# Patient Record
Sex: FEMALE | Race: WHITE | NOT HISPANIC OR LATINO | Employment: OTHER | URBAN - METROPOLITAN AREA
[De-identification: names, ages, dates, MRNs, and addresses within clinical notes are randomized per-mention and may not be internally consistent; named-entity substitution may affect disease eponyms.]

---

## 2017-01-24 ENCOUNTER — TELEPHONE (OUTPATIENT)
Dept: INTERNAL MEDICINE | Facility: CLINIC | Age: 62
End: 2017-01-24

## 2017-01-24 DIAGNOSIS — E03.8 SUBCLINICAL HYPOTHYROIDISM: Primary | ICD-10-CM

## 2017-01-24 RX ORDER — LEVOTHYROXINE SODIUM 25 UG/1
25 TABLET ORAL DAILY
Qty: 90 TABLET | Refills: 1 | Status: ON HOLD | OUTPATIENT
Start: 2017-01-24 | End: 2019-07-23

## 2017-01-24 NOTE — TELEPHONE ENCOUNTER
Dr. Álvarez,    Pt is calling complaining of possible flu symptoms. She has been having a fever, will get dizzy, weak, very tired, decreased appetite. Today pt took a shower, she exerted a lot of energy while showering. she was so tired after the shower she had to lay down and rest.   Also, she has been coughong for about 1 month. Non-productive cough, intermittent cough. She is drinking plenty of fluids. Taking ibuprofen and cough medicine. Sometimes she is SOB.   Do you want to see pt in clinic for appt? Rule out flu, bronchitis, pneumonia?

## 2017-01-25 NOTE — TELEPHONE ENCOUNTER
Spoke with patient who states she is feeling much better today.  She will take it easy today and continue to drink plenty of fluids.  Will call back if symptoms worsen or if any further questions or concerns.

## 2017-01-26 ENCOUNTER — OFFICE VISIT (OUTPATIENT)
Dept: INTERNAL MEDICINE | Facility: CLINIC | Age: 62
End: 2017-01-26
Payer: MEDICARE

## 2017-01-26 VITALS
HEART RATE: 72 BPM | SYSTOLIC BLOOD PRESSURE: 118 MMHG | OXYGEN SATURATION: 95 % | HEIGHT: 66 IN | BODY MASS INDEX: 32.3 KG/M2 | TEMPERATURE: 98.3 F | WEIGHT: 201 LBS | DIASTOLIC BLOOD PRESSURE: 80 MMHG

## 2017-01-26 DIAGNOSIS — J06.9 UPPER RESPIRATORY TRACT INFECTION, UNSPECIFIED TYPE: Primary | ICD-10-CM

## 2017-01-26 PROCEDURE — 99214 OFFICE O/P EST MOD 30 MIN: CPT | Performed by: INTERNAL MEDICINE

## 2017-01-26 RX ORDER — CODEINE PHOSPHATE AND GUAIFENESIN 10; 100 MG/5ML; MG/5ML
1 SOLUTION ORAL EVERY 4 HOURS PRN
Qty: 120 ML | Refills: 0 | Status: SHIPPED | OUTPATIENT
Start: 2017-01-26 | End: 2017-02-03

## 2017-01-26 NOTE — PROGRESS NOTES
SUBJECTIVE:                                                    Mercedes Barber is a 61 year old female who presents to clinic today for the following health issues    Acute Illness   Acute illness concerns: 1 week  Onset:     Fever: no     Chills/Sweats: no     Headache (location?): no     Sinus Pressure:YES    Conjunctivitis:  no    Ear Pain: no    Rhinorrhea: YES    Congestion: no     Sore Throat: no      Cough: YES    Wheeze: no     Decreased Appetite: YES    Nausea: no     Vomiting: no     Diarrhea:  no     Dysuria/Freq.: no     Fatigue/Achiness: YES    Sick/Strep Exposure: no      Therapies Tried and outcome:     Problem list and histories reviewed & adjusted, as indicated.  Additional history: as documented    Patient Active Problem List   Diagnosis     Menopausal syndrome (hot flashes)     Family history of breast cancer     Vulvar pain     Diarrhea     Renal anomaly     Overactive bladder     Rectal prolapse     CARDIOVASCULAR SCREENING; LDL GOAL LESS THAN 160     Vaginal pain     Dysphagia     Confusion     Headache     Left shoulder pain     Anemia     Advanced directives, counseling/discussion     Mild major depression (H)     Esophageal stricture     Vulvar lesion     Temporal sclerosis     Constipation     Hypothyroidism     Lumbago     Pain in thoracic spine     Sciatica     Pain in joint, lower leg     Plantar fascial fibromatosis     Pain in joint involving ankle and foot     Other specified hypothyroidism     Gastroesophageal reflux disease without esophagitis     Irritable bowel syndrome     Sleep apnea, unspecified sleep apnea type     Cervical pain     Past Surgical History   Procedure Laterality Date     C anesth,total knee arthroplasty       bilateral     Tonsillectomy & adenoidectomy       Carpal tunnel release rt/lt       Hammar toes on r foot       Drain pilonidal cyst simpl       L shoulder fracture       Rectal prolapse repair abdominally       Hc anter colporrhaphy,blad/vagina        perigee mesh     Tubal ligation       Hysterectomy, pap no longer indicated       Dilation and curettage       Remove mesh vagina  8/10/2012     Procedure: REMOVE MESH VAGINA;  Excision of Vaginal Mesh Exposure, Removal of Skin Tag Left Inner Leg;  Surgeon: Jerald Diggs MD;  Location: [facility] OR     Excise lesion trunk  8/10/2012     Procedure: EXCISE LESION TRUNK;;  Surgeon: Jerald Diggs MD;  Location: [facility] OR     Esophagoscopy, gastroscopy, duodenoscopy (egd), combined  4/5/2013     Procedure: COMBINED ESOPHAGOSCOPY, GASTROSCOPY, DUODENOSCOPY (EGD), BIOPSY SINGLE OR MULTIPLE;;  Surgeon: Mundo Mehta MD;  Location:  GI     Dental surgery       implant     Release plantar fascia Right 1/20/2015     Procedure: RELEASE PLANTAR FASCIA;  Surgeon: Maicol Liu DPM;  Location: [facility]     Repair hammer toe Right 1/20/2015     Procedure: REPAIR HAMMER TOE;  Surgeon: Maicol Liu DPM;  Location: [facility]     Endoscopy drug induced sleep N/A 11/18/2015     Procedure: ENDOSCOPY DRUG INDUCED SLEEP;  Surgeon: Park Ortiz MD;  Location: [facility] OR       Social History   Substance Use Topics     Smoking status: Never Smoker      Smokeless tobacco: Never Used     Alcohol Use: Yes      Comment: 1 glass of wine 4x/yr     Family History   Problem Relation Age of Onset     Eye Disorder Mother      Lipids Mother      has CHF     OSTEOPOROSIS Mother      DIABETES Father      Alzheimer Disease Paternal Grandmother      Hypertension Brother      Alcohol/Drug Brother      Depression Brother      GASTROINTESTINAL DISEASE Brother      hx of colonic polyps     Lipids Brother      Psychotic Disorder Brother      Breast Cancer Sister      Depression Sister      Lipids Sister      Thyroid Disease Sister      Congenital Anomalies Daughter      Neurologic Disorder Daughter          Current Outpatient Prescriptions   Medication Sig Dispense Refill     levothyroxine (SYNTHROID/LEVOTHROID) 25 MCG tablet Take 1 tablet (25 mcg)  "by mouth daily 90 tablet 1     ranitidine (ZANTAC) 150 MG tablet Take 1 tablet (150 mg) by mouth 2 times daily 180 tablet 1     cetirizine (ZYRTEC) 10 MG tablet Take 1 tablet (10 mg) by mouth every evening 30 tablet 1     omeprazole (PRILOSEC) 40 MG capsule Take 1 capsule (40 mg) by mouth 2 times daily 180 capsule 3     ketoprofen 10% in PLO 10% Ketoprofen in PLO 60 g 2     order for DME Equipment being ordered: ankle trilok brace 1 Device 0     ibuprofen (ADVIL,MOTRIN) 600 MG tablet Take 1 tablet (600 mg) by mouth every 6 hours as needed for moderate pain 30 tablet 1     amoxicillin (AMOXIL) 500 MG capsule Take 4 capsules 1 hour before dental appointment  2     nefazodone (SERZONE) 200 MG tablet At Bedtime        pramipexole (MIRAPEX) 1 MG tablet        traZODone (DESYREL) 100 MG tablet 300 mg At Bedtime        polyethylene glycol (MIRALAX) powder Take 17 g by mouth. STIR 1 CAPFUL (17GM) IN 8 OZ OF LIQUID AND DRINK 527 g prn     calcium carbonate 500 MG tablet Take 1,000 mg by mouth daily.       cycloSPORINE (RESTASIS) 0.05 % ophthalmic emulsion Place 1 drop into both eyes 2 times daily as needed.       lacosamide (VIMPAT) 200 MG TABS Take 200 mg by mouth 2 times daily.       buPROPion (WELLBUTRIN XL) 300 MG 24 hr tablet Take 300 mg by mouth every morning.       Allergies   Allergen Reactions     Clonopin [Clonazepam]      \"Walk funny and Mind goes funny\"     Encort [Hydrocortisone Acetate] Swelling     Feet swelled.     Encort [Hydrocortisone] Swelling     Erythromycin Diarrhea     Flagyl [Imidazole Antifungals] Nausea     Gabapentin      Over eats     Tegretol [Carbamazepine] Nausea and Vomiting     BP Readings from Last 3 Encounters:   10/07/16 100/60   09/01/16 108/68   07/12/16 110/70    Wt Readings from Last 3 Encounters:   10/06/16 190 lb (86.183 kg)   09/01/16 203 lb (92.08 kg)   07/12/16 199 lb (90.266 kg)            ROS:  C: NEGATIVE for fever, chills, change in weight  E/M: NEGATIVE for ear, mouth and " "throat problems  CV: NEGATIVE for chest pain, palpitations or peripheral edema  GI: NEGATIVE for nausea, abdominal pain, heartburn, or change in bowel habits  : NEGATIVE for frequency, dysuria, or hematuria  M: NEGATIVE for significant arthralgias or myalgia  H: NEGATIVE for bleeding problems  P: NEGATIVE for changes in mood or affect    OBJECTIVE:                                                    /80 mmHg  Pulse 72  Temp(Src) 98.3  F (36.8  C) (Oral)  Ht 5' 6\" (1.676 m)  Wt 201 lb (91.173 kg)  BMI 32.46 kg/m2  SpO2 95%  LMP 03/11/2010  There is no weight on file to calculate BMI.  GENERAL: healthy, alert and no distress  EYES: Eyes grossly normal to inspection, extraocular movements - intact, and PERRL  HENT: ear canals- normal; TMs- normal; Nose- normal; Mouth- no ulcers, no lesions  NECK: no tenderness, no adenopathy, no asymmetry, no masses, no stiffness; thyroid- normal to palpation  RESP: lungs clear to auscultation - no rales, no rhonchi, no wheezes  CV: regular rates and rhythm, normal S1 S2, no S3 or S4 and no murmur, no click or rub -  MS: extremities- no gross deformities noted, no edema  PSYCH: Alert and oriented times 3; speech- coherent , normal rate and volume; able to articulate logical thoughts, able to abstract reason, no tangential thoughts, no hallucinations or delusions, affect- normal       ASSESSMENT/PLAN:                                                      (J06.9) Upper respiratory tract infection, unspecified type  (primary encounter diagnosis)  Comment: appears as acute viral etiology, supportive care discussed  Plan: guaiFENesin-codeine (ROBITUSSIN AC) 100-10         MG/5ML SOLN solution        No indication for abx      See Patient Instructions    Skyler Álvarez MD  [facility]    25 minutes spent with this patient, face to face, discussing treatment options for listed problems above as well as side effects of appropriate medications.  Counseling " time extended beyond 50% of the clinic visit.  Medication dosing, treatment plan and follow-up were discussed. Also reviewed need for primary care testing for patient.

## 2017-01-26 NOTE — MR AVS SNAPSHOT
"              After Visit Summary   2017    Mercedes Barber    MRN: [id number]           Patient Information     Date Of Birth          1955        Visit Information        Provider Department      2017 5:20 PM Skyler Álvarez MD [facility]        Today's Diagnoses     Upper respiratory tract infection, unspecified type    -  1        Follow-ups after your visit        Follow-up notes from your care team     Return if symptoms worsen or fail to improve.      Who to contact     If you have questions or need follow up information about today's clinic visit or your schedule please contact [facility] directly at [phone].  Normal or non-critical lab and imaging results will be communicated to you by MyChart, letter or phone within 4 business days after the clinic has received the results. If you do not hear from us within 7 days, please contact the clinic through MyChart or phone. If you have a critical or abnormal lab result, we will notify you by phone as soon as possible.  Submit refill requests through [employer] or call your pharmacy and they will forward the refill request to us. Please allow 3 business days for your refill to be completed.          Additional Information About Your Visit        MyChart Information     [employer] lets you send messages to your doctor, view your test results, renew your prescriptions, schedule appointments and more. To sign up, go to [email or web address] . Click on \"Log in\" on the left side of the screen, which will take you to the Welcome page. Then click on \"Sign up Now\" on the right side of the page.     You will be asked to enter the access code listed below, as well as some personal information. Please follow the directions to create your username and password.     Your access code is: [email or web address]  Expires: 2017  5:39 PM     Your access code will  in 90 days. If you need help or a new code, " "please call your [place] clinic or [phone].        Care EveryWhere ID     This is your Care EveryWhere ID. This could be used by other organizations to access your [place] medical records  [id number]        Your Vitals Were     Pulse Temperature Height BMI (Body Mass Index) Pulse Oximetry Last Period    72 98.3  F (36.8  C) (Oral) 5' 6\" (1.676 m) 32.46 kg/m2 95% 03/11/2010       Blood Pressure from Last 3 Encounters:   01/26/17 118/80   10/07/16 100/60   09/01/16 108/68    Weight from Last 3 Encounters:   01/26/17 201 lb (91.173 kg)   10/06/16 190 lb (86.183 kg)   09/01/16 203 lb (92.08 kg)              Today, you had the following     No orders found for display         Today's Medication Changes          These changes are accurate as of: 1/26/17  5:39 PM.  If you have any questions, ask your nurse or doctor.               Start taking these medicines.        Dose/Directions    guaiFENesin-codeine 100-10 MG/5ML Soln solution   Commonly known as:  ROBITUSSIN AC   Used for:  Upper respiratory tract infection, unspecified type   Started by:  Skyler Álvarez MD        Dose:  1 tsp.   Take 5 mLs by mouth every 4 hours as needed for cough   Quantity:  120 mL   Refills:  0            Where to get your medicines      Some of these will need a paper prescription and others can be bought over the counter.  Ask your nurse if you have questions.     Bring a paper prescription for each of these medications    - guaiFENesin-codeine 100-10 MG/5ML Soln solution             Primary Care Provider Office Phone # Fax #    Skyler Álvarez [phone]       [facility] [address] [facility] [phone]        Thank you!     Thank you for choosing [facility]  for your care. Our goal is always to provide you with excellent care. Hearing back from our patients is one way we can continue to improve our services. Please take a few minutes to complete the written survey " that you may receive in the mail after your visit with us. Thank you!             Your Updated Medication List - Protect others around you: Learn how to safely use, store and throw away your medicines at www.disposemymeds.org.          This list is accurate as of: 1/26/17  5:39 PM.  Always use your most recent med list.                   Brand Name Dispense Instructions for use    amoxicillin 500 MG capsule    AMOXIL     Take 4 capsules 1 hour before dental appointment       calcium carbonate 500 MG tablet    OS-SABA 500 mg [place]. [place]     Take 1,000 mg by mouth daily.       cetirizine 10 MG tablet    zyrTEC    30 tablet    Take 1 tablet (10 mg) by mouth every evening       guaiFENesin-codeine 100-10 MG/5ML Soln solution    ROBITUSSIN AC    120 mL    Take 5 mLs by mouth every 4 hours as needed for cough       ibuprofen 600 MG tablet    ADVIL/MOTRIN    30 tablet    Take 1 tablet (600 mg) by mouth every 6 hours as needed for moderate pain       ketoprofen 10% in PLO 10% topical gel     60 g    Ketoprofen in PLO       levothyroxine 25 MCG tablet    SYNTHROID/LEVOTHROID    90 tablet    Take 1 tablet (25 mcg) by mouth daily       nefazodone 200 MG tablet    SERZONE     At Bedtime       omeprazole 40 MG capsule    priLOSEC    180 capsule    Take 1 capsule (40 mg) by mouth 2 times daily       order for DME     1 Device    Equipment being ordered: ankle trilok brace       polyethylene glycol powder    MIRALAX    527 g    Take 17 g by mouth. STIR 1 CAPFUL (17GM) IN 8 OZ OF LIQUID AND DRINK       pramipexole 1 MG tablet    MIRAPEX         ranitidine 150 MG tablet    ZANTAC    180 tablet    Take 1 tablet (150 mg) by mouth 2 times daily       RESTASIS 0.05 % ophthalmic emulsion   Generic drug:  cycloSPORINE      Place 1 drop into both eyes 2 times daily as needed.       traZODone 100 MG tablet    DESYREL     300 mg At Bedtime       VIMPAT 200 MG Tabs tablet   Generic drug:  lacosamide      Take 200 mg by mouth 2 times daily.        WELLBUTRIN  MG 24 hr tablet   Generic drug:  buPROPion      Take 300 mg by mouth every morning.

## 2017-01-26 NOTE — NURSING NOTE
"Chief Complaint   Patient presents with     URI       Initial /80 mmHg  Pulse 72  Temp(Src) 98.3  F (36.8  C) (Oral)  Ht 5' 6\" (1.676 m)  Wt 201 lb (91.173 kg)  BMI 32.46 kg/m2  SpO2 95%  LMP 03/11/2010 Estimated body mass index is 32.46 kg/(m^2) as calculated from the following:    Height as of this encounter: 5' 6\" (1.676 m).    Weight as of this encounter: 201 lb (91.173 kg).  BP completed using cuff size:     Carol Burgos CMA      "

## 2017-01-27 ASSESSMENT — PATIENT HEALTH QUESTIONNAIRE - PHQ9: SUM OF ALL RESPONSES TO PHQ QUESTIONS 1-9: 21

## 2017-02-01 ENCOUNTER — TRANSFERRED RECORDS (OUTPATIENT)
Dept: HEALTH INFORMATION MANAGEMENT | Facility: CLINIC | Age: 62
End: 2017-02-01

## 2017-02-03 DIAGNOSIS — J06.9 UPPER RESPIRATORY TRACT INFECTION, UNSPECIFIED TYPE: Primary | ICD-10-CM

## 2017-02-03 NOTE — TELEPHONE ENCOUNTER
Pt still coughing a lot at night and would like one more fill of the cough medicine.  Please call to [facility].

## 2017-02-05 RX ORDER — CODEINE PHOSPHATE AND GUAIFENESIN 10; 100 MG/5ML; MG/5ML
1 SOLUTION ORAL EVERY 4 HOURS PRN
Qty: 120 ML | Refills: 0 | Status: SHIPPED | OUTPATIENT
Start: 2017-02-05 | End: 2017-03-09

## 2017-02-06 NOTE — TELEPHONE ENCOUNTER
Called pt to advise the rx was faxed to [facility]  Pt advised the medication costs too much and would like a less expensive medicine  Please send over a less expensive medicine for her cough to her preferred pharmacy.

## 2017-02-08 ENCOUNTER — HOSPITAL ENCOUNTER (OUTPATIENT)
Dept: GENERAL RADIOLOGY | Facility: CLINIC | Age: 62
Discharge: HOME | End: 2017-02-08
Attending: PHYSICIAN ASSISTANT | Admitting: PHYSICIAN ASSISTANT
Payer: MEDICARE

## 2017-02-08 DIAGNOSIS — K59.00 CONSTIPATION, UNSPECIFIED CONSTIPATION TYPE: ICD-10-CM

## 2017-02-08 DIAGNOSIS — M79.7 FIBROMYALGIA: ICD-10-CM

## 2017-02-08 PROCEDURE — 74283 THER NMA RDCTJ INTUS/OBSTRCJ: CPT

## 2017-02-08 RX ADMIN — DIATRIZOATE MEGLUMINE AND DIATRIZOATE SODIUM 2000 ML: 660; 100 SOLUTION ORAL; RECTAL at 07:58

## 2017-02-09 ENCOUNTER — TRANSFERRED RECORDS (OUTPATIENT)
Dept: HEALTH INFORMATION MANAGEMENT | Facility: CLINIC | Age: 62
End: 2017-02-09

## 2017-02-23 ENCOUNTER — HOSPITAL ENCOUNTER (OUTPATIENT)
Dept: MRI IMAGING | Facility: CLINIC | Age: 62
Discharge: HOME | End: 2017-02-23
Attending: PHYSICIAN ASSISTANT | Admitting: PHYSICIAN ASSISTANT
Payer: MEDICARE

## 2017-02-23 DIAGNOSIS — K58.2 IRRITABLE BOWEL SYNDROME WITH CONSTIPATION AND DIARRHEA: ICD-10-CM

## 2017-02-23 DIAGNOSIS — R93.89 ABNORMAL CT SCAN: ICD-10-CM

## 2017-02-23 PROCEDURE — 74183 MRI ABD W/O CNTR FLWD CNTR: CPT

## 2017-02-23 PROCEDURE — 25500064 ZZH RX 255 OP 636: Performed by: RADIOLOGY

## 2017-02-23 PROCEDURE — A9585 GADOBUTROL INJECTION: HCPCS | Performed by: RADIOLOGY

## 2017-02-23 RX ORDER — GADOBUTROL 604.72 MG/ML
15 INJECTION INTRAVENOUS ONCE
Status: COMPLETED | OUTPATIENT
Start: 2017-02-23 | End: 2017-02-23

## 2017-02-23 RX ADMIN — GADOBUTROL 15 ML: 604.72 INJECTION INTRAVENOUS at 08:00

## 2017-02-24 ENCOUNTER — TRANSFERRED RECORDS (OUTPATIENT)
Dept: HEALTH INFORMATION MANAGEMENT | Facility: CLINIC | Age: 62
End: 2017-02-24

## 2017-02-27 ENCOUNTER — RADIANT APPOINTMENT (OUTPATIENT)
Dept: GENERAL RADIOLOGY | Facility: CLINIC | Age: 62
End: 2017-02-27
Attending: INTERNAL MEDICINE
Payer: MEDICARE

## 2017-02-27 ENCOUNTER — OFFICE VISIT (OUTPATIENT)
Dept: INTERNAL MEDICINE | Facility: CLINIC | Age: 62
End: 2017-02-27
Payer: MEDICARE

## 2017-02-27 VITALS
WEIGHT: 202 LBS | SYSTOLIC BLOOD PRESSURE: 106 MMHG | HEART RATE: 63 BPM | BODY MASS INDEX: 32.47 KG/M2 | TEMPERATURE: 98.5 F | HEIGHT: 66 IN | DIASTOLIC BLOOD PRESSURE: 66 MMHG | OXYGEN SATURATION: 93 %

## 2017-02-27 DIAGNOSIS — M79.10 MYALGIA: ICD-10-CM

## 2017-02-27 DIAGNOSIS — M79.10 MYALGIA: Primary | ICD-10-CM

## 2017-02-27 LAB
FLUAV+FLUBV AG SPEC QL: NEGATIVE
FLUAV+FLUBV AG SPEC QL: NORMAL
SPECIMEN SOURCE: NORMAL

## 2017-02-27 PROCEDURE — 87804 INFLUENZA ASSAY W/OPTIC: CPT | Performed by: INTERNAL MEDICINE

## 2017-02-27 PROCEDURE — 99214 OFFICE O/P EST MOD 30 MIN: CPT | Performed by: INTERNAL MEDICINE

## 2017-02-27 PROCEDURE — 71020 XR CHEST 2 VW: CPT

## 2017-02-27 RX ORDER — CODEINE PHOSPHATE AND GUAIFENESIN 10; 100 MG/5ML; MG/5ML
1 SOLUTION ORAL EVERY 4 HOURS PRN
Qty: 120 ML | Refills: 0 | Status: SHIPPED | OUTPATIENT
Start: 2017-02-27 | End: 2017-06-05

## 2017-02-27 RX ORDER — BENZONATATE 100 MG/1
100 CAPSULE ORAL 3 TIMES DAILY PRN
Qty: 30 CAPSULE | Refills: 0 | Status: SHIPPED | OUTPATIENT
Start: 2017-02-27 | End: 2017-06-05

## 2017-02-27 NOTE — LETTER
[facility]  [address] [address] [phone] (appt)  [phone] (nurse line)    February 27, 2017      Mercedes Barber  [address]  [facility] [address]              Dear Mercedes,    The results of your recent influenza test were negative.     If you have any questions or concerns, please call myself or my nurse at [phone].          Sincerely,    NAMOI MARRERO MD, MADAMA.  Internal Medicine

## 2017-02-27 NOTE — NURSING NOTE
"Chief Complaint   Patient presents with     URI       Initial /66 (BP Location: Left arm, Patient Position: Chair, Cuff Size: Adult Regular)  Pulse 63  Temp 98.5  F (36.9  C) (Oral)  Ht 5' 6\" (1.676 m)  Wt 202 lb (91.6 kg)  LMP 03/11/2010  SpO2 93%  BMI 32.6 kg/m2 Estimated body mass index is 32.6 kg/(m^2) as calculated from the following:    Height as of this encounter: 5' 6\" (1.676 m).    Weight as of this encounter: 202 lb (91.6 kg).  Medication Reconciliation: complete   Jeanne Duckworth, RONNI      "

## 2017-02-27 NOTE — MR AVS SNAPSHOT
After Visit Summary   2/27/2017    Mercedes Barber    MRN: [id number]           Patient Information     Date Of Birth          1955        Visit Information        Provider Department      2/27/2017 8:00 AM Skyler Álvarez MD [facility]        Today's Diagnoses     Myalgia    -  1       Follow-ups after your visit        Follow-up notes from your care team     Return if symptoms worsen or fail to improve.      Your next 10 appointments already scheduled     Feb 27, 2017  8:20 AM CST   XR CHEST 2 VIEWS with [id number]   [facility]    [address] [id number] [phone]           Please bring a list of your current medicines to your exam. (Include vitamins, minerals and over-thecounter medicines.) Leave your valuables at home.  Tell your doctor if there is a chance you may be pregnant.  You do not need to do anything special for this exam.              Who to contact     If you have questions or need follow up information about today's clinic visit or your schedule please contact [facility] directly at [phone].  Normal or non-critical lab and imaging results will be communicated to you by MyChart, letter or phone within 4 business days after the clinic has received the results. If you do not hear from us within 7 days, please contact the clinic through [employer] or phone. If you have a critical or abnormal lab result, we will notify you by phone as soon as possible.  Submit refill requests through [employer] or call your pharmacy and they will forward the refill request to us. Please allow 3 business days for your refill to be completed.          Additional Information About Your Visit        MyChart Information     [employer] lets you send messages to your doctor, view your test results, renew your prescriptions, schedule appointments and more. To sign up, go to  "[email or web address] . Click on \"Log in\" on the left side of the screen, which will take you to the Welcome page. Then click on \"Sign up Now\" on the right side of the page.     You will be asked to enter the access code listed below, as well as some personal information. Please follow the directions to create your username and password.     Your access code is: [email or web address]  Expires: 2017  5:39 PM     Your access code will  in 90 days. If you need help or a new code, please call your [place] clinic or [phone].        Care EveryWhere ID     This is your Care EveryWhere ID. This could be used by other organizations to access your [place] medical records  [id number]        Your Vitals Were     Pulse Temperature Height Last Period Pulse Oximetry BMI (Body Mass Index)    63 98.5  F (36.9  C) (Oral) 5' 6\" (1.676 m) 2010 93% 32.6 kg/m2       Blood Pressure from Last 3 Encounters:   17 106/66   17 118/80   10/07/16 100/60    Weight from Last 3 Encounters:   17 202 lb (91.6 kg)   17 201 lb (91.2 kg)   10/06/16 190 lb (86.2 kg)              We Performed the Following     DEPRESSION ACTION PLAN (DAP)     H Influenzae antigen          Today's Medication Changes          These changes are accurate as of: 17  8:19 AM.  If you have any questions, ask your nurse or doctor.               Start taking these medicines.        Dose/Directions    benzonatate 100 MG capsule   Commonly known as:  TESSALON   Used for:  Myalgia   Started by:  Skyler Álvarez MD        Dose:  100 mg   Take 1 capsule (100 mg) by mouth 3 times daily as needed for cough   Quantity:  30 capsule   Refills:  0         These medicines have changed or have updated prescriptions.        Dose/Directions    * guaiFENesin-codeine 100-10 MG/5ML Soln solution   Commonly known as:  ROBITUSSIN AC   This may have changed:  Another medication with the same name was added. Make sure you understand how and when to take " each.   Used for:  Upper respiratory tract infection, unspecified type   Changed by:  Skyler Álvarez MD        Dose:  1 tsp.   Take 5 mLs by mouth every 4 hours as needed for cough   Quantity:  120 mL   Refills:  0       * guaiFENesin-codeine 100-10 MG/5ML Soln solution   Commonly known as:  ROBITUSSIN AC   This may have changed:  You were already taking a medication with the same name, and this prescription was added. Make sure you understand how and when to take each.   Used for:  Myalgia   Changed by:  Skyler Álvarez MD        Dose:  1 tsp.   Take 5 mLs by mouth every 4 hours as needed for cough   Quantity:  120 mL   Refills:  0       * Notice:  This list has 2 medication(s) that are the same as other medications prescribed for you. Read the directions carefully, and ask your doctor or other care provider to review them with you.         Where to get your medicines      These medications were sent to [facility] [address] [place] [address] [address] AT [place] [address] [facility] [address]    Hours:  24-hours Phone:  [phone]     benzonatate 100 MG capsule         Some of these will need a paper prescription and others can be bought over the counter.  Ask your nurse if you have questions.     Bring a paper prescription for each of these medications     guaiFENesin-codeine 100-10 MG/5ML Soln solution                Primary Care Provider Office Phone # Fax #    Skyler Álvarez [phone]       [facility] [address] [facility] [address]        Thank you!     Thank you for choosing [facility]  for your care. Our goal is always to provide you with excellent care. Hearing back from our patients is one way we can continue to improve our services. Please take a few minutes to complete the written survey that you may receive in the mail after your visit with us. Thank you!             Your Updated Medication  List - Protect others around you: Learn how to safely use, store and throw away your medicines at www.disposemymeds.org.          This list is accurate as of: 2/27/17  8:19 AM.  Always use your most recent med list.                   Brand Name Dispense Instructions for use    amoxicillin 500 MG capsule    AMOXIL     Take 4 capsules 1 hour before dental appointment       benzonatate 100 MG capsule    TESSALON    30 capsule    Take 1 capsule (100 mg) by mouth 3 times daily as needed for cough       calcium carbonate 500 MG tablet    OS-SABA 500 mg [place]. [place]     Take 1,000 mg by mouth daily.       cetirizine 10 MG tablet    zyrTEC    30 tablet    Take 1 tablet (10 mg) by mouth every evening       * guaiFENesin-codeine 100-10 MG/5ML Soln solution    ROBITUSSIN AC    120 mL    Take 5 mLs by mouth every 4 hours as needed for cough       * guaiFENesin-codeine 100-10 MG/5ML Soln solution    ROBITUSSIN AC    120 mL    Take 5 mLs by mouth every 4 hours as needed for cough       ibuprofen 600 MG tablet    ADVIL/MOTRIN    30 tablet    Take 1 tablet (600 mg) by mouth every 6 hours as needed for moderate pain       ketoprofen 10% in PLO 10% topical gel     60 g    Ketoprofen in PLO       levothyroxine 25 MCG tablet    SYNTHROID/LEVOTHROID    90 tablet    Take 1 tablet (25 mcg) by mouth daily       nefazodone 200 MG tablet    SERZONE     At Bedtime       omeprazole 40 MG capsule    priLOSEC    180 capsule    Take 1 capsule (40 mg) by mouth 2 times daily       order for DME     1 Device    Equipment being ordered: ankle trilok brace       polyethylene glycol powder    MIRALAX    527 g    Take 17 g by mouth. STIR 1 CAPFUL (17GM) IN 8 OZ OF LIQUID AND DRINK       pramipexole 1 MG tablet    MIRAPEX         ranitidine 150 MG tablet    ZANTAC    180 tablet    Take 1 tablet (150 mg) by mouth 2 times daily       RESTASIS 0.05 % ophthalmic emulsion   Generic drug:  cycloSPORINE      Place 1 drop into both eyes 2 times daily as needed.        traZODone 100 MG tablet    DESYREL     300 mg At Bedtime       VIMPAT 200 MG Tabs tablet   Generic drug:  lacosamide      Take 200 mg by mouth 2 times daily.       WELLBUTRIN  MG 24 hr tablet   Generic drug:  buPROPion      Take 300 mg by mouth every morning.       * Notice:  This list has 2 medication(s) that are the same as other medications prescribed for you. Read the directions carefully, and ask your doctor or other care provider to review them with you.

## 2017-02-27 NOTE — PROGRESS NOTES
SUBJECTIVE:                                                    Mercedes Barber is a 61 year old female who presents to clinic today for the following health issues:      RESPIRATORY SYMPTOMS      Duration: 1 month     Description  nasal congestion, rhinorrhea, cough, wheezing, headache, fatigue/malaise, hoarse voice, myalgias and conjunctival irritation    Severity: moderate    Accompanying signs and symptoms: diarrhea- states hx of IBS    History (predisposing factors):  none    Precipitating or alleviating factors: None    Therapies tried and outcome:  Seen 1/26/17 by PCP and given Robitussin AC for URI. Also seen 2/14/17 at [facility] and given Doxycycline 100 mg bid x 10 days and phenergan with Codeine- no improvement in sx        Problem list and histories reviewed & adjusted, as indicated.  Additional history: as documented    Patient Active Problem List   Diagnosis     Menopausal syndrome (hot flashes)     Family history of breast cancer     Vulvar pain     Diarrhea     Renal anomaly     Overactive bladder     Rectal prolapse     CARDIOVASCULAR SCREENING; LDL GOAL LESS THAN 160     Vaginal pain     Dysphagia     Confusion     Headache     Left shoulder pain     Anemia     Advanced directives, counseling/discussion     Mild major depression (H)     Esophageal stricture     Vulvar lesion     Temporal sclerosis     Constipation     Hypothyroidism     Lumbago     Pain in thoracic spine     Sciatica     Pain in joint, lower leg     Plantar fascial fibromatosis     Pain in joint involving ankle and foot     Other specified hypothyroidism     Gastroesophageal reflux disease without esophagitis     Irritable bowel syndrome     Sleep apnea, unspecified sleep apnea type     Cervical pain     Past Surgical History   Procedure Laterality Date     C anesth,total knee arthroplasty       bilateral     Tonsillectomy & adenoidectomy       Carpal tunnel release rt/lt       Hammar toes on r foot       Drain pilonidal  cyst simpl       L shoulder fracture       Rectal prolapse repair abdominally       Hc anter colporrhaphy,blad/vagina       perigee mesh     Tubal ligation       Hysterectomy, pap no longer indicated       Dilation and curettage       Remove mesh vagina  8/10/2012     Procedure: REMOVE MESH VAGINA;  Excision of Vaginal Mesh Exposure, Removal of Skin Tag Left Inner Leg;  Surgeon: Jerald Diggs MD;  Location: [facility] OR     Excise lesion trunk  8/10/2012     Procedure: EXCISE LESION TRUNK;;  Surgeon: Jerald Diggs MD;  Location: [facility] OR     Esophagoscopy, gastroscopy, duodenoscopy (egd), combined  4/5/2013     Procedure: COMBINED ESOPHAGOSCOPY, GASTROSCOPY, DUODENOSCOPY (EGD), BIOPSY SINGLE OR MULTIPLE;;  Surgeon: Mundo Mehta MD;  Location:  GI     Dental surgery       implant     Release plantar fascia Right 1/20/2015     Procedure: RELEASE PLANTAR FASCIA;  Surgeon: Maicol Liu DPM;  [facility]: [facility]     Repair hammer toe Right 1/20/2015     Procedure: REPAIR HAMMER TOE;  Surgeon: Maicol Liu DPM;  Location: [facility]     Endoscopy drug induced sleep N/A 11/18/2015     Procedure: ENDOSCOPY DRUG INDUCED SLEEP;  Surgeon: Park Ortiz MD;  Location: [facility] OR       Social History   Substance Use Topics     Smoking status: Never Smoker     Smokeless tobacco: Never Used     Alcohol use Yes      Comment: 1 glass of wine 4x/yr     Family History   Problem Relation Age of Onset     Eye Disorder Mother      Lipids Mother      has CHF     OSTEOPOROSIS Mother      DIABETES Father      Alzheimer Disease Paternal Grandmother      Hypertension Brother      Alcohol/Drug Brother      Depression Brother      GASTROINTESTINAL DISEASE Brother      hx of colonic polyps     Lipids Brother      Psychotic Disorder Brother      Breast Cancer Sister      Depression Sister      Lipids Sister      Thyroid Disease Sister      Congenital Anomalies Daughter      Neurologic Disorder Daughter          Current Outpatient  "Prescriptions   Medication Sig Dispense Refill     levothyroxine (SYNTHROID/LEVOTHROID) 25 MCG tablet Take 1 tablet (25 mcg) by mouth daily 90 tablet 1     ranitidine (ZANTAC) 150 MG tablet Take 1 tablet (150 mg) by mouth 2 times daily 180 tablet 1     cetirizine (ZYRTEC) 10 MG tablet Take 1 tablet (10 mg) by mouth every evening 30 tablet 1     omeprazole (PRILOSEC) 40 MG capsule Take 1 capsule (40 mg) by mouth 2 times daily 180 capsule 3     ketoprofen 10% in PLO 10% Ketoprofen in PLO 60 g 2     order for DME Equipment being ordered: ankle trilok brace 1 Device 0     ibuprofen (ADVIL,MOTRIN) 600 MG tablet Take 1 tablet (600 mg) by mouth every 6 hours as needed for moderate pain 30 tablet 1     nefazodone (SERZONE) 200 MG tablet At Bedtime        pramipexole (MIRAPEX) 1 MG tablet        traZODone (DESYREL) 100 MG tablet 300 mg At Bedtime        polyethylene glycol (MIRALAX) powder Take 17 g by mouth. STIR 1 CAPFUL (17GM) IN 8 OZ OF LIQUID AND DRINK 527 g prn     calcium carbonate 500 MG tablet Take 1,000 mg by mouth daily.       cycloSPORINE (RESTASIS) 0.05 % ophthalmic emulsion Place 1 drop into both eyes 2 times daily as needed.       lacosamide (VIMPAT) 200 MG TABS Take 200 mg by mouth 2 times daily.       buPROPion (WELLBUTRIN XL) 300 MG 24 hr tablet Take 300 mg by mouth every morning.       guaiFENesin-codeine (ROBITUSSIN AC) 100-10 MG/5ML SOLN solution Take 5 mLs by mouth every 4 hours as needed for cough 120 mL 0     amoxicillin (AMOXIL) 500 MG capsule Take 4 capsules 1 hour before dental appointment  2     Allergies   Allergen Reactions     Clonopin [Clonazepam]      \"Walk funny and Mind goes funny\"     Encort [Hydrocortisone Acetate] Swelling     Feet swelled.     Encort [Hydrocortisone] Swelling     Erythromycin Diarrhea     Flagyl [Imidazole Antifungals] Nausea     Gabapentin      Over eats     Tegretol [Carbamazepine] Nausea and Vomiting     BP Readings from Last 3 Encounters:   02/27/17 106/66 " "  01/26/17 118/80   10/07/16 100/60    Wt Readings from Last 3 Encounters:   02/27/17 202 lb (91.6 kg)   01/26/17 201 lb (91.2 kg)   10/06/16 190 lb (86.2 kg)                  Problem list, Medication list, Allergies, and Medical/Social/Surgical histories reviewed in [facility] and updated as appropriate.    ROS:  C: NEGATIVE for fever, chills, change in weight  E/M: NEGATIVE for ear, mouth and throat problems  CV: NEGATIVE for chest pain, palpitations or peripheral edema  GI: NEGATIVE for nausea, abdominal pain, heartburn, or change in bowel habits  : NEGATIVE for frequency, dysuria, or hematuria  M: NEGATIVE for significant arthralgias or myalgia  H: NEGATIVE for bleeding problems  P: NEGATIVE for changes in mood or affect    OBJECTIVE:                                                    /66 (BP Location: Left arm, Patient Position: Chair, Cuff Size: Adult Regular)  Pulse 63  Temp 98.5  F (36.9  C) (Oral)  Ht 5' 6\" (1.676 m)  Wt 202 lb (91.6 kg)  LMP 03/11/2010  SpO2 93%  BMI 32.6 kg/m2  Body mass index is 32.6 kg/(m^2).  GENERAL: healthy, alert and + cough  EYES: Eyes grossly normal to inspection, extraocular movements - intact, and PERRL  HENT: ear canals- normal; TMs- normal; Nose- normal; Mouth- no ulcers, no lesions  NECK: no tenderness, no adenopathy, no asymmetry, no masses, no stiffness; thyroid- normal to palpation  RESP: lungs clear to auscultation - no rales, no rhonchi, no wheezes  CV: regular rates and rhythm, normal S1 S2, no S3 or S4 and no murmur, no click or rub -  MS: extremities- no gross deformities noted, no edema  PSYCH: Alert and oriented times 3; speech- coherent , normal rate and volume; able to articulate logical thoughts, able to abstract reason, no tangential thoughts, no hallucinations or delusions, affect- normal         ASSESSMENT/PLAN:                                                      (M79.1) Myalgia  (primary encounter diagnosis)  Comment: appears as acute viral etiology, check " as noted  Plan: H Influenzae antigen, guaiFENesin-codeine         (ROBITUSSIN AC) 100-10 MG/5ML SOLN solution,         benzonatate (TESSALON) 100 MG capsule, XR Chest        2 Views        Supportive care        See Patient Instructions    Skyler Álvarez MD  [facility]    25 minutes spent with this patient, face to face, discussing treatment options for listed problems above as well as side effects of appropriate medications.  Counseling time extended beyond 50% of the clinic visit.  Medication dosing, treatment plan and follow-up were discussed. Also reviewed need for primary care testing for patient.

## 2017-02-27 NOTE — LETTER
My Depression Action Plan  Name: Mercedes Barber   Date of Birth 1955  Date: 2/27/2017    My doctor: Skyler Álvarez   My clinic: [address] [address] [phone]          GREEN    ZONE   Good Control    What it looks like:     Things are going generally well. You have normal up s and down s. You may even feel depressed from time to time, but bad moods usually last less than a day.   What you need to do:  1. Continue to care for yourself (see self care plan)  2. Check your depression survival kit and update it as needed  3. Follow your physician s recommendations including any medication.  4. Do not stop taking medication unless you consult with your physician first.           YELLOW         ZONE Getting Worse    What it looks like:     Depression is starting to interfere with your life.     It may be hard to get out of bed; you may be starting to isolate yourself from others.    Symptoms of depression are starting to last most all day and this has happened for several days.     You may have suicidal thoughts but they are not constant.   What you need to do:     1. Call your care team, your response to treatment will improve if you keep your care team informed of your progress. Yellow periods are signs an adjustment may need to be made.     2. Continue your self-care, even if you have to fake it!    3. Talk to someone in your support network    4. Open up your depression survival kit           RED    ZONE Medical Alert - Get Help    What it looks like:     Depression is seriously interfering with your life.     You may experience these or other symptoms: You can t get out of bed most days, can t work or engage in other necessary activities, you have trouble taking care of basic hygiene, or basic responsibilities, thoughts of suicide or death that will not go away, self-injurious behavior.     What you need to do:  1. Call your  care team and request a same-day appointment. If they are not available (weekends or after hours) call your local crisis line, emergency room or 911.      Electronically signed by: Jeanne Duckworth, February 27, 2017    Depression Self Care Plan / Survival Kit    Self-Care for Depression  Here s the deal. Your body and mind are really not as separate as most people think.  What you do and think affects how you feel and how you feel influences what you do and think. This means if you do things that people who feel good do, it will help you feel better.  Sometimes this is all it takes.  There is also a place for medication and therapy depending on how severe your depression is, so be sure to consult with your medical provider and/ or Behavioral Health Consultant if your symptoms are worsening or not improving.     In order to better manage my stress, I will:    Exercise  Get some form of exercise, every day. This will help reduce pain and release endorphins, the  feel good  chemicals in your brain. This is almost as good as taking antidepressants!  This is not the same as joining a gym and then never going! (they count on that by the way ) It can be as simple as just going for a walk or doing some gardening, anything that will get you moving.      Hygiene   Maintain good hygiene (Get out of bed in the morning, Make your bed, Brush your teeth, Take a shower, and Get dressed like you were going to work, even if you are unemployed).  If your clothes don't fit try to get ones that do.    Diet  I will strive to eat foods that are good for me, drink plenty of water, and avoid excessive sugar, caffeine, alcohol, and other mood-altering substances.  Some foods that are helpful in depression are: complex carbohydrates, B vitamins, flaxseed, fish or fish oil, fresh fruits and vegetables.    Psychotherapy  I agree to participate in Individual Therapy (if recommended).    Medication  If prescribed medications, I agree to take  them.  Missing doses can result in serious side effects.  I understand that drinking alcohol, or other illicit drug use, may cause potential side effects.  I will not stop my medication abruptly without first discussing it with my provider.    Staying Connected With Others  I will stay in touch with my friends, family members, and my primary care provider/team.    Use your imagination  Be creative.  We all have a creative side; it doesn t matter if it s oil painting, sand castles, or mud pies! This will also kick up the endorphins.    Witness Beauty  (AKA stop and smell the roses) Take a look outside, even in mid-winter. Notice colors, textures. Watch the squirrels and birds.     Service to others  Be of service to others.  There is always someone else in need.  By helping others we can  get out of ourselves  and remember the really important things.  This also provides opportunities for practicing all the other parts of the program.    Humor  Laugh and be silly!  Adjust your TV habits for less news and crime-drama and more comedy.    Control your stress  Try breathing deep, massage therapy, biofeedback, and meditation. Find time to relax each day.     My support system    Clinic Contact:  Phone number:    Contact 1:  Phone number:    Contact 2:  Phone number:    Latter day/:  Phone number:    Therapist:  Phone number:    Local crisis center:    Phone number:    Other community support:  Phone number:

## 2017-03-08 ENCOUNTER — TELEPHONE (OUTPATIENT)
Dept: INTERNAL MEDICINE | Facility: CLINIC | Age: 62
End: 2017-03-08

## 2017-03-08 NOTE — TELEPHONE ENCOUNTER
"Mercedes Barber is a 61 year old female who calls with URI .    NURSING ASSESSMENT:  Description:  Pt has had an ongoing cough, non productive and nasal congestion x 7 weeks.  Also c/o watery eyes.  Was given a course of antibiotics in Feb.  Has been using robitussin with codeine at night but cough continues and keeps her up at night.  Is afebrile.  Onset/duration:  7 week  Precip. factors:  none  Associated symptoms:  none  Improves/worsens symptoms:  none  Pain scale (0-10)   0/10  LMP/preg/breast feeding:  NA  Last exam/Treatment:  2/27/17  Allergies:   Allergies   Allergen Reactions     Clonopin [Clonazepam]      \"Walk funny and Mind goes funny\"     Encort [Hydrocortisone Acetate] Swelling     Feet swelled.     Encort [Hydrocortisone] Swelling     Erythromycin Diarrhea     Flagyl [Imidazole Antifungals] Nausea     Gabapentin      Over eats     Tegretol [Carbamazepine] Nausea and Vomiting       MEDICATIONS:   Taking medication(s) as prescribed? Yes  Taking over the counter medication(s?) No  Any medication side effects? No significant side effects    Any barriers to taking medication(s) as prescribed?  No  Medication(s) improving/managing symptoms?  No  Medication reconciliation completed: Yes      NURSING PLAN: Routed to provider Yes    RECOMMENDED DISPOSITION:  Home care advice - routed to PCP for further advisement.    Will comply with recommendation: Yes  Candi Cota RN    "

## 2017-03-08 NOTE — TELEPHONE ENCOUNTER
Patient called says she has seen dr wolfe twice for this cold 7 weeks duration she is letting you know she is not any better please call her reqarding this sick since January please at [phone] home cell [phone]

## 2017-03-09 ENCOUNTER — OFFICE VISIT (OUTPATIENT)
Dept: INTERNAL MEDICINE | Facility: CLINIC | Age: 62
End: 2017-03-09
Payer: MEDICARE

## 2017-03-09 VITALS
OXYGEN SATURATION: 95 % | DIASTOLIC BLOOD PRESSURE: 68 MMHG | BODY MASS INDEX: 32.28 KG/M2 | SYSTOLIC BLOOD PRESSURE: 116 MMHG | WEIGHT: 200 LBS | TEMPERATURE: 98.1 F | HEART RATE: 68 BPM

## 2017-03-09 DIAGNOSIS — J06.9 UPPER RESPIRATORY TRACT INFECTION, UNSPECIFIED TYPE: ICD-10-CM

## 2017-03-09 DIAGNOSIS — J40 BRONCHITIS: Primary | ICD-10-CM

## 2017-03-09 PROCEDURE — 99214 OFFICE O/P EST MOD 30 MIN: CPT | Performed by: INTERNAL MEDICINE

## 2017-03-09 RX ORDER — PREDNISONE 20 MG/1
20 TABLET ORAL DAILY
Qty: 5 TABLET | Refills: 0 | Status: SHIPPED | OUTPATIENT
Start: 2017-03-09 | End: 2017-06-05

## 2017-03-09 RX ORDER — ALBUTEROL SULFATE 90 UG/1
2 AEROSOL, METERED RESPIRATORY (INHALATION) EVERY 6 HOURS PRN
Qty: 1 INHALER | Refills: 1 | Status: SHIPPED | OUTPATIENT
Start: 2017-03-09 | End: 2017-06-05

## 2017-03-09 RX ORDER — CODEINE PHOSPHATE AND GUAIFENESIN 10; 100 MG/5ML; MG/5ML
1 SOLUTION ORAL EVERY 4 HOURS PRN
Qty: 120 ML | Refills: 0 | Status: SHIPPED | OUTPATIENT
Start: 2017-03-09 | End: 2017-06-05

## 2017-03-09 NOTE — PATIENT INSTRUCTIONS
- Use the Flonase nasal spray nightly, before bed.  Two sprays each nostril once daily.  (Available over-the-counter)  - Take the Prednisone as prescribed.  (Prescription has been sent to your pharmacy)  - Use the albuterol inhaler every 4-6 hours as needed for cough, wheezing, chest congestion, etc.  (Prescription has been sent to your pharmacy)

## 2017-03-09 NOTE — MR AVS SNAPSHOT
"              After Visit Summary   3/9/2017    Mercedes Barber    MRN: [id number]           Patient Information     Date Of Birth          1955        Visit Information        Provider Department      3/9/2017 11:00 AM Toby Lowry MD [facility]        Today's Diagnoses     Bronchitis    -  1    Upper respiratory tract infection, unspecified type          Care Instructions    - Use the Flonase nasal spray nightly, before bed.  Two sprays each nostril once daily.  (Available over-the-counter)  - Take the Prednisone as prescribed.  (Prescription has been sent to your pharmacy)  - Use the albuterol inhaler every 4-6 hours as needed for cough, wheezing, chest congestion, etc.  (Prescription has been sent to your pharmacy)         Follow-ups after your visit        Who to contact     If you have questions or need follow up information about today's clinic visit or your schedule please contact [facility] directly at [phone].  Normal or non-critical lab and imaging results will be communicated to you by [email or web address], letter or phone within 4 business days after the clinic has received the results. If you do not hear from us within 7 days, please contact the clinic through [employer] or phone. If you have a critical or abnormal lab result, we will notify you by phone as soon as possible.  Submit refill requests through [employer] or call your pharmacy and they will forward the refill request to us. Please allow 3 business days for your refill to be completed.          Additional Information About Your Visit        [email or web address] Information     [employer] lets you send messages to your doctor, view your test results, renew your prescriptions, schedule appointments and more. To sign up, go to [email or web address] . Click on \"Log in\" on the left side of the screen, which will take you to the Welcome page. Then click on \"Sign up Now\" on the right side of the page.     You " will be asked to enter the access code listed below, as well as some personal information. Please follow the directions to create your username and password.     Your access code is: [email or web address]  Expires: 2017  5:39 PM     Your access code will  in 90 days. If you need help or a new code, please call your [facility] clinic or [phone].        Care EveryWhere ID     This is your Care EveryWhere ID. This could be used by other organizations to access your [facility] medical records  [id number]        Your Vitals Were     Pulse Temperature Last Period Pulse Oximetry BMI (Body Mass Index)       68 98.1  F (36.7  C) (Oral) 2010 95% 32.28 kg/m2        Blood Pressure from Last 3 Encounters:   17 116/68   17 106/66   17 118/80    Weight from Last 3 Encounters:   17 200 lb (90.7 kg)   17 202 lb (91.6 kg)   17 201 lb (91.2 kg)              Today, you had the following     No orders found for display         Today's Medication Changes          These changes are accurate as of: 3/9/17 11:39 AM.  If you have any questions, ask your nurse or doctor.               Start taking these medicines.        Dose/Directions    albuterol 108 (90 BASE) MCG/ACT Inhaler   Commonly known as:  PROAIR HFA/PROVENTIL HFA/VENTOLIN HFA   Used for:  Bronchitis   Started by:  Toby Lowry MD        Dose:  2 puff   Inhale 2 puffs into the lungs every 6 hours as needed for shortness of breath / dyspnea or wheezing   Quantity:  1 Inhaler   Refills:  1       predniSONE 20 MG tablet   Commonly known as:  DELTASONE   Used for:  Bronchitis   Started by:  Toby Lowry MD        Dose:  20 mg   Take 1 tablet (20 mg) by mouth daily   Quantity:  5 tablet   Refills:  0            Where to get your medicines      These medications were sent to [facility] [address] [place], [place] - [address] AT [facility] & [address], [facility] [address]    Hours:  24-hours  Phone:  [phone]     albuterol 108 (90 BASE) MCG/ACT Inhaler    predniSONE 20 MG tablet         Some of these will need a paper prescription and others can be bought over the counter.  Ask your nurse if you have questions.     Bring a paper prescription for each of these medications     guaiFENesin-codeine 100-10 MG/5ML Soln solution                Primary Care Provider Office Phone # Fax #    Skyler Álvarez [phone]       [facility] [address] [facility] [address]        Thank you!     Thank you for choosing [facility]  for your care. Our goal is always to provide you with excellent care. Hearing back from our patients is one way we can continue to improve our services. Please take a few minutes to complete the written survey that you may receive in the mail after your visit with us. Thank you!             Your Updated Medication List - Protect others around you: Learn how to safely use, store and throw away your medicines at www.disposemymeds.org.          This list is accurate as of: 3/9/17 11:39 AM.  Always use your most recent med list.                   Brand Name Dispense Instructions for use    albuterol 108 (90 BASE) MCG/ACT Inhaler    PROAIR HFA/PROVENTIL HFA/VENTOLIN HFA    1 Inhaler    Inhale 2 puffs into the lungs every 6 hours as needed for shortness of breath / dyspnea or wheezing       amoxicillin 500 MG capsule    AMOXIL     Reported on 3/9/2017       benzonatate 100 MG capsule    TESSALON    30 capsule    Take 1 capsule (100 mg) by mouth 3 times daily as needed for cough       calcium carbonate 500 MG tablet    OS-SABA 500 mg [place]. [place]     Take 1,000 mg by mouth daily.       cetirizine 10 MG tablet    zyrTEC    30 tablet    Take 1 tablet (10 mg) by mouth every evening       * guaiFENesin-codeine 100-10 MG/5ML Soln solution    ROBITUSSIN AC    120 mL    Take 5 mLs by mouth every 4 hours as needed for cough       *  guaiFENesin-codeine 100-10 MG/5ML Soln solution    ROBITUSSIN AC    120 mL    Take 5 mLs by mouth every 4 hours as needed for cough       ibuprofen 600 MG tablet    ADVIL/MOTRIN    30 tablet    Take 1 tablet (600 mg) by mouth every 6 hours as needed for moderate pain       ketoprofen 10% in PLO 10% topical gel     60 g    Ketoprofen in PLO       levothyroxine 25 MCG tablet    SYNTHROID/LEVOTHROID    90 tablet    Take 1 tablet (25 mcg) by mouth daily       nefazodone 200 MG tablet    SERZONE     At Bedtime       omeprazole 40 MG capsule    priLOSEC    180 capsule    Take 1 capsule (40 mg) by mouth 2 times daily       order for DME     1 Device    Equipment being ordered: ankle trilok brace       polyethylene glycol powder    MIRALAX    527 g    Take 17 g by mouth. STIR 1 CAPFUL (17GM) IN 8 OZ OF LIQUID AND DRINK       pramipexole 1 MG tablet    MIRAPEX         predniSONE 20 MG tablet    DELTASONE    5 tablet    Take 1 tablet (20 mg) by mouth daily       ranitidine 150 MG tablet    ZANTAC    180 tablet    Take 1 tablet (150 mg) by mouth 2 times daily       RESTASIS 0.05 % ophthalmic emulsion   Generic drug:  cycloSPORINE      Place 1 drop into both eyes 2 times daily as needed.       traZODone 100 MG tablet    DESYREL     300 mg At Bedtime       VIMPAT 200 MG Tabs tablet   Generic drug:  lacosamide      Take 200 mg by mouth 2 times daily.       WELLBUTRIN  MG 24 hr tablet   Generic drug:  buPROPion      Take 300 mg by mouth every morning.       * Notice:  This list has 2 medication(s) that are the same as other medications prescribed for you. Read the directions carefully, and ask your doctor or other care provider to review them with you.

## 2017-03-09 NOTE — NURSING NOTE
"Chief Complaint   Patient presents with     URI       Initial /68 (BP Location: Left arm, Patient Position: Chair, Cuff Size: Adult Regular)  Pulse 68  Temp 98.1  F (36.7  C) (Oral)  Wt 200 lb (90.7 kg)  LMP 03/11/2010  SpO2 95%  BMI 32.28 kg/m2 Estimated body mass index is 32.28 kg/(m^2) as calculated from the following:    Height as of 2/27/17: 5' 6\" (1.676 m).    Weight as of this encounter: 200 lb (90.7 kg).  Medication Reconciliation: complete    "

## 2017-03-09 NOTE — PROGRESS NOTES
SUBJECTIVE:                                                    Mercedes Barber is a 61 year old female who presents to clinic today for the following health issues:      RESPIRATORY SYMPTOMS      Duration: 7 weeks (1/19/2017)    Description  nasal congestion, cough, headache and watery eyes    Severity: moderate/severe    Accompanying signs and symptoms: None    History (predisposing factors):  none    Precipitating or alleviating factors: None    Therapies tried and outcome:  rest and fluids , Nyquil, Theraflu and guaifenesin with codeine on two  different occasions in the last 7 weeks           Problem list and histories reviewed & adjusted, as indicated.  Additional history:         Reviewed and updated as needed this visit by clinical staff       Reviewed and updated as needed this visit by Provider         ROS:  Constitutional, HEENT, cardiovascular, pulmonary, gi and gu systems are negative, except as otherwise noted.    OBJECTIVE:                                                    /68 (BP Location: Left arm, Patient Position: Chair, Cuff Size: Adult Regular)  Pulse 68  Temp 98.1  F (36.7  C) (Oral)  Wt 200 lb (90.7 kg)  LMP 03/11/2010  SpO2 95%  BMI 32.28 kg/m2  Body mass index is 32.28 kg/(m^2).  GENERAL: healthy, alert and no distress  NECK: no adenopathy, no asymmetry, masses, or scars and thyroid normal to palpation  RESP: lungs clear to auscultation - no rales, rhonchi or wheezes  CV: regular rate and rhythm, normal S1 S2, no S3 or S4, no murmur, click or rub, no peripheral edema and peripheral pulses strong  ABDOMEN: soft, nontender, no hepatosplenomegaly, no masses and bowel sounds normal  MS: no gross musculoskeletal defects noted, no edema    Diagnostic Test Results:  none      ASSESSMENT/PLAN:                                                      1. Bronchitis    - predniSONE (DELTASONE) 20 MG tablet; Take 1 tablet (20 mg) by mouth daily  Dispense: 5 tablet; Refill: 0  - albuterol (PROAIR  HFA/PROVENTIL HFA/VENTOLIN HFA) 108 (90 BASE) MCG/ACT Inhaler; Inhale 2 puffs into the lungs every 6 hours as needed for shortness of breath / dyspnea or wheezing  Dispense: 1 Inhaler; Refill: 1    2. Upper respiratory tract infection, unspecified type    - guaiFENesin-codeine (ROBITUSSIN AC) 100-10 MG/5ML SOLN solution; Take 5 mLs by mouth every 4 hours as needed for cough  Dispense: 120 mL; Refill: 0        Toby Lowry MD  [facility]

## 2017-05-21 ENCOUNTER — OFFICE VISIT (OUTPATIENT)
Dept: URGENT CARE | Facility: URGENT CARE | Age: 62
End: 2017-05-21
Payer: MEDICARE

## 2017-05-21 ENCOUNTER — RADIANT APPOINTMENT (OUTPATIENT)
Dept: GENERAL RADIOLOGY | Facility: CLINIC | Age: 62
End: 2017-05-21
Attending: FAMILY MEDICINE
Payer: MEDICARE

## 2017-05-21 VITALS
OXYGEN SATURATION: 96 % | DIASTOLIC BLOOD PRESSURE: 73 MMHG | HEART RATE: 76 BPM | WEIGHT: 203 LBS | SYSTOLIC BLOOD PRESSURE: 98 MMHG | BODY MASS INDEX: 32.77 KG/M2 | TEMPERATURE: 99.1 F | RESPIRATION RATE: 20 BRPM

## 2017-05-21 DIAGNOSIS — R05.9 COUGH: Primary | ICD-10-CM

## 2017-05-21 DIAGNOSIS — R05.9 COUGH: ICD-10-CM

## 2017-05-21 PROCEDURE — 71020 XR CHEST 2 VW: CPT

## 2017-05-21 PROCEDURE — 99214 OFFICE O/P EST MOD 30 MIN: CPT | Performed by: FAMILY MEDICINE

## 2017-05-21 RX ORDER — ALBUTEROL SULFATE 90 UG/1
2 AEROSOL, METERED RESPIRATORY (INHALATION) 4 TIMES DAILY PRN
Qty: 1 INHALER | Refills: 0 | Status: SHIPPED | OUTPATIENT
Start: 2017-05-21 | End: 2017-06-05

## 2017-05-21 RX ORDER — PREDNISONE 20 MG/1
20 TABLET ORAL 2 TIMES DAILY
Qty: 10 TABLET | Refills: 0 | Status: SHIPPED | OUTPATIENT
Start: 2017-05-21 | End: 2017-05-26

## 2017-05-21 NOTE — MR AVS SNAPSHOT
"              After Visit Summary   2017    Mercedes Barber    MRN: [id number]           Patient Information     Date Of Birth          1955        Visit Information        Provider Department      2017 9:20 AM Skyler Alford, DO [facility]        Today's Diagnoses     Cough    -  1       Follow-ups after your visit        Who to contact     If you have questions or need follow up information about today's clinic visit or your schedule please contact [facility] directly at [phone].  Normal or non-critical lab and imaging results will be communicated to you by [employer], letter or phone within 4 business days after the clinic has received the results. If you do not hear from us within 7 days, please contact the clinic through [employer] or phone. If you have a critical or abnormal lab result, we will notify you by phone as soon as possible.  Submit refill requests through [employer] or call your pharmacy and they will forward the refill request to us. Please allow 3 business days for your refill to be completed.          Additional Information About Your Visit        MyChart Information     [employer] lets you send messages to your doctor, view your test results, renew your prescriptions, schedule appointments and more. To sign up, go to [email or web address] . Click on \"Log in\" on the left side of the screen, which will take you to the Welcome page. Then click on \"Sign up Now\" on the right side of the page.     You will be asked to enter the access code listed below, as well as some personal information. Please follow the directions to create your username and password.     Your access code is: [email or web address]  Expires: 2017 10:03 AM     Your access code will  in 90 days. If you need help or a new code, please call your [facility] clinic or [phone].        Care EveryWhere ID     This is your Care EveryWhere ID. This could be used " by other organizations to access your [facility] medical records  [id number]        Your Vitals Were     Pulse Temperature Respirations Last Period Pulse Oximetry BMI (Body Mass Index)    76 99.1  F (37.3  C) (Oral) 20 03/11/2010 96% 32.77 kg/m2       Blood Pressure from Last 3 Encounters:   05/21/17 98/73   03/09/17 116/68   02/27/17 106/66    Weight from Last 3 Encounters:   05/21/17 203 lb (92.1 kg)   03/09/17 200 lb (90.7 kg)   02/27/17 202 lb (91.6 kg)                 Today's Medication Changes          These changes are accurate as of: 5/21/17 10:03 AM.  If you have any questions, ask your nurse or doctor.               These medicines have changed or have updated prescriptions.        Dose/Directions    * predniSONE 20 MG tablet   Commonly known as:  DELTASONE   This may have changed:  Another medication with the same name was added. Make sure you understand how and when to take each.   Used for:  Bronchitis   Changed by:  Toby Lowry MD        Dose:  20 mg   Take 1 tablet (20 mg) by mouth daily   Quantity:  5 tablet   Refills:  0       * predniSONE 20 MG tablet   Commonly known as:  DELTASONE   This may have changed:  You were already taking a medication with the same name, and this prescription was added. Make sure you understand how and when to take each.   Used for:  Cough   Changed by:  Skyler Alford DO        Dose:  20 mg   Take 1 tablet (20 mg) by mouth 2 times daily for 5 days   Quantity:  10 tablet   Refills:  0       * Notice:  This list has 2 medication(s) that are the same as other medications prescribed for you. Read the directions carefully, and ask your doctor or other care provider to review them with you.         Where to get your medicines      These medications were sent to [facility] - [place], [place] - [address] AT [facility] [address] [facility] [address]    Hours:  24-hours Phone:  [phone]     predniSONE 20 MG tablet                 Primary Care Provider Office Phone # Fax #    Skyler Álvarez [phone]       [facility] [address]  [facility] [address]        Thank you!     Thank you for choosing [facility]  for your care. Our goal is always to provide you with excellent care. Hearing back from our patients is one way we can continue to improve our services. Please take a few minutes to complete the written survey that you may receive in the mail after your visit with us. Thank you!             Your Updated Medication List - Protect others around you: Learn how to safely use, store and throw away your medicines at www.disposemymeds.org.          This list is accurate as of: 5/21/17 10:03 AM.  Always use your most recent med list.                   Brand Name Dispense Instructions for use    albuterol 108 (90 BASE) MCG/ACT Inhaler    PROAIR HFA/PROVENTIL HFA/VENTOLIN HFA    1 Inhaler    Inhale 2 puffs into the lungs every 6 hours as needed for shortness of breath / dyspnea or wheezing       amoxicillin 500 MG capsule    AMOXIL     Reported on 3/9/2017       benzonatate 100 MG capsule    TESSALON    30 capsule    Take 1 capsule (100 mg) by mouth 3 times daily as needed for cough       calcium carbonate 500 MG tablet    OS-SABA 500 mg [place]. [place]     Take 1,000 mg by mouth daily.       cetirizine 10 MG tablet    zyrTEC    30 tablet    Take 1 tablet (10 mg) by mouth every evening       DOXYCYCLINE HYCLATE PO      Take 100 mg by mouth       * guaiFENesin-codeine 100-10 MG/5ML Soln solution    ROBITUSSIN AC    120 mL    Take 5 mLs by mouth every 4 hours as needed for cough       * guaiFENesin-codeine 100-10 MG/5ML Soln solution    ROBITUSSIN AC    120 mL    Take 5 mLs by mouth every 4 hours as needed for cough       ibuprofen 600 MG tablet    ADVIL/MOTRIN    30 tablet    Take 1 tablet (600 mg) by mouth every 6 hours as needed for moderate pain       ketoprofen 10% in PLO 10%  topical gel     60 g    Ketoprofen in PLO       levothyroxine 25 MCG tablet    SYNTHROID/LEVOTHROID    90 tablet    Take 1 tablet (25 mcg) by mouth daily       nefazodone 200 MG tablet    SERZONE     At Bedtime       omeprazole 40 MG capsule    priLOSEC    180 capsule    Take 1 capsule (40 mg) by mouth 2 times daily       order for DME     1 Device    Equipment being ordered: ankle trilok brace       polyethylene glycol powder    MIRALAX    527 g    Take 17 g by mouth. STIR 1 CAPFUL (17GM) IN 8 OZ OF LIQUID AND DRINK       pramipexole 1 MG tablet    MIRAPEX         * predniSONE 20 MG tablet    DELTASONE    5 tablet    Take 1 tablet (20 mg) by mouth daily       * predniSONE 20 MG tablet    DELTASONE    10 tablet    Take 1 tablet (20 mg) by mouth 2 times daily for 5 days       PROMETHAZINE PLAIN/CODEINE PO          ranitidine 150 MG tablet    ZANTAC    180 tablet    Take 1 tablet (150 mg) by mouth 2 times daily       RESTASIS 0.05 % ophthalmic emulsion   Generic drug:  cycloSPORINE      Place 1 drop into both eyes 2 times daily as needed.       traZODone 100 MG tablet    DESYREL     300 mg At Bedtime       VIMPAT 200 MG Tabs tablet   Generic drug:  lacosamide      Take 200 mg by mouth 2 times daily.       WELLBUTRIN  MG 24 hr tablet   Generic drug:  buPROPion      Take 300 mg by mouth every morning.       * Notice:  This list has 4 medication(s) that are the same as other medications prescribed for you. Read the directions carefully, and ask your doctor or other care provider to review them with you.

## 2017-05-21 NOTE — PROGRESS NOTES
"SUBJECTIVE: Mercedes Barber is a 62 year old female presenting with a chief complaint of cough and wheeze.  Onset of symptoms was 10 day(s) ago.  Course of illness is same.    Severity moderate  Current and Associated symptoms: \"cold symptoms\"  Treatment measures tried include Doxy, cough med and inhaler.  Predisposing factors include None.    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     Encort [Hydrocortisone] Swelling     Erythromycin Diarrhea     Flagyl [Imidazole Antifungals] Nausea     Gabapentin      Over eats     Tegretol [Carbamazepine] Nausea and Vomiting     Social History   Substance Use Topics     Smoking status: Never Smoker     Smokeless tobacco: Never Used     Alcohol use Yes      Comment: 1 glass of wine 4x/yr       ROS:  SKIN: no rash  GI: no vomiting    OBJECTIVE:  BP 98/73 (BP Location: Right arm, Patient Position: Chair, Cuff Size: Adult Regular)  Pulse 76  Temp 99.1  F (37.3  C) (Oral)  Resp 20  Wt 203 lb (92.1 kg)  LMP 03/11/2010  SpO2 96%  BMI 32.77 kg/[id number] APPEARANCE: healthy, alert and no distress  EYES: EOMI,  PERRL, conjunctiva clear  HENT: ear canals and TM's normal.  Nose and mouth without ulcers, erythema or lesions  NECK: supple, nontender, no lymphadenopathy  RESP: lungs clear to auscultation - no rales, rhonchi or wheezes  CV: regular rates and rhythm, normal S1 S2, no murmur noted  SKIN: no suspicious lesions or rashes    Xray without acute findings, read by Skyler Alford " D.O.      ICD-10-CM    1. Cough R05 XR Chest 2 Views     predniSONE (DELTASONE) 20 MG tablet     albuterol (ALBUTEROL) 108 (90 BASE) MCG/ACT Inhaler   Finish doxy  Fluids/Rest, f/u if worse/not any better

## 2017-05-21 NOTE — NURSING NOTE
"Chief Complaint   Patient presents with     Cough     cough, wheezing, headache x x 10 days        Initial BP 98/73 (BP Location: Right arm, Patient Position: Chair, Cuff Size: Adult Regular)  Pulse 76  Temp 99.1  F (37.3  C) (Oral)  Resp 20  Wt 203 lb (92.1 kg)  LMP 03/11/2010  SpO2 96%  BMI 32.77 kg/m2 Estimated body mass index is 32.77 kg/(m^2) as calculated from the following:    Height as of 2/27/17: 5' 6\" (1.676 m).    Weight as of this encounter: 203 lb (92.1 kg).  Medication Reconciliation: complete    "

## 2017-06-05 ENCOUNTER — OFFICE VISIT (OUTPATIENT)
Dept: INTERNAL MEDICINE | Facility: CLINIC | Age: 62
End: 2017-06-05
Payer: MEDICARE

## 2017-06-05 VITALS
DIASTOLIC BLOOD PRESSURE: 68 MMHG | SYSTOLIC BLOOD PRESSURE: 106 MMHG | OXYGEN SATURATION: 96 % | BODY MASS INDEX: 32.77 KG/M2 | TEMPERATURE: 98.8 F | WEIGHT: 203 LBS | HEART RATE: 75 BPM

## 2017-06-05 DIAGNOSIS — R05.9 COUGH: Primary | ICD-10-CM

## 2017-06-05 PROCEDURE — 99213 OFFICE O/P EST LOW 20 MIN: CPT | Performed by: INTERNAL MEDICINE

## 2017-06-05 RX ORDER — ALBUTEROL SULFATE 90 UG/1
2 AEROSOL, METERED RESPIRATORY (INHALATION) 4 TIMES DAILY PRN
Qty: 1 INHALER | Refills: 0 | Status: SHIPPED | OUTPATIENT
Start: 2017-06-05 | End: 2024-08-01

## 2017-06-05 RX ORDER — GINSENG 100 MG
1 CAPSULE ORAL DAILY
COMMUNITY
End: 2019-07-02

## 2017-06-05 NOTE — MR AVS SNAPSHOT
"              After Visit Summary   6/5/2017    Mercedes Barber    MRN: [id number]           Patient Information     Date Of Birth          1955        Visit Information        Provider Department      6/5/2017 7:20 AM Skyler Álvarez MD [facility]        Today's Diagnoses     Cough    -  1       Follow-ups after your visit        Additional Services     PULMONARY MEDICINE REFERRAL       Your provider has referred you to: [facility]:  [facility], [phone]    Please be aware that coverage of these services is subject to the terms and limitations of your health insurance plan.  Call member services at your health plan with any benefit or coverage questions.                  Who to contact     If you have questions or need follow up information about today's clinic visit or your schedule please contact [facility] directly at [phone].  Normal or non-critical lab and imaging results will be communicated to you by MyChart, letter or phone within 4 business days after the clinic has received the results. If you do not hear from us within 7 days, please contact the clinic through MyChart or phone. If you have a critical or abnormal lab result, we will notify you by phone as soon as possible.  Submit refill requests through [employer] or call your pharmacy and they will forward the refill request to us. Please allow 3 business days for your refill to be completed.          Additional Information About Your Visit        MyChart Information     [employer] lets you send messages to your doctor, view your test results, renew your prescriptions, schedule appointments and more. To sign up, go to [email or web address] . Click on \"Log in\" on the left side of the screen, which will take you to the Welcome page. Then click on \"Sign up Now\" on the right side of the page.     You will be asked to enter the access code listed below, as well as some personal information. Please follow " the directions to create your username and password.     Your access code is: [email or web address]  Expires: 2017 10:03 AM     Your access code will  in 90 days. If you need help or a new code, please call your [place] clinic or [phone].        Care EveryWhere ID     This is your Care EveryWhere ID. This could be used by other organizations to access your [place] medical records  [id number]        Your Vitals Were     Pulse Temperature Last Period Pulse Oximetry BMI (Body Mass Index)       75 98.8  F (37.1  C) (Oral) 2010 96% 32.77 kg/m2        Blood Pressure from Last 3 Encounters:   17 106/68   17 98/73   17 116/68    Weight from Last 3 Encounters:   17 203 lb (92.1 kg)   17 203 lb (92.1 kg)   17 200 lb (90.7 kg)              We Performed the Following     PULMONARY MEDICINE REFERRAL          Today's Medication Changes          These changes are accurate as of: 17  7:51 AM.  If you have any questions, ask your nurse or doctor.               These medicines have changed or have updated prescriptions.        Dose/Directions    levothyroxine 25 MCG tablet   Commonly known as:  SYNTHROID/LEVOTHROID   This may have changed:  how much to take   Used for:  Subclinical hypothyroidism        Dose:  25 mcg   Take 1 tablet (25 mcg) by mouth daily   Quantity:  90 tablet   Refills:  1                Primary Care Provider Office Phone # Fax #    Skyler Álvarez [phone]       [facility] [address] [facility] [address]        Thank you!     Thank you for choosing [facility]  for your care. Our goal is always to provide you with excellent care. Hearing back from our patients is one way we can continue to improve our services. Please take a few minutes to complete the written survey that you may receive in the mail after your visit with us. Thank you!             Your Updated Medication List - Protect others  around you: Learn how to safely use, store and throw away your medicines at www.disposemymeds.org.          This list is accurate as of: 6/5/17  7:51 AM.  Always use your most recent med list.                   Brand Name Dispense Instructions for use    albuterol 108 (90 BASE) MCG/ACT Inhaler    albuterol    1 Inhaler    Inhale 2 puffs into the lungs 4 times daily as needed for shortness of breath / dyspnea or wheezing       calcium carbonate 1250 MG tablet    OS-SABA 500 mg [place]     Take 1,000 mg by mouth daily.       cholecalciferol 40787 UNITS capsule    vitamin D3     Take 1 capsule by mouth daily       ibuprofen 600 MG tablet    ADVIL/MOTRIN    30 tablet    Take 1 tablet (600 mg) by mouth every 6 hours as needed for moderate pain       ketoprofen 10% in PLO 10% topical gel     60 g    Ketoprofen in PLO       levothyroxine 25 MCG tablet    SYNTHROID/LEVOTHROID    90 tablet    Take 1 tablet (25 mcg) by mouth daily       nefazodone 200 MG tablet    SERZONE     At Bedtime       omeprazole 40 MG capsule    priLOSEC    180 capsule    Take 1 capsule (40 mg) by mouth 2 times daily       order for DME     1 Device    Equipment being ordered: ankle trilok brace       polyethylene glycol powder    MIRALAX    527 g    Take 17 g by mouth. STIR 1 CAPFUL (17GM) IN 8 OZ OF LIQUID AND DRINK       POTASSIUM CITRATE PO          pramipexole 1 MG tablet    MIRAPEX         PROMETHAZINE PLAIN/CODEINE PO          ranitidine 150 MG tablet    ZANTAC    180 tablet    Take 1 tablet (150 mg) by mouth 2 times daily       RESTASIS 0.05 % ophthalmic emulsion   Generic drug:  cycloSPORINE      Place 1 drop into both eyes 2 times daily as needed.       SELENIUM PO          traZODone 100 MG tablet    DESYREL     300 mg At Bedtime       VIMPAT 200 MG Tabs tablet   Generic drug:  lacosamide      Take 200 mg by mouth 2 times daily.       WELLBUTRIN  MG 24 hr tablet   Generic drug:  buPROPion      Take 300 mg by mouth every morning.        zinc 50 MG Tabs      Take 1 tablet by mouth daily

## 2017-06-05 NOTE — PROGRESS NOTES
SUBJECTIVE:                                                    Mercedes Barber is a 62 year old female who presents to clinic today for the following health issues:      RESPIRATORY SYMPTOMS      Duration: 4+ months     Description  rhinorrhea, cough, wheezing, headache, hoarse voice and shortness of breath     Severity: severe    Accompanying signs and symptoms: None     History (predisposing factors):  none    Precipitating or alleviating factors: Has been seen multiple times since 1/26/17 for ongoing cough and bronchitis     Therapies tried and outcome:  Abx, Albuterol, prednisone, Robitussin AC         Other concern:  1. Pt seening Endocrinologist at [facility] (Dr. Araiza) who altered thyroid dosing  2. Started acupuncture 2 weeks ago for arthritis which she has had some benefit from she thinks and is covered via insurance.     Problem list and histories reviewed & adjusted, as indicated.  Additional history: as documented    Patient Active Problem List   Diagnosis     Menopausal syndrome (hot flashes)     Family history of breast cancer     Vulvar pain     Diarrhea     Renal anomaly     Overactive bladder     Rectal prolapse     CARDIOVASCULAR SCREENING; LDL GOAL LESS THAN 160     Vaginal pain     Dysphagia     Confusion     Headache     Left shoulder pain     Anemia     Advanced directives, counseling/discussion     Mild major depression (H)     Esophageal stricture     Vulvar lesion     Temporal sclerosis     Constipation     Hypothyroidism     Lumbago     Pain in thoracic spine     Sciatica     Pain in joint, lower leg     Plantar fascial fibromatosis     Pain in joint involving ankle and foot     Other specified hypothyroidism     Gastroesophageal reflux disease without esophagitis     Irritable bowel syndrome     Sleep apnea, unspecified sleep apnea type     Cervical pain     Past Surgical History:   Procedure Laterality Date     C ANESTH,TOTAL KNEE ARTHROPLASTY      bilateral     CARPAL  TUNNEL RELEASE RT/LT       DENTAL SURGERY      implant     DILATION AND CURETTAGE       DRAIN PILONIDAL CYST SIMPL       ENDOSCOPY DRUG INDUCED SLEEP N/A 11/18/2015    Procedure: ENDOSCOPY DRUG INDUCED SLEEP;  Surgeon: Park Ortiz MD;  Location: [facility] OR     ESOPHAGOSCOPY, GASTROSCOPY, DUODENOSCOPY (EGD), COMBINED  4/5/2013    Procedure: COMBINED ESOPHAGOSCOPY, GASTROSCOPY, DUODENOSCOPY (EGD), BIOPSY SINGLE OR MULTIPLE;;  Surgeon: Mundo Mehta MD;  Location:  GI     EXCISE LESION TRUNK  8/10/2012    Procedure: EXCISE LESION TRUNK;;  Surgeon: Jerald Diggs MD;  Location: [facility] OR     hammar toes on R foot       HC ANTER COLPORRHAPHY,BLAD/VAGINA      perigee mesh     HYSTERECTOMY, PAP NO LONGER INDICATED       L shoulder fracture       rectal prolapse repair abdominally       RELEASE PLANTAR FASCIA Right 1/20/2015    Procedure: RELEASE PLANTAR FASCIA;  Surgeon: Maicol Liu DPM;  Location:  [facility]     REMOVE MESH VAGINA  8/10/2012    Procedure: REMOVE MESH VAGINA;  Excision of Vaginal Mesh Exposure, Removal of Skin Tag Left Inner Leg;  Surgeon: Jerald Diggs MD;  Location: [facility] OR     REPAIR HAMMER TOE Right 1/20/2015    Procedure: REPAIR HAMMER TOE;  Surgeon: Maicol Liu DPM;  Location:  [facility]     TONSILLECTOMY & ADENOIDECTOMY       TUBAL LIGATION         Social History   Substance Use Topics     Smoking status: Never Smoker     Smokeless tobacco: Never Used     Alcohol use Yes      Comment: 1 glass of wine 4x/yr     Family History   Problem Relation Age of Onset     Eye Disorder Mother      Lipids Mother      has CHF     OSTEOPOROSIS Mother      DIABETES Father      Alzheimer Disease Paternal Grandmother      Hypertension Brother      Alcohol/Drug Brother      Depression Brother      GASTROINTESTINAL DISEASE Brother      hx of colonic polyps     Lipids Brother      Psychotic Disorder Brother      Breast Cancer Sister      Depression Sister      Lipids Sister      Thyroid Disease Sister       Congenital Anomalies Daughter      Neurologic Disorder Daughter          Current Outpatient Prescriptions   Medication Sig Dispense Refill     zinc 50 MG TABS Take 1 tablet by mouth daily       cholecalciferol (VITAMIN D3) 57939 UNITS capsule Take 1 capsule by mouth daily       POTASSIUM CITRATE PO        SELENIUM PO        albuterol (ALBUTEROL) 108 (90 BASE) MCG/ACT Inhaler Inhale 2 puffs into the lungs 4 times daily as needed for shortness of breath / dyspnea or wheezing 1 Inhaler 0     levothyroxine (SYNTHROID/LEVOTHROID) 25 MCG tablet Take 1 tablet (25 mcg) by mouth daily (Patient taking differently: Take 50 mcg by mouth daily ) 90 tablet 1     ranitidine (ZANTAC) 150 MG tablet Take 1 tablet (150 mg) by mouth 2 times daily 180 tablet 1     omeprazole (PRILOSEC) 40 MG capsule Take 1 capsule (40 mg) by mouth 2 times daily 180 capsule 3     traZODone (DESYREL) 100 MG tablet 300 mg At Bedtime        polyethylene glycol (MIRALAX) powder Take 17 g by mouth. STIR 1 CAPFUL (17GM) IN 8 OZ OF LIQUID AND DRINK 527 g prn     calcium carbonate 500 MG tablet Take 1,000 mg by mouth daily.       cycloSPORINE (RESTASIS) 0.05 % ophthalmic emulsion Place 1 drop into both eyes 2 times daily as needed.       lacosamide (VIMPAT) 200 MG TABS Take 200 mg by mouth 2 times daily.       buPROPion (WELLBUTRIN XL) 300 MG 24 hr tablet Take 300 mg by mouth every morning.       Promethazine-Codeine (PROMETHAZINE PLAIN/CODEINE PO)        [DISCONTINUED] albuterol (PROAIR HFA/PROVENTIL HFA/VENTOLIN HFA) 108 (90 BASE) MCG/ACT Inhaler Inhale 2 puffs into the lungs every 6 hours as needed for shortness of breath / dyspnea or wheezing 1 Inhaler 1     ketoprofen 10% in PLO 10% Ketoprofen in PLO 60 g 2     order for DME Equipment being ordered: ankle trilok brace 1 Device 0     ibuprofen (ADVIL,MOTRIN) 600 MG tablet Take 1 tablet (600 mg) by mouth every 6 hours as needed for moderate pain 30 tablet 1     nefazodone (SERZONE) 200 MG tablet At  "Bedtime        pramipexole (MIRAPEX) 1 MG tablet        Allergies   Allergen Reactions     Clonopin [Clonazepam]      \"Walk funny and Mind goes funny\"     Encort [Hydrocortisone Acetate] Swelling     Feet swelled.     Encort [Hydrocortisone] Swelling     Erythromycin Diarrhea     Flagyl [Imidazole Antifungals] Nausea     Gabapentin      Over eats     Tegretol [Carbamazepine] Nausea and Vomiting     BP Readings from Last 3 Encounters:   05/21/17 98/73   03/09/17 116/68   02/27/17 106/66    Wt Readings from Last 3 Encounters:   05/21/17 203 lb (92.1 kg)   03/09/17 200 lb (90.7 kg)   02/27/17 202 lb (91.6 kg)                    Reviewed and updated as needed this visit by clinical staff  Tobacco  Allergies  Med Hx  Surg Hx  Fam Hx  Soc Hx      Reviewed and updated as needed this visit by Provider       ROS:  C: NEGATIVE for fever, chills, change in weight  I: NEGATIVE for worrisome rashes, moles or lesions  E/M: NEGATIVE for ear, mouth and throat problems  CV: NEGATIVE for chest pain, palpitations or peripheral edema  GI: NEGATIVE for nausea, abdominal pain, heartburn, or change in bowel habits  : NEGATIVE for frequency, dysuria, or hematuria  H: NEGATIVE for bleeding problems  P: NEGATIVE for changes in mood or affect    OBJECTIVE:                                                    /68  Pulse 75  Temp 98.8  F (37.1  C) (Oral)  Wt 203 lb (92.1 kg)  LMP 03/11/2010  SpO2 96%  BMI 32.77 kg/m2  Body mass index is 32.77 kg/(m^2).  GENERAL: alert and with cough.  EYES: Eyes grossly normal to inspection, extraocular movements - intact, and PERRL  HENT: ear canals- normal; TMs- normal; Nose- normal; Mouth- no ulcers, no lesions  NECK: no tenderness, no adenopathy, no asymmetry, no masses, no stiffness; thyroid- normal to palpation  RESP: lungs clear to auscultation - no rales, no rhonchi, no wheezes  CV: regular rates and rhythm, normal S1 S2, no S3 or S4 and no click or rub   MS: extremities- no gross " deformities noted  PSYCH: Alert and oriented times 3; speech- coherent , normal rate and volume; able to articulate logical thoughts, able to abstract reason, no tangential thoughts, no hallucinations or delusions, affect- normal     CHEST TWO VIEWS 5/21/2017 10:01 AM      HISTORY: Cough     COMPARISON: 2/27/2017     FINDINGS: There are no acute infiltrates. The cardiac silhouette is  not enlarged. Pulmonary vasculature is unremarkable.  ASSESSMENT/PLAN:                                                      (R05) Cough  (primary encounter diagnosis)  Comment: this appears as a prolonged post infectious cough, she has been on abx, steroids, inhalers, cough suppressants and has had a negative CXR thus at this point I fell she may need to see Pulmonary for other options of care.  Plan: PULMONARY MEDICINE REFERRAL, albuterol         (ALBUTEROL) 108 (90 BASE) MCG/ACT Inhaler        She is agreeable.      See Patient Instructions    Skyler Álvarez MD  [facility]    15 minutes spent with this patient, face to face, discussing treatment options for listed problems above as well as side effects of appropriate medications.  Counseling time extended beyond 50% of the clinic visit.  Medication dosing, treatment plan and follow-up were discussed. Also reviewed need for primary care testing for patient.

## 2017-06-05 NOTE — NURSING NOTE
"Chief Complaint   Patient presents with     Cough       Initial /68  Pulse 75  Temp 98.8  F (37.1  C) (Oral)  Wt 203 lb (92.1 kg)  LMP 03/11/2010  SpO2 96%  BMI 32.77 kg/m2 Estimated body mass index is 32.77 kg/(m^2) as calculated from the following:    Height as of 2/27/17: 5' 6\" (1.676 m).    Weight as of this encounter: 203 lb (92.1 kg).  Medication Reconciliation: complete   Jeanne Duckworth CMA      "

## 2017-06-30 ENCOUNTER — TRANSFERRED RECORDS (OUTPATIENT)
Dept: HEALTH INFORMATION MANAGEMENT | Facility: CLINIC | Age: 62
End: 2017-06-30

## 2017-07-14 DIAGNOSIS — K21.9 GASTROESOPHAGEAL REFLUX DISEASE WITHOUT ESOPHAGITIS: ICD-10-CM

## 2017-07-15 NOTE — TELEPHONE ENCOUNTER
ranitidine (ZANTAC) 150 MG tablet      Last Written Prescription Date: 12/20/16  Last Fill Quantity: 180,  # refills: 1   Last Office Visit with [facility], [facility] or [facility] prescribing provider: 05/05/17                                         Next 5 appointments (look out 90 days)     Jul 17, 2017 10:15 AM CDT   [facility]onya [facility]ley [facility]   [facility]    [address] [address] [phone]

## 2017-07-17 ENCOUNTER — RADIANT APPOINTMENT (OUTPATIENT)
Dept: MAMMOGRAPHY | Facility: CLINIC | Age: 62
End: 2017-07-17
Attending: INTERNAL MEDICINE
Payer: MEDICARE

## 2017-07-17 ENCOUNTER — RESULT FOLLOW UP (OUTPATIENT)
Dept: OBGYN | Facility: CLINIC | Age: 62
End: 2017-07-17

## 2017-07-17 ENCOUNTER — OFFICE VISIT (OUTPATIENT)
Dept: OBGYN | Facility: CLINIC | Age: 62
End: 2017-07-17
Payer: MEDICARE

## 2017-07-17 VITALS
HEIGHT: 66 IN | HEART RATE: 64 BPM | WEIGHT: 203 LBS | DIASTOLIC BLOOD PRESSURE: 70 MMHG | BODY MASS INDEX: 32.62 KG/M2 | SYSTOLIC BLOOD PRESSURE: 110 MMHG

## 2017-07-17 DIAGNOSIS — Z12.31 VISIT FOR SCREENING MAMMOGRAM: ICD-10-CM

## 2017-07-17 DIAGNOSIS — Z11.51 SCREENING FOR HUMAN PAPILLOMAVIRUS: ICD-10-CM

## 2017-07-17 DIAGNOSIS — R87.810 CERVICAL HIGH RISK HPV (HUMAN PAPILLOMAVIRUS) TEST POSITIVE: ICD-10-CM

## 2017-07-17 DIAGNOSIS — Z12.4 PAP SMEAR FOR CERVICAL CANCER SCREENING: ICD-10-CM

## 2017-07-17 DIAGNOSIS — N89.8 VAGINAL IRRITATION: ICD-10-CM

## 2017-07-17 DIAGNOSIS — Z01.419 ENCOUNTER FOR ANNUAL ROUTINE GYNECOLOGICAL EXAMINATION: Primary | ICD-10-CM

## 2017-07-17 LAB
MICRO REPORT STATUS: NORMAL
SPECIMEN SOURCE: NORMAL
WET PREP SPEC: NORMAL

## 2017-07-17 PROCEDURE — 99386 PREV VISIT NEW AGE 40-64: CPT | Performed by: ADVANCED PRACTICE MIDWIFE

## 2017-07-17 PROCEDURE — G0145 SCR C/V CYTO,THINLAYER,RESCR: HCPCS | Performed by: ADVANCED PRACTICE MIDWIFE

## 2017-07-17 PROCEDURE — 87624 HPV HI-RISK TYP POOLED RSLT: CPT | Performed by: ADVANCED PRACTICE MIDWIFE

## 2017-07-17 PROCEDURE — G0202 SCR MAMMO BI INCL CAD: HCPCS | Mod: TC

## 2017-07-17 PROCEDURE — 77063 BREAST TOMOSYNTHESIS BI: CPT | Mod: TC

## 2017-07-17 PROCEDURE — G0101 CA SCREEN;PELVIC/BREAST EXAM: HCPCS | Performed by: ADVANCED PRACTICE MIDWIFE

## 2017-07-17 PROCEDURE — 87210 SMEAR WET MOUNT SALINE/INK: CPT | Performed by: ADVANCED PRACTICE MIDWIFE

## 2017-07-17 PROCEDURE — G0476 HPV COMBO ASSAY CA SCREEN: HCPCS | Performed by: ADVANCED PRACTICE MIDWIFE

## 2017-07-17 NOTE — NURSING NOTE
"Chief Complaint   Patient presents with     Gyn Exam     Vaginal redness and dryness       Initial /70  Pulse 64  Ht 5' 6\" (1.676 m)  Wt 203 lb (92.1 kg)  LMP 2010  BMI 32.77 kg/m2 Estimated body mass index is 32.77 kg/(m^2) as calculated from the following:    Height as of this encounter: 5' 6\" (1.676 m).    Weight as of this encounter: 203 lb (92.1 kg).  BP completed using cuff size: regular        The following HM Due: NONE      The following patient reported/Care Every where data was sent to:  P ABSTRACT QUALITY INITIATIVES [[id number]]  NA     patient has appointment for today    Karlene JANE               "

## 2017-07-17 NOTE — PROGRESS NOTES
Mercedes is a 62 year old  female who presents for annual exam.     Besides routine health maintenance, she has no other health concerns today .  HPI:  Mercedes presents for annual exam. Has not been seen by OB/Gyn in over 5 years. Due for pap. Does not remember if she had a pap since her hysterectomy.  Had mammogram done earlier today. Had sister who  of breast cancer. She is also reporting some vaginal irritation, on and off over the past year. No vaginal discharge. Is not sexually active for over ten years. No hormones. No vaginal bleeding. Patient has many health concerns and sees her primary for all of them except for pelvic and breast exams.  Menses are absent   Patient's last menstrual period was 2010..   Using none for contraception.      REPRODUCTIVE/GYNECOLOGIC HISTORY:  Mercedes is not sexually active with male partner(s) and is not currently in monogamous relationship.   STI testing offered?  Declined  History of abnormal Pap smear:  No  SOCIAL HISTORY  Abuse: does not report having previously been physical or sexually abused.    Do you feel safe in your environment? YES    She  reports that she has never smoked. She has never used smokeless tobacco.      Last PHQ-9 score on record =   PHQ-9 SCORE 2017   Total Score -   Total Score 21     Last GAD7 score on record = No flowsheet data found.      HEALTH MAINTENANCE:  Cholesterol: (  Cholesterol   Date Value Ref Range Status   2012 200 0 - 200 mg/dL Final     Comment:     LDL Cholesterol is the primary guide to therapy.   The NCEP recommends further evaluation of: patients with cholesterol greater   than 200 mg/dL if additional risk factors are present, cholesterol greater   than   240 mg/dL, triglycerides greater than 150 mg/dL, or HDL less than 40 mg/dL.   2009 177 0 - 200 mg/dL Final     Comment:     LDL Cholesterol is the primary guide to therapy: LDL-cholesterol goal in high   risk patients is <100 mg/dL and in very high  risk patients is <70 mg/dL.   The NCEP recommends further evaluation of: patients with cholesterol <200 mg/dL   if additional risk factors are present, cholesterol >240 mg/dL, triglycerides   >150 mg/dL, or HDL <40 mg/dL.    History of abnormal lipids: No  Mammo: today . History of abnormal Mammo: No, .  Regular Self Breast Exams: Yes  TSH: (  TSH   Date Value Ref Range Status   2015 1.70 0.40 - 4.00 mU/L Final    )  Pap; (  Lab Results   Component Value Date    PAP NIL 2012    PAP NIL 04/15/2010    PAP NIL 2009    )  Immunizations up to date: yes  (Gardasil, Tdap, Flu)  Health maintenance updated:  yes    HEALTHY HABITS  Eating habits: eats regular meals and follows a balanced nutrition diet  Calcium/Vitamin D intake: source:  dairy Adequate?   Exercise: How often do you exercise? Has arthritis so does not get much exercise.  Have you had an eye exam in the last two years? YES, having cataract surgery   Do you routinely see the dentist once or twice yearly? YES      HISTORY:  Obstetric History       T2      L2     SAB0   TAB0   Ectopic0   Multiple0   Live Births0       # Outcome Date GA Lbr Agustin/2nd Weight Sex Delivery Anes PTL Lv   3 SAB            2 Term            1 Term                 Past Medical History:   Diagnosis Date     Arthritis     Thumb     Esophageal stricture 2012     Gastro-oesophageal reflux disease      Hypothyroidism 2012     IBS (irritable bowel syndrome)      Mild major depression (H) 2012     Neuropathy of lower extremity      Seizure disorder (H)      Seizures (H)      Sleep apnea     CPAP     Temporal sclerosis 2012     Past Surgical History:   Procedure Laterality Date     C ANESTH,TOTAL KNEE ARTHROPLASTY      bilateral     CARPAL TUNNEL RELEASE RT/LT       DENTAL SURGERY      implant     DILATION AND CURETTAGE       DRAIN PILONIDAL CYST SIMPL       ENDOSCOPY DRUG INDUCED SLEEP N/A 2015    Procedure: ENDOSCOPY DRUG INDUCED SLEEP;   Surgeon: Park Ortiz MD;  Location: [facility] OR     ESOPHAGOSCOPY, GASTROSCOPY, DUODENOSCOPY (EGD), COMBINED  4/5/2013    Procedure: COMBINED ESOPHAGOSCOPY, GASTROSCOPY, DUODENOSCOPY (EGD), BIOPSY SINGLE OR MULTIPLE;;  Surgeon: Mundo Mehta MD;  Location:  GI     EXCISE LESION TRUNK  8/10/2012    Procedure: EXCISE LESION TRUNK;;  Surgeon: Jerald Diggs MD;  Location: [facility] OR     hammar toes on R foot       HC ANTER COLPORRHAPHY,BLAD/VAGINA      perigee mesh     HYSTERECTOMY, PAP NO LONGER INDICATED       L shoulder fracture       rectal prolapse repair abdominally       RELEASE PLANTAR FASCIA Right 1/20/2015    Procedure: RELEASE PLANTAR FASCIA;  Surgeon: Maicol Liu DPM;  Location: [facility]     REMOVE MESH VAGINA  8/10/2012    Procedure: REMOVE MESH VAGINA;  Excision of Vaginal Mesh Exposure, Removal of Skin Tag Left Inner Leg;  Surgeon: Jerald Diggs MD;  Location: [facility] OR     REPAIR HAMMER TOE Right 1/20/2015    Procedure: REPAIR HAMMER TOE;  Surgeon: Maicol Liu DPM;  Location:  [facility]     TONSILLECTOMY & ADENOIDECTOMY       TUBAL LIGATION       Family History   Problem Relation Age of Onset     Eye Disorder Mother      Lipids Mother      has CHF     OSTEOPOROSIS Mother      DIABETES Father      Alzheimer Disease Paternal Grandmother      Hypertension Brother      Alcohol/Drug Brother      Depression Brother      GASTROINTESTINAL DISEASE Brother      hx of colonic polyps     Lipids Brother      Psychotic Disorder Brother      Breast Cancer Sister      Depression Sister      Lipids Sister      Thyroid Disease Sister      Congenital Anomalies Daughter      Neurologic Disorder Daughter      Social History     Social History     Marital status:      Spouse name: N/A     Number of children: N/A     Years of education: N/A     Social History Main Topics     Smoking status: Never Smoker     Smokeless tobacco: Never Used     Alcohol use Yes      Comment: 1 glass of wine 4x/yr     Drug  use: No     Sexual activity: No      Comment: vag hyst     Other Topics Concern     None     Social History Narrative       Current Outpatient Prescriptions:      zinc 50 MG TABS, Take 1 tablet by mouth daily, Disp: , Rfl:      cholecalciferol (VITAMIN D3) 52013 UNITS capsule, Take 1 capsule by mouth daily, Disp: , Rfl:      POTASSIUM CITRATE PO, , Disp: , Rfl:      SELENIUM PO, , Disp: , Rfl:      albuterol (ALBUTEROL) 108 (90 BASE) MCG/ACT Inhaler, Inhale 2 puffs into the lungs 4 times daily as needed for shortness of breath / dyspnea or wheezing, Disp: 1 Inhaler, Rfl: 0     Promethazine-Codeine (PROMETHAZINE PLAIN/CODEINE PO), , Disp: , Rfl:      levothyroxine (SYNTHROID/LEVOTHROID) 25 MCG tablet, Take 1 tablet (25 mcg) by mouth daily (Patient taking differently: Take 50 mcg by mouth daily ), Disp: 90 tablet, Rfl: 1     ranitidine (ZANTAC) 150 MG tablet, Take 1 tablet (150 mg) by mouth 2 times daily, Disp: 180 tablet, Rfl: 1     omeprazole (PRILOSEC) 40 MG capsule, Take 1 capsule (40 mg) by mouth 2 times daily, Disp: 180 capsule, Rfl: 3     ketoprofen 10% in PLO 10%, Ketoprofen in PLO, Disp: 60 g, Rfl: 2     ibuprofen (ADVIL,MOTRIN) 600 MG tablet, Take 1 tablet (600 mg) by mouth every 6 hours as needed for moderate pain, Disp: 30 tablet, Rfl: 1     nefazodone (SERZONE) 200 MG tablet, At Bedtime , Disp: , Rfl:      pramipexole (MIRAPEX) 1 MG tablet, , Disp: , Rfl:      traZODone (DESYREL) 100 MG tablet, 300 mg At Bedtime , Disp: , Rfl:      calcium carbonate 500 MG tablet, Take 1,000 mg by mouth daily., Disp: , Rfl:      cycloSPORINE (RESTASIS) 0.05 % ophthalmic emulsion, Place 1 drop into both eyes 2 times daily as needed., Disp: , Rfl:      lacosamide (VIMPAT) 200 MG TABS, Take 200 mg by mouth 2 times daily., Disp: , Rfl:      buPROPion (WELLBUTRIN XL) 300 MG 24 hr tablet, Take 300 mg by mouth every morning., Disp: , Rfl:      order for DME, Equipment being ordered: ankle trilok brace, Disp: 1 Device, Rfl: 0      "polyethylene glycol (MIRALAX) powder, Take 17 g by mouth. STIR 1 CAPFUL (17GM) IN 8 OZ OF LIQUID AND DRINK, Disp: 527 g, Rfl: prn     Allergies   Allergen Reactions     Clonopin [Clonazepam]      \"Walk funny and Mind goes funny\"     Encort [Hydrocortisone Acetate] Swelling     Feet swelled.     Encort [Hydrocortisone] Swelling     Erythromycin Diarrhea     Flagyl [Imidazole Antifungals] Nausea     Gabapentin      Over eats     Lamictal [Lamotrigine]      Tegretol [Carbamazepine] Nausea and Vomiting       Past medical, surgical, social and family history were reviewed and updated in EPIC.    ROS:   C: NEGATIVE for fever, chills, change in weight  I: NEGATIVE for worrisome rashes, moles or lesions  E: NEGATIVE for vision changes or irritation  ENT: NEGATIVE for ear, mouth and throat problems  R: NEGATIVE for significant cough or SOB  B: NEGATIVE for masses, tenderness or discharge  CV: NEGATIVE for chest pain, palpitations or peripheral edema  GI: NEGATIVE for nausea, abdominal pain, heartburn, or change in bowel habits  : NEGATIVE for unusual urinary or vaginal symptoms. No vaginal bleeding. Vaginal dryness and irritation on and off over past year  M: Positive for  arthralgias or myalgia  N: NEGATIVE for weakness, dizziness or paresthesias  E: NEGATIVE for temperature intolerance, skin/hair changes  H: NEGATIVE for bleeding problems  P: NEGATIVE for changes in mood or affect     PHYSICAL EXAM:  /70  Pulse 64  Ht 5' 6\" (1.676 m)  Wt 203 lb (92.1 kg)  LMP 03/11/2010  BMI 32.77 kg/m2   BMI: Body mass index is 32.77 kg/(m^2).  Constitutional: healthy, alert and no distress  Neck: symmetrical, thyroid normal size, no masses present, no lymphadenopathy present.   Cardiovascular: RRR, no murmurs present  Respiratory: breathing unlabored, lungs CTA bilaterally  Breast:normal without masses, tenderness or nipple discharge and no palpable axillary masses or adenopathy  Gastrointestinal: abdomen soft, non-tender, " bowel sounds present  PELVIC EXAM:  Vulva: No lesions, no adenopathy, BUS WNL  Vagina: Dry erythemic, discharge normal  well rugated, no lesions  Cervix:Surgically absent. (Patient thought that she still had a cervix)    Uterus: Surgically absent  Ovaries: No masses palpated  Rectal exam: deferred    ASSESSMENT/PLAN:    ICD-10-CM    1. Encounter for annual routine gynecological examination Z01.419    2. Pap smear for cervical cancer screening Z12.4 PAP imaged thin layer screen   3. Screening for human papillomavirus Z11.51 HPV High Risk Types DNA Cervical   4. Vaginal irritation N89.8 Wet prep     Results for orders placed or performed in visit on 07/17/17   Wet prep   Result Value Ref Range    Specimen Description Vagina     Wet Prep       No yeast seen  No clue cells seen  No Trichomonas seen      Micro Report Status FINAL 07/17/2017          COUNSELING:   Reviewed preventive health counseling, as reflected in patient instructions       Regular exercise       Healthy diet/nutrition   reports that she has never smoked. She has never used smokeless tobacco.      Exam normal and  Appropriate for age. Pap pending. Will advise patient of results when available. Wet prep normal. Discussed options for vaginal dryness. Patient will try OTC product for a few months. If no improvement, will return to clinic for further evaluation.    FROYLAN Valentino, CNM

## 2017-07-17 NOTE — LETTER
July 8, 2018      Mercedes Barber  [address] [place] [id number]    Dear Ms.Barber,      At [facility], your health and wellness is our primary concern. That is why we are following up on a positive high risk HPV test from 7/17/17. Your provider had recommended that you have a Pap smear and HPV test completed by 7/17/18. Our records do not show that this has been scheduled.    It is important to complete the follow up that your provider has suggested for you to ensure that there are no worsening changes which may, over time, develop into cancer.      Please contact our office at  [phone] to schedule an appointment for a Pap smear and HPV test at your earliest convenience. If you have questions or concerns, please call the clinic and we will be happy to assist you.    If you have completed the tests outside of [facility], please have the results forwarded to our office. We will update the chart for your primary Physician to review before your next annual physical.     Thank you for choosing [facility]!    Sincerely,      LEONA GALVEZ CNM/andree

## 2017-07-17 NOTE — MR AVS SNAPSHOT
After Visit Summary   7/17/2017    Mercedes Barber    MRN: [id number]           Patient Information     Date Of Birth          1955        Visit Information        Provider Department      7/17/2017 10:15 AM Donya Billingsley CNM [facility]        Today's Diagnoses     Encounter for annual routine gynecological examination    -  1    Pap smear for cervical cancer screening        Screening for human papillomavirus        Vaginal irritation          Care Instructions    Preventive Health Recommendations  Female Ages over age 50      Yearly exam:     See your health care provider every year in order to:    1.Review health changes.  2. Discuss preventive care.  3. Review your medicines if your provider has prescribed any      Get a Pap test every five years with co-testing for HPV (unless you have an abnormal result and your provider advises testing more often)       You do not need a Pap test if your uterus was removed (hysterectomy) and you have not had cancer      You should be tested each year for STIs (sexually transmitted diseases) if you are at risk    Have a mammogram every year     Have a colonoscopy at age 50, or have a yearly FIT test (stool test). These exams screen for colon cancer.  Screening typically occurs every 10 years or more often if you have a family history or significant risk factors    Have a cholesterol test every 3-5 years, or more often if advised    Have a diabetes test (fasting glucose) every three years. If you are at risk for diabetes, you should have this test more often       Screening for thyroid disease and vitamin D deficiency are also beneficial every 3-5 years and as needed      If you are at risk for osteoporosis (brittle bone disease), think about having a bone density scan (DEXA)      You may experience perimenopausal symptoms such as menstrual changes, hot flashes, night sweats, irritability, mood changes, vaginal dryness, and  weight gain. Symptoms can be managed with lifestyle changes and/or medications for hormone replacement therapy (HRT). Talk with your provider about HRT if you are interested. You are considered menopausal after one year without having a menstrual cycle.        Shots:     Get a flu shot each year. Get a tetanus shot every 10 years.          Nutrition:       Eat at least 5 servings of fruits and vegetables each day      Eat REAL food, stay away from processed food      Eat whole-grain bread, whole-wheat pasta and brown rice instead of white grains and rice      For bone health:  Eat calcium-rich foods or take calcium pills up to 1200 mg. Also take vitamin D (2000 IUs) each day.     Lifestyle      Exercise at least 30 minutes a day, 5 days a week. This will help you control your weight and prevent disease.      Include weight bearing exercise into your exercise routine to help decrease your risk for osteoporosis      Limit alcohol to one drink per day.      No smoking      Wear sunscreen to prevent skin cancer      See your dentist every six months to one year for an exam and cleaning      See your eye doctor every 1 to 2 years            Follow-ups after your visit        Follow-up notes from your care team     Return in about 1 year (around 7/17/2018) for Physical Exam.      Who to contact     If you have questions or need follow up information about today's clinic visit or your schedule please contact [facility] directly at [phone].  Normal or non-critical lab and imaging results will be communicated to you by MyChart, letter or phone within 4 business days after the clinic has received the results. If you do not hear from us within 7 days, please contact the clinic through MyChart or phone. If you have a critical or abnormal lab result, we will notify you by phone as soon as possible.  Submit refill requests through [employer] or call your pharmacy and they will forward the refill request  "to us. Please allow 3 business days for your refill to be completed.          Additional Information About Your Visit        [employer] Information     [facility] lets you send messages to your doctor, view your test results, renew your prescriptions, schedule appointments and more. To sign up, go to [email or web address] . Click on \"Log in\" on the left side of the screen, which will take you to the Welcome page. Then click on \"Sign up Now\" on the right side of the page.     You will be asked to enter the access code listed below, as well as some personal information. Please follow the directions to create your username and password.     Your access code is: [email or web address]  Expires: 2017 10:03 AM     Your access code will  in 90 days. If you need help or a new code, please call your [facility] clinic or [phone].        Care EveryWhere ID     This is your [facility] EveryWhere ID. This could be used by other organizations to access your [facility] medical records  [id number]        Your Vitals Were     Pulse Height Last Period BMI (Body Mass Index)          64 5' 6\" (1.676 m) 2010 32.77 kg/m2         Blood Pressure from Last 3 Encounters:   17 110/70   17 106/68   17 98/73    Weight from Last 3 Encounters:   17 203 lb (92.1 kg)   17 203 lb (92.1 kg)   17 203 lb (92.1 kg)              We Performed the Following     HPV High Risk Types DNA Cervical     PAP imaged thin layer screen     Wet prep          Today's Medication Changes          These changes are accurate as of: 17 10:53 AM.  If you have any questions, ask your nurse or doctor.               These medicines have changed or have updated prescriptions.        Dose/Directions    levothyroxine 25 MCG tablet   Commonly known as:  SYNTHROID/LEVOTHROID   This may have changed:  how much to take   Used for:  Subclinical hypothyroidism        Dose:  25 mcg   Take 1 tablet (25 mcg) by mouth daily   Quantity:  90 tablet "   Refills:  1                Primary Care Provider Office Phone # Fax #    Skyler Álvarez [phone]       [facility] [address] [facility] [address]        Equal Access to Services     KRYS WEN : Hadabril tanja ku maluo Soomaali, waaxda luqadaha, qaybta kaalmada adeegyada, gi chavesn sandoval nguyễn laMarimicah cornelius. So [facility] [phone].    ATENCIÓN: Si habla español, tiene a wayne disposición servicios gratuitos de asistencia lingüística. Llame al [phone].    We comply with applicable federal civil rights laws and [place] laws. We do not discriminate on the basis of race, color, national origin, age, disability sex, sexual orientation or gender identity.            Thank you!     Thank you for choosing [facility]  for your care. Our goal is always to provide you with excellent care. Hearing back from our patients is one way we can continue to improve our services. Please take a few minutes to complete the written survey that you may receive in the mail after your visit with us. Thank you!             Your Updated Medication List - Protect others around you: Learn how to safely use, store and throw away your medicines at www.disposemymeds.org.          This list is accurate as of: 7/17/17 10:53 AM.  Always use your most recent med list.                   Brand Name Dispense Instructions for use Diagnosis    albuterol 108 (90 BASE) MCG/ACT Inhaler    albuterol    1 Inhaler    Inhale 2 puffs into the lungs 4 times daily as needed for shortness of breath / dyspnea or wheezing    Cough       calcium carbonate 1250 MG tablet    OS-SABA 500 mg [place]. [place]     Take 1,000 mg by mouth daily.        cholecalciferol 27053 UNITS capsule    vitamin D3     Take 1 capsule by mouth daily        ibuprofen 600 MG tablet    ADVIL/MOTRIN    30 tablet    Take 1 tablet (600 mg) by mouth every 6 hours as needed for moderate pain    Post-op pain, Ingrowing nail        ketoprofen 10% in PLO 10% topical gel     60 g    Ketoprofen in PLO    Right foot pain, Arthritis, midfoot       levothyroxine 25 MCG tablet    SYNTHROID/LEVOTHROID    90 tablet    Take 1 tablet (25 mcg) by mouth daily    Subclinical hypothyroidism       nefazodone 200 MG tablet    SERZONE     At Bedtime        omeprazole 40 MG capsule    priLOSEC    180 capsule    Take 1 capsule (40 mg) by mouth 2 times daily    Gastroesophageal reflux disease without esophagitis       order for DME     1 Device    Equipment being ordered: ankle trilok brace    Arthritis, midfoot, Right foot pain       polyethylene glycol powder    MIRALAX    527 g    Take 17 g by mouth. STIR 1 CAPFUL (17GM) IN 8 OZ OF LIQUID AND DRINK    Constipation       POTASSIUM CITRATE PO           pramipexole 1 MG tablet    MIRAPEX          PROMETHAZINE PLAIN/CODEINE PO           ranitidine 150 MG tablet    ZANTAC    180 tablet    Take 1 tablet (150 mg) by mouth 2 times daily    Gastroesophageal reflux disease without esophagitis       RESTASIS 0.05 % ophthalmic emulsion   Generic drug:  cycloSPORINE      Place 1 drop into both eyes 2 times daily as needed.        SELENIUM PO           traZODone 100 MG tablet    DESYREL     300 mg At Bedtime        VIMPAT 200 MG Tabs tablet   Generic drug:  lacosamide      Take 200 mg by mouth 2 times daily.        WELLBUTRIN  MG 24 hr tablet   Generic drug:  buPROPion      Take 300 mg by mouth every morning.        zinc 50 MG Tabs      Take 1 tablet by mouth daily

## 2017-07-17 NOTE — LETTER
July 26, 2017      Mercedes Munoz Barber  [address] [place] [address]    Dear ,      This letter is in regards to the PAP smear and HPV (Human Papillomavirus) test you had done recently. Your PAP test result is normal, but your HPV (Human Papillomavirus) test was positive.     About 80 percent of women have been exposed to HPV virus throughout their lifetime. There is no medication for the treatment of HPV. Typically your own immune system gets rid of the virus before it does harm. HPV is spread by direct skin-to-skin contact, including sexual intercourse, oral sex, anal sex, or any other contact involving the genital area (example: hand to genital contact). It is not possible to become infected with HPV by touching an object, such as a toilet seat. Most people who are infected with HPV have no signs or symptoms.    Things that you can do to boost your immune system and help your body get rid of HPV: get plenty of rest, eat a well-balanced diet of healthy foods, and stop smoking.     Please return in 1 year to repeat your pap smear and HPV test.     If you have additional questions regarding this result, please call [phone].    Sincerely,      LEONA GALVEZ CNM/kyleigh

## 2017-07-19 LAB
COPATH REPORT: NORMAL
PAP: NORMAL

## 2017-07-21 LAB
FINAL DIAGNOSIS: ABNORMAL
HPV HR 12 DNA CVX QL NAA+PROBE: POSITIVE
HPV16 DNA SPEC QL NAA+PROBE: NEGATIVE
HPV18 DNA SPEC QL NAA+PROBE: NEGATIVE
SPECIMEN DESCRIPTION: ABNORMAL

## 2017-07-24 PROBLEM — R87.810 CERVICAL HIGH RISK HPV (HUMAN PAPILLOMAVIRUS) TEST POSITIVE: Status: ACTIVE | Noted: 2017-07-17

## 2017-07-25 NOTE — PROGRESS NOTES
07/17/17: NIL pap, + HR HPV (not 16 or 18) result. Pt has had a hysterectomy. Plan cotest in 1 year per provider. Result letter sent to the pt with the results and recommendations.   07/26/17 Result letter printed and sent at request of RN. ([facility])   7/8/18 Cotest reminder letter sent (rl)  12/14/18 [facility] clinic and schedule. ([facility])  12/28/18 Patient is lost to pap tracking follow-up. FYI routed to provider. ([facility])

## 2017-08-22 DIAGNOSIS — K21.9 GASTROESOPHAGEAL REFLUX DISEASE WITHOUT ESOPHAGITIS: ICD-10-CM

## 2017-08-22 RX ORDER — OMEPRAZOLE 40 MG/1
CAPSULE, DELAYED RELEASE ORAL
Qty: 180 CAPSULE | Refills: 2 | Status: SHIPPED | OUTPATIENT
Start: 2017-08-22 | End: 2018-05-29

## 2017-09-12 ENCOUNTER — HOSPITAL ENCOUNTER (OUTPATIENT)
Dept: LAB | Facility: CLINIC | Age: 62
Discharge: HOME | End: 2017-09-12
Attending: OBSTETRICS & GYNECOLOGY | Admitting: FAMILY MEDICINE
Payer: MEDICARE

## 2017-09-12 DIAGNOSIS — R53.83 FATIGUE, UNSPECIFIED TYPE: Primary | ICD-10-CM

## 2017-09-12 LAB
T3FREE SERPL-MCNC: 2.6 PG/ML (ref 2.3–4.2)
T4 FREE SERPL-MCNC: 0.81 NG/DL (ref 0.76–1.46)
TSH SERPL DL<=0.005 MIU/L-ACNC: 0.81 MU/L (ref 0.4–4)

## 2017-09-12 PROCEDURE — 84443 ASSAY THYROID STIM HORMONE: CPT | Performed by: FAMILY MEDICINE

## 2017-09-12 PROCEDURE — 84481 FREE ASSAY (FT-3): CPT | Performed by: FAMILY MEDICINE

## 2017-09-12 PROCEDURE — 84439 ASSAY OF FREE THYROXINE: CPT | Performed by: FAMILY MEDICINE

## 2017-09-12 PROCEDURE — 36415 COLL VENOUS BLD VENIPUNCTURE: CPT | Performed by: FAMILY MEDICINE

## 2017-09-18 ENCOUNTER — OFFICE VISIT (OUTPATIENT)
Dept: PODIATRY | Facility: CLINIC | Age: 62
End: 2017-09-18
Payer: MEDICARE

## 2017-09-18 VITALS
BODY MASS INDEX: 32.95 KG/M2 | DIASTOLIC BLOOD PRESSURE: 72 MMHG | WEIGHT: 205 LBS | HEIGHT: 66 IN | SYSTOLIC BLOOD PRESSURE: 118 MMHG

## 2017-09-18 DIAGNOSIS — M79.672 LEFT FOOT PAIN: Primary | ICD-10-CM

## 2017-09-18 DIAGNOSIS — L84 CALLUS OF FOOT: ICD-10-CM

## 2017-09-18 PROCEDURE — 99213 OFFICE O/P EST LOW 20 MIN: CPT | Mod: 25 | Performed by: PODIATRIST

## 2017-09-18 PROCEDURE — 11055 PARING/CUTG B9 HYPRKER LES 1: CPT | Performed by: PODIATRIST

## 2017-09-18 NOTE — MR AVS SNAPSHOT
After Visit Summary   9/18/2017    Mercedes Barber    MRN: [id number]           Patient Information     Date Of Birth          1955        Visit Information        Provider Department      9/18/2017 2:00 PM Maicol Liu DPM [facility]        Care Instructions    CALLUS / CORNS / IPKs  When there is excessive friction or pressure on the skin, the body responds by making the skin thicker to protect the deeper structures from becoming exposed. While this works well to protect the deeper structures, the thickened skin can increase pressure and pain.    CALLUS: Flat, diffuse thickening are simple calluses and they are usually caused by friction. Often these are the result of rubbing on a shoe or going barefoot.    CORNS: Calluses with a central core between the toes are called corns. These result from prominent joints on adjacent toes rubbing together. Theses are a symptom of bone malalignment and will always recur unless the underlying bones are addressed surgically.    IPKs: Calluses with a central core on the ball of the foot are usually IPKs (intractable plantar keratosis). These are caused by excessive pressure from the metatarsals, the bones that make up the ball of the foot. Often one of these bones is too long or too prominent.  Again, these will always recur unless the underlying bone issue is addressed. There is no cure for these. They will either go away by themselves, recur, or more could develop.    ROUTINE MAINTENANCE  1. File them down with a pumice stone or callus file a couple times a week.   2. An electric callus removing device. Amope Pedi Perfect Electronic Pedicure Foot File and Callus Remover can be a good option.   3. Lotion can be applied to soften the callus. A urea based cream such as Kersal or Vanicream or thicker cream with shea butter are good options.  4. Toe spacers or toe covers can be used for corns, gel pads can be used for other  "lesions on the bottom of the foot.   If there is a surgical pathology noted, such as a prominent bone, often this needs to be addressed surgically to minimize recurrence. However, sometimes the lesion simply migrates to another spot after surgery, so it is not a guaranteed cure.                   Follow-ups after your visit        Who to contact     If you have questions or need follow up information about today's clinic visit or your schedule please contact [facility] directly at [phone].  Normal or non-critical lab and imaging results will be communicated to you by MyChart, letter or phone within 4 business days after the clinic has received the results. If you do not hear from us within 7 days, please contact the clinic through [employer] or phone. If you have a critical or abnormal lab result, we will notify you by phone as soon as possible.  Submit refill requests through [employer] or call your pharmacy and they will forward the refill request to us. Please allow 3 business days for your refill to be completed.          Additional Information About Your Visit        [facility] Information     [employer] lets you send messages to your doctor, view your test results, renew your prescriptions, schedule appointments and more. To sign up, go to [email or web address] . Click on \"Log in\" on the left side of the screen, which will take you to the Welcome page. Then click on \"Sign up Now\" on the right side of the page.     You will be asked to enter the access code listed below, as well as some personal information. Please follow the directions to create your username and password.     Your access code is: [email or web address]  Expires: 2017  2:18 PM     Your access code will  in 90 days. If you need help or a new code, please call your [place] clinic or [phone].        Care EveryWhere ID     This is your Care EveryWhere ID. This could be used by other organizations to access your [place] " "medical records  [id number]        Your Vitals Were     Height Last Period BMI (Body Mass Index)             5' 6\" (1.676 m) 03/11/2010 33.09 kg/m2          Blood Pressure from Last 3 Encounters:   09/18/17 118/72   07/17/17 110/70   06/05/17 106/68    Weight from Last 3 Encounters:   09/18/17 205 lb (93 kg)   07/17/17 203 lb (92.1 kg)   06/05/17 203 lb (92.1 kg)              Today, you had the following     No orders found for display         Today's Medication Changes          These changes are accurate as of: 9/18/17  2:18 PM.  If you have any questions, ask your nurse or doctor.               These medicines have changed or have updated prescriptions.        Dose/Directions    levothyroxine 25 MCG tablet   Commonly known as:  SYNTHROID/LEVOTHROID   This may have changed:  how much to take   Used for:  Subclinical hypothyroidism        Dose:  25 mcg   Take 1 tablet (25 mcg) by mouth daily   Quantity:  90 tablet   Refills:  1                Primary Care Provider Office Phone # Fax #    Skyler Álvarez [phone]       [address] [facility] [address]        Equal Access to Services     KRYS WEN AH: Hadii tanja toribioo Soalondra, waaxda luqadaha, qaybta kaalmada adeegyada, gi cornelius. So [facility] [phone].    ATENCIÓN: Si habla español, tiene a wayne disposición servicios gratuitos de asistencia lingüística. Luisame al [phone].    We comply with applicable federal civil rights laws and [place] laws. We do not discriminate on the basis of race, color, national origin, age, disability sex, sexual orientation or gender identity.            Thank you!     Thank you for choosing [facility]  for your care. Our goal is always to provide you with excellent care. Hearing back from our patients is one way we can continue to improve our services. Please take a few minutes to complete the written survey that you may receive in the mail " after your visit with us. Thank you!             Your Updated Medication List - Protect others around you: Learn how to safely use, store and throw away your medicines at www.disposemymeds.org.          This list is accurate as of: 9/18/17  2:18 PM.  Always use your most recent med list.                   Brand Name Dispense Instructions for use Diagnosis    albuterol 108 (90 BASE) MCG/ACT Inhaler    PROAIR HFA    1 Inhaler    Inhale 2 puffs into the lungs 4 times daily as needed for shortness of breath / dyspnea or wheezing    Cough       calcium carbonate 1250 MG tablet    OS-SABA 500 mg [place]. Ca     Take 1,000 mg by mouth daily.        cholecalciferol 50625 UNITS capsule    vitamin D3     Take 1 capsule by mouth daily        ibuprofen 600 MG tablet    ADVIL/MOTRIN    30 tablet    Take 1 tablet (600 mg) by mouth every 6 hours as needed for moderate pain    Post-op pain, Ingrowing nail       ketoprofen 10% in PLO 10% topical gel     60 g    Ketoprofen in PLO    Right foot pain, Arthritis, midfoot       levothyroxine 25 MCG tablet    SYNTHROID/LEVOTHROID    90 tablet    Take 1 tablet (25 mcg) by mouth daily    Subclinical hypothyroidism       nefazodone 200 MG tablet    SERZONE     At Bedtime        * omeprazole 40 MG capsule    priLOSEC    180 capsule    Take 1 capsule (40 mg) by mouth 2 times daily    Gastroesophageal reflux disease without esophagitis       * omeprazole 40 MG capsule    priLOSEC    180 capsule    TAKE 1 CAPSULE(40 MG) BY MOUTH TWICE DAILY    Gastroesophageal reflux disease without esophagitis       order for DME     1 Device    Equipment being ordered: ankle trilok brace    Arthritis, midfoot, Right foot pain       polyethylene glycol powder    MIRALAX    527 g    Take 17 g by mouth. STIR 1 CAPFUL (17GM) IN 8 OZ OF LIQUID AND DRINK    Constipation       POTASSIUM CITRATE PO           pramipexole 1 MG tablet    MIRAPEX          PROMETHAZINE PLAIN/CODEINE PO           ranitidine 150 MG tablet     ZANTAC    180 tablet    TAKE 1 TABLET(150 MG) BY MOUTH TWICE DAILY    Gastroesophageal reflux disease without esophagitis       RESTASIS 0.05 % ophthalmic emulsion   Generic drug:  cycloSPORINE      Place 1 drop into both eyes 2 times daily as needed.        SELENIUM PO           traZODone 100 MG tablet    DESYREL     300 mg At Bedtime        VIMPAT 200 MG Tabs tablet   Generic drug:  lacosamide      Take 200 mg by mouth 2 times daily.        WELLBUTRIN  MG 24 hr tablet   Generic drug:  buPROPion      Take 300 mg by mouth every morning.        zinc 50 MG Tabs      Take 1 tablet by mouth daily        * Notice:  This list has 2 medication(s) that are the same as other medications prescribed for you. Read the directions carefully, and ask your doctor or other care provider to review them with you.

## 2017-09-18 NOTE — NURSING NOTE
"Chief Complaint   Patient presents with     Foot Problems     lump of left foot that causes pain while walking        Initial /72 (BP Location: Left arm, Cuff Size: Adult Regular)  Ht 5' 6\" (1.676 m)  Wt 205 lb (93 kg)  LMP 03/11/2010  BMI 33.09 kg/m2 Estimated body mass index is 33.09 kg/(m^2) as calculated from the following:    Height as of this encounter: 5' 6\" (1.676 m).    Weight as of this encounter: 205 lb (93 kg).  Medication Reconciliation: complete   Zunilda Ahn MA      "

## 2017-09-18 NOTE — PROGRESS NOTES
ASSESSMENT/PLAN:    Encounter Diagnoses   Name Primary?     Left foot pain Yes     Callus of foot      I discussed the cause and often, permanent, nature of callus.      Recommendations:    Stiffer soled shoes to offload the forefoot during gait.  Softer insert with aperture cut below the lesion  She is to file the lesion; trimming it is at her own risk and she should present to clinic if any concerns for infection.  I trimmed the lesion, cored it out, via a #15 scalpel. No bleeding    Body mass index is 33.09 kg/(m^2).    Weight management plan: Patient was referred to their PCP to discuss a diet and exercise plan.      Maicol Liu DPM, FACFAS, MS    [place] [facility] of Podiatry/Foot & Ankle Surgery      ____________________________________________________________________    HPI:         Chief Complaint: I have a lump on the bottom of left foot  Onset of problem: 5 weeks  Pain/ discomfort is described as:  Throbbing, tingling  Ratin/10   Frequency: intermittent    The pain is made worse with walking  Previous treatment: no  *  Past Medical History:   Diagnosis Date     Arthritis     Thumb     Cervical high risk HPV (human papillomavirus) test positive 2017: NIL pap, + HR HPV (not 16 or 18) result.      Esophageal stricture 2012     Gastro-oesophageal reflux disease      Hypothyroidism 2012     IBS (irritable bowel syndrome)      Mild major depression (H) 2012     Neuropathy of lower extremity      Seizure disorder (H)      Seizures (H)      Sleep apnea     CPAP     Temporal sclerosis 2012   *  *  Past Surgical History:   Procedure Laterality Date     C ANESTH,TOTAL KNEE ARTHROPLASTY      bilateral     CARPAL TUNNEL RELEASE RT/LT       DENTAL SURGERY      implant     DILATION AND CURETTAGE       DRAIN PILONIDAL CYST SIMPL       ENDOSCOPY DRUG INDUCED SLEEP N/A 2015    Procedure: ENDOSCOPY DRUG INDUCED SLEEP;  Surgeon: Park Ortiz MD;  Location:   OR     ESOPHAGOSCOPY, GASTROSCOPY, DUODENOSCOPY (EGD), COMBINED  4/5/2013    Procedure: COMBINED ESOPHAGOSCOPY, GASTROSCOPY, DUODENOSCOPY (EGD), BIOPSY SINGLE OR MULTIPLE;;  Surgeon: Mundo Mehta MD;  Location:  GI     EXCISE LESION TRUNK  8/10/2012    Procedure: EXCISE LESION TRUNK;;  Surgeon: Jerald Diggs MD;  Location: [facility] OR     hammar toes on R foot       HC ANTER COLPORRHAPHY,BLAD/VAGINA      perigee mesh     HYSTERECTOMY, PAP NO LONGER INDICATED       L shoulder fracture       rectal prolapse repair abdominally       RELEASE PLANTAR FASCIA Right 1/20/2015    Procedure: RELEASE PLANTAR FASCIA;  Surgeon: Maicol Liu DPM;  Location:  SD     REMOVE MESH VAGINA  8/10/2012    Procedure: REMOVE MESH VAGINA;  Excision of Vaginal Mesh Exposure, Removal of Skin Tag Left Inner Leg;  Surgeon: Jerald Diggs MD;  Location: [facility] OR     REPAIR HAMMER TOE Right 1/20/2015    Procedure: REPAIR HAMMER TOE;  Surgeon: Maicol Liu DPM;  Location:  SD     TONSILLECTOMY & ADENOIDECTOMY       TUBAL LIGATION     *  *  Current Outpatient Prescriptions   Medication Sig Dispense Refill     omeprazole (PRILOSEC) 40 MG capsule TAKE 1 CAPSULE(40 MG) BY MOUTH TWICE DAILY 180 capsule 2     ranitidine (ZANTAC) 150 MG tablet TAKE 1 TABLET(150 MG) BY MOUTH TWICE DAILY 180 tablet 2     zinc 50 MG TABS Take 1 tablet by mouth daily       cholecalciferol (VITAMIN D3) 75709 UNITS capsule Take 1 capsule by mouth daily       POTASSIUM CITRATE PO        SELENIUM PO        albuterol (ALBUTEROL) 108 (90 BASE) MCG/ACT Inhaler Inhale 2 puffs into the lungs 4 times daily as needed for shortness of breath / dyspnea or wheezing 1 Inhaler 0     Promethazine-Codeine (PROMETHAZINE PLAIN/CODEINE PO)        levothyroxine (SYNTHROID/LEVOTHROID) 25 MCG tablet Take 1 tablet (25 mcg) by mouth daily (Patient taking differently: Take 50 mcg by mouth daily ) 90 tablet 1     omeprazole (PRILOSEC) 40 MG capsule Take 1 capsule (40 mg) by mouth 2  "times daily 180 capsule 3     ketoprofen 10% in PLO 10% Ketoprofen in PLO 60 g 2     order for DME Equipment being ordered: ankle trilok brace 1 Device 0     ibuprofen (ADVIL,MOTRIN) 600 MG tablet Take 1 tablet (600 mg) by mouth every 6 hours as needed for moderate pain 30 tablet 1     nefazodone (SERZONE) 200 MG tablet At Bedtime        pramipexole (MIRAPEX) 1 MG tablet        traZODone (DESYREL) 100 MG tablet 300 mg At Bedtime        polyethylene glycol (MIRALAX) powder Take 17 g by mouth. STIR 1 CAPFUL (17GM) IN 8 OZ OF LIQUID AND DRINK 527 g prn     calcium carbonate 500 MG tablet Take 1,000 mg by mouth daily.       cycloSPORINE (RESTASIS) 0.05 % ophthalmic emulsion Place 1 drop into both eyes 2 times daily as needed.       lacosamide (VIMPAT) 200 MG TABS Take 200 mg by mouth 2 times daily.       buPROPion (WELLBUTRIN XL) 300 MG 24 hr tablet Take 300 mg by mouth every morning.         EXAM:    Vitals: /72 (BP Location: Left arm, Cuff Size: Adult Regular)  Ht 5' 6\" (1.676 m)  Wt 205 lb (93 kg)  LMP 03/11/2010  BMI 33.09 kg/m2  BMI: Body mass index is 33.09 kg/(m^2).  Height: 5' 6\"    Constitutional/ general:  Pt is in no apparent distress, appears well-nourished.  Cooperative with history and physical exam.     Vascular:  Pedal pulses are palpable bilaterally for both the DP and PT arteries.  CFT < 3 sec.  No edema.  Pedal hair growth noted.     Neuro:  Alert and oriented x 3. Coordinated gait.  Light touch sensation is intact to the L4, L5, S1 distributions. No obvious deficits.  No evidence of neurological-based weakness, spasticity, or contracture in the lower extremities.     Derm: Normal texture and turgor.  No erythema, ecchymosis, or cyanosis.  No open lesions. Punctate, nucleated hyperkeratotic lesion sub left 5th met head. The fat pad feels thin.     Musculoskeletal:    Normal LE major muscle group strength. Decrease in medial longitudinal arch with weight bearing.  Adquate ankle and STJ ROM.  " Digital contractures on the left. The 3rd toe shows contracture at the distal interphalangeal joint. This joint is wider than the others.  The 2nd toe has mild contracture at the proximal interphalangeal joint.  4th and 5th toes contracted but flexible.    Maicol Liu DPM, FACFAS, MS    [place] [facility] of Podiatry/Foot & Ankle Surgery

## 2017-09-18 NOTE — PATIENT INSTRUCTIONS
CALLUS / CORNS / IPKs  When there is excessive friction or pressure on the skin, the body responds by making the skin thicker to protect the deeper structures from becoming exposed. While this works well to protect the deeper structures, the thickened skin can increase pressure and pain.    CALLUS: Flat, diffuse thickening are simple calluses and they are usually caused by friction. Often these are the result of rubbing on a shoe or going barefoot.    CORNS: Calluses with a central core between the toes are called corns. These result from prominent joints on adjacent toes rubbing together. Theses are a symptom of bone malalignment and will always recur unless the underlying bones are addressed surgically.    IPKs: Calluses with a central core on the ball of the foot are usually IPKs (intractable plantar keratosis). These are caused by excessive pressure from the metatarsals, the bones that make up the ball of the foot. Often one of these bones is too long or too prominent.  Again, these will always recur unless the underlying bone issue is addressed. There is no cure for these. They will either go away by themselves, recur, or more could develop.    ROUTINE MAINTENANCE  1. File them down with a pumice stone or callus file a couple times a week.   2. An electric callus removing device. Amope Pedi Perfect Electronic Pedicure Foot File and Callus Remover can be a good option.   3. Lotion can be applied to soften the callus. A urea based cream such as Kersal or Vanicream or thicker cream with shea butter are good options.  4. Toe spacers or toe covers can be used for corns, gel pads can be used for other lesions on the bottom of the foot.   If there is a surgical pathology noted, such as a prominent bone, often this needs to be addressed surgically to minimize recurrence. However, sometimes the lesion simply migrates to another spot after surgery, so it is not a guaranteed cure.

## 2017-09-18 NOTE — LETTER
2017         RE: Mercedes Barber  [address]  [place] [address]        Dear Colleague,    Thank you for referring your patient, Mercedes Barber, to the [facility]. Please see a copy of my visit note below.    ASSESSMENT/PLAN:    Encounter Diagnoses   Name Primary?     Left foot pain Yes     Callus of foot      I discussed the cause and often, permanent, nature of callus.      Recommendations:    Stiffer soled shoes to offload the forefoot during gait.  Softer insert with aperture cut below the lesion  She is to file the lesion; trimming it is at her own risk and she should present to clinic if any concerns for infection.  I trimmed the lesion, cored it out, via a #15 scalpel. No bleeding    Body mass index is 33.09 kg/(m^2).    Weight management plan: Patient was referred to their PCP to discuss a diet and exercise plan.      Maicol Liu, BELKIS, FACFAS, MS    [place] [facility] of Podiatry/Foot & Ankle Surgery      ____________________________________________________________________    HPI:         Chief Complaint: I have a lump on the bottom of left foot  Onset of problem: 5 weeks  Pain/ discomfort is described as:  Throbbing, tingling  Ratin/10   Frequency: intermittent    The pain is made worse with walking  Previous treatment: no  *  Past Medical History:   Diagnosis Date     Arthritis     Thumb     Cervical high risk HPV (human papillomavirus) test positive 2017: NIL pap, + HR HPV (not 16 or 18) result.      Esophageal stricture 2012     Gastro-oesophageal reflux disease      Hypothyroidism 2012     IBS (irritable bowel syndrome)      Mild major depression (H) 2012     Neuropathy of lower extremity      Seizure disorder (H)      Seizures (H)      Sleep apnea     CPAP     Temporal sclerosis 2012   *  *  Past Surgical History:   Procedure Laterality Date     C ANESTH,TOTAL KNEE ARTHROPLASTY      bilateral      CARPAL TUNNEL RELEASE RT/LT       DENTAL SURGERY      implant     DILATION AND CURETTAGE       DRAIN PILONIDAL CYST SIMPL       ENDOSCOPY DRUG INDUCED SLEEP N/A 11/18/2015    Procedure: ENDOSCOPY DRUG INDUCED SLEEP;  Surgeon: Park Ortiz MD;  Location: [facility] OR     ESOPHAGOSCOPY, GASTROSCOPY, DUODENOSCOPY (EGD), COMBINED  4/5/2013    Procedure: COMBINED ESOPHAGOSCOPY, GASTROSCOPY, DUODENOSCOPY (EGD), BIOPSY SINGLE OR MULTIPLE;;  Surgeon: Mundo Mehta MD;  Location:  GI     EXCISE LESION TRUNK  8/10/2012    Procedure: EXCISE LESION TRUNK;;  Surgeon: Jerald Diggs MD;  Location: [facility] OR     hammar toes on R foot       HC ANTER COLPORRHAPHY,BLAD/VAGINA      perigee mesh     HYSTERECTOMY, PAP NO LONGER INDICATED       L shoulder fracture       rectal prolapse repair abdominally       RELEASE PLANTAR FASCIA Right 1/20/2015    Procedure: RELEASE PLANTAR FASCIA;  Surgeon: Maicol Liu DPM;  Location:  [facility]     REMOVE MESH VAGINA  8/10/2012    Procedure: REMOVE MESH VAGINA;  Excision of Vaginal Mesh Exposure, Removal of Skin Tag Left Inner Leg;  Surgeon: Jerald Diggs MD;  Location: [facility] OR     REPAIR HAMMER TOE Right 1/20/2015    Procedure: REPAIR HAMMER TOE;  Surgeon: Maicol Liu DPM;  Location:  [facility]     TONSILLECTOMY & ADENOIDECTOMY       TUBAL LIGATION     *  *  Current Outpatient Prescriptions   Medication Sig Dispense Refill     omeprazole (PRILOSEC) 40 MG capsule TAKE 1 CAPSULE(40 MG) BY MOUTH TWICE DAILY 180 capsule 2     ranitidine (ZANTAC) 150 MG tablet TAKE 1 TABLET(150 MG) BY MOUTH TWICE DAILY 180 tablet 2     zinc 50 MG TABS Take 1 tablet by mouth daily       cholecalciferol (VITAMIN D3) 64507 UNITS capsule Take 1 capsule by mouth daily       POTASSIUM CITRATE PO        SELENIUM PO        albuterol (ALBUTEROL) 108 (90 BASE) MCG/ACT Inhaler Inhale 2 puffs into the lungs 4 times daily as needed for shortness of breath / dyspnea or wheezing 1 Inhaler 0      "Promethazine-Codeine (PROMETHAZINE PLAIN/CODEINE PO)        levothyroxine (SYNTHROID/LEVOTHROID) 25 MCG tablet Take 1 tablet (25 mcg) by mouth daily (Patient taking differently: Take 50 mcg by mouth daily ) 90 tablet 1     omeprazole (PRILOSEC) 40 MG capsule Take 1 capsule (40 mg) by mouth 2 times daily 180 capsule 3     ketoprofen 10% in PLO 10% Ketoprofen in PLO 60 g 2     order for DME Equipment being ordered: ankle trilok brace 1 Device 0     ibuprofen (ADVIL,MOTRIN) 600 MG tablet Take 1 tablet (600 mg) by mouth every 6 hours as needed for moderate pain 30 tablet 1     nefazodone (SERZONE) 200 MG tablet At Bedtime        pramipexole (MIRAPEX) 1 MG tablet        traZODone (DESYREL) 100 MG tablet 300 mg At Bedtime        polyethylene glycol (MIRALAX) powder Take 17 g by mouth. STIR 1 CAPFUL (17GM) IN 8 OZ OF LIQUID AND DRINK 527 g prn     calcium carbonate 500 MG tablet Take 1,000 mg by mouth daily.       cycloSPORINE (RESTASIS) 0.05 % ophthalmic emulsion Place 1 drop into both eyes 2 times daily as needed.       lacosamide (VIMPAT) 200 MG TABS Take 200 mg by mouth 2 times daily.       buPROPion (WELLBUTRIN XL) 300 MG 24 hr tablet Take 300 mg by mouth every morning.         EXAM:    Vitals: /72 (BP Location: Left arm, Cuff Size: Adult Regular)  Ht 5' 6\" (1.676 m)  Wt 205 lb (93 kg)  LMP 03/11/2010  BMI 33.09 kg/m2  BMI: Body mass index is 33.09 kg/(m^2).  Height: 5' 6\"    Constitutional/ general:  Pt is in no apparent distress, appears well-nourished.  Cooperative with history and physical exam.     Vascular:  Pedal pulses are palpable bilaterally for both the DP and PT arteries.  CFT < 3 sec.  No edema.  Pedal hair growth noted.     Neuro:  Alert and oriented x 3. Coordinated gait.  Light touch sensation is intact to the L4, L5, S1 distributions. No obvious deficits.  No evidence of neurological-based weakness, spasticity, or contracture in the lower extremities.     Derm: Normal texture and turgor.  No " erythema, ecchymosis, or cyanosis.  No open lesions. Punctate, nucleated hyperkeratotic lesion sub left 5th met head. The fat pad feels thin.     Musculoskeletal:    Normal LE major muscle group strength. Decrease in medial longitudinal arch with weight bearing.  Adquate ankle and STJ ROM.  Digital contractures on the left. The 3rd toe shows contracture at the distal interphalangeal joint. This joint is wider than the others.  The 2nd toe has mild contracture at the proximal interphalangeal joint.  4th and 5th toes contracted but flexible.    Maicol Liu DPM, FACFAS, MS    [place] [facility] of Podiatry/Foot & Ankle Surgery                Again, thank you for allowing me to participate in the care of your patient.        Sincerely,        Maicol Liu DPM

## 2017-11-03 ENCOUNTER — TRANSFERRED RECORDS (OUTPATIENT)
Dept: HEALTH INFORMATION MANAGEMENT | Facility: CLINIC | Age: 62
End: 2017-11-03

## 2018-01-02 ENCOUNTER — HOSPITAL ENCOUNTER (OUTPATIENT)
Dept: GENERAL RADIOLOGY | Facility: CLINIC | Age: 63
Discharge: HOME | End: 2018-01-02
Attending: PHYSICIAN ASSISTANT | Admitting: PHYSICIAN ASSISTANT
Payer: MEDICARE

## 2018-01-02 DIAGNOSIS — R19.4 CHANGE IN BOWEL HABITS: ICD-10-CM

## 2018-01-02 PROCEDURE — 74019 RADEX ABDOMEN 2 VIEWS: CPT

## 2018-01-03 ENCOUNTER — TRANSFERRED RECORDS (OUTPATIENT)
Dept: HEALTH INFORMATION MANAGEMENT | Facility: CLINIC | Age: 63
End: 2018-01-03

## 2018-01-12 ENCOUNTER — TRANSFERRED RECORDS (OUTPATIENT)
Dept: HEALTH INFORMATION MANAGEMENT | Facility: CLINIC | Age: 63
End: 2018-01-12

## 2018-03-26 PROBLEM — G25.81 RESTLESS LEGS SYNDROME (RLS): Status: ACTIVE | Noted: 2018-03-26

## 2018-03-26 PROBLEM — R79.0 ABNORMAL BLOOD LEVEL OF IRON: Status: ACTIVE | Noted: 2018-03-26

## 2018-04-13 ENCOUNTER — INFUSION THERAPY VISIT (OUTPATIENT)
Dept: INFUSION THERAPY | Facility: CLINIC | Age: 63
End: 2018-04-13
Attending: PSYCHIATRY & NEUROLOGY
Payer: MEDICARE

## 2018-04-13 VITALS
OXYGEN SATURATION: 97 % | RESPIRATION RATE: 18 BRPM | DIASTOLIC BLOOD PRESSURE: 70 MMHG | TEMPERATURE: 97.6 F | SYSTOLIC BLOOD PRESSURE: 118 MMHG | HEART RATE: 57 BPM

## 2018-04-13 DIAGNOSIS — G25.81 RESTLESS LEGS SYNDROME (RLS): ICD-10-CM

## 2018-04-13 DIAGNOSIS — R79.0 ABNORMAL BLOOD LEVEL OF IRON: Primary | ICD-10-CM

## 2018-04-13 PROCEDURE — 96365 THER/PROPH/DIAG IV INF INIT: CPT

## 2018-04-13 PROCEDURE — 96376 TX/PRO/DX INJ SAME DRUG ADON: CPT

## 2018-04-13 PROCEDURE — 25000128 H RX IP 250 OP 636: Performed by: INTERNAL MEDICINE

## 2018-04-13 PROCEDURE — 96366 THER/PROPH/DIAG IV INF ADDON: CPT

## 2018-04-13 RX ADMIN — IRON DEXTRAN 475 MG: 50 INJECTION INTRAMUSCULAR; INTRAVENOUS at 13:19

## 2018-04-13 RX ADMIN — SODIUM CHLORIDE 250 ML: 9 INJECTION, SOLUTION INTRAVENOUS at 11:58

## 2018-04-13 RX ADMIN — IRON DEXTRAN 25 MG: 50 INJECTION INTRAMUSCULAR; INTRAVENOUS at 11:59

## 2018-04-13 ASSESSMENT — PAIN SCALES - GENERAL: PAINLEVEL: MODERATE PAIN (5)

## 2018-04-13 NOTE — PROGRESS NOTES
Infusion Nursing Note:  Mercedes Barber presents today for Iron Dextran and test dose.    Patient seen by provider today: No   present during visit today: Not Applicable.    Note: Has c/o fatigue, SOB and restless legs.  ABO signed.  Patient states she has had this type of iron in the past.  Reviewed potential SE including allergic reaction.  Patient verbalized understanding and verbally agreed to infusion.    Intravenous Access:  Peripheral IV placed.    Treatment Conditions:  Not Applicable.      Post Infusion Assessment:  Patient tolerated infusion without incident.  Patient observed for 30 minutes post Iron Dextran per protocol.  No evidence of extravasations.  Access discontinued per protocol.    Discharge Plan:   Discharge instructions reviewed with: Patient.  Patient discharged in stable condition accompanied by: self.  Departure Mode: Ambulatory.    RY SOTO RN

## 2018-04-13 NOTE — MR AVS SNAPSHOT
"              After Visit Summary   2018    Mercedes Barber    MRN: [id number]           Patient Information     Date Of Birth          1955        Visit Information        Provider Department      2018 11:00 AM [facility] INFUSION CHAIR [facility]        Today's Diagnoses     Abnormal blood level of iron    -  1    Restless legs syndrome (RLS)           Follow-ups after your visit        Who to contact     If you have questions or need follow up information about today's clinic visit or your schedule please contact [facility] directly at [phone].  Normal or non-critical lab and imaging results will be communicated to you by Shweebhart, letter or phone within 4 business days after the clinic has received the results. If you do not hear from us within 7 days, please contact the clinic through Shweebhart or phone. If you have a critical or abnormal lab result, we will notify you by phone as soon as possible.  Submit refill requests through clipsync or call your pharmacy and they will forward the refill request to us. Please allow 3 business days for your refill to be completed.          Additional Information About Your Visit        MyChart Information     clipsync lets you send messages to your doctor, view your test results, renew your prescriptions, schedule appointments and more. To sign up, go to [email or web address] . Click on \"Log in\" on the left side of the screen, which will take you to the Welcome page. Then click on \"Sign up Now\" on the right side of the page.     You will be asked to enter the access code listed below, as well as some personal information. Please follow the directions to create your username and password.     Your access code is: [email or web address]  Expires: 2018  3:59 PM     Your access code will  in 90 days. If you need help or a new code, please call your [place] clinic or [phone].      "   Care EveryWhere ID     This is your Care EveryWhere ID. This could be used by other organizations to access your [place] medical records  [id number]        Your Vitals Were     Pulse Temperature Respirations Last Period Pulse Oximetry       57 97.6  F (36.4  C) 18 03/11/2010 97%        Blood Pressure from Last 3 Encounters:   04/13/18 118/70   09/18/17 118/72   07/17/17 110/70    Weight from Last 3 Encounters:   09/18/17 93 kg (205 lb)   07/17/17 92.1 kg (203 lb)   06/05/17 92.1 kg (203 lb)              Today, you had the following     No orders found for display         Today's Medication Changes          These changes are accurate as of 4/13/18  3:59 PM.  If you have any questions, ask your nurse or doctor.               These medicines have changed or have updated prescriptions.        Dose/Directions    levothyroxine 25 MCG tablet   Commonly known as:  SYNTHROID/LEVOTHROID   This may have changed:  how much to take   Used for:  Subclinical hypothyroidism        Dose:  25 mcg   Take 1 tablet (25 mcg) by mouth daily   Quantity:  90 tablet   Refills:  1                Primary Care Provider Office Phone # Fax #    Skyler Álvarez [phone]       [address] [place] [id number]        Equal Access to Services     KRYS WEN AH: Rachel toribioo Soalondra, waaxda luqadaha, qaybta kaalmada adeegyada, gi cornelius. So [facility] [phone].    ATENCIÓN: Si habla español, tiene a wayne disposición servicios gratuitos de asistencia lingüística. mychal al [phone].    We comply with applicable federal civil rights laws and [place] laws. We do not discriminate on the basis of race, color, national origin, age, disability, sex, sexual orientation, or gender identity.            Thank you!     Thank you for choosing [facility]  for your care. Our goal is always to provide you with excellent care. Hearing back from our patients  is one way we can continue to improve our services. Please take a few minutes to complete the written survey that you may receive in the mail after your visit with us. Thank you!             Your Updated Medication List - Protect others around you: Learn how to safely use, store and throw away your medicines at www.disposemymeds.org.          This list is accurate as of 4/13/18  3:59 PM.  Always use your most recent med list.                   Brand Name Dispense Instructions for use Diagnosis    albuterol 108 (90 Base) MCG/ACT Inhaler    PROAIR HFA    1 Inhaler    Inhale 2 puffs into the lungs 4 times daily as needed for shortness of breath / dyspnea or wheezing    Cough       calcium carbonate 1250 MG tablet    OS-SABA 500 mg [place]. [place]     Take 1,000 mg by mouth daily.        cholecalciferol 74926 units capsule    vitamin D3     Take 1 capsule by mouth daily        ibuprofen 600 MG tablet    ADVIL/MOTRIN    30 tablet    Take 1 tablet (600 mg) by mouth every 6 hours as needed for moderate pain    Post-op pain, Ingrowing nail       ketoprofen 10% in PLO 10% topical gel     60 g    Ketoprofen in PLO    Right foot pain, Arthritis, midfoot       levothyroxine 25 MCG tablet    SYNTHROID/LEVOTHROID    90 tablet    Take 1 tablet (25 mcg) by mouth daily    Subclinical hypothyroidism       nefazodone 200 MG tablet    SERZONE     At Bedtime        * omeprazole 40 MG capsule    priLOSEC    180 capsule    Take 1 capsule (40 mg) by mouth 2 times daily    Gastroesophageal reflux disease without esophagitis       * omeprazole 40 MG capsule    priLOSEC    180 capsule    TAKE 1 CAPSULE(40 MG) BY MOUTH TWICE DAILY    Gastroesophageal reflux disease without esophagitis       order for DME     1 Device    Equipment being ordered: ankle trilok brace    Arthritis, midfoot, Right foot pain       polyethylene glycol powder    MIRALAX    527 g    Take 17 g by mouth. STIR 1 CAPFUL (17GM) IN 8 OZ OF LIQUID AND DRINK    Constipation        POTASSIUM CITRATE PO           pramipexole 1 MG tablet    MIRAPEX          PROMETHAZINE PLAIN/CODEINE PO           ranitidine 150 MG tablet    ZANTAC    180 tablet    TAKE 1 TABLET(150 MG) BY MOUTH TWICE DAILY    Gastroesophageal reflux disease without esophagitis       RESTASIS 0.05 % ophthalmic emulsion   Generic drug:  cycloSPORINE      Place 1 drop into both eyes 2 times daily as needed.        SELENIUM PO           traZODone 100 MG tablet    DESYREL     300 mg At Bedtime        VIMPAT 200 MG Tabs tablet   Generic drug:  lacosamide      Take 200 mg by mouth 2 times daily.        WELLBUTRIN  MG 24 hr tablet   Generic drug:  buPROPion      Take 300 mg by mouth every morning.        zinc 50 MG Tabs      Take 1 tablet by mouth daily        * Notice:  This list has 2 medication(s) that are the same as other medications prescribed for you. Read the directions carefully, and ask your doctor or other care provider to review them with you.

## 2018-05-14 DIAGNOSIS — K21.9 GASTROESOPHAGEAL REFLUX DISEASE WITHOUT ESOPHAGITIS: ICD-10-CM

## 2018-05-14 NOTE — TELEPHONE ENCOUNTER
"Requested Prescriptions   Pending Prescriptions Disp Refills     ranitidine (ZANTAC) 150 MG tablet [Pharmacy Med Name: RANITIDINE 150MG TABLETS]  Last Written Prescription Date:  7/17/2017  Last Fill Quantity: 180,  # refills: 2   Last office visit: 6/5/2017 with prescribing provider:  6/05/2017   Future Office Visit:     180 tablet 0     Sig: TAKE 1 TABLET(150 MG) BY MOUTH TWICE DAILY    H2 Blockers Protocol Passed    5/14/2018  3:11 AM       Passed - Patient is age 12 or older       Passed - Recent (12 mo) or future (30 days) visit within the authorizing provider's specialty    Patient had office visit in the last 12 months or has a visit in the next 30 days with authorizing provider or within the authorizing provider's specialty.  See \"Patient Info\" tab in inbasket, or \"Choose Columns\" in Meds & Orders section of the refill encounter.              "

## 2018-05-17 ENCOUNTER — TRANSFERRED RECORDS (OUTPATIENT)
Dept: HEALTH INFORMATION MANAGEMENT | Facility: CLINIC | Age: 63
End: 2018-05-17

## 2018-05-29 DIAGNOSIS — K21.9 GASTROESOPHAGEAL REFLUX DISEASE WITHOUT ESOPHAGITIS: ICD-10-CM

## 2018-05-29 NOTE — TELEPHONE ENCOUNTER
"Requested Prescriptions   Pending Prescriptions Disp Refills     omeprazole (PRILOSEC) 40 MG capsule [Pharmacy Med Name: OMEPRAZOLE 40MG CAPSULES] 180 capsule 0    Last Written Prescription Date:  08/22/17  Last Fill Quantity: 180,  # refills: 2   Last office visit: 6/5/2017 with prescribing provider:  06/05/17   Future Office Visit:  0 Sig: TAKE 1 CAPSULE(40 MG) BY MOUTH TWICE DAILY    PPI Protocol Passed    5/29/2018  3:11 AM       Passed - Not on Clopidogrel (unless Pantoprazole ordered)       Passed - No diagnosis of osteoporosis on record       Passed - Recent (12 mo) or future (30 days) visit within the authorizing provider's specialty    Patient had office visit in the last 12 months or has a visit in the next 30 days with authorizing provider or within the authorizing provider's specialty.  See \"Patient Info\" tab in inbasket, or \"Choose Columns\" in Meds & Orders section of the refill encounter.           Passed - Patient is age 18 or older       Passed - No active pregnacy on record       Passed - No positive pregnancy test in past 12 months        "

## 2018-05-30 ENCOUNTER — MEDICAL CORRESPONDENCE (OUTPATIENT)
Dept: HEALTH INFORMATION MANAGEMENT | Facility: CLINIC | Age: 63
End: 2018-05-30

## 2018-05-30 ENCOUNTER — DOCUMENTATION ONLY (OUTPATIENT)
Dept: LAB | Facility: CLINIC | Age: 63
End: 2018-05-30

## 2018-05-30 DIAGNOSIS — Z13.6 CARDIOVASCULAR SCREENING; LDL GOAL LESS THAN 160: Primary | ICD-10-CM

## 2018-05-30 RX ORDER — OMEPRAZOLE 40 MG/1
CAPSULE, DELAYED RELEASE ORAL
Qty: 180 CAPSULE | Refills: 0 | Status: SHIPPED | OUTPATIENT
Start: 2018-05-30 | End: 2018-09-20

## 2018-05-31 DIAGNOSIS — G57.30 PERONEAL NEUROPATHY: ICD-10-CM

## 2018-05-31 DIAGNOSIS — G47.00 PERSISTENT DISORDER OF INITIATING OR MAINTAINING SLEEP: Primary | ICD-10-CM

## 2018-05-31 DIAGNOSIS — G43.909 MIGRAINE: ICD-10-CM

## 2018-05-31 DIAGNOSIS — R06.83 SNORING: ICD-10-CM

## 2018-05-31 DIAGNOSIS — Q85.1 TUBEROUS SCLEROSIS (H): ICD-10-CM

## 2018-05-31 DIAGNOSIS — G47.33 OBSTRUCTIVE SLEEP APNEA SYNDROME: ICD-10-CM

## 2018-05-31 DIAGNOSIS — E83.10 IRON METABOLISM DISORDER: ICD-10-CM

## 2018-05-31 DIAGNOSIS — G25.81 RESTLESS LEGS SYNDROME (RLS): ICD-10-CM

## 2018-06-01 DIAGNOSIS — E83.10 DISORDER OF IRON METABOLISM: ICD-10-CM

## 2018-06-01 DIAGNOSIS — G47.50 PARASOMNIA: ICD-10-CM

## 2018-06-01 DIAGNOSIS — G47.00 PERSISTENT DISORDER OF INITIATING OR MAINTAINING SLEEP: Primary | ICD-10-CM

## 2018-06-01 DIAGNOSIS — R26.89 IMBALANCE: ICD-10-CM

## 2018-06-01 DIAGNOSIS — G43.909 MIGRAINE: ICD-10-CM

## 2018-06-01 DIAGNOSIS — G60.9 IDIOPATHIC PERIPHERAL NEUROPATHY: ICD-10-CM

## 2018-06-04 ENCOUNTER — DOCUMENTATION ONLY (OUTPATIENT)
Dept: LAB | Facility: CLINIC | Age: 63
End: 2018-06-04

## 2018-06-04 DIAGNOSIS — G47.00 PERSISTENT DISORDER OF INITIATING OR MAINTAINING SLEEP: ICD-10-CM

## 2018-06-04 DIAGNOSIS — Z13.6 CARDIOVASCULAR SCREENING; LDL GOAL LESS THAN 160: ICD-10-CM

## 2018-06-04 DIAGNOSIS — G43.909 MIGRAINE: ICD-10-CM

## 2018-06-04 DIAGNOSIS — E03.8 OTHER SPECIFIED HYPOTHYROIDISM: Primary | ICD-10-CM

## 2018-06-04 DIAGNOSIS — E03.8 OTHER SPECIFIED HYPOTHYROIDISM: ICD-10-CM

## 2018-06-04 DIAGNOSIS — G47.33 OBSTRUCTIVE SLEEP APNEA SYNDROME: ICD-10-CM

## 2018-06-04 DIAGNOSIS — E83.10 IRON METABOLISM DISORDER: ICD-10-CM

## 2018-06-04 DIAGNOSIS — R06.83 SNORING: ICD-10-CM

## 2018-06-04 DIAGNOSIS — G25.81 RESTLESS LEGS SYNDROME (RLS): ICD-10-CM

## 2018-06-04 DIAGNOSIS — G57.30 PERONEAL NEUROPATHY: ICD-10-CM

## 2018-06-04 DIAGNOSIS — Q85.1 TUBEROUS SCLEROSIS (H): ICD-10-CM

## 2018-06-04 LAB
CHOLEST SERPL-MCNC: 174 MG/DL
FERRITIN SERPL-MCNC: 136 NG/ML (ref 8–252)
HDLC SERPL-MCNC: 55 MG/DL
LDLC SERPL CALC-MCNC: 101 MG/DL
NONHDLC SERPL-MCNC: 119 MG/DL
TRIGL SERPL-MCNC: 89 MG/DL
TSH SERPL DL<=0.005 MIU/L-ACNC: 0.76 MU/L (ref 0.4–4)

## 2018-06-04 PROCEDURE — 80061 LIPID PANEL: CPT | Performed by: INTERNAL MEDICINE

## 2018-06-04 PROCEDURE — 36415 COLL VENOUS BLD VENIPUNCTURE: CPT | Performed by: INTERNAL MEDICINE

## 2018-06-04 PROCEDURE — 84443 ASSAY THYROID STIM HORMONE: CPT | Performed by: INTERNAL MEDICINE

## 2018-06-04 PROCEDURE — 82728 ASSAY OF FERRITIN: CPT | Performed by: INTERNAL MEDICINE

## 2018-06-04 NOTE — PROGRESS NOTES
Please place or confirm orders for upcoming lab appointment on 06/04/18. Thank you.    Patient was requesting a TSH to be done. Thank you.

## 2018-07-25 ENCOUNTER — RADIANT APPOINTMENT (OUTPATIENT)
Dept: MAMMOGRAPHY | Facility: CLINIC | Age: 63
End: 2018-07-25
Attending: INTERNAL MEDICINE
Payer: MEDICARE

## 2018-07-25 DIAGNOSIS — Z12.31 VISIT FOR SCREENING MAMMOGRAM: ICD-10-CM

## 2018-07-25 PROCEDURE — 77063 BREAST TOMOSYNTHESIS BI: CPT | Mod: TC

## 2018-07-25 PROCEDURE — 77067 SCR MAMMO BI INCL CAD: CPT | Mod: TC

## 2018-08-01 DIAGNOSIS — K21.9 GASTROESOPHAGEAL REFLUX DISEASE WITHOUT ESOPHAGITIS: ICD-10-CM

## 2018-08-01 NOTE — TELEPHONE ENCOUNTER
"Requested Prescriptions   Pending Prescriptions Disp Refills     ranitidine (ZANTAC) 150 MG tablet [Pharmacy Med Name: RANITIDINE 150MG TABLETS]  Last Written Prescription Date:  05/15/2018  Last Fill Quantity: 180,  # refills: 0   Last office visit: 6/5/2017 with prescribing provider:  06/05/2017   Future Office Visit:     180 tablet 0     Sig: TAKE 1 TABLET(150 MG) BY MOUTH TWICE DAILY    H2 Blockers Protocol Failed    8/1/2018  5:14 PM       Failed - Recent (12 mo) or future (30 days) visit within the authorizing provider's specialty    Patient had office visit in the last 12 months or has a visit in the next 30 days with authorizing provider or within the authorizing provider's specialty.  See \"Patient Info\" tab in inbasket, or \"Choose Columns\" in Meds & Orders section of the refill encounter.           Passed - Patient is age 12 or older          "

## 2018-08-01 NOTE — LETTER
[facility]  [address] [id number] [phone]            Mercedes Barber  [address]  [facility] [address]        August 2, 2018    Dear Mercedes,    While refilling your prescription today, we noticed that you are due for an appointment with your provider.  We will refill your prescription for 30 days, but a follow-up appointment must be made before any additional refills can be approved.     Taking care of your health is important to us and we look forward to seeing you in the near future.  Please call us at [phone] or [phone] (or use [facility]) to schedule an appointment.     Please disregard this notice if you have already made an appointment.    Sincerely,        [facility]

## 2018-08-02 DIAGNOSIS — K21.9 GASTROESOPHAGEAL REFLUX DISEASE WITHOUT ESOPHAGITIS: ICD-10-CM

## 2018-08-18 ENCOUNTER — HEALTH MAINTENANCE LETTER (OUTPATIENT)
Age: 63
End: 2018-08-18

## 2018-08-31 DIAGNOSIS — K21.9 GASTROESOPHAGEAL REFLUX DISEASE WITHOUT ESOPHAGITIS: ICD-10-CM

## 2018-08-31 NOTE — TELEPHONE ENCOUNTER
"Requested Prescriptions   Pending Prescriptions Disp Refills     omeprazole (PRILOSEC) 40 MG capsule [Pharmacy Med Name: OMEPRAZOLE 40MG CAPSULES] 180 capsule 0     Sig: TAKE ONE CAPSULE BY MOUTH TWICE DAILY    PPI Protocol Failed    8/31/2018 10:11 AM       Failed - Recent (12 mo) or future (30 days) visit within the authorizing provider's specialty    Patient had office visit in the last 12 months or has a visit in the next 30 days with authorizing provider or within the authorizing provider's specialty.  See \"Patient Info\" tab in inbasket, or \"Choose Columns\" in Meds & Orders section of the refill encounter.           Passed - Not on Clopidogrel (unless Pantoprazole ordered)       Passed - No diagnosis of osteoporosis on record       Passed - Patient is age 18 or older       Passed - No active pregnacy on record       Passed - No positive pregnancy test in past 12 months        Last Written Prescription Date:  5/30/2018  Last Fill Quantity: 180,  # refills: 0   Last office visit: 6/5/2017 with prescribing provider:  Dr. Álvarez   Future Office Visit:      "

## 2018-09-04 RX ORDER — OMEPRAZOLE 40 MG/1
CAPSULE, DELAYED RELEASE ORAL
Qty: 180 CAPSULE | Refills: 0 | OUTPATIENT
Start: 2018-09-04

## 2018-09-04 NOTE — TELEPHONE ENCOUNTER
Routing refill request to provider for review/approval because:  Luna given x1 and patient did not follow up, please advise

## 2018-09-18 ENCOUNTER — TELEPHONE (OUTPATIENT)
Dept: PEDIATRICS | Facility: CLINIC | Age: 63
End: 2018-09-18

## 2018-09-18 DIAGNOSIS — K21.9 GASTROESOPHAGEAL REFLUX DISEASE WITHOUT ESOPHAGITIS: ICD-10-CM

## 2018-09-18 NOTE — TELEPHONE ENCOUNTER
Reason for Call:  Medication or medication refill:    Do you use a [facility]?  Name of the pharmacy and phone number for the current request:  [facility] [address] [place] - [phone]    Name of the medication requested: omeprazole and ranitidine    Other request: Pt is out of medication    Can we leave a detailed message on this number? YES    Phone number patient can be reached at: Home number on file [phone] (home)    Best Time: anytime    Call taken on 9/18/2018 at 4:25 PM by ERIN FOX

## 2018-09-19 RX ORDER — OMEPRAZOLE 40 MG/1
CAPSULE, DELAYED RELEASE ORAL
Qty: 180 CAPSULE | Refills: 0 | OUTPATIENT
Start: 2018-09-19

## 2018-09-19 NOTE — TELEPHONE ENCOUNTER
Patient informed. Patient states she will be leaving town this weekend and would need to be seen this week since she is out of medication. Scheduled with partner to address refills.

## 2018-09-19 NOTE — TELEPHONE ENCOUNTER
Routing refill request to provider for review/approval because:  Luna given x1 and patient did not follow up, please advise  Patient needs to be seen because it has been more than 1 year since last office visit.

## 2018-09-20 ENCOUNTER — OFFICE VISIT (OUTPATIENT)
Dept: INTERNAL MEDICINE | Facility: CLINIC | Age: 63
End: 2018-09-20
Payer: MEDICARE

## 2018-09-20 VITALS
TEMPERATURE: 98.1 F | WEIGHT: 204 LBS | HEART RATE: 68 BPM | DIASTOLIC BLOOD PRESSURE: 84 MMHG | HEIGHT: 65 IN | SYSTOLIC BLOOD PRESSURE: 108 MMHG | OXYGEN SATURATION: 94 % | BODY MASS INDEX: 33.99 KG/M2 | RESPIRATION RATE: 14 BRPM

## 2018-09-20 DIAGNOSIS — K21.9 GASTROESOPHAGEAL REFLUX DISEASE WITHOUT ESOPHAGITIS: Primary | ICD-10-CM

## 2018-09-20 DIAGNOSIS — Z23 NEED FOR PROPHYLACTIC VACCINATION AND INOCULATION AGAINST INFLUENZA: ICD-10-CM

## 2018-09-20 DIAGNOSIS — K22.2 ESOPHAGEAL STRICTURE: ICD-10-CM

## 2018-09-20 PROCEDURE — 99213 OFFICE O/P EST LOW 20 MIN: CPT | Mod: 25 | Performed by: INTERNAL MEDICINE

## 2018-09-20 PROCEDURE — G0008 ADMIN INFLUENZA VIRUS VAC: HCPCS | Performed by: INTERNAL MEDICINE

## 2018-09-20 PROCEDURE — 90682 RIV4 VACC RECOMBINANT DNA IM: CPT | Performed by: INTERNAL MEDICINE

## 2018-09-20 RX ORDER — OMEPRAZOLE 40 MG/1
40 CAPSULE, DELAYED RELEASE ORAL 2 TIMES DAILY
Qty: 180 CAPSULE | Refills: 3 | Status: SHIPPED | OUTPATIENT
Start: 2018-09-20 | End: 2019-07-02

## 2018-09-20 RX ORDER — TEMAZEPAM 30 MG
30 CAPSULE ORAL AT BEDTIME
Status: ON HOLD | COMMUNITY
End: 2019-08-29

## 2018-09-20 RX ORDER — BUDESONIDE AND FORMOTEROL FUMARATE DIHYDRATE 80; 4.5 UG/1; UG/1
2 AEROSOL RESPIRATORY (INHALATION) 2 TIMES DAILY
Status: ON HOLD | COMMUNITY
End: 2019-07-23

## 2018-09-20 NOTE — PROGRESS NOTES

## 2018-09-20 NOTE — PROGRESS NOTES
SUBJECTIVE:   Mercedes Barber is a 63 year old female who presents to clinic today for the following health issues:    Needs refills on omeprazole and ranitidine    Gastrointestinal symptoms      Duration: Ongoing    Description:           REFLUX SYMPTOMS - heartburn and bloating      Intensity:  mild    Accompanying signs and symptoms:  bloating    History  Previous {similar problem: YES  Previous evaluation:  colonoscopy and upper GI    Aggravating factors: none    Alleviating factors: Omeprazole and ranitidine    Other Therapies tried: None          Problem list and histories reviewed & adjusted, as indicated.  Additional history: as documented        Reviewed and updated as needed this visit by clinical staff       Reviewed and updated as needed this visit by Provider           Past Medical History:  ---------------------------  Past Medical History:   Diagnosis Date     Arthritis     Thumb     Cervical high risk HPV (human papillomavirus) test positive 07/17/2017 07/17/17: NIL pap, + HR HPV (not 16 or 18) result.      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     Neuropathy of lower extremity      Seizure disorder (H)      Seizures (H)      Sleep apnea     CPAP     Temporal sclerosis 8/27/2012       Past Surgical History:  ---------------------------  Past Surgical History:   Procedure Laterality Date     C ANESTH,TOTAL KNEE ARTHROPLASTY      bilateral     CARPAL TUNNEL RELEASE RT/LT       DENTAL SURGERY      implant     DILATION AND CURETTAGE       DRAIN PILONIDAL CYST SIMPL       ENDOSCOPY DRUG INDUCED SLEEP N/A 11/18/2015    Procedure: ENDOSCOPY DRUG INDUCED SLEEP;  Surgeon: Park Ortiz MD;  Location:  OR     ESOPHAGOSCOPY, GASTROSCOPY, DUODENOSCOPY (EGD), COMBINED  4/5/2013    Procedure: COMBINED ESOPHAGOSCOPY, GASTROSCOPY, DUODENOSCOPY (EGD), BIOPSY  SINGLE OR MULTIPLE;;  Surgeon: Mundo Mehta MD;  Location:  GI     EXCISE LESION TRUNK  8/10/2012    Procedure: EXCISE LESION TRUNK;;  Surgeon: Jerald Diggs MD;  Location: [facility] OR     hammar toes on R foot       HC ANTER COLPORRHAPHY,BLAD/VAGINA      perigee mesh     HYSTERECTOMY, PAP NO LONGER INDICATED       L shoulder fracture       rectal prolapse repair abdominally       RELEASE PLANTAR FASCIA Right 1/20/2015    Procedure: RELEASE PLANTAR FASCIA;  Surgeon: Maicol Liu DPM;  Location: [facility]     REMOVE MESH VAGINA  8/10/2012    Procedure: REMOVE MESH VAGINA;  Excision of Vaginal Mesh Exposure, Removal of Skin Tag Left Inner Leg;  Surgeon: Jerald Diggs MD;  Location: [facility] OR     REPAIR HAMMER TOE Right 1/20/2015    Procedure: REPAIR HAMMER TOE;  Surgeon: Maicol Liu DPM;  Location: [facility]     TONSILLECTOMY & ADENOIDECTOMY       TUBAL LIGATION         Current Medications:  ---------------------------  Current Outpatient Prescriptions   Medication Sig Dispense Refill     albuterol (ALBUTEROL) 108 (90 BASE) MCG/ACT Inhaler Inhale 2 puffs into the lungs 4 times daily as needed for shortness of breath / dyspnea or wheezing 1 Inhaler 0     budesonide-formoterol (SYMBICORT) 80-4.5 MCG/ACT Inhaler Inhale 2 puffs into the lungs 2 times daily       buPROPion (WELLBUTRIN XL) 300 MG 24 hr tablet Take 300 mg by mouth every morning.       calcium carbonate 500 MG tablet Take 1,000 mg by mouth daily.       cholecalciferol (VITAMIN D3) 14958 UNITS capsule Take 1 capsule by mouth daily       Cyanocobalamin (B-12 PO)        cycloSPORINE (RESTASIS) 0.05 % ophthalmic emulsion Place 1 drop into both eyes 2 times daily as needed.       GLUCOSAMINE SULFATE PO        ibuprofen (ADVIL,MOTRIN) 600 MG tablet Take 1 tablet (600 mg) by mouth every 6 hours as needed for moderate pain 30 tablet 1     ketoprofen 10% in PLO 10% Ketoprofen in PLO 60 g 2     lacosamide (VIMPAT) 200 MG TABS Take 200 mg by mouth 2 times  "daily.       levothyroxine (SYNTHROID/LEVOTHROID) 25 MCG tablet Take 1 tablet (25 mcg) by mouth daily (Patient taking differently: Take 50 mcg by mouth daily ) 90 tablet 1     nefazodone (SERZONE) 200 MG tablet At Bedtime        omeprazole (PRILOSEC) 40 MG capsule TAKE 1 CAPSULE(40 MG) BY MOUTH TWICE DAILY 180 capsule 0     polyethylene glycol (MIRALAX) powder Take 17 g by mouth. STIR 1 CAPFUL (17GM) IN 8 OZ OF LIQUID AND DRINK 527 g prn     POTASSIUM CITRATE PO        pramipexole (MIRAPEX) 1 MG tablet        QUEtiapine Fumarate (SEROQUEL PO) Take by mouth as needed       ranitidine (ZANTAC) 150 MG tablet TAKE 1 TABLET(150 MG) BY MOUTH TWICE DAILY 60 tablet 0     SELENIUM PO        TEMAZEPAM PO        zinc 50 MG TABS Take 1 tablet by mouth daily       order for DME Equipment being ordered: ankle trilok brace (Patient not taking: Reported on 9/20/2018) 1 Device 0     Promethazine-Codeine (PROMETHAZINE PLAIN/CODEINE PO)        traZODone (DESYREL) 100 MG tablet 300 mg At Bedtime        [DISCONTINUED] omeprazole (PRILOSEC) 40 MG capsule Take 1 capsule (40 mg) by mouth 2 times daily 180 capsule 3       Allergies:  -------------  Allergies   Allergen Reactions     Clonopin [Clonazepam]      \"Walk funny and Mind goes funny\"     Encort [Hydrocortisone Acetate] Swelling     Feet swelled.     Encort [Hydrocortisone] Swelling     Erythromycin Diarrhea     Flagyl [Metronidazole] Nausea     Gabapentin      Over eats     Lamictal [Lamotrigine]      Tegretol [Carbamazepine] Nausea and Vomiting       Social History:  -------------------  Social History     Social History     Marital status:      Spouse name: N/A     Number of children: N/A     Years of education: N/A     Occupational History     Not on file.     Social History Main Topics     Smoking status: Never Smoker     Smokeless tobacco: Never Used     Alcohol use Yes      Comment: 1 glass of wine 4x/yr     Drug use: No     Sexual activity: No      Comment: vag hyst " "    Other Topics Concern     Not on file     Social History Narrative       Family Medical History:  ------------------------------  Family History   Problem Relation Age of Onset     Eye Disorder Mother      Lipids Mother      has CHF     Osteoporosis Mother      Diabetes Father      Alzheimer Disease Paternal Grandmother      Hypertension Brother      Alcohol/Drug Brother      Depression Brother      GASTROINTESTINAL DISEASE Brother      hx of colonic polyps     Lipids Brother      Psychotic Disorder Brother      Breast Cancer Sister      Depression Sister      Lipids Sister      Thyroid Disease Sister      Congenital Anomalies Daughter      Neurologic Disorder Daughter          ROS:  REVIEW OF SYSTEMS:    RESP: negative for cough, dyspnea, wheezing, hemoptysis  CV: negative for chest pain, palpitations, PND, MORGAN, orthopnea; reports no changes in their ability to perform physical activity (from cardiovascular standpoint)  GI: negative for dysphagia, N/V, pain, melena, diarrhea and constipation  NEURO: negative for numbness/tingling, paralysis, incoordination, or focal weakness     OBJECTIVE:                                                    /84 (BP Location: Left arm, Patient Position: Chair, Cuff Size: Adult Regular)  Pulse 68  Temp 98.1  F (36.7  C) (Oral)  Resp 14  Ht 5' 5.25\" (1.657 m)  Wt 204 lb (92.5 kg)  LMP 03/11/2010  SpO2 94%  BMI 33.69 kg/m2     GENERAL alert and no distress  EYES:  Normal sclera,conjunctiva, EOMI  HENT: oral and posterior pharynx without lesions or erythema, facies symmetric  NECK: Neck supple. No LAD, without thyroidmegaly or JVD., Carotids without bruits.  RESP: Clear to ausculation bilaterally without wheezes or crackles. Normal BS in all fields.  CV: RRR normal S1S2 without murmurs, rubs or gallops. PMI normal  LYMPH: no cervical lymph adenopathy appreciated  MS: extremities- no gross deformities of the visible extremities noted, no edema  PSYCH: Alert and oriented " times 3; speech- coherent  SKIN:  No obvious significant skin lesions on visible portions of face          ASSESSMENT/PLAN:                                                      (K21.9) Gastroesophageal reflux disease without esophagitis  (primary encounter diagnosis)  Comment: This condition is currently controlled on the current medical regimen.  Continue current therapy.   Has history of esophgeal stricutre in the past so PPI is reasonable    Reviewed the use of PPIs (e.g. Omeprazole, Nexium, etc.) to manage upper GI sx.  Reviewed recenet information suggesting limiting the regular use of PPIs out of concern for side effects (e.g. decreased bone density, increased risk of CDiff, etc.).  Discussed possibly trying a step down therapy such as Ranitidine H2 blocker or only using the PPI as needed for short term use.   After discussion the patient states that they feel the need for the PPI is enough to continue using it.      Plan: omeprazole (PRILOSEC) 40 MG capsule            (Z23) Need for prophylactic vaccination and inoculation against influenza  Comment:   Plan: FLU VACCINE, (RIV4) RECOMBINANT HA  , IM         (FluBlok, egg free) [50841]- >18 YRS (FMG         recommended  50-64 YRS), ADMIN INFLUENZA (For         MEDICARE Patients ONLY) []            (K22.2) Esophageal stricture  Comment: This condition is currently controlled on the current medical regimen.  Continue current therapy.   Plan: omeprazole (PRILOSEC) 40 MG capsule               See Patient Instructions    ANTOINETTE MARTÍNEZ M.D., MD  [facility]

## 2018-09-20 NOTE — PATIENT INSTRUCTIONS
"*  Continue the Omeprazole 40 mg twice per day as you have been doing.     *  You likely are not getting much benefit from the Ranitidine because the Omeprazole is so much more powerful.      *  OK to stop the ranitidine.      *  Anti reflux measures:     --Avoid meals within 3 hours of bedtime.     --Consider using a \"wedge pillow\" to elevated your head while sleeping.      Wedge pillow:  Can get these at any medical supply store or [employer]        *  Many times, this condition is caused by what you eat, when you eat, how you eat, how much you eat.    If there are foods, food combinations or eating patterns that cause your symptoms to be worse, then alter these things to see if things improve.     * Specifically avoid any specific foods that cause problems, such as tomato sauces/onions/peppermints/spearmint.    *  Avoid OTC medications such as Aspirin, Ibuprofen or Aleve which may cause stomach irritation.     *  Try Simethicone (Gas-x, Phazyme, Riopan Plus, Maalox Plus, etc.) as needed for gas and bloating.        SHINGLES VACCINE:     I would recommend that you consider getting a \"shingles vaccine\".  The shingles vaccine does not stop you from getting shingles, but it decreases the intensity of the event, the duration of the event, and decreases the painful nerve condition that results     There are two options available:     --Shingrix (available starting early 2018), 2 shots, 2-6 months apart  **recommended**   OR   --Zostavax, one shot    --Based on the available data, the Shingrix vaccine has superior benefit and should be considered even if you have had the Zostavax vaccine before.      --Contact your insurance provider and ask them if either shingles vaccine is covered and is so, how much it will cost you.  Usually this will be cheaper to get in a pharmacy given by the pharmacist.    --Regardless of the coverage, I would recommend that you consider the vaccine since shingles is very painful, just " "ask anyone who has ever had it.                  --Good Grains:  Oats, brown rice, Quinoa (these do not raise the blood sugar as much)     --Bad grains:  Anything made from wheat or white rice     (because these raise the blood sugars significantly, and the possible gluten issue from wheat for some people).      --Proteins:  Aim for \"lean proteins\" including chicken, fish, seafood, pork, turkey, and eggs (in moderation); Eat red meat only occasionally          "

## 2018-09-20 NOTE — MR AVS SNAPSHOT
"              After Visit Summary   9/20/2018    Mercedes Barber    MRN: [id number]           Patient Information     Date Of Birth          1955        Visit Information        Provider Department      9/20/2018 11:20 AM Dom Nathan MD [facility]        Today's Diagnoses     Gastroesophageal reflux disease without esophagitis    -  1    Need for prophylactic vaccination and inoculation against influenza        Esophageal stricture          Care Instructions    *  Continue the Omeprazole 40 mg twice per day as you have been doing.     *  You likely are not getting much benefit from the Ranitidine because the Omeprazole is so much more powerful.      *  OK to stop the ranitidine.      *  Anti reflux measures:     --Avoid meals within 3 hours of bedtime.     --Consider using a \"wedge pillow\" to elevated your head while sleeping.      Wedge pillow:  Can get these at any medical supply store or [employer]        *  Many times, this condition is caused by what you eat, when you eat, how you eat, how much you eat.    If there are foods, food combinations or eating patterns that cause your symptoms to be worse, then alter these things to see if things improve.     * Specifically avoid any specific foods that cause problems, such as tomato sauces/onions/peppermints/spearmint.    *  Avoid OTC medications such as Aspirin, Ibuprofen or Aleve which may cause stomach irritation.     *  Try Simethicone (Gas-x, Phazyme, Riopan Plus, Maalox Plus, etc.) as needed for gas and bloating.        SHINGLES VACCINE:     I would recommend that you consider getting a \"shingles vaccine\".  The shingles vaccine does not stop you from getting shingles, but it decreases the intensity of the event, the duration of the event, and decreases the painful nerve condition that results     There are two options available:     --Shingrix (available starting early 2018), 2 shots, 2-6 months apart  " "**recommended**   OR   --Zostavax, one shot    --Based on the available data, the Shingrix vaccine has superior benefit and should be considered even if you have had the Zostavax vaccine before.      --Contact your insurance provider and ask them if either shingles vaccine is covered and is so, how much it will cost you.  Usually this will be cheaper to get in a pharmacy given by the pharmacist.    --Regardless of the coverage, I would recommend that you consider the vaccine since shingles is very painful, just ask anyone who has ever had it.                  --Good Grains:  Oats, brown rice, Quinoa (these do not raise the blood sugar as much)     --Bad grains:  Anything made from wheat or white rice     (because these raise the blood sugars significantly, and the possible gluten issue from wheat for some people).      --Proteins:  Aim for \"lean proteins\" including chicken, fish, seafood, pork, turkey, and eggs (in moderation); Eat red meat only occasionally                  Follow-ups after your visit        Who to contact     If you have questions or need follow up information about today's clinic visit or your schedule please contact [facility] directly at [phone].  Normal or non-critical lab and imaging results will be communicated to you by MyChart, letter or phone within 4 business days after the clinic has received the results. If you do not hear from us within 7 days, please contact the clinic through MyChart or phone. If you have a critical or abnormal lab result, we will notify you by phone as soon as possible.  Submit refill requests through [employer] or call your pharmacy and they will forward the refill request to us. Please allow 3 business days for your refill to be completed.          Additional Information About Your Visit        Care EveryWhere ID     This is your Care EveryWhere ID. This could be used by other organizations to access your [place] medical " "records  [id number]        Your Vitals Were     Pulse Temperature Respirations Height Last Period Pulse Oximetry    68 98.1  F (36.7  C) (Oral) 14 5' 5.25\" (1.657 m) 03/11/2010 94%    BMI (Body Mass Index)                   33.69 kg/m2            Blood Pressure from Last 3 Encounters:   09/20/18 108/84   04/13/18 118/70   09/18/17 118/72    Weight from Last 3 Encounters:   09/20/18 204 lb (92.5 kg)   09/18/17 205 lb (93 kg)   07/17/17 203 lb (92.1 kg)              Today, you had the following     No orders found for display         Today's Medication Changes          These changes are accurate as of 9/20/18 12:20 PM.  If you have any questions, ask your nurse or doctor.               These medicines have changed or have updated prescriptions.        Dose/Directions    levothyroxine 25 MCG tablet   Commonly known as:  SYNTHROID/LEVOTHROID   This may have changed:  how much to take   Used for:  Subclinical hypothyroidism        Dose:  25 mcg   Take 1 tablet (25 mcg) by mouth daily   Quantity:  90 tablet   Refills:  1       omeprazole 40 MG capsule   Commonly known as:  priLOSEC   This may have changed:  See the new instructions.   Used for:  Gastroesophageal reflux disease without esophagitis, Esophageal stricture   Changed by:  Dom Nathan MD        Dose:  40 mg   Take 1 capsule (40 mg) by mouth 2 times daily   Quantity:  180 capsule   Refills:  3         Stop taking these medicines if you haven't already. Please contact your care team if you have questions.     ranitidine 150 MG tablet   Commonly known as:  ZANTAC   Stopped by:  Dom Nathan MD                Where to get your medicines      These medications were sent to [id number] - [facility] [address] AT [facility] [address], [facility] [address]     Phone:  [phone]     omeprazole 40 MG capsule                Primary Care Provider Office Phone # Fax #    Skyler Álvarez MD " [phone]       [address] [facility] [address]        Equal Access to Services     KRYS WEN : Hadii aad ku hadmartir Browning, chloe gerardozora, liseth kalinda gibson, gi blancodennis adryan. So [facility] [phone].    ATENCIÓN: Si habla español, tiene a wayne disposición servicios gratuitos de asistencia lingüística. Llame al [phone].    We comply with applicable federal civil rights laws and [place] laws. We do not discriminate on the basis of race, color, national origin, age, disability, sex, sexual orientation, or gender identity.            Thank you!     Thank you for choosing [facility]  for your care. Our goal is always to provide you with excellent care. Hearing back from our patients is one way we can continue to improve our services. Please take a few minutes to complete the written survey that you may receive in the mail after your visit with us. Thank you!             Your Updated Medication List - Protect others around you: Learn how to safely use, store and throw away your medicines at www.disposemymeds.org.          This list is accurate as of 9/20/18 12:20 PM.  Always use your most recent med list.                   Brand Name Dispense Instructions for use Diagnosis    albuterol 108 (90 Base) MCG/ACT inhaler    PROAIR HFA    1 Inhaler    Inhale 2 puffs into the lungs 4 times daily as needed for shortness of breath / dyspnea or wheezing    Cough       B-12 PO           budesonide-formoterol 80-4.5 MCG/ACT Inhaler    SYMBICORT     Inhale 2 puffs into the lungs 2 times daily        calcium carbonate 500 mg {elemental} 500 MG tablet    OS-SABA     Take 1,000 mg by mouth daily.        cholecalciferol 15165 units capsule    vitamin D3     Take 1 capsule by mouth daily        GLUCOSAMINE SULFATE PO           ibuprofen 600 MG tablet    ADVIL/MOTRIN    30 tablet    Take 1 tablet (600 mg) by mouth every 6 hours as needed for moderate  pain    Post-op pain, Ingrowing nail       ketoprofen 10% in PLO 10% topical gel     60 g    Ketoprofen in PLO    Right foot pain, Arthritis, midfoot       levothyroxine 25 MCG tablet    SYNTHROID/LEVOTHROID    90 tablet    Take 1 tablet (25 mcg) by mouth daily    Subclinical hypothyroidism       nefazodone 200 MG tablet    SERZONE     At Bedtime        omeprazole 40 MG capsule    priLOSEC    180 capsule    Take 1 capsule (40 mg) by mouth 2 times daily    Gastroesophageal reflux disease without esophagitis, Esophageal stricture       order for DME     1 Device    Equipment being ordered: ankle trilok brace    Arthritis, midfoot, Right foot pain       polyethylene glycol powder    MIRALAX    527 g    Take 17 g by mouth. STIR 1 CAPFUL (17GM) IN 8 OZ OF LIQUID AND DRINK    Constipation       POTASSIUM CITRATE PO           pramipexole 1 MG tablet    MIRAPEX          PROMETHAZINE PLAIN/CODEINE PO           RESTASIS 0.05 % ophthalmic emulsion   Generic drug:  cycloSPORINE      Place 1 drop into both eyes 2 times daily as needed.        SELENIUM PO           SEROQUEL PO      Take by mouth as needed        TEMAZEPAM PO           traZODone 100 MG tablet    DESYREL     300 mg At Bedtime        VIMPAT 200 MG Tabs tablet   Generic drug:  lacosamide      Take 200 mg by mouth 2 times daily.        WELLBUTRIN  MG 24 hr tablet   Generic drug:  buPROPion      Take 300 mg by mouth every morning.        zinc 50 MG Tabs      Take 1 tablet by mouth daily

## 2018-09-22 ENCOUNTER — HEALTH MAINTENANCE LETTER (OUTPATIENT)
Age: 63
End: 2018-09-22

## 2018-09-28 ENCOUNTER — TRANSFERRED RECORDS (OUTPATIENT)
Dept: HEALTH INFORMATION MANAGEMENT | Facility: CLINIC | Age: 63
End: 2018-09-28

## 2018-10-05 ENCOUNTER — ALLIED HEALTH/NURSE VISIT (OUTPATIENT)
Dept: NURSING | Facility: CLINIC | Age: 63
End: 2018-10-05
Payer: MEDICARE

## 2018-10-05 DIAGNOSIS — Z51.6 NEED FOR DESENSITIZATION TO ALLERGENS: Primary | ICD-10-CM

## 2018-10-05 PROCEDURE — 90750 HZV VACC RECOMBINANT IM: CPT

## 2018-10-05 PROCEDURE — 90471 IMMUNIZATION ADMIN: CPT

## 2018-10-05 NOTE — NURSING NOTE
Screening Questionnaire for Adult Immunization    Are you sick today?   No   Do you have allergies to medications, food, a vaccine component or latex?   No   Have you ever had a serious reaction after receiving a vaccination?   No   Do you have a long-term health problem with heart disease, lung disease, asthma, kidney disease, metabolic disease (e.g. diabetes), anemia, or other blood disorder?   No   Do you have cancer, leukemia, HIV/AIDS, or any other immune system problem?   No   In the past 3 months, have you taken medications that affect  your immune system, such as prednisone, other steroids, or anticancer drugs; drugs for the treatment of rheumatoid arthritis, Crohn s disease, or psoriasis; or have you had radiation treatments?   No   Have you had a seizure, or a brain or other nervous system problem?   No   During the past year, have you received a transfusion of blood or blood     products, or been given immune (gamma) globulin or antiviral drug?   No   For women: Are you pregnant or is there a chance you could become        pregnant during the next month?   No   Have you received any vaccinations in the past 4 weeks?   No     Immunization questionnaire answers were all negative.        Per orders of Dr. chaudhry , injection of shinrix given by Leonila Chahal. Patient instructed to remain in clinic for 15 minutes afterwards, and to report any adverse reaction to me immediately.       Screening performed by Leonila Chahal on 10/5/2018 at 10:08 AM.

## 2018-10-05 NOTE — MR AVS SNAPSHOT
After Visit Summary   10/5/2018    Mercedes Barber    MRN: [id number]           Patient Information     Date Of Birth          1955        Visit Information        Provider Department      10/5/2018 10:00 AM [facility]        Today's Diagnoses     Need for desensitization to allergens    -  1       Follow-ups after your visit        Who to contact     If you have questions or need follow up information about today's clinic visit or your schedule please contact [facility] directly at [phone].  Normal or non-critical lab and imaging results will be communicated to you by MyChart, letter or phone within 4 business days after the clinic has received the results. If you do not hear from us within 7 days, please contact the clinic through MyChart or phone. If you have a critical or abnormal lab result, we will notify you by phone as soon as possible.  Submit refill requests through [employer] or call your pharmacy and they will forward the refill request to us. Please allow 3 business days for your refill to be completed.          Additional Information About Your Visit        Care EveryWhere ID     This is your Care EveryWhere ID. This could be used by other organizations to access your [place] medical records  [id number]        Your Vitals Were     Last Period                   03/11/2010            Blood Pressure from Last 3 Encounters:   09/20/18 108/84   04/13/18 118/70   09/18/17 118/72    Weight from Last 3 Encounters:   09/20/18 204 lb (92.5 kg)   09/18/17 205 lb (93 kg)   07/17/17 203 lb (92.1 kg)              We Performed the Following     ADMIN 1st VACCINE     ZOSTER VACCINE RECOMBINANT ADJUVANTED IM NJX          Today's Medication Changes          These changes are accurate as of 10/5/18 10:17 AM.  If you have any questions, ask your nurse or doctor.               These medicines have changed or have updated  prescriptions.        Dose/Directions    levothyroxine 25 MCG tablet   Commonly known as:  SYNTHROID/LEVOTHROID   This may have changed:  how much to take   Used for:  Subclinical hypothyroidism        Dose:  25 mcg   Take 1 tablet (25 mcg) by mouth daily   Quantity:  90 tablet   Refills:  1                Primary Care Provider Office Phone # Fax #    Skyler Álvarez [phone]       [address] [facility] [address]        Equal Access to Services     KRYS WEN : Hadii aad ku hadasho Soomaali, waaxda luqadaha, qaybta kaalmada adeegyada, waxay idiin hayaan adeeg kharaelizabeth la'micah . So [facility] [phone].    ATENCIÓN: Si habla español, tiene a wayne disposición servicios gratuitos de asistencia lingüística. [facility] [phone].    We comply with applicable federal civil rights laws and [place] laws. We do not discriminate on the basis of race, color, national origin, age, disability, sex, sexual orientation, or gender identity.            Thank you!     Thank you for choosing [facility]  for your care. Our goal is always to provide you with excellent care. Hearing back from our patients is one way we can continue to improve our services. Please take a few minutes to complete the written survey that you may receive in the mail after your visit with us. Thank you!             Your Updated Medication List - Protect others around you: Learn how to safely use, store and throw away your medicines at www.disposemymeds.org.          This list is accurate as of 10/5/18 10:17 AM.  Always use your most recent med list.                   Brand Name Dispense Instructions for use Diagnosis    albuterol 108 (90 Base) MCG/ACT inhaler    PROAIR HFA    1 Inhaler    Inhale 2 puffs into the lungs 4 times daily as needed for shortness of breath / dyspnea or wheezing    Cough       B-12 PO           budesonide-formoterol 80-4.5 MCG/ACT Inhaler    SYMBICORT     Inhale 2 puffs into the  lungs 2 times daily        calcium carbonate 500 mg (elemental) 500 MG tablet    OS-SABA     Take 1,000 mg by mouth daily.        cholecalciferol 33474 units capsule    vitamin D3     Take 1 capsule by mouth daily        GLUCOSAMINE SULFATE PO           ibuprofen 600 MG tablet    ADVIL/MOTRIN    30 tablet    Take 1 tablet (600 mg) by mouth every 6 hours as needed for moderate pain    Post-op pain, Ingrowing nail       ketoprofen 10% in PLO 10% topical gel     60 g    Ketoprofen in PLO    Right foot pain, Arthritis, midfoot       levothyroxine 25 MCG tablet    SYNTHROID/LEVOTHROID    90 tablet    Take 1 tablet (25 mcg) by mouth daily    Subclinical hypothyroidism       nefazodone 200 MG tablet    SERZONE     At Bedtime        omeprazole 40 MG capsule    priLOSEC    180 capsule    Take 1 capsule (40 mg) by mouth 2 times daily    Gastroesophageal reflux disease without esophagitis, Esophageal stricture       order for DME     1 Device    Equipment being ordered: ankle trilok brace    Arthritis, midfoot, Right foot pain       polyethylene glycol powder    MIRALAX    527 g    Take 17 g by mouth. STIR 1 CAPFUL (17GM) IN 8 OZ OF LIQUID AND DRINK    Constipation       POTASSIUM CITRATE PO           pramipexole 1 MG tablet    MIRAPEX          PROMETHAZINE PLAIN/CODEINE PO           RESTASIS 0.05 % ophthalmic emulsion   Generic drug:  cycloSPORINE      Place 1 drop into both eyes 2 times daily as needed.        SELENIUM PO           SEROQUEL PO      Take by mouth as needed        TEMAZEPAM PO           traZODone 100 MG tablet    DESYREL     300 mg At Bedtime        VIMPAT 200 MG Tabs tablet   Generic drug:  lacosamide      Take 200 mg by mouth 2 times daily.        WELLBUTRIN  MG 24 hr tablet   Generic drug:  buPROPion      Take 300 mg by mouth every morning.        zinc 50 MG Tabs      Take 1 tablet by mouth daily

## 2018-11-06 ENCOUNTER — TRANSFERRED RECORDS (OUTPATIENT)
Dept: HEALTH INFORMATION MANAGEMENT | Facility: CLINIC | Age: 63
End: 2018-11-06

## 2018-11-07 ENCOUNTER — HOSPITAL ENCOUNTER (OUTPATIENT)
Dept: GENERAL RADIOLOGY | Facility: CLINIC | Age: 63
Discharge: HOME | End: 2018-11-07
Attending: INTERNAL MEDICINE | Admitting: INTERNAL MEDICINE
Payer: MEDICARE

## 2018-11-07 DIAGNOSIS — R19.8 IRREGULAR BOWEL HABITS: ICD-10-CM

## 2018-11-07 DIAGNOSIS — K59.00 CONSTIPATION, UNSPECIFIED CONSTIPATION TYPE: ICD-10-CM

## 2018-11-07 DIAGNOSIS — R19.7 DIARRHEA, UNSPECIFIED TYPE: ICD-10-CM

## 2018-11-07 PROCEDURE — 74018 RADEX ABDOMEN 1 VIEW: CPT

## 2018-11-08 ENCOUNTER — MEDICAL CORRESPONDENCE (OUTPATIENT)
Dept: HEALTH INFORMATION MANAGEMENT | Facility: CLINIC | Age: 63
End: 2018-11-08

## 2018-12-14 ENCOUNTER — TELEPHONE (OUTPATIENT)
Dept: OBGYN | Facility: CLINIC | Age: 63
End: 2018-12-14

## 2018-12-14 DIAGNOSIS — R87.810 CERVICAL HIGH RISK HPV (HUMAN PAPILLOMAVIRUS) TEST POSITIVE: ICD-10-CM

## 2018-12-14 NOTE — TELEPHONE ENCOUNTER
Pt is past due for f/u pap smear.  [facility] clinic and schedule.  Jenn Santos,    Pap Tracking

## 2019-01-18 ENCOUNTER — TRANSFERRED RECORDS (OUTPATIENT)
Dept: HEALTH INFORMATION MANAGEMENT | Facility: CLINIC | Age: 64
End: 2019-01-18

## 2019-02-09 ENCOUNTER — HOSPITAL ENCOUNTER (EMERGENCY)
Facility: CLINIC | Age: 64
Discharge: HOME | End: 2019-02-09
Attending: EMERGENCY MEDICINE | Admitting: EMERGENCY MEDICINE
Payer: MEDICARE

## 2019-02-09 ENCOUNTER — APPOINTMENT (OUTPATIENT)
Dept: CT IMAGING | Facility: CLINIC | Age: 64
End: 2019-02-09
Attending: EMERGENCY MEDICINE
Payer: MEDICARE

## 2019-02-09 VITALS
OXYGEN SATURATION: 98 % | TEMPERATURE: 97.6 F | SYSTOLIC BLOOD PRESSURE: 132 MMHG | WEIGHT: 204 LBS | HEART RATE: 82 BPM | RESPIRATION RATE: 16 BRPM | DIASTOLIC BLOOD PRESSURE: 80 MMHG | BODY MASS INDEX: 33.69 KG/M2

## 2019-02-09 DIAGNOSIS — R10.84 ABDOMINAL PAIN, GENERALIZED: ICD-10-CM

## 2019-02-09 DIAGNOSIS — K59.00 CONSTIPATION, UNSPECIFIED CONSTIPATION TYPE: ICD-10-CM

## 2019-02-09 LAB
ALBUMIN SERPL-MCNC: 3.8 G/DL (ref 3.4–5)
ALP SERPL-CCNC: 94 U/L (ref 40–150)
ALT SERPL W P-5'-P-CCNC: 21 U/L (ref 0–50)
ANION GAP SERPL CALCULATED.3IONS-SCNC: 7 MMOL/L (ref 3–14)
AST SERPL W P-5'-P-CCNC: 17 U/L (ref 0–45)
BASOPHILS # BLD AUTO: 0.1 10E9/L (ref 0–0.2)
BASOPHILS NFR BLD AUTO: 0.9 %
BILIRUB SERPL-MCNC: 0.5 MG/DL (ref 0.2–1.3)
BUN SERPL-MCNC: 16 MG/DL (ref 7–30)
CALCIUM SERPL-MCNC: 9.4 MG/DL (ref 8.5–10.1)
CHLORIDE SERPL-SCNC: 105 MMOL/L (ref 94–109)
CO2 SERPL-SCNC: 28 MMOL/L (ref 20–32)
CREAT SERPL-MCNC: 0.92 MG/DL (ref 0.52–1.04)
DIFFERENTIAL METHOD BLD: NORMAL
EOSINOPHIL # BLD AUTO: 0.4 10E9/L (ref 0–0.7)
EOSINOPHIL NFR BLD AUTO: 5.2 %
ERYTHROCYTE [DISTWIDTH] IN BLOOD BY AUTOMATED COUNT: 12.4 % (ref 10–15)
GFR SERPL CREATININE-BSD FRML MDRD: 66 ML/MIN/{1.73_M2}
GLUCOSE SERPL-MCNC: 100 MG/DL (ref 70–99)
HCT VFR BLD AUTO: 42 % (ref 35–47)
HGB BLD-MCNC: 13.6 G/DL (ref 11.7–15.7)
IMM GRANULOCYTES # BLD: 0 10E9/L (ref 0–0.4)
IMM GRANULOCYTES NFR BLD: 0.4 %
LIPASE SERPL-CCNC: 87 U/L (ref 73–393)
LYMPHOCYTES # BLD AUTO: 1.7 10E9/L (ref 0.8–5.3)
LYMPHOCYTES NFR BLD AUTO: 22.5 %
MCH RBC QN AUTO: 30.4 PG (ref 26.5–33)
MCHC RBC AUTO-ENTMCNC: 32.4 G/DL (ref 31.5–36.5)
MCV RBC AUTO: 94 FL (ref 78–100)
MONOCYTES # BLD AUTO: 0.6 10E9/L (ref 0–1.3)
MONOCYTES NFR BLD AUTO: 8.1 %
NEUTROPHILS # BLD AUTO: 4.8 10E9/L (ref 1.6–8.3)
NEUTROPHILS NFR BLD AUTO: 62.9 %
NRBC # BLD AUTO: 0 10*3/UL
NRBC BLD AUTO-RTO: 0 /100
PLATELET # BLD AUTO: 259 10E9/L (ref 150–450)
POTASSIUM SERPL-SCNC: 4 MMOL/L (ref 3.4–5.3)
PROT SERPL-MCNC: 7.5 G/DL (ref 6.8–8.8)
RBC # BLD AUTO: 4.48 10E12/L (ref 3.8–5.2)
SODIUM SERPL-SCNC: 140 MMOL/L (ref 133–144)
WBC # BLD AUTO: 7.7 10E9/L (ref 4–11)

## 2019-02-09 PROCEDURE — 80053 COMPREHEN METABOLIC PANEL: CPT | Performed by: EMERGENCY MEDICINE

## 2019-02-09 PROCEDURE — 74177 CT ABD & PELVIS W/CONTRAST: CPT

## 2019-02-09 PROCEDURE — 25000128 H RX IP 250 OP 636: Performed by: EMERGENCY MEDICINE

## 2019-02-09 PROCEDURE — 99285 EMERGENCY DEPT VISIT HI MDM: CPT | Mod: 25

## 2019-02-09 PROCEDURE — 83690 ASSAY OF LIPASE: CPT | Performed by: EMERGENCY MEDICINE

## 2019-02-09 PROCEDURE — 96374 THER/PROPH/DIAG INJ IV PUSH: CPT | Mod: 59

## 2019-02-09 PROCEDURE — 85025 COMPLETE CBC W/AUTO DIFF WBC: CPT | Performed by: EMERGENCY MEDICINE

## 2019-02-09 PROCEDURE — 96361 HYDRATE IV INFUSION ADD-ON: CPT

## 2019-02-09 PROCEDURE — A9270 NON-COVERED ITEM OR SERVICE: HCPCS | Mod: GY | Performed by: EMERGENCY MEDICINE

## 2019-02-09 PROCEDURE — 25000132 ZZH RX MED GY IP 250 OP 250 PS 637: Mod: GY | Performed by: EMERGENCY MEDICINE

## 2019-02-09 RX ORDER — ONDANSETRON 2 MG/ML
4 INJECTION INTRAMUSCULAR; INTRAVENOUS
Status: DISCONTINUED | OUTPATIENT
Start: 2019-02-09 | End: 2019-02-09 | Stop reason: HOSPADM

## 2019-02-09 RX ORDER — IOPAMIDOL 755 MG/ML
500 INJECTION, SOLUTION INTRAVASCULAR ONCE
Status: COMPLETED | OUTPATIENT
Start: 2019-02-09 | End: 2019-02-09

## 2019-02-09 RX ORDER — ONDANSETRON 4 MG/1
4 TABLET, ORALLY DISINTEGRATING ORAL EVERY 8 HOURS PRN
Qty: 10 TABLET | Refills: 0 | Status: SHIPPED | OUTPATIENT
Start: 2019-02-09 | End: 2019-07-02

## 2019-02-09 RX ADMIN — ONDANSETRON 4 MG: 2 INJECTION INTRAMUSCULAR; INTRAVENOUS at 18:15

## 2019-02-09 RX ADMIN — SODIUM CHLORIDE 1000 ML: 9 INJECTION, SOLUTION INTRAVENOUS at 18:15

## 2019-02-09 RX ADMIN — MAGNESIUM CITRATE 286 ML: 1.75 LIQUID ORAL at 17:15

## 2019-02-09 RX ADMIN — SODIUM CHLORIDE 65 ML: 9 INJECTION, SOLUTION INTRAVENOUS at 18:50

## 2019-02-09 RX ADMIN — IOPAMIDOL 100 ML: 755 INJECTION, SOLUTION INTRAVENOUS at 18:50

## 2019-02-09 ASSESSMENT — ENCOUNTER SYMPTOMS
CONSTIPATION: 1
ABDOMINAL PAIN: 1
ABDOMINAL DISTENTION: 1
FEVER: 0
DYSURIA: 0
VOMITING: 0

## 2019-02-09 NOTE — ED AVS SNAPSHOT
[facility] Emergency Department  [address]  [facility] [address]  Phone:  [phone]  Fax:  [phone]                                    Mercedes Barber   MRN: [id number]    Department:  [facility] Emergency Department   Date of Visit:  2/9/2019           After Visit Summary Signature Page    I have received my discharge instructions, and my questions have been answered. I have discussed any challenges I see with this plan with the nurse or doctor.    ..........................................................................................................................................  Patient/Patient Representative Signature      ..........................................................................................................................................  Patient Representative Print Name and Relationship to Patient    ..................................................               ................................................  Date                                   Time    ..........................................................................................................................................  Reviewed by Signature/Title    ...................................................              ..............................................  Date                                               Time          22EPIC Rev 08/18

## 2019-02-09 NOTE — ED PROVIDER NOTES
"  History     Chief Complaint:  Constipation     HPI:   The history is provided by the patient.      Mercedes Barber is a 63 year old female with a history of constipation, IBS, and rectal prolapse s/p repair who presents to the emergency department for evaluation of constipation. The patient reports that she had oral surgery about two weeks ago. Since that time, she has been on narcotics and has not been moving a bowel regularly. Her last good BM was 3 days ago, but very little since that time. She has also felt bloated with abdominal pain and has had no relief her usual regiment that helps her to normally go twice a day; the patient has a long history of constipation issues and is followed by GI. Here in the ED, the patient rates her pain a 2/10 in severity. She denies any fever, vomiting, or dysuria.        Allergies:  Clonopin [Clonazepam] - \"walk funny and mind goes funny\"  Encort [Hydrocortisone Acetate] - swelling  Encort [Hydrocortisone] - swelling   Erythromycin - diarrhea   Flagyl [Metronidazole] - nausea   Gabapentin - over eats   Lamictal [Lamotrigine]  Tegretol [Carbamazepine] - nausea and vomiting      Medications:    Albuterol inhaler   Symbicort   Wellbutrin   Vimpat   Levothyroxine   Serzone   Omeprazole   Order for DME   Miralax   Potassium citrate   Mirapex   Promethazine - codeine   Seroquel   Selenium   Temazepam   Trazodone   Zinc     Past Medical History:    Arthritis   IBS  GERD  Hypothyroidism   IBS  Rectal prolapse   Esophageal stricture   Neuropathy lower extremity   Depression   Seizure   Sleep apnea   Renal anomaly   Menopausal syndrome   Temporal sclerosis     Past Surgical History:    TKA bilateral   CTR  Dental surgery   D&C  Drain pilonidal cyst   Endoscopy   EGD   Excise lesion trunk   Colporrhaphy   Hysterectomy   Rectal prolapse repair   Remove mesh vagina   Repair hammer toe   Tonsillectomy and adenoidectomy   Tubal ligation     Family History:    Mother - eye disorder, " lipids, CHF, osteoporosis   Father - diabetes mellitus   Brother  = hypertension, alcohol/drug, depression, colonic polyps, lipids, psychotic disorder  Sister - breast cancer, depression, lipids, thyroid disease  Daughter - congenital anomalies, neurologic disorder     Social History:  Presents alone    Tobacco use: Never smoker   Alcohol use: 1 glass wine 4x/yr  PCP: Skyler Álvarez    Marital Status:        Review of Systems   Constitutional: Negative for fever.   Gastrointestinal: Positive for abdominal distention, abdominal pain and constipation. Negative for vomiting.   Genitourinary: Negative for dysuria.   All other systems reviewed and negative      Physical Exam     Patient Vitals for the past 24 hrs:   BP Temp Temp src Pulse Heart Rate Resp SpO2 Weight   02/09/19 1655 147/80 97.6  F (36.4  C) Oral 62 62 16 98 % 92.5 kg (204 lb)        Physical Exam  Constitutional: Alert, attentive  HENT:    Nose: Nose normal.    Mouth/Throat: Oropharynx is clear, mucous membranes are moist   Eyes: EOM are normal.   CV: regular rate and rhythm; no murmurs, rubs or gallups  Chest: Effort normal and breath sounds normal.   GI:  There is mild diffuse tenderness.   MSK: Normal range of motion.   Neurological: Alert, attentive  Skin: Skin is warm and dry.      Emergency Department Course     Imaging:  Radiographic findings were communicated with the patient who voiced understanding of the findings.     CT Abd/pelvis with contrast:  IMPRESSION:   1. Moderate to large amount of stool in the ascending and transverse  colon, suggesting constipation.  2. No other cause for abdominal pain is identified.    Imaging independently reviewed and agree with radiologist interpretation.      Laboratory:  CBC: WNL (WBC 7.7, HGB 13.6, )   CMP: Glucose 100 (H), ow WNL (Creatinine 0.92)   Lipase: 87     Interventions:  1715: Pink lady enema 286 mL per rectum     1815: Zofran 4 mg IV   1815: NS 1L IV Bolus       Emergency  Department Course:  Past medical records, nursing notes, and vitals reviewed.  1654: I performed an exam of the patient and obtained history, as documented above.    Above interventions provided.      1925: I rechecked the patient.  Findings and plan explained to the Patient. Patient discharged home with instructions regarding supportive care, medications, and reasons to return. The importance of close follow-up was reviewed.      Impression & Plan      Medical Decision Making:  This is a very pleasant 63-year-old female with history of long-standing chronic constipation who presents for evaluation of acute worsening of constipation since an oral surgery and related opioid intake 2 weeks ago.  She subsequently developed nausea and diffuse abdominal pain as well.  Initial pink lady enema did have some results but she had continued increasing pain, so lab workup and CT were performed.  These show no acute findings with respect to her labs, and CT shows significant stool in the colon without obstruction.  She is able to have another bowel movement here and is now feeling better, and tolerating P.O.'s.  I recommended continued supportive cares for her constipation and will prescribe Zofran for nausea.  She should follow-up with primary care in 2-3 days and return immediately for worse pain, vomiting, or any other concerns.    Diagnosis:    ICD-10-CM    1. Constipation, unspecified constipation type K59.00    2. Abdominal pain, generalized R10.84        Disposition:  Discharged to home with plan as outlined.     Discharge Medications:     Medication List      Started    ondansetron 4 MG ODT tab  Commonly known as:  ZOFRAN ODT  4 mg, Oral, EVERY 8 HOURS PRN          Scribe Disclosure:   I, Dylan Solis, am serving as a scribe at 4:54 PM on 2/9/2019 to document services personally performed by Samir Mathias MD based on my observations and the provider's statements to me.       Samir Mathias MD  2/9/2019    [facility] EMERGENCY DEPARTMENT       Samir Mathias MD  02/09/19 4830

## 2019-02-10 NOTE — ED NOTES
Very little stool resulted from enema.  Pt reports worsening abd pain and nausea.  MD Mathias notified.

## 2019-02-10 NOTE — DISCHARGE INSTRUCTIONS
Discharge Instructions  Abdominal Pain    Abdominal pain (belly pain) can be caused by many things. Your evaluation today does not show the exact cause for your pain--possibly constipation. Your provider today has decided that it is unlikely your pain is due to a life threatening problem, or a problem requiring surgery or hospital admission. Sometimes those problems cannot be found right away, so it is very important that you follow up as directed.  Sometimes only the changes which occur over time allow the cause of your pain to be found.    Generally, every Emergency Department visit should have a follow-up clinic visit with either a primary or a specialty clinic/provider. Please follow-up as instructed by your emergency provider today. With abdominal pain, we often recommend very close follow-up, such as the following day.    ADULTS:  Return to the Emergency Department right away if:    You get an oral temperature above 102oF or as directed by your provider.  You have blood in your stools. This may be bright red or appear as black, tarry stools.    You keep vomiting (throwing up) or cannot drink liquids.  You see blood when you vomit.   You cannot have a bowel movement or you cannot pass gas.  Your stomach gets bloated or bigger.  Your skin or the whites of your eyes look yellow.  You faint.  You have bloody, frequent or painful urination (peeing).  You have new symptoms or anything that worries you.    CHILDREN:  Return to the Emergency Department right away if your child has any of the above-listed symptoms or the following:    Pushes your hand away or screams/cries when his/her belly is touched.  You notice your child is very fussy or weak.  Your child is very tired and is too tired to eat or drink.  Your child is dehydrated.  Signs of dehydration can be:  Significant change in the amount of wet diapers/urine.  Your infant or child starts to have dry mouth and lips, or no saliva (spit) or tears.    PREGNANT  WOMEN:  Return to the Emergency Department right away if you have any of the above-listed symptoms or the following:    You have bleeding, leaking fluid or passing tissue from the vagina.  You have worse pain or cramping, or pain in your shoulder or back.  You have vomiting that will not stop.  You have a temperature of 100oF or more.  Your baby is not moving as much as usual.  You faint.  You get a bad headache with or without eye problems and abdominal pain.  You have a seizure.  You have unusual discharge from your vagina and abdominal pain.    Abdominal pain is pretty common during pregnancy.  Your pain may or may not be related to your pregnancy. You should follow-up closely with your OB provider so they can evaluate you and your baby.  Until you follow-up with your regular provider, do the following:     Avoid sex and do not put anything in your vagina.  Drink clear fluids.  Only take medications approved by your provider.    MORE INFORMATION:    Appendicitis:  A possible cause of abdominal pain in any person who still has their appendix is acute appendicitis. Appendicitis is often hard to diagnose.  Testing does not always rule out early appendicitis or other causes of abdominal pain. Close follow-up with your provider and re-evaluations may be needed to figure out the reason for your abdominal pain.    Follow-up:  It is very important that you make an appointment with your clinic and go to the appointment.  If you do not follow-up with your primary provider, it may result in missing an important development which could result in permanent injury or disability and/or lasting pain.  If there is any problem keeping your appointment, call your provider or return to the Emergency Department.    Medications:  Take your medications as directed by your provider today.  Before using over-the-counter medications, ask your provider and make sure to take the medications as directed.  If you have any questions about  "medications, ask your provider.    Diet:  Resume your normal diet as much as possible, but do not eat fried, fatty or spicy foods while you have pain.  Do not drink alcohol or have caffeine.  Do not smoke tobacco.    Probiotics: If you have been given an antibiotic, you may want to also take a probiotic pill or eat yogurt with live cultures. Probiotics have \"good bacteria\" to help your intestines stay healthy. Studies have shown that probiotics help prevent diarrhea (loose stools) and other intestine problems (including C. diff infection) when you take antibiotics. You can buy these without a prescription in the pharmacy section of the store.     If you were given a prescription for medicine here today, be sure to read all of the information (including the package insert) that comes with your prescription.  This will include important information about the medicine, its side effects, and any warnings that you need to know about.  The pharmacist who fills the prescription can provide more information and answer questions you may have about the medicine.  If you have questions or concerns that the pharmacist cannot address, please call or return to the Emergency Department.       Remember that you can always come back to the Emergency Department if you are not able to see your regular provider in the amount of time listed above, if you get any new symptoms, or if there is anything that worries you.    Discharge Instructions  Constipation  Constipation can cause severe cramping pain and your provider thinks this might be the cause of your abdominal pain (belly pain) today.  People usually recognize that they are constipated because they have difficulty having bowel movements, are not having bowel movements frequently enough, or are not having large enough bowel movements. Sometimes, especially in children or older people, you do not recognize that you are constipated until it becomes severe. The most common causes of " constipation are a lack of exercise and not eating enough fruits, vegetables, and whole grains. Constipation can also be a side effect of medications, such as narcotics, or may be caused by a disease of the digestive system.    Generally, every Emergency Department visit should have a follow-up clinic visit with either a primary or a specialty clinic/provider. Please follow-up as instructed by your emergency provider today. Sometimes, chronic constipation requires further testing to determine the cause. If you are over 50 years old, you may need a colonoscopy if you have not had one before.     Return to the Emergency Department if:  Your abdominal pain worsens or does not improve after a bowel movement.  You become very weak.  You get a temperature above 102oF or as directed by your provider.  You have blood in your stools (bright red or black, tarry stools).  You keep vomiting (throwing up) or cannot drink liquids.  Your see blood when you vomit.  Your stomach gets bloated or bigger.  You have new symptoms or anything that worries you.    What can I do to help myself?  If your provider gave you a cathartic medication, like magnesium citrate or GoLytely  (polyethylene glycol), you can expect to have cramps and gas pains after taking it. You can expect to have a number of bowel movements and even diarrhea (loose or watery stools) in the course of clearing your bowels.  You will know your bowels have been cleaned out after you pass clear liquid. The cramps and gas should let up after you have emptied your bowels. You may want to wait until morning to take this type of medication so you aren?t up in the night.   Sometimes instead of cathartics, we recommend laxatives like milk of magnesia to move your bowels more slowly, or an enema to help the bowels to move. Read and follow the package directions, or follow your provider?s instructions.  Once you have become very constipated, it takes time for your bowels to return  to normal and you need to be very careful to prevent becoming constipated again. Take a laxative if you do not move your bowels at least every two days.     Eat foods that have a lot of fiber. Good choices are fruits, vegetables, prune juice, apple juice, and high fiber cereal. Limit dairy products such as milk and cheese, since these can make constipation worse.   Drink plenty of water.   When you feel the need to go to the bathroom, go to the bathroom. Do not hold it.  Miralax , Metamucil , Colace , Senna or fiber supplements can be used daily.  Miralax  daily is often the best choice for children.  If you were given a prescription for medicine here today, be sure to read all of the information (including the package insert) that comes with your prescription.  This will include important information about the medicine, its side effects, and any warnings that you need to know about.  The pharmacist who fills the prescription can provide more information and answer questions you may have about the medicine.  If you have questions or concerns that the pharmacist cannot address, please call or return to the Emergency Department.   Remember that you can always come back to the Emergency Department if you are not able to see your regular provider in the amount of time listed above, if you get any new symptoms, or if there is anything that worries you.

## 2019-02-13 ENCOUNTER — ALLIED HEALTH/NURSE VISIT (OUTPATIENT)
Dept: NURSING | Facility: CLINIC | Age: 64
End: 2019-02-13
Payer: MEDICARE

## 2019-02-13 DIAGNOSIS — Z23 NEED FOR VACCINATION: Primary | ICD-10-CM

## 2019-02-13 PROCEDURE — 90471 IMMUNIZATION ADMIN: CPT

## 2019-02-13 PROCEDURE — 90750 HZV VACC RECOMBINANT IM: CPT

## 2019-02-14 ENCOUNTER — TRANSFERRED RECORDS (OUTPATIENT)
Dept: HEALTH INFORMATION MANAGEMENT | Facility: CLINIC | Age: 64
End: 2019-02-14

## 2019-02-26 ENCOUNTER — TRANSFERRED RECORDS (OUTPATIENT)
Dept: HEALTH INFORMATION MANAGEMENT | Facility: CLINIC | Age: 64
End: 2019-02-26

## 2019-02-28 ENCOUNTER — HOSPITAL ENCOUNTER (OUTPATIENT)
Dept: GENERAL RADIOLOGY | Facility: CLINIC | Age: 64
Discharge: HOME | End: 2019-02-28
Attending: PHYSICIAN ASSISTANT | Admitting: PHYSICIAN ASSISTANT
Payer: MEDICARE

## 2019-02-28 DIAGNOSIS — K59.00 CONSTIPATION, UNSPECIFIED CONSTIPATION TYPE: ICD-10-CM

## 2019-02-28 PROCEDURE — 74283 THER NMA RDCTJ INTUS/OBSTRCJ: CPT

## 2019-02-28 RX ADMIN — DIATRIZOATE MEGLUMINE AND DIATRIZOATE SODIUM 2000 ML: 660; 100 SOLUTION ORAL; RECTAL at 12:11

## 2019-03-01 ENCOUNTER — TRANSFERRED RECORDS (OUTPATIENT)
Dept: HEALTH INFORMATION MANAGEMENT | Facility: CLINIC | Age: 64
End: 2019-03-01

## 2019-03-06 DIAGNOSIS — K21.9 GASTROESOPHAGEAL REFLUX DISEASE WITHOUT ESOPHAGITIS: ICD-10-CM

## 2019-03-06 DIAGNOSIS — K21.9 GASTROESOPHAGEAL REFLUX DISEASE WITHOUT ESOPHAGITIS: Primary | ICD-10-CM

## 2019-03-06 NOTE — TELEPHONE ENCOUNTER
"Requested Prescriptions   Pending Prescriptions Disp Refills     ranitidine (ZANTAC) 150 MG tablet 60 tablet 0     Sig: TK 1 T PO  BID    H2 Blockers Protocol Passed - 3/6/2019  9:06 AM       Passed - Patient is age 12 or older       Passed - Recent (12 mo) or future (30 days) visit within the authorizing provider's specialty    Patient had office visit in the last 12 months or has a visit in the next 30 days with authorizing provider or within the authorizing provider's specialty.  See \"Patient Info\" tab in inbasket, or \"Choose Columns\" in Meds & Orders section of the refill encounter.             Passed - Medication is active on med list      Signed Prescriptions Disp Refills     ranitidine (ZANTAC) 150 MG tablet  0     Sig: TK 1 T PO  BID    There is no refill protocol information for this order        Routing refill request to provider for review/approval because:  Medication is reported/historical        "

## 2019-03-17 ENCOUNTER — HOSPITAL ENCOUNTER (EMERGENCY)
Facility: CLINIC | Age: 64
Discharge: HOME | End: 2019-03-18
Attending: EMERGENCY MEDICINE | Admitting: EMERGENCY MEDICINE
Payer: MEDICARE

## 2019-03-17 VITALS
OXYGEN SATURATION: 98 % | TEMPERATURE: 98.2 F | DIASTOLIC BLOOD PRESSURE: 90 MMHG | RESPIRATION RATE: 18 BRPM | BODY MASS INDEX: 33.69 KG/M2 | SYSTOLIC BLOOD PRESSURE: 121 MMHG | HEART RATE: 82 BPM | WEIGHT: 204 LBS

## 2019-03-17 DIAGNOSIS — K59.09 OTHER CONSTIPATION: ICD-10-CM

## 2019-03-17 PROCEDURE — 99283 EMERGENCY DEPT VISIT LOW MDM: CPT

## 2019-03-17 NOTE — ED AVS SNAPSHOT
[facility] Emergency Department  [address]  [facility] [address]  Phone:  [phone]  Fax:  [phone]                                    Mercedes Barber   MRN: [id number]    Department:  [facility] Emergency Department   Date of Visit:  3/17/2019           After Visit Summary Signature Page    I have received my discharge instructions, and my questions have been answered. I have discussed any challenges I see with this plan with the nurse or doctor.    ..........................................................................................................................................  Patient/Patient Representative Signature      ..........................................................................................................................................  Patient Representative Print Name and Relationship to Patient    ..................................................               ................................................  Date                                   Time    ..........................................................................................................................................  Reviewed by Signature/Title    ...................................................              ..............................................  Date                                               Time          22EPIC Rev 08/18

## 2019-03-18 ENCOUNTER — PATIENT OUTREACH (OUTPATIENT)
Dept: CARE COORDINATION | Facility: CLINIC | Age: 64
End: 2019-03-18

## 2019-03-18 PROCEDURE — A9270 NON-COVERED ITEM OR SERVICE: HCPCS | Mod: GY | Performed by: EMERGENCY MEDICINE

## 2019-03-18 PROCEDURE — 25000132 ZZH RX MED GY IP 250 OP 250 PS 637: Mod: GY | Performed by: EMERGENCY MEDICINE

## 2019-03-18 RX ADMIN — MAGNESIUM CITRATE 286 ML: 1.75 LIQUID ORAL at 00:29

## 2019-03-18 ASSESSMENT — ACTIVITIES OF DAILY LIVING (ADL): DEPENDENT_IADLS:: INDEPENDENT

## 2019-03-18 ASSESSMENT — ENCOUNTER SYMPTOMS
NAUSEA: 1
CONSTIPATION: 1
VOMITING: 0

## 2019-03-18 NOTE — ED NOTES
Pt provided with discharge paperwork and educated on recommended follow-up with PCP and gastroenterology. Pt educated on how to manage symptoms at home and when to seek medical attention. Pt voiced understanding and denied any questions at discharge.

## 2019-03-18 NOTE — ED TRIAGE NOTES
"63-year-old female presents to the ER with complaints of constipation. Pt states she has been dealing with this for several weeks and is going to a specialist on Wednesday. Had a \"special enema\" recently. Last stool was two days ago but she felt it was little.   "

## 2019-03-18 NOTE — PROGRESS NOTES
Clinic Care Coordination Contact    Clinic Care Coordination Contact  OUTREACH    Referral Information:  Referral Source: ED Follow-Up    Primary Diagnosis: GI Disorders    Chief Complaint   Patient presents with     Clinic Care Coordination - [facility]     Follow up from ED visit.         [place] Utilization:   Clinic Utilization  Difficulty keeping appointments:: No  Compliance Concerns: No  No-Show Concerns: No  No PCP office visit in Past Year: No  Utilization    Last refreshed: 3/18/2019  1:17 AM:  Hospital Admissions 0           Last refreshed: 3/18/2019  1:17 AM:  ED Visits 2           Last refreshed: 3/18/2019  1:17 AM:  No Show Count (past year) 1              Current as of: 3/18/2019  1:17 AM              Clinical Concerns:  Pt was seen in ED for Constipation- which pt reports is a recurring problem.  Spoke with pt on this date for ED follow up call.  Pt indicates she is doing much better and continues on regime recommended- Milk of Magnesia and plans to start a probiotic.  Pt also states she is going to trial an elimination diet focusing first on the elimination of dairy.  Pt states she has a f/u apt with specialist who specifically works with the colon scheduled this week and hopes to discuss further some concerns/questions she has as it relates to her GI conditions.      Pt denied any further need for ongoing care coordination.     Medication Management:  Independent.      Functional Status:  Dependent ADLs:: Ambulation-cane  Dependent IADLs:: Independent  Bed or wheelchair confined:: No  Mobility Status: Independent  Fallen 2 or more times in the past year?: No  Any fall with injury in the past year?: No    Living Situation:  Current living arrangement:: I live alone    Diet/Exercise/Sleep:  Diet:: Regular, Dairy-free(Trialing dairy free)  Inadequate nutrition (GOAL):: No  Food Insecurity: No  Tube Feeding: No    Transportation:  Transportation concerns (GOAL):: No  Transportation means:: Regular  car     Psychosocial:  Mental health DX:: Yes  Mental health DX how managed:: Medication, Outpatient Counseling, Psychiatrist  Mental health management concern (GOAL):: No  Informal Support system:: None     Financial/Insurance:   Financial/Insurance concerns (GOAL):: No     Resources and Interventions:  Community Resources: None  Supplies used at home:: None  Equipment Currently Used at Home: cane, straight    Advance Care Plan/Directive  Advanced Care Plan/Directive Status: Not Applicable    Referrals Placed: None     Patient/Caregiver understanding: Pt reports understanding and denies any additional questions or concerns at this times. CARLEY CC engaged in [employer]ID[employer] communication during encounter.    Plan: Pt has f/u apt with specialist scheduled to address constipation and other GI concerns.  No other needs expressed or identified.  No further outreaches will be made at this time unless a new referral is made or a change in the pt's status occurs. Patient was provided with this writer's contact information and encouraged to call with any questions or concerns.    Peter Monae [facility] [place]  Clinic Care Coordinator  [facility]  [phone]  [email or web address]

## 2019-03-18 NOTE — ED PROVIDER NOTES
History     Chief Complaint:  Constipation    HPI   Mercedes Barber is a 63 year old female with a history of IBS and rectal prolapse who presents to the ED for evaluation of constipation. The patient states that she has been having difficulties with constipation for quite some time now and recently received a pink lady enema in the ED on 2/9/19. She states that she had one small bowl movement two days ago but has not had a bowel movement since. She complains of some nausea but denies any vomit. She is scheduled to follow up with her GI physician in three days.     Allergies:  Clonopin  Encort  Erythromycin  Flagyl  Gabapentin  Lamictal  Tegretol     Medications:    Albuterol  Symbicort  Wellbutrin  Synthroid  Vimpat  Serzone  Mirapex  Promethazine  Seroquel  Selenium  Temazepam  Desyrel     Past Medical History:    Arthritis  HPV  GERD  IBS  Hypothyroidism  Depression  Neuropathy of lower extremity  Seizure disorder  Sleep apnea  Temporal sclerosis  Rectal prolapse    Past Surgical History:    Perigee mesh  Arthroplasty patello-femoral  Carpal tunnel release  Dental surgery  D&C  Drain pilonidal cyst  Endoscopy  EGD  Excise trunk lesion  Hysterectomy  L shoulder fracture  Rectal prolapse repair abdominally   Release plantar fascia   Remove mesh vagina  Repair hammer toe  T&A  Tubal ligation    Family History:    Eye disorder  Lipids  CHF  Osteoporosis  Diabetes  HTN  Alcohol/drug  Depression  Gastrointestinal disease  Thyroid disease    Social History:  Negative for tobacco use.  Rare alcohol use.   Marital Status:       Review of Systems   Gastrointestinal: Positive for constipation and nausea. Negative for vomiting.   All other systems reviewed and are negative.      Physical Exam   First Vitals:  BP: 121/90  Pulse: 82  Temp: 98.2  F (36.8  C)  Resp: 18  Weight: 92.5 kg (204 lb)  SpO2: 98 %    Physical Exam  Nursing note and vitals reviewed.  Constitutional: Cooperative.   HENT:   Mouth/Throat:  Mucous membranes are normal.   Cardiovascular: Normal rate, regular rhythm and normal heart sounds.  No murmur.  Pulmonary/Chest: Effort normal and breath sounds normal. No respiratory distress. No wheezes. No rales.   Abdominal: Soft. Normal appearance and bowel sounds are normal. No distension. There is no tenderness. There is no rigidity and no guarding.   Neurological: Alert.   Skin: Skin is warm and dry.   Psychiatric: Normal mood and affect.     Emergency Department Course   Interventions:  0029 Pink lady enema 286 mLs rectal    Emergency Department Course:  Nursing notes and vitals reviewed. (0004) I performed an exam of the patient as documented above.     Medicine administered as documented above.    0100 Patient had a successful bowel movement. Requesting to go home.     Findings and plan explained to the Patient. Patient discharged home with instructions regarding supportive care, medications, and reasons to return. The importance of close follow-up was reviewed.     Impression & Plan    Medical Decision Making:  Sunil is a 63 year old female with history of constipation type IBS. She has also had a rectal prolapse in the past. She is scheduled to see GI on Wednesday for this exact problem. She is already on stool softeners. She is requesting an enema here which provided with successful bowel movement. No concerning findings on exam for acute surgical process or indication for labs or imaging. Vitals are reassuring and with her symptoms improving she will be discharged home to follow up with her specialty clinic.       Diagnosis:    ICD-10-CM    1. Constipation K59.09        Disposition:  discharged to home  Scribe Disclosure:  I,  Juliano Becker, am serving as a scribe on 3/18/2019 at 12:04 AM to personally document services performed by Samir Grossman MD based on my observations and the provider's statements to me.          Juliano Becker  3/17/2019   [facility] EMERGENCY DEPARTMENT        Samir Grossman MD  03/18/19 0146

## 2019-03-18 NOTE — ED NOTES
"Answered pt's call light. Pt had successful bowel movement after administration of enema. Pt states feeling better and states \"I think I will sleep better at home now\". MD notified.   "

## 2019-03-20 ENCOUNTER — TRANSFERRED RECORDS (OUTPATIENT)
Dept: HEALTH INFORMATION MANAGEMENT | Facility: CLINIC | Age: 64
End: 2019-03-20

## 2019-04-04 ENCOUNTER — TRANSFERRED RECORDS (OUTPATIENT)
Dept: HEALTH INFORMATION MANAGEMENT | Facility: CLINIC | Age: 64
End: 2019-04-04

## 2019-04-08 ENCOUNTER — TRANSFERRED RECORDS (OUTPATIENT)
Dept: HEALTH INFORMATION MANAGEMENT | Facility: CLINIC | Age: 64
End: 2019-04-08

## 2019-04-30 ENCOUNTER — MEDICAL CORRESPONDENCE (OUTPATIENT)
Dept: HEALTH INFORMATION MANAGEMENT | Facility: CLINIC | Age: 64
End: 2019-04-30

## 2019-04-30 DIAGNOSIS — G47.50 PARASOMNIA: ICD-10-CM

## 2019-04-30 DIAGNOSIS — E03.9 HYPOTHYROIDISM, ADULT: Primary | ICD-10-CM

## 2019-04-30 DIAGNOSIS — G43.909 MIGRAINE: ICD-10-CM

## 2019-04-30 DIAGNOSIS — G47.00 PERSISTENT DISORDER OF INITIATING OR MAINTAINING SLEEP: ICD-10-CM

## 2019-04-30 DIAGNOSIS — G25.81 RESTLESS LEG SYNDROME: ICD-10-CM

## 2019-04-30 DIAGNOSIS — E83.10 DISORDER OF IRON METABOLISM: ICD-10-CM

## 2019-05-02 DIAGNOSIS — G47.50 PARASOMNIA: ICD-10-CM

## 2019-05-02 DIAGNOSIS — E03.9 HYPOTHYROIDISM, ADULT: ICD-10-CM

## 2019-05-02 DIAGNOSIS — E83.10 DISORDER OF IRON METABOLISM: ICD-10-CM

## 2019-05-02 DIAGNOSIS — G25.81 RESTLESS LEG SYNDROME: ICD-10-CM

## 2019-05-02 DIAGNOSIS — G43.909 MIGRAINE: ICD-10-CM

## 2019-05-02 DIAGNOSIS — G47.00 PERSISTENT DISORDER OF INITIATING OR MAINTAINING SLEEP: ICD-10-CM

## 2019-05-02 LAB — FERRITIN SERPL-MCNC: 121 NG/ML (ref 8–252)

## 2019-05-02 PROCEDURE — 36415 COLL VENOUS BLD VENIPUNCTURE: CPT | Performed by: INTERNAL MEDICINE

## 2019-05-02 PROCEDURE — 82728 ASSAY OF FERRITIN: CPT | Performed by: INTERNAL MEDICINE

## 2019-05-17 ENCOUNTER — OFFICE VISIT (OUTPATIENT)
Dept: INTERNAL MEDICINE | Facility: CLINIC | Age: 64
End: 2019-05-17
Payer: MEDICARE

## 2019-05-17 ENCOUNTER — TELEPHONE (OUTPATIENT)
Dept: INTERNAL MEDICINE | Facility: CLINIC | Age: 64
End: 2019-05-17

## 2019-05-17 VITALS
HEART RATE: 64 BPM | SYSTOLIC BLOOD PRESSURE: 114 MMHG | OXYGEN SATURATION: 96 % | HEIGHT: 65 IN | BODY MASS INDEX: 32.82 KG/M2 | DIASTOLIC BLOOD PRESSURE: 76 MMHG | WEIGHT: 197 LBS | TEMPERATURE: 98.3 F | RESPIRATION RATE: 18 BRPM

## 2019-05-17 DIAGNOSIS — M16.11 OSTEOARTHRITIS OF RIGHT HIP, UNSPECIFIED OSTEOARTHRITIS TYPE: ICD-10-CM

## 2019-05-17 DIAGNOSIS — M25.551 HIP PAIN, RIGHT: Primary | ICD-10-CM

## 2019-05-17 PROCEDURE — 99213 OFFICE O/P EST LOW 20 MIN: CPT | Performed by: PHYSICIAN ASSISTANT

## 2019-05-17 RX ORDER — TRAMADOL HYDROCHLORIDE 50 MG/1
50 TABLET ORAL 2 TIMES DAILY PRN
Qty: 15 TABLET | Refills: 0 | Status: ON HOLD | OUTPATIENT
Start: 2019-05-17 | End: 2019-07-25

## 2019-05-17 ASSESSMENT — MIFFLIN-ST. JEOR: SCORE: 1443.43

## 2019-05-17 NOTE — TELEPHONE ENCOUNTER
Reason for call:  Patient reporting a symptom    Symptom or request: Hip pain    Duration (how long have symptoms been present): 10 days +    Have you been treated for this before? No    Additional comments: Patient saw an orthopedic surgeon at [facility] and they suggested she speak to her doctor about possibly getting on a pain killer or muscle relaxer for her hip pain.    Patient is currently taking 3+ advils and it is not helping.    Does she need to see Dr. Álvarez for an appointment first? If so, how can she manage the pain until then? Please call & advise.    Prefers [facility] on [address] if Dr. Ávlarez prescribes anything.    Phone Number patient can be reached at:  Home number on file [phone] (home)    Best Time:  any    Can we leave a detailed message on this number:  YES    Call taken on 5/17/2019 at 11:09 AM by Nano Tierney

## 2019-05-17 NOTE — TELEPHONE ENCOUNTER
Called patient. Reports she has had hip pain for years and just thought it would get better but got worse. Saw ortho doctor and was given a cortisone injection and that did not help at all and was then suggested to get hip replacement. Takes 3 advil with no relief. Ice and heat are no help. Saw ortho this week and 2 weeks ago. Sees hip doctor next week. Says they said she had to go to her PCP for pain medications. Appt made to see partner today in PCPs absence to address pain management.

## 2019-05-17 NOTE — PROGRESS NOTES
"  SUBJECTIVE:   Mercedes Barber is a 64 year old female who presents to clinic today for the following   health issues:      Joint Pain    Onset: Years    Description:   Location: right hip  Character: Sharp, Stabbing and shooting    Intensity: 10/10    Progression of Symptoms: worse    Accompanying Signs & Symptoms:  Other symptoms: Aggravates Restless leg syndrome.    History:   Previous similar pain: YES      Precipitating factors:   Trauma or overuse: no     Alleviating factors:  Improved by: nothing    Therapies Tried and outcome: Steroid shot in hip 2-3 weeks ago. Did not help    Patient calling earlier today looking for medication for pain. Patient having issues especially at night. Not sleeping well due to the pain.  Patient will be seeing ortho again next week for follow up. Hip replacement has been recommended.         -------------------------------------    Additional history: as documented    Reviewed  and updated as needed this visit by clinical staff         Reviewed and updated as needed this visit by Provider  Allergies  Meds         Labs reviewed in EPIC    ROS:  Constitutional, HEENT, cardiovascular, pulmonary, gi and gu systems are negative, except as otherwise noted.    OBJECTIVE:     /76 (BP Location: Left arm, Patient Position: Chair, Cuff Size: Adult Regular)   Pulse 64   Temp 98.3  F (36.8  C) (Oral)   Resp 18   Ht 1.657 m (5' 5.25\")   Wt 89.4 kg (197 lb)   LMP 03/11/2010   SpO2 96%   BMI 32.53 kg/m    Body mass index is 32.53 kg/m .  GENERAL: healthy, alert and no distress  RESP: lungs clear to auscultation - no rales, rhonchi or wheezes  CV: regular rate and rhythm, normal S1 S2, no S3 or S4, no murmur, click or rub, no peripheral edema and peripheral pulses strong  MS: no gross musculoskeletal defects noted, no edema  SKIN: no suspicious lesions or rashes    Diagnostic Test Results:  none     ASSESSMENT/PLAN:             1. Hip pain, right    - traMADol (ULTRAM) 50 MG " tablet; Take 1 tablet (50 mg) by mouth 2 times daily as needed for severe pain  Dispense: 15 tablet; Refill: 0    2. Osteoarthritis of right hip, unspecified osteoarthritis type    - traMADol (ULTRAM) 50 MG tablet; Take 1 tablet (50 mg) by mouth 2 times daily as needed for severe pain  Dispense: 15 tablet; Refill: 0    Continue with symptomatic measures  Medication as above to help with night time pain or pain during the day.  See PCP next week after ortho visit to review further plan for pain        Elissa Campbell PA-C  [facility]

## 2019-05-30 ENCOUNTER — TRANSFERRED RECORDS (OUTPATIENT)
Dept: HEALTH INFORMATION MANAGEMENT | Facility: CLINIC | Age: 64
End: 2019-05-30

## 2019-06-27 ENCOUNTER — HOSPITAL ENCOUNTER (OUTPATIENT)
Dept: LAB | Facility: CLINIC | Age: 64
Discharge: HOME | End: 2019-06-27
Attending: ORTHOPAEDIC SURGERY | Admitting: ORTHOPAEDIC SURGERY
Payer: MEDICARE

## 2019-06-27 DIAGNOSIS — Z01.812 PRE-OPERATIVE LABORATORY EXAMINATION: ICD-10-CM

## 2019-06-27 PROCEDURE — 87640 STAPH A DNA AMP PROBE: CPT | Performed by: ORTHOPAEDIC SURGERY

## 2019-06-27 PROCEDURE — 40000830 ZZHCL STATISTIC STAPH AUREUS METH RESIST SCREEN PCR: Performed by: ORTHOPAEDIC SURGERY

## 2019-06-27 PROCEDURE — 87641 MR-STAPH DNA AMP PROBE: CPT | Performed by: ORTHOPAEDIC SURGERY

## 2019-06-27 PROCEDURE — 40000829 ZZHCL STATISTIC STAPH AUREUS SUSCEPT SCREEN PCR: Performed by: ORTHOPAEDIC SURGERY

## 2019-06-28 LAB
MRSA DNA SPEC QL NAA+PROBE: NEGATIVE
SPECIMEN SOURCE: NORMAL

## 2019-06-28 RX ORDER — MUPIROCIN 20 MG/G
OINTMENT TOPICAL 2 TIMES DAILY
COMMUNITY
End: 2019-07-02

## 2019-06-28 NOTE — PROVIDER NOTIFICATION
Nasal screen results MRSA negative, MSSA positive. Dr. Mejia's office notified. Patient notified of results, extra showering and instructions given for Mupiricin, denies questions. Mupiricin ointment called into pharmacy of choice.

## 2019-07-02 ENCOUNTER — TELEPHONE (OUTPATIENT)
Dept: ONCOLOGY | Facility: CLINIC | Age: 64
End: 2019-07-02

## 2019-07-02 ENCOUNTER — OFFICE VISIT (OUTPATIENT)
Dept: INTERNAL MEDICINE | Facility: CLINIC | Age: 64
End: 2019-07-02
Payer: MEDICARE

## 2019-07-02 VITALS
SYSTOLIC BLOOD PRESSURE: 120 MMHG | DIASTOLIC BLOOD PRESSURE: 76 MMHG | WEIGHT: 197.7 LBS | BODY MASS INDEX: 32.94 KG/M2 | OXYGEN SATURATION: 95 % | HEART RATE: 70 BPM | HEIGHT: 65 IN | TEMPERATURE: 98.1 F | RESPIRATION RATE: 16 BRPM

## 2019-07-02 DIAGNOSIS — G47.39 OTHER SLEEP APNEA: ICD-10-CM

## 2019-07-02 DIAGNOSIS — F32.0 MILD MAJOR DEPRESSION (H): ICD-10-CM

## 2019-07-02 DIAGNOSIS — Z01.818 PREOP GENERAL PHYSICAL EXAM: Primary | ICD-10-CM

## 2019-07-02 DIAGNOSIS — K21.9 GASTROESOPHAGEAL REFLUX DISEASE WITHOUT ESOPHAGITIS: ICD-10-CM

## 2019-07-02 DIAGNOSIS — E03.8 OTHER SPECIFIED HYPOTHYROIDISM: ICD-10-CM

## 2019-07-02 DIAGNOSIS — K22.2 ESOPHAGEAL STRICTURE: ICD-10-CM

## 2019-07-02 DIAGNOSIS — M25.551 HIP PAIN, RIGHT: ICD-10-CM

## 2019-07-02 LAB
HGB BLD-MCNC: 12.7 G/DL (ref 11.7–15.7)
PLATELET # BLD AUTO: 254 10E9/L (ref 150–450)
POTASSIUM SERPL-SCNC: 3.9 MMOL/L (ref 3.4–5.3)
TSH SERPL DL<=0.005 MIU/L-ACNC: 0.48 MU/L (ref 0.4–4)

## 2019-07-02 PROCEDURE — 36415 COLL VENOUS BLD VENIPUNCTURE: CPT | Performed by: INTERNAL MEDICINE

## 2019-07-02 PROCEDURE — 93000 ELECTROCARDIOGRAM COMPLETE: CPT | Performed by: INTERNAL MEDICINE

## 2019-07-02 PROCEDURE — 85018 HEMOGLOBIN: CPT | Performed by: INTERNAL MEDICINE

## 2019-07-02 PROCEDURE — 84443 ASSAY THYROID STIM HORMONE: CPT | Performed by: INTERNAL MEDICINE

## 2019-07-02 PROCEDURE — 85049 AUTOMATED PLATELET COUNT: CPT | Performed by: INTERNAL MEDICINE

## 2019-07-02 PROCEDURE — 84132 ASSAY OF SERUM POTASSIUM: CPT | Performed by: INTERNAL MEDICINE

## 2019-07-02 PROCEDURE — 99215 OFFICE O/P EST HI 40 MIN: CPT | Performed by: INTERNAL MEDICINE

## 2019-07-02 RX ORDER — PRAMIPEXOLE DIHYDROCHLORIDE 1 MG/1
TABLET ORAL
COMMUNITY

## 2019-07-02 RX ORDER — OMEPRAZOLE 40 MG/1
40 CAPSULE, DELAYED RELEASE ORAL 2 TIMES DAILY
Qty: 180 CAPSULE | Refills: 3 | Status: SHIPPED | OUTPATIENT
Start: 2019-07-02 | End: 2021-02-01

## 2019-07-02 RX ORDER — LORAZEPAM 0.5 MG/1
0.5 TABLET ORAL AT BEDTIME
Status: ON HOLD | COMMUNITY
End: 2019-08-06

## 2019-07-02 RX ORDER — NAPROXEN 500 MG/1
500 TABLET ORAL 2 TIMES DAILY WITH MEALS
Status: ON HOLD | COMMUNITY
End: 2019-07-25

## 2019-07-02 RX ORDER — LIOTHYRONINE SODIUM 5 UG/1
5 TABLET ORAL DAILY
COMMUNITY
End: 2022-02-23

## 2019-07-02 ASSESSMENT — MIFFLIN-ST. JEOR: SCORE: 1446.6

## 2019-07-02 NOTE — PROGRESS NOTES
[facility]  [address] [address]  [phone]  Dept: [phone]    PRE-OP EVALUATION:  Today's date: 2019    Mercedes Barber (: 1955) presents for pre-operative evaluation assessment as requested by Dr. Mejia.  She requires evaluation and anesthesia risk assessment prior to undergoing surgery/procedure for treatment of right hip.    Proposed Surgery/ Procedure: Arthroplasty, right hip  Date of Surgery/ Procedure: 19  Time of Surgery/ Procedure: 10:25 am  Hospital/Surgical Facility: [facility]  Primary Physician: Skyler Álvarez  Type of Anesthesia Anticipated: Choice    Patient has a Health Care Directive or Living Will:  See prior code    HPI:     HPI related to upcoming procedure: She requires evaluation and anesthesia risk assessment prior to undergoing surgery/procedure for treatment of right hip.    Proposed Surgery/ Procedure: Arthroplasty, right hip  Date of Surgery/ Procedure: 19    See problem list for active medical problems.  Problems all longstanding and stable, except as noted/documented.  See ROS for pertinent symptoms related to these conditions.      MEDICAL HISTORY:     Patient Active Problem List    Diagnosis Date Noted     Headache 10/14/2011     Priority: High     Problem list name updated by automated process. Provider to review       Confusion 10/13/2011     Priority: High     Restless legs syndrome (RLS) 2018     Priority: Medium     Cervical high risk HPV (human papillomavirus) test positive 2017     Priority: Medium     17: NIL pap, + HR HPV (not 16 or 18) result. Pt has had a hysterectomy. Plan cotest in 1 year per provider.  18 Patient is lost to pap tracking follow-up         Other sleep apnea 2015     Priority: Medium     Gastroesophageal reflux disease without esophagitis 2015     Priority: Medium     Irritable bowel syndrome 2015     Priority: Medium     Other specified  hypothyroidism 05/19/2015     Priority: Medium     Pain in joint involving ankle and foot 11/20/2014     Priority: Medium     Plantar fascial fibromatosis 10/07/2014     Priority: Medium     Pain in joint, lower leg 01/24/2014     Priority: Medium     Sciatica 09/23/2013     Priority: Medium     Pain in thoracic spine 02/12/2013     Priority: Medium     Lumbago 02/11/2013     Priority: Medium     Temporal sclerosis 08/27/2012     Priority: Medium     Vulvar lesion 07/13/2012     Priority: Medium     Advanced directives, counseling/discussion 02/21/2012     Priority: Medium     Advance Directive Problem List Overview:   Name Relationship Phone    Primary Health Care Agent            Alternative Health Care Agent          Patient states has Advance Directive and will bring in a copy to clinic. 2/21/2012          Mild major depression (H) 02/21/2012     Priority: Medium     Esophageal stricture 02/21/2012     Priority: Medium     Left shoulder pain 10/15/2011     Priority: Medium     S/p surgery Aug 2011       Anemia 10/15/2011     Priority: Medium     Dysphagia 04/14/2011     Priority: Medium     Vaginal pain 12/21/2010     Priority: Medium     CARDIOVASCULAR SCREENING; LDL GOAL LESS THAN 160 10/31/2010     Priority: Medium     Rectal prolapse 12/01/2009     Priority: Medium     Overactive bladder 06/29/2009     Priority: Medium     Renal anomaly 06/04/2009     Priority: Medium     Tubular sclerosis by hx       Vulvar pain 01/16/2009     Priority: Medium     Family history of breast cancer 06/05/2008     Priority: Medium     Sister w premenopausal breast ca       Menopausal syndrome (hot flashes) 05/30/2008     Priority: Medium      Past Medical History:   Diagnosis Date     Arthritis     Thumb     Cervical high risk HPV (human papillomavirus) test positive 07/17/2017 07/17/17: NIL pap, + HR HPV (not 16 or 18) result.      Cervical pain 9/15/2016     Constipation 8/27/2012     Diarrhea 4/30/2009     Esophageal  stricture 2/21/2012     Gastro-oesophageal reflux disease      Hypothyroidism 9/18/2012     IBS (irritable bowel syndrome)      Mild major depression (H) 2/21/2012     Neuropathy of lower extremity      Rectal prolapse      Seizure disorder (H)      Sleep apnea     CPAP     Temporal sclerosis 8/27/2012     Past Surgical History:   Procedure Laterality Date     Anterior COLPORRHAPHY, BLADDER/VAGINA      perigee mesh     ARTHROPLASTY PATELLO-FEMORAL (KNEE) Bilateral      CARPAL TUNNEL RELEASE RT/LT       DENTAL SURGERY      implant     DILATION AND CURETTAGE       DRAIN PILONIDAL CYST SIMPL       ENDOSCOPY DRUG INDUCED SLEEP N/A 11/18/2015    Procedure: ENDOSCOPY DRUG INDUCED SLEEP;  Surgeon: Park Ortiz MD;  Location: [facility] OR     ESOPHAGOSCOPY, GASTROSCOPY, DUODENOSCOPY (EGD), COMBINED  4/5/2013    Procedure: COMBINED ESOPHAGOSCOPY, GASTROSCOPY, DUODENOSCOPY (EGD), BIOPSY SINGLE OR MULTIPLE;;  Surgeon: Mundo Mehta MD;  Location:  GI     EXCISE LESION TRUNK  8/10/2012    Procedure: EXCISE LESION TRUNK;;  Surgeon: Jerald Diggs MD;  Location: [facility] OR     HYSTERECTOMY       L shoulder fracture       rectal prolapse repair abdominally       RELEASE PLANTAR FASCIA Right 1/20/2015    Procedure: RELEASE PLANTAR FASCIA;  Surgeon: Maicol Liu DPM;  Location: [facility]     REMOVE MESH VAGINA  8/10/2012    Procedure: REMOVE MESH VAGINA;  Excision of Vaginal Mesh Exposure, Removal of Skin Tag Left Inner Leg;  Surgeon: Jerald Diggs MD;  Location: [facility] OR     REPAIR HAMMER TOE Right 1/20/2015    Procedure: REPAIR HAMMER TOE;  Surgeon: Maicol Liu DPM;  Location: [facility]     TONSILLECTOMY & ADENOIDECTOMY       TUBAL LIGATION       Current Outpatient Medications   Medication Sig Dispense Refill     albuterol (ALBUTEROL) 108 (90 BASE) MCG/ACT Inhaler Inhale 2 puffs into the lungs 4 times daily as needed for shortness of breath / dyspnea or wheezing 1 Inhaler 0     budesonide-formoterol (SYMBICORT)  80-4.5 MCG/ACT Inhaler Inhale 2 puffs into the lungs 2 times daily       buPROPion (WELLBUTRIN XL) 300 MG 24 hr tablet Take 300 mg by mouth every morning.       calcium carbonate 500 MG tablet Take 1,000 mg by mouth daily.       cholecalciferol (VITAMIN D3) 99158 UNITS capsule Take 1 capsule by mouth daily       Cyanocobalamin (B-12 PO) Take 1 tablet by mouth daily        cycloSPORINE (RESTASIS) 0.05 % ophthalmic emulsion Place 1 drop into both eyes 2 times daily as needed.       FOLIC ACID PO Take 400 mcg by mouth daily       GLUCOSAMINE SULFATE PO Take 1,000 mg by mouth        ketoprofen 10% in PLO 10% Ketoprofen in PLO 60 g 2     lacosamide (VIMPAT) 200 MG TABS Take 200 mg by mouth 2 times daily.       levothyroxine (SYNTHROID/LEVOTHROID) 25 MCG tablet Take 1 tablet (25 mcg) by mouth daily (Patient taking differently: Take 50 mcg by mouth daily ) 90 tablet 1     linaclotide (LINZESS) 290 MCG capsule Take 290 mcg by mouth every morning (before breakfast)       liothyronine (CYTOMEL) 5 MCG tablet Take 5 mcg by mouth daily       mupirocin (BACTROBAN) 2 % external ointment Apply topically 2 times daily Apply small amount in each nostril twice daily for seven days prior to surgery       nefazodone (SERZONE) 200 MG tablet Take 200 mg by mouth At Bedtime        omeprazole (PRILOSEC) 40 MG DR capsule Take 1 capsule (40 mg) by mouth 2 times daily 180 capsule 3     ondansetron (ZOFRAN ODT) 4 MG ODT tab Take 1 tablet (4 mg) by mouth every 8 hours as needed for nausea 10 tablet 0     polyethylene glycol (MIRALAX) powder Take 17 g by mouth. STIR 1 CAPFUL (17GM) IN 8 OZ OF LIQUID AND DRINK 527 g prn     POTASSIUM CITRATE PO Take 10 mEq by mouth daily        pramipexole (MIRAPEX) 1 MG tablet Take 1 mg by mouth 3 times daily       Promethazine-Codeine (PROMETHAZINE PLAIN/CODEINE PO)        QUEtiapine Fumarate (SEROQUEL PO) Take by mouth as needed       ranitidine (ZANTAC) 150 MG tablet TAKE 1 TABLET BY MOUTH TWICE DAILY 180  "tablet 3     SELENIUM PO        TEMAZEPAM PO        zinc 50 MG TABS Take 1 tablet by mouth daily       traMADol (ULTRAM) 50 MG tablet Take 1 tablet (50 mg) by mouth 2 times daily as needed for severe pain 15 tablet 0     traZODone (DESYREL) 100 MG tablet 300 mg At Bedtime        OTC products: None, except as noted above    Allergies   Allergen Reactions     Clonopin [Clonazepam]      \"Walk funny and Mind goes funny\"     Encort [Hydrocortisone Acetate] Swelling     Feet swelled.     Encort [Hydrocortisone] Swelling     Erythromycin Diarrhea     Flagyl [Metronidazole] Nausea     Gabapentin      Over eats     Lamictal [Lamotrigine]      Tegretol [Carbamazepine] Nausea and Vomiting      Latex Allergy: NO    Social History     Tobacco Use     Smoking status: Never Smoker     Smokeless tobacco: Never Used   Substance Use Topics     Alcohol use: Yes     Comment: 1 glass of wine 4x/yr     History   Drug Use No       REVIEW OF SYSTEMS:   CONSTITUTIONAL: NEGATIVE for fever, chills, change in weight  ENT/MOUTH: NEGATIVE for ear, mouth and throat problems  RESP: NEGATIVE for significant cough or SOB  CV: NEGATIVE for chest pain, palpitations or peripheral edema  GI: NEGATIVE for nausea, abdominal pain or change in bowel habits  : NEGATIVE for frequency, dysuria, or hematuria  NEURO: NEGATIVE for weakness, dizziness or paresthesias  HEME: NEGATIVE for bleeding problems  PSYCHIATRIC: NEGATIVE for changes in mood or affect    EXAM:   /76   Pulse 70   Temp 98.1  F (36.7  C) (Oral)   Resp 16   Ht 1.657 m (5' 5.25\")   Wt 89.7 kg (197 lb 11.2 oz)   LMP 03/11/2010   SpO2 95%   BMI 32.65 kg/m      GENERAL APPEARANCE: alert and no distress     EYES: EOMI, PERRL     HENT: ear canals and TM's normal and nose and mouth without ulcers or lesions     NECK: no adenopathy, no asymmetry, masses, or scars and thyroid normal to palpation     RESP: lungs clear to auscultation - no rales, rhonchi or wheezes     CV: regular rates and " rhythm, normal S1 S2, no S3 or S4 and no click or rub     ABDOMEN:  soft, nontender, no HSM or masses and bowel sounds normal     MS: extremities normal- no gross deformities noted with antalgic gait     NEURO: No focal changes     PSYCH: mentation appears normal and affect normal/bright    DIAGNOSTICS:     EKG: Normal Sinus Rhythm @ 60, normal intervals, no acute ST/T changes c/w ischemia, unchanged from previous tracings  Labs Drawn and in Process:   Unresulted Labs Ordered in the Past 30 Days of this Admission     No orders found from 5/3/2019 to 7/3/2019.          Recent Labs   Lab Test 02/09/19  1811 11/01/16 11/11/15  0947 05/19/15  1125  08/26/11  0615  08/23/11  0800   HGB 13.6  --  12.1 13.3   < > 10.5*   < > 11.6*     --  246  --    < > 269   < > 218   INR  --   --   --   --   --   --   --  0.98     --   --  139   < > 140   < > 138   POTASSIUM 4.0  --  3.7 3.8   < > 4.4   < > 3.8   CR 0.92 0.9  --  1.02   < > 0.88   < > 0.73   A1C  --   --   --   --   --  5.6  --   --     < > = values in this interval not displayed.     IMPRESSION:   Reason for surgery/procedure:She requires evaluation and anesthesia risk assessment prior to undergoing surgery/procedure for treatment of right hip.    Proposed Surgery/ Procedure: Arthroplasty, right hip  Date of Surgery/ Procedure: 7/23/19     The proposed surgical procedure is considered mild risk.    REVISED CARDIAC RISK INDEX  The patient has the following serious cardiovascular risks for perioperative complications such as (MI, PE, VFib and 3  AV Block):  No serious cardiac risks  INTERPRETATION: 2 risks: Class III (moderate risk - 6.6% complication rate)    The patient has the following additional risks for perioperative complications:  No identified additional risks      ICD-10-CM    1. Preop general physical exam Z01.818 Potassium     Hemoglobin     Platelet count     EKG 12-lead complete w/read - Clinics   2. Hip pain, right M25.551    3. Mild major  depression (H) F32.0    4. Other specified hypothyroidism E03.8 TSH with free T4 reflex   5. Other sleep apnea G47.39    6. Gastroesophageal reflux disease without esophagitis K21.9 omeprazole (PRILOSEC) 40 MG DR capsule   7. Esophageal stricture K22.2 omeprazole (PRILOSEC) 40 MG DR capsule       RECOMMENDATIONS:     --Consult hospital rounder / IM to assist post-op medical management    Cardiovascular Risk  Performs 4 METs exercise without symptoms (Light housework (dusting, washing dishes) and Climb a flight of stairs) .     Obstructive Sleep Apnea (or suspected sleep apnea)  Patient is no longer using CPAP.    --Patient is to take all scheduled medications on the day of surgery EXCEPT for modifications listed below.    Anticoagulant or Antiplatelet Medication Use  ASPIRIN/NSAIDS/OTC products: Discontinue as taking 7 days prior to procedure to reduce bleeding risk.  It should be resumed post-operatively.    Please note MRSA screen performed.      APPROVAL GIVEN to proceed with proposed procedure, without further diagnostic evaluation     Signed Electronically by: Skyler Álvarez MD    Copy of this evaluation report is provided to requesting physician, Dr. Jackie Luna Preop Guidelines    Revised Cardiac Risk Index

## 2019-07-02 NOTE — TELEPHONE ENCOUNTER
Mercedes contacted me (voicemail message left 7/1/2019) requesting records from her previous genetic testing, to assist with testing for her family.  She reports meeting with Donya James sometime in the past (over 10 years ago), and reports a diagnosis of tuberous sclerosis.      I returned her voicemail message today, in which Mercedes stated that she spoke with her neurologist who is able to help obtain these records.  I encouraged Mercedes to contact us if she has questions in the future or would like to connect with the genetics department at [facility]. Additional questions/concerns were denied.       Connie Tavares MS, [facility]  Licensed Genetic Counselor  [phone]

## 2019-07-11 ENCOUNTER — TRANSFERRED RECORDS (OUTPATIENT)
Dept: HEALTH INFORMATION MANAGEMENT | Facility: CLINIC | Age: 64
End: 2019-07-11

## 2019-07-20 RX ORDER — CYCLOSPORINE 0.5 MG/ML
1 EMULSION OPHTHALMIC 2 TIMES DAILY
COMMUNITY

## 2019-07-21 NOTE — PHARMACY-ADMISSION MEDICATION HISTORY
Medication reconciliation interview completed by pre-admitting nurse, reviewed by pharmacy. Called to clarify restatis, ketoprofen and added artificial tears. No further clarifications needed.     Prior to Admission medications    Medication Sig Last Dose Taking? Auth Provider   albuterol (ALBUTEROL) 108 (90 BASE) MCG/ACT Inhaler Inhale 2 puffs into the lungs 4 times daily as needed for shortness of breath / dyspnea or wheezing  Yes Skyler Álvarez MD   budesonide-formoterol (SYMBICORT) 80-4.5 MCG/ACT Inhaler Inhale 2 puffs into the lungs 2 times daily  Yes Reported, Patient   cholecalciferol (VITAMIN D3) 12794 UNITS capsule Take 1 capsule by mouth daily  Yes Reported, Patient   Cyanocobalamin (B-12 PO) Take 1,000 mcg by mouth daily   Yes Reported, Patient   cycloSPORINE (RESTASIS) 0.05 % ophthalmic emulsion Place 1 drop into both eyes 2 times daily  Yes Unknown, Entered By History   FOLIC ACID PO Take 400 mcg by mouth daily  Yes Reported, Patient   GLUCOSAMINE SULFATE PO Take 1,000 mg by mouth daily   Yes Reported, Patient   hypromellose (ARTIFICIAL TEARS) 0.5 % SOLN ophthalmic solution Place 1 drop into both eyes 2 times daily  Yes Unknown, Entered By History   ketoprofen 10% in PLO 10% topical gel Place onto the skin daily as needed for moderate pain (hands/feet/knees)  Yes Unknown, Entered By History   lacosamide (VIMPAT) 200 MG TABS Take 200 mg by mouth 2 times daily.  Yes Unknown, Entered By History   levothyroxine (SYNTHROID/LEVOTHROID) 25 MCG tablet Take 1 tablet (25 mcg) by mouth daily  Patient taking differently: Take 50 mcg by mouth daily   Yes Skyler Álvarez MD   linaclotide (LINZESS) 290 MCG capsule Take 290 mcg by mouth every morning (before breakfast)  Yes Reported, Patient   liothyronine (CYTOMEL) 5 MCG tablet Take 10 mcg by mouth daily   Yes Reported, Patient   LORazepam (ATIVAN) 0.5 MG tablet Take 0.5 mg by mouth At Bedtime  Yes Reported, Patient   mupirocin (BACTROBAN) 2 % nasal ointment Apply  1 g into each nare 2 times daily For 7 days prior to surgery  Yes Reported, Patient   naproxen (NAPROSYN) 500 MG tablet Take 500 mg by mouth 2 times daily (with meals)  Yes Reported, Patient   nefazodone (SERZONE) 200 MG tablet Take 600 mg by mouth At Bedtime   Yes Reported, Patient   omeprazole (PRILOSEC) 40 MG DR capsule Take 1 capsule (40 mg) by mouth 2 times daily  Yes Skyler Álvarez MD   polyethylene glycol (MIRALAX) powder Take 17 g by mouth. STIR 1 CAPFUL (17GM) IN 8 OZ OF LIQUID AND DRINK  Yes Abel James MD   POTASSIUM CITRATE PO Take 10 mEq by mouth daily   Yes Reported, Patient   pramipexole (MIRAPEX) 1 MG tablet Take 1 mg by mouth 2 times daily 1 tab in am , 2 tabs 1 hour prior to hs  Yes Reported, Patient   QUEtiapine Fumarate (SEROQUEL PO) Take 50 mg by mouth At Bedtime   Yes Reported, Patient   ranitidine (ZANTAC) 150 MG tablet TAKE 1 TABLET BY MOUTH TWICE DAILY  Yes Skyler Álvarez MD   TEMAZEPAM PO Take 30 mg by mouth At Bedtime   Yes Reported, Patient

## 2019-07-23 ENCOUNTER — HOSPITAL ENCOUNTER (INPATIENT)
Facility: CLINIC | Age: 64
LOS: 2 days | Discharge: HOME | DRG: 470 | End: 2019-07-25
Attending: ORTHOPAEDIC SURGERY | Admitting: ORTHOPAEDIC SURGERY
Payer: MEDICARE

## 2019-07-23 ENCOUNTER — ANESTHESIA EVENT (OUTPATIENT)
Dept: SURGERY | Facility: CLINIC | Age: 64
DRG: 470 | End: 2019-07-23
Payer: MEDICARE

## 2019-07-23 ENCOUNTER — APPOINTMENT (OUTPATIENT)
Dept: PHYSICAL THERAPY | Facility: CLINIC | Age: 64
DRG: 470 | End: 2019-07-23
Attending: ORTHOPAEDIC SURGERY
Payer: MEDICARE

## 2019-07-23 ENCOUNTER — ANESTHESIA (OUTPATIENT)
Dept: SURGERY | Facility: CLINIC | Age: 64
DRG: 470 | End: 2019-07-23
Payer: MEDICARE

## 2019-07-23 ENCOUNTER — APPOINTMENT (OUTPATIENT)
Dept: GENERAL RADIOLOGY | Facility: CLINIC | Age: 64
DRG: 470 | End: 2019-07-23
Attending: PHYSICIAN ASSISTANT
Payer: MEDICARE

## 2019-07-23 DIAGNOSIS — Z96.641 STATUS POST TOTAL HIP REPLACEMENT, RIGHT: Primary | ICD-10-CM

## 2019-07-23 PROCEDURE — 40000306 ZZH STATISTIC PRE PROC ASSESS II: Performed by: ORTHOPAEDIC SURGERY

## 2019-07-23 PROCEDURE — 25000128 H RX IP 250 OP 636: Performed by: ANESTHESIOLOGY

## 2019-07-23 PROCEDURE — 97530 THERAPEUTIC ACTIVITIES: CPT | Mod: GP | Performed by: PHYSICAL THERAPIST

## 2019-07-23 PROCEDURE — 36000093 ZZH SURGERY LEVEL 4 1ST 30 MIN: Performed by: ORTHOPAEDIC SURGERY

## 2019-07-23 PROCEDURE — 71000012 ZZH RECOVERY PHASE 1 LEVEL 1 FIRST HR: Performed by: ORTHOPAEDIC SURGERY

## 2019-07-23 PROCEDURE — 25800025 ZZH RX 258: Performed by: ORTHOPAEDIC SURGERY

## 2019-07-23 PROCEDURE — 25000128 H RX IP 250 OP 636: Performed by: ORTHOPAEDIC SURGERY

## 2019-07-23 PROCEDURE — 25000128 H RX IP 250 OP 636: Performed by: NURSE ANESTHETIST, CERTIFIED REGISTERED

## 2019-07-23 PROCEDURE — 25000132 ZZH RX MED GY IP 250 OP 250 PS 637: Mod: GY | Performed by: PHYSICIAN ASSISTANT

## 2019-07-23 PROCEDURE — 25000125 ZZHC RX 250: Performed by: NURSE ANESTHETIST, CERTIFIED REGISTERED

## 2019-07-23 PROCEDURE — C1776 JOINT DEVICE (IMPLANTABLE): HCPCS | Performed by: ORTHOPAEDIC SURGERY

## 2019-07-23 PROCEDURE — 27211024 ZZHC OR SUPPLY OTHER OPNP: Performed by: ORTHOPAEDIC SURGERY

## 2019-07-23 PROCEDURE — 0SR904A REPLACEMENT OF RIGHT HIP JOINT WITH CERAMIC ON POLYETHYLENE SYNTHETIC SUBSTITUTE, UNCEMENTED, OPEN APPROACH: ICD-10-PCS | Performed by: ORTHOPAEDIC SURGERY

## 2019-07-23 PROCEDURE — 37000009 ZZH ANESTHESIA TECHNICAL FEE, EACH ADDTL 15 MIN: Performed by: ORTHOPAEDIC SURGERY

## 2019-07-23 PROCEDURE — 27210794 ZZH OR GENERAL SUPPLY STERILE: Performed by: ORTHOPAEDIC SURGERY

## 2019-07-23 PROCEDURE — 25000125 ZZHC RX 250: Performed by: ORTHOPAEDIC SURGERY

## 2019-07-23 PROCEDURE — 12000000 ZZH R&B MED SURG/OB

## 2019-07-23 PROCEDURE — 36000063 ZZH SURGERY LEVEL 4 EA 15 ADDTL MIN: Performed by: ORTHOPAEDIC SURGERY

## 2019-07-23 PROCEDURE — 25000132 ZZH RX MED GY IP 250 OP 250 PS 637: Mod: GY | Performed by: ORTHOPAEDIC SURGERY

## 2019-07-23 PROCEDURE — 25800030 ZZH RX IP 258 OP 636: Performed by: PHYSICIAN ASSISTANT

## 2019-07-23 PROCEDURE — 71000013 ZZH RECOVERY PHASE 1 LEVEL 1 EA ADDTL HR: Performed by: ORTHOPAEDIC SURGERY

## 2019-07-23 PROCEDURE — 99207 ZZC CONSULT E&M CHANGED TO INITIAL LEVEL: CPT | Performed by: INTERNAL MEDICINE

## 2019-07-23 PROCEDURE — 25000128 H RX IP 250 OP 636: Performed by: PHYSICIAN ASSISTANT

## 2019-07-23 PROCEDURE — 97162 PT EVAL MOD COMPLEX 30 MIN: CPT | Mod: GP | Performed by: PHYSICAL THERAPIST

## 2019-07-23 PROCEDURE — 97116 GAIT TRAINING THERAPY: CPT | Mod: GP | Performed by: PHYSICAL THERAPIST

## 2019-07-23 PROCEDURE — 25000132 ZZH RX MED GY IP 250 OP 250 PS 637: Mod: GY | Performed by: INTERNAL MEDICINE

## 2019-07-23 PROCEDURE — 37000008 ZZH ANESTHESIA TECHNICAL FEE, 1ST 30 MIN: Performed by: ORTHOPAEDIC SURGERY

## 2019-07-23 PROCEDURE — 25800030 ZZH RX IP 258 OP 636: Performed by: ANESTHESIOLOGY

## 2019-07-23 PROCEDURE — 99222 1ST HOSP IP/OBS MODERATE 55: CPT | Performed by: INTERNAL MEDICINE

## 2019-07-23 PROCEDURE — 97110 THERAPEUTIC EXERCISES: CPT | Mod: GP | Performed by: PHYSICAL THERAPIST

## 2019-07-23 PROCEDURE — 40000986 XR PELVIS AD HIP PORTABLE RIGHT 1 VIEW

## 2019-07-23 DEVICE — IMPLANTABLE DEVICE
Type: IMPLANTABLE DEVICE | Site: HIP | Status: NON-FUNCTIONAL
Removed: 2019-08-07

## 2019-07-23 DEVICE — IMP LINER STRK TRIDENT X3 POLY 36MM 0DEG SZ E 623-00-36E
Type: IMPLANTABLE DEVICE | Site: HIP | Status: NON-FUNCTIONAL
Removed: 2019-08-07

## 2019-07-23 DEVICE — IMP STEM FEMORAL HIP STRK ACCOLADE II 132DEG SZ 6 6720-0635: Type: IMPLANTABLE DEVICE | Site: HIP | Status: FUNCTIONAL

## 2019-07-23 DEVICE — IMP HEAD FEMORAL STRK BIOLOX DELTA CERAMIC V40 36MM
Type: IMPLANTABLE DEVICE | Site: HIP | Status: NON-FUNCTIONAL
Removed: 2019-08-07

## 2019-07-23 RX ORDER — ONDANSETRON 2 MG/ML
INJECTION INTRAMUSCULAR; INTRAVENOUS PRN
Status: DISCONTINUED | OUTPATIENT
Start: 2019-07-23 | End: 2019-07-23

## 2019-07-23 RX ORDER — LANOLIN ALCOHOL/MO/W.PET/CERES
400 CREAM (GRAM) TOPICAL DAILY
Status: DISCONTINUED | OUTPATIENT
Start: 2019-07-23 | End: 2019-07-25 | Stop reason: HOSPADM

## 2019-07-23 RX ORDER — BUDESONIDE AND FORMOTEROL FUMARATE DIHYDRATE 160; 4.5 UG/1; UG/1
2 AEROSOL RESPIRATORY (INHALATION) 2 TIMES DAILY
Status: DISCONTINUED | OUTPATIENT
Start: 2019-07-23 | End: 2019-07-23

## 2019-07-23 RX ORDER — KETAMINE HYDROCHLORIDE 10 MG/ML
INJECTION INTRAMUSCULAR; INTRAVENOUS PRN
Status: DISCONTINUED | OUTPATIENT
Start: 2019-07-23 | End: 2019-07-23

## 2019-07-23 RX ORDER — BUDESONIDE AND FORMOTEROL FUMARATE DIHYDRATE 160; 4.5 UG/1; UG/1
2 AEROSOL RESPIRATORY (INHALATION) 2 TIMES DAILY
COMMUNITY
End: 2019-08-25

## 2019-07-23 RX ORDER — SODIUM CHLORIDE, SODIUM LACTATE, POTASSIUM CHLORIDE, CALCIUM CHLORIDE 600; 310; 30; 20 MG/100ML; MG/100ML; MG/100ML; MG/100ML
INJECTION, SOLUTION INTRAVENOUS CONTINUOUS
Status: DISCONTINUED | OUTPATIENT
Start: 2019-07-23 | End: 2019-07-23 | Stop reason: HOSPADM

## 2019-07-23 RX ORDER — GLYCOPYRROLATE 0.2 MG/ML
INJECTION, SOLUTION INTRAMUSCULAR; INTRAVENOUS PRN
Status: DISCONTINUED | OUTPATIENT
Start: 2019-07-23 | End: 2019-07-23

## 2019-07-23 RX ORDER — HYDROXYZINE HYDROCHLORIDE 10 MG/1
10 TABLET, FILM COATED ORAL EVERY 6 HOURS PRN
Status: DISCONTINUED | OUTPATIENT
Start: 2019-07-23 | End: 2019-07-25

## 2019-07-23 RX ORDER — LANOLIN ALCOHOL/MO/W.PET/CERES
1000 CREAM (GRAM) TOPICAL DAILY
Status: DISCONTINUED | OUTPATIENT
Start: 2019-07-23 | End: 2019-07-25 | Stop reason: HOSPADM

## 2019-07-23 RX ORDER — ONDANSETRON 4 MG/1
4 TABLET, ORALLY DISINTEGRATING ORAL EVERY 6 HOURS PRN
Status: DISCONTINUED | OUTPATIENT
Start: 2019-07-23 | End: 2019-07-25 | Stop reason: HOSPADM

## 2019-07-23 RX ORDER — SODIUM PHOSPHATE,MONO-DIBASIC 19G-7G/118
1000 ENEMA (ML) RECTAL DAILY
Status: DISCONTINUED | OUTPATIENT
Start: 2019-07-23 | End: 2019-07-25 | Stop reason: HOSPADM

## 2019-07-23 RX ORDER — TEMAZEPAM 15 MG/1
30 CAPSULE ORAL AT BEDTIME
Status: DISCONTINUED | OUTPATIENT
Start: 2019-07-23 | End: 2019-07-25 | Stop reason: HOSPADM

## 2019-07-23 RX ORDER — LIDOCAINE HYDROCHLORIDE ANHYDROUS AND EPINEPHRINE 10; 10 MG/ML; UG/ML
30 INJECTION, SOLUTION INFILTRATION ONCE
Status: DISCONTINUED | OUTPATIENT
Start: 2019-07-23 | End: 2019-07-23 | Stop reason: HOSPADM

## 2019-07-23 RX ORDER — PRAMIPEXOLE DIHYDROCHLORIDE 1 MG/1
2 TABLET ORAL EVERY EVENING
COMMUNITY
End: 2019-09-06

## 2019-07-23 RX ORDER — ONDANSETRON 2 MG/ML
4 INJECTION INTRAMUSCULAR; INTRAVENOUS EVERY 30 MIN PRN
Status: DISCONTINUED | OUTPATIENT
Start: 2019-07-23 | End: 2019-07-23 | Stop reason: HOSPADM

## 2019-07-23 RX ORDER — ACETAMINOPHEN 325 MG/1
650 TABLET ORAL EVERY 4 HOURS PRN
Status: DISCONTINUED | OUTPATIENT
Start: 2019-07-26 | End: 2019-07-25 | Stop reason: HOSPADM

## 2019-07-23 RX ORDER — NEOSTIGMINE METHYLSULFATE 1 MG/ML
VIAL (ML) INJECTION PRN
Status: DISCONTINUED | OUTPATIENT
Start: 2019-07-23 | End: 2019-07-23

## 2019-07-23 RX ORDER — ACETAMINOPHEN 325 MG/1
975 TABLET ORAL EVERY 8 HOURS
Status: DISCONTINUED | OUTPATIENT
Start: 2019-07-23 | End: 2019-07-25 | Stop reason: HOSPADM

## 2019-07-23 RX ORDER — PROMETHAZINE HYDROCHLORIDE 25 MG/ML
25 INJECTION INTRAMUSCULAR; INTRAVENOUS ONCE
Status: DISCONTINUED | OUTPATIENT
Start: 2019-07-23 | End: 2019-07-23 | Stop reason: HOSPADM

## 2019-07-23 RX ORDER — LIDOCAINE 40 MG/G
CREAM TOPICAL
Status: DISCONTINUED | OUTPATIENT
Start: 2019-07-23 | End: 2019-07-25 | Stop reason: HOSPADM

## 2019-07-23 RX ORDER — POTASSIUM CITRATE 10 MEQ/1
10 TABLET, EXTENDED RELEASE ORAL DAILY
Status: DISCONTINUED | OUTPATIENT
Start: 2019-07-23 | End: 2019-07-23

## 2019-07-23 RX ORDER — QUETIAPINE FUMARATE 50 MG/1
50 TABLET, FILM COATED ORAL AT BEDTIME
Status: DISCONTINUED | OUTPATIENT
Start: 2019-07-23 | End: 2019-07-25 | Stop reason: HOSPADM

## 2019-07-23 RX ORDER — SODIUM CHLORIDE, SODIUM LACTATE, POTASSIUM CHLORIDE, CALCIUM CHLORIDE 600; 310; 30; 20 MG/100ML; MG/100ML; MG/100ML; MG/100ML
INJECTION, SOLUTION INTRAVENOUS CONTINUOUS
Status: DISCONTINUED | OUTPATIENT
Start: 2019-07-23 | End: 2019-07-25

## 2019-07-23 RX ORDER — NALOXONE HYDROCHLORIDE 0.4 MG/ML
.1-.4 INJECTION, SOLUTION INTRAMUSCULAR; INTRAVENOUS; SUBCUTANEOUS
Status: ACTIVE | OUTPATIENT
Start: 2019-07-23 | End: 2019-07-24

## 2019-07-23 RX ORDER — LIDOCAINE HYDROCHLORIDE 10 MG/ML
INJECTION, SOLUTION INFILTRATION; PERINEURAL PRN
Status: DISCONTINUED | OUTPATIENT
Start: 2019-07-23 | End: 2019-07-23

## 2019-07-23 RX ORDER — LORAZEPAM 0.5 MG/1
0.5 TABLET ORAL AT BEDTIME
Status: DISCONTINUED | OUTPATIENT
Start: 2019-07-23 | End: 2019-07-25 | Stop reason: HOSPADM

## 2019-07-23 RX ORDER — FENTANYL CITRATE 50 UG/ML
INJECTION, SOLUTION INTRAMUSCULAR; INTRAVENOUS PRN
Status: DISCONTINUED | OUTPATIENT
Start: 2019-07-23 | End: 2019-07-23

## 2019-07-23 RX ORDER — LIDOCAINE 40 MG/G
CREAM TOPICAL
Status: DISCONTINUED | OUTPATIENT
Start: 2019-07-23 | End: 2019-07-23 | Stop reason: HOSPADM

## 2019-07-23 RX ORDER — NALOXONE HYDROCHLORIDE 0.4 MG/ML
.1-.4 INJECTION, SOLUTION INTRAMUSCULAR; INTRAVENOUS; SUBCUTANEOUS
Status: DISCONTINUED | OUTPATIENT
Start: 2019-07-23 | End: 2019-07-25 | Stop reason: HOSPADM

## 2019-07-23 RX ORDER — CYCLOSPORINE 0.5 MG/ML
1 EMULSION OPHTHALMIC 2 TIMES DAILY
Status: DISCONTINUED | OUTPATIENT
Start: 2019-07-23 | End: 2019-07-23 | Stop reason: CLARIF

## 2019-07-23 RX ORDER — ONDANSETRON 4 MG/1
4 TABLET, ORALLY DISINTEGRATING ORAL EVERY 30 MIN PRN
Status: DISCONTINUED | OUTPATIENT
Start: 2019-07-23 | End: 2019-07-23 | Stop reason: HOSPADM

## 2019-07-23 RX ORDER — PRAMIPEXOLE DIHYDROCHLORIDE 0.25 MG/1
1 TABLET ORAL
Status: DISCONTINUED | OUTPATIENT
Start: 2019-07-23 | End: 2019-07-23

## 2019-07-23 RX ORDER — LIOTHYRONINE SODIUM 5 UG/1
10 TABLET ORAL DAILY
Status: DISCONTINUED | OUTPATIENT
Start: 2019-07-23 | End: 2019-07-25 | Stop reason: HOSPADM

## 2019-07-23 RX ORDER — LEVOTHYROXINE SODIUM 50 UG/1
50 TABLET ORAL DAILY
Status: DISCONTINUED | OUTPATIENT
Start: 2019-07-23 | End: 2019-07-25 | Stop reason: HOSPADM

## 2019-07-23 RX ORDER — CEFAZOLIN SODIUM 2 G/100ML
2 INJECTION, SOLUTION INTRAVENOUS
Status: COMPLETED | OUTPATIENT
Start: 2019-07-23 | End: 2019-07-23

## 2019-07-23 RX ORDER — LABETALOL 20 MG/4 ML (5 MG/ML) INTRAVENOUS SYRINGE
10
Status: DISCONTINUED | OUTPATIENT
Start: 2019-07-23 | End: 2019-07-23 | Stop reason: HOSPADM

## 2019-07-23 RX ORDER — PROPOFOL 10 MG/ML
INJECTION, EMULSION INTRAVENOUS PRN
Status: DISCONTINUED | OUTPATIENT
Start: 2019-07-23 | End: 2019-07-23

## 2019-07-23 RX ORDER — ALBUTEROL SULFATE 90 UG/1
2 AEROSOL, METERED RESPIRATORY (INHALATION) 4 TIMES DAILY PRN
Status: DISCONTINUED | OUTPATIENT
Start: 2019-07-23 | End: 2019-07-25 | Stop reason: HOSPADM

## 2019-07-23 RX ORDER — LEVOTHYROXINE SODIUM 50 UG/1
50 TABLET ORAL DAILY
COMMUNITY
End: 2022-02-24

## 2019-07-23 RX ORDER — ONDANSETRON 2 MG/ML
4 INJECTION INTRAMUSCULAR; INTRAVENOUS EVERY 6 HOURS PRN
Status: DISCONTINUED | OUTPATIENT
Start: 2019-07-23 | End: 2019-07-25 | Stop reason: HOSPADM

## 2019-07-23 RX ORDER — EPHEDRINE SULFATE 50 MG/ML
25 INJECTION, SOLUTION INTRAVENOUS ONCE
Status: DISCONTINUED | OUTPATIENT
Start: 2019-07-23 | End: 2019-07-23 | Stop reason: HOSPADM

## 2019-07-23 RX ORDER — ASPIRIN 81 MG/1
162 TABLET ORAL 2 TIMES DAILY WITH MEALS
Status: DISCONTINUED | OUTPATIENT
Start: 2019-07-24 | End: 2019-07-25 | Stop reason: HOSPADM

## 2019-07-23 RX ORDER — DEXAMETHASONE SODIUM PHOSPHATE 4 MG/ML
INJECTION, SOLUTION INTRA-ARTICULAR; INTRALESIONAL; INTRAMUSCULAR; INTRAVENOUS; SOFT TISSUE PRN
Status: DISCONTINUED | OUTPATIENT
Start: 2019-07-23 | End: 2019-07-23

## 2019-07-23 RX ORDER — LEVOTHYROXINE SODIUM 50 UG/1
50 TABLET ORAL DAILY
Status: DISCONTINUED | OUTPATIENT
Start: 2019-07-23 | End: 2019-07-23

## 2019-07-23 RX ORDER — CEFAZOLIN SODIUM 2 G/100ML
2 INJECTION, SOLUTION INTRAVENOUS EVERY 8 HOURS
Status: COMPLETED | OUTPATIENT
Start: 2019-07-23 | End: 2019-07-24

## 2019-07-23 RX ORDER — FENTANYL CITRATE 50 UG/ML
25-50 INJECTION, SOLUTION INTRAMUSCULAR; INTRAVENOUS
Status: DISCONTINUED | OUTPATIENT
Start: 2019-07-23 | End: 2019-07-23 | Stop reason: HOSPADM

## 2019-07-23 RX ORDER — ACETAMINOPHEN 500 MG
1000 TABLET ORAL ONCE
Status: COMPLETED | OUTPATIENT
Start: 2019-07-23 | End: 2019-07-23

## 2019-07-23 RX ORDER — NEFAZODONE HYDROCHLORIDE 200 MG/1
600 TABLET ORAL AT BEDTIME
Status: DISCONTINUED | OUTPATIENT
Start: 2019-07-23 | End: 2019-07-25 | Stop reason: HOSPADM

## 2019-07-23 RX ORDER — OXYCODONE HYDROCHLORIDE 5 MG/1
5-10 TABLET ORAL EVERY 4 HOURS PRN
Status: DISCONTINUED | OUTPATIENT
Start: 2019-07-23 | End: 2019-07-25 | Stop reason: HOSPADM

## 2019-07-23 RX ORDER — PRAMIPEXOLE DIHYDROCHLORIDE 0.25 MG/1
1 TABLET ORAL EVERY MORNING
Status: DISCONTINUED | OUTPATIENT
Start: 2019-07-24 | End: 2019-07-25 | Stop reason: HOSPADM

## 2019-07-23 RX ORDER — CEFAZOLIN SODIUM 1 G/3ML
1 INJECTION, POWDER, FOR SOLUTION INTRAMUSCULAR; INTRAVENOUS SEE ADMIN INSTRUCTIONS
Status: DISCONTINUED | OUTPATIENT
Start: 2019-07-23 | End: 2019-07-23 | Stop reason: HOSPADM

## 2019-07-23 RX ORDER — BUDESONIDE AND FORMOTEROL FUMARATE DIHYDRATE 160; 4.5 UG/1; UG/1
2 AEROSOL RESPIRATORY (INHALATION) 2 TIMES DAILY
Status: DISCONTINUED | OUTPATIENT
Start: 2019-07-23 | End: 2019-07-25 | Stop reason: HOSPADM

## 2019-07-23 RX ORDER — LACOSAMIDE 200 MG/1
200 TABLET ORAL 2 TIMES DAILY
Status: DISCONTINUED | OUTPATIENT
Start: 2019-07-23 | End: 2019-07-25 | Stop reason: HOSPADM

## 2019-07-23 RX ORDER — PRAMIPEXOLE DIHYDROCHLORIDE 1 MG/1
2 TABLET ORAL EVERY EVENING
Status: DISCONTINUED | OUTPATIENT
Start: 2019-07-23 | End: 2019-07-25 | Stop reason: HOSPADM

## 2019-07-23 RX ORDER — HYDROMORPHONE HYDROCHLORIDE 1 MG/ML
.3-.5 INJECTION, SOLUTION INTRAMUSCULAR; INTRAVENOUS; SUBCUTANEOUS
Status: DISCONTINUED | OUTPATIENT
Start: 2019-07-23 | End: 2019-07-25 | Stop reason: HOSPADM

## 2019-07-23 RX ADMIN — HYDROMORPHONE HYDROCHLORIDE 0.5 MG: 1 INJECTION, SOLUTION INTRAMUSCULAR; INTRAVENOUS; SUBCUTANEOUS at 11:45

## 2019-07-23 RX ADMIN — Medication 50 MG: at 10:06

## 2019-07-23 RX ADMIN — DEXAMETHASONE SODIUM PHOSPHATE 8 MG: 4 INJECTION, SOLUTION INTRA-ARTICULAR; INTRALESIONAL; INTRAMUSCULAR; INTRAVENOUS; SOFT TISSUE at 10:06

## 2019-07-23 RX ADMIN — CEFAZOLIN SODIUM 2 G: 2 INJECTION, SOLUTION INTRAVENOUS at 09:54

## 2019-07-23 RX ADMIN — FENTANYL CITRATE 100 MCG: 50 INJECTION, SOLUTION INTRAMUSCULAR; INTRAVENOUS at 09:53

## 2019-07-23 RX ADMIN — PRAMIPEXOLE DIHYDROCHLORIDE 2 MG: 1 TABLET ORAL at 21:44

## 2019-07-23 RX ADMIN — HYDROMORPHONE HYDROCHLORIDE 0.5 MG: 1 INJECTION, SOLUTION INTRAMUSCULAR; INTRAVENOUS; SUBCUTANEOUS at 16:58

## 2019-07-23 RX ADMIN — ROCURONIUM BROMIDE 40 MG: 10 INJECTION INTRAVENOUS at 09:53

## 2019-07-23 RX ADMIN — LEVOTHYROXINE SODIUM 50 MCG: 50 TABLET ORAL at 17:37

## 2019-07-23 RX ADMIN — FENTANYL CITRATE 50 MCG: 50 INJECTION, SOLUTION INTRAMUSCULAR; INTRAVENOUS at 09:51

## 2019-07-23 RX ADMIN — SODIUM CHLORIDE, POTASSIUM CHLORIDE, SODIUM LACTATE AND CALCIUM CHLORIDE: 600; 310; 30; 20 INJECTION, SOLUTION INTRAVENOUS at 09:28

## 2019-07-23 RX ADMIN — ONDANSETRON HYDROCHLORIDE 4 MG: 2 INJECTION, SOLUTION INTRAMUSCULAR; INTRAVENOUS at 16:58

## 2019-07-23 RX ADMIN — GLYCOPYRROLATE 0.5 MG: 0.2 INJECTION, SOLUTION INTRAMUSCULAR; INTRAVENOUS at 10:51

## 2019-07-23 RX ADMIN — DEXTRAN 70, AND HYPROMELLOSE 2910 1 DROP: 1; 3 SOLUTION/ DROPS OPHTHALMIC at 19:50

## 2019-07-23 RX ADMIN — SODIUM CHLORIDE, POTASSIUM CHLORIDE, SODIUM LACTATE AND CALCIUM CHLORIDE: 600; 310; 30; 20 INJECTION, SOLUTION INTRAVENOUS at 10:17

## 2019-07-23 RX ADMIN — Medication 1 G: at 10:51

## 2019-07-23 RX ADMIN — FENTANYL CITRATE 100 MCG: 50 INJECTION, SOLUTION INTRAMUSCULAR; INTRAVENOUS at 10:16

## 2019-07-23 RX ADMIN — HYDROMORPHONE HYDROCHLORIDE 0.5 MG: 1 INJECTION, SOLUTION INTRAMUSCULAR; INTRAVENOUS; SUBCUTANEOUS at 12:47

## 2019-07-23 RX ADMIN — FENTANYL CITRATE 50 MCG: 50 INJECTION, SOLUTION INTRAMUSCULAR; INTRAVENOUS at 11:30

## 2019-07-23 RX ADMIN — HYDROMORPHONE HYDROCHLORIDE 0.5 MG: 1 INJECTION, SOLUTION INTRAMUSCULAR; INTRAVENOUS; SUBCUTANEOUS at 11:35

## 2019-07-23 RX ADMIN — TEMAZEPAM 30 MG: 15 CAPSULE ORAL at 21:44

## 2019-07-23 RX ADMIN — LIDOCAINE HYDROCHLORIDE 35 MG: 10 INJECTION, SOLUTION INFILTRATION; PERINEURAL at 09:53

## 2019-07-23 RX ADMIN — Medication 1 LOZENGE: at 19:48

## 2019-07-23 RX ADMIN — Medication 1 G: at 09:59

## 2019-07-23 RX ADMIN — FENTANYL CITRATE 50 MCG: 50 INJECTION, SOLUTION INTRAMUSCULAR; INTRAVENOUS at 11:26

## 2019-07-23 RX ADMIN — Medication 5 MG: at 10:51

## 2019-07-23 RX ADMIN — ACETAMINOPHEN 1000 MG: 500 TABLET, FILM COATED ORAL at 09:05

## 2019-07-23 RX ADMIN — ONDANSETRON 4 MG: 2 INJECTION INTRAMUSCULAR; INTRAVENOUS at 10:50

## 2019-07-23 RX ADMIN — HYDROMORPHONE HYDROCHLORIDE 0.5 MG: 1 INJECTION, SOLUTION INTRAMUSCULAR; INTRAVENOUS; SUBCUTANEOUS at 11:22

## 2019-07-23 RX ADMIN — OMEPRAZOLE 40 MG: 20 CAPSULE, DELAYED RELEASE ORAL at 19:48

## 2019-07-23 RX ADMIN — SODIUM CHLORIDE, POTASSIUM CHLORIDE, SODIUM LACTATE AND CALCIUM CHLORIDE: 600; 310; 30; 20 INJECTION, SOLUTION INTRAVENOUS at 13:58

## 2019-07-23 RX ADMIN — CEFAZOLIN SODIUM 2 G: 2 INJECTION, SOLUTION INTRAVENOUS at 17:37

## 2019-07-23 RX ADMIN — LACOSAMIDE 200 MG: 200 TABLET, FILM COATED ORAL at 19:48

## 2019-07-23 RX ADMIN — ACETAMINOPHEN 975 MG: 325 TABLET, FILM COATED ORAL at 19:48

## 2019-07-23 RX ADMIN — OXYCODONE HYDROCHLORIDE 5 MG: 5 TABLET ORAL at 23:41

## 2019-07-23 RX ADMIN — PROPOFOL 150 MG: 10 INJECTION, EMULSION INTRAVENOUS at 09:53

## 2019-07-23 RX ADMIN — HYDROMORPHONE HYDROCHLORIDE 0.5 MG: 1 INJECTION, SOLUTION INTRAMUSCULAR; INTRAVENOUS; SUBCUTANEOUS at 10:43

## 2019-07-23 RX ADMIN — GLYCOPYRROLATE 0.2 MG: 0.2 INJECTION, SOLUTION INTRAMUSCULAR; INTRAVENOUS at 09:53

## 2019-07-23 ASSESSMENT — ENCOUNTER SYMPTOMS
DYSRHYTHMIAS: 0
SEIZURES: 1

## 2019-07-23 ASSESSMENT — LIFESTYLE VARIABLES: TOBACCO_USE: 0

## 2019-07-23 ASSESSMENT — ACTIVITIES OF DAILY LIVING (ADL)
ADLS_ACUITY_SCORE: 15
ADLS_ACUITY_SCORE: 13

## 2019-07-23 NOTE — CONSULTS
[facility]  Consult note   Hospitalist  May Beaver MD       Mercedes Barber MRN# [id number]   YOB: 1955 Age: 64 year old      Date of Admission:  7/23/2019    Consult is requested by    Reason for consultation : post op medical management after RTHA          Assessment and Plan:   Mercedes Barber is a 64 year old female with history of tuberous sclerosis,  mild JORDAN does not need to wear CPAP anymore as per the sleep doctor, GERd, hypothyroidism, seizure disorder underwent right total hip arthroplasty   Status post right total hip arthroplasty postop day 0;  Management is per orthopedic surgery including pain control DVT prophylaxis  Perioperative antibiotic prophylaxis per Ortho as well    History of hypothyroidism;    Continue levothyroxine    History of GERD;    Continue omeprazole    History of depression and anxiety;    Continue prior to admission medications nefazodone and Ativan    History of seizure disorder with tuberosclerosis;  Continue Vimpat  Her prior seizure was several years ago    History of lung infection in the past and was placed on Symbicort and albuterol as needed;  Continue the same medications here    History of sleep mild sleep apnea;  Was told by the sleep medicine doctor that if she sleeps on her side she is okay does not need to wear her CPAP anymore    DVT Prophylaxis aspirin 162 mg p.o. twice daily per Ortho    CODE STATUS is discussed with the patient she wants to be full code  Thank you for the consultation we will continue to follow her along with you             Code Status:   Full Code         Primary Care Physician:   Skyler Álvarez [phone]         Chief Complaint:   Right total hip arthroplasty     History is obtained from the patient         History of Present Illness:   Mercedes Barber is a 64 year old female with history of tuberous sclerosis,  mild JORDAN does not need to wear CPAP anymore as per the sleep doctor, GERd,  hypothyroidism, seizure disorder underwent right total hip arthroplasty today for osteoarthritis with chronic right-sided hip pain.  Procedure was done under general anesthesia.  She tolerated the procedure well.  Estimated blood loss was 250 mL.  In the PACU she was nauseated and was given medication to help with that.  Her hip pain is well controlled currently she denies any other complaints currently other than being feel feeling cold.  We were consulted for medical management         Past Medical History:     Past Medical History:   Diagnosis Date     Arthritis     Thumb     Cervical high risk HPV (human papillomavirus) test positive 07/17/2017 07/17/17: NIL pap, + HR HPV (not 16 or 18) result.      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     Neuropathy of lower extremity      Rectal prolapse      Restless leg syndrome      Seizure disorder (H)     25 years ago     Sleep apnea     CPAP, no longer using CPAP     Temporal sclerosis 8/27/2012             Past Surgical History:     Past Surgical History:   Procedure Laterality Date     Anterior COLPORRHAPHY, BLADDER/VAGINA      perigee mesh     ARTHROPLASTY PATELLO-FEMORAL (KNEE) Bilateral      CARPAL TUNNEL RELEASE RT/LT       DENTAL SURGERY      implant     DILATION AND CURETTAGE       DRAIN PILONIDAL CYST SIMPL       ENDOSCOPY DRUG INDUCED SLEEP N/A 11/18/2015    Procedure: ENDOSCOPY DRUG INDUCED SLEEP;  Surgeon: Park Ortiz MD;  Location: [facility] OR     ESOPHAGOSCOPY, GASTROSCOPY, DUODENOSCOPY (EGD), COMBINED  4/5/2013    Procedure: COMBINED ESOPHAGOSCOPY, GASTROSCOPY, DUODENOSCOPY (EGD), BIOPSY SINGLE OR MULTIPLE;;  Surgeon: Mundo Mehta MD;  Location:  GI     EXCISE LESION TRUNK  8/10/2012    Procedure: EXCISE LESION TRUNK;;   Surgeon: Jerald Diggs MD;  Location: [facility] OR     HYSTERECTOMY       L shoulder fracture       rectal prolapse repair abdominally       RELEASE PLANTAR FASCIA Right 1/20/2015    Procedure: RELEASE PLANTAR FASCIA;  Surgeon: Maicol Liu DPM;  Location: [facility]     REMOVE MESH VAGINA  8/10/2012    Procedure: REMOVE MESH VAGINA;  Excision of Vaginal Mesh Exposure, Removal of Skin Tag Left Inner Leg;  Surgeon: Jerald Diggs MD;  Location: [facility] OR     REPAIR HAMMER TOE Right 1/20/2015    Procedure: REPAIR HAMMER TOE;  Surgeon: Maicol Liu DPM;  Location: [facility]     TONSILLECTOMY & ADENOIDECTOMY       TUBAL LIGATION                Home Medications:     Prior to Admission medications    Medication Sig Last Dose Taking? Auth Provider   albuterol (ALBUTEROL) 108 (90 BASE) MCG/ACT Inhaler Inhale 2 puffs into the lungs 4 times daily as needed for shortness of breath / dyspnea or wheezing Past Week at Unknown time Yes Skyler Álvarez MD   budesonide-formoterol (SYMBICORT) 80-4.5 MCG/ACT Inhaler Inhale 2 puffs into the lungs 2 times daily  Yes Reported, Patient   cholecalciferol (VITAMIN D3) 91764 UNITS capsule Take 1 capsule by mouth daily 7/20/2019 Yes Reported, Patient   Cyanocobalamin (B-12 PO) Take 1,000 mcg by mouth daily  7/14/2019 Yes Reported, Patient   cycloSPORINE (RESTASIS) 0.05 % ophthalmic emulsion Place 1 drop into both eyes 2 times daily 7/22/2019 at 2100 Yes Unknown, Entered By History   FOLIC ACID PO Take 400 mcg by mouth daily 7/14/2019 Yes Reported, Patient   GLUCOSAMINE SULFATE PO Take 1,000 mg by mouth daily  7/14/2019 Yes Reported, Patient   hypromellose (ARTIFICIAL TEARS) 0.5 % SOLN ophthalmic solution Place 1 drop into both eyes 2 times daily Past Week at Unknown time Yes Unknown, Entered By History   ketoprofen 10% in PLO 10% topical gel Place onto the skin daily as needed for moderate pain (hands/feet/knees) Past Month at Unknown time Yes Unknown, Entered By History   lacosamide (VIMPAT)  "200 MG TABS Take 200 mg by mouth 2 times daily. 7/22/2019 at 2100 Yes Unknown, Entered By History   levothyroxine (SYNTHROID/LEVOTHROID) 25 MCG tablet Take 1 tablet (25 mcg) by mouth daily  Patient taking differently: Take 50 mcg by mouth daily  7/22/2019 at 0800 Yes Skyler Álvarez MD   linaclotide (LINZESS) 290 MCG capsule Take 290 mcg by mouth every morning (before breakfast) 7/22/2019 at 0800 Yes Reported, Patient   liothyronine (CYTOMEL) 5 MCG tablet Take 10 mcg by mouth daily  7/22/2019 at 0800 Yes Reported, Patient   LORazepam (ATIVAN) 0.5 MG tablet Take 0.5 mg by mouth At Bedtime 7/22/2019 at 2100 Yes Reported, Patient   mupirocin (BACTROBAN) 2 % nasal ointment Apply 1 g into each nare 2 times daily For 7 days prior to surgery 7/23/2019 at 0700 Yes Reported, Patient   naproxen (NAPROSYN) 500 MG tablet Take 500 mg by mouth 2 times daily (with meals) 7/13/2019 Yes Reported, Patient   nefazodone (SERZONE) 200 MG tablet Take 600 mg by mouth At Bedtime  7/22/2019 at 2100 Yes Reported, Patient   omeprazole (PRILOSEC) 40 MG DR capsule Take 1 capsule (40 mg) by mouth 2 times daily 7/22/2019 at 2100 Yes Skyler Álvarez MD   polyethylene glycol (MIRALAX) powder Take 17 g by mouth. STIR 1 CAPFUL (17GM) IN 8 OZ OF LIQUID AND DRINK 7/22/2019 at 0800 Yes Abel James MD   POTASSIUM CITRATE PO Take 10 mEq by mouth daily  7/22/2019 at 0800 Yes Reported, Patient   pramipexole (MIRAPEX) 1 MG tablet Take 1 mg by mouth 2 times daily 1 tab in am , 2 tabs 1 hour prior to hs 7/22/2019 at 2100 Yes Reported, Patient   QUEtiapine Fumarate (SEROQUEL PO) Take 50 mg by mouth At Bedtime  Past Week Yes Reported, Patient   ranitidine (ZANTAC) 150 MG tablet TAKE 1 TABLET BY MOUTH TWICE DAILY 7/22/2019 at 2100 Yes Skyler Álvarez MD   TEMAZEPAM PO Take 30 mg by mouth At Bedtime  7/22/2019 at 2100 Yes Reported, Patient            Allergies:     Allergies   Allergen Reactions     Clonopin [Clonazepam]      \"Walk funny and Mind goes " "funny\"     Encort [Hydrocortisone Acetate] Swelling     Feet swelled.     Encort [Hydrocortisone] Swelling     Erythromycin Diarrhea     Flagyl [Metronidazole] Nausea     Gabapentin      Over eats     Lamictal [Lamotrigine]      Tegretol [Carbamazepine] Nausea and Vomiting            Social History:     Social History     Tobacco Use     Smoking status: Never Smoker     Smokeless tobacco: Never Used   Substance Use Topics     Alcohol use: Yes     Comment: 1 glass of wine 4x/yr                 Family History:     Family History   Problem Relation Age of Onset     Eye Disorder Mother      Lipids Mother         has CHF     Osteoporosis Mother      Diabetes Father      Alzheimer Disease Paternal Grandmother      Hypertension Brother      Alcohol/Drug Brother      Depression Brother      Gastrointestinal Disease Brother         hx of colonic polyps     Lipids Brother      Psychotic Disorder Brother      Breast Cancer Sister      Depression Sister      Lipids Sister      Thyroid Disease Sister      Congenital Anomalies Daughter      Neurologic Disorder Daughter                 Review of Systems:       The 10 point Review of Systems is negative other than noted in the HPI            Physical Exam:   Blood pressure 108/73, pulse 55, temperature 95.8  F (35.4  C), temperature source Oral, resp. rate 19, weight 90.2 kg (198 lb 12.8 oz), last menstrual period 03/11/2010, SpO2 100 %, not currently breastfeeding.  198 lbs 12.8 oz    Constitutional: Alert, awake not in any distress,  appears pale   Psych: Mood and affect are normal    Neuro: Cranial nerves 2-12 are intact, muscle power 5/5, no focal weakness   Eyes: No conjunctival injection, No scleral icterus, PERRLA   ENT: Ear, nose , throat are normal. Mucous membranes moist         Cardiovascular:  Normal rate rhythm no murmurs rubs or gallops   Lungs:  Clear to auscultation no rales rhonchi or wheezing   Abdomen:  Soft, nontender, nondistended, bowel sounds positive   Skin: "  Pallor present, warm and dry   Musculoskeletal:  No active tenosynovitis.  Surgical incision site is dressed   Other:           Data:   All new lab and imaging data was reviewed.   Recent Labs   Lab Test 07/02/19  1056 02/09/19  1811 08/19/12 2236  08/23/11  0800   WBC  --  7.7  --  5.0   < > 9.4   HGB 12.7 13.6   < > 12.1   < > 11.6*   MCV  --  94  --  92   < > 92    259   < > 276   < > 218   INR  --   --   --   --   --  0.98    < > = values in this interval not displayed.      Recent Labs   Lab Test 07/02/19  1056 02/09/19  1811 11/01/16  05/19/15  1125   NA  --  140  --   --  139   POTASSIUM 3.9 4.0  --    < > 3.8   CHLORIDE  --  105  --   --  105   CO2  --  28  --   --  26   BUN  --  16  --   --  16   CR  --  0.92 0.9  --  1.02   ANIONGAP  --  7  --   --  8   SABA  --  9.4  --   --  8.9   GLC  --  100*  --   --  73    < > = values in this interval not displayed.     Last Comprehensive Metabolic Panel:  Sodium   Date Value Ref Range Status   02/09/2019 140 133 - 144 mmol/L Final     Potassium   Date Value Ref Range Status   07/02/2019 3.9 3.4 - 5.3 mmol/L Final     Chloride   Date Value Ref Range Status   02/09/2019 105 94 - 109 mmol/L Final     Carbon Dioxide   Date Value Ref Range Status   02/09/2019 28 20 - 32 mmol/L Final     Anion Gap   Date Value Ref Range Status   02/09/2019 7 3 - 14 mmol/L Final     Glucose   Date Value Ref Range Status   02/09/2019 100 (H) 70 - 99 mg/dL Final     Urea Nitrogen   Date Value Ref Range Status   02/09/2019 16 7 - 30 mg/dL Final     Creatinine   Date Value Ref Range Status   02/09/2019 0.92 0.52 - 1.04 mg/dL Final     GFR Estimate   Date Value Ref Range Status   02/09/2019 66 >60 mL/min/[1.73_m2] Final     Comment:     Non  GFR Calc  Starting 12/18/2018, serum creatinine based estimated GFR (eGFR) will be   calculated using the Chronic Kidney Disease Epidemiology Collaboration   (CKD-EPI) equation.       Calcium   Date Value Ref Range Status    02/09/2019 9.4 8.5 - 10.1 mg/dL Final     Bilirubin Total   Date Value Ref Range Status   02/09/2019 0.5 0.2 - 1.3 mg/dL Final     Alkaline Phosphatase   Date Value Ref Range Status   02/09/2019 94 40 - 150 U/L Final     ALT   Date Value Ref Range Status   02/09/2019 21 0 - 50 U/L Final     AST   Date Value Ref Range Status   02/09/2019 17 0 - 45 U/L Final             No lab results found.    Invalid input(s): TROP, TROPONINIES

## 2019-07-23 NOTE — ANESTHESIA PREPROCEDURE EVALUATION
Anesthesia Pre-Procedure Evaluation    Patient: Mercedes Barber   MRN: [id number] : 1955          Preoperative Diagnosis: right hip djd    Procedure(s):  Right total hip arthroplasty (choice anesthesia)    Past Medical History:   Diagnosis Date     Arthritis     Thumb     Cervical high risk HPV (human papillomavirus) test positive 2017: NIL pap, + HR HPV (not 16 or 18) result.      Cervical pain 9/15/2016     Constipation 2012     Diarrhea 2009     Esophageal stricture 2012     Gastro-oesophageal reflux disease      Hypothyroidism 2012     IBS (irritable bowel syndrome)      Mild major depression (H) 2012     Neuropathy of lower extremity      Rectal prolapse      Restless leg syndrome      Seizure disorder (H)     25 years ago     Sleep apnea     CPAP, no longer using CPAP     Temporal sclerosis 2012     Past Surgical History:   Procedure Laterality Date     Anterior COLPORRHAPHY, BLADDER/VAGINA      perigee mesh     ARTHROPLASTY PATELLO-FEMORAL (KNEE) Bilateral      CARPAL TUNNEL RELEASE RT/LT       DENTAL SURGERY      implant     DILATION AND CURETTAGE       DRAIN PILONIDAL CYST SIMPL       ENDOSCOPY DRUG INDUCED SLEEP N/A 2015    Procedure: ENDOSCOPY DRUG INDUCED SLEEP;  Surgeon: Park Ortiz MD;  Location: [facility] OR     ESOPHAGOSCOPY, GASTROSCOPY, DUODENOSCOPY (EGD), COMBINED  2013    Procedure: COMBINED ESOPHAGOSCOPY, GASTROSCOPY, DUODENOSCOPY (EGD), BIOPSY SINGLE OR MULTIPLE;;  Surgeon: Mundo Mehta MD;  Location:  GI     EXCISE LESION TRUNK  8/10/2012    Procedure: EXCISE LESION TRUNK;;  Surgeon: Jerald Diggs MD;  Location: [facility]     HYSTERECTOMY       L shoulder fracture       rectal prolapse repair abdominally       RELEASE PLANTAR FASCIA Right 2015    Procedure: RELEASE PLANTAR FASCIA;  Surgeon: Maicol Liu DPM;  [facility]     REMOVE MESH VAGINA  8/10/2012    Procedure: REMOVE MESH VAGINA;   Excision of Vaginal Mesh Exposure, Removal of Skin Tag Left Inner Leg;  Surgeon: Jerald Diggs MD;  Location: [facility]     REPAIR HAMMER TOE Right 1/20/2015    Procedure: REPAIR HAMMER TOE;  Surgeon: Maicol Liu DPM;  [facility]     TONSILLECTOMY & ADENOIDECTOMY       TUBAL LIGATION       Anesthesia Evaluation     .             ROS/MED HX    ENT/Pulmonary:     (+)sleep apnea, , . .   (-) tobacco use and Other pulmonary disease   Neurologic:     (+)neuropathy - Lower Ext, seizures    (-) TIA, Other neuro hx and Dementia   Cardiovascular:        (-) hypertension, CAD, CHF, arrhythmias, pulmonary hypertension and dyslipidemia   METS/Exercise Tolerance:     Hematologic:         Musculoskeletal:   (+) arthritis,  -       GI/Hepatic:     (+) GERD Inflammatory bowel disease,      (-) hepatitis   Renal/Genitourinary:     (+) chronic renal disease, type: CRI, Pt does not require dialysis, Pt has no history of transplant,    (-) other renal    Endo:     (+) thyroid problem hypothyroidism, .   (-) Type I DM, Type II DM, chronic steroid usage and other endocrine disorder   Psychiatric:     (+) psychiatric history depression      Infectious Disease:  - neg infectious disease ROS       Malignancy:      - no malignancy   Other:    - neg other ROS                      Physical Exam      Airway   Mallampati: II  TM distance: >3 FB  Neck ROM: full    Dental     Cardiovascular   Rhythm and rate: regular and normal  (-) no murmur    Pulmonary    breath sounds clear to auscultation    Other findings: Lab Test        07/02/19     02/09/19     11/11/15      --          08/19/12 08/08/12      --          08/23/11                       1056          1811          0947           --           2236          1145           --           0800          WBC           --          7.7           --           --          5.0          4.8            < >        9.4           HGB          12.7         13.6         12.1           < >         12.1         12.4           < >        11.6*         MCV           --          94            --           --          92           93             < >        92            PLT          254          259          246           --          276          230            < >        218           INR           --           --           --           --           --           --           --          0.98           < > = values in this interval not displayed.                  Lab Test        07/02/19     02/09/19     11/01/16     11/11/15     05/19/15     01/16/15                       1056          1811                            0947          1125          1050          NA            --          140           --           --          139          138           POTASSIUM    3.9          4.0           --          3.7          3.8          4.1           CHLORIDE      --          105           --           --          105          104           CO2           --          28            --           --          26           31            BUN           --          16            --           --          16           17            CR            --          0.92         0.9           --          1.02         0.97          ANIONGAP      --          7             --           --          8            3             SABA           --          9.4           --           --          8.9          9.0           GLC           --          100*          --           --          73           84                   Lab Results   Component Value Date    WBC 7.7 02/09/2019    HGB 12.7 07/02/2019    HCT 42.0 02/09/2019     07/02/2019    SED 7 02/08/2011     02/09/2019    POTASSIUM 3.9 07/02/2019    CHLORIDE 105 02/09/2019    CO2 28 02/09/2019    BUN 16 02/09/2019    CR 0.92 02/09/2019     (H) 02/09/2019    SABA 9.4 02/09/2019    PHOS 4.1 09/17/2009    MAG 1.9 10/13/2011    ALBUMIN 3.8 02/09/2019    PROTTOTAL 7.5 02/09/2019    ALT 21  "02/09/2019    AST 17 02/09/2019    ALKPHOS 94 02/09/2019    BILITOTAL 0.5 02/09/2019    LIPASE 87 02/09/2019    PTT 30 08/23/2011    INR 0.98 08/23/2011    TSH 0.48 07/02/2019    T4 0.81 09/12/2017    HCG Negative 10/17/2007       Preop Vitals  BP Readings from Last 3 Encounters:   07/02/19 120/76   05/17/19 114/76   03/17/19 121/90    Pulse Readings from Last 3 Encounters:   07/02/19 70   05/17/19 64   03/17/19 82      Resp Readings from Last 3 Encounters:   07/02/19 16   05/17/19 18   03/17/19 18    SpO2 Readings from Last 3 Encounters:   07/02/19 95%   05/17/19 96%   03/17/19 98%      Temp Readings from Last 1 Encounters:   07/02/19 98.1  F (36.7  C) (Oral)    Ht Readings from Last 1 Encounters:   07/02/19 1.657 m (5' 5.25\")      Wt Readings from Last 1 Encounters:   07/02/19 89.7 kg (197 lb 11.2 oz)    Estimated body mass index is 32.65 kg/m  as calculated from the following:    Height as of 7/2/19: 1.657 m (5' 5.25\").    Weight as of 7/2/19: 89.7 kg (197 lb 11.2 oz).       Anesthesia Plan      History & Physical Review  History and physical reviewed and following examination; no interval change.    ASA Status:  3 .    NPO Status:  > 8 hours    Plan for General and ETT with Propofol induction. Maintenance will be Balanced.    PONV prophylaxis:  Ondansetron (or other 5HT-3) and Dexamethasone or Solumedrol       Postoperative Care  Postoperative pain management:  IV analgesics and Oral pain medications.      Consents  Anesthetic plan, risks, benefits and alternatives discussed with:  Patient.  Use of blood products discussed: Yes.   Use of blood products discussed with Patient.  Consented to blood products.  .                 Samir Jerry MD                    .  "

## 2019-07-23 NOTE — PROGRESS NOTES
"SPIRITUAL HEALTH SERVICES Progress Note  [facility] Pre-surgical    SH consult per pt's request for  support prior to procedure.   Met with pt, Mercedes, and her \"awesome friend\" who is with her.   Mercedes identifies as Hoahaoism and shares she has had \"15 other surgeries.\" She invites prayers that \"all goes well.\"   Shared in prayer together. Oriented pt/friend how to request SH support once admitted to the floor. No other needs expressed.    BRIAN Turcios.  Staff    Pager #[phone]     "

## 2019-07-23 NOTE — ADDENDUM NOTE
Addendum  created 07/23/19 1455 by Samir Jerry MD    Attestation recorded in Intraprocedure, Intraprocedure Attestations filed

## 2019-07-23 NOTE — PLAN OF CARE
PT:  Eval complete. Pt seen POD 0 s/p R SARAHI. At baseline, pt lives alone in an accessible condo, uses SEC due to hip pain/balance problems.  Owns FWW.  Has daughter staying with her at time of discharge.    Discharge Planner PT   Patient plan for discharge: home with daughter staying with her x 2-3 nights  Current status: Initiated mobility training, tolerating fairly well, limited by pain rated 8/10, nausea and lightheadedness. Min A with bed mobility, CGA/min A with transfers, amb x 30ft with FWW and CGA.  Ed pt on post op hip precautions, issued handouts. BP stable /86, HR 57-74bpm, SaO2 stable on RA.  Barriers to return to prior living situation: limited mobility  Recommendations for discharge: TCO Careplan: home without additional services  Rationale for recommendations: Anticipate patient will progress well given PLOF and current functional status. With continued PT intervention and ongoing medical management, anticipate pt will be able to meet mobility goals to safely discharge home.        Entered by: Paul Reid 07/23/2019 5:01 PM

## 2019-07-23 NOTE — PLAN OF CARE
Pt arrived on floor at 1345 from PACU. Lethargic but oriented. On 2L O2. Bradycardic, MD notified and remote telemetry ordered. SB/NSR per tele tech. Tolerating clear liquid diet. Intermittent nausea, IV Zofran given x1. Transfers with Ax1, gait belt, and walker. Dressing to R hip has scant amount dried drainage, GATITO drain in place. Baseline numbness and tingling in hands and feet. Voiding in adequate amounts, BM this shift. Pain managed with prn IV Dilaudid. Will continue to monitor.

## 2019-07-23 NOTE — ANESTHESIA CARE TRANSFER NOTE
Patient: Mercedes Barber    Procedure(s):  Right total hip arthroplasty (choice anesthesia)    Diagnosis: right hip djd  Diagnosis Additional Information: No value filed.    Anesthesia Type:   General, ETT     Note:  Airway :Face Mask  Patient transferred to:PACU  Comments: Pt to PACU monitors attached VSS. Pt conversing with staff. Report to RN.Handoff Report: Identifed the Patient, Identified the Reponsible Provider, Reviewed the pertinent medical history, Discussed the surgical course, Reviewed Intra-OP anesthesia mangement and issues during anesthesia, Set expectations for post-procedure period and Allowed opportunity for questions and acknowledgement of understanding      Vitals: (Last set prior to Anesthesia Care Transfer)    CRNA VITALS  7/23/2019 1057 - 7/23/2019 1136      7/23/2019             Pulse:  69    SpO2:  92 %                Electronically Signed By: FROYLAN Luong CRNA  July 23, 2019  11:36 AM

## 2019-07-23 NOTE — ANESTHESIA POSTPROCEDURE EVALUATION
Patient: Mercedes Barber    Procedure(s):  Right total hip arthroplasty (choice anesthesia)    Diagnosis:right hip djd  Diagnosis Additional Information: No value filed.    Anesthesia Type:  General, ETT    Note:  Anesthesia Post Evaluation    Patient location during evaluation: PACU  Patient participation: Able to fully participate in evaluation  Level of consciousness: awake  Pain management: adequate  Airway patency: patent  Cardiovascular status: acceptable  Respiratory status: acceptable  Hydration status: euvolemic  PONV: controlled     Anesthetic complications: None          Last vitals:  Vitals:    07/23/19 1345 07/23/19 1415 07/23/19 1429   BP: 108/73 123/67    Pulse:      Resp: 19 14    Temp: 95.8  F (35.4  C)     SpO2: 100% 96% 96%         Electronically Signed By: Samir Jerry MD  July 23, 2019  2:54 PM

## 2019-07-23 NOTE — PROGRESS NOTES
07/23/19 1605   Quick Adds   Type of Visit Initial PT Evaluation   Living Environment   Lives With alone   Living Arrangements condominium;apartment   Home Accessibility no concerns   Transportation Anticipated car, drives self;family or friend will provide   Living Environment Comment Pt lives alone in an accessible condo, 1st fl.  Has daughter staying with her x 2-3 nights upon discharge, then has good support from friends/neighbors.   Self-Care   Usual Activity Tolerance moderate  (limited by pain)   Current Activity Tolerance fair   Equipment Currently Used at Home cane, straight   Activity/Exercise/Self-Care Comment Indep PLOF, uses SEC prior to surgery d/t hip pain and balance problems.  Owns FWW.   Functional Level Prior   Ambulation 1-->assistive equipment   Transferring 1-->assistive equipment   Toileting 0-->independent   Bathing 0-->independent   Communication 0-->understands/communicates without difficulty   Swallowing 0-->swallows foods/liquids without difficulty   Cognition 0 - no cognition issues reported   Fall history within last six months yes   Number of times patient has fallen within last six months 1  ((+) fall hx > 1yr ago as well with injury)   Which of the above functional risks had a recent onset or change? ambulation;transferring;toileting;bathing;dressing;fall history   Prior Functional Level Comment Reports she can ambulate functional distances with SEC at baseline, hx of falls with injury, unclear if due to low BP and/or neuropathy.   General Information   Onset of Illness/Injury or Date of Surgery - Date 07/23/19   Referring Physician Rosalio Henriquez PA-C   Patient/Family Goals Statement TCO Careplan: Home without additional services. Per pt: dc home POD 2 with daughter staying with her   Pertinent History of Current Problem (include personal factors and/or comorbidities that impact the POC) Pt seen POD 0 s/p R SARAHI.  PMH including tuberous sclerosis,  mild JORDAN does not need to  wear CPAP anymore as per the sleep doctor, GERd, hypothyroidism, seizure disorder.  Hx of falls with injury, unclear mechanism, possibly related to baseline low BP and/or neuropathy.   Precautions/Limitations fall precautions;right hip precautions  (PL hip precautions)   Weight-Bearing Status - RLE weight-bearing as tolerated   Cognitive Status Examination   Orientation orientation to person, place and time   Pain Assessment   Patient Currently in Pain Yes, see Vital Sign flowsheet  (5/10 R hip at rest, 8/10 with WB)   Integumentary/Edema   Integumentary/Edema Comments incisional dressing dry and intact, mild swelling about R hip/thigh   Posture    Posture Forward head position   Range of Motion (ROM)   ROM Comment WFL LLE and BUEs, R hip AAROM to ~80 deg hip flexion due to pain   Strength   Strength Comments WFL LLE and BUEs, notable generalized weakness B shoulder flx 4-/5 strength B, SLR indep LLE but ~4-/5 L hip strength.  Functional weakness as expected post op R hip.   Bed Mobility   Bed Mobility Comments Min A to support RLE   Transfer Skills   Transfer Comments Min A sit <> stand with FWW, limited by pain/weakness.   Gait   Gait Comments Amb with slow gait speed, FWW, CGA x 1, amb x 15ft at bedside with good/fair tolerance, limited by pain and nausea.   Balance   Balance Comments Impaired standing balance and gait stability, compensating with FWW fairly well, Ax1 for safety. Of note, baseline balance impairment reported, hx of falls.   Sensory Examination   Sensory Perception Comments baseline neuropathy involving B hands and feet, currently intact to gross/light touch.   Coordination   Coordination Comments restless legs   Modality Interventions   Planned Modality Interventions Cryotherapy   Planned Modality Interventions Comments prn for pain control   General Therapy Interventions   Planned Therapy Interventions balance training;gait training;neuromuscular  "re-education;ROM;strengthening;stretching;transfer training;risk factor education;home program guidelines;progressive activity/exercise   Clinical Impression   Criteria for Skilled Therapeutic Intervention yes, treatment indicated   PT Diagnosis Impaired functional mobility s/p SARAHI   Influenced by the following impairments Pain, weakness, impaired balance, dec ROM, hip precautions   Functional limitations due to impairments Fall risk, impaired bed mobility, transfers, gait   Clinical Presentation Evolving/Changing   Clinical Presentation Rationale acute post op medical status, hx of falls, PMH, PLOF   Clinical Decision Making (Complexity) Moderate complexity   Therapy Frequency 2x/day   Predicted Duration of Therapy Intervention (days/wks) 2 days   Anticipated Equipment Needs at Discharge   (owns FWW)   Anticipated Discharge Disposition Home with Assist   Risk & Benefits of therapy have been explained Yes   Patient, Family & other staff in agreement with plan of care Yes   [facility] \"6 Clicks\"   2016, Trustees of [facility], under license to [employer].  All rights reserved.   6 Clicks Short Forms Basic Mobility Inpatient Short Form   [facility]  \"6 Clicks\" V.2 Basic Mobility Inpatient Short Form   1. Turning from your back to your side while in a flat bed without using bedrails? 3 - A Little   2. Moving from lying on your back to sitting on the side of a flat bed without using bedrails? 3 - A Little   3. Moving to and from a bed to a chair (including a wheelchair)? 3 - A Little   4. Standing up from a chair using your arms (e.g., wheelchair, or bedside chair)? 3 - A Little   5. To walk in hospital room? 3 - A Little   6. Climbing 3-5 steps with a railing? 3 - A Little   Basic Mobility Raw Score (Score out of 24.Lower scores equate to lower levels of function) 18   Total Evaluation Time   Total Evaluation Time (Minutes) 15     "

## 2019-07-23 NOTE — OR NURSING
In PACU patient's HR has been in the 40's, Dr Pimentel was informed and he said that the patient HR was in the 40's before surgury.

## 2019-07-23 NOTE — OP NOTE
Procedure Date: 07/23/2019      PREOPERATIVE DIAGNOSIS:  Right hip end-stage primary osteoarthritis.      POSTOPERATIVE DIAGNOSIS:  Right hip end-stage primary osteoarthritis.      PROCEDURE:  Right total hip arthroplasty.      SURGEON:  Anant Mejia MD      ASSISTANT:  Rosalio Henriquez PA-C      ANESTHESIA:  General.      ESTIMATED BLOOD LOSS:  250 mL.      INTRAOPERATIVE COMPLICATIONS:  None apparent.      OPERATIVE INDICATIONS:  The patient is a 64-year-old female with a long history of right hip pain.  She had pain with any ambulation and was significantly limited with her overall day-to-day functional activities and this began to impact her overall quality of life.  She therefore elected to proceed with a right total hip arthroplasty.      IMPLANTS:  Trident II PSL cluster hole HA acetabular shell, size 54.  Trident X3 0-degree polyethylene insert, size 36 mm.  Two cancellous screws. A 132-degree neck angle hip stem Accolade 2 size 6.  Biolox delta 36 mm, -2.5 mm neck length femoral head.      DESCRIPTION OF PROCEDURE:  The patient was identified in the preoperative holding area and the operative site was marked.  Consent was again reviewed and all questions were answered.  She was taken to the operating room, placed under general anesthesia, positioned in the left lateral decubitus position with all bony prominences well-padded with the right lower extremity prepped and draped in the usual sterile fashion.  Preoperative antibiotics administered and a timeout was called to ensure the correct operative site and procedure.  A longitudinal incision was made along the posterior border of the greater trochanter with sharp dissection down through skin and subcutaneous tissues.  The iliotibial band was split sharply and the gluteal fascia bluntly in line with its fibers.  An East-West retractor was placed.  The short external rotators and posterior capsule were taken down as a posterior based flap and tagged for later  repair.  The hip was dislocated.  The neck cut was made based on preoperative templating.  Acetabular retractors were positioned.  The remnant labrum was excised.  The pulvinar was excised.  Sequential reaming then commenced up to a size 54.  The central reaming did broach to within the pelvis, but the peripheral rim remained nice and solid for a good rim fit.  The Trident II PSL cluster holes bedtime shell was therefore selected given the rim fit.  Reamings from the femoral head were seated within the base of the medial wall defect.  The cup was then positioned in appropriate anteversion and abduction referencing off the transverse acetabular ligament.  The cup was impacted and noted to be stable with good peripheral rim fit.  Two cancellous screws were placed for support.  The liner was then positioned.  The proximal femur was exposed.  The box cut and canal finder were used to enter the canal followed by sequential broaching up to a size 6.  A trial reduction showed good stability.  The size 6 Accolade -degree neck angle hip stem was therefore opened and impacted to the same level, matching the patient's native femoral anteversion.  Multiple trials again commenced with different head lengths to provide maximal stability and matching leg lengths based on shuck and palpation.  The final -2.5 mm Biolox delta ceramic femoral head was selected and impacted on the trunnion.  The hip was again reduced.  It was noted to be very stable in sleeper position and internally rotated to 60 degrees with the hip flexed at 90 degrees prior to subluxation.  The wound was soaked in a dilute Betadine solution followed by irrigation with pulsatile lavage.  Pericapsular anesthetic cocktail was infiltrated.  The short external rotators and posterior capsule were repaired through drill holes in the greater trochanter.  The iliotibial band was closed with interrupted #1 Vicryl suture, and the gluteal fascia with a running Vicryl  suture.  The remainder of the wound was closed in multiple deep layers given the extensive subcutaneous tissues.  The skin was closed with staples.  Given the extensive subcutaneous tissues with high risk for prolonged postoperative wound drainage with potential for subsequent infection, it was felt that the patient would benefit from use of a vacuum-assisted type incisional closure device.  The GATITO dressing was therefore utilized with an excellent suction seal.  The patient was then awoken from general anesthesia and transferred to postanesthesia care unit in stable condition.      Rosalio Henriquez PA-C was present and scrubbed throughout the case and his assistance was critical for patient positioning, assistance with prepping, draping, soft tissue retraction, leg manipulation, wound closure, dressing and abduction pillow application.         JOSE ENRIQUE ARNOLD MD             D: 2019   T: 2019   MT: ANDREW      Name:     OLI MOSELEY   MRN:      [id number]        Account:        [id number]   :      1955           Procedure Date: 2019      Document: [id number]

## 2019-07-24 ENCOUNTER — APPOINTMENT (OUTPATIENT)
Dept: PHYSICAL THERAPY | Facility: CLINIC | Age: 64
DRG: 470 | End: 2019-07-24
Attending: ORTHOPAEDIC SURGERY
Payer: MEDICARE

## 2019-07-24 ENCOUNTER — APPOINTMENT (OUTPATIENT)
Dept: OCCUPATIONAL THERAPY | Facility: CLINIC | Age: 64
DRG: 470 | End: 2019-07-24
Attending: ORTHOPAEDIC SURGERY
Payer: MEDICARE

## 2019-07-24 LAB
GLUCOSE SERPL-MCNC: 127 MG/DL (ref 70–99)
HGB BLD-MCNC: 11.1 G/DL (ref 11.7–15.7)

## 2019-07-24 PROCEDURE — 97116 GAIT TRAINING THERAPY: CPT | Mod: GP | Performed by: PHYSICAL THERAPIST

## 2019-07-24 PROCEDURE — 25000128 H RX IP 250 OP 636: Performed by: PHYSICIAN ASSISTANT

## 2019-07-24 PROCEDURE — 97530 THERAPEUTIC ACTIVITIES: CPT | Mod: GP | Performed by: PHYSICAL THERAPIST

## 2019-07-24 PROCEDURE — 25000132 ZZH RX MED GY IP 250 OP 250 PS 637: Mod: GY | Performed by: PHYSICIAN ASSISTANT

## 2019-07-24 PROCEDURE — 97165 OT EVAL LOW COMPLEX 30 MIN: CPT | Mod: GO | Performed by: REHABILITATION PRACTITIONER

## 2019-07-24 PROCEDURE — 97110 THERAPEUTIC EXERCISES: CPT | Mod: GP | Performed by: PHYSICAL THERAPIST

## 2019-07-24 PROCEDURE — 85018 HEMOGLOBIN: CPT | Performed by: PHYSICIAN ASSISTANT

## 2019-07-24 PROCEDURE — 82947 ASSAY GLUCOSE BLOOD QUANT: CPT | Performed by: PHYSICIAN ASSISTANT

## 2019-07-24 PROCEDURE — 12000000 ZZH R&B MED SURG/OB

## 2019-07-24 PROCEDURE — 97535 SELF CARE MNGMENT TRAINING: CPT | Mod: GO | Performed by: REHABILITATION PRACTITIONER

## 2019-07-24 PROCEDURE — 36415 COLL VENOUS BLD VENIPUNCTURE: CPT | Performed by: PHYSICIAN ASSISTANT

## 2019-07-24 PROCEDURE — 25000132 ZZH RX MED GY IP 250 OP 250 PS 637: Mod: GY | Performed by: INTERNAL MEDICINE

## 2019-07-24 PROCEDURE — 99232 SBSQ HOSP IP/OBS MODERATE 35: CPT | Performed by: INTERNAL MEDICINE

## 2019-07-24 RX ORDER — HYDROXYZINE HYDROCHLORIDE 10 MG/1
10 TABLET, FILM COATED ORAL EVERY 6 HOURS PRN
Qty: 40 TABLET | Refills: 0 | Status: SHIPPED | OUTPATIENT
Start: 2019-07-24 | End: 2019-08-25

## 2019-07-24 RX ORDER — IBUPROFEN 600 MG/1
600 TABLET, FILM COATED ORAL EVERY 6 HOURS PRN
Qty: 50 TABLET | Refills: 0 | Status: SHIPPED | OUTPATIENT
Start: 2019-07-24 | End: 2019-09-06

## 2019-07-24 RX ORDER — OXYCODONE HYDROCHLORIDE 5 MG/1
5-10 TABLET ORAL EVERY 4 HOURS PRN
Qty: 40 TABLET | Refills: 0 | Status: ON HOLD | OUTPATIENT
Start: 2019-07-24 | End: 2019-08-08

## 2019-07-24 RX ORDER — ACETAMINOPHEN 325 MG/1
650 TABLET ORAL EVERY 4 HOURS PRN
Qty: 50 TABLET | Refills: 0 | Status: SHIPPED | OUTPATIENT
Start: 2019-07-26 | End: 2019-09-09

## 2019-07-24 RX ORDER — ONDANSETRON 4 MG/1
4 TABLET, ORALLY DISINTEGRATING ORAL EVERY 6 HOURS PRN
Qty: 10 TABLET | Refills: 0 | Status: ON HOLD | OUTPATIENT
Start: 2019-07-24 | End: 2019-08-27

## 2019-07-24 RX ORDER — SENNA AND DOCUSATE SODIUM 50; 8.6 MG/1; MG/1
1 TABLET, FILM COATED ORAL AT BEDTIME
Qty: 40 TABLET | Refills: 0 | Status: ON HOLD | OUTPATIENT
Start: 2019-07-24 | End: 2019-08-08

## 2019-07-24 RX ADMIN — OXYCODONE HYDROCHLORIDE 5 MG: 5 TABLET ORAL at 14:44

## 2019-07-24 RX ADMIN — OXYCODONE HYDROCHLORIDE 5 MG: 5 TABLET ORAL at 13:13

## 2019-07-24 RX ADMIN — OMEPRAZOLE 40 MG: 20 CAPSULE, DELAYED RELEASE ORAL at 08:02

## 2019-07-24 RX ADMIN — LACOSAMIDE 200 MG: 200 TABLET, FILM COATED ORAL at 19:38

## 2019-07-24 RX ADMIN — LACOSAMIDE 200 MG: 200 TABLET, FILM COATED ORAL at 08:03

## 2019-07-24 RX ADMIN — LEVOTHYROXINE SODIUM 50 MCG: 50 TABLET ORAL at 08:03

## 2019-07-24 RX ADMIN — PRAMIPEXOLE DIHYDROCHLORIDE 1 MG: 0.25 TABLET ORAL at 08:02

## 2019-07-24 RX ADMIN — OXYCODONE HYDROCHLORIDE 10 MG: 5 TABLET ORAL at 19:38

## 2019-07-24 RX ADMIN — LIOTHYRONINE SODIUM 10 MCG: 5 TABLET ORAL at 08:03

## 2019-07-24 RX ADMIN — CHOLECALCIFEROL CAP 125 MCG (5000 UNIT) 10000 UNITS: 125 CAP at 08:02

## 2019-07-24 RX ADMIN — OXYCODONE HYDROCHLORIDE 10 MG: 5 TABLET ORAL at 03:47

## 2019-07-24 RX ADMIN — CYANOCOBALAMIN TAB 1000 MCG 1000 MCG: 1000 TAB at 08:03

## 2019-07-24 RX ADMIN — ASPIRIN 162 MG: 81 TABLET, COATED ORAL at 18:31

## 2019-07-24 RX ADMIN — ACETAMINOPHEN 975 MG: 325 TABLET, FILM COATED ORAL at 16:04

## 2019-07-24 RX ADMIN — DEXTRAN 70, AND HYPROMELLOSE 2910 1 DROP: 1; 3 SOLUTION/ DROPS OPHTHALMIC at 22:46

## 2019-07-24 RX ADMIN — OXYCODONE HYDROCHLORIDE 10 MG: 5 TABLET ORAL at 09:27

## 2019-07-24 RX ADMIN — LINACLOTIDE 290 MCG: 290 CAPSULE, GELATIN COATED ORAL at 08:03

## 2019-07-24 RX ADMIN — OMEPRAZOLE 40 MG: 20 CAPSULE, DELAYED RELEASE ORAL at 19:39

## 2019-07-24 RX ADMIN — TEMAZEPAM 30 MG: 15 CAPSULE ORAL at 22:46

## 2019-07-24 RX ADMIN — ACETAMINOPHEN 975 MG: 325 TABLET, FILM COATED ORAL at 08:02

## 2019-07-24 RX ADMIN — CEFAZOLIN SODIUM 2 G: 2 INJECTION, SOLUTION INTRAVENOUS at 01:35

## 2019-07-24 RX ADMIN — HYDROMORPHONE HYDROCHLORIDE 0.5 MG: 1 INJECTION, SOLUTION INTRAMUSCULAR; INTRAVENOUS; SUBCUTANEOUS at 01:48

## 2019-07-24 RX ADMIN — ACETAMINOPHEN 975 MG: 325 TABLET, FILM COATED ORAL at 03:47

## 2019-07-24 RX ADMIN — ASPIRIN 162 MG: 81 TABLET, COATED ORAL at 08:02

## 2019-07-24 RX ADMIN — PRAMIPEXOLE DIHYDROCHLORIDE 2 MG: 1 TABLET ORAL at 22:46

## 2019-07-24 RX ADMIN — LORAZEPAM 0.5 MG: 0.5 TABLET ORAL at 22:46

## 2019-07-24 ASSESSMENT — ACTIVITIES OF DAILY LIVING (ADL)
ADLS_ACUITY_SCORE: 12

## 2019-07-24 NOTE — PLAN OF CARE
PT: Pt attempted for session this AM. Pt reporting that she had just returned to bed and requesting PT attempt back as able. Nursing updated.

## 2019-07-24 NOTE — PROGRESS NOTES
Orthopedic Surgery  Mercedes Barber  2019  Admit Date:  2019  POD # 1 right SARAHI    Mercedes Barber is a 65 y/o female whom was rounded on 1 day s/p right SARAHI.  Pain controlled.  Tolerating oral intake.  No events overnight. The patient denies chest pain, SOB, calf pain.    Alert and orient to person, place, and time.  Vital Sign Ranges  Temperature Temp  Av.6  F (35.9  C)  Min: 95.3  F (35.2  C)  Max: 97.5  F (36.4  C)   Blood pressure Systolic (24hrs), Av , Min:97 , Max:135        Diastolic (24hrs), Av, Min:55, Max:80      Pulse Pulse  Av  Min: 54  Max: 62   Respirations Resp  Av.3  Min: 10  Max: 23   Pulse oximetry SpO2  Av.2 %  Min: 95 %  Max: 100 %       Right GATITO dressing is clean, dry, and intact. Minimal erythema of the surrounding skin and no signs of infection  Bilateral calves are soft, non-tender.  right lower extremity is NVI. 2+ DP/PT pulses   Able to actively move distal extremity  5/5 strength distally    Labs:  Recent Labs   Lab Test 19  1056 19  1811 11/11/15  0947   POTASSIUM 3.9 4.0 3.7     Recent Labs   Lab Test 19  0605 19  1056 19  1811   HGB 11.1* 12.7 13.6     Recent Labs   Lab Test 11  0800   INR 0.98     Recent Labs   Lab Test 19  1056 19  1811 11/11/15  0947    259 246       A/P  1. POD #1 right SARAHI   Continue ASA for DVT prophylaxis.     Mobilize with PT/OT WBAT.     Continue current pain regiment.   Appreciate Hospital consult     2. Disposition   Anticipate d/c to home pending continual strengthening and ambulation with PT.    Rosalio Henriquez PA-C  [phone]

## 2019-07-24 NOTE — PLAN OF CARE
A&O. VSS. LS clear. 2L of O2. Dressing in place, scant drainage. CMS intact. GATITO drain in place. Pain managed with IV and oral medications. Assist of 1 with a walker and gait belt. Voiding.

## 2019-07-24 NOTE — PROGRESS NOTES
07/24/19 1131   Quick Adds   Type of Visit Initial Occupational Therapy Evaluation   Living Environment   Lives With alone   Living Arrangements condominium   Home Accessibility no concerns   Transportation Anticipated car, drives self;family or friend will provide   Living Environment Comment Pt lives alone in an accessible condo, 1st fl.  Has daughter staying with her x 2-3 nights upon discharge, then has good support from friends/neighbors.   Self-Care   Usual Activity Tolerance moderate   Current Activity Tolerance fair   Equipment Currently Used at Home cane, straight;raised toilet  (grab bars in walk in shower)   Activity/Exercise/Self-Care Comment Indep PLOF, uses SEC prior to surgery d/t hip pain and balance problems.  Owns FWW.   Functional Level   Ambulation 1-->assistive equipment   Transferring 1-->assistive equipment   Toileting 0-->independent   Bathing 0-->independent   Dressing 0-->independent   Eating 0-->independent   Communication 0-->understands/communicates without difficulty   Swallowing 0-->swallows foods/liquids without difficulty   Cognition 0 - no cognition issues reported   Fall history within last six months yes   Number of times patient has fallen within last six months 1  (fall hx > 1yr ago as well w/L shoulder injury/surgery)   Which of the above functional risks had a recent onset or change? ambulation;transferring;toileting;bathing;dressing   Prior Functional Level Comment Reports she can ambulate functional distances with SEC at baseline, hx of falls with injury, unclear if due to low BP and/or neuropathy.   General Information   Onset of Illness/Injury or Date of Surgery - Date 07/23/19   Referring Physician Dr. 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, is an artist (for fun)   Cognitive Status Examination   Orientation orientation to person, place and time   Level of Consciousness alert   Follows Commands (Cognition) WNL   Memory intact   Attention No deficits were identified   Visual Perception   Visual Perception Wears glasses   Sensory Examination   Sensory Comments neuropathy in B feet and hands   Pain Assessment   Patient Currently in Pain Yes, see Vital Sign flowsheet  (rating pain 7.5/10)   Posture   Posture not impaired   Range of Motion (ROM)   ROM Quick Adds No deficits were identified   Strength   Manual Muscle Testing Quick Adds No deficits were identified   Hand Strength   Hand Strength Comments intact   Muscle Tone Assessment   Muscle Tone Quick Adds No deficits were identified   Coordination   Upper Extremity Coordination No deficits were identified   Eating/Self Feeding   Level of [place]: Eating independent   Instrumental Activities of Daily Living (IADL)   IADL Comments family and friends to complete   Activities of Daily Living Analysis   Impairments Contributing to Impaired Activities of Daily Living balance impaired;pain;post surgical precautions;ROM decreased;strength decreased   General Therapy Interventions   Planned Therapy Interventions ADL retraining;progressive activity/exercise;transfer training   Clinical Impression   Criteria for Skilled Therapeutic Interventions Met yes, treatment indicated   OT Diagnosis decreased ADL's and IADl's   Influenced by the following impairments balance impaired;pain;post surgical precautions;ROM decreased;strength decreased   Assessment of Occupational Performance 5 or more Performance Deficits   Identified Performance Deficits decreased ADL's and IADl's- dsg, toileting, bathing, functional/community mobility, household chores, driving, errands  "  Clinical Decision Making (Complexity) Low complexity   Therapy Frequency Daily   Predicted Duration of Therapy Intervention (days/wks) 2 days   Anticipated Equipment Needs at Discharge bath sponge;sock aide   Anticipated Discharge Disposition Home   Risks and Benefits of Treatment have been explained. Yes   Patient, Family & other staff in agreement with plan of care Yes   [facility] TM \"6 Clicks\"   2016, Trustees of [facility], under license to [employer].  All rights reserved.   6 Clicks Short Forms Daily Activity Inpatient Short Form   [facility]  \"6 Clicks\" Daily Activity Inpatient Short Form   1. Putting on and taking off regular lower body clothing? 3 - A Little   2. Bathing (including washing, rinsing, drying)? 3 - A Little   3. Toileting, which includes using toilet, bedpan or urinal? 3 - A Little   4. Putting on and taking off regular upper body clothing? 4 - None   5. Taking care of personal grooming such as brushing teeth? 3 - A Little   6. Eating meals? 4 - None   Daily Activity Raw Score (Score out of 24.Lower scores equate to lower levels of function) 20   Total Evaluation Time   Total Evaluation Time (Minutes) 10     "

## 2019-07-24 NOTE — PLAN OF CARE
OT- eval completed and treatment initiated.  Patient is POD #1 for R SARAHI, WBAT and hip precautions. At baseline, pt lives alone in an accessible condo, uses SEC due to hip pain/balance problems.  Owns FWW.  Has daughter staying with her at time of discharge.    Discharge Planner OT   Patient plan for discharge: home with dtr to stay with her for a few days  Current status: Educated in walker safety, EC/WS techniques, advancement of activity following surgery, car transfers and driving readiness, patient was engaged in instruction and verbalized understanding. After instruction, patient was min A with bed mobility (supine to sit) for B LE support to lift in/out of bed. After instruction, patient was mod I with TB dressing with use of sockaid and reacher to don pants and don/doff socks. Patient needed frequent cues to maintain hip precautions during dressing tasks. Patient demonstrating some carry-over, reinforcement is highly recommended. CGA, fww for functional mobility to gather clothes for dressing, walker safety reinforced throughout session.   Barriers to return to prior living situation: cues needed to maintain hip precautions, fatigue, pain   Recommendations for discharge: home with spouse to A with IADL's- household chores, driving, errands, cooking and bathing. AE recommendations: sockaid and LHS.  Rationale for recommendations: anticipate patient will meet needed goals for safe return home with spouse support for ADL's. Will continue with OT for ADL's and AE recommendations.        Entered by: Landy Renner 07/24/2019 12:40 PM

## 2019-07-24 NOTE — PROGRESS NOTES
"SPIRITUAL HEALTH SERVICES Progress Note  Formerly [facility] Ortho/Spine    SHS visit per pt request.    Introduced myself and oriented pt, Mercedes, to [facility].  Pt underwent a total hip replacement per pt's chart.  Prior to her surgery, pt met with Chaplain Alvarez and spoke very highly of their visit.    Pt cited many prior surgeries and procedures and states that she is doing fairly well this time around, but is a little anxious about postop infections and complications.  Pt is hopeful that her recovery will go smoothly and that she'll be able to get back to her hobbies soon.    Pt identifies as a lifelong [employer] and belongs to [employer].  Pt states: \"I put all my trust in the Lord and He has seen me through everything.\"  Pt cites a very active Adventism life, noting that she often makes cards, prays for, and visits with people in her Gnosticist who are suffering.  Pt engages in many spiritual practices such as: prayer, Bible study, and regular Mosque attendance.    Pt stated that she has an extended support system of friends and family members, but cites that none of them have visited her in the past.  Pt feels \"a little down\" and \"lonely\" because of this.  Pt mentioned that her support system was very helpful to her when her son  from suicide less than 2 years ago.  Pt states: \"I miss him every day.  He thought no one loved him, but I did. I always have and always will.\"  Affirmed and normalized the pt's emotions and grieving process.    Pt cited feelings of gratitude for SHS visit and welcomed prayers of heeling.  Led pt in prayer and informed her of how to reach [facility] if desired.    Plan: [facility] remains available for spiritual/emotional support.      Aziza Chapman   Intern  Pager: [phone]  "

## 2019-07-24 NOTE — PLAN OF CARE
Discharge Planner PT   Patient plan for discharge: home with daughter staying with her x 2-3 nights  Current status: Pt now agreeable to session. Pt completes supine>sit with SBA and cues for hip precautions. Pt completes sit<>stand with cues for UE placement. Pt ambulates 120' with use of FWW and CGA/SBA with cues for step length, walker management, and knee flexion during swing phase of gait. Pt returns to supine at end of session with CGA at LE and cues for precautions, all needs in reach.     PM: Pt seen in satellite for session. Pt completes supine LE strengthening. Pt ambulates 200' with use of FWW and SBA with cues for step-through gait pattern. Pt tolerates well, and able to progress gait quality with cuing. Pt completes sit<>stand with cues for LE placement/precautions. Pt completes sit<>supine with SBA and cues for precautions. Needs further re-education on precautions.     Barriers to return to prior living situation: Difficulty remembering precautions.   Recommendations for discharge: [facility] Careplan: home without additional services  Rationale for recommendations: Anticipate patient will progress well given PLOF and current functional status. With continued PT intervention and ongoing medical management, anticipate pt will be able to meet mobility goals to safely discharge home.        Entered by: Mohini Vargas 07/24/2019 12:37 PM

## 2019-07-25 ENCOUNTER — APPOINTMENT (OUTPATIENT)
Dept: PHYSICAL THERAPY | Facility: CLINIC | Age: 64
DRG: 470 | End: 2019-07-25
Attending: ORTHOPAEDIC SURGERY
Payer: MEDICARE

## 2019-07-25 ENCOUNTER — APPOINTMENT (OUTPATIENT)
Dept: OCCUPATIONAL THERAPY | Facility: CLINIC | Age: 64
DRG: 470 | End: 2019-07-25
Attending: ORTHOPAEDIC SURGERY
Payer: MEDICARE

## 2019-07-25 VITALS
RESPIRATION RATE: 16 BRPM | DIASTOLIC BLOOD PRESSURE: 59 MMHG | SYSTOLIC BLOOD PRESSURE: 95 MMHG | HEART RATE: 82 BPM | OXYGEN SATURATION: 97 % | BODY MASS INDEX: 32.83 KG/M2 | TEMPERATURE: 97.7 F | WEIGHT: 198.8 LBS

## 2019-07-25 LAB
ANION GAP SERPL CALCULATED.3IONS-SCNC: 5 MMOL/L (ref 3–14)
BUN SERPL-MCNC: 10 MG/DL (ref 7–30)
CALCIUM SERPL-MCNC: 8.5 MG/DL (ref 8.5–10.1)
CHLORIDE SERPL-SCNC: 109 MMOL/L (ref 94–109)
CO2 SERPL-SCNC: 28 MMOL/L (ref 20–32)
CREAT SERPL-MCNC: 0.84 MG/DL (ref 0.52–1.04)
GFR SERPL CREATININE-BSD FRML MDRD: 73 ML/MIN/{1.73_M2}
GLUCOSE SERPL-MCNC: 95 MG/DL (ref 70–99)
HGB BLD-MCNC: 10 G/DL (ref 11.7–15.7)
POTASSIUM SERPL-SCNC: 3.6 MMOL/L (ref 3.4–5.3)
SODIUM SERPL-SCNC: 142 MMOL/L (ref 133–144)

## 2019-07-25 PROCEDURE — 85018 HEMOGLOBIN: CPT | Performed by: INTERNAL MEDICINE

## 2019-07-25 PROCEDURE — 25000132 ZZH RX MED GY IP 250 OP 250 PS 637: Mod: GY | Performed by: INTERNAL MEDICINE

## 2019-07-25 PROCEDURE — 99232 SBSQ HOSP IP/OBS MODERATE 35: CPT | Performed by: INTERNAL MEDICINE

## 2019-07-25 PROCEDURE — 99207 ZZC CDG-MDM COMPONENT: MEETS MODERATE - UP CODED: CPT | Performed by: INTERNAL MEDICINE

## 2019-07-25 PROCEDURE — 25000132 ZZH RX MED GY IP 250 OP 250 PS 637: Mod: GY | Performed by: PHYSICIAN ASSISTANT

## 2019-07-25 PROCEDURE — 97116 GAIT TRAINING THERAPY: CPT | Mod: GP | Performed by: PHYSICAL THERAPY ASSISTANT

## 2019-07-25 PROCEDURE — 97110 THERAPEUTIC EXERCISES: CPT | Mod: GP | Performed by: PHYSICAL THERAPY ASSISTANT

## 2019-07-25 PROCEDURE — 80048 BASIC METABOLIC PNL TOTAL CA: CPT | Performed by: INTERNAL MEDICINE

## 2019-07-25 PROCEDURE — 97530 THERAPEUTIC ACTIVITIES: CPT | Mod: GP | Performed by: PHYSICAL THERAPY ASSISTANT

## 2019-07-25 PROCEDURE — 36415 COLL VENOUS BLD VENIPUNCTURE: CPT | Performed by: INTERNAL MEDICINE

## 2019-07-25 PROCEDURE — 97535 SELF CARE MNGMENT TRAINING: CPT | Mod: GO

## 2019-07-25 PROCEDURE — 82947 ASSAY GLUCOSE BLOOD QUANT: CPT | Performed by: INTERNAL MEDICINE

## 2019-07-25 RX ORDER — POLYETHYLENE GLYCOL 3350 17 G/17G
17 POWDER, FOR SOLUTION ORAL 2 TIMES DAILY PRN
Status: DISCONTINUED | OUTPATIENT
Start: 2019-07-25 | End: 2019-07-25 | Stop reason: HOSPADM

## 2019-07-25 RX ORDER — HYDROXYZINE HYDROCHLORIDE 25 MG/1
25 TABLET, FILM COATED ORAL EVERY 6 HOURS PRN
Status: DISCONTINUED | OUTPATIENT
Start: 2019-07-25 | End: 2019-07-25 | Stop reason: HOSPADM

## 2019-07-25 RX ADMIN — ASPIRIN 162 MG: 81 TABLET, COATED ORAL at 08:38

## 2019-07-25 RX ADMIN — CHOLECALCIFEROL CAP 125 MCG (5000 UNIT) 10000 UNITS: 125 CAP at 08:38

## 2019-07-25 RX ADMIN — ACETAMINOPHEN 975 MG: 325 TABLET, FILM COATED ORAL at 00:21

## 2019-07-25 RX ADMIN — OXYCODONE HYDROCHLORIDE 10 MG: 5 TABLET ORAL at 12:50

## 2019-07-25 RX ADMIN — LINACLOTIDE 290 MCG: 290 CAPSULE, GELATIN COATED ORAL at 08:38

## 2019-07-25 RX ADMIN — HYDROXYZINE HYDROCHLORIDE 25 MG: 25 TABLET ORAL at 08:38

## 2019-07-25 RX ADMIN — OXYCODONE HYDROCHLORIDE 10 MG: 5 TABLET ORAL at 04:47

## 2019-07-25 RX ADMIN — LACOSAMIDE 200 MG: 200 TABLET, FILM COATED ORAL at 08:38

## 2019-07-25 RX ADMIN — LIOTHYRONINE SODIUM 10 MCG: 5 TABLET ORAL at 08:38

## 2019-07-25 RX ADMIN — DEXTRAN 70, AND HYPROMELLOSE 2910 1 DROP: 1; 3 SOLUTION/ DROPS OPHTHALMIC at 08:38

## 2019-07-25 RX ADMIN — HYDROXYZINE HYDROCHLORIDE 10 MG: 10 TABLET ORAL at 02:21

## 2019-07-25 RX ADMIN — POLYETHYLENE GLYCOL 3350 17 G: 17 POWDER, FOR SOLUTION ORAL at 12:47

## 2019-07-25 RX ADMIN — OXYCODONE HYDROCHLORIDE 10 MG: 5 TABLET ORAL at 00:21

## 2019-07-25 RX ADMIN — PRAMIPEXOLE DIHYDROCHLORIDE 1 MG: 0.25 TABLET ORAL at 08:37

## 2019-07-25 RX ADMIN — FOLIC ACID TAB 400 MCG 400 MCG: 400 TAB at 08:38

## 2019-07-25 RX ADMIN — CYANOCOBALAMIN TAB 1000 MCG 1000 MCG: 1000 TAB at 08:38

## 2019-07-25 RX ADMIN — OMEPRAZOLE 40 MG: 20 CAPSULE, DELAYED RELEASE ORAL at 08:38

## 2019-07-25 RX ADMIN — LEVOTHYROXINE SODIUM 50 MCG: 50 TABLET ORAL at 08:38

## 2019-07-25 RX ADMIN — ACETAMINOPHEN 975 MG: 325 TABLET, FILM COATED ORAL at 08:38

## 2019-07-25 ASSESSMENT — ACTIVITIES OF DAILY LIVING (ADL)
ADLS_ACUITY_SCORE: 12

## 2019-07-25 NOTE — PLAN OF CARE
Pt discharged to home with daughter. Given discharge instruction to patient and daughter. Sent discharge medications with.

## 2019-07-25 NOTE — PLAN OF CARE
A&Ox4, VSS: stable. Up with [id number] and walker. CMS: numbness BLE baseline.. Drsg: dried drainage. Increased pain this shift, tearful. PRN oxycodone, Atarax, and scheduled given with relief. Relaxations and aromatherapy promoted. Voiding adequately. Plans to go home today.

## 2019-07-25 NOTE — PROGRESS NOTES
Orthopedic Surgery  Mercedes Barber  2019  Admit Date:  2019  POD # 2 s/p right SARAHI    Mercedes Barber is a 63 y/o female whom was rounded on 2 days s/p right SARAHI.  Pain moderatly controlled but worse overnight.  Tolerating oral intake.  No events overnight. The patient denies chest pain, SOB, calf pain.    Alert and orient to person, place, and time.  Vital Sign Ranges  Temperature Temp  Av.5  F (36.4  C)  Min: 96.1  F (35.6  C)  Max: 98.8  F (37.1  C)   Blood pressure Systolic (24hrs), Av , Min:90 , Max:124        Diastolic (24hrs), Av, Min:56, Max:70      Pulse No data recorded   Respirations Resp  Av.3  Min: 16  Max: 18   Pulse oximetry SpO2  Av %  Min: 94 %  Max: 98 %       Right hip dressing is clean, dry, and intact. Minimal erythema of the surrounding skin and no signs of infection  Bilateral calves are soft, non-tender.  right lower extremity is NVI. 2+ DP/PT pulses   Able to actively move distal extremity  5/5 strength distally    Labs:  Recent Labs   Lab Test 19  1056 19  1811 11/11/15  0947   POTASSIUM 3.9 4.0 3.7     Recent Labs   Lab Test 19  0605 19  1056 19  1811   HGB 11.1* 12.7 13.6     Recent Labs   Lab Test 11  0800   INR 0.98     Recent Labs   Lab Test 19  1056 19  1811 11/11/15  0947    259 246       A/P  1. POD #2 s/p right SARAHI   Continue ASA for DVT prophylaxis.     Mobilize with PT/OT WBAT.     Continue current pain regiment, but modified Atarax to 25 mg (IF discharged today she can take 2 tabs of her 10mg at home).    2. Disposition   Anticipate d/c to home pending continual strengthening and ambulation with PT as well as pain management.     Rosalio Henriquez PA-C  [phone]

## 2019-07-25 NOTE — PLAN OF CARE
Pt A&O. VSS. Tele. Up with A1 gait belt and walker. Ambulated in halls. Reg diet. Voiding. Loose bm this shift. Karl drain in place. Dressing to hip with scant amount of drainage. Baseline N/T to hands and feet. Taking oxy for pain. Plan to discharge home tomorrow with daughter. Will continue to monitor.

## 2019-07-25 NOTE — PROGRESS NOTES
[facility]  Hospitalist Progress Note  Tio Padlila MD 07/24/2019    Reason for Stay (Diagnosis): post-op elective R SARAHI         Assessment and Plan:      Summary of Stay: Mercedes Barber is a 64 year old female admitted on 7/23/2019 following successful right SARAHI.       Problem List:   1. POD #1 s/p R SARAHI.  GAITTO (negative pressure wound closure dressing) in place.   2. 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  3. JORDAN. Not using CPAP.  4. Hypothyroidism, GERD, Mood disorder.      PLAN:  1.  No changes recommended.     DVT Prophylaxis: per primary team.        Interval History (Subjective):      Chart reviewed, pt interviewed.    Pt is comfortable and denies complaints at this time. Is bothered by the buzzing of the GATITO device, which is not familiar to me, nor is it well-understood by the nurses.     No SOB or cough.                   Physical Exam:      Last Vital Signs:  /63 (BP Location: Right arm)   Pulse 54   Temp 98.8  F (37.1  C) (Oral)   Resp 16   Wt 90.2 kg (198 lb 12.8 oz)   LMP 03/11/2010   SpO2 96%   BMI 32.83 kg/m      I/O last 3 completed shifts:  In: 1376 [P.O.:560; I.V.:816]  Out: 1200 [Urine:1200]    Constitutional: Awake, alert, cooperative, no apparent distress   Respiratory: Clear to auscultation bilaterally, no crackles or wheezing   Cardiovascular: Regular rate and rhythm, normal S1 and S2, and no murmur noted   Abdomen: Normal bowel sounds, soft, non-distended, non-tender   Skin: No rashes, no cyanosis, dry to touch   Neuro: Alert and oriented x3.   Extremities: No edema.  Able to stand with walker and minimal assist. Right hip dressing is intact and mildly stained with serous-appearing fluid.   Other(s):        All other systems: Negative          Medications:      All current medications were reviewed with changes reflected in problem list.         Data:      All new lab and imaging data was reviewed.   Labs/Imaging:  Results for orders  placed or performed during the hospital encounter of 07/23/19 (from the past 24 hour(s))   Hemoglobin   Result Value Ref Range    Hemoglobin 11.1 (L) 11.7 - 15.7 g/dL   Glucose   Result Value Ref Range    Glucose 127 (H) 70 - 99 mg/dL

## 2019-07-25 NOTE — PLAN OF CARE
Discharge Planner OT   Patient plan for discharge: Home with dtr to stay with her for a few days  Current status: Pt able to verbalize 2/4 hip precautions, educated on 4/4 and ways to implement/maintain during ADL tasks. Pt completed bed mobility supine > sit EOB with SBA. Pt completed sit > stand from EOB to FWW with CGA, ambulated to/from BR with CGA to SBA. Pt educated on walk in shower transfer, able to complete with use of grab bar and CGA. Pt re-educated on LH AE and compensatory LB dressing technique. Reviewed home safety modifications/recommendations, DME/AE recommendations and resources and safe activities post SARAHI, verbalized understanding.   Barriers to return to prior living situation: cues needed to maintain hip precautions, fatigue, pain   Recommendations for discharge: Home with daughter to A with IADL's (household chores, driving, errands, cooking) and ADLs of LB dressing, bathing.  Rationale for recommendations: Anticipate patient will meet needed goals for safe return home with daughter support for ADL's.        Entered by: Cece Woods 07/25/2019 7:59 AM       Occupational Therapy Discharge Summary    Reason for therapy discharge:    Discharged to home.    Progress towards therapy goal(s). See goals on Care Plan in [facility] electronic health record for goal details.  Goals partially met.  Barriers to achieving goals:   discharge from facility.    Therapy recommendation(s):    No further skilled OT needs.

## 2019-07-25 NOTE — PROGRESS NOTES
[facility]  Hospitalist Progress Note  Tio Padilla MD 07/25/2019    Reason for Stay (Diagnosis): post-op elective R SARAHI         Assessment and Plan:      Summary of Stay: Mercedes Barber is a 64 year old female admitted on 7/23/2019 following successful right SARAHI.       Problem List:   1. POD #1 s/p R SARAHI.  GATITO (negative pressure wound closure dressing) in place.   2. 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  3. JORDAN. Not using CPAP.  4. Hypothyroidism, GERD, Mood disorder.      PLAN:  1.  No changes recommended.     DVT Prophylaxis: per primary team.        Interval History (Subjective):      Pt had a good night, although a lot of pain yesterday.   Ready to discharge.                   Physical Exam:      Last Vital Signs:  BP 95/59   Pulse 82   Temp 97.7  F (36.5  C)   Resp 16   Wt 90.2 kg (198 lb 12.8 oz)   LMP 03/11/2010   SpO2 97%   BMI 32.83 kg/m      I/O last 3 completed shifts:  In: 960 [P.O.:960]  Out: -     Constitutional: Awake, alert, cooperative, no apparent distress   Respiratory: Clear to auscultation bilaterally, no crackles or wheezing   Cardiovascular: Regular rate and rhythm, normal S1 and S2, and no murmur noted   Abdomen: Normal bowel sounds, soft, non-distended, non-tender   Skin: No rashes, no cyanosis, dry to touch   Neuro: Alert and oriented x3.       Other(s):        All other systems: Negative          Medications:      All current medications were reviewed with changes reflected in problem list.         Data:      All new lab and imaging data was reviewed.   Labs/Imaging:  Results for orders placed or performed during the hospital encounter of 07/23/19 (from the past 24 hour(s))   Hemoglobin   Result Value Ref Range    Hemoglobin 10.0 (L) 11.7 - 15.7 g/dL   Basic metabolic panel   Result Value Ref Range    Sodium 142 133 - 144 mmol/L    Potassium 3.6 3.4 - 5.3 mmol/L    Chloride 109 94 - 109 mmol/L    Carbon Dioxide 28 20 - 32 mmol/L    Anion  Gap 5 3 - 14 mmol/L    Glucose 95 70 - 99 mg/dL    Urea Nitrogen 10 7 - 30 mg/dL    Creatinine 0.84 0.52 - 1.04 mg/dL    GFR Estimate 73 >60 mL/min/[1.73_m2]    GFR Estimate If Black 85 >60 mL/min/[1.73_m2]    Calcium 8.5 8.5 - 10.1 mg/dL

## 2019-07-25 NOTE — PLAN OF CARE
Discharge Planner PT   Patient plan for discharge: home ok with today if able  Current status: gait wit RW to 100'x2 up and down platform as needed for home bed mobility with S to higher bed with step stool as she does at home. Does need cues for precautions only able to state one of them.  Barriers to return to prior living situation: none, but limited concentration and needs cues for safety with precautions  Recommendations for discharge:     [facility] Careplan: home without additional services and per plan established by the PT.    Rationale for recommendations: Anticipate patient will progress well given PLOF and current functional status. With continued PT intervention and ongoing medical management, anticipate pt will be able to meet mobility goals to safely discharge home. She would like home PT services, RN alerted.    Entered by: Landy Soliz 07/25/2019 9:19 AM     PT- PM did stairs again will need cues for this from family, noted in pre op book for stairs so family can assist. Goals are met recommend to PT for discharge to home no DME needs

## 2019-07-25 NOTE — PLAN OF CARE
A&O x 4. Pt stable and ready for discharge, Vital signs stable. GATITO drain in place. Pt showed how to disconnect and reconnect for showering. Plan to educate shannon since she will be helping  Medications in lock box

## 2019-07-26 ENCOUNTER — TELEPHONE (OUTPATIENT)
Dept: INTERNAL MEDICINE | Facility: CLINIC | Age: 64
End: 2019-07-26

## 2019-07-29 NOTE — TELEPHONE ENCOUNTER
"  ED for acute condition Discharge Protocol    \"Hi, my name is Mariel Holloway, a registered nurse, and I am calling from [facility].  I am calling to follow up and see how things are going for you after your recent emergency visit.\"    Tell me how you are doing now that you are home?\" Not doing so well still in a lot of pain. They did not give me the discharge paper work regarding medication to start. So I had called them and they gave me the instructions. I'm taking Oxycodone now along with hydroxyzine. I was taking tylenol in the meantime but did not feel they gave me a pleasant experience- advised to contact patient representative ( number given).  I am taking ASA for clot prevention. Will have physical therapy tomorrow. Advised to contact [facility] regarding drain at procedure site if she has further questions  WBAT  ASA for DVT ppx  Dressing is waterproof so ok to get wet in shower but no  submerging in water for 3 weeks  Batter of electronic device will shut off in 7 days so  disconnect this but leave dressing on until        Discharge Instructions    \"Let's review your discharge instructions.  What is/are the follow-up recommendations?  Pt. Response: Follow-up at [facility] with Rosalio Henriquez PA-C in 2 weeks  [phone]    \"Has an appointment with your primary care provider been scheduled?\"  No (not needed) per discharge AVS,     Medications    \"Tell me what changed about your medicines when you discharged?\"    None.    Post Discharge Medication Reconciliation Status: discharge medications reconciled, continue medications without change.      \"What questions do you have about your medications?\"   None        Call Summary    \"What questions or concerns do you have about your recent visit and your follow-up care?\"     none    \"If you have questions or things don't continue to improve, we encourage you contact us through the main clinic number (give number).  Even if the clinic is not open, triage nurses are " "available 24/7 to help you.     We would like you to know that our clinic has extended hours (provide information).  We also have urgent care (provide details on closest location and hours/contact info)\"    \"Thank you for your time and take care!\"      Mariel GUY RN, BSN, PHN              "

## 2019-08-01 NOTE — DISCHARGE SUMMARY
Admit Date:     2019   Discharge Date:     2019      ADMISSION DIAGNOSIS:  Right hip end-stage osteoarthritis.      DISCHARGE DIAGNOSIS:  Right hip end-stage osteoarthritis.      PROCEDURE PERFORMED DURING HOSPITALIZATION:  Right total hip arthroplasty.      HOSPITAL COURSE:  The patient underwent a right total hip arthroplasty for chronic degenerative osteoarthritis.  She was admitted to the hospital postoperatively and had a stable 2-day course in the hospital.  Given her history of sleep apnea, she was seen by the Hospitalist Service and they did assist in her postoperative management.  On postoperative day #2, she had met appropriate milestones for discharge to home under Physical Therapy guidance, and it was felt that ongoing outpatient therapy would be appropriate for her.      DISCHARGE INSTRUCTIONS:  The patient will monitor for any increasing erythema or drainage and call if she has uncontrolled pain or fevers.  She will continue with weightbearing as tolerated and follow posterior hip precautions.  She will follow up in 2 weeks as scheduled.         JOSE ENRIQUE ARNOLD MD             D: 2019   T: 2019   MT: ANGELA      Name:     OLI MOSELEY   MRN:      [id number]        Account:        [id number]   :      1955           Admit Date:     2019                                  Discharge Date: 2019      Document: [id number]       cc: Skyler Álvarez MD

## 2019-08-06 ENCOUNTER — HOSPITAL ENCOUNTER (INPATIENT)
Facility: CLINIC | Age: 64
Setting detail: SURGERY ADMIT
End: 2019-08-06
Attending: ORTHOPAEDIC SURGERY | Admitting: ORTHOPAEDIC SURGERY
Payer: MEDICARE

## 2019-08-06 ENCOUNTER — HOSPITAL ENCOUNTER (INPATIENT)
Facility: CLINIC | Age: 64
LOS: 4 days | Discharge: NURSING, REHAB OR LONG-TERM CARE | DRG: 467 | End: 2019-08-10
Attending: INTERNAL MEDICINE | Admitting: INTERNAL MEDICINE
Payer: MEDICARE

## 2019-08-06 ENCOUNTER — APPOINTMENT (OUTPATIENT)
Dept: CT IMAGING | Facility: CLINIC | Age: 64
DRG: 467 | End: 2019-08-06
Attending: ORTHOPAEDIC SURGERY
Payer: MEDICARE

## 2019-08-06 DIAGNOSIS — Z96.641 STATUS POST TOTAL HIP REPLACEMENT, RIGHT: ICD-10-CM

## 2019-08-06 DIAGNOSIS — M25.559 ARTHRALGIA OF HIP, UNSPECIFIED LATERALITY: Primary | ICD-10-CM

## 2019-08-06 LAB
ANION GAP SERPL CALCULATED.3IONS-SCNC: 5 MMOL/L (ref 3–14)
BUN SERPL-MCNC: 17 MG/DL (ref 7–30)
CALCIUM SERPL-MCNC: 8.9 MG/DL (ref 8.5–10.1)
CHLORIDE SERPL-SCNC: 108 MMOL/L (ref 94–109)
CO2 SERPL-SCNC: 26 MMOL/L (ref 20–32)
CREAT SERPL-MCNC: 0.83 MG/DL (ref 0.52–1.04)
GFR SERPL CREATININE-BSD FRML MDRD: 73 ML/MIN/{1.73_M2}
GLUCOSE SERPL-MCNC: 97 MG/DL (ref 70–99)
HGB BLD-MCNC: 9.7 G/DL (ref 11.7–15.7)
INR PPP: 0.95 (ref 0.86–1.14)
POTASSIUM SERPL-SCNC: 4.2 MMOL/L (ref 3.4–5.3)
SODIUM SERPL-SCNC: 139 MMOL/L (ref 133–144)

## 2019-08-06 PROCEDURE — 86923 COMPATIBILITY TEST ELECTRIC: CPT | Performed by: PHYSICIAN ASSISTANT

## 2019-08-06 PROCEDURE — 12000000 ZZH R&B MED SURG/OB

## 2019-08-06 PROCEDURE — 86850 RBC ANTIBODY SCREEN: CPT | Performed by: PHYSICIAN ASSISTANT

## 2019-08-06 PROCEDURE — 86901 BLOOD TYPING SEROLOGIC RH(D): CPT | Performed by: PHYSICIAN ASSISTANT

## 2019-08-06 PROCEDURE — 80048 BASIC METABOLIC PNL TOTAL CA: CPT | Performed by: INTERNAL MEDICINE

## 2019-08-06 PROCEDURE — 36415 COLL VENOUS BLD VENIPUNCTURE: CPT | Performed by: INTERNAL MEDICINE

## 2019-08-06 PROCEDURE — 99222 1ST HOSP IP/OBS MODERATE 55: CPT | Mod: AI | Performed by: INTERNAL MEDICINE

## 2019-08-06 PROCEDURE — 72192 CT PELVIS W/O DYE: CPT

## 2019-08-06 PROCEDURE — 85018 HEMOGLOBIN: CPT | Performed by: INTERNAL MEDICINE

## 2019-08-06 PROCEDURE — 25000132 ZZH RX MED GY IP 250 OP 250 PS 637: Mod: GY | Performed by: INTERNAL MEDICINE

## 2019-08-06 PROCEDURE — 85610 PROTHROMBIN TIME: CPT | Performed by: PHYSICIAN ASSISTANT

## 2019-08-06 PROCEDURE — 86900 BLOOD TYPING SEROLOGIC ABO: CPT | Performed by: PHYSICIAN ASSISTANT

## 2019-08-06 PROCEDURE — 36415 COLL VENOUS BLD VENIPUNCTURE: CPT | Performed by: PHYSICIAN ASSISTANT

## 2019-08-06 PROCEDURE — 25000128 H RX IP 250 OP 636: Performed by: INTERNAL MEDICINE

## 2019-08-06 PROCEDURE — 25800030 ZZH RX IP 258 OP 636: Performed by: INTERNAL MEDICINE

## 2019-08-06 RX ORDER — PRAMIPEXOLE DIHYDROCHLORIDE 1 MG/1
1 TABLET ORAL EVERY MORNING
Status: DISCONTINUED | OUTPATIENT
Start: 2019-08-07 | End: 2019-08-10 | Stop reason: HOSPADM

## 2019-08-06 RX ORDER — POTASSIUM CL/LIDO/0.9 % NACL 10MEQ/0.1L
10 INTRAVENOUS SOLUTION, PIGGYBACK (ML) INTRAVENOUS
Status: DISCONTINUED | OUTPATIENT
Start: 2019-08-06 | End: 2019-08-10 | Stop reason: HOSPADM

## 2019-08-06 RX ORDER — OXYCODONE HYDROCHLORIDE 5 MG/1
5-10 TABLET ORAL EVERY 4 HOURS PRN
Status: DISCONTINUED | OUTPATIENT
Start: 2019-08-06 | End: 2019-08-07

## 2019-08-06 RX ORDER — CYCLOBENZAPRINE HCL 5 MG
5 TABLET ORAL 3 TIMES DAILY PRN
Status: DISCONTINUED | OUTPATIENT
Start: 2019-08-06 | End: 2019-08-10 | Stop reason: HOSPADM

## 2019-08-06 RX ORDER — ACETAMINOPHEN 325 MG/1
975 TABLET ORAL 3 TIMES DAILY
Status: DISCONTINUED | OUTPATIENT
Start: 2019-08-06 | End: 2019-08-07

## 2019-08-06 RX ORDER — AMOXICILLIN 250 MG
1 CAPSULE ORAL AT BEDTIME
Status: DISCONTINUED | OUTPATIENT
Start: 2019-08-06 | End: 2019-08-07

## 2019-08-06 RX ORDER — QUETIAPINE FUMARATE 50 MG/1
25 TABLET, FILM COATED ORAL
Status: ON HOLD | COMMUNITY
End: 2019-08-09

## 2019-08-06 RX ORDER — LORAZEPAM 0.5 MG/1
0.5 TABLET ORAL DAILY PRN
Status: ON HOLD | COMMUNITY
End: 2019-08-10

## 2019-08-06 RX ORDER — LIDOCAINE 40 MG/G
CREAM TOPICAL
Status: DISCONTINUED | OUTPATIENT
Start: 2019-08-06 | End: 2019-08-07

## 2019-08-06 RX ORDER — HYDROMORPHONE HYDROCHLORIDE 1 MG/ML
0.2 INJECTION, SOLUTION INTRAMUSCULAR; INTRAVENOUS; SUBCUTANEOUS
Status: DISCONTINUED | OUTPATIENT
Start: 2019-08-06 | End: 2019-08-07

## 2019-08-06 RX ORDER — POLYETHYLENE GLYCOL 3350 17 G/17G
17 POWDER, FOR SOLUTION ORAL DAILY PRN
Status: DISCONTINUED | OUTPATIENT
Start: 2019-08-06 | End: 2019-08-10 | Stop reason: HOSPADM

## 2019-08-06 RX ORDER — HYDROXYZINE HYDROCHLORIDE 10 MG/1
10 TABLET, FILM COATED ORAL EVERY 6 HOURS PRN
Status: DISCONTINUED | OUTPATIENT
Start: 2019-08-06 | End: 2019-08-10 | Stop reason: HOSPADM

## 2019-08-06 RX ORDER — PRAMIPEXOLE DIHYDROCHLORIDE 1 MG/1
2 TABLET ORAL EVERY EVENING
Status: DISCONTINUED | OUTPATIENT
Start: 2019-08-06 | End: 2019-08-10 | Stop reason: HOSPADM

## 2019-08-06 RX ORDER — LIOTHYRONINE SODIUM 5 UG/1
10 TABLET ORAL DAILY
Status: DISCONTINUED | OUTPATIENT
Start: 2019-08-07 | End: 2019-08-10 | Stop reason: HOSPADM

## 2019-08-06 RX ORDER — ONDANSETRON 2 MG/ML
4 INJECTION INTRAMUSCULAR; INTRAVENOUS EVERY 6 HOURS PRN
Status: DISCONTINUED | OUTPATIENT
Start: 2019-08-06 | End: 2019-08-07

## 2019-08-06 RX ORDER — POLYETHYLENE GLYCOL 3350 17 G/17G
17 POWDER, FOR SOLUTION ORAL DAILY
COMMUNITY
End: 2019-10-02

## 2019-08-06 RX ORDER — ONDANSETRON 4 MG/1
4 TABLET, ORALLY DISINTEGRATING ORAL EVERY 6 HOURS PRN
Status: DISCONTINUED | OUTPATIENT
Start: 2019-08-06 | End: 2019-08-07

## 2019-08-06 RX ORDER — POTASSIUM CHLORIDE 1500 MG/1
20-40 TABLET, EXTENDED RELEASE ORAL
Status: DISCONTINUED | OUTPATIENT
Start: 2019-08-06 | End: 2019-08-10 | Stop reason: HOSPADM

## 2019-08-06 RX ORDER — NALOXONE HYDROCHLORIDE 0.4 MG/ML
.1-.4 INJECTION, SOLUTION INTRAMUSCULAR; INTRAVENOUS; SUBCUTANEOUS
Status: DISCONTINUED | OUTPATIENT
Start: 2019-08-06 | End: 2019-08-07

## 2019-08-06 RX ORDER — LANOLIN ALCOHOL/MO/W.PET/CERES
400 CREAM (GRAM) TOPICAL DAILY
Status: DISCONTINUED | OUTPATIENT
Start: 2019-08-07 | End: 2019-08-06

## 2019-08-06 RX ORDER — POTASSIUM CHLORIDE 7.45 MG/ML
10 INJECTION INTRAVENOUS
Status: DISCONTINUED | OUTPATIENT
Start: 2019-08-06 | End: 2019-08-10 | Stop reason: HOSPADM

## 2019-08-06 RX ORDER — LORAZEPAM 0.5 MG/1
0.25 TABLET ORAL DAILY PRN
Status: DISCONTINUED | OUTPATIENT
Start: 2019-08-06 | End: 2019-08-10 | Stop reason: HOSPADM

## 2019-08-06 RX ORDER — TEMAZEPAM 15 MG/1
15 CAPSULE ORAL AT BEDTIME
Status: DISCONTINUED | OUTPATIENT
Start: 2019-08-06 | End: 2019-08-10 | Stop reason: HOSPADM

## 2019-08-06 RX ORDER — BUDESONIDE AND FORMOTEROL FUMARATE DIHYDRATE 160; 4.5 UG/1; UG/1
2 AEROSOL RESPIRATORY (INHALATION)
Status: DISCONTINUED | OUTPATIENT
Start: 2019-08-06 | End: 2019-08-10 | Stop reason: HOSPADM

## 2019-08-06 RX ORDER — LACOSAMIDE 200 MG/1
200 TABLET ORAL 2 TIMES DAILY
Status: DISCONTINUED | OUTPATIENT
Start: 2019-08-06 | End: 2019-08-10 | Stop reason: HOSPADM

## 2019-08-06 RX ORDER — LEVOTHYROXINE SODIUM 50 UG/1
50 TABLET ORAL DAILY
Status: DISCONTINUED | OUTPATIENT
Start: 2019-08-07 | End: 2019-08-10 | Stop reason: HOSPADM

## 2019-08-06 RX ORDER — POTASSIUM CHLORIDE 1.5 G/1.58G
20-40 POWDER, FOR SOLUTION ORAL
Status: DISCONTINUED | OUTPATIENT
Start: 2019-08-06 | End: 2019-08-10 | Stop reason: HOSPADM

## 2019-08-06 RX ORDER — SODIUM CHLORIDE 9 MG/ML
INJECTION, SOLUTION INTRAVENOUS CONTINUOUS
Status: DISCONTINUED | OUTPATIENT
Start: 2019-08-06 | End: 2019-08-07

## 2019-08-06 RX ORDER — NEFAZODONE HYDROCHLORIDE 200 MG/1
600 TABLET ORAL AT BEDTIME
Status: DISCONTINUED | OUTPATIENT
Start: 2019-08-06 | End: 2019-08-10 | Stop reason: HOSPADM

## 2019-08-06 RX ORDER — ALBUTEROL SULFATE 90 UG/1
2 AEROSOL, METERED RESPIRATORY (INHALATION) 4 TIMES DAILY PRN
Status: DISCONTINUED | OUTPATIENT
Start: 2019-08-06 | End: 2019-08-10 | Stop reason: HOSPADM

## 2019-08-06 RX ORDER — POTASSIUM CHLORIDE 29.8 MG/ML
20 INJECTION INTRAVENOUS
Status: DISCONTINUED | OUTPATIENT
Start: 2019-08-06 | End: 2019-08-10 | Stop reason: HOSPADM

## 2019-08-06 RX ADMIN — HYDROXYZINE HYDROCHLORIDE 10 MG: 10 TABLET ORAL at 20:44

## 2019-08-06 RX ADMIN — ACETAMINOPHEN 975 MG: 325 TABLET, FILM COATED ORAL at 20:01

## 2019-08-06 RX ADMIN — OXYCODONE HYDROCHLORIDE 10 MG: 5 TABLET ORAL at 23:58

## 2019-08-06 RX ADMIN — DEXTRAN 70, AND HYPROMELLOSE 2910 1 DROP: 1; 3 SOLUTION/ DROPS OPHTHALMIC at 22:44

## 2019-08-06 RX ADMIN — CYCLOBENZAPRINE HYDROCHLORIDE 5 MG: 5 TABLET, FILM COATED ORAL at 22:44

## 2019-08-06 RX ADMIN — RANITIDINE 150 MG: 150 TABLET ORAL at 20:02

## 2019-08-06 RX ADMIN — HYDROMORPHONE HYDROCHLORIDE 0.2 MG: 1 INJECTION, SOLUTION INTRAMUSCULAR; INTRAVENOUS; SUBCUTANEOUS at 18:47

## 2019-08-06 RX ADMIN — LACOSAMIDE 200 MG: 200 TABLET, FILM COATED ORAL at 20:01

## 2019-08-06 RX ADMIN — OMEPRAZOLE 40 MG: 20 CAPSULE, DELAYED RELEASE ORAL at 20:01

## 2019-08-06 RX ADMIN — NEFAZODONE HYDROCHLORIDE 600 MG: 200 TABLET ORAL at 22:46

## 2019-08-06 RX ADMIN — SENNOSIDES AND DOCUSATE SODIUM 1 TABLET: 8.6; 5 TABLET ORAL at 22:44

## 2019-08-06 RX ADMIN — SODIUM CHLORIDE, PRESERVATIVE FREE: 5 INJECTION INTRAVENOUS at 23:58

## 2019-08-06 RX ADMIN — OXYCODONE HYDROCHLORIDE 10 MG: 5 TABLET ORAL at 20:01

## 2019-08-06 RX ADMIN — HYDROMORPHONE HYDROCHLORIDE 0.2 MG: 1 INJECTION, SOLUTION INTRAMUSCULAR; INTRAVENOUS; SUBCUTANEOUS at 20:42

## 2019-08-06 RX ADMIN — TEMAZEPAM 15 MG: 15 CAPSULE ORAL at 22:44

## 2019-08-06 RX ADMIN — PRAMIPEXOLE DIHYDROCHLORIDE 2 MG: 1 TABLET ORAL at 20:01

## 2019-08-06 ASSESSMENT — ACTIVITIES OF DAILY LIVING (ADL): ADLS_ACUITY_SCORE: 12

## 2019-08-06 ASSESSMENT — MIFFLIN-ST. JEOR: SCORE: 1453.07

## 2019-08-06 NOTE — H&P
[facility]  Hospitalist History and Physical    Name: Mercedes Barber    MRN: [id number]  YOB: 1955    Age: 64 year old  Date of Admission:  8/6/2019  Physician:  Jose Rafael Pratt DO, [facility]    Assessment & Plan   Mercedes Barber is a 64 year old female who presented to Dr. Mejia's clinic for right hip surgical follow-up and increased pain.  She was felt to need right hip revision surgery and she was admitted to the hospital.  She reports increasing pain and difficulty with ambulating gradually since her recent surgery and discharge.    Recent right SARAHI with increasing pain:  -  Orthopedics recommending surgery tomorrow.  She reports no major problems tolerating prior surgery.  She denies chest pain, sob.  She had a recent EKG and no new cardiac complaints so I did not repeat it at this point.   Plan pain control tonight + diet.  After midnight will be NPO.  She is non-weight bearing right leg.  Orthopedics will see her again tomorrow AM.  -   She feels she will likely need more help at discharge.  I will ask SW to also see her.    Anemia following recent surgery:  -  Recheck hgb here.   Type and Cross.    Seizure disorder:  -  No recent seizures, continue vimpat.  I will continue this through the surgical period.    Reflux:  -  Continue PPI + zantac    Restless Legs:  -  Continue mirapex    Insomnia, anxiety:  -  Continue serzone, temazepam.    Constipation:  -  Hx of increased constipation around surgery.  She is on linzess which I will continue.  She reports she did have a BM yesterday.    DVT Prophylaxis: Pneumatic Compression Devices  Code Status: Full Code    Disposition:  Discharge unclear, timing depends on surgical outcome.    Primary Care Physician   Skyler Álvarez, [phone]    Chief Complaint   Right hip pain    History is obtained from the patient.    History of Present Illness   Mercedes Barber is a 64 year old female who presents with right hip pain gradually  worsening since her recent surgery.  She has had trouble walking and functioning at home.  She was seen at Dr. Mejia's clinic today and felt to need right hip surgical revision.  Her sutures were also removed.  She otherwise denies chest pain, sob, nausea, recent falls, worsening constipation, other new complaints.      Past Medical History    Past Medical History:   Diagnosis Date     Arthritis     Thumb     Cervical high risk HPV (human papillomavirus) test positive 07/17/2017 07/17/17: NIL pap, + HR HPV (not 16 or 18) result.      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     Neuropathy of lower extremity      Rectal prolapse      Restless leg syndrome      Seizure disorder (H)     25 years ago     Sleep apnea     CPAP, no longer using CPAP     Temporal sclerosis 8/27/2012         Past Surgical History   Past Surgical History:   Procedure Laterality Date     Anterior COLPORRHAPHY, BLADDER/VAGINA      perigee mesh     ARTHROPLASTY HIP Right 7/23/2019    Procedure: Right total hip arthroplasty;  Surgeon: Anant Mejia MD;  Location:  OR     ARTHROPLASTY PATELLO-FEMORAL (KNEE) Bilateral      CARPAL TUNNEL RELEASE RT/LT       DENTAL SURGERY      implant     DILATION AND CURETTAGE       DRAIN PILONIDAL CYST SIMPL       ENDOSCOPY DRUG INDUCED SLEEP N/A 11/18/2015    Procedure: ENDOSCOPY DRUG INDUCED SLEEP;  Surgeon: Park Ortiz MD;  Location: [facility] OR     ESOPHAGOSCOPY, GASTROSCOPY, DUODENOSCOPY (EGD), COMBINED  4/5/2013    Procedure: COMBINED ESOPHAGOSCOPY, GASTROSCOPY, DUODENOSCOPY (EGD), BIOPSY SINGLE OR MULTIPLE;;  Surgeon: Mundo Mehta MD;  Location:  GI     EXCISE LESION TRUNK  8/10/2012    Procedure: EXCISE LESION TRUNK;;  Surgeon: Jerald Diggs MD;  Location:  OR      HYSTERECTOMY       L shoulder fracture       rectal prolapse repair abdominally       RELEASE PLANTAR FASCIA Right 1/20/2015    Procedure: RELEASE PLANTAR FASCIA;  Surgeon: Maicol Liu DPM;  Location: [facility]     REMOVE MESH VAGINA  8/10/2012    Procedure: REMOVE MESH VAGINA;  Excision of Vaginal Mesh Exposure, Removal of Skin Tag Left Inner Leg;  Surgeon: Jerald Diggs MD;  Location: [facility] OR     REPAIR HAMMER TOE Right 1/20/2015    Procedure: REPAIR HAMMER TOE;  Surgeon: Maicol Liu DPM;  Location: [facility]     TONSILLECTOMY & ADENOIDECTOMY       TUBAL LIGATION          Prior to Admission Medications   Prior to Admission Medications   Prescriptions Last Dose Informant Patient Reported? Taking?   LORazepam (ATIVAN) 0.5 MG tablet 8/5/2019 at pm  Yes Yes   Sig: Take 0.5 mg by mouth daily as needed for anxiety or sleep   POTASSIUM CITRATE PO 8/6/2019 at Unknown time  Yes Yes   Sig: Take 10 mEq by mouth daily    QUEtiapine (SEROQUEL) 50 MG tablet Past Month at Unknown time  Yes Yes   Sig: Take 25 mg by mouth nightly as needed   SENNA-docusate sodium (SENNA S) 8.6-50 MG tablet 8/5/2019 at Unknown time  No Yes   Sig: Take 1 tablet by mouth At Bedtime   TEMAZEPAM PO 8/5/2019 at Unknown time  Yes Yes   Sig: Take 30 mg by mouth At Bedtime    acetaminophen (TYLENOL) 325 MG tablet   No No   Sig: Take 2 tablets (650 mg) by mouth every 4 hours as needed for other (multimodal surgical pain management along with NSAIDS and opioid medication as indicated based on pain control and physical function.)   albuterol (ALBUTEROL) 108 (90 BASE) MCG/ACT Inhaler   No No   Sig: Inhale 2 puffs into the lungs 4 times daily as needed for shortness of breath / dyspnea or wheezing   aspirin (ASA) 81 MG EC tablet 8/6/2019 at x1  No Yes   Sig: Take 2 tablets (162 mg) by mouth 2 times daily (with meals)   budesonide-formoterol (SYMBICORT) 160-4.5 MCG/ACT Inhaler 8/6/2019 at x1  Yes Yes   Sig: Inhale 2 puffs into the lungs 2 times daily    cycloSPORINE (RESTASIS) 0.05 % ophthalmic emulsion 8/6/2019 at x1  Yes Yes   Sig: Place 1 drop into both eyes 2 times daily   hydrOXYzine (ATARAX) 10 MG tablet   No No   Sig: Take 1 tablet (10 mg) by mouth every 6 hours as needed for itching   hypromellose (ARTIFICIAL TEARS) 0.5 % SOLN ophthalmic solution 8/6/2019 at x1  Yes Yes   Sig: Place 1 drop into both eyes 2 times daily   ibuprofen (ADVIL/MOTRIN) 600 MG tablet 8/6/2019 at x1  No Yes   Sig: Take 1 tablet (600 mg) by mouth every 6 hours as needed for moderate pain   lacosamide (VIMPAT) 200 MG TABS 8/6/2019 at x1  Yes Yes   Sig: Take 200 mg by mouth 2 times daily.   levothyroxine (SYNTHROID/LEVOTHROID) 50 MCG tablet 8/6/2019 at Unknown time  Yes Yes   Sig: Take 50 mcg by mouth daily   linaclotide (LINZESS) 290 MCG capsule 8/6/2019 at Unknown time  Yes Yes   Sig: Take 290 mcg by mouth every morning (before breakfast)   liothyronine (CYTOMEL) 5 MCG tablet 8/6/2019 at Unknown time  Yes Yes   Sig: Take 10 mcg by mouth daily    nefazodone (SERZONE) 200 MG tablet 8/5/2019 at Unknown time  Yes Yes   Sig: Take 600 mg by mouth At Bedtime    omeprazole (PRILOSEC) 40 MG DR capsule 8/6/2019 at x1  No Yes   Sig: Take 1 capsule (40 mg) by mouth 2 times daily   ondansetron (ZOFRAN-ODT) 4 MG ODT tab   No No   Sig: Take 1 tablet (4 mg) by mouth every 6 hours as needed for nausea or vomiting   oxyCODONE (ROXICODONE) 5 MG tablet 8/5/2019 at Unknown time  No Yes   Sig: Take 1-2 tablets (5-10 mg) by mouth every 4 hours as needed   polyethylene glycol (MIRALAX/GLYCOLAX) packet 8/5/2019 at Unknown time  Yes Yes   Sig: Take 1 packet by mouth daily as needed for constipation   pramipexole (MIRAPEX) 1 MG tablet 8/6/2019 at Unknown time  Yes Yes   Sig: Take 1 mg by mouth every morning . ( takes 1 tab in am , 2 tabs 1 hour prior to hs)   pramipexole (MIRAPEX) 1 MG tablet 8/5/2019 at Unknown time  Yes Yes   Sig: Take 2 mg by mouth every evening . (takes 1 tab in am , 2 tabs 1 hour prior  "to hs)   ranitidine (ZANTAC) 150 MG tablet 8/6/2019 at x1  No Yes   Sig: TAKE 1 TABLET BY MOUTH TWICE DAILY      Facility-Administered Medications: None     Allergies   Allergies   Allergen Reactions     Clonopin [Clonazepam]      \"Walk funny and Mind goes funny\"     Encort [Hydrocortisone Acetate] Swelling     Feet swelled.     Encort [Hydrocortisone] Swelling     Erythromycin Diarrhea     Flagyl [Metronidazole] Nausea     Gabapentin      Over eats     Lamictal [Lamotrigine]      Tegretol [Carbamazepine] Nausea and Vomiting       Social History   Social History     Tobacco Use     Smoking status: Never Smoker     Smokeless tobacco: Never Used   Substance Use Topics     Alcohol use: Yes     Comment: 1 glass of wine 4x/yr     Social History     Social History Narrative     Not on file       Family History   Reviewed, non-contributory    Review of Systems   A Comprehensive greater than 10 system review of systems was carried out.  Pertinent positives and negatives are noted above.  Otherwise negative for contributory information.    Physical Exam   Temp: 98.1  F (36.7  C) Temp src: Oral BP: 127/80   Heart Rate: 65 Resp: 18 SpO2: 98 % O2 Device: None (Room air)    Vital Signs with Ranges   Temp:  [98.1  F (36.7  C)] 98.1  F (36.7  C)  Heart Rate:  [65] 65  Resp:  [18] 18  BP: (127)/(80) 127/80  SpO2:  [98 %] 98 %  0 lbs 0 oz      GEN:  Alert, oriented x 3, appears comfortable at rest, no overt distress  HEENT:  Normocephalic/atraumatic, no scleral icterus, no nasal discharge, mouth moist.  CV:  Regular rate and rhythm, no murmur or JVD.  S1 + S2 noted, no S3 or S4.  LUNGS:  Clear to auscultation bilaterally without rales/rhonchi/wheezing/retractions.  Symmetric chest rise on inhalation noted.  ABD:  Active bowel sounds, soft, non-tender/non-distended.  No rebound/guarding/rigidity.  EXT:  Trace edema.  No cyanosis.  Right hip surgical site without spreading erythema.  Moving feet well.  SKIN:  Dry to touch, no " exanthems noted in the visualized areas.  NEURO:  Sensation to touch grossly intact.  No new focal deficits appreciated.    Data   Data reviewed today:  I personally reviewed no images or EKG's today.    Recent Labs   Lab 08/06/19  1743   HGB 9.7*     Recent Labs   Lab 08/06/19  1743      POTASSIUM 4.2   CHLORIDE 108   CO2 26   ANIONGAP 5   GLC 97   BUN 17   CR 0.83   GFRESTIMATED 73   GFRESTBLACK 85   SABA 8.9       No results found for this or any previous visit (from the past 24 hour(s)).

## 2019-08-06 NOTE — LETTER
Transition Communication Hand-off for Care Transitions to Next Level of Care Provider    Name: Mercedes Barber  : 1955  MRN #: [id number]  Primary Care Provider: Skyler Álvarez     Primary Clinic: [address] [place] [id number]     Reason for Hospitalization:  Hip fracture  Hip pain  Admit Date/Time: 2019  5:03 PM  Discharge Date: 8/10/19  Payor Source: Payor: MEDICARE / Plan: MEDICARE / Product Type: Medicare /     Readmission Assessment Measure (FER) Risk Score/category: ELEVATED            Reason for Communication Hand-off Referral: Fragility    Discharge Plan:       Concern for non-adherence with plan of care:   NO  Discharge Needs Assessment:  Needs      Most Recent Value   Equipment Currently Used at Home  cane, straight, raised toilet   # of Referrals Placed by [facility]  Internal Clinic Care Coordination          Already enrolled in Tele-monitoring program and name of program:  na  Follow-up specialty is recommended: Yes    Follow-up plan:  No future appointments.    Any outstanding tests or procedures:        Referrals     Future Labs/Procedures    Occupational Therapy Adult Consult     Comments:    Evaluate and treat as clinically indicated.    Reason:  Right total hip acetabulum revision    Physical Therapy Adult Consult     Comments:    Evaluate and treat as clinically indicated.    Reason:  Right total hip acetabulum revision          Reason for your hospital stay    Right total hip acetabulum revision          Key Recommendations:  Pt admitted for a right hip revision.  SW leading for TCU. FER ELEVATED.  Pt is frail and would benefit from care coordination.      Recommendations are to discharge to [facility] TCU    Loreto Vasquez    AVS/Discharge Summary is the source of truth; this is a helpful guide for improved communication of patient story

## 2019-08-06 NOTE — PROGRESS NOTES
Ortho Brief - full consult to follow    Pt referred to me by partner after increasing pain and protrusio of cup after recent R SARAHI.  Plan revision surgery.    Appreciate medicine team care  NPO p MN  Type and screen  CT scan pelvis ordered

## 2019-08-06 NOTE — LETTER
Key Recommendations:  Pt admitted for a right hip revision.  SW leading for TCU. FER ELEVATED.     Recommendations are ***    Pt was discharged to *** TCU   Loreto Vasquez    AVS/Discharge Summary is the source of truth; this is a helpful guide for improved communication of patient story

## 2019-08-06 NOTE — PHARMACY-ADMISSION MEDICATION HISTORY
Admission medication history interview status for this patient is complete. See [facility] admission navigator for allergy information, prior to admission medications and immunization status.     Medication history interview source(s):Patient  Medication history resources (including written lists, pill bottles, clinic record): [facility] list  Primary pharmacy: [facility]    Changes made to [facility] medication list:  Added:   Deleted: Glucosamine, Ketoprofen Gel, Vit D, Folic Acid  Changed: Lorazepam to prn, Miralax to prn, Quetiapine 50 mg to 25 mg prn    Actions taken by pharmacist (provider contacted, etc):provider paged     Additional medication history information:None    Medication reconciliation/reorder completed by provider prior to medication history? Yes      Prior to Admission medications    Medication Sig Last Dose Taking? Auth Provider   aspirin (ASA) 81 MG EC tablet Take 2 tablets (162 mg) by mouth 2 times daily (with meals) 8/6/2019 at x1 Yes Rosalio Henriquez PA-C   budesonide-formoterol (SYMBICORT) 160-4.5 MCG/ACT Inhaler Inhale 2 puffs into the lungs 2 times daily 8/6/2019 at x1 Yes Unknown, Entered By History   cycloSPORINE (RESTASIS) 0.05 % ophthalmic emulsion Place 1 drop into both eyes 2 times daily 8/6/2019 at x1 Yes Unknown, Entered By History   hypromellose (ARTIFICIAL TEARS) 0.5 % SOLN ophthalmic solution Place 1 drop into both eyes 2 times daily 8/6/2019 at x1 Yes Unknown, Entered By History   ibuprofen (ADVIL/MOTRIN) 600 MG tablet Take 1 tablet (600 mg) by mouth every 6 hours as needed for moderate pain 8/6/2019 at x1 Yes Rosalio Henriquez PA-C   lacosamide (VIMPAT) 200 MG TABS Take 200 mg by mouth 2 times daily. 8/6/2019 at x1 Yes Unknown, Entered By History   levothyroxine (SYNTHROID/LEVOTHROID) 50 MCG tablet Take 50 mcg by mouth daily 8/6/2019 at Unknown time Yes Unknown, Entered By History   linaclotide (LINZESS) 290 MCG capsule Take 290 mcg by mouth every morning (before breakfast)  8/6/2019 at Unknown time Yes Reported, Patient   liothyronine (CYTOMEL) 5 MCG tablet Take 10 mcg by mouth daily  8/6/2019 at Unknown time Yes Reported, Patient   LORazepam (ATIVAN) 0.5 MG tablet Take 0.5 mg by mouth daily as needed for anxiety or sleep 8/5/2019 at pm Yes Unknown, Entered By History   nefazodone (SERZONE) 200 MG tablet Take 600 mg by mouth At Bedtime  8/5/2019 at Unknown time Yes Reported, Patient   omeprazole (PRILOSEC) 40 MG DR capsule Take 1 capsule (40 mg) by mouth 2 times daily 8/6/2019 at x1 Yes Skyler Álvarez MD   oxyCODONE (ROXICODONE) 5 MG tablet Take 1-2 tablets (5-10 mg) by mouth every 4 hours as needed 8/5/2019 at Unknown time Yes Rosalio Henriquez PA-C   polyethylene glycol (MIRALAX/GLYCOLAX) packet Take 1 packet by mouth daily as needed for constipation 8/5/2019 at Unknown time Yes Unknown, Entered By History   POTASSIUM CITRATE PO Take 10 mEq by mouth daily  8/6/2019 at Unknown time Yes Reported, Patient   pramipexole (MIRAPEX) 1 MG tablet Take 2 mg by mouth every evening . (takes 1 tab in am , 2 tabs 1 hour prior to hs) 8/5/2019 at Unknown time Yes Unknown, Entered By History   pramipexole (MIRAPEX) 1 MG tablet Take 1 mg by mouth every morning . ( takes 1 tab in am , 2 tabs 1 hour prior to hs) 8/6/2019 at Unknown time Yes Reported, Patient   QUEtiapine (SEROQUEL) 50 MG tablet Take 25 mg by mouth nightly as needed Past Month at Unknown time Yes Unknown, Entered By History   ranitidine (ZANTAC) 150 MG tablet TAKE 1 TABLET BY MOUTH TWICE DAILY 8/6/2019 at x1 Yes Skyler Álvarez MD   SENNA-docusate sodium (SENNA S) 8.6-50 MG tablet Take 1 tablet by mouth At Bedtime 8/5/2019 at Unknown time Yes Rosalio Henriquez PA-C   TEMAZEPAM PO Take 30 mg by mouth At Bedtime  8/5/2019 at Unknown time Yes Reported, Patient   acetaminophen (TYLENOL) 325 MG tablet Take 2 tablets (650 mg) by mouth every 4 hours as needed for other (multimodal surgical pain management along with NSAIDS and opioid  medication as indicated based on pain control and physical function.)   Rosalio Henriquez PA-C   albuterol (ALBUTEROL) 108 (90 BASE) MCG/ACT Inhaler Inhale 2 puffs into the lungs 4 times daily as needed for shortness of breath / dyspnea or wheezing   Skyler Álvarez MD   hydrOXYzine (ATARAX) 10 MG tablet Take 1 tablet (10 mg) by mouth every 6 hours as needed for itching   Rosalio Henriquez PA-C   ondansetron (ZOFRAN-ODT) 4 MG ODT tab Take 1 tablet (4 mg) by mouth every 6 hours as needed for nausea or vomiting   Rosalio Henriquez PA-C

## 2019-08-07 ENCOUNTER — ANESTHESIA (OUTPATIENT)
Dept: SURGERY | Facility: CLINIC | Age: 64
DRG: 467 | End: 2019-08-07
Payer: MEDICARE

## 2019-08-07 ENCOUNTER — ANESTHESIA EVENT (OUTPATIENT)
Dept: SURGERY | Facility: CLINIC | Age: 64
DRG: 467 | End: 2019-08-07
Payer: MEDICARE

## 2019-08-07 ENCOUNTER — APPOINTMENT (OUTPATIENT)
Dept: GENERAL RADIOLOGY | Facility: CLINIC | Age: 64
DRG: 467 | End: 2019-08-07
Attending: INTERNAL MEDICINE
Payer: MEDICARE

## 2019-08-07 ENCOUNTER — APPOINTMENT (OUTPATIENT)
Dept: GENERAL RADIOLOGY | Facility: CLINIC | Age: 64
DRG: 467 | End: 2019-08-07
Attending: ORTHOPAEDIC SURGERY
Payer: MEDICARE

## 2019-08-07 LAB
ABO + RH BLD: NORMAL
ABO + RH BLD: NORMAL
ANION GAP SERPL CALCULATED.3IONS-SCNC: 3 MMOL/L (ref 3–14)
BLD GP AB SCN SERPL QL: NORMAL
BLD PROD TYP BPU: NORMAL
BLOOD BANK CMNT PATIENT-IMP: NORMAL
BUN SERPL-MCNC: 12 MG/DL (ref 7–30)
CALCIUM SERPL-MCNC: 8.7 MG/DL (ref 8.5–10.1)
CHLORIDE SERPL-SCNC: 109 MMOL/L (ref 94–109)
CO2 SERPL-SCNC: 30 MMOL/L (ref 20–32)
CREAT SERPL-MCNC: 0.77 MG/DL (ref 0.52–1.04)
GFR SERPL CREATININE-BSD FRML MDRD: 81 ML/MIN/{1.73_M2}
GLUCOSE SERPL-MCNC: 89 MG/DL (ref 70–99)
GRAM STN SPEC: NORMAL
HGB BLD-MCNC: 9.1 G/DL (ref 11.7–15.7)
NUM BPU REQUESTED: 2
POTASSIUM SERPL-SCNC: 4.2 MMOL/L (ref 3.4–5.3)
SODIUM SERPL-SCNC: 142 MMOL/L (ref 133–144)
SPECIMEN EXP DATE BLD: NORMAL
SPECIMEN SOURCE: NORMAL

## 2019-08-07 PROCEDURE — 80048 BASIC METABOLIC PNL TOTAL CA: CPT | Performed by: INTERNAL MEDICINE

## 2019-08-07 PROCEDURE — 25000132 ZZH RX MED GY IP 250 OP 250 PS 637: Mod: GY | Performed by: ORTHOPAEDIC SURGERY

## 2019-08-07 PROCEDURE — 99232 SBSQ HOSP IP/OBS MODERATE 35: CPT | Performed by: HOSPITALIST

## 2019-08-07 PROCEDURE — 71000013 ZZH RECOVERY PHASE 1 LEVEL 1 EA ADDTL HR: Performed by: ORTHOPAEDIC SURGERY

## 2019-08-07 PROCEDURE — 36000067 ZZH SURGERY LEVEL 5 1ST 30 MIN: Performed by: ORTHOPAEDIC SURGERY

## 2019-08-07 PROCEDURE — 25000128 H RX IP 250 OP 636

## 2019-08-07 PROCEDURE — 87205 SMEAR GRAM STAIN: CPT | Performed by: ORTHOPAEDIC SURGERY

## 2019-08-07 PROCEDURE — 25800030 ZZH RX IP 258 OP 636: Performed by: NURSE ANESTHETIST, CERTIFIED REGISTERED

## 2019-08-07 PROCEDURE — 25800030 ZZH RX IP 258 OP 636: Performed by: ANESTHESIOLOGY

## 2019-08-07 PROCEDURE — 25000128 H RX IP 250 OP 636: Performed by: PHYSICIAN ASSISTANT

## 2019-08-07 PROCEDURE — 36415 COLL VENOUS BLD VENIPUNCTURE: CPT | Performed by: INTERNAL MEDICINE

## 2019-08-07 PROCEDURE — C1763 CONN TISS, NON-HUMAN: HCPCS | Performed by: ORTHOPAEDIC SURGERY

## 2019-08-07 PROCEDURE — 87070 CULTURE OTHR SPECIMN AEROBIC: CPT | Performed by: ORTHOPAEDIC SURGERY

## 2019-08-07 PROCEDURE — 25800030 ZZH RX IP 258 OP 636: Performed by: ORTHOPAEDIC SURGERY

## 2019-08-07 PROCEDURE — 37000009 ZZH ANESTHESIA TECHNICAL FEE, EACH ADDTL 15 MIN: Performed by: ORTHOPAEDIC SURGERY

## 2019-08-07 PROCEDURE — 27211024 ZZHC OR SUPPLY OTHER OPNP: Performed by: ORTHOPAEDIC SURGERY

## 2019-08-07 PROCEDURE — 25000128 H RX IP 250 OP 636: Performed by: NURSE ANESTHETIST, CERTIFIED REGISTERED

## 2019-08-07 PROCEDURE — 40000986 XR PELVIS AD HIP PORTABLE RIGHT 1 VIEW

## 2019-08-07 PROCEDURE — 25000128 H RX IP 250 OP 636: Performed by: ANESTHESIOLOGY

## 2019-08-07 PROCEDURE — 12000000 ZZH R&B MED SURG/OB

## 2019-08-07 PROCEDURE — C1762 CONN TISS, HUMAN(INC FASCIA): HCPCS | Performed by: ORTHOPAEDIC SURGERY

## 2019-08-07 PROCEDURE — 25800025 ZZH RX 258: Performed by: INTERNAL MEDICINE

## 2019-08-07 PROCEDURE — 87075 CULTR BACTERIA EXCEPT BLOOD: CPT | Performed by: ORTHOPAEDIC SURGERY

## 2019-08-07 PROCEDURE — 25000125 ZZHC RX 250: Performed by: ANESTHESIOLOGY

## 2019-08-07 PROCEDURE — 40000306 ZZH STATISTIC PRE PROC ASSESS II: Performed by: ORTHOPAEDIC SURGERY

## 2019-08-07 PROCEDURE — 25000125 ZZHC RX 250: Performed by: NURSE ANESTHETIST, CERTIFIED REGISTERED

## 2019-08-07 PROCEDURE — 25000132 ZZH RX MED GY IP 250 OP 250 PS 637: Mod: GY | Performed by: INTERNAL MEDICINE

## 2019-08-07 PROCEDURE — C1776 JOINT DEVICE (IMPLANTABLE): HCPCS | Performed by: ORTHOPAEDIC SURGERY

## 2019-08-07 PROCEDURE — 37000008 ZZH ANESTHESIA TECHNICAL FEE, 1ST 30 MIN: Performed by: ORTHOPAEDIC SURGERY

## 2019-08-07 PROCEDURE — 0SR904A REPLACEMENT OF RIGHT HIP JOINT WITH CERAMIC ON POLYETHYLENE SYNTHETIC SUBSTITUTE, UNCEMENTED, OPEN APPROACH: ICD-10-PCS | Performed by: ORTHOPAEDIC SURGERY

## 2019-08-07 PROCEDURE — 85018 HEMOGLOBIN: CPT | Performed by: INTERNAL MEDICINE

## 2019-08-07 PROCEDURE — 36000069 ZZH SURGERY LEVEL 5 EA 15 ADDTL MIN: Performed by: ORTHOPAEDIC SURGERY

## 2019-08-07 PROCEDURE — 40000275 ZZH STATISTIC RCP TIME EA 10 MIN

## 2019-08-07 PROCEDURE — 0SP90JZ REMOVAL OF SYNTHETIC SUBSTITUTE FROM RIGHT HIP JOINT, OPEN APPROACH: ICD-10-PCS | Performed by: ORTHOPAEDIC SURGERY

## 2019-08-07 PROCEDURE — 25000128 H RX IP 250 OP 636: Performed by: INTERNAL MEDICINE

## 2019-08-07 PROCEDURE — 27210794 ZZH OR GENERAL SUPPLY STERILE: Performed by: ORTHOPAEDIC SURGERY

## 2019-08-07 PROCEDURE — 25000128 H RX IP 250 OP 636: Performed by: ORTHOPAEDIC SURGERY

## 2019-08-07 PROCEDURE — 94640 AIRWAY INHALATION TREATMENT: CPT

## 2019-08-07 PROCEDURE — 25000125 ZZHC RX 250: Performed by: ORTHOPAEDIC SURGERY

## 2019-08-07 PROCEDURE — 40000985 XR HIP PORT RT 1 VW: Mod: TC

## 2019-08-07 PROCEDURE — 71000012 ZZH RECOVERY PHASE 1 LEVEL 1 FIRST HR: Performed by: ORTHOPAEDIC SURGERY

## 2019-08-07 PROCEDURE — C1713 ANCHOR/SCREW BN/BN,TIS/BN: HCPCS | Performed by: ORTHOPAEDIC SURGERY

## 2019-08-07 PROCEDURE — 89051 BODY FLUID CELL COUNT: CPT | Performed by: ORTHOPAEDIC SURGERY

## 2019-08-07 DEVICE — GRAFT BONE CRUSH CANC 30ML 400080: Type: IMPLANTABLE DEVICE | Site: HIP | Status: FUNCTIONAL

## 2019-08-07 DEVICE — IMPLANTABLE DEVICE
Type: IMPLANTABLE DEVICE | Site: HIP | Status: NON-FUNCTIONAL
Removed: 2020-08-05

## 2019-08-07 DEVICE — IMP INSERT ACET STRK MDM COCR HIP 0DEG 46MM SZ F 626-00-46F
Type: IMPLANTABLE DEVICE | Site: HIP | Status: NON-FUNCTIONAL
Removed: 2020-08-05

## 2019-08-07 DEVICE — IMP HEAD FEMORAL STRK BIOLOX DELTA CERAMIC 28MM 0MM
Type: IMPLANTABLE DEVICE | Site: HIP | Status: NON-FUNCTIONAL
Removed: 2020-08-05

## 2019-08-07 DEVICE — IMPLANTABLE DEVICE: Type: IMPLANTABLE DEVICE | Site: HIP | Status: FUNCTIONAL

## 2019-08-07 RX ORDER — LIDOCAINE HYDROCHLORIDE ANHYDROUS AND EPINEPHRINE 10; 10 MG/ML; UG/ML
30 INJECTION, SOLUTION INFILTRATION ONCE
Status: COMPLETED | OUTPATIENT
Start: 2019-08-07 | End: 2019-08-07

## 2019-08-07 RX ORDER — ACETAMINOPHEN 325 MG/1
650 TABLET ORAL EVERY 4 HOURS PRN
Status: DISCONTINUED | OUTPATIENT
Start: 2019-08-10 | End: 2019-08-10 | Stop reason: HOSPADM

## 2019-08-07 RX ORDER — ONDANSETRON 2 MG/ML
4 INJECTION INTRAMUSCULAR; INTRAVENOUS EVERY 30 MIN PRN
Status: DISCONTINUED | OUTPATIENT
Start: 2019-08-07 | End: 2019-08-07 | Stop reason: HOSPADM

## 2019-08-07 RX ORDER — LIDOCAINE 40 MG/G
CREAM TOPICAL
Status: DISCONTINUED | OUTPATIENT
Start: 2019-08-07 | End: 2019-08-10 | Stop reason: HOSPADM

## 2019-08-07 RX ORDER — VANCOMYCIN HYDROCHLORIDE 1 G/20ML
INJECTION, POWDER, LYOPHILIZED, FOR SOLUTION INTRAVENOUS PRN
Status: DISCONTINUED | OUTPATIENT
Start: 2019-08-07 | End: 2019-08-07 | Stop reason: HOSPADM

## 2019-08-07 RX ORDER — SODIUM CHLORIDE, SODIUM LACTATE, POTASSIUM CHLORIDE, CALCIUM CHLORIDE 600; 310; 30; 20 MG/100ML; MG/100ML; MG/100ML; MG/100ML
INJECTION, SOLUTION INTRAVENOUS CONTINUOUS
Status: DISCONTINUED | OUTPATIENT
Start: 2019-08-07 | End: 2019-08-07 | Stop reason: HOSPADM

## 2019-08-07 RX ORDER — GLYCOPYRROLATE 0.2 MG/ML
INJECTION, SOLUTION INTRAMUSCULAR; INTRAVENOUS PRN
Status: DISCONTINUED | OUTPATIENT
Start: 2019-08-07 | End: 2019-08-07

## 2019-08-07 RX ORDER — SODIUM CHLORIDE 9 MG/ML
INJECTION, SOLUTION INTRAVENOUS CONTINUOUS PRN
Status: DISCONTINUED | OUTPATIENT
Start: 2019-08-07 | End: 2019-08-07

## 2019-08-07 RX ORDER — LIDOCAINE HYDROCHLORIDE 10 MG/ML
INJECTION, SOLUTION INFILTRATION; PERINEURAL PRN
Status: DISCONTINUED | OUTPATIENT
Start: 2019-08-07 | End: 2019-08-07

## 2019-08-07 RX ORDER — HYDROMORPHONE HYDROCHLORIDE 1 MG/ML
.3-.5 INJECTION, SOLUTION INTRAMUSCULAR; INTRAVENOUS; SUBCUTANEOUS
Status: DISCONTINUED | OUTPATIENT
Start: 2019-08-07 | End: 2019-08-10 | Stop reason: HOSPADM

## 2019-08-07 RX ORDER — FENTANYL CITRATE 50 UG/ML
25-50 INJECTION, SOLUTION INTRAMUSCULAR; INTRAVENOUS EVERY 5 MIN PRN
Status: DISCONTINUED | OUTPATIENT
Start: 2019-08-07 | End: 2019-08-07 | Stop reason: HOSPADM

## 2019-08-07 RX ORDER — MEPERIDINE HYDROCHLORIDE 50 MG/ML
12.5 INJECTION INTRAMUSCULAR; INTRAVENOUS; SUBCUTANEOUS
Status: DISCONTINUED | OUTPATIENT
Start: 2019-08-07 | End: 2019-08-07 | Stop reason: HOSPADM

## 2019-08-07 RX ORDER — FENTANYL CITRATE 50 UG/ML
25-50 INJECTION, SOLUTION INTRAMUSCULAR; INTRAVENOUS
Status: DISCONTINUED | OUTPATIENT
Start: 2019-08-07 | End: 2019-08-07 | Stop reason: HOSPADM

## 2019-08-07 RX ORDER — NALOXONE HYDROCHLORIDE 0.4 MG/ML
.1-.4 INJECTION, SOLUTION INTRAMUSCULAR; INTRAVENOUS; SUBCUTANEOUS
Status: DISCONTINUED | OUTPATIENT
Start: 2019-08-07 | End: 2019-08-07 | Stop reason: HOSPADM

## 2019-08-07 RX ORDER — METHOCARBAMOL 500 MG/1
500 TABLET, FILM COATED ORAL 4 TIMES DAILY PRN
Status: DISCONTINUED | OUTPATIENT
Start: 2019-08-07 | End: 2019-08-10 | Stop reason: HOSPADM

## 2019-08-07 RX ORDER — AMOXICILLIN 250 MG
2 CAPSULE ORAL 2 TIMES DAILY
Status: DISCONTINUED | OUTPATIENT
Start: 2019-08-07 | End: 2019-08-10 | Stop reason: HOSPADM

## 2019-08-07 RX ORDER — OXYCODONE HYDROCHLORIDE 5 MG/1
5-10 TABLET ORAL EVERY 4 HOURS PRN
Status: DISCONTINUED | OUTPATIENT
Start: 2019-08-07 | End: 2019-08-10 | Stop reason: HOSPADM

## 2019-08-07 RX ORDER — NALOXONE HYDROCHLORIDE 0.4 MG/ML
.1-.4 INJECTION, SOLUTION INTRAMUSCULAR; INTRAVENOUS; SUBCUTANEOUS
Status: DISCONTINUED | OUTPATIENT
Start: 2019-08-07 | End: 2019-08-10 | Stop reason: HOSPADM

## 2019-08-07 RX ORDER — DICLOFENAC SODIUM 1 MG/ML
1 SOLUTION/ DROPS OPHTHALMIC 4 TIMES DAILY PRN
Status: DISCONTINUED | OUTPATIENT
Start: 2019-08-07 | End: 2019-08-10 | Stop reason: HOSPADM

## 2019-08-07 RX ORDER — CARBOXYMETHYLCELLULOSE SODIUM 5 MG/ML
1 SOLUTION/ DROPS OPHTHALMIC 3 TIMES DAILY PRN
Status: DISCONTINUED | OUTPATIENT
Start: 2019-08-07 | End: 2019-08-10 | Stop reason: HOSPADM

## 2019-08-07 RX ORDER — PROPOFOL 10 MG/ML
INJECTION, EMULSION INTRAVENOUS PRN
Status: DISCONTINUED | OUTPATIENT
Start: 2019-08-07 | End: 2019-08-07

## 2019-08-07 RX ORDER — LIDOCAINE 40 MG/G
CREAM TOPICAL
Status: DISCONTINUED | OUTPATIENT
Start: 2019-08-07 | End: 2019-08-07 | Stop reason: HOSPADM

## 2019-08-07 RX ORDER — NEOSTIGMINE METHYLSULFATE 1 MG/ML
VIAL (ML) INJECTION PRN
Status: DISCONTINUED | OUTPATIENT
Start: 2019-08-07 | End: 2019-08-07

## 2019-08-07 RX ORDER — HYDROMORPHONE HYDROCHLORIDE 1 MG/ML
.3-.5 INJECTION, SOLUTION INTRAMUSCULAR; INTRAVENOUS; SUBCUTANEOUS EVERY 10 MIN PRN
Status: DISCONTINUED | OUTPATIENT
Start: 2019-08-07 | End: 2019-08-07 | Stop reason: HOSPADM

## 2019-08-07 RX ORDER — HYDROMORPHONE HYDROCHLORIDE 1 MG/ML
INJECTION, SOLUTION INTRAMUSCULAR; INTRAVENOUS; SUBCUTANEOUS
Status: COMPLETED
Start: 2019-08-07 | End: 2019-08-07

## 2019-08-07 RX ORDER — CEFAZOLIN SODIUM 2 G/100ML
2 INJECTION, SOLUTION INTRAVENOUS EVERY 8 HOURS
Status: COMPLETED | OUTPATIENT
Start: 2019-08-07 | End: 2019-08-08

## 2019-08-07 RX ORDER — ACETAMINOPHEN 325 MG/1
975 TABLET ORAL EVERY 8 HOURS
Status: COMPLETED | OUTPATIENT
Start: 2019-08-07 | End: 2019-08-10

## 2019-08-07 RX ORDER — CELECOXIB 100 MG/1
100 CAPSULE ORAL 2 TIMES DAILY
Status: COMPLETED | OUTPATIENT
Start: 2019-08-07 | End: 2019-08-09

## 2019-08-07 RX ORDER — FENTANYL CITRATE 50 UG/ML
INJECTION, SOLUTION INTRAMUSCULAR; INTRAVENOUS PRN
Status: DISCONTINUED | OUTPATIENT
Start: 2019-08-07 | End: 2019-08-07

## 2019-08-07 RX ORDER — ONDANSETRON 4 MG/1
4 TABLET, ORALLY DISINTEGRATING ORAL EVERY 6 HOURS PRN
Status: DISCONTINUED | OUTPATIENT
Start: 2019-08-07 | End: 2019-08-10 | Stop reason: HOSPADM

## 2019-08-07 RX ORDER — CEFAZOLIN SODIUM 2 G/100ML
2 INJECTION, SOLUTION INTRAVENOUS
Status: COMPLETED | OUTPATIENT
Start: 2019-08-07 | End: 2019-08-07

## 2019-08-07 RX ORDER — FENTANYL CITRATE 50 UG/ML
100 INJECTION, SOLUTION INTRAMUSCULAR; INTRAVENOUS ONCE
Status: COMPLETED | OUTPATIENT
Start: 2019-08-07 | End: 2019-08-07

## 2019-08-07 RX ORDER — KETOROLAC TROMETHAMINE 5 MG/ML
1 SOLUTION OPHTHALMIC 4 TIMES DAILY PRN
Status: DISCONTINUED | OUTPATIENT
Start: 2019-08-07 | End: 2019-08-07

## 2019-08-07 RX ORDER — ONDANSETRON 4 MG/1
4 TABLET, ORALLY DISINTEGRATING ORAL EVERY 30 MIN PRN
Status: DISCONTINUED | OUTPATIENT
Start: 2019-08-07 | End: 2019-08-07 | Stop reason: HOSPADM

## 2019-08-07 RX ORDER — SODIUM CHLORIDE, SODIUM LACTATE, POTASSIUM CHLORIDE, CALCIUM CHLORIDE 600; 310; 30; 20 MG/100ML; MG/100ML; MG/100ML; MG/100ML
INJECTION, SOLUTION INTRAVENOUS CONTINUOUS
Status: DISCONTINUED | OUTPATIENT
Start: 2019-08-07 | End: 2019-08-09

## 2019-08-07 RX ORDER — GABAPENTIN 100 MG/1
100 CAPSULE ORAL 3 TIMES DAILY
Status: COMPLETED | OUTPATIENT
Start: 2019-08-07 | End: 2019-08-10

## 2019-08-07 RX ORDER — DEXAMETHASONE SODIUM PHOSPHATE 4 MG/ML
INJECTION, SOLUTION INTRA-ARTICULAR; INTRALESIONAL; INTRAMUSCULAR; INTRAVENOUS; SOFT TISSUE PRN
Status: DISCONTINUED | OUTPATIENT
Start: 2019-08-07 | End: 2019-08-07

## 2019-08-07 RX ORDER — AMOXICILLIN 250 MG
1 CAPSULE ORAL 2 TIMES DAILY
Status: DISCONTINUED | OUTPATIENT
Start: 2019-08-07 | End: 2019-08-10 | Stop reason: HOSPADM

## 2019-08-07 RX ORDER — CEFAZOLIN SODIUM 1 G/3ML
1 INJECTION, POWDER, FOR SOLUTION INTRAMUSCULAR; INTRAVENOUS SEE ADMIN INSTRUCTIONS
Status: DISCONTINUED | OUTPATIENT
Start: 2019-08-07 | End: 2019-08-07 | Stop reason: HOSPADM

## 2019-08-07 RX ORDER — ONDANSETRON 2 MG/ML
4 INJECTION INTRAMUSCULAR; INTRAVENOUS EVERY 6 HOURS PRN
Status: DISCONTINUED | OUTPATIENT
Start: 2019-08-07 | End: 2019-08-10 | Stop reason: HOSPADM

## 2019-08-07 RX ADMIN — CEFAZOLIN SODIUM 2 G: 2 INJECTION, SOLUTION INTRAVENOUS at 18:18

## 2019-08-07 RX ADMIN — FENTANYL CITRATE 100 MCG: 50 INJECTION, SOLUTION INTRAMUSCULAR; INTRAVENOUS at 07:46

## 2019-08-07 RX ADMIN — MIDAZOLAM 1 MG: 1 INJECTION INTRAMUSCULAR; INTRAVENOUS at 09:15

## 2019-08-07 RX ADMIN — ROCURONIUM BROMIDE 50 MG: 10 INJECTION INTRAVENOUS at 07:46

## 2019-08-07 RX ADMIN — ACETAMINOPHEN 975 MG: 325 TABLET, FILM COATED ORAL at 15:11

## 2019-08-07 RX ADMIN — ASPIRIN 325 MG: 325 TABLET, DELAYED RELEASE ORAL at 16:23

## 2019-08-07 RX ADMIN — GABAPENTIN 100 MG: 100 CAPSULE ORAL at 16:23

## 2019-08-07 RX ADMIN — SODIUM CHLORIDE 1 G: 9 INJECTION, SOLUTION INTRAVENOUS at 08:27

## 2019-08-07 RX ADMIN — CYCLOBENZAPRINE HYDROCHLORIDE 5 MG: 5 TABLET, FILM COATED ORAL at 14:00

## 2019-08-07 RX ADMIN — ACETAMINOPHEN 975 MG: 325 TABLET, FILM COATED ORAL at 21:51

## 2019-08-07 RX ADMIN — LACOSAMIDE 200 MG: 200 TABLET, FILM COATED ORAL at 19:50

## 2019-08-07 RX ADMIN — DEXTRAN 70, AND HYPROMELLOSE 2910 1 DROP: 1; 3 SOLUTION/ DROPS OPHTHALMIC at 19:55

## 2019-08-07 RX ADMIN — FENTANYL CITRATE 100 MCG: 50 INJECTION INTRAMUSCULAR; INTRAVENOUS at 05:51

## 2019-08-07 RX ADMIN — FENTANYL CITRATE 50 MCG: 50 INJECTION INTRAMUSCULAR; INTRAVENOUS at 11:49

## 2019-08-07 RX ADMIN — DEXAMETHASONE SODIUM PHOSPHATE 4 MG: 4 INJECTION, SOLUTION INTRA-ARTICULAR; INTRALESIONAL; INTRAMUSCULAR; INTRAVENOUS; SOFT TISSUE at 07:50

## 2019-08-07 RX ADMIN — SENNOSIDES AND DOCUSATE SODIUM 2 TABLET: 8.6; 5 TABLET ORAL at 19:50

## 2019-08-07 RX ADMIN — MINERAL OIL AND PETROLATUM: 150; 830 OINTMENT OPHTHALMIC at 21:13

## 2019-08-07 RX ADMIN — SODIUM CHLORIDE 1 G: 9 INJECTION, SOLUTION INTRAVENOUS at 10:52

## 2019-08-07 RX ADMIN — CELECOXIB 100 MG: 100 CAPSULE ORAL at 19:50

## 2019-08-07 RX ADMIN — HYDROMORPHONE HYDROCHLORIDE 0.5 MG: 1 INJECTION, SOLUTION INTRAMUSCULAR; INTRAVENOUS; SUBCUTANEOUS at 09:03

## 2019-08-07 RX ADMIN — HYDROMORPHONE HYDROCHLORIDE 0.5 MG: 1 INJECTION, SOLUTION INTRAMUSCULAR; INTRAVENOUS; SUBCUTANEOUS at 13:56

## 2019-08-07 RX ADMIN — GLYCOPYRROLATE 0.5 MG: 0.2 INJECTION, SOLUTION INTRAMUSCULAR; INTRAVENOUS at 10:59

## 2019-08-07 RX ADMIN — BUDESONIDE AND FORMOTEROL FUMARATE DIHYDRATE 2 PUFF: 160; 4.5 AEROSOL RESPIRATORY (INHALATION) at 20:55

## 2019-08-07 RX ADMIN — SODIUM CHLORIDE, POTASSIUM CHLORIDE, SODIUM LACTATE AND CALCIUM CHLORIDE: 600; 310; 30; 20 INJECTION, SOLUTION INTRAVENOUS at 15:12

## 2019-08-07 RX ADMIN — OMEPRAZOLE 40 MG: 20 CAPSULE, DELAYED RELEASE ORAL at 19:51

## 2019-08-07 RX ADMIN — CEFAZOLIN SODIUM 2 G: 2 INJECTION, SOLUTION INTRAVENOUS at 08:19

## 2019-08-07 RX ADMIN — FENTANYL CITRATE 50 MCG: 50 INJECTION, SOLUTION INTRAMUSCULAR; INTRAVENOUS at 11:15

## 2019-08-07 RX ADMIN — FENTANYL CITRATE 50 MCG: 50 INJECTION INTRAMUSCULAR; INTRAVENOUS at 11:57

## 2019-08-07 RX ADMIN — RANITIDINE 150 MG: 150 TABLET ORAL at 19:51

## 2019-08-07 RX ADMIN — ONDANSETRON 4 MG: 2 INJECTION INTRAMUSCULAR; INTRAVENOUS at 10:50

## 2019-08-07 RX ADMIN — Medication 3 MG: at 10:59

## 2019-08-07 RX ADMIN — PRAMIPEXOLE DIHYDROCHLORIDE 2 MG: 1 TABLET ORAL at 19:51

## 2019-08-07 RX ADMIN — HYDROMORPHONE HYDROCHLORIDE 0.5 MG: 1 INJECTION, SOLUTION INTRAMUSCULAR; INTRAVENOUS; SUBCUTANEOUS at 08:41

## 2019-08-07 RX ADMIN — HYDROMORPHONE HYDROCHLORIDE 0.3 MG: 1 INJECTION, SOLUTION INTRAMUSCULAR; INTRAVENOUS; SUBCUTANEOUS at 20:49

## 2019-08-07 RX ADMIN — NEFAZODONE HYDROCHLORIDE 600 MG: 200 TABLET ORAL at 21:53

## 2019-08-07 RX ADMIN — SODIUM CHLORIDE, POTASSIUM CHLORIDE, SODIUM LACTATE AND CALCIUM CHLORIDE: 600; 310; 30; 20 INJECTION, SOLUTION INTRAVENOUS at 07:40

## 2019-08-07 RX ADMIN — CEFAZOLIN SODIUM 1 G: 2 INJECTION, SOLUTION INTRAVENOUS at 10:19

## 2019-08-07 RX ADMIN — ALBUTEROL SULFATE 2 PUFF: 90 INHALANT RESPIRATORY (INHALATION) at 20:48

## 2019-08-07 RX ADMIN — SODIUM CHLORIDE, POTASSIUM CHLORIDE, SODIUM LACTATE AND CALCIUM CHLORIDE: 600; 310; 30; 20 INJECTION, SOLUTION INTRAVENOUS at 09:07

## 2019-08-07 RX ADMIN — OXYCODONE HYDROCHLORIDE 10 MG: 5 TABLET ORAL at 16:23

## 2019-08-07 RX ADMIN — HYDROXYZINE HYDROCHLORIDE 10 MG: 10 TABLET ORAL at 18:19

## 2019-08-07 RX ADMIN — GLYCOPYRROLATE 0.2 MG: 0.2 INJECTION, SOLUTION INTRAMUSCULAR; INTRAVENOUS at 07:47

## 2019-08-07 RX ADMIN — MINERAL OIL AND PETROLATUM: 150; 830 OINTMENT OPHTHALMIC at 17:01

## 2019-08-07 RX ADMIN — DEXTRAN 70, AND HYPROMELLOSE 2910 1 DROP: 1; 3 SOLUTION/ DROPS OPHTHALMIC at 15:13

## 2019-08-07 RX ADMIN — LIDOCAINE HYDROCHLORIDE 50 MG: 10 INJECTION, SOLUTION INFILTRATION; PERINEURAL at 07:46

## 2019-08-07 RX ADMIN — MIDAZOLAM 1 MG: 1 INJECTION INTRAMUSCULAR; INTRAVENOUS at 07:43

## 2019-08-07 RX ADMIN — TEMAZEPAM 15 MG: 15 CAPSULE ORAL at 21:52

## 2019-08-07 RX ADMIN — PROPOFOL 100 MG: 10 INJECTION, EMULSION INTRAVENOUS at 07:46

## 2019-08-07 RX ADMIN — GABAPENTIN 100 MG: 100 CAPSULE ORAL at 19:51

## 2019-08-07 RX ADMIN — HYDROMORPHONE HYDROCHLORIDE 0.5 MG: 1 INJECTION, SOLUTION INTRAMUSCULAR; INTRAVENOUS; SUBCUTANEOUS at 12:36

## 2019-08-07 RX ADMIN — OXYCODONE HYDROCHLORIDE 10 MG: 5 TABLET ORAL at 21:52

## 2019-08-07 RX ADMIN — ALBUTEROL SULFATE 2 PUFF: 90 INHALANT RESPIRATORY (INHALATION) at 20:38

## 2019-08-07 RX ADMIN — SODIUM CHLORIDE: 9 INJECTION, SOLUTION INTRAVENOUS at 08:21

## 2019-08-07 RX ADMIN — CYCLOBENZAPRINE HYDROCHLORIDE 5 MG: 5 TABLET, FILM COATED ORAL at 05:17

## 2019-08-07 ASSESSMENT — ACTIVITIES OF DAILY LIVING (ADL)
AMBULATION: 1-->ASSISTIVE EQUIPMENT
BATHING: 1-->ASSISTIVE EQUIPMENT
ADLS_ACUITY_SCORE: 16
COGNITION: 0 - NO COGNITION ISSUES REPORTED
DRESS: 1-->ASSISTIVE EQUIPMENT
FALL_HISTORY_WITHIN_LAST_SIX_MONTHS: NO
WHICH_OF_THE_ABOVE_FUNCTIONAL_RISKS_HAD_A_RECENT_ONSET_OR_CHANGE?: AMBULATION;TRANSFERRING;BATHING;TOILETING;DRESSING
SWALLOWING: 0-->SWALLOWS FOODS/LIQUIDS WITHOUT DIFFICULTY
TOILETING: 1-->ASSISTIVE EQUIPMENT
RETIRED_COMMUNICATION: 0-->UNDERSTANDS/COMMUNICATES WITHOUT DIFFICULTY
TRANSFERRING: 1-->ASSISTIVE EQUIPMENT
RETIRED_EATING: 1-->ASSISTIVE EQUIPMENT
ADLS_ACUITY_SCORE: 12

## 2019-08-07 NOTE — ANESTHESIA POSTPROCEDURE EVALUATION
Patient: Mercedes Barber    Procedure(s):  Right hip revision total arthroplasty    Diagnosis:.  Diagnosis Additional Information: No value filed.    Anesthesia Type:  General, ETT    Note:  Anesthesia Post Evaluation    Patient location during evaluation: PACU  Patient participation: Able to fully participate in evaluation  Level of consciousness: awake and alert  Pain management: satisfactory to patient  Airway patency: patent  Cardiovascular status: acceptable  Respiratory status: acceptable  Hydration status: acceptable  PONV: controlled             Last vitals:  Vitals:    08/07/19 1140 08/07/19 1145 08/07/19 1150   BP: 123/78 134/80 127/82   Pulse: 71 64 65   Resp: 15 15 18   Temp: 97.4  F (36.3  C)     SpO2: 100% 100% 100%         Electronically Signed By: Noe Billingsley MD  August 7, 2019  11:57 AM

## 2019-08-07 NOTE — PLAN OF CARE
VSS. Alert and oriented x 4. Pt up with Sera steady x 2. Purewick in place at HS. Pt voiding adequately. Pre surgical scrub completed. Pt received PRN IV dilaudid x 2 and PRN oxycodone x 1. PRN cyclobenziprine ordered for c/o cramping, given and was effective. Tolerated regular diet, NPO at midnight. CT scan performed of pelvis. Surgery following. CMS intact. Discharge plan pending.

## 2019-08-07 NOTE — ANESTHESIA CARE TRANSFER NOTE
Patient: Mercedes Barber    Procedure(s):  Right hip revision total arthroplasty    Diagnosis: .  Diagnosis Additional Information: No value filed.    Anesthesia Type:   General, ETT     Note:  Airway :Face Mask  Patient transferred to:PACU  Handoff Report: Identifed the Patient, Identified the Reponsible Provider, Reviewed the pertinent medical history, Discussed the surgical course, Reviewed Intra-OP anesthesia mangement and issues during anesthesia, Set expectations for post-procedure period and Allowed opportunity for questions and acknowledgement of understanding      Vitals: (Last set prior to Anesthesia Care Transfer)    CRNA VITALS  8/7/2019 1103 - 8/7/2019 1141      8/7/2019             Pulse:  79    SpO2:  100 %    Resp Rate (observed):  10                Electronically Signed By: FROYLAN Vargas CRNA  August 7, 2019  11:41 AM

## 2019-08-07 NOTE — BRIEF OP NOTE
[facility]    Brief Operative Note    Pre-operative diagnosis: .  Post-operative diagnosis * No post-op diagnosis entered *  Procedure: Procedure(s):  Right hip revision total arthroplasty  Surgeon: Surgeon(s) and Role:     * Tom Antonio MD - Primary     * Juan Miguel Art PA-C - Assisting  Anesthesia: General   Estimated blood loss: 300 ml  Drains: None  Specimens:   ID Type Source Tests Collected by Time Destination   1 : Right hip seroma Fluid Hip, Right ANAEROBIC BACTERIAL CULTURE, CELL COUNT WITH DIFFERENTIAL FLUID, FLUID CULTURE AEROBIC BACTERIAL, GRAM STAIN Tom Antonio MD 8/7/2019  8:38 AM      Findings:   protrusio of cup, stable hip and cup at finish.  Complications: None.  Implants:    Implant Name Type Inv. Item Serial No.  Lot No. LRB No. Used   Stimulan Rapid Cure, 10cc    [employer] [id number] Right 1   GRAFT BONE CRUSH CANC 30ML [id number] Bone/Tissue/Biologic GRAFT BONE CRUSH CANC 30ML [id number] MUSCULOSKELETAL CAMPBELL  Right 1   GRAFT BONE CRUSH CANC 30ML [id number] Bone/Tissue/Biologic GRAFT BONE CRUSH CANC 30ML 601234 [id number] MUSCULOSKELETAL CAMPBELL  Right 1   Multihole acetabular shell 56mm, F     VIVIANA [id number] Right 1   6.5mm x 50mm low profile hex screw    VIVIANA 7T3 Right 2   6.5mm x 40mm low profile hex screw    VIVIANA 7LS Right 1   6.5mm x 30mm low profile hex screw    VIVIANA 85TA Right 1   IMP CABLE ZIM CERCLAGE 1.8X25MM 2232-04-18 Metallic Hardware/Avenue IMP CABLE ZIM CERCLAGE 1.8X25MM 2232-04-18  [employer] [id number] Right 2   IMP INSERT ACET STRK RSTRTN HIP ADM X3 [id number] 1236-2-852 Total Joint Component/Insert IMP INSERT ACET STRK RSTRTN HIP ADM X3 [id number] 1236-2-852  [employer] [id number] Right 1   IMP INSERT ACET STRK MDM COCR HIP 0DEG 46MM SZ F 626-00-46F Total Joint Component/Insert IMP INSERT ACET STRK MDM COCR HIP 0DEG 46MM SZ F 626-00-46F  [employer] [id number] Right 1   6.5 x 25mm Low Profile Hex  Screw    VIVIANA 5NAA Right 1   6.5 x 30mm Low Profile Hex Screw    VIVIANA 63DA Right 1   Hex Dome Hole Plug    VIVIANA [id number] Right 1   IMP HEAD FEMORAL STRK BIOLOX DELTA CERAMIC V40 36MM Total Joint Component/Insert IMP HEAD FEMORAL STRK BIOLOX DELTA CERAMIC V40 36MM  [employer] [id number] Right 1   IMP LINER STRK TRIDENT X3 POLY 36MM 0DEG SZ E 623-00-36E Total Joint Component/Insert IMP LINER STRK TRIDENT X3 POLY 36MM 0DEG SZ E 623-00-36E  [employer] [id number] Right 1   TRIDENT  II, PSL  Clusterhole HA Acetabular Shell, 54mm, E    VIVIANA [id number] Right 1   IMP HEAD FEMORAL STRK BIOLOX DELTA CERAMIC 28MM 0MM Total Joint Component/Insert IMP HEAD FEMORAL STRK BIOLOX DELTA CERAMIC 28MM 0MM  [employer] [id number] Right 1      PLan:    Flat foot weight bearing RLE with walker x 6 weeks  PACU x-rays  DVT prophy - ASA and SCDs  Antibiotics - 24 H ancef and 2 weeks of PO abx  SW - pt would like to go to TCU or maximize home care options  Pain control

## 2019-08-07 NOTE — PLAN OF CARE
zabrina PO well, PO pain meds managing pain, kay draining in good amts  Ointment applied to right eye as per orders

## 2019-08-07 NOTE — ANESTHESIA PREPROCEDURE EVALUATION
Anesthesia Pre-Procedure Evaluation    Patient: Mercedes Barber   MRN: [id number] : 1955          Preoperative Diagnosis: .    Procedure(s):  Right hip revision total arthroplasty    Past Medical History:   Diagnosis Date     Arthritis     Thumb     Cervical high risk HPV (human papillomavirus) test positive 2017: NIL pap, + HR HPV (not 16 or 18) result.      Cervical pain 9/15/2016     Constipation 2012     Diarrhea 2009     Esophageal stricture 2012     Gastro-oesophageal reflux disease      Hypothyroidism 2012     IBS (irritable bowel syndrome)      Mild major depression (H) 2012     Neuropathy of lower extremity      Rectal prolapse      Restless leg syndrome      Seizure disorder (H)     25 years ago     Sleep apnea     CPAP, no longer using CPAP     Temporal sclerosis 2012     Past Surgical History:   Procedure Laterality Date     Anterior COLPORRHAPHY, BLADDER/VAGINA      perigee mesh     ARTHROPLASTY HIP Right 2019    Procedure: Right total hip arthroplasty;  Surgeon: Anant Mejia MD;  Location: [facility] OR     ARTHROPLASTY PATELLO-FEMORAL (KNEE) Bilateral      CARPAL TUNNEL RELEASE RT/LT       DENTAL SURGERY      implant     DILATION AND CURETTAGE       DRAIN PILONIDAL CYST SIMPL       ENDOSCOPY DRUG INDUCED SLEEP N/A 2015    Procedure: ENDOSCOPY DRUG INDUCED SLEEP;  Surgeon: Park Ortiz MD;  Location: [facility] OR     ESOPHAGOSCOPY, GASTROSCOPY, DUODENOSCOPY (EGD), COMBINED  2013    Procedure: COMBINED ESOPHAGOSCOPY, GASTROSCOPY, DUODENOSCOPY (EGD), BIOPSY SINGLE OR MULTIPLE;;  Surgeon: Mundo Mehta MD;  Location:  GI     EXCISE LESION TRUNK  8/10/2012    Procedure: EXCISE LESION TRUNK;;  Surgeon: Jerald Diggs MD;  Location: [facility] OR     HYSTERECTOMY       L shoulder fracture       rectal prolapse repair abdominally       RELEASE PLANTAR FASCIA Right 2015    Procedure: RELEASE PLANTAR FASCIA;  Surgeon: Noe  Maicol Garza DPM;  Location: [facility]     REMOVE MESH VAGINA  8/10/2012    Procedure: REMOVE MESH VAGINA;  Excision of Vaginal Mesh Exposure, Removal of Skin Tag Left Inner Leg;  Surgeon: Jerald Diggs MD;  Location: [facility] OR     REPAIR HAMMER TOE Right 1/20/2015    Procedure: REPAIR HAMMER TOE;  Surgeon: Maicol Liu DPM;  Location: [facility]     TONSILLECTOMY & ADENOIDECTOMY       TUBAL LIGATION       Anesthesia Evaluation     . Pt has had prior anesthetic. Type: General    No history of anesthetic complications          ROS/MED HX    ENT/Pulmonary:     (+)sleep apnea, doesn't use CPAP , . .    Neurologic:     (+)neuropathy - b/l LE, intermittent and related to position,     Cardiovascular:  - neg cardiovascular ROS       METS/Exercise Tolerance:     Hematologic:         Musculoskeletal: Comment: Previous right hip arthroplasty, needing revision   (+)  other musculoskeletal-       GI/Hepatic:     (+) GERD Asymptomatic on medication,       Renal/Genitourinary:         Endo:     (+) thyroid problem .      Psychiatric:  - neg psychiatric ROS       Infectious Disease:  - neg infectious disease ROS       Malignancy:      - no malignancy   Other:    - neg other ROS                      Physical Exam  Normal systems: cardiovascular, pulmonary and dental    Airway   Mallampati: II  TM distance: >3 FB  Neck ROM: full    Dental     Cardiovascular       Pulmonary             Lab Results   Component Value Date    WBC 7.7 02/09/2019    HGB 9.7 (L) 08/06/2019    HCT 42.0 02/09/2019     07/02/2019    SED 7 02/08/2011     08/07/2019    POTASSIUM 4.2 08/07/2019    CHLORIDE 109 08/07/2019    CO2 30 08/07/2019    BUN 12 08/07/2019    CR 0.77 08/07/2019    GLC 89 08/07/2019    ASBA 8.7 08/07/2019    PHOS 4.1 09/17/2009    MAG 1.9 10/13/2011    ALBUMIN 3.8 02/09/2019    PROTTOTAL 7.5 02/09/2019    ALT 21 02/09/2019    AST 17 02/09/2019    ALKPHOS 94 02/09/2019    BILITOTAL 0.5 02/09/2019    LIPASE 87 02/09/2019    PTT 30  "08/23/2011    INR 0.95 08/06/2019    TSH 0.48 07/02/2019    T4 0.81 09/12/2017    HCG Negative 10/17/2007       Preop Vitals  BP Readings from Last 3 Encounters:   08/07/19 138/75   07/25/19 95/59   07/02/19 120/76    Pulse Readings from Last 3 Encounters:   07/25/19 82   07/02/19 70   05/17/19 64      Resp Readings from Last 3 Encounters:   08/07/19 18   07/25/19 16   07/02/19 16    SpO2 Readings from Last 3 Encounters:   08/07/19 98%   07/25/19 97%   07/02/19 95%      Temp Readings from Last 1 Encounters:   08/07/19 97.8  F (36.6  C) (Temporal)    Ht Readings from Last 1 Encounters:   08/06/19 1.651 m (5' 5\")      Wt Readings from Last 1 Encounters:   08/06/19 90.7 kg (200 lb)    Estimated body mass index is 33.28 kg/m  as calculated from the following:    Height as of this encounter: 1.651 m (5' 5\").    Weight as of this encounter: 90.7 kg (200 lb).       Anesthesia Plan      History & Physical Review  History and physical reviewed and following examination; no interval change.    ASA Status:  2 .    NPO Status:  > 8 hours    Plan for General and ETT with Intravenous induction. Maintenance will be Balanced.    PONV prophylaxis:  Ondansetron (or other 5HT-3) and Dexamethasone or Solumedrol  Additional equipment: 2nd IV      Postoperative Care  Postoperative pain management:  IV analgesics.      Consents  Anesthetic plan, risks, benefits and alternatives discussed with:  Patient.  Use of blood products discussed: Yes.   Use of blood products discussed with Patient.  Consented to blood products.  .                 Noe Billingsley MD                    .  "

## 2019-08-07 NOTE — PROGRESS NOTES
"[facility]    Hospitalist Progress Note             Date of Admission:  8/6/2019                   Day of hospitalization: 1    Assessment and Plan:       Mercedes Barber is a 64 year old female admitted on 8/6/2019 for revision of right SARAHI.       Problem List:   1. POD #0 s/p Right hip revision total arthroplasty.  GATITO (negative pressure wound closure dressing) in place.   2. 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  3. JORDAN. Not using CPAP.  4. Hypothyroidism, GERD, Mood disorder.    5. R eye pain, possibly Corneal abrasion, no trauma per patient, no evidence of infection/foriegn body on my exam  - monitor, eye drops ordered for pain control, may need referral to opthalmology if not improved outpatient        ---------     # Code status: Full  # Anticipated discharge date and Disposition: per Orthopedics  # DVT: per Orthopedics  # IVF: Lactated ringers at 100 mL/hour                        Janie Hoyos MD  Text Page (7am - 6pm, M-F)               Subjective   Chief Complaint: hip pain    subjective: Pain in hip, knee, ankle and right eye.  Denies any eye trauma or any fevers chills or discharge.  Otherwise no other complaints.  No fevers chills cough. No Chest pain or shortness of breath.         Objective   /67   Pulse 55   Temp 96.7  F (35.9  C) (Oral)   Resp 16   Ht 1.651 m (5' 5\")   Wt 90.7 kg (200 lb)   LMP 03/11/2010   SpO2 98%   BMI 33.28 kg/m       Physical Exam  General: Pt in NAD, normal appearance  HEENT: OP clear MMM, no JVD  Lungs: Clear to Auscultation Bilateral, normal breathing  without accessory muscle usage, no wheezing, rhonchi or crackles  Cardiac: +S1, S2, RRR, no MRG, no edema  Abdominal: normal bowel sounds, NT/ND,   Skin: warm, dry, normal turgor, no rash  Psyche: A& O x3, appropriate affect             Intake/Output Summary (Last 24 hours) at 8/7/2019 1442  Last data filed at 8/7/2019 1309  Gross per 24 hour   Intake 3481 ml   Output 1950 " ml   Net 1531 ml           Labs and Imaging Results:      Recent Labs   Lab 08/07/19  0938 08/06/19  1743   HGB 9.1* 9.7*        Recent Labs   Lab 08/07/19  0610 08/06/19  1743    139   CO2 30 26   BUN 12 17        Recent Labs   Lab 08/06/19  1818   INR 0.95      No results for input(s): CKMB in the last 168 hours.    Invalid input(s): TROPONINT   No results for input(s): ALBUMIN, AST, ALT, ALKPHOS, BILITOT in the last 168 hours.     Micro:     Radio:  XR Pelvis w Hip Port Right 1 View   Final Result   Impression: Revision right total hip arthroplasty. Proximal femur   cerclage wire fixation. The orthopedic hardware is intact without   evidence of immediate complication. No periprosthetic fracture.   Radiodensities projecting over the soft tissues of the right hip,   presumably external to the patient. Vieyra catheter. Remainder   unremarkable..      ROHINI DE LUNA MD      XR Hip Port Right 1 View   Final Result      CT Pelvis Bone wo Contrast   Final Result   IMPRESSION:   1. Right total hip arthroplasty with shift in the position of the   acetabular cup since radiographs of 7/23/2019. There are two   acetabular roof screws. Although no lucencies are visible about the   screws, new acetabular protrusio and shift in the orientation of the   prosthetic cup suggest loosening compared to 7/23/2019.   2. Large deep subcutaneous collection over the lateral aspect of the   right hip, possibly a subcutaneous hematoma.      ALIYAH VILLANUEVA MD              Medications:      Scheduled Meds:      acetaminophen  975 mg Oral Q8H     artificial tears   Right Eye 4x Daily     aspirin  325 mg Oral Daily     budesonide-formoterol  2 puff Inhalation 2 times daily     ceFAZolin  2 g Intravenous Q8H     celecoxib  100 mg Oral BID     gabapentin  100 mg Oral TID     hypromellose-dextran  1 drop Both Eyes BID     lacosamide  200 mg Oral BID     levothyroxine  50 mcg Oral Daily     linaclotide  290 mcg Oral QAM AC     liothyronine  10  mcg Oral Daily     nefazodone  600 mg Oral At Bedtime     omeprazole  40 mg Oral BID     pramipexole  1 mg Oral QAM     pramipexole  2 mg Oral QPM     ranitidine  150 mg Oral BID     senna-docusate  1 tablet Oral BID    Or     senna-docusate  2 tablet Oral BID     sodium chloride (PF)  3 mL Intracatheter Q8H     temazepam  15 mg Oral At Bedtime     Continuous Infusions:      lactated ringers       PRN Meds:  [START ON 8/10/2019] acetaminophen, albuterol, sore throat lozenge, carboxymethylcellulose PF, cyclobenzaprine, HYDROmorphone, hydrOXYzine, lidocaine 4%, lidocaine (buffered or not buffered), LORazepam, [START ON 8/8/2019] melatonin, methocarbamol, naloxone, ondansetron **OR** ondansetron, oxyCODONE IR, polyethylene glycol, potassium chloride, potassium chloride with lidocaine, potassium chloride, potassium chloride, potassium chloride, sodium chloride (PF)

## 2019-08-08 ENCOUNTER — APPOINTMENT (OUTPATIENT)
Dept: PHYSICAL THERAPY | Facility: CLINIC | Age: 64
DRG: 467 | End: 2019-08-08
Attending: ORTHOPAEDIC SURGERY
Payer: MEDICARE

## 2019-08-08 LAB
APPEARANCE FLD: NORMAL
COLOR FLD: NORMAL
EOSINOPHIL NFR FLD MANUAL: 2 %
GLUCOSE SERPL-MCNC: 134 MG/DL (ref 70–99)
HGB BLD-MCNC: 8.4 G/DL (ref 11.7–15.7)
LYMPHOCYTES NFR FLD MANUAL: 36 %
MONOS+MACROS NFR FLD MANUAL: 34 %
NEUTS BAND NFR FLD MANUAL: 28 %
SPECIMEN SOURCE FLD: NORMAL
WBC # FLD AUTO: 498 /UL

## 2019-08-08 PROCEDURE — 85018 HEMOGLOBIN: CPT | Performed by: ORTHOPAEDIC SURGERY

## 2019-08-08 PROCEDURE — 25000128 H RX IP 250 OP 636: Performed by: ORTHOPAEDIC SURGERY

## 2019-08-08 PROCEDURE — 82947 ASSAY GLUCOSE BLOOD QUANT: CPT | Performed by: ORTHOPAEDIC SURGERY

## 2019-08-08 PROCEDURE — 40000275 ZZH STATISTIC RCP TIME EA 10 MIN

## 2019-08-08 PROCEDURE — 25000132 ZZH RX MED GY IP 250 OP 250 PS 637: Mod: GY | Performed by: INTERNAL MEDICINE

## 2019-08-08 PROCEDURE — 36415 COLL VENOUS BLD VENIPUNCTURE: CPT | Performed by: ORTHOPAEDIC SURGERY

## 2019-08-08 PROCEDURE — 94640 AIRWAY INHALATION TREATMENT: CPT

## 2019-08-08 PROCEDURE — 97161 PT EVAL LOW COMPLEX 20 MIN: CPT | Mod: GP

## 2019-08-08 PROCEDURE — 97530 THERAPEUTIC ACTIVITIES: CPT | Mod: GP

## 2019-08-08 PROCEDURE — 25000132 ZZH RX MED GY IP 250 OP 250 PS 637: Mod: GY | Performed by: ORTHOPAEDIC SURGERY

## 2019-08-08 PROCEDURE — 99232 SBSQ HOSP IP/OBS MODERATE 35: CPT | Performed by: HOSPITALIST

## 2019-08-08 PROCEDURE — 12000000 ZZH R&B MED SURG/OB

## 2019-08-08 PROCEDURE — 97110 THERAPEUTIC EXERCISES: CPT | Mod: GP

## 2019-08-08 RX ORDER — ASPIRIN 325 MG
325 TABLET, DELAYED RELEASE (ENTERIC COATED) ORAL DAILY
Qty: 40 TABLET | Refills: 0 | Status: ON HOLD | OUTPATIENT
Start: 2019-08-09 | End: 2019-08-27

## 2019-08-08 RX ORDER — OXYCODONE HYDROCHLORIDE 5 MG/1
5-10 TABLET ORAL EVERY 4 HOURS PRN
Qty: 30 TABLET | Refills: 0 | Status: SHIPPED | OUTPATIENT
Start: 2019-08-08 | End: 2019-08-25

## 2019-08-08 RX ORDER — AMOXICILLIN 250 MG
1 CAPSULE ORAL 2 TIMES DAILY PRN
Qty: 20 TABLET | Refills: 0 | Status: ON HOLD | OUTPATIENT
Start: 2019-08-08 | End: 2020-07-29

## 2019-08-08 RX ADMIN — OMEPRAZOLE 40 MG: 20 CAPSULE, DELAYED RELEASE ORAL at 20:58

## 2019-08-08 RX ADMIN — LACOSAMIDE 200 MG: 200 TABLET, FILM COATED ORAL at 20:57

## 2019-08-08 RX ADMIN — NEFAZODONE HYDROCHLORIDE 600 MG: 200 TABLET ORAL at 21:01

## 2019-08-08 RX ADMIN — ACETAMINOPHEN 975 MG: 325 TABLET, FILM COATED ORAL at 06:26

## 2019-08-08 RX ADMIN — LACOSAMIDE 200 MG: 200 TABLET, FILM COATED ORAL at 09:09

## 2019-08-08 RX ADMIN — CYCLOBENZAPRINE HYDROCHLORIDE 5 MG: 5 TABLET, FILM COATED ORAL at 13:27

## 2019-08-08 RX ADMIN — TEMAZEPAM 15 MG: 15 CAPSULE ORAL at 22:08

## 2019-08-08 RX ADMIN — DEXTRAN 70, AND HYPROMELLOSE 2910 1 DROP: 1; 3 SOLUTION/ DROPS OPHTHALMIC at 09:15

## 2019-08-08 RX ADMIN — OXYCODONE HYDROCHLORIDE 10 MG: 5 TABLET ORAL at 06:27

## 2019-08-08 RX ADMIN — LINACLOTIDE 290 MCG: 290 CAPSULE, GELATIN COATED ORAL at 09:08

## 2019-08-08 RX ADMIN — ASPIRIN 325 MG: 325 TABLET, DELAYED RELEASE ORAL at 09:09

## 2019-08-08 RX ADMIN — OXYCODONE HYDROCHLORIDE 10 MG: 5 TABLET ORAL at 14:11

## 2019-08-08 RX ADMIN — GABAPENTIN 100 MG: 100 CAPSULE ORAL at 09:08

## 2019-08-08 RX ADMIN — OXYCODONE HYDROCHLORIDE 10 MG: 5 TABLET ORAL at 02:03

## 2019-08-08 RX ADMIN — GABAPENTIN 100 MG: 100 CAPSULE ORAL at 13:27

## 2019-08-08 RX ADMIN — OXYCODONE HYDROCHLORIDE 10 MG: 5 TABLET ORAL at 18:57

## 2019-08-08 RX ADMIN — PRAMIPEXOLE DIHYDROCHLORIDE 1 MG: 1 TABLET ORAL at 09:08

## 2019-08-08 RX ADMIN — Medication 1 LOZENGE: at 10:46

## 2019-08-08 RX ADMIN — CYCLOBENZAPRINE HYDROCHLORIDE 5 MG: 5 TABLET, FILM COATED ORAL at 06:27

## 2019-08-08 RX ADMIN — RANITIDINE 150 MG: 150 TABLET ORAL at 20:57

## 2019-08-08 RX ADMIN — SENNOSIDES AND DOCUSATE SODIUM 1 TABLET: 8.6; 5 TABLET ORAL at 20:57

## 2019-08-08 RX ADMIN — GABAPENTIN 100 MG: 100 CAPSULE ORAL at 20:57

## 2019-08-08 RX ADMIN — CELECOXIB 100 MG: 100 CAPSULE ORAL at 20:57

## 2019-08-08 RX ADMIN — SENNOSIDES AND DOCUSATE SODIUM 2 TABLET: 8.6; 5 TABLET ORAL at 09:09

## 2019-08-08 RX ADMIN — BUDESONIDE AND FORMOTEROL FUMARATE DIHYDRATE 2 PUFF: 160; 4.5 AEROSOL RESPIRATORY (INHALATION) at 07:27

## 2019-08-08 RX ADMIN — CEFAZOLIN SODIUM 2 G: 2 INJECTION, SOLUTION INTRAVENOUS at 02:03

## 2019-08-08 RX ADMIN — ACETAMINOPHEN 975 MG: 325 TABLET, FILM COATED ORAL at 22:08

## 2019-08-08 RX ADMIN — ACETAMINOPHEN 975 MG: 325 TABLET, FILM COATED ORAL at 13:27

## 2019-08-08 RX ADMIN — DEXTRAN 70, AND HYPROMELLOSE 2910 1 DROP: 1; 3 SOLUTION/ DROPS OPHTHALMIC at 16:42

## 2019-08-08 RX ADMIN — LIOTHYRONINE SODIUM 10 MCG: 5 TABLET ORAL at 09:08

## 2019-08-08 RX ADMIN — CELECOXIB 100 MG: 100 CAPSULE ORAL at 09:09

## 2019-08-08 RX ADMIN — RANITIDINE 150 MG: 150 TABLET ORAL at 09:09

## 2019-08-08 RX ADMIN — LEVOTHYROXINE SODIUM 50 MCG: 0.05 TABLET ORAL at 09:08

## 2019-08-08 RX ADMIN — OXYCODONE HYDROCHLORIDE 10 MG: 5 TABLET ORAL at 10:20

## 2019-08-08 RX ADMIN — BUDESONIDE AND FORMOTEROL FUMARATE DIHYDRATE 2 PUFF: 160; 4.5 AEROSOL RESPIRATORY (INHALATION) at 22:03

## 2019-08-08 RX ADMIN — METHOCARBAMOL 500 MG: 500 TABLET ORAL at 00:03

## 2019-08-08 RX ADMIN — OMEPRAZOLE 40 MG: 20 CAPSULE, DELAYED RELEASE ORAL at 09:09

## 2019-08-08 RX ADMIN — PRAMIPEXOLE DIHYDROCHLORIDE 2 MG: 1 TABLET ORAL at 20:58

## 2019-08-08 ASSESSMENT — ACTIVITIES OF DAILY LIVING (ADL)
ADLS_ACUITY_SCORE: 17
ADLS_ACUITY_SCORE: 17
ADLS_ACUITY_SCORE: 19
ADLS_ACUITY_SCORE: 20

## 2019-08-08 NOTE — PLAN OF CARE
VSS, pt on RA until HS, put on 2L after mild desaturation into high 80s. Pt had pain in R hip throughout shift- received PRN IV dilaudid x 1 for breakthrough pain which was somewhat effective and oral oxycodone x 1 which was effective. Pt was rather anxious throughout shift- Scheduled HS meds effective with reducing anxiety. Pt refused capnography, PCDs. Pt refused to dangle, allowed writer plus other staff to reposition in bed and shift weight x 1. CMS intact. PIV SL. Discharge plan pending.

## 2019-08-08 NOTE — PROGRESS NOTES
08/08/19 1000   Quick Adds   Type of Visit Initial PT Evaluation   Living Environment   Lives With alone   Living Arrangements condominium   Living Environment Comment Pt lives alone in a condo. Pt does not have help lined up to assist at home. Friends can provide intermittent assist only.    Self-Care   Usual Activity Tolerance moderate   Current Activity Tolerance fair   Equipment Currently Used at Home cane, straight;raised toilet   Activity/Exercise/Self-Care Comment Patient typically mod I with a cane at baseline. Has been using a walker since her inital surgery on 7/23.    Functional Level Prior   Ambulation 1-->assistive equipment  (FWW)   Transferring 1-->assistive equipment   Fall history within last six months no   General Information   Onset of Illness/Injury or Date of Surgery - Date 08/06/19   Referring Physician Paco AYALA   Patient/Family Goals Statement TCU   Pertinent History of Current Problem (include personal factors and/or comorbidities that impact the POC) 64 year old s/p revision R SARAHI and bone grafting to the R acetabulum. Inital SARAHI on 7/23/19.    Precautions/Limitations fall precautions;right hip precautions   Weight-Bearing Status - RLE toe touch weight-bearing   General Info Comments Posterior or P/L precautions   Cognitive Status Examination   Orientation orientation to person, place and time   Level of Consciousness alert   Pain Assessment   Patient Currently in Pain Yes, see Vital Sign flowsheet  (6-7/10)   Range of Motion (ROM)   ROM Comment Decreased R LE AROM secondary to pain, weakness and precautions. Other extremities are WFLs.    Strength   Strength Comments Sifficient for mobility with CGA/SBA.    Bed Mobility   Bed Mobility Comments Supine to sit with min A   Transfer Skills   Transfer Comments STS with CGA   Gait   Gait Comments Min-mod A, FWW   Sensory Examination   Sensory Perception Comments Baseline neuropathy and numbness in bilateral feet.    Modality  "Interventions   Planned Modality Interventions Cryotherapy   Planned Modality Interventions Comments PRN   General Therapy Interventions   Planned Therapy Interventions bed mobility training;gait training;ROM;strengthening;transfer training;progressive activity/exercise   Clinical Impression   Criteria for Skilled Therapeutic Intervention yes, treatment indicated   PT Diagnosis Impaired gait   Influenced by the following impairments Pain, decreased R LE AROM/strength, impaired balance   Functional limitations due to impairments Decreased IND with bed mobility, transfers and ambulation   Clinical Presentation Stable/Uncomplicated   Clinical Presentation Rationale Stable.    Clinical Decision Making (Complexity) Low complexity   Therapy Frequency Daily   Predicted Duration of Therapy Intervention (days/wks) 3 days   Anticipated Discharge Disposition Transitional Care Facility   Risk & Benefits of therapy have been explained Yes   Patient, Family & other staff in agreement with plan of care Yes   [facility] AM-PAC  \"6 Clicks\" V.2 Basic Mobility Inpatient Short Form   1. Turning from your back to your side while in a flat bed without using bedrails? 3 - A Little   2. Moving from lying on your back to sitting on the side of a flat bed without using bedrails? 3 - A Little   3. Moving to and from a bed to a chair (including a wheelchair)? 3 - A Little   4. Standing up from a chair using your arms (e.g., wheelchair, or bedside chair)? 3 - A Little   5. To walk in hospital room? 3 - A Little   6. Climbing 3-5 steps with a railing? 1 - Total   Basic Mobility Raw Score (Score out of 24.Lower scores equate to lower levels of function) 16   Total Evaluation Time   Total Evaluation Time (Minutes) 5     "

## 2019-08-08 NOTE — PLAN OF CARE
A&Ox4, VSS, BP soft at 99/49, denies dizziness.  95% sats on RA.  LS clear, incentive spirometer encouraged.  CMS intact except baseline neuropathy/numbness all extremities.  Right hip dressing c/d/I.  Pain managed with prn oxycodone, flexeril, robaxin and ice pack, scheduled tylenol and gabapentin.  Up to w/c with PT, returned to bed with assist 1/gait belt/walker, TTWB RLE, compliant.  Instructed on hip precautions, stated understanding.  Vieyra catheter removed at 1200, DTV.  Tolerating regular diet, good appetite.  Will continue to monitor.

## 2019-08-08 NOTE — PROGRESS NOTES
"SPIRITUAL HEALTH SERVICES Progress Note  [facility]    [facility] visit per pt request.    Introduced myself and oriented pt, Mercedes, to [facility].  Pt had been in the hospital a few weeks prior for a total hip replacement, and underwent a revision of that surgery prompting her current hospitalization, per her chart.  Pt was overjoyed that [facility] is part of her care team and expressed gratitude for another visit.    Pt expressed severe discomfort and pain in her hip.  She cites that, at the time of her admission to the hospital, she felt like she was \"suffering\" and was unsure if she could withstand it.  She found comfort in knowing that Erick suffered as well, and that she was not alone.    Pt engaged in storytelling about her family, including stories of her father's death from diabetes complications, her sister's death from breast cancer, and her son's death by suicide.  Pt cited that she doesn't understand why bad things happen, and feels grief for the many people she has lost in her family.  Pt shared that she tries to keep a positive attitude and find the good in her struggles.  Affirmed pt's doubts and questions about suffering, facilitated space for the pt to engage in emotional processing and meaning-making, and normalized her sadness, grief, and fears.    Pt shared that despite her hospitalization, she engages in many spiritual practices.  During our visit we read aloud from two of her devotional books.  Pt also engages in prayer, Bible study, Bahai services, and outreach opportunities.  Pt looks forward to returning to those things once she recovers.    [facility] visit concluded due to pt's need for physical therapy.  Led pt in prayer and offered ongoing thoughts and prayers for her.  Informed pt of how to reach [facility] if desired.    Plan: [facility] remains available for spiritual/emotional support.      Aziza Chapman   Intern  Pager: [phone]  "

## 2019-08-08 NOTE — PLAN OF CARE
A&OX4, anxious, VSS: on 2l oxygen with nc, sats 90's.  Repositioned with Ax2. CMS: baseline neuropathy BLE. Drsg: CDI. Pain managed with PRN  oxycodone, Robaxin, Atarax and scheduled Tylenol. Vieyra patent and putting good amount. Nursing continue to monitor.

## 2019-08-08 NOTE — PLAN OF CARE
PT: Orders received, evaluation completed and treatment initiated. 64 year old s/p revision R SARAHI and bone grafting to the R acetabulum. Inital SARAHI on 7/23/19. Patient typically mod I with a cane at baseline. Has been using a walker since her inital surgery on 7/23. Pt lives alone in a condo. Pt does not have help lined up to assist at home. Friends can provide intermittent assist only.     Discharge Planner PT   Patient plan for discharge: TCU  Current status: Education/demonstration on hip precautions and safe mobility within WB restriction. Supine to sit with min A. STS with min A from bed, CGA from wheelchair. Ambulates 3' with FWW and min A. Constant cues to reduce weight on the R LE.   Barriers to return to prior living situation: Level of A, fall risk, no support at home, precautions, WB restriction  Recommendations for discharge: TCU  Rationale for recommendations: Pt below baseline for mobility. Not moving safe enough for discharge to home. Will need continued PT/OT in a TCU setting to address strength, balance and activity tolerance deficits.        Entered by: Jamison Arguello 08/08/2019 11:28 AM

## 2019-08-08 NOTE — PROGRESS NOTES
Patient had reported right eye discomfort after right hip arthroplasty on 8/7/19.  No vision changes at that time.  Felt like she had something in the eye.  Prescribed ointment and patched eye.  Today, the eye is much better, no patch in place.  No redness.  No vision changes. Per report feels normal again.  Likely small corneal abrasion which has resolved.  Instructed her to let anesthesia know if she has any further questions or concerns.      Noe Billingsley MD  Anesthesiology

## 2019-08-08 NOTE — PROGRESS NOTES
"[facility]    Hospitalist Progress Note             Date of Admission:  8/6/2019                   Day of hospitalization: 2    Assessment and Plan:       Mercedes Barber is a 64 year old female admitted on 8/6/2019 for revision of right SARAHI.       Problem List:   1. POD #1 s/p Right hip revision total arthroplasty.  GATITO (negative pressure wound closure dressing) in place.   2. 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  3. JORDAN. Not using CPAP.  4. Hypothyroidism, GERD, Mood disorder.    5. R eye pain, possibly Corneal abrasion, no trauma per patient, no evidence of infection/foriegn body on my exam  - monitor improved, eye drops ordered for pain control        ---------     # Code status: Full  # Anticipated discharge date and Disposition: per Orthopedics  # DVT: per Orthopedics  # IVF: Lactated ringers at 100 mL/hour                        Janie Hoyos MD  Text Page (7am - 6pm, M-F)               Subjective   Chief Complaint: hip pain    subjective: Pain in hip, knee, ankle eye pain resolved  Otherwise no other complaints.  No fevers chills cough. No Chest pain or shortness of breath.         Objective   BP 99/49   Pulse 67   Temp 96.8  F (36  C) (Oral)   Resp 18   Ht 1.651 m (5' 5\")   Wt 90.7 kg (200 lb)   LMP 03/11/2010   SpO2 99%   BMI 33.28 kg/m       Physical Exam  General: Pt in NAD, normal appearance  HEENT: OP clear MMM, no JVD  Lungs: Clear to Auscultation Bilateral, normal breathing  without accessory muscle usage, no wheezing, rhonchi or crackles  Cardiac: +S1, S2, RRR, no MRG, no edema  Abdominal: normal bowel sounds, NT/ND,   Skin: warm, dry, normal turgor, no rash  Psyche: A& O x3, appropriate affect             Intake/Output Summary (Last 24 hours) at 8/7/2019 1442  Last data filed at 8/7/2019 1309  Gross per 24 hour   Intake 3481 ml   Output 1950 ml   Net 1531 ml           Labs and Imaging Results:      Recent Labs   Lab 08/08/19  0708 08/07/19  0938   HGB " 8.4* 9.1*        Recent Labs   Lab 08/07/19  0610 08/06/19  1743    139   CO2 30 26   BUN 12 17        Recent Labs   Lab 08/06/19  1818   INR 0.95      No results for input(s): CKMB in the last 168 hours.    Invalid input(s): TROPONINT   No results for input(s): ALBUMIN, AST, ALT, ALKPHOS, BILITOT in the last 168 hours.     Micro:     Radio:  XR Pelvis w Hip Port Right 1 View   Final Result   Impression: Revision right total hip arthroplasty. Proximal femur   cerclage wire fixation. The orthopedic hardware is intact without   evidence of immediate complication. No periprosthetic fracture.   Radiodensities projecting over the soft tissues of the right hip,   presumably external to the patient. Vieyra catheter. Remainder   unremarkable..      ROHINI DE LUNA MD      XR Hip Port Right 1 View   Final Result      CT Pelvis Bone wo Contrast   Final Result   IMPRESSION:   1. Right total hip arthroplasty with shift in the position of the   acetabular cup since radiographs of 7/23/2019. There are two   acetabular roof screws. Although no lucencies are visible about the   screws, new acetabular protrusio and shift in the orientation of the   prosthetic cup suggest loosening compared to 7/23/2019.   2. Large deep subcutaneous collection over the lateral aspect of the   right hip, possibly a subcutaneous hematoma.      ALIYAH VILLANUEVA MD              Medications:      Scheduled Meds:      acetaminophen  975 mg Oral Q8H     artificial tears   Right Eye 4x Daily     aspirin  325 mg Oral Daily     budesonide-formoterol  2 puff Inhalation 2 times daily     celecoxib  100 mg Oral BID     gabapentin  100 mg Oral TID     hypromellose-dextran  1 drop Both Eyes BID     lacosamide  200 mg Oral BID     levothyroxine  50 mcg Oral Daily     linaclotide  290 mcg Oral QAM AC     liothyronine  10 mcg Oral Daily     nefazodone  600 mg Oral At Bedtime     omeprazole  40 mg Oral BID     pramipexole  1 mg Oral QAM     pramipexole  2 mg Oral QPM      ranitidine  150 mg Oral BID     senna-docusate  1 tablet Oral BID    Or     senna-docusate  2 tablet Oral BID     sodium chloride (PF)  3 mL Intracatheter Q8H     temazepam  15 mg Oral At Bedtime     Continuous Infusions:      lactated ringers 100 mL/hr at 08/07/19 1512     PRN Meds:  [START ON 8/10/2019] acetaminophen, albuterol, sore throat lozenge, carboxymethylcellulose PF, cyclobenzaprine, diclofenac, HYDROmorphone, hydrOXYzine, lidocaine 4%, lidocaine (buffered or not buffered), LORazepam, melatonin, methocarbamol, naloxone, ondansetron **OR** ondansetron, oxyCODONE IR, polyethylene glycol, potassium chloride, potassium chloride with lidocaine, potassium chloride, potassium chloride, potassium chloride, sodium chloride (PF)

## 2019-08-08 NOTE — OP NOTE
Procedure Date: 08/07/2019      PREOPERATIVE DIAGNOSES:   1.  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   2.  Right hip pain.   3.  Obstructive sleep apnea.   4.  History of seizure disorder.   5.  BMI greater than 40, large in buttocks.       POSTOPERATIVE DIAGNOSES:   1.  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   2.  Right hip pain.   3.  Obstructive sleep apnea.   4.  History of seizure disorder.   5.  BMI greater than 40, large in buttocks.      PROCEDURES PERFORMED:     1.  Revision right total hip arthroplasty, both acetabular and femoral components.   2.  Bone grafting, right acetabulum.      SURGEON:  Tom Antonio MD.       ASSISTANT:  Higinio Art PA-C, whose assistance was critical for positioning this large patient, retraction with exposure, explantation, preparing the acetabulum and placement of components as well as closure.      ANESTHESIA:  General.      ESTIMATED BLOOD LOSS:  300 mL.      FINDINGS:  Acetabulum component with protrusio into the pelvis.  Stable hip following revision.  Good purchase of screws in cup.      IMPLANTS USED:     1.  [place] Trident II multi-hole Tritanium shell, size 56 with four 6.5 mm screws lengths 50, 50, 40, 30.     2.  MDM liner.   3.  Reimplantation of size 6, 132-degree Accolade stem.     4.  Dual mobility head +0, 28 mm inner.   5.  46 mm X3 restoration insert.      INDICATIONS:  This is a 64-year-old female who on 07/23/2019 had a posterior approach total hip arthroplasty with one of my partners.  Intraoperatively, it was recognized she had a breach of the medial wall of the acetabulum.  The acetabular component was placed and was felt to have good rim fit and screws per original operating surgeon.  At follow-up, she was found to have continued pain and protrusio of the cup into the pelvis.  Due to my expertise in adult reconstruction, I was asked to manage, take over this  patient's care.  I saw and examined her after reviewing x-rays and CT.  We had a long discussion regarding the nature of the situation with her total hip.  I have recommended surgery as nonoperative treatment would lead to further complications.  Risks of surgery discussed included but not limited to bleeding, infection, damage to surrounding neurovascular structures, leg length inequality, dislocation, periprosthetic fracture, need for additional surgery, blood clots going to the heart, lung or brain, anesthetic complications, even death.  She understands and wishes to proceed.  Consent signed.      DESCRIPTION OF PROCEDURE:  The patient was identified in the preop holding area per hospital policy, correct operative site marked, to the OR and the OR table.  General anesthesia induced.  Placed in a lateral position with all bony prominences well padded and an axillary roll.  Chlorhexidine and prescrub ChloraPrep drape.  Timeout performed.  WHO protocol correctly identifying the patient, operative site, procedure to be performed.  All in the room agreed.      I used the previous posterolateral incision extending slightly distally and proximally.  I dissected through skin and subcutaneous tissue, large fatty layer.  Encountered a large seroma which burst forth from the wound.  The fluid was sampled and sent for stat cell count, Gram stain and cultures.  Nothing in the hip appeared infected.  Dissected down and opened the IT band.  Released the posterior closure taken for later repair.  Palpated the sciatic nerve well away from where we were working.  The hip was dislocated, disimpacted the ceramic head.  The trunnion was in good condition.  First attempted to mobilize the hip anteriorly, but felt that due to the recent implantation, it would be best to remove the femoral component to visualize the acetabulum.  Placed threaded the removal tool into the femoral component and removed without difficulty.  There was  existing breach of the cortex posteriorly as it met the trochanter but did not appear to be propagating.        Now I turned attention to the acetabulum placing retractors anterior and posteriorly with a Steinmann pin superiorly, taking time to clear the surrounding tissue.  The poly liner was removed.  The 2 screws were removed without difficulty.  The cup was pushed into the pelvis.  Using a series of Kochers and then a bone hook, carefully removed the acetabular component.  After probing, the anterior and posterior columns were intact as well as the posterior wall.  The medial wall was no longer.  Carefully reamed up from a 53 to 56 mm.  I felt that if we went bigger than that, we would start to lose the walls of the acetabulum.  Trialed a 56 trial liner.  Then bone grafted 60 mL of cancellous bone chips, reverse reaming gently medially.  Opened and impacted a Trident II 56 mm multi-hole cup slightly flatter than before with anteversion just deep to the anterior acetabular wall.  The cup did have a good rim fit.  Placed 4 screws, all with good purchase.  The first were 50 mm screws with large bite followed by a 40 mm, and then a 30 mm directed slightly more posterior.  I carefully probed to ensure that these would be within bone.  The cup was very solid.  At this point, I placed a trial liner for dual mobility.      I now turned attention  to the femur placing 2 Prophylactic cerclage wires, 1 above and 1 below the lesser trochanter.  Again, I placed a broach for a size 6 stem trial with the most minus head and then the +0 head.  X-ray was brought in showing safe and appropriate position of the acetabular component, safe position of the screws.  Due to the patient's size, it was difficult to visualize the lesser trochanters in regard to leg length on 1 film.  The size 6 stem again appeared appropriate.      The trial was removed, impacted the actual MDM liner and it fit down circumferentially.  Placed actual size  6 stem that was used previously (it was cleaned and soaked in betadine during case) and then placed a 0 Biolox head and the X3 MDM insert with a 46 mm outer.  Just as in trialing before, the leg lengths felt symmetric.  No undue shuck.  Stable to external rotation and full extension.  Stable in sleeping position.  Stable with the hip flexed to 90 degrees and internally rotated to 60 degrees.         Copiously irrigated the wound after performing a rush Betadine lavage protocol.  Placed 25 mL of Stimulan beads that had been mixed with a gram of vancomycin due to the revision nature and re-revision surgery and the patient's obesity.  Repaired the posterior capsule with Ethibond through bone tunnels in the greater trochanter.  Closed down the IT band with #1 Vicryl followed by a #1 barbed Stratafix.  Closed the fatty layer with a #1 barbed Stratafix followed by #1 Vicryl, and 2-0 Stratafix fatty subcutaneous subcuticular layer.  Zipline placed with Exofin followed by a sterile dressing.  Hip abduction brace placed.  Awakened, transferred stable to PACU.      POSTOPERATIVE PLAN:   1.  Toe touch weightbearing right lower extremity x 6 weeks.   2.  PT, OT.   3.  Deep venous thrombosis prophylaxis:  Aspirin enteric coated 325 mg daily x 6 weeks.   4.  Vitamin D 2000 units daily.   5.  Pain control.   6.  Social work consult to optimize for home assist versus rehab unit.      FOLLOWUP PLAN:  Two-week wound check with Dr. Snyder with x-rays also at 6 weeks.         ZENIA SNYDER MD             D: 2019   T: 2019   MT:       Name:     OLI MOSELEY   MRN:      [id number]        Account:        [id number]   :      1955           Procedure Date: 2019      Document: [id number]

## 2019-08-08 NOTE — CONSULTS
Care Transition Initial Assessment - SW     Met with: Patient    Active Problems:    Hip pain       DATA  Lives With: alone   Living Arrangements: condominium  Who is your support system?: Children, Friends  Identified issues/concerns regarding health management: increased needs   Resources List: Skilled Nursing Facility    ASSESSMENT  Cognitive Status:  awake, alert and oriented  Concerns to be addressed: Need for TCU at discharge  SW consulted to assist with discharge planning, emotional support and transportation arrangements.  Pt is a 64 yr old  female who admitted after a hip fracture and surgical repair. Pt previously lived independently in her own home.   PLAN  Financial costs for the patient includes TBD .  Patient given options and choices for discharge: SNF list offered  Patient/family is agreeable to the plan?  Yes:   Transportation/person available to transport on day of discharge is TBD. Patient to call her dtr to discuss transport. Patient provided with [facility] Transport rates.  Patient Goals and Preferences: TCU  Patient anticipates discharging to: TCU  SW sent TCU referrals based on patient preference  SW to continue to follow and assist with discharge planning.

## 2019-08-08 NOTE — CONSULTS
Pt admitted for a right hip revision.  SW leading for TCU. FER ELEVATED.  Will follow along for needs.  Will give hand off to clinic RN CTS at time of discharge.  Remedios RN CTS [phone]

## 2019-08-09 ENCOUNTER — APPOINTMENT (OUTPATIENT)
Dept: PHYSICAL THERAPY | Facility: CLINIC | Age: 64
DRG: 467 | End: 2019-08-09
Attending: INTERNAL MEDICINE
Payer: MEDICARE

## 2019-08-09 LAB
HGB BLD-MCNC: 7.5 G/DL (ref 11.7–15.7)
HGB BLD-MCNC: 8.2 G/DL (ref 11.7–15.7)

## 2019-08-09 PROCEDURE — 25000132 ZZH RX MED GY IP 250 OP 250 PS 637: Mod: GY | Performed by: ORTHOPAEDIC SURGERY

## 2019-08-09 PROCEDURE — 99232 SBSQ HOSP IP/OBS MODERATE 35: CPT | Performed by: HOSPITALIST

## 2019-08-09 PROCEDURE — 25000132 ZZH RX MED GY IP 250 OP 250 PS 637: Mod: GY | Performed by: INTERNAL MEDICINE

## 2019-08-09 PROCEDURE — 12000000 ZZH R&B MED SURG/OB

## 2019-08-09 PROCEDURE — 97116 GAIT TRAINING THERAPY: CPT | Mod: GP | Performed by: PHYSICAL THERAPIST

## 2019-08-09 PROCEDURE — 97110 THERAPEUTIC EXERCISES: CPT | Mod: GP | Performed by: PHYSICAL THERAPIST

## 2019-08-09 PROCEDURE — 85018 HEMOGLOBIN: CPT | Performed by: ORTHOPAEDIC SURGERY

## 2019-08-09 PROCEDURE — 25000132 ZZH RX MED GY IP 250 OP 250 PS 637: Mod: GY | Performed by: HOSPITALIST

## 2019-08-09 PROCEDURE — 97530 THERAPEUTIC ACTIVITIES: CPT | Mod: GP | Performed by: PHYSICAL THERAPIST

## 2019-08-09 PROCEDURE — 36415 COLL VENOUS BLD VENIPUNCTURE: CPT | Performed by: ORTHOPAEDIC SURGERY

## 2019-08-09 RX ORDER — SULFAMETHOXAZOLE/TRIMETHOPRIM 800-160 MG
1 TABLET ORAL 2 TIMES DAILY
Qty: 28 TABLET | Refills: 0 | Status: SHIPPED | OUTPATIENT
Start: 2019-08-09 | End: 2019-08-25

## 2019-08-09 RX ORDER — ASPIRIN 325 MG
325 TABLET ORAL DAILY
Status: DISCONTINUED | OUTPATIENT
Start: 2019-08-10 | End: 2019-08-10 | Stop reason: HOSPADM

## 2019-08-09 RX ORDER — HYDROXYZINE HYDROCHLORIDE 10 MG/1
10 TABLET, FILM COATED ORAL 3 TIMES DAILY PRN
Status: DISCONTINUED | OUTPATIENT
Start: 2019-08-09 | End: 2019-08-09

## 2019-08-09 RX ORDER — BISACODYL 10 MG
10 SUPPOSITORY, RECTAL RECTAL DAILY PRN
Status: DISCONTINUED | OUTPATIENT
Start: 2019-08-09 | End: 2019-08-10 | Stop reason: HOSPADM

## 2019-08-09 RX ORDER — POLYETHYLENE GLYCOL 3350 17 G/17G
17 POWDER, FOR SOLUTION ORAL DAILY
Status: DISCONTINUED | OUTPATIENT
Start: 2019-08-09 | End: 2019-08-10 | Stop reason: HOSPADM

## 2019-08-09 RX ADMIN — PRAMIPEXOLE DIHYDROCHLORIDE 1 MG: 1 TABLET ORAL at 07:58

## 2019-08-09 RX ADMIN — OXYCODONE HYDROCHLORIDE 10 MG: 5 TABLET ORAL at 07:58

## 2019-08-09 RX ADMIN — GABAPENTIN 100 MG: 100 CAPSULE ORAL at 20:32

## 2019-08-09 RX ADMIN — LACOSAMIDE 200 MG: 200 TABLET, FILM COATED ORAL at 20:32

## 2019-08-09 RX ADMIN — BUDESONIDE AND FORMOTEROL FUMARATE DIHYDRATE 2 PUFF: 160; 4.5 AEROSOL RESPIRATORY (INHALATION) at 08:05

## 2019-08-09 RX ADMIN — LACOSAMIDE 200 MG: 200 TABLET, FILM COATED ORAL at 08:00

## 2019-08-09 RX ADMIN — SENNOSIDES AND DOCUSATE SODIUM 2 TABLET: 8.6; 5 TABLET ORAL at 20:32

## 2019-08-09 RX ADMIN — CELECOXIB 100 MG: 100 CAPSULE ORAL at 07:59

## 2019-08-09 RX ADMIN — OXYCODONE HYDROCHLORIDE 10 MG: 5 TABLET ORAL at 12:15

## 2019-08-09 RX ADMIN — ACETAMINOPHEN 975 MG: 325 TABLET, FILM COATED ORAL at 05:48

## 2019-08-09 RX ADMIN — LEVOTHYROXINE SODIUM 50 MCG: 0.05 TABLET ORAL at 07:59

## 2019-08-09 RX ADMIN — SENNOSIDES AND DOCUSATE SODIUM 1 TABLET: 8.6; 5 TABLET ORAL at 08:00

## 2019-08-09 RX ADMIN — METHOCARBAMOL 500 MG: 500 TABLET ORAL at 05:49

## 2019-08-09 RX ADMIN — DEXTRAN 70, AND HYPROMELLOSE 2910 1 DROP: 1; 3 SOLUTION/ DROPS OPHTHALMIC at 08:05

## 2019-08-09 RX ADMIN — PRAMIPEXOLE DIHYDROCHLORIDE 2 MG: 1 TABLET ORAL at 20:32

## 2019-08-09 RX ADMIN — LINACLOTIDE 290 MCG: 290 CAPSULE, GELATIN COATED ORAL at 07:59

## 2019-08-09 RX ADMIN — POLYETHYLENE GLYCOL 3350 17 G: 17 POWDER, FOR SOLUTION ORAL at 21:44

## 2019-08-09 RX ADMIN — CYCLOBENZAPRINE HYDROCHLORIDE 5 MG: 5 TABLET, FILM COATED ORAL at 00:38

## 2019-08-09 RX ADMIN — RANITIDINE 150 MG: 150 TABLET ORAL at 07:59

## 2019-08-09 RX ADMIN — ACETAMINOPHEN 975 MG: 325 TABLET, FILM COATED ORAL at 22:10

## 2019-08-09 RX ADMIN — NEFAZODONE HYDROCHLORIDE 600 MG: 200 TABLET ORAL at 22:11

## 2019-08-09 RX ADMIN — TEMAZEPAM 15 MG: 15 CAPSULE ORAL at 22:10

## 2019-08-09 RX ADMIN — ACETAMINOPHEN 975 MG: 325 TABLET, FILM COATED ORAL at 13:38

## 2019-08-09 RX ADMIN — RANITIDINE 150 MG: 150 TABLET ORAL at 20:32

## 2019-08-09 RX ADMIN — OMEPRAZOLE 40 MG: 20 CAPSULE, DELAYED RELEASE ORAL at 20:32

## 2019-08-09 RX ADMIN — GABAPENTIN 100 MG: 100 CAPSULE ORAL at 07:59

## 2019-08-09 RX ADMIN — ASPIRIN 325 MG: 325 TABLET, DELAYED RELEASE ORAL at 08:00

## 2019-08-09 RX ADMIN — DEXTRAN 70, AND HYPROMELLOSE 2910 1 DROP: 1; 3 SOLUTION/ DROPS OPHTHALMIC at 20:34

## 2019-08-09 RX ADMIN — OXYCODONE HYDROCHLORIDE 10 MG: 5 TABLET ORAL at 22:10

## 2019-08-09 RX ADMIN — POLYETHYLENE GLYCOL 3350 17 G: 17 POWDER, FOR SOLUTION ORAL at 13:38

## 2019-08-09 RX ADMIN — LIOTHYRONINE SODIUM 10 MCG: 5 TABLET ORAL at 07:59

## 2019-08-09 RX ADMIN — GABAPENTIN 100 MG: 100 CAPSULE ORAL at 13:38

## 2019-08-09 RX ADMIN — BUDESONIDE AND FORMOTEROL FUMARATE DIHYDRATE 2 PUFF: 160; 4.5 AEROSOL RESPIRATORY (INHALATION) at 20:35

## 2019-08-09 RX ADMIN — OXYCODONE HYDROCHLORIDE 10 MG: 5 TABLET ORAL at 17:08

## 2019-08-09 RX ADMIN — OXYCODONE HYDROCHLORIDE 10 MG: 5 TABLET ORAL at 00:38

## 2019-08-09 RX ADMIN — OMEPRAZOLE 40 MG: 20 CAPSULE, DELAYED RELEASE ORAL at 07:59

## 2019-08-09 ASSESSMENT — ACTIVITIES OF DAILY LIVING (ADL)
ADLS_ACUITY_SCORE: 20
ADLS_ACUITY_SCORE: 19

## 2019-08-09 NOTE — PROGRESS NOTES
SW:  Discharge Planner   Discharge Plans in progress: Patient accepted at [facility] into private room. Pt  Agrees to $39 amenity fee. Pt arranging for her dtr to transport to TCU on Sat afternoon. Samir from [facility] stated that admission between [id number] is best.  Barriers to discharge plan: None identified  Follow up plan: SW to continue to follow and assist with discharge planning.         Entered by: Sarah Magdaleno 08/09/2019 3:10 PM

## 2019-08-09 NOTE — PLAN OF CARE
A/O.  CMS at baseline.  Dressing D/I to right hip.  Pain managed with Oxycodone and Tylenol.  Voiding in adequate amounts-has purewick off and on today.  Tolerating regular diet.  Miralax given x1 today.  Plan is to go to [facility]'s tomorrow afternoon. Will continue to monitor.

## 2019-08-09 NOTE — PROGRESS NOTES
Pt stable during shift pain controlled with PRN oxycodone 10mg. Pt resting comfortably between cares and at the end of the shift. Voiding on a bedside commode with A1, walker gait belt.

## 2019-08-09 NOTE — PROGRESS NOTES
"[facility]    Hospitalist Progress Note             Date of Admission:  8/6/2019                   Day of hospitalization: 3    Assessment and Plan:       Mercedes Barber is a 64 year old female admitted on 8/6/2019 for revision of right SARAHI.       Problem List:   1.  s/p Right hip revision total arthroplasty.  GATITO (negative pressure wound closure dressing) in place.   2. 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  3. JORDAN. Not using CPAP.  4. Hypothyroidism, GERD, Mood disorder.    5. R eye pain, possibly Corneal abrasion, no trauma per patient, no evidence of infection/foriegn body on my exam  - monitor improved, eye drops ordered for pain control   6. Consitipation bowel regimen ordered  7. Post operative blood loss anemia, hemoglobin 7.5  - monitor transfuse when hemoglobin <7     ---------     # Code status: Full  # Anticipated discharge date and Disposition: per Orthopedics  # DVT: per Orthopedics  # IVF: none               Janie Hoyos MD  Text Page (7am - 6pm, M-F)               Subjective   Chief Complaint: hip pain    subjective: Pain in hip, knee, ankle eye pain resolved  Otherwise no other complaints.  No fevers chills cough. No Chest pain or shortness of breath.         Objective   /70   Pulse 67   Temp 96.3  F (35.7  C)   Resp 16   Ht 1.651 m (5' 5\")   Wt 90.7 kg (200 lb)   LMP 03/11/2010   SpO2 98%   BMI 33.28 kg/m       Physical Exam  General: Pt in NAD, normal appearance  HEENT: OP clear MMM, no JVD  Lungs: Clear to Auscultation Bilateral, normal breathing  without accessory muscle usage, no wheezing, rhonchi or crackles  Cardiac: +S1, S2, RRR, no MRG, no edema  Abdominal: normal bowel sounds, NT/ND,   Skin: warm, dry, normal turgor, no rash  Psyche: A& O x3, appropriate affect             Intake/Output Summary (Last 24 hours) at 8/7/2019 1442  Last data filed at 8/7/2019 1309  Gross per 24 hour   Intake 3481 ml   Output 1950 ml   Net 1531 ml         "   Labs and Imaging Results:      Recent Labs   Lab 08/09/19  0655 08/08/19  0708   HGB 7.5* 8.4*        Recent Labs   Lab 08/07/19  0610 08/06/19  1743    139   CO2 30 26   BUN 12 17        Recent Labs   Lab 08/06/19  1818   INR 0.95      No results for input(s): CKMB in the last 168 hours.    Invalid input(s): TROPONINT   No results for input(s): ALBUMIN, AST, ALT, ALKPHOS, BILITOT in the last 168 hours.     Micro:     Radio:  XR Pelvis w Hip Port Right 1 View   Final Result   Impression: Revision right total hip arthroplasty. Proximal femur   cerclage wire fixation. The orthopedic hardware is intact without   evidence of immediate complication. No periprosthetic fracture.   Radiodensities projecting over the soft tissues of the right hip,   presumably external to the patient. Vieyra catheter. Remainder   unremarkable..      ROHINI DE LUNA MD      XR Hip Port Right 1 View   Final Result      CT Pelvis Bone wo Contrast   Final Result   IMPRESSION:   1. Right total hip arthroplasty with shift in the position of the   acetabular cup since radiographs of 7/23/2019. There are two   acetabular roof screws. Although no lucencies are visible about the   screws, new acetabular protrusio and shift in the orientation of the   prosthetic cup suggest loosening compared to 7/23/2019.   2. Large deep subcutaneous collection over the lateral aspect of the   right hip, possibly a subcutaneous hematoma.      ALIYAH VILLANUEVA MD              Medications:      Scheduled Meds:      acetaminophen  975 mg Oral Q8H     artificial tears   Right Eye 4x Daily     aspirin  325 mg Oral Daily     budesonide-formoterol  2 puff Inhalation 2 times daily     gabapentin  100 mg Oral TID     hypromellose-dextran  1 drop Both Eyes BID     lacosamide  200 mg Oral BID     levothyroxine  50 mcg Oral Daily     linaclotide  290 mcg Oral QAM AC     liothyronine  10 mcg Oral Daily     nefazodone  600 mg Oral At Bedtime     omeprazole  40 mg Oral BID      polyethylene glycol  17 g Oral Daily     pramipexole  1 mg Oral QAM     pramipexole  2 mg Oral QPM     ranitidine  150 mg Oral BID     senna-docusate  1 tablet Oral BID    Or     senna-docusate  2 tablet Oral BID     sodium chloride (PF)  3 mL Intracatheter Q8H     temazepam  15 mg Oral At Bedtime     Continuous Infusions:      PRN Meds:  [START ON 8/10/2019] acetaminophen, albuterol, sore throat lozenge, bisacodyl, carboxymethylcellulose PF, cyclobenzaprine, diclofenac, HYDROmorphone, hydrOXYzine, lidocaine 4%, lidocaine (buffered or not buffered), LORazepam, melatonin, methocarbamol, naloxone, ondansetron **OR** ondansetron, oxyCODONE IR, polyethylene glycol, potassium chloride, potassium chloride with lidocaine, potassium chloride, potassium chloride, potassium chloride, sodium chloride (PF)

## 2019-08-09 NOTE — PLAN OF CARE
A&Ox4, VSS; stable. RA. Refused to get OOB this shift. Purewick in place per pt. Request. CMS; intact. Drsg: CDI. Pain managed with scheduled Tylenol, PRN oxycodone, Robaxin, and flexeril. Nursing continue to monitor.

## 2019-08-09 NOTE — PLAN OF CARE
Discharge Planner PT   Patient plan for discharge: TCU  Current status: Re- Education/demonstration on hip precautions and safe mobility within WB restriction. Pt seemed to be surprised that she was still TTWB. Explained WB status and expected period of time. Supine to sit with min A and cuing for technique. Sit<>stand with CGA with cues for positioning, UE placement, and precautions. Pt ambulates 10' total with use of FWW and CGA with cues for TTWB status. Pt sitting in bedside chair with all needs in reach (alarm on) at end of session.   Barriers to return to prior living situation: Level of A, fall risk, no support at home, precautions, WB restriction  Recommendations for discharge: TCU  Rationale for recommendations: Pt below baseline for mobility. Not moving safe enough for discharge to home. Will need continued PT/OT in a TCU setting to address strength, balance and activity tolerance deficits.        Entered by: Mohini Vargas 08/09/2019 1:07 PM

## 2019-08-09 NOTE — PROGRESS NOTES
Orthopedic Surgery  Mercedes Barber  2019  Admit Date:  2019  POD 2 Days Post-Op  S/P Procedure(s):  Right hip revision total arthroplasty    Patient resting comfortably in bed.    Pain controlled.  Tolerating oral intake.    Denies nausea or vomiting  Denies chest pain or shortness of breath  No events overnight.     Alert and orient to person, place, and time.  Vital Sign Ranges  Temperature Temp  Av.8  F (36  C)  Min: 96.3  F (35.7  C)  Max: 97.1  F (36.2  C)   Blood pressure Systolic (24hrs), Av , Min:95 , Max:120        Diastolic (24hrs), Av, Min:56, Max:70      Pulse No data recorded   Respirations Resp  Av.8  Min: 16  Max: 20   Pulse oximetry SpO2  Av.4 %  Min: 93 %  Max: 98 %       Dressing is clean, dry, and intact.   Minimal erythema of the surrounding skin.   Bilateral calves are soft, non-tender.  Bilateral lower extremity is NVI.  Sensation intact bilateral lower extremities  5/5 motor with resisted dorsi and plantar flexion bilaterally  +Dp pulse    Labs:  Recent Labs   Lab Test 19  0610 19  1743 19  0727   POTASSIUM 4.2 4.2 3.6     Recent Labs   Lab Test 19  0655 19  0708 19  0938   HGB 7.5* 8.4* 9.1*     Recent Labs   Lab Test 19  1818 11  0800   INR 0.95 0.98     Recent Labs   Lab Test 19  1056 19  1811 11/11/15  0947    259 246       A/P  1. S/p right total hip acetabular revision    Continue ASA for DVT prophylaxis.     Mobilize with PT/OT    TTWB right LE.     Continue current pain regiment.   Leave dressing intact   2 weeks PO abx    Watch hgb    2. Disposition   Anticipate d/c to TCU tomorrow pending medical clearance     Smita Lazo PA-C

## 2019-08-10 VITALS
TEMPERATURE: 98.6 F | BODY MASS INDEX: 33.32 KG/M2 | WEIGHT: 200 LBS | HEIGHT: 65 IN | HEART RATE: 67 BPM | DIASTOLIC BLOOD PRESSURE: 78 MMHG | RESPIRATION RATE: 16 BRPM | SYSTOLIC BLOOD PRESSURE: 112 MMHG | OXYGEN SATURATION: 96 %

## 2019-08-10 LAB
BLD PROD TYP BPU: NORMAL
BLD PROD TYP BPU: NORMAL
BLD UNIT ID BPU: 0
BLD UNIT ID BPU: 0
BLOOD PRODUCT CODE: NORMAL
BLOOD PRODUCT CODE: NORMAL
BPU ID: NORMAL
BPU ID: NORMAL
TRANSFUSION STATUS PATIENT QL: NORMAL

## 2019-08-10 PROCEDURE — 25000132 ZZH RX MED GY IP 250 OP 250 PS 637: Mod: GY | Performed by: HOSPITALIST

## 2019-08-10 PROCEDURE — 25000132 ZZH RX MED GY IP 250 OP 250 PS 637: Mod: GY | Performed by: ORTHOPAEDIC SURGERY

## 2019-08-10 PROCEDURE — 25000132 ZZH RX MED GY IP 250 OP 250 PS 637: Mod: GY | Performed by: INTERNAL MEDICINE

## 2019-08-10 PROCEDURE — 99207 ZZC MOONLIGHTING INDICATOR: CPT | Performed by: INTERNAL MEDICINE

## 2019-08-10 PROCEDURE — 99239 HOSP IP/OBS DSCHRG MGMT >30: CPT | Performed by: INTERNAL MEDICINE

## 2019-08-10 RX ORDER — LACOSAMIDE 200 MG/1
200 TABLET ORAL 2 TIMES DAILY
Qty: 60 TABLET | Refills: 1 | Status: ON HOLD | OUTPATIENT
Start: 2019-08-10 | End: 2019-08-29

## 2019-08-10 RX ORDER — TEMAZEPAM 15 MG/1
15 CAPSULE ORAL AT BEDTIME
Qty: 30 CAPSULE | Refills: 1 | Status: SHIPPED | OUTPATIENT
Start: 2019-08-10 | End: 2019-08-25

## 2019-08-10 RX ORDER — LORAZEPAM 0.5 MG/1
0.25 TABLET ORAL DAILY PRN
Qty: 60 TABLET | Refills: 1 | Status: SHIPPED | OUTPATIENT
Start: 2019-08-10 | End: 2019-08-25

## 2019-08-10 RX ADMIN — OMEPRAZOLE 40 MG: 20 CAPSULE, DELAYED RELEASE ORAL at 08:22

## 2019-08-10 RX ADMIN — LIOTHYRONINE SODIUM 10 MCG: 5 TABLET ORAL at 08:22

## 2019-08-10 RX ADMIN — GABAPENTIN 100 MG: 100 CAPSULE ORAL at 08:22

## 2019-08-10 RX ADMIN — OXYCODONE HYDROCHLORIDE 10 MG: 5 TABLET ORAL at 13:24

## 2019-08-10 RX ADMIN — OXYCODONE HYDROCHLORIDE 10 MG: 5 TABLET ORAL at 07:37

## 2019-08-10 RX ADMIN — Medication 0.25 MG: at 15:27

## 2019-08-10 RX ADMIN — BISACODYL 10 MG: 10 SUPPOSITORY RECTAL at 07:45

## 2019-08-10 RX ADMIN — LINACLOTIDE 290 MCG: 290 CAPSULE, GELATIN COATED ORAL at 08:21

## 2019-08-10 RX ADMIN — PRAMIPEXOLE DIHYDROCHLORIDE 1 MG: 1 TABLET ORAL at 08:21

## 2019-08-10 RX ADMIN — BUDESONIDE AND FORMOTEROL FUMARATE DIHYDRATE 2 PUFF: 160; 4.5 AEROSOL RESPIRATORY (INHALATION) at 08:25

## 2019-08-10 RX ADMIN — ACETAMINOPHEN 975 MG: 325 TABLET, FILM COATED ORAL at 06:00

## 2019-08-10 RX ADMIN — LACOSAMIDE 200 MG: 200 TABLET, FILM COATED ORAL at 08:21

## 2019-08-10 RX ADMIN — LEVOTHYROXINE SODIUM 50 MCG: 0.05 TABLET ORAL at 08:22

## 2019-08-10 RX ADMIN — ASPIRIN 325 MG ORAL TABLET 325 MG: 325 PILL ORAL at 08:22

## 2019-08-10 RX ADMIN — SENNOSIDES AND DOCUSATE SODIUM 1 TABLET: 8.6; 5 TABLET ORAL at 08:22

## 2019-08-10 RX ADMIN — OXYCODONE HYDROCHLORIDE 10 MG: 5 TABLET ORAL at 02:06

## 2019-08-10 RX ADMIN — RANITIDINE 150 MG: 150 TABLET ORAL at 08:22

## 2019-08-10 RX ADMIN — DEXTRAN 70, AND HYPROMELLOSE 2910 1 DROP: 1; 3 SOLUTION/ DROPS OPHTHALMIC at 08:25

## 2019-08-10 ASSESSMENT — ACTIVITIES OF DAILY LIVING (ADL)
ADLS_ACUITY_SCORE: 19

## 2019-08-10 NOTE — CONSULTS
[facility]    Orthopedics Consultation    Date of Admission:  8/6/2019    Assessment & Plan   Mercedes Barber is a 64 year old female who was admitted on 8/6/2019 with protrusion of right acetabular cup after recent SARAHI with partner.     We had a long discussion regarding the nature of the situation with her total hip.  I have recommended surgery as nonoperative treatment would lead to further complications.  Risks of surgery discussed included but not limited to bleeding, infection, damage to surrounding neurovascular structures, leg length inequality, dislocation, periprosthetic fracture, need for additional surgery, blood clots going to the heart, lung or brain, anesthetic complications, even death.  She understands and wishes to proceed.  Consent signed.     Tom Antonio MD    Code Status    Full Code    Reason for Consult   Reason for consult: I was asked by Dr. Pratt to evaluate this patient for right hip pain.    Primary Care Physician   Skyler Álvarez    History of Present Illness   Mercedes Barber is a 64 year old female who presents with increasing right hip pain following SARAHI.  This is a 64-year-old female who on 07/23/2019 had a posterior approach total hip arthroplasty with one of my partners.  Intraoperatively, it was recognized she had a breach of the medial wall of the acetabulum.  The acetabular component was placed and was felt to have good rim fit and screws per original operating surgeon.  At follow-up, she was found to have continued pain and protrusio of the cup into the pelvis.  Due to my expertise in adult reconstruction, I was asked to manage, take over this patient's care.  I saw and examined her after reviewing x-rays and CT.  Increasing pain in groin, not relieved with medication.  MEDS:   Current Outpatient Medications   Medication Sig Dispense Refill     aspirin (ASA) 325 MG EC tablet Take 1 tablet (325 mg) by mouth daily 40 tablet 0     oxyCODONE  "(ROXICODONE) 5 MG tablet Take 1-2 tablets (5-10 mg) by mouth every 4 hours as needed 1 tab po q 4 hrs prn pain scale \"1-5\"  2 tabs po q 4 hrs prn pain scale \"6-10\" 30 tablet 0     senna-docusate (SENOKOT-S/PERICOLACE) 8.6-50 MG tablet Take 1 tablet by mouth 2 times daily as needed for constipation 20 tablet 0     sulfamethoxazole-trimethoprim (BACTRIM DS/SEPTRA DS) 800-160 MG tablet Take 1 tablet by mouth 2 times daily for 14 days 28 tablet 0       PAST MEDICAL HISTORY:   Past Medical History:   Diagnosis Date     Arthritis     Thumb     Cervical high risk HPV (human papillomavirus) test positive 07/17/2017 07/17/17: NIL pap, + HR HPV (not 16 or 18) result.      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     Neuropathy of lower extremity      Rectal prolapse      Restless leg syndrome      Seizure disorder (H)     25 years ago     Sleep apnea     CPAP, no longer using CPAP     Temporal sclerosis 8/27/2012       PAST SURGICAL HISTORY:   Past Surgical History:   Procedure Laterality Date     Anterior COLPORRHAPHY, BLADDER/VAGINA      perigee mesh     ARTHROPLASTY HIP Right 7/23/2019    Procedure: Right total hip arthroplasty;  Surgeon: Anant Mejia MD;  Location: [facility] OR     ARTHROPLASTY PATELLO-FEMORAL (KNEE) Bilateral      ARTHROPLASTY REVISION HIP Right 8/7/2019    Procedure: Right hip revision total arthroplasty;  Surgeon: Tom Antonio MD;  Location: [facility] OR     CARPAL TUNNEL RELEASE RT/LT       DENTAL SURGERY      implant     DILATION AND CURETTAGE       DRAIN PILONIDAL CYST SIMPL       ENDOSCOPY DRUG INDUCED SLEEP N/A 11/18/2015    Procedure: ENDOSCOPY DRUG INDUCED SLEEP;  Surgeon: Park Ortiz MD;  Location: [facility] OR     ESOPHAGOSCOPY, GASTROSCOPY, DUODENOSCOPY (EGD), COMBINED  " "4/5/2013    Procedure: COMBINED ESOPHAGOSCOPY, GASTROSCOPY, DUODENOSCOPY (EGD), BIOPSY SINGLE OR MULTIPLE;;  Surgeon: Mundo Mehta MD;  Location:  GI     EXCISE LESION TRUNK  8/10/2012    Procedure: EXCISE LESION TRUNK;;  Surgeon: Jerald Diggs MD;  Location: [facility] OR     HYSTERECTOMY       L shoulder fracture       rectal prolapse repair abdominally       RELEASE PLANTAR FASCIA Right 1/20/2015    Procedure: RELEASE PLANTAR FASCIA;  Surgeon: Maicol Liu DPM;  Location: [facility]     REMOVE MESH VAGINA  8/10/2012    Procedure: REMOVE MESH VAGINA;  Excision of Vaginal Mesh Exposure, Removal of Skin Tag Left Inner Leg;  Surgeon: Jerald Diggs MD;  Location: [facility] OR     REPAIR HAMMER TOE Right 1/20/2015    Procedure: REPAIR HAMMER TOE;  Surgeon: Maicol Liu DPM;  Location: [facility]     TONSILLECTOMY & ADENOIDECTOMY       TUBAL LIGATION         FAMILY HISTORY:   Family History   Problem Relation Age of Onset     Eye Disorder Mother      Lipids Mother         has CHF     Osteoporosis Mother      Diabetes Father      Alzheimer Disease Paternal Grandmother      Hypertension Brother      Alcohol/Drug Brother      Depression Brother      Gastrointestinal Disease Brother         hx of colonic polyps     Lipids Brother      Psychotic Disorder Brother      Breast Cancer Sister      Depression Sister      Lipids Sister      Thyroid Disease Sister      Congenital Anomalies Daughter      Neurologic Disorder Daughter        SOCIAL HISTORY:   Social History     Tobacco Use     Smoking status: Never Smoker     Smokeless tobacco: Never Used   Substance Use Topics     Alcohol use: Yes     Comment: 1 glass of wine 4x/yr       ALLERGIES:    Allergies   Allergen Reactions     Clonopin [Clonazepam]      \"Walk funny and Mind goes funny\"     Encort [Hydrocortisone Acetate] Swelling     Feet swelled.     Encort [Hydrocortisone] Swelling     Erythromycin Diarrhea     Flagyl [Metronidazole] Nausea     Gabapentin      Over eats "     Lamictal [Lamotrigine]      Tegretol [Carbamazepine] Nausea and Vomiting       ROS:  10 point ROS neg other than the symptoms noted above in the HPI.      Physical Exam   Temp: 97.9  F (36.6  C) Temp src: Oral BP: 100/58   Heart Rate: 69 Resp: 18 SpO2: 96 % O2 Device: None (Room air)    Vital Signs with Ranges  Temp:  [96.3  F (35.7  C)-97.9  F (36.6  C)] 97.9  F (36.6  C)  Heart Rate:  [58-69] 69  Resp:  [16-20] 18  BP: ()/(56-70) 100/58  SpO2:  [93 %-98 %] 96 %  200 lbs 0 oz    Constitutional: Pleasant, alert, appropriate, following commands.  HEENT: Head atraumatic normocephalic. Pupils equal round and reactive to light.  Respiratory: Unlabored breathing no audible wheeze  Cardiovascular: Regular rate and rhythm  GI: Abdomen soft nontender nondistended.  Lymph/Hematologic: No lymphadenopathy in areas examined  Genitourinary:  No kay  Skin: No rashes, no cyanosis, no edema.  Musculoskeletal: Right leg, wound c/d/i, NVI.  Neurologic: normal without focal findings, mental status, speech normal, alert and oriented x iii, SASHA        Data   CT and x-rays reviewed showing right SARAHI with protusio of cup into pelvis.  Columns intact.

## 2019-08-10 NOTE — PLAN OF CARE
Physical Therapy Discharge Summary    Reason for therapy discharge:    Discharged to transitional care facility.    Progress towards therapy goal(s). See goals on Care Plan in [facility] electronic health record for goal details.  Goals not met.  Barriers to achieving goals:   discharge from facility.    Therapy recommendation(s):    Continued therapy is recommended.  Rationale/Recommendations:  PT as indicated at TCU.     Note: Pt not seen by documenting PT on this date. Information obtained from chart review.

## 2019-08-10 NOTE — PROGRESS NOTES
Pt resting in bed most of the shift, refused to get out of bed saying that she is too sleepy today. Pain controlled with 10mg oxycodone and ice. Good appetite, concerned about her voiding and her bowels, PRN miralax administered along with scheduled senna and fluids, voiding adequate amounts bladder scanned for 94ml will keep monitoring.

## 2019-08-10 NOTE — DISCHARGE SUMMARY
[facility]    Discharge Summary  Hospitalist    Date of Admission:  8/6/2019  Date of Discharge:  8/10/2019  Discharging Provider: Yusuf Brantley MD    Discharge Diagnoses   Right hip revision total arthroplasty    History of Present Illness   Mercedes Barber is a 64 year old female who presents with right hip pain gradually worsening since her recent surgery.  She has had trouble walking and functioning at home.  She was seen at Dr. Mejia's clinic today and felt to need right hip surgical revision.  Her sutures were also removed.  She otherwise denies chest pain, sob, nausea, recent falls, worsening constipation, other new complaints.      Hospital Course   Mercedes Barber was admitted on 8/6/2019.  The following problems were addressed during her hospitalization:    Active Problems:    Hip pain    Mercedes Barber is a 64 year old female admitted on 8/6/2019 for revision of right SARAHI.      Problem List:   1. s/p Right hip revision total arthroplasty.  Right total hip acetabular revision. GATITO (negative pressure wound closure dressing) in place. Ok for discharge per ortho.    2. 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 Continue.   3. JORDAN. Not using CPAP.  4. Hypothyroidism, GERD, Mood disorder.    5.   R eye pain, possibly Corneal abrasion, no trauma per patient, no evidence of infection/foriegn body on my exam. Improved. Ok for discharge without intervention.   - monitor improved, eye drops ordered for pain control      # Discharge Pain Plan:   - Patient currently has NO PAIN and is not being prescribed pain medications on discharge.    Yusuf Brantley MD    Significant Results and Procedures   None    Pending Results   These results will be followed up by PCP  Unresulted Labs Ordered in the Past 30 Days of this Admission     Date and Time Order Name Status Description    8/7/2019 0843 Fluid Culture Aerobic Bacterial Preliminary     8/7/2019 0843 Anaerobic bacterial culture  Preliminary           Code Status   Full Code       Primary Care Physician   Skyler Álvarez    Physical Exam   Temp: 97.7  F (36.5  C) Temp src: Oral BP: 110/67   Heart Rate: 65 Resp: 16 SpO2: 96 % O2 Device: None (Room air)    Vitals:    08/06/19 2352   Weight: 90.7 kg (200 lb)     Vital Signs with Ranges  Temp:  [97.1  F (36.2  C)-97.9  F (36.6  C)] 97.7  F (36.5  C)  Heart Rate:  [58-69] 65  Resp:  [16-20] 16  BP: ()/(56-67) 110/67  SpO2:  [95 %-96 %] 96 %  I/O last 3 completed shifts:  In: 1290 [P.O.:1290]  Out: 1600 [Urine:1600]    General: Pt in NAD, normal appearance  HEENT: OP clear MMM, no JVD  Lungs: Clear to Auscultation Bilateral, normal breathing  without accessory muscle usage, no wheezing, rhonchi or crackles  Cardiac: +S1, S2, RRR, no MRG, no edema  Abdominal: normal bowel sounds, NT/ND,   Skin: warm, dry, normal turgor, no rash  Psyche: A& O x3, appropriate affect    Discharge Disposition   Discharged to rehabilitation facility  Condition at discharge: Good    Consultations This Hospital Stay   PHARMACY IP CONSULT  ORTHOPEDIC SURGERY IP CONSULT  SOCIAL WORK IP CONSULT  OCCUPATIONAL THERAPY ADULT IP CONSULT  PHYSICAL THERAPY ADULT IP CONSULT  SOCIAL WORK IP CONSULT  CARE COORDINATOR IP CONSULT  PHYSICAL THERAPY ADULT IP CONSULT  OCCUPATIONAL THERAPY ADULT IP CONSULT    Time Spent on this Encounter   IYusuf, personally saw the patient today and spent greater than 30 minutes discharging this patient.    Discharge Orders      General info for SNF    Length of Stay Estimate: Short Term Care: Estimated # of Days <30  Condition at Discharge: Improving  Level of care:skilled   Rehabilitation Potential: Good  Admission H&P remains valid and up-to-date: Yes  Recent Chemotherapy: N/A  Use Nursing Home Standing Orders: Yes     Mantoux instructions    Give two-step Mantoux (PPD) Per Facility Policy Yes     Reason for your hospital stay    Right total hip acetabulum revision     Wound care (specify)  "   Site:   Right hip  Instructions:  Keep dressings intact, change if >60% saturated or peeling off.  Keep incision dry, able to shower over dressings if aquacel or tegaderm dressing.     Follow Up and recommended labs and tests    Follow up with Dr. Antonio at [facility] 2 weeks post surgery.   Call [phone] for appointment and questions.     Activity - Up with assistive device    Up with walker     Weight bearing status    TTWB Right LE with walker     Physical Therapy Adult Consult    Evaluate and treat as clinically indicated.    Reason:  Right total hip acetabulum revision     Occupational Therapy Adult Consult    Evaluate and treat as clinically indicated.    Reason:  Right total hip acetabulum revision     Fall precautions     Diet    Follow this diet upon discharge: Orders Placed This Encounter      Advance Diet as Tolerated: Regular Diet Adult     Discharge Medications   Current Discharge Medication List      START taking these medications    Details   sulfamethoxazole-trimethoprim (BACTRIM DS/SEPTRA DS) 800-160 MG tablet Take 1 tablet by mouth 2 times daily for 14 days  Qty: 28 tablet, Refills: 0    Associated Diagnoses: Status post total hip replacement, right         CONTINUE these medications which have CHANGED    Details   aspirin (ASA) 325 MG EC tablet Take 1 tablet (325 mg) by mouth daily  Qty: 40 tablet, Refills: 0    Associated Diagnoses: Status post total hip replacement, right      oxyCODONE (ROXICODONE) 5 MG tablet Take 1-2 tablets (5-10 mg) by mouth every 4 hours as needed 1 tab po q 4 hrs prn pain scale \"1-5\"  2 tabs po q 4 hrs prn pain scale \"6-10\"  Qty: 30 tablet, Refills: 0    Associated Diagnoses: Status post total hip replacement, right      senna-docusate (SENOKOT-S/PERICOLACE) 8.6-50 MG tablet Take 1 tablet by mouth 2 times daily as needed for constipation  Qty: 20 tablet, Refills: 0    Associated Diagnoses: Status post total hip replacement, right         CONTINUE these medications " which have NOT CHANGED    Details   budesonide-formoterol (SYMBICORT) 160-4.5 MCG/ACT Inhaler Inhale 2 puffs into the lungs 2 times daily      cycloSPORINE (RESTASIS) 0.05 % ophthalmic emulsion Place 1 drop into both eyes 2 times daily      hypromellose (ARTIFICIAL TEARS) 0.5 % SOLN ophthalmic solution Place 1 drop into both eyes 2 times daily      ibuprofen (ADVIL/MOTRIN) 600 MG tablet Take 1 tablet (600 mg) by mouth every 6 hours as needed for moderate pain  Qty: 50 tablet, Refills: 0    Associated Diagnoses: Status post total hip replacement, right      lacosamide (VIMPAT) 200 MG TABS Take 200 mg by mouth 2 times daily.      levothyroxine (SYNTHROID/LEVOTHROID) 50 MCG tablet Take 50 mcg by mouth daily      linaclotide (LINZESS) 290 MCG capsule Take 290 mcg by mouth every morning (before breakfast)      liothyronine (CYTOMEL) 5 MCG tablet Take 10 mcg by mouth daily       LORazepam (ATIVAN) 0.5 MG tablet Take 0.5 mg by mouth daily as needed for anxiety or sleep      nefazodone (SERZONE) 200 MG tablet Take 600 mg by mouth At Bedtime       omeprazole (PRILOSEC) 40 MG DR capsule Take 1 capsule (40 mg) by mouth 2 times daily  Qty: 180 capsule, Refills: 3    Associated Diagnoses: Gastroesophageal reflux disease without esophagitis; Esophageal stricture      polyethylene glycol (MIRALAX/GLYCOLAX) packet Take 1 packet by mouth daily as needed for constipation      POTASSIUM CITRATE PO Take 10 mEq by mouth daily       !! pramipexole (MIRAPEX) 1 MG tablet Take 2 mg by mouth every evening . (takes 1 tab in am , 2 tabs 1 hour prior to hs)      !! pramipexole (MIRAPEX) 1 MG tablet Take 1 mg by mouth every morning . ( takes 1 tab in am , 2 tabs 1 hour prior to hs)      ranitidine (ZANTAC) 150 MG tablet TAKE 1 TABLET BY MOUTH TWICE DAILY  Qty: 180 tablet, Refills: 3    Comments: **Patient requests 90 days supply**  Associated Diagnoses: Gastroesophageal reflux disease without esophagitis      TEMAZEPAM PO Take 30 mg by mouth At  "Bedtime       acetaminophen (TYLENOL) 325 MG tablet Take 2 tablets (650 mg) by mouth every 4 hours as needed for other (multimodal surgical pain management along with NSAIDS and opioid medication as indicated based on pain control and physical function.)  Qty: 50 tablet, Refills: 0    Associated Diagnoses: Status post total hip replacement, right      albuterol (ALBUTEROL) 108 (90 BASE) MCG/ACT Inhaler Inhale 2 puffs into the lungs 4 times daily as needed for shortness of breath / dyspnea or wheezing  Qty: 1 Inhaler, Refills: 0    Associated Diagnoses: Cough      hydrOXYzine (ATARAX) 10 MG tablet Take 1 tablet (10 mg) by mouth every 6 hours as needed for itching  Qty: 40 tablet, Refills: 0    Associated Diagnoses: Status post total hip replacement, right      ondansetron (ZOFRAN-ODT) 4 MG ODT tab Take 1 tablet (4 mg) by mouth every 6 hours as needed for nausea or vomiting  Qty: 10 tablet, Refills: 0    Associated Diagnoses: Status post total hip replacement, right       !! - Potential duplicate medications found. Please discuss with provider.      STOP taking these medications       QUEtiapine (SEROQUEL) 50 MG tablet Comments:   Reason for Stopping:             Allergies   Allergies   Allergen Reactions     Clonopin [Clonazepam]      \"Walk funny and Mind goes funny\"     Encort [Hydrocortisone Acetate] Swelling     Feet swelled.     Encort [Hydrocortisone] Swelling     Erythromycin Diarrhea     Flagyl [Metronidazole] Nausea     Gabapentin      Over eats     Lamictal [Lamotrigine]      Tegretol [Carbamazepine] Nausea and Vomiting     Data   Most Recent 3 CBC's:  Recent Labs   Lab Test 08/09/19  1652 08/09/19  0655 08/08/19  0708  07/02/19  1056 02/09/19  1811 11/11/15  0947  08/19/12  2236 08/08/12  1145   WBC  --   --   --   --   --  7.7  --   --  5.0 4.8   HGB 8.2* 7.5* 8.4*   < > 12.7 13.6 12.1   < > 12.1 12.4   MCV  --   --   --   --   --  94  --   --  92 93   PLT  --   --   --   --  254 259 246  --  276 230    < " > = values in this interval not displayed.      Most Recent 3 BMP's:  Recent Labs   Lab Test 08/08/19  0708 08/07/19  0610 08/06/19  1743 07/25/19  0727   NA  --  142 139 142   POTASSIUM  --  4.2 4.2 3.6   CHLORIDE  --  109 108 109   CO2  --  30 26 28   BUN  --  12 17 10   CR  --  0.77 0.83 0.84   ANIONGAP  --  3 5 5   SABA  --  8.7 8.9 8.5   * 89 97 95     Most Recent 2 LFT's:  Recent Labs   Lab Test 02/09/19  1811 11/01/16 06/09/16  1553   AST 17 17 18   ALT 21 17 27   ALKPHOS 94  --  74   BILITOTAL 0.5  --  0.2     Most Recent INR's and Anticoagulation Dosing History:  Anticoagulation Dose History     Recent Dosing and Labs Latest Ref Rng & Units 9/10/2005 9/13/2005 9/14/2005 9/15/2005 9/16/2005 8/23/2011 8/6/2019    INR 0.86 - 1.14 1.04 1.18(H) 2.28(H) 2.22(H) 1.44(H) 0.98 0.95        Most Recent 3 Troponin's:No lab results found.  Most Recent Cholesterol Panel:  Recent Labs   Lab Test 06/04/18  1037   CHOL 174   *   HDL 55   TRIG 89     Most Recent 6 Bacteria Isolates From Any Culture (See EPIC Reports for Culture Details):  Recent Labs   Lab Test 08/07/19  0838 08/27/15  1305 01/06/12  1607   CULT Culture negative monitoring continues  Culture negative monitoring continues No Salmonella, Shigella, Campylobacter, E. coli O157, Aeromonas, or Plesiomonas   isolated.   <10,000 colonies/mL Multiple species present, probable perineal contamination.     Most Recent TSH, T4 and A1c Labs:  Recent Labs   Lab Test 07/02/19  1056  09/12/17  1045  08/26/11  0615   TSH 0.48   < > 0.81   < >  --    T4  --   --  0.81   < >  --    A1C  --   --   --   --  5.6    < > = values in this interval not displayed.     Results for orders placed or performed during the hospital encounter of 08/06/19   CT Pelvis Bone wo Contrast    Narrative    CT PELVIS BONE WITHOUT CONTRAST 8/6/2019 9:28 PM     HISTORY: Pelvic fracture follow-up. Evaluate for pelvic discontinuity.  Preop planning status post total hip arthroplasty, now  with protrusio  of cup.    COMPARISON: 7/23/2019 radiographs.    Radiation dose for this scan was reduced using automated exposure  control, adjustment of the mA and/or kV according to patient size, or  iterative reconstruction technique.    FINDINGS:  Right total hip arthroplasty is present. Two acetabular  screws are noted without apparent adjacent bone lucency. However,  there is marked acetabular protrusio with the prosthetic acetabular  cup protruding through the medial wall of the acetabulum into the  pelvis, new since prior radiographs of 7/23/2019. The remainder of the  osseous pelvis and sacrum appear intact. No lucency, osseous  destruction, or fracture is noted adjacent to the femoral stem. There  is a large subcutaneous collection lateral to the right greater  trochanter, possibly a subcutaneous hematoma. Advanced degenerative  disc disease is noted in the lower lumbar spine.      Impression    IMPRESSION:  1. Right total hip arthroplasty with shift in the position of the  acetabular cup since radiographs of 7/23/2019. There are two  acetabular roof screws. Although no lucencies are visible about the  screws, new acetabular protrusio and shift in the orientation of the  prosthetic cup suggest loosening compared to 7/23/2019.  2. Large deep subcutaneous collection over the lateral aspect of the  right hip, possibly a subcutaneous hematoma.    ALIYAH VILLANEUVA MD   XR Hip Port Right 1 View    Narrative    This exam was marked as non-reportable because it will not be read by a   radiologist or a [place] non-radiologist provider.             XR Pelvis w Hip Port Right 1 View    Narrative    Examination:  XR PELVIS AD HIP PORTABLE RIGHT 1 VIEW    Date:  8/7/2019 12:39 PM     Clinical Information: s/p revision SARAHI for protrusio     Comparison: 8/7/2019.      Impression    Impression: Revision right total hip arthroplasty. Proximal femur  cerclage wire fixation. The orthopedic hardware is intact  without  evidence of immediate complication. No periprosthetic fracture.  Radiodensities projecting over the soft tissues of the right hip,  presumably external to the patient. Vieyra catheter. Remainder  unremarkable..    ROHINI DE LUNA MD

## 2019-08-10 NOTE — PROGRESS NOTES
Discharge instructions reviewed with pt and daughter.  Verbalize understanding.  Packet with instructions/orders along with scripts for Aspirin, Oxycodone, Senna, and Bactrim sent with pt.  Personal belongings gathered including clothes, purse, glasses, cell phone, and home prescriptions and sent with pt. Pt escorted via w/c accompanied by RN to hospital entrance.  Leaves with daughter to go to [facility].

## 2019-08-10 NOTE — PLAN OF CARE
Noc RN- Pt resting between cares, CMS at baseline, Pure-wick discouraged, Pt up to BSC TTWB zabrina well, Urine very dark jesica, encourage fluids. Pt c/o constipation, miralax given earlier & supp if pt requests. Oxycodone 10mg for pain & scheduled tylenol.

## 2019-08-12 ENCOUNTER — PATIENT OUTREACH (OUTPATIENT)
Dept: FAMILY MEDICINE | Facility: CLINIC | Age: 64
End: 2019-08-12

## 2019-08-12 LAB
BACTERIA SPEC CULT: NO GROWTH
SPECIMEN SOURCE: NORMAL

## 2019-08-12 NOTE — PROGRESS NOTES
"Clinic Care Coordination Contact  Care Coordination Transition Communication    Referral Source: IP Handoff    Clinical Data: Patient was hospitalized at [facility] from 8/06/19 to 8/10/19 with diagnosis of \"Right hip revision total arthroplasty\".     Transition to Facility:              Facility Name: [facility]              Contact name and phone number/fax: [phone]  Writer faxed a request to [facility] asking that writer be notified of patient's discharge plans/needs when patient is close to discharge from TCU.     Plan: SW Care Coordinator will await notification from facility staff informing SW Care Coordinator of patient's discharge plans/needs. SW Care Coordinator will review chart and outreach to facility staff every 4 weeks and as needed.       GOSIA Florian  [facility] Care Coordination  [facility]   Email: [email or web address]  Tele: [phone]      "

## 2019-08-12 NOTE — LETTER
[facility]       To:   [facility] TCU          Please give to facility        From:   GOSIA Florian, Social Work Care Coordinator   [facility]     Patient Name:  Mercedes Barber YOB: 1955   Admit date: 8/10/19        Information Needed:  Please contact me when the patient will discharge (or if they will move to long term care)- include the discharge date, disposition, and main diagnosis. If the patient is discharged with home care services, please provide the name of the agency    Also- Please inform me if a care conference is being held.     Phone or Email with information       GOSIA Florian  [facility] Care Coordination  [facility]inics: [facility], [facility]   Email: [email or web address]  Tele: [phone]

## 2019-08-12 NOTE — PROGRESS NOTES
Transition Communication Hand-off for Care Transitions to Next Level of Care Provider    Name: Mercedes Barber  : 1955  MRN #: [id number]  Primary Care Provider: Skyler Álvarez     Primary Clinic: [address] [place] [id number]     Reason for Hospitalization:  Hip fracture  Hip pain  Admit Date/Time: 2019  5:03 PM  Discharge Date: 8/10/19  Payor Source: Payor: MEDICARE / Plan: MEDICARE / Product Type: Medicare /     Readmission Assessment Measure (FER) Risk Score/category: ELEVATED            Reason for Communication Hand-off Referral: Fragility    Discharge Plan:       Concern for non-adherence with plan of care:   NO  Discharge Needs Assessment:  Needs      Most Recent Value   Equipment Currently Used at Home  cane, straight, raised toilet   # of Referrals Placed by [facility]  Internal Clinic Care Coordination          Already enrolled in Tele-monitoring program and name of program:  na  Follow-up specialty is recommended: Yes    Follow-up plan:  No future appointments.    Any outstanding tests or procedures:        Referrals     Future Labs/Procedures    Occupational Therapy Adult Consult     Comments:    Evaluate and treat as clinically indicated.    Reason:  Right total hip acetabulum revision    Physical Therapy Adult Consult     Comments:    Evaluate and treat as clinically indicated.    Reason:  Right total hip acetabulum revision          Reason for your hospital stay    Right total hip acetabulum revision          Key Recommendations:  Pt admitted for a right hip revision.  SW leading for TCU. FER ELEVATED.  Pt is frail and would benefit from care coordination.      Recommendations are to discharge to [facility]'s TCU    Loreto Vasquez    AVS/Discharge Summary is the source of truth; this is a helpful guide for improved communication of patient story

## 2019-08-16 ENCOUNTER — TRANSFERRED RECORDS (OUTPATIENT)
Dept: HEALTH INFORMATION MANAGEMENT | Facility: CLINIC | Age: 64
End: 2019-08-16

## 2019-08-21 LAB
BACTERIA SPEC CULT: NORMAL
Lab: NORMAL
SPECIMEN SOURCE: NORMAL

## 2019-08-25 ENCOUNTER — HOSPITAL ENCOUNTER (INPATIENT)
Facility: CLINIC | Age: 64
LOS: 4 days | Discharge: NURSING, REHAB OR LONG-TERM CARE | DRG: 857 | End: 2019-08-29
Attending: EMERGENCY MEDICINE | Admitting: INTERNAL MEDICINE
Payer: MEDICARE

## 2019-08-25 ENCOUNTER — APPOINTMENT (OUTPATIENT)
Dept: CT IMAGING | Facility: CLINIC | Age: 64
DRG: 857 | End: 2019-08-25
Attending: EMERGENCY MEDICINE
Payer: MEDICARE

## 2019-08-25 ENCOUNTER — APPOINTMENT (OUTPATIENT)
Dept: GENERAL RADIOLOGY | Facility: CLINIC | Age: 64
DRG: 857 | End: 2019-08-25
Attending: INTERNAL MEDICINE
Payer: MEDICARE

## 2019-08-25 DIAGNOSIS — L03.116 CELLULITIS OF LEFT LOWER EXTREMITY: ICD-10-CM

## 2019-08-25 DIAGNOSIS — K58.8 OTHER IRRITABLE BOWEL SYNDROME: ICD-10-CM

## 2019-08-25 DIAGNOSIS — M25.559 ARTHRALGIA OF HIP, UNSPECIFIED LATERALITY: ICD-10-CM

## 2019-08-25 DIAGNOSIS — Z96.641 STATUS POST TOTAL HIP REPLACEMENT, RIGHT: ICD-10-CM

## 2019-08-25 DIAGNOSIS — L03.115 CELLULITIS OF HIP, RIGHT: ICD-10-CM

## 2019-08-25 DIAGNOSIS — G47.09 OTHER INSOMNIA: ICD-10-CM

## 2019-08-25 DIAGNOSIS — T84.51XA INFECTION ASSOCIATED WITH INTERNAL RIGHT HIP PROSTHESIS, INITIAL ENCOUNTER (H): ICD-10-CM

## 2019-08-25 DIAGNOSIS — N95.1 MENOPAUSAL SYNDROME (HOT FLASHES): Primary | ICD-10-CM

## 2019-08-25 PROBLEM — L03.90 CELLULITIS: Status: ACTIVE | Noted: 2019-08-25

## 2019-08-25 LAB
ANION GAP SERPL CALCULATED.3IONS-SCNC: 3 MMOL/L (ref 3–14)
BASOPHILS # BLD AUTO: 0.1 10E9/L (ref 0–0.2)
BASOPHILS NFR BLD AUTO: 0.8 %
BUN SERPL-MCNC: 13 MG/DL (ref 7–30)
CALCIUM SERPL-MCNC: 8.7 MG/DL (ref 8.5–10.1)
CHLORIDE SERPL-SCNC: 109 MMOL/L (ref 94–109)
CO2 SERPL-SCNC: 27 MMOL/L (ref 20–32)
CREAT SERPL-MCNC: 0.6 MG/DL (ref 0.52–1.04)
CRP SERPL-MCNC: 17.7 MG/L (ref 0–8)
DIFFERENTIAL METHOD BLD: ABNORMAL
EOSINOPHIL # BLD AUTO: 1 10E9/L (ref 0–0.7)
EOSINOPHIL NFR BLD AUTO: 11.5 %
ERYTHROCYTE [DISTWIDTH] IN BLOOD BY AUTOMATED COUNT: 14.8 % (ref 10–15)
GFR SERPL CREATININE-BSD FRML MDRD: >90 ML/MIN/{1.73_M2}
GLUCOSE SERPL-MCNC: 114 MG/DL (ref 70–99)
HCT VFR BLD AUTO: 30.8 % (ref 35–47)
HGB BLD-MCNC: 9.8 G/DL (ref 11.7–15.7)
IMM GRANULOCYTES # BLD: 0.1 10E9/L (ref 0–0.4)
IMM GRANULOCYTES NFR BLD: 0.8 %
LYMPHOCYTES # BLD AUTO: 1.3 10E9/L (ref 0.8–5.3)
LYMPHOCYTES NFR BLD AUTO: 14.9 %
MAGNESIUM SERPL-MCNC: 2.1 MG/DL (ref 1.6–2.3)
MCH RBC QN AUTO: 30.5 PG (ref 26.5–33)
MCHC RBC AUTO-ENTMCNC: 31.8 G/DL (ref 31.5–36.5)
MCV RBC AUTO: 96 FL (ref 78–100)
MONOCYTES # BLD AUTO: 0.7 10E9/L (ref 0–1.3)
MONOCYTES NFR BLD AUTO: 8.4 %
NEUTROPHILS # BLD AUTO: 5.5 10E9/L (ref 1.6–8.3)
NEUTROPHILS NFR BLD AUTO: 63.6 %
NRBC # BLD AUTO: 0 10*3/UL
NRBC BLD AUTO-RTO: 0 /100
PLATELET # BLD AUTO: 451 10E9/L (ref 150–450)
POTASSIUM SERPL-SCNC: 3.3 MMOL/L (ref 3.4–5.3)
POTASSIUM SERPL-SCNC: 3.7 MMOL/L (ref 3.4–5.3)
RBC # BLD AUTO: 3.21 10E12/L (ref 3.8–5.2)
SODIUM SERPL-SCNC: 139 MMOL/L (ref 133–144)
WBC # BLD AUTO: 8.6 10E9/L (ref 4–11)

## 2019-08-25 PROCEDURE — 12000000 ZZH R&B MED SURG/OB

## 2019-08-25 PROCEDURE — 83735 ASSAY OF MAGNESIUM: CPT | Performed by: EMERGENCY MEDICINE

## 2019-08-25 PROCEDURE — 25000128 H RX IP 250 OP 636: Performed by: INTERNAL MEDICINE

## 2019-08-25 PROCEDURE — 80048 BASIC METABOLIC PNL TOTAL CA: CPT | Performed by: EMERGENCY MEDICINE

## 2019-08-25 PROCEDURE — 85025 COMPLETE CBC W/AUTO DIFF WBC: CPT | Performed by: EMERGENCY MEDICINE

## 2019-08-25 PROCEDURE — 84132 ASSAY OF SERUM POTASSIUM: CPT | Performed by: INTERNAL MEDICINE

## 2019-08-25 PROCEDURE — 25000132 ZZH RX MED GY IP 250 OP 250 PS 637: Mod: GY | Performed by: INTERNAL MEDICINE

## 2019-08-25 PROCEDURE — 99207 ZZC CDG-MDM COMPONENT: MEETS MODERATE - UP CODED: CPT | Performed by: INTERNAL MEDICINE

## 2019-08-25 PROCEDURE — 99223 1ST HOSP IP/OBS HIGH 75: CPT | Mod: AI | Performed by: INTERNAL MEDICINE

## 2019-08-25 PROCEDURE — 73700 CT LOWER EXTREMITY W/O DYE: CPT | Mod: RT

## 2019-08-25 PROCEDURE — 99285 EMERGENCY DEPT VISIT HI MDM: CPT | Mod: 25

## 2019-08-25 PROCEDURE — 86140 C-REACTIVE PROTEIN: CPT | Performed by: EMERGENCY MEDICINE

## 2019-08-25 PROCEDURE — 71045 X-RAY EXAM CHEST 1 VIEW: CPT

## 2019-08-25 RX ORDER — TRAMADOL HYDROCHLORIDE 50 MG/1
100 TABLET ORAL EVERY 4 HOURS PRN
Status: ON HOLD | COMMUNITY
End: 2019-08-27

## 2019-08-25 RX ORDER — NEFAZODONE HYDROCHLORIDE 200 MG/1
600 TABLET ORAL AT BEDTIME
Status: DISCONTINUED | OUTPATIENT
Start: 2019-08-25 | End: 2019-08-29 | Stop reason: HOSPADM

## 2019-08-25 RX ORDER — BUDESONIDE AND FORMOTEROL FUMARATE DIHYDRATE 160; 4.5 UG/1; UG/1
2 AEROSOL RESPIRATORY (INHALATION) 2 TIMES DAILY
Status: ON HOLD | COMMUNITY
Start: 2018-10-01 | End: 2020-08-07

## 2019-08-25 RX ORDER — POTASSIUM CHLORIDE 1.5 G/1.58G
20-40 POWDER, FOR SOLUTION ORAL
Status: DISCONTINUED | OUTPATIENT
Start: 2019-08-25 | End: 2019-08-29 | Stop reason: HOSPADM

## 2019-08-25 RX ORDER — LIDOCAINE 4 G/G
1 PATCH TOPICAL EVERY 24 HOURS
COMMUNITY
End: 2019-10-02

## 2019-08-25 RX ORDER — TEMAZEPAM 15 MG/1
15 CAPSULE ORAL AT BEDTIME
Status: DISCONTINUED | OUTPATIENT
Start: 2019-08-25 | End: 2019-08-29 | Stop reason: HOSPADM

## 2019-08-25 RX ORDER — LACOSAMIDE 200 MG/1
200 TABLET ORAL 2 TIMES DAILY
Status: DISCONTINUED | OUTPATIENT
Start: 2019-08-25 | End: 2019-08-29 | Stop reason: HOSPADM

## 2019-08-25 RX ORDER — ONDANSETRON 4 MG/1
4 TABLET, ORALLY DISINTEGRATING ORAL EVERY 6 HOURS PRN
Status: DISCONTINUED | OUTPATIENT
Start: 2019-08-25 | End: 2019-08-25

## 2019-08-25 RX ORDER — ACETAMINOPHEN 325 MG/1
650 TABLET ORAL EVERY 4 HOURS PRN
Status: DISCONTINUED | OUTPATIENT
Start: 2019-08-25 | End: 2019-08-29 | Stop reason: HOSPADM

## 2019-08-25 RX ORDER — POLYETHYLENE GLYCOL 3350 17 G/17G
17 POWDER, FOR SOLUTION ORAL DAILY PRN
Status: DISCONTINUED | OUTPATIENT
Start: 2019-08-25 | End: 2019-08-29 | Stop reason: HOSPADM

## 2019-08-25 RX ORDER — LORAZEPAM 0.5 MG/1
0.25 TABLET ORAL DAILY PRN
Status: DISCONTINUED | OUTPATIENT
Start: 2019-08-25 | End: 2019-08-25

## 2019-08-25 RX ORDER — LACOSAMIDE 200 MG/1
200 TABLET ORAL 2 TIMES DAILY
Status: DISCONTINUED | OUTPATIENT
Start: 2019-08-25 | End: 2019-08-25

## 2019-08-25 RX ORDER — MAGNESIUM SULFATE HEPTAHYDRATE 40 MG/ML
4 INJECTION, SOLUTION INTRAVENOUS EVERY 4 HOURS PRN
Status: DISCONTINUED | OUTPATIENT
Start: 2019-08-25 | End: 2019-08-29 | Stop reason: HOSPADM

## 2019-08-25 RX ORDER — LEVOTHYROXINE SODIUM 50 UG/1
50 TABLET ORAL DAILY
Status: DISCONTINUED | OUTPATIENT
Start: 2019-08-26 | End: 2019-08-29 | Stop reason: HOSPADM

## 2019-08-25 RX ORDER — NEFAZODONE HYDROCHLORIDE 200 MG/1
600 TABLET ORAL AT BEDTIME
Status: DISCONTINUED | OUTPATIENT
Start: 2019-08-25 | End: 2019-08-25

## 2019-08-25 RX ORDER — ALBUTEROL SULFATE 90 UG/1
2 AEROSOL, METERED RESPIRATORY (INHALATION) 4 TIMES DAILY PRN
Status: DISCONTINUED | OUTPATIENT
Start: 2019-08-25 | End: 2019-08-29 | Stop reason: HOSPADM

## 2019-08-25 RX ORDER — POTASSIUM CHLORIDE 1500 MG/1
20-40 TABLET, EXTENDED RELEASE ORAL
Status: DISCONTINUED | OUTPATIENT
Start: 2019-08-25 | End: 2019-08-29 | Stop reason: HOSPADM

## 2019-08-25 RX ORDER — POTASSIUM CHLORIDE 7.45 MG/ML
10 INJECTION INTRAVENOUS
Status: DISCONTINUED | OUTPATIENT
Start: 2019-08-25 | End: 2019-08-29 | Stop reason: HOSPADM

## 2019-08-25 RX ORDER — PRAMIPEXOLE DIHYDROCHLORIDE 1 MG/1
2 TABLET ORAL EVERY EVENING
Status: DISCONTINUED | OUTPATIENT
Start: 2019-08-25 | End: 2019-08-29 | Stop reason: HOSPADM

## 2019-08-25 RX ORDER — POTASSIUM CHLORIDE 29.8 MG/ML
20 INJECTION INTRAVENOUS
Status: DISCONTINUED | OUTPATIENT
Start: 2019-08-25 | End: 2019-08-29 | Stop reason: HOSPADM

## 2019-08-25 RX ORDER — OXYCODONE HYDROCHLORIDE 5 MG/1
5 TABLET ORAL EVERY 4 HOURS PRN
Status: ON HOLD | COMMUNITY
End: 2019-08-27

## 2019-08-25 RX ORDER — CEFTRIAXONE 1 G/1
1 INJECTION, POWDER, FOR SOLUTION INTRAMUSCULAR; INTRAVENOUS EVERY 24 HOURS
Status: DISCONTINUED | OUTPATIENT
Start: 2019-08-26 | End: 2019-08-25

## 2019-08-25 RX ORDER — PRAMIPEXOLE DIHYDROCHLORIDE 1 MG/1
1 TABLET ORAL EVERY MORNING
Status: DISCONTINUED | OUTPATIENT
Start: 2019-08-26 | End: 2019-08-29 | Stop reason: HOSPADM

## 2019-08-25 RX ORDER — LIOTHYRONINE SODIUM 5 UG/1
10 TABLET ORAL DAILY
Status: DISCONTINUED | OUTPATIENT
Start: 2019-08-26 | End: 2019-08-29 | Stop reason: HOSPADM

## 2019-08-25 RX ORDER — PROCHLORPERAZINE MALEATE 10 MG
10 TABLET ORAL EVERY 8 HOURS PRN
Status: ON HOLD | COMMUNITY
End: 2019-08-27

## 2019-08-25 RX ORDER — ONDANSETRON 2 MG/ML
4 INJECTION INTRAMUSCULAR; INTRAVENOUS EVERY 6 HOURS PRN
Status: DISCONTINUED | OUTPATIENT
Start: 2019-08-25 | End: 2019-08-29 | Stop reason: HOSPADM

## 2019-08-25 RX ORDER — NALOXONE HYDROCHLORIDE 0.4 MG/ML
.1-.4 INJECTION, SOLUTION INTRAMUSCULAR; INTRAVENOUS; SUBCUTANEOUS
Status: DISCONTINUED | OUTPATIENT
Start: 2019-08-25 | End: 2019-08-29 | Stop reason: HOSPADM

## 2019-08-25 RX ORDER — OXYCODONE HYDROCHLORIDE 5 MG/1
5-10 TABLET ORAL EVERY 4 HOURS PRN
Status: DISCONTINUED | OUTPATIENT
Start: 2019-08-25 | End: 2019-08-25

## 2019-08-25 RX ORDER — LIDOCAINE 40 MG/G
CREAM TOPICAL
Status: DISCONTINUED | OUTPATIENT
Start: 2019-08-25 | End: 2019-08-29 | Stop reason: HOSPADM

## 2019-08-25 RX ORDER — HYDROXYZINE HYDROCHLORIDE 10 MG/1
10 TABLET, FILM COATED ORAL EVERY 6 HOURS PRN
Status: DISCONTINUED | OUTPATIENT
Start: 2019-08-25 | End: 2019-08-25

## 2019-08-25 RX ORDER — ONDANSETRON 4 MG/1
4 TABLET, ORALLY DISINTEGRATING ORAL EVERY 6 HOURS PRN
Status: DISCONTINUED | OUTPATIENT
Start: 2019-08-25 | End: 2019-08-29 | Stop reason: HOSPADM

## 2019-08-25 RX ORDER — AMOXICILLIN 250 MG
1 CAPSULE ORAL 2 TIMES DAILY PRN
Status: DISCONTINUED | OUTPATIENT
Start: 2019-08-25 | End: 2019-08-29 | Stop reason: HOSPADM

## 2019-08-25 RX ORDER — POTASSIUM CL/LIDO/0.9 % NACL 10MEQ/0.1L
10 INTRAVENOUS SOLUTION, PIGGYBACK (ML) INTRAVENOUS
Status: DISCONTINUED | OUTPATIENT
Start: 2019-08-25 | End: 2019-08-29 | Stop reason: HOSPADM

## 2019-08-25 RX ORDER — HYDROMORPHONE HYDROCHLORIDE 1 MG/ML
0.2 INJECTION, SOLUTION INTRAMUSCULAR; INTRAVENOUS; SUBCUTANEOUS EVERY 4 HOURS PRN
Status: DISCONTINUED | OUTPATIENT
Start: 2019-08-25 | End: 2019-08-29 | Stop reason: HOSPADM

## 2019-08-25 RX ORDER — CEFTRIAXONE 2 G/1
2 INJECTION, POWDER, FOR SOLUTION INTRAMUSCULAR; INTRAVENOUS EVERY 24 HOURS
Status: DISCONTINUED | OUTPATIENT
Start: 2019-08-26 | End: 2019-08-29 | Stop reason: HOSPADM

## 2019-08-25 RX ORDER — CEFTRIAXONE SODIUM 2 G
2 VIAL (EA) INJECTION EVERY 24 HOURS
Status: ON HOLD | COMMUNITY
End: 2019-08-28

## 2019-08-25 RX ORDER — TRAMADOL HYDROCHLORIDE 50 MG/1
50 TABLET ORAL 3 TIMES DAILY PRN
Status: DISCONTINUED | OUTPATIENT
Start: 2019-08-25 | End: 2019-08-27

## 2019-08-25 RX ORDER — POTASSIUM CITRATE 10 MEQ/1
10 TABLET, EXTENDED RELEASE ORAL DAILY
COMMUNITY
End: 2021-04-29

## 2019-08-25 RX ADMIN — POTASSIUM CHLORIDE 40 MEQ: 1500 TABLET, EXTENDED RELEASE ORAL at 16:27

## 2019-08-25 RX ADMIN — POTASSIUM CHLORIDE 20 MEQ: 1500 TABLET, EXTENDED RELEASE ORAL at 18:28

## 2019-08-25 RX ADMIN — TEMAZEPAM 15 MG: 15 CAPSULE ORAL at 22:02

## 2019-08-25 RX ADMIN — LACOSAMIDE 200 MG: 200 TABLET, FILM COATED ORAL at 20:22

## 2019-08-25 RX ADMIN — OMEPRAZOLE 40 MG: 20 CAPSULE, DELAYED RELEASE ORAL at 20:21

## 2019-08-25 RX ADMIN — TRAMADOL HYDROCHLORIDE 50 MG: 50 TABLET, COATED ORAL at 16:27

## 2019-08-25 RX ADMIN — RANITIDINE 150 MG: 150 TABLET ORAL at 20:22

## 2019-08-25 RX ADMIN — PRAMIPEXOLE DIHYDROCHLORIDE 2 MG: 1 TABLET ORAL at 20:21

## 2019-08-25 RX ADMIN — ONDANSETRON HYDROCHLORIDE 4 MG: 2 INJECTION, SOLUTION INTRAMUSCULAR; INTRAVENOUS at 23:21

## 2019-08-25 ASSESSMENT — ACTIVITIES OF DAILY LIVING (ADL)
ADLS_ACUITY_SCORE: 19
ADLS_ACUITY_SCORE: 19
DRESS: 2-->ASSISTIVE PERSON
BATHING: 3-->ASSISTIVE EQUIPMENT AND PERSON
SWALLOWING: 0-->SWALLOWS FOODS/LIQUIDS WITHOUT DIFFICULTY
WHICH_OF_THE_ABOVE_FUNCTIONAL_RISKS_HAD_A_RECENT_ONSET_OR_CHANGE?: AMBULATION;TRANSFERRING;TOILETING;BATHING;DRESSING
RETIRED_EATING: 0-->INDEPENDENT
AMBULATION: 1-->ASSISTIVE EQUIPMENT
TRANSFERRING: 1-->ASSISTIVE EQUIPMENT
TOILETING: 2-->ASSISTIVE PERSON
RETIRED_COMMUNICATION: 0-->UNDERSTANDS/COMMUNICATES WITHOUT DIFFICULTY

## 2019-08-25 ASSESSMENT — ENCOUNTER SYMPTOMS
CHILLS: 1
APPETITE CHANGE: 1
COLOR CHANGE: 1
WOUND: 1
MYALGIAS: 1
FEVER: 0
ABDOMINAL PAIN: 0
COUGH: 0
SHORTNESS OF BREATH: 0

## 2019-08-25 NOTE — H&P
"[facility]    Hospitalist History and Physical    Name: Mercedes Barber    MRN: [id number]  YOB: 1955    Age: 64 year old  Date of Admission:  8/25/2019  Date of Service (when I saw the patient): 08/25/19    Assessment & Plan   Mercedes Barber is a 64 year old female with past medical history significant for hypothyroidism, seizure disorder, sleep apnea, arthritis, GERD. Of note she had recent hospitalizations for total right hip arthroplasty in July 2019 complicated with infection and revision surgery in August 2019 with recurrent cellulitis and fluid collection around the wound status fluid aspirated aspiration on 8/14 and 8/16 from both joint and subcutaneous fluid collection.  Subcutaneous fluid grew Proteus.  PICC line  was placed on 8/17 and she was started on IV ceftriaxone on 8/19.  Presented to the emergency room as despite being on IV antibiotics she had increased drainage, erythema and pain around the wound.  She also had low-grade temperature.     Right hip cellulitis cannot exclude deeper infection/abscess  --Drainage from surgical site surrounding erythema warmth and tenderness  ----Recent CT scan on 8/16/2019 at [facility] showed \" large fluid collection and subcutaneous tissue lateral to right hip prosthesis measuring 10.8-6 0.5 to 15 cm. Concerning for abscess  --Orthopedic surgery was consulted by ER  --Recommended continuation of IV ceftriaxone without major change in antibiotic at this time  --ID is consulted to help with antibiotic management  --N.p.o. after midnight for possible I&D and joint aspirate in a.m.  --We will place official orthopedic consult  --In the interim we will get repeat CT hip.  Pending  --Continue ceftriaxone  --CRP level 17  --PRN pain meds    Hypokalemia  --We will replace potassium per protocol    Anemia  --During recent hospitalization patient's hemoglobin was down to 6.8 due to acute blood loss.  2 units of blood " transfusion  --Hemoglobin today stable at 9.8    H/o Seizure Disorder  --Continue prior to admission meds  --Seizure precautions    GERD  --Continue PPI    Hypothyroidism  --Continue replacement     IBS  --Continue home meds    History of sleep apnea  --Not using CPAP at home    DVT Prophylaxis: Pneumatic Compression Devices/lovenox    Code Status: Full Code    Disposition: Admitted to inpatient    Primary Care Physician   Skyler Álvarez    Chief Complaint   Right hip wound swollen and red now draining worse for last 5 days    History is obtained from the patient    History of Present Illness   Mercedes Barber is a 64 year old female with past medical history significant for hypothyroidism, sleep apnea, seizure disorder, sleep apnea, arthritis, GERD, IBS, depression presented to the emergency room with increased right hip postsurgical wound swelling redness and drainage.    Patient initially underwent right hip arthroplasty in July 2019 which was complicated and resulted in revision surgery in August 2019.  She was discharged to [facility] for outpatient physical therapy and rehab.  Where she had trouble weightbearing and further evaluation was concerning for joint infection.  She was on Bactrim for prophylaxis.  Further evaluation resulted in CT hip which showed fluid collection lateral to the hip.  She underwent aspiration of fluid collection on 814 and joint aspiration on 816.  Cultures from fluid aspiration positive for Proteus.  Joint aspirate has been negative so far.  Patient was started on IV ceftriaxone after a PICC line was placed on 8/17.  She is on daily dose of ceftriaxone since 8/19/2019.  Despite being on antibiotic she continues to have drainage and increased swelling and redness.  She complained of low-grade fever some chills malaise weakness.  Came to the emergency room.  Drainage from the surgical site was foul-smelling.  Orthopedic surgery was contacted and recommended admission for IV  antibiotic ID consult and possible I&D.    She is being admitted to medicine for further evaluation and treatment.  Denies any chest pain shortness of breath lightheadedness dizziness.  Review of all data symptoms are negative.      Past Medical History    Past Medical History:   Diagnosis Date     Arthritis     Thumb     Cervical high risk HPV (human papillomavirus) test positive 07/17/2017 07/17/17: NIL pap, + HR HPV (not 16 or 18) result.      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     Neuropathy of lower extremity      Rectal prolapse      Restless leg syndrome      Seizure disorder (H)     25 years ago     Sleep apnea     CPAP, no longer using CPAP     Temporal sclerosis 8/27/2012         Past Surgical History   Past Surgical History:   Procedure Laterality Date     Anterior COLPORRHAPHY, BLADDER/VAGINA      perigee mesh     ARTHROPLASTY HIP Right 7/23/2019    Procedure: Right total hip arthroplasty;  Surgeon: Anant Mejia MD;  Location: [facility] OR     ARTHROPLASTY PATELLO-FEMORAL (KNEE) Bilateral      ARTHROPLASTY REVISION HIP Right 8/7/2019    Procedure: Right hip revision total arthroplasty;  Surgeon: Tom Antonio MD;  Location: [facility] OR     CARPAL TUNNEL RELEASE RT/LT       DENTAL SURGERY      implant     DILATION AND CURETTAGE       DRAIN PILONIDAL CYST SIMPL       ENDOSCOPY DRUG INDUCED SLEEP N/A 11/18/2015    Procedure: ENDOSCOPY DRUG INDUCED SLEEP;  Surgeon: Park Ortiz MD;  Location: [facility] OR     ESOPHAGOSCOPY, GASTROSCOPY, DUODENOSCOPY (EGD), COMBINED  4/5/2013    Procedure: COMBINED ESOPHAGOSCOPY, GASTROSCOPY, DUODENOSCOPY (EGD), BIOPSY SINGLE OR MULTIPLE;;  Surgeon: Mundo Mehta MD;  Location:  GI     EXCISE LESION TRUNK  8/10/2012    Procedure: EXCISE LESION  TRUNK;;  Surgeon: Jerald Diggs MD;  Location: [facility] OR     HYSTERECTOMY       L shoulder fracture       rectal prolapse repair abdominally       RELEASE PLANTAR FASCIA Right 1/20/2015    Procedure: RELEASE PLANTAR FASCIA;  Surgeon: Maicol Liu DPM;  Location: [facility]     REMOVE MESH VAGINA  8/10/2012    Procedure: REMOVE MESH VAGINA;  Excision of Vaginal Mesh Exposure, Removal of Skin Tag Left Inner Leg;  Surgeon: Jerald Diggs MD;  Location: [facility] OR     REPAIR HAMMER TOE Right 1/20/2015    Procedure: REPAIR HAMMER TOE;  Surgeon: Maicol Liu DPM;  Location: [facility]     TONSILLECTOMY & ADENOIDECTOMY       TUBAL LIGATION         Prior to Admission Medications   Prior to Admission Medications   Prescriptions Last Dose Informant Patient Reported? Taking?   LORazepam (ATIVAN) 0.5 MG tablet   No No   Sig: Take 0.5 tablets (0.25 mg) by mouth daily as needed for anxiety   POTASSIUM CITRATE PO   Yes No   Sig: Take 10 mEq by mouth daily    TEMAZEPAM PO   Yes No   Sig: Take 30 mg by mouth At Bedtime    acetaminophen (TYLENOL) 325 MG tablet   No No   Sig: Take 2 tablets (650 mg) by mouth every 4 hours as needed for other (multimodal surgical pain management along with NSAIDS and opioid medication as indicated based on pain control and physical function.)   albuterol (ALBUTEROL) 108 (90 BASE) MCG/ACT Inhaler   No No   Sig: Inhale 2 puffs into the lungs 4 times daily as needed for shortness of breath / dyspnea or wheezing   aspirin (ASA) 325 MG EC tablet   No No   Sig: Take 1 tablet (325 mg) by mouth daily   budesonide-formoterol (SYMBICORT) 160-4.5 MCG/ACT Inhaler   Yes No   Sig: Inhale 2 puffs into the lungs 2 times daily   cycloSPORINE (RESTASIS) 0.05 % ophthalmic emulsion   Yes No   Sig: Place 1 drop into both eyes 2 times daily   hydrOXYzine (ATARAX) 10 MG tablet   No No   Sig: Take 1 tablet (10 mg) by mouth every 6 hours as needed for itching   hypromellose (ARTIFICIAL TEARS) 0.5 % SOLN ophthalmic solution   Yes  "No   Sig: Place 1 drop into both eyes 2 times daily   ibuprofen (ADVIL/MOTRIN) 600 MG tablet   No No   Sig: Take 1 tablet (600 mg) by mouth every 6 hours as needed for moderate pain   lacosamide (VIMPAT) 200 MG TABS   Yes No   Sig: Take 200 mg by mouth 2 times daily.   lacosamide (VIMPAT) 200 MG TABS tablet   No No   Sig: Take 1 tablet (200 mg) by mouth 2 times daily   levothyroxine (SYNTHROID/LEVOTHROID) 50 MCG tablet   Yes No   Sig: Take 50 mcg by mouth daily   linaclotide (LINZESS) 290 MCG capsule   Yes No   Sig: Take 290 mcg by mouth every morning (before breakfast)   liothyronine (CYTOMEL) 5 MCG tablet   Yes No   Sig: Take 10 mcg by mouth daily    nefazodone (SERZONE) 200 MG tablet   Yes No   Sig: Take 600 mg by mouth At Bedtime    omeprazole (PRILOSEC) 40 MG DR capsule   No No   Sig: Take 1 capsule (40 mg) by mouth 2 times daily   ondansetron (ZOFRAN-ODT) 4 MG ODT tab   No No   Sig: Take 1 tablet (4 mg) by mouth every 6 hours as needed for nausea or vomiting   oxyCODONE (ROXICODONE) 5 MG tablet   No No   Sig: Take 1-2 tablets (5-10 mg) by mouth every 4 hours as needed 1 tab po q 4 hrs prn pain scale \"1-5\"  2 tabs po q 4 hrs prn pain scale \"6-10\"   polyethylene glycol (MIRALAX/GLYCOLAX) packet   Yes No   Sig: Take 1 packet by mouth daily as needed for constipation   pramipexole (MIRAPEX) 1 MG tablet   Yes No   Sig: Take 1 mg by mouth every morning . ( takes 1 tab in am , 2 tabs 1 hour prior to hs)   pramipexole (MIRAPEX) 1 MG tablet   Yes No   Sig: Take 2 mg by mouth every evening . (takes 1 tab in am , 2 tabs 1 hour prior to hs)   ranitidine (ZANTAC) 150 MG tablet   No No   Sig: TAKE 1 TABLET BY MOUTH TWICE DAILY   senna-docusate (SENOKOT-S/PERICOLACE) 8.6-50 MG tablet   No No   Sig: Take 1 tablet by mouth 2 times daily as needed for constipation   sulfamethoxazole-trimethoprim (BACTRIM DS/SEPTRA DS) 800-160 MG tablet   No No   Sig: Take 1 tablet by mouth 2 times daily for 14 days   temazepam (RESTORIL) 15 MG " "capsule   No No   Sig: Take 1 capsule (15 mg) by mouth At Bedtime      Facility-Administered Medications: None     Allergies   Allergies   Allergen Reactions     Clonopin [Clonazepam]      \"Walk funny and Mind goes funny\"     Encort [Hydrocortisone Acetate] Swelling     Feet swelled.     Encort [Hydrocortisone] Swelling     Erythromycin Diarrhea     Flagyl [Metronidazole] Nausea     Gabapentin      Over eats     Lamictal [Lamotrigine]      Tegretol [Carbamazepine] Nausea and Vomiting       Social History   Social History     Tobacco Use     Smoking status: Never Smoker     Smokeless tobacco: Never Used   Substance Use Topics     Alcohol use: Yes     Comment: 1 glass of wine 4x/yr     Social History     Social History Narrative     Not on file     Assumed care reviewed chart.  Patient has been in [facility] for rehab since recent arthroplasty.  However prior to that lived independently alone.  Denies smoking no use of alcohol.    Family History   Father had diabetes mellitus, mom had coronary artery disease.    Review of Systems   A Comprehensive greater than 10 system review of systems was carried out.  Pertinent positives and negatives are noted above.  Otherwise negative for contributory information.    Physical Exam   Temp: 98.1  F (36.7  C) Temp src: Oral BP: 134/85 Pulse: 64 Heart Rate: 70 Resp: 16 SpO2: 97 % O2 Device: None (Room air)    Vital Signs with Ranges  Temp:  [98.1  F (36.7  C)] 98.1  F (36.7  C)  Pulse:  [64-74] 64  Heart Rate:  [70] 70  Resp:  [16] 16  BP: (121-149)/(81-85) 134/85  SpO2:  [97 %] 97 %  0 lbs 0 oz    GEN:  Alert, oriented x 3, appears comfortable, no overt distress  HEENT:  Normocephalic/atraumatic, no scleral icterus, no nasal discharge, mouth moist.  CV:  Regular rate and rhythm, no murmur or JVD.  S1 + S2 noted, no S3 or S4.  LUNGS:  Clear to auscultation bilaterally without rales/rhonchi/wheezing/retractions.  Symmetric chest rise on inhalation noted.  ABD:  Active bowel " sounds, soft, non-tender/non-distended.  No rebound/guarding/rigidity.  EXT:  No edema.  No cyanosis.  Surgical wound around right hip is erythematous warm there is noted drainage.  Positive swelling redness tenderness  SKIN: Right hip lateral aspect erythema warmth swelling redness surrounding recent surgical incision with purulent drainage on the gauze  NEURO:  Symmetric muscle strength,   No new focal deficits appreciated.    Data   Data reviewed today:  I personally reviewed no images or EKG's today.  CT scan pending    Recent Labs   Lab 08/25/19  1124   WBC 8.6   HGB 9.8*   HCT 30.8*   MCV 96   *     Recent Labs   Lab 08/25/19  1124      POTASSIUM 3.3*   CHLORIDE 109   CO2 27   ANIONGAP 3   *   BUN 13   CR 0.60   GFRESTIMATED >90   GFRESTBLACK >90   SABA 8.7     No results for input(s): CULT in the last 168 hours.  No results for input(s): NTBNPI, NTBNP in the last 168 hours.  No results for input(s): AST, ALT, GGT, ALKPHOS, BILITOTAL, BILICONJ, BILIDIRECT, KURTIS in the last 168 hours.    Invalid input(s): BILIRUBININDIRECT  No results for input(s): INR in the last 168 hours.  No results for input(s): LACT in the last 168 hours.  No results for input(s): TSH in the last 168 hours.  No results for input(s): TROPONIN, TROPI, TROPR in the last 168 hours.    Invalid input(s): TROP, TROPONINIES  No results for input(s): COLOR, APPEARANCE, URINEGLC, URINEBILI, URINEKETONE, SG, UBLD, URINEPH, PROTEIN, UROBILINOGEN, NITRITE, LEUKEST, RBCU, WBCU in the last 168 hours.    No results found for this or any previous visit (from the past 24 hour(s)).

## 2019-08-25 NOTE — LETTER
Key Recommendations:  Pt admitted from [facility] TCU after right hip replacement and now having complications with infection.  Pt now wanting [facility] TCU at discharge as it is closer to her home.  ID recommendations are***. Please follow up with pt on post discharge from TCU facility.    Madeline Pete RN    AVS/Discharge Summary is the source of truth; this is a helpful guide for improved communication of patient story

## 2019-08-25 NOTE — ED PROVIDER NOTES
History     Chief Complaint:  Post-op Problem    HPI  Mercedes Barber is a 64 year old female with a history of hypothyroidism who presents to the emergency department today for evaluation of a post-op problem. The patient underwent a total right hip arthroplasty on 7/23 by Dr. Mejia at [facility].  On 8/7, she underwent right total hip acetabular revision by Dr. Antonio. She was discharged from the hospital 2 days later to [facility] with outpatient PT guidance and toe touch weight bearing.She was placed on Bactrim as a precaution on 8/14. On 8/16 she had 2 aspirations completed by Dr. Art at [facility], one being of the right hip joint and the other of the subcutaneous fluid collection immediately lateral to the right greater trochanter. The subcutaneous collection grew proteus. There is no growth from the right hip to date per 2 days ago. CT imaging also performed on this date, results as below. PICC was placed on 8/17. On 8/19 she was started on IV ceftriaxone Q24H. She did receive her ceftriaxone dose this morning. Over the last 1.5 weeks, she has developed increased redness surrounding the incision site. Redness initially improved with antibiotics but then worsened again. 2 days ago, she noticed yellow-greenish drainage from the incision site but no pus or foul odor. This morning, patient had an elevated temperature of 99.1. No fever here. She also endorses having chills, lack of appetite, and nausea over the past few days. No generalized malaise aside from the surgical site pain. Patient does have an follow up appointment scheduled for Tuesday with Dr. Antonio.    CT Right Hip w/ contrast on 8/16/2019 at [facility]:  IMPRESSION:  Large fluid collection seen in the subcutaneous tissues lateral to the right hip prosthesis measuring 10.8 x 6.5 x 15.0 centimeters. Enhancement of the margins and small bubbles of gas, findings worrisome for an abscess, although this could be a  postoperative hematoma/seroma.   No sign of any additional fluid collection or abscess elsewhere.   Area of ill-defined hemorrhage within the soft tissues anterior to the hematoma described above.   Satisfactory appearance of components of a right total hip prosthesis with no sign of osteomyelitis.   Status post hysterectomy.  Signed by Dr. Alon Sherwood @ Aug 16 2019  2:38PM    Allergies:  Clonopin [Clonazepam]  Encort [Hydrocortisone Acetate]  Encort [Hydrocortisone]  Erythromycin  Flagyl [Metronidazole]  Gabapentin  Lamictal [Lamotrigine]  Tegretol [Carbamazepine]    Medications:    Albuterol inhaler  Aspirin  Symbicort  Restasis  Hydroxyzine  Vimpat  Levothyroxine   Linzess  Cytomel  Ativan  Serzone  Prilosec  Zofran-ODT  Roxicodone  Potassium citrate  Mirapex  Zantac  Senna-docusate  Restoril  Temazepam     Past Medical History:    Arthritis  Cervical pain  Constipation   Diarrhea   Esophageal stricture  GERD  Hypothyroidism   IBS  Mild major depression   Neuropathy  Rectal prolapse  RLS  Seizure disorder  Sleep apnea  Temporal sclerosis   Menopausal syndrome (hot flashes)  Vulvar pain  Overactive bladder  Dysphagia  Confusion   Headache  Anemia  Vulvar lesion  Lumbago  Sciatica  Plantar fascial fibromatosis    Past Surgical History:    Anterior colporrhaphy, bladder/vagina - perigee mesh  Right hip arthroplasty   Patello-femoral (knee) arthroplasty, right  Right hip arthroplasty revision   Carpal tunnel release  Dental surgery - implant   D&C  Drain simple pilonidal cyst  Endoscopy, drug induced sleep  Excise trunk lesion  Hysterectomy  Left shoulder fracture surgery  Rectal prolapse repair abdominally   Release plantar fascia, right  Remove mesh vagina   Repair hammer toe, right  T&A  Tubal ligation     Family History:    The patient's family history includes Alcohol/Drug in her brother; Alzheimer Disease in her paternal grandmother; Breast Cancer in her sister; Congenital Anomalies in her daughter; Depression  in her brother and sister; Diabetes in her father; Eye Disorder in her mother; Gastrointestinal Disease in her brother; Hypertension in her brother; Lipids in her brother, mother, and sister; Neurologic Disorder in her daughter; Osteoporosis in her mother; Psychotic Disorder in her brother; Thyroid Disease in her sister.    Social History:  The patient reports that she has never smoked. She has never used smokeless tobacco. She reports that she drinks alcohol. She reports that she does not use drugs.   PCP: Skyler Álvarez  Marital Status:      Review of Systems   Constitutional: Positive for appetite change and chills. Negative for fever.   Respiratory: Negative for cough and shortness of breath.    Gastrointestinal: Negative for abdominal pain.   Musculoskeletal: Positive for myalgias (right hip).   Skin: Positive for color change (redness), rash and wound.   All other systems reviewed and are negative.      Physical Exam     Patient Vitals for the past 24 hrs:   BP Temp Temp src Pulse Heart Rate Resp SpO2   08/25/19 1215 134/85 -- -- 64 -- -- --   08/25/19 1200 138/81 -- -- 69 -- -- --   08/25/19 1145 133/82 -- -- 67 -- -- --   08/25/19 1130 121/81 -- -- 74 -- -- --   08/25/19 1123 (!) 149/83 98.1  F (36.7  C) Oral 70 70 16 97 %     Physical Exam  General: Patient is alert and interactive when I enter the room  Head:  The scalp, face, and head appear normal  Eyes:  The pupils are equal, round, and reactive to light    Conjunctivae and sclerae are normal  ENT:    External acoustic canals are normal    The oropharynx is normal without erythema.     Uvula is in the midline  Neck:  Normal range of motion  CV:  Regular rate. S1/S2. No murmurs.   Resp:  Lungs are clear without wheezes or rales. No distress  GI:  Abdomen is soft, no rigidity, guarding, or rebound    No distension. No tenderness to palpation in any quadrant.     MS:  Normal tone. Joints grossly normal without effusions.     No asymmetric leg  swelling, calf or thigh tenderness.      Normal motor assessment of all extremities.  Skin:  Right hip is red, minimal extension outside previously outlined areas. Minimal   warmth. Wound closure device intact. Some yellow/green drainage noted on   dressing. No pus expressed. Wound itself is more noticeable red than surrounding   tissues. Right leg is otherwise normal; normal pulses,   Normal capillary refill noted,   normal DF/PF.   Neuro: Speech is normal and fluent. Face is symmetric.     Moving all extremities well.   Psych:  Awake. Alert.  Normal affect.  Appropriate interactions.  Lymph: No anterior cervical lymphadenopathy noted    Emergency Department Course     Laboratory:  Laboratory findings were communicated with the patient who voiced understanding of the findings.  CBC: WBC 8.6, HGB 9.8 (L),  (H)  BMP: Potassium 3.3 (L), Glucose 114 (H) o/w WNL (Creatinine 0.60)  CRP inflammation: 17.7 (H)  CT Hip Right w/o Contrast   Final Result   IMPRESSION:   1. Subcutaneous fluid collection in the right gluteal region and thigh   is likely due to a postop hematoma/seroma, however, superimposed   infection cannot be excluded.   2. Right SARAHI. Increasing soft tissue thickening and calcification   along the medial acetabular rim.      LILIANA MARRERO MD            Emergency Department Course:  Past medical records, nursing notes, and vitals reviewed.  1119: I performed an exam of the patient and obtained history, as documented above. GCS 15.    IV inserted and blood drawn.    1228: I discussed the case with Dr. Prabhakar of orthopedics regarding the patient.    Findings and plan explained to the Patient who consents to admission.     1250: Discussed the patient with Dr. Ma, who will admit the patient to a bed for further monitoring, evaluation, and treatment.     Impression & Plan      Medical Decision Making:  Mercedes Barber is a 64 year old female with increasing drainage, malaise, low grade temp  elevation, increasing CRP after initial decline, spreading redness all of which are c/w likely progressing infection. Admit to medicine w/ ortho consulting. Ortho will like do I&D tomorrow. No signs of sepsis at this point.       Diagnosis:    ICD-10-CM   1. Cellulitis of hip, right L03.115       Disposition:   Admitted to Dr. Ma.      Scribe Disclosure:  ISmita, am serving as a scribe at 11:25 AM on 8/25/2019 to document services personally performed by Colton Molina MD based on my observations and the provider's statements to me.       [facility] EMERGENCY DEPARTMENT       Colton Molina MD  08/25/19 8860

## 2019-08-25 NOTE — PHARMACY-ADMISSION MEDICATION HISTORY
Admission medication history interview status for this patient is complete. See [facility] admission navigator for allergy information, prior to admission medications and immunization status.     Medication history interview source(s):Patient and Caregiver  Medication history resources (including written lists, pill bottles, clinic record): SureScripts, Care Everywhere, and medication list provided by daughter from [facility].  Primary pharmacy: [id number] - [place], [place] - [address] [place] [facility] [place] [address]  And expensive medications (Vimpat) from [facility] mail order [facility]    Changes made to PTA medication list:  Added: biotene spray, ceftriaxone, compazine, tramadol  Deleted: hydroxyzine, lorazepam, SMZ/TMP, temazepam 15mg QHS  Changed: oxycodone 5-10mg --> 5mg    Actions taken by pharmacist (provider contacted, etc):None     Additional medication history information:None    Medication reconciliation/reorder completed by provider prior to medication history? Yes    Do you take OTC medications (eg tylenol, ibuprofen, fish oil, eye/ear drops, etc)? Yes, see list.      Prior to Admission medications    Medication Sig Last Dose Taking? Auth Provider   acetaminophen (TYLENOL) 325 MG tablet Take 2 tablets (650 mg) by mouth every 4 hours as needed for other (multimodal surgical pain management along with NSAIDS and opioid medication as indicated based on pain control and physical function.) 8/25/2019 at 0820 Yes Rosalio Henriquez PA-C   artificial saliva (BIOTENE MT) AERS spray Take 2 sprays by mouth 3 times daily as needed for dry mouth 8/25/2019 at 0820 Yes Unknown, Entered By History   aspirin (ASA) 325 MG EC tablet Take 1 tablet (325 mg) by mouth daily 8/25/2019 at 0820 Yes Shona Gonzales PA-C   budesonide-formoterol (SYMBICORT) 160-4.5 MCG/ACT Inhaler Inhale 2 puffs into the lungs 2 times daily Past Week at Unknown time Yes Unknown, Entered By History    cefTRIAXone (ROCEPHIN) 2 GM IV Inject 2 g into the vein every 24 hours 8/25/2019 at 0820 Yes Unknown, Entered By History   cycloSPORINE (RESTASIS) 0.05 % ophthalmic emulsion Place 1 drop into both eyes 2 times daily 8/25/2019 at 0820 Yes Unknown, Entered By History   hypromellose (ARTIFICIAL TEARS) 0.5 % SOLN ophthalmic solution Place 1 drop into both eyes 2 times daily 8/25/2019 at 0820 Yes Unknown, Entered By History   lacosamide (VIMPAT) 200 MG TABS tablet Take 1 tablet (200 mg) by mouth 2 times daily 8/25/2019 at 0820 Yes Yusuf Brantley MD   levothyroxine (SYNTHROID/LEVOTHROID) 50 MCG tablet Take 50 mcg by mouth daily 8/25/2019 at 0700 Yes Unknown, Entered By History   Lidocaine (LIDOCARE) 4 % Patch Place 1 patch onto the skin every 24 hours To prevent lidocaine toxicity, patient should be patch free for 12 hrs daily. 8/25/2019 at 0820 Yes Unknown, Entered By History   linaclotide (LINZESS) 290 MCG capsule Take 290 mcg by mouth every morning (before breakfast) 8/25/2019 at 0700 Yes Reported, Patient   liothyronine (CYTOMEL) 5 MCG tablet Take 10 mcg by mouth daily  8/25/2019 at 0700 Yes Reported, Patient   nefazodone (SERZONE) 200 MG tablet Take 600 mg by mouth At Bedtime  8/24/2019 at 1930 Yes Reported, Patient   omeprazole (PRILOSEC) 40 MG DR capsule Take 1 capsule (40 mg) by mouth 2 times daily 8/25/2019 at 0700 Yes Skyler Álvarez MD   ondansetron (ZOFRAN-ODT) 4 MG ODT tab Take 1 tablet (4 mg) by mouth every 6 hours as needed for nausea or vomiting Past Month at Unknown time Yes Rosalio Henriquez PA-C   polyethylene glycol (MIRALAX/GLYCOLAX) packet Take 1 packet by mouth daily as needed for constipation Past Month at Unknown time Yes Unknown, Entered By History   potassium citrate (UROCIT-K) 10 MEQ (1080 MG) CR tablet Take 10 mEq by mouth daily 8/25/2019 at 0820 Yes Unknown, Entered By History   pramipexole (MIRAPEX) 1 MG tablet Take 2 mg by mouth every evening . (takes 1 tab in am , 2 tabs 1 hour prior to  hs) 8/24/2019 at 1900 Yes Unknown, Entered By History   pramipexole (MIRAPEX) 1 MG tablet Take 1 mg by mouth every morning . ( takes 1 tab in am , 2 tabs 1 hour prior to hs) 8/24/2019 at 1630 Yes Reported, Patient   prochlorperazine (COMPAZINE) 10 MG tablet Take 10 mg by mouth every 8 hours as needed for nausea or vomiting 8/23/2019 at 0900 Yes Unknown, Entered By History   ranitidine (ZANTAC) 150 MG tablet TAKE 1 TABLET BY MOUTH TWICE DAILY 8/25/2019 at 0820 Yes Skyler Álvarez MD   senna-docusate (SENOKOT-S/PERICOLACE) 8.6-50 MG tablet Take 1 tablet by mouth 2 times daily as needed for constipation 8/25/2019 at 0820 Yes Shona Gonzales PA-C   temazepam (RESTORIL) 30 MG capsule Take 30 mg by mouth At Bedtime  8/24/2019 at 1930 Yes Reported, Patient   traMADol (ULTRAM) 50 MG tablet Take 100 mg by mouth every 4 hours as needed for severe pain 8/25/2019 at 0820 Yes Unknown, Entered By History   albuterol (ALBUTEROL) 108 (90 BASE) MCG/ACT Inhaler Inhale 2 puffs into the lungs 4 times daily as needed for shortness of breath / dyspnea or wheezing More than a month at Unknown time  Skyler Álvarez MD   ibuprofen (ADVIL/MOTRIN) 600 MG tablet Take 1 tablet (600 mg) by mouth every 6 hours as needed for moderate pain 8/23/2019 at 1300  Rosalio Henriquez PA-C   oxyCODONE (ROXICODONE) 5 MG tablet Take 1 tablet (5 mg) by mouth every 4 hours as needed  8/20/2019  Shona Gonzales PA-C

## 2019-08-25 NOTE — ED NOTES
"[facility]  ED Nurse Handoff Report    Mercedes Barber is a 64 year old female   ED Chief complaint: Post-op Problem  . ED Diagnosis:   Final diagnoses:   Cellulitis of hip, right     Allergies:   Allergies   Allergen Reactions     Clonopin [Clonazepam]      \"Walk funny and Mind goes funny\"     Encort [Hydrocortisone Acetate] Swelling     Feet swelled.     Encort [Hydrocortisone] Swelling     Erythromycin Diarrhea     Flagyl [Metronidazole] Nausea     Gabapentin      Over eats     Lamictal [Lamotrigine]      Tegretol [Carbamazepine] Nausea and Vomiting       Code Status: Full Code  Activity level - Baseline/Home:  Stand by Assist. Activity Level - Current:   Assist X 1. Lift room needed: No. Bariatric: No   Needed: No   Isolation: No. Infection: Not Applicable.     Vital Signs:   Vitals:    08/25/19 1130 08/25/19 1145 08/25/19 1200 08/25/19 1215   BP: 121/81 133/82 138/81 134/85   Pulse: 74 67 69 64   Resp:       Temp:       TempSrc:       SpO2:           Cardiac Rhythm:  ,      Pain level:    Patient confused: No. Patient Falls Risk: Yes.   Elimination Status: Has voided   Patient Report - Initial Complaint: Pt presents with concerns for post-op infection. Focused Assessment: Pt presents with concerns for worsening post-op infection of her right hip. Pt initially had a right total hip replacement at the end of July but had complications and required a second surgery to correct it. Since that time she has been having ongoing issues with concerns for infection and continues to have redness and drainage from the incision despite being on IV antibiotics at a TCU facility. Pt was advised by infectious disease to come in for further eval. Ortho requesting admission and will be managing her case.  Tests Performed: labs  . Abnormal Results:   Labs Ordered and Resulted from Time of ED Arrival Up to the Time of Departure from the ED   CBC WITH PLATELETS DIFFERENTIAL - Abnormal; Notable for the " following components:       Result Value    RBC Count 3.21 (*)     Hemoglobin 9.8 (*)     Hematocrit 30.8 (*)     Platelet Count 451 (*)     Absolute Eosinophils 1.0 (*)     All other components within normal limits   BASIC METABOLIC PANEL - Abnormal; Notable for the following components:    Potassium 3.3 (*)     Glucose 114 (*)     All other components within normal limits   CRP INFLAMMATION - Abnormal; Notable for the following components:    CRP Inflammation 17.7 (*)     All other components within normal limits   PERIPHERAL IV CATHETER       Treatments provided:   Family Comments: daughter at bedside  OBS brochure/video discussed/provided to patient:  N/A  ED Medications: Medications - No data to display  Drips infusing:  No  For the majority of the shift, the patient's behavior Green. Interventions performed were .     Severe Sepsis OR Septic Shock Diagnosis Present: No      ED Nurse Name/Phone Number: Kiesha Barragan RN,   12:39 PM    RECEIVING UNIT ED HANDOFF REVIEW    Above ED Nurse Handoff Report was reviewed: Yes  Reviewed by: Sugar Oliver, RN on August 25, 2019 at 2:02 PM   Did you vocera the ED RN: Yes, left message

## 2019-08-25 NOTE — ED TRIAGE NOTES
"Pt presents with complications from right hip replacement. Pt states she had it completed at the end of July but it had to be re-done, now c/o redness and drainage for the site and reports of \"low fever of 99.1\" today. Pt alert, oriented x3 ABCs intact  "

## 2019-08-26 ENCOUNTER — ANESTHESIA EVENT (OUTPATIENT)
Dept: SURGERY | Facility: CLINIC | Age: 64
DRG: 857 | End: 2019-08-26
Payer: MEDICARE

## 2019-08-26 ENCOUNTER — ANESTHESIA (OUTPATIENT)
Dept: SURGERY | Facility: CLINIC | Age: 64
DRG: 857 | End: 2019-08-26
Payer: MEDICARE

## 2019-08-26 PROBLEM — T84.51XA INFECTION OF RIGHT PROSTHETIC HIP JOINT (H): Status: ACTIVE | Noted: 2019-08-26

## 2019-08-26 LAB
ANION GAP SERPL CALCULATED.3IONS-SCNC: 4 MMOL/L (ref 3–14)
BASOPHILS # BLD AUTO: 0.1 10E9/L (ref 0–0.2)
BASOPHILS NFR BLD AUTO: 1 %
BUN SERPL-MCNC: 8 MG/DL (ref 7–30)
CALCIUM SERPL-MCNC: 8.7 MG/DL (ref 8.5–10.1)
CHLORIDE SERPL-SCNC: 108 MMOL/L (ref 94–109)
CO2 SERPL-SCNC: 27 MMOL/L (ref 20–32)
CREAT SERPL-MCNC: 0.58 MG/DL (ref 0.52–1.04)
DIFFERENTIAL METHOD BLD: ABNORMAL
EOSINOPHIL # BLD AUTO: 1 10E9/L (ref 0–0.7)
EOSINOPHIL NFR BLD AUTO: 12.7 %
ERYTHROCYTE [DISTWIDTH] IN BLOOD BY AUTOMATED COUNT: 15 % (ref 10–15)
GFR SERPL CREATININE-BSD FRML MDRD: >90 ML/MIN/{1.73_M2}
GLUCOSE SERPL-MCNC: 98 MG/DL (ref 70–99)
GRAM STN SPEC: NORMAL
HCT VFR BLD AUTO: 28.9 % (ref 35–47)
HGB BLD-MCNC: 8.8 G/DL (ref 11.7–15.7)
IMM GRANULOCYTES # BLD: 0 10E9/L (ref 0–0.4)
IMM GRANULOCYTES NFR BLD: 0.4 %
LYMPHOCYTES # BLD AUTO: 1.5 10E9/L (ref 0.8–5.3)
LYMPHOCYTES NFR BLD AUTO: 20 %
MCH RBC QN AUTO: 29.7 PG (ref 26.5–33)
MCHC RBC AUTO-ENTMCNC: 30.4 G/DL (ref 31.5–36.5)
MCV RBC AUTO: 98 FL (ref 78–100)
MONOCYTES # BLD AUTO: 0.7 10E9/L (ref 0–1.3)
MONOCYTES NFR BLD AUTO: 9.3 %
NEUTROPHILS # BLD AUTO: 4.4 10E9/L (ref 1.6–8.3)
NEUTROPHILS NFR BLD AUTO: 56.6 %
NRBC # BLD AUTO: 0 10*3/UL
NRBC BLD AUTO-RTO: 0 /100
PLATELET # BLD AUTO: 406 10E9/L (ref 150–450)
POTASSIUM SERPL-SCNC: 3.8 MMOL/L (ref 3.4–5.3)
RBC # BLD AUTO: 2.96 10E12/L (ref 3.8–5.2)
SODIUM SERPL-SCNC: 139 MMOL/L (ref 133–144)
SPECIMEN SOURCE: NORMAL
SPECIMEN SOURCE: NORMAL
WBC # BLD AUTO: 7.7 10E9/L (ref 4–11)

## 2019-08-26 PROCEDURE — 25000128 H RX IP 250 OP 636: Performed by: NURSE ANESTHETIST, CERTIFIED REGISTERED

## 2019-08-26 PROCEDURE — 87070 CULTURE OTHR SPECIMN AEROBIC: CPT | Performed by: ORTHOPAEDIC SURGERY

## 2019-08-26 PROCEDURE — 25000128 H RX IP 250 OP 636: Performed by: PHYSICIAN ASSISTANT

## 2019-08-26 PROCEDURE — 25000128 H RX IP 250 OP 636: Performed by: INTERNAL MEDICINE

## 2019-08-26 PROCEDURE — 37000008 ZZH ANESTHESIA TECHNICAL FEE, 1ST 30 MIN: Performed by: ORTHOPAEDIC SURGERY

## 2019-08-26 PROCEDURE — 40000306 ZZH STATISTIC PRE PROC ASSESS II: Performed by: ORTHOPAEDIC SURGERY

## 2019-08-26 PROCEDURE — 25000132 ZZH RX MED GY IP 250 OP 250 PS 637: Mod: GY | Performed by: PHYSICIAN ASSISTANT

## 2019-08-26 PROCEDURE — 25000125 ZZHC RX 250: Performed by: NURSE ANESTHETIST, CERTIFIED REGISTERED

## 2019-08-26 PROCEDURE — 25800030 ZZH RX IP 258 OP 636: Performed by: PHYSICIAN ASSISTANT

## 2019-08-26 PROCEDURE — 12000000 ZZH R&B MED SURG/OB

## 2019-08-26 PROCEDURE — 25800030 ZZH RX IP 258 OP 636: Performed by: ANESTHESIOLOGY

## 2019-08-26 PROCEDURE — 25000128 H RX IP 250 OP 636: Performed by: ANESTHESIOLOGY

## 2019-08-26 PROCEDURE — 25000128 H RX IP 250 OP 636: Performed by: ORTHOPAEDIC SURGERY

## 2019-08-26 PROCEDURE — 87075 CULTR BACTERIA EXCEPT BLOOD: CPT | Performed by: ORTHOPAEDIC SURGERY

## 2019-08-26 PROCEDURE — 99233 SBSQ HOSP IP/OBS HIGH 50: CPT | Performed by: INTERNAL MEDICINE

## 2019-08-26 PROCEDURE — 25800025 ZZH RX 258: Performed by: ORTHOPAEDIC SURGERY

## 2019-08-26 PROCEDURE — 37000009 ZZH ANESTHESIA TECHNICAL FEE, EACH ADDTL 15 MIN: Performed by: ORTHOPAEDIC SURGERY

## 2019-08-26 PROCEDURE — 80048 BASIC METABOLIC PNL TOTAL CA: CPT | Performed by: INTERNAL MEDICINE

## 2019-08-26 PROCEDURE — 27210794 ZZH OR GENERAL SUPPLY STERILE: Performed by: ORTHOPAEDIC SURGERY

## 2019-08-26 PROCEDURE — 87205 SMEAR GRAM STAIN: CPT | Performed by: ORTHOPAEDIC SURGERY

## 2019-08-26 PROCEDURE — 36000058 ZZH SURGERY LEVEL 3 EA 15 ADDTL MIN: Performed by: ORTHOPAEDIC SURGERY

## 2019-08-26 PROCEDURE — 71000013 ZZH RECOVERY PHASE 1 LEVEL 1 EA ADDTL HR: Performed by: ORTHOPAEDIC SURGERY

## 2019-08-26 PROCEDURE — 0S993ZX DRAINAGE OF RIGHT HIP JOINT, PERCUTANEOUS APPROACH, DIAGNOSTIC: ICD-10-PCS | Performed by: ORTHOPAEDIC SURGERY

## 2019-08-26 PROCEDURE — 85025 COMPLETE CBC W/AUTO DIFF WBC: CPT | Performed by: INTERNAL MEDICINE

## 2019-08-26 PROCEDURE — 0JBL0ZZ EXCISION OF RIGHT UPPER LEG SUBCUTANEOUS TISSUE AND FASCIA, OPEN APPROACH: ICD-10-PCS | Performed by: ORTHOPAEDIC SURGERY

## 2019-08-26 PROCEDURE — 71000012 ZZH RECOVERY PHASE 1 LEVEL 1 FIRST HR: Performed by: ORTHOPAEDIC SURGERY

## 2019-08-26 PROCEDURE — 25000132 ZZH RX MED GY IP 250 OP 250 PS 637: Mod: GY | Performed by: INTERNAL MEDICINE

## 2019-08-26 PROCEDURE — 36000056 ZZH SURGERY LEVEL 3 1ST 30 MIN: Performed by: ORTHOPAEDIC SURGERY

## 2019-08-26 PROCEDURE — 25800030 ZZH RX IP 258 OP 636: Performed by: NURSE ANESTHETIST, CERTIFIED REGISTERED

## 2019-08-26 RX ORDER — LIDOCAINE 40 MG/G
CREAM TOPICAL
Status: DISCONTINUED | OUTPATIENT
Start: 2019-08-26 | End: 2019-08-26

## 2019-08-26 RX ORDER — EPHEDRINE SULFATE 50 MG/ML
INJECTION, SOLUTION INTRAMUSCULAR; INTRAVENOUS; SUBCUTANEOUS PRN
Status: DISCONTINUED | OUTPATIENT
Start: 2019-08-26 | End: 2019-08-26

## 2019-08-26 RX ORDER — SODIUM CHLORIDE, SODIUM LACTATE, POTASSIUM CHLORIDE, CALCIUM CHLORIDE 600; 310; 30; 20 MG/100ML; MG/100ML; MG/100ML; MG/100ML
INJECTION, SOLUTION INTRAVENOUS CONTINUOUS
Status: DISCONTINUED | OUTPATIENT
Start: 2019-08-26 | End: 2019-08-26 | Stop reason: HOSPADM

## 2019-08-26 RX ORDER — FENTANYL CITRATE 50 UG/ML
25-50 INJECTION, SOLUTION INTRAMUSCULAR; INTRAVENOUS
Status: CANCELLED | OUTPATIENT
Start: 2019-08-26

## 2019-08-26 RX ORDER — LIDOCAINE HYDROCHLORIDE 10 MG/ML
INJECTION, SOLUTION INFILTRATION; PERINEURAL PRN
Status: DISCONTINUED | OUTPATIENT
Start: 2019-08-26 | End: 2019-08-26

## 2019-08-26 RX ORDER — LIDOCAINE 40 MG/G
CREAM TOPICAL
Status: DISCONTINUED | OUTPATIENT
Start: 2019-08-26 | End: 2019-08-26 | Stop reason: HOSPADM

## 2019-08-26 RX ORDER — PROPOFOL 10 MG/ML
INJECTION, EMULSION INTRAVENOUS PRN
Status: DISCONTINUED | OUTPATIENT
Start: 2019-08-26 | End: 2019-08-26

## 2019-08-26 RX ORDER — IBUPROFEN 600 MG/1
600 TABLET, FILM COATED ORAL EVERY 6 HOURS PRN
Status: DISCONTINUED | OUTPATIENT
Start: 2019-08-26 | End: 2019-08-29 | Stop reason: HOSPADM

## 2019-08-26 RX ORDER — SODIUM CHLORIDE 9 MG/ML
INJECTION, SOLUTION INTRAVENOUS CONTINUOUS
Status: DISCONTINUED | OUTPATIENT
Start: 2019-08-26 | End: 2019-08-29 | Stop reason: HOSPADM

## 2019-08-26 RX ORDER — FENTANYL CITRATE 50 UG/ML
INJECTION, SOLUTION INTRAMUSCULAR; INTRAVENOUS PRN
Status: DISCONTINUED | OUTPATIENT
Start: 2019-08-26 | End: 2019-08-26

## 2019-08-26 RX ORDER — DEXAMETHASONE SODIUM PHOSPHATE 4 MG/ML
INJECTION, SOLUTION INTRA-ARTICULAR; INTRALESIONAL; INTRAMUSCULAR; INTRAVENOUS; SOFT TISSUE PRN
Status: DISCONTINUED | OUTPATIENT
Start: 2019-08-26 | End: 2019-08-26

## 2019-08-26 RX ORDER — NEOSTIGMINE METHYLSULFATE 1 MG/ML
VIAL (ML) INJECTION PRN
Status: DISCONTINUED | OUTPATIENT
Start: 2019-08-26 | End: 2019-08-26

## 2019-08-26 RX ORDER — HYDROMORPHONE HYDROCHLORIDE 1 MG/ML
.3-.5 INJECTION, SOLUTION INTRAMUSCULAR; INTRAVENOUS; SUBCUTANEOUS EVERY 10 MIN PRN
Status: DISCONTINUED | OUTPATIENT
Start: 2019-08-26 | End: 2019-08-26 | Stop reason: HOSPADM

## 2019-08-26 RX ORDER — ONDANSETRON 2 MG/ML
4 INJECTION INTRAMUSCULAR; INTRAVENOUS EVERY 30 MIN PRN
Status: DISCONTINUED | OUTPATIENT
Start: 2019-08-26 | End: 2019-08-26 | Stop reason: HOSPADM

## 2019-08-26 RX ORDER — BUPIVACAINE HYDROCHLORIDE 5 MG/ML
INJECTION, SOLUTION EPIDURAL; INTRACAUDAL PRN
Status: DISCONTINUED | OUTPATIENT
Start: 2019-08-26 | End: 2019-08-26 | Stop reason: HOSPADM

## 2019-08-26 RX ORDER — GLYCOPYRROLATE 0.2 MG/ML
INJECTION, SOLUTION INTRAMUSCULAR; INTRAVENOUS PRN
Status: DISCONTINUED | OUTPATIENT
Start: 2019-08-26 | End: 2019-08-26

## 2019-08-26 RX ORDER — FENTANYL CITRATE 50 UG/ML
25-50 INJECTION, SOLUTION INTRAMUSCULAR; INTRAVENOUS EVERY 5 MIN PRN
Status: DISCONTINUED | OUTPATIENT
Start: 2019-08-26 | End: 2019-08-26 | Stop reason: HOSPADM

## 2019-08-26 RX ORDER — MEPERIDINE HYDROCHLORIDE 50 MG/ML
12.5 INJECTION INTRAMUSCULAR; INTRAVENOUS; SUBCUTANEOUS
Status: DISCONTINUED | OUTPATIENT
Start: 2019-08-26 | End: 2019-08-26 | Stop reason: HOSPADM

## 2019-08-26 RX ORDER — ONDANSETRON 4 MG/1
4 TABLET, ORALLY DISINTEGRATING ORAL EVERY 30 MIN PRN
Status: DISCONTINUED | OUTPATIENT
Start: 2019-08-26 | End: 2019-08-26 | Stop reason: HOSPADM

## 2019-08-26 RX ORDER — NALOXONE HYDROCHLORIDE 0.4 MG/ML
.1-.4 INJECTION, SOLUTION INTRAMUSCULAR; INTRAVENOUS; SUBCUTANEOUS
Status: DISCONTINUED | OUTPATIENT
Start: 2019-08-26 | End: 2019-08-26 | Stop reason: HOSPADM

## 2019-08-26 RX ADMIN — FENTANYL CITRATE 50 MCG: 50 INJECTION, SOLUTION INTRAMUSCULAR; INTRAVENOUS at 12:41

## 2019-08-26 RX ADMIN — FENTANYL CITRATE 100 MCG: 50 INJECTION, SOLUTION INTRAMUSCULAR; INTRAVENOUS at 10:18

## 2019-08-26 RX ADMIN — TRAMADOL HYDROCHLORIDE 50 MG: 50 TABLET, COATED ORAL at 00:27

## 2019-08-26 RX ADMIN — LEVOTHYROXINE SODIUM 50 MCG: 0.05 TABLET ORAL at 15:48

## 2019-08-26 RX ADMIN — FENTANYL CITRATE 50 MCG: 50 INJECTION, SOLUTION INTRAMUSCULAR; INTRAVENOUS at 12:01

## 2019-08-26 RX ADMIN — TRAMADOL HYDROCHLORIDE 50 MG: 50 TABLET, COATED ORAL at 20:57

## 2019-08-26 RX ADMIN — GLYCOPYRROLATE 0.4 MG: 0.2 INJECTION, SOLUTION INTRAMUSCULAR; INTRAVENOUS at 11:15

## 2019-08-26 RX ADMIN — PRAMIPEXOLE DIHYDROCHLORIDE 2 MG: 1 TABLET ORAL at 20:49

## 2019-08-26 RX ADMIN — HYDROMORPHONE HYDROCHLORIDE 0.2 MG: 1 INJECTION, SOLUTION INTRAMUSCULAR; INTRAVENOUS; SUBCUTANEOUS at 13:43

## 2019-08-26 RX ADMIN — HYDROMORPHONE HYDROCHLORIDE 1 MG: 1 INJECTION, SOLUTION INTRAMUSCULAR; INTRAVENOUS; SUBCUTANEOUS at 10:21

## 2019-08-26 RX ADMIN — HYDROMORPHONE HYDROCHLORIDE 0.5 MG: 1 INJECTION, SOLUTION INTRAMUSCULAR; INTRAVENOUS; SUBCUTANEOUS at 12:14

## 2019-08-26 RX ADMIN — RANITIDINE 150 MG: 150 TABLET ORAL at 08:48

## 2019-08-26 RX ADMIN — LIOTHYRONINE SODIUM 10 MCG: 5 TABLET ORAL at 15:48

## 2019-08-26 RX ADMIN — HYDROMORPHONE HYDROCHLORIDE 0.2 MG: 1 INJECTION, SOLUTION INTRAMUSCULAR; INTRAVENOUS; SUBCUTANEOUS at 17:48

## 2019-08-26 RX ADMIN — ONDANSETRON HYDROCHLORIDE 4 MG: 2 INJECTION, SOLUTION INTRAMUSCULAR; INTRAVENOUS at 10:39

## 2019-08-26 RX ADMIN — TRAMADOL HYDROCHLORIDE 50 MG: 50 TABLET, COATED ORAL at 14:08

## 2019-08-26 RX ADMIN — OMEPRAZOLE 40 MG: 20 CAPSULE, DELAYED RELEASE ORAL at 08:48

## 2019-08-26 RX ADMIN — Medication 5 MG: at 10:43

## 2019-08-26 RX ADMIN — PHENYLEPHRINE HYDROCHLORIDE 50 MCG: 10 INJECTION INTRAVENOUS at 10:48

## 2019-08-26 RX ADMIN — Medication 1 LOZENGE: at 22:57

## 2019-08-26 RX ADMIN — DEXAMETHASONE SODIUM PHOSPHATE 4 MG: 4 INJECTION, SOLUTION INTRA-ARTICULAR; INTRALESIONAL; INTRAMUSCULAR; INTRAVENOUS; SOFT TISSUE at 10:18

## 2019-08-26 RX ADMIN — SODIUM CHLORIDE, POTASSIUM CHLORIDE, SODIUM LACTATE AND CALCIUM CHLORIDE: 600; 310; 30; 20 INJECTION, SOLUTION INTRAVENOUS at 12:03

## 2019-08-26 RX ADMIN — RANITIDINE 150 MG: 150 TABLET ORAL at 20:50

## 2019-08-26 RX ADMIN — Medication 1 MG: at 00:33

## 2019-08-26 RX ADMIN — CEFTRIAXONE SODIUM 2 G: 2 INJECTION, POWDER, FOR SOLUTION INTRAMUSCULAR; INTRAVENOUS at 07:48

## 2019-08-26 RX ADMIN — MIDAZOLAM 2 MG: 1 INJECTION INTRAMUSCULAR; INTRAVENOUS at 10:07

## 2019-08-26 RX ADMIN — OMEPRAZOLE 40 MG: 20 CAPSULE, DELAYED RELEASE ORAL at 20:50

## 2019-08-26 RX ADMIN — PROPOFOL 100 MG: 10 INJECTION, EMULSION INTRAVENOUS at 10:18

## 2019-08-26 RX ADMIN — LACOSAMIDE 200 MG: 200 TABLET, FILM COATED ORAL at 08:48

## 2019-08-26 RX ADMIN — ROCURONIUM BROMIDE 20 MG: 10 INJECTION INTRAVENOUS at 10:18

## 2019-08-26 RX ADMIN — ACETAMINOPHEN 650 MG: 325 TABLET, FILM COATED ORAL at 17:48

## 2019-08-26 RX ADMIN — PRAMIPEXOLE DIHYDROCHLORIDE 1 MG: 1 TABLET ORAL at 08:48

## 2019-08-26 RX ADMIN — HYDROMORPHONE HYDROCHLORIDE 0.2 MG: 1 INJECTION, SOLUTION INTRAMUSCULAR; INTRAVENOUS; SUBCUTANEOUS at 07:40

## 2019-08-26 RX ADMIN — Medication 5 MG: at 10:48

## 2019-08-26 RX ADMIN — Medication 3 MG: at 11:15

## 2019-08-26 RX ADMIN — DEXTRAN 70, AND HYPROMELLOSE 2910 1 DROP: 1; 3 SOLUTION/ DROPS OPHTHALMIC at 20:50

## 2019-08-26 RX ADMIN — SODIUM CHLORIDE: 9 INJECTION, SOLUTION INTRAVENOUS at 13:44

## 2019-08-26 RX ADMIN — HYDROMORPHONE HYDROCHLORIDE 0.2 MG: 1 INJECTION, SOLUTION INTRAMUSCULAR; INTRAVENOUS; SUBCUTANEOUS at 21:47

## 2019-08-26 RX ADMIN — LACOSAMIDE 200 MG: 200 TABLET, FILM COATED ORAL at 20:49

## 2019-08-26 RX ADMIN — LIDOCAINE HYDROCHLORIDE 50 MG: 10 INJECTION, SOLUTION INFILTRATION; PERINEURAL at 10:18

## 2019-08-26 RX ADMIN — FENTANYL CITRATE 50 MCG: 50 INJECTION, SOLUTION INTRAMUSCULAR; INTRAVENOUS at 11:53

## 2019-08-26 RX ADMIN — SODIUM CHLORIDE, POTASSIUM CHLORIDE, SODIUM LACTATE AND CALCIUM CHLORIDE: 600; 310; 30; 20 INJECTION, SOLUTION INTRAVENOUS at 10:07

## 2019-08-26 ASSESSMENT — ACTIVITIES OF DAILY LIVING (ADL)
ADLS_ACUITY_SCORE: 19
ADLS_ACUITY_SCORE: 22

## 2019-08-26 ASSESSMENT — ENCOUNTER SYMPTOMS
DYSRHYTHMIAS: 0
SEIZURES: 1

## 2019-08-26 NOTE — PROGRESS NOTES
ANISH      D: Per request to follow and assist with discharge planning, noted surgery today. SW has been informed that pt was admitted from [facility], per contact this morning with admissions coordinator there it was noted that pt has private pay bed hold at this time.        A/P: SW following for support and discharge planning.

## 2019-08-26 NOTE — PLAN OF CARE
A&O x4. Up w/SBA pivots to BSC, right leg TTWB. BS+. NPO since midnight. Voiding well. CMS: N/T to fingers and toes - baseline. Mod dorsi/plantar, good pedal pulses. PICC right arm. Right hip redness and swelling, steri strips covering incision. Pain managed w/Tramadol. SW consult placed per pt request to assist w/discharge as pt came from [facility]'s TCU. Surgical bath done. Surgery at 9:50am.

## 2019-08-26 NOTE — ANESTHESIA POSTPROCEDURE EVALUATION
Patient: Mercedes Barber    Procedure(s):  Irrigation and Debridement with Wound Vac Placement Right Hip    Diagnosis:RIGHT HIP WOUND  Diagnosis Additional Information: No value filed.    Anesthesia Type:  General, ETT    Note:  Anesthesia Post Evaluation    Patient location during evaluation: PACU  Patient participation: Able to fully participate in evaluation  Level of consciousness: awake and alert  Pain management: adequate  Airway patency: patent  Cardiovascular status: acceptable  Respiratory status: acceptable  Hydration status: acceptable  PONV: controlled             Last vitals:  Vitals:    08/26/19 1135 08/26/19 1140 08/26/19 1145   BP: 134/77 131/88 (!) 148/92   Pulse: 73 70 87   Resp: 18 18 15   Temp:      SpO2: 100% 99% 99%         Electronically Signed By: Noe Billingsley MD  August 26, 2019  12:02 PM

## 2019-08-26 NOTE — ANESTHESIA CARE TRANSFER NOTE
Patient: Mercedes Barber    Procedure(s):  Irrigation and Debridement with Wound Vac Placement Right Hip    Diagnosis: RIGHT HIP WOUND  Diagnosis Additional Information: No value filed.    Anesthesia Type:   General, ETT     Note:  Airway :Face Mask  Patient transferred to:PACU  Handoff Report: Identifed the Patient, Identified the Reponsible Provider, Reviewed the pertinent medical history, Discussed the surgical course, Reviewed Intra-OP anesthesia mangement and issues during anesthesia, Set expectations for post-procedure period and Allowed opportunity for questions and acknowledgement of understanding      Vitals: (Last set prior to Anesthesia Care Transfer)    CRNA VITALS  8/26/2019 1055 - 8/26/2019 1135      8/26/2019             EKG:  Sinus rhythm                Electronically Signed By: FROYLAN Queen CRNA  August 26, 2019  11:35 AM

## 2019-08-26 NOTE — ANESTHESIA PREPROCEDURE EVALUATION
Anesthesia Pre-Procedure Evaluation    Patient: Mercedes Barber   MRN: [id number] : 1955          Preoperative Diagnosis: RIGHT HIP WOUND    Procedure(s):  IRRIGATION AND DEBRIDEMENT, HIP    Past Medical History:   Diagnosis Date     Arthritis     Thumb     Cervical high risk HPV (human papillomavirus) test positive 2017: NIL pap, + HR HPV (not 16 or 18) result.      Cervical pain 9/15/2016     Constipation 2012     Diarrhea 2009     Esophageal stricture 2012     Gastro-oesophageal reflux disease      Hypothyroidism 2012     IBS (irritable bowel syndrome)      Mild major depression (H) 2012     Neuropathy of lower extremity      Rectal prolapse      Restless leg syndrome      Seizure disorder (H)     25 years ago     Sleep apnea     CPAP, no longer using CPAP     Temporal sclerosis 2012     Past Surgical History:   Procedure Laterality Date     Anterior COLPORRHAPHY, BLADDER/VAGINA      perigee mesh     ARTHROPLASTY HIP Right 2019    Procedure: Right total hip arthroplasty;  Surgeon: Anant Mejia MD;  Location: [facility] OR     ARTHROPLASTY PATELLO-FEMORAL (KNEE) Bilateral      ARTHROPLASTY REVISION HIP Right 2019    Procedure: Right hip revision total arthroplasty;  Surgeon: Tom Antonio MD;  Location: [facility]     CARPAL TUNNEL RELEASE RT/LT       DENTAL SURGERY      implant     DILATION AND CURETTAGE       DRAIN PILONIDAL CYST SIMPL       ENDOSCOPY DRUG INDUCED SLEEP N/A 2015    Procedure: ENDOSCOPY DRUG INDUCED SLEEP;  Surgeon: Park Ortiz MD;  Location: [facility] OR     ESOPHAGOSCOPY, GASTROSCOPY, DUODENOSCOPY (EGD), COMBINED  2013    Procedure: COMBINED ESOPHAGOSCOPY, GASTROSCOPY, DUODENOSCOPY (EGD), BIOPSY SINGLE OR MULTIPLE;;  Surgeon: Mundo Mehta MD;  Location:  GI     EXCISE LESION TRUNK  8/10/2012    Procedure: EXCISE LESION TRUNK;;  Surgeon: Jerald Diggs MD;  Location: [facility]     HYSTERECTOMY       L  shoulder fracture       rectal prolapse repair abdominally       RELEASE PLANTAR FASCIA Right 1/20/2015    Procedure: RELEASE PLANTAR FASCIA;  Surgeon: Maicol Liu DPM;  Location: [facility]     REMOVE MESH VAGINA  8/10/2012    Procedure: REMOVE MESH VAGINA;  Excision of Vaginal Mesh Exposure, Removal of Skin Tag Left Inner Leg;  Surgeon: Jerald Diggs MD;  Location: [facility]     REPAIR HAMMER TOE Right 1/20/2015    Procedure: REPAIR HAMMER TOE;  Surgeon: Maicol Liu DPM;  Location: [facility]     TONSILLECTOMY & ADENOIDECTOMY       TUBAL LIGATION       Anesthesia Evaluation     . Pt has had prior anesthetic. Type: General    History of anesthetic complications          ROS/MED HX    ENT/Pulmonary:     (+)sleep apnea, doesn't use CPAP , . .    Neurologic:     (+)neuropathy seizures last seizure: 20 years ago     Cardiovascular:  - neg cardiovascular ROS   (+) ----. : . . . :. . Previous cardiac testing date:results:date: results:ECG reviewed date:7/2/19 results:NSR date: results:         (-) CAD, syncope and arrhythmias   METS/Exercise Tolerance:  3 - Able to walk 1-2 blocks without stopping   Hematologic:     (+) Anemia ( hb 8.8), -      Musculoskeletal:   (+) arthritis,  -       GI/Hepatic:     (+) GERD Asymptomatic on medication, Inflammatory bowel disease, Other GI/Hepatic H/O esophageal stricture      Renal/Genitourinary:  - ROS Renal section negative    (-) renal disease   Endo:     (+) thyroid problem hypothyroidism, .      Psychiatric:  - neg psychiatric ROS       Infectious Disease:         Malignancy:         Other:                          Physical Exam  Normal systems: cardiovascular, pulmonary and dental    Airway   Mallampati: I  TM distance: >3 FB  Neck ROM: full    Dental     Cardiovascular       Pulmonary             Lab Results   Component Value Date    WBC 7.7 08/26/2019    HGB 8.8 (L) 08/26/2019    HCT 28.9 (L) 08/26/2019     08/26/2019    CRP 17.7 (H) 08/25/2019    SED 7 02/08/2011  "    08/26/2019    POTASSIUM 3.8 08/26/2019    CHLORIDE 108 08/26/2019    CO2 27 08/26/2019    BUN 8 08/26/2019    CR 0.58 08/26/2019    GLC 98 08/26/2019    SABA 8.7 08/26/2019    PHOS 4.1 09/17/2009    MAG 2.1 08/25/2019    ALBUMIN 3.8 02/09/2019    PROTTOTAL 7.5 02/09/2019    ALT 21 02/09/2019    AST 17 02/09/2019    ALKPHOS 94 02/09/2019    BILITOTAL 0.5 02/09/2019    LIPASE 87 02/09/2019    PTT 30 08/23/2011    INR 0.95 08/06/2019    TSH 0.48 07/02/2019    T4 0.81 09/12/2017    HCG Negative 10/17/2007       Preop Vitals  BP Readings from Last 3 Encounters:   08/26/19 139/80   08/10/19 112/78   07/25/19 95/59    Pulse Readings from Last 3 Encounters:   08/25/19 67   08/07/19 67   07/25/19 82      Resp Readings from Last 3 Encounters:   08/26/19 16   08/10/19 16   07/25/19 16    SpO2 Readings from Last 3 Encounters:   08/26/19 99%   08/10/19 96%   07/25/19 97%      Temp Readings from Last 1 Encounters:   08/26/19 97.2  F (36.2  C) (Temporal)    Ht Readings from Last 1 Encounters:   08/06/19 1.651 m (5' 5\")      Wt Readings from Last 1 Encounters:   08/26/19 87.9 kg (193 lb 12.8 oz)    Estimated body mass index is 32.25 kg/m  as calculated from the following:    Height as of 8/6/19: 1.651 m (5' 5\").    Weight as of this encounter: 87.9 kg (193 lb 12.8 oz).       Anesthesia Plan      History & Physical Review  History and physical reviewed and following examination; no interval change.    ASA Status:  2 .    NPO Status:  > 8 hours    Plan for General and ETT with Intravenous induction. Maintenance will be Inhalation.    PONV prophylaxis:  Ondansetron (or other 5HT-3) and Dexamethasone or Solumedrol       Postoperative Care  Postoperative pain management:  IV analgesics.      Consents  Anesthetic plan, risks, benefits and alternatives discussed with:  Patient..                 Noe Billingsley MD                    .  "

## 2019-08-26 NOTE — CONSULTS
ID consult dictated IMP 1 65 yo female early postop R SARAHI wd infection, SP I and D, felt superficial, recent joint aspirate cx neg, wd sens proteus    REC await current cxs, continue ceftriaxone likely 2 weeks IV postop assuming superfical but protective aggressive tx

## 2019-08-26 NOTE — PLAN OF CARE
RN [id number]  Pt  A&O, VSS.  Up with SBA, pivoting to commode in room. Pt states she is TTWB and restricted from bending >90 degrees.  PICC in place, xray taken to verify placement. Pain managed with 50mg tramadol.  Pt has baseline numbness to feet, good dorsi/plantar. Incision to R hip red and weeping scant fluid.  Pt to have  I&D R hip Monday at 0850 with Dr. Prabhakar.  Will continue to monitor.

## 2019-08-26 NOTE — CONSULTS
[facility]  Orthopaedics/Foot and Ankle Surgery Consultation         Tyson Prabhakar MD    Mercedes Barber MRN# [id number]   YOB: 1955 Age: 64 year old      Date of Admission:  8/25/2019  Date of Consult: 08/25/2019           Assessment and Plan:   64-year-old female with complicated recent history with regards to her right hip.  The patient is now over 2 weeks status post revision right total hip arthroplasty with debridement of a large seroma within the subcutaneous tissues superficial to the hip joint.  Cultures from the OR at that time were negative for infection.  The patient had noted increasing erythema and drainage associated with her hip wound over the past week and presented to [facility] where aspiration of a right hip subcutaneous fluid collection and the right hip joint itself were obtained.  The hip joint aspiration was negative for any sign of infection but the subcutaneous fluid collection grew Proteus species.  The patient has been placed on ceftriaxone over the past week with initial improvement in symptoms but has noted increasing pain, erythema, and swelling around the right hip over the past few days.  Inflammatory markers are increasing once again and examination is concerning for a persistent infected seroma.  Clearly there would be concern for this traveling deep into the hip joint if infection persists and not adequately addressed with just IV antibiotics.  Would recommend proceeding to the operating room tomorrow for irrigation and debridement of the right hip subcutaneous seroma.  Evaluation at that time would also help determine if there appears to be infection traveling deep into the hip joint.  The patient will continue IV ceftriaxone at this point time with further tailoring of her antibiotic regimen based on the results of cultures from the OR tomorrow.  Hospitalist comanagement is appreciated.  I will also discuss the patient's case  with Dr. Antonio who performed her more recent revision hip replacement surgery.            Code Status:   Full Code         Primary Care Physician:   Skyler Álvarez [phone]         Requesting Physician:      Dr. Ma         Chief Complaint:   R hip pain and swelling    History is obtained from the patient and medical chart.         History of Present Illness:   Mercedes Barber is a 64 year old female with complicated recent history with regards to her right hip.  The patient is now over 2 weeks status post revision right total hip arthroplasty with debridement of a large seroma within the subcutaneous tissues superficial to the hip joint.  Cultures from the OR at that time were negative for infection.  The patient had noted increasing erythema and drainage associated with her hip wound over the past week and presented to [facility] where aspiration of a right hip subcutaneous fluid collection and the right hip joint itself were obtained.  The hip joint aspiration was negative for any sign of infection but the subcutaneous fluid collection grew Proteus species.  The patient has been placed on ceftriaxone over the past week with initial improvement in symptoms but has noted increasing pain, erythema, and swelling around the right hip over the past few days.  The patient has maintained toe-touch weightbearing precautions on the right hip as instructed and denies any recent trauma to the hip.  The patient denies any fevers, chills, or other systemic signs of infection.  The patient has not noted significant drainage around the incision site over the past week.           Past Medical History:     Patient Active Problem List   Diagnosis     Menopausal syndrome (hot flashes)     Family history of breast cancer     Vulvar pain     Renal anomaly     Overactive bladder     Rectal prolapse     CARDIOVASCULAR SCREENING; LDL GOAL LESS THAN 160     Vaginal pain     Dysphagia     Confusion     Headache      Left shoulder pain     Anemia     Advanced directives, counseling/discussion     Mild major depression (H)     Esophageal stricture     Vulvar lesion     Temporal sclerosis     Lumbago     Pain in thoracic spine     Sciatica     Pain in joint, lower leg     Plantar fascial fibromatosis     Pain in joint involving ankle and foot     Other specified hypothyroidism     Gastroesophageal reflux disease without esophagitis     Irritable bowel syndrome     Other sleep apnea     Cervical high risk HPV (human papillomavirus) test positive     Restless legs syndrome (RLS)     Status post total hip replacement, right     Hip pain     Cellulitis      Past Medical History:   Diagnosis Date     Arthritis     Thumb     Cervical high risk HPV (human papillomavirus) test positive 07/17/2017 07/17/17: NIL pap, + HR HPV (not 16 or 18) result.      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     Neuropathy of lower extremity      Rectal prolapse      Restless leg syndrome      Seizure disorder (H)     25 years ago     Sleep apnea     CPAP, no longer using CPAP     Temporal sclerosis 8/27/2012             Past Surgical History:     Past Surgical History:   Procedure Laterality Date     Anterior COLPORRHAPHY, BLADDER/VAGINA      perigee mesh     ARTHROPLASTY HIP Right 7/23/2019    Procedure: Right total hip arthroplasty;  Surgeon: Anant Mejia MD;  Location: [facility] OR     ARTHROPLASTY PATELLO-FEMORAL (KNEE) Bilateral      ARTHROPLASTY REVISION HIP Right 8/7/2019    Procedure: Right hip revision total arthroplasty;  Surgeon: Tom Antonio MD;  Location: [facility] OR     CARPAL TUNNEL RELEASE RT/LT       DENTAL SURGERY      implant     DILATION AND CURETTAGE       DRAIN PILONIDAL CYST SIMPL       ENDOSCOPY DRUG INDUCED SLEEP  N/A 11/18/2015    Procedure: ENDOSCOPY DRUG INDUCED SLEEP;  Surgeon: Park Ortiz MD;  Location: [facility] OR     ESOPHAGOSCOPY, GASTROSCOPY, DUODENOSCOPY (EGD), COMBINED  4/5/2013    Procedure: COMBINED ESOPHAGOSCOPY, GASTROSCOPY, DUODENOSCOPY (EGD), BIOPSY SINGLE OR MULTIPLE;;  Surgeon: Mundo Mehta MD;  Location:  GI     EXCISE LESION TRUNK  8/10/2012    Procedure: EXCISE LESION TRUNK;;  Surgeon: Jerald Diggs MD;  Location: [facility] OR     HYSTERECTOMY       L shoulder fracture       rectal prolapse repair abdominally       RELEASE PLANTAR FASCIA Right 1/20/2015    Procedure: RELEASE PLANTAR FASCIA;  Surgeon: Maicol Liu DPM;  Location: [facility]     REMOVE MESH VAGINA  8/10/2012    Procedure: REMOVE MESH VAGINA;  Excision of Vaginal Mesh Exposure, Removal of Skin Tag Left Inner Leg;  Surgeon: Jerald Diggs MD;  Location: [facility] OR     REPAIR HAMMER TOE Right 1/20/2015    Procedure: REPAIR HAMMER TOE;  Surgeon: Maicol Liu DPM;  Location: [facility]     TONSILLECTOMY & ADENOIDECTOMY       TUBAL LIGATION              Home Medications:     Prior to Admission medications    Medication Sig Last Dose Taking? Auth Provider   acetaminophen (TYLENOL) 325 MG tablet Take 2 tablets (650 mg) by mouth every 4 hours as needed for other (multimodal surgical pain management along with NSAIDS and opioid medication as indicated based on pain control and physical function.) 8/25/2019 at 0820 Yes Rosalio Henriquez PA-C   artificial saliva (BIOTENE MT) AERS spray Take 2 sprays by mouth 3 times daily as needed for dry mouth 8/25/2019 at 0820 Yes Unknown, Entered By History   aspirin (ASA) 325 MG EC tablet Take 1 tablet (325 mg) by mouth daily 8/25/2019 at 0820 Yes Shona Gonzales PA-C   budesonide-formoterol (SYMBICORT) 160-4.5 MCG/ACT Inhaler Inhale 2 puffs into the lungs 2 times daily Past Week at Unknown time Yes Unknown, Entered By History   cefTRIAXone (ROCEPHIN) 2 GM IV Inject 2 g into the vein every 24 hours  8/25/2019 at 0820 Yes Unknown, Entered By History   cycloSPORINE (RESTASIS) 0.05 % ophthalmic emulsion Place 1 drop into both eyes 2 times daily 8/25/2019 at 0820 Yes Unknown, Entered By History   hypromellose (ARTIFICIAL TEARS) 0.5 % SOLN ophthalmic solution Place 1 drop into both eyes 2 times daily 8/25/2019 at 0820 Yes Unknown, Entered By History   lacosamide (VIMPAT) 200 MG TABS tablet Take 1 tablet (200 mg) by mouth 2 times daily 8/25/2019 at 0820 Yes Yusuf Brantley MD   levothyroxine (SYNTHROID/LEVOTHROID) 50 MCG tablet Take 50 mcg by mouth daily 8/25/2019 at 0700 Yes Unknown, Entered By History   Lidocaine (LIDOCARE) 4 % Patch Place 1 patch onto the skin every 24 hours To prevent lidocaine toxicity, patient should be patch free for 12 hrs daily. 8/25/2019 at 0820 Yes Unknown, Entered By History   linaclotide (LINZESS) 290 MCG capsule Take 290 mcg by mouth every morning (before breakfast) 8/25/2019 at 0700 Yes Reported, Patient   liothyronine (CYTOMEL) 5 MCG tablet Take 10 mcg by mouth daily  8/25/2019 at 0700 Yes Reported, Patient   nefazodone (SERZONE) 200 MG tablet Take 600 mg by mouth At Bedtime  8/24/2019 at 1930 Yes Reported, Patient   omeprazole (PRILOSEC) 40 MG DR capsule Take 1 capsule (40 mg) by mouth 2 times daily 8/25/2019 at 0700 Yes Skyler Álvarez MD   ondansetron (ZOFRAN-ODT) 4 MG ODT tab Take 1 tablet (4 mg) by mouth every 6 hours as needed for nausea or vomiting Past Month at Unknown time Yes Rosalio Henriquez PA-C   oxyCODONE (ROXICODONE) 5 MG tablet Take 5 mg by mouth every 4 hours as needed  8/20/2019 at 0200 Yes Unknown, Entered By History   polyethylene glycol (MIRALAX/GLYCOLAX) packet Take 1 packet by mouth daily as needed for constipation Past Month at Unknown time Yes Unknown, Entered By History   potassium citrate (UROCIT-K) 10 MEQ (1080 MG) CR tablet Take 10 mEq by mouth daily 8/25/2019 at 0820 Yes Unknown, Entered By History   pramipexole (MIRAPEX) 1 MG tablet Take 2 mg by mouth  every evening . (takes 1 tab in am , 2 tabs 1 hour prior to hs) 8/24/2019 at 1900 Yes Unknown, Entered By History   pramipexole (MIRAPEX) 1 MG tablet Take 1 mg by mouth every morning . ( takes 1 tab in am , 2 tabs 1 hour prior to hs) 8/24/2019 at 1630 Yes Reported, Patient   prochlorperazine (COMPAZINE) 10 MG tablet Take 10 mg by mouth every 8 hours as needed for nausea or vomiting 8/23/2019 at 0900 Yes Unknown, Entered By History   ranitidine (ZANTAC) 150 MG tablet TAKE 1 TABLET BY MOUTH TWICE DAILY 8/25/2019 at 0820 Yes Skyler Álvarez MD   senna-docusate (SENOKOT-S/PERICOLACE) 8.6-50 MG tablet Take 1 tablet by mouth 2 times daily as needed for constipation 8/25/2019 at 0820 Yes Shona Gonzales PA-C   temazepam (RESTORIL) 30 MG capsule Take 30 mg by mouth At Bedtime  8/24/2019 at 1930 Yes Reported, Patient   traMADol (ULTRAM) 50 MG tablet Take 100 mg by mouth every 4 hours as needed for severe pain 8/25/2019 at 0820 Yes Unknown, Entered By History   albuterol (ALBUTEROL) 108 (90 BASE) MCG/ACT Inhaler Inhale 2 puffs into the lungs 4 times daily as needed for shortness of breath / dyspnea or wheezing More than a month at Unknown time  Skyler Álvarez MD   ibuprofen (ADVIL/MOTRIN) 600 MG tablet Take 1 tablet (600 mg) by mouth every 6 hours as needed for moderate pain 8/23/2019 at 1300  Rosalio Henriquez PA-C            Current Medications:           [START ON 8/26/2019] cefTRIAXone  2 g Intravenous Q24H     [START ON 8/26/2019] fluticasone-vilanterol  1 puff Inhalation Daily     hypromellose-dextran  1 drop Both Eyes BID     lacosamide  200 mg Oral BID     [START ON 8/26/2019] levothyroxine  50 mcg Oral Daily     [START ON 8/26/2019] linaclotide  290 mcg Oral QAM AC     [START ON 8/26/2019] liothyronine  10 mcg Oral Daily     nefazodone  600 mg Oral At Bedtime     omeprazole  40 mg Oral BID     [START ON 8/26/2019] pramipexole  1 mg Oral QAM     pramipexole  2 mg Oral QPM     ranitidine  150 mg Oral BID      "sodium chloride (PF)  3 mL Intracatheter Q8H     temazepam  15 mg Oral At Bedtime     acetaminophen, albuterol, HYDROmorphone, lidocaine 4%, lidocaine (buffered or not buffered), magnesium sulfate, melatonin, naloxone, ondansetron **OR** ondansetron, polyethylene glycol, potassium chloride, potassium chloride with lidocaine, potassium chloride, potassium chloride, potassium chloride, senna-docusate, sodium chloride (PF), traMADol         Allergies:     Allergies   Allergen Reactions     Clonopin [Clonazepam]      \"Walk funny and Mind goes funny\"     Encort [Hydrocortisone Acetate] Swelling     Feet swelled.     Encort [Hydrocortisone] Swelling     Erythromycin Diarrhea     Flagyl [Metronidazole] Nausea     Gabapentin      Over eats     Lamictal [Lamotrigine]      Tegretol [Carbamazepine] Nausea and Vomiting            Social History:     Social History     Tobacco Use     Smoking status: Never Smoker     Smokeless tobacco: Never Used   Substance Use Topics     Alcohol use: Yes     Comment: 1 glass of wine 4x/yr             Family History:     Family History   Problem Relation Age of Onset     Eye Disorder Mother      Lipids Mother         has CHF     Osteoporosis Mother      Diabetes Father      Alzheimer Disease Paternal Grandmother      Hypertension Brother      Alcohol/Drug Brother      Depression Brother      Gastrointestinal Disease Brother         hx of colonic polyps     Lipids Brother      Psychotic Disorder Brother      Breast Cancer Sister      Depression Sister      Lipids Sister      Thyroid Disease Sister      Congenital Anomalies Daughter      Neurologic Disorder Daughter               Review of Systems:   The 10 point Review of Systems is negative other than noted in the HPI            Physical Exam:   Blood pressure (!) 144/77, pulse 67, temperature 97.2  F (36.2  C), temperature source Oral, resp. rate 16, last menstrual period 03/11/2010, SpO2 97 %, not currently breastfeeding.  0 lbs 0 " oz    Constitutional:   Awake, alert, cooperative, no apparent distress, and appears stated age.     Lungs:   No increased work of breathing, good air exchange.     Musculoskeletal:   The right hip incision demonstrates moderate erythema, induration, and tenderness with palpation particularly along the distal end of the incision.  There is no expressible drainage from the incision.  The proximal three quarters of the incision appear to be healing well with a zipper suture device in place.  There is minimal irritability with gentle logroll of the right hip.  The patient demonstrates preserved dorsiflexion and plantarflexion strength of the right ankle.  Sensation is fully intact throughout the right foot in all peripheral nerve distributions.  Toes are all warm and well-perfused with brisk capillary refill.              Data:   All new lab and imaging data was reviewed.  Cultures from the patient's revision total hip arthroplasty were negative for growth.  Aspiration of the right hip subcutaneous fluid collection at [place] this past week returned positive for pan-sensitive Proteus species.  Aspiration from the right hip joint performed at [place] this past week was negative for infection.  White blood cell count is 8.6 but CRP is elevated at 17, increased from her more recent values obtained at [place].  CT scan of the right hip obtained today demonstrates a subcutaneous fluid collection consistent with a seroma but no significant hip effusion or large fluid collection around the hip joint.  There is no obvious hardware complication.  Results for orders placed or performed during the hospital encounter of 08/25/19 (from the past 24 hour(s))   CBC with platelets differential   Result Value Ref Range    WBC 8.6 4.0 - 11.0 10e9/L    RBC Count 3.21 (L) 3.8 - 5.2 10e12/L    Hemoglobin 9.8 (L) 11.7 - 15.7 g/dL    Hematocrit 30.8 (L) 35.0 - 47.0 %    MCV 96 78 - 100 fl    MCH 30.5 26.5 - 33.0 pg    MCHC 31.8  31.5 - 36.5 g/dL    RDW 14.8 10.0 - 15.0 %    Platelet Count 451 (H) 150 - 450 10e9/L    Diff Method Automated Method     % Neutrophils 63.6 %    % Lymphocytes 14.9 %    % Monocytes 8.4 %    % Eosinophils 11.5 %    % Basophils 0.8 %    % Immature Granulocytes 0.8 %    Nucleated RBCs 0 0 /100    Absolute Neutrophil 5.5 1.6 - 8.3 10e9/L    Absolute Lymphocytes 1.3 0.8 - 5.3 10e9/L    Absolute Monocytes 0.7 0.0 - 1.3 10e9/L    Absolute Eosinophils 1.0 (H) 0.0 - 0.7 10e9/L    Absolute Basophils 0.1 0.0 - 0.2 10e9/L    Abs Immature Granulocytes 0.1 0 - 0.4 10e9/L    Absolute Nucleated RBC 0.0    Basic metabolic panel   Result Value Ref Range    Sodium 139 133 - 144 mmol/L    Potassium 3.3 (L) 3.4 - 5.3 mmol/L    Chloride 109 94 - 109 mmol/L    Carbon Dioxide 27 20 - 32 mmol/L    Anion Gap 3 3 - 14 mmol/L    Glucose 114 (H) 70 - 99 mg/dL    Urea Nitrogen 13 7 - 30 mg/dL    Creatinine 0.60 0.52 - 1.04 mg/dL    GFR Estimate >90 >60 mL/min/[1.73_m2]    GFR Estimate If Black >90 >60 mL/min/[1.73_m2]    Calcium 8.7 8.5 - 10.1 mg/dL   CRP inflammation   Result Value Ref Range    CRP Inflammation 17.7 (H) 0.0 - 8.0 mg/L   Magnesium   Result Value Ref Range    Magnesium 2.1 1.6 - 2.3 mg/dL   CT Hip Right w/o Contrast    Narrative    CT RIGHT HIP WITHOUT CONTRAST   8/25/2019 1:35 PM     HISTORY: Pain, history of seroma, evaluate location of seroma and  extent.    TECHNIQUE:   No IV contrast material. Radiation dose for this scan was  reduced using automated exposure control, adjustment of the mA and/or  kV according to patient size, or iterative reconstruction technique.    COMPARISON: 8/7/2019 radiographs. 8/6/2019 CT pelvis.    FINDINGS:    Bone/cartilage: Right SARAHI with cerclage wire fixation. The degree of  soft tissue thickening and calcification along the medial acetabular  rim extending into the pelvis has slightly increased in the interval.  No lucency adjacent to the acetabular component or screw fixation. No  fractures  of the acetabular screw fixation. No lucency adjacent to the  femoral component. Osteopenia. No fractures are identified.    Joint: No right hip joint effusion.    Soft tissues: There is an elliptical fluid collection in the soft  tissues of the right gluteal region and right thigh that extends  superficial to the gluteus susan musculature and vastus medialis  musculature. The fluid collection measures 10.0 cm in width x 5.6 cm  in height and up to 20 cm in length obliquely. The extent of the fluid  collection has increased since the preoperative CT examination.  Findings are most likely due to postop hematoma/seroma, however,  superimposed infection cannot be excluded.    Muscles and tendons: No intramuscular hematoma or fluid collection. No  muscular atrophy.      Impression    IMPRESSION:  1. Subcutaneous fluid collection in the right gluteal region and thigh  is likely due to a postop hematoma/seroma, however, superimposed  infection cannot be excluded.  2. Right SARAHI. Increasing soft tissue thickening and calcification  along the medial acetabular rim.    LILIANA MARRERO MD   XR Chest Port 1 View    Narrative    CHEST PORTABLE ONE VIEW 8/25/2019 4:31 PM     COMPARISON: Two-view chest x-ray 5/21/2017.    HISTORY: Verify PICC placement: pre-existing PICC upon admission to  hospital.    FINDINGS: There has been interval placement of a right PICC line with  its tip at the cavoatrial junction. The cardiac silhouette, pulmonary  vasculature, lungs and pleural spaces are within normal limits.      Impression    IMPRESSION: Clear lungs.    BEN SHELDON MD

## 2019-08-26 NOTE — PLAN OF CARE
Day RN  VS monitored, 2L NC, up w/SBA stand pivot to bsc, TTWB, wound vac in place at continuous suction, IV Dilaudid/Ultram for pain, voiding, 2+ dorsal pedals, mod dorsi plantar R LE, baseline neuropathy bilaterally, zabrina clears, PICC patent and infusing, will cont to monitor.

## 2019-08-26 NOTE — CONSULTS
Consult Date:  08/26/2019      LOCATION:  Room 621.       REFERRING PHYSICIAN:  Dr. Ma.      IMPRESSION:   1.  A 64-year-old female with recent revision right total hip arthroplasty, early postop seroma infected with Proteus, on antibiotics, worsening now with a formal I and D, at surgery no signs of deep infection.   2.  Recent revision right total hip arthroplasty for mechanical reasons failing early after initial surgery.   3.  Remote history of Clostridium difficile colitis.   4.  Seizure disorder.   5.  FLAGYL LISTED ALLERGY AS JUST NAUSEA.      RECOMMENDATIONS:  Continue ceftriaxone, assuming no deep infection.  Theoretically could use oral antibiotics, but as a protective measure given this has required a full I and D and recent surgery, probably 2 weeks IV, especially if she is going to rehab anyway.      HISTORY:  This 64-year-old female is seen in consultation due to infected right total hip arthroplasty.  The patient has a history of a recent right total hip arthroplasty and failed early postop due to a dislocation and mechanical reasons.  This required her to undergo revision therapy in late July.  Postop she had increasing swelling and was hospitalized at [place].  She had no particular fevers or chills at the time, but had some mild systemic illness symptoms.  An aspiration was done that grew Proteus that was pansensitive.  She was placed on ceftriaxone.  No I and D was done and was discharged with a plan to do an extended course.  On the antibiotics, she had worsening wound and was readmitted here and has now undergone a formal I and D.  They did not invade the joint capsule as there were no signs of deep infection.  At [place] an aspiration of the joint itself was done that was negative for infection.  She has not been noticing fevers and chills with the current illness.      PAST MEDICAL HISTORY:  Prior seizure disorder, history of C. diff several years ago, has had multiple  postoperative problems from her various surgeries, largely not infection complications.      SOCIAL AND FAMILY HISTORY:  No recent travels, no known resistant pathogens.  Has had recent healthcare contact.      ALLERGIES:  INCLUDE FLAGYL, WHICH WAS GI INTOLERANCE ONLY.      REVIEW OF SYSTEMS:  Largely as above.  Some pain in the joint, although it was primarily in the superficial area also.  She was able to move the joint in an ongoing fashion.      PHYSICAL EXAMINATION:   GENERAL:  The patient appears her stated age.  She is just out of anesthesia and sleepy.   VITAL SIGNS:  She is afebrile.  Vital signs currently normal.   HEENT:  No thrush or intraoral lesions.  Pupils reactive.   NECK:  Supple and nontender.   HEART:  Regular rhythm, no particular murmur.   LUNGS:  Clear bilaterally.   ABDOMEN:  Soft, nontender.   EXTREMITIES:  The hip area is wrapped partially, but I am able to actually see the wound.  Open area, some redness around the area.      LABORATORY:  Cultures here are pending.  Outside culture with a pansensitive Proteus in pure culture.  White blood cell count currently 8600 with a normal diff.  Inflammatory markers:  CRP is 17.7.      Thank you very much for consultation.  I will follow the patient with you.         GINA MCCOLLUM MD             D: 2019   T: 2019   MT: BLANCHE      Name:     OLI MOSELEY   MRN:      [id number]        Account:       [id number]   :      1955           Consult Date:  2019      Document: [id number]       cc: Skyler Álvarez MD

## 2019-08-26 NOTE — PROGRESS NOTES
"[facility]    Hospitalist Progress Note  Name: Mercedes Barber    MRN: [id number]  Provider: Astrid Ma MD  Date of Service: 08/26/2019    Assessment & Plan   Summary of Stay: Mercedes Barber is a 64 year old female who was admitted on 8/25/2019 for right hip cellulitis and abscess. Her past medical history significant for hypothyroidism, seizure disorder, sleep apnea, arthritis, GERD. Of note she had recent hospitalizations for total right hip arthroplasty in July 2019 complicated with infection and revision surgery in August 2019 with recurrent cellulitis and fluid collection around the wound status fluid aspirated aspiration on 8/14 and 8/16 from both joint and subcutaneous fluid collection.  Subcutaneous fluid grew Proteus.  PICC line  was placed on 8/17 and she was started on IV ceftriaxone on 8/19.  Presented to the emergency room as despite being on IV antibiotics she had increased drainage, erythema and pain around the wound.  She also had low-grade temperature.   Orthopedic surgery was consulted patient underwent wound irrigation and debridement with excision 8/26/2019     Right hip cellulitis cannot exclude deeper infection/abscess  --Drainage from surgical site surrounding erythema warmth and tenderness  ----Recent CT scan on 8/16/2019 at [facility] showed \" large fluid collection and subcutaneous tissue lateral to right hip prosthesis measuring 10.8-6 0.5 to 15 cm. Concerning for abscess  -On IV ceftriaxone without major change in antibiotic at this time  --ID is consulted to help with antibiotic management  --Status post I&D and joint aspirate 8/26/2019  --PRN pain meds      Hypokalemia  --Replaced  potassium per protocol     Anemia  --During recent hospitalization patient's hemoglobin was down to 6.8 due to acute blood loss.  2 units of blood transfusion  --Hemoglobin today stable at 9.8  --Recheck in a.m. postop     H/o Seizure Disorder  --Continue prior to admission " meds  --Seizure precautions     GERD  --Continue PPI     Hypothyroidism  --Continue replacement      IBS  --Continue home meds     History of sleep apnea  --Not using CPAP at home      DVT Prophylaxis: Enoxaparin (Lovenox) SQ  Code Status: Full Code    Disposition: Expected discharge in 2-3 days to prior living arrangement      Interval History   Patient seen in PACU doing well.  Pain was well controlled.  Underwent debridement wound VAC was placed.  Denies any chest pain or shortness of breath.  Complains of dry mouth.    -Data reviewed today: I reviewed all new labs and imaging reports over the last 24 hours. I personally reviewed no images or EKG's today.    Physical Exam   Temp: 97.1  F (36.2  C) Temp src: Oral BP: (!) 150/74 Pulse: 64 Heart Rate: 59 Resp: 19 SpO2: 95 % O2 Device: Nasal cannula Oxygen Delivery: 2 LPM  Vitals:    08/26/19 0655   Weight: 87.9 kg (193 lb 12.8 oz)     Vital Signs with Ranges  Temp:  [96.6  F (35.9  C)-97.2  F (36.2  C)] 97.1  F (36.2  C)  Pulse:  [53-87] 64  Heart Rate:  [52-88] 59  Resp:  [11-19] 19  BP: (127-150)/(73-92) 150/74  SpO2:  [93 %-100 %] 95 %  I/O last 3 completed shifts:  In: 300 [P.O.:300]  Out: -       GEN:  Alert, oriented x 3, appears comfortable, NAD.  HEENT:  Normocephalic/atraumatic, no scleral icterus, no nasal discharge, mouth moist.  CV:  Regular rate and rhythm, no murmur or JVD.  S1 + S2 noted, no S3 or S4.  LUNGS:  Clear to auscultation bilaterally without rales/rhonchi/wheezing/retractions.  Symmetric chest rise on inhalation noted.  ABD:  Active bowel sounds, soft, non-tender/non-distended.  No rebound/guarding/rigidity.  EXT:  No edema.  No cyanosis.  Right leg surgical wound wound VAC   SKIN:  Dry to touch, cellulitis right leg lateral aspect of hip    Medications     sodium chloride 100 mL/hr at 08/26/19 1344       [START ON 8/27/2019] aspirin  325 mg Oral Daily     cefTRIAXone  2 g Intravenous Q24H     fluticasone-vilanterol  1 puff Inhalation Daily      hypromellose-dextran  1 drop Both Eyes BID     lacosamide  200 mg Oral BID     levothyroxine  50 mcg Oral Daily     linaclotide  290 mcg Oral QAM AC     liothyronine  10 mcg Oral Daily     nefazodone  600 mg Oral At Bedtime     omeprazole  40 mg Oral BID     pramipexole  1 mg Oral QAM     pramipexole  2 mg Oral QPM     ranitidine  150 mg Oral BID     sodium chloride (PF)  3 mL Intracatheter Q8H     temazepam  15 mg Oral At Bedtime     Data     Recent Labs   Lab 08/26/19  0544 08/25/19  1124   WBC 7.7 8.6   HGB 8.8* 9.8*   HCT 28.9* 30.8*   MCV 98 96    451*     Recent Labs   Lab 08/26/19  0544 08/25/19  2230 08/25/19  1124     --  139   POTASSIUM 3.8 3.7 3.3*   CHLORIDE 108  --  109   CO2 27  --  27   ANIONGAP 4  --  3   GLC 98  --  114*   BUN 8  --  13   CR 0.58  --  0.60   GFRESTIMATED >90  --  >90   GFRESTBLACK >90  --  >90   SABA 8.7  --  8.7     Recent Labs   Lab 08/26/19  1109 08/26/19  1048   CULT PENDING  PENDING PENDING  PENDING     No results for input(s): AST, ALT, GGT, ALKPHOS, BILITOTAL, BILICONJ, BILIDIRECT, KURTIS in the last 168 hours.    Invalid input(s): BILIRUBININDIRECT  No results for input(s): INR in the last 168 hours.  No results for input(s): LACT in the last 168 hours.  No results for input(s): TSH in the last 168 hours.  No results for input(s): TROPONIN, TROPI, TROPR in the last 168 hours.    Invalid input(s): TROP, TROPONINIES  No results for input(s): COLOR, APPEARANCE, URINEGLC, URINEBILI, URINEKETONE, SG, UBLD, URINEPH, PROTEIN, UROBILINOGEN, NITRITE, LEUKEST, RBCU, WBCU in the last 168 hours.    Recent Results (from the past 24 hour(s))   XR Chest Port 1 View    Narrative    CHEST PORTABLE ONE VIEW 8/25/2019 4:31 PM     COMPARISON: Two-view chest x-ray 5/21/2017.    HISTORY: Verify PICC placement: pre-existing PICC upon admission to  hospital.    FINDINGS: There has been interval placement of a right PICC line with  its tip at the cavoatrial junction. The cardiac  silhouette, pulmonary  vasculature, lungs and pleural spaces are within normal limits.      Impression    IMPRESSION: Clear lungs.    BEN SHELDON MD

## 2019-08-26 NOTE — OP NOTE
PREOPERATIVE DIAGNOSIS: Right hip infected seroma.    POSTOPERATIVE DIAGNOSIS: Right hip infected seroma.    PROCEDURE(S):   1.  Right hip wound irrigation and debridement with excisional debridement of nonviable skin, subcutaneous tissues, and fascia.  2. Application of incisional wound VAC, 27cm length incision.    ATTENDING SURGEON: Dr. Tyson Prabhakar.    ASSISTANT SURGEON: Jewel Bailey PA-C.    ANESTHESIA: General.    EBL: 25mL    SPECIMENS: Right hip seroma fluid and right hip joint aspirate sent for Gram stain and culture.    COMPLICATIONS: None apparent.    A skilled surgical assistant, Jewel Bailey PA-C, was necessary and utilized during the case to assist with patient positioning, prepping and draping, completing the soft tissue/bone work, wound closure, and dressing application.  The assistant was utilized throughout the entire case.    INDICATIONS: Mercedes is a pleasant 64 year-old female with complicated recent history with regards to her right hip.  In brief, the patient underwent a revision total hip arthroplasty a few weeks ago with my partner Dr. Antonio.  The patient had been healing well initially following surgery though developed concern for a superficial wound infection and was placed on oral antibiotics at a rehabilitation center.  Due to increasing swelling around the right hip, the patient was seen at [facility] where an aspiration was obtained from both a right hip superficial fluid collection as well as the right hip joint.  The joint aspiration was negative for infection but the superficial fluid collection was positive for Proteus.  The patient was placed on IV antibiotics with initial improvement in symptoms.  Over the past few days, however, the patient has noted increasing swelling and discomfort localized around her hip incision.  Inflammatory markers have been re-elevating.  Given the patient's complicated recent history with regards to her right total hip  arthroplasty, concern for an infected seroma, and to limit the potential spread of infection deep to the hip joint, the above operative intervention was recommended.  After full discussion of the benefits and risks of surgery, the patient provided informed consent to proceed.    The patient was identified in the pre-operative holding area on the date of surgery.  The operative site was marked with indelible marker and the patient was brought back to the operating room and transferred to the operating table in a left lateral decubitus position after successful administration of anesthesia.  All bony prominences were well-padded.  The right lower extremity was prepped and draped in standard sterile fashion.  A pre-operative timeout was performed identifying the correct patient, procedure, operative site, antibiotic administration, and equipment necessary for the procedure.    The patient's previous suture closure device was removed and an incision was carried down through the patient's previous total hip arthroplasty incision.  A copious amount of seroma fluid immediately drained from around the subcutaneous tissues.  The fluid was relatively clear and brown-tinged, consistent with a chronic hematoma/seroma.  There is no obvious purulence.  Suture debris within the incision was carefully debrided.  A combination of knife and rongeur were utilized to debride nonviable skin, subcutaneous tissue, and fascia.  Seroma fluid was sent for Gram stain and culture.  The deep fascia was carefully inspected and there was no evidence of obvious communication deep to the hip joint.  The wound was copiously irrigated with a combination of 3L normal saline and 4L of antibiotic impregnated saline.  After completing the irrigation and debridement, an 18-gauge needle was utilized to access the hip joint.  Clear, yellow synovial fluid was aspirated from the hip and sent for Gram stain and culture.  Deep subcutaneous tissues were  reapproximated with 0 Monocryl suture.  Subcutaneous tissues and skin were reapproximated with interrupted 0 Monocryl and a running 3-0 Monocryl subcuticular suture respectively.    Due to the previous seroma formation despite use of a drain, I elected to place an incisional wound VAC along the incision.  Adaptic was placed directly over the surgical incision and a wound VAC was placed over the Adaptic and placed to 75mmHg low continuous suction.  The incision measured 27 cm in length. The patient was extubated and brought to the PACU in stable condition for further postoperative care.    Postoperatively, the patient will remain toe-touch weightbearing on the right lower extremity, following her initial hip precautions.  Infectious disease consultation will be obtained to help guide antibiotic treatment.  The patient will be placed on aspirin for DVT prophylaxis.  The incisional wound VAC will remain in place for at least 72 hours, or until the patient is discharged from the hospital.  We will continue to monitor the results of the patient's cultures throughout her hospital stay.  The patient will plan for outpatient follow-up with my partner Dr. Antonio as currently scheduled and he has been made aware of the patient's current clinical course.    Of note, all counts were correct at the conclusion of the case.

## 2019-08-27 ENCOUNTER — PATIENT OUTREACH (OUTPATIENT)
Dept: FAMILY MEDICINE | Facility: CLINIC | Age: 64
End: 2019-08-27

## 2019-08-27 ENCOUNTER — APPOINTMENT (OUTPATIENT)
Dept: PHYSICAL THERAPY | Facility: CLINIC | Age: 64
DRG: 857 | End: 2019-08-27
Attending: PHYSICIAN ASSISTANT
Payer: MEDICARE

## 2019-08-27 LAB
CREAT SERPL-MCNC: 0.58 MG/DL (ref 0.52–1.04)
CRP SERPL-MCNC: 16 MG/L (ref 0–8)
GFR SERPL CREATININE-BSD FRML MDRD: >90 ML/MIN/{1.73_M2}
GLUCOSE SERPL-MCNC: 114 MG/DL (ref 70–99)
PLATELET # BLD AUTO: 413 10E9/L (ref 150–450)

## 2019-08-27 PROCEDURE — 25000132 ZZH RX MED GY IP 250 OP 250 PS 637: Mod: GY | Performed by: PHYSICIAN ASSISTANT

## 2019-08-27 PROCEDURE — 12000000 ZZH R&B MED SURG/OB

## 2019-08-27 PROCEDURE — 97116 GAIT TRAINING THERAPY: CPT | Mod: GP | Performed by: PHYSICAL THERAPIST

## 2019-08-27 PROCEDURE — 82565 ASSAY OF CREATININE: CPT | Performed by: INTERNAL MEDICINE

## 2019-08-27 PROCEDURE — 99232 SBSQ HOSP IP/OBS MODERATE 35: CPT | Performed by: INTERNAL MEDICINE

## 2019-08-27 PROCEDURE — 25000132 ZZH RX MED GY IP 250 OP 250 PS 637: Mod: GY | Performed by: INTERNAL MEDICINE

## 2019-08-27 PROCEDURE — 82947 ASSAY GLUCOSE BLOOD QUANT: CPT | Performed by: INTERNAL MEDICINE

## 2019-08-27 PROCEDURE — 40000894 ZZH STATISTIC OT IP EVAL DEFER

## 2019-08-27 PROCEDURE — 97162 PT EVAL MOD COMPLEX 30 MIN: CPT | Mod: GP | Performed by: PHYSICAL THERAPIST

## 2019-08-27 PROCEDURE — 25000128 H RX IP 250 OP 636: Performed by: PHYSICIAN ASSISTANT

## 2019-08-27 PROCEDURE — 97530 THERAPEUTIC ACTIVITIES: CPT | Mod: GP | Performed by: PHYSICAL THERAPIST

## 2019-08-27 PROCEDURE — 97110 THERAPEUTIC EXERCISES: CPT | Mod: GP | Performed by: PHYSICAL THERAPIST

## 2019-08-27 PROCEDURE — 85049 AUTOMATED PLATELET COUNT: CPT | Performed by: INTERNAL MEDICINE

## 2019-08-27 PROCEDURE — 86140 C-REACTIVE PROTEIN: CPT | Performed by: INTERNAL MEDICINE

## 2019-08-27 RX ORDER — ASPIRIN 325 MG
325 TABLET, DELAYED RELEASE (ENTERIC COATED) ORAL DAILY
Qty: 40 TABLET | Refills: 0 | Status: SHIPPED | OUTPATIENT
Start: 2019-08-27 | End: 2019-09-06

## 2019-08-27 RX ORDER — OXYCODONE HYDROCHLORIDE 5 MG/1
5 TABLET ORAL
Status: DISCONTINUED | OUTPATIENT
Start: 2019-08-27 | End: 2019-08-29 | Stop reason: HOSPADM

## 2019-08-27 RX ORDER — PROCHLORPERAZINE MALEATE 5 MG
5 TABLET ORAL EVERY 6 HOURS PRN
Status: DISCONTINUED | OUTPATIENT
Start: 2019-08-27 | End: 2019-08-29 | Stop reason: HOSPADM

## 2019-08-27 RX ORDER — OXYCODONE HYDROCHLORIDE 5 MG/1
5 TABLET ORAL EVERY 4 HOURS PRN
Qty: 20 TABLET | Refills: 0 | Status: SHIPPED | OUTPATIENT
Start: 2019-08-27 | End: 2019-08-29

## 2019-08-27 RX ORDER — PROCHLORPERAZINE 25 MG
12.5 SUPPOSITORY, RECTAL RECTAL EVERY 12 HOURS PRN
Status: DISCONTINUED | OUTPATIENT
Start: 2019-08-27 | End: 2019-08-29 | Stop reason: HOSPADM

## 2019-08-27 RX ORDER — ONDANSETRON 4 MG/1
4 TABLET, ORALLY DISINTEGRATING ORAL EVERY 6 HOURS PRN
Qty: 10 TABLET | Refills: 0 | Status: SHIPPED | OUTPATIENT
Start: 2019-08-27 | End: 2019-10-02

## 2019-08-27 RX ORDER — TRAMADOL HYDROCHLORIDE 50 MG/1
100 TABLET ORAL EVERY 6 HOURS PRN
Status: DISCONTINUED | OUTPATIENT
Start: 2019-08-27 | End: 2019-08-29 | Stop reason: HOSPADM

## 2019-08-27 RX ADMIN — OMEPRAZOLE 40 MG: 20 CAPSULE, DELAYED RELEASE ORAL at 08:37

## 2019-08-27 RX ADMIN — PRAMIPEXOLE DIHYDROCHLORIDE 2 MG: 1 TABLET ORAL at 21:12

## 2019-08-27 RX ADMIN — ASPIRIN 325 MG: 325 TABLET, DELAYED RELEASE ORAL at 08:38

## 2019-08-27 RX ADMIN — LACOSAMIDE 200 MG: 200 TABLET, FILM COATED ORAL at 08:38

## 2019-08-27 RX ADMIN — LACOSAMIDE 200 MG: 200 TABLET, FILM COATED ORAL at 21:12

## 2019-08-27 RX ADMIN — TRAMADOL HYDROCHLORIDE 50 MG: 50 TABLET, COATED ORAL at 05:34

## 2019-08-27 RX ADMIN — IBUPROFEN 600 MG: 600 TABLET ORAL at 18:02

## 2019-08-27 RX ADMIN — RANITIDINE 150 MG: 150 TABLET ORAL at 21:12

## 2019-08-27 RX ADMIN — DEXTRAN 70, AND HYPROMELLOSE 2910 1 DROP: 1; 3 SOLUTION/ DROPS OPHTHALMIC at 11:48

## 2019-08-27 RX ADMIN — IBUPROFEN 600 MG: 600 TABLET ORAL at 10:45

## 2019-08-27 RX ADMIN — OMEPRAZOLE 40 MG: 20 CAPSULE, DELAYED RELEASE ORAL at 21:12

## 2019-08-27 RX ADMIN — TRAMADOL HYDROCHLORIDE 100 MG: 50 TABLET, COATED ORAL at 11:48

## 2019-08-27 RX ADMIN — TEMAZEPAM 15 MG: 15 CAPSULE ORAL at 21:12

## 2019-08-27 RX ADMIN — ONDANSETRON HYDROCHLORIDE 4 MG: 2 INJECTION, SOLUTION INTRAMUSCULAR; INTRAVENOUS at 23:50

## 2019-08-27 RX ADMIN — TRAMADOL HYDROCHLORIDE 100 MG: 50 TABLET, COATED ORAL at 21:22

## 2019-08-27 RX ADMIN — CEFTRIAXONE SODIUM 2 G: 2 INJECTION, POWDER, FOR SOLUTION INTRAMUSCULAR; INTRAVENOUS at 08:37

## 2019-08-27 RX ADMIN — LIOTHYRONINE SODIUM 10 MCG: 5 TABLET ORAL at 08:37

## 2019-08-27 RX ADMIN — TEMAZEPAM 15 MG: 15 CAPSULE ORAL at 00:57

## 2019-08-27 RX ADMIN — LEVOTHYROXINE SODIUM 50 MCG: 0.05 TABLET ORAL at 08:38

## 2019-08-27 RX ADMIN — RANITIDINE 150 MG: 150 TABLET ORAL at 08:38

## 2019-08-27 RX ADMIN — LINACLOTIDE 290 MCG: 290 CAPSULE, GELATIN COATED ORAL at 08:37

## 2019-08-27 RX ADMIN — PRAMIPEXOLE DIHYDROCHLORIDE 1 MG: 1 TABLET ORAL at 08:38

## 2019-08-27 RX ADMIN — NEFAZODONE HYDROCHLORIDE 600 MG: 200 TABLET ORAL at 21:13

## 2019-08-27 RX ADMIN — IBUPROFEN 600 MG: 600 TABLET ORAL at 00:57

## 2019-08-27 RX ADMIN — ACETAMINOPHEN 650 MG: 325 TABLET, FILM COATED ORAL at 10:45

## 2019-08-27 RX ADMIN — DEXTRAN 70, AND HYPROMELLOSE 2910 1 DROP: 1; 3 SOLUTION/ DROPS OPHTHALMIC at 21:12

## 2019-08-27 ASSESSMENT — ACTIVITIES OF DAILY LIVING (ADL)
ADLS_ACUITY_SCORE: 22
ADLS_ACUITY_SCORE: 20
ADLS_ACUITY_SCORE: 22
ADLS_ACUITY_SCORE: 20
ADLS_ACUITY_SCORE: 20
ADLS_ACUITY_SCORE: 22

## 2019-08-27 NOTE — PROGRESS NOTES
"Clinic Care Coordination Contact    Situation: Patient chart reviewed by care coordinator.    Background/Assessment: Per chart review, patient was admitted to [facility] from [facility] on 8/25/19 with (per History & Physical) \"Right hip wound swollen and red now draining worse for last 5 days\".    Plan/Recommendations: -CCC will follow for possible community resource needs post hospitalization.      GOSIA Florian  [facility] Care Coordination  [facility] [place]   Email: [email or web address]  Tele: [phone]      "

## 2019-08-27 NOTE — CONSULTS
Care Coordination following pt with elevated FER. Pt has post op complications with at right hip replacement and now cellulitis at the incision. Pt admitted from [facility]CU and had a bedhold. Pt now has cancelled her bedhold at [facility] and prefers to go to [facility] TCU at discharge.    SW following with discharge plans to TCU.    RNCC will handoff to PCP clinic at discharge.    Madeline Pete RN, BSN CTS  Care Coordinator  [phone]

## 2019-08-27 NOTE — PROGRESS NOTES
[facility]  Infectious Disease Progress Note          Assessment and Plan:   IMPRESSION:   1.  A 64-year-old female with recent revision right total hip arthroplasty, early postop seroma infected with Proteus, on antibiotics, worsening now with a formal I and D, at surgery no signs of deep infection.   2.  Recent revision right total hip arthroplasty for mechanical reasons failing early after initial surgery.   3.  Remote history of Clostridium difficile colitis.   4.  Seizure disorder.   5.  FLAGYL LISTED ALLERGY AS JUST NAUSEA.      RECOMMENDATIONS:  1 Continue ceftriaxone, assuming no deep infection.  Theoretically could use oral antibiotics, but as a protective measure given this has required a full I and D and recent surgery, probably 2 weeks IV, especially if she is going to rehab anyway. Await current cxs and adjust, so far neg             Interval History:   no new complaints and doing well; no cp, sob, n/v/d, or abd pain. Op cx pending wd vac on CRP 16              Medications:       aspirin  325 mg Oral Daily     cefTRIAXone  2 g Intravenous Q24H     fluticasone-vilanterol  1 puff Inhalation Daily     hypromellose-dextran  1 drop Both Eyes BID     lacosamide  200 mg Oral BID     levothyroxine  50 mcg Oral Daily     linaclotide  290 mcg Oral QAM AC     liothyronine  10 mcg Oral Daily     nefazodone  600 mg Oral At Bedtime     omeprazole  40 mg Oral BID     pramipexole  1 mg Oral QAM     pramipexole  2 mg Oral QPM     ranitidine  150 mg Oral BID     sodium chloride (PF)  3 mL Intracatheter Q8H     temazepam  15 mg Oral At Bedtime                  Physical Exam:   Blood pressure 135/75, pulse 64, temperature 96.9  F (36.1  C), temperature source Oral, resp. rate 18, weight 88.1 kg (194 lb 4.8 oz), last menstrual period 03/11/2010, SpO2 95 %, not currently breastfeeding.  Wt Readings from Last 2 Encounters:   08/27/19 88.1 kg (194 lb 4.8 oz)   08/06/19 90.7 kg (200 lb)     Vital Signs  with Ranges  Temp:  [96.9  F (36.1  C)-97.8  F (36.6  C)] 96.9  F (36.1  C)  Pulse:  [64-69] 64  Heart Rate:  [51-80] 68  Resp:  [11-28] 18  BP: (129-152)/(71-86) 135/75  SpO2:  [95 %-100 %] 95 %    Constitutional: Awake, alert, cooperative, no apparent distress   Lungs: Clear to auscultation bilaterally, no crackles or wheezing   Cardiovascular: Regular rate and rhythm, normal S1 and S2, and no murmur noted   Abdomen: Normal bowel sounds, soft, non-distended, non-tender   Skin: No rashes, no cyanosis, no edema wd vac mild erythema   Other:           Data:   All microbiology laboratory data reviewed.  Recent Labs   Lab Test 08/27/19  0505 08/26/19  0544 08/25/19  1124 08/09/19  1652  02/09/19  1811   WBC  --  7.7 8.6  --   --  7.7   HGB  --  8.8* 9.8* 8.2*   < > 13.6   HCT  --  28.9* 30.8*  --   --  42.0   MCV  --  98 96  --   --  94    406 451*  --    < > 259    < > = values in this interval not displayed.     Recent Labs   Lab Test 08/27/19  0505 08/26/19  0544 08/25/19  1124   CR 0.58 0.58 0.60     No lab results found.  Recent Labs   Lab Test 08/26/19  1109 08/26/19  1048 08/07/19  0838 08/27/15  1305 01/06/12  1607   CULT Culture negative monitoring continues  Culture negative monitoring continues Culture negative monitoring continues  Culture negative monitoring continues No anaerobes isolated  No growth No Salmonella, Shigella, Campylobacter, E. coli O157, Aeromonas, or Plesiomonas   isolated.   <10,000 colonies/mL Multiple species present, probable perineal contamination.

## 2019-08-27 NOTE — CONSULTS
Care Transition Initial Assessment -     Discharge Planning: Pt will go to TCU placement, prefers to go to [facility].    Met with: Patient    Active Problems:    Cellulitis    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)       DATA  Lives With: alone   Living Arrangements: condominium     Transportation Anticipated: family or friend will provide    INTERVENTION: Met with Pt to discuss discharge planning. Pt stated she is no longer holding her bed at [facility] and wishes to stay at [facility] because it is closer to her daughter. Pt stated that if there is no availability there, she would be willing to go to [facility] or [facility]. She stated she received her wound vac during her stay here and did not have it at [facility]. Pt stated she had a private room at [facility], but would be willing to have a semi-private room at [facility]. Pt. Stated she would like  to call her daughter and let her know the discharge plan. Referrals were made to [facility], [facility], and [facility].    ASSESSMENT  Cognitive Status:  awake, alert and oriented     PLAN  Patient given options and choices for discharge: Yes  Patient/family is agreeable to the plan?  Yes          Addendum:        Spoke with Rachael at [facility]. Pt has been accepted for a semi-private room. Writer let [facility] know that Pt has IV antibiotics and will let know if there is any changes regarding the IV antibiotic. Writer also let [facility] know that pt has wound vac and Rachael at [facility] said they would arrange to have a new wound vac placed when Pt is admitted. Writer let [facility] know that the anticipated discharge is 2-3 days from now. Writer let [facility] know that  will let them know if their are any changes with the Pt.      Let Pt know she was accepted at [facility] and that the room is semi-private. Pt stated she would be okay with the semi-private room.

## 2019-08-27 NOTE — PLAN OF CARE
Discharge Planner OT   Patient plan for discharge: TCU  Current status: OT order received, chart reviewed. Pt s/p R hip I&D, TTWB. Per discussion with PT, pt currently requiring increased assist with transfers/mobility, requiring assist for toileting tasks of hygiene/mgmt due to TTWB. Pt unable to safely navigate home environment and complete ADL/mobility tasks, therefore plans to discharge to TCU. Will defer OT eval to TCU at this time.   Barriers to return to prior living situation: Refer to PT  Recommendations for discharge: TCU - defer OT eval to TCU  Rationale for recommendations: Pt with planned discharge to TCU. Recommend ongoing OT in TCU setting for ADL/IADL and mobility training.        Entered by: Cece Woods 08/27/2019 12:07 PM

## 2019-08-27 NOTE — PLAN OF CARE
Discharge Planner PT  PT carlos completed and treatment initiated.  64-year-old female admitted for infected right total hip arthroplasty.  The patient has a history of a recent right total hip arthroplasty on 7/23/19 and failed early postop due to a dislocation and mechanical reasons.  This required her to undergo revision therapy in late July.  Postop she had increasing swelling and was hospitalized at [place].  She had no particular fevers or chills at the time, but had some mild systemic illness symptoms.  An aspiration was done that grew Proteus that was pansensitive.  She was placed on ceftriaxone.  No I and D was done and was discharged to [facility] with a plan to do an extended course.  On the antibiotics, she had worsening wound and was readmitted here and has now undergone a formal I and D on 8/26/19.  Patient plan for discharge: TCU.  Pt would like to go to [facility] vs. Returning to [facility] as it is closer to home.  Current status: Pt is I with bed mobility.  Sit <> stand with CGA to FWW.  Pt amb 10' x 2 with FWW and CGA to bathroom with good maintenance of R TTWB status.  Gait distance limited by fatigue and pain.    Barriers to return to prior living situation: Unable to perform ADLs or ambulate household or longer distances to navigate home environment, due to R TTWB status.  Recommendations for discharge: TCU  Rationale for recommendations: Continued skilled PT (and OT) in the acute and TCU setting to increase R LE strength and progress independence with all transfers, ADLs and gait to enable pt to return home with increased safety and decreased falls risk.       Entered by: Elayne Mireles 08/27/2019 10:51 AM

## 2019-08-27 NOTE — PROGRESS NOTES
"[facility]    Hospitalist Progress Note  Name: Mercedes Barber    MRN: [id number]  Provider: Astrid Ma MD  Date of Service: 08/27/2019    Assessment & Plan   Summary of Stay: Mercedes Barber is a 64 year old female who was admitted on 8/25/2019 for right hip cellulitis and abscess. Her past medical history significant for hypothyroidism, seizure disorder, sleep apnea, arthritis, GERD. Of note she had recent hospitalizations for total right hip arthroplasty in July 2019 complicated with infection and revision surgery in August 2019 with recurrent cellulitis and fluid collection around the wound status fluid aspirated aspiration on 8/14 and 8/16 from both joint and subcutaneous fluid collection.  Subcutaneous fluid grew Proteus.  PICC line  was placed on 8/17 and she was started on IV ceftriaxone on 8/19.  Presented to the emergency room as despite being on IV antibiotics she had increased drainage, erythema and pain around the wound.  She also had low-grade temperature.   Orthopedic surgery was consulted patient underwent wound irrigation and debridement with excision 8/26/2019  Complains of pain on incision site wound VAC in place     Right hip cellulitis cannot exclude deeper infection/abscess  --Drainage from surgical site surrounding erythema warmth and tenderness  ----Recent CT scan on 8/16/2019 at [facility] showed \" large fluid collection and subcutaneous tissue lateral to right hip prosthesis measuring 10.8-6 0.5 to 15 cm. Concerning for abscess  -On IV ceftriaxone without major change in antibiotic at this time  --ID is consulted to help with antibiotic management.  Recommended ceftriaxone for 2 weeks  --Status post I&D and joint aspirate 8/26/2019  --PRN pain meds   --Ortho following     Hypokalemia  --Replaced  potassium per protocol     Anemia  --During recent hospitalization patient's hemoglobin was down to 6.8 due to acute blood loss.  2 units of blood " transfusion  --Hemoglobin preop stable at 9.8  --Postop hemoglobin 8.8     H/o Seizure Disorder  --Continue prior to admission meds  --Seizure precautions     GERD  --Continue PPI     Hypothyroidism  --Continue replacement      IBS  --Continue home meds     History of sleep apnea  --Not using CPAP at home      DVT Prophylaxis: Aspirin daily for Ortho  Code Status: Full Code    Disposition: Expected discharge in 2-3 days to prior living arrangement      Interval History   Reviewed chart.  Patient complains of significant pain with slightest movement.  Wound VAC in place. pain worse with range of motion..  Denies any chest pain or shortness of breath.  Complains of dry mouth.    -Data reviewed today: I reviewed all new labs and imaging reports over the last 24 hours. I personally reviewed no images or EKG's today.    Physical Exam   Temp: 97.8  F (36.6  C) Temp src: Oral BP: 135/71 Pulse: 64 Heart Rate: 80 Resp: 28 SpO2: 98 % O2 Device: Nasal cannula Oxygen Delivery: 1 LPM  Vitals:    08/26/19 0655 08/27/19 0508   Weight: 87.9 kg (193 lb 12.8 oz) 88.1 kg (194 lb 4.8 oz)     Vital Signs with Ranges  Temp:  [96.6  F (35.9  C)-97.8  F (36.6  C)] 97.8  F (36.6  C)  Pulse:  [53-87] 64  Heart Rate:  [51-88] 80  Resp:  [11-28] 28  BP: (127-152)/(71-92) 135/71  SpO2:  [95 %-100 %] 98 %  I/O last 3 completed shifts:  In: 3064 [P.O.:570; I.V.:2494]  Out: 1820 [Urine:1800; Blood:20]      GEN:  Alert, oriented x 3, appears comfortable, NAD.  HEENT:  Normocephalic/atraumatic, no scleral icterus, no nasal discharge, mouth moist.  CV:  Regular rate and rhythm, no murmur or JVD.  S1 + S2 noted, no S3 or S4.  LUNGS:  Clear to auscultation bilaterally without rales/rhonchi/wheezing/retractions.  Symmetric chest rise on inhalation noted.  ABD:  Active bowel sounds, soft, non-tender/non-distended.  No rebound/guarding/rigidity.  EXT:  No edema.  No cyanosis.  Right leg surgical wound wound VAC   SKIN:  Dry to touch, cellulitis right leg  lateral aspect of hip    Medications     - MEDICATION INSTRUCTIONS -       sodium chloride 100 mL/hr at 08/26/19 1344       aspirin  325 mg Oral Daily     cefTRIAXone  2 g Intravenous Q24H     enoxaparin  40 mg Subcutaneous Q24H     fluticasone-vilanterol  1 puff Inhalation Daily     hypromellose-dextran  1 drop Both Eyes BID     lacosamide  200 mg Oral BID     levothyroxine  50 mcg Oral Daily     linaclotide  290 mcg Oral QAM AC     liothyronine  10 mcg Oral Daily     nefazodone  600 mg Oral At Bedtime     omeprazole  40 mg Oral BID     pramipexole  1 mg Oral QAM     pramipexole  2 mg Oral QPM     ranitidine  150 mg Oral BID     sodium chloride (PF)  3 mL Intracatheter Q8H     temazepam  15 mg Oral At Bedtime     Data     Recent Labs   Lab 08/27/19  0505 08/26/19  0544 08/25/19  1124   WBC  --  7.7 8.6   HGB  --  8.8* 9.8*   HCT  --  28.9* 30.8*   MCV  --  98 96    406 451*     Recent Labs   Lab 08/27/19  0505 08/26/19  0544 08/25/19  2230 08/25/19  1124   NA  --  139  --  139   POTASSIUM  --  3.8 3.7 3.3*   CHLORIDE  --  108  --  109   CO2  --  27  --  27   ANIONGAP  --  4  --  3   * 98  --  114*   BUN  --  8  --  13   CR 0.58 0.58  --  0.60   GFRESTIMATED >90 >90  --  >90   GFRESTBLACK >90 >90  --  >90   SABA  --  8.7  --  8.7     Recent Labs   Lab 08/26/19  1109 08/26/19  1048   CULT Culture negative monitoring continues  PENDING Culture negative monitoring continues  PENDING     No results for input(s): AST, ALT, GGT, ALKPHOS, BILITOTAL, BILICONJ, BILIDIRECT, KURTIS in the last 168 hours.    Invalid input(s): BILIRUBININDIRECT  No results for input(s): INR in the last 168 hours.  No results for input(s): LACT in the last 168 hours.  No results for input(s): TSH in the last 168 hours.  No results for input(s): TROPONIN, TROPI, TROPR in the last 168 hours.    Invalid input(s): TROP, TROPONINIES  No results for input(s): COLOR, APPEARANCE, URINEGLC, URINEBILI, URINEKETONE, SG, UBLD, URINEPH, PROTEIN,  UROBILINOGEN, NITRITE, LEUKEST, RBCU, WBCU in the last 168 hours.    No results found for this or any previous visit (from the past 24 hour(s)).

## 2019-08-27 NOTE — PLAN OF CARE
A&O. VSS. Dressing CDI. Wound Vac. Tolerating diet, +BS, - flatus. Ax1 w/ GB/WW, TTWB. Baseline neuropathy to ALISON hands/feet. Ultram increased from 50 mg to 100, frequency increased to q6h. Plan is TCU on discharge. Will continue to monitor.

## 2019-08-27 NOTE — PROGRESS NOTES
Orthopedic Surgery  Mercedes Barber  2019  Admit Date:  2019  POD 1 Day Post-Op  S/P Procedure(s):  1.  Right hip wound irrigation and debridement with excisional debridement of nonviable skin, subcutaneous tissues, and fascia. 2. Application of incisional wound VAC, 27cm length incision.    Patient resting comfortable at edge of bed.    Pain poorly controlled per patient - has been on long term narcotics.  Tolerating oral intake.    Denies nausea or vomiting  Denies chest pain or shortness of breath  No events overnight.     Alert and orient to person, place, and time.  Vital Sign Ranges  Temperature Temp  Av.3  F (36.3  C)  Min: 96.6  F (35.9  C)  Max: 97.8  F (36.6  C)   Blood pressure Systolic (24hrs), Av , Min:127 , Max:152        Diastolic (24hrs), Av, Min:71, Max:92      Pulse Pulse  Av.2  Min: 53  Max: 87   Respirations Resp  Av  Min: 11  Max: 28   Pulse oximetry SpO2  Av.1 %  Min: 95 %  Max: 100 %       Dressing is clean, dry, and intact.   Minimal erythema of the surrounding skin.   Bilateral calves are soft, non-tender.  Bilateral lower extremity is NVI.  Sensation intact bilateral lower extremities  5/5 motor with resisted dorsi and plantar flexion bilaterally  +Dp pulse    Labs:  Recent Labs   Lab Test 19  0544 19  2230 19  1124   POTASSIUM 3.8 3.7 3.3*     Recent Labs   Lab Test 19  0544 19  1124 19  1652   HGB 8.8* 9.8* 8.2*     Recent Labs   Lab Test 19  1818 11  0800   INR 0.95 0.98     Recent Labs   Lab Test 19  0505 19  0544 19  1124    406 451*       A/P  1. S/p right I&D of post-op seroma   Continue ASA for DVT prophylaxis. discontinue lovenox     Mobilize with PT/OT    TTWB on the right LE.   Continue to follow cultures, pending at this point (seroma and intraarticular taken)     Continue current pain regiment. Will increase dosing of narcotics.   Leave dressing intact    2.  Disposition   Anticipate d/c to TCU based on results of culture, plan for abx and medical clearance.    Smita Lazo PA-C

## 2019-08-27 NOTE — CONSULTS
CLINICAL NUTRITION SERVICES  -  ASSESSMENT NOTE    Recommendations Ordered by Registered Dietitian (RD):   Diet per MD   Ordered Boost Plus (chocolate) BID between meals.    Malnutrition:  % Weight Loss:  Weight loss does not meet criteria for malnutrition - 2% in one month, difficult to assess which is unintentional vs, intentional   % Intake:  <75% for >/= 1 month (non-severe malnutrition)  Subcutaneous Fat Loss:  None observed  Muscle Loss:  Temporal region mild/moderate depletion, Acromion bone region mild depletion, Dorsal hand region mild/moderate depletion and Posterior calf region mild depletion  Fluid Retention:  Trace-Mild    Malnutrition Diagnosis: Non-Severe malnutrition  In Context of:  Acute illness or injury      REASON FOR ASSESSMENT  Mercedes Barber is a 64 year old female seen by Registered Dietitian for Admission Nutrition Risk Screen for unintentional loss of 10# or more in the past two months and reduced oral intake over the last month and Provider Order - loss 10 lbs in 2 months without trying, less appetite.    NUTRITION HISTORY  - Information obtained from patient and chart   - Pt admitted for right hip cellulitis   - Pertinent information includes GERD, IBS and over 2 weeks status post revision right total hip arthroplasty with debridement of a large seroma within the subcutaneous tissues superficial to the hip joint.  - Prior to admission pt was not eating very well for about 1 month (since surgery on 7/23) due to a combination of factors of which include poor appetite, disliked the food at [facility] (where she was for about 2 weeks for physical therapy and rehab) and nausea. Pt likely consuming <75% of nutritional needs during this time.   - Breakfast: eggs, sausage and toast   - Lunch: 1/2 sandwich with lunch meat   - Dinner: salmon with vegetables.   - Pt does avoid a variety of foods due to IBS: corn on the cob, dairy products besides cheese, apples, among others.  -  "NKFA    CURRENT NUTRITION ORDERS  Diet Order: Regular     Current Intake/Tolerance: Per the flowsheets, pt is consuming 25% of food ordered. Per the meal ordering system, pt has been ordering meals 2-3 times per day when her diet order allows.     NUTRITION FOCUSED PHYSICAL ASSESSMENT FOR DIAGNOSING MALNUTRITION)  Completed:  Yes Full assessment         Observed:    Muscle wasting (refer to documentation in Malnutrition section)    Obtained from Chart/Interdisciplinary Team:  - on 8/26 pt underwent wound irrigation and debridement   - last BM on 8/25    ANTHROPOMETRICS  Height: 5' 5\"   Weight: 88.1 kg ( 194 lbs 4.8 oz)   Body mass index is 32.33 kg/m .  Weight Status:  Obesity Grade I BMI 30-34.9  IBW: 57 kg (125 lbs)  % IBW: 155% +/- 10%  Weight History: weight is down 2.1 kg (5 lbs) in the last month. This equates to a weight loss of 2%. Pt states that she was trying to loose weight for part of this last month that she wasn't eating well by watching what she was eating but she also experienced some unintentional weight loss. Pt states that her usual body weight is around 203 lbs.     Per care everywhere:  6/4/19: 90.7 kg  6/25/19: 88.5 kg   Vitals:    08/26/19 0655 08/27/19 0508   Weight: 87.9 kg (193 lb 12.8 oz) 88.1 kg (194 lb 4.8 oz)     Wt Readings from Last 10 Encounters:   08/27/19 88.1 kg (194 lb 4.8 oz)   08/06/19 90.7 kg (200 lb)   07/23/19 90.2 kg (198 lb 12.8 oz)   07/02/19 89.7 kg (197 lb 11.2 oz)   05/17/19 89.4 kg (197 lb)   03/17/19 92.5 kg (204 lb)   02/09/19 92.5 kg (204 lb)   09/20/18 92.5 kg (204 lb)   09/18/17 93 kg (205 lb)   07/17/17 92.1 kg (203 lb)     LABS  Labs reviewed    Recent Labs   Lab Test 08/26/19  0544 08/25/19  2230 08/25/19  1124 08/07/19  0610 08/06/19  1743   POTASSIUM 3.8 3.7 3.3* 4.2 4.2     Recent Labs   Lab Test 08/25/19  1124 10/13/11  1150   MAG 2.1 1.9     Recent Labs   Lab Test 08/26/19  0544 08/25/19  1124 08/07/19  0610 08/06/19  1743 07/25/19  0727    139 142 " 139 142     Recent Labs   Lab Test 08/27/19  0505 08/26/19  0544 08/25/19  1124 08/07/19  0610 08/06/19  1743   CR 0.58 0.58 0.60 0.77 0.83     Recent Labs   Lab 08/27/19  0505 08/26/19  0544 08/25/19  1124   * 98 114*     Lab Results   Component Value Date    A1C 5.6 08/26/2011    A1C 5.6 02/08/2011     Triglycerides   Date Value Ref Range Status   06/04/2018 89 <150 mg/dL Final   02/21/2012 141 0 - 150 mg/dL Final     Comment:     Fasting specimen   01/09/2009 97 0 - 150 mg/dL Final      MEDICATIONS  Medications reviewed    IVF: NaCl @ 100 mL/hr     ASSESSED NUTRITION NEEDS PER APPROVED PRACTICE GUIDELINES:    Dosing Weight 65 kg (based on an adjusted weight)   Estimated Energy Needs: 7320-9337 kcals (25-30 Kcal/Kg)  Justification: maintenance and obese  Estimated Protein Needs: 85-98 grams protein (1.3-1.5 g pro/Kg)  Justification: maintenance and preservation of lean body mass  Estimated Fluid Needs: per MD   Justification: per MD     MALNUTRITION:  % Weight Loss:  Weight loss does not meet criteria for malnutrition - 2% in one month, difficult to assess which is unintentional vs, intentional   % Intake:  <75% for >/= 1 month (non-severe malnutrition)  Subcutaneous Fat Loss:  None observed  Muscle Loss:  Temporal region mild/moderate depletion, Acromion bone region mild depletion, Dorsal hand region mild/moderate depletion and Posterior calf region mild depletion  Fluid Retention:  Trace-Mild    Malnutrition Diagnosis: Non-Severe malnutrition  In Context of:  Acute illness or injury    NUTRITION DIAGNOSIS:  Inadequate oral intake related to poor appetite, previous nausea and menu fatigue at [facility] as evidenced by pt likely consuming <75% of nutritional needs during this time.     NUTRITION INTERVENTIONS  Recommendations / Nutrition Prescription  Diet per MD   Ordered Boost Plus (chocolate) BID between meals.     Implementation  Nutrition education: encouraged po intake and focused on protein  sources. Also discussed supplement options.   Medical Food Supplement: as above     Nutrition Goals  Pt to consume % of food ordered to meet nutritional needs.     MONITORING AND EVALUATION:  Progress towards goals will be monitored and evaluated per protocol and Practice Guidelines    Kirstin Bolaños RD  Clinical Dietitian

## 2019-08-27 NOTE — PLAN OF CARE
[facility] Orthopedic Nursing Progress Note       Assessment   Assessment:  Post-operative day #1  Right hip I&D w/wound vac placement     CMS: iat baseline- has neuropathy. Calves non-tender bilaterally.  Lungs: are clear. O2 sat= 98% on 1L/NC  BS: active, no flatus yet, denies nausea   Urine: voiding adequately per BSC  Surgical Site: Dressing is clean dry intact.no drainage per wound vac.  Pain: controlled with po pain meds. Ice to incisional area.   Activity: BSC w/A1/walker- moves well  Slept well.      Almas Pendleton RN

## 2019-08-27 NOTE — PLAN OF CARE
VS-stable, afebrile. On capnography.   Lung Sounds-clear, no cough, using IS upto 1250  O2-on 2l NC.   GI-+bs, +flatus, lbm per pt was Sunday 8/25. Tolerating reg diet, denies nausea, just no appetite. Had bagel and soup. Ate about 50%  -voiding, checked pvr --was 25cc. Voiding in 200-400cc amounts, voids frequently.   IVF-at 100. On rocephin. Has picc line.   Dressings-wound vac cont at 75, no drainage in container. Clean dry and intact on R hip.   CMS-has neuropathy in both hands and feet bilaterally, baseline. Mod dorsi/planter flexion. +pp, scds on in bed.   Drain-wound vac--no drainage.   Activity- up with one assist. Pivot to [facility]. TTWB. Uses walker as support. Tolerates activity.   Pain-rates pain level a 6-7. Goal is a 4. Taking ultram, tylenol, and dilaudid iv. Describes pain as achy, stabbing, and sharp. Using ice to hip.   D/C Plan-pt was at [facility]. To hold bed there,  pt would have to pay $150/day to hold bed. Pt did give bed up on 8/26. Left note for our sws.

## 2019-08-27 NOTE — PROGRESS NOTES
"   08/27/19 0800   Quick Adds   Type of Visit Initial PT Evaluation   Living Environment   Lives With alone   Living Arrangements condominium   Home Accessibility no concerns   Transportation Anticipated family or friend will provide   Living Environment Comment Pt lives alone in a condo. Pt does not have help lined up to assist at home. Friends can provide intermittent assist only.    Self-Care   Usual Activity Tolerance good   Current Activity Tolerance moderate   Regular Exercise No   Equipment Currently Used at Home cane, straight;raised toilet;walker, rolling  (Pt also has a call button)   Activity/Exercise/Self-Care Comment Patient typically mod I with a cane at baseline. Has been using a walker since her inital surgery on 7/23.    Functional Level Prior   Ambulation 1-->assistive equipment   Transferring 1-->assistive equipment   Toileting 1-->assistive equipment   Bathing 1-->assistive equipment   Communication 0-->understands/communicates without difficulty   Swallowing 0-->swallows foods/liquids without difficulty   Cognition 0 - no cognition issues reported   Fall history within last six months no   Which of the above functional risks had a recent onset or change? ambulation;transferring   Prior Functional Level Comment Pt was I with all ADLs, cooking, cleaning and laundry.  Pt drove prior to her initial surgery and did own grocery shopping.  Pt reports walking a long hallway to get to her mailbox on the main level.    General Information   Onset of Illness/Injury or Date of Surgery - Date 08/26/19   Referring Physician Dr. Jewel Bailey   Patient/Family Goals Statement \"I want to be able to go right home and do stuff\"   Pertinent History of Current Problem (include personal factors and/or comorbidities that impact the POC) 64-year-old female admitted for infected right total hip arthroplasty.  The patient has a history of a recent right total hip arthroplasty on 7/23/19 and failed early postop due to a " dislocation and mechanical reasons.  This required her to undergo revision therapy in late July.  Postop she had increasing swelling and was hospitalized at [place].  She had no particular fevers or chills at the time, but had some mild systemic illness symptoms.  An aspiration was done that grew Proteus that was pansensitive.  She was placed on ceftriaxone.  No I and D was done and was discharged with a plan to do an extended course.  On the antibiotics, she had worsening wound and was readmitted here and has now undergone a formal I and D on 8/26/19.   Precautions/Limitations fall precautions   Weight-Bearing Status - LLE full weight-bearing   Weight-Bearing Status - RLE toe touch weight-bearing   General Observations Pt lying in bed awake and agreeable to therapy   General Info Comments [facility]   Cognitive Status Examination   Orientation orientation to person, place and time   Level of Consciousness alert   Follows Commands and Answers Questions 100% of the time;able to follow multistep instructions   Personal Safety and Judgment intact   Memory intact   Pain Assessment   Patient Currently in Pain Yes, see Vital Sign flowsheet  (R hip 7/10)   Integumentary/Edema   Integumentary/Edema Comments R hip incision with bandage and wound vac   Posture    Posture Forward head position;Protracted shoulders   Range of Motion (ROM)   ROM Comment B UEs and L LE WFL.  R hip flex  to atleast 90 deg.     Strength   Strength Comments R hip flex 3-/5, knee ext 3/5, ankle DF atleast 4/5.  L hip flex 4/5, knee ext 5/5, ankle DF 4+/5.  B UEs 5/5 throughout   Bed Mobility   Bed Mobility Comments Sup > sit SBA with bedrail.     Transfer Skills   Transfer Comments Sit <> stand to FWW with CGA with good maintenance of R TTWB prec.    Gait   Gait Comments Pt amb 10' x 2 with FWW and CGA with R TTWB.  Good maintenance of TTWB.  Gait slow but steady.  Therapist assist to manage IV pole with  "wound vac attached.   Balance   Balance Comments Pt able to sit independently on EOB and maintain sitting balance. Pt requires B UE support on FWW for safety in stance.   Sensory Examination   Sensory Perception no deficits were identified   Coordination   Coordination no deficits were identified   Muscle Tone   Muscle Tone no deficits were identified   Modality Interventions   Planned Modality Interventions Cryotherapy   General Therapy Interventions   Planned Therapy Interventions bed mobility training;gait training;ROM;strengthening;stretching;transfer training;risk factor education;home program guidelines;progressive activity/exercise   Clinical Impression   Criteria for Skilled Therapeutic Intervention yes, treatment indicated   PT Diagnosis Decreased functional mobility, impaired gait   Influenced by the following impairments Decreased R LE strength, impaired gait and transferfs, decreased standing balance, decreased activity tolerance   Functional limitations due to impairments Unable to navigate household or community distances or perform ADLs, increased falls risk.     Clinical Presentation Evolving/Changing   Clinical Presentation Rationale Multiple comorbidities with evolving wound status   Clinical Decision Making (Complexity) Moderate complexity   Therapy Frequency 2x/day   Predicted Duration of Therapy Intervention (days/wks) 3 days   Anticipated Discharge Disposition Transitional Care Facility   Risk & Benefits of therapy have been explained Yes   Patient, Family & other staff in agreement with plan of care Yes   [facility] Mob[employer] Serv[employer]-PAC TM \"6 Clicks\"   2016, Trustees of [facility], under license to [employer].  All rights reserved.   6 Clicks Short Forms Basic Mobility Inpatient Short Form   [facility] AM-PAC  \"6 Clicks\" V.2 Basic Mobility Inpatient Short Form   1. Turning from your back to your side while in a flat bed without using bedrails? 4 - None   2. Moving from lying on your " back to sitting on the side of a flat bed without using bedrails? 4 - None   3. Moving to and from a bed to a chair (including a wheelchair)? 3 - A Little   4. Standing up from a chair using your arms (e.g., wheelchair, or bedside chair)? 3 - A Little   5. To walk in hospital room? 3 - A Little   6. Climbing 3-5 steps with a railing? 2 - A Lot   Basic Mobility Raw Score (Score out of 24.Lower scores equate to lower levels of function) 19   Total Evaluation Time   Total Evaluation Time (Minutes) 15

## 2019-08-27 NOTE — PROVIDER NOTIFICATION
Provider paged at [phone]: Good morning. Patient has both ASA and Lovenox ordered. Please advise. Thanks.      Response at 0748: Hold Lovenox for now, defer to Ortho on their preferred anticoagulant method.     1020: Spoke with Ortho, verbal to discontinue Lovenox order.

## 2019-08-27 NOTE — PROGRESS NOTES
"Spiritual Assessment Progress Note  [facility]  621      PRIMARY FOCUS:      Emotional/spiritual/Uatsdin distress    Support for coping    ILLNESS CIRCUMSTANCES:    Reviewed documentation. Reflective conversation shared with Mercedes which integrated elements of illness and family narratives.         Context of Serious Illness/Symptom(s) - Initial request was for HCD but patient welcomed conversation about her life's difficulties.    Resources for Support - nabila community, daughter and daughter's , mother and a few friends.      DISTRESS:      Emotional/ ExistentialRelational Distress - Mercedes has experienced much loss in her life including the suicide of her son.  She tearfully spoke about her losses and believes that God has given her the strength to deal with all of this though she also admitted to having great difficulty with the deaths.  Mercedes has been in therapy for years and finds that helpful.    Spiritual/Lutheran Distress - none discussed.    Social/Cultural/EconomicDistress - Mercedes says she has always been poor as she was the sole support of her two children.  On occasion, her family has helped her out financially.         SPIRITUAL/Buddhism (Coping):      Samaritan/Nabila - Buddhism    Spiritual Practice(s) - prayer, Moravian    Emotional/Existential/ Relationall/Connections - daughter, mother, friends.      GOALS OF CARE:    Goals of Care - Getting home and returning to a \"normal\" life.    Meaning/Sense-Making - Mercedes has a strong nabila life and credits that with being able to survive through all of her hardships.      PLAN: She would like another visit today so  will follow up.      Viola Mitchell    Pager [phone]  "

## 2019-08-28 ENCOUNTER — APPOINTMENT (OUTPATIENT)
Dept: PHYSICAL THERAPY | Facility: CLINIC | Age: 64
DRG: 857 | End: 2019-08-28
Payer: MEDICARE

## 2019-08-28 LAB
BASOPHILS # BLD AUTO: 0.1 10E9/L (ref 0–0.2)
BASOPHILS NFR BLD AUTO: 1 %
DIFFERENTIAL METHOD BLD: ABNORMAL
EOSINOPHIL # BLD AUTO: 0.8 10E9/L (ref 0–0.7)
EOSINOPHIL NFR BLD AUTO: 11.2 %
ERYTHROCYTE [DISTWIDTH] IN BLOOD BY AUTOMATED COUNT: 15.3 % (ref 10–15)
ERYTHROCYTE [SEDIMENTATION RATE] IN BLOOD BY WESTERGREN METHOD: 17 MM/H (ref 0–30)
HCT VFR BLD AUTO: 27.3 % (ref 35–47)
HGB BLD-MCNC: 8.3 G/DL (ref 11.7–15.7)
IMM GRANULOCYTES # BLD: 0 10E9/L (ref 0–0.4)
IMM GRANULOCYTES NFR BLD: 0.6 %
LYMPHOCYTES # BLD AUTO: 1.9 10E9/L (ref 0.8–5.3)
LYMPHOCYTES NFR BLD AUTO: 27.3 %
MCH RBC QN AUTO: 29.6 PG (ref 26.5–33)
MCHC RBC AUTO-ENTMCNC: 30.4 G/DL (ref 31.5–36.5)
MCV RBC AUTO: 98 FL (ref 78–100)
MONOCYTES # BLD AUTO: 0.7 10E9/L (ref 0–1.3)
MONOCYTES NFR BLD AUTO: 9.8 %
NEUTROPHILS # BLD AUTO: 3.5 10E9/L (ref 1.6–8.3)
NEUTROPHILS NFR BLD AUTO: 50.1 %
NRBC # BLD AUTO: 0 10*3/UL
NRBC BLD AUTO-RTO: 0 /100
PLATELET # BLD AUTO: 379 10E9/L (ref 150–450)
RBC # BLD AUTO: 2.8 10E12/L (ref 3.8–5.2)
WBC # BLD AUTO: 7 10E9/L (ref 4–11)

## 2019-08-28 PROCEDURE — 25000132 ZZH RX MED GY IP 250 OP 250 PS 637: Mod: GY | Performed by: PHYSICIAN ASSISTANT

## 2019-08-28 PROCEDURE — 85025 COMPLETE CBC W/AUTO DIFF WBC: CPT | Performed by: INTERNAL MEDICINE

## 2019-08-28 PROCEDURE — 97116 GAIT TRAINING THERAPY: CPT | Mod: GP | Performed by: PHYSICAL THERAPY ASSISTANT

## 2019-08-28 PROCEDURE — 12000000 ZZH R&B MED SURG/OB

## 2019-08-28 PROCEDURE — 85652 RBC SED RATE AUTOMATED: CPT | Performed by: INTERNAL MEDICINE

## 2019-08-28 PROCEDURE — 99233 SBSQ HOSP IP/OBS HIGH 50: CPT | Performed by: INTERNAL MEDICINE

## 2019-08-28 PROCEDURE — 25000131 ZZH RX MED GY IP 250 OP 636 PS 637: Mod: GY | Performed by: PHYSICIAN ASSISTANT

## 2019-08-28 PROCEDURE — 97110 THERAPEUTIC EXERCISES: CPT | Mod: GP | Performed by: PHYSICAL THERAPY ASSISTANT

## 2019-08-28 PROCEDURE — 25000128 H RX IP 250 OP 636: Performed by: INTERNAL MEDICINE

## 2019-08-28 PROCEDURE — 25000132 ZZH RX MED GY IP 250 OP 250 PS 637: Mod: GY | Performed by: INTERNAL MEDICINE

## 2019-08-28 PROCEDURE — 25000128 H RX IP 250 OP 636: Performed by: PHYSICIAN ASSISTANT

## 2019-08-28 RX ORDER — CEFTRIAXONE 1 G/1
2000 INJECTION, POWDER, FOR SOLUTION INTRAMUSCULAR; INTRAVENOUS DAILY
Qty: 600 ML | Refills: 0 | Status: SHIPPED | OUTPATIENT
Start: 2019-08-28 | End: 2019-09-16

## 2019-08-28 RX ORDER — HEPARIN SODIUM,PORCINE 10 UNIT/ML
5-10 VIAL (ML) INTRAVENOUS
Status: DISCONTINUED | OUTPATIENT
Start: 2019-08-28 | End: 2019-08-29 | Stop reason: HOSPADM

## 2019-08-28 RX ORDER — HEPARIN SODIUM,PORCINE 10 UNIT/ML
5-10 VIAL (ML) INTRAVENOUS EVERY 24 HOURS
Status: DISCONTINUED | OUTPATIENT
Start: 2019-08-28 | End: 2019-08-29 | Stop reason: HOSPADM

## 2019-08-28 RX ADMIN — LIOTHYRONINE SODIUM 10 MCG: 5 TABLET ORAL at 08:39

## 2019-08-28 RX ADMIN — DEXTRAN 70, AND HYPROMELLOSE 2910 1 DROP: 1; 3 SOLUTION/ DROPS OPHTHALMIC at 08:44

## 2019-08-28 RX ADMIN — RANITIDINE 150 MG: 150 TABLET ORAL at 20:14

## 2019-08-28 RX ADMIN — HYDROMORPHONE HYDROCHLORIDE 0.2 MG: 1 INJECTION, SOLUTION INTRAMUSCULAR; INTRAVENOUS; SUBCUTANEOUS at 17:56

## 2019-08-28 RX ADMIN — LACOSAMIDE 200 MG: 200 TABLET, FILM COATED ORAL at 20:14

## 2019-08-28 RX ADMIN — OMEPRAZOLE 40 MG: 20 CAPSULE, DELAYED RELEASE ORAL at 08:39

## 2019-08-28 RX ADMIN — LINACLOTIDE 290 MCG: 290 CAPSULE, GELATIN COATED ORAL at 06:38

## 2019-08-28 RX ADMIN — OMEPRAZOLE 40 MG: 20 CAPSULE, DELAYED RELEASE ORAL at 20:14

## 2019-08-28 RX ADMIN — CEFTRIAXONE SODIUM 2 G: 2 INJECTION, POWDER, FOR SOLUTION INTRAMUSCULAR; INTRAVENOUS at 08:40

## 2019-08-28 RX ADMIN — TRAMADOL HYDROCHLORIDE 100 MG: 50 TABLET, COATED ORAL at 12:52

## 2019-08-28 RX ADMIN — TRAMADOL HYDROCHLORIDE 100 MG: 50 TABLET, COATED ORAL at 06:38

## 2019-08-28 RX ADMIN — IBUPROFEN 600 MG: 600 TABLET ORAL at 17:55

## 2019-08-28 RX ADMIN — DEXTRAN 70, AND HYPROMELLOSE 2910 1 DROP: 1; 3 SOLUTION/ DROPS OPHTHALMIC at 20:13

## 2019-08-28 RX ADMIN — TRAMADOL HYDROCHLORIDE 100 MG: 50 TABLET, COATED ORAL at 20:13

## 2019-08-28 RX ADMIN — PRAMIPEXOLE DIHYDROCHLORIDE 2 MG: 1 TABLET ORAL at 20:13

## 2019-08-28 RX ADMIN — ONDANSETRON 4 MG: 4 TABLET, ORALLY DISINTEGRATING ORAL at 08:38

## 2019-08-28 RX ADMIN — LACOSAMIDE 200 MG: 200 TABLET, FILM COATED ORAL at 08:40

## 2019-08-28 RX ADMIN — SODIUM CHLORIDE, PRESERVATIVE FREE 5 ML: 5 INJECTION INTRAVENOUS at 13:20

## 2019-08-28 RX ADMIN — RANITIDINE 150 MG: 150 TABLET ORAL at 08:39

## 2019-08-28 RX ADMIN — NEFAZODONE HYDROCHLORIDE 600 MG: 200 TABLET ORAL at 20:17

## 2019-08-28 RX ADMIN — ACETAMINOPHEN 650 MG: 325 TABLET, FILM COATED ORAL at 17:55

## 2019-08-28 RX ADMIN — ACETAMINOPHEN 650 MG: 325 TABLET, FILM COATED ORAL at 08:39

## 2019-08-28 RX ADMIN — ASPIRIN 325 MG: 325 TABLET, DELAYED RELEASE ORAL at 08:39

## 2019-08-28 RX ADMIN — TEMAZEPAM 15 MG: 15 CAPSULE ORAL at 20:14

## 2019-08-28 RX ADMIN — IBUPROFEN 600 MG: 600 TABLET ORAL at 08:40

## 2019-08-28 RX ADMIN — PRAMIPEXOLE DIHYDROCHLORIDE 1 MG: 1 TABLET ORAL at 08:40

## 2019-08-28 RX ADMIN — LEVOTHYROXINE SODIUM 50 MCG: 0.05 TABLET ORAL at 08:39

## 2019-08-28 ASSESSMENT — ACTIVITIES OF DAILY LIVING (ADL)
ADLS_ACUITY_SCORE: 20

## 2019-08-28 NOTE — PROGRESS NOTES
Orthopedic Surgery  Mercedes Barber  2019  Admit Date:  2019  POD: 2 Days Post-Op   Procedure(s):  1.  Right hip wound irrigation and debridement with excisional debridement of nonviable skin, subcutaneous tissues, and fascia. 2. Application of incisional wound VAC, 27cm length incision.    Alert and orient to person, place, and time.  Tearful t/o exam.   Patient resting comfortably in bed.    Pain fairly well controlled  Tolerating oral intake.    Denies nausea or vomiting  Denies chest pain or shortness of breath    Vital Sign Ranges  Temperature Temp  Av.4  F (36.3  C)  Min: 97.3  F (36.3  C)  Max: 97.5  F (36.4  C)   Blood pressure Systolic (24hrs), Av , Min:108 , Max:149        Diastolic (24hrs), Av, Min:64, Max:75      Pulse Pulse  Av  Min: 75  Max: 75   Respirations Resp  Av.5  Min: 16  Max: 18   Pulse oximetry SpO2  Av.5 %  Min: 95 %  Max: 96 %       Dressing is clean, dry, and intact. Incisional vac in place   Minimal erythema of the surrounding skin.   Bilateral calves are soft, non-tender.  Right lower extremity is NVI.  Sensation intact bilateral lower extremities  Patient able to resist dorsi and plantar flexion bilaterally  +Dp pulse    Labs:  Recent Labs   Lab Test 19  0544 19  2230 19  1124   POTASSIUM 3.8 3.7 3.3*     Recent Labs   Lab Test 19  0644 19  0544 19  1124   HGB 8.3* 8.8* 9.8*     Recent Labs   Lab Test 19  1818 11  0800   INR 0.95 0.98     Recent Labs   Lab Test 19  0644 19  0505 19  0544    [phone]       1. PLAN:   Continue ASA for DVT prophylaxis.     Mobilize with PT/OT    TTWB RLE.     Continue to follow cultures, Abx per ID   Continue current pain regiment.   Dressings: Keep intact.  Patient requesting incisional vac as long as possible.    Follow-up: 2 weeks Dr Antonio    2. Disposition   Anticipate d/c to TCU when medically cleared and progressing in PT.    Shona  BROOKS Gonzales PA-C

## 2019-08-28 NOTE — PROGRESS NOTES
[facility]  Infectious Disease Progress Note          Assessment and Plan:   IMPRESSION:   1.  A 64-year-old female with recent revision right total hip arthroplasty, early postop seroma infected with Proteus, on antibiotics, worsening now with a formal I and D, at surgery no signs of deep infection.   2.  Recent revision right total hip arthroplasty for mechanical reasons failing early after initial surgery.   3.  Remote history of Clostridium difficile colitis.   4.  Seizure disorder.   5.  FLAGYL LISTED ALLERGY AS JUST NAUSEA.      RECOMMENDATIONS:  1 Continue ceftriaxone, assuming no deep infection.  Theoretically could use oral antibiotics, but as a protective measure given this has required a full I and D and recent surgery, probably 2 weeks IV, especially if she is going to rehab anyway. Await current cxs and adjust, so far neg at 2 days , orders in for 2 weeks ceftriaxone if disposition, will Follow-up on final cx  2 Wd management per ortho             Interval History:   no new complaints and doing well; no cp, sob, n/v/d, or abd pain. Op cx pending wd vac on CRP 16              Medications:       aspirin  325 mg Oral Daily     cefTRIAXone  2 g Intravenous Q24H     fluticasone-vilanterol  1 puff Inhalation Daily     hypromellose-dextran  1 drop Both Eyes BID     lacosamide  200 mg Oral BID     levothyroxine  50 mcg Oral Daily     linaclotide  290 mcg Oral QAM AC     liothyronine  10 mcg Oral Daily     nefazodone  600 mg Oral At Bedtime     omeprazole  40 mg Oral BID     pramipexole  1 mg Oral QAM     pramipexole  2 mg Oral QPM     ranitidine  150 mg Oral BID     sodium chloride (PF)  3 mL Intracatheter Q8H     temazepam  15 mg Oral At Bedtime                  Physical Exam:   Blood pressure 108/64, pulse 75, temperature 97.3  F (36.3  C), temperature source Oral, resp. rate 16, weight 88.3 kg (194 lb 11.2 oz), last menstrual period 03/11/2010, SpO2 95 %, not currently  breastfeeding.  Wt Readings from Last 2 Encounters:   08/28/19 88.3 kg (194 lb 11.2 oz)   08/06/19 90.7 kg (200 lb)     Vital Signs with Ranges  Temp:  [96.9  F (36.1  C)-97.5  F (36.4  C)] 97.3  F (36.3  C)  Pulse:  [75] 75  Heart Rate:  [64-68] 64  Resp:  [16-18] 16  BP: (108-149)/(64-75) 108/64  SpO2:  [95 %-96 %] 95 %    Constitutional: Awake, alert, cooperative, no apparent distress   Lungs: Clear to auscultation bilaterally, no crackles or wheezing   Cardiovascular: Regular rate and rhythm, normal S1 and S2, and no murmur noted   Abdomen: Normal bowel sounds, soft, non-distended, non-tender   Skin: No rashes, no cyanosis, no edema wd vac mild erythema   Other:           Data:   All microbiology laboratory data reviewed.  Recent Labs   Lab Test 08/28/19  0644 08/27/19  0505 08/26/19  0544 08/25/19  1124   WBC 7.0  --  7.7 8.6   HGB 8.3*  --  8.8* 9.8*   HCT 27.3*  --  28.9* 30.8*   MCV 98  --  98 96    413 406 451*     Recent Labs   Lab Test 08/27/19  0505 08/26/19  0544 08/25/19  1124   CR 0.58 0.58 0.60     Recent Labs   Lab Test 08/28/19  0644   SED 17     Recent Labs   Lab Test 08/26/19  1109 08/26/19  1048 08/07/19  0838 08/27/15  1305 01/06/12  1607   CULT Culture negative monitoring continues  Culture negative monitoring continues Culture negative monitoring continues  Culture negative monitoring continues No anaerobes isolated  No growth No Salmonella, Shigella, Campylobacter, E. coli O157, Aeromonas, or Plesiomonas   isolated.   <10,000 colonies/mL Multiple species present, probable perineal contamination.

## 2019-08-28 NOTE — PLAN OF CARE
A&O. VSS. Ambulates Ax1 w/ WW/GB/TTWB. Wound vac, dressing CDI, erythema to site. Taking PRN ultram, ibuprofen, and tylenol for pain control as well as utilizing ice. Tolerating diet, though reports lack of appetite. Plan is TCU on discharge. Will continue to monitor.

## 2019-08-28 NOTE — PROGRESS NOTES
"[facility]    Hospitalist Progress Note  Name: Merceeds Barber    MRN: [id number]  Provider: Astrid Ma MD  Date of Service: 08/28/2019    Assessment & Plan   Summary of Stay: Mercedes Barber is a 64 year old female who was admitted on 8/25/2019 for right hip cellulitis and abscess. Her past medical history significant for hypothyroidism, seizure disorder, sleep apnea, arthritis, GERD. Of note she had recent hospitalizations for total right hip arthroplasty in July 2019 complicated with infection and revision surgery in August 2019 with recurrent cellulitis and fluid collection around the wound status fluid aspirated aspiration on 8/14 and 8/16 from both joint and subcutaneous fluid collection.  Subcutaneous fluid grew Proteus.  PICC line  was placed on 8/17 and she was started on IV ceftriaxone on 8/19.  Presented to the emergency room as despite being on IV antibiotics she had increased drainage, erythema and pain around the wound.  She also had low-grade temperature.   Orthopedic surgery was consulted patient underwent wound irrigation and debridement with excision 8/26/2019  Complains of pain on incision site wound VAC in place.  She will benefit from 2 weeks of IV antibiotics per infectious disease     Right hip cellulitis cannot exclude deeper infection/abscess  --Drainage from surgical site surrounding erythema warmth and tenderness  ----Recent CT scan on 8/16/2019 at [facility] showed \" large fluid collection and subcutaneous tissue lateral to right hip prosthesis measuring 10.8-6 0.5 to 15 cm. Concerning for abscess  -On IV ceftriaxone without major change in antibiotic at this time  --ID is consulted to help with antibiotic management.  Recommended ceftriaxone for 2 weeks  --Status post I&D and joint aspirate 8/26/2019  --PRN pain meds   --Ortho following     Hypokalemia  --Replaced  potassium per protocol     Anemia  --During recent hospitalization patient's hemoglobin was " down to 6.8 due to acute blood loss.  2 units of blood transfusion  --Hemoglobin preop stable at 9.8  --Postop hemoglobin 8.8 to 8.3     H/o Seizure Disorder  --Continue prior to admission meds  --Seizure precautions     GERD  --Continue PPI     Hypothyroidism  --Continue replacement      IBS  --Continue home meds     History of sleep apnea  --Not using CPAP at home      DVT Prophylaxis: Aspirin daily for Ortho  Code Status: Full Code    Disposition: Expected discharge in 2-3 days to prior living arrangement      Interval History   Reviewed chart.  Complains of pain requiring IV pain medication overnight wound VAC in place.  Pain worse with any change in position..  Denies any chest pain or shortness of breath.  Complains of dry mouth.    -Data reviewed today: I reviewed all new labs and imaging reports over the last 24 hours. I personally reviewed no images or EKG's today.    Physical Exam   Temp: 97.3  F (36.3  C) Temp src: Oral BP: 108/64 Pulse: 75 Heart Rate: 64 Resp: 16 SpO2: 95 % O2 Device: None (Room air)    Vitals:    08/26/19 0655 08/27/19 0508 08/28/19 0618   Weight: 87.9 kg (193 lb 12.8 oz) 88.1 kg (194 lb 4.8 oz) 88.3 kg (194 lb 11.2 oz)     Vital Signs with Ranges  Temp:  [96.9  F (36.1  C)-97.5  F (36.4  C)] 97.3  F (36.3  C)  Pulse:  [75] 75  Heart Rate:  [64-68] 64  Resp:  [16-18] 16  BP: (108-149)/(64-75) 108/64  SpO2:  [95 %-96 %] 95 %  I/O last 3 completed shifts:  In: 500 [P.O.:500]  Out: 700 [Urine:700]      GEN:  Alert, oriented x 3, appears comfortable, NAD.  HEENT:  Normocephalic/atraumatic, no scleral icterus, no nasal discharge, mouth moist.  CV:  Regular rate and rhythm, no murmur or JVD.  S1 + S2 noted, no S3 or S4.  LUNGS:  Clear to auscultation bilaterally without rales/rhonchi/wheezing/retractions.  Symmetric chest rise on inhalation noted.  ABD:  Active bowel sounds, soft, non-tender/non-distended.  No rebound/guarding/rigidity.  EXT:  No edema.  No cyanosis.  Right leg surgical wound  wound VAC   SKIN:  Dry to touch, cellulitis right leg lateral aspect of hip    Medications     - MEDICATION INSTRUCTIONS -       sodium chloride 100 mL/hr at 08/26/19 1344       aspirin  325 mg Oral Daily     cefTRIAXone  2 g Intravenous Q24H     fluticasone-vilanterol  1 puff Inhalation Daily     hypromellose-dextran  1 drop Both Eyes BID     lacosamide  200 mg Oral BID     levothyroxine  50 mcg Oral Daily     linaclotide  290 mcg Oral QAM AC     liothyronine  10 mcg Oral Daily     nefazodone  600 mg Oral At Bedtime     omeprazole  40 mg Oral BID     pramipexole  1 mg Oral QAM     pramipexole  2 mg Oral QPM     ranitidine  150 mg Oral BID     sodium chloride (PF)  3 mL Intracatheter Q8H     temazepam  15 mg Oral At Bedtime     Data     Recent Labs   Lab 08/28/19  0644 08/27/19  0505 08/26/19  0544 08/25/19  1124   WBC 7.0  --  7.7 8.6   HGB 8.3*  --  8.8* 9.8*   HCT 27.3*  --  28.9* 30.8*   MCV 98  --  98 96    413 406 451*     Recent Labs   Lab 08/27/19  0505 08/26/19  0544 08/25/19  2230 08/25/19  1124   NA  --  139  --  139   POTASSIUM  --  3.8 3.7 3.3*   CHLORIDE  --  108  --  109   CO2  --  27  --  27   ANIONGAP  --  4  --  3   * 98  --  114*   BUN  --  8  --  13   CR 0.58 0.58  --  0.60   GFRESTIMATED >90 >90  --  >90   GFRESTBLACK >90 >90  --  >90   SABA  --  8.7  --  8.7     Recent Labs   Lab 08/26/19  1109 08/26/19  1048   CULT Culture negative monitoring continues  Culture negative monitoring continues Culture negative monitoring continues  Culture negative monitoring continues     No results for input(s): AST, ALT, GGT, ALKPHOS, BILITOTAL, BILICONJ, BILIDIRECT, KURTIS in the last 168 hours.    Invalid input(s): BILIRUBININDIRECT  No results for input(s): INR in the last 168 hours.  No results for input(s): LACT in the last 168 hours.  No results for input(s): TSH in the last 168 hours.  No results for input(s): TROPONIN, TROPI, TROPR in the last 168 hours.    Invalid input(s): TROP,  TROPONINIES  No results for input(s): COLOR, APPEARANCE, URINEGLC, URINEBILI, URINEKETONE, SG, UBLD, URINEPH, PROTEIN, UROBILINOGEN, NITRITE, LEUKEST, RBCU, WBCU in the last 168 hours.    No results found for this or any previous visit (from the past 24 hour(s)).

## 2019-08-28 NOTE — PLAN OF CARE
POD 2 from right hip I&D  Wound vac in place, dressing CDI  Elevated BP, other vital signs stable, on RA  Tramadol x 1  IV Zofran @ start of shift  Up with assist x 1 to BSC, toe touch wt bearing   PICC in place, saline locked continues IV Rocephin

## 2019-08-28 NOTE — PLAN OF CARE
Discharge Planner PT    Patient plan for discharge: TCU.  Pt would like to go to [facility] vs. Returning to [facility] as it is closer to home.  Current status: PT - Pt transfers supine to sit with SBA and indep with scooting to EOB.  Pt transfers sit to/from stand with SBA & min assist with bed to wc with ww. PT - Pt amb 15' with ww with min assist, while maintaining TTWB on the R LE.  Pt fatigued quickly an required wc to be brought behind pt and transported back to room.   Barriers to return to prior living situation: Unable to perform ADLs or ambulate household or longer distances to navigate home environment, due to R TTWB status.  Recommendations for discharge: TCU  per plan established by the PT.  Rationale for recommendations: Continued skilled PT (and OT) in the acute and TCU setting to increase R LE strength and progress independence with all transfers, ADLs and gait to enable pt to return home with increased safety and decreased falls risk.       Entered by: Keira Persaud 08/28/2019 12:27 PM

## 2019-08-28 NOTE — PLAN OF CARE
Pt A&O. VSS. Afebrile. PICC line patent. Up with assist of 1 gait belt and walker. TTWB. Voiding adequate amounts. Baseline neuropathy BUE and BLE. Rocephin IV abx. Not passing flatus. Wound vac had nothing out of it.  Dressing/tegaderm c/d/I. Pain managed with ibuprofen and ultram. Plan to transfer to TCU in 1-2 days. Pt stated at very end of shift she had nausea. Reported to next RN at bedside report and she will assess for nausea medication. Will continue to monitor

## 2019-08-28 NOTE — PROGRESS NOTES
SPIRITUAL HEALTH SERVICES Progress Note  [facility] 6    Pt receiving cares and declined [facility] visit at this time.  Request follow up when pt available.    Plan: Spiritual Health Services remains available for additional emotional/spiritual support.    Ernie Lu MA  Staff   Pager: [phone]  Phone: [phone]

## 2019-08-28 NOTE — PROGRESS NOTES
SWS     D: Discharge planning continuing.. Noted per MD the anticipation of pt's discharge 2-3 days, [facility] has been updated. Per discussion with Dr Bradley, at this time anticipate pt will be needing IV ceftriaxone, currently 1x day. [facility] aware of IV antibiotics need as well as wound vac which they have begun process to obtain.      I: Met with pt and assured her of continued bed availability at [facility]. Pt informs that she will have her daughter provide the transport for her to rehab facility.     A/P: Will await MD determination of discharge date, continue planning accordingly. Monitor continued bed availability at [facility].

## 2019-08-29 ENCOUNTER — HOSPITAL ENCOUNTER (OUTPATIENT)
Facility: CLINIC | Age: 64
Setting detail: OBSERVATION
Discharge: NURSING, REHAB OR LONG-TERM CARE | DRG: 857 | End: 2019-08-31
Attending: INTERNAL MEDICINE | Admitting: INTERNAL MEDICINE
Payer: MEDICARE

## 2019-08-29 VITALS
TEMPERATURE: 97.5 F | WEIGHT: 197.4 LBS | BODY MASS INDEX: 32.85 KG/M2 | SYSTOLIC BLOOD PRESSURE: 135 MMHG | OXYGEN SATURATION: 96 % | HEART RATE: 64 BPM | DIASTOLIC BLOOD PRESSURE: 77 MMHG | RESPIRATION RATE: 16 BRPM

## 2019-08-29 DIAGNOSIS — S71.001A BLEEDING FROM RIGHT HIP WOUND, INITIAL ENCOUNTER: ICD-10-CM

## 2019-08-29 DIAGNOSIS — L03.115 CELLULITIS OF RIGHT HIP: Primary | ICD-10-CM

## 2019-08-29 LAB
ANION GAP SERPL CALCULATED.3IONS-SCNC: 5 MMOL/L (ref 3–14)
BASOPHILS # BLD AUTO: 0.1 10E9/L (ref 0–0.2)
BASOPHILS NFR BLD AUTO: 1 %
BUN SERPL-MCNC: 14 MG/DL (ref 7–30)
CALCIUM SERPL-MCNC: 8.6 MG/DL (ref 8.5–10.1)
CHLORIDE SERPL-SCNC: 108 MMOL/L (ref 94–109)
CO2 SERPL-SCNC: 27 MMOL/L (ref 20–32)
CREAT SERPL-MCNC: 0.61 MG/DL (ref 0.52–1.04)
DIFFERENTIAL METHOD BLD: ABNORMAL
EOSINOPHIL # BLD AUTO: 0.9 10E9/L (ref 0–0.7)
EOSINOPHIL NFR BLD AUTO: 11.4 %
ERYTHROCYTE [DISTWIDTH] IN BLOOD BY AUTOMATED COUNT: 15.1 % (ref 10–15)
GFR SERPL CREATININE-BSD FRML MDRD: >90 ML/MIN/{1.73_M2}
GLUCOSE SERPL-MCNC: 91 MG/DL (ref 70–99)
HCT VFR BLD AUTO: 29.5 % (ref 35–47)
HGB BLD-MCNC: 9.2 G/DL (ref 11.7–15.7)
IMM GRANULOCYTES # BLD: 0.1 10E9/L (ref 0–0.4)
IMM GRANULOCYTES NFR BLD: 0.6 %
LYMPHOCYTES # BLD AUTO: 2 10E9/L (ref 0.8–5.3)
LYMPHOCYTES NFR BLD AUTO: 24.4 %
MCH RBC QN AUTO: 30.4 PG (ref 26.5–33)
MCHC RBC AUTO-ENTMCNC: 31.2 G/DL (ref 31.5–36.5)
MCV RBC AUTO: 97 FL (ref 78–100)
MONOCYTES # BLD AUTO: 0.7 10E9/L (ref 0–1.3)
MONOCYTES NFR BLD AUTO: 8.7 %
NEUTROPHILS # BLD AUTO: 4.4 10E9/L (ref 1.6–8.3)
NEUTROPHILS NFR BLD AUTO: 53.9 %
NRBC # BLD AUTO: 0 10*3/UL
NRBC BLD AUTO-RTO: 0 /100
PLATELET # BLD AUTO: 358 10E9/L (ref 150–450)
POTASSIUM SERPL-SCNC: 3.7 MMOL/L (ref 3.4–5.3)
RBC # BLD AUTO: 3.03 10E12/L (ref 3.8–5.2)
SODIUM SERPL-SCNC: 140 MMOL/L (ref 133–144)
WBC # BLD AUTO: 8.1 10E9/L (ref 4–11)

## 2019-08-29 PROCEDURE — G0378 HOSPITAL OBSERVATION PER HR: HCPCS

## 2019-08-29 PROCEDURE — 99239 HOSP IP/OBS DSCHRG MGMT >30: CPT | Performed by: INTERNAL MEDICINE

## 2019-08-29 PROCEDURE — 85025 COMPLETE CBC W/AUTO DIFF WBC: CPT | Performed by: INTERNAL MEDICINE

## 2019-08-29 PROCEDURE — 85018 HEMOGLOBIN: CPT | Performed by: PHYSICIAN ASSISTANT

## 2019-08-29 PROCEDURE — 80048 BASIC METABOLIC PNL TOTAL CA: CPT | Performed by: INTERNAL MEDICINE

## 2019-08-29 PROCEDURE — 25000132 ZZH RX MED GY IP 250 OP 250 PS 637: Mod: GY | Performed by: PHYSICIAN ASSISTANT

## 2019-08-29 PROCEDURE — 25000128 H RX IP 250 OP 636: Performed by: INTERNAL MEDICINE

## 2019-08-29 PROCEDURE — 99285 EMERGENCY DEPT VISIT HI MDM: CPT | Mod: 25

## 2019-08-29 PROCEDURE — 25000128 H RX IP 250 OP 636: Performed by: PHYSICIAN ASSISTANT

## 2019-08-29 PROCEDURE — 99219 ZZC INITIAL OBSERVATION CARE,LEVL II: CPT | Performed by: PHYSICIAN ASSISTANT

## 2019-08-29 PROCEDURE — 25000132 ZZH RX MED GY IP 250 OP 250 PS 637: Mod: GY | Performed by: INTERNAL MEDICINE

## 2019-08-29 RX ORDER — PRAMIPEXOLE DIHYDROCHLORIDE 1 MG/1
1 TABLET ORAL EVERY MORNING
Status: DISCONTINUED | OUTPATIENT
Start: 2019-08-30 | End: 2019-08-31 | Stop reason: HOSPADM

## 2019-08-29 RX ORDER — NEFAZODONE HYDROCHLORIDE 200 MG/1
600 TABLET ORAL AT BEDTIME
Status: DISCONTINUED | OUTPATIENT
Start: 2019-08-29 | End: 2019-08-31 | Stop reason: HOSPADM

## 2019-08-29 RX ORDER — TRAMADOL HYDROCHLORIDE 50 MG/1
100 TABLET ORAL EVERY 6 HOURS PRN
Qty: 20 TABLET | Refills: 0 | Status: ON HOLD | OUTPATIENT
Start: 2019-08-29 | End: 2019-08-31

## 2019-08-29 RX ORDER — ONDANSETRON 2 MG/ML
4 INJECTION INTRAMUSCULAR; INTRAVENOUS EVERY 6 HOURS PRN
Status: DISCONTINUED | OUTPATIENT
Start: 2019-08-29 | End: 2019-08-31 | Stop reason: HOSPADM

## 2019-08-29 RX ORDER — LIDOCAINE 40 MG/G
CREAM TOPICAL
Status: DISCONTINUED | OUTPATIENT
Start: 2019-08-29 | End: 2019-08-31 | Stop reason: HOSPADM

## 2019-08-29 RX ORDER — TEMAZEPAM 15 MG/1
15 CAPSULE ORAL AT BEDTIME
Qty: 5 CAPSULE | Refills: 0 | Status: SHIPPED | OUTPATIENT
Start: 2019-08-29 | End: 2019-09-04

## 2019-08-29 RX ORDER — LEVOTHYROXINE SODIUM 50 UG/1
50 TABLET ORAL DAILY
Status: DISCONTINUED | OUTPATIENT
Start: 2019-08-30 | End: 2019-08-31 | Stop reason: HOSPADM

## 2019-08-29 RX ORDER — TRAMADOL HYDROCHLORIDE 50 MG/1
100 TABLET ORAL EVERY 6 HOURS PRN
Status: DISCONTINUED | OUTPATIENT
Start: 2019-08-29 | End: 2019-08-30

## 2019-08-29 RX ORDER — BISACODYL 10 MG
10 SUPPOSITORY, RECTAL RECTAL DAILY PRN
Status: DISCONTINUED | OUTPATIENT
Start: 2019-08-29 | End: 2019-08-31 | Stop reason: HOSPADM

## 2019-08-29 RX ORDER — AMOXICILLIN 250 MG
1 CAPSULE ORAL 2 TIMES DAILY PRN
Status: DISCONTINUED | OUTPATIENT
Start: 2019-08-29 | End: 2019-08-30

## 2019-08-29 RX ORDER — LIOTHYRONINE SODIUM 5 UG/1
10 TABLET ORAL DAILY
Status: DISCONTINUED | OUTPATIENT
Start: 2019-08-30 | End: 2019-08-31 | Stop reason: HOSPADM

## 2019-08-29 RX ORDER — ONDANSETRON 4 MG/1
4 TABLET, ORALLY DISINTEGRATING ORAL EVERY 6 HOURS PRN
Status: DISCONTINUED | OUTPATIENT
Start: 2019-08-29 | End: 2019-08-31 | Stop reason: HOSPADM

## 2019-08-29 RX ORDER — TEMAZEPAM 30 MG
30 CAPSULE ORAL AT BEDTIME
Qty: 5 CAPSULE | Refills: 0 | Status: SHIPPED | OUTPATIENT
Start: 2019-08-29 | End: 2019-08-29

## 2019-08-29 RX ORDER — CEFTRIAXONE 2 G/1
2 INJECTION, POWDER, FOR SOLUTION INTRAMUSCULAR; INTRAVENOUS EVERY 24 HOURS
Status: DISCONTINUED | OUTPATIENT
Start: 2019-08-30 | End: 2019-08-31 | Stop reason: HOSPADM

## 2019-08-29 RX ORDER — PRAMIPEXOLE DIHYDROCHLORIDE 1 MG/1
2 TABLET ORAL EVERY EVENING
Status: DISCONTINUED | OUTPATIENT
Start: 2019-08-29 | End: 2019-08-31 | Stop reason: HOSPADM

## 2019-08-29 RX ORDER — PROCHLORPERAZINE 25 MG
12.5 SUPPOSITORY, RECTAL RECTAL EVERY 12 HOURS PRN
Status: DISCONTINUED | OUTPATIENT
Start: 2019-08-29 | End: 2019-08-31 | Stop reason: HOSPADM

## 2019-08-29 RX ORDER — NALOXONE HYDROCHLORIDE 0.4 MG/ML
.1-.4 INJECTION, SOLUTION INTRAMUSCULAR; INTRAVENOUS; SUBCUTANEOUS
Status: DISCONTINUED | OUTPATIENT
Start: 2019-08-29 | End: 2019-08-31 | Stop reason: HOSPADM

## 2019-08-29 RX ORDER — IBUPROFEN 600 MG/1
600 TABLET, FILM COATED ORAL EVERY 6 HOURS PRN
Status: DISCONTINUED | OUTPATIENT
Start: 2019-08-29 | End: 2019-08-31 | Stop reason: HOSPADM

## 2019-08-29 RX ORDER — ACETAMINOPHEN 325 MG/1
650 TABLET ORAL EVERY 4 HOURS PRN
Status: DISCONTINUED | OUTPATIENT
Start: 2019-08-29 | End: 2019-08-30

## 2019-08-29 RX ORDER — POLYETHYLENE GLYCOL 3350 17 G/17G
17 POWDER, FOR SOLUTION ORAL DAILY PRN
Status: DISCONTINUED | OUTPATIENT
Start: 2019-08-29 | End: 2019-08-30

## 2019-08-29 RX ORDER — LACOSAMIDE 200 MG/1
200 TABLET ORAL 2 TIMES DAILY
Status: DISCONTINUED | OUTPATIENT
Start: 2019-08-29 | End: 2019-08-31 | Stop reason: HOSPADM

## 2019-08-29 RX ORDER — AMOXICILLIN 250 MG
2 CAPSULE ORAL 2 TIMES DAILY PRN
Status: DISCONTINUED | OUTPATIENT
Start: 2019-08-29 | End: 2019-08-30

## 2019-08-29 RX ORDER — LACOSAMIDE 200 MG/1
200 TABLET ORAL 2 TIMES DAILY
Qty: 10 TABLET | Refills: 0 | Status: SHIPPED | OUTPATIENT
Start: 2019-08-29 | End: 2019-09-04

## 2019-08-29 RX ORDER — PROCHLORPERAZINE MALEATE 5 MG
5 TABLET ORAL EVERY 6 HOURS PRN
Status: DISCONTINUED | OUTPATIENT
Start: 2019-08-29 | End: 2019-08-31 | Stop reason: HOSPADM

## 2019-08-29 RX ADMIN — RANITIDINE 150 MG: 150 TABLET ORAL at 07:47

## 2019-08-29 RX ADMIN — PRAMIPEXOLE DIHYDROCHLORIDE 1 MG: 1 TABLET ORAL at 07:47

## 2019-08-29 RX ADMIN — RANITIDINE 150 MG: 150 TABLET ORAL at 22:51

## 2019-08-29 RX ADMIN — LIOTHYRONINE SODIUM 10 MCG: 5 TABLET ORAL at 07:47

## 2019-08-29 RX ADMIN — DEXTRAN 70, AND HYPROMELLOSE 2910 1 DROP: 1; 3 SOLUTION/ DROPS OPHTHALMIC at 11:42

## 2019-08-29 RX ADMIN — TRAMADOL HYDROCHLORIDE 100 MG: 50 TABLET, COATED ORAL at 11:56

## 2019-08-29 RX ADMIN — LACOSAMIDE 200 MG: 200 TABLET, FILM COATED ORAL at 07:47

## 2019-08-29 RX ADMIN — OMEPRAZOLE 40 MG: 20 CAPSULE, DELAYED RELEASE ORAL at 07:47

## 2019-08-29 RX ADMIN — ASPIRIN 325 MG: 325 TABLET, DELAYED RELEASE ORAL at 07:47

## 2019-08-29 RX ADMIN — CEFTRIAXONE SODIUM 2 G: 2 INJECTION, POWDER, FOR SOLUTION INTRAMUSCULAR; INTRAVENOUS at 09:08

## 2019-08-29 RX ADMIN — LINACLOTIDE 290 MCG: 290 CAPSULE, GELATIN COATED ORAL at 07:47

## 2019-08-29 RX ADMIN — TRAMADOL HYDROCHLORIDE 100 MG: 50 TABLET, COATED ORAL at 05:43

## 2019-08-29 RX ADMIN — SODIUM CHLORIDE, PRESERVATIVE FREE 5 ML: 5 INJECTION INTRAVENOUS at 11:42

## 2019-08-29 RX ADMIN — IBUPROFEN 600 MG: 600 TABLET ORAL at 11:56

## 2019-08-29 RX ADMIN — LACOSAMIDE 200 MG: 200 TABLET, FILM COATED ORAL at 22:51

## 2019-08-29 RX ADMIN — IBUPROFEN 600 MG: 600 TABLET ORAL at 05:43

## 2019-08-29 RX ADMIN — PRAMIPEXOLE DIHYDROCHLORIDE 2 MG: 1 TABLET ORAL at 22:51

## 2019-08-29 RX ADMIN — TRAMADOL HYDROCHLORIDE 100 MG: 50 TABLET, FILM COATED ORAL at 22:51

## 2019-08-29 RX ADMIN — SENNOSIDES AND DOCUSATE SODIUM 1 TABLET: 8.6; 5 TABLET ORAL at 07:47

## 2019-08-29 RX ADMIN — OMEPRAZOLE 40 MG: 20 CAPSULE, DELAYED RELEASE ORAL at 22:51

## 2019-08-29 RX ADMIN — LEVOTHYROXINE SODIUM 50 MCG: 0.05 TABLET ORAL at 07:47

## 2019-08-29 ASSESSMENT — ACTIVITIES OF DAILY LIVING (ADL)
ADLS_ACUITY_SCORE: 20

## 2019-08-29 ASSESSMENT — ENCOUNTER SYMPTOMS
MYALGIAS: 1
WOUND: 1

## 2019-08-29 NOTE — PROGRESS NOTES
"  Spiritual Health Services  Spiritual Assessment Progress Note  [facility] Unit: Ortho 6    Primary Focus:    Advanced Care Planning    Completed healthcare directive. Notary has been advised of need for completion.    Illness Narrative:    Pt is an 64 year old female with a complex medical history currently admitted for treatment of infection.    Pt alert and denies discomfort. Shared context of admission and concerns about possible re-curring infections, as that has been a pattern in her health history.    Reflective conversion with Mercedes which integrated elements of illness and family narratives.    Resources for Support :      Mercedes referenced support from her daughter, Rachael and son-in-law, Davey who live nearby and are attentive to her needs.    Mentioned supportive nabila community of [facility] and her  Sol who has visited her during this admission.    Distress:     Pt primary source of distress today is concern about the number of losses she has experienced. Her son, Steve, to suicide three years ago, several friends and a sister.    Emotional/Spiritual/Existential/Mandaen Distress - Mercedes reflected on the challenges of her childhood and traumas she experienced.    Social/Cultural/Economic Distress -  Mercedes expressed that she has always lived from \"check to check\" though her family has been supportive when she has been in need.     Coping:    Samaritan/Nabila/spiritual practices - Pt is Adventist and active member at [facility] in [place].  Mercedes welcomed prayer.    Enjoys discussing her activity in her nabila community and the julissa of her love for [employer] and her prayer life.    Mercedes loves people and shares her gifts of art and cooking with family and friends.    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    She described her condo and the people there with whom she has developed friendships.    Plan:     No " plan to follow. Pt anticipates discharge this afternoon.     Ernie Lu MA  Staff   Pager: [phone]  Phone: [phone]

## 2019-08-29 NOTE — PROGRESS NOTES
Your information has been submitted on August 29th, 2019 at 01:39:24 PM CDT. The confirmation number is [id number]

## 2019-08-29 NOTE — PLAN OF CARE
A&Ox4. VSS. A1 with TTWB. Voiding adequately. Passing gas. Tolerating diet well. Pain managed with Tramadol. Ice for comfort. Wound vac removed per PA drain order. Betadine to incision. Aquacell applied. Baseline neuropathy and numbness to BLE. Plans to discharge to [facility] this afternoon.

## 2019-08-29 NOTE — PLAN OF CARE
"  PT:  Noted plan for discharge to TCU this afternoon, will plan to defer further PT services to next level of care.    Physical Therapy Discharge Summary    Reason for therapy discharge:    Discharged to transitional care facility.    Progress towards therapy goal(s). See goals on Care Plan in [facility] electronic health record for goal details.  Goals not met.  Barriers to achieving goals:   limited tolerance for therapy and discharge from facility.  Pt made progress with PT, limited by TTWB restriction, last seen on 8/28:    \"Discharge Planner PT    Patient plan for discharge: TCU.  Pt would like to go to [facility] vs. Returning to [facility] as it is closer to home.  Current status: PT - Pt transfers supine to sit with SBA and indep with scooting to EOB.  Pt transfers sit to/from stand with SBA & min assist with bed to wc with ww. PT - Pt amb 15' with ww with min assist, while maintaining TTWB on the R LE.  Pt fatigued quickly an required wc to be brought behind pt and transported back to room.   Barriers to return to prior living situation: Unable to perform ADLs or ambulate household or longer distances to navigate home environment, due to R TTWB status.  Recommendations for discharge: TCU  per plan established by the PT.  Rationale for recommendations: Continued skilled PT (and OT) in the acute and TCU setting to increase R LE strength and progress independence with all transfers, ADLs and gait to enable pt to return home with increased safety and decreased falls risk.       Entered by: Keira Persaud 08/28/2019 12:27 PM\"    Therapy recommendation(s):    Continued therapy is recommended.  Rationale/Recommendations:  Ongoing PT at TCU setting to regain strength and mobility status prior to returning home.    **Pt not seen by discharging therapist on this date, note written based on previous treating therapist's notes and recommendations.    "

## 2019-08-29 NOTE — PROGRESS NOTES
Called and spoke with Ortho PA regarding discharge pain meds. Pt prescribed oxycodone but was taking tramadol while in hospital which seems adequate for pain management. Ortho PA not available to change prescription and asked for hospitalist to do so. Paged hospitalist with this information.

## 2019-08-29 NOTE — PROGRESS NOTES
Orthopedic Surgery  Mercedes Barber  2019  Admit Date:  2019  POD 3 Days Post-Op  S/P Procedure(s):  1.  Right hip wound irrigation and debridement with excisional debridement of nonviable skin, subcutaneous tissues, and fascia. 2. Application of incisional wound VAC, 27cm length incision.    Patient resting comfortably in bed.    Pain controlled.  Tolerating oral intake.    Denies nausea or vomiting  Denies chest pain or shortness of breath  No events overnight.     Alert and orient to person, place, and time.  Vital Sign Ranges  Temperature Temp  Av.5  F (36.4  C)  Min: 96.8  F (36  C)  Max: 98.3  F (36.8  C)   Blood pressure Systolic (24hrs), Av , Min:125 , Max:136        Diastolic (24hrs), Av, Min:65, Max:79      Pulse Pulse  Av  Min: 64  Max: 64   Respirations Resp  Av  Min: 16  Max: 16   Pulse oximetry SpO2  Av.7 %  Min: 93 %  Max: 98 %       Dressing is clean, dry, and intact. Wound vac with minimal output (incisional only)  Minimal erythema of the surrounding skin.   Bilateral calves are soft, non-tender.  Bilateral lower extremity is NVI.  Sensation intact bilateral lower extremities  5/5 motor with resisted dorsi and plantar flexion bilaterally  +Dp pulse      Labs:  Recent Labs   Lab Test 19  0555 19  0544 19  2230   POTASSIUM 3.7 3.8 3.7     Recent Labs   Lab Test 19  0555 19  0644 19  0544   HGB 9.2* 8.3* 8.8*     Recent Labs   Lab Test 19  1818 11  0800   INR 0.95 0.98     Recent Labs   Lab Test 19  0555 19  0644 19  0505    [phone]       A/P  1. S/p right hip I&D of seroma   Continue ASA for DVT prophylaxis.     Mobilize with PT/OT    TTWB right LE.     Continue current pain regiment.   Remove incisional wound vac and place Aquacel sterilely after betadine paint   Follow-up with Dr. Horazdovsky in 1-2 weeks    2. Disposition   Anticipate d/c to TCU today or tomorrow based on  transportation to TCU.    Smita Lazo PA-C

## 2019-08-29 NOTE — PLAN OF CARE
A/Ox4, VSS, SBA, LS clear on RA, regular diet - tolerating well, pain managed with tramadol, tylenol, ibuprofen and ice, wound vac positional and foam dressing to R hip CDI, CMS intact -numbness to BLE - baseline, PICC to R arm SL, on IV rocephin for R hip cellulitis, up to commode/pivot transfer for toileting, discharge plan 2-3 days to [facility], continue with plan of care.

## 2019-08-29 NOTE — PROGRESS NOTES
SW:  Discharge Planner   Discharge Plans in progress: Patient to discharge to [facility]  Barriers to discharge plan: TCU to confirm wound vac in place prior to admission  Follow up plan: TCU confirmed that Wound Vac is in place.  SW to continue to follow and assist with discharge planning.    ADDENDUM: SW informed that patient's Wound Vac is ordered to be removed prior to discharge. SW met with patient to discuss discharge plan. Patient in agreement with plan and indicates her sister will be picking her up around 1600.         Entered by: Sarah Magdaleno 08/29/2019 10:21 AM       DC orders: sent via DOD at 1115  Scripts:  PAS:  Trans: dtr to transport between [id number] today.

## 2019-08-29 NOTE — DISCHARGE SUMMARY
"[facility]    Discharge Summary  Hospitalist    Date of Admission:  8/25/2019  Date of Discharge:  8/29/2019  Discharging Provider: Farzana Ro MD  Date of Service (when I saw the patient): 08/29/19    Discharge Diagnoses      Right hip cellulitis with abscess     Hypokalemia     Anemia     H/o Seizure Disorder     GERD     Hypothyroidism      IBS     History of sleep apnea    History of Present Illness   Mercedes Barber is an 64 year old female who presented with right hip cellulitis and abscess. Adriana see H&P for details.     Hospital Course   Summary of Stay: Mercedes Barber is a 64 year old female who was admitted on 8/25/2019 for right hip cellulitis and abscess. Her past medical history significant for hypothyroidism, seizure disorder, sleep apnea, arthritis, GERD. Of note she had recent hospitalizations for total right hip arthroplasty in July 2019 complicated with infection and revision surgery in August 2019 with recurrent cellulitis and fluid collection around the wound status fluid aspirated aspiration on 8/14 and 8/16 from both joint and subcutaneous fluid collection.  Subcutaneous fluid grew Proteus.  PICC line  was placed on 8/17 and she was started on IV ceftriaxone on 8/19.  Presented to the emergency room as despite being on IV antibiotics she had increased drainage, erythema and pain around the wound.  She also had low-grade temperature.   Orthopedic surgery was consulted patient underwent wound irrigation and debridement with excision 8/26/2019  Complains of pain on incision site wound VAC in place. She will benefit from 2 weeks of IV antibiotics per infectious disease     Right hip cellulitis cannot exclude deeper infection/abscess  --Recent CT scan on 8/16/2019 at [facility] showed \" large fluid collection and subcutaneous tissue lateral to right hip prosthesis measuring 10.8-6 0.5 to 15 cm concerning for abscess.  Patient was placed on IV ceftriaxone per " infectious disease.  Orthopedic surgery was also consulted.  Status post I&D in the joint aspirate on 8/26/2019.  She was put on wound VAC.  Infectious disease recommended 2 weeks of IV ceftriaxone on discharge.  Orthopedic surgery also recommended outpatient follow-up in 2 weeks.  Patient was transferred to transitional care unit to continue rehab.    Hypokalemia  --Replaced  potassium per protocol     Anemia  --During recent hospitalization patient's hemoglobin was down to 6.8 due to acute blood loss.  Status post 2 units of blood transfusion  --Hemoglobin preop stable at 9.8. Postop hemoglobin 9.2 on discharge.  She was advised to follow-up as outpatient.    H/o Seizure Disorder  --Continue prior to admission meds  --Seizure precautions     GERD  --Continue PPI     Hypothyroidism  --Continued on levothyroxine replacement      IBS  --Continued on home meds     History of sleep apnea  --Not using CPAP at home    Patient remained stable during hospital course.  She was discharged to transitional care unit in a stable condition.    Significant Results and Procedures   Results for orders placed or performed during the hospital encounter of 08/25/19   CT Hip Right w/o Contrast    Narrative    CT RIGHT HIP WITHOUT CONTRAST   8/25/2019 1:35 PM     HISTORY: Pain, history of seroma, evaluate location of seroma and  extent.    TECHNIQUE:   No IV contrast material. Radiation dose for this scan was  reduced using automated exposure control, adjustment of the mA and/or  kV according to patient size, or iterative reconstruction technique.    COMPARISON: 8/7/2019 radiographs. 8/6/2019 CT pelvis.    FINDINGS:    Bone/cartilage: Right SARAHI with cerclage wire fixation. The degree of  soft tissue thickening and calcification along the medial acetabular  rim extending into the pelvis has slightly increased in the interval.  No lucency adjacent to the acetabular component or screw fixation. No  fractures of the acetabular screw  fixation. No lucency adjacent to the  femoral component. Osteopenia. No fractures are identified.    Joint: No right hip joint effusion.    Soft tissues: There is an elliptical fluid collection in the soft  tissues of the right gluteal region and right thigh that extends  superficial to the gluteus susan musculature and vastus medialis  musculature. The fluid collection measures 10.0 cm in width x 5.6 cm  in height and up to 20 cm in length obliquely. The extent of the fluid  collection has increased since the preoperative CT examination.  Findings are most likely due to postop hematoma/seroma, however,  superimposed infection cannot be excluded.    Muscles and tendons: No intramuscular hematoma or fluid collection. No  muscular atrophy.      Impression    IMPRESSION:  1. Subcutaneous fluid collection in the right gluteal region and thigh  is likely due to a postop hematoma/seroma, however, superimposed  infection cannot be excluded.  2. Right SARAHI. Increasing soft tissue thickening and calcification  along the medial acetabular rim.    LILIANA ÁLVAREZ MD   XR Chest Port 1 View    Narrative    CHEST PORTABLE ONE VIEW 8/25/2019 4:31 PM     COMPARISON: Two-view chest x-ray 5/21/2017.    HISTORY: Verify PICC placement: pre-existing PICC upon admission to  hospital.    FINDINGS: There has been interval placement of a right PICC line with  its tip at the cavoatrial junction. The cardiac silhouette, pulmonary  vasculature, lungs and pleural spaces are within normal limits.      Impression    IMPRESSION: Clear lungs.    BEN SHELDON MD         Pending Results   None  Code Status   Full Code       Primary Care Physician   Skyler Álvarez    0    Discharge Disposition   Discharged to TCU  Condition at discharge: Stable    Consultations This Hospital Stay   INFECTIOUS DISEASES IP CONSULT  ORTHOPEDIC SURGERY IP CONSULT  SOCIAL WORK IP CONSULT  VASCULAR ACCESS ADULT IP CONSULT  INFECTIOUS DISEASES IP CONSULT  OCCUPATIONAL  THERAPY ADULT IP CONSULT  PHYSICAL THERAPY ADULT IP CONSULT  ADVANCE DIRECTIVE IP CONSULT  NUTRITION SERVICES ADULT IP CONSULT  CARE COORDINATOR IP CONSULT    Time Spent on this Encounter   I, Farzana Ro MD, MD, personally saw the patient today and spent greater than 30 minutes discharging this patient.    Discharge Orders      Reason for your hospital stay    Right seroma irrigation and debridement, s/p right total hip revision     Follow-up and recommended labs and tests     Follow up with Dr. Tom Antonio , at [facility]location [facility] or city[facility], within 7-10 days  to evaluate after surgery. No follow up labs or test are needed prior to visit. At this visit x-rays will be taken, sutures/staples removed, and questions to be answered.  Bring any needed forms with to appointment.  Call for appointment: [phone]  After hours: [phone]  Fax: [phone] or [phone]     Activity    Your activity upon discharge: activity as tolerated, no driving while on analgesics and hip precautions  TTWB     Discharge Instructions    TTWB     General info for SNF    Length of Stay Estimate: Short Term Care: Estimated # of Days <30  Condition at Discharge: Stable  Level of care:skilled   Rehabilitation Potential: Good  Admission H&P remains valid and up-to-date: Yes  Recent Chemotherapy: N/A  Use Nursing Home Standing Orders: Yes     Mantoux instructions    Give two-step Mantoux (PPD) Per Facility Policy Yes     Wound care and dressings    Instructions to care for your wound at home: as directed, leave intact, reinforce if needed (Aquacel)     Full Code     Diet    Follow this diet upon discharge: Orders Placed This Encounter      Regular Diet Adult     Advance Diet as Tolerated    Follow this diet upon discharge: Orders Placed This Encounter      Snacks/Supplements Adult: Boost Plus; Between Meals      Regular Diet Adult      Diet     Discharge Medications   Current  Discharge Medication List      CONTINUE these medications which have CHANGED    Details   aspirin (ASA) 325 MG EC tablet Take 1 tablet (325 mg) by mouth daily  Qty: 40 tablet, Refills: 0    Associated Diagnoses: Status post total hip replacement, right      cefTRIAXone (ROCEPHIN) 1 GM vial Inject 2 g (2,000 mg) into the vein daily for 14 days ESR,CRP,CBC with differential, creatinine, SGOT weekly while on this medication to be faxed to Dr. Bradley office.  Qty: 600 mL, Refills: 0    Associated Diagnoses: Cellulitis of left lower extremity      ondansetron (ZOFRAN-ODT) 4 MG ODT tab Take 1 tablet (4 mg) by mouth every 6 hours as needed for nausea or vomiting  Qty: 10 tablet, Refills: 0    Associated Diagnoses: Status post total hip replacement, right      oxyCODONE (ROXICODONE) 5 MG tablet Take 1 tablet (5 mg) by mouth every 4 hours as needed for severe pain  Qty: 20 tablet, Refills: 0    Associated Diagnoses: Cellulitis of hip, right      temazepam (RESTORIL) 15 MG capsule Take 1 capsule (15 mg) by mouth At Bedtime  Qty: 5 capsule, Refills: 0    Associated Diagnoses: Other insomnia         CONTINUE these medications which have NOT CHANGED    Details   acetaminophen (TYLENOL) 325 MG tablet Take 2 tablets (650 mg) by mouth every 4 hours as needed for other (multimodal surgical pain management along with NSAIDS and opioid medication as indicated based on pain control and physical function.)  Qty: 50 tablet, Refills: 0    Associated Diagnoses: Status post total hip replacement, right      artificial saliva (BIOTENE MT) AERS spray Take 2 sprays by mouth 3 times daily as needed for dry mouth      budesonide-formoterol (SYMBICORT) 160-4.5 MCG/ACT Inhaler Inhale 2 puffs into the lungs 2 times daily      cycloSPORINE (RESTASIS) 0.05 % ophthalmic emulsion Place 1 drop into both eyes 2 times daily      hypromellose (ARTIFICIAL TEARS) 0.5 % SOLN ophthalmic solution Place 1 drop into both eyes 2 times daily      lacosamide  (VIMPAT) 200 MG TABS tablet Take 1 tablet (200 mg) by mouth 2 times daily  Qty: 60 tablet, Refills: 1    Associated Diagnoses: Arthralgia of hip, unspecified laterality      levothyroxine (SYNTHROID/LEVOTHROID) 50 MCG tablet Take 50 mcg by mouth daily      Lidocaine (LIDOCARE) 4 % Patch Place 1 patch onto the skin every 24 hours To prevent lidocaine toxicity, patient should be patch free for 12 hrs daily.      linaclotide (LINZESS) 290 MCG capsule Take 290 mcg by mouth every morning (before breakfast)      liothyronine (CYTOMEL) 5 MCG tablet Take 10 mcg by mouth daily       nefazodone (SERZONE) 200 MG tablet Take 600 mg by mouth At Bedtime       omeprazole (PRILOSEC) 40 MG DR capsule Take 1 capsule (40 mg) by mouth 2 times daily  Qty: 180 capsule, Refills: 3    Associated Diagnoses: Gastroesophageal reflux disease without esophagitis; Esophageal stricture      polyethylene glycol (MIRALAX/GLYCOLAX) packet Take 1 packet by mouth daily as needed for constipation      potassium citrate (UROCIT-K) 10 MEQ (1080 MG) CR tablet Take 10 mEq by mouth daily      !! pramipexole (MIRAPEX) 1 MG tablet Take 2 mg by mouth every evening . (takes 1 tab in am , 2 tabs 1 hour prior to hs)      !! pramipexole (MIRAPEX) 1 MG tablet Take 1 mg by mouth every morning . ( takes 1 tab in am , 2 tabs 1 hour prior to hs)      ranitidine (ZANTAC) 150 MG tablet TAKE 1 TABLET BY MOUTH TWICE DAILY  Qty: 180 tablet, Refills: 3    Comments: **Patient requests 90 days supply**  Associated Diagnoses: Gastroesophageal reflux disease without esophagitis      senna-docusate (SENOKOT-S/PERICOLACE) 8.6-50 MG tablet Take 1 tablet by mouth 2 times daily as needed for constipation  Qty: 20 tablet, Refills: 0    Associated Diagnoses: Status post total hip replacement, right      albuterol (ALBUTEROL) 108 (90 BASE) MCG/ACT Inhaler Inhale 2 puffs into the lungs 4 times daily as needed for shortness of breath / dyspnea or wheezing  Qty: 1 Inhaler, Refills: 0     "Associated Diagnoses: Cough      ibuprofen (ADVIL/MOTRIN) 600 MG tablet Take 1 tablet (600 mg) by mouth every 6 hours as needed for moderate pain  Qty: 50 tablet, Refills: 0    Associated Diagnoses: Status post total hip replacement, right       !! - Potential duplicate medications found. Please discuss with provider.      STOP taking these medications       prochlorperazine (COMPAZINE) 10 MG tablet Comments:   Reason for Stopping:         traMADol (ULTRAM) 50 MG tablet Comments:   Reason for Stopping:                 Allergies   Allergies   Allergen Reactions     Clonopin [Clonazepam]      \"Walk funny and Mind goes funny\"     Encort [Hydrocortisone Acetate] Swelling     Feet swelled.     Encort [Hydrocortisone] Swelling     Erythromycin Diarrhea     Flagyl [Metronidazole] Nausea     Gabapentin      Over eats     Lamictal [Lamotrigine]      Oxycodone Nausea     Tegretol [Carbamazepine] Nausea and Vomiting     Data   Most Recent 3 CBC's:  Recent Labs   Lab Test 08/29/19  0555 08/28/19  0644 08/27/19  0505 08/26/19  0544   WBC 8.1 7.0  --  7.7   HGB 9.2* 8.3*  --  8.8*   MCV 97 98  --  98    379 413 406      Most Recent 3 BMP's:  Recent Labs   Lab Test 08/29/19  0555 08/27/19  0505 08/26/19  0544 08/25/19  2230 08/25/19  1124     --  139  --  139   POTASSIUM 3.7  --  3.8 3.7 3.3*   CHLORIDE 108  --  108  --  109   CO2 27  --  27  --  27   BUN 14  --  8  --  13   CR 0.61 0.58 0.58  --  0.60   ANIONGAP 5  --  4  --  3   SABA 8.6  --  8.7  --  8.7   GLC 91 114* 98  --  114*     Most Recent 2 LFT's:  Recent Labs   Lab Test 02/09/19  1811 11/01/16 06/09/16  1553   AST 17 17 18   ALT 21 17 27   ALKPHOS 94  --  74   BILITOTAL 0.5  --  0.2     Most Recent INR's and Anticoagulation Dosing History:  Anticoagulation Dose History     Recent Dosing and Labs Latest Ref Rng & Units 9/10/2005 9/13/2005 9/14/2005 9/15/2005 9/16/2005 8/23/2011 8/6/2019    INR 0.86 - 1.14 1.04 1.18(H) 2.28(H) 2.22(H) 1.44(H) 0.98 0.95    "     Most Recent 3 Troponin's:No lab results found.  Most Recent Cholesterol Panel:  Recent Labs   Lab Test 06/04/18  1037   CHOL 174   *   HDL 55   TRIG 89     Most Recent 6 Bacteria Isolates From Any Culture (See EPIC Reports for Culture Details):  Recent Labs   Lab Test 08/26/19  1109 08/26/19  1048 08/07/19  0838 08/27/15  1305 01/06/12  1607   CULT Culture negative monitoring continues  Culture negative monitoring continues Culture negative monitoring continues  Culture negative monitoring continues No anaerobes isolated  No growth No Salmonella, Shigella, Campylobacter, E. coli O157, Aeromonas, or Plesiomonas   isolated.   <10,000 colonies/mL Multiple species present, probable perineal contamination.     Most Recent TSH, T4 and A1c Labs:  Recent Labs   Lab Test 07/02/19  1056  09/12/17  1045  08/26/11  0615   TSH 0.48   < > 0.81   < >  --    T4  --   --  0.81   < >  --    A1C  --   --   --   --  5.6    < > = values in this interval not displayed.

## 2019-08-29 NOTE — PROGRESS NOTES
[facility]  Infectious Disease Progress Note          Assessment and Plan:   IMPRESSION:   1.  A 64-year-old female with recent revision right total hip arthroplasty, early postop seroma infected with Proteus, on antibiotics, worsening now with a formal I and D, at surgery no signs of deep infection.   2.  Recent revision right total hip arthroplasty for mechanical reasons failing early after initial surgery.   3.  Remote history of Clostridium difficile colitis.   4.  Seizure disorder.   5.  FLAGYL LISTED ALLERGY AS JUST NAUSEA.      RECOMMENDATIONS:  1 Continue ceftriaxone, assuming no deep infection.  Theoretically could use oral antibiotics, but as a protective measure given this has required a full I and D and recent surgery, probably 2 weeks IV, especially if she is going to rehab anyway. Neg current cxs  , orders in for 2 weeks ceftriaxone when disposition,   2 Wd management per ortho             Interval History:   no new complaints and doing well; no cp, sob, n/v/d, or abd pain. Op cx pending wd vac on CRP 16              Medications:       aspirin  325 mg Oral Daily     cefTRIAXone  2 g Intravenous Q24H     fluticasone-vilanterol  1 puff Inhalation Daily     heparin lock flush  5-10 mL Intracatheter Q24H     hypromellose-dextran  1 drop Both Eyes BID     lacosamide  200 mg Oral BID     levothyroxine  50 mcg Oral Daily     linaclotide  290 mcg Oral QAM AC     liothyronine  10 mcg Oral Daily     nefazodone  600 mg Oral At Bedtime     omeprazole  40 mg Oral BID     pramipexole  1 mg Oral QAM     pramipexole  2 mg Oral QPM     ranitidine  150 mg Oral BID     sodium chloride (PF)  3 mL Intracatheter Q8H     temazepam  15 mg Oral At Bedtime                  Physical Exam:   Blood pressure 135/77, pulse 64, temperature 97.5  F (36.4  C), temperature source Oral, resp. rate 16, weight 89.5 kg (197 lb 6.4 oz), last menstrual period 03/11/2010, SpO2 96 %, not currently breastfeeding.  Wt  Readings from Last 2 Encounters:   08/29/19 89.5 kg (197 lb 6.4 oz)   08/06/19 90.7 kg (200 lb)     Vital Signs with Ranges  Temp:  [96.8  F (36  C)-98.3  F (36.8  C)] 97.5  F (36.4  C)  Pulse:  [64] 64  Heart Rate:  [65-68] 65  Resp:  [16] 16  BP: (125-136)/(65-79) 135/77  SpO2:  [93 %-98 %] 96 %    Constitutional: Awake, alert, cooperative, no apparent distress   Lungs: Clear to auscultation bilaterally, no crackles or wheezing   Cardiovascular: Regular rate and rhythm, normal S1 and S2, and no murmur noted   Abdomen: Normal bowel sounds, soft, non-distended, non-tender   Skin: No rashes, no cyanosis, no edema wd vac mild erythema   Other:           Data:   All microbiology laboratory data reviewed.  Recent Labs   Lab Test 08/29/19  0555 08/28/19  0644 08/27/19  0505 08/26/19  0544   WBC 8.1 7.0  --  7.7   HGB 9.2* 8.3*  --  8.8*   HCT 29.5* 27.3*  --  28.9*   MCV 97 98  --  98    379 413 406     Recent Labs   Lab Test 08/29/19  0555 08/27/19  0505 08/26/19  0544   CR 0.61 0.58 0.58     Recent Labs   Lab Test 08/28/19  0644   SED 17     Recent Labs   Lab Test 08/26/19  1109 08/26/19  1048 08/07/19  0838 08/27/15  1305 01/06/12  1607   CULT Culture negative monitoring continues  Culture negative monitoring continues Culture negative monitoring continues  Culture negative monitoring continues No anaerobes isolated  No growth No Salmonella, Shigella, Campylobacter, E. coli O157, Aeromonas, or Plesiomonas   isolated.   <10,000 colonies/mL Multiple species present, probable perineal contamination.

## 2019-08-30 ENCOUNTER — DOCUMENTATION ONLY (OUTPATIENT)
Dept: OTHER | Facility: CLINIC | Age: 64
End: 2019-08-30

## 2019-08-30 ENCOUNTER — AMBULATORY - HEALTHEAST (OUTPATIENT)
Dept: OTHER | Facility: CLINIC | Age: 64
End: 2019-08-30

## 2019-08-30 ENCOUNTER — PATIENT OUTREACH (OUTPATIENT)
Dept: FAMILY MEDICINE | Facility: CLINIC | Age: 64
End: 2019-08-30

## 2019-08-30 LAB — HGB BLD-MCNC: 8.6 G/DL (ref 11.7–15.7)

## 2019-08-30 PROCEDURE — 99225 ZZC SUBSEQUENT OBSERVATION CARE,LEVEL II: CPT | Performed by: INTERNAL MEDICINE

## 2019-08-30 PROCEDURE — G0463 HOSPITAL OUTPT CLINIC VISIT: HCPCS

## 2019-08-30 PROCEDURE — 25000128 H RX IP 250 OP 636: Performed by: PHYSICIAN ASSISTANT

## 2019-08-30 PROCEDURE — 40000902 ZZH STATISTIC WOC PT EDUCATION, 16-30 MIN

## 2019-08-30 PROCEDURE — 97605 NEG PRS WND THER DME<=50SQCM: CPT

## 2019-08-30 PROCEDURE — 99222 1ST HOSP IP/OBS MODERATE 55: CPT | Performed by: NURSE PRACTITIONER

## 2019-08-30 PROCEDURE — 85018 HEMOGLOBIN: CPT | Performed by: INTERNAL MEDICINE

## 2019-08-30 PROCEDURE — 25000128 H RX IP 250 OP 636: Performed by: INTERNAL MEDICINE

## 2019-08-30 PROCEDURE — 25000132 ZZH RX MED GY IP 250 OP 250 PS 637: Mod: GY | Performed by: INTERNAL MEDICINE

## 2019-08-30 PROCEDURE — 25000132 ZZH RX MED GY IP 250 OP 250 PS 637: Mod: GY | Performed by: PHYSICIAN ASSISTANT

## 2019-08-30 PROCEDURE — 99207 ZZC CDG-CODE CATEGORY CHANGED: CPT | Performed by: INTERNAL MEDICINE

## 2019-08-30 PROCEDURE — G0378 HOSPITAL OBSERVATION PER HR: HCPCS

## 2019-08-30 PROCEDURE — 99207 ZZC CONSULT E&M CHANGED TO INITIAL LEVEL: CPT | Performed by: NURSE PRACTITIONER

## 2019-08-30 PROCEDURE — 96374 THER/PROPH/DIAG INJ IV PUSH: CPT

## 2019-08-30 RX ORDER — HYDROXYZINE HYDROCHLORIDE 25 MG/1
25-50 TABLET, FILM COATED ORAL EVERY 6 HOURS PRN
Status: DISCONTINUED | OUTPATIENT
Start: 2019-08-30 | End: 2019-08-31 | Stop reason: HOSPADM

## 2019-08-30 RX ORDER — HEPARIN SODIUM,PORCINE 10 UNIT/ML
5-10 VIAL (ML) INTRAVENOUS EVERY 24 HOURS
Status: DISCONTINUED | OUTPATIENT
Start: 2019-08-30 | End: 2019-08-31 | Stop reason: HOSPADM

## 2019-08-30 RX ORDER — HEPARIN SODIUM,PORCINE 10 UNIT/ML
5-10 VIAL (ML) INTRAVENOUS
Status: DISCONTINUED | OUTPATIENT
Start: 2019-08-30 | End: 2019-08-31 | Stop reason: HOSPADM

## 2019-08-30 RX ORDER — AMOXICILLIN 250 MG
2 CAPSULE ORAL 2 TIMES DAILY
Status: DISCONTINUED | OUTPATIENT
Start: 2019-08-30 | End: 2019-08-31 | Stop reason: HOSPADM

## 2019-08-30 RX ORDER — HYDROCODONE BITARTRATE AND ACETAMINOPHEN 5; 325 MG/1; MG/1
1 TABLET ORAL EVERY 4 HOURS PRN
Status: DISCONTINUED | OUTPATIENT
Start: 2019-08-30 | End: 2019-08-31 | Stop reason: HOSPADM

## 2019-08-30 RX ORDER — POLYETHYLENE GLYCOL 3350 17 G/17G
17 POWDER, FOR SOLUTION ORAL 3 TIMES DAILY
Status: DISCONTINUED | OUTPATIENT
Start: 2019-08-30 | End: 2019-08-31 | Stop reason: HOSPADM

## 2019-08-30 RX ORDER — AMOXICILLIN 250 MG
1 CAPSULE ORAL 2 TIMES DAILY
Status: DISCONTINUED | OUTPATIENT
Start: 2019-08-30 | End: 2019-08-31 | Stop reason: HOSPADM

## 2019-08-30 RX ORDER — ZINC OXIDE 20 %
OINTMENT (GRAM) TOPICAL
Status: DISCONTINUED | OUTPATIENT
Start: 2019-08-30 | End: 2019-08-31 | Stop reason: HOSPADM

## 2019-08-30 RX ADMIN — CEFTRIAXONE SODIUM 2 G: 2 INJECTION, POWDER, FOR SOLUTION INTRAMUSCULAR; INTRAVENOUS at 08:48

## 2019-08-30 RX ADMIN — SODIUM CHLORIDE, PRESERVATIVE FREE 5 ML: 5 INJECTION INTRAVENOUS at 09:31

## 2019-08-30 RX ADMIN — LACOSAMIDE 200 MG: 200 TABLET, FILM COATED ORAL at 09:33

## 2019-08-30 RX ADMIN — OMEPRAZOLE 40 MG: 20 CAPSULE, DELAYED RELEASE ORAL at 09:33

## 2019-08-30 RX ADMIN — RANITIDINE 150 MG: 150 TABLET ORAL at 09:34

## 2019-08-30 RX ADMIN — HYDROXYZINE HYDROCHLORIDE 50 MG: 25 TABLET, FILM COATED ORAL at 18:19

## 2019-08-30 RX ADMIN — HYDROCODONE BITARTRATE AND ACETAMINOPHEN 1 TABLET: 5; 325 TABLET ORAL at 10:39

## 2019-08-30 RX ADMIN — LEVOTHYROXINE SODIUM 50 MCG: 50 TABLET ORAL at 09:33

## 2019-08-30 RX ADMIN — OMEPRAZOLE 40 MG: 20 CAPSULE, DELAYED RELEASE ORAL at 15:31

## 2019-08-30 RX ADMIN — LIOTHYRONINE SODIUM 10 MCG: 5 TABLET ORAL at 09:34

## 2019-08-30 RX ADMIN — ASPIRIN 325 MG: 325 TABLET, DELAYED RELEASE ORAL at 09:34

## 2019-08-30 RX ADMIN — LINACLOTIDE 290 MCG: 290 CAPSULE, GELATIN COATED ORAL at 09:33

## 2019-08-30 RX ADMIN — HYDROCODONE BITARTRATE AND ACETAMINOPHEN 1 TABLET: 5; 325 TABLET ORAL at 15:31

## 2019-08-30 RX ADMIN — HYDROXYZINE HYDROCHLORIDE 25 MG: 25 TABLET, FILM COATED ORAL at 10:39

## 2019-08-30 RX ADMIN — TRAMADOL HYDROCHLORIDE 100 MG: 50 TABLET, FILM COATED ORAL at 06:00

## 2019-08-30 RX ADMIN — PRAMIPEXOLE DIHYDROCHLORIDE 2 MG: 1 TABLET ORAL at 20:49

## 2019-08-30 RX ADMIN — PRAMIPEXOLE DIHYDROCHLORIDE 1 MG: 1 TABLET ORAL at 09:34

## 2019-08-30 RX ADMIN — LACOSAMIDE 200 MG: 200 TABLET, FILM COATED ORAL at 20:49

## 2019-08-30 RX ADMIN — RANITIDINE 150 MG: 150 TABLET ORAL at 20:48

## 2019-08-30 NOTE — PHARMACY-ADMISSION MEDICATION HISTORY
Admission medication history interview status for this patient is complete. See [facility] admission navigator for allergy information, prior to admission medications and immunization status.     Medication history interview source(s):Patient  Medication history resources (including written lists, pill bottles, clinic record):none    Changes made to PTA medication list:  Added: none  Deleted: none  Changed: none    Actions taken by pharmacist (provider contacted, etc):None     Additional medication history information:patient was discharged less than 24 hrs from [facility].    Medication reconciliation/reorder completed by provider prior to medication history? No    For patients on insulin therapy: no (Yes/No)   Lantus/levemir/NPH/Mix 70/30 dose: ___ in AM/PM or twice daily   Sliding scale Novolog Y/N   If Yes, do you have a baseline novolog pre-meal dose: ______units with meals   Patients eat three meals a day: Y/N ---  How many episodes of hypoglycemia (low blood glucose) do you have weekly: ---   How many missed doses do you have a week: ---  How many times do you check your blood glucose per day: ---  Any Barriers to therapy: cost of medications/comfortable with giving injections (if applicable)/ comfortable and confident with current diabetes regimen ---      Prior to Admission medications    Medication Sig Last Dose Taking? Auth Provider   acetaminophen (TYLENOL) 325 MG tablet Take 2 tablets (650 mg) by mouth every 4 hours as needed for other (multimodal surgical pain management along with NSAIDS and opioid medication as indicated based on pain control and physical function.)  Yes Rosalio Henriquez PA-C   albuterol (ALBUTEROL) 108 (90 BASE) MCG/ACT Inhaler Inhale 2 puffs into the lungs 4 times daily as needed for shortness of breath / dyspnea or wheezing  Yes Skyler Álvarez MD   artificial saliva (BIOTENE MT) AERS spray Take 2 sprays by mouth 3 times daily as needed for dry mouth  Yes Unknown, Entered By History    aspirin (ASA) 325 MG EC tablet Take 1 tablet (325 mg) by mouth daily 8/29/2019 at am Yes Smita Lazo PA-C   budesonide-formoterol (SYMBICORT) 160-4.5 MCG/ACT Inhaler Inhale 2 puffs into the lungs 2 times daily  Yes Unknown, Entered By History   cefTRIAXone (ROCEPHIN) 1 GM vial Inject 2 g (2,000 mg) into the vein daily for 14 days ESR,CRP,CBC with differential, creatinine, SGOT weekly while on this medication to be faxed to Dr. Bradley office. 8/29/2019 at am Yes Rodrigo Bradley MD   cycloSPORINE (RESTASIS) 0.05 % ophthalmic emulsion Place 1 drop into both eyes 2 times daily  Yes Unknown, Entered By History   hypromellose (ARTIFICIAL TEARS) 0.5 % SOLN ophthalmic solution Place 1 drop into both eyes 2 times daily 8/29/2019 at Unknown time Yes Unknown, Entered By History   ibuprofen (ADVIL/MOTRIN) 600 MG tablet Take 1 tablet (600 mg) by mouth every 6 hours as needed for moderate pain 8/29/2019 at x2 Yes Rosalio Henriquez PA-C   lacosamide (VIMPAT) 200 MG TABS tablet Take 1 tablet (200 mg) by mouth 2 times daily 8/29/2019 at am Yes Farzana Ro MD   levothyroxine (SYNTHROID/LEVOTHROID) 50 MCG tablet Take 50 mcg by mouth daily 8/29/2019 at am Yes Unknown, Entered By History   Lidocaine (LIDOCARE) 4 % Patch Place 1 patch onto the skin every 24 hours To prevent lidocaine toxicity, patient should be patch free for 12 hrs daily.  Yes Unknown, Entered By History   linaclotide (LINZESS) 290 MCG capsule Take 1 capsule (290 mcg) by mouth every morning (before breakfast) 8/29/2019 at am Yes Farzana Ro MD   liothyronine (CYTOMEL) 5 MCG tablet Take 10 mcg by mouth daily  8/29/2019 at am Yes Reported, Patient   nefazodone (SERZONE) 200 MG tablet Take 600 mg by mouth At Bedtime   Yes Reported, Patient   omeprazole (PRILOSEC) 40 MG DR capsule Take 1 capsule (40 mg) by mouth 2 times daily 8/29/2019 at am Yes Skyler Álvarez MD   ondansetron (ZOFRAN-ODT) 4 MG ODT tab Take 1 tablet (4 mg) by mouth  every 6 hours as needed for nausea or vomiting  Yes Smita Lazo PA-C   polyethylene glycol (MIRALAX/GLYCOLAX) packet Take 1 packet by mouth daily as needed for constipation  Yes Unknown, Entered By History   potassium citrate (UROCIT-K) 10 MEQ (1080 MG) CR tablet Take 10 mEq by mouth daily  Yes Unknown, Entered By History   pramipexole (MIRAPEX) 1 MG tablet Take 2 mg by mouth every evening . (takes 1 tab in am , 2 tabs 1 hour prior to hs)  Yes Unknown, Entered By History   pramipexole (MIRAPEX) 1 MG tablet Take 1 mg by mouth every morning . ( takes 1 tab in am , 2 tabs 1 hour prior to hs) 8/29/2019 at am Yes Reported, Patient   ranitidine (ZANTAC) 150 MG tablet TAKE 1 TABLET BY MOUTH TWICE DAILY 8/29/2019 at am Yes Skyler Álvarez MD   senna-docusate (SENOKOT-S/PERICOLACE) 8.6-50 MG tablet Take 1 tablet by mouth 2 times daily as needed for constipation 8/29/2019 at am Yes Shona Gonzales PA-C   temazepam (RESTORIL) 15 MG capsule Take 1 capsule (15 mg) by mouth At Bedtime  Yes Farzana Ro MD   traMADol (ULTRAM) 50 MG tablet Take 2 tablets (100 mg) by mouth every 6 hours as needed for moderate pain 8/29/2019 at 1200 Yes Farzana Ro MD

## 2019-08-30 NOTE — ED TRIAGE NOTES
Recent admission for I&D for right hip. Just discharged a couple hours ago. Concerned about increased pain. Was on IV dilaudid pain medications, now taking tramadol. Also, concerned about wound vac taken out, which was supposedly not supposed to be.

## 2019-08-30 NOTE — DISCHARGE INSTRUCTIONS
Right hip wound: Monday/Wednesday/Friday and PRN if leaking and unable to patch with wound vac drape  1. Remove old wound vac dressing carefully- may moisten with NS if sticking to wound or steri strips.   2. Cleanse with NS or microklenz and gently pat dry  3. Apply Cavilon No Sting barrier to periwound  4. Cut adaptic 1cm x 13cm and place over incision for protection. Then cut vac black foam approx 1.5 cm wide to run the length of open part of incision- leave a circular piece at the most distal portion to offload trac pad (will look like a sucker).  5. Apply wound vac drape over black foam and approx 4-6 cm past edge of zip strips.   6. Cut quarter sized hole in drape then apply trac pad.   7. Set wound vac at 125 mmHg continuous suction. Change canister when full or once weekly.  8. If wound vac dressing has no seal or machine not working for >2 hours remove wound vac dressing and apply NS moist then dry 4x4 to wound until vac can be placed again- change twice daily and PRN when soiled.

## 2019-08-30 NOTE — PROGRESS NOTES
[facility]  WO Nurse Inpatient Wound Assessment   Reason for consultation: Evaluate and treat Right hip wound     Assessment  Right hip wound due to Surgical Wound with surrounding cellulitis. Ortho applied zip strips today and then WOC applied incisional vac  wound vac over adaptic to protect periwound skin and also used barrier ring around edges to ensure good seal while transferring to TCU over the weekend when pt is between vacs for transport. No need to use barrier ring normally. Periwound skin pink and slightly edematous. Not able to assess wound bed due to zipstrip holding wound together.  Status: initial assessment    Treatment Plan  Right hip wound: Monday/Wednesday/Friday   1. Remove old wound vac dressing carefully- may moisten with NS if sticking to wound or steri strips.   2. Cleanse with NS or microklenz and gently pat dry  3. Apply Cavilon No Sting barrier to periwound  4. Cut adaptic 1cm x 13cm and place over incision for protection. Then cut vac black foam approx 1.5 cm wide to run the length of open part of incision- leave a circular piece at the most distal portion to offload trac pad (will look like a sucker).  5. Apply wound vac drape over black foam and approx 4-6 cm past edge of steri strips.   6. Cut quarter sized hole in drape then apply trac pad.   7. Set wound vac at 125 mmHg continuous suction. Change canister when full or once weekly.  8. If wound vac dressing has no seal or machine not working for >2 hours remove wound vac dressing and apply NS moist then dry 4x4 to wound until vac can be placed again- change twice daily and PRN when soiled.     Orders Written  WOC Nurse follow-up plan:Tuesday/Friday - if patient still in [facility] will do wound vac change Tuesday  Nursing to notify the Provider(s) and re-consult the WOC Nurse if wound(s) deteriorates or new skin concern.    Patient History  According to provider note(s):  64 year old female with a history of JORDAN, IBS,  Hypothyroidism, GERD, Seizure Disorder, Anemia and Right Hip Cellulitis.  She returned to the hospital on 8/29 after being discharged earlier in the day to TCU due to increased drainage from chronic right hip wound.    Objective Data  Containment of urine/stool: Diaper    Active Diet Order  Orders Placed This Encounter      Regular Diet Adult      Output:   I/O last 3 completed shifts:  In: 560 [P.O.:560]  Out: 600 [Urine:600]    Risk Assessment:   Sensory Perception: 4-->no impairment  Moisture: 4-->rarely moist  Activity: 3-->walks occasionally  Mobility: 3-->slightly limited  Nutrition: 3-->adequate  Friction and Shear: 3-->no apparent problem  Xander Score: 20                          Labs:   Recent Labs   Lab 08/30/19  0610 08/29/19  0555  08/27/19  0505   HGB 8.6* 9.2*   < >  --    WBC  --  8.1   < >  --    CRP  --   --   --  16.0*    < > = values in this interval not displayed.       Physical Exam  Skin inspection: focused right hip    Wound Location:  Right lateral hip     Date of last photo 8/30/19  Wound History: Pt admitted on 8/25/2019 for right hip cellulitis and abscess. She had recent hospitalizations for total right hip arthroplasty in July 2019 complicated with infection and revision surgery in August 2019 with recurrent cellulitis and fluid collection around the wound status fluid aspirated aspiration on 8/14 and 8/16 from both joint and subcutaneous fluid collection.  Subcutaneous fluid grew Proteus.  PICC line was placed on 8/17 and she was started on IV ceftriaxone on 8/19.  Presented to the emergency room on 8/25, despite being on IV antibiotics she had increased drainage, erythema and pain around the wound. Orthopedic surgery was consulted patient underwent wound irrigation and debridement with excision 8/26/2019.  She was put on a wound VAC.  Measurements (length x width x depth, in cm) 13 cm x 0.4 cm  x  0.2 cm (open area of incision covered with wound vac)  Wound Base: unable to  assess  Tunneling N/A  Undermining N/A  Palpation of the wound bed: normal   Periwound skin: intact, edematous and erythema- blanchable  Color: pink  Temperature: normal   Drainage:, moderate  Description of drainage: serosanguinous and bloody  Odor: none  Pain: during dressing change, tender    Interventions  Current support surface: Standard  Atmos Air mattress  Current off-loading measures: Pillows under calves or between knees  Visual inspection of wound(s) completed  Wound Care: done per plan of care  Supplies: gathered, placed at the bedside, discussed with RN, discussed with family and discussed with patient and PA Smita  Education provided: plan of care and wound progress  Discussed plan of care with Patient, Family, Nurse and Physicians Assistant    Gini Walden RN

## 2019-08-30 NOTE — PLAN OF CARE
Noc RN- Pt awake at short intervals to voied. Pt bladder scanned for 5cc post void. Pain controlled with Ulatram, Small amount bloody drainage to drsg.

## 2019-08-30 NOTE — PROGRESS NOTES
Patient seen and examined  Small bloody drainge noted at distal incision  Discussed with wound team  Plan for VAC

## 2019-08-30 NOTE — PROGRESS NOTES
Patient well known to Ortho service  WOC consult placed to have incisional wound vac placed inpt  Will see patient early afternoon/late morning    Smita NAGY

## 2019-08-30 NOTE — H&P
[facility]  Observation Unit  H & P      Patient Name: Mercedes Barber MRN# [id number]   Age: 64 year old YOB: 1955     Date of Admission:8/29/2019    Primary care provider: Skyler Álvarez  Date of Service: 8/29/2019         Assessment and Plan:   Mercedes Barber is a 64 year old female with a history of JORDAN, IBS, Hypothyroidism, GERD, Seizure Disorder, Anemia and Right Hip Cellulitis who was discharged to a TCU earlier today and returns with increasing redness and drainage from the wound on her right hip.      Right Hip Cellulitis with Drainage - pt returns to the hospital after being discharged to a TCU earlier today with increasing drainage from the wound on her right hip.  Wound VAC was removed earlier today prior to discharge.  ED discussed with Orthopedic Surgery on call who recommended a compressive dressing be placed by ED staff and Orthopedics to reassess in am.  - Orthopedic Surgery consultation  - continue pta IV Ceftriaxone  - continue pain control with pta Tylenol, Ibuprofen and Ultram    Anemia - stable at 9.2 earlier today.  Given drainage from wound, will check hgb tonight and in am.      H/o Seizure Disorder - last seizure 20 years ago.  - continue pta Lacosamide  - seizure precautions     GERD - continue pta Omeprazole and Ranitidine     Hypothyroidism - continue pta Levothyroxine and Liothyronine     IBS - continue pta Linzess     History of sleep apnea - not using CPAP at home    Restless Legs Syndrome - continue pta Mirapex    Depression/Anxiety - continue pta Nefazodone    Asthma - no acute exacerbation.  Hold pta Symbicort and Albuterol inhalers due to obs status.      CODE: Full  Diet/IVF: regular  GI ppx:  Omeprazole  DVT ppx: ASA    Smita Greene MS PA-C  Physician Assistant   Hospitalist Service  Pager: [phone]           Chief Complaint:   Right Hip Drainage         HPI:   64 year old female with a history of JORDAN, IBS, Hypothyroidism, GERD,  Seizure Disorder, Anemia and Right Hip Cellulitis who was discharged to a TCU earlier today and returns with increasing redness and drainage from the wound on her right hip.    Patient returns to the hospital after being discharged earlier today to a TCU due to bleeding and drainage from her right hip incision.  She reports her right hip pain has been unchanged from recent baseline.  She reports a wound VAC was discontinued prior to her discharge earlier today.  She denies any chest pain, shortness of breath, abdominal pain.      Per discharge summary from earlier today:  Summary of Stay: Mercedes Barber is a 64 year old female who was admitted on 8/25/2019 for right hip cellulitis and abscess. She had recent hospitalizations for total right hip arthroplasty in July 2019 complicated with infection and revision surgery in August 2019 with recurrent cellulitis and fluid collection around the wound status fluid aspirated aspiration on 8/14 and 8/16 from both joint and subcutaneous fluid collection.  Subcutaneous fluid grew Proteus.  PICC line was placed on 8/17 and she was started on IV ceftriaxone on 8/19.  Presented to the emergency room on 8/25, despite being on IV antibiotics she had increased drainage, erythema and pain around the wound. Orthopedic surgery was consulted patient underwent wound irrigation and debridement with excision 8/26/2019.  She was put on a wound VAC.  Infectious disease recommended 2 weeks of IV ceftriaxone on discharge.  Orthopedic surgery also recommended outpatient follow-up in 2 weeks.  Patient was transferred to transitional care unit to continue rehab.         Past Medical History:     Past Medical History:   Diagnosis Date     Arthritis     Thumb     Cervical high risk HPV (human papillomavirus) test positive 07/17/2017 07/17/17: NIL pap, + HR HPV (not 16 or 18) result.      Cervical pain 9/15/2016     Constipation 8/27/2012     Diarrhea 4/30/2009     Esophageal stricture  2/21/2012     Gastro-oesophageal reflux disease      Hypothyroidism 9/18/2012     IBS (irritable bowel syndrome)      Mild major depression (H) 2/21/2012     Neuropathy of lower extremity      Rectal prolapse      Restless leg syndrome      Seizure disorder (H)     25 years ago     Sleep apnea     CPAP, no longer using CPAP     Temporal sclerosis 8/27/2012          Past Surgical History:     Past Surgical History:   Procedure Laterality Date     Anterior COLPORRHAPHY, BLADDER/VAGINA      perigee mesh     ARTHROPLASTY HIP Right 7/23/2019    Procedure: Right total hip arthroplasty;  Surgeon: Anant Mejia MD;  Location: [facility] OR     ARTHROPLASTY PATELLO-FEMORAL (KNEE) Bilateral      ARTHROPLASTY REVISION HIP Right 8/7/2019    Procedure: Right hip revision total arthroplasty;  Surgeon: Tom Antonio MD;  Location: [facility] OR     CARPAL TUNNEL RELEASE RT/LT       DENTAL SURGERY      implant     DILATION AND CURETTAGE       DRAIN PILONIDAL CYST SIMPL       ENDOSCOPY DRUG INDUCED SLEEP N/A 11/18/2015    Procedure: ENDOSCOPY DRUG INDUCED SLEEP;  Surgeon: Park Ortiz MD;  Location: [facility] OR     ESOPHAGOSCOPY, GASTROSCOPY, DUODENOSCOPY (EGD), COMBINED  4/5/2013    Procedure: COMBINED ESOPHAGOSCOPY, GASTROSCOPY, DUODENOSCOPY (EGD), BIOPSY SINGLE OR MULTIPLE;;  Surgeon: Mundo Mehta MD;  Location:  GI     EXCISE LESION TRUNK  8/10/2012    Procedure: EXCISE LESION TRUNK;;  Surgeon: Jerald Diggs MD;  Location: [facility] OR     HYSTERECTOMY       IRRIGATION AND DEBRIDEMENT HIP, COMBINED Right 8/26/2019    Procedure: 1.  Right hip wound irrigation and debridement with excisional debridement of nonviable skin, subcutaneous tissues, and fascia. 2. Application of incisional wound VAC, 27cm length incision.;  Surgeon: Tyson Prabhakar MD;  Location: [facility] OR     L shoulder fracture       rectal prolapse repair abdominally       RELEASE PLANTAR FASCIA Right 1/20/2015    Procedure: RELEASE PLANTAR FASCIA;   Surgeon: Maicol Liu DPM;  Location: [facility]     REMOVE MESH VAGINA  8/10/2012    Procedure: REMOVE MESH VAGINA;  Excision of Vaginal Mesh Exposure, Removal of Skin Tag Left Inner Leg;  Surgeon: Jerald Diggs MD;  Location: [facility] OR     REPAIR HAMMER TOE Right 1/20/2015    Procedure: REPAIR HAMMER TOE;  Surgeon: Maicol Liu DPM;  Location: [facility]     TONSILLECTOMY & ADENOIDECTOMY       TUBAL LIGATION            Social History:     Social History     Socioeconomic History     Marital status:      Spouse name: Not on file     Number of children: Not on file     Years of education: Not on file     Highest education level: Not on file   Occupational History     Not on file   Social Needs     Financial resource strain: Not on file     Food insecurity:     Worry: Not on file     Inability: Not on file     Transportation needs:     Medical: Not on file     Non-medical: Not on file   Tobacco Use     Smoking status: Never Smoker     Smokeless tobacco: Never Used   Substance and Sexual Activity     Alcohol use: Yes     Comment: 1 glass of wine 4x/yr     Drug use: No     Sexual activity: Never     Birth control/protection: Surgical     Comment: vag hyst   Lifestyle     Physical activity:     Days per week: Not on file     Minutes per session: Not on file     Stress: Not on file   Relationships     Social connections:     Talks on phone: Not on file     Gets together: Not on file     Attends Oriental orthodox service: Not on file     Active member of club or organization: Not on file     Attends meetings of clubs or organizations: Not on file     Relationship status: Not on file     Intimate partner violence:     Fear of current or ex partner: Not on file     Emotionally abused: Not on file     Physically abused: Not on file     Forced sexual activity: Not on file   Other Topics Concern     Parent/sibling w/ CABG, MI or angioplasty before 65F 55M? Not Asked   Social History Narrative     Not on file          Family  "History:     Family History   Problem Relation Age of Onset     Eye Disorder Mother      Lipids Mother         has CHF     Osteoporosis Mother      Diabetes Father      Alzheimer Disease Paternal Grandmother      Hypertension Brother      Alcohol/Drug Brother      Depression Brother      Gastrointestinal Disease Brother         hx of colonic polyps     Lipids Brother      Psychotic Disorder Brother      Breast Cancer Sister      Depression Sister      Lipids Sister      Thyroid Disease Sister      Congenital Anomalies Daughter      Neurologic Disorder Daughter           Allergies:      Allergies   Allergen Reactions     Clonopin [Clonazepam]      \"Walk funny and Mind goes funny\"     Encort [Hydrocortisone Acetate] Swelling     Feet swelled.     Encort [Hydrocortisone] Swelling     Erythromycin Diarrhea     Flagyl [Metronidazole] Nausea     Gabapentin      Over eats     Lamictal [Lamotrigine]      Oxycodone Nausea     Tegretol [Carbamazepine] Nausea and Vomiting          Medications:     Prior to Admission medications    Medication Sig Last Dose Taking? Auth Provider   acetaminophen (TYLENOL) 325 MG tablet Take 2 tablets (650 mg) by mouth every 4 hours as needed for other (multimodal surgical pain management along with NSAIDS and opioid medication as indicated based on pain control and physical function.)   Rosalio Henriquez PA-C   albuterol (ALBUTEROL) 108 (90 BASE) MCG/ACT Inhaler Inhale 2 puffs into the lungs 4 times daily as needed for shortness of breath / dyspnea or wheezing   Skyler Álvarez MD   artificial saliva (BIOTENE MT) AERS spray Take 2 sprays by mouth 3 times daily as needed for dry mouth   Unknown, Entered By History   aspirin (ASA) 325 MG EC tablet Take 1 tablet (325 mg) by mouth daily   Smita Lazo PA-C   budesonide-formoterol (SYMBICORT) 160-4.5 MCG/ACT Inhaler Inhale 2 puffs into the lungs 2 times daily   Unknown, Entered By History   cefTRIAXone (ROCEPHIN) 1 GM vial Inject 2 g (2,000 " mg) into the vein daily for 14 days ESR,CRP,CBC with differential, creatinine, SGOT weekly while on this medication to be faxed to Dr. Bradley office.   Rodrigo Bradley MD   cycloSPORINE (RESTASIS) 0.05 % ophthalmic emulsion Place 1 drop into both eyes 2 times daily   Unknown, Entered By History   hypromellose (ARTIFICIAL TEARS) 0.5 % SOLN ophthalmic solution Place 1 drop into both eyes 2 times daily   Unknown, Entered By History   ibuprofen (ADVIL/MOTRIN) 600 MG tablet Take 1 tablet (600 mg) by mouth every 6 hours as needed for moderate pain   Rosalio Henriquez PA-C   lacosamide (VIMPAT) 200 MG TABS tablet Take 1 tablet (200 mg) by mouth 2 times daily   Farzana Ro MD   levothyroxine (SYNTHROID/LEVOTHROID) 50 MCG tablet Take 50 mcg by mouth daily   Unknown, Entered By History   Lidocaine (LIDOCARE) 4 % Patch Place 1 patch onto the skin every 24 hours To prevent lidocaine toxicity, patient should be patch free for 12 hrs daily.   Unknown, Entered By History   linaclotide (LINZESS) 290 MCG capsule Take 1 capsule (290 mcg) by mouth every morning (before breakfast)   Farzana Ro MD   liothyronine (CYTOMEL) 5 MCG tablet Take 10 mcg by mouth daily    Reported, Patient   nefazodone (SERZONE) 200 MG tablet Take 600 mg by mouth At Bedtime    Reported, Patient   omeprazole (PRILOSEC) 40 MG DR capsule Take 1 capsule (40 mg) by mouth 2 times daily   Skyler Álvarez MD   ondansetron (ZOFRAN-ODT) 4 MG ODT tab Take 1 tablet (4 mg) by mouth every 6 hours as needed for nausea or vomiting   Smita Lazo PA-C   polyethylene glycol (MIRALAX/GLYCOLAX) packet Take 1 packet by mouth daily as needed for constipation   Unknown, Entered By History   potassium citrate (UROCIT-K) 10 MEQ (1080 MG) CR tablet Take 10 mEq by mouth daily   Unknown, Entered By History   pramipexole (MIRAPEX) 1 MG tablet Take 2 mg by mouth every evening . (takes 1 tab in am , 2 tabs 1 hour prior to hs)   Unknown, Entered By History    pramipexole (MIRAPEX) 1 MG tablet Take 1 mg by mouth every morning . ( takes 1 tab in am , 2 tabs 1 hour prior to hs)   Reported, Patient   ranitidine (ZANTAC) 150 MG tablet TAKE 1 TABLET BY MOUTH TWICE DAILY   Skyler Álvarez MD   senna-docusate (SENOKOT-S/PERICOLACE) 8.6-50 MG tablet Take 1 tablet by mouth 2 times daily as needed for constipation   Shona Gonzales PA-C   temazepam (RESTORIL) 15 MG capsule Take 1 capsule (15 mg) by mouth At Bedtime   Farzana Ro MD   traMADol (ULTRAM) 50 MG tablet Take 2 tablets (100 mg) by mouth every 6 hours as needed for moderate pain   Farzana Ro MD          Review of Systems:   A complete ROS was performed and is negative other than what is stated in the HPI.       Physical Exam:   Blood pressure (!) 147/79, pulse 60, temperature 98.2  F (36.8  C), temperature source Oral, resp. rate 18, last menstrual period 03/11/2010, SpO2 95 %, not currently breastfeeding.  General: Alert, interactive, NAD, lying in bed pleasant and cooperative.  HEENT: AT/NC, sclera anicteric, PERRL, EOMI  Chest/Resp: clear to auscultation bilaterally, no crackles or wheezes  Heart/CV: regular rate and rhythm, no murmur  Abdomen/GI: Soft, nontender, nondistended. +BS.  No rebound or guarding.  Extremities/MSK: Right Hip incision with serosanguinous scant drainage with surrounding erythema.  FROM testing not performed.  Skin: Warm and dry  Neuro: Alert & oriented x 3, sensation of BLE grossly intact.         Labs:   ROUTINE ICU LABS (Last four results)  CMP  Recent Labs   Lab 08/29/19  0555 08/27/19  0505 08/26/19  0544 08/25/19  2230 08/25/19  1124     --  139  --  139   POTASSIUM 3.7  --  3.8 3.7 3.3*   CHLORIDE 108  --  108  --  109   CO2 27  --  27  --  27   ANIONGAP 5  --  4  --  3   GLC 91 114* 98  --  114*   BUN 14  --  8  --  13   CR 0.61 0.58 0.58  --  0.60   GFRESTIMATED >90 >90 >90  --  >90   GFRESTBLACK >90 >90 >90  --  >90   SABA 8.6  --  8.7  --  8.7    MAG  --   --   --   --  2.1     CBC  Recent Labs   Lab 08/29/19  0555 08/28/19  0644 08/27/19  0505 08/26/19  0544 08/25/19  1124   WBC 8.1 7.0  --  7.7 8.6   RBC 3.03* 2.80*  --  2.96* 3.21*   HGB 9.2* 8.3*  --  8.8* 9.8*   HCT 29.5* 27.3*  --  28.9* 30.8*   MCV 97 98  --  98 96   MCH 30.4 29.6  --  29.7 30.5   MCHC 31.2* 30.4*  --  30.4* 31.8   RDW 15.1* 15.3*  --  15.0 14.8    [phone]*          Imaging/Procedures:   None

## 2019-08-30 NOTE — PLAN OF CARE
Vitals: VSS, afebrile  Oxygen: RA  LOC: AAOx4  Pain: reports moderate to severe amounts of pain to right hip, PRN pain medication given with some relief, ice applied  Ambulation: assist x1 and walker  Cardiac: WNL  Lungs: LS-clear, denies SOB  GI: denies nausea, poor appetite,   : WNL  Skin: right hip wound, blanchable redness to inner buttock-barrier applied  Dressings: right hip dressing has moderate amount of drainage, WOC RN placed wound vac this afternoon  Plan: manage pain, continue IV abx

## 2019-08-30 NOTE — PROGRESS NOTES
Ortho Daily Progress Note  Pain controlled.  Denies cp or sob.    Temp:  [97.2  F (36.2  C)-98.2  F (36.8  C)] 97.9  F (36.6  C)  Pulse:  [60-73] 73  Heart Rate:  [62-66] 63  Resp:  [16-18] 16  BP: (133-149)/(75-87) 149/87  SpO2:  [95 %-97 %] 95 %  Hemoglobin   Date Value Ref Range Status   08/30/2019 8.6 (L) 11.7 - 15.7 g/dL Final   ]    Alert, appropriate, and following commands  Breathing easily without wheeze  Wound blood stained bandage, able to gap wound edge and express small fluid distal with pressure SILT, palpable dp pulse    **Applied zipline and 4 staples after sterile prep to distal aspect of incision    A/P - 64 year old female s/p seroma wash out 8/26 following revision right hip SARAHI 8/6  Plan for incisional vac  DVT prophy - SCD and ASA  ID - single culture growing proteus from aspiration seroma at [facility] following revision.  Culture from revision on 8/6 and and I&D seroma on 8/26 NTD.  Will treat as infection.  Dr. Bradley ID following will treat as infection and plan 6 wks IV abx  TTWB RLE  PT/OT  Dispo - TCU, follow up Dr. Antonio in 2 weeks    Tom Antonio MD

## 2019-08-30 NOTE — CONSULTS
Care Transition Initial Assessment - SW     Met with: Pt and daughter.   Active Problems:    Cellulitis of right hip       DATA  Lives With: facility resident      Quality of Family Relationships: helpful, involved, supportive  Description of Support System: Supportive, Involved  Who is your support system?: Children  Support Assessment: Adequate family and caregiver support, Adequate social supports.   Identified issues/concerns regarding health management:  Pt recently discharged to [facility] TCU. Readmitted after a few hours for cellulitis of left hip. Wound vac needed. Anticipated discharge in 1-2 days. Pt did not hold bed at [facility]. Spoke with admissions, who can accept pt back if they have a bed available when pt is medically stable. Referral sent for review.       Quality of Family Relationships: helpful, involved, supportive     ASSESSMENT  Cognitive Status:  awake, alert and oriented  Concerns to be addressed: Discharge planning, will need TCU. Came from [facility] and prefers to return there at discharge. Referral sent for semi-private room.      PLAN  Financial costs for the patient includes: None anticipated.  Patient given options and choices for discharge: Yes.  Patient/family is agreeable to the plan?  Yes: Prefers to return to [facility].  Transportation/person available to transport on day of discharge  is daughter, and she is available Saturday at anytime or Sunday morning.  Patient Goals and Preferences: Return to [facility] TCU .  Patient anticipates discharging to:  [facility]. New referral sent as pt did not hold bed. Spoke with admissions staff who have a wound vac and can accept pt back if bed available when medically stable. No PAS needed. Daughter available to transport Saturday and Sunday morning, otherwise another family member may be available. SW to continue following. Per MD note, anticipate discharge in 1-2 days.

## 2019-08-30 NOTE — PLAN OF CARE
Pt arrived to room 620 from ED at 2200. Pt just discharged to TCU today at 1600 and returned to ED after experiencing increased drainage and bleeding from incision on R hip.  Abd pad/gauze dressing applied and will see ortho in am.  PICC in place.  Will continue to monitor.

## 2019-08-30 NOTE — PROGRESS NOTES
This nurse was called by the staff at [facility] around 1700 and was asking if the pt should have a wound vac placed. This staff asked staff what there discharge orders stated and they reported it said daily dressing change but did not clarify what type of dressing. This staff gave the staff at [facility] the direct number to call at [facility] and get the dressing order clarified. Also this staff gave the staff Smita WOODARD cell phone number to the staff at [facility] to also call and ask about the dressing since she seen the patient today. Patients dgt later came to the floor to  a home medication that was left here before she discharged to rehab. The dgt had a message that the medicine was here. Pts dgt than said that her mother was back in the ER and expressed that she was frustrated with her mother having to be in the hospital 3 times this month and now she is back. Patient representative cards given to the dgt.

## 2019-08-30 NOTE — PROGRESS NOTES
[facility]    Medicine Progress Note - Hospitalist Service       Date of Admission:  8/29/2019  Assessment & Plan   Mercedes Barber is a 64 year old female with a history of JORDAN, IBS, Hypothyroidism, GERD, Seizure Disorder, Anemia and Right Hip Cellulitis.  She returned to the hospital on 8/29 after being discharged earlier in the day to TCU due to increased drainage from chronic right hip wound.    1.  Right hip chronic wound with surrounding cellulitis.  Continue IV ceftriaxone.  Orthopedic surgery following.  Infectious disease consult.  Wound nurse consult.    2.  Chronic pain syndrome.  She does not feel chronic pain is well controlled at this point.  Suspect that part of this might be related to anxiety.  Change tramadol to Norco.  Add hydroxyzine as needed.  Pain team consult.    3.  Hypothyroidism.  Continue levothyroxine.    4.  Seizure disorder.  Continue Vimpat.    5.  IBS.  Continue Linzess.    6.  Anemia.  Hemoglobin 8.6 today.  Recheck CBC tomorrow.    7.  GERD.  Continue omeprazole and ranitidine.    8.  Depression.  Continue Serzone.    Diet: Regular Diet Adult  Snacks/Supplements Adult: Boost Shake; Between Meals    DVT Prophylaxis: ASA  Vieyra Catheter: not present  Code Status: Full Code      Disposition Plan   Expected discharge: 1-2 days, recommended to transitional care unit   Entered: Chirag Morales DO 08/30/2019, 12:32 PM       Chirag Morales DO  Hospitalist Service  [facility]    ______________________________________________________________________    Interval History   Having right hip pain.  Denies chest pain, shortness of breath, fevers, chills, nausea, vomiting, or diarrhea.    Data reviewed today: I reviewed all medications, new labs and imaging results over the last 24 hours.     Physical Exam   Vital Signs: Temp: 97.9  F (36.6  C) Temp src: Oral BP: (!) 149/87 Pulse: 73 Heart Rate: 63 Resp: 16 SpO2: 95 % O2 Device: None (Room air)    Weight:  0 lbs 0 oz  Gen:  NAD, A&Ox3.  Eyes:  PERRL, sclera anicteric.  OP:  MMM, no lesions.  Neck:  Supple.  CV:  Regular, no murmurs.  Lung:  CTA b/l, normal effort.  Ab:  +BS, soft.  Skin:  Warm, dry to touch.  Right hip dressing not removed, but does have quite a bit of saturation present.  Ext:  Mild non pitting edema LE b/l.      Data   Recent Labs   Lab 08/30/19  0610 08/29/19  0555 08/28/19  0644 08/27/19  0505 08/26/19  0544 08/25/19  2230 08/25/19  1124   WBC  --  8.1 7.0  --  7.7  --  8.6   HGB 8.6* 9.2* 8.3*  --  8.8*  --  9.8*   MCV  --  97 98  --  98  --  96   PLT  --  358 379 413 406  --  451*   NA  --  140  --   --  139  --  139   POTASSIUM  --  3.7  --   --  3.8 3.7 3.3*   CHLORIDE  --  108  --   --  108  --  109   CO2  --  27  --   --  27  --  27   BUN  --  14  --   --  8  --  13   CR  --  0.61  --  0.58 0.58  --  0.60   ANIONGAP  --  5  --   --  4  --  3   SABA  --  8.6  --   --  8.7  --  8.7   GLC  --  91  --  114* 98  --  114*

## 2019-08-30 NOTE — ED NOTES
"[facility]  ED Nurse Handoff Report    Mercedes Barber is a 64 year old female   ED Chief complaint: Post-op Problem  . ED Diagnosis:   Final diagnoses:   Bleeding from right hip wound, initial encounter     Allergies:   Allergies   Allergen Reactions     Clonopin [Clonazepam]      \"Walk funny and Mind goes funny\"     Encort [Hydrocortisone Acetate] Swelling     Feet swelled.     Encort [Hydrocortisone] Swelling     Erythromycin Diarrhea     Flagyl [Metronidazole] Nausea     Gabapentin      Over eats     Lamictal [Lamotrigine]      Oxycodone Nausea     Tegretol [Carbamazepine] Nausea and Vomiting       Code Status: Full Code  Activity level - Baseline/Home:  Stand by Assist. Activity Level - Current:   Assist X 1. Lift room needed: No. Bariatric: No   Needed: No   Isolation: No. Infection: Not Applicable.     Vital Signs:   Vitals:    08/29/19 1919   BP: (!) 147/79   Pulse: 60   Resp: 18   Temp: 98.2  F (36.8  C)   TempSrc: Oral   SpO2: 95%       Cardiac Rhythm:  ,      Pain level:    Patient confused: No. Patient Falls Risk: Yes.   Elimination Status: Has voided   Patient Report - Initial Complaint: Hip pain and wound vac removed. Focused Assessment: Incision to right hip covered with dressing. No erythema to surrounding area or palpable hematoma noted. Distal CSM instact   Tests Performed: N/A. Abnormal Results: N/A.   Treatments provided: N/A  Family Comments: Children at bedside  OBS brochure/video discussed/provided to patient:  Yes  ED Medications: Medications - No data to display  Drips infusing:  No  For the majority of the shift, the patient's behavior Green. Interventions performed were N/A.     Severe Sepsis OR Septic Shock Diagnosis Present: No      ED Nurse Name/Phone Number: Gelacio Yang RN,   8:32 PM    RECEIVING UNIT ED HANDOFF REVIEW    Above ED Nurse Handoff Report was reviewed: Yes  Reviewed by: Siena Alexander RN on August 29, 2019 at 9:36 PM       "

## 2019-08-30 NOTE — PROGRESS NOTES
[facility] Progress Note  FR Ortho 6    Follow up visit with pt and daughter, Rachael. Provided consultation on healthcare directive which was completed yesterday with pt.     Plan: [facility] Services remains available for additional emotional/spiritual support.    Ernie Lu MA  Staff   Pager: [phone]  Phone: [phone]

## 2019-08-30 NOTE — ED PROVIDER NOTES
History     Chief Complaint:  Hip Pain    HPI   Mercedes Barber is a 64 year old female who presents to the emergency department for evaluation of right hip pain. Of note, the patient had I&D of her right hip performed on 8/25, with wound VAC placed, admitted to the hospital, and discharged at 1500 today. The patient reports, prior to her return at her nursing home, she was visited by orthopedics PA and had the wound VAC removed, but since removal of the wound VAC, she has had increasing redness and drainage from the wound on her right hip. She indicates this has gradually worsened since her return home, prompting her arrival to the ED. She further reports right hip pain, rated 7-8/10, accompanying this postop issue.    Allergies:  Clonopin [Clonazepam]  Encort [Hydrocortisone Acetate]  Encort [Hydrocortisone]  Erythromycin  Flagyl [Metronidazole]  Gabapentin  Lamictal [Lamotrigine]  Oxycodone  Tegretol [Carbamazepine]     Medications:    Albuterol  Aspirin  Rocephin  Vimpat  Levothyroxine  Linzess  Cytomel  Omeprazole  Miralax  Mirapex  Zantac  Senna  Restoril  Ultram     Past Medical History:    Arthritis  HPV test positive  GERD  Hypothyroidism  IBS  Depression  Rectal prolapse  Neuropathy  RLS  Seizure  Sleep apnea    Past Surgical History:    Colporrhaphy  Arthroplasty hip, right  Carpal tunnel release  Dental surgery, implant   D&C  Drain pilonidal cyst  Endoscopy  EGD combined  Excise lesion trunk  Hysterectomy  I&D hip, right  Left shoulder fracture repair  Rectal prolapse repair abdominally  Release plantar fascia  Repair hammer toe  T&A  Tubal ligation    Family History:    Eye disorder  Lipids  CHF  Osteoporosis  Diabetes  HTN  Alcohol/drug use  Depression  GI disease  Thyroid disease    Social History:  Presents friend, son-in-law.  Never smoker.  Positive for alcohol use.   Marital Status:   [4]     Review of Systems   Musculoskeletal: Positive for myalgias.   Skin: Positive for wound.    All other systems reviewed and are negative.      Physical Exam     Patient Vitals for the past 24 hrs:   BP Temp Temp src Pulse Resp SpO2   08/29/19 1919 (!) 147/79 98.2  F (36.8  C) Oral 60 18 95 %     Physical Exam   Constitutional:   Pleasant and cooperative   HENT:   Right Ear: Tympanic membrane normal.   Left Ear: Tympanic membrane normal.   Mouth/Throat: Oropharynx is clear and moist and mucous membranes are normal. No posterior oropharyngeal erythema.   Eyes: Conjunctivae are normal.   Neck: Neck supple.   Cardiovascular: Normal rate, regular rhythm and normal heart sounds.   Pulmonary/Chest: Effort normal and breath sounds normal.   Abdominal: Soft. Bowel sounds are normal. She exhibits no distension. There is no tenderness. There is no rebound and no guarding.   Musculoskeletal:   Semi-permeable dressing in place overlying right hip incision.  There is visible blood under the dressing but no active drainage.   Neurological: She is alert.   Skin: Skin is warm and dry.       Emergency Department Course     Emergency Department Course:  Nursing notes and vitals reviewed. 1925 I performed an exam of the patient as documented above.     1949 I spoke with Dr. Prabhakar, orthopedics, regarding the patient.    2015 I rechecked the patient and discussed the results of her workup thus far.     2043 I spoke with LINDA Greene for hospitalist Dr. Yin, who agreed to admit the patient.    2048 I rechecked and updated the patient.    Findings and plan explained to the Patient who consents to admission. Discussed the patient with LINDA Greene, who will admit the patient to an obs bed for further monitoring, evaluation, and treatment.    Impression & Plan      Medical Decision Making:    Mercedes Barber is a 64 year old female discharged from this hospital a few hours ago who returns now with concerns about her wound.  As noted I spoke with Dr. Prabhakar of orthopedics.  I removed the semi-permeable dressing revealing  a moderate amount of dark bloody drainage.  The wound appears intact with minimal drainage of serosanguineous material.  I discussed with the family Dr. Prabhakar's recommendation to apply hip spica dressing to provide pressure to this area.  They had many questions and felt like they were being given contradictory recommendations by different specialists.  It sounds like neither of the children were present during the discussion with the physicians though they said they had asked to be called on the phone.  I recommended that they try to be present during the day tomorrow to speak with the physicians.  I spoke with Smita Greene of the hospitalist service.  Patient will be admitted on observation status for further discharge planning.      Diagnosis:    ICD-10-CM   1. Bleeding from right hip wound, initial encounter S71.001A       Disposition:  discharged to home    I, Patrice Hamilton, am serving as a scribe on 8/29/2019 at 7:15 PM to personally document services performed by Camilla Cedillo MD based on my observations and the provider's statements to me.     Patrice Hamilton  8/29/2019   [facility] EMERGENCY DEPARTMENT       Camilla Cedillo MD  08/29/19 1759

## 2019-08-30 NOTE — PROGRESS NOTES
"Clinic Care Coordination Contact  Care Coordination Transition Communication    Clinical Data: Patient was hospitalized at [facility] from 8/25/19 to 8/29/19 with diagnoses of:     Right hip cellulitis with abscess  Hypokalemia  Anemia  H/o Seizure Disorder  GERD  Hypothyroidism   IBS  History of sleep apnea    Patient went from the hospital to [facility] on 8/29/19. Patient was then readmitted to [facility] later on 8/29/19 with--per hospital History & Physical--\"increasing redness and drainage from the wound on her right hip\".    Plan: Patient is currently hospitalized. -[facility] will follow for possible community resource needs post hospitalization.      GOSIA Florian  [facility] Care Coordination  [facility]in[facility]: [place]   Email: [email or web address]  Tele: [phone]      "

## 2019-08-30 NOTE — PROGRESS NOTES
[facility]  Infectious Disease Progress Note          Assessment and Plan:   IMPRESSION:   1.  A 64-year-old female with recent revision right total hip arthroplasty, early postop seroma infected with Proteus, on antibiotics, worsening now with a formal I and D, at surgery no signs of deep infection.   2.  Recent revision right total hip arthroplasty for mechanical reasons failing early after initial surgery.   3.  Remote history of Clostridium difficile colitis.   4.  Seizure disorder.   5.  FLAGYL LISTED ALLERGY AS JUST NAUSEA.      RECOMMENDATIONS:  1 Continue ceftriaxone, assuming no deep infection.  Theoretically could use oral antibiotics, but as a protective measure given this has required a full I and D and recent surgery, probably 2 weeks IV, especially if she is going to rehab anyway. Neg current cxs  , orders in for 2 weeks ceftriaxone when disposition,   2 Readmitted with more drainage, wd stable, does not alter ABX plan wd management per ortho             Interval History:   no new complaints and doing well  Readmitted with drainaage no fever or infection worsening; no cp, sob, n/v/d, or abd pain. Op cx pending wd vac on CRP 16              Medications:       aspirin  325 mg Oral Daily     cefTRIAXone  2 g Intravenous Q24H     heparin lock flush  5-10 mL Intracatheter Q24H     lacosamide  200 mg Oral BID     levothyroxine  50 mcg Oral Daily     linaclotide  290 mcg Oral QAM AC     liothyronine  10 mcg Oral Daily     nefazodone  600 mg Oral At Bedtime     omeprazole  40 mg Oral BID AC     polyethylene glycol  17 g Oral TID     pramipexole  1 mg Oral QAM     pramipexole  2 mg Oral QPM     ranitidine  150 mg Oral BID     senna-docusate  1 tablet Oral BID    Or     senna-docusate  2 tablet Oral BID     sodium chloride (PF)  3 mL Intracatheter Q8H                  Physical Exam:   Blood pressure 137/76, pulse 73, temperature 98.5  F (36.9  C), temperature source Oral, resp. rate 16,  last menstrual period 03/11/2010, SpO2 96 %, not currently breastfeeding.  Wt Readings from Last 2 Encounters:   08/29/19 89.5 kg (197 lb 6.4 oz)   08/06/19 90.7 kg (200 lb)     Vital Signs with Ranges  Temp:  [97.2  F (36.2  C)-98.5  F (36.9  C)] 98.5  F (36.9  C)  Pulse:  [60-73] 73  Heart Rate:  [62-68] 68  Resp:  [16-18] 16  BP: (133-149)/(75-87) 137/76  SpO2:  [95 %-97 %] 96 %    Constitutional: Awake, alert, cooperative, no apparent distress   Lungs: Clear to auscultation bilaterally, no crackles or wheezing   Cardiovascular: Regular rate and rhythm, normal S1 and S2, and no murmur noted   Abdomen: Normal bowel sounds, soft, non-distended, non-tender   Skin: No rashes, no cyanosis, no edema wd vac mild erythema   Other:           Data:   All microbiology laboratory data reviewed.  Recent Labs   Lab Test 08/30/19  0610 08/29/19  0555 08/28/19  0644 08/27/19  0505 08/26/19  0544   WBC  --  8.1 7.0  --  7.7   HGB 8.6* 9.2* 8.3*  --  8.8*   HCT  --  29.5* 27.3*  --  28.9*   MCV  --  97 98  --  98   PLT  --  358 379 413 406     Recent Labs   Lab Test 08/29/19  0555 08/27/19  0505 08/26/19  0544   CR 0.61 0.58 0.58     Recent Labs   Lab Test 08/28/19  0644   SED 17     Recent Labs   Lab Test 08/26/19  1109 08/26/19  1048 08/07/19  0838 08/27/15  1305 01/06/12  1607   CULT Culture negative monitoring continues  Culture negative monitoring continues Culture negative monitoring continues  Culture negative monitoring continues No anaerobes isolated  No growth No Salmonella, Shigella, Campylobacter, E. coli O157, Aeromonas, or Plesiomonas   isolated.   <10,000 colonies/mL Multiple species present, probable perineal contamination.

## 2019-08-30 NOTE — CONSULTS
[facility]  Pain Service Consultation   Text Page    Date of Admission:  8/29/2019    Assessment & Plan   Mercedes Barber is a 64 year old female who was admitted on 8/29/2019. I was asked by Dr. Chirag Morales to see the patient for pain management.    1)  Acute right hip pain s/p right hip ORIF revision with recurrent pain in association with development of cellulitis of the incision.    2)  Patient without chronic pain and is not on chronic opiates.  Patient does not have expected opiate tolerance.  Patient reports some increased pain in the past 2-3 days, however states tramadol has been mostly effective.     Patient's opioid use in past 24 hours: 300 mg tramadol = 30 mg Daily Morphine Equivalent    3)  Risk factors for opioid related harms  -Sleep disordered breathing  -Anxiety/depression    4)  Opioid induced side-effects:  -Constipation - chronic constipation, continue home linzess with scheduled miralax and senna-s  -Nausea/Vomit- nausea persistent for few weeks, improved.    5)  Other/Related:    -Deconditioning- initial surgery was in July 2019, patient has been through complicated recovery course due to surgical revision and cellulitis.    - Poor appetite - not meeting criteria for malnutrition, however at risk for malnutrition due to poor PO intake.    PLAN:   1) Nutrition consult - patient at risk for malnutrition due to persistent decreased appetite/PO intake.  Boost shakes to supplement diet and improve nutritional status (patient request).  2) Medical mariajuana lotion as treatment for her chronic lower extremity neuropathy may be used per [place] policy if patient chooses to use it.  3) Discomfort from skin irritation/redness in gluteal fold, Desitin cream ordered per patient's request, she had been using prior to admission. Do not use on open skin areas.  4)Non-opioid multimodal medication therapy  -Topical:Voltaren 1% Topical Gel 4 times daily around wound vac dressing.  Not  to be placed directly over incision site.  -N-SAIDS:Ibuprofen 600 mg q 6 hours prn.  -Muscle Relaxants:None indicated  -Adjuvants: hydroxyzine 25-50 mg q 6 hours prn.  -Antidepresants/anxiolytics: nefazodone, home medication.  5)  Non-medication interventions  Positioning, ICE, Relaxation  6)  Opioids:  - hydrocodone-acetaminophen 5-325 mg every 4 hours prn  Opioids Treatment Goal:   -Improvement in function  -Participate in PT  -Post-operative/post-infectious management of pain, expected 3-7 days needed  7)  Constipation Prophylaxis  Chronic constipation followed by [facility], per most recent clinic note and patient report, Miralax TID and Linzess with prn medications have been working.     Senna-S 1-2 tablets BID while on opiate medications.  8)  Pain Education  -Opioid safe use, storage and disposal information included in DC AVS  9)  DC Planning   Discussed goal of Opioid therapy as above with patient and daughter.  Length of therapy is less than 10 days, opioids may be stopped without taper.  Continued outpatient management of pain per surgical team.  Disposition: TCU pending.  Support systems: lee Cano.  Outpatient Referrals: none.    Time Spent on this Encounter   Total unit/floor time 45 minutes, time consisted of the following, examination of the patient, reviewing the record and completing documentation. >50% of time spent in counseling and coordination of care.  Time spend counseling with patient and family consisted of the following topics, goals of care and symptom management.  Time spent in coordination of care with Bedside Nurse , and Hospitalist Dr. Mena.     Michelle GALEANO, CNP  Pain Management and Palliative Care  [facility]  Pgr: [phone]      Reason for Consult   Reason for consult: I was asked by Dr. Morales to evaluate this patient for acute pain management.    Primary Care Physician   Primary Care Physician:Skyler Álvarez  Pain Specialist: n/a.    Chief  Complaint   Right hip pain s/p revision of ORIF and now with cellulitis.    History is obtained from the patient    History of Present Illness   Mercedes Barber is a 64 year old female with recent history of right hip replacement (7/2019) and revision (8/2019) with complication of cellulitis requiring antibiotic therapy, who presents with wound drainage and continued pain of the right hip.  She states wound vac was removed a couple of days ago and she began to have more redness and drainage with bleeding from wound site.    States her pain has been fairly tolerable with prn doses of tramadol, however it has increased in intensity with a warm, aching sensation over incision site for the past 3 days.  She denies aggravating or alleviating factors except for the prn pain medications.  States she has been using ibuprofen and tramadol intermittently with tolerable pain levels.  Also notes that she has had the GI upset with nausea and so she is nervous about taking too much medication.  States she has not had much appetite and is concerned about her nutritional status as well.     Of note patient states she uses medical mariajuana lotion on lower extremities and low back for neuropathic pain.  States she has not used it while inpatient.        PAST PAIN TREATMENT:   Medications:tramadol, oxycodone (s/e of nausea and lightheadedness). Ibuprofen and tylenol.  Non-phamacologic modalities: rest, distraction.  Previous interventions/surgeries:right hip ORIF, revision of Right hip ORIF.    [facility] database review: no findings of misuse or concerning opiate use on review.    Past Medical History   I have reviewed this patient's medical history and updated it with pertinent information if needed.   Past Medical History:   Diagnosis Date     Arthritis     Thumb     Cervical high risk HPV (human papillomavirus) test positive 07/17/2017 07/17/17: NIL pap, + HR HPV (not 16 or 18) result.      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  Past Surgical History:   Procedure Laterality Date     Anterior COLPORRHAPHY, BLADDER/VAGINA      perigee mesh     ARTHROPLASTY HIP Right 7/23/2019    Procedure: Right total hip arthroplasty;  Surgeon: Anant Mejia MD;  Location: [facility] OR     ARTHROPLASTY PATELLO-FEMORAL (KNEE) Bilateral      ARTHROPLASTY REVISION HIP Right 8/7/2019    Procedure: Right hip revision total arthroplasty;  Surgeon: Tom Antonio MD;  Location: [facility] OR     CARPAL TUNNEL RELEASE RT/LT       DENTAL SURGERY      implant     DILATION AND CURETTAGE       DRAIN PILONIDAL CYST SIMPL       ENDOSCOPY DRUG INDUCED SLEEP N/A 11/18/2015    Procedure: ENDOSCOPY DRUG INDUCED SLEEP;  Surgeon: Park Ortiz MD;  Location: [facility] OR     ESOPHAGOSCOPY, GASTROSCOPY, DUODENOSCOPY (EGD), COMBINED  4/5/2013    Procedure: COMBINED ESOPHAGOSCOPY, GASTROSCOPY, DUODENOSCOPY (EGD), BIOPSY SINGLE OR MULTIPLE;;  Surgeon: Mundo Mehta MD;  Location:  GI     EXCISE LESION TRUNK  8/10/2012    Procedure: EXCISE LESION TRUNK;;  Surgeon: Jerald Diggs MD;  Location: [facility] OR     HYSTERECTOMY       IRRIGATION AND DEBRIDEMENT HIP, COMBINED Right 8/26/2019    Procedure: 1.  Right hip wound irrigation and debridement with excisional debridement of nonviable skin, subcutaneous tissues, and fascia. 2. Application of incisional wound VAC, 27cm length incision.;  Surgeon: Tyson Prabhakar MD;   Location: [facility] OR     L shoulder fracture       rectal prolapse repair abdominally       RELEASE PLANTAR FASCIA Right 1/20/2015    Procedure: RELEASE PLANTAR FASCIA;  Surgeon: Maicol Liu DPM;  Location: [facility]     REMOVE MESH VAGINA  8/10/2012    Procedure: REMOVE MESH VAGINA;  Excision of Vaginal Mesh Exposure, Removal of Skin Tag Left Inner Leg;  Surgeon: Jerald Diggs MD;  Location: [facility] OR     REPAIR HAMMER TOE Right 1/20/2015    Procedure: REPAIR HAMMER TOE;  Surgeon: Maicol Liu DPM;  Location: [facility]     TONSILLECTOMY & ADENOIDECTOMY       TUBAL LIGATION           Prior to Admission Medications   Prior to Admission Medications   Prescriptions Last Dose Informant Patient Reported? Taking?   Lidocaine (LIDOCARE) 4 % Patch   Yes Yes   Sig: Place 1 patch onto the skin every 24 hours To prevent lidocaine toxicity, patient should be patch free for 12 hrs daily.   acetaminophen (TYLENOL) 325 MG tablet   No Yes   Sig: Take 2 tablets (650 mg) by mouth every 4 hours as needed for other (multimodal surgical pain management along with NSAIDS and opioid medication as indicated based on pain control and physical function.)   albuterol (ALBUTEROL) 108 (90 BASE) MCG/ACT Inhaler   No Yes   Sig: Inhale 2 puffs into the lungs 4 times daily as needed for shortness of breath / dyspnea or wheezing   artificial saliva (BIOTENE MT) AERS spray   Yes Yes   Sig: Take 2 sprays by mouth 3 times daily as needed for dry mouth   aspirin (ASA) 325 MG EC tablet 8/29/2019 at am  No Yes   Sig: Take 1 tablet (325 mg) by mouth daily   budesonide-formoterol (SYMBICORT) 160-4.5 MCG/ACT Inhaler   Yes Yes   Sig: Inhale 2 puffs into the lungs 2 times daily   cefTRIAXone (ROCEPHIN) 1 GM vial 8/29/2019 at am  No Yes   Sig: Inject 2 g (2,000 mg) into the vein daily for 14 days ESR,CRP,CBC with differential, creatinine, SGOT weekly while on this medication to be faxed to Dr. Bradley office.   cycloSPORINE (RESTASIS) 0.05 % ophthalmic emulsion    Yes Yes   Sig: Place 1 drop into both eyes 2 times daily   hypromellose (ARTIFICIAL TEARS) 0.5 % SOLN ophthalmic solution 8/29/2019 at Unknown time  Yes Yes   Sig: Place 1 drop into both eyes 2 times daily   ibuprofen (ADVIL/MOTRIN) 600 MG tablet 8/29/2019 at x2  No Yes   Sig: Take 1 tablet (600 mg) by mouth every 6 hours as needed for moderate pain   lacosamide (VIMPAT) 200 MG TABS tablet 8/29/2019 at am  No Yes   Sig: Take 1 tablet (200 mg) by mouth 2 times daily   levothyroxine (SYNTHROID/LEVOTHROID) 50 MCG tablet 8/29/2019 at am  Yes Yes   Sig: Take 50 mcg by mouth daily   linaclotide (LINZESS) 290 MCG capsule 8/29/2019 at am  No Yes   Sig: Take 1 capsule (290 mcg) by mouth every morning (before breakfast)   liothyronine (CYTOMEL) 5 MCG tablet 8/29/2019 at am  Yes Yes   Sig: Take 10 mcg by mouth daily    nefazodone (SERZONE) 200 MG tablet   Yes Yes   Sig: Take 600 mg by mouth At Bedtime    omeprazole (PRILOSEC) 40 MG DR capsule 8/29/2019 at am  No Yes   Sig: Take 1 capsule (40 mg) by mouth 2 times daily   ondansetron (ZOFRAN-ODT) 4 MG ODT tab   No Yes   Sig: Take 1 tablet (4 mg) by mouth every 6 hours as needed for nausea or vomiting   polyethylene glycol (MIRALAX/GLYCOLAX) packet   Yes Yes   Sig: Take 1 packet by mouth daily as needed for constipation   potassium citrate (UROCIT-K) 10 MEQ (1080 MG) CR tablet   Yes Yes   Sig: Take 10 mEq by mouth daily   pramipexole (MIRAPEX) 1 MG tablet 8/29/2019 at am  Yes Yes   Sig: Take 1 mg by mouth every morning . ( takes 1 tab in am , 2 tabs 1 hour prior to hs)   pramipexole (MIRAPEX) 1 MG tablet   Yes Yes   Sig: Take 2 mg by mouth every evening . (takes 1 tab in am , 2 tabs 1 hour prior to hs)   ranitidine (ZANTAC) 150 MG tablet 8/29/2019 at am  No Yes   Sig: TAKE 1 TABLET BY MOUTH TWICE DAILY   senna-docusate (SENOKOT-S/PERICOLACE) 8.6-50 MG tablet 8/29/2019 at am  No Yes   Sig: Take 1 tablet by mouth 2 times daily as needed for constipation   temazepam (RESTORIL) 15  "MG capsule   No Yes   Sig: Take 1 capsule (15 mg) by mouth At Bedtime   traMADol (ULTRAM) 50 MG tablet 8/29/2019 at 1200  No Yes   Sig: Take 2 tablets (100 mg) by mouth every 6 hours as needed for moderate pain      Facility-Administered Medications: None     Allergies   Allergies   Allergen Reactions     Clonopin [Clonazepam]      \"Walk funny and Mind goes funny\"     Encort [Hydrocortisone Acetate] Swelling     Feet swelled.     Encort [Hydrocortisone] Swelling     Erythromycin Diarrhea     Flagyl [Metronidazole] Nausea     Gabapentin      Over eats     Lamictal [Lamotrigine]      Oxycodone Nausea     Tegretol [Carbamazepine] Nausea and Vomiting       Social History   I have reviewed this patient's social history and updated it with pertinent information if needed. Merceeds Barber  reports that she has never smoked. She has never used smokeless tobacco. She reports that she drinks alcohol. She reports that she does not use drugs.    Family History   I have reviewed this patient's family history and updated it with pertinent information if needed.   Family History   Problem Relation Age of Onset     Eye Disorder Mother      Lipids Mother         has CHF     Osteoporosis Mother      Diabetes Father      Alzheimer Disease Paternal Grandmother      Hypertension Brother      Alcohol/Drug Brother      Depression Brother      Gastrointestinal Disease Brother         hx of colonic polyps     Lipids Brother      Psychotic Disorder Brother      Breast Cancer Sister      Depression Sister      Lipids Sister      Thyroid Disease Sister      Congenital Anomalies Daughter      Neurologic Disorder Daughter      Family history of addiction no.    Review of Systems   The 10 point Review of Systems is negative other than noted in the HPI or here.    Denies Bowel or bladder dysfunction    Physical Exam   Temp:  [97.2  F (36.2  C)-98.2  F (36.8  C)] 97.9  F (36.6  C)  Pulse:  [60-73] 73  Heart Rate:  [62-66] 63  Resp:  [16-18] " 16  BP: (133-149)/(75-87) 149/87  SpO2:  [95 %-97 %] 95 %  0 lbs 0 oz  GEN:  Alert, oriented x 3, appears comfortable, No apparent distress.  HEENT:  Normocephalic/atraumatic, no scleral icterus, no nasal discharge, mouth moist.  CV:  RRR, S1, S2; no murmurs or other irregularities noted.  +3 DP/PT pulses bilaterally; no edema bilateral lower extremeties.  RESP:  Clear to auscultation bilaterally without rales/rhonchi/wheezing/retractions.  Symmetric chest rise on inhalation noted.  Normal respiratory effort.  ABD:  Rounded, soft, non-tender/non-distended.  +BS  EXT:  Edema & pulses as noted above.  Color, moisture and sensation intact x 4.     M/S:   Tender to palpation over right hip and anterior thigh.    SKIN:  Dry to touch, no exanthems noted in the visualized areas. Mallory-incisional redness, mild erythema over right lateral thigh.   NEURO: Symmetric strength +5/5.  Sensation to touch intact all extremities.   There is no area of allodynia or hyperesthesia.  PAIN BEHAVIOR: Cooperative  Psych:  Normal affect.  Calm, cooperative, conversant appropriately.   Data   Most Recent 3 CBC's:  Recent Labs   Lab Test 08/30/19  0610 08/29/19  0555 08/28/19  0644 08/27/19  0505 08/26/19  0544   WBC  --  8.1 7.0  --  7.7   HGB 8.6* 9.2* 8.3*  --  8.8*   MCV  --  97 98  --  98   PLT  --  358 379 413 406     Most Recent 3 BMP's:  Recent Labs   Lab Test 08/29/19  0555 08/27/19  0505 08/26/19  0544 08/25/19  2230 08/25/19  1124     --  139  --  139   POTASSIUM 3.7  --  3.8 3.7 3.3*   CHLORIDE 108  --  108  --  109   CO2 27  --  27  --  27   BUN 14  --  8  --  13   CR 0.61 0.58 0.58  --  0.60   ANIONGAP 5  --  4  --  3   SABA 8.6  --  8.7  --  8.7   GLC 91 114* 98  --  114*

## 2019-08-31 VITALS
RESPIRATION RATE: 16 BRPM | OXYGEN SATURATION: 95 % | TEMPERATURE: 95.7 F | SYSTOLIC BLOOD PRESSURE: 149 MMHG | HEART RATE: 72 BPM | DIASTOLIC BLOOD PRESSURE: 90 MMHG

## 2019-08-31 LAB
ANION GAP SERPL CALCULATED.3IONS-SCNC: 4 MMOL/L (ref 3–14)
BACTERIA SPEC CULT: NO GROWTH
BACTERIA SPEC CULT: NO GROWTH
BUN SERPL-MCNC: 11 MG/DL (ref 7–30)
CALCIUM SERPL-MCNC: 8.5 MG/DL (ref 8.5–10.1)
CHLORIDE SERPL-SCNC: 109 MMOL/L (ref 94–109)
CO2 SERPL-SCNC: 30 MMOL/L (ref 20–32)
CREAT SERPL-MCNC: 0.62 MG/DL (ref 0.52–1.04)
GFR SERPL CREATININE-BSD FRML MDRD: >90 ML/MIN/{1.73_M2}
GLUCOSE SERPL-MCNC: 96 MG/DL (ref 70–99)
HGB BLD-MCNC: 8.5 G/DL (ref 11.7–15.7)
POTASSIUM SERPL-SCNC: 3.3 MMOL/L (ref 3.4–5.3)
SODIUM SERPL-SCNC: 143 MMOL/L (ref 133–144)
SPECIMEN SOURCE: NORMAL
SPECIMEN SOURCE: NORMAL

## 2019-08-31 PROCEDURE — 85018 HEMOGLOBIN: CPT | Performed by: INTERNAL MEDICINE

## 2019-08-31 PROCEDURE — 25000128 H RX IP 250 OP 636: Performed by: INTERNAL MEDICINE

## 2019-08-31 PROCEDURE — 99207 ZZC CDG-CODE CATEGORY CHANGED: CPT | Performed by: INTERNAL MEDICINE

## 2019-08-31 PROCEDURE — 96376 TX/PRO/DX INJ SAME DRUG ADON: CPT

## 2019-08-31 PROCEDURE — G0378 HOSPITAL OBSERVATION PER HR: HCPCS

## 2019-08-31 PROCEDURE — 99217 ZZC OBSERVATION CARE DISCHARGE: CPT | Performed by: INTERNAL MEDICINE

## 2019-08-31 PROCEDURE — 80048 BASIC METABOLIC PNL TOTAL CA: CPT | Performed by: INTERNAL MEDICINE

## 2019-08-31 PROCEDURE — 25000132 ZZH RX MED GY IP 250 OP 250 PS 637: Mod: GY | Performed by: INTERNAL MEDICINE

## 2019-08-31 PROCEDURE — 25000128 H RX IP 250 OP 636: Performed by: PHYSICIAN ASSISTANT

## 2019-08-31 PROCEDURE — 25000132 ZZH RX MED GY IP 250 OP 250 PS 637: Mod: GY | Performed by: PHYSICIAN ASSISTANT

## 2019-08-31 RX ORDER — POTASSIUM CHLORIDE 1500 MG/1
20-40 TABLET, EXTENDED RELEASE ORAL
Status: DISCONTINUED | OUTPATIENT
Start: 2019-08-31 | End: 2019-08-31 | Stop reason: HOSPADM

## 2019-08-31 RX ORDER — POTASSIUM CHLORIDE 1.5 G/1.58G
20-40 POWDER, FOR SOLUTION ORAL
Status: DISCONTINUED | OUTPATIENT
Start: 2019-08-31 | End: 2019-08-31 | Stop reason: HOSPADM

## 2019-08-31 RX ORDER — POTASSIUM CHLORIDE 29.8 MG/ML
20 INJECTION INTRAVENOUS
Status: DISCONTINUED | OUTPATIENT
Start: 2019-08-31 | End: 2019-08-31 | Stop reason: HOSPADM

## 2019-08-31 RX ORDER — POTASSIUM CHLORIDE 7.45 MG/ML
10 INJECTION INTRAVENOUS
Status: DISCONTINUED | OUTPATIENT
Start: 2019-08-31 | End: 2019-08-31 | Stop reason: HOSPADM

## 2019-08-31 RX ORDER — POTASSIUM CL/LIDO/0.9 % NACL 10MEQ/0.1L
10 INTRAVENOUS SOLUTION, PIGGYBACK (ML) INTRAVENOUS
Status: DISCONTINUED | OUTPATIENT
Start: 2019-08-31 | End: 2019-08-31 | Stop reason: HOSPADM

## 2019-08-31 RX ORDER — HYDROCODONE BITARTRATE AND ACETAMINOPHEN 5; 325 MG/1; MG/1
1 TABLET ORAL EVERY 4 HOURS PRN
Qty: 20 TABLET | Refills: 0 | Status: SHIPPED | OUTPATIENT
Start: 2019-08-31 | End: 2019-09-04

## 2019-08-31 RX ADMIN — ASPIRIN 325 MG: 325 TABLET, DELAYED RELEASE ORAL at 08:17

## 2019-08-31 RX ADMIN — HYDROXYZINE HYDROCHLORIDE 25 MG: 25 TABLET, FILM COATED ORAL at 00:26

## 2019-08-31 RX ADMIN — POTASSIUM CHLORIDE 40 MEQ: 1500 TABLET, EXTENDED RELEASE ORAL at 10:23

## 2019-08-31 RX ADMIN — IBUPROFEN 600 MG: 600 TABLET ORAL at 03:32

## 2019-08-31 RX ADMIN — LINACLOTIDE 290 MCG: 290 CAPSULE, GELATIN COATED ORAL at 08:17

## 2019-08-31 RX ADMIN — PRAMIPEXOLE DIHYDROCHLORIDE 1 MG: 1 TABLET ORAL at 08:16

## 2019-08-31 RX ADMIN — HYDROCODONE BITARTRATE AND ACETAMINOPHEN 1 TABLET: 5; 325 TABLET ORAL at 00:22

## 2019-08-31 RX ADMIN — LACOSAMIDE 200 MG: 200 TABLET, FILM COATED ORAL at 08:17

## 2019-08-31 RX ADMIN — RANITIDINE 150 MG: 150 TABLET ORAL at 08:17

## 2019-08-31 RX ADMIN — OMEPRAZOLE 40 MG: 20 CAPSULE, DELAYED RELEASE ORAL at 08:17

## 2019-08-31 RX ADMIN — SODIUM CHLORIDE, PRESERVATIVE FREE 5 ML: 5 INJECTION INTRAVENOUS at 06:09

## 2019-08-31 RX ADMIN — LEVOTHYROXINE SODIUM 50 MCG: 50 TABLET ORAL at 08:18

## 2019-08-31 RX ADMIN — LIOTHYRONINE SODIUM 10 MCG: 5 TABLET ORAL at 08:17

## 2019-08-31 RX ADMIN — HYDROCODONE BITARTRATE AND ACETAMINOPHEN 1 TABLET: 5; 325 TABLET ORAL at 08:16

## 2019-08-31 RX ADMIN — HYDROXYZINE HYDROCHLORIDE 25 MG: 25 TABLET, FILM COATED ORAL at 08:16

## 2019-08-31 RX ADMIN — CEFTRIAXONE SODIUM 2 G: 2 INJECTION, POWDER, FOR SOLUTION INTRAMUSCULAR; INTRAVENOUS at 08:16

## 2019-08-31 NOTE — PROGRESS NOTES
PRIMARY DIAGNOSIS: ACUTE PAIN  OUTPATIENT/OBSERVATION GOALS TO BE MET BEFORE DISCHARGE:  1. Pain Status: Improved-controlled with oral pain medications.    2. Return to near baseline physical activity: No    3. Cleared for discharge by consultants (if involved): No    Discharge Planner Nurse   Safe discharge environment identified: Yes  Barriers to discharge: Yes       Entered by: Syl Lord 08/30/2019 10:37 PM     Please review provider order for any additional goals.   Nurse to notify provider when observation goals have been met and patient is ready for discharge.    Pt VSS. Denies pain.   LS CTA bilat. No cough noted. IS encouraged.  BS active x4, +ve flatus. Reports loose BM x2, bowel meds held.  Reports numbness & tingling to fingers & toes, baseline per pt.  Up w/1 assist, walker, gb. Up to BSC x2. Alarms on.  Dressing to L hip c/d/i.  Wound vac in place, appears to be working effectively.  On regular diet, tolerating.  PICC (R),  hep locked.   Tx: Rocephin.   Consults: Ortho, ID, Pain & WOC.  Plan: Possible discharge 8/31 to TCU.

## 2019-08-31 NOTE — DISCHARGE SUMMARY
[facility]  Hospitalist Discharge Summary       Date of Admission:  8/29/2019  Date of Discharge:  8/31/2019  Discharging Provider: Chirag Morales DO      Discharge Diagnoses   1.  Right hip chronic wound with surrounding cellulitis.  Continue IV ceftriaxone for 2 weeks.  Orthopedic surgery following.  Follow-up with orthopedic surgery in the outpatient setting in 2 weeks.      2.  Chronic pain syndrome.  Pain medications as needed.     3.  Hypothyroidism.  Continue levothyroxine.     4.  Seizure disorder.  Continue Vimpat.     5.  IBS.  Continue Linzess.     6.  Anemia.  Hemoglobin 8.5 on the day of discharge.  Was stable from previous.  Should have CBC rechecked in 1 week with follow-up with primary care physician.     7.  GERD.  Continue omeprazole and ranitidine.     8.  Depression.  Continue Serzone.    9.  Hypokalemia.  Potassium replacement protocol during hospital stay.  Restart usual potassium replacement supplement.    Follow-ups Needed After Discharge   Follow-up Appointments     Follow Up and recommended labs and tests      Follow up with primary care provider in 1 week.  Follow-up with   orthopedic surgery within 2 weeks.  The following labs/tests are   recommended: BMP and CBC in 1 week.              Discharge Disposition   Discharged to rehabilitation facility  Condition at discharge: Stable      Consultations This Hospital Stay   ORTHOPEDIC SURGERY IP CONSULT  VASCULAR ACCESS ADULT IP CONSULT  VASCULAR ACCESS ADULT IP CONSULT  WOUND OSTOMY CONTINENCE NURSE  IP CONSULT  PAIN MANAGEMENT ADULT IP CONSULT  INFECTIOUS DISEASES IP CONSULT  SOCIAL WORK IP CONSULT  SPIRITUAL HEALTH SERVICES IP CONSULT  NUTRITION SERVICES ADULT IP CONSULT  PHYSICAL THERAPY ADULT IP CONSULT  OCCUPATIONAL THERAPY ADULT IP CONSULT    Code Status   Full Code    Time Spent on this Encounter   I spent 25 minutes with Ms. Barber and working on discharge on 8/31/2019.       Chirag Morales DO  [facility]  ______________________________________________________________________    Physical Exam   Vital Signs: Temp: 95.7  F (35.4  C) Temp src: Axillary BP: (!) 149/90 Pulse: 72 Heart Rate: 69 Resp: 16 SpO2: 95 % O2 Device: None (Room air)    Weight: 0 lbs 0 oz  Gen:  NAD, A&Ox3.  Eyes:  PERRL, sclera anicteric.  OP:  MMM, no lesions.  Neck:  Supple.  CV:  Regular, no murmurs.  Lung:  CTA b/l, normal effort.  Ab:  +BS, soft.  Skin:  Warm, dry to touch.  No rash.  Right hip dressing not removed.  Ext:  Minimal non pitting edema LE b/l.         Primary Care Physician   Skyler Álvarez    Discharge Orders      General info for SNF    Length of Stay Estimate: Short Term Care: Estimated # of Days <30  Condition at Discharge: Improving  Level of care:skilled   Rehabilitation Potential: Excellent  Admission H&P remains valid and up-to-date: Yes  Recent Chemotherapy: N/A  Use Nursing Home Standing Orders: Yes     Mantoux instructions    Give two-step Mantoux (PPD) Per Facility Policy Yes     Reason for your hospital stay    Right hip wound     Follow Up and recommended labs and tests    Follow up with primary care provider in 1 week.  Follow-up with orthopedic surgery within 2 weeks.  The following labs/tests are recommended: BMP and CBC in 1 week.     Activity - Up with nursing assistance     Full Code     Physical Therapy Adult Consult    Evaluate and treat as clinically indicated.    Reason:  Right hip surgery     Occupational Therapy Adult Consult    Evaluate and treat as clinically indicated.    Reason:  Right hip surgery     Advance Diet as Tolerated    Follow this diet upon discharge: Regular           Discharge Medications   Current Discharge Medication List      START taking these medications    Details   HYDROcodone-acetaminophen (NORCO) 5-325 MG tablet Take 1 tablet by mouth every 4 hours as needed for moderate to severe pain  Qty: 20 tablet, Refills: 0    Associated Diagnoses: Cellulitis of right hip          CONTINUE these medications which have NOT CHANGED    Details   acetaminophen (TYLENOL) 325 MG tablet Take 2 tablets (650 mg) by mouth every 4 hours as needed for other (multimodal surgical pain management along with NSAIDS and opioid medication as indicated based on pain control and physical function.)  Qty: 50 tablet, Refills: 0    Associated Diagnoses: Status post total hip replacement, right      albuterol (ALBUTEROL) 108 (90 BASE) MCG/ACT Inhaler Inhale 2 puffs into the lungs 4 times daily as needed for shortness of breath / dyspnea or wheezing  Qty: 1 Inhaler, Refills: 0    Associated Diagnoses: Cough      artificial saliva (BIOTENE MT) AERS spray Take 2 sprays by mouth 3 times daily as needed for dry mouth      aspirin (ASA) 325 MG EC tablet Take 1 tablet (325 mg) by mouth daily  Qty: 40 tablet, Refills: 0    Associated Diagnoses: Status post total hip replacement, right      budesonide-formoterol (SYMBICORT) 160-4.5 MCG/ACT Inhaler Inhale 2 puffs into the lungs 2 times daily      cefTRIAXone (ROCEPHIN) 1 GM vial Inject 2 g (2,000 mg) into the vein daily for 14 days ESR,CRP,CBC with differential, creatinine, SGOT weekly while on this medication to be faxed to Dr. Bradley office.  Qty: 600 mL, Refills: 0    Associated Diagnoses: Cellulitis of left lower extremity      cycloSPORINE (RESTASIS) 0.05 % ophthalmic emulsion Place 1 drop into both eyes 2 times daily      hypromellose (ARTIFICIAL TEARS) 0.5 % SOLN ophthalmic solution Place 1 drop into both eyes 2 times daily      ibuprofen (ADVIL/MOTRIN) 600 MG tablet Take 1 tablet (600 mg) by mouth every 6 hours as needed for moderate pain  Qty: 50 tablet, Refills: 0    Associated Diagnoses: Status post total hip replacement, right      lacosamide (VIMPAT) 200 MG TABS tablet Take 1 tablet (200 mg) by mouth 2 times daily  Qty: 10 tablet, Refills: 0    Associated Diagnoses: Arthralgia of hip, unspecified laterality      levothyroxine (SYNTHROID/LEVOTHROID) 50 MCG  tablet Take 50 mcg by mouth daily      Lidocaine (LIDOCARE) 4 % Patch Place 1 patch onto the skin every 24 hours To prevent lidocaine toxicity, patient should be patch free for 12 hrs daily.      linaclotide (LINZESS) 290 MCG capsule Take 1 capsule (290 mcg) by mouth every morning (before breakfast)  Qty: 10 capsule, Refills: 0    Associated Diagnoses: Other irritable bowel syndrome      liothyronine (CYTOMEL) 5 MCG tablet Take 10 mcg by mouth daily       nefazodone (SERZONE) 200 MG tablet Take 600 mg by mouth At Bedtime       omeprazole (PRILOSEC) 40 MG DR capsule Take 1 capsule (40 mg) by mouth 2 times daily  Qty: 180 capsule, Refills: 3    Associated Diagnoses: Gastroesophageal reflux disease without esophagitis; Esophageal stricture      ondansetron (ZOFRAN-ODT) 4 MG ODT tab Take 1 tablet (4 mg) by mouth every 6 hours as needed for nausea or vomiting  Qty: 10 tablet, Refills: 0    Associated Diagnoses: Status post total hip replacement, right      polyethylene glycol (MIRALAX/GLYCOLAX) packet Take 1 packet by mouth daily as needed for constipation      potassium citrate (UROCIT-K) 10 MEQ (1080 MG) CR tablet Take 10 mEq by mouth daily      !! pramipexole (MIRAPEX) 1 MG tablet Take 2 mg by mouth every evening . (takes 1 tab in am , 2 tabs 1 hour prior to hs)      !! pramipexole (MIRAPEX) 1 MG tablet Take 1 mg by mouth every morning . ( takes 1 tab in am , 2 tabs 1 hour prior to hs)      ranitidine (ZANTAC) 150 MG tablet TAKE 1 TABLET BY MOUTH TWICE DAILY  Qty: 180 tablet, Refills: 3    Comments: **Patient requests 90 days supply**  Associated Diagnoses: Gastroesophageal reflux disease without esophagitis      senna-docusate (SENOKOT-S/PERICOLACE) 8.6-50 MG tablet Take 1 tablet by mouth 2 times daily as needed for constipation  Qty: 20 tablet, Refills: 0    Associated Diagnoses: Status post total hip replacement, right      temazepam (RESTORIL) 15 MG capsule Take 1 capsule (15 mg) by mouth At Bedtime  Qty: 5  "capsule, Refills: 0    Associated Diagnoses: Other insomnia       !! - Potential duplicate medications found. Please discuss with provider.      STOP taking these medications       traMADol (ULTRAM) 50 MG tablet Comments:   Reason for Stopping:             Allergies   Allergies   Allergen Reactions     Clonopin [Clonazepam]      \"Walk funny and Mind goes funny\"     Encort [Hydrocortisone Acetate] Swelling     Feet swelled.     Encort [Hydrocortisone] Swelling     Erythromycin Diarrhea     Flagyl [Metronidazole] Nausea     Gabapentin      Over eats     Lamictal [Lamotrigine]      Oxycodone Nausea     Tegretol [Carbamazepine] Nausea and Vomiting     "

## 2019-08-31 NOTE — PROGRESS NOTES
Ortho Daily Progress Note  Pain controlled.  Denies cp or sob.    Temp:  [97.9  F (36.6  C)-98.9  F (37.2  C)] 98.4  F (36.9  C)  Pulse:  [73] 73  Heart Rate:  [68-76] 69  Resp:  [15-16] 16  BP: (123-152)/(46-87) 152/78  SpO2:  [94 %-97 %] 94 %  Hemoglobin   Date Value Ref Range Status   08/31/2019 8.5 (L) 11.7 - 15.7 g/dL Final       Alert, appropriate, and following commands  Breathing easily without wheeze  Wound blood stained bandage, able to gap wound edge and express small fluid distal with pressure SILT, palpable dp pulse    Applied zipline and 4 staples after sterile prep to distal aspect of incision yesterday.    A/P - 64 year old female s/p seroma wash out 8/26 following revision right hip SARAHI 8/6  Plan for incisional vac  DVT prophy - SCD and ASA  ID - single culture growing proteus from aspiration seroma at [facility] following revision.  Culture from revision on 8/6 and and I&D seroma on 8/26 NTD.  Will treat as infection.  Dr. Bradley ID following will treat as infection and plan 6 wks IV abx  TTWB RLE  PT/OT  Dispo - TCU, follow up Dr. Antonio in 2 weeks    Kg Willams PA-C

## 2019-08-31 NOTE — PROGRESS NOTES
Discharge Planner   Discharge Plans in progress: Pt able to d.c back to TCU today. Orders faxed to [facility]  Barriers to discharge plan: none anticipated. Family transport   Follow up plan: TCU aware. Bed after 1400       Entered by: Corinne C. White 08/31/2019 11:25 AM

## 2019-08-31 NOTE — PLAN OF CARE
PRIMARY DIAGNOSIS: ACUTE PAIN  OUTPATIENT/OBSERVATION GOALS TO BE MET BEFORE Discharge  1. Pain Status: Improved with oral pain meds    2. Return to near baseline physical activity:  NO    3. Cleared for discharge by consultants : NO    Discharge Planner Nurse   Safe discharge environment identified: YES to TCU  Barriers to discharge: YES       Entered by: Manuela Larsen 08/31/2019 4:51 AM     Please review provider order for any additional goals.   Nurse to notify provider when observation goals have been met and patient is ready for discharge.  A&OX4, VSS: stable. Up with Ax1 to bedside commode. CMS: baseline numbness BLE. Drsg: CDI.  Woundvac patent. PICC-line Hep locked. Possible discharge back to TCU today

## 2019-08-31 NOTE — CONSULTS
"CLINICAL NUTRITION SERVICES  -  ASSESSMENT NOTE      MALNUTRITION:  % Weight Loss:  Weight loss does not meet criteria for malnutrition   % Intake:  <75% for > 7 days (non-severe malnutrition)  Subcutaneous Fat Loss:  None observed  Muscle Loss:  Temporal region mild/moderate depletion, Acromion bone region mild depletion, Dorsal hand region mild/moderate depletion and Posterior calf region mild depletion  Fluid Retention:  None noted    Malnutrition Diagnosis: Non-Severe malnutrition  In Context of:  Acute illness or injury  Chronic illness or disease        REASON FOR ASSESSMENT  Mercedes Barber is a 64 year old female seen by Registered Dietitian for Provider Order - \"risk for malnutrition due to poor appetite. Please provide assessment and education to patient. Supplements ordered\".       NUTRITION HISTORY  - Information obtained from patient and chart.  - Patient with a h/o GERD, IBS, seizures, R hip cellulitis.  Admitted from TCU 2/2 increase in drainage from R hip.  - Recent admission and assessment by RD 8/27/2019:    - Prior to admission pt was not eating very well for about 1 month (since surgery on 7/23) due to a combination of factors of which include poor appetite, disliked the food at [facility] (where she was for about 2 weeks for physical therapy and rehab) and nausea. Pt likely consuming <75% of nutritional needs during this time.   - Breakfast: eggs, sausage and toast   - Lunch: 1/2 sandwich with lunch meat   - Dinner: salmon with vegetables.   - Pt does avoid a variety of foods due to IBS: corn on the cob, dairy products besides cheese, apples, among others.    - Met non-severe malnutrition criteria at this time.    - Patient again confirms decreased appetite and eating less than usual for a period of weeks PTA.  Describes early satiety along with decreased appetite.  Not necessarily skipping meals, though consistently eating smaller portions at meal times.  - Liked the supplement she " "received last admit and wants this to continue at [facility].  - NKFA.     CURRENT NUTRITION ORDERS  Diet Order:     Regular  Boost shakes between meals BID, chocolate    Current Intake/Tolerance:  25-75% meal consumption since admit.       NUTRITION FOCUSED PHYSICAL ASSESSMENT FOR DIAGNOSING MALNUTRITION)  Completed:  Yes Full assessment         Observed:    Muscle wasting (refer to documentation in Malnutrition section)    Obtained from Chart/Interdisciplinary Team:  Pain team following  Will discharge back to TCU  WOCN following: now with wound VAC  Stooling patterns    ANTHROPOMETRICS  Height: 5' 5\"  Weight: 89.7 kg (197#)  Body Mass Index: 32.9 kg/m^2  Weight Status:  Obesity Grade I BMI 30-34.9  IBW: 57 kg (125#)  % IBW: 157%  Weight History:  Wt Readings from Last 10 Encounters:   08/29/19 89.5 kg (197 lb 6.4 oz)   08/06/19 90.7 kg (200 lb)   07/23/19 90.2 kg (198 lb 12.8 oz)   07/02/19 89.7 kg (197 lb 11.2 oz)   05/17/19 89.4 kg (197 lb)   03/17/19 92.5 kg (204 lb)   02/09/19 92.5 kg (204 lb)   09/20/18 92.5 kg (204 lb)   09/18/17 93 kg (205 lb)   07/17/17 92.1 kg (203 lb)   - 3% wt loss since 3/2019, not of nutritional significance.    LABS  Labs reviewed    MEDICATIONS  Medications reviewed      ASSESSED NUTRITION NEEDS PER APPROVED PRACTICE GUIDELINES:    Dosing Weight 89.7 kg - actual body weight   Estimated Energy Needs: 6572-1073 kcals (25-30 Kcal/Kg)  Justification: maintenance  Estimated Protein Needs: 108+ grams protein (1.2+ g pro/Kg)  Justification: preservation of lean body mass and wound healing  Estimated Fluid Needs: per MD    MALNUTRITION:  % Weight Loss:  Weight loss does not meet criteria for malnutrition   % Intake:  <75% for > 7 days (non-severe malnutrition)  Subcutaneous Fat Loss:  None observed  Muscle Loss:  Temporal region mild/moderate depletion, Acromion bone region mild depletion, Dorsal hand region mild/moderate depletion and Posterior calf region mild depletion  Fluid Retention:  None " noted    Malnutrition Diagnosis: Non-Severe malnutrition  In Context of:  Acute illness or injury  Chronic illness or disease      NUTRITION DIAGNOSIS:  Inadequate oral intake related to decreased appetite, increased wound healing needs as evidenced by likely meeting <75% needs over the past >1 week, muscle loss, coding of malnutrition.      NUTRITION INTERVENTIONS  Recommendations / Nutrition Prescription  Continue regular diet with protein focus.    Continue supplements as ordered.      Implementation  Nutrition education: Provided education on tips for increasing protein and calories from food.    Collaboration and Referral of Nutrition care: Discussed POC with RN.      Nutrition Goals  Patient to consume at least 50-75% of meals TID or the equivalent with supplement use.      MONITORING AND EVALUATION:  Progress towards goals will be monitored and evaluated per protocol and Practice Guidelines            Elissa Jones RD, LD  Clinical Dietitian  3rd floor/ICU: [phone]  All other floors: [phone]  Weekend/holiday: [phone]

## 2019-09-03 ENCOUNTER — PATIENT OUTREACH (OUTPATIENT)
Dept: FAMILY MEDICINE | Facility: CLINIC | Age: 64
End: 2019-09-03

## 2019-09-03 ASSESSMENT — MIFFLIN-ST. JEOR: SCORE: 1427.67

## 2019-09-03 NOTE — PROGRESS NOTES
Clinic Care Coordination Contact  Care Coordination Transition Communication    Clinical Data: Patient was hospitalized at [facility] from 8/29/19 to 8/31/19 with diagnoses of:      1. Right hip chronic wound with surrounding cellulitis.  Continue IV ceftriaxone for 2 weeks.  Orthopedic surgery following.  Follow-up with orthopedic surgery in the outpatient setting in 2 weeks.   2.  Chronic pain syndrome.  Pain medications as needed.  3.  Hypothyroidism.  Continue levothyroxine.  4.  Seizure disorder.  Continue Vimpat.  5.  IBS.  Continue Linzess.  6.  Anemia.  Hemoglobin 8.5 on the day of discharge.  Was stable from previous.  Should have CBC rechecked in 1 week with follow-up with primary care physician.  7.  GERD.  Continue omeprazole and ranitidine.  8.  Depression.  Continue Serzone.  9.  Hypokalemia.  Potassium replacement protocol during hospital stay.  Restart usual potassium replacement supplement.      Return to Facility:              Facility Name: [facility]              Contact name and phone number/fax: [phone]  Writer called [facility] to confirm that patient did return to [facility] from the hospital. Writer left a voice message for TCU  Tiff requesting that Tiff update writer re: patient's discharge plans/needs when patient is close to discharge from TCU.    Plan: SW Care Coordinator will await notification from facility staff informing SW Care Coordinator of patient's discharge plans/needs. SW Care Coordinator will review chart and outreach to facility staff every 4 weeks and as needed.     GOSIA Florian  [facility] Care Coordination  [facility] [place]   Email: [email or web address]  Tele: [phone]

## 2019-09-03 NOTE — PROGRESS NOTES
[place] [facility]  PRIMARY CARE PROVIDER AND CLINIC:  Skyler Álvarez MD, [address] [place] [address] / [facility] [address]  Chief Complaint   Patient presents with     Hospital F/U     [place] Medical Record Number:  [id number]  Place of Service where encounter took place:  [facility] ([facility]) [[id number]]    Mercedes Barber  is a 64 year old  (1955), admitted to the above facility from  [facility]. Hospital stay 8/29/19 through 8/31/19..  Admitted to this facility for  rehab, medical management and nursing care.    HPI:    HPI information obtained from: facility chart records, facility staff and patient report.   Brief Summary of Hospital Course:   Updates on Status Since Skilled nursing Admission:     Patient Mercedes Barber is a 54 yr old female admitted to [facility] for rehabilitation s/p hospitalization [facility] 8/29-8/31/19 right hip cellulitis & abscess: s/p seroma wash out 8/26 following revision right hip SARAHI 8/6. Plan to continue wound vac  ID - single culture growing proteus from aspiration seroma at [place] following revision.  Culture from revision on 8/6 and and I&D seroma on 8/26 NTD. Applied zipline and 4 staples after sterile prep to distal aspect of incision      PMHx chronic pain syndrome, seizure disorder, IBS, hypothyroidism, anemia, GERD & depression & sleep apnea (not use CPAP per Epic)    TODAY  Patient request tramadol for improve pain management. States this is more effective than Norco for her  Participating in therapies  She states roommate loud and had TV on all night. She requests roommate change          CODE STATUS/ADVANCE DIRECTIVES DISCUSSION:   CPR/Full code   Patient's living condition: lives alone  ALLERGIES: Clonopin [clonazepam]; Encort [hydrocortisone acetate]; Encort [hydrocortisone]; Erythromycin; Flagyl [metronidazole]; Gabapentin; Lamictal [lamotrigine]; Oxycodone; and Tegretol [carbamazepine]  PAST  MEDICAL HISTORY:  has a past medical history of Arthritis, Cervical high risk HPV (human papillomavirus) test positive (07/17/2017), Cervical pain (9/15/2016), Constipation (8/27/2012), Diarrhea (4/30/2009), Esophageal stricture (2/21/2012), Gastro-oesophageal reflux disease, Hypothyroidism (9/18/2012), IBS (irritable bowel syndrome), Mild major depression (H) (2/21/2012), Neuropathy of lower extremity, Rectal prolapse, Restless leg syndrome, Seizure disorder (H), Sleep apnea, and Temporal sclerosis (8/27/2012). She also has no past medical history of Complication of anesthesia, History of blood transfusion, or Malignant hyperthermia.  PAST SURGICAL HISTORY:   has a past surgical history that includes tonsillectomy & adenoidectomy; carpal tunnel release rt/lt; drain pilonidal cyst simpl; L shoulder fracture; rectal prolapse repair abdominally; tubal ligation; Dilation and curettage; Remove mesh vagina (8/10/2012); Excise lesion trunk (8/10/2012); Esophagoscopy, gastroscopy, duodenoscopy (EGD), combined (4/5/2013); Dental surgery; Release plantar fascia (Right, 1/20/2015); Repair hammer toe (Right, 1/20/2015); Endoscopy Drug Induced Sleep (N/A, 11/18/2015); Hysterectomy; Anterior COLPORRHAPHY, BLADDER/VAGINA; Arthroplasty patello-femoral (knee) (Bilateral); Arthroplasty hip (Right, 7/23/2019); Arthroplasty revision hip (Right, 8/7/2019); and Irrigation and debridement hip, combined (Right, 8/26/2019).  FAMILY HISTORY: family history includes Alcohol/Drug in her brother; Alzheimer Disease in her paternal grandmother; Breast Cancer in her sister; Congenital Anomalies in her daughter; Depression in her brother and sister; Diabetes in her father; Eye Disorder in her mother; Gastrointestinal Disease in her brother; Hypertension in her brother; Lipids in her brother, mother, and sister; Neurologic Disorder in her daughter; Osteoporosis in her mother; Psychotic Disorder in her brother; Thyroid Disease in her sister.  SOCIAL  HISTORY:   reports that she has never smoked. She has never used smokeless tobacco. She reports that she drinks alcohol. She reports that she does not use drugs.    Post Discharge Medication Reconciliation Status: discharge medications reconciled and changed, per note/orders (see AVS)    Current Outpatient Medications   Medication Sig Dispense Refill     acetaminophen (TYLENOL) 325 MG tablet Take 2 tablets (650 mg) by mouth every 4 hours as needed for other (multimodal surgical pain management along with NSAIDS and opioid medication as indicated based on pain control and physical function.) 50 tablet 0     albuterol (ALBUTEROL) 108 (90 BASE) MCG/ACT Inhaler Inhale 2 puffs into the lungs 4 times daily as needed for shortness of breath / dyspnea or wheezing 1 Inhaler 0     artificial saliva (BIOTENE MT) AERS spray Take 2 sprays by mouth 3 times daily as needed for dry mouth       aspirin (ASA) 325 MG EC tablet Take 1 tablet (325 mg) by mouth daily 40 tablet 0     budesonide-formoterol (SYMBICORT) 160-4.5 MCG/ACT Inhaler Inhale 2 puffs into the lungs 2 times daily       cefTRIAXone (ROCEPHIN) 1 GM vial Inject 2 g (2,000 mg) into the vein daily for 14 days ESR,CRP,CBC with differential, creatinine, SGOT weekly while on this medication to be faxed to Dr. Bradley office. 600 mL 0     cycloSPORINE (RESTASIS) 0.05 % ophthalmic emulsion Place 1 drop into both eyes 2 times daily       HYDROcodone-acetaminophen (NORCO) 5-325 MG tablet Take 1 tablet by mouth every 4 hours as needed for moderate to severe pain 20 tablet 0     hypromellose (ARTIFICIAL TEARS) 0.5 % SOLN ophthalmic solution Place 1 drop into both eyes 2 times daily       ibuprofen (ADVIL/MOTRIN) 600 MG tablet Take 1 tablet (600 mg) by mouth every 6 hours as needed for moderate pain 50 tablet 0     lacosamide (VIMPAT) 200 MG TABS tablet Take 1 tablet (200 mg) by mouth 2 times daily 10 tablet 0     levothyroxine (SYNTHROID/LEVOTHROID) 50 MCG tablet Take 50 mcg by  "mouth daily       Lidocaine (LIDOCARE) 4 % Patch Place 1 patch onto the skin every 24 hours To prevent lidocaine toxicity, patient should be patch free for 12 hrs daily.       linaclotide (LINZESS) 290 MCG capsule Take 1 capsule (290 mcg) by mouth every morning (before breakfast) 10 capsule 0     liothyronine (CYTOMEL) 5 MCG tablet Take 10 mcg by mouth daily        nefazodone (SERZONE) 200 MG tablet Take 600 mg by mouth At Bedtime        omeprazole (PRILOSEC) 40 MG DR capsule Take 1 capsule (40 mg) by mouth 2 times daily 180 capsule 3     ondansetron (ZOFRAN-ODT) 4 MG ODT tab Take 1 tablet (4 mg) by mouth every 6 hours as needed for nausea or vomiting 10 tablet 0     polyethylene glycol (MIRALAX/GLYCOLAX) packet Take 1 packet by mouth daily as needed for constipation       potassium citrate (UROCIT-K) 10 MEQ (1080 MG) CR tablet Take 10 mEq by mouth daily       pramipexole (MIRAPEX) 1 MG tablet Give 1 tab in AM; and 2 tabs 1 hour prior to hs       ranitidine (ZANTAC) 150 MG tablet TAKE 1 TABLET BY MOUTH TWICE DAILY 180 tablet 3     senna-docusate (SENOKOT-S/PERICOLACE) 8.6-50 MG tablet Take 1 tablet by mouth 2 times daily as needed for constipation 20 tablet 0     temazepam (RESTORIL) 15 MG capsule Take 1 capsule (15 mg) by mouth At Bedtime 5 capsule 0     pramipexole (MIRAPEX) 1 MG tablet Take 2 mg by mouth every evening . (takes 1 tab in am , 2 tabs 1 hour prior to hs)         ROS:  10 point ROS of systems including Constitutional, Eyes, Respiratory, Cardiovascular, Gastroenterology, Genitourinary, Integumentary, Musculoskeletal, Psychiatric were all negative except for pertinent positives noted in my HPI.    Vitals:  /71   Pulse 70   Temp 97.7  F (36.5  C)   Resp 19   Ht 1.651 m (5' 5\")   Wt 88.2 kg (194 lb 6.4 oz)   LMP 03/11/2010   SpO2 92%   BMI 32.35 kg/m    Exam:  GENERAL APPEARANCE:  Alert, in no distress  ENT:  Mouth and posterior oropharynx normal, moist mucous membranes  EYES:  EOM, " conjunctivae, lids, pupils and irises normal  NECK:  No adenopathy,masses or thyromegaly  RESP:  respiratory effort and palpation of chest normal, lungs clear to auscultation , no respiratory distress  CV:  Palpation and auscultation of heart done , regular rate and rhythm, no murmur, rub, or gallop  ABDOMEN:  normal bowel sounds, soft, nontender, no hepatosplenomegaly or other masses  M/S:   lying in bed  SKIN:  Inspection of skin and subcutaneous tissue wound vac intact right hip  NEURO:   Cranial nerves 2-12 are normal tested and grossly at patient's baseline  PSYCH:  oriented X 3    Lab/Diagnostic data:  Labs done in SNF are in [facility]. Please refer to them using [employer] Everywhere.    ASSESSMENT/PLAN:  Right hip infection/cellulitis  IV ceftriaxone x2 week  ESR,CRP,CBC with differential, creatinine, SGOT weekly while on this medication to be faxed to Dr. Bradley office.  follow up Dr. Antonio in 2 weeks (9/12/19)  PICC cares per protocol   Continue wound vac cares  toe touch weight bearing    Restart Tramadol. Will discontinue Norco in near future if patient not using   Continue tylenol & ibuprofen. Goal wean off narcotics  Continue therapies   involved in safe plan home  Patient lives alone  Continue aspirin for DVT prophylaxis    Chronic pain syndrome  Continue on current medications to treat   Continue ibuprofen, tramadol & as needed lidocaine patch  Will continue with Norco for now. Plan to discontinue if not using    RLS  Continue Mirapex, monitor     Hypothyroidism  chronic managed   Continue on current dose Levothyroxine & cytomel , monitor     Seizure disorder  No signs and symptoms seizure  Continue Vimpat    IBS  Patient states she had bowel movement yesterday, however, she states had been several days prior to this   She request scheduled Miralax. She is reminded that she has as needed Senna S as well  Will schedule Miralax 17 g daily, monitor   Continue  Linzess    GERD  chronic managed   Continue on omeprazole & ranitidine, monitor   Consider discontinue PPI or ranitidine in future due to polypharmacy     Depression  Patient states she is coping, however, recent surgery with infection has been challenging  Continue on Serzone  Has supportive family   Insomnia  Continue Restoril  Patient report roommate loud, updated nurse manager-patient would like roomchange    Anemia  hgb 8.5 on hospital discharge, monitor trend  BMP,CBC 9/6/19    Hypokalemia  On potassium, monitor     dyspnea  Continue Symbicort & albuterol as needed ,monitor      Updated daughter Rachael today on patient status, monitor        Total time spent with patient visit at the skilled nursing facility was 35 min including patient visit, review of past records and phone call to patient contact. Greater than 50% of total time spent with counseling and coordinating care due to coordinating care with nurse on admission orders and coordinating care with follow up labs and appointments.    Electronically signed by:  FROYLAN Munguia CNP

## 2019-09-04 ENCOUNTER — NURSING HOME VISIT (OUTPATIENT)
Dept: GERIATRICS | Facility: CLINIC | Age: 64
End: 2019-09-04
Payer: MEDICARE

## 2019-09-04 VITALS
WEIGHT: 194.4 LBS | HEIGHT: 65 IN | TEMPERATURE: 97.7 F | SYSTOLIC BLOOD PRESSURE: 114 MMHG | RESPIRATION RATE: 19 BRPM | DIASTOLIC BLOOD PRESSURE: 71 MMHG | HEART RATE: 70 BPM | BODY MASS INDEX: 32.39 KG/M2 | OXYGEN SATURATION: 92 %

## 2019-09-04 DIAGNOSIS — T84.51XD INFECTION ASSOCIATED WITH INTERNAL RIGHT HIP PROSTHESIS, SUBSEQUENT ENCOUNTER: Primary | ICD-10-CM

## 2019-09-04 DIAGNOSIS — M25.559 ARTHRALGIA OF HIP, UNSPECIFIED LATERALITY: ICD-10-CM

## 2019-09-04 DIAGNOSIS — G47.09 OTHER INSOMNIA: ICD-10-CM

## 2019-09-04 DIAGNOSIS — L03.115 CELLULITIS OF RIGHT HIP: ICD-10-CM

## 2019-09-04 PROCEDURE — 99310 SBSQ NF CARE HIGH MDM 45: CPT | Performed by: NURSE PRACTITIONER

## 2019-09-04 RX ORDER — POLYETHYLENE GLYCOL 3350 17 G/17G
1 POWDER, FOR SOLUTION ORAL DAILY
COMMUNITY
End: 2019-09-06

## 2019-09-04 RX ORDER — HYDROCODONE BITARTRATE AND ACETAMINOPHEN 5; 325 MG/1; MG/1
1 TABLET ORAL EVERY 4 HOURS PRN
Qty: 20 TABLET | Refills: 0 | Status: SHIPPED | OUTPATIENT
Start: 2019-09-04 | End: 2019-09-06

## 2019-09-04 RX ORDER — TEMAZEPAM 15 MG/1
15 CAPSULE ORAL AT BEDTIME
Qty: 30 CAPSULE | Refills: 0 | Status: SHIPPED | OUTPATIENT
Start: 2019-09-04 | End: 2019-10-02

## 2019-09-04 RX ORDER — LACOSAMIDE 200 MG/1
200 TABLET ORAL 2 TIMES DAILY
Qty: 60 TABLET | Refills: 0 | Status: SHIPPED | OUTPATIENT
Start: 2019-09-04 | End: 2019-09-04

## 2019-09-04 RX ORDER — LACOSAMIDE 200 MG/1
200 TABLET ORAL 2 TIMES DAILY
Qty: 60 TABLET | Refills: 0 | Status: SHIPPED | OUTPATIENT
Start: 2019-09-04 | End: 2019-10-02

## 2019-09-04 RX ORDER — TRAMADOL HYDROCHLORIDE 50 MG/1
50 TABLET ORAL EVERY 6 HOURS PRN
Qty: 90 TABLET | Refills: 0 | Status: SHIPPED | OUTPATIENT
Start: 2019-09-04 | End: 2019-09-09

## 2019-09-04 NOTE — LETTER
9/4/2019        RE: Mercedes Barber  [address]  [facility] [address]        [place] [facility]  PRIMARY CARE PROVIDER AND CLINIC:  Skyler Álvarez MD, [address] / [facility] [address]  Chief Complaint   Patient presents with     Hospital F/U     [place] Medical Record Number:  [id number]  Place of Service where encounter took place:  [facility] ([facility] [[id number]]    Mercedes Barber  is a 64 year old  (1955), admitted to the above facility from  [facility]. Hospital stay 8/29/19 through 8/31/19..  Admitted to this facility for  rehab, medical management and nursing care.    HPI:    HPI information obtained from: facility chart records, facility staff and patient report.   Brief Summary of Hospital Course:   Updates on Status Since Skilled nursing Admission:     Patient Mercedes Barber is a 54 yr old female admitted to [facility] for rehabilitation s/p hospitalization [facility] 8/29-8/31/19 right hip cellulitis & abscess: s/p seroma wash out 8/26 following revision right hip SARAHI 8/6. Plan to continue wound vac  ID - single culture growing proteus from aspiration seroma at [place] following revision.  Culture from revision on 8/6 and and I&D seroma on 8/26 NTD. Applied zipline and 4 staples after sterile prep to distal aspect of incision      PMHx chronic pain syndrome, seizure disorder, IBS, hypothyroidism, anemia, GERD & depression & sleep apnea (not use CPAP per Epic)    TODAY  Patient request tramadol for improve pain management. States this is more effective than Norco for her  Participating in therapies  She states roommate loud and had TV on all night. She requests roommate change          CODE STATUS/ADVANCE DIRECTIVES DISCUSSION:   CPR/Full code   Patient's living condition: lives alone  ALLERGIES: Clonopin [clonazepam]; Encort [hydrocortisone acetate]; Encort [hydrocortisone]; Erythromycin;  Flagyl [metronidazole]; Gabapentin; Lamictal [lamotrigine]; Oxycodone; and Tegretol [carbamazepine]  PAST MEDICAL HISTORY:  has a past medical history of Arthritis, Cervical high risk HPV (human papillomavirus) test positive (07/17/2017), Cervical pain (9/15/2016), Constipation (8/27/2012), Diarrhea (4/30/2009), Esophageal stricture (2/21/2012), Gastro-oesophageal reflux disease, Hypothyroidism (9/18/2012), IBS (irritable bowel syndrome), Mild major depression (H) (2/21/2012), Neuropathy of lower extremity, Rectal prolapse, Restless leg syndrome, Seizure disorder (H), Sleep apnea, and Temporal sclerosis (8/27/2012). She also has no past medical history of Complication of anesthesia, History of blood transfusion, or Malignant hyperthermia.  PAST SURGICAL HISTORY:   has a past surgical history that includes tonsillectomy & adenoidectomy; carpal tunnel release rt/lt; drain pilonidal cyst simpl; L shoulder fracture; rectal prolapse repair abdominally; tubal ligation; Dilation and curettage; Remove mesh vagina (8/10/2012); Excise lesion trunk (8/10/2012); Esophagoscopy, gastroscopy, duodenoscopy (EGD), combined (4/5/2013); Dental surgery; Release plantar fascia (Right, 1/20/2015); Repair hammer toe (Right, 1/20/2015); Endoscopy Drug Induced Sleep (N/A, 11/18/2015); Hysterectomy; Anterior COLPORRHAPHY, BLADDER/VAGINA; Arthroplasty patello-femoral (knee) (Bilateral); Arthroplasty hip (Right, 7/23/2019); Arthroplasty revision hip (Right, 8/7/2019); and Irrigation and debridement hip, combined (Right, 8/26/2019).  FAMILY HISTORY: family history includes Alcohol/Drug in her brother; Alzheimer Disease in her paternal grandmother; Breast Cancer in her sister; Congenital Anomalies in her daughter; Depression in her brother and sister; Diabetes in her father; Eye Disorder in her mother; Gastrointestinal Disease in her brother; Hypertension in her brother; Lipids in her brother, mother, and sister; Neurologic Disorder in her  daughter; Osteoporosis in her mother; Psychotic Disorder in her brother; Thyroid Disease in her sister.  SOCIAL HISTORY:   reports that she has never smoked. She has never used smokeless tobacco. She reports that she drinks alcohol. She reports that she does not use drugs.    Post Discharge Medication Reconciliation Status: discharge medications reconciled and changed, per note/orders (see AVS)    Current Outpatient Medications   Medication Sig Dispense Refill     acetaminophen (TYLENOL) 325 MG tablet Take 2 tablets (650 mg) by mouth every 4 hours as needed for other (multimodal surgical pain management along with NSAIDS and opioid medication as indicated based on pain control and physical function.) 50 tablet 0     albuterol (ALBUTEROL) 108 (90 BASE) MCG/ACT Inhaler Inhale 2 puffs into the lungs 4 times daily as needed for shortness of breath / dyspnea or wheezing 1 Inhaler 0     artificial saliva (BIOTENE MT) AERS spray Take 2 sprays by mouth 3 times daily as needed for dry mouth       aspirin (ASA) 325 MG EC tablet Take 1 tablet (325 mg) by mouth daily 40 tablet 0     budesonide-formoterol (SYMBICORT) 160-4.5 MCG/ACT Inhaler Inhale 2 puffs into the lungs 2 times daily       cefTRIAXone (ROCEPHIN) 1 GM vial Inject 2 g (2,000 mg) into the vein daily for 14 days ESR,CRP,CBC with differential, creatinine, SGOT weekly while on this medication to be faxed to Dr. Bradley office. 600 mL 0     cycloSPORINE (RESTASIS) 0.05 % ophthalmic emulsion Place 1 drop into both eyes 2 times daily       HYDROcodone-acetaminophen (NORCO) 5-325 MG tablet Take 1 tablet by mouth every 4 hours as needed for moderate to severe pain 20 tablet 0     hypromellose (ARTIFICIAL TEARS) 0.5 % SOLN ophthalmic solution Place 1 drop into both eyes 2 times daily       ibuprofen (ADVIL/MOTRIN) 600 MG tablet Take 1 tablet (600 mg) by mouth every 6 hours as needed for moderate pain 50 tablet 0     lacosamide (VIMPAT) 200 MG TABS tablet Take 1 tablet (200  "mg) by mouth 2 times daily 10 tablet 0     levothyroxine (SYNTHROID/LEVOTHROID) 50 MCG tablet Take 50 mcg by mouth daily       Lidocaine (LIDOCARE) 4 % Patch Place 1 patch onto the skin every 24 hours To prevent lidocaine toxicity, patient should be patch free for 12 hrs daily.       linaclotide (LINZESS) 290 MCG capsule Take 1 capsule (290 mcg) by mouth every morning (before breakfast) 10 capsule 0     liothyronine (CYTOMEL) 5 MCG tablet Take 10 mcg by mouth daily        nefazodone (SERZONE) 200 MG tablet Take 600 mg by mouth At Bedtime        omeprazole (PRILOSEC) 40 MG DR capsule Take 1 capsule (40 mg) by mouth 2 times daily 180 capsule 3     ondansetron (ZOFRAN-ODT) 4 MG ODT tab Take 1 tablet (4 mg) by mouth every 6 hours as needed for nausea or vomiting 10 tablet 0     polyethylene glycol (MIRALAX/GLYCOLAX) packet Take 1 packet by mouth daily as needed for constipation       potassium citrate (UROCIT-K) 10 MEQ (1080 MG) CR tablet Take 10 mEq by mouth daily       pramipexole (MIRAPEX) 1 MG tablet Give 1 tab in AM; and 2 tabs 1 hour prior to hs       ranitidine (ZANTAC) 150 MG tablet TAKE 1 TABLET BY MOUTH TWICE DAILY 180 tablet 3     senna-docusate (SENOKOT-S/PERICOLACE) 8.6-50 MG tablet Take 1 tablet by mouth 2 times daily as needed for constipation 20 tablet 0     temazepam (RESTORIL) 15 MG capsule Take 1 capsule (15 mg) by mouth At Bedtime 5 capsule 0     pramipexole (MIRAPEX) 1 MG tablet Take 2 mg by mouth every evening . (takes 1 tab in am , 2 tabs 1 hour prior to hs)         ROS:  10 point ROS of systems including Constitutional, Eyes, Respiratory, Cardiovascular, Gastroenterology, Genitourinary, Integumentary, Musculoskeletal, Psychiatric were all negative except for pertinent positives noted in my HPI.    Vitals:  /71   Pulse 70   Temp 97.7  F (36.5  C)   Resp 19   Ht 1.651 m (5' 5\")   Wt 88.2 kg (194 lb 6.4 oz)   LMP 03/11/2010   SpO2 92%   BMI 32.35 kg/m     Exam:  GENERAL APPEARANCE:  " Alert, in no distress  ENT:  Mouth and posterior oropharynx normal, moist mucous membranes  EYES:  EOM, conjunctivae, lids, pupils and irises normal  NECK:  No adenopathy,masses or thyromegaly  RESP:  respiratory effort and palpation of chest normal, lungs clear to auscultation , no respiratory distress  CV:  Palpation and auscultation of heart done , regular rate and rhythm, no murmur, rub, or gallop  ABDOMEN:  normal bowel sounds, soft, nontender, no hepatosplenomegaly or other masses  M/S:   lying in bed  SKIN:  Inspection of skin and subcutaneous tissue wound vac intact right hip  NEURO:   Cranial nerves 2-12 are normal tested and grossly at patient's baseline  PSYCH:  oriented X 3    Lab/Diagnostic data:  Labs done in SNF are in [facility]. Please refer to them using [employer] Everywhere.    ASSESSMENT/PLAN:  Right hip infection/cellulitis  IV ceftriaxone x2 week  ESR,CRP,CBC with differential, creatinine, SGOT weekly while on this medication to be faxed to Dr. Bradley office.  follow up Dr. Antonio in 2 weeks (9/12/19)  PICC cares per protocol   Continue wound vac cares  toe touch weight bearing    Restart Tramadol. Will discontinue Norco in near future if patient not using   Continue tylenol & ibuprofen. Goal wean off narcotics  Continue therapies   involved in safe plan home  Patient lives alone  Continue aspirin for DVT prophylaxis    Chronic pain syndrome  Continue on current medications to treat   Continue ibuprofen, tramadol & as needed lidocaine patch  Will continue with Norco for now. Plan to discontinue if not using    RLS  Continue Mirapex, monitor     Hypothyroidism  chronic managed   Continue on current dose Levothyroxine & cytomel , monitor     Seizure disorder  No signs and symptoms seizure  Continue Vimpat    IBS  Patient states she had bowel movement yesterday, however, she states had been several days prior to this   She request scheduled Miralax. She is reminded that she  has as needed Senna S as well  Will schedule Miralax 17 g daily, monitor   Continue Linzess    GERD  chronic managed   Continue on omeprazole & ranitidine, monitor   Consider discontinue PPI or ranitidine in future due to polypharmacy     Depression  Patient states she is coping, however, recent surgery with infection has been challenging  Continue on Serzone  Has supportive family   Insomnia  Continue Restoril  Patient report roommate loud, updated nurse manager-patient would like roomchange    Anemia  hgb 8.5 on hospital discharge, monitor trend  BMP,CBC 9/6/19    Hypokalemia  On potassium, monitor     dyspnea  Continue Symbicort & albuterol as needed ,monitor      Updated daughter Rachael today on patient status, monitor        Total time spent with patient visit at the [facility] nursing facility was 35 min including patient visit, review of past records and phone call to patient contact. Greater than 50% of total time spent with counseling and coordinating care due to coordinating care with nurse on admission orders and coordinating care with follow up labs and appointments.    Electronically signed by:  FROYLAN Munguia CNP                       Sincerely,        FROYLAN Munguia CNP

## 2019-09-06 ENCOUNTER — NURSING HOME VISIT (OUTPATIENT)
Dept: GERIATRICS | Facility: CLINIC | Age: 64
End: 2019-09-06
Payer: MEDICARE

## 2019-09-06 ENCOUNTER — HOSPITAL ENCOUNTER (EMERGENCY)
Facility: CLINIC | Age: 64
Discharge: HOME | End: 2019-09-06
Attending: EMERGENCY MEDICINE | Admitting: EMERGENCY MEDICINE
Payer: MEDICARE

## 2019-09-06 VITALS
TEMPERATURE: 97.7 F | BODY MASS INDEX: 32.39 KG/M2 | RESPIRATION RATE: 18 BRPM | HEART RATE: 72 BPM | WEIGHT: 194.4 LBS | HEIGHT: 65 IN | SYSTOLIC BLOOD PRESSURE: 132 MMHG | OXYGEN SATURATION: 92 % | DIASTOLIC BLOOD PRESSURE: 79 MMHG

## 2019-09-06 VITALS
SYSTOLIC BLOOD PRESSURE: 128 MMHG | TEMPERATURE: 98.1 F | WEIGHT: 193 LBS | RESPIRATION RATE: 18 BRPM | OXYGEN SATURATION: 92 % | DIASTOLIC BLOOD PRESSURE: 85 MMHG | BODY MASS INDEX: 32.15 KG/M2 | HEART RATE: 62 BPM | HEIGHT: 65 IN

## 2019-09-06 DIAGNOSIS — I82.621 ACUTE DEEP VEIN THROMBOSIS (DVT) OF RIGHT UPPER EXTREMITY, UNSPECIFIED VEIN (H): ICD-10-CM

## 2019-09-06 DIAGNOSIS — M79.601 PAIN IN RIGHT ARM: ICD-10-CM

## 2019-09-06 DIAGNOSIS — I82.621 DEEP VEIN THROMBOSIS (DVT) OF RIGHT UPPER EXTREMITY, UNSPECIFIED CHRONICITY, UNSPECIFIED VEIN (H): ICD-10-CM

## 2019-09-06 DIAGNOSIS — T84.51XD INFECTION ASSOCIATED WITH INTERNAL RIGHT HIP PROSTHESIS, SUBSEQUENT ENCOUNTER: Primary | ICD-10-CM

## 2019-09-06 LAB
ALBUMIN SERPL-MCNC: 3.1 G/DL (ref 3.4–5)
ALP SERPL-CCNC: 86 U/L (ref 40–150)
ALT SERPL W P-5'-P-CCNC: 13 U/L (ref 0–50)
ANION GAP SERPL CALCULATED.3IONS-SCNC: 5 MMOL/L (ref 3–14)
APTT PPP: 34 SEC (ref 22–37)
AST SERPL W P-5'-P-CCNC: 17 U/L (ref 0–45)
BASOPHILS # BLD AUTO: 0.1 10E9/L (ref 0–0.2)
BASOPHILS NFR BLD AUTO: 0.8 %
BILIRUB SERPL-MCNC: 0.2 MG/DL (ref 0.2–1.3)
BUN SERPL-MCNC: 9 MG/DL (ref 7–30)
CALCIUM SERPL-MCNC: 8.5 MG/DL (ref 8.5–10.1)
CHLORIDE SERPL-SCNC: 108 MMOL/L (ref 94–109)
CO2 SERPL-SCNC: 27 MMOL/L (ref 20–32)
CREAT SERPL-MCNC: 0.64 MG/DL (ref 0.52–1.04)
DIFFERENTIAL METHOD BLD: ABNORMAL
EOSINOPHIL # BLD AUTO: 0.5 10E9/L (ref 0–0.7)
EOSINOPHIL NFR BLD AUTO: 6.4 %
ERYTHROCYTE [DISTWIDTH] IN BLOOD BY AUTOMATED COUNT: 14.6 % (ref 10–15)
GFR SERPL CREATININE-BSD FRML MDRD: >90 ML/MIN/{1.73_M2}
GLUCOSE SERPL-MCNC: 99 MG/DL (ref 70–99)
HCT VFR BLD AUTO: 28.5 % (ref 35–47)
HGB BLD-MCNC: 9 G/DL (ref 11.7–15.7)
IMM GRANULOCYTES # BLD: 0 10E9/L (ref 0–0.4)
IMM GRANULOCYTES NFR BLD: 0.6 %
INR PPP: 1.08 (ref 0.86–1.14)
LYMPHOCYTES # BLD AUTO: 0.9 10E9/L (ref 0.8–5.3)
LYMPHOCYTES NFR BLD AUTO: 12.2 %
MCH RBC QN AUTO: 29.9 PG (ref 26.5–33)
MCHC RBC AUTO-ENTMCNC: 31.6 G/DL (ref 31.5–36.5)
MCV RBC AUTO: 95 FL (ref 78–100)
MONOCYTES # BLD AUTO: 0.7 10E9/L (ref 0–1.3)
MONOCYTES NFR BLD AUTO: 10.2 %
NEUTROPHILS # BLD AUTO: 5 10E9/L (ref 1.6–8.3)
NEUTROPHILS NFR BLD AUTO: 69.8 %
NRBC # BLD AUTO: 0 10*3/UL
NRBC BLD AUTO-RTO: 0 /100
PLATELET # BLD AUTO: 249 10E9/L (ref 150–450)
POTASSIUM SERPL-SCNC: 3.9 MMOL/L (ref 3.4–5.3)
PROT SERPL-MCNC: 6.2 G/DL (ref 6.8–8.8)
RBC # BLD AUTO: 3.01 10E12/L (ref 3.8–5.2)
SODIUM SERPL-SCNC: 140 MMOL/L (ref 133–144)
WBC # BLD AUTO: 7.2 10E9/L (ref 4–11)

## 2019-09-06 PROCEDURE — 85610 PROTHROMBIN TIME: CPT | Performed by: EMERGENCY MEDICINE

## 2019-09-06 PROCEDURE — 99283 EMERGENCY DEPT VISIT LOW MDM: CPT

## 2019-09-06 PROCEDURE — 25000132 ZZH RX MED GY IP 250 OP 250 PS 637: Mod: GY | Performed by: EMERGENCY MEDICINE

## 2019-09-06 PROCEDURE — 99310 SBSQ NF CARE HIGH MDM 45: CPT | Performed by: NURSE PRACTITIONER

## 2019-09-06 PROCEDURE — 85730 THROMBOPLASTIN TIME PARTIAL: CPT | Performed by: EMERGENCY MEDICINE

## 2019-09-06 PROCEDURE — 85025 COMPLETE CBC W/AUTO DIFF WBC: CPT | Performed by: EMERGENCY MEDICINE

## 2019-09-06 PROCEDURE — 80053 COMPREHEN METABOLIC PANEL: CPT | Performed by: EMERGENCY MEDICINE

## 2019-09-06 RX ORDER — HYDROCODONE BITARTRATE AND ACETAMINOPHEN 5; 325 MG/1; MG/1
1 TABLET ORAL EVERY 6 HOURS PRN
Qty: 12 TABLET | Refills: 0 | Status: SHIPPED | OUTPATIENT
Start: 2019-09-06 | End: 2019-09-16

## 2019-09-06 RX ORDER — HYDROCODONE BITARTRATE AND ACETAMINOPHEN 5; 325 MG/1; MG/1
1 TABLET ORAL ONCE
Status: COMPLETED | OUTPATIENT
Start: 2019-09-06 | End: 2019-09-06

## 2019-09-06 RX ADMIN — HYDROCODONE BITARTRATE AND ACETAMINOPHEN 1 TABLET: 5; 325 TABLET ORAL at 13:18

## 2019-09-06 RX ADMIN — RIVAROXABAN 15 MG: 15 TABLET, FILM COATED ORAL at 14:35

## 2019-09-06 ASSESSMENT — ENCOUNTER SYMPTOMS
SHORTNESS OF BREATH: 0
MYALGIAS: 1
NECK PAIN: 1

## 2019-09-06 ASSESSMENT — MIFFLIN-ST. JEOR
SCORE: 1421.32
SCORE: 1427.67

## 2019-09-06 NOTE — ED NOTES
Bed: ED32  Expected date: 9/6/19  Expected time: 11:27 AM  Means of arrival: Ambulance  Comments:  [facility]  PICC line assessment

## 2019-09-06 NOTE — ED AVS SNAPSHOT
[facility] Emergency Department  [address]  [facility] [address]  Phone:  [phone]  Fax:  [phone]                                    Mercedes Barber   MRN: [id number]    Department:  [facility] Emergency Department   Date of Visit:  9/6/2019           After Visit Summary Signature Page    I have received my discharge instructions, and my questions have been answered. I have discussed any challenges I see with this plan with the nurse or doctor.    ..........................................................................................................................................  Patient/Patient Representative Signature      ..........................................................................................................................................  Patient Representative Print Name and Relationship to Patient    ..................................................               ................................................  Date                                   Time    ..........................................................................................................................................  Reviewed by Signature/Title    ...................................................              ..............................................  Date                                               Time          22EPIC Rev 08/18

## 2019-09-06 NOTE — PROGRESS NOTES
[facility]  [place] Medical Record Number:  [id number]  Place of Service where encounter took place:  [facility] - [facility] ([facility]) [[id number]]  Chief Complaint   Patient presents with     Nursing Home Acute       HPI:    Mercedes Barber  is a 64 year old (1955), who is being seen today for an episodic care visit.  HPI information obtained from: facility chart records, facility staff and patient report. Today's concern is:    Patient Mercedes Barber is a 54 yr old female admitted to [facility] for rehabilitation s/p hospitalization [facility] 8/29-8/31/19 right hip cellulitis & abscess: s/p seroma wash out 8/26 following revision right hip SARAHI 8/6. Plan to continue wound vac  ID - single culture growing proteus from aspiration seroma at [place] following revision.  Culture from revision on 8/6 and and I&D seroma on 8/26 NTD. Applied zipline and 4 staples after sterile prep to distal aspect of incision      PMHx chronic pain syndrome, seizure disorder, IBS, hypothyroidism, anemia, GERD & depression & sleep apnea (not use CPAP per Epic)       Infection associated with internal right hip prosthesis, subsequent encounter  Deep vein thrombosis (DVT) of right upper extremity, unspecified chronicity, unspecified vein (H)  Pain in right arm     Pain in right arm limiting therapies  STAT ultrasound note:  Extensive acute DVT which surrounds the PICC line as well as superficial thrombus in basilic vein  (extensive thrombus involving subclavian vein as axillary and brachial vein. Thrombus noted in basilic vein and surrounding PICC  Patient being treated with IV antibiotic for right hip infection  She is on aspirin for DVT prophylaxis    Past Medical and Surgical History reviewed in Epic today.    MEDICATIONS:  Current Outpatient Medications   Medication Sig Dispense Refill     acetaminophen (TYLENOL) 325 MG tablet Take 2 tablets (650 mg) by mouth every 4 hours as  needed for other (multimodal surgical pain management along with NSAIDS and opioid medication as indicated based on pain control and physical function.) 50 tablet 0     albuterol (ALBUTEROL) 108 (90 BASE) MCG/ACT Inhaler Inhale 2 puffs into the lungs 4 times daily as needed for shortness of breath / dyspnea or wheezing 1 Inhaler 0     artificial saliva (BIOTENE MT) AERS spray Take 2 sprays by mouth 3 times daily as needed for dry mouth       budesonide-formoterol (SYMBICORT) 160-4.5 MCG/ACT Inhaler Inhale 2 puffs into the lungs 2 times daily       cefTRIAXone (ROCEPHIN) 1 GM vial Inject 2 g (2,000 mg) into the vein daily for 14 days ESR,CRP,CBC with differential, creatinine, SGOT weekly while on this medication to be faxed to Dr. Bradley office. 600 mL 0     cycloSPORINE (RESTASIS) 0.05 % ophthalmic emulsion Place 1 drop into both eyes 2 times daily       hypromellose (ARTIFICIAL TEARS) 0.5 % SOLN ophthalmic solution Place 1 drop into both eyes 2 times daily       lacosamide (VIMPAT) 200 MG TABS tablet Take 1 tablet (200 mg) by mouth 2 times daily 60 tablet 0     levothyroxine (SYNTHROID/LEVOTHROID) 50 MCG tablet Take 50 mcg by mouth daily       Lidocaine (LIDOCARE) 4 % Patch Place 1 patch onto the skin every 24 hours To prevent lidocaine toxicity, patient should be patch free for 12 hrs daily.       linaclotide (LINZESS) 290 MCG capsule Take 1 capsule (290 mcg) by mouth every morning (before breakfast) 10 capsule 0     liothyronine (CYTOMEL) 5 MCG tablet Take 10 mcg by mouth daily        nefazodone (SERZONE) 200 MG tablet Take 600 mg by mouth At Bedtime        omeprazole (PRILOSEC) 40 MG DR capsule Take 1 capsule (40 mg) by mouth 2 times daily 180 capsule 3     ondansetron (ZOFRAN-ODT) 4 MG ODT tab Take 1 tablet (4 mg) by mouth every 6 hours as needed for nausea or vomiting 10 tablet 0     polyethylene glycol (MIRALAX/GLYCOLAX) packet Take 17 g by mouth daily And daily as needed       potassium citrate (UROCIT-K) 10  "MEQ (1080 MG) CR tablet Take 10 mEq by mouth daily       pramipexole (MIRAPEX) 1 MG tablet Give 1 tab in AM; and 2 tabs 1 hour prior to hs       ranitidine (ZANTAC) 150 MG tablet TAKE 1 TABLET BY MOUTH TWICE DAILY 180 tablet 3     senna-docusate (SENOKOT-S/PERICOLACE) 8.6-50 MG tablet Take 1 tablet by mouth 2 times daily as needed for constipation 20 tablet 0     temazepam (RESTORIL) 15 MG capsule Take 1 capsule (15 mg) by mouth At Bedtime 30 capsule 0     traMADol (ULTRAM) 50 MG tablet Take 1 tablet (50 mg) by mouth every 6 hours as needed for severe pain 90 tablet 0     HYDROcodone-acetaminophen (NORCO) 5-325 MG tablet Take 1 tablet by mouth every 6 hours as needed for severe pain 12 tablet 0     rivaroxaban ANTICOAGULANT (XARELTO) 15 MG TABS tablet Take 1 tablet (15 mg) by mouth 2 times daily (with meals) for 14 days 28 tablet 0         REVIEW OF SYSTEMS:  4 point ROS including Respiratory, CV, GI and , other than that noted in the HPI,  is negative    Objective:  /79   Pulse 72   Temp 97.7  F (36.5  C)   Resp 18   Ht 1.651 m (5' 5\")   Wt 88.2 kg (194 lb 6.4 oz)   LMP 03/11/2010   SpO2 92%   BMI 32.35 kg/m    Exam:  GENERAL APPEARANCE:  Alert, in no distress  RESP:  respiratory effort and palpation of chest normal, lungs clear to auscultation , no respiratory distress  CV:  Palpation and auscultation of heart done , regular rate and rhythm, no murmur, rub, or gallop  ABDOMEN:  normal bowel sounds, soft, nontender, no hepatosplenomegaly or other masses  M/S:   lying in bed  SKIN; minimal swelling right arm. PICC intact with no erythema noted. CMS within normal limits   NEURO:   Cranial nerves 2-12 are normal tested and grossly at patient's baseline  PSYCH:  oriented X 3    Labs:   Labs done in SNF are in [place] EPIC. Please refer to them using [employer] [facility]/Care Everywhere.    ASSESSMENT/PLAN:     Infection associated with internal right hip prosthesis, subsequent encounter  Deep vein thrombosis (DVT) of " right upper extremity, unspecified chronicity, unspecified vein (H)  Pain in right arm     Send to hospital to evaluate and treat due to extensive acute DVT right arm   needing PICC placement for IV antibiotic for right hip infection  Family updated    Orders written by provider at facility      Total time spent with patient visit at the [facility] nursing [facility] was 35 min including patient visit, review of past records and consulting. Greater than 50% of total time spent with counseling and coordinating care due to coordinating care with follow up labs and appointments.  Electronically signed by:  FROYLAN Munguia CNP

## 2019-09-06 NOTE — ED PROVIDER NOTES
History     Chief Complaint:  Clotted PICC line    HPI   Mercedes Barber is a 64 year old female who presents with a clotted PICC line. The patient has had 2 recent hip replacement surgeries and had gotten 2 infections at the surgical site so she was placed on IV antibiotic administered thorough a PICC line in her upper right arm. The patient say that 5 days ago she started hurting where the PICC line was placed. She had an ultrasound done at [facility] that showed occlusions in her right arm up to her neck. The patient says that she has some right arm and neck pain. The patient says that she is unable to put pressure on her right leg and denies any falls. The patient denies any chest pain or shortness of breath.      X-ray from [facility] 9/06/2019  Right Extremity vein US-Unilateral Upper:  Findings:Extensive thrombus involving the subclavian vein as axillary and the brachial vein. The remainder of the deep venous system appear intact. Thrombus noted in the basilic vein. Thrombus surrounds the PICC line. The remainder of the exam appears intact.  Impression: Extensive acute DVT which surrounds the PICC line as well as superficial thrombus in the basilic vein.      Allergies:  Clonopin  Encort  Erythromycin  Flagyl  Gabapentin  Lamictal  Oxycodone  Tegretol      Medications:    Tylenol  Albuterol  Biotene  Aspirin  Symbicort  Rocephin  Restasis   [place]  Artifical tears  Ibuprofen  Vimpat  Synthroid  Lidocare  Linzess  Cytomel  Zerzone  Prilosec  Zofran ODT  Miralax  Urocit  Mirapex  Zantac  Senokot  Restoril  Ultram      Past Medical History:    Arthritis   Cervical pain  Constipation  Diarrhea  Esophageal  stricture  GERD  Hypothyroidism  irritable bowel syndrome  Mild major depression  Neuropathy of lower extremity  Rectal prolapse   restless leg syndrome  Seizure disorder  Sleep apnea  Temporal sclerosis  Confusion  Headache  Menopausal syndrome  Vulvar pain  Overactive  bladder  Dysphagia  Anemia  Lumbago  Pain in thoracic spine  Sciatica  Plantar fascial fibromatosis   Hip pain  Cellulitis    Past Surgical History:    Anterior colporrhaphy, bladder/vagina  Arthoplasty  hip  Arthoplasty patello-femoral knee  Arthoplasty revision hip  Carpal tunnel release  Dental implant  Dilation and curettage  Drain pilonidal cyst  EDG  Excise lesion trunk  Hysterectomy   Irrigation and debridement hip  Shoulder fracture  Rectal prolapse repair abdominally  Release plantar fascia  Remove mesh vagina  Repair hammer toe  Tonsillectomy and adenoidectomy  Tubal ligation     Family History:    Eye disorder  Lipids  congestive heart failure  osteoporosis  Diabetes   Alzheimer disease  hypertension  Alcohol/ drug  Depression  Psychotic  disorder  Breast cancer   Neurologic disorder      Social History:  The patient was accompanied to the ED by family.  Smoking Status: Never Smoker  Smokeless Tobacco: Never Used  Alcohol Use: Positive  Drug Use: Negative  PCP: Skyler Álvarez   Marital Status:          Review of Systems   Respiratory: Negative for shortness of breath.    Cardiovascular: Negative for chest pain.   Musculoskeletal: Positive for myalgias and neck pain.   All other systems reviewed and are negative.      Physical Exam     Patient Vitals for the past 24 hrs:   BP Temp Temp src Pulse Heart Rate Resp SpO2 Height Weight   09/06/19 1430 128/85 -- -- 62 -- -- 92 % -- --   09/06/19 1415 133/77 -- -- 58 -- -- 96 % -- --   09/06/19 1400 139/85 -- -- 62 -- -- 94 % -- --   09/06/19 1345 135/82 -- -- 68 -- -- 94 % -- --   09/06/19 1330 130/79 -- -- 69 -- -- 94 % -- --   09/06/19 1315 122/77 -- -- 65 -- -- 93 % -- --   09/06/19 1300 133/87 -- -- -- -- -- -- -- --   09/06/19 1230 136/84 -- -- 69 -- -- 97 % -- --   09/06/19 1215 111/73 -- -- 69 -- -- 93 % -- --   09/06/19 1200 119/73 -- -- 72 -- -- 96 % -- --   09/06/19 1145 133/75 -- -- 78 -- -- 98 % -- --   09/06/19 1138 (!) 133/105 98.1  F (36.7  " C) Oral -- 77 18 96 % 1.651 m (5' 5\") 87.5 kg (193 lb)        Physical Exam  Nursing note and vitals reviewed.  Constitutional: Cooperative.   HENT:   Mouth/Throat: Moist mucous membranes.   Eyes: EOMI, nonicteric sclera  Cardiovascular: Normal rate, regular rhythm, no murmurs, rubs, or gallops. Normal radial pulse.   Pulmonary/Chest: Effort normal and breath sounds normal. No respiratory distress. No wheezes. No rales.   Abdominal: Soft. Nontender, nondistended, no guarding or rigidity. BS present.   Musculoskeletal: Normal range of motion. No significant erythema/swelling of RUE.   Neurological: Alert. Moves all extremities spontaneously.   Skin: Skin is warm and dry. No rash noted.   Psychiatric: Normal mood and affect.       Emergency Department Course     Laboratory:  Laboratory findings were communicated with the patient who voiced understanding of the findings.    CBC: WBC 7.2, HGB 9.0 (L),   CMP: albumin 3.1 (L), protein 6.2 (L) o/w WNL (Creatinine 0.64)  INR: 1.08  PTT: 34    Interventions:  1318 Norco 1 tablet Oral  1435 Xarelto 15 mg Oral    Emergency Department Course:    1145 Nursing notes and vitals reviewed.    1226 I performed an exam of the patient as documented above.     1324 I spoke with Dr. Florentino of the vascular medicine service regarding patient's presentation, findings, and plan of care.     1325 IV was inserted and blood was drawn for laboratory testing, results above.     1530 The patient is discharged to home.     Impression & Plan      Medical Decision Making:  Mercedes Barber is a 64 year old female who presents to the emergency department today for evaluation of known RUE DVT associated with a PICC line. Line isn't due to come out for another week. Line still draws and flushes without difficulty. Pt has pain in the RUE, but does not have significant signs of swelling or redness.  She has no chest pain or shortness of breath to suggest PE.  Her vital signs are also normal. "  Even if patient were to have a PE, she would be low risk by PESI score and would be appropriate for outpatient management.  For upper extremity DVTs associated with a line, it is okay to keep the line in and continue to use it until the line needs to be discontinued.  It is recommended to start anticoagulation and I discussed Lovenox/Coumadin versus NOAC and patient preferred the latter.  We started patient on Xarelto at 15 mg twice daily.  I did discuss this plan with on-call vascular medicine who was in agreement, but stated that he would like a repeat ultrasound in 1 week after line is removed.  I did place this in the discharge instructions for patient's care facility.  First dose of Xarelto was given here with prescription for an additional 2 weeks.  Per vascular medicine, total course will likely be 1 month after line removal.  Also discussed with patient stopping aspirin and ibuprofen use.  We will use Norco to control her right upper extremity pain.  We also discussed that if pain were to become worse or unbearable in the next week, that she could come in and have the line removed and replaced in her left upper extremity.  She is discharged in stable condition.  All questions answered.        Diagnosis:    ICD-10-CM    1. Acute deep vein thrombosis (DVT) of right upper extremity, unspecified vein (H) I82.621      Disposition:   Findings and plan explained to the Patient. Patient discharged home with instructions regarding supportive care, medications, and reasons to return. The importance of close follow-up was reviewed.     Discharge Medications:         Review of your medicines             START taking      Dose / Directions   rivaroxaban ANTICOAGULANT 15 MG Tabs tablet  Commonly known as:  XARELTO      Dose:  15 mg  Take 1 tablet (15 mg) by mouth 2 times daily (with meals) for 14 days  Quantity:  28 tablet  Refills:  0        CONTINUE these medicines which may have CHANGED, or have new prescriptions. If  we are uncertain of the size of tablets/capsules you have at home, strength may be listed as something that might have changed.      Dose / Directions   HYDROcodone-acetaminophen 5-325 MG tablet  Commonly known as:  NORCO  This may have changed:      when to take this    reasons to take this      Dose:  1 tablet  Take 1 tablet by mouth every 6 hours as needed for severe pain  Quantity:  12 tablet  Refills:  0           Where to get your medicines      Some of these will need a paper prescription and others can be bought over the counter. Ask your nurse if you have questions.    Bring a paper prescription for each of these medications    HYDROcodone-acetaminophen 5-325 MG tablet    rivaroxaban ANTICOAGULANT 15 MG Tabs tablet          Scribe Disclosure:  I, Alireza Fontanez, am serving as a scribe at 11:46 AM on 9/6/2019 to document services personally performed by Fabiano Santa MD based on my observations and the provider's statements to me.      [facility] EMERGENCY DEPARTMENT       Fabiano Santa MD  09/06/19 3167

## 2019-09-06 NOTE — ED TRIAGE NOTES
Patient came by EMS from transitional care.  Patient is SP hip replacement July 23 and redone on August 7.  Since then patient has gotten two infections at the surgical site and now has a wound vac.  Patient has a PICC line for IV antibiotics that started hurting about 4 or 5 days ago.  CMS intact.  US done today at [facility] and showed occlusion.  Patient states she was given pain meds about 2 hours ago but not sure what med.

## 2019-09-08 ENCOUNTER — TELEPHONE (OUTPATIENT)
Dept: GERIATRICS | Facility: CLINIC | Age: 64
End: 2019-09-08

## 2019-09-08 NOTE — TELEPHONE ENCOUNTER
Resident recently went to the ED due to right UE DVT and came back on a blood thinner but ASA was not discontinued.    Mercedes said the ED doc wanted it dc'ed but no orders.    Orders:  Discontinue ASA therapy    Electronically signed by Jazmyn Samaniego RN, CNP

## 2019-09-09 ENCOUNTER — PATIENT OUTREACH (OUTPATIENT)
Dept: FAMILY MEDICINE | Facility: CLINIC | Age: 64
End: 2019-09-09

## 2019-09-09 ENCOUNTER — NURSING HOME VISIT (OUTPATIENT)
Dept: GERIATRICS | Facility: CLINIC | Age: 64
End: 2019-09-09
Payer: MEDICARE

## 2019-09-09 VITALS
SYSTOLIC BLOOD PRESSURE: 111 MMHG | BODY MASS INDEX: 32.39 KG/M2 | DIASTOLIC BLOOD PRESSURE: 74 MMHG | WEIGHT: 194.4 LBS | HEIGHT: 65 IN | HEART RATE: 69 BPM | TEMPERATURE: 98 F | RESPIRATION RATE: 18 BRPM | OXYGEN SATURATION: 96 %

## 2019-09-09 DIAGNOSIS — M25.551 ACUTE PAIN OF RIGHT HIP: ICD-10-CM

## 2019-09-09 DIAGNOSIS — T84.51XD INFECTION ASSOCIATED WITH INTERNAL RIGHT HIP PROSTHESIS, SUBSEQUENT ENCOUNTER: Primary | ICD-10-CM

## 2019-09-09 DIAGNOSIS — I82.621 DEEP VEIN THROMBOSIS (DVT) OF RIGHT UPPER EXTREMITY, UNSPECIFIED CHRONICITY, UNSPECIFIED VEIN (H): ICD-10-CM

## 2019-09-09 LAB
BACTERIA SPEC CULT: NORMAL
BACTERIA SPEC CULT: NORMAL
Lab: NORMAL
Lab: NORMAL
SPECIMEN SOURCE: NORMAL
SPECIMEN SOURCE: NORMAL

## 2019-09-09 PROCEDURE — 99309 SBSQ NF CARE MODERATE MDM 30: CPT | Performed by: NURSE PRACTITIONER

## 2019-09-09 RX ORDER — ACETAMINOPHEN 325 MG/1
650 TABLET ORAL EVERY 4 HOURS PRN
Status: ON HOLD | COMMUNITY
End: 2020-07-29

## 2019-09-09 ASSESSMENT — MIFFLIN-ST. JEOR: SCORE: 1427.67

## 2019-09-09 NOTE — PROGRESS NOTES
[facility]  [place] Medical Record Number:  [id number]  Place of Service where encounter took place:  [facility] - [facility] ([facility]) [[id number]]  Chief Complaint   Patient presents with     Nursing Home Acute       HPI:    Mercedes Barber  is a 64 year old (1955), who is being seen today for an episodic care visit.  HPI information obtained from: facility chart records, facility staff and patient report. Today's concern is:    Patient Mercedes Barber is a 54 yr old female admitted to [facility] for rehabilitation s/p hospitalization [facility] -19 right hip cellulitis & abscess: s/p seroma wash out  following revision right hip SARAHI . Plan to continue wound vac  ID - single culture growing proteus from aspiration seroma at [place] following revision.  Culture from revision on  and and I&D seroma on  NTD. Applied zipline and 4 staples after sterile prep to distal aspect of incision  19 positive for extensive DVT right arm. Patient started on Xarelto     PMHx chronic pain syndrome, seizure disorder, IBS, hypothyroidism, anemia, GERD & depression & sleep apnea (not use CPAP per Epic)       Infection associated with internal right hip prosthesis, subsequent encounter  Deep vein thrombosis (DVT) of right upper extremity, unspecified chronicity, unspecified vein (H)  Acute pain of right hip     Patient states she has much less pain in right arm since starting anticoagulation    She has acute worsening pain in right leg she states since s/p going out this weekend for a   Patient is toe touch weight bearing only. Unclear if she was compliant   Patient report pain medication is effective  Patient request xray to make sure that hardware is in proper alignment  Wound vac intact         Past Medical and Surgical History reviewed in Epic today.    MEDICATIONS:  Current Outpatient Medications   Medication Sig Dispense Refill      acetaminophen (TYLENOL) 325 MG tablet Take 2 tablets (650 mg) by mouth every 4 hours as needed for other (multimodal surgical pain management along with NSAIDS and opioid medication as indicated based on pain control and physical function.) 50 tablet 0     albuterol (ALBUTEROL) 108 (90 BASE) MCG/ACT Inhaler Inhale 2 puffs into the lungs 4 times daily as needed for shortness of breath / dyspnea or wheezing 1 Inhaler 0     artificial saliva (BIOTENE MT) AERS spray Take 2 sprays by mouth 3 times daily as needed for dry mouth       budesonide-formoterol (SYMBICORT) 160-4.5 MCG/ACT Inhaler Inhale 2 puffs into the lungs 2 times daily       cefTRIAXone (ROCEPHIN) 1 GM vial Inject 2 g (2,000 mg) into the vein daily for 14 days ESR,CRP,CBC with differential, creatinine, SGOT weekly while on this medication to be faxed to Dr. Bradley office. 600 mL 0     cycloSPORINE (RESTASIS) 0.05 % ophthalmic emulsion Place 1 drop into both eyes 2 times daily       HYDROcodone-acetaminophen (NORCO) 5-325 MG tablet Take 1 tablet by mouth every 6 hours as needed for severe pain 12 tablet 0     hypromellose (ARTIFICIAL TEARS) 0.5 % SOLN ophthalmic solution Place 1 drop into both eyes 2 times daily       lacosamide (VIMPAT) 200 MG TABS tablet Take 1 tablet (200 mg) by mouth 2 times daily 60 tablet 0     levothyroxine (SYNTHROID/LEVOTHROID) 50 MCG tablet Take 50 mcg by mouth daily       Lidocaine (LIDOCARE) 4 % Patch Place 1 patch onto the skin every 24 hours To prevent lidocaine toxicity, patient should be patch free for 12 hrs daily.       linaclotide (LINZESS) 290 MCG capsule Take 1 capsule (290 mcg) by mouth every morning (before breakfast) 10 capsule 0     liothyronine (CYTOMEL) 5 MCG tablet Take 10 mcg by mouth daily        nefazodone (SERZONE) 200 MG tablet Take 600 mg by mouth At Bedtime        omeprazole (PRILOSEC) 40 MG DR capsule Take 1 capsule (40 mg) by mouth 2 times daily 180 capsule 3     ondansetron (ZOFRAN-ODT) 4 MG ODT tab Take  "1 tablet (4 mg) by mouth every 6 hours as needed for nausea or vomiting 10 tablet 0     polyethylene glycol (MIRALAX/GLYCOLAX) packet Take 17 g by mouth daily And daily as needed       potassium citrate (UROCIT-K) 10 MEQ (1080 MG) CR tablet Take 10 mEq by mouth daily       pramipexole (MIRAPEX) 1 MG tablet Give 1 tab in AM; and 2 tabs 1 hour prior to hs       ranitidine (ZANTAC) 150 MG tablet TAKE 1 TABLET BY MOUTH TWICE DAILY 180 tablet 3     rivaroxaban ANTICOAGULANT (XARELTO) 15 MG TABS tablet Take 1 tablet (15 mg) by mouth 2 times daily (with meals) for 14 days 28 tablet 0     senna-docusate (SENOKOT-S/PERICOLACE) 8.6-50 MG tablet Take 1 tablet by mouth 2 times daily as needed for constipation 20 tablet 0     temazepam (RESTORIL) 15 MG capsule Take 1 capsule (15 mg) by mouth At Bedtime 30 capsule 0     TRAMADOL HCL PO Take 50 mg by mouth every 6 hours as needed for moderate to severe pain           REVIEW OF SYSTEMS:  10 point ROS of systems including Constitutional, Eyes, Respiratory, Cardiovascular, Gastroenterology, Genitourinary, Integumentary, Musculoskeletal, Psychiatric were all negative except for pertinent positives noted in my HPI.    Objective:  /74   Pulse 69   Temp 98  F (36.7  C)   Resp 18   Ht 1.651 m (5' 5\")   Wt 88.2 kg (194 lb 6.4 oz)   LMP 03/11/2010   SpO2 96%   BMI 32.35 kg/m    Exam:  GENERAL APPEARANCE:  Alert, in no distress  ENT:  Mouth and posterior oropharynx normal, moist mucous membranes  EYES:  EOM, conjunctivae, lids, pupils and irises normal  NECK:  No adenopathy,masses or thyromegaly  RESP:  respiratory effort and palpation of chest normal, lungs clear to auscultation , no respiratory distress  CV:  Palpation and auscultation of heart done , regular rate and rhythm, no murmur, rub, or gallop  ABDOMEN:  normal bowel sounds, soft, nontender, no hepatosplenomegaly or other masses  M/S:   lying in bed  SKIN:  Inspection of skin and subcutaneous tissue wound vac intact " right hip; PICC intact right arm   NEURO:   Cranial nerves 2-12 are normal tested and grossly at patient's baseline  PSYCH:  oriented X 3    Labs:   Labs done in SNF are in [place] EPIC. Please refer to them using EPIC/Care Everywhere.    ASSESSMENT/PLAN:     Infection associated with internal right hip prosthesis, subsequent encounter  Deep vein thrombosis (DVT) of right upper extremity, unspecified chronicity, unspecified vein (H)  Acute pain of right hip     Positive for DVT right arm  Started on Xarelto, per vascular continue x1 month after PICC line removed and follow up repeat right arm doppler 1 week after line removed  Discontinue aspirin & NSAID  If pain worsen should have PICC replaced to left arm     Vitals stable, continue IV Rocephin and weekly labs and follow up  & ortho as ordered  Continue wound vac  Pain managed with Tylenol   Repeat xray right hip today show hardware in proper alignment          Orders written by provider at facility      Electronically signed by:  FROYLAN Munguia CNP

## 2019-09-09 NOTE — PROGRESS NOTES
"Clinic Care Coordination Contact  Care Coordination Transition Communication    Clinical Data: Per chart review, patient was in the [facility] on 9/06/19 with  \"Acute deep vein thrombosis (DVT) of right upper extremity, unspecified vein (H)\".    Return to Facility:              Facility Name: [facility]              Contact name and phone number/fax: [phone]  Writer has previously left a voice message for TCU  Tiff requesting that Tiff update writer re: patient's discharge plans/needs when patient is close to discharge from TCU.       Plan: SW Care Coordinator will await notification from facility staff informing SW Care Coordinator of patient's discharge plans/needs. SW Care Coordinator will review chart and outreach to facility staff every 4 weeks and as needed.       GOSIA Florian  [facility] Care Coordination  [facility]   Email: [email or web address]  Tele: [phone]        "

## 2019-09-12 ENCOUNTER — APPOINTMENT (OUTPATIENT)
Dept: GERIATRICS | Facility: CLINIC | Age: 64
End: 2019-09-12
Payer: MEDICARE

## 2019-09-12 ENCOUNTER — NURSING HOME VISIT (OUTPATIENT)
Dept: GERIATRICS | Facility: CLINIC | Age: 64
End: 2019-09-12

## 2019-09-12 DIAGNOSIS — D64.9 ANEMIA, UNSPECIFIED TYPE: ICD-10-CM

## 2019-09-12 DIAGNOSIS — T84.51XD INFECTION ASSOCIATED WITH INTERNAL RIGHT HIP PROSTHESIS, SUBSEQUENT ENCOUNTER: Primary | ICD-10-CM

## 2019-09-12 DIAGNOSIS — I82.621 DEEP VEIN THROMBOSIS (DVT) OF RIGHT UPPER EXTREMITY, UNSPECIFIED CHRONICITY, UNSPECIFIED VEIN (H): ICD-10-CM

## 2019-09-12 PROCEDURE — 99305 1ST NF CARE MODERATE MDM 35: CPT | Performed by: INTERNAL MEDICINE

## 2019-09-13 NOTE — PROGRESS NOTES
[place] [facility]  PRIMARY CARE PROVIDER AND CLINIC:  Skyler Álvarez MD, [address] [place] [address] / [facility] [id number]      Patient seen by Dr. Leyva on September 12, 2019, for initial TCU visit at [facility].    Mercedes Barber  is a 64 year old  (1955), admitted to the above facility from  [facility]. Hospital stay 8/29/19 through 8/31/19..  Admitted to this facility for  rehab, medical management and nursing care.      Hospital course was reviewed by me, is as per the hospital discharge summary and nurse practitioner note.    Pt is a 54 yr old female admitted to [facility] for rehabilitation s/p hospitalization [facility] 8/29-8/31/19 right hip cellulitis & abscess: s/p seroma wash out 8/26 following revision right hip SARAHI 8/6.     ID - single culture growing proteus from aspiration seroma at [place] following revision.  Culture from revision on 8/6 and and I&D seroma on 8/26 NTD. Applied zipline and 4 staples after sterile prep to distal aspect of incision      PMHx chronic pain syndrome, seizure disorder, IBS, hypothyroidism, anemia, GERD & depression     Patient was evaluated in the ER on September 6, 2019 for right upper arm swelling associated with a PICC line.  She was found to have an extensive clot involving her subclavian vein down to the brachial vein.  Patient was started on Xarelto.  PICC line was kept in place.  Plan is to continue Xarelto for 1 month following upcoming PICC line removal.    Patient reports fair pain control right hip, somewhat worsened over the last few days as her activity has been increasing.  She is toe-touch weightbearing right leg.  Wound VAC remains in place.  She denies cough, chest pain, shortness of breath abdominal pain, nausea, diarrhea.      CODE STATUS/ADVANCE DIRECTIVES DISCUSSION:   CPR/Full code   Patient's living condition: lives alone  ALLERGIES: Clonopin [clonazepam]; Encort [hydrocortisone  acetate]; Encort [hydrocortisone]; Erythromycin; Flagyl [metronidazole]; Gabapentin; Lamictal [lamotrigine]; Oxycodone; and Tegretol [carbamazepine]  PAST MEDICAL HISTORY:  has a past medical history of Arthritis, Cervical high risk HPV (human papillomavirus) test positive (07/17/2017), Cervical pain (9/15/2016), Constipation (8/27/2012), Diarrhea (4/30/2009), Esophageal stricture (2/21/2012), Gastro-oesophageal reflux disease, Hypothyroidism (9/18/2012), IBS (irritable bowel syndrome), Mild major depression (H) (2/21/2012), Neuropathy of lower extremity, Rectal prolapse, Restless leg syndrome, Seizure disorder (H), Sleep apnea, and Temporal sclerosis (8/27/2012). She also has no past medical history of Complication of anesthesia, History of blood transfusion, or Malignant hyperthermia.  PAST SURGICAL HISTORY:   has a past surgical history that includes tonsillectomy & adenoidectomy; carpal tunnel release rt/lt; drain pilonidal cyst simpl; L shoulder fracture; rectal prolapse repair abdominally; tubal ligation; Dilation and curettage; Remove mesh vagina (8/10/2012); Excise lesion trunk (8/10/2012); Esophagoscopy, gastroscopy, duodenoscopy (EGD), combined (4/5/2013); Dental surgery; Release plantar fascia (Right, 1/20/2015); Repair hammer toe (Right, 1/20/2015); Endoscopy Drug Induced Sleep (N/A, 11/18/2015); Hysterectomy; Anterior COLPORRHAPHY, BLADDER/VAGINA; Arthroplasty patello-femoral (knee) (Bilateral); Arthroplasty hip (Right, 7/23/2019); Arthroplasty revision hip (Right, 8/7/2019); and Irrigation and debridement hip, combined (Right, 8/26/2019).  FAMILY HISTORY: family history includes Alcohol/Drug in her brother; Alzheimer Disease in her paternal grandmother; Breast Cancer in her sister; Congenital Anomalies in her daughter; Depression in her brother and sister; Diabetes in her father; Eye Disorder in her mother; Gastrointestinal Disease in her brother; Hypertension in her brother; Lipids in her brother,  mother, and sister; Neurologic Disorder in her daughter; Osteoporosis in her mother; Psychotic Disorder in her brother; Thyroid Disease in her sister.  SOCIAL HISTORY:   reports that she has never smoked. She has never used smokeless tobacco. She reports that she drinks alcohol. She reports that she does not use drugs.      Current Outpatient Medications   Medication Sig Dispense Refill     acetaminophen (TYLENOL) 325 MG tablet Take 2 tablets (650 mg) by mouth every 4 hours as needed for other (multimodal surgical pain management along with NSAIDS and opioid medication as indicated based on pain control and physical function.) 50 tablet 0     albuterol (ALBUTEROL) 108 (90 BASE) MCG/ACT Inhaler Inhale 2 puffs into the lungs 4 times daily as needed for shortness of breath / dyspnea or wheezing 1 Inhaler 0     artificial saliva (BIOTENE MT) AERS spray Take 2 sprays by mouth 3 times daily as needed for dry mouth       aspirin (ASA) 325 MG EC tablet Take 1 tablet (325 mg) by mouth daily 40 tablet 0     budesonide-formoterol (SYMBICORT) 160-4.5 MCG/ACT Inhaler Inhale 2 puffs into the lungs 2 times daily       cefTRIAXone (ROCEPHIN) 1 GM vial Inject 2 g (2,000 mg) into the vein daily for 14 days ESR,CRP,CBC with differential, creatinine, SGOT weekly while on this medication to be faxed to Dr. Bradley office. 600 mL 0     cycloSPORINE (RESTASIS) 0.05 % ophthalmic emulsion Place 1 drop into both eyes 2 times daily       HYDROcodone-acetaminophen (NORCO) 5-325 MG tablet Take 1 tablet by mouth every 4 hours as needed for moderate to severe pain 20 tablet 0     hypromellose (ARTIFICIAL TEARS) 0.5 % SOLN ophthalmic solution Place 1 drop into both eyes 2 times daily       ibuprofen (ADVIL/MOTRIN) 600 MG tablet Take 1 tablet (600 mg) by mouth every 6 hours as needed for moderate pain 50 tablet 0     lacosamide (VIMPAT) 200 MG TABS tablet Take 1 tablet (200 mg) by mouth 2 times daily 10 tablet 0     levothyroxine  (SYNTHROID/LEVOTHROID) 50 MCG tablet Take 50 mcg by mouth daily       Lidocaine (LIDOCARE) 4 % Patch Place 1 patch onto the skin every 24 hours To prevent lidocaine toxicity, patient should be patch free for 12 hrs daily.       linaclotide (LINZESS) 290 MCG capsule Take 1 capsule (290 mcg) by mouth every morning (before breakfast) 10 capsule 0     liothyronine (CYTOMEL) 5 MCG tablet Take 10 mcg by mouth daily        nefazodone (SERZONE) 200 MG tablet Take 600 mg by mouth At Bedtime        omeprazole (PRILOSEC) 40 MG DR capsule Take 1 capsule (40 mg) by mouth 2 times daily 180 capsule 3     ondansetron (ZOFRAN-ODT) 4 MG ODT tab Take 1 tablet (4 mg) by mouth every 6 hours as needed for nausea or vomiting 10 tablet 0     polyethylene glycol (MIRALAX/GLYCOLAX) packet Take 1 packet by mouth daily as needed for constipation       potassium citrate (UROCIT-K) 10 MEQ (1080 MG) CR tablet Take 10 mEq by mouth daily       pramipexole (MIRAPEX) 1 MG tablet Give 1 tab in AM; and 2 tabs 1 hour prior to hs       ranitidine (ZANTAC) 150 MG tablet TAKE 1 TABLET BY MOUTH TWICE DAILY 180 tablet 3     senna-docusate (SENOKOT-S/PERICOLACE) 8.6-50 MG tablet Take 1 tablet by mouth 2 times daily as needed for constipation 20 tablet 0     temazepam (RESTORIL) 15 MG capsule Take 1 capsule (15 mg) by mouth At Bedtime 5 capsule 0     pramipexole (MIRAPEX) 1 MG tablet Take 2 mg by mouth every evening . (takes 1 tab in am , 2 tabs 1 hour prior to hs)         ROS:  10 point ROS negative except as noted above      Exam:  GENERAL APPEARANCE:  Alert, in no distress.  Thin, pleasant, lying in bed  ENT: No oral lesions  EYES: No eye redness or drainage.  NECK: Supple  RESP: Lungs clear  CV: RRR, no murmur   ABDOMEN: Soft, nontender, nondistended  M/S: Moderate swelling right thigh.  Incision not examined.  Wound VAC in place.  No calf edema.  No right arm swelling.  SKIN: Extensive bruising over both upper arms.  NEURO: Alert, fully oriented,  pleasant.  Face symmetric.  No tremors.  PSYCH:  oriented X 3        ASSESSMENT/PLAN:      Infected right hip incision, status post revision right total hip arthroplasty, status post right hip wound irrigation and debridement with wound VAC application  Plan continue wound VAC, continue Rocephin for expected 2-week course.  Orthopedics follow-up today.  Toe-touch weightbearing.    Right upper extremity DVT, PICC line associated.  Right arm swelling and pain improved.  Plan: Continue Xarelto, discontinue PICC line after antibiotic course completed.  Xarelto for 1 month following discontinuation of PICC line.  Monitor for worsening pain, swelling.      Chronic pain syndrome  RLS  Stable  Plan: Continue current medications.  Monitor bowel status.    Seizure disorder  Stable on Vimpat  Plan continue current medication.    GERD  Chronic, asymptomatic  Continue omeprazole & ranitidine     Anemia  Hemoglobin 9.0 on September 6, 2019  Likely secondary to infection and recent surgeries  Asymptomatic.  Plan: Monitor hemoglobin    Asthma stable on current  Inhaler regimen  Plan: Monitor respiratory status.      Missael Leyva MD

## 2019-09-16 ENCOUNTER — NURSING HOME VISIT (OUTPATIENT)
Dept: GERIATRICS | Facility: CLINIC | Age: 64
End: 2019-09-16
Payer: MEDICARE

## 2019-09-16 VITALS
BODY MASS INDEX: 31.52 KG/M2 | OXYGEN SATURATION: 94 % | TEMPERATURE: 98.4 F | DIASTOLIC BLOOD PRESSURE: 76 MMHG | HEIGHT: 65 IN | RESPIRATION RATE: 18 BRPM | SYSTOLIC BLOOD PRESSURE: 110 MMHG | HEART RATE: 71 BPM | WEIGHT: 189.2 LBS

## 2019-09-16 DIAGNOSIS — I82.621 DEEP VEIN THROMBOSIS (DVT) OF RIGHT UPPER EXTREMITY, UNSPECIFIED CHRONICITY, UNSPECIFIED VEIN (H): ICD-10-CM

## 2019-09-16 DIAGNOSIS — Z96.641 STATUS POST TOTAL HIP REPLACEMENT, RIGHT: Primary | ICD-10-CM

## 2019-09-16 DIAGNOSIS — T84.51XD INFECTION ASSOCIATED WITH INTERNAL RIGHT HIP PROSTHESIS, SUBSEQUENT ENCOUNTER: ICD-10-CM

## 2019-09-16 PROCEDURE — 99309 SBSQ NF CARE MODERATE MDM 30: CPT | Performed by: NURSE PRACTITIONER

## 2019-09-16 RX ORDER — HYDROCODONE BITARTRATE AND ACETAMINOPHEN 5; 325 MG/1; MG/1
1 TABLET ORAL EVERY 6 HOURS PRN
Qty: 12 TABLET | Refills: 0 | Status: SHIPPED | OUTPATIENT
Start: 2019-09-16 | End: 2019-09-23

## 2019-09-16 ASSESSMENT — MIFFLIN-ST. JEOR: SCORE: 1404.09

## 2019-09-16 NOTE — PROGRESS NOTES
[place] [facility]  [place] Medical Record Number:  [id number]  Place of Service where encounter took place:  [facility] ([facility]) [[id number]]  Chief Complaint   Patient presents with     Nursing Home Acute       HPI:    Mercedes Barber  is a 64 year old (1955), who is being seen today for an episodic care visit.  HPI information obtained from: facility chart records, facility staff and patient report. Today's concern is:    Patient Mercedes Barber is a 54 yr old female admitted to [facility] for rehabilitation s/p hospitalization [facility] 8/29-8/31/19 right hip cellulitis & abscess: s/p seroma wash out 8/26 following revision right hip SARAHI 8/6. Plan to continue wound vac  ID - single culture growing proteus from aspiration seroma at [place] following revision.  Culture from revision on 8/6 and and I&D seroma on 8/26 NTD. Applied zipline and 4 staples after sterile prep to distal aspect of incision  9/6/19 positive for extensive DVT right arm. Patient started on Xarelto     PMHx chronic pain syndrome, seizure disorder, IBS, hypothyroidism, anemia, GERD & depression & sleep apnea (not use CPAP per Epic)       Status post total hip replacement, right  Deep vein thrombosis (DVT) of right upper extremity, unspecified chronicity, unspecified vein (H)  Infection associated with internal right hip prosthesis, subsequent encounter     Patient report still has pain in right hip, however, states it is managed  Reports she was walking toe touch weight bearing for 60ft  PICC removed and plan for ultrasound in 1 week  Continues with weekly labs to be faxed to Dr. Bradley. Health Unit Coordinator clarify follow up appointment today with infectious disease   Patient reports she feels good      Past Medical and Surgical History reviewed in Epic today.    MEDICATIONS:  Current Outpatient Medications   Medication Sig Dispense Refill     acetaminophen (TYLENOL) 325 MG tablet  Take 650 mg by mouth every 4 hours as needed for mild pain       albuterol (ALBUTEROL) 108 (90 BASE) MCG/ACT Inhaler Inhale 2 puffs into the lungs 4 times daily as needed for shortness of breath / dyspnea or wheezing 1 Inhaler 0     artificial saliva (BIOTENE MT) AERS spray Take 2 sprays by mouth 3 times daily as needed for dry mouth       budesonide-formoterol (SYMBICORT) 160-4.5 MCG/ACT Inhaler Inhale 2 puffs into the lungs 2 times daily       cycloSPORINE (RESTASIS) 0.05 % ophthalmic emulsion Place 1 drop into both eyes 2 times daily       HYDROcodone-acetaminophen (NORCO) 5-325 MG tablet Take 1 tablet by mouth every 6 hours as needed for severe pain 12 tablet 0     hypromellose (ARTIFICIAL TEARS) 0.5 % SOLN ophthalmic solution Place 1 drop into both eyes 2 times daily       lacosamide (VIMPAT) 200 MG TABS tablet Take 1 tablet (200 mg) by mouth 2 times daily 60 tablet 0     levothyroxine (SYNTHROID/LEVOTHROID) 50 MCG tablet Take 50 mcg by mouth daily       Lidocaine (LIDOCARE) 4 % Patch Place 1 patch onto the skin every 24 hours To prevent lidocaine toxicity, patient should be patch free for 12 hrs daily.       linaclotide (LINZESS) 290 MCG capsule Take 1 capsule (290 mcg) by mouth every morning (before breakfast) 10 capsule 0     liothyronine (CYTOMEL) 5 MCG tablet Take 10 mcg by mouth daily        nefazodone (SERZONE) 200 MG tablet Take 600 mg by mouth At Bedtime        omeprazole (PRILOSEC) 40 MG DR capsule Take 1 capsule (40 mg) by mouth 2 times daily 180 capsule 3     ondansetron (ZOFRAN-ODT) 4 MG ODT tab Take 1 tablet (4 mg) by mouth every 6 hours as needed for nausea or vomiting 10 tablet 0     polyethylene glycol (MIRALAX/GLYCOLAX) packet Take 17 g by mouth daily And daily as needed       potassium citrate (UROCIT-K) 10 MEQ (1080 MG) CR tablet Take 10 mEq by mouth daily       pramipexole (MIRAPEX) 1 MG tablet Give 1 tab in AM; and 2 tabs 1 hour prior to hs       ranitidine (ZANTAC) 150 MG tablet TAKE 1  "TABLET BY MOUTH TWICE DAILY 180 tablet 3     rivaroxaban ANTICOAGULANT (XARELTO) 15 MG TABS tablet Take 1 tablet (15 mg) by mouth 2 times daily (with meals) for 14 days 28 tablet 0     senna-docusate (SENOKOT-S/PERICOLACE) 8.6-50 MG tablet Take 1 tablet by mouth 2 times daily as needed for constipation 20 tablet 0     temazepam (RESTORIL) 15 MG capsule Take 1 capsule (15 mg) by mouth At Bedtime 30 capsule 0     TRAMADOL HCL PO Take 50 mg by mouth every 6 hours as needed for moderate to severe pain           REVIEW OF SYSTEMS:  10 point ROS of systems including Constitutional, Eyes, Respiratory, Cardiovascular, Gastroenterology, Genitourinary, Integumentary, Musculoskeletal, Psychiatric were all negative except for pertinent positives noted in my HPI.    Objective:  /76   Pulse 71   Temp 98.4  F (36.9  C)   Resp 18   Ht 1.651 m (5' 5\")   Wt 85.8 kg (189 lb 3.2 oz)   LMP 03/11/2010   SpO2 94%   BMI 31.48 kg/m    Exam:  GENERAL APPEARANCE:  Alert, in no distress  ENT:  Mouth and posterior oropharynx normal, moist mucous membranes  EYES:  EOM, conjunctivae, lids, pupils and irises normal  NECK:  No adenopathy,masses or thyromegaly  RESP:  respiratory effort and palpation of chest normal, lungs clear to auscultation , no respiratory distress  CV:  Palpation and auscultation of heart done , regular rate and rhythm, no murmur, rub, or gallop  ABDOMEN:  normal bowel sounds, soft, nontender, no hepatosplenomegaly or other masses  M/S:   lying in bed  SKIN:  Inspection of skin and subcutaneous tissue wound vac intact right hip; pressure drsg intact right arm  NEURO:   Cranial nerves 2-12 are normal tested and grossly at patient's baseline  PSYCH:  oriented X 3    Labs:   Labs done in SNF are in [place] EPIC. Please refer to them using [facility] Everywhere.    ASSESSMENT/PLAN:     Status post total hip replacement, right  Deep vein thrombosis (DVT) of right upper extremity, unspecified chronicity, unspecified " vein (H)  Infection associated with internal right hip prosthesis, subsequent encounter      Pain managed with as needed pain medications. Will continue at this time. Plan future dose reduction narcotics as pain less  Continue therapies  Patient is toe touch weight bearing x2 wks then 50lb weight bearing x 1wk then weight bearing as tolerated  Wound vac to continue for total 2wks since last appointment ortho. Follow up ortho on 9/19/19    Continue xarelto. Follow up ultrasound right arm x1 week after PICC removal date      Orders written by provider at facility      Electronically signed by:  FROYLAN Munguia CNP

## 2019-09-19 ENCOUNTER — NURSING HOME VISIT (OUTPATIENT)
Dept: GERIATRICS | Facility: CLINIC | Age: 64
End: 2019-09-19
Payer: MEDICARE

## 2019-09-19 VITALS
SYSTOLIC BLOOD PRESSURE: 116 MMHG | OXYGEN SATURATION: 97 % | DIASTOLIC BLOOD PRESSURE: 79 MMHG | BODY MASS INDEX: 31.52 KG/M2 | TEMPERATURE: 98.1 F | RESPIRATION RATE: 20 BRPM | HEART RATE: 72 BPM | HEIGHT: 65 IN | WEIGHT: 189.2 LBS

## 2019-09-19 DIAGNOSIS — I82.621 DEEP VEIN THROMBOSIS (DVT) OF RIGHT UPPER EXTREMITY, UNSPECIFIED CHRONICITY, UNSPECIFIED VEIN (H): Primary | ICD-10-CM

## 2019-09-19 DIAGNOSIS — T84.51XD INFECTION ASSOCIATED WITH INTERNAL RIGHT HIP PROSTHESIS, SUBSEQUENT ENCOUNTER: ICD-10-CM

## 2019-09-19 DIAGNOSIS — Z96.641 STATUS POST TOTAL HIP REPLACEMENT, RIGHT: ICD-10-CM

## 2019-09-19 PROCEDURE — 99309 SBSQ NF CARE MODERATE MDM 30: CPT | Performed by: NURSE PRACTITIONER

## 2019-09-19 ASSESSMENT — MIFFLIN-ST. JEOR: SCORE: 1404.09

## 2019-09-19 NOTE — PROGRESS NOTES
[place] [facility]    [place] Medical Record Number:  [id number]  Place of Service where encounter took place:  [facility] ([facility]) [[id number]]  Chief Complaint   Patient presents with     RECHECK       HPI:    Mercedes Barber  is a 64 year old (1955), who is being seen today for an episodic care visit.  HPI information obtained from: facility chart records, facility staff, patient report and [facility] chart review.     Today's concern is:    Nursing staff concerned regarding repeat venous ultrasound to RUE completed today with ongoing DVT to RUE. Nursing staff unclear why imaging was completed today, as orders stated to repeat venous US one week following removal of PICC line. Staff states PICC line was removed on 9/15.   During exam, patient surprised RUE venous US was completed today as well. Reports during ED visit was told to have venous US completed one week following PICC line removal. Does recall ED provider educating her on use of xarelto and may need to be on this medication for at least 2-3 months until DVT are resolved. Patient reports tolerating xarelto well, has not noticed increased bruising or new onset of bleeding. Admits to intermittent aching to RUE, but no new sx of acute pain, edema, or erythema to RUE. Patient reports plans to follow-up with Ortho and ID as directed.           Past Medical and Surgical History reviewed in Epic today.    MEDICATIONS:  Current Outpatient Medications   Medication Sig Dispense Refill     acetaminophen (TYLENOL) 325 MG tablet Take 650 mg by mouth every 4 hours as needed for mild pain       albuterol (ALBUTEROL) 108 (90 BASE) MCG/ACT Inhaler Inhale 2 puffs into the lungs 4 times daily as needed for shortness of breath / dyspnea or wheezing 1 Inhaler 0     artificial saliva (BIOTENE MT) AERS spray Take 2 sprays by mouth 3 times daily as needed for dry mouth       budesonide-formoterol (SYMBICORT) 160-4.5 MCG/ACT Inhaler  Inhale 2 puffs into the lungs 2 times daily       cycloSPORINE (RESTASIS) 0.05 % ophthalmic emulsion Place 1 drop into both eyes 2 times daily       HYDROcodone-acetaminophen (NORCO) 5-325 MG tablet Take 1 tablet by mouth every 6 hours as needed for severe pain 12 tablet 0     hypromellose (ARTIFICIAL TEARS) 0.5 % SOLN ophthalmic solution Place 1 drop into both eyes 2 times daily       lacosamide (VIMPAT) 200 MG TABS tablet Take 1 tablet (200 mg) by mouth 2 times daily 60 tablet 0     levothyroxine (SYNTHROID/LEVOTHROID) 50 MCG tablet Take 50 mcg by mouth daily       Lidocaine (LIDOCARE) 4 % Patch Place 1 patch onto the skin every 24 hours To prevent lidocaine toxicity, patient should be patch free for 12 hrs daily.       linaclotide (LINZESS) 290 MCG capsule Take 1 capsule (290 mcg) by mouth every morning (before breakfast) 10 capsule 0     liothyronine (CYTOMEL) 5 MCG tablet Take 10 mcg by mouth daily        nefazodone (SERZONE) 200 MG tablet Take 600 mg by mouth At Bedtime        omeprazole (PRILOSEC) 40 MG DR capsule Take 1 capsule (40 mg) by mouth 2 times daily 180 capsule 3     ondansetron (ZOFRAN-ODT) 4 MG ODT tab Take 1 tablet (4 mg) by mouth every 6 hours as needed for nausea or vomiting 10 tablet 0     polyethylene glycol (MIRALAX/GLYCOLAX) packet Take 17 g by mouth daily And daily as needed       potassium citrate (UROCIT-K) 10 MEQ (1080 MG) CR tablet Take 10 mEq by mouth daily       pramipexole (MIRAPEX) 1 MG tablet Give 1 tab in AM; and 2 tabs 1 hour prior to hs       ranitidine (ZANTAC) 150 MG tablet TAKE 1 TABLET BY MOUTH TWICE DAILY 180 tablet 3     rivaroxaban ANTICOAGULANT (XARELTO) 15 MG TABS tablet Take 1 tablet (15 mg) by mouth 2 times daily (with meals) for 14 days 28 tablet 0     senna-docusate (SENOKOT-S/PERICOLACE) 8.6-50 MG tablet Take 1 tablet by mouth 2 times daily as needed for constipation 20 tablet 0     temazepam (RESTORIL) 15 MG capsule Take 1 capsule (15 mg) by mouth At Bedtime 30  "capsule 0     TRAMADOL HCL PO Take 50 mg by mouth every 6 hours as needed for moderate to severe pain           REVIEW OF SYSTEMS:  4 point ROS including Respiratory, CV, GI and , other than that noted in the HPI,  is negative      Objective:  /79   Pulse 72   Temp 98.1  F (36.7  C)   Resp 20   Ht 1.651 m (5' 5\")   Wt 85.8 kg (189 lb 3.2 oz)   LMP 03/11/2010   SpO2 97%   BMI 31.48 kg/m    Exam:  GENERAL APPEARANCE:  Alert, in no distress, appears healthy, oriented, cooperative  RESP:  respiratory effort and palpation of chest normal, no respiratory distress  M/S: Bilateral strong hand grasp. Gait and station abnormal, requires assistance w/activity and ADLs. Digits and nails normal  SKIN:  Inspection of skin and subcutaneous tissue baseline, Palpation of skin and subcutaneous tissue baseline. RUE without tenderness, erythema, or edema.   PSYCH:  oriented X 3, affect and mood normal      Labs:   Labs done in SNF are in [facility]. Please refer to them using [facility] Everywhere., Recent labs in EPIC reviewed by me today.  and   Most Recent 3 CBC's:  Recent Labs   Lab Test 09/06/19  1325 08/31/19  0610 08/30/19  0610 08/29/19  0555 08/28/19  0644   WBC 7.2  --   --  8.1 7.0   HGB 9.0* 8.5* 8.6* 9.2* 8.3*   MCV 95  --   --  97 98     --   --  358 379     Most Recent 3 BMP's:  Recent Labs   Lab Test 09/06/19  1325 08/31/19  0610 08/29/19  0555    143 140   POTASSIUM 3.9 3.3* 3.7   CHLORIDE 108 109 108   CO2 27 30 27   BUN 9 11 14   CR 0.64 0.62 0.61   ANIONGAP 5 4 5   SABA 8.5 8.5 8.6   GLC 99 96 91        X-ray from [facility] 9/06/2019  Right Extremity vein US-Unilateral Upper:  Findings:Extensive thrombus involving the subclavian vein as axillary and the brachial vein. The remainder of the deep venous system appear intact. Thrombus noted in the basilic vein. Thrombus surrounds the PICC line. The remainder of the exam appears intact.  Impression: Extensive acute DVT which " surrounds the PICC line as well as superficial thrombus in the basilic vein.      RIGHT Extremity Veins US-Unilateral Upper 9/19/19:  Right Upper Extremity Venous Ultrasound.  Technique: Real time and color flow prior examination dated 9/6/2019.  Findings:  Thrombus: At this time, there is excess of thrombus in the right subclavian vein. 1 of the 2 brachial veins is also completely occluded with thrombus.  There remains thrombus in the basilic vein which is a superficial vein completely occluded.  Compression: The cephalic vein and radial ulnar veins remain patent and compressible.  Fluid collections: Not seen.  Other: None.  IMPRESSION:  At this time, there remains extensive thrombus in the right subclavian vein and 1 of the 2 brachial veins. There remains thrombus in the  basilic vein. There is no blood flow in the basilic vein or the brachial vein. There is flow present in the through subclavian vein and axillary  vein. Prior examination on 9/6/2019 demonstrated extensive acute DVT surrounding a PICC line as well as superficial thrombus in the basilic  vein on the right.  Electronically Signed By: Dr. Luis Enrique Schofield M.D. 09/19/2019 10:07:56 CDT  Tech: Xu Licea          ASSESSMENT/PLAN:    (I82.621) Deep vein thrombosis (DVT) of right upper extremity, unspecified chronicity, unspecified vein (H)  (primary encounter diagnosis)  (T84.51XD) Infection associated with internal right hip prosthesis, subsequent encounter  (Z96.641) Status post total hip replacement, right  Comment: Acute onset of multiple RUE DVT on 9/6, was started on Xarelto; no new symptoms to RUE. Unclear why RUE venous US was completed today, as PICC line was removed on 9/15. Hx right hip replacement in 07/2019 with subsequent right hip cellulitis and abscess, s/p wash out on 8/26 with revision of right hip SARAHI on 8/6. Recently completed course of IV abx on 9/15 with PICC line removal as previously mentioned.    Plan:   - Continue xarelto  BID.   - Repeat RUE in 1-2 months.   - Monitor for RUE edema/tenderness.   - Patient to follow-up with Ortho and ID as directed.           Electronically signed by:  FROYLAN Marcelo CNP

## 2019-09-23 ENCOUNTER — NURSING HOME VISIT (OUTPATIENT)
Dept: GERIATRICS | Facility: CLINIC | Age: 64
End: 2019-09-23
Payer: MEDICARE

## 2019-09-23 VITALS
HEART RATE: 69 BPM | WEIGHT: 189.2 LBS | TEMPERATURE: 96.8 F | SYSTOLIC BLOOD PRESSURE: 115 MMHG | HEIGHT: 65 IN | DIASTOLIC BLOOD PRESSURE: 73 MMHG | BODY MASS INDEX: 31.52 KG/M2 | OXYGEN SATURATION: 94 % | RESPIRATION RATE: 18 BRPM

## 2019-09-23 DIAGNOSIS — T84.51XD INFECTION ASSOCIATED WITH INTERNAL RIGHT HIP PROSTHESIS, SUBSEQUENT ENCOUNTER: ICD-10-CM

## 2019-09-23 DIAGNOSIS — Z96.641 STATUS POST TOTAL HIP REPLACEMENT, RIGHT: ICD-10-CM

## 2019-09-23 DIAGNOSIS — I82.621 DEEP VEIN THROMBOSIS (DVT) OF RIGHT UPPER EXTREMITY, UNSPECIFIED CHRONICITY, UNSPECIFIED VEIN (H): Primary | ICD-10-CM

## 2019-09-23 PROCEDURE — 99309 SBSQ NF CARE MODERATE MDM 30: CPT | Performed by: NURSE PRACTITIONER

## 2019-09-23 RX ORDER — TRAMADOL HYDROCHLORIDE 50 MG/1
50 TABLET ORAL EVERY 6 HOURS PRN
Qty: 60 TABLET | Refills: 0 | Status: SHIPPED | OUTPATIENT
Start: 2019-09-23 | End: 2019-12-30

## 2019-09-23 RX ORDER — HYDROCODONE BITARTRATE AND ACETAMINOPHEN 5; 325 MG/1; MG/1
1 TABLET ORAL EVERY 6 HOURS PRN
Qty: 90 TABLET | Refills: 0 | Status: SHIPPED | OUTPATIENT
Start: 2019-09-23 | End: 2019-10-02

## 2019-09-23 ASSESSMENT — MIFFLIN-ST. JEOR: SCORE: 1404.09

## 2019-09-23 NOTE — PROGRESS NOTES
[place] [facility]  [place] Medical Record Number:  [id number]  Place of Service where encounter took place:  [facility] ([facility]) [[id number]]  Chief Complaint   Patient presents with     Nursing Home Acute       HPI:    Mercedes Barber  is a 64 year old (1955), who is being seen today for an episodic care visit.  HPI information obtained from: facility chart records, facility staff and patient report. Today's concern is:    Patient Mercedes Barber is a 54 yr old female admitted to [facility] for rehabilitation s/p hospitalization [facility] 8/29-8/31/19 right hip cellulitis & abscess: s/p seroma wash out 8/26 following revision right hip SARAHI 8/6. Plan to continue wound vac  ID - single culture growing proteus from aspiration seroma at [place] following revision.  Culture from revision on 8/6 and and I&D seroma on 8/26 NTD. Applied zipline and 4 staples after sterile prep to distal aspect of incision  9/6/19 positive for extensive DVT right arm. Patient started on Xarelto     PMHx chronic pain syndrome, seizure disorder, IBS, hypothyroidism, anemia, GERD & depression (history son commit suicide/pt enjoys Art therapy) & sleep apnea (not use CPAP per Epic)       Status post total hip replacement, right  Deep vein thrombosis (DVT) of right upper extremity, unspecified chronicity, unspecified vein (H)  Infection associated with internal right hip prosthesis, subsequent encounter     Repeat ultrasound last week 9/19/19 show extensive thrombus right subclavian vein and 1 of the 2 brachial veins. Remains thrombus in basilic vein. No blood flow in basilic vein or brachial vein. There is flow present in the through subclavian vein and axiliary vein. Prior examination 9/6/19 demonstrate extensive acute DVT surrounding PICC line as well as superficial thrombus in basilic vein on the right   Patient remains on Xarelto. With plan follow up ultrasound in month per  consult      Wound vac discontinue & steri-strips intact with no drainage. Patient remains on oral minocycline with plan follow up infectious disease  10/10/19 & follow up ortho 10/3 () with [facility]  She is 50lb weight bearing. She states she has some increased pain with more activity lately & states she had a outing with a friend yesterday.  Pain medications effective for pain management     Patient states she is dressing herself. No pain in right arm    Past Medical and Surgical History reviewed in Epic today.    MEDICATIONS:  Current Outpatient Medications   Medication Sig Dispense Refill     acetaminophen (TYLENOL) 325 MG tablet Take 650 mg by mouth every 4 hours as needed for mild pain       albuterol (ALBUTEROL) 108 (90 BASE) MCG/ACT Inhaler Inhale 2 puffs into the lungs 4 times daily as needed for shortness of breath / dyspnea or wheezing 1 Inhaler 0     artificial saliva (BIOTENE MT) AERS spray Take 2 sprays by mouth 3 times daily as needed for dry mouth       budesonide-formoterol (SYMBICORT) 160-4.5 MCG/ACT Inhaler Inhale 2 puffs into the lungs 2 times daily       cycloSPORINE (RESTASIS) 0.05 % ophthalmic emulsion Place 1 drop into both eyes 2 times daily       HYDROcodone-acetaminophen (NORCO) 5-325 MG tablet Take 1 tablet by mouth every 6 hours as needed for severe pain 90 tablet 0     hypromellose (ARTIFICIAL TEARS) 0.5 % SOLN ophthalmic solution Place 1 drop into both eyes 2 times daily       lacosamide (VIMPAT) 200 MG TABS tablet Take 1 tablet (200 mg) by mouth 2 times daily 60 tablet 0     levothyroxine (SYNTHROID/LEVOTHROID) 50 MCG tablet Take 50 mcg by mouth daily       Lidocaine (LIDOCARE) 4 % Patch Place 1 patch onto the skin every 24 hours To prevent lidocaine toxicity, patient should be patch free for 12 hrs daily.       linaclotide (LINZESS) 290 MCG capsule Take 1 capsule (290 mcg) by mouth every morning (before breakfast) 10 capsule 0     liothyronine (CYTOMEL) 5  "MCG tablet Take 10 mcg by mouth daily        MINOCYCLINE HCL PO Take 100 mg by mouth 2 times daily       nefazodone (SERZONE) 200 MG tablet Take 600 mg by mouth At Bedtime        omeprazole (PRILOSEC) 40 MG DR capsule Take 1 capsule (40 mg) by mouth 2 times daily 180 capsule 3     ondansetron (ZOFRAN-ODT) 4 MG ODT tab Take 1 tablet (4 mg) by mouth every 6 hours as needed for nausea or vomiting 10 tablet 0     polyethylene glycol (MIRALAX/GLYCOLAX) packet Take 17 g by mouth daily And daily as needed       potassium citrate (UROCIT-K) 10 MEQ (1080 MG) CR tablet Take 10 mEq by mouth daily       pramipexole (MIRAPEX) 1 MG tablet Give 1 tab in AM; and 2 tabs 1 hour prior to hs       ranitidine (ZANTAC) 150 MG tablet TAKE 1 TABLET BY MOUTH TWICE DAILY 180 tablet 3     RIVAROXABAN PO Take 15 mg by mouth 2 times daily       senna-docusate (SENOKOT-S/PERICOLACE) 8.6-50 MG tablet Take 1 tablet by mouth 2 times daily as needed for constipation 20 tablet 0     temazepam (RESTORIL) 15 MG capsule Take 1 capsule (15 mg) by mouth At Bedtime 30 capsule 0     traMADol (ULTRAM) 50 MG tablet Take 1 tablet (50 mg) by mouth every 6 hours as needed for moderate to severe pain 60 tablet 0         REVIEW OF SYSTEMS:  10 point ROS of systems including Constitutional, Eyes, Respiratory, Cardiovascular, Gastroenterology, Genitourinary, Integumentary, Musculoskeletal, Psychiatric were all negative except for pertinent positives noted in my HPI.    Objective:  /73   Pulse 69   Temp 96.8  F (36  C)   Resp 18   Ht 1.651 m (5' 5\")   Wt 85.8 kg (189 lb 3.2 oz)   LMP 03/11/2010   SpO2 94%   BMI 31.48 kg/m    Exam:  GENERAL APPEARANCE:  Alert, in no distress  ENT:  Mouth and posterior oropharynx normal, moist mucous membranes  EYES:  EOM, conjunctivae, lids, pupils and irises normal  NECK:  No adenopathy,masses or thyromegaly  RESP:  respiratory effort and palpation of chest normal, lungs clear to auscultation , no respiratory " distress  CV:  Palpation and auscultation of heart done , regular rate and rhythm, no murmur, rub, or gallop  ABDOMEN:  normal bowel sounds, soft, nontender, no hepatosplenomegaly or other masses  M/S:   lying in bed  SKIN:  Inspection of skin and subcutaneous tissue steri-strips intact right thigh incision. Not appear infected. No drainage  NEURO:   Cranial nerves 2-12 are normal tested and grossly at patient's baseline  PSYCH:  oriented X 3    Labs:   Labs done in SNF are in [place] EPIC. Please refer to them using [facility]Care Everywhere.    ASSESSMENT/PLAN:     Status post total hip replacement, right  Deep vein thrombosis (DVT) of right upper extremity, unspecified chronicity, unspecified vein (H)  Infection associated with internal right hip prosthesis, subsequent encounter     Continues Xarelto, follow up ultrasound x 1 month. No pain in right arm, monitor   Progressing in therapies. Wound not appear infected and appear healed, monitor   Follow up ortho & infectious disease & continue on minocycline at this time  Remains 50lb weight bearing restriction  Continue therapies          Orders written by provider at facility      Electronically signed by:  FROYLAN Munguia CNP

## 2019-10-01 VITALS
RESPIRATION RATE: 18 BRPM | WEIGHT: 187.4 LBS | OXYGEN SATURATION: 97 % | DIASTOLIC BLOOD PRESSURE: 70 MMHG | TEMPERATURE: 96.8 F | BODY MASS INDEX: 31.22 KG/M2 | SYSTOLIC BLOOD PRESSURE: 107 MMHG | HEART RATE: 56 BPM | HEIGHT: 65 IN

## 2019-10-01 ASSESSMENT — MIFFLIN-ST. JEOR: SCORE: 1395.92

## 2019-10-01 NOTE — PROGRESS NOTES
[place] [facility] DISCHARGE SUMMARY  PATIENT'S NAME: Mercedes Barber  YOB: 1955  MEDICAL RECORD NUMBER:  [id number]  Place of Service where encounter took place:  [facility] ([facility]) [[id number]]    PRIMARY CARE PROVIDER AND CLINIC RESPONSIBLE AFTER TRANSFER:   Skyler Álvarez MD, [address] [place] [address] / [facility] [id number]    [facility] Provider     Transferring providers: FROYLAN Munguia CNP; Missael Leyva MD  Recent Hospitalization/ED:  [facility] stay 8/29/19 to 8/31/19.  Date of SNF Admission: August / 31 / 2019  Date of SNF (anticipated) Discharge: October / 04 / 2019  Discharged to: previous independent home  Cognitive Scores/Physical Function/DME:   Independent with activities of daily living     CODE STATUS/ADVANCE DIRECTIVES DISCUSSION:  Full Code   ALLERGIES: Clonopin [clonazepam]; Encort [hydrocortisone acetate]; Encort [hydrocortisone]; Erythromycin; Flagyl [metronidazole]; Gabapentin; Lamictal [lamotrigine]; Oxycodone; and Tegretol [carbamazepine]    DISCHARGE DIAGNOSIS/NURSING FACILITY COURSE:     Patient Mercedes Barber is a 54 yr old female admitted to [facility] for rehabilitation s/p hospitalization [facility] 8/29-8/31/19 right hip cellulitis & abscess: s/p seroma wash out 8/26 following revision right hip SARAHI 8/6. Started on wound vac  ID - single culture growing proteus from aspiration seroma at [place] following revision.  9/6/19 positive for extensive DVT right arm (on Xarelto     PMHx chronic pain syndrome, seizure disorder, IBS, hypothyroidism, anemia, GERD & depression (enjoys Art therapy) & sleep apnea (not use CPAP per Epic)      Right hip infection/cellulitis  Completed IV ceftriaxone x2 week & subsequent course minocycline  Incision healed,   Patient taking Norco and Tramadol. Goal wean off Norco. Decrease Norco 5-325mg to 2 x daily as needed.  Continue Tylenol   Patient is independent with  activities of daily living. Has home eval today. Plans to discharge home on Friday with homecare  Follow up infectious disease 10/10 Dr. Lilly & Ortho 10/3 Dr Antonio  Is 50lb weight bearing x 2 wks from 9/12 then is weight bearing as tolerated. Should be weight bearing as tolerated tomorrow  Right upper extremity DVT  Stop date xarelto 10/15  Follow up ultrasound IR 10/8/19 & follow up primary care provider   Discussed with Dr. Leyva plan of care for DVT    Chronic pain syndrome  Continue on current medications to treat   Continue tramadol and wean off Norco as able  Continue therapies     RLS  chronic managed   Continue Mirapex, monitor     Hypothyroidism  chronic managed   Continue on current dose Levothyroxine & cytomel, monitor     Seizure disorder  No signs and symptoms seizure  Continue Vimpat    IBS  chronic managed   Continue Senna S  Continue Linzess, monitor     GERD  chronic managed   Continue on omeprazole & ranitidine, monitor   Consider discontinue PPI or ranitidine in future due to polypharmacy     Depression  Patient states she is coping, however, recent surgery with infection has been challenging  Continue on Serzone  Has supportive family   Insomnia  Continue Restoril  chronic managed     Anemia  hgb 8.5 on hospital discharge, monitor trend  BMP,CBC 9/6/19    Hypokalemia  On potassium, monitor     dyspnea  Continue Symbicort & albuterol as needed ,monitor      Med review  Discontinue Zofran & lidocaine patch due to not using  Discontinue Miralax due to polypharmacy. Continue Senna S        Past Medical History:  has a past medical history of Arthritis, Cervical high risk HPV (human papillomavirus) test positive (07/17/2017), Cervical pain (9/15/2016), Constipation (8/27/2012), Diarrhea (4/30/2009), Esophageal stricture (2/21/2012), Gastro-oesophageal reflux disease, Hypothyroidism (9/18/2012), IBS (irritable bowel syndrome), Mild major depression (H) (2/21/2012), Neuropathy of lower  extremity, Rectal prolapse, Restless leg syndrome, Seizure disorder (H), Sleep apnea, and Temporal sclerosis (8/27/2012). She also has no past medical history of Complication of anesthesia, History of blood transfusion, or Malignant hyperthermia.    Discharge Medications:  Current Outpatient Medications   Medication Sig Dispense Refill     acetaminophen (TYLENOL) 325 MG tablet Take 650 mg by mouth every 4 hours as needed for mild pain       albuterol (ALBUTEROL) 108 (90 BASE) MCG/ACT Inhaler Inhale 2 puffs into the lungs 4 times daily as needed for shortness of breath / dyspnea or wheezing 1 Inhaler 0     artificial saliva (BIOTENE MT) AERS spray Take 2 sprays by mouth 3 times daily as needed for dry mouth       budesonide-formoterol (SYMBICORT) 160-4.5 MCG/ACT Inhaler Inhale 2 puffs into the lungs 2 times daily       cycloSPORINE (RESTASIS) 0.05 % ophthalmic emulsion Place 1 drop into both eyes 2 times daily       HYDROcodone-acetaminophen (NORCO) 5-325 MG tablet 2 x daily as needed  90 tablet 0     hypromellose (ARTIFICIAL TEARS) 0.5 % SOLN ophthalmic solution Place 1 drop into both eyes 2 times daily       lacosamide (VIMPAT) 200 MG TABS tablet Take 1 tablet (200 mg) by mouth 2 times daily 60 tablet 0     levothyroxine (SYNTHROID/LEVOTHROID) 50 MCG tablet Take 50 mcg by mouth daily       linaclotide (LINZESS) 290 MCG capsule Take 1 capsule (290 mcg) by mouth every morning (before breakfast) 10 capsule 0     liothyronine (CYTOMEL) 5 MCG tablet Take 10 mcg by mouth daily        nefazodone (SERZONE) 200 MG tablet Take 600 mg by mouth At Bedtime        omeprazole (PRILOSEC) 40 MG DR capsule Take 1 capsule (40 mg) by mouth 2 times daily 180 capsule 3     potassium citrate (UROCIT-K) 10 MEQ (1080 MG) CR tablet Take 10 mEq by mouth daily       pramipexole (MIRAPEX) 1 MG tablet Give 1 tab in AM; and 2 tabs 1 hour prior to hs       ranitidine (ZANTAC) 150 MG tablet TAKE 1 TABLET BY MOUTH TWICE DAILY 180 tablet 3      "RIVAROXABAN PO Take 15 mg by mouth 2 times daily  STOP DATE 12/6/19   follow up PCP       senna-docusate (SENOKOT-S/PERICOLACE) 8.6-50 MG tablet Take 1 tablet by mouth 2 times daily as needed for constipation 20 tablet 0     temazepam (RESTORIL) 15 MG capsule Take 1 capsule (15 mg) by mouth At Bedtime 30 capsule 0     traMADol (ULTRAM) 50 MG tablet Take 1 tablet (50 mg) by mouth every 6 hours as needed for moderate to severe pain 60 tablet 0       Medication Changes/Rationale:   Decrease Norco 5-325mg 2 x daily as needed     Controlled medications sent with patient:    no more than 2 wks supply     ROS:   10 point ROS of systems including Constitutional, Eyes, Respiratory, Cardiovascular, Gastroenterology, Genitourinary, Integumentary, Musculoskeletal, Psychiatric were all negative except for pertinent positives noted in my HPI.    Physical Exam:   Vitals: /70   Pulse 56   Temp 96.8  F (36  C)   Resp 18   Ht 1.651 m (5' 5\")   Wt 85 kg (187 lb 6.4 oz)   LMP 03/11/2010   SpO2 97%   BMI 31.18 kg/m    BMI= Body mass index is 31.18 kg/m .  GENERAL APPEARANCE:  Alert, in no distress  ENT:  Mouth and posterior oropharynx normal, moist mucous membranes  EYES:  EOM, conjunctivae, lids, pupils and irises normal  NECK:  No adenopathy,masses or thyromegaly  RESP:  respiratory effort and palpation of chest normal, lungs clear to auscultation , no respiratory distress  CV:  Palpation and auscultation of heart done , regular rate and rhythm, no murmur, rub, or gallop  ABDOMEN:  normal bowel sounds, soft, nontender, no hepatosplenomegaly or other masses  M/S:   sitting in WC  SKIN:  Inspection of skin and subcutaneous tissue baseline  NEURO:   Cranial nerves 2-12 are normal tested and grossly at patient's baseline  PSYCH:  oriented X 3     SNF labs: Labs done in SNF are in [place] EPIC. Please refer to them using [facility]Care Everywhere.      DISCHARGE PLAN:    Medical Follow Up:      Follow up with primary care " provider in 1 weeks    MTM referral needed and placed by this provider: No    Current [place] scheduled appointments:       Discharge Services: Home Care:  Occupational Therapy, Physical Therapy, Registered Nurse, Home Health Aide and From:  [place] [facility] [place]    Discharge Instructions Verbalized to Patient at Discharge:     REMINDER need follow up ultrasound right upper extremity and primary care provider to guide anticoagulation therapies for DVT      TOTAL DISCHARGE TIME:   Greater than 30 minutes  Electronically signed by:  FROYLAN Munguia CNP     Home care Face to Face documentation done in [facility] attached to Home care orders for [facility].

## 2019-10-02 ENCOUNTER — TELEPHONE (OUTPATIENT)
Dept: GERIATRICS | Facility: CLINIC | Age: 64
End: 2019-10-02

## 2019-10-02 ENCOUNTER — DISCHARGE SUMMARY NURSING HOME (OUTPATIENT)
Dept: GERIATRICS | Facility: CLINIC | Age: 64
End: 2019-10-02
Payer: MEDICARE

## 2019-10-02 DIAGNOSIS — I82.621 DEEP VEIN THROMBOSIS (DVT) OF RIGHT UPPER EXTREMITY, UNSPECIFIED CHRONICITY, UNSPECIFIED VEIN (H): ICD-10-CM

## 2019-10-02 DIAGNOSIS — G40.909 SEIZURE DISORDER (H): ICD-10-CM

## 2019-10-02 DIAGNOSIS — T84.51XD INFECTION ASSOCIATED WITH INTERNAL RIGHT HIP PROSTHESIS, SUBSEQUENT ENCOUNTER: ICD-10-CM

## 2019-10-02 PROCEDURE — 99316 NF DSCHRG MGMT 30 MIN+: CPT | Performed by: NURSE PRACTITIONER

## 2019-10-02 RX ORDER — LACOSAMIDE 200 MG/1
200 TABLET ORAL 2 TIMES DAILY
Qty: 30 TABLET | Refills: 0 | Status: SHIPPED | OUTPATIENT
Start: 2019-10-02 | End: 2020-08-28

## 2019-10-02 RX ORDER — TEMAZEPAM 15 MG/1
15 CAPSULE ORAL AT BEDTIME
Qty: 30 CAPSULE | Refills: 0 | Status: ON HOLD | OUTPATIENT
Start: 2019-10-02 | End: 2020-07-29

## 2019-10-02 RX ORDER — HYDROCODONE BITARTRATE AND ACETAMINOPHEN 5; 325 MG/1; MG/1
1 TABLET ORAL 2 TIMES DAILY PRN
Qty: 30 TABLET | Refills: 0 | Status: SHIPPED | OUTPATIENT
Start: 2019-10-02 | End: 2019-11-20

## 2019-10-02 NOTE — LETTER
10/2/2019        RE: Mercedes Barber  [address]  [facility] [address]        [place] [facility] DISCHARGE SUMMARY  PATIENT'S NAME: Mercedes Barber  YOB: 1955  MEDICAL RECORD NUMBER:  [id number]  Place of Service where encounter took place:  [facility]) [[id number]]    PRIMARY CARE PROVIDER AND CLINIC RESPONSIBLE AFTER TRANSFER:   Skyler Álvarez MD, [address]  / [facility] [address]    [facility] Provider     Transferring providers: FROYLAN Munguia CNP; Missael Leyva MD  Recent Hospitalization/ED:  Hospital  [facility] stay 8/29/19 to 8/31/19.  Date of SNF Admission: August / 31 / 2019  Date of SNF (anticipated) Discharge: October / 04 / 2019  Discharged to: previous independent home  Cognitive Scores/Physical Function/DME:   Independent with activities of daily living     CODE STATUS/ADVANCE DIRECTIVES DISCUSSION:  Full Code   ALLERGIES: Clonopin [clonazepam]; Encort [hydrocortisone acetate]; Encort [hydrocortisone]; Erythromycin; Flagyl [metronidazole]; Gabapentin; Lamictal [lamotrigine]; Oxycodone; and Tegretol [carbamazepine]    DISCHARGE DIAGNOSIS/NURSING FACILITY COURSE:     Patient Mercedes Barber is a 54 yr old female admitted to [facility] for rehabilitation s/p hospitalization [facility] 8/29-8/31/19 right hip cellulitis & abscess: s/p seroma wash out 8/26 following revision right hip SARAHI 8/6. Started on wound vac  ID - single culture growing proteus from aspiration seroma at [place] following revision.  9/6/19 positive for extensive DVT right arm (on Xarelto     PMHx chronic pain syndrome, seizure disorder, IBS, hypothyroidism, anemia, GERD & depression (enjoys Art therapy) & sleep apnea (not use CPAP per Epic)      Right hip infection/cellulitis  Completed IV ceftriaxone x2 week & subsequent course minocycline  Incision healed,   Patient taking Norco and Tramadol. Goal  wean off Norco. Decrease Norco 5-325mg to 2 x daily as needed.  Continue Tylenol   Patient is independent with activities of daily living. Has home eval today. Plans to discharge home on Friday with homecare  Follow up infectious disease 10/10 Dr. Lilly & Ortho 10/3 Dr Antonio  Is 50lb weight bearing x 2 wks from 9/12 then is weight bearing as tolerated. Should be weight bearing as tolerated tomorrow  Right upper extremity DVT  Stop date xarelto 10/15  Follow up ultrasound IR 10/8/19 & follow up primary care provider   Discussed with Dr. Leyva plan of care for DVT    Chronic pain syndrome  Continue on current medications to treat   Continue tramadol and wean off Norco as able  Continue therapies     RLS  chronic managed   Continue Mirapex, monitor     Hypothyroidism  chronic managed   Continue on current dose Levothyroxine & cytomel, monitor     Seizure disorder  No signs and symptoms seizure  Continue Vimpat    IBS  chronic managed   Continue Senna S  Continue Linzess, monitor     GERD  chronic managed   Continue on omeprazole & ranitidine, monitor   Consider discontinue PPI or ranitidine in future due to polypharmacy     Depression  Patient states she is coping, however, recent surgery with infection has been challenging  Continue on Serzone  Has supportive family   Insomnia  Continue Restoril  chronic managed     Anemia  hgb 8.5 on hospital discharge, monitor trend  BMP,CBC 9/6/19    Hypokalemia  On potassium, monitor     dyspnea  Continue Symbicort & albuterol as needed ,monitor      Med review  Discontinue Zofran & lidocaine patch due to not using  Discontinue Miralax due to polypharmacy. Continue Senna S        Past Medical History:  has a past medical history of Arthritis, Cervical high risk HPV (human papillomavirus) test positive (07/17/2017), Cervical pain (9/15/2016), Constipation (8/27/2012), Diarrhea (4/30/2009), Esophageal stricture (2/21/2012), Gastro-oesophageal reflux disease, Hypothyroidism  (9/18/2012), IBS (irritable bowel syndrome), Mild major depression (H) (2/21/2012), Neuropathy of lower extremity, Rectal prolapse, Restless leg syndrome, Seizure disorder (H), Sleep apnea, and Temporal sclerosis (8/27/2012). She also has no past medical history of Complication of anesthesia, History of blood transfusion, or Malignant hyperthermia.    Discharge Medications:  Current Outpatient Medications   Medication Sig Dispense Refill     acetaminophen (TYLENOL) 325 MG tablet Take 650 mg by mouth every 4 hours as needed for mild pain       albuterol (ALBUTEROL) 108 (90 BASE) MCG/ACT Inhaler Inhale 2 puffs into the lungs 4 times daily as needed for shortness of breath / dyspnea or wheezing 1 Inhaler 0     artificial saliva (BIOTENE MT) AERS spray Take 2 sprays by mouth 3 times daily as needed for dry mouth       budesonide-formoterol (SYMBICORT) 160-4.5 MCG/ACT Inhaler Inhale 2 puffs into the lungs 2 times daily       cycloSPORINE (RESTASIS) 0.05 % ophthalmic emulsion Place 1 drop into both eyes 2 times daily       HYDROcodone-acetaminophen (NORCO) 5-325 MG tablet 2 x daily as needed  90 tablet 0     hypromellose (ARTIFICIAL TEARS) 0.5 % SOLN ophthalmic solution Place 1 drop into both eyes 2 times daily       lacosamide (VIMPAT) 200 MG TABS tablet Take 1 tablet (200 mg) by mouth 2 times daily 60 tablet 0     levothyroxine (SYNTHROID/LEVOTHROID) 50 MCG tablet Take 50 mcg by mouth daily       linaclotide (LINZESS) 290 MCG capsule Take 1 capsule (290 mcg) by mouth every morning (before breakfast) 10 capsule 0     liothyronine (CYTOMEL) 5 MCG tablet Take 10 mcg by mouth daily        nefazodone (SERZONE) 200 MG tablet Take 600 mg by mouth At Bedtime        omeprazole (PRILOSEC) 40 MG DR capsule Take 1 capsule (40 mg) by mouth 2 times daily 180 capsule 3     potassium citrate (UROCIT-K) 10 MEQ (1080 MG) CR tablet Take 10 mEq by mouth daily       pramipexole (MIRAPEX) 1 MG tablet Give 1 tab in AM; and 2 tabs 1 hour prior  "to hs       ranitidine (ZANTAC) 150 MG tablet TAKE 1 TABLET BY MOUTH TWICE DAILY 180 tablet 3     RIVAROXABAN PO Take 15 mg by mouth 2 times daily  STOP DATE 10/15/19 DEPENDING ON RESULTS ULTRASOUND  follow up PCP       senna-docusate (SENOKOT-S/PERICOLACE) 8.6-50 MG tablet Take 1 tablet by mouth 2 times daily as needed for constipation 20 tablet 0     temazepam (RESTORIL) 15 MG capsule Take 1 capsule (15 mg) by mouth At Bedtime 30 capsule 0     traMADol (ULTRAM) 50 MG tablet Take 1 tablet (50 mg) by mouth every 6 hours as needed for moderate to severe pain 60 tablet 0       Medication Changes/Rationale:   Decrease Norco 5-325mg 2 x daily as needed     Controlled medications sent with patient:    no more than 2 wks supply     ROS:   10 point ROS of systems including Constitutional, Eyes, Respiratory, Cardiovascular, Gastroenterology, Genitourinary, Integumentary, Musculoskeletal, Psychiatric were all negative except for pertinent positives noted in my HPI.    Physical Exam:   Vitals: /70   Pulse 56   Temp 96.8  F (36  C)   Resp 18   Ht 1.651 m (5' 5\")   Wt 85 kg (187 lb 6.4 oz)   LMP 03/11/2010   SpO2 97%   BMI 31.18 kg/m     BMI= Body mass index is 31.18 kg/m .  GENERAL APPEARANCE:  Alert, in no distress  ENT:  Mouth and posterior oropharynx normal, moist mucous membranes  EYES:  EOM, conjunctivae, lids, pupils and irises normal  NECK:  No adenopathy,masses or thyromegaly  RESP:  respiratory effort and palpation of chest normal, lungs clear to auscultation , no respiratory distress  CV:  Palpation and auscultation of heart done , regular rate and rhythm, no murmur, rub, or gallop  ABDOMEN:  normal bowel sounds, soft, nontender, no hepatosplenomegaly or other masses  M/S:   sitting in WC  SKIN:  Inspection of skin and subcutaneous tissue baseline  NEURO:   Cranial nerves 2-12 are normal tested and grossly at patient's baseline  PSYCH:  oriented X 3     SNF labs: Labs done in SNF are in [place] EPIC. " Please refer to them using EPIC/Care Everywhere.      DISCHARGE PLAN:    Medical Follow Up:      Follow up with primary care provider in 1 weeks    MTM referral needed and placed by this provider: No    Current [place] scheduled appointments:       Discharge Services: Home Care:  Occupational Therapy, Physical Therapy, Registered Nurse, Home Health Aide and From:  [place] [facility]    Discharge Instructions Verbalized to Patient at Discharge:     REMINDER need follow up ultrasound right upper extremity and primary care provider to guide anticoagulation therapies for DVT      TOTAL DISCHARGE TIME:   Greater than 30 minutes  Electronically signed by:  FROYLAN Munguia CNP     Home care Face to Face documentation done in [facility] attached to Home care orders for [facility].                     Sincerely,        FROYLAN Munguia CNP

## 2019-10-03 NOTE — TELEPHONE ENCOUNTER
RN called to report serial repeat US was positive for RUE DVT.   Patient is already on Xarelto.     Plan:  No new orders tonight.   -Facility to call PCP/NP in AM to get further instructions.

## 2019-10-04 ENCOUNTER — NURSING HOME VISIT (OUTPATIENT)
Dept: GERIATRICS | Facility: CLINIC | Age: 64
End: 2019-10-04
Payer: MEDICARE

## 2019-10-04 DIAGNOSIS — Z53.9 ERRONEOUS ENCOUNTER--DISREGARD: Primary | ICD-10-CM

## 2019-10-06 ENCOUNTER — DOCUMENTATION ONLY (OUTPATIENT)
Dept: INTERNAL MEDICINE | Facility: CLINIC | Age: 64
End: 2019-10-06

## 2019-10-06 ENCOUNTER — NURSE TRIAGE (OUTPATIENT)
Dept: NURSING | Facility: CLINIC | Age: 64
End: 2019-10-06

## 2019-10-06 NOTE — TELEPHONE ENCOUNTER
Maycol RN @ Home Care requests the following orders:    Skilled nurse visit 2x/wk for 1 wk then 1x/wk for 2 wks then 3 visits PRN    home health aide 2x/wk for 3 wks    PT eval ,OT eval    Orders given to home care nurse per [facility] standing orders.

## 2019-10-07 NOTE — PROGRESS NOTES
[facility] now requests orders and shares plan of care/discharge summaries for some patients through [employer].  Please REPLY TO THIS MESSAGE OR ROUTE BACK TO THE AUTHOR in order to give authorization for orders when needed.  This is considered a verbal order, you will still receive a faxed copy of orders for signature.  Thank you for your assistance in improving collaboration for our patients.    Dr. Rebollar     Mercedes was admitted to [facility] today, orders were approved from triage RN.    I wanted to notify you of the following medication interactions and duplicates:     Severity Level: 2-Severe Interaction budesonide-formoterol interacts with nefazodone   Severity Level: 2-Severe Interaction cycloSPORINE interacts with rivaroxaban   Severity Level: 2-Severe Interaction HYDROcodone-acetaminophen interacts with nefazodone   Severity Level: 3-Moderate Interaction cycloSPORINE interacts with nefazodone   Severity Level: 3-Moderate Interaction HYDROcodone-acetaminophen interacts with temazepam   Severity Level: 3-Moderate Interaction nefazodone interacts with traMADol   Severity Level: 3-Moderate Interaction temazepam interacts with traMADol   Severity Level: 3-Moderate Interaction temazepam interacts with traMADol    Severe Warning Multiple medications in the Non-Opioid Analgesic/Antipyretic, Non-Salicylate class   Severe Warning Multiple medications in the Non-Opioid Analgesic/Antipyretic, Non-Salicylate class   Severe Warning Multiple medications in the Opioid Analgesics- IR (with all antitussive opiates) class   Severe Warning Multiple medications in the Thyroid Hormones class   Severe Warning Multiple medications in the Thyroid Hormones class   Severe Warning Multiple medications in the Peptic Ulcer Agents class   Severe Warning Multiple medications in the Peptic Ulcer Agents class   Severe Warning Multiple medications in the Opioid Analgesics- IR (with all antitussive opiates) class    Thank you,      Maycol Briseno RN Case Manager   [phone]  [email or web address]

## 2019-10-08 ENCOUNTER — TRANSFERRED RECORDS (OUTPATIENT)
Dept: HEALTH INFORMATION MANAGEMENT | Facility: CLINIC | Age: 64
End: 2019-10-08

## 2019-10-09 ENCOUNTER — DOCUMENTATION ONLY (OUTPATIENT)
Dept: INTERNAL MEDICINE | Facility: CLINIC | Age: 64
End: 2019-10-09

## 2019-10-09 NOTE — PROGRESS NOTES
Kanwal PT with  HC calling,     Requesting PT     2x a week for 3 weeks for safety training.     Informed approved per standing orders. HC staff will fax these orders for MD signature.      Mariel GUY RN, BSN, PHN

## 2019-10-10 ENCOUNTER — OFFICE VISIT (OUTPATIENT)
Dept: INTERNAL MEDICINE | Facility: CLINIC | Age: 64
End: 2019-10-10
Payer: MEDICARE

## 2019-10-10 VITALS
OXYGEN SATURATION: 97 % | TEMPERATURE: 98.5 F | DIASTOLIC BLOOD PRESSURE: 64 MMHG | HEART RATE: 81 BPM | SYSTOLIC BLOOD PRESSURE: 104 MMHG

## 2019-10-10 DIAGNOSIS — L03.115 CELLULITIS OF RIGHT HIP: ICD-10-CM

## 2019-10-10 DIAGNOSIS — I82.621 DEEP VEIN THROMBOSIS (DVT) OF BRACHIAL VEIN OF RIGHT UPPER EXTREMITY, UNSPECIFIED CHRONICITY (H): ICD-10-CM

## 2019-10-10 DIAGNOSIS — Z09 HOSPITAL DISCHARGE FOLLOW-UP: Primary | ICD-10-CM

## 2019-10-10 LAB
ERYTHROCYTE [DISTWIDTH] IN BLOOD BY AUTOMATED COUNT: 14.9 % (ref 10–15)
HCT VFR BLD AUTO: 35.2 % (ref 35–47)
HGB BLD-MCNC: 10.9 G/DL (ref 11.7–15.7)
MCH RBC QN AUTO: 27.8 PG (ref 26.5–33)
MCHC RBC AUTO-ENTMCNC: 31 G/DL (ref 31.5–36.5)
MCV RBC AUTO: 90 FL (ref 78–100)
PLATELET # BLD AUTO: 342 10E9/L (ref 150–450)
RBC # BLD AUTO: 3.92 10E12/L (ref 3.8–5.2)
WBC # BLD AUTO: 6.5 10E9/L (ref 4–11)

## 2019-10-10 PROCEDURE — 36415 COLL VENOUS BLD VENIPUNCTURE: CPT | Performed by: INTERNAL MEDICINE

## 2019-10-10 PROCEDURE — 85027 COMPLETE CBC AUTOMATED: CPT | Performed by: INTERNAL MEDICINE

## 2019-10-10 PROCEDURE — 99214 OFFICE O/P EST MOD 30 MIN: CPT | Performed by: INTERNAL MEDICINE

## 2019-10-10 RX ORDER — HYDROXYZINE HYDROCHLORIDE 50 MG/1
50 TABLET, FILM COATED ORAL 3 TIMES DAILY PRN
Refills: 0 | Status: ON HOLD | COMMUNITY
Start: 2019-10-08 | End: 2020-07-29

## 2019-10-10 RX ORDER — CEFUROXIME AXETIL 500 MG/1
1 TABLET ORAL 2 TIMES DAILY
Refills: 2 | COMMUNITY
Start: 2019-10-08 | End: 2020-04-22

## 2019-10-10 SDOH — HEALTH STABILITY: MENTAL HEALTH: HOW OFTEN DO YOU HAVE A DRINK CONTAINING ALCOHOL?: MONTHLY OR LESS

## 2019-10-10 NOTE — LETTER
[facility]  [address] [place] [address]  [phone]      10/10/2019       Mercedes Barber  [address]   [facility] [address]        Dear Mercedes,    Your hemoglobin is still slightly low but has improved from when last checked.  I would expect this to slowly improve with time on its own and would suggest a recheck of your hemoglobin level in 2 to 3 months.    Sincerely,      Skyler Álvarez MD  Internal Medicine

## 2019-10-10 NOTE — PROGRESS NOTES
Subjective     Mercedes Barber is a 64 year old female who presents to clinic today for the following health issues:    HPI     Patient Mercedes Barber is a 54 yr old female admitted to [facility] for rehabilitation s/p hospitalization [facility] 8/29-8/31/19 right hip cellulitis & abscess: s/p seroma wash out 8/26 following revision right hip SARAHI 8/6. Started on wound vac  ID - single culture growing proteus from aspiration seroma at [facility] [place] following revision.  9/6/19 positive for extensive DVT right arm (on Xarelto)     PMHx chronic pain syndrome, seizure disorder, IBS, hypothyroidism, anemia, GERD & depression (enjoys Art therapy) & sleep apnea (not use CPAP per Epic)     Right hip infection/cellulitis  Completed IV ceftriaxone x 2 week & subsequent course minocycline  Incision healed,   Patient taking Norco and Tramadol. Goal wean off Norco. Decrease Norco 5-325mg to 2 x daily as needed.  Continue Tylenol   Patient is independent with activities of daily living. Has home eval today. Plans to discharge home on Friday with homecare  Follow up infectious disease 10/10 Dr. Lilly & Ortho 10/3 Dr Antonio  Is 50 lb weight bearing x 2 wks from 9/12 then is weight bearing as tolerated.     Right upper extremity DVT  Stop date xarelto   Follow up ultrasound IR 10/8/19 & follow up primary     Hospital Follow-up Visit:    Hospital/Nursing Home/IP Rehab Facility: [facility]  Date of Admission: 8/29/19  Date of Discharge: 8/31/19  Reason(s) for Admission: cellulitis            Problems taking medications regularly:  None       Medication changes since discharge: noted       Problems adhering to non-medication therapy:  None    Summary of hospitalization:  [facility] discharge summary reviewed  Diagnostic Tests/Treatments reviewed.  Follow up needed: ID and IR  Other Healthcare Providers Involved in Patient s Care:         Physical Therapy  Update since discharge: stable.      Post Discharge Medication Reconciliation: discharge medications reconciled, continue medications without change.  Plan of care communicated with patient     Coding guidelines for this visit:  Type of Medical   Decision Making Face-to-Face Visit       within 7 Days of discharge Face-to-Face Visit        within 14 days of discharge   Moderate Complexity [id number]   High Complexity [id number]              Patient Active Problem List   Diagnosis     Menopausal syndrome (hot flashes)     Family history of breast cancer     Vulvar pain     Renal anomaly     Overactive bladder     Rectal prolapse     CARDIOVASCULAR SCREENING; LDL GOAL LESS THAN 160     Vaginal pain     Dysphagia     Confusion     Headache     Left shoulder pain     Anemia     Mild major depression (H)     Esophageal stricture     Vulvar lesion     Temporal sclerosis     Lumbago     Pain in thoracic spine     Sciatica     Pain in joint, lower leg     Plantar fascial fibromatosis     Pain in joint involving ankle and foot     Other specified hypothyroidism     Gastroesophageal reflux disease without esophagitis     Irritable bowel syndrome     Other sleep apnea     Cervical high risk HPV (human papillomavirus) test positive     Restless legs syndrome (RLS)     Status post total hip replacement, right     Hip pain     Cellulitis     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)     Cellulitis of right hip     Past Surgical History:   Procedure Laterality Date     Anterior COLPORRHAPHY, BLADDER/VAGINA      perigee mesh     ARTHROPLASTY HIP Right 7/23/2019    Procedure: Right total hip arthroplasty;  Surgeon: Anant Mejia MD;  Location: [facility] OR     ARTHROPLASTY PATELLO-FEMORAL (KNEE) Bilateral      ARTHROPLASTY REVISION HIP Right 8/7/2019    Procedure: Right hip revision total arthroplasty;  Surgeon: Tom Antonio MD;  Location: [facility] OR     CARPAL TUNNEL RELEASE RT/LT       DENTAL SURGERY      implant     DILATION AND CURETTAGE       DRAIN  PILONIDAL CYST SIMPL       ENDOSCOPY DRUG INDUCED SLEEP N/A 11/18/2015    Procedure: ENDOSCOPY DRUG INDUCED SLEEP;  Surgeon: Park Ortiz MD;  Location: [facility] OR     ESOPHAGOSCOPY, GASTROSCOPY, DUODENOSCOPY (EGD), COMBINED  4/5/2013    Procedure: COMBINED ESOPHAGOSCOPY, GASTROSCOPY, DUODENOSCOPY (EGD), BIOPSY SINGLE OR MULTIPLE;;  Surgeon: Mundo Mehta MD;  Location:  GI     EXCISE LESION TRUNK  8/10/2012    Procedure: EXCISE LESION TRUNK;;  Surgeon: Jerald Diggs MD;  Location: [facility] OR     HYSTERECTOMY       IRRIGATION AND DEBRIDEMENT HIP, COMBINED Right 8/26/2019    Procedure: 1.  Right hip wound irrigation and debridement with excisional debridement of nonviable skin, subcutaneous tissues, and fascia. 2. Application of incisional wound VAC, 27cm length incision.;  Surgeon: Tyson Prabhakar MD;  Location: [facility] OR     L shoulder fracture       rectal prolapse repair abdominally       RELEASE PLANTAR FASCIA Right 1/20/2015    Procedure: RELEASE PLANTAR FASCIA;  Surgeon: Maicol Liu DPM;  Location: [facility]     REMOVE MESH VAGINA  8/10/2012    Procedure: REMOVE MESH VAGINA;  Excision of Vaginal Mesh Exposure, Removal of Skin Tag Left Inner Leg;  Surgeon: Jerald Diggs MD;  Location: [facility] OR     REPAIR HAMMER TOE Right 1/20/2015    Procedure: REPAIR HAMMER TOE;  Surgeon: Maicol Liu DPM;  Location: [facility]     TONSILLECTOMY & ADENOIDECTOMY       TUBAL LIGATION         Social History     Tobacco Use     Smoking status: Never Smoker     Smokeless tobacco: Never Used   Substance Use Topics     Alcohol use: Yes     Comment: 1 glass of wine 4x/yr     Family History   Problem Relation Age of Onset     Eye Disorder Mother      Lipids Mother         has CHF     Osteoporosis Mother      Diabetes Father      Alzheimer Disease Paternal Grandmother      Hypertension Brother      Alcohol/Drug Brother      Depression Brother      Gastrointestinal Disease Brother         hx of colonic polyps      Lipids Brother      Psychotic Disorder Brother      Breast Cancer Sister      Depression Sister      Lipids Sister      Thyroid Disease Sister      Congenital Anomalies Daughter      Neurologic Disorder Daughter          Current Outpatient Medications   Medication Sig Dispense Refill     acetaminophen (TYLENOL) 325 MG tablet Take 650 mg by mouth every 4 hours as needed for mild pain       albuterol (ALBUTEROL) 108 (90 BASE) MCG/ACT Inhaler Inhale 2 puffs into the lungs 4 times daily as needed for shortness of breath / dyspnea or wheezing 1 Inhaler 0     budesonide-formoterol (SYMBICORT) 160-4.5 MCG/ACT Inhaler Inhale 2 puffs into the lungs 2 times daily       cefuroxime (CEFTIN) 500 MG tablet Take 1 tablet by mouth 2 times daily  2     cycloSPORINE (RESTASIS) 0.05 % ophthalmic emulsion Place 1 drop into both eyes 2 times daily       hydrOXYzine (ATARAX) 25 MG tablet Take 1 tablet by mouth 3 times daily as needed  0     hypromellose (ARTIFICIAL TEARS) 0.5 % SOLN ophthalmic solution Place 1 drop into both eyes 2 times daily       lacosamide (VIMPAT) 200 MG TABS tablet Take 1 tablet (200 mg) by mouth 2 times daily 30 tablet 0     levothyroxine (SYNTHROID/LEVOTHROID) 50 MCG tablet Take 50 mcg by mouth daily       linaclotide (LINZESS) 290 MCG capsule Take 1 capsule (290 mcg) by mouth every morning (before breakfast) 10 capsule 0     liothyronine (CYTOMEL) 5 MCG tablet Take 10 mcg by mouth daily        nefazodone (SERZONE) 200 MG tablet Take 600 mg by mouth At Bedtime        omeprazole (PRILOSEC) 40 MG DR capsule Take 1 capsule (40 mg) by mouth 2 times daily 180 capsule 3     potassium citrate (UROCIT-K) 10 MEQ (1080 MG) CR tablet Take 10 mEq by mouth daily       pramipexole (MIRAPEX) 1 MG tablet Give 1 tab in AM; and 2 tabs 1 hour prior to hs       ranitidine (ZANTAC) 150 MG tablet TAKE 1 TABLET BY MOUTH TWICE DAILY 180 tablet 3     RIVAROXABAN PO Take 15 mg by mouth 2 times daily       artificial saliva (BIOTENE MT)  "AERS spray Take 2 sprays by mouth 3 times daily as needed for dry mouth       HYDROcodone-acetaminophen (NORCO) 5-325 MG tablet Take 1 tablet by mouth 2 times daily as needed for pain (Patient not taking: Reported on 10/10/2019) 30 tablet 0     senna-docusate (SENOKOT-S/PERICOLACE) 8.6-50 MG tablet Take 1 tablet by mouth 2 times daily as needed for constipation (Patient not taking: Reported on 10/10/2019) 20 tablet 0     temazepam (RESTORIL) 15 MG capsule Take 1 capsule (15 mg) by mouth At Bedtime (Patient not taking: Reported on 10/10/2019) 30 capsule 0     traMADol (ULTRAM) 50 MG tablet Take 1 tablet (50 mg) by mouth every 6 hours as needed for moderate to severe pain (Patient not taking: Reported on 10/10/2019) 60 tablet 0     Allergies   Allergen Reactions     Clonopin [Clonazepam]      \"Walk funny and Mind goes funny\"     Encort [Hydrocortisone Acetate] Swelling     Feet swelled.     Encort [Hydrocortisone] Swelling     Erythromycin Diarrhea     Flagyl [Metronidazole] Nausea     Gabapentin      Over eats     Lamictal [Lamotrigine]      Oxycodone Nausea and Nausea and Vomiting     Tegretol [Carbamazepine] Nausea and Vomiting     BP Readings from Last 3 Encounters:   10/01/19 107/70   09/23/19 115/73   09/19/19 116/79    Wt Readings from Last 3 Encounters:   10/01/19 85 kg (187 lb 6.4 oz)   09/23/19 85.8 kg (189 lb 3.2 oz)   09/19/19 85.8 kg (189 lb 3.2 oz)           Reviewed and updated as needed this visit by Provider         Review of Systems   ROS COMP: CONSTITUTIONAL: NEGATIVE for fever, chills, change in weight  ENT/MOUTH: NEGATIVE for ear, mouth and throat problems  RESP: NEGATIVE for significant cough or SOB  CV: NEGATIVE for chest pain, palpitations or peripheral edema  GI: NEGATIVE for nausea, abdominal pain, heartburn, or change in bowel habits  : NEGATIVE for frequency, dysuria, or hematuria  MUSCULOSKELETAL: NEGATIVE for significant arthralgias or myalgia  NEURO: NEGATIVE for weakness, dizziness or " paresthesias  HEME: NEGATIVE for bleeding problems  PSYCHIATRIC: NEGATIVE for changes in mood or affect      Objective    /64   Pulse 81   Temp 98.5  F (36.9  C) (Oral)   LMP 03/11/2010   SpO2 97%   There is no height or weight on file to calculate BMI.  Physical Exam   GENERAL: alert and no distress using walker  EYES: Eyes grossly normal to inspection, PERRL and conjunctivae and sclerae normal  HENT: ear canals and TM's normal, nose and mouth without ulcers or lesions  NECK: no adenopathy, no asymmetry, masses, or scars and thyroid normal to palpation  RESP: lungs clear to auscultation - no rales, rhonchi or wheezes  CV: regular rate and rhythm, normal S1 S2, no click or rub, no peripheral edema and peripheral pulses strong  Right hip incision site stable w/o obvious cellulitic changes.  NEURO: No focal changes  PSYCH: mentation appears normal, affect normal/bright        Assessment & Plan     1. Hospital discharge follow-up  Appears as stable at present time with slow and continuing progression, continuing with outpatient physical therapy.  - CBC with platelets    2. Cellulitis of right hip  Suggesting follow-up with infectious disease and Dr. MCCOLLUM as well as orthopedics as scheduled.  Patient will contact her orthopedist for further pain control management issues and we will recheck her hemoglobin today to assess the stability of her anemia postoperatively.  Continuing with Ceftin use as ordered per ID.  - CBC with platelets    3. Deep vein thrombosis (DVT) of brachial vein of right upper extremity, unspecified chronicity (H)  Continuing with anticoagulation.  Patient states that she has had 2 ultrasounds although I can find neither of them in the medical record lesser report in the ER of the first one done apparently through [facility].  Patient states that she then had a follow-up ultrasound done which showed no progression of the clot.  Her PICC line is been since removed and she is  "currently anticoagulated with an expected timeframe of anticoagulation of 3 months.  I informed the patient would be reasonable to see her back in December at which time I can reorder a right upper extremity ultrasound and consider a d-dimer study.  Pending normality of both her anticoagulation can potentially be discontinued.       BMI:   Estimated body mass index is 31.18 kg/m  as calculated from the following:    Height as of 10/1/19: 1.651 m (5' 5\").    Weight as of 10/1/19: 85 kg (187 lb 6.4 oz).     See Patient Instructions    Return in about 2 months (around 12/10/2019) for f/u with routine sub-specialist, f/u Orthopedics.    Skyler Álvarez MD  [facility]      "

## 2019-10-15 ENCOUNTER — PATIENT OUTREACH (OUTPATIENT)
Dept: FAMILY MEDICINE | Facility: CLINIC | Age: 64
End: 2019-10-15

## 2019-10-15 NOTE — PROGRESS NOTES
Clinic Care Coordination Contact  Care Team Conversations    Data: Writer did not hear back from [facility] but--per chart review--it appears that [facility]    Per chart review (HomeCare Advisor), patient is currently receiving home care services through [facility]. Patient's [facility] RN Case Manager is Alicia Bentley ([phone]). Writer has left a message in HomeCare Advisor requesting that [facility] staff notify clinic Care Coordination if there are on-going concerns when patient is discharged from home care.    Plan: Patient has returned home from [facility] and is currently receiving services through [facility]. [facility] plans no further outreach at this time but remains available if patient's status changes or a new Care Coordination order is received.      [facility]  GOSIA Florian, Social Work Care Coordinator  [facility] and [address]  [address]  [email or web address]   Office: [phone]  Gender Pronouns: she/her  Employed by [employer]

## 2019-10-23 ENCOUNTER — NURSE TRIAGE (OUTPATIENT)
Dept: INTERNAL MEDICINE | Facility: CLINIC | Age: 64
End: 2019-10-23

## 2019-10-23 ENCOUNTER — APPOINTMENT (OUTPATIENT)
Dept: ULTRASOUND IMAGING | Facility: CLINIC | Age: 64
End: 2019-10-23
Attending: EMERGENCY MEDICINE
Payer: MEDICARE

## 2019-10-23 ENCOUNTER — HOSPITAL ENCOUNTER (EMERGENCY)
Facility: CLINIC | Age: 64
Discharge: HOME | End: 2019-10-23
Attending: EMERGENCY MEDICINE | Admitting: EMERGENCY MEDICINE
Payer: MEDICARE

## 2019-10-23 VITALS
TEMPERATURE: 98.4 F | BODY MASS INDEX: 30.62 KG/M2 | WEIGHT: 184 LBS | OXYGEN SATURATION: 99 % | HEART RATE: 89 BPM | RESPIRATION RATE: 16 BRPM | SYSTOLIC BLOOD PRESSURE: 120 MMHG | DIASTOLIC BLOOD PRESSURE: 87 MMHG

## 2019-10-23 DIAGNOSIS — M79.89 LEG SWELLING: ICD-10-CM

## 2019-10-23 DIAGNOSIS — Z51.89 VISIT FOR WOUND CHECK: ICD-10-CM

## 2019-10-23 LAB
ALBUMIN SERPL-MCNC: 4 G/DL (ref 3.4–5)
ALBUMIN UR-MCNC: 20 MG/DL
ALP SERPL-CCNC: 83 U/L (ref 40–150)
ALT SERPL W P-5'-P-CCNC: 16 U/L (ref 0–50)
ANION GAP SERPL CALCULATED.3IONS-SCNC: 5 MMOL/L (ref 3–14)
APPEARANCE UR: CLEAR
AST SERPL W P-5'-P-CCNC: 15 U/L (ref 0–45)
BASOPHILS # BLD AUTO: 0.1 10E9/L (ref 0–0.2)
BASOPHILS NFR BLD AUTO: 0.8 %
BILIRUB SERPL-MCNC: 0.3 MG/DL (ref 0.2–1.3)
BILIRUB UR QL STRIP: NEGATIVE
BUN SERPL-MCNC: 13 MG/DL (ref 7–30)
CALCIUM SERPL-MCNC: 9.2 MG/DL (ref 8.5–10.1)
CHLORIDE SERPL-SCNC: 108 MMOL/L (ref 94–109)
CO2 SERPL-SCNC: 25 MMOL/L (ref 20–32)
COLOR UR AUTO: YELLOW
CREAT SERPL-MCNC: 0.73 MG/DL (ref 0.52–1.04)
DIFFERENTIAL METHOD BLD: ABNORMAL
EOSINOPHIL # BLD AUTO: 0.5 10E9/L (ref 0–0.7)
EOSINOPHIL NFR BLD AUTO: 4.2 %
ERYTHROCYTE [DISTWIDTH] IN BLOOD BY AUTOMATED COUNT: 14.7 % (ref 10–15)
GFR SERPL CREATININE-BSD FRML MDRD: 86 ML/MIN/{1.73_M2}
GLUCOSE SERPL-MCNC: 107 MG/DL (ref 70–99)
GLUCOSE UR STRIP-MCNC: NEGATIVE MG/DL
HCT VFR BLD AUTO: 36.7 % (ref 35–47)
HGB BLD-MCNC: 11.6 G/DL (ref 11.7–15.7)
HGB UR QL STRIP: NEGATIVE
HYALINE CASTS #/AREA URNS LPF: 7 /LPF (ref 0–2)
IMM GRANULOCYTES # BLD: 0 10E9/L (ref 0–0.4)
IMM GRANULOCYTES NFR BLD: 0.4 %
KETONES UR STRIP-MCNC: NEGATIVE MG/DL
LEUKOCYTE ESTERASE UR QL STRIP: NEGATIVE
LYMPHOCYTES # BLD AUTO: 1.3 10E9/L (ref 0.8–5.3)
LYMPHOCYTES NFR BLD AUTO: 12.6 %
MCH RBC QN AUTO: 28 PG (ref 26.5–33)
MCHC RBC AUTO-ENTMCNC: 31.6 G/DL (ref 31.5–36.5)
MCV RBC AUTO: 89 FL (ref 78–100)
MONOCYTES # BLD AUTO: 0.8 10E9/L (ref 0–1.3)
MONOCYTES NFR BLD AUTO: 7.9 %
MUCOUS THREADS #/AREA URNS LPF: PRESENT /LPF
NEUTROPHILS # BLD AUTO: 7.9 10E9/L (ref 1.6–8.3)
NEUTROPHILS NFR BLD AUTO: 74.1 %
NITRATE UR QL: NEGATIVE
NRBC # BLD AUTO: 0 10*3/UL
NRBC BLD AUTO-RTO: 0 /100
NT-PROBNP SERPL-MCNC: 215 PG/ML (ref 0–900)
PH UR STRIP: 5.5 PH (ref 5–7)
PLATELET # BLD AUTO: 407 10E9/L (ref 150–450)
POTASSIUM SERPL-SCNC: 3.5 MMOL/L (ref 3.4–5.3)
PROT SERPL-MCNC: 7.4 G/DL (ref 6.8–8.8)
RBC # BLD AUTO: 4.14 10E12/L (ref 3.8–5.2)
RBC #/AREA URNS AUTO: 1 /HPF (ref 0–2)
SODIUM SERPL-SCNC: 138 MMOL/L (ref 133–144)
SOURCE: ABNORMAL
SP GR UR STRIP: 1.02 (ref 1–1.03)
SQUAMOUS #/AREA URNS AUTO: 1 /HPF (ref 0–1)
UROBILINOGEN UR STRIP-MCNC: NORMAL MG/DL (ref 0–2)
WBC # BLD AUTO: 10.6 10E9/L (ref 4–11)
WBC #/AREA URNS AUTO: 2 /HPF (ref 0–5)

## 2019-10-23 PROCEDURE — 25000128 H RX IP 250 OP 636: Performed by: EMERGENCY MEDICINE

## 2019-10-23 PROCEDURE — 96360 HYDRATION IV INFUSION INIT: CPT

## 2019-10-23 PROCEDURE — 85025 COMPLETE CBC W/AUTO DIFF WBC: CPT | Performed by: EMERGENCY MEDICINE

## 2019-10-23 PROCEDURE — 81001 URINALYSIS AUTO W/SCOPE: CPT | Performed by: EMERGENCY MEDICINE

## 2019-10-23 PROCEDURE — 83880 ASSAY OF NATRIURETIC PEPTIDE: CPT | Performed by: EMERGENCY MEDICINE

## 2019-10-23 PROCEDURE — 80053 COMPREHEN METABOLIC PANEL: CPT | Performed by: EMERGENCY MEDICINE

## 2019-10-23 PROCEDURE — 99284 EMERGENCY DEPT VISIT MOD MDM: CPT | Mod: 25

## 2019-10-23 PROCEDURE — 93971 EXTREMITY STUDY: CPT

## 2019-10-23 RX ADMIN — SODIUM CHLORIDE 1000 ML: 9 INJECTION, SOLUTION INTRAVENOUS at 15:35

## 2019-10-23 ASSESSMENT — ENCOUNTER SYMPTOMS: MYALGIAS: 1

## 2019-10-23 NOTE — ED TRIAGE NOTES
Pt with hx of hip surgery x 2 in the past 6 months, cellulites of right hip, and blood clots in her leg.  C/o bilateral tightness since last night.  Doesn't think they are swollen.  Pt is on xeralto and ceftin.

## 2019-10-23 NOTE — ED AVS SNAPSHOT
[facility] Emergency Department  [address]  [facility] [address]  Phone:  [phone]  Fax:  [phone]                                    Mercedes Barber   MRN: [id number]    Department:  [facility] Emergency Department   Date of Visit:  10/23/2019           After Visit Summary Signature Page    I have received my discharge instructions, and my questions have been answered. I have discussed any challenges I see with this plan with the nurse or doctor.    ..........................................................................................................................................  Patient/Patient Representative Signature      ..........................................................................................................................................  Patient Representative Print Name and Relationship to Patient    ..................................................               ................................................  Date                                   Time    ..........................................................................................................................................  Reviewed by Signature/Title    ...................................................              ..............................................  Date                                               Time          22EPIC Rev 08/18

## 2019-10-23 NOTE — DISCHARGE INSTRUCTIONS
No sign of blood clot at this time.    Continue with your medications.    Wound looks great.    Followup PCP.

## 2019-10-23 NOTE — TELEPHONE ENCOUNTER
"Patient called and asked to speak to triage and reported the following in quotations:    1. \"I am having leg tightness in both my legs a little today and had some last night. I have had blood clots before in my upper arms and around my chest area but not in my lungs and I am on Xarelto and wondering if I should take a baby aspirin.\"  2. \"I had three hip surgeries in the months of August and September I believe at [facility] and it was due to me having infections and my hip was not in the right spot after surgery.\"   3. \"I was seen in [facility] ER around the end of Septmber I believe and they found blood clots in both my upper arms and around my chest. They put me on Xarelto and when I was rechecked, they said I still had blood clots and told me to keep taking Xarelto until December when I follow-up.\"  4. \"The feeling in my legs is abnormal for me, hard to describe, calves felt tight last night, felt kind of how it did when I had blood clots before so I am a little worried.\" Patient denies having lower extremity blood clots in the past; only upper extremity.  5. \"I have no fever, no trouble urinating, no trouble with bowel movements, and they don't hurt to touch them but I do hurt all over from my arthritis. I don't have chest pain. I don't have shortness of breath that is abnormal or worse for me. I have always had some shortness of breath that I use an inhaler for.\"     Triage advised patient based on Care Advice protocol to be seen in ER initially but patient declined and requested urgent care due to transportation concerns. Triage advised to be seen in Urgent Care now to be evaluated by a physician and to discuss about taking a baby aspirin with a physician. Patient verbalized understanding. Triage advised patient that if worsening symptoms develop (SOB, chest pain) to call 911/be seen in the ER. Patient verbalized understanding and states \"I don't have those symptoms now (chest pain/SOB) so I " "will call for a ride to bring me up there to urgent care.\"    Will route to Dr. Álvarez as an FYI.    Additional Information    Negative: Sounds like a life-threatening emergency to the triager    Negative: Chest pain    Negative: Small area of swelling and followed an insect bite to the area    Negative: Followed a knee injury    Negative: Ankle or foot injury    Negative: Pregnant with leg swelling or edema    Negative: Difficulty breathing at rest    Negative: Entire foot is cool or blue in comparison to other side    Negative: SEVERE swelling (e.g., swelling extends above knee, entire leg is swollen, weeping fluid)    Thigh or calf pain and only 1 side and present > 1 hour    Answer Assessment - Initial Assessment Questions  1. ONSET: \"When did the swelling start?\" (e.g., minutes, hours, days)      Patient reports \"both legs are slightly swollen last night, not so much today, I have been up and walking around.\"  2. LOCATION: \"What part of the leg is swollen?\"  \"Are both legs swollen or just one leg?\"      Patient reports \" felt swollen down in my left calf but also sore in right calf. It feels abnormal to me, hard to describe but I am not having any symptoms right this minute.\"  3. SEVERITY: \"How bad is the swelling?\" (e.g., localized; mild, moderate, severe)   - Localized - small area of swelling localized to one leg   - MILD pedal edema - swelling limited to foot and ankle, pitting edema < 1/4 inch (6 mm) deep, rest and elevation eliminate most or all swelling   - MODERATE edema - swelling of lower leg to knee, pitting edema > 1/4 inch (6 mm) deep, rest and elevation only partially reduce swelling   - SEVERE edema - swelling extends above knee, facial or hand swelling present       Patient reports \"my toes are numb, I have neuropathy so that is from that, but my feet are a little swollen I think\"   4. REDNESS: \"Does the swelling look red or infected?\"      Patient reports \"no redness or heat, just a tight " "feeling\"   5. PAIN: \"Is the swelling painful to touch?\" If so, ask: \"How painful is it?\"   (Scale 1-10; mild, moderate or severe)      Patient reports \" my calves do not hurt to touch but I do have pain in my right hip from recent surgery back in August and I have pain all over from arthritis\"  6. FEVER: \"Do you have a fever?\" If so, ask: \"What is it, how was it measured, and when did it start?\"       Patient reports \"I have no fever, I have had two right hip infections back in August, September after my surgeries.\"  7. CAUSE: \"What do you think is causing the leg swelling?\"      \"I am not sure. I have a history of blood clots and I am on Xarelto and wondering if I can take a baby aspirin\"  8. MEDICAL HISTORY: \"Do you have a history of heart failure, kidney disease, liver failure, or cancer?\"      Patient reports \"I don't have any of those problems\"  9. RECURRENT SYMPTOM: \"Have you had leg swelling before?\" If so, ask: \"When was the last time?\" \"What happened that time?\"      Patient reports \" I think I may have had some swelling before when I had the blood clots in my chest and upper arms but they weren't in my lungs, just around my chest area. I have no chest pain or shortness of breath that is beyond what I normally have. I am normally somewhat short of breath that I take inhalers for but I feel fine now.\"  10. OTHER SYMPTOMS: \"Do you have any other symptoms?\" (e.g., chest pain, difficulty breathing)        Patient reports \"no shortness of breath or chest pain.\" Patient reports chronic shortness of breath that she uses inhalers for   11. PREGNANCY: \"Is there any chance you are pregnant?\" \"When was your last menstrual period?\"        No    Protocols used: LEG SWELLING AND EDEMA-A-OH    Park Soni RN on 10/23/2019 at 12:55 PM  "

## 2019-10-23 NOTE — ED PROVIDER NOTES
History     Chief Complaint:  Leg Swelling    HPI   Mercedes Barber is a 64 year old female on Xarelto with a history of DVT, cellulitis who presents to the emergency department for evaluation of leg swelling. The patient reports she had recent surgery to her right hip within the past 2 months, twice in the past 6 months. She indicates she has had a variety of issues with her right lower extremity, including cellulitis and DVT (most recently on 9/6), both of which she was seen in the [facility] system. The patient indicates she has had increasing bilateral leg tightness and pain since last night, prompting her arrival to the ED. She states they do not appear more swollen than normal, but she has concern given her previous complications.  Most recent discharge summary as below.  No fevers or redness.  No cough or SOB (above her baseline).  No fevers or noted redness.  Patient still taking cefin, and her  PICC line has been removed.    Chirag Morales DO   Physician   Hospitalist   Discharge Summary   Signed   Date of Service:  8/31/2019 10:53 AM   Creation Time:  8/31/2019 10:49 AM            Expand All Collapse All      []Hide copied text    []Hover for details  [facility]  Hospitalist Discharge Summary       Date of Admission:  8/29/2019  Date of Discharge:  8/31/2019  Discharging Provider: Chirag Morales DO           Discharge Diagnoses     1.  Right hip chronic wound with surrounding cellulitis.  Continue IV ceftriaxone for 2 weeks.  Orthopedic surgery following.  Follow-up with orthopedic surgery in the outpatient setting in 2 weeks.      2.  Chronic pain syndrome.  Pain medications as needed.     3.  Hypothyroidism.  Continue levothyroxine.     4.  Seizure disorder.  Continue Vimpat.     5.  IBS.  Continue Linzess.     6.  Anemia.  Hemoglobin 8.5 on the day of discharge.  Was stable from previous.  Should have CBC rechecked in 1 week with follow-up with primary care  physician.     7.  GERD.  Continue omeprazole and ranitidine.     8.  Depression.  Continue Serzone.     9.  Hypokalemia.  Potassium replacement protocol during hospital stay.  Restart usual potassium replacement supplement.            Allergies:  Clonopin [Clonazepam]  Encort [Hydrocortisone Acetate]  Encort [Hydrocortisone]  Erythromycin  Flagyl [Metronidazole]  Gabapentin  Lamictal [Lamotrigine]  Oxycodone  Tegretol [Carbamazepine]     Medications:    Albuterol  Ceftin  Atarax  Vimpat  Levothyroxine  Linzess  Cytomel  Serzone  Omeprazole  Mirapex  Zantav  Rivaroxaban   Senokot  Restoril  [place]  Ultram     Past Medical History:    Arthritis  Cervical pain  GERD  Esophageal stricture  Hypothyroidism  IBS  Depression  Neuropathy of lower extremity  RLS  Seizure disorder  Sleep apnea   DVT  Temporal sclerosis    Past Surgical History:    Colporrhaphy, bladder/vagina  Arthroplasty hip, right  Carpal tunnel release  Dental implant  D&C  Drain pilonidal cyst  EGD combined  Excise lesion trunk  Hysterectomy  I&D hip  Left shoulder fracture  Rectal prolapse repair  Release plantar fascia  Repair hammer toe  Tubal ligation  T&A    Family History:    Eye disorder  Lipids  CHF  Osteoporosis  Diabetes  HTN  Depression  GI disease  Alcohol/drug use  Breast cancer  Psychotic disorder  Depression  Thyroid disease    Social History:  Presents with son.  Never smoker.  Positive for alcohol use.   Marital Status:   [4]     Review of Systems   Cardiovascular: Positive for leg swelling.   Musculoskeletal: Positive for myalgias.   All other systems reviewed and are negative.    Pt with hx of hip surgery x 2 in the past 6 months, cellulites of right hip, and blood clots in her leg.  C/o bilateral tightness since last night.  Doesn't think they are swollen.  Pt is on xeralto and ceftin.    Physical Exam     Patient Vitals for the past 24 hrs:   BP Temp Pulse Heart Rate Resp SpO2 Weight   10/23/19 1504 (!) 122/92 98.4  F (36.9   C) 94 94 16 97 % 83.5 kg (184 lb)     Physical Exam  Musculoskeletal:        Legs:        GEN: patient smiling, no distress  HEAD: atraumatic, normocephalic  EYES: pupils reactive , conjunctivae normal  ENT: TMs flat and white bilaterally, oropharynx normal with no erythema or exudate, mucus membranes moist  NECK: no cervical LAD,  No meningeal signs  RESPIRATORY: no tachypnea, breath sounds clear to auscultation (no rales, wheezes, rhonchi),  normal phonation  CVS: normal S1/S2, II/VI systolic ej murmurs, no rubs/gallops  ABDOMEN: soft, nontender, no masses or organomegaly, no rebound, positive bowel sounds  BACK: no lesions  EXTREMITIES: intact pulses x 4, full range of motion at joints, slight pretibial edema bilaterally.  Negative Homans signs  MUSCULOSKELETAL: no deformities, right hip with postsurgical wound but no redness.  Femoral pulses intact.  SKIN: warm and dry, no acute rashes , incision with no fluctuance or erythema  NEURO: GCS 15, cranial nerves intact.  Motor- moves all 4 extremities, int/ext rotation at the hip is normal.  Sensation- intact all LE dermatones to pinprick and light touch.    Coordination- ambulatory with her wheeled walker.  Overall symmetrical exam  HEME: no bruising or petechiae/contusions  LYMPH: no lymphadenopathy      Emergency Department Course     Imaging:  Radiographic findings were communicated with the patient who voiced understanding of the findings.    US Lower Extremity Venous Duplex Limited Bilateral:  No evidence for deep venous thrombosis in the bilateral  lower extremity veins.  As per radiology.    Laboratory:  CBC: WBC: 10.6, HGB: 11.6 (L), PLT: 407  CMP: Glucose 107 (H), o/w WNL (Creatinine: 0.73)    BNP: 215    UA with micro: Albumin 20, Mucous Present, Hyaline Casts 7 (H), o/w negative    Interventions:  1535 NS 500cc Bolus IV  Heplock  Cardiac/Sp02 monitoring      Emergency Department Course:  Nursing notes and vitals reviewed. 1530 I performed an exam of the  patient as documented above.     IV inserted. Medicine administered as documented above. Blood drawn. This was sent to the lab for further testing, results above.    The patient was sent for a US BLE while in the emergency department, findings above.     The patient provided a urine sample here in the emergency department. This was sent for laboratory testing, findings above.     1650 I rechecked the patient and discussed the results of her workup thus far.     Findings and plan explained to the Patient. Patient discharged home with instructions regarding supportive care, medications, and reasons to return. The importance of close follow-up was reviewed.     I personally reviewed the laboratory results with the Patient and answered all related questions prior to discharge.    /87   Pulse 89   Temp 98.4  F (36.9  C)   Resp 16   Wt 83.5 kg (184 lb)   LMP 03/11/2010   SpO2 99%   BMI 30.62 kg/m    Patient and son updated.      Discussed results with patient.  Gave patient copies of all results (applicable labs, CT scans and/or ultrasounds).  Answered questions.  Asked patient to followup with PCP.  Circled any abnormal lab values and asked patient to followup with PCP.    Impression & Plan      Medical Decision Making:  Mercedes Barber is a 64 year old female who has noticed that her bilateral legs are more swollen. She is doing physical therapy at home. She was discharged from [facility] after extensive surgery (right hip) to a care facility. No redness and the wound appears well.  IV placed and labs sent.  Reviewed Epic and no recent echo founds.    She was able to urinate for today. There are no signs of infection in the urine. CBC and chemistry panel are normal. kidney function is normal.  Her wound otherwise looks normal. US does not show any evidence of DVT. The patient is taking her blood thinning medications. I do think she can go home.   BNP is normal and does not show any evidence of CHF.     I  just rechecked her. She is ambulatory with her son and walker and smiling at this time.  Patient given copies of labs and also believes she can go home.    Plan- leg elevation, compression hose.  Recheck with PCP.    Diagnosis:    ICD-10-CM   1. Leg swelling M79.89   2. Visit for wound check Z51.89       Disposition:  discharged to home    IPatrice, am serving as a scribe on 10/23/2019 at 3:17 PM to personally document services performed by Meg Fairchild MD based on my observations and the provider's statements to me.     Patrice Hamilton  10/23/2019   [facility] EMERGENCY DEPARTMENT       Meg Fairchild MD  10/24/19 1179

## 2019-10-24 ENCOUNTER — PATIENT OUTREACH (OUTPATIENT)
Dept: CARE COORDINATION | Facility: CLINIC | Age: 64
End: 2019-10-24

## 2019-10-24 ENCOUNTER — TELEPHONE (OUTPATIENT)
Dept: INTERNAL MEDICINE | Facility: CLINIC | Age: 64
End: 2019-10-24

## 2019-10-24 DIAGNOSIS — M25.559 ARTHRALGIA OF HIP, UNSPECIFIED LATERALITY: Primary | ICD-10-CM

## 2019-10-24 ASSESSMENT — ACTIVITIES OF DAILY LIVING (ADL): DEPENDENT_IADLS:: INDEPENDENT

## 2019-10-24 NOTE — PROGRESS NOTES
Clinic Care Coordination Contact    Clinic Care Coordination Contact  OUTREACH    Referral Information:  Referral Source: ED Follow-Up  Primary Diagnosis: Other (include Comment box)(Wound Care)    Chief Complaint   Patient presents with     Clinic Care Coordination - Initial     ED Follow-Up      Universal Utilization: Higher utilization noted.   Difficulty keeping appointments: No  Compliance Concerns: No  No-Show Concerns: No  No PCP office visit in Past Year: No  Utilization    Last refreshed: 10/24/2019 11:17 AM:  Hospital Admissions 4           Last refreshed: 10/24/2019 11:17 AM:  ED Visits 4           Last refreshed: 10/24/2019 11:17 AM:  No Show Count (past year) 1              Current as of: 10/24/2019 11:17 AM            Clinical Concerns:  Current Medical Concerns:  Patient presented to the [facility] ED on 10.23.2019 for Leg swelling and a wound check.  Current Behavioral Concerns: None noted at this time.     Education Provided to patient: Role of  CC and reason for calling.    Pain: Yes  Health Maintenance Reviewed: Not assessed  Clinical Pathway: None    Medication Management:  Independent with assistance from [facility].     Functional Status:  Dependent ADLs: Ambulation-walker  Dependent IADLs: Independent  Bed or wheelchair confined: No  Mobility Status: Independent w/Device    Living Situation:  Current living arrangement: I live in a private home  Type of residence: Private home - stairs    Diet/Exercise/Sleep:  Diet: Regular, Dairy-free  Inadequate nutrition: No  Food Insecurity: No  Tube Feeding: No  Exercise: Yes  Inadequate activity/exercise: No    Transportation:  Transportation concerns: No  Transportation means: Regular car     Psychosocial:  Mental health DX: Yes  Mental health DX how managed: Medication, Outpatient Counseling, Psychiatrist  Mental health management concern: No  Informal Support system: Family     Financial/Insurance:   Financial/Insurance concerns: No     Resources and  Interventions:  List of home care services: Physicial Therapy, Occupational Therapy, Home Health Aid, Skilled Nursing;   Community Resources: Transitional Care  Supplies used at home: None  Equipment Currently Used at Home: cane, straight, raised toilet, walker, rolling    Patient's [facility] RN Case Manager is Alicia oDminguezdeepa ([phone]).     Advance Care Plan/Directive  Advanced Care Plans/Directives on file: No  Advanced Care Plan/Directive Status: Not Applicable    Referrals Placed: Community Resources  Patient is interested in housekeeping support. Information for the HOME Program provided to Patient.    Patient/Caregiver understanding: Pt reports understanding and denies any additional questions or concerns at this times. SW CC engaged in AIDET communication during encounter.       Future Appointments              In 1 month Skyler Álvarez MD [facility]          Plan: Introduction to Care Coordination letter along with resource above mailed to Patient on this date. No further outreaches will be made at this time unless a new referral is made or a change in the pt's status occurs. Patient was provided with this writer's contact information and encouraged to call with any questions or concerns.      Mary Nathan, [facility]  Clinic Care Coordinator  Ph. [phone]  [email or web address]

## 2019-10-24 NOTE — LETTER
[place] CARE COORDINATION  [address]  [place] [address]  October 24, 2019    Mercedes Munoz Barber  [address]   [place] [address]      Dear Mercedes,    I am a clinic care coordinator who works with Skyler Álvarez MD at the [facility]. I wanted to thank you for spending the time to talk with me.  I wanted to introduce myself and provide you with my contact information so that you can call me with questions or concerns about your health care. Included is a flyer about clinic care coordination and how I can further assist you.     I have included information about a housekeeping program that might be a good fit for you.    Please feel free to contact me at [phone], with any questions or concerns. We at [facility] are focused on providing you with the highest-quality healthcare experience possible and that all starts with you.     Sincerely,         Mary Nathan, [facility]  Clinic Care Coordinator  Ph. [phone]  [email or web address]      Enclosed: I have enclosed helpful educational material. Please review and call me with any questions.

## 2019-10-25 ENCOUNTER — TRANSFERRED RECORDS (OUTPATIENT)
Dept: HEALTH INFORMATION MANAGEMENT | Facility: CLINIC | Age: 64
End: 2019-10-25

## 2019-11-11 DIAGNOSIS — Z53.9 DIAGNOSIS NOT YET DEFINED: Primary | ICD-10-CM

## 2019-11-11 PROCEDURE — G0180 MD CERTIFICATION HHA PATIENT: HCPCS | Performed by: INTERNAL MEDICINE

## 2019-11-13 ENCOUNTER — PATIENT OUTREACH (OUTPATIENT)
Dept: CARE COORDINATION | Facility: CLINIC | Age: 64
End: 2019-11-13

## 2019-11-13 ASSESSMENT — ACTIVITIES OF DAILY LIVING (ADL): DEPENDENT_IADLS:: INDEPENDENT

## 2019-11-13 NOTE — LETTER
[facility] Home  Complex Care Plan  About Me:    Patient Name:  Mercedes Barber    YOB: 1955  Age:         64 year old   [facility] MRN:    [id number] Telephone Information:  Home Phone [phone]   Mobile [phone]       Address:  [address]  [place] [address] Email address:  No e-mail address on record      Emergency Contact(s)    Name Relationship Lgl Grd Work Phone Home Phone Mobile Phone   1. MICHAEL KASPER Daughter No  [phone]   2. JULIO C KASPER (* Relative No  [phone]   3. SILVIO ROMERO Friend No  [phone]           Primary language:  English     needed? No   [facility] [employer]:  [phone] op. 1  Other communication barriers: None  Preferred Method of Communication:  Phone  Current living arrangement: I live alone  Mobility Status/ Medical Equipment: Independent w/Device        My Access Plan  Medical Emergency 911   Primary Clinic Line [facility] - [phone]   24 Hour Appointment Line [phone] or  [phone]) (toll-free)   24 Hour Nurse Line [phone] (toll-free)   Preferred Urgent Care [facility], [phone]   Preferred Hospital [facility]  [phone]   Preferred Pharmacy [facility] - [place], [place] - [address] [place] [address]     Behavioral Health Crisis Line The National Suicide Prevention Lifeline at 1-751.225.8930 or 911             My Care Team Members  Patient Care Team       Relationship Specialty Notifications Start End    Skyler Álvarez MD PCP - General Internal Medicine  7/31/12     Phone: [phone] Fax: [phone]         [address] [facility] [address]    Samir Arguelles MD MD Neurology  10/19/15     Referred by Dr. Samir Arguelles from [facility] for sleep apnea     Phone: [phone] Fax:  [phone]         [facility] NEUROLOGICAL [facility] [address] [facility] [address]    Park Ortiz MD MD Otolaryngology  10/19/15     Referred by Dr. Samir Arguelles from [facility] for sleep apnea     Phone: [phone] Fax: [phone]         [address] [facility] [address] [facility]  [facility] [facility] [address]    Skyler Álvarez MD Assigned PCP   10/27/19     Phone: [phone] Fax: [phone]         [address] [facility] [address]    Amber Nguyen LSW Lead Care Coordinator   11/13/19             My Care Plans  Self Management and Treatment Plan  Goals and (Comments)  Goals        General    1.Psychosocial (pt-stated)     Notes - Note created  11/14/2019  8:25 AM by Amber Nguyen LSW    Goal Statement: I will attend [employer] at least 1 time this month. I will call ahead and ask for help from someone at [employer] to help me with my walker. This will help me connect with a social network and [employer] is something that is important to me.  Measure of Success: I will attend [employer] this month.   Supportive Steps to Achieve: Plan when I would like to attend [employer], call ahead and ask for help.   Barriers: Not wanting to ask for help. Getting tired easily.   Strengths: Motivated to seek a support network.   Date to Achieve By: 12/12/2019  Patient expressed understanding of goal: Yes                 Lifestyle    2.Clean House     Notes - Note created  11/14/2019  8:29 AM by Amber Nguyen LSW    Goal Statement: I will seek and utilize services to assist me in helping tidy and organize my house.    Measure of Success: My house will be organized and clean  Supportive Steps to Achieve: CC SW will help find services to assist me with tasks around the house.  Barriers: Cost of services  Strengths: Motivated to have someone help organize and help me around the house  Date to Achieve By: Will find a service or someone to assist me by 12/12/2019  Patient expressed understanding of goal:  yes               Advance Care Plans/Directives Type:        My Medical and Care Information  Problem List   Patient Active Problem List   Diagnosis     Menopausal syndrome (hot flashes)     Family history of breast cancer     Vulvar pain     Renal anomaly     Overactive bladder     Rectal prolapse     CARDIOVASCULAR SCREENING; LDL GOAL LESS THAN 160     Vaginal pain     Dysphagia     Confusion     Headache     Left shoulder pain     Anemia     Mild major depression (H)     Esophageal stricture     Vulvar lesion     Temporal sclerosis     Lumbago     Pain in thoracic spine     Sciatica     Pain in joint, lower leg     Plantar fascial fibromatosis     Pain in joint involving ankle and foot     Other specified hypothyroidism     Gastroesophageal reflux disease without esophagitis     Irritable bowel syndrome     Other sleep apnea     Cervical high risk HPV (human papillomavirus) test positive     Restless legs syndrome (RLS)     Status post total hip replacement, right     Hip pain     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    Care Coordination Start Date: 11/13/2019   Frequency of Care Coordination: Every 2 weeks.   Form Last Updated: 11/14/2019

## 2019-11-13 NOTE — PROGRESS NOTES
Clinic Care Coordination Contact  [facility]/Voicemail     CC Nidia received a VM from Sarah at [facility]. Sarah discharged pt from Home Care. Sarah stated in her voicemail that pt is seeing Psychologist and psychiatrist.  Restarted some local driving.  Having grief issues over health issues with her mother (who she is very close to).  Depression score high.  Sarah stated she want someone to talk with her and ensure she's setting up appropriate appointments.     Referral Source: Care Team  Clinical Data: Care Coordinator Outreach  Outreach attempted x 1.  Left message on Sarah-OT voicemail with call back information and requested return call.  Plan: Care Coordinator will try to reach patient again in 3-5 business days.      HEIDI Gonsales  Clinic Care Coordinator  [phone]  [email or web address]

## 2019-11-13 NOTE — LETTER
[place] CARE COORDINATION  [facility]  [address] [place] [address]  [phone]  November 14, 2019    Mercedes Barber  [address]   [place] [facility] [address]      Dear Mercedes,    I am a clinic care coordinator who works with Skyler Álvarez MD at [facility]. I wanted to thank you for spending the time to talk with me, I hope that we can find some services that will help you feel more supported and assisted around the house.  I wanted to provide you with my contact information so that you can call me with questions or concerns about your health care. Below is a description of clinic care coordination and how I can further assist you.     The clinic care coordinator is a registered nurse and/or  who understand the health care system. The goal of clinic care coordination is to help you manage your health and improve access to the [facility] system in the most efficient manner. The registered nurse can assist you in meeting your health care goals by providing education, coordinating services, and strengthening the communication among your providers. The  can assist you with financial, behavioral, psychosocial, chemical dependency, counseling, and/or psychiatric resources.    Please feel free to contact me at [phone], with any questions or concerns. We at [facility] are focused on providing you with the highest-quality healthcare experience possible and that all starts with you.     Sincerely,     HEIDI Gonsales  Clinic Care Coordinator  [phone]  [email or web address]
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
## 2019-11-14 ENCOUNTER — PATIENT OUTREACH (OUTPATIENT)
Dept: CARE COORDINATION | Facility: CLINIC | Age: 64
End: 2019-11-14

## 2019-11-14 ENCOUNTER — TELEPHONE (OUTPATIENT)
Dept: INTERNAL MEDICINE | Facility: CLINIC | Age: 64
End: 2019-11-14

## 2019-11-14 ASSESSMENT — ACTIVITIES OF DAILY LIVING (ADL): DEPENDENT_IADLS:: INDEPENDENT

## 2019-11-14 NOTE — TELEPHONE ENCOUNTER
Very difficult to assess via message by phone.  Question weight gain related to medication as somewhat unusual complaint.  If patient has no other symptoms I would continue to observe and follow-up if persists or symptoms develop.

## 2019-11-14 NOTE — PROGRESS NOTES
Clinic Care Coordination Contact    Follow Up Progress Note      Assessment: ELIZA HOWE called patient to inform her of a supportive service; [facility]. This service can provide assistance with cleaning and organizing her house. ELIZA HOWE made phone call to [facility] today to make sure they could offer the services patient needs. They confirmed that they could help organize and clean her condo. Once referral is made they will reach out to patient.     Goals addressed this encounter:   Goals Addressed                 This Visit's Progress      2.Clean House   On track     Goal Statement: I will seek and utilize services to assist me in helping tidy and organize my house.    Measure of Success: My house will be organized and clean  Supportive Steps to Achieve: ELIZA HOWE will help find services to assist me with tasks around the house.  Barriers: Cost of services  Strengths: Motivated to have someone help organize and help me around the house  Date to Achieve By: Will find a service or someone to assist me by 12/12/2019  Patient expressed understanding of goal: yes               Intervention/Education provided during outreach: ELIZA HOWE explain [facility] services to patient. She was encouraged that this service could help her get her condo organized and clean. She would really like to utilize her art room and because of the disorganized state of it she is unable to use it. She loves to make cards and craft which helps with her depression, so getting that cleaned up would really benefit her.     ELIZA HOWE stated that she is feeling very depressed today and having a hard time with her son's death anniversary. She is also very upset with her slow progress in healing.     ELIZA HOWE asked patient if she would like ELIZA HOWE to make the referral for her, she said she would like if ELIZA HOWE would fill it out as it is online and she doesn't have access to a computer. ELIZA HOWE filled out online referral with patient on the phone for [facility].     ELIZA HOWE noted that patient is coming into the client on 11/20/2019, ELIZA HOWE asked if she would like ELIZA HOWE to stop in to see her. Patient she would like this, ELIZA HOWE to plan to see patient in clinic on 11/20/2019    Outreach Frequency: 2 weeks    Plan: Will see patient in client on 11/20/2019      Care Coordinator will follow up in 2 weeks

## 2019-11-14 NOTE — PROGRESS NOTES
.Clinic Care Coordination Contact  Care Team Conversations    ELIZA HOWE had phone call with [facility]. She stated she wanted to make sure that patient has ongoing support as she is depressed and lonely. ELIZA HOWE explained that a outreach was already done and we are working towards to goals to assist her in her life. ELIZA HOWE asked if patient would benefit from a house cleaning service due to patient frequently bringing up that she wanted her house organized and cleaned. Sarah stated that he house is controlled in some parts and uncontrolled in other, like her art room and master closet. Sarah believes she would benefit from someone assisting her in some organization and cleaning.    Amber Nguyen, BLANCAW  Clinic Care Coordinator  [phone]  [email or web address]

## 2019-11-14 NOTE — TELEPHONE ENCOUNTER
Patient notified and scheduled for next Wed and will monitor till then offered to check on patient status tomorrow .Zeny Ha RN

## 2019-11-14 NOTE — TELEPHONE ENCOUNTER
Pt calling is on Xarelto lowest dose but is concerned gained 6 lbs overnight 188# to 195# . She can see ankles are swollen but is not Short of breath .please advise .Zeny Ha RN

## 2019-11-15 NOTE — TELEPHONE ENCOUNTER
Spoke with patient . States she is having wrist pain is going to go get braces . Maybe see ortho . No additional sx with the swelling . Pt was at [facility] today - son passed 2 years ago .  Did not sleep well and will contact psych about this .Zeny Ha RN

## 2019-11-18 NOTE — TELEPHONE ENCOUNTER
She was maintaining when spoke with her no additional swelling . She will come in when scheduled.Zeny Ha RN

## 2019-11-20 ENCOUNTER — ANCILLARY PROCEDURE (OUTPATIENT)
Dept: MAMMOGRAPHY | Facility: CLINIC | Age: 64
End: 2019-11-20
Attending: INTERNAL MEDICINE
Payer: MEDICARE

## 2019-11-20 ENCOUNTER — OFFICE VISIT (OUTPATIENT)
Dept: INTERNAL MEDICINE | Facility: CLINIC | Age: 64
End: 2019-11-20
Payer: MEDICARE

## 2019-11-20 ENCOUNTER — PATIENT OUTREACH (OUTPATIENT)
Dept: CARE COORDINATION | Facility: CLINIC | Age: 64
End: 2019-11-20

## 2019-11-20 VITALS
DIASTOLIC BLOOD PRESSURE: 84 MMHG | RESPIRATION RATE: 16 BRPM | SYSTOLIC BLOOD PRESSURE: 132 MMHG | OXYGEN SATURATION: 97 % | HEART RATE: 67 BPM | BODY MASS INDEX: 32.45 KG/M2 | TEMPERATURE: 97.8 F | WEIGHT: 195 LBS

## 2019-11-20 DIAGNOSIS — Z65.9 PSYCHOSOCIAL PROBLEM: Primary | ICD-10-CM

## 2019-11-20 DIAGNOSIS — R60.9 EDEMA, UNSPECIFIED TYPE: ICD-10-CM

## 2019-11-20 DIAGNOSIS — Z12.31 VISIT FOR SCREENING MAMMOGRAM: ICD-10-CM

## 2019-11-20 DIAGNOSIS — R60.9 EDEMA, UNSPECIFIED TYPE: Primary | ICD-10-CM

## 2019-11-20 PROCEDURE — 77063 BREAST TOMOSYNTHESIS BI: CPT | Mod: TC

## 2019-11-20 PROCEDURE — 99214 OFFICE O/P EST MOD 30 MIN: CPT | Performed by: INTERNAL MEDICINE

## 2019-11-20 PROCEDURE — 77067 SCR MAMMO BI INCL CAD: CPT | Mod: TC

## 2019-11-20 RX ORDER — FUROSEMIDE 20 MG
TABLET ORAL
Qty: 90 TABLET | Refills: 0 | OUTPATIENT
Start: 2019-11-20

## 2019-11-20 RX ORDER — FUROSEMIDE 20 MG
20 TABLET ORAL DAILY
Qty: 5 TABLET | Refills: 0 | Status: SHIPPED | OUTPATIENT
Start: 2019-11-20 | End: 2019-12-02

## 2019-11-20 NOTE — PROGRESS NOTES
Subjective     Mercedes Barber is a 64 year old female who presents to clinic today for the following health issues:    HPI   ED/UC Followup:    Facility:  [facility]  Date of visit: 10/23/19  Reason for visit: leg swelling   Current Status: unchanged   Was seen in the emergency room and she underwent lower extremity venous ultrasound which did not demonstrate any clot formation.  She does not really define any exertional shortness of breath, dyspnea, PND or chest pain but she has not really been as active as normal due to the rehab she has been doing for her hip surgery.    Medical Decision Making:  Mercedes Barber is a 64 year old female who has noticed that her bilateral legs are more swollen. She is doing physical therapy at home. She was discharged from [facility] after extensive surgery (right hip) to a care facility. No redness and the wound appears well.  IV placed and labs sent.  Reviewed Epic and no recent echo founds.    Diagnosis:      ICD-10-CM   1. Leg swelling M79.89   2. Visit for wound check Z51.89       Reviewed and updated as needed this visit by Provider         Review of Systems   ROS COMP: CONSTITUTIONAL: NEGATIVE for fever, chills, change in weight  ENT/MOUTH: NEGATIVE for ear, mouth and throat problems  RESP: NEGATIVE for significant cough  CV: NEGATIVE for chest pain, palpitations  GI: NEGATIVE for nausea, abdominal pain, heartburn, or change in bowel habits  : NEGATIVE for frequency, dysuria, or hematuria  MUSCULOSKELETAL: NEGATIVE for significant arthralgias or myalgia  NEURO: NEGATIVE for weakness, dizziness or paresthesias  HEME: NEGATIVE for bleeding problems  PSYCHIATRIC: NEGATIVE for changes in mood or affect      Objective    /84   Pulse 67   Temp 97.8  F (36.6  C) (Oral)   Resp 16   Wt 88.5 kg (195 lb)   LMP 03/11/2010   SpO2 97%   BMI 32.45 kg/m    Body mass index is 32.45 kg/m .  Physical Exam   GENERAL: healthy, alert and no distress using a walker  EYES: Eyes  "grossly normal to inspection, PERRL and conjunctivae and sclerae normal  HENT: ear canals and TM's normal, nose and mouth without ulcers or lesions  NECK: no adenopathy, no asymmetry, masses, or scars and thyroid normal to palpation  RESP: lungs clear to auscultation - no rales, rhonchi or wheezes  CV: regular rate and rhythm, normal S1 S2, no S3 or S4, no murmur, click or rub and peripheral pulses strong  MS: There is 1+ to 2+ lower extremity edema bilaterally to the knees  NEURO: Normal strength and tone, mentation intact and speech normal  PSYCH: mentation appears normal, affect normal/bright  Creatinine   Date Value Ref Range Status   10/23/2019 0.73 0.52 - 1.04 mg/dL Final           Assessment & Plan     (R60.9) Edema, unspecified type  (primary encounter diagnosis)  Comment: No focal changes noted on exam with obvious source.  I have suggested an echocardiogram to evaluate her left and right ventricular function and also suggested a 5-day trial of Lasix 20 mg daily with leg elevation and support hose accordingly.  She will contact me on Monday to let me know how she is doing.  Of note in September she did have a slightly low albumin of 3.1  Plan: Echocardiogram Complete, furosemide (LASIX) 20         MG tablet            (Z12.31) Visit for screening mammogram  Comment: I have ordered a mammogram as she is overdue as this was an oversight on her behalf due to her recent surgery and therapy  Plan: *MA Screening Digital Bilateral          BMI:   Estimated body mass index is 32.45 kg/m  as calculated from the following:    Height as of 10/1/19: 1.651 m (5' 5\").    Weight as of this encounter: 88.5 kg (195 lb).   Weight management plan: Discussed healthy diet and exercise guidelines    See Patient Instructions    Return for if symptoms recur or worsen, diagnostic test as ordered.    Skyler Álvarez MD  [facility]      "

## 2019-11-20 NOTE — PROGRESS NOTES
"Subjective     Mercedes Barber is a 64 year old female who presents to clinic today for the following health issues:    HPI   {SUPERLIST (Optional):909892}  {additonal problems for provider to add (Optional):151649}    {HIST REVIEW/ LINKS 2 (Optional):228013}    {Additional problems for the provider to add (optional):530176}  Reviewed and updated as needed this visit by Provider         Review of Systems   {ROS COMP (Optional):151959}      Objective    LMP 03/11/2010   There is no height or weight on file to calculate BMI.  Physical Exam   {Exam List (Optional):011908}    {Diagnostic Test Results (Optional):211905::\"Diagnostic Test Results:\",\"Labs reviewed in Epic\"}        {PROVIDER CHARTING PREFERENCE:871094}      "

## 2019-11-26 ENCOUNTER — PATIENT OUTREACH (OUTPATIENT)
Dept: CARE COORDINATION | Facility: CLINIC | Age: 64
End: 2019-11-26

## 2019-11-26 NOTE — PROGRESS NOTES
Clinic Care Coordination Contact    Follow Up Progress Note      Assessment: Pt called ELIZA HOWE to get the number to [facility] as she lost the paper where she wrote it down. She really wants to get her house cleaned.   She continues to make cards during the day as stated by patient.     Goals addressed this encounter:   Goals Addressed                 This Visit's Progress      1.Psychosocial (pt-stated)   On track     Goal Statement: I will attend [employer] at least 1 time this month. I will call ahead and ask for help from someone at [employer] to help me with my walker. This will help me connect with a social network and [employer] is something that is important to me.  Measure of Success: I will attend [employer] this month.   Supportive Steps to Achieve: Plan when I would like to attend [employer], call ahead and ask for help.   Barriers: Not wanting to ask for help. Getting tired easily.   Strengths: Motivated to seek a support network.   Date to Achieve By: 12/12/2019  Patient expressed understanding of goal: Yes                2.Clean House   On track     Goal Statement: I will seek and utilize services to assist me in helping tidy and organize my house.    Measure of Success: My house will be organized and clean  Supportive Steps to Achieve: ELIZA HOWE will help find services to assist me with tasks around the house.  Barriers: Cost of services  Strengths: Motivated to have someone help organize and help me around the house  Date to Achieve By: Will find a service or someone to assist me by 12/12/2019  Patient expressed understanding of goal: yes          3. Self Care/Stress Reduction  (pt-stated)   On track     Goal Statement: I will make time for myself each week to make cards to reduce my stress and increase my happiness.   Measure of Success: I will make cards every week.   Supportive Steps to Achieve: Make time to create and craft cards, get supplies at [employer] and call [facility] to clean my craft room so I  can utilize it again.   Barriers: Not getting help to clean my craft room.   Strengths: Motivated to get craft room clean, motivated to make cards as it makes me happy.   Date to Achieve By: 12/26/2019  Patient expressed understanding of goal: yes               Intervention/Education provided during outreach: ELIZA HOWE provided patient with number to [facility], [phone].  Pt to call them today to set up appointment for her house cleaning.   Outreach Frequency: monthly    Plan:   Pt to call [facility] to set up appointment for her rooms to be organized and cleaned.     Care Coordinator will follow up in 1 month as she would like to talk to ELIZA HOWE after the Holidays.

## 2019-12-02 ENCOUNTER — PATIENT OUTREACH (OUTPATIENT)
Dept: CARE COORDINATION | Facility: CLINIC | Age: 64
End: 2019-12-02

## 2019-12-02 ENCOUNTER — OFFICE VISIT (OUTPATIENT)
Dept: INTERNAL MEDICINE | Facility: CLINIC | Age: 64
End: 2019-12-02
Payer: MEDICARE

## 2019-12-02 VITALS
TEMPERATURE: 98.3 F | DIASTOLIC BLOOD PRESSURE: 70 MMHG | SYSTOLIC BLOOD PRESSURE: 114 MMHG | WEIGHT: 193 LBS | OXYGEN SATURATION: 97 % | RESPIRATION RATE: 16 BRPM | BODY MASS INDEX: 32.12 KG/M2 | HEART RATE: 83 BPM

## 2019-12-02 DIAGNOSIS — R60.0 PERIPHERAL EDEMA: ICD-10-CM

## 2019-12-02 DIAGNOSIS — I82.621 DEEP VEIN THROMBOSIS (DVT) OF BRACHIAL VEIN OF RIGHT UPPER EXTREMITY, UNSPECIFIED CHRONICITY (H): Primary | ICD-10-CM

## 2019-12-02 PROCEDURE — 99214 OFFICE O/P EST MOD 30 MIN: CPT | Performed by: INTERNAL MEDICINE

## 2019-12-02 NOTE — PROGRESS NOTES
Subjective     Mercedes Barber is a 64 year old female who presents to clinic today for the following health issues:    HPI     Follow up 11/20/19 visit for edema. Patient has echocardiogram scheduled for 12/27/19. Patient would also like to discuss repeat ultrasound for RUE DVT.   She states that she took a short course of the diuretic as we discussed and has had improvement of her edema.  Other associated symptoms.    Other concerns:  1. Patient would like to discuss getting letter stating her medical disabilities prior to leasing a new car - discussed.    Patient Active Problem List   Diagnosis     Menopausal syndrome (hot flashes)     Family history of breast cancer     Vulvar pain     Renal anomaly     Overactive bladder     Rectal prolapse     CARDIOVASCULAR SCREENING; LDL GOAL LESS THAN 160     Vaginal pain     Dysphagia     Confusion     Headache     Left shoulder pain     Anemia     Mild major depression (H)     Esophageal stricture     Vulvar lesion     Temporal sclerosis     Lumbago     Pain in thoracic spine     Sciatica     Pain in joint, lower leg     Plantar fascial fibromatosis     Pain in joint involving ankle and foot     Other specified hypothyroidism     Gastroesophageal reflux disease without esophagitis     Irritable bowel syndrome     Other sleep apnea     Cervical high risk HPV (human papillomavirus) test positive     Restless legs syndrome (RLS)     Status post total hip replacement, right     Hip pain     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)     Cellulitis of right hip     Deep vein thrombosis (DVT) of brachial vein of right upper extremity, unspecified chronicity (H)     Past Surgical History:   Procedure Laterality Date     Anterior COLPORRHAPHY, BLADDER/VAGINA      perigee mesh     ARTHROPLASTY HIP Right 7/23/2019    Procedure: Right total hip arthroplasty;  Surgeon: Anant Mejia MD;  Location: [facility] OR     ARTHROPLASTY PATELLO-FEMORAL (KNEE) Bilateral      ARTHROPLASTY  REVISION HIP Right 8/7/2019    Procedure: Right hip revision total arthroplasty;  Surgeon: Tom Antonio MD;  Location: [facility] OR     CARPAL TUNNEL RELEASE RT/LT       DENTAL SURGERY      implant     DILATION AND CURETTAGE       DRAIN PILONIDAL CYST SIMPL       ENDOSCOPY DRUG INDUCED SLEEP N/A 11/18/2015    Procedure: ENDOSCOPY DRUG INDUCED SLEEP;  Surgeon: Park Ortiz MD;  Location: [facility]     ESOPHAGOSCOPY, GASTROSCOPY, DUODENOSCOPY (EGD), COMBINED  4/5/2013    Procedure: COMBINED ESOPHAGOSCOPY, GASTROSCOPY, DUODENOSCOPY (EGD), BIOPSY SINGLE OR MULTIPLE;;  Surgeon: Mundo Mehta MD;  Location:  GI     EXCISE LESION TRUNK  8/10/2012    Procedure: EXCISE LESION TRUNK;;  Surgeon: Jerald Diggs MD;  Location: [facility] OR     HYSTERECTOMY       IRRIGATION AND DEBRIDEMENT HIP, COMBINED Right 8/26/2019    Procedure: 1.  Right hip wound irrigation and debridement with excisional debridement of nonviable skin, subcutaneous tissues, and fascia. 2. Application of incisional wound VAC, 27cm length incision.;  Surgeon: Tyson Prabhakar MD;  Location:  OR     L shoulder fracture       rectal prolapse repair abdominally       RELEASE PLANTAR FASCIA Right 1/20/2015    Procedure: RELEASE PLANTAR FASCIA;  Surgeon: Maicol Liu DPM;  Location: [facility]     REMOVE MESH VAGINA  8/10/2012    Procedure: REMOVE MESH VAGINA;  Excision of Vaginal Mesh Exposure, Removal of Skin Tag Left Inner Leg;  Surgeon: Jerald Diggs MD;  Location:  OR     REPAIR HAMMER TOE Right 1/20/2015    Procedure: REPAIR HAMMER TOE;  Surgeon: Maicol Liu DPM;  Location: [facility]     TONSILLECTOMY & ADENOIDECTOMY       TUBAL LIGATION         Social History     Tobacco Use     Smoking status: Never Smoker     Smokeless tobacco: Never Used   Substance Use Topics     Alcohol use: Yes     Frequency: Monthly or less     Comment: 1 glass of wine 2x/yr     Family History   Problem Relation Age of Onset     Eye Disorder Mother       Lipids Mother         has CHF     Osteoporosis Mother      Diabetes Father      Alzheimer Disease Paternal Grandmother      Hypertension Brother      Alcohol/Drug Brother      Depression Brother      Gastrointestinal Disease Brother         hx of colonic polyps     Lipids Brother      Psychotic Disorder Brother      Breast Cancer Sister      Depression Sister      Lipids Sister      Thyroid Disease Sister      Congenital Anomalies Daughter      Neurologic Disorder Daughter          Current Outpatient Medications   Medication Sig Dispense Refill     acetaminophen (TYLENOL) 325 MG tablet Take 650 mg by mouth every 4 hours as needed for mild pain       albuterol (ALBUTEROL) 108 (90 BASE) MCG/ACT Inhaler Inhale 2 puffs into the lungs 4 times daily as needed for shortness of breath / dyspnea or wheezing 1 Inhaler 0     artificial saliva (BIOTENE MT) AERS spray Take 2 sprays by mouth 3 times daily as needed for dry mouth       budesonide-formoterol (SYMBICORT) 160-4.5 MCG/ACT Inhaler Inhale 2 puffs into the lungs 2 times daily       cefuroxime (CEFTIN) 500 MG tablet Take 1 tablet by mouth 2 times daily  2     cycloSPORINE (RESTASIS) 0.05 % ophthalmic emulsion Place 1 drop into both eyes 2 times daily       hypromellose (ARTIFICIAL TEARS) 0.5 % SOLN ophthalmic solution Place 1 drop into both eyes 2 times daily       lacosamide (VIMPAT) 200 MG TABS tablet Take 1 tablet (200 mg) by mouth 2 times daily 30 tablet 0     levothyroxine (SYNTHROID/LEVOTHROID) 50 MCG tablet Take 50 mcg by mouth daily       linaclotide (LINZESS) 290 MCG capsule Take 1 capsule (290 mcg) by mouth every morning (before breakfast) 10 capsule 0     liothyronine (CYTOMEL) 5 MCG tablet Take 10 mcg by mouth daily        nefazodone (SERZONE) 200 MG tablet Take 600 mg by mouth At Bedtime        omeprazole (PRILOSEC) 40 MG DR capsule Take 1 capsule (40 mg) by mouth 2 times daily 180 capsule 3     potassium citrate (UROCIT-K) 10 MEQ (1080 MG) CR tablet Take 10  "mEq by mouth daily       pramipexole (MIRAPEX) 1 MG tablet Give 1 tab in AM; and 2 tabs 1 hour prior to hs       ranitidine (ZANTAC) 150 MG tablet TAKE 1 TABLET BY MOUTH TWICE DAILY 180 tablet 3     RIVAROXABAN PO Take 15 mg by mouth 2 times daily       senna-docusate (SENOKOT-S/PERICOLACE) 8.6-50 MG tablet Take 1 tablet by mouth 2 times daily as needed for constipation 20 tablet 0     temazepam (RESTORIL) 15 MG capsule Take 1 capsule (15 mg) by mouth At Bedtime 30 capsule 0     traMADol (ULTRAM) 50 MG tablet Take 1 tablet (50 mg) by mouth every 6 hours as needed for moderate to severe pain 60 tablet 0     hydrOXYzine (ATARAX) 25 MG tablet Take 1 tablet by mouth 3 times daily as needed  0     Allergies   Allergen Reactions     Clonopin [Clonazepam]      \"Walk funny and Mind goes funny\"     Encort [Hydrocortisone Acetate] Swelling     Feet swelled.     Encort [Hydrocortisone] Swelling     Erythromycin Diarrhea     Flagyl [Metronidazole] Nausea     Gabapentin      Over eats     Lamictal [Lamotrigine]      Oxycodone Nausea and Nausea and Vomiting     Tegretol [Carbamazepine] Nausea and Vomiting     BP Readings from Last 3 Encounters:   11/20/19 132/84   10/23/19 120/87   10/10/19 104/64    Wt Readings from Last 3 Encounters:   11/20/19 88.5 kg (195 lb)   10/23/19 83.5 kg (184 lb)   10/01/19 85 kg (187 lb 6.4 oz)           Reviewed and updated as needed this visit by Provider         Review of Systems   ROS COMP: CONSTITUTIONAL: NEGATIVE for fever, chills, change in weight  ENT/MOUTH: NEGATIVE for ear, mouth and throat problems  RESP: NEGATIVE for significant cough or SOB  CV: NEGATIVE for chest pain, palpitations   GI: NEGATIVE for nausea, abdominal pain, heartburn, or change in bowel habits  : NEGATIVE for frequency, dysuria, or hematuria  MUSCULOSKELETAL: NEGATIVE for significant arthralgias or myalgia  NEURO: NEGATIVE for weakness, dizziness or paresthesias  ENDOCRINE: NEGATIVE for temperature intolerance, " "skin/hair changes  HEME: NEGATIVE for bleeding problems  PSYCHIATRIC: NEGATIVE for changes in mood or affect      Objective    /70   Pulse 83   Temp 98.3  F (36.8  C) (Oral)   Resp 16   Wt 87.5 kg (193 lb)   LMP 03/11/2010   SpO2 97%   BMI 32.12 kg/m    Body mass index is 32.12 kg/m .  Physical Exam   GENERAL: alert and no distress using walker  EYES: Eyes grossly normal to inspection, PERRL and conjunctivae and sclerae normal  HENT: ear canals and TM's normal, nose and mouth without ulcers or lesions  NECK: no adenopathy, no asymmetry, masses, or scars and thyroid normal to palpation  RESP: lungs clear to auscultation - no rales, rhonchi or wheezes  CV: regular rate and rhythm, normal S1 S2, no S3 or S4, no click or rub, noted minor trace peripheral edema and peripheral pulses strong  MS: no gross musculoskeletal defects noted  NEURO: No focal cahnges  PSYCH: mentation appears normal, affect normal/bright        Assessment & Plan     1. Deep vein thrombosis (DVT) of brachial vein of right upper extremity, unspecified chronicity (H)  I have informed the patient we probably should follow-up and get a repeat venous ultrasound of her right upper extremity and if negative then complete a 3-month course of anticoagulant and then discontinue.  - rivaroxaban ANTICOAGULANT (XARELTO) 15 MG TABS tablet; Take 1 tablet (15 mg) by mouth 2 times daily  Dispense: 180 tablet; Refill: 0  - US Extremity Upper Venous rt; Future    2. Peripheral edema  Stable and resolved with recent Lasix treatment.  Suggest no further diuretic therapy and ROMMEL hose for support.  I have also ordered an echocardiogram which she is not obtained yet but is tentatively scheduled for evaluation of both left ventricular function and right ventricular pressures.    BMI:   Estimated body mass index is 32.45 kg/m  as calculated from the following:    Height as of 10/1/19: 1.651 m (5' 5\").    Weight as of 11/20/19: 88.5 kg (195 lb).     See Patient " Instructions    Return in about 4 weeks (around 12/30/2019) for diagnostic test as ordered.    Skyler Álvarez MD  [facility]

## 2019-12-02 NOTE — PROGRESS NOTES
Clinic Care Coordination Contact    Follow Up Progress Note      Assessment: Patient stated that she is unable to get a returned call from [employer] to assist her in cleaning her home.   ELIZA HOWE stated that she would email/call [employer] to inquire why there has been no returned phone call.    Goals addressed this encounter:   Goals Addressed                 This Visit's Progress      2.Clean House   On track     Goal Statement: I will seek and utilize services to assist me in helping tidy and organize my house.    Measure of Success: My house will be organized and clean  Supportive Steps to Achieve: ELIZA HOWE will help find services to assist me with tasks around the house.  Barriers: Cost of services  Strengths: Motivated to have someone help organize and help me around the house  Date to Achieve By: Will find a service or someone to assist me by 12/12/2019  Patient expressed understanding of goal: yes               Intervention/Education provided during outreach: ELIZA HOWE emailed [employer] to inquire patient's unreturned phone call.      Outreach Frequency: monthly    Plan:   ELIZA HOWE will coordinate with [employer].   Care Coordinator will follow up in 2 weeks.

## 2019-12-03 ENCOUNTER — TELEPHONE (OUTPATIENT)
Dept: INTERNAL MEDICINE | Facility: CLINIC | Age: 64
End: 2019-12-03

## 2019-12-03 NOTE — TELEPHONE ENCOUNTER
Please advise patient she needs to purchase the ROMMEL hose as recommended.  I would like to avoid ongoing diuretics as I discussed with the patient nonetheless if she still has swelling on the ROMMEL hose then we would consider this as an option.  She also needs to schedule the ultrasound as was previously ordered

## 2019-12-03 NOTE — TELEPHONE ENCOUNTER
Informed patient note below. Will update us regarding Derrek Hose and if able to tolerate as she feels this will exacerbate restless legs.     Triage encouraged to give them a try.     Mariel RIOSN, RN, PHN

## 2019-12-03 NOTE — TELEPHONE ENCOUNTER
"Patient updating PCP regarding swelling in legs.     She has not been able to purchase Derrek Hose stockings just yet. Legs were \"very\" swollen again last night. She is still waiting to schedule US- triage gave scheduling number just incase    She has finished the lasix (5 days worth). Please advise if patient should restart this or continue to elevate until she can purchase stockings. She was on her feet a lot yesterday.       Mariel RIOSN, RN, PHN      "

## 2019-12-06 ENCOUNTER — HOSPITAL ENCOUNTER (OUTPATIENT)
Dept: ULTRASOUND IMAGING | Facility: CLINIC | Age: 64
Discharge: HOME | End: 2019-12-06
Attending: INTERNAL MEDICINE | Admitting: INTERNAL MEDICINE
Payer: MEDICARE

## 2019-12-06 DIAGNOSIS — I82.621 DEEP VEIN THROMBOSIS (DVT) OF BRACHIAL VEIN OF RIGHT UPPER EXTREMITY, UNSPECIFIED CHRONICITY (H): ICD-10-CM

## 2019-12-06 PROCEDURE — 93971 EXTREMITY STUDY: CPT | Mod: RT

## 2019-12-08 ENCOUNTER — OFFICE VISIT (OUTPATIENT)
Dept: URGENT CARE | Facility: URGENT CARE | Age: 64
End: 2019-12-08
Payer: MEDICARE

## 2019-12-08 VITALS
TEMPERATURE: 98.1 F | WEIGHT: 190 LBS | RESPIRATION RATE: 20 BRPM | BODY MASS INDEX: 31.62 KG/M2 | HEART RATE: 67 BPM | DIASTOLIC BLOOD PRESSURE: 76 MMHG | SYSTOLIC BLOOD PRESSURE: 106 MMHG

## 2019-12-08 DIAGNOSIS — H01.009 BLEPHARITIS, UNSPECIFIED LATERALITY, UNSPECIFIED TYPE: Primary | ICD-10-CM

## 2019-12-08 PROCEDURE — 99213 OFFICE O/P EST LOW 20 MIN: CPT | Performed by: PHYSICIAN ASSISTANT

## 2019-12-08 RX ORDER — DICLOFENAC SODIUM 1 MG/ML
1 SOLUTION/ DROPS OPHTHALMIC 4 TIMES DAILY
Qty: 2 ML | Refills: 0 | Status: SHIPPED | OUTPATIENT
Start: 2019-12-08 | End: 2019-12-30

## 2019-12-08 ASSESSMENT — ENCOUNTER SYMPTOMS
RESPIRATORY NEGATIVE: 1
CONSTITUTIONAL NEGATIVE: 1
CARDIOVASCULAR NEGATIVE: 1
EYE ITCHING: 1
MUSCULOSKELETAL NEGATIVE: 1

## 2019-12-08 NOTE — PATIENT INSTRUCTIONS
Patient Education     Treating Blepharitis: Self-Care    To treat the problem, keep your eyelids clean. Warm compresses can reduce redness and swelling, and help clean your eyelids, too. You may also need to wash the area gently with an eyelid scrub when you wake up.  To apply a warm compress:  1. Wash your hands with soap and warm water.  2. Wet a clean washcloth with warm water. Then wring it out.  3. Close your eyes and place the washcloth over your eyelids for 3 to 5 minutes. This helps loosen scales or crusts.  4. Wet the washcloth again as often as needed to keep it warm.  Repeat 2 or more times a day. Use a clean washcloth each time.  To use an eyelid scrub:  1. Wash your hands with soap and warm water.  2. Use a ready-made eyelid scrub. Or mix 3 drops of baby shampoo in 1/4 cup of warm water.  3. Dip a lint-free pad, cotton swab, or clean washcloth in the scrub.  4. Close one eye and gently scrub the base of the eyelid.  5. Rinse the lid in cool water and dry with a clean towel.  6. Repeat on your other eye.  Date Last Reviewed: 3/1/2018    3467-7766 [employer]. [address] [place] [address], [place] [address]. All rights reserved. This information is not intended as a substitute for professional medical care. Always follow your healthcare professional's instructions.

## 2019-12-08 NOTE — PROGRESS NOTES
SUBJECTIVE:   Mercedes Barber is a 64 year old female presenting with a chief complaint of   Chief Complaint   Patient presents with     Eye Problem     eyes red and puffy and itching       She is an established patient of [facility].    Eye Problem    Onset of symptoms was 3 day(s) ago.   Location: both eyes   Course of illness is same.    Severity moderate  Current and Associated symptoms: redness, eyelid swelling, itching  Treatment measures tried include ice and topical abx cream  Context: none      Review of Systems   Constitutional: Negative.    HENT: Negative.    Eyes: Positive for itching.   Respiratory: Negative.    Cardiovascular: Negative.    Genitourinary: Negative.    Musculoskeletal: Negative.    Skin: Negative.    All other systems reviewed and are negative.      Past Medical History:   Diagnosis Date     Arthritis     Thumb     Cervical high risk HPV (human papillomavirus) test positive 07/17/2017 07/17/17: NIL pap, + HR HPV (not 16 or 18) result.      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     Neuropathy of lower extremity      Rectal prolapse      Restless leg syndrome      Seizure disorder (H)     25 years ago     Sleep apnea     CPAP, no longer using CPAP     Temporal sclerosis 8/27/2012     Family History   Problem Relation Age of Onset     Eye Disorder Mother      Lipids Mother         has CHF     Osteoporosis Mother      Diabetes Father      Alzheimer Disease Paternal Grandmother      Hypertension Brother      Alcohol/Drug Brother      Depression Brother      Gastrointestinal Disease Brother         hx of colonic polyps     Lipids Brother      Psychotic Disorder Brother      Breast Cancer Sister      Depression Sister      Lipids Sister      Thyroid Disease Sister       Congenital Anomalies Daughter      Neurologic Disorder Daughter      Current Outpatient Medications   Medication Sig Dispense Refill     acetaminophen (TYLENOL) 325 MG tablet Take 650 mg by mouth every 4 hours as needed for mild pain       albuterol (ALBUTEROL) 108 (90 BASE) MCG/ACT Inhaler Inhale 2 puffs into the lungs 4 times daily as needed for shortness of breath / dyspnea or wheezing 1 Inhaler 0     artificial saliva (BIOTENE MT) AERS spray Take 2 sprays by mouth 3 times daily as needed for dry mouth       budesonide-formoterol (SYMBICORT) 160-4.5 MCG/ACT Inhaler Inhale 2 puffs into the lungs 2 times daily       cefuroxime (CEFTIN) 500 MG tablet Take 1 tablet by mouth 2 times daily  2     cycloSPORINE (RESTASIS) 0.05 % ophthalmic emulsion Place 1 drop into both eyes 2 times daily       diclofenac (VOLTAREN) 0.1 % ophthalmic solution Place 1 drop into both eyes 4 times daily for 7 days 2 mL 0     hydrOXYzine (ATARAX) 25 MG tablet Take 1 tablet by mouth 3 times daily as needed  0     hypromellose (ARTIFICIAL TEARS) 0.5 % SOLN ophthalmic solution Place 1 drop into both eyes 2 times daily       lacosamide (VIMPAT) 200 MG TABS tablet Take 1 tablet (200 mg) by mouth 2 times daily 30 tablet 0     levothyroxine (SYNTHROID/LEVOTHROID) 50 MCG tablet Take 50 mcg by mouth daily       linaclotide (LINZESS) 290 MCG capsule Take 1 capsule (290 mcg) by mouth every morning (before breakfast) 10 capsule 0     liothyronine (CYTOMEL) 5 MCG tablet Take 10 mcg by mouth daily        nefazodone (SERZONE) 200 MG tablet Take 600 mg by mouth At Bedtime        omeprazole (PRILOSEC) 40 MG DR capsule Take 1 capsule (40 mg) by mouth 2 times daily 180 capsule 3     potassium citrate (UROCIT-K) 10 MEQ (1080 MG) CR tablet Take 10 mEq by mouth daily       pramipexole (MIRAPEX) 1 MG tablet Give 1 tab in AM; and 2 tabs 1 hour prior to hs       ranitidine (ZANTAC) 150 MG tablet TAKE 1 TABLET BY MOUTH TWICE DAILY 180 tablet 3     rivaroxaban  ANTICOAGULANT (XARELTO) 15 MG TABS tablet Take 1 tablet (15 mg) by mouth 2 times daily 180 tablet 0     senna-docusate (SENOKOT-S/PERICOLACE) 8.6-50 MG tablet Take 1 tablet by mouth 2 times daily as needed for constipation 20 tablet 0     temazepam (RESTORIL) 15 MG capsule Take 1 capsule (15 mg) by mouth At Bedtime 30 capsule 0     traMADol (ULTRAM) 50 MG tablet Take 1 tablet (50 mg) by mouth every 6 hours as needed for moderate to severe pain 60 tablet 0     Social History     Tobacco Use     Smoking status: Never Smoker     Smokeless tobacco: Never Used   Substance Use Topics     Alcohol use: Yes     Frequency: Monthly or less     Comment: 1 glass of wine 2x/yr       OBJECTIVE  /76 (Cuff Size: Adult Regular)   Pulse 67   Temp 98.1  F (36.7  C) (Oral)   Resp 20   Wt 86.2 kg (190 lb)   LMP 03/11/2010   BMI 31.62 kg/m      Physical Exam  Vitals signs and nursing note reviewed.   Constitutional:       Appearance: Normal appearance. She is normal weight.   HENT:      Head: Normocephalic and atraumatic.      Right Ear: Tympanic membrane normal.      Left Ear: Tympanic membrane normal.      Mouth/Throat:      Mouth: Mucous membranes are moist.      Pharynx: Oropharynx is clear.   Eyes:      Extraocular Movements: Extraocular movements intact.      Conjunctiva/sclera: Conjunctivae normal.      Pupils: Pupils are equal, round, and reactive to light.      Comments: Bilateral lid with mild edema and erythema.   Neck:      Musculoskeletal: Normal range of motion and neck supple.   Cardiovascular:      Rate and Rhythm: Normal rate.   Musculoskeletal: Normal range of motion.   Skin:     General: Skin is warm and dry.      Capillary Refill: Capillary refill takes less than 2 seconds.   Neurological:      General: No focal deficit present.      Mental Status: She is alert and oriented to person, place, and time.   Psychiatric:         Mood and Affect: Mood normal.         Behavior: Behavior normal.         Labs:  No  results found for this or any previous visit (from the past 24 hour(s)).    X-Ray was not done.    ASSESSMENT:      ICD-10-CM    1. Blepharitis, unspecified laterality, unspecified type H01.009 diclofenac (VOLTAREN) 0.1 % ophthalmic solution        Medical Decision Making:    Differential Diagnosis:  Eye Problem: Blepharitis    Serious Comorbid Conditions:  Adult:  None    PLAN:    Eye Problem: Warm packs for comfort. Hygiene measures discussed. voltaren gtts    Followup:    If not improving or if condition worsens, follow up with your Primary Care Provider, In 3  day(s) follow up with  Ophthalmology    Patient Instructions     Patient Education     Treating Blepharitis: Self-Care    To treat the problem, keep your eyelids clean. Warm compresses can reduce redness and swelling, and help clean your eyelids, too. You may also need to wash the area gently with an eyelid scrub when you wake up.  To apply a warm compress:  1. Wash your hands with soap and warm water.  2. Wet a clean washcloth with warm water. Then wring it out.  3. Close your eyes and place the washcloth over your eyelids for 3 to 5 minutes. This helps loosen scales or crusts.  4. Wet the washcloth again as often as needed to keep it warm.  Repeat 2 or more times a day. Use a clean washcloth each time.  To use an eyelid scrub:  1. Wash your hands with soap and warm water.  2. Use a ready-made eyelid scrub. Or mix 3 drops of baby shampoo in 1/4 cup of warm water.  3. Dip a lint-free pad, cotton swab, or clean washcloth in the scrub.  4. Close one eye and gently scrub the base of the eyelid.  5. Rinse the lid in cool water and dry with a clean towel.  6. Repeat on your other eye.  Date Last Reviewed: 3/1/2018    [id number] [employer]. [address] [place] [address], [place] [address]. All rights reserved. This information is not intended as a substitute for professional medical care. Always follow your healthcare professional's instructions.

## 2019-12-13 ENCOUNTER — TRANSFERRED RECORDS (OUTPATIENT)
Dept: HEALTH INFORMATION MANAGEMENT | Facility: CLINIC | Age: 64
End: 2019-12-13

## 2019-12-16 ENCOUNTER — TELEPHONE (OUTPATIENT)
Dept: INTERNAL MEDICINE | Facility: CLINIC | Age: 64
End: 2019-12-16

## 2019-12-16 NOTE — TELEPHONE ENCOUNTER
Very difficult to assess these types of complaints with leg swelling and  fatigue issues over the phone and just randomly prescribed a muscle relaxant.  I would suggest that this patient make a follow-up appointment either before or after the echocardiogram to review these issues.  Weight was down as of last clinic appointment and suggest continuing monitoring with compression hose.  May need lab work or additional studies done also

## 2019-12-16 NOTE — TELEPHONE ENCOUNTER
On lasix for 5 days . Compression stocking . Pt has gained 4 lbs over night . Feels pain and fatigue due to leg swelling . Getting spasm in muscles and is requesting a muscle relaxer so she can sleep  . Pt is having echo 12/27/19 need labs prior had somelabs drawn at [facility]  And they will get rodri the results. You saw pt for this 12/2/19 for same thing . Pt is wearing compression stockings daily . Please advise .Zeny Ha RN

## 2019-12-18 ENCOUNTER — PATIENT OUTREACH (OUTPATIENT)
Dept: CARE COORDINATION | Facility: CLINIC | Age: 64
End: 2019-12-18

## 2019-12-18 NOTE — PROGRESS NOTES
Clinic Care Coordination Contact  [facility]/Voicemail       Clinical Data: Care Coordinator Outreach  Outreach attempted x 1.  Left message on patient's voicemail with call back information and requested return call.  Plan: Care Coordinator will try to reach patient again in 3-5 business days.      HEIDI Gonsales  Clinic Care Coordinator  [phone]  [email or web address]

## 2019-12-19 ENCOUNTER — PATIENT OUTREACH (OUTPATIENT)
Dept: CARE COORDINATION | Facility: CLINIC | Age: 64
End: 2019-12-19

## 2019-12-19 NOTE — PROGRESS NOTES
Clinic Care Coordination Contact  [facility]/Voicemail       Clinical Data: Care Coordinator Outreach  Outreach attempted x 2.  Left message on patient's voicemail with call back information and requested return call.  Plan: Care Coordinator  will try to reach patient again in 1 month.      HEIDI Gonsales  Clinic Care Coordinator  [phone]  [email or web address]

## 2019-12-27 ENCOUNTER — HOSPITAL ENCOUNTER (OUTPATIENT)
Dept: CARDIOLOGY | Facility: CLINIC | Age: 64
Discharge: HOME | End: 2019-12-27
Attending: INTERNAL MEDICINE | Admitting: INTERNAL MEDICINE
Payer: MEDICARE

## 2019-12-27 ENCOUNTER — TRANSFERRED RECORDS (OUTPATIENT)
Dept: HEALTH INFORMATION MANAGEMENT | Facility: CLINIC | Age: 64
End: 2019-12-27

## 2019-12-27 DIAGNOSIS — R60.9 EDEMA, UNSPECIFIED TYPE: ICD-10-CM

## 2019-12-27 PROCEDURE — 93306 TTE W/DOPPLER COMPLETE: CPT

## 2019-12-27 PROCEDURE — 93306 TTE W/DOPPLER COMPLETE: CPT | Mod: 26 | Performed by: INTERNAL MEDICINE

## 2019-12-30 ENCOUNTER — OFFICE VISIT (OUTPATIENT)
Dept: INTERNAL MEDICINE | Facility: CLINIC | Age: 64
End: 2019-12-30
Payer: MEDICARE

## 2019-12-30 VITALS
BODY MASS INDEX: 32.28 KG/M2 | RESPIRATION RATE: 15 BRPM | WEIGHT: 194 LBS | TEMPERATURE: 98.5 F | DIASTOLIC BLOOD PRESSURE: 70 MMHG | HEART RATE: 68 BPM | SYSTOLIC BLOOD PRESSURE: 116 MMHG | OXYGEN SATURATION: 96 %

## 2019-12-30 DIAGNOSIS — R60.9 EDEMA, UNSPECIFIED TYPE: Primary | ICD-10-CM

## 2019-12-30 DIAGNOSIS — Z23 NEED FOR PNEUMOCOCCAL VACCINATION: ICD-10-CM

## 2019-12-30 PROCEDURE — G0009 ADMIN PNEUMOCOCCAL VACCINE: HCPCS | Performed by: INTERNAL MEDICINE

## 2019-12-30 PROCEDURE — 90732 PPSV23 VACC 2 YRS+ SUBQ/IM: CPT | Performed by: INTERNAL MEDICINE

## 2019-12-30 PROCEDURE — 99213 OFFICE O/P EST LOW 20 MIN: CPT | Mod: 25 | Performed by: INTERNAL MEDICINE

## 2019-12-30 NOTE — PROGRESS NOTES
Subjective     Mercedes Barber is a 64 year old female who presents to clinic today for the following health issues:    HPI     Review results of 12/27/19 echocardiogram reviewed.    She has been using her support stockings and notes that this helped considerably with her edema.  She has had a chest x-ray which is been negative.  An echocardiogram which demonstrates normal left ventricular function.  She reports now that her edema is significantly improved.    She has also recently had a follow-up upper extremity venous ultrasound which demonstrated resolution of the clot was noted.  She has now stopped anticoagulant  therapy post 3 months of treatment.      Leg swelling       Duration: 6 weeks     Description (location/character/radiation): Right leg     Intensity:  moderate    Accompanying signs and symptoms: Neuropathy in foot, pulsating in low leg    History (similar episodes/previous evaluation): Hx of DVT    Precipitating or alleviating factors: None    Therapies tried and outcome: Compression stockings, help      Other concerns:  1. Bilateral hand pain, L>R x 6 months. Swelling, worse in AM. Appointment scheduled with Rheumatology (Dr. White the end of next month).  She has seen her rheumatologist in the past and is scheduled to see him again to discuss the achiness and pain she has in her hands.    Patient Active Problem List   Diagnosis     Menopausal syndrome (hot flashes)     Family history of breast cancer     Vulvar pain     Renal anomaly     Overactive bladder     Rectal prolapse     CARDIOVASCULAR SCREENING; LDL GOAL LESS THAN 160     Vaginal pain     Dysphagia     Confusion     Headache     Left shoulder pain     Anemia     Mild major depression (H)     Esophageal stricture     Vulvar lesion     Temporal sclerosis     Lumbago     Pain in thoracic spine     Sciatica     Pain in joint, lower leg     Plantar fascial fibromatosis     Pain in joint involving ankle and foot     Other specified  hypothyroidism     Gastroesophageal reflux disease without esophagitis     Irritable bowel syndrome     Other sleep apnea     Cervical high risk HPV (human papillomavirus) test positive     Restless legs syndrome (RLS)     Status post total hip replacement, right     Hip pain     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)     Cellulitis of right hip     Deep vein thrombosis (DVT) of brachial vein of right upper extremity, unspecified chronicity (H)     Peripheral edema     Past Surgical History:   Procedure Laterality Date     Anterior COLPORRHAPHY, BLADDER/VAGINA      perigee mesh     ARTHROPLASTY HIP Right 7/23/2019    Procedure: Right total hip arthroplasty;  Surgeon: Anant Mejia MD;  Location: [facility] OR     ARTHROPLASTY PATELLO-FEMORAL (KNEE) Bilateral      ARTHROPLASTY REVISION HIP Right 8/7/2019    Procedure: Right hip revision total arthroplasty;  Surgeon: Tom Antonio MD;  Location: [facility] OR     CARPAL TUNNEL RELEASE RT/LT       DENTAL SURGERY      implant     DILATION AND CURETTAGE       DRAIN PILONIDAL CYST SIMPL       ENDOSCOPY DRUG INDUCED SLEEP N/A 11/18/2015    Procedure: ENDOSCOPY DRUG INDUCED SLEEP;  Surgeon: Park Ortiz MD;  Location: [facility]     ESOPHAGOSCOPY, GASTROSCOPY, DUODENOSCOPY (EGD), COMBINED  4/5/2013    Procedure: COMBINED ESOPHAGOSCOPY, GASTROSCOPY, DUODENOSCOPY (EGD), BIOPSY SINGLE OR MULTIPLE;;  Surgeon: Mundo Mehta MD;  Location:  GI     EXCISE LESION TRUNK  8/10/2012    Procedure: EXCISE LESION TRUNK;;  Surgeon: Jerald Diggs MD;  Location: [facility] OR     HYSTERECTOMY       IRRIGATION AND DEBRIDEMENT HIP, COMBINED Right 8/26/2019    Procedure: 1.  Right hip wound irrigation and debridement with excisional debridement of nonviable skin, subcutaneous tissues, and fascia. 2. Application of incisional wound VAC, 27cm length incision.;  Surgeon: Tyson Prabhakar MD;  Location: [facility] OR     L shoulder fracture       rectal prolapse repair abdominally        RELEASE PLANTAR FASCIA Right 1/20/2015    Procedure: RELEASE PLANTAR FASCIA;  Surgeon: Maicol Liu DPM;  Location: [facility]     REMOVE MESH VAGINA  8/10/2012    Procedure: REMOVE MESH VAGINA;  Excision of Vaginal Mesh Exposure, Removal of Skin Tag Left Inner Leg;  Surgeon: Jerald Diggs MD;  Location: [facility] OR     REPAIR HAMMER TOE Right 1/20/2015    Procedure: REPAIR HAMMER TOE;  Surgeon: Maicol Liu DPM;  Location: [facility]     TONSILLECTOMY & ADENOIDECTOMY       TUBAL LIGATION         Social History     Tobacco Use     Smoking status: Never Smoker     Smokeless tobacco: Never Used   Substance Use Topics     Alcohol use: Yes     Frequency: Monthly or less     Comment: 1 glass of wine 2x/yr     Family History   Problem Relation Age of Onset     Eye Disorder Mother      Lipids Mother         has CHF     Osteoporosis Mother      Diabetes Father      Alzheimer Disease Paternal Grandmother      Hypertension Brother      Alcohol/Drug Brother      Depression Brother      Gastrointestinal Disease Brother         hx of colonic polyps     Lipids Brother      Psychotic Disorder Brother      Breast Cancer Sister      Depression Sister      Lipids Sister      Thyroid Disease Sister      Congenital Anomalies Daughter      Neurologic Disorder Daughter          Current Outpatient Medications   Medication Sig Dispense Refill     acetaminophen (TYLENOL) 325 MG tablet Take 650 mg by mouth every 4 hours as needed for mild pain       albuterol (ALBUTEROL) 108 (90 BASE) MCG/ACT Inhaler Inhale 2 puffs into the lungs 4 times daily as needed for shortness of breath / dyspnea or wheezing 1 Inhaler 0     artificial saliva (BIOTENE MT) AERS spray Take 2 sprays by mouth 3 times daily as needed for dry mouth       budesonide-formoterol (SYMBICORT) 160-4.5 MCG/ACT Inhaler Inhale 2 puffs into the lungs 2 times daily       cefuroxime (CEFTIN) 500 MG tablet Take 1 tablet by mouth 2 times daily  2     cycloSPORINE (RESTASIS) 0.05 %  "ophthalmic emulsion Place 1 drop into both eyes 2 times daily       hydrOXYzine (ATARAX) 25 MG tablet Take 1 tablet by mouth 3 times daily as needed  0     hypromellose (ARTIFICIAL TEARS) 0.5 % SOLN ophthalmic solution Place 1 drop into both eyes 2 times daily       lacosamide (VIMPAT) 200 MG TABS tablet Take 1 tablet (200 mg) by mouth 2 times daily 30 tablet 0     levothyroxine (SYNTHROID/LEVOTHROID) 50 MCG tablet Take 50 mcg by mouth daily       linaclotide (LINZESS) 290 MCG capsule Take 1 capsule (290 mcg) by mouth every morning (before breakfast) 10 capsule 0     liothyronine (CYTOMEL) 5 MCG tablet Take 10 mcg by mouth daily        nefazodone (SERZONE) 200 MG tablet Take 600 mg by mouth At Bedtime        omeprazole (PRILOSEC) 40 MG DR capsule Take 1 capsule (40 mg) by mouth 2 times daily 180 capsule 3     potassium citrate (UROCIT-K) 10 MEQ (1080 MG) CR tablet Take 10 mEq by mouth daily       pramipexole (MIRAPEX) 1 MG tablet Give 1 tab in AM; and 2 tabs 1 hour prior to hs       ranitidine (ZANTAC) 150 MG tablet TAKE 1 TABLET BY MOUTH TWICE DAILY 180 tablet 3     temazepam (RESTORIL) 15 MG capsule Take 1 capsule (15 mg) by mouth At Bedtime 30 capsule 0     senna-docusate (SENOKOT-S/PERICOLACE) 8.6-50 MG tablet Take 1 tablet by mouth 2 times daily as needed for constipation 20 tablet 0     Allergies   Allergen Reactions     Clonopin [Clonazepam]      \"Walk funny and Mind goes funny\"     Encort [Hydrocortisone Acetate] Swelling     Feet swelled.     Encort [Hydrocortisone] Swelling     Erythromycin Diarrhea     Flagyl [Metronidazole] Nausea     Gabapentin      Over eats     Lamictal [Lamotrigine]      Oxycodone Nausea and Nausea and Vomiting     Tegretol [Carbamazepine] Nausea and Vomiting     BP Readings from Last 3 Encounters:   12/30/19 116/70   12/08/19 106/76   12/02/19 114/70    Wt Readings from Last 3 Encounters:   12/30/19 88 kg (194 lb)   12/08/19 86.2 kg (190 lb)   12/02/19 87.5 kg (193 lb)          "     Reviewed and updated as needed this visit by Provider         Review of Systems   ROS COMP: CONSTITUTIONAL: NEGATIVE for fever, chills, change in weight  EYES: NEGATIVE for vision changes or irritation  ENT/MOUTH: NEGATIVE for ear, mouth and throat problems  RESP: NEGATIVE for significant cough or SOB  CV: NEGATIVE for chest pain, palpitations  GI: NEGATIVE for nausea, abdominal pain, heartburn, or change in bowel habits  : NEGATIVE for frequency, dysuria, or hematuria  NEURO: NEGATIVE for weakness, dizziness  HEME: NEGATIVE for bleeding problems  PSYCHIATRIC: NEGATIVE for changes in mood or affect      Objective    /70   Pulse 68   Temp 98.5  F (36.9  C) (Oral)   Resp 15   Wt 88 kg (194 lb)   LMP 03/11/2010   SpO2 96%   BMI 32.28 kg/m    Body mass index is 32.28 kg/m .  Physical Exam   GENERAL: alert and no distress  EYES: Eyes grossly normal to inspection, PERRL and conjunctivae and sclerae normal  HENT: ear canals and TM's normal, nose and mouth without ulcers or lesions  NECK: no adenopathy, no asymmetry, masses, or scars and thyroid normal to palpation  RESP: lungs clear to auscultation - no rales, rhonchi or wheezes  CV: regular rate and rhythm, normal S1 S2, no S3 or S4, no click or rub, no peripheral edema and peripheral pulses strong  ABDOMEN: soft, nontender, no hepatosplenomegaly, no masses and bowel sounds normal  MS: no gross musculoskeletal defects noted, no edema noted LE's.  I also do not see any synovial changes or inflammatory joint changes noted to the hands bilaterally.    NEURO: No focal changes  PSYCH: mentation appears normal, affect normal/bright        TSH   Date Value Ref Range Status   07/02/2019 0.48 0.40 - 4.00 mU/L Final         Assessment & Plan     1. Edema, unspecified type  Appears resolved without any focal changes.  I have suggested that the patient continue with her support stockings and follow-up accordingly.    2. Need for pneumococcal vaccination  Recommend  updated vaccination based on age  - Pneumococcal vaccine 23 valent PPSV23  (Pneumovax) [17651]  - ADMIN 1st VACCINE       See Patient Instructions    Return for if symptoms recur or worsen, follow-up [facility].    Skyler Álvarez MD  [facility]

## 2019-12-30 NOTE — LETTER
[facility]  [address] [place] [address]  [phone]      12/30/2019       Mercedes Barber  [address]   [facility] [address]        Dear Mercedes,    I forgot to mention to you that when you do see Dr. White could you please make sure he sends me a copy of your clinic note so that I have records of what was recommended.    I also think we might want to repeat your echocardiogram in 1 year.  Your aorta which is the big vessel coming off your heart was just mildly dilated.  This is nothing that would be accountable for any of your symptoms but is something that should be followed for reassurance in 1 year.    Sincerely,      Skyler Álvarez MD  Internal Medicine

## 2020-01-16 ENCOUNTER — TELEPHONE (OUTPATIENT)
Dept: INTERNAL MEDICINE | Facility: CLINIC | Age: 65
End: 2020-01-16

## 2020-01-16 NOTE — TELEPHONE ENCOUNTER
Patient calling to see if she should resume her Xalrelto.       She is concerned because her mother is struggling with CHF. She forgot to wear her compression stockings for 2 day. Edema came back in RLE. No concerns of warmth or hot sensation. She was on her feet all day long. No tenderness. Occasional RLE can look pink in color but no redness. Patient stated she is not eating well d/t home stressors and dealing with her mothers health; therefore she is unsure how healthy her diet is or any high salt intake.     Patient stated BLE not as swollen this morning and did place on her compression stockings without difficulty.      Triage advise no medication until discussed with PCP; continue to elevate and wear compression stockings.     PCP any further recommendations? Patient declined coming into clinic for future eval d/t inclement weather.      Mariel JAMESON, RN, PHN

## 2020-01-16 NOTE — TELEPHONE ENCOUNTER
Suggest continue with ROMMEL hose aggressively as well as low-sodium diet.    It was my understanding that the plan was resolution of her upper extremity DVT that anticoagulation should be continued for 3 months.  If that 3-month window has been passed since initiation of therapy then no further anticoagulants are needed as it appears that the 12/6/2019 upper extremity venous duplex ultrasound was negative.

## 2020-01-20 ENCOUNTER — PATIENT OUTREACH (OUTPATIENT)
Dept: CARE COORDINATION | Facility: CLINIC | Age: 65
End: 2020-01-20

## 2020-01-20 ASSESSMENT — ACTIVITIES OF DAILY LIVING (ADL): DEPENDENT_IADLS:: CLEANING;COOKING;LAUNDRY

## 2020-01-20 NOTE — PROGRESS NOTES
"Clinic Care Coordination Contact    Follow Up Progress Note      Assessment: CARLEY  outreach to patient for goal check in. CARLEY  introduced new CARLEY . Patient reported that she has not been sleeping for the last 4-5 days. Her mother is ill and may pass in the next few months. Patient reports she has a sleep medication but it makes her a \"zombie.\" She does not want to take it much when she has appointments the next day. CARLEY KAY encouraged patient to discuss with care team at next appointment. CARLEY KAY inquired if patient is needing any mental health resources at this time; patient has a psychologist and psychiatry appointment this week and next week. She has been connected to these resources for 30+ years. Patient reports that she is also experiencing pain in her left hand. CARLEY KAY encouraged patient to make an appointment if needed. Patient reported that she has an ortho appointment 1/21/20. CARLEY  reviewed goals and patient has been working with [facility]. Patient reports she has not gotten a bill yet and is worried she will someday get one large bill. CARLEY KAY inquired when staff started coming, patient reported mid January. CARLEY  encouraged patient to reach out to agency as needed but they may run billing monthly-early February. CARLEY  can follow up after next outreach if patient has not gotten a bill yet. Patient agreed.     Goals addressed this encounter:   Goals Addressed                 This Visit's Progress      1.Psychosocial (pt-stated)   On track     Goal Statement: I will attend [employer] at least 1 time this month. I will call ahead and ask for help from someone at [employer] to help me with my walker. This will help me connect with a social network and [employer] is something that is important to me.  Measure of Success: I will attend [employer] this month.   Supportive Steps to Achieve: Plan when I would like to attend [employer], call ahead and ask for help.   Barriers: Not wanting to ask for help. Getting tired easily. "   Strengths: Motivated to seek a support network.   Date to Achieve By: 4/1/2020  Patient expressed understanding of goal: Yes    As of today's date 1/20/2020 goal is met at 26 - 50%.   Goal Status:  Active              2.Clean House   On track     Goal Statement: I will seek and utilize services to assist me in helping tidy and organize my house.    Measure of Success: My house will be organized and clean  Supportive Steps to Achieve: CC SW will help find services to assist me with tasks around the house.  Barriers: Cost of services  Strengths: Motivated to have someone help organize and help me around the house  Date to Achieve By: Will find a service or someone to assist me by 4/1/2020  Patient expressed understanding of goal: yes    As of today's date 1/20/2020 goal is met at 76 - 100%.   Goal Status:  Complete        3. Self Care/Stress Reduction  (pt-stated)   On track     Goal Statement: I will make time for myself each week to make cards to reduce my stress and increase my happiness.   Measure of Success: I will make cards every week.   Supportive Steps to Achieve: Make time to create and craft cards, get supplies at [employer] and call [facility] to clean my craft room so I can utilize it again.   Barriers: Not getting help to clean my craft room.   Strengths: Motivated to get craft room clean, motivated to make cards as it makes me happy.   Date to Achieve By: 4/1/20020  Patient expressed understanding of goal: yes    As of today's date 1/20/2020 goal is met at 26 - 50%.   Goal Status:  Active               Intervention/Education provided during outreach: New CARLEY CC introduction     Outreach Frequency: Monthly    Plan: CARLEY CC will follow up in one month.     Katy Calderon, [facility]  Clinic Care Coordinator   [facility]  [phone]  [email or web address]

## 2020-01-30 ENCOUNTER — TRANSFERRED RECORDS (OUTPATIENT)
Dept: HEALTH INFORMATION MANAGEMENT | Facility: CLINIC | Age: 65
End: 2020-01-30

## 2020-01-31 ENCOUNTER — NURSE TRIAGE (OUTPATIENT)
Dept: INTERNAL MEDICINE | Facility: CLINIC | Age: 65
End: 2020-01-31

## 2020-01-31 NOTE — TELEPHONE ENCOUNTER
Recommended patient to be seen ans tested if sx not better or worsen in the next 24 hours .Zeny Ha RN  Told to stay hydrated.Zeny Ha RN

## 2020-01-31 NOTE — TELEPHONE ENCOUNTER
Additional Information    Negative: Severe difficulty breathing (e.g., struggling for each breath, speaks in single words)    Negative: Bluish (or gray) lips or face    Negative: Shock suspected (e.g., cold/pale/clammy skin, too weak to stand, low BP, rapid pulse)    Negative: Sounds like a life-threatening emergency to the triager    Negative: Sounds like a cold and there is no fever    Negative: Cough and there is no fever    Negative: Severe cough    Negative: Throat pain and there is no fever    Negative: Severe sore throat    Negative: Influenza vaccine reaction is suspected    Negative: Headache and stiff neck (can't touch chin to chest)    Negative: Chest pain (EXCEPTION: MILD central chest pain, present only when coughing)    Negative: Difficulty breathing that is not severe and not relieved by cleaning out the nose    Negative: Patient sounds very sick or weak to the triager    Negative: Fever > 104 F (40 C)    Negative: Fever > 101 F (38.3 C) and over 60 years of age    Negative: Fever > 100.0 F (37.8 C) and diabetes mellitus or weak immune system (e.g., HIV positive, cancer chemo, splenectomy, organ transplant, chronic steroids)    Negative: Fever > 100.0 F (37.8 C) and bedridden (e.g., nursing home patient, stroke, chronic illness, recovering from surgery)    Negative: Using nasal washes and pain medicine > 24 hours and sinus pain (lower forehead, cheekbone, or eye) persists    Negative: Fever present > 3 days (72 hours)    Negative: Fever returns after gone for over 24 hours and symptoms worse (or not improved)    Negative: Earache    Negative: Patient wants to be seen    Negative: Nasal discharge present > 10 days    Negative: Cough present > 3 weeks    Patient requests antiviral medicine for influenza and flu symptoms present < 48 hours    Negative: Probable influenza (fever) with no complications and not HIGH RISK    Negative: Probable mild influenza (no fever) or a common cold with no  complications and not HIGH RISK    Negative: Influenza vaccine, questions about    Protocols used: INFLUENZA - SEASONAL-A-OH

## 2020-02-19 NOTE — PROVIDER NOTIFICATION
"\"Pt having sharp cramping in RLE- Oral and IV PRN pain medications administered with little affect on cramping. Can we get a muscle relaxer ordered?\" - MD paged.  "
Dr. Billingsley paged.  C/O R eye pain.  Lid and sclera slightly red.  Cool wash cloth applied.    Dr. Billingsley to bedside @ [phone].  He will determine what needs to be done.  
finger rub heard bilaterally, no redness/inflammation of mouth/pharynx

## 2020-02-20 ENCOUNTER — PATIENT OUTREACH (OUTPATIENT)
Dept: CARE COORDINATION | Facility: CLINIC | Age: 65
End: 2020-02-20

## 2020-02-20 NOTE — PROGRESS NOTES
02/24/20-Fareed Álvarez from [facility] called the CHW.  Patient is scheduled at the end of next week to have a  out to help with bathroom sink.  _______________________________________________    02/20/20-Community Health Worker called the patient for Clinic Care Coordination outreach follow up on goals and action steps.  Spoke to Mercedes.    Discussed:     Patient fell in at home and is in a lot of pain.    Patient plans to see Ortho doctor 02/21/20 for pain.    Patient took prescribed medication last night and that helped patient to sleep.  Patient stated that she does not usually take this prescribed medication.    Patient has someone scheduled for 03/03 to help clean patient's house.    Patient is not attending [employer], but is keeping in contact with [employer] friends, and is in a Bible study.    Patient called [facility] for assistance with bathroom sink that is leaking.  Patient has not heard back and it has been 2 1/2 weeks.    Patient is driving on her own, but has a difficult time walking.  _______________________________________________  Plan:    CHW will contact [facility] to check on the status of assistance.  _______________________________________________  Goals:  Psychosocial-Goal Statement: I will attend [employer] at least 1 time this month. I will call ahead and ask for help from someone at [employer] to help me with my walker. This will help me connect with a social network and [employer] is something that is important to me.  Measure of Success: I will attend [employer] this month.   Supportive Steps to Achieve: Plan when I would like to attend [employer], call ahead and ask for help.   Barriers: Not wanting to ask for help. Getting tired easily.   Strengths: Motivated to seek a support network.   Date to Achieve By: 4/1/2020    Self-Care: Goal Statement: I will make time for myself each week to make cards to reduce my stress and increase my happiness.   Measure of Success: I will make  cards every week.   Supportive Steps to Achieve: Make time to create and craft cards, get supplies at craft store and call [facility] to clean my craft room so I can utilize it again.   Barriers: Not getting help to clean my craft room.   Strengths: Motivated to get craft room clean, motivated to make cards as it makes me happy.  Date to Achieve By: 4/1/20020      Clean House: Goal Statement: I will seek and utilize services to assist me in helping tidy and organize my house.    Measure of Success: My house will be organized and clean  Supportive Steps to Achieve: CC SW will help find services to assist me with tasks around the house.  Barriers: Cost of services  Strengths: Motivated to have someone help organize and help me around the house   Date to Achieve By: 4/1/20020  _______________________________________________     Next Outreach: 03/20/20  Planned Outreach Frequency: Monthly  Preferred Phone Number: [phone]     Enrollment Date: 11/26/20  Last Care Plan Assessment Date: 11/26/20

## 2020-03-05 ENCOUNTER — MEDICAL CORRESPONDENCE (OUTPATIENT)
Dept: HEALTH INFORMATION MANAGEMENT | Facility: CLINIC | Age: 65
End: 2020-03-05

## 2020-03-06 DIAGNOSIS — R79.0 LOW IRON STORES: ICD-10-CM

## 2020-03-06 RX ORDER — HEPARIN SODIUM (PORCINE) LOCK FLUSH IV SOLN 100 UNIT/ML 100 UNIT/ML
5 SOLUTION INTRAVENOUS
Status: CANCELLED | OUTPATIENT
Start: 2020-03-11

## 2020-03-06 RX ORDER — HEPARIN SODIUM,PORCINE 10 UNIT/ML
5 VIAL (ML) INTRAVENOUS
Status: CANCELLED | OUTPATIENT
Start: 2020-03-11

## 2020-03-06 RX ORDER — DIPHENHYDRAMINE HYDROCHLORIDE 50 MG/ML
25 INJECTION INTRAMUSCULAR; INTRAVENOUS ONCE
Status: CANCELLED
Start: 2020-03-11

## 2020-03-23 ENCOUNTER — PATIENT OUTREACH (OUTPATIENT)
Dept: CARE COORDINATION | Facility: CLINIC | Age: 65
End: 2020-03-23

## 2020-03-23 NOTE — PROGRESS NOTES
Clinic Care Coordination Contact    Community Health Worker called the patient for Clinic Care Coordination outreach follow up on goals and action steps.  Spoke to Mercedes.     Discussed:    Patient is not sleeping too well.    Patient is continuing to make greeting cards.    Patient has driven to the grocery store and the hardware store, but is staying home most of the time.    Patient stated that the gin was out and fixed her faucet in the bathroom.    Patient has someone to clean her house once a month, but is not sure about April due to the COVID-19.    Patient reads her Bible, and calls her [employer] friends thru out the day.  Today she has talked with four friends.    Patient stated that she feels isolated at times.    Patient stated that she uses her walker and cane occasionally around the house.    Patient stated overall doing good, and thanked the CHW for calling.  _______________________________________________  Goals Addressed:    Goals:  Psychosocial-Goal Statement: I will attend [employer] at least 1 time this month. I will call ahead and ask for help from someone at [employer] to help me with my walker. This will help me connect with a social network and Jew is something that is important to me.  Measure of Success: I will attend [employer] this month.   Supportive Steps to Achieve: Plan when I would like to attend [employer], call ahead and ask for help.   Barriers: Not wanting to ask for help. Getting tired easily.   Strengths: Motivated to seek a support network.   Date to Achieve By: 4/1/2020  As of today's date 3/23/2020 goal is met at 0 - 25%.   Goal Status:  Showing progress     Self-Care: Goal Statement: I will make time for myself each week to make cards to reduce my stress and increase my happiness.   Measure of Success: I will make cards every week.   Supportive Steps to Achieve: Make time to create and craft cards, get supplies at [employer] and call Help at your door to clean my craft room so I can  utilize it again.   Barriers: Not getting help to clean my craft room.   Strengths: Motivated to get craft room clean, motivated to make cards as it makes me happy.  Date to Achieve By: 4/1/20020    As of today's date 3/23/2020 goal is met at 51 - 75%.   Goal Status:  Active     Clean House: Goal Statement: I will seek and utilize services to assist me in helping tidy and organize my house.    Measure of Success: My house will be organized and clean  Supportive Steps to Achieve: CC SW will help find services to assist me with tasks around the house.  Barriers: Cost of services  Strengths: Motivated to have someone help organize and help me around the house   Date to Achieve By: 4/1/20020  As of today's date 3/23/2020 goal is met at 51 - 75%.   Goal Status:  Active ___________________________________     Next Outreach: 04/22/20    CAMRON Hester  Clinic Care Coordination  [facility]  Phone: [phone]

## 2020-03-23 NOTE — PROGRESS NOTES
Clinic Care Coordination Contact    Situation: Patient chart reviewed by care coordinator.    Background: Patient is enrolled in [facility] for ongoing support and coaching in accessing community resources and natural supports. CHW outreach to patient today 3/23/20 and 2/20/20.     Assessment: Patient is accessing community resources and natural supports as suggested. Patient last saw her psychologist 3/5/20. Patient has a sleep medication but does not opt to take it regularly. Has reported good results for sleep when she does take it. Current goals still seem appropriate at this time; will reevaluate at next check in.     Plan/Recommendations: CHW will continue with outreaches to patient at this time. CHW will inform SW CC of any concerns or changes regarding patient as needed. SW CC will chart review every 6 weeks and outreach to patient as needed.    GOSIA Stokes   Social Work Clinic Care Coordinator   [facility]   PH: [phone]  [email or web address]

## 2020-04-21 ENCOUNTER — NURSE TRIAGE (OUTPATIENT)
Dept: INTERNAL MEDICINE | Facility: CLINIC | Age: 65
End: 2020-04-21

## 2020-04-21 NOTE — TELEPHONE ENCOUNTER
Reason for call:  Patient reporting a symptom    Symptom or request: Feels like there is water in lt ear    Duration (how long have symptoms been present): 2 months    Have you been treated for this before? No    Additional comments:     Phone Number patient can be reached at:  Home number on file [phone] (home)    Best Time:  anytime    Can we leave a detailed message on this number:  YES    Call taken on 4/21/2020 at 1:56 PM by ERIN FOX

## 2020-04-21 NOTE — TELEPHONE ENCOUNTER
"  Additional Information    Negative: Ear pain is the main symptom    Negative: Hearing loss (complete or partial) is the main symptom    Negative: Earwax is the main concern    Negative: Has nasal allergies and they are acting up    Negative: Earache lasts > 1 hour    Negative: Pus or cloudy discharge from ear canal    Negative: Patient wants to be seen    Ear congestion present > 48 hours    Answer Assessment - Initial Assessment Questions  1. LOCATION: \"Which ear is involved?\"        Left ear  2. SENSATION: \"Describe how the ear feels.\"       Feels like water is in ear. Sounds like a hollow sound  3. ONSET:  \"When did the ear symptoms start?\"        On going for the last 2 months  4. PAIN: \"Do you also have an earache?\" If so, ask: \"How bad is it?\" (Scale 1-10; or mild, moderate, severe)      No pain  5. CAUSE: \"What do you think is causing the ear congestion?\"      Unsure, cleans ears with q-tip but not recently.  6. URI: \"Do you have a runny nose or cough?\"       Stuffy nose in the morning feels like has to blow nose because head feels full- has not tried anything: ie nasal steroids of OTC antihistamine  7. NASAL ALLERGIES: \"Are there symptoms of hay fever, such as sneezing or a clear nasal discharge?\"      Sneezing more often. Clear nasal discharge  8. PREGNANCY: \"Is there any chance you are pregnant?\" \"When was your last menstrual period?\"      N/a    Protocols used: EAR - CONGESTION-A-OH    "

## 2020-04-22 ENCOUNTER — OFFICE VISIT (OUTPATIENT)
Dept: FAMILY MEDICINE | Facility: CLINIC | Age: 65
End: 2020-04-22
Payer: MEDICARE

## 2020-04-22 VITALS
RESPIRATION RATE: 20 BRPM | WEIGHT: 194 LBS | TEMPERATURE: 98.1 F | HEART RATE: 78 BPM | OXYGEN SATURATION: 95 % | BODY MASS INDEX: 32.28 KG/M2 | DIASTOLIC BLOOD PRESSURE: 76 MMHG | SYSTOLIC BLOOD PRESSURE: 120 MMHG

## 2020-04-22 DIAGNOSIS — H69.92 DYSFUNCTION OF LEFT EUSTACHIAN TUBE: Primary | ICD-10-CM

## 2020-04-22 PROCEDURE — 99213 OFFICE O/P EST LOW 20 MIN: CPT | Performed by: INTERNAL MEDICINE

## 2020-04-22 NOTE — PROGRESS NOTES
"Subjective     Mercedes Barber is a 64 year old female who presents to clinic today for the following health issues:    HPI   Check ears-especially left ear      2-month history of abnormal sensations left ear.  She states it feels like \"water is in the ear\".  Despite this she states her hearing seems normal, no tinnitus or vertigo.  No preceding air travel or URI symptoms.    Patient Active Problem List   Diagnosis     Menopausal syndrome (hot flashes)     Family history of breast cancer     Vulvar pain     Renal anomaly     Overactive bladder     Rectal prolapse     CARDIOVASCULAR SCREENING; LDL GOAL LESS THAN 160     Vaginal pain     Dysphagia     Confusion     Headache     Left shoulder pain     Anemia     Mild major depression (H)     Esophageal stricture     Vulvar lesion     Temporal sclerosis     Lumbago     Pain in thoracic spine     Sciatica     Pain in joint, lower leg     Plantar fascial fibromatosis     Pain in joint involving ankle and foot     Other specified hypothyroidism     Gastroesophageal reflux disease without esophagitis     Irritable bowel syndrome     Other sleep apnea     Cervical high risk HPV (human papillomavirus) test positive     Restless legs syndrome (RLS)     Status post total hip replacement, right     Hip pain     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)     Cellulitis of right hip     Deep vein thrombosis (DVT) of brachial vein of right upper extremity, unspecified chronicity (H)     Peripheral edema     Low iron stores     Past Surgical History:   Procedure Laterality Date     Anterior COLPORRHAPHY, BLADDER/VAGINA      perigee mesh     ARTHROPLASTY HIP Right 7/23/2019    Procedure: Right total hip arthroplasty;  Surgeon: Anant Mejia MD;  Location: [facility] OR     ARTHROPLASTY PATELLO-FEMORAL (KNEE) Bilateral      ARTHROPLASTY REVISION HIP Right 8/7/2019    Procedure: Right hip revision total arthroplasty;  Surgeon: Tom Antonio MD;  Location:  OR     CARPAL " TUNNEL RELEASE RT/LT       DENTAL SURGERY      implant     DILATION AND CURETTAGE       DRAIN PILONIDAL CYST SIMPL       ENDOSCOPY DRUG INDUCED SLEEP N/A 11/18/2015    Procedure: ENDOSCOPY DRUG INDUCED SLEEP;  Surgeon: Park Ortiz MD;  Location: [facility] OR     ESOPHAGOSCOPY, GASTROSCOPY, DUODENOSCOPY (EGD), COMBINED  4/5/2013    Procedure: COMBINED ESOPHAGOSCOPY, GASTROSCOPY, DUODENOSCOPY (EGD), BIOPSY SINGLE OR MULTIPLE;;  Surgeon: Mundo Mehta MD;  Location:  GI     EXCISE LESION TRUNK  8/10/2012    Procedure: EXCISE LESION TRUNK;;  Surgeon: Jerald Diggs MD;  Location: [facility] OR     HYSTERECTOMY       IRRIGATION AND DEBRIDEMENT HIP, COMBINED Right 8/26/2019    Procedure: 1.  Right hip wound irrigation and debridement with excisional debridement of nonviable skin, subcutaneous tissues, and fascia. 2. Application of incisional wound VAC, 27cm length incision.;  Surgeon: Tyson Prabhakar MD;  Location: [facility] OR     L shoulder fracture       rectal prolapse repair abdominally       RELEASE PLANTAR FASCIA Right 1/20/2015    Procedure: RELEASE PLANTAR FASCIA;  Surgeon: Maicol Liu DPM;  Location: [facility]     REMOVE MESH VAGINA  8/10/2012    Procedure: REMOVE MESH VAGINA;  Excision of Vaginal Mesh Exposure, Removal of Skin Tag Left Inner Leg;  Surgeon: Jerald Diggs MD;  Location:  OR     REPAIR HAMMER TOE Right 1/20/2015    Procedure: REPAIR HAMMER TOE;  Surgeon: Maicol Liu DPM;  Location: [facility]     TONSILLECTOMY & ADENOIDECTOMY       TUBAL LIGATION         Social History     Tobacco Use     Smoking status: Never Smoker     Smokeless tobacco: Never Used   Substance Use Topics     Alcohol use: Yes     Frequency: Monthly or less     Comment: 1 glass of wine 2x/yr     Family History   Problem Relation Age of Onset     Eye Disorder Mother      Lipids Mother         has CHF     Osteoporosis Mother      Diabetes Father      Alzheimer Disease Paternal Grandmother      Hypertension  Brother      Alcohol/Drug Brother      Depression Brother      Gastrointestinal Disease Brother         hx of colonic polyps     Lipids Brother      Psychotic Disorder Brother      Breast Cancer Sister      Depression Sister      Lipids Sister      Thyroid Disease Sister      Congenital Anomalies Daughter      Neurologic Disorder Daughter          Current Outpatient Medications   Medication Sig Dispense Refill     acetaminophen (TYLENOL) 325 MG tablet Take 650 mg by mouth every 4 hours as needed for mild pain       albuterol (ALBUTEROL) 108 (90 BASE) MCG/ACT Inhaler Inhale 2 puffs into the lungs 4 times daily as needed for shortness of breath / dyspnea or wheezing 1 Inhaler 0     artificial saliva (BIOTENE MT) AERS spray Take 2 sprays by mouth 3 times daily as needed for dry mouth       budesonide-formoterol (SYMBICORT) 160-4.5 MCG/ACT Inhaler Inhale 2 puffs into the lungs 2 times daily       cycloSPORINE (RESTASIS) 0.05 % ophthalmic emulsion Place 1 drop into both eyes 2 times daily       hydrOXYzine (ATARAX) 25 MG tablet Take 1 tablet by mouth 3 times daily as needed  0     hypromellose (ARTIFICIAL TEARS) 0.5 % SOLN ophthalmic solution Place 1 drop into both eyes 2 times daily       lacosamide (VIMPAT) 200 MG TABS tablet Take 1 tablet (200 mg) by mouth 2 times daily 30 tablet 0     levothyroxine (SYNTHROID/LEVOTHROID) 50 MCG tablet Take 50 mcg by mouth daily       linaclotide (LINZESS) 290 MCG capsule Take 1 capsule (290 mcg) by mouth every morning (before breakfast) 10 capsule 0     liothyronine (CYTOMEL) 5 MCG tablet Take 10 mcg by mouth daily        nefazodone (SERZONE) 200 MG tablet Take 600 mg by mouth At Bedtime        omeprazole (PRILOSEC) 40 MG DR capsule Take 1 capsule (40 mg) by mouth 2 times daily 180 capsule 3     potassium citrate (UROCIT-K) 10 MEQ (1080 MG) CR tablet Take 10 mEq by mouth daily       pramipexole (MIRAPEX) 1 MG tablet Give 1 tab in AM; and 2 tabs 1 hour prior to hs       ranitidine  "(ZANTAC) 150 MG tablet TAKE 1 TABLET BY MOUTH TWICE DAILY 180 tablet 3     senna-docusate (SENOKOT-S/PERICOLACE) 8.6-50 MG tablet Take 1 tablet by mouth 2 times daily as needed for constipation 20 tablet 0     temazepam (RESTORIL) 15 MG capsule Take 1 capsule (15 mg) by mouth At Bedtime 30 capsule 0     Allergies   Allergen Reactions     Clonopin [Clonazepam]      \"Walk funny and Mind goes funny\"     Encort [Hydrocortisone Acetate] Swelling     Feet swelled.     Encort [Hydrocortisone] Swelling     Erythromycin Diarrhea     Flagyl [Metronidazole] Nausea     Gabapentin      Over eats     Lamictal [Lamotrigine]      Oxycodone Nausea and Nausea and Vomiting     Tegretol [Carbamazepine] Nausea and Vomiting     BP Readings from Last 3 Encounters:   04/22/20 120/76   12/30/19 116/70   12/08/19 106/76    Wt Readings from Last 3 Encounters:   04/22/20 88 kg (194 lb)   12/30/19 88 kg (194 lb)   12/08/19 86.2 kg (190 lb)                      Reviewed and updated as needed this visit by Provider         Review of Systems   ROS COMP: CONSTITUTIONAL:NEGATIVE for fever, chills, change in weight  RESP:NEGATIVE for significant cough or SOB      Objective    /76   Pulse 78   Temp 98.1  F (36.7  C)   Resp 20   Wt 88 kg (194 lb)   LMP 03/11/2010   SpO2 95%   BMI 32.28 kg/m    Body mass index is 32.28 kg/m .  Physical Exam   GENERAL APPEARANCE: alert and no distress  HENT: ear canals and TM's normal    Diagnostic Test Results:  none         Assessment & Plan     Mercedes was seen today for ear problem.    Diagnoses and all orders for this visit:    Dysfunction of left eustachian tube  -     OTOLARYNGOLOGY REFERRAL              Etiology of her symptoms is uncertain.           Patient Instructions   I suggest that you use a Flonase spray; 2 sprays left nostril twice per day for one month.             If this does not help, call [phone] for an ENT appointment.               No follow-ups on file.    Skyler Gallego, " MD  [facility]

## 2020-04-22 NOTE — PATIENT INSTRUCTIONS
I suggest that you use a Flonase spray; 2 sprays left nostril twice per day for one month.             If this does not help, call [phone] for an ENT appointment.

## 2020-05-01 ENCOUNTER — PATIENT OUTREACH (OUTPATIENT)
Dept: CARE COORDINATION | Facility: CLINIC | Age: 65
End: 2020-05-01

## 2020-05-01 NOTE — PROGRESS NOTES
"Clinic Care Coordination Contact  Community Health Worker Follow Up    Patient Reports:    Patient stated that she still is not sleeping well.    Patient has a bit of depression due to her mom's illness.  Patient stated that her mom is not doing too well, took her out of the nursing home to live with her sister and brother in law in [place].    Patient is planning on driving to [place] soon for a visit, but to stay at least six feet apart, along with wearing a mask and gloves.    Patient is baking a lot to help with her depression.  Patient will bring the bake goods wihen she travels to [place] to visit her mom.    Patient stated that she just had a birthday, and had a \"distance gathering\" with a few friends.    Patient continues making greeting cards, and is in the process of making one today for a dear friend.    Patient attends online Jain service, and reads her Bible daily.    Patient stated that she has only been to [employer] grocery store twice, but she goes early in the morning and wears a mask.    Patient stated that she has talked with her psychologist earlier this week, and plans to talk with her psychiatrist within the next few weeks.    Patient stated that she will be having a MRI to check for lesions on her brain.    Patient has an eye appointment the first part of June.    Patient stated that the person to help clean her house did not come in April due to Covid-19, but she is not sure if she will be out this month in May.      Goals:   Goals Addressed as of 5/1/2020 at 12:04 PM                 1/20/20 11/26/19       Patient Stated      1.Psychosocial (pt-stated)   On track  On track    Added 11/14/19 by Amber Nguyen, [facility]     Goal Statement: I will attend [employer] at least 1 time this month. I will call ahead and ask for help from someone at [employer] to help me with my walker. This will help me connect with a social network and Jain is something that is important to me.  Measure of Success: I " will attend [employer] this month.   Supportive Steps to Achieve: Plan when I would like to attend [employer], call ahead and ask for help.   Barriers: Not wanting to ask for help. Getting tired easily.   Strengths: Motivated to seek a support network.   Date to Achieve By: 4/1/2020  Patient expressed understanding of goal: Yes    As of today's date 1/20/2020 goal is met at 26 - 50%.   Goal Status:  Active  As of today's date 5/1/2020 goal is met at 26 - 50%.   Goal Status:  Active               3. Self Care/Stress Reduction  (pt-stated)   On track  On track    Added 11/20/19 by Amber Nguyen, [facility]     Goal Statement: I will make time for myself each week to make cards to reduce my stress and increase my happiness.   Measure of Success: I will make cards every week.   Supportive Steps to Achieve: Make time to create and craft cards, get supplies at craft store and call Help at your door to clean my craft room so I can utilize it again.   Barriers: Not getting help to clean my craft room.   Strengths: Motivated to get craft room clean, motivated to make cards as it makes me happy.   Date to Achieve By: 4/1/20020  Patient expressed understanding of goal: yes    As of today's date 1/20/2020 goal is met at 26 - 50%.   Goal Status:  Active  As of today's date 5/1/2020 goal is met at 26 - 50%.   Goal Status:  Active               CHW Next Follow-up: one month.      CHW offered encouragement and support during the phone visit.    Raegan Chinchilla, CAMRON  Clinic Care Coordination  [facility]  Phone: [phone]  ___________________________________    Next Outreach: 06/01/20  Planned Outreach Frequency: Monthly  Preferred Phone Number: [phone]

## 2020-05-04 ENCOUNTER — PATIENT OUTREACH (OUTPATIENT)
Dept: CARE COORDINATION | Facility: CLINIC | Age: 65
End: 2020-05-04

## 2020-05-04 NOTE — LETTER
[place] CARE COORDINATION  [facility]  [address] [address]  Tel. [phone]  Fax [phone]    May 4, 2020        Mercedes Barber  [address]  [facility] [address]    Dear Mercedes,      Miguel is an updated complex care plan for your continued enrollment in clinic care coordination. Please let us know if you have additional questions, goals, or concerns that we can assist with.      GOSIA Stokes   Social Work Clinic Care Coordinator   [facility] and [facility]   PH: [phone]  [email or web address]  Complex Care Plan  About Me:    Patient Name:  Mercedes Barber    YOB: 1955  Age:         65 year old   [place] MRN:    [id number] Telephone Information:  Home Phone [phone]   Mobile [phone]       Address:  [address]  [place] [place] [facility] [address] Email address:  No e-mail address on record      Emergency Contact(s)    Name Relationship Lgl Grd Work Phone Home Phone Mobile Phone   1. RANJTIHJOYCEN Daughter No  [phone]   2. JULIO C KASPER (* Relative No  [phone]   3. NEDRAELENA SILVIO Friend No  [phone]           Primary language:  English     needed? No   [place] [employer]:  [phone] op. 1  Other communication barriers:    Preferred Method of Communication:  Phone  Current living arrangement:    Mobility Status/ Medical Equipment:      Health Maintenance  Health Maintenance Reviewed:    Health Maintenance Due   Topic Date Due     PHQ-9  07/26/2017     MEDICARE ANNUAL WELLNESS VISIT  04/24/2019     My Access Plan  Medical Emergency 911   Primary Clinic Line [facility][1] - [phone]   24 Hour Appointment Line [phone] or  [phone]) (toll-free)   24 Hour Nurse Line [phone] (toll-free)   Preferred Urgent Care      Preferred Hospital     Preferred Pharmacy [facility] - [place], [place] - [address] [facility]     Behavioral Health Crisis Line The National Suicide Prevention Lifeline at 1-468.745.5473 or 911     My Care Team Members  Patient Care Team       Relationship Specialty Notifications Start End    Skyler Álvarez MD PCP - General Internal Medicine  7/31/12     Phone: [phone] Fax: [phone]         [address] [place] [address]    Samir Arguelles MD MD Neurology  10/19/15     Referred by Dr. Samir Arguelles from [facility] for sleep apnea     Phone: [phone] Fax: [phone]         [facility] NEUROLOGICAL [facility] [address] [facility] [address] [facility] [address]    Park Ortiz MD MD Otolaryngology  10/19/15     Referred by Dr. Samir Arguelles from [facility] for sleep apnea     Phone: [phone] Fax: [phone]         [address] [place] [address] [address] [facility] [address]    Katy Calderon LSW Lead Care Coordinator Primary Care - CC  1/9/20     Raegan Chinchilla MA Community Health Worker Primary Care - CC  1/23/20     Skyler Álvarez MD Assigned PCP   3/22/20     Phone: [phone] Fax: [phone]         [address] [place] [address]            My Care Plans  Self Management and Treatment Plan  Goals and (Comments)  Goals        General    1.Psychosocial (pt-stated)     Notes - Note edited  5/4/2020  9:28 AM by Katy Calderon LSW    Goal Statement: I will attend [employer] at least 1 time this month over the next 6 months. I will call ahead and ask for help from someone at [employer] to help me with my walker. This will help me connect with a social network and [employer] is something that is important to me.  Measure of Success: I will attend [employer] this month.   Supportive Steps to Achieve: Plan when I would like to attend [employer], call ahead and ask for help.   Barriers: Not wanting to ask for help. Getting tired easily.   Strengths:  Motivated to seek a support network.   Date to Achieve By: 10/1/2020  Patient expressed understanding of goal: Yes    As of today's date 1/20/2020 goal is met at 26 - 50%.   Goal Status:  Active  As of today's date 5/1/2020 goal is met at 26 - 50%.   Goal Status:  Active             2. Self Care/Stress Reduction (pt-stated)     Notes - Note edited  5/4/2020  9:26 AM by Katy Calderon LSW    Goal Statement: I will make time for myself each week to make cards to reduce my stress and increase my happiness over the next 6 months.   Measure of Success: I will make cards every week.   Supportive Steps to Achieve: Make time to create and craft cards, get supplies at craft store and call [employer] to clean my craft room so I can utilize it again.   Barriers: Not getting help to clean my craft room.   Strengths: Motivated to get craft room clean, motivated to make cards as it makes me happy.   Date to Achieve By: 10/1/20  Patient expressed understanding of goal: Yes    As of today's date 1/20/2020 goal is met at 26 - 50%.   Goal Status:  Active  As of today's date 5/1/2020 goal is met at 26 - 50%.   Goal Status:  Active                Action Plans on File:                       Advance Care Plans/Directives Type:        My Medical and Care Information  Problem List   Patient Active Problem List   Diagnosis     Menopausal syndrome (hot flashes)     Family history of breast cancer     Vulvar pain     Renal anomaly     Overactive bladder     Rectal prolapse     CARDIOVASCULAR SCREENING; LDL GOAL LESS THAN 160     Vaginal pain     Dysphagia     Confusion     Headache     Left shoulder pain     Anemia     Mild major depression (H)     Esophageal stricture     Vulvar lesion     Temporal sclerosis     Lumbago     Pain in thoracic spine     Sciatica     Pain in joint, lower leg     Plantar fascial fibromatosis     Pain in joint involving ankle and foot     Other specified hypothyroidism     Gastroesophageal reflux disease  without esophagitis     Irritable bowel syndrome     Other sleep apnea     Cervical high risk HPV (human papillomavirus) test positive     Restless legs syndrome (RLS)     Status post total hip replacement, right     Hip pain     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)     Cellulitis of right hip     Deep vein thrombosis (DVT) of brachial vein of right upper extremity, unspecified chronicity (H)     Peripheral edema     Low iron stores      Current Medications and Allergies:    Current Outpatient Medications   Medication     acetaminophen (TYLENOL) 325 MG tablet     albuterol (ALBUTEROL) 108 (90 BASE) MCG/ACT Inhaler     artificial saliva (BIOTENE MT) AERS spray     budesonide-formoterol (SYMBICORT) 160-4.5 MCG/ACT Inhaler     cycloSPORINE (RESTASIS) 0.05 % ophthalmic emulsion     hydrOXYzine (ATARAX) 25 MG tablet     hypromellose (ARTIFICIAL TEARS) 0.5 % SOLN ophthalmic solution     lacosamide (VIMPAT) 200 MG TABS tablet     levothyroxine (SYNTHROID/LEVOTHROID) 50 MCG tablet     linaclotide (LINZESS) 290 MCG capsule     liothyronine (CYTOMEL) 5 MCG tablet     nefazodone (SERZONE) 200 MG tablet     omeprazole (PRILOSEC) 40 MG DR capsule     potassium citrate (UROCIT-K) 10 MEQ (1080 MG) CR tablet     pramipexole (MIRAPEX) 1 MG tablet     ranitidine (ZANTAC) 150 MG tablet     senna-docusate (SENOKOT-S/PERICOLACE) 8.6-50 MG tablet     temazepam (RESTORIL) 15 MG capsule     No current facility-administered medications for this visit.      Care Coordination Start Date: 11/13/2019   Frequency of Care Coordination: monthly   Form Last Updated: 05/04/2020

## 2020-05-04 NOTE — PROGRESS NOTES
"Clinic Care Coordination Contact    Situation: Patient chart reviewed by care coordinator.    Background: Patient is enrolled in [facility] for ongoing support and coaching in accessing community resources and natural supports. CHW last outreach to patient 5/1/20.     Assessment: Patient is connected to psychology and PCP. [facility] CC completed \"housing cleaning\" goal per CHW notes. Other goals still seem appropriate at this time; will reevaluate at next check in.    Plan/Recommendations: CHW will continue with outreaches to patient at this time. CHW will inform [facility] of any concerns or changes regarding patient as needed. [facility] will chart review every 6 weeks and outreach to patient as needed. [facility] will mail 90 day complex care plan to patient. [facility] updated time component of SMART goals her chart audit review.     GOSIA Stokes   Social Work Clinic Care Coordinator   [facility] and [facility]   PH: [phone]  [email or web address]     "

## 2020-05-18 ENCOUNTER — MEDICAL CORRESPONDENCE (OUTPATIENT)
Dept: HEALTH INFORMATION MANAGEMENT | Facility: CLINIC | Age: 65
End: 2020-05-18

## 2020-06-03 DIAGNOSIS — Z48.89 AFTERCARE FOLLOWING SURGERY: Primary | ICD-10-CM

## 2020-06-03 DIAGNOSIS — M25.559 JOINT PAIN, HIP: ICD-10-CM

## 2020-06-03 DIAGNOSIS — Z47.89 ORTHOPEDIC AFTERCARE: ICD-10-CM

## 2020-06-03 DIAGNOSIS — M19.90 OSTEOARTHRITIS: ICD-10-CM

## 2020-06-03 LAB
CRP SERPL-MCNC: 4.5 MG/L (ref 0–8)
ERYTHROCYTE [DISTWIDTH] IN BLOOD BY AUTOMATED COUNT: 15.3 % (ref 10–15)
ERYTHROCYTE [SEDIMENTATION RATE] IN BLOOD BY WESTERGREN METHOD: 8 MM/H (ref 0–30)
HCT VFR BLD AUTO: 37.5 % (ref 35–47)
HGB BLD-MCNC: 12 G/DL (ref 11.7–15.7)
MCH RBC QN AUTO: 27.8 PG (ref 26.5–33)
MCHC RBC AUTO-ENTMCNC: 32 G/DL (ref 31.5–36.5)
MCV RBC AUTO: 87 FL (ref 78–100)
PLATELET # BLD AUTO: 251 10E9/L (ref 150–450)
RBC # BLD AUTO: 4.31 10E12/L (ref 3.8–5.2)
WBC # BLD AUTO: 6.7 10E9/L (ref 4–11)

## 2020-06-03 PROCEDURE — 85652 RBC SED RATE AUTOMATED: CPT | Performed by: INTERNAL MEDICINE

## 2020-06-03 PROCEDURE — 36415 COLL VENOUS BLD VENIPUNCTURE: CPT | Performed by: INTERNAL MEDICINE

## 2020-06-03 PROCEDURE — 86140 C-REACTIVE PROTEIN: CPT | Performed by: ORTHOPAEDIC SURGERY

## 2020-06-03 PROCEDURE — 85027 COMPLETE CBC AUTOMATED: CPT | Performed by: INTERNAL MEDICINE

## 2020-06-04 ENCOUNTER — PATIENT OUTREACH (OUTPATIENT)
Dept: CARE COORDINATION | Facility: CLINIC | Age: 65
End: 2020-06-04

## 2020-06-04 NOTE — PROGRESS NOTES
Clinic Care Coordination Contact  Community Health Worker Follow Up    Patient Reports:    Patient stated that she will need hip surgery on .      Patient stated that she does not want to go to a TCU to recover.  The last time she was in one a year ago, she became very ill.  Patient stated that she would like to go home.    Patient stated that her mother  a few weeks ago on May 19th.  She was not with her mother, but her sister was with their mother until the end.    Patient stated that her son  two years ago.    Patient stated that she is in a lot of pain, and it has been difficult to sleep.    Goals:   Goals Addressed as of 2020 at 12:41 PM                 Today    20       Patient Stated      1.Psychosocial (pt-stated)   On track  On track    Added 19 by Amber Nguyen LSW     Goal Statement: I will attend [employer] at least 1 time this month over the next 6 months. I will call ahead and ask for help from someone at [employer] to help me with my walker. This will help me connect with a social network and [employer] is something that is important to me.  Measure of Success: I will attend [employer] this month.   Supportive Steps to Achieve: Plan when I would like to attend [employer], call ahead and ask for help.   Barriers: Not wanting to ask for help. Getting tired easily.   Strengths: Motivated to seek a support network.   Date to Achieve By: 10/1/2020  Patient expressed understanding of goal: Yes    As of today's date 2020 goal is met at 26 - 50%.   Goal Status:  Active  As of today's date 2020 goal is met at 26 - 50%.   Goal Status:  Active   As of today's date 2020 goal is met at 26 - 50%.   Goal Status:  Active               2. Self Care/Stress Reduction (pt-stated)   On track  On track    Added 19 by Amber Nguyen LSW     Goal Statement: I will make time for myself each week to make cards to reduce my stress and increase my happiness over the next 6 months.    Measure of Success: I will make cards every week.   Supportive Steps to Achieve: Make time to create and craft cards, get supplies at craft store and call [employer] your [employer] to clean my craft room so I can utilize it again.   Barriers: Not getting help to clean my craft room.   Strengths: Motivated to get craft room clean, motivated to make cards as it makes me happy.   Date to Achieve By: 10/1/20  Patient expressed understanding of goal: Yes    As of today's date 1/20/2020 goal is met at 26 - 50%.   Goal Status:  Active  As of today's date 5/1/2020 goal is met at 26 - 50%.   Goal Status:  Active   As of today's date 6/4/2020 goal is met at 26 - 50%.   Goal Status:  Active               CHW Next Follow-up: One Month    CHW Plan: Patient is going to rest and try to sleep even during the day if needed.    Next Outreach: 7/3/20    CAMRON Hester  Clinic Care Coordination  [facility]  Phone: [phone]

## 2020-06-10 ENCOUNTER — PATIENT OUTREACH (OUTPATIENT)
Dept: CARE COORDINATION | Facility: CLINIC | Age: 65
End: 2020-06-10

## 2020-06-10 NOTE — LETTER
[place] CARE COORDINATION  [facility]  [address] [address]  Tel. [phone]  Fax [phone]    [place]iar[place] 10, 2020        Mercedes Barber  [address]  [facility] [address]    Dear Mercedes,      Here are some agencies to consider.     [facility] [employer]: [phone]  [facility]: [phone]  [employer]: [phone]   Google local home care agencies and call for staffing   [email or web address]     GOSIA Stokes   Social Work Clinic Care Coordinator   [facility], and [facility]   PH: [phone]  [email or web address]

## 2020-06-10 NOTE — PROGRESS NOTES
Clinic Care Coordination Contact    Follow Up Progress Note      Assessment: Pt expressed concerns regarding upcoming surgery and aftercare to CHW last week. Pt expressed grief due to her mother's recent passing. Requested SW follow up.     Goals addressed this encounter:   Goals Addressed                 This Visit's Progress      1.Psychosocial (pt-stated)   On track     Goal Statement: I will attend [employer] at least 1 time this month over the next 6 months. I will call ahead and ask for help from someone at [employer] to help me with my walker. This will help me connect with a social network and [employer] is something that is important to me.  Measure of Success: I will attend [employer] this month.   Supportive Steps to Achieve: Plan when I would like to attend [employer], call ahead and ask for help.   Barriers: Not wanting to ask for help. Getting tired easily.   Strengths: Motivated to seek a support network.   Date to Achieve By: 10/1/2020  Patient expressed understanding of goal: Yes    As of today's date 1/20/2020 goal is met at 26 - 50%.   Goal Status:  Active  As of today's date 5/1/2020 goal is met at 26 - 50%.   Goal Status:  Active   As of today's date 6/4/2020 goal is met at 26 - 50%.   Goal Status:  Active               2. Self Care/Stress Reduction (pt-stated)   On track     Goal Statement: I will make time for myself each week to make cards to reduce my stress and increase my happiness over the next 6 months.   Measure of Success: I will make cards every week.   Supportive Steps to Achieve: Make time to create and craft cards, get supplies at craft store and call [employer] to clean my craft room so I can utilize it again.   Barriers: Not getting help to clean my craft room.   Strengths: Motivated to get craft room clean, motivated to make cards as it makes me happy.   Date to Achieve By: 10/1/20  Patient expressed understanding of goal: Yes    As of today's date 1/20/2020 goal is met at 26 - 50%.   Goal  Status:  Active  As of today's date 5/1/2020 goal is met at 26 - 50%.   Goal Status:  Active   As of today's date 6/4/2020 goal is met at 26 - 50%.   Goal Status:  Active                Intervention/Education provided during outreach: CARLEY KAY outreach to patient per CHW request for check in.     Pt discussed her current hip pain and plan for upcoming surgery. Pt has appt on 6/24/20 with [facility] to discuss need/schedule surgery.     Pt does not want to go to TCU afterwards due to her previous two bad experiences. CARLEY KAY discussed discharge home with home care, but would most likely need additional private pay staffing and/or family/friend support. Pt seemed to understand this and has realistic expectations of the situation. CARLEY KAY discussed how pt would need with inpatient SW or CC to discharge plan after surgery.     CARLEY KAY inquired if pt has friends or family who can stay with her or stop by. She has a friend who can from time to time. She may ask her sister to come from [place] but feels bad to bother her. CARLEY KAY encouraged pt to try if she is comfortable.     Pt has previously used [facility] and uses [facility] [employer] for some things. CARLEY KAY will send a list of agency to pt via mail to review and look into for staffing.     Pt expressed grief regarding mother passing. Confirmed therapy appt tomorrow. She scheduled 1x/week for the next 4 weeks. Says this is helpful. CARLEY KAY provided support and encouragement to pt. Pt has been reading her bible, baking, making cards, and going to Wed drive up communion at [employer].     Pt has lift chair, toilet lift, canes, walker, her mom's wheelchair if needed, and reacher tool. CARLEY KAY suggested a bedside commode.     Outreach Frequency: Monthly    Plan: CARLEY KAY will follow up in two weeks after appt to discuss upcoming surgery. Patient will schedule hip surgery in coming weeks. Pt will discuss aftercare with friends and family. CARLEY KAY will send list of agencies via mail to pt to  review.     ANGELA Stokes   Social Work Clinic Care Coordinator   [facility] and [facility]   PH: [phone]  [email or web address]

## 2020-06-16 ENCOUNTER — TELEPHONE (OUTPATIENT)
Dept: INTERNAL MEDICINE | Facility: CLINIC | Age: 65
End: 2020-06-16

## 2020-06-16 NOTE — TELEPHONE ENCOUNTER
Suggested if patient is having hip problems that she might benefit from contacting her orthopedist to adjust therapy as I do not see tramadol listed as a medication.  Restrictions narcotic refills or new prescriptions for such will require an appointment.  Can see if we can get patient in for a phone visit sometime this week

## 2020-06-16 NOTE — TELEPHONE ENCOUNTER
Reason for Call:  Other Medication request    Detailed comments: The patient called and stated that she is having very intense pain in her right hip. She stated that she had surgery on her hip last summer but they messed up and she is trying to get in to see another doctor to have the surgery re-done. She is wondering if Dr. Álvarez would be willing to prescribe her something to help with the pain as she was in so much pain she couldn't even sleep last night. She stated that she is currently taking tramadol but that is doing nothing to help. If this would be possible she would like the prescription sent to the [facility] pharmacy off of [address]. She would like a call back to discuss if this would be possible or not.     Phone Number Patient can be reached at: Cell number on file:    Telephone Information:   Mobile [phone]       Best Time: Any    Can we leave a detailed message on this number? YES    Call taken on 6/16/2020 at 2:43 PM by Isabela Sánchez

## 2020-06-18 NOTE — TELEPHONE ENCOUNTER
Are you willing to open one of your chart review tomorrow morning around 10:00 for a phone visit? Please let me know. You are pretty booked and the ortho office is not treating her the best, she is in pain and she does have an appt with [facility] on Wednesday but, is having issues with walking.

## 2020-06-18 NOTE — TELEPHONE ENCOUNTER
I guess it would be okay to put you on 1 of my hold slots for a virtual phone visit in the morning

## 2020-06-19 ENCOUNTER — VIRTUAL VISIT (OUTPATIENT)
Dept: INTERNAL MEDICINE | Facility: CLINIC | Age: 65
End: 2020-06-19
Payer: MEDICARE

## 2020-06-19 VITALS — BODY MASS INDEX: 31.99 KG/M2 | WEIGHT: 192 LBS | HEIGHT: 65 IN

## 2020-06-19 DIAGNOSIS — M25.551 HIP PAIN, RIGHT: Primary | ICD-10-CM

## 2020-06-19 PROCEDURE — 99442 ZZC PHYSICIAN TELEPHONE EVALUATION 11-20 MIN: CPT | Performed by: INTERNAL MEDICINE

## 2020-06-19 RX ORDER — TRAMADOL HYDROCHLORIDE 50 MG/1
50 TABLET ORAL EVERY 6 HOURS PRN
Qty: 10 TABLET | Refills: 0 | Status: ON HOLD
Start: 2020-06-19 | End: 2020-07-29

## 2020-06-19 ASSESSMENT — MIFFLIN-ST. JEOR: SCORE: 1411.79

## 2020-06-19 NOTE — PROGRESS NOTES
"Mercedes Barber is a 65 year old female who is being evaluated via a billable telephone visit.      The patient has been notified of following:     \"This telephone visit will be conducted via a call between you and your physician/provider. We have found that certain health care needs can be provided without the need for a physical exam.  This service lets us provide the care you need with a short phone conversation.  If a prescription is necessary we can send it directly to your pharmacy.  If lab work is needed we can place an order for that and you can then stop by our lab to have the test done at a later time.    Telephone visits are billed at different rates depending on your insurance coverage. During this emergency period, for some insurers they may be billed the same as an in-person visit.  Please reach out to your insurance provider with any questions.    If during the course of the call the physician/provider feels a telephone visit is not appropriate, you will not be charged for this service.\"    Patient has given verbal consent for Telephone visit?  Yes    What phone number would you like to be contacted at? [phone]    How would you like to obtain your AVS? Mail a copy    Subjective     Mercedes Barber is a 65 year old female who presents via phone visit today for the following health issues:    HPI  Patient here to discuss pain management. Saw ASH[facility] VILLAGOMEZ and is going to follow up with them next week to discuss surgery.    See recent phone message as listed below:    The patient called and stated that she is having very intense pain in her right hip. She stated that she had surgery on her hip last summer but they messed up and she is trying to get in to see another doctor to have the surgery re-done. She is wondering if Dr. Álvarez would be willing to prescribe her something to help with the pain as she was in so much pain she couldn't even sleep last night. She stated that she is currently " taking tramadol but that is doing nothing to help. If this would be possible she would like the prescription sent to the [facility] pharmacy off of [address]. She would like a call back to discuss if this would be possible or not.     Seen 5/22/20 to [facility] and is having issues with right hip with loose screws and loose hip ball joint with [facility] and being [facility] this Wednesday, [facility] Marques at [facility].      Using Tramadol but is not taking it as scheduled.    Patient Active Problem List   Diagnosis     Menopausal syndrome (hot flashes)     Family history of breast cancer     Vulvar pain     Renal anomaly     Overactive bladder     Rectal prolapse     CARDIOVASCULAR SCREENING; LDL GOAL LESS THAN 160     Vaginal pain     Dysphagia     Confusion     Headache     Left shoulder pain     Anemia     Mild major depression (H)     Esophageal stricture     Vulvar lesion     Temporal sclerosis     Lumbago     Pain in thoracic spine     Sciatica     Pain in joint, lower leg     Plantar fascial fibromatosis     Pain in joint involving ankle and foot     Other specified hypothyroidism     Gastroesophageal reflux disease without esophagitis     Irritable bowel syndrome     Other sleep apnea     Cervical high risk HPV (human papillomavirus) test positive     Restless legs syndrome (RLS)     Status post total hip replacement, right     Hip pain     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)     Cellulitis of right hip     Deep vein thrombosis (DVT) of brachial vein of right upper extremity, unspecified chronicity (H)     Peripheral edema     Low iron stores     Past Surgical History:   Procedure Laterality Date     Anterior COLPORRHAPHY, BLADDER/VAGINA      perigee mesh     ARTHROPLASTY HIP Right 7/23/2019    Procedure: Right total hip arthroplasty;  Surgeon: Anant Mejia MD;  Location: [facility] OR     ARTHROPLASTY PATELLO-FEMORAL (KNEE) Bilateral      ARTHROPLASTY REVISION HIP Right 8/7/2019    Procedure:  Right hip revision total arthroplasty;  Surgeon: Tom Antonio MD;  Location: [facility] OR     CARPAL TUNNEL RELEASE RT/LT       DENTAL SURGERY      implant     DILATION AND CURETTAGE       DRAIN PILONIDAL CYST SIMPL       ENDOSCOPY DRUG INDUCED SLEEP N/A 11/18/2015    Procedure: ENDOSCOPY DRUG INDUCED SLEEP;  Surgeon: Park Ortiz MD;  Location: [facility] OR     ESOPHAGOSCOPY, GASTROSCOPY, DUODENOSCOPY (EGD), COMBINED  4/5/2013    Procedure: COMBINED ESOPHAGOSCOPY, GASTROSCOPY, DUODENOSCOPY (EGD), BIOPSY SINGLE OR MULTIPLE;;  Surgeon: Mundo Mehta MD;  Location:  GI     EXCISE LESION TRUNK  8/10/2012    Procedure: EXCISE LESION TRUNK;;  Surgeon: Jerald Diggs MD;  Location: [facility] OR     HYSTERECTOMY       IRRIGATION AND DEBRIDEMENT HIP, COMBINED Right 8/26/2019    Procedure: 1.  Right hip wound irrigation and debridement with excisional debridement of nonviable skin, subcutaneous tissues, and fascia. 2. Application of incisional wound VAC, 27cm length incision.;  Surgeon: Tyson Prabhakar MD;  Location: [facility] OR     L shoulder fracture       rectal prolapse repair abdominally       RELEASE PLANTAR FASCIA Right 1/20/2015    Procedure: RELEASE PLANTAR FASCIA;  Surgeon: Maicol Liu DPM;  Location: [facility]     REMOVE MESH VAGINA  8/10/2012    Procedure: REMOVE MESH VAGINA;  Excision of Vaginal Mesh Exposure, Removal of Skin Tag Left Inner Leg;  Surgeon: Jerald Diggs MD;  Location:  OR     REPAIR HAMMER TOE Right 1/20/2015    Procedure: REPAIR HAMMER TOE;  Surgeon: Maicol Liu DPM;  Location: [facility]     TONSILLECTOMY & ADENOIDECTOMY       TUBAL LIGATION         Social History     Tobacco Use     Smoking status: Never Smoker     Smokeless tobacco: Never Used   Substance Use Topics     Alcohol use: Yes     Frequency: Monthly or less     Comment: 1 glass of wine 2x/yr     Family History   Problem Relation Age of Onset     Eye Disorder Mother      Lipids Mother         has CHF      Osteoporosis Mother      Diabetes Father      Alzheimer Disease Paternal Grandmother      Hypertension Brother      Alcohol/Drug Brother      Depression Brother      Gastrointestinal Disease Brother         hx of colonic polyps     Lipids Brother      Psychotic Disorder Brother      Breast Cancer Sister      Depression Sister      Lipids Sister      Thyroid Disease Sister      Congenital Anomalies Daughter      Neurologic Disorder Daughter          Current Outpatient Medications   Medication Sig Dispense Refill     albuterol (ALBUTEROL) 108 (90 BASE) MCG/ACT Inhaler Inhale 2 puffs into the lungs 4 times daily as needed for shortness of breath / dyspnea or wheezing 1 Inhaler 0     budesonide-formoterol (SYMBICORT) 160-4.5 MCG/ACT Inhaler Inhale 2 puffs into the lungs 2 times daily       cycloSPORINE (RESTASIS) 0.05 % ophthalmic emulsion Place 1 drop into both eyes 2 times daily       hydrOXYzine (ATARAX) 25 MG tablet Take 1 tablet by mouth 3 times daily as needed  0     hypromellose (ARTIFICIAL TEARS) 0.5 % SOLN ophthalmic solution Place 1 drop into both eyes 2 times daily       lacosamide (VIMPAT) 200 MG TABS tablet Take 1 tablet (200 mg) by mouth 2 times daily 30 tablet 0     levothyroxine (SYNTHROID/LEVOTHROID) 50 MCG tablet Take 50 mcg by mouth daily       linaclotide (LINZESS) 290 MCG capsule Take 1 capsule (290 mcg) by mouth every morning (before breakfast) 10 capsule 0     liothyronine (CYTOMEL) 5 MCG tablet Take 10 mcg by mouth daily        nefazodone (SERZONE) 200 MG tablet Take 600 mg by mouth At Bedtime        omeprazole (PRILOSEC) 40 MG DR capsule Take 1 capsule (40 mg) by mouth 2 times daily 180 capsule 3     potassium citrate (UROCIT-K) 10 MEQ (1080 MG) CR tablet Take 10 mEq by mouth daily       pramipexole (MIRAPEX) 1 MG tablet Give 1 tab in AM; and 2 tabs 1 hour prior to hs       senna-docusate (SENOKOT-S/PERICOLACE) 8.6-50 MG tablet Take 1 tablet by mouth 2 times daily as needed for constipation 20  "tablet 0     temazepam (RESTORIL) 15 MG capsule Take 1 capsule (15 mg) by mouth At Bedtime 30 capsule 0     traMADol (ULTRAM) 50 MG tablet Take 1 tablet (50 mg) by mouth every 6 hours as needed for severe pain 10 tablet 0     acetaminophen (TYLENOL) 325 MG tablet Take 650 mg by mouth every 4 hours as needed for mild pain       artificial saliva (BIOTENE MT) AERS spray Take 2 sprays by mouth 3 times daily as needed for dry mouth       ranitidine (ZANTAC) 150 MG tablet TAKE 1 TABLET BY MOUTH TWICE DAILY (Patient not taking: Reported on 6/19/2020) 180 tablet 3     Allergies   Allergen Reactions     Clonopin [Clonazepam]      \"Walk funny and Mind goes funny\"     Encort [Hydrocortisone Acetate] Swelling     Feet swelled.     Encort [Hydrocortisone] Swelling     Erythromycin Diarrhea     Flagyl [Metronidazole] Nausea     Gabapentin      Over eats     Lamictal [Lamotrigine]      Oxycodone Nausea and Nausea and Vomiting     Tegretol [Carbamazepine] Nausea and Vomiting     BP Readings from Last 3 Encounters:   04/22/20 120/76   12/30/19 116/70   12/08/19 106/76    Wt Readings from Last 3 Encounters:   04/22/20 88 kg (194 lb)   12/30/19 88 kg (194 lb)   12/08/19 86.2 kg (190 lb)         Reviewed and updated as needed this visit by Provider         Review of Systems   CONSTITUTIONAL: NEGATIVE for fever, chills, change in weight  EYES: NEGATIVE for vision changes or irritation  ENT/MOUTH: NEGATIVE for ear, mouth and throat problems  RESP: NEGATIVE for significant cough or SOB  CV: NEGATIVE for chest pain, palpitations or peripheral edema  GI: NEGATIVE for nausea, abdominal pain, heartburn, or change in bowel habits  : NEGATIVE for frequency, dysuria, or hematuria  NEURO: NEGATIVE for weakness, dizziness or paresthesias  HEME: NEGATIVE for bleeding problems  PSYCHIATRIC: NEGATIVE for changes in mood or affect       Objective   Reported vitals:  Ht 1.651 m (5' 5\")   Wt 87.1 kg (192 lb)   LMP 03/11/2010   BMI 31.95 kg/m   "   alert and no distress  PSYCH: Alert and oriented times 3; coherent speech, normal   rate and volume, able to articulate logical thoughts, able   to abstract reason, no tangential thoughts, no hallucinations   or delusions  Her affect is normal  RESP: No cough, no audible wheezing, able to talk in full sentences  Remainder of exam unable to be completed due to telephone visits        Assessment/Plan:    1. Hip pain, right  Discussed with patient that she should be more aggressive using her tramadol and dose every 6 hours as prescribed.  I would continue this regimen to try to keep ahead of the pain and follow-up with her new orthopedic surgeon next Wednesday to determine timing and need for surgical repair and prior preoperative assessment.  Patient is agreeable.  - traMADol (ULTRAM) 50 MG tablet; Take 1 tablet (50 mg) by mouth every 6 hours as needed for severe pain  Dispense: 10 tablet; Refill: 0    Return in about 1 week (around 6/26/2020) for f/u Orthopedics.      Phone call duration:  14 minutes    Sykler Álvarez MD

## 2020-06-29 ENCOUNTER — PATIENT OUTREACH (OUTPATIENT)
Dept: NURSING | Facility: CLINIC | Age: 65
End: 2020-06-29
Payer: MEDICARE

## 2020-06-29 NOTE — PROGRESS NOTES
Clinic Care Coordination Contact    Follow Up Progress Note      Assessment: [facility] and pt discussed upcoming hip surgery and pt's after care goals on 6/10/20. Requested a call back after consult to discuss after care. Had appt with Dr Marques at [facility] 6/24/20.     Goals addressed this encounter:   Goals Addressed                 This Visit's Progress      1.Psychosocial (pt-stated)   50%     Goal Statement: I will attend [employer] at least 1 time this month over the next 6 months. I will call ahead and ask for help from someone at [employer] to help me with my walker. This will help me connect with a social network and [employer] is something that is important to me.  Measure of Success: I will attend [employer] this month.   Supportive Steps to Achieve: Plan when I would like to attend [employer], call ahead and ask for help.   Barriers: Not wanting to ask for help. Getting tired easily.   Strengths: Motivated to seek a support network.   Date to Achieve By: 10/1/2020  Patient expressed understanding of goal: Yes    As of today's date 1/20/2020 goal is met at 26 - 50%.   Goal Status:  Active  As of today's date 5/1/2020 goal is met at 26 - 50%.   Goal Status:  Active   As of today's date 6/4/2020 goal is met at 26 - 50%.   Goal Status:  Active               2. Self Care/Stress Reduction (pt-stated)   50%     Goal Statement: I will make time for myself each week to make cards to reduce my stress and increase my happiness over the next 6 months.   Measure of Success: I will make cards every week.   Supportive Steps to Achieve: Make time to create and craft cards, get supplies at [employer] store and call Help at your door to clean my craft room so I can utilize it again.   Barriers: Not getting help to clean my craft room.   Strengths: Motivated to get craft room clean, motivated to make cards as it makes me happy.   Date to Achieve By: 10/1/20  Patient expressed understanding of goal: Yes    As of today's date 1/20/2020 goal is  met at 26 - 50%.   Goal Status:  Active  As of today's date 5/1/2020 goal is met at 26 - 50%.   Goal Status:  Active   As of today's date 6/4/2020 goal is met at 26 - 50%.   Goal Status:  Active                Intervention/Education provided during outreach: [facility] outreach to pt as discussed to follow up on surgical consult.     Pt has several consults scheduled over the next week. She wants to be sure she has the best experience this time.     7/1/20 Dr Abel Billingsley, [facility]   7/3/20 Daya Pierre   76/20 Daya Pleitez    Pt plans to discuss after care at the appt. Her daughter will  her, hopes she can be on phone during visit to hear what  says.     Pt does not want to go to O - had a poor experience last time with hip surgery. Her mother passed away and pt can use her scooter and lift chair post surgery if needed. Pt reported she is resting as much as possible.     Outreach Frequency: Monthly    Care Coordinator has reviewed patient's Social Determinants of Health (SDoH) on this date. Upon review, changes were not made.     Plan: CHW will continue with outreaches at this time. CHW will inform [facility] CC of any concerns or changes regarding patient as needed. SW CC will chart review every 6 weeks and outreach to patient as needed. Pt will attend upcoming ortho appts and let SW CC know when her surgery is scheduled to discuss after care.     GOSIA Stokes   Social Work Clinic Care Coordinator   [facility]   PH: [phone]  [email or web address]

## 2020-07-03 ENCOUNTER — TRANSFERRED RECORDS (OUTPATIENT)
Dept: HEALTH INFORMATION MANAGEMENT | Facility: CLINIC | Age: 65
End: 2020-07-03

## 2020-07-07 ENCOUNTER — TRANSCRIBE ORDERS (OUTPATIENT)
Dept: OTHER | Age: 65
End: 2020-07-07

## 2020-07-07 ENCOUNTER — MEDICAL CORRESPONDENCE (OUTPATIENT)
Dept: HEALTH INFORMATION MANAGEMENT | Facility: CLINIC | Age: 65
End: 2020-07-07

## 2020-07-07 DIAGNOSIS — M25.551 HIP PAIN, RIGHT: Primary | ICD-10-CM

## 2020-07-08 ENCOUNTER — TELEPHONE (OUTPATIENT)
Dept: ORTHOPEDICS | Facility: CLINIC | Age: 65
End: 2020-07-08

## 2020-07-08 NOTE — TELEPHONE ENCOUNTER
talked with pt on the phone. Pt is going to call Dr. Chacko's clinic and have them send the referral over to Dr. Bermudez so that the process can start and get pt scheduled for an appointment.     Bing Solis LPN

## 2020-07-09 ENCOUNTER — PRE VISIT (OUTPATIENT)
Dept: ORTHOPEDICS | Facility: CLINIC | Age: 65
End: 2020-07-09

## 2020-07-10 NOTE — TELEPHONE ENCOUNTER
[facility]   APPOINTMENT DATE: 07/20/20   NOTES STATUS DETAILS   OFFICE NOTE from referring provider Care Everywhere    OFFICE NOTE from other specialist Care Everywhere    DISCHARGE SUMMARY from hospital Care Everywhere    DISCHARGE REPORT from the ER Care Everywhere    OPERATIVE REPORT Care Everywhere    MEDICATION LIST Care Everywhere    EMG (for Spine) N/A    IMPLANT RECORD/STICKER N/A    LABS     CBC/DIFF Received IN Epic  6/3/20   CULTURES N/A    INJECTIONS DONE IN RADIOLOGY N/A    MRI N/A    CT SCAN In process    XRAYS (IMAGES & REPORTS) In process    TUMOR     PATHOLOGY  Slides & report N/A      Action    Action Taken REQ SENT TO [facility] FOR IMAGING CDK     07/16/20   2:16 PM   Called [facility], they will push images  Soheila Farmer, CMA

## 2020-07-17 DIAGNOSIS — Z96.649 PAIN DUE TO TOTAL HIP REPLACEMENT (H): ICD-10-CM

## 2020-07-17 DIAGNOSIS — T84.84XA PAIN DUE TO TOTAL HIP REPLACEMENT (H): ICD-10-CM

## 2020-07-17 DIAGNOSIS — T84.51XA INFECTION ASSOCIATED WITH INTERNAL RIGHT HIP PROSTHESIS, INITIAL ENCOUNTER (H): Primary | ICD-10-CM

## 2020-07-20 ENCOUNTER — ANCILLARY PROCEDURE (OUTPATIENT)
Dept: CT IMAGING | Facility: CLINIC | Age: 65
End: 2020-07-20
Attending: ORTHOPAEDIC SURGERY
Payer: MEDICARE

## 2020-07-20 ENCOUNTER — OFFICE VISIT (OUTPATIENT)
Dept: ORTHOPEDICS | Facility: CLINIC | Age: 65
End: 2020-07-20
Payer: MEDICARE

## 2020-07-20 VITALS — WEIGHT: 190 LBS | BODY MASS INDEX: 31.65 KG/M2 | HEIGHT: 65 IN

## 2020-07-20 DIAGNOSIS — T84.098A MECHANICAL COMPLICATION OF PROSTHETIC HIP IMPLANT, INITIAL ENCOUNTER (H): ICD-10-CM

## 2020-07-20 DIAGNOSIS — Z96.649 MECHANICAL COMPLICATION OF PROSTHETIC HIP IMPLANT, INITIAL ENCOUNTER (H): ICD-10-CM

## 2020-07-20 DIAGNOSIS — Z96.649 PAIN DUE TO TOTAL HIP REPLACEMENT, INITIAL ENCOUNTER (H): ICD-10-CM

## 2020-07-20 DIAGNOSIS — M25.551 HIP PAIN, RIGHT: ICD-10-CM

## 2020-07-20 DIAGNOSIS — T84.84XA PAIN DUE TO TOTAL HIP REPLACEMENT, INITIAL ENCOUNTER (H): Primary | ICD-10-CM

## 2020-07-20 DIAGNOSIS — Z96.649 PAIN DUE TO TOTAL HIP REPLACEMENT, INITIAL ENCOUNTER (H): Primary | ICD-10-CM

## 2020-07-20 DIAGNOSIS — T84.84XA PAIN DUE TO TOTAL HIP REPLACEMENT, INITIAL ENCOUNTER (H): ICD-10-CM

## 2020-07-20 LAB
CRP SERPL-MCNC: 4.7 MG/L (ref 0–8)
ERYTHROCYTE [SEDIMENTATION RATE] IN BLOOD BY WESTERGREN METHOD: 8 MM/H (ref 0–30)

## 2020-07-20 PROCEDURE — 86880 COOMBS TEST DIRECT: CPT | Performed by: ORTHOPAEDIC SURGERY

## 2020-07-20 PROCEDURE — 86905 BLOOD TYPING RBC ANTIGENS: CPT | Performed by: ORTHOPAEDIC SURGERY

## 2020-07-20 PROCEDURE — 86870 RBC ANTIBODY IDENTIFICATION: CPT | Performed by: ORTHOPAEDIC SURGERY

## 2020-07-20 ASSESSMENT — MIFFLIN-ST. JEOR: SCORE: 1402.71

## 2020-07-20 NOTE — LETTER
7/20/2020         RE: Mercedes Barber  [address]  [facility] [address]        Dear Colleague,    Thank you for referring your patient, Mercedes Barber, to the [facility]. Please see a copy of my visit note below.        [facility]  Orthopaedic Surgery, Joint Replacement Consultation  by Keanu Bermudez M.D.    Mercedes Barber MRN# [id number]   Age: 65 year old YOB: 1955     Requesting physician: Skyler Booth Dr., Dr.            Assessment and Plan:   Assessment:  Failed fixation R acetabulum x2 with protrusio.     Plan:  Advise revision R acetabulum with GAP Cage and grafting.  Preop CT for planning.  Recheck inflm markers, if not elevated no need to repeat aspiration of hip.    I discussed the risks, benefits, alternatives regarding the proposed surgery with the patient who both demonstrated understanding and indicated a desire to proceed with the surgery as planned.               History of Present Illness:   65 year old female  chief complaint    Painful R SARAHI. Had post SARAHI  pelvic fx 1 week post SARAHI and then subsequent surgeries and prolonged antibx therapy without any cultures being (+) .  Rx'd empirically for cellulitis and open wound with VAC.     6/4/2020, Saw Dr. Antonio who advised revision SARAHI due to displaced cup with loss of fixation and broken cup screws and cup protrusio.      Background history:  DX:  1. DJD R hip  2. Seizure disorder  3. Chronic pain syndrome  4. IBS    TREATMENTS:  1. 2004, R TKA, L TKA  (Jamie), [facility]  2. 7/23/2019, R SARAHI (Tom Wolff) , [facility]  3. 8/7/2019, Rev R SARAHI , bone graft and rev cup fixation with cerclage femoral stem.  (Tom Antonio), [facility] . , PMNs 28%. Culture x1 negative.  4. 8/26/2019, I and D R SARAHI (Dr. Chago Prabhakar) . Incisional Wound vac placement x 6 weeks. Cultures x 2 (-).  IV Ceftriaxone x 4-6 weeks, po  "cephalexin thru 12/2019  5. 5/29/2020, R hip aspiration at [facility]. Alpha defensin (-), WBC 1755, PMNs 14%  6. 6/3/2020, ESR 8, CRP 4.5               Physical Exam:     EXAMINATION pertinent findings:   PSYCH: Pleasant, healthy-appearing, alert, oriented x3, cooperative. Normal mood and affect.  VITAL SIGNS: Height 1.651 m (5' 5\"), weight 86.2 kg (190 lb), last menstrual period 03/11/2010, not currently breastfeeding..  Reviewed nursing intake notes.   Body mass index is 31.62 kg/m .  RESP: non labored breathing   ABD: benign, soft, non-tender, no acute peritoneal findings  SKIN: grossly normal   LYMPHATIC: grossly normal, no adenopathy, no extremity edema  NEURO: grossly normal , no motor deficits  VASCULAR: satisfactory perfusion of all extremities . 2+ DP pulses bilateral  MUSCULOSKELETAL:   Knees: 0-110 deg bilaterally  Hip painful R SARAHI with any motion.  No pain L hip.    Gait antalgic on R    Wound dry , no erythema.  posteralat incisiom             Data:   All laboratory data reviewed  All imaging studies reviewed by me                  MD Terry Tavarez Family Professor  Oncology and Adult Reconstructive Surgery  Dept Orthopaedic Surgery, [facility]  [phone] office, [phone] pager  [email or web address]      DATA for DOCUMENTATION:         Past Medical History:     Patient Active Problem List   Diagnosis     Menopausal syndrome (hot flashes)     Family history of breast cancer     Vulvar pain     Renal anomaly     Overactive bladder     Rectal prolapse     CARDIOVASCULAR SCREENING; LDL GOAL LESS THAN 160     Vaginal pain     Dysphagia     Confusion     Headache     Left shoulder pain     Anemia     Mild major depression (H)     Esophageal stricture     Vulvar lesion     Temporal sclerosis     Lumbago     Pain in thoracic spine     Sciatica     Pain in joint, lower leg     Plantar fascial fibromatosis     Pain in joint involving ankle and foot     Other specified hypothyroidism     " Gastroesophageal reflux disease without esophagitis     Irritable bowel syndrome     Other sleep apnea     Cervical high risk HPV (human papillomavirus) test positive     Restless legs syndrome (RLS)     Status post total hip replacement, right     Hip pain     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     Neuropathy of lower extremity      Rectal prolapse      Restless leg syndrome      Seizure disorder (H)     25 years ago     Sleep apnea     CPAP, no longer using CPAP     Temporal sclerosis 8/27/2012       Also see scanned health assessment forms.       Past Surgical History:     Past Surgical History:   Procedure Laterality Date     Anterior COLPORRHAPHY, BLADDER/VAGINA      perigee mesh     ARTHROPLASTY HIP Right 7/23/2019    Procedure: Right total hip arthroplasty;  Surgeon: Anant Mejia MD;  Location: [facility] OR     ARTHROPLASTY PATELLO-FEMORAL (KNEE) Bilateral      ARTHROPLASTY REVISION HIP Right 8/7/2019    Procedure: Right hip revision total arthroplasty;  Surgeon: Tom Antonio MD;  Location: [facility] OR     CARPAL TUNNEL RELEASE RT/LT       DENTAL SURGERY      implant     DILATION AND CURETTAGE       DRAIN PILONIDAL CYST SIMPL       ENDOSCOPY DRUG INDUCED SLEEP N/A 11/18/2015    Procedure: ENDOSCOPY DRUG INDUCED SLEEP;  Surgeon: Park Ortiz MD;  Location:  OR      ESOPHAGOSCOPY, GASTROSCOPY, DUODENOSCOPY (EGD), COMBINED  4/5/2013    Procedure: COMBINED ESOPHAGOSCOPY, GASTROSCOPY, DUODENOSCOPY (EGD), BIOPSY SINGLE OR MULTIPLE;;  Surgeon: Mundo Mehta MD;  Location:  GI     EXCISE LESION TRUNK  8/10/2012    Procedure: EXCISE LESION TRUNK;;  Surgeon: Jerald Diggs MD;  Location: [facility] OR     HYSTERECTOMY       IRRIGATION AND DEBRIDEMENT HIP, COMBINED Right 8/26/2019    Procedure: 1.  Right hip wound irrigation and debridement with excisional debridement of nonviable skin, subcutaneous tissues, and fascia. 2. Application of incisional wound VAC, 27cm length incision.;  Surgeon: Tyson Prabhakar MD;  Location: [facility] OR     L shoulder fracture       rectal prolapse repair abdominally       RELEASE PLANTAR FASCIA Right 1/20/2015    Procedure: RELEASE PLANTAR FASCIA;  Surgeon: Maicol Liu DPM;  Location: [facility]     REMOVE MESH VAGINA  8/10/2012    Procedure: REMOVE MESH VAGINA;  Excision of Vaginal Mesh Exposure, Removal of Skin Tag Left Inner Leg;  Surgeon: Jerald Diggs MD;  Location:  OR     REPAIR HAMMER TOE Right 1/20/2015    Procedure: REPAIR HAMMER TOE;  Surgeon: Maicol Liu DPM;  Location: [facility]     TONSILLECTOMY & ADENOIDECTOMY       TUBAL LIGATION              Social History:     Social History     Socioeconomic History     Marital status:      Spouse name: Not on file     Number of children: Not on file     Years of education: Not on file     Highest education level: Not on file   Occupational History     Not on file   Social Needs     Financial resource strain: Not on file     Food insecurity     Worry: Not on file     Inability: Not on file     Transportation needs     Medical: Not on file     Non-medical: Not on file   Tobacco Use     Smoking status: Never Smoker     Smokeless tobacco: Never Used   Substance and Sexual Activity     Alcohol use: Yes     Frequency: Monthly or less     Comment: 1 glass of wine 2x/yr     Drug use: No      Sexual activity: Not Currently     Comment: vag hyst   Lifestyle     Physical activity     Days per week: Not on file     Minutes per session: Not on file     Stress: Not on file   Relationships     Social connections     Talks on phone: Not on file     Gets together: Not on file     Attends Baptism service: Not on file     Active member of club or organization: Not on file     Attends meetings of clubs or organizations: Not on file     Relationship status: Not on file     Intimate partner violence     Fear of current or ex partner: Not on file     Emotionally abused: Not on file     Physically abused: Not on file     Forced sexual activity: Not on file   Other Topics Concern     Parent/sibling w/ CABG, MI or angioplasty before 65F 55M? Not Asked   Social History Narrative     Not on file            Family History:       Family History   Problem Relation Age of Onset     Eye Disorder Mother      Lipids Mother         has CHF     Osteoporosis Mother      Diabetes Father      Alzheimer Disease Paternal Grandmother      Hypertension Brother      Alcohol/Drug Brother      Depression Brother      Gastrointestinal Disease Brother         hx of colonic polyps     Lipids Brother      Psychotic Disorder Brother      Breast Cancer Sister      Depression Sister      Lipids Sister      Thyroid Disease Sister      Congenital Anomalies Daughter      Neurologic Disorder Daughter             Medications:     Current Outpatient Medications   Medication Sig     acetaminophen (TYLENOL) 325 MG tablet Take 650 mg by mouth every 4 hours as needed for mild pain     albuterol (ALBUTEROL) 108 (90 BASE) MCG/ACT Inhaler Inhale 2 puffs into the lungs 4 times daily as needed for shortness of breath / dyspnea or wheezing     budesonide-formoterol (SYMBICORT) 160-4.5 MCG/ACT Inhaler Inhale 2 puffs into the lungs 2 times daily     cycloSPORINE (RESTASIS) 0.05 % ophthalmic emulsion Place 1 drop into both eyes 2 times daily     hydrOXYzine  (ATARAX) 25 MG tablet Take 1 tablet by mouth 3 times daily as needed     hypromellose (ARTIFICIAL TEARS) 0.5 % SOLN ophthalmic solution Place 1 drop into both eyes 2 times daily     lacosamide (VIMPAT) 200 MG TABS tablet Take 1 tablet (200 mg) by mouth 2 times daily     levothyroxine (SYNTHROID/LEVOTHROID) 50 MCG tablet Take 50 mcg by mouth daily     linaclotide (LINZESS) 290 MCG capsule Take 1 capsule (290 mcg) by mouth every morning (before breakfast)     liothyronine (CYTOMEL) 5 MCG tablet Take 10 mcg by mouth daily      nefazodone (SERZONE) 200 MG tablet Take 600 mg by mouth At Bedtime      omeprazole (PRILOSEC) 40 MG DR capsule Take 1 capsule (40 mg) by mouth 2 times daily     potassium citrate (UROCIT-K) 10 MEQ (1080 MG) CR tablet Take 10 mEq by mouth daily     pramipexole (MIRAPEX) 1 MG tablet Give 1 tab in AM; and 2 tabs 1 hour prior to hs     senna-docusate (SENOKOT-S/PERICOLACE) 8.6-50 MG tablet Take 1 tablet by mouth 2 times daily as needed for constipation     artificial saliva (BIOTENE MT) AERS spray Take 2 sprays by mouth 3 times daily as needed for dry mouth     ranitidine (ZANTAC) 150 MG tablet TAKE 1 TABLET BY MOUTH TWICE DAILY (Patient not taking: Reported on 6/19/2020)     temazepam (RESTORIL) 15 MG capsule Take 1 capsule (15 mg) by mouth At Bedtime (Patient not taking: Reported on 7/20/2020)     No current facility-administered medications for this visit.               Review of Systems:   A comprehensive 10 point review of systems (constitutional, ENT, cardiac, peripheral vascular, lymphatic, respiratory, GI, , Musculoskeletal, skin, Neurological) was performed and found to be negative except as described in this note.     See intake form completed by patient

## 2020-07-20 NOTE — NURSING NOTE
"Chief Complaint   Patient presents with     Consult     Pt. states that she is here today for Painful Right SARAHI.        65 year old  1955    Ht 1.651 m (5' 5\")   Wt 86.2 kg (190 lb)   LMP 03/11/2010   BMI 31.62 kg/m        Date/Surgery/Surgeon/Hospital:  1. 07/23/2019, Right total hip arthroplasty, Dr. Mejia  2. 08/07/2019, Right hip revision total arthroplasty, Dr. Antonio  3. 08/26/2019, 1.  Right hip wound irrigation and debridement with excisional debridement of nonviable skin, subcutaneous tissues, and fascia. 2. Application of incisional wound VAC, 27cm length incision, Dr. Prabhakar     [facility] - [place], [place] - [address] [place] [address] [id number] - [place], [place] - [address] AT [facility] [address]    Allergies   Allergen Reactions     Clonopin [Clonazepam]      \"Walk funny and Mind goes funny\"     Encort [Hydrocortisone Acetate] Swelling     Feet swelled.     Encort [Hydrocortisone] Swelling     Erythromycin Diarrhea     Flagyl [Metronidazole] Nausea     Gabapentin      Over eats     Lamictal [Lamotrigine]      Oxycodone Nausea and Nausea and Vomiting     Tegretol [Carbamazepine] Nausea and Vomiting       Current Outpatient Medications   Medication     acetaminophen (TYLENOL) 325 MG tablet     albuterol (ALBUTEROL) 108 (90 BASE) MCG/ACT Inhaler     budesonide-formoterol (SYMBICORT) 160-4.5 MCG/ACT Inhaler     cycloSPORINE (RESTASIS) 0.05 % ophthalmic emulsion     hydrOXYzine (ATARAX) 25 MG tablet     hypromellose (ARTIFICIAL TEARS) 0.5 % SOLN ophthalmic solution     lacosamide (VIMPAT) 200 MG TABS tablet     levothyroxine (SYNTHROID/LEVOTHROID) 50 MCG tablet     linaclotide (LINZESS) 290 MCG capsule     liothyronine (CYTOMEL) 5 MCG tablet     nefazodone (SERZONE) 200 MG tablet     omeprazole (PRILOSEC) 40 MG DR capsule     potassium citrate (UROCIT-K) 10 MEQ (1080 MG) CR tablet     pramipexole (MIRAPEX) 1 MG tablet     senna-docusate " (SENOKOT-S/PERICOLACE) 8.6-50 MG tablet     artificial saliva (BIOTENE MT) AERS spray     ranitidine (ZANTAC) 150 MG tablet     temazepam (RESTORIL) 15 MG capsule     No current facility-administered medications for this visit.

## 2020-07-21 LAB
IL6 SERPL-MCNC: 0.84 PG/ML
RADIOLOGIST FLAGS: NORMAL

## 2020-07-22 ENCOUNTER — TELEPHONE (OUTPATIENT)
Dept: ORTHOPEDICS | Facility: CLINIC | Age: 65
End: 2020-07-22

## 2020-07-22 LAB
ABO + RH BLD: ABNORMAL
ABO + RH BLD: ABNORMAL
BLD GP AB INVEST PLASRBC-IMP: ABNORMAL
BLD GP AB SCN SERPL QL: ABNORMAL
BLOOD BANK CMNT PATIENT-IMP: ABNORMAL
DAT POLY-SP REAG RBC QL: ABNORMAL
SPECIMEN EXP DATE BLD: ABNORMAL

## 2020-07-22 NOTE — TELEPHONE ENCOUNTER
Blood bank called in with urgent finding from patients blood tests on 7/20/20    ABO/Rh type and screen   Positive antibody screen.    Routed to care team high priority

## 2020-07-24 ENCOUNTER — PATIENT OUTREACH (OUTPATIENT)
Dept: NURSING | Facility: CLINIC | Age: 65
End: 2020-07-24
Payer: MEDICARE

## 2020-07-24 ENCOUNTER — TELEPHONE (OUTPATIENT)
Dept: ORTHOPEDICS | Facility: CLINIC | Age: 65
End: 2020-07-24

## 2020-07-24 ENCOUNTER — PREP FOR PROCEDURE (OUTPATIENT)
Dept: ORTHOPEDICS | Facility: CLINIC | Age: 65
End: 2020-07-24

## 2020-07-24 DIAGNOSIS — Z96.649 MECHANICAL COMPLICATION OF PROSTHETIC HIP IMPLANT, INITIAL ENCOUNTER (H): Primary | ICD-10-CM

## 2020-07-24 DIAGNOSIS — T84.84XA PAIN DUE TO TOTAL HIP REPLACEMENT, INITIAL ENCOUNTER (H): Primary | ICD-10-CM

## 2020-07-24 DIAGNOSIS — T84.098A MECHANICAL COMPLICATION OF PROSTHETIC HIP IMPLANT, INITIAL ENCOUNTER (H): Primary | ICD-10-CM

## 2020-07-24 DIAGNOSIS — Z96.649 PAIN DUE TO TOTAL HIP REPLACEMENT, INITIAL ENCOUNTER (H): Primary | ICD-10-CM

## 2020-07-24 NOTE — TELEPHONE ENCOUNTER
[facility] Call Center    Phone Message    May a detailed message be left on voicemail: yes     Reason for Call: Other:   Pt calling back to inquire about covid testing. No orders place. No surgery scheduled yet. Writer advised that clinic needs 24 hrs to respond.     Action Taken: Other:  ortho    Travel Screening: Not Applicable

## 2020-07-24 NOTE — TELEPHONE ENCOUNTER
12:53 PM    I called the patient. We got her down for August 11th surgery with Dr. Bermudez. COVID 19 team will be reaching out to her once the case request is signed and in the queue.    We reviewed showering, NPO status, Preop H & P. We also discussed COVID 19 testing (2-3 days prior to surgery) and Type and Screen due to positive antibody. She will do this the day before surgery.

## 2020-07-24 NOTE — RESULT ENCOUNTER NOTE
To Marlys Osorio RN (or clinic staff),    I called and spoke to pt about these results already.    Keanu Bermudez MD  7/23/2020  7:36 PM

## 2020-07-24 NOTE — TELEPHONE ENCOUNTER
[facility] Call Center    Phone Message    May a detailed message be left on voicemail: yes     Reason for Call: Other: Pt would like a call back as she is unable to make an appt for the covid test and stated she was on hold for 20 minutes and then told she can not make the appt and will have to use a web site but pt is confused as to why she can't just make the appt. Please reach out to pt to discuss     Action Taken: Message routed to:  [facility] ([facility] Ortho    Travel Screening: Not Applicable

## 2020-07-24 NOTE — PROGRESS NOTES
Clinic Care Coordination Contact  The Community Health Worker called the patient for Clinic Care Coordination outreach to follow up on goals and action steps. The patient asked to be called back next week.  The patient stated that she was not available to talk today.     CAMRON Hester  Clinic Care Coordination  [facility]: [facility], [facility]  Phone: [phone]  ___________________________________     Next Outreach: 7/29/20

## 2020-07-24 NOTE — RESULT ENCOUNTER NOTE
To Marlys Osorio RN (or clinic staff),    I reviewed the CT scan results.  I also reviewed her laboratory studies.  There is no elevation in her inflammatory markers and therefore I do not think aspiration of the hip is required.  We should proceed with her surgery without delay.  She has a fracture of her medial wall acetabulum.      Please schedule revision right total hip arthroplasty, tier 2, 2 hours, 2 night stay.    Keanu Bermudez MD  7/23/2020  7:25 PM

## 2020-07-24 NOTE — TELEPHONE ENCOUNTER
Contacted patient but got disconnected. Stated she would call back.   Offering patient surgery date.    Marlys Osorio RN on 7/24/2020 at 12:03 PM

## 2020-07-27 ENCOUNTER — HOSPITAL ENCOUNTER (INPATIENT)
Facility: CLINIC | Age: 65
Setting detail: SURGERY ADMIT
End: 2020-07-27
Attending: ORTHOPAEDIC SURGERY | Admitting: ORTHOPAEDIC SURGERY
Payer: MEDICARE

## 2020-07-27 DIAGNOSIS — Z96.649 MECHANICAL COMPLICATION OF PROSTHETIC HIP IMPLANT, INITIAL ENCOUNTER (H): ICD-10-CM

## 2020-07-27 DIAGNOSIS — T84.098A MECHANICAL COMPLICATION OF PROSTHETIC HIP IMPLANT, INITIAL ENCOUNTER (H): ICD-10-CM

## 2020-07-27 DIAGNOSIS — Z11.59 ENCOUNTER FOR SCREENING FOR OTHER VIRAL DISEASES: Primary | ICD-10-CM

## 2020-07-28 ENCOUNTER — TELEPHONE (OUTPATIENT)
Dept: ORTHOPEDICS | Facility: CLINIC | Age: 65
End: 2020-07-28

## 2020-07-28 ENCOUNTER — HOSPITAL ENCOUNTER (OUTPATIENT)
Facility: CLINIC | Age: 65
Setting detail: OBSERVATION
Discharge: HOME | End: 2020-07-30
Attending: EMERGENCY MEDICINE | Admitting: ORTHOPAEDIC SURGERY
Payer: MEDICARE

## 2020-07-28 ENCOUNTER — APPOINTMENT (OUTPATIENT)
Dept: GENERAL RADIOLOGY | Facility: CLINIC | Age: 65
End: 2020-07-28
Attending: EMERGENCY MEDICINE
Payer: MEDICARE

## 2020-07-28 DIAGNOSIS — M25.559 HIP PAIN: Primary | ICD-10-CM

## 2020-07-28 DIAGNOSIS — Z03.818 ENCNTR FOR OBS FOR SUSP EXPSR TO OTH BIOLG AGENTS RULED OUT: ICD-10-CM

## 2020-07-28 DIAGNOSIS — Z96.641 PRESENCE OF RIGHT ARTIFICIAL HIP JOINT: ICD-10-CM

## 2020-07-28 DIAGNOSIS — M25.551 HIP PAIN, RIGHT: ICD-10-CM

## 2020-07-28 DIAGNOSIS — Z96.649 MECHANICAL COMPLICATION OF PROSTHETIC HIP IMPLANT, INITIAL ENCOUNTER (H): ICD-10-CM

## 2020-07-28 DIAGNOSIS — T84.098A MECHANICAL COMPLICATION OF PROSTHETIC HIP IMPLANT, INITIAL ENCOUNTER (H): ICD-10-CM

## 2020-07-28 PROCEDURE — 25000128 H RX IP 250 OP 636: Performed by: EMERGENCY MEDICINE

## 2020-07-28 PROCEDURE — 25000132 ZZH RX MED GY IP 250 OP 250 PS 637: Mod: GY | Performed by: EMERGENCY MEDICINE

## 2020-07-28 PROCEDURE — C9803 HOPD COVID-19 SPEC COLLECT: HCPCS | Performed by: EMERGENCY MEDICINE

## 2020-07-28 PROCEDURE — 73502 X-RAY EXAM HIP UNI 2-3 VIEWS: CPT

## 2020-07-28 PROCEDURE — 99285 EMERGENCY DEPT VISIT HI MDM: CPT | Mod: 25 | Performed by: EMERGENCY MEDICINE

## 2020-07-28 PROCEDURE — 99284 EMERGENCY DEPT VISIT MOD MDM: CPT | Mod: Z6 | Performed by: EMERGENCY MEDICINE

## 2020-07-28 RX ORDER — OXYCODONE HYDROCHLORIDE 5 MG/1
5 TABLET ORAL ONCE
Status: COMPLETED | OUTPATIENT
Start: 2020-07-28 | End: 2020-07-28

## 2020-07-28 RX ORDER — ONDANSETRON 4 MG/1
4 TABLET, ORALLY DISINTEGRATING ORAL ONCE
Status: COMPLETED | OUTPATIENT
Start: 2020-07-28 | End: 2020-07-28

## 2020-07-28 RX ADMIN — OXYCODONE HYDROCHLORIDE 5 MG: 5 TABLET ORAL at 23:51

## 2020-07-28 RX ADMIN — ONDANSETRON 4 MG: 4 TABLET, ORALLY DISINTEGRATING ORAL at 23:52

## 2020-07-28 ASSESSMENT — ENCOUNTER SYMPTOMS
COLOR CHANGE: 0
FEVER: 0
NUMBNESS: 0

## 2020-07-28 ASSESSMENT — MIFFLIN-ST. JEOR: SCORE: 1398.18

## 2020-07-29 ENCOUNTER — APPOINTMENT (OUTPATIENT)
Dept: PHYSICAL THERAPY | Facility: CLINIC | Age: 65
End: 2020-07-29
Payer: MEDICARE

## 2020-07-29 DIAGNOSIS — Z11.59 ENCOUNTER FOR SCREENING FOR OTHER VIRAL DISEASES: Primary | ICD-10-CM

## 2020-07-29 PROBLEM — M25.559 HIP PAIN: Status: ACTIVE | Noted: 2019-08-06

## 2020-07-29 PROCEDURE — 97530 THERAPEUTIC ACTIVITIES: CPT | Mod: GP

## 2020-07-29 PROCEDURE — 25000132 ZZH RX MED GY IP 250 OP 250 PS 637: Mod: GY | Performed by: STUDENT IN AN ORGANIZED HEALTH CARE EDUCATION/TRAINING PROGRAM

## 2020-07-29 PROCEDURE — 25000132 ZZH RX MED GY IP 250 OP 250 PS 637: Mod: GY | Performed by: INTERNAL MEDICINE

## 2020-07-29 PROCEDURE — G0378 HOSPITAL OBSERVATION PER HR: HCPCS

## 2020-07-29 PROCEDURE — 40000894 ZZH STATISTIC OT IP EVAL DEFER

## 2020-07-29 PROCEDURE — U0003 INFECTIOUS AGENT DETECTION BY NUCLEIC ACID (DNA OR RNA); SEVERE ACUTE RESPIRATORY SYNDROME CORONAVIRUS 2 (SARS-COV-2) (CORONAVIRUS DISEASE [COVID-19]), AMPLIFIED PROBE TECHNIQUE, MAKING USE OF HIGH THROUGHPUT TECHNOLOGIES AS DESCRIBED BY CMS-2020-01-R: HCPCS | Performed by: EMERGENCY MEDICINE

## 2020-07-29 PROCEDURE — 25000128 H RX IP 250 OP 636: Performed by: STUDENT IN AN ORGANIZED HEALTH CARE EDUCATION/TRAINING PROGRAM

## 2020-07-29 PROCEDURE — 25000132 ZZH RX MED GY IP 250 OP 250 PS 637: Mod: GY | Performed by: ORTHOPAEDIC SURGERY

## 2020-07-29 PROCEDURE — 97161 PT EVAL LOW COMPLEX 20 MIN: CPT | Mod: GP

## 2020-07-29 RX ORDER — HYDROMORPHONE HYDROCHLORIDE 1 MG/ML
.3-.5 INJECTION, SOLUTION INTRAMUSCULAR; INTRAVENOUS; SUBCUTANEOUS
Status: DISCONTINUED | OUTPATIENT
Start: 2020-07-29 | End: 2020-07-30 | Stop reason: HOSPADM

## 2020-07-29 RX ORDER — POLYETHYLENE GLYCOL 3350 17 G/17G
17 POWDER, FOR SOLUTION ORAL DAILY PRN
Status: DISCONTINUED | OUTPATIENT
Start: 2020-07-29 | End: 2020-07-30 | Stop reason: HOSPADM

## 2020-07-29 RX ORDER — LIOTHYRONINE SODIUM 5 UG/1
10 TABLET ORAL DAILY
Status: DISCONTINUED | OUTPATIENT
Start: 2020-07-29 | End: 2020-07-30 | Stop reason: HOSPADM

## 2020-07-29 RX ORDER — ALBUTEROL SULFATE 90 UG/1
2 AEROSOL, METERED RESPIRATORY (INHALATION) 4 TIMES DAILY PRN
Status: DISCONTINUED | OUTPATIENT
Start: 2020-07-29 | End: 2020-07-30 | Stop reason: HOSPADM

## 2020-07-29 RX ORDER — TRAMADOL HYDROCHLORIDE 50 MG/1
50 TABLET ORAL EVERY 6 HOURS PRN
Status: ON HOLD | COMMUNITY
End: 2020-07-30

## 2020-07-29 RX ORDER — NEFAZODONE HYDROCHLORIDE 200 MG/1
600 TABLET ORAL AT BEDTIME
Status: DISCONTINUED | OUTPATIENT
Start: 2020-07-29 | End: 2020-07-30 | Stop reason: HOSPADM

## 2020-07-29 RX ORDER — LACOSAMIDE 200 MG/1
200 TABLET ORAL 2 TIMES DAILY
Status: DISCONTINUED | OUTPATIENT
Start: 2020-07-29 | End: 2020-07-30 | Stop reason: HOSPADM

## 2020-07-29 RX ORDER — AMOXICILLIN 250 MG
1-2 CAPSULE ORAL 2 TIMES DAILY
Status: DISCONTINUED | OUTPATIENT
Start: 2020-07-29 | End: 2020-07-30 | Stop reason: HOSPADM

## 2020-07-29 RX ORDER — TEMAZEPAM 30 MG
30 CAPSULE ORAL
Status: ON HOLD | COMMUNITY
End: 2020-08-07

## 2020-07-29 RX ORDER — CARBOXYMETHYLCELLULOSE SODIUM 5 MG/ML
1 SOLUTION/ DROPS OPHTHALMIC 2 TIMES DAILY
Status: DISCONTINUED | OUTPATIENT
Start: 2020-07-29 | End: 2020-07-30 | Stop reason: HOSPADM

## 2020-07-29 RX ORDER — PRAMIPEXOLE DIHYDROCHLORIDE 1 MG/1
2 TABLET ORAL AT BEDTIME
Status: DISCONTINUED | OUTPATIENT
Start: 2020-07-29 | End: 2020-07-29

## 2020-07-29 RX ORDER — NALOXONE HYDROCHLORIDE 0.4 MG/ML
.1-.4 INJECTION, SOLUTION INTRAMUSCULAR; INTRAVENOUS; SUBCUTANEOUS
Status: DISCONTINUED | OUTPATIENT
Start: 2020-07-29 | End: 2020-07-30 | Stop reason: HOSPADM

## 2020-07-29 RX ORDER — ACETAMINOPHEN 325 MG/1
975 TABLET ORAL 3 TIMES DAILY
Status: DISCONTINUED | OUTPATIENT
Start: 2020-07-29 | End: 2020-07-30 | Stop reason: HOSPADM

## 2020-07-29 RX ORDER — CYCLOSPORINE 0.5 MG/ML
1 EMULSION OPHTHALMIC 2 TIMES DAILY
Status: DISCONTINUED | OUTPATIENT
Start: 2020-07-29 | End: 2020-07-30 | Stop reason: HOSPADM

## 2020-07-29 RX ORDER — AMOXICILLIN 250 MG
1-2 CAPSULE ORAL 2 TIMES DAILY
Qty: 30 TABLET | Refills: 0 | Status: ON HOLD | OUTPATIENT
Start: 2020-07-29 | End: 2020-08-06

## 2020-07-29 RX ORDER — OXYCODONE HYDROCHLORIDE 5 MG/1
5-10 TABLET ORAL EVERY 4 HOURS PRN
Qty: 39 TABLET | Refills: 0 | Status: ON HOLD | OUTPATIENT
Start: 2020-07-29 | End: 2020-08-07

## 2020-07-29 RX ORDER — POLYETHYLENE GLYCOL 3350 17 G/17G
17 POWDER, FOR SOLUTION ORAL DAILY PRN
Qty: 7 PACKET | Refills: 0 | Status: ON HOLD | OUTPATIENT
Start: 2020-07-29 | End: 2020-08-06

## 2020-07-29 RX ORDER — PRAMIPEXOLE DIHYDROCHLORIDE 1 MG/1
3 TABLET ORAL AT BEDTIME
Status: DISCONTINUED | OUTPATIENT
Start: 2020-07-29 | End: 2020-07-30 | Stop reason: HOSPADM

## 2020-07-29 RX ORDER — LEVOTHYROXINE SODIUM 50 UG/1
50 TABLET ORAL DAILY
Status: DISCONTINUED | OUTPATIENT
Start: 2020-07-29 | End: 2020-07-30 | Stop reason: HOSPADM

## 2020-07-29 RX ORDER — BUDESONIDE AND FORMOTEROL FUMARATE DIHYDRATE 160; 4.5 UG/1; UG/1
2 AEROSOL RESPIRATORY (INHALATION) 2 TIMES DAILY
Status: DISCONTINUED | OUTPATIENT
Start: 2020-07-29 | End: 2020-07-29

## 2020-07-29 RX ORDER — HYDROXYZINE HYDROCHLORIDE 25 MG/1
25 TABLET, FILM COATED ORAL 3 TIMES DAILY PRN
Status: DISCONTINUED | OUTPATIENT
Start: 2020-07-29 | End: 2020-07-30 | Stop reason: HOSPADM

## 2020-07-29 RX ORDER — OXYCODONE HYDROCHLORIDE 5 MG/1
5-10 TABLET ORAL EVERY 4 HOURS PRN
Status: DISCONTINUED | OUTPATIENT
Start: 2020-07-29 | End: 2020-07-30 | Stop reason: HOSPADM

## 2020-07-29 RX ORDER — ONDANSETRON 2 MG/ML
4 INJECTION INTRAMUSCULAR; INTRAVENOUS EVERY 6 HOURS PRN
Status: DISCONTINUED | OUTPATIENT
Start: 2020-07-29 | End: 2020-07-30 | Stop reason: HOSPADM

## 2020-07-29 RX ORDER — ONDANSETRON 4 MG/1
4 TABLET, ORALLY DISINTEGRATING ORAL EVERY 6 HOURS PRN
Status: DISCONTINUED | OUTPATIENT
Start: 2020-07-29 | End: 2020-07-30 | Stop reason: HOSPADM

## 2020-07-29 RX ORDER — LIDOCAINE 4 G/G
1 PATCH TOPICAL
Status: DISCONTINUED | OUTPATIENT
Start: 2020-07-29 | End: 2020-07-30 | Stop reason: HOSPADM

## 2020-07-29 RX ADMIN — ONDANSETRON 4 MG: 4 TABLET, ORALLY DISINTEGRATING ORAL at 06:08

## 2020-07-29 RX ADMIN — ONDANSETRON 4 MG: 4 TABLET, ORALLY DISINTEGRATING ORAL at 21:55

## 2020-07-29 RX ADMIN — LEVOTHYROXINE SODIUM 50 MCG: 50 TABLET ORAL at 08:44

## 2020-07-29 RX ADMIN — DOCUSATE SODIUM 50 MG AND SENNOSIDES 8.6 MG 2 TABLET: 8.6; 5 TABLET, FILM COATED ORAL at 08:44

## 2020-07-29 RX ADMIN — LACOSAMIDE 200 MG: 200 TABLET, FILM COATED ORAL at 19:33

## 2020-07-29 RX ADMIN — LIDOCAINE 1 PATCH: 560 PATCH PERCUTANEOUS; TOPICAL; TRANSDERMAL at 02:20

## 2020-07-29 RX ADMIN — PRAMIPEXOLE DIHYDROCHLORIDE 3 MG: 1 TABLET ORAL at 21:55

## 2020-07-29 RX ADMIN — LACOSAMIDE 200 MG: 200 TABLET, FILM COATED ORAL at 08:44

## 2020-07-29 RX ADMIN — OXYCODONE HYDROCHLORIDE 5 MG: 5 TABLET ORAL at 06:08

## 2020-07-29 RX ADMIN — ACETAMINOPHEN 975 MG: 325 TABLET, FILM COATED ORAL at 19:33

## 2020-07-29 RX ADMIN — CYCLOSPORINE 1 DROP: 0.5 EMULSION OPHTHALMIC at 08:45

## 2020-07-29 RX ADMIN — OXYCODONE HYDROCHLORIDE 10 MG: 5 TABLET ORAL at 23:51

## 2020-07-29 RX ADMIN — LIOTHYRONINE SODIUM 10 MCG: 5 TABLET ORAL at 09:21

## 2020-07-29 RX ADMIN — LINACLOTIDE 290 MCG: 145 CAPSULE, GELATIN COATED ORAL at 09:21

## 2020-07-29 RX ADMIN — CYCLOSPORINE 1 DROP: 0.5 EMULSION OPHTHALMIC at 19:32

## 2020-07-29 RX ADMIN — OXYCODONE HYDROCHLORIDE 5 MG: 5 TABLET ORAL at 19:33

## 2020-07-29 RX ADMIN — OXYCODONE HYDROCHLORIDE 5 MG: 5 TABLET ORAL at 09:32

## 2020-07-29 RX ADMIN — ONDANSETRON 4 MG: 4 TABLET, ORALLY DISINTEGRATING ORAL at 23:56

## 2020-07-29 RX ADMIN — DOCUSATE SODIUM 50 MG AND SENNOSIDES 8.6 MG 2 TABLET: 8.6; 5 TABLET, FILM COATED ORAL at 19:33

## 2020-07-29 RX ADMIN — ACETAMINOPHEN 975 MG: 325 TABLET, FILM COATED ORAL at 14:08

## 2020-07-29 RX ADMIN — POLYETHYLENE GLYCOL 3350 17 G: 17 POWDER, FOR SOLUTION ORAL at 03:09

## 2020-07-29 RX ADMIN — OMEPRAZOLE 40 MG: 20 CAPSULE, DELAYED RELEASE ORAL at 08:44

## 2020-07-29 RX ADMIN — CARBOXYMETHYLCELLULOSE SODIUM 1 DROP: 5 SOLUTION/ DROPS OPHTHALMIC at 19:31

## 2020-07-29 RX ADMIN — OMEPRAZOLE 40 MG: 20 CAPSULE, DELAYED RELEASE ORAL at 19:33

## 2020-07-29 RX ADMIN — OXYCODONE HYDROCHLORIDE 5 MG: 5 TABLET ORAL at 15:29

## 2020-07-29 RX ADMIN — CARBOXYMETHYLCELLULOSE SODIUM 1 DROP: 5 SOLUTION/ DROPS OPHTHALMIC at 09:32

## 2020-07-29 RX ADMIN — ACETAMINOPHEN 975 MG: 325 TABLET, FILM COATED ORAL at 08:44

## 2020-07-29 RX ADMIN — FLUTICASONE FUROATE AND VILANTEROL TRIFENATATE 1 PUFF: 200; 25 POWDER RESPIRATORY (INHALATION) at 09:21

## 2020-07-29 RX ADMIN — ONDANSETRON 4 MG: 4 TABLET, ORALLY DISINTEGRATING ORAL at 15:29

## 2020-07-29 NOTE — ED NOTES
"[facility], [place]   ED Nurse to Floor Handoff     Mercedes Barber is a 65 year old female who speaks English and lives alone,  in a home  They arrived in the ED by car from home    ED Chief Complaint: Hip Pain    ED Dx;   Final diagnoses:   Hip pain, right         Needed?: No    Allergies:   Allergies   Allergen Reactions     Blood Transfusion Related (Informational Only) Other (See Comments)     Patient has a history of a clinically significant antibody against RBC antigens.  A delay in compatible RBCs may occur.     Clonopin [Clonazepam]      \"Walk funny and Mind goes funny\"     Encort [Hydrocortisone Acetate] Swelling     Feet swelled.     Encort [Hydrocortisone] Swelling     Erythromycin Diarrhea     Flagyl [Metronidazole] Nausea     Gabapentin      Over eats     Lamictal [Lamotrigine]      Oxycodone Nausea and Nausea and Vomiting     Tegretol [Carbamazepine] Nausea and Vomiting   .  Past Medical Hx:   Past Medical History:   Diagnosis Date     Arthritis     Thumb     Cervical high risk HPV (human papillomavirus) test positive 07/17/2017 07/17/17: NIL pap, + HR HPV (not 16 or 18) result.      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     Neuropathy of lower extremity      Rectal prolapse      Restless leg syndrome      Seizure disorder (H)     25 years ago     Sleep apnea     CPAP, no longer using CPAP     Temporal sclerosis 8/27/2012      Baseline Mental status: WDL  Current Mental Status changes: at basesline    Infection present or suspected this encounter: no  Sepsis suspected: No  Isolation type: No active isolations     Activity level - Baseline/Home:  Walker  Activity Level - Current:   Independent and Walker    Bariatric equipment needed?: No    In " the ED these meds were given:   Medications   naloxone (NARCAN) injection 0.1-0.4 mg (has no administration in time range)   acetaminophen (TYLENOL) tablet 975 mg (has no administration in time range)   Lidocaine (LIDOCARE) 4 % Patch 1 patch (1 patch Transdermal Patch/Med Applied 7/29/20 0220)     And   lidocaine patch in PLACE (has no administration in time range)   oxyCODONE (ROXICODONE) tablet 5-10 mg (has no administration in time range)   HYDROmorphone (PF) (DILAUDID) injection 0.3-0.5 mg (has no administration in time range)   senna-docusate (SENOKOT-S/PERICOLACE) 8.6-50 MG per tablet 1-2 tablet (has no administration in time range)   polyethylene glycol (MIRALAX) Packet 17 g (has no administration in time range)   ondansetron (ZOFRAN-ODT) ODT tab 4 mg (has no administration in time range)     Or   ondansetron (ZOFRAN) injection 4 mg (has no administration in time range)   ondansetron (ZOFRAN-ODT) ODT tab 4 mg (4 mg Oral Given 7/28/20 8552)   oxyCODONE (ROXICODONE) tablet 5 mg (5 mg Oral Given 7/28/20 6221)       Drips running?  No    Home pump  No    Current LDAs  PICC Single Lumen 08/25/19 Right (Active)   Number of days: 339       External Urinary Catheter (Active)   Number of days: 355       Wound 08/30/19 Coccyx Other (comment) blanchable redness to inner buttock (Active)   Number of days: 334       Rash 08/26/19 1848 labia patch (Active)   Number of days: 338       Incision/Surgical Site 08/07/19 Right Hip (Active)   Number of days: 357       Labs results:   Labs Ordered and Resulted from Time of ED Arrival Up to the Time of Departure from the ED   COVID-19 VIRUS (CORONAVIRUS) BY PCR   OBSERVATION GOALS   IP ASSIGN PROVIDER TEAM TO TREATMENT TEAM   VITAL SIGNS   ACTIVITY   APPLY PNEUMATIC COMPRESSION DEVICE (PCD)       Imaging Studies:   Recent Results (from the past 24 hour(s))   XR Hip Left 2-3 Views    Narrative    EXAM: XR HIP LEFT 2-3 VIEWS  LOCATION: [facility]  DATE/TIME: 7/28/2020  "10:51 PM    INDICATION: Left hip pain  COMPARISON: None.      Impression    IMPRESSION: Mild degenerative changes left hip. Otherwise negative.    XR Hip Right 2-3 Views    Narrative    EXAM: XR HIP RIGHT 2-3 VIEWS  LOCATION: [facility]  DATE/TIME: 7/28/2020 11:47 PM    INDICATION: Pain. Evaluate for displacement of hardware.    COMPARISON: 7/3/2020.      Impression    IMPRESSION: No change in alignment of the right total hip arthroplasty. Screws at the superior aspect of the acetabular component are again fractured. No acute bone fracture.        Recent vital signs:   /81   Pulse 59   Temp 98.5  F (36.9  C) (Oral)   Resp 18   Ht 1.651 m (5' 5\")   Wt 85.7 kg (189 lb)   LMP 03/11/2010   SpO2 96%   BMI 31.45 kg/m      Denice Coma Scale Score: 15 (07/28/20 2041)       Cardiac Rhythm: Normal Sinus  Pt needs tele? No  Skin/wound Issues: None    Code Status: Full Code    Pain control: fair    Nausea control: pt had none    Abnormal labs/tests/findings requiring intervention: refer to result review    Family present during ED course? No   Family Comments/Social Situation comments: n/a    Tasks needing completion: None    Tom Meeks RN  ascom -- [phone] [place] [facility]  [phone] [facility]    "

## 2020-07-29 NOTE — PLAN OF CARE
Discharge Planner PT   Patient plan for discharge: Home with assist  Current status: PT evaluation and 1 time treatment complete. Supine in bed upon arrival, agreeable to PT. Completes supine<>sit transfer with supervision. Sits EOB with good tolerance. Educated on NWB status. Completes sit<>stand transfer with supervision and use of walker, NWB maintained. Ambulated with SBA using walker, fairly steady. NWB status maintained. Educated on safety in her home, w/c based mobility, assist at home, ect. No further questions endorsed.   Barriers to return to prior living situation: NWB status  Recommendations for discharge: Home with assist and home PT/OT and home health aide  Rationale for recommendations: Did well with short boughts of ambulation. Discussed w/c baseline mobility at home.     Will hold at this time d/t observation status. If patient remains in hospital and changes to inpatient will re-initiate therapies in preparation for upcoming surgery.        Entered by: Rachael Siddiqui 07/29/2020 11:32 AM

## 2020-07-29 NOTE — PLAN OF CARE
"Vitals: BP (!) 150/92   Pulse 59   Temp 97.5  F (36.4  C) (Oral)   Resp 18   Ht 1.651 m (5' 5\")   Wt 85.7 kg (189 lb)   LMP 03/11/2010   SpO2 97%   BMI 31.45 kg/m    Lung sounds clear. Denies CP, SOB.   Output: Continent of bowel/bladder  Last BM: -   Activity: SBA w/ walker   Skin: Intact.   Pain: R hip pain - managed with PRN oxy (Given w/ zofran)   CMS/Neuro: Intact. A&O x 4; Baseline neuropathy in BLE   Dressing: None   Diet: Regular   LDA: No IV access   Equipment:    Plan/Add'l info: Able to make needs known. Call light within reach. Continue with POC.  Discharge plan: TBD         "

## 2020-07-29 NOTE — H&P
[facility]  ORTHOPAEDIC SURGERY  HISTORY AND PHYSICAL    DATE OF CONSULT: 7/29/2020 1:01 AM    REQUESTING PROVIDER: Keanu Bermudez MD, MD - [facility].    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 Bermudez. Dr. Bermudez spoke to patient over the phone yesterday and advised her to come to hospital for repeat XRs to ensure no significant implant displacement and for possible admit for pain control and coordination of TCU placement vs home care. XRs in ED negative for implant displacement. Patient was transferred from [facility] to [facility] for observation admission. She denies fever/chills.    PAST MEDICAL HISTORY:   Past Medical History:   Diagnosis Date     Arthritis     Thumb     Cervical high risk HPV (human papillomavirus) test positive 07/17/2017 07/17/17: NIL pap, + HR HPV (not 16 or 18) result.      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    PAST SURGICAL HISTORY:    Past Surgical History:   Procedure Laterality Date     Anterior COLPORRHAPHY, BLADDER/VAGINA      perigee mesh     ARTHROPLASTY HIP Right  7/23/2019    Procedure: Right total hip arthroplasty;  Surgeon: Anant Mejia MD;  Location: [facility] OR     ARTHROPLASTY PATELLO-FEMORAL (KNEE) Bilateral      ARTHROPLASTY REVISION HIP Right 8/7/2019    Procedure: Right hip revision total arthroplasty;  Surgeon: Tom Antonio MD;  Location: [facility] OR     CARPAL TUNNEL RELEASE RT/LT       DENTAL SURGERY      implant     DILATION AND CURETTAGE       DRAIN PILONIDAL CYST SIMPL       ENDOSCOPY DRUG INDUCED SLEEP N/A 11/18/2015    Procedure: ENDOSCOPY DRUG INDUCED SLEEP;  Surgeon: Park Ortiz MD;  Location: [facility] OR     ESOPHAGOSCOPY, GASTROSCOPY, DUODENOSCOPY (EGD), COMBINED  4/5/2013    Procedure: COMBINED ESOPHAGOSCOPY, GASTROSCOPY, DUODENOSCOPY (EGD), BIOPSY SINGLE OR MULTIPLE;;  Surgeon: Mundo Mehta MD;  Location:  GI     EXCISE LESION TRUNK  8/10/2012    Procedure: EXCISE LESION TRUNK;;  Surgeon: Jerald Diggs MD;  Location: [facility] OR     HYSTERECTOMY       IRRIGATION AND DEBRIDEMENT HIP, COMBINED Right 8/26/2019    Procedure: 1.  Right hip wound irrigation and debridement with excisional debridement of nonviable skin, subcutaneous tissues, and fascia. 2. Application of incisional wound VAC, 27cm length incision.;  Surgeon: Tyson Prabhakar MD;  Location: [facility] OR     L shoulder fracture       rectal prolapse repair abdominally       RELEASE PLANTAR FASCIA Right 1/20/2015    Procedure: RELEASE PLANTAR FASCIA;  Surgeon: Maicol Liu DPM;  Location: [facility]     REMOVE MESH VAGINA  8/10/2012    Procedure: REMOVE MESH VAGINA;  Excision of Vaginal Mesh Exposure, Removal of Skin Tag Left Inner Leg;  Surgeon: Jerald Diggs MD;  Location:  OR     REPAIR HAMMER TOE Right 1/20/2015    Procedure: REPAIR HAMMER TOE;  Surgeon: Maicol Liu DPM;  Location: [facility]     TONSILLECTOMY & ADENOIDECTOMY       TUBAL LIGATION         MEDICATIONS:   Prior to Admission medications    Medication Sig Last Dose Taking? Auth Provider   acetaminophen  (TYLENOL) 325 MG tablet Take 650 mg by mouth every 4 hours as needed for mild pain   Reported, Patient   albuterol (ALBUTEROL) 108 (90 BASE) MCG/ACT Inhaler Inhale 2 puffs into the lungs 4 times daily as needed for shortness of breath / dyspnea or wheezing   Skyler Álvarez MD   artificial saliva (BIOTENE MT) AERS spray Take 2 sprays by mouth 3 times daily as needed for dry mouth   Unknown, Entered By History   budesonide-formoterol (SYMBICORT) 160-4.5 MCG/ACT Inhaler Inhale 2 puffs into the lungs 2 times daily   Unknown, Entered By History   cycloSPORINE (RESTASIS) 0.05 % ophthalmic emulsion Place 1 drop into both eyes 2 times daily   Unknown, Entered By History   hydrOXYzine (ATARAX) 25 MG tablet Take 1 tablet by mouth 3 times daily as needed   Reported, Patient   hypromellose (ARTIFICIAL TEARS) 0.5 % SOLN ophthalmic solution Place 1 drop into both eyes 2 times daily   Unknown, Entered By History   lacosamide (VIMPAT) 200 MG TABS tablet Take 1 tablet (200 mg) by mouth 2 times daily   Loreto Barlow APRN CNP   levothyroxine (SYNTHROID/LEVOTHROID) 50 MCG tablet Take 50 mcg by mouth daily   Unknown, Entered By History   linaclotide (LINZESS) 290 MCG capsule Take 1 capsule (290 mcg) by mouth every morning (before breakfast)   Farzana Ro MD   liothyronine (CYTOMEL) 5 MCG tablet Take 10 mcg by mouth daily    Reported, Patient   nefazodone (SERZONE) 200 MG tablet Take 600 mg by mouth At Bedtime    Reported, Patient   omeprazole (PRILOSEC) 40 MG DR capsule Take 1 capsule (40 mg) by mouth 2 times daily   Skyler Álvarez MD   potassium citrate (UROCIT-K) 10 MEQ (1080 MG) CR tablet Take 10 mEq by mouth daily   Unknown, Entered By History   pramipexole (MIRAPEX) 1 MG tablet Give 1 tab in AM; and 2 tabs 1 hour prior to hs   Reported, Patient   ranitidine (ZANTAC) 150 MG tablet TAKE 1 TABLET BY MOUTH TWICE DAILY  Patient not taking: Reported on 6/19/2020   Skyler Álvarez MD sennakaro  (SENOKOT-S/PERICOLACE) 8.6-50 MG tablet Take 1 tablet by mouth 2 times daily as needed for constipation   Shona Gonzales PA-C   temazepam (RESTORIL) 15 MG capsule Take 1 capsule (15 mg) by mouth At Bedtime  Patient not taking: Reported on 7/20/2020   Loreto Barlow APRN CNP       ALLERGIES:   Blood transfusion related (informational only); Clonopin [clonazepam]; Encort [hydrocortisone acetate]; Encort [hydrocortisone]; Erythromycin; Flagyl [metronidazole]; Gabapentin; Lamictal [lamotrigine]; Oxycodone; and Tegretol [carbamazepine]    SOCIAL HISTORY:   Social History     Socioeconomic History     Marital status:      Spouse name: Not on file     Number of children: Not on file     Years of education: Not on file     Highest education level: Not on file   Occupational History     Not on file   Social Needs     Financial resource strain: Not on file     Food insecurity     Worry: Not on file     Inability: Not on file     Transportation needs     Medical: Not on file     Non-medical: Not on file   Tobacco Use     Smoking status: Never Smoker     Smokeless tobacco: Never Used   Substance and Sexual Activity     Alcohol use: Yes     Frequency: Monthly or less     Comment: 1 glass of wine 2x/yr     Drug use: No     Sexual activity: Not Currently     Comment: vag hyst   Lifestyle     Physical activity     Days per week: Not on file     Minutes per session: Not on file     Stress: Not on file   Relationships     Social connections     Talks on phone: Not on file     Gets together: Not on file     Attends Christian service: Not on file     Active member of club or organization: Not on file     Attends meetings of clubs or organizations: Not on file     Relationship status: Not on file     Intimate partner violence     Fear of current or ex partner: Not on file     Emotionally abused: Not on file     Physically abused: Not on file     Forced sexual activity: Not on file   Other Topics Concern      "Parent/sibling w/ CABG, MI or angioplasty before 65F 55M? Not Asked   Social History Narrative     Not on file     Living situation: Patient lives in alone in [place].  Denies tobacco use    FAMILY HISTORY:  Family History   Problem Relation Age of Onset     Eye Disorder Mother      Lipids Mother         has CHF     Osteoporosis Mother      Diabetes Father      Alzheimer Disease Paternal Grandmother      Hypertension Brother      Alcohol/Drug Brother      Depression Brother      Gastrointestinal Disease Brother         hx of colonic polyps     Lipids Brother      Psychotic Disorder Brother      Breast Cancer Sister      Depression Sister      Lipids Sister      Thyroid Disease Sister      Congenital Anomalies Daughter      Neurologic Disorder Daughter        Patient denies known family history of bleeding, clotting, or anesthesia related complications.     REVIEW OF SYSTEMS:   10-point reviews of systems was negative except as noted above in the HPI.     PHYSICAL EXAM:   Vitals:    07/28/20 2041 07/28/20 2345   BP: 124/84 117/81   Pulse:  59   Resp: 18    Temp: 98.5  F (36.9  C)    TempSrc: Oral    SpO2: 97% 96%   Weight: 85.7 kg (189 lb)    Height: 1.651 m (5' 5\")      General: Awake, alert, appropriate, following commands, NAD.  Neuro: CN II-XII grossly intact.   Skin: No rashes,  skin color normal.  HEENT: Normal.   Lungs: Breathing comfortably and nonlabored, no wheezes or stridor noted.  Heart/Cardiovascular: Regular pulse, no peripheral cyanosis.  Abdomen: Soft, non-tender, non-distended.   Right Lower Extremity: No deformity, skin intact. Healed hip (posterolateral) and anterior knee incisions. TTP over anterior and lateral hip. No significant tenderness to palpation over thigh, knee, leg, ankle/foot. Pain with any motion of hip. No pain with ROM knee/ankle. Motor intact distally TA/GSC/EHL/FHL with 5/5 strength. SILT sp/dp/tibial/saph/sural nerves. DP/PT pulses palpable, 2+, toes warm and well perfused. "   Left Lower Extremity: No deformity, skin intact. Healed midline knee incision. No significant tenderness to palpation over thigh, knee, leg, ankle/foot. No pain with ROM hip/knee/ankle. Motor intact distally TA/GSC/EHL/FHL with 5/5 strength. SILT sp/dp/tibial/saph/sural nerves. DP/PT pulses palpable, 2+, toes warm and well perfused.     LABS:  Hemoglobin   Date Value Ref Range Status   06/03/2020 12.0 11.7 - 15.7 g/dL Final   10/23/2019 11.6 (L) 11.7 - 15.7 g/dL Final     WBC   Date Value Ref Range Status   06/03/2020 6.7 4.0 - 11.0 10e9/L Final     Platelet Count   Date Value Ref Range Status   06/03/2020 251 150 - 450 10e9/L Final     INR   Date Value Ref Range Status   09/06/2019 1.08 0.86 - 1.14 Final     Creatinine   Date Value Ref Range Status   10/23/2019 0.73 0.52 - 1.04 mg/dL Final     Glucose   Date Value Ref Range Status   10/23/2019 107 (H) 70 - 99 mg/dL Final       IMAGING:  Right hip XRs (2v) personally reviewed. There is protrusio of the right total hip arthroplasty, broken acetabular screws, and proximal femur cerclage wires, all stable from previous XRs. No fractures.     IMPRESSION:   Mercedes Barber is a 65 year old female with failed right acetabular fixation x2 with protrusio here with worsening right hip pain. XRs with no evidence of displacement. As patient lives alone, we will have her work with PT/OT and evaluated by SW for possible home care vs TCU needs.    RECOMMENDATIONS:   - Orthopedics primary  - Anticoagulation/DVT Prophylaxis: Mechanical, SCDs  - X-rays/Imaging: Obtained  - Activity: Up with assist  - Weight bearing: NWB RLE  - Pain control: po with IV for breakthrough  - Diet: Regular  - Consults: PT/OT, SW to eval for role of home cares vs TCU prior to surgery  - Follow-up: 8/11 for right acetabular ORIF with Dr. Bermudez  - Disposition: TBD    Assessment and Plan to be discussed with Dr. Bermudez, Orthopaedic Surgery.     Loan Gregorio MD 7/29/2020 1:01 AM  Orthopaedic Surgery  Resident, PGY-4  Pager: [phone]

## 2020-07-29 NOTE — ED TRIAGE NOTES
Pt arrives to triage with reports of L hip pain that has been increasing over the past 2 days.  Pt has a hx of complications from a previous hip replacement and was scheduled to have a revision in august but states the pain was too bad.  CMS intact.

## 2020-07-29 NOTE — CONSULTS
Social Work Services Progress Note  SW consulted for home care. RNCC notified to assist with coordination for home care. PT confirmed that home with home care was appropriate.     BRODERICK Oliveira  [address] Ortho/Med/Surg & [facility]  Phone: [phone] Pager: [phone]

## 2020-07-29 NOTE — PROGRESS NOTES
SURGERY PLAN (PRE-OP PLAN)    Patient Position (indicated by x):    Supine     Supine with torso rolled up on a bump     Floppy lateral on torso length bean bag     Lateral decubitus, bean bag, full length   X  Lateral decubitus, Wixson hip positioner     Safety paddle side supports x 2 clamped to side rail     Lithotomy, both legs in yellow padded leg newberry     Lithotomy, single leg in yellow padded leg newberry     Prone on blanket rolls/round gel pad     Prone on Christopher (arched) frame on Abraham table     Single thigh in orange arthroscopy clamp     Beach chair semi recumbent     Spider limb positioner     Arm out on radiolucent arm table     Split drape with top bar   X  Revision SARAHI drape with plastic side bags for leg     Extremity drape     Shoulder pack drape     Laparotomy drape   X  Vieyra catheter          General Equipment Requests (indicated by x):      C-Arm with C-Armor drape     C-Arm (video capable, Weblance00 model)     O-Arm with Stealth imaging   X  Regular OR Table     Abraham XR Table     SurgiGraphic 6000 (diving board) fluoro table     Cell saver     Aidan Biopsy trephine set w/ K-wire & pituitary rongeurs   X  Small pituitary rongeur   X  Aidan's angled curettes, narrow shaft     Bone graft, kapner gouges   X  Midas Praveen Medtronic rosalio, electric motor  ON BACK UP     Phenol 5%     Chester BMAC stem cell     Vancomycin 1 gram powder     Zometa 4 mg vials     Depo Medrol steroid     Blunt Pelvic Retractor (.55, Blunt Hohmann with  slight bend)     (1) Portable hand held radiation detector machine for sentinel node biopsy and (2) Lymphazurin   X  L[place]otte Osteotomes     Trauma/Fixation Requests (indicated by x):    Large Frag AO plates     Small Frag AO plates     Locking periarticular plates - AO     Locking periarticular prox humerus plate - Nathan/Nephew     Pelvic recon plates (curved & straight), 3.5 & 4.5 mm     Caroline weston-prosthetic fracture plate     DHS hip screw     Laie Gamma nail      AO, DCS 95 degree plate/screw     AO, 95 degree condylar blade plate     AO, 3.0, 3.5, 4.0, 4.5 mm Cannulated screws     AO, 6.5, 7.0, 7.3 mm Cannulated screws- Stainless Steel     AO, 6.5 mm Cannulated screws- Titanium     Caroline cerclage cable plates     AO IM nails     AO Large Ex Fix     AO Small Ex Fix   X  Screw removal set for broken screws     Large fx reduction forcepts - AO     Bone clamps - Large (Abraham, Esperanza, Trudy)     Bone clamps - Medium (Abraham)     SARAHI Requests (indicated by x):  X  White handle off set drift   X  Acetabular removal set ernesto for PE liner and screwdriver     Biomet RB cup, 28+32 heads     Biomet Bipolar cup     Biomet Constrained Freedom liner with heads     Nathan Nephew BHR resurfacing SARAHI with Clarity navigation unit (need 2 weeks advance notice)     Venuefoxuy S-ROM long stems     Ernesto Restoration modular long stem     Biomet MICKI long stems, both interlocked and splined stems     Biomet Regenerex TM cup + augments     Ernesto Tritanium TM cup +  augments   X  Antioch GAP II acet cage + cemented poly cup   X   Antioch Acetabular impactor set + medial wall mesh   X  Antioch 32/36 heads     IM flexible reamers + guidewire, < 9 mm     IM flexible reamers + guidewire, > 9 mm     Allograft: femoral head     Allograft: distal femur     Allograft: proximal tibia     Bone mill   X  Allograft cancellous chips 4 x 30cc in the room     Allograft cancellous crushed     Wolf Chen Troch Claw + cable, insertion instruments     Wolf Chen beaded cerclage cable, green cable  x2 , insertion instruments     Caroline CTR Troch cable plate     AO pelvic instruments and clamps (angled) Blunt Hohmann & angled Abreu, large bone reduction forceps     Implant Extraction/Cement spacer tools (indicated by x):     Critical Media Ultrasound cement removal     Kellie Morton AC-1 (1mm tiny dissecting tip with wire guide gold handpiece)     Flexible osteotomes (both black and wood handled with new  "replacement blades)     Universal stem extractor     \"L\" hook & hoop style stem extractor stem extractor (DePuy AML stem extractor)     Caroline Explant cup removal osteotomes     Suction tip with debris trap (clear plastic disposable)     Biomet offset implant impactor (white handle in Bermudez's cart)     Nash IM canal long shaft cement removal gouges, pituitary ronguers)     Biomet Spacer One hip stem spacer molds with trials     Biomet Articulating knee spacer molds with trials     Biomet [place] Blue gentamycin PMMA and Vanco 1 g vial/package PMMA     Gilbert Rods, large + bryn cutter         Specimens and cultures (indicated by x):   X  Tissue cultures, aerobic and anaerobic without gram stain     Frozen section     pathology specimens - fresh     pathology specimens - formalin     Summary:     Mercedes Barber is a 65 year old female with 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    Abel Haney MD  N Arthroplasty Fellow    "

## 2020-07-29 NOTE — ED PROVIDER NOTES
[place] EMERGENCY DEPARTMENT ([facility])  July 28, 2020  History     Chief Complaint   Patient presents with     Hip Pain     The history is provided by the patient and medical records.     Mercedes Barber is a 65 year old female with a history of s/p right total hip arthroplasty (7/23/2019) with revision (8/7/2019) and I&D (8/26/2019), DVT, seizure disorder who presents to the Emergency Department with hip pain. Patient reports right hip replacement last summer. She states she began having pain again in June that has continually increased. Patient reports pain has worsened again over the past few days. She states she is having a hard time taking care of herself at home due to the pain and having difficulty using a wheelchair.  No new trauma or injury.  Pain is located over the lateral hip does radiate down into the groin some.  Patient was recommended to use a wheelchair 3-4 weeks ago. Patient reports moving worsens her pain. Pain is now referring to the knee. She states she takes Norco at home. Patient denies new numbness or tingling, fever, redness, or swelling. Patient had recent CT scan showing hardware failure.    CT PELVIS BONE WO CONTRAST, 7/20/2020   Impression:   1. Evidence of right total hip arthroplasty hardware failure with  abnormal angulation of the acetabular component due to very subtle  loosening at the bone/acetabular component interface in addition to  fracture of the anteriormost acetabular screw. These findings are new  from CT 8/25/2019.  2. Stable appearance of reactive heterotopic bone formation medial to  the acetabulum secondary to medial protrusio.  3. No acute osseous fracture.  4. Lumbar spondylosis with complete disc height loss at L4-5.     PAST MEDICAL HISTORY:   Past Medical History:   Diagnosis Date     Arthritis     Thumb     Cervical high risk HPV (human papillomavirus) test positive 07/17/2017 07/17/17: NIL pap, + HR HPV (not 16 or 18) result.      Cervical  pain 9/15/2016     Constipation 8/27/2012     Diarrhea 4/30/2009     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     Neuropathy of lower extremity      Rectal prolapse      Restless leg syndrome      Seizure disorder (H)     25 years ago     Sleep apnea     CPAP, no longer using CPAP     Temporal sclerosis 8/27/2012       PAST SURGICAL HISTORY:   Past Surgical History:   Procedure Laterality Date     Anterior COLPORRHAPHY, BLADDER/VAGINA      perigee mesh     ARTHROPLASTY HIP Right 7/23/2019    Procedure: Right total hip arthroplasty;  Surgeon: Anant Mejia MD;  Location: [facility] OR     ARTHROPLASTY PATELLO-FEMORAL (KNEE) Bilateral      ARTHROPLASTY REVISION HIP Right 8/7/2019    Procedure: Right hip revision total arthroplasty;  Surgeon: Tom Antonio MD;  Location: [facility] OR     CARPAL TUNNEL RELEASE RT/LT       DENTAL SURGERY      implant     DILATION AND CURETTAGE       DRAIN PILONIDAL CYST SIMPL       ENDOSCOPY DRUG INDUCED SLEEP N/A 11/18/2015    Procedure: ENDOSCOPY DRUG INDUCED SLEEP;  Surgeon: Park Ortiz MD;  Location: [facility] OR     ESOPHAGOSCOPY, GASTROSCOPY, DUODENOSCOPY (EGD), COMBINED  4/5/2013    Procedure: COMBINED ESOPHAGOSCOPY, GASTROSCOPY, DUODENOSCOPY (EGD), BIOPSY SINGLE OR MULTIPLE;;  Surgeon: Mundo Mehta MD;  Location:  GI     EXCISE LESION TRUNK  8/10/2012    Procedure: EXCISE LESION TRUNK;;  Surgeon: Jerald Diggs MD;  Location: [facility] OR     HYSTERECTOMY       IRRIGATION AND DEBRIDEMENT HIP, COMBINED Right 8/26/2019    Procedure: 1.  Right hip wound irrigation and debridement with excisional debridement of nonviable skin, subcutaneous tissues, and fascia. 2. Application of incisional wound VAC, 27cm length incision.;  Surgeon: Tyson Prabhakar MD;  Location: [facility] OR     L shoulder fracture       rectal prolapse repair abdominally        RELEASE PLANTAR FASCIA Right 1/20/2015    Procedure: RELEASE PLANTAR FASCIA;  Surgeon: Maicol Liu DPM;  Location: [facility]     REMOVE MESH VAGINA  8/10/2012    Procedure: REMOVE MESH VAGINA;  Excision of Vaginal Mesh Exposure, Removal of Skin Tag Left Inner Leg;  Surgeon: Jerald Diggs MD;  Location: [facility] OR     REPAIR HAMMER TOE Right 1/20/2015    Procedure: REPAIR HAMMER TOE;  Surgeon: Maicol Liu DPM;  Location: [facility]     TONSILLECTOMY & ADENOIDECTOMY       TUBAL LIGATION         Past medical history, past surgical history, medications, and allergies were reviewed with the patient. Additional pertinent items: None    FAMILY HISTORY:   Family History   Problem Relation Age of Onset     Eye Disorder Mother      Lipids Mother         has CHF     Osteoporosis Mother      Diabetes Father      Alzheimer Disease Paternal Grandmother      Hypertension Brother      Alcohol/Drug Brother      Depression Brother      Gastrointestinal Disease Brother         hx of colonic polyps     Lipids Brother      Psychotic Disorder Brother      Breast Cancer Sister      Depression Sister      Lipids Sister      Thyroid Disease Sister      Congenital Anomalies Daughter      Neurologic Disorder Daughter        SOCIAL HISTORY:   Social History     Tobacco Use     Smoking status: Never Smoker     Smokeless tobacco: Never Used   Substance Use Topics     Alcohol use: Yes     Frequency: Monthly or less     Comment: 1 glass of wine 2x/yr     Social history was reviewed with the patient. Additional pertinent items: None      Current Discharge Medication List      START taking these medications    Details   oxyCODONE (ROXICODONE) 5 MG tablet Take 1-2 tablets (5-10 mg) by mouth every 4 hours as needed for moderate to severe pain  Qty: 39 tablet, Refills: 0    Associated Diagnoses: Hip pain      polyethylene glycol (MIRALAX) 17 g packet Take 17 g by mouth daily as needed for constipation  Qty: 7 packet, Refills: 0    Associated  Diagnoses: Hip pain      senna-docusate (SENOKOT-S/PERICOLACE) 8.6-50 MG tablet Take 1-2 tablets by mouth 2 times daily  Qty: 30 tablet, Refills: 0    Associated Diagnoses: Hip pain         CONTINUE these medications which have NOT CHANGED    Details   albuterol (ALBUTEROL) 108 (90 BASE) MCG/ACT Inhaler Inhale 2 puffs into the lungs 4 times daily as needed for shortness of breath / dyspnea or wheezing  Qty: 1 Inhaler, Refills: 0    Associated Diagnoses: Cough      budesonide-formoterol (SYMBICORT) 160-4.5 MCG/ACT Inhaler Inhale 2 puffs into the lungs 2 times daily      cycloSPORINE (RESTASIS) 0.05 % ophthalmic emulsion Place 1 drop into both eyes 2 times daily      hypromellose (ARTIFICIAL TEARS) 0.5 % SOLN ophthalmic solution Place 1 drop into both eyes 2 times daily      lacosamide (VIMPAT) 200 MG TABS tablet Take 1 tablet (200 mg) by mouth 2 times daily  Qty: 30 tablet, Refills: 0    Associated Diagnoses: Seizure disorder (H)      levothyroxine (SYNTHROID/LEVOTHROID) 50 MCG tablet Take 50 mcg by mouth daily      linaclotide (LINZESS) 290 MCG capsule Take 1 capsule (290 mcg) by mouth every morning (before breakfast)  Qty: 10 capsule, Refills: 0    Associated Diagnoses: Other irritable bowel syndrome      liothyronine (CYTOMEL) 5 MCG tablet Take 5 mcg by mouth daily       nefazodone (SERZONE) 200 MG tablet Take 600 mg by mouth At Bedtime       omeprazole (PRILOSEC) 40 MG DR capsule Take 1 capsule (40 mg) by mouth 2 times daily  Qty: 180 capsule, Refills: 3    Associated Diagnoses: Gastroesophageal reflux disease without esophagitis; Esophageal stricture      potassium citrate (UROCIT-K) 10 MEQ (1080 MG) CR tablet Take 10 mEq by mouth daily      pramipexole (MIRAPEX) 1 MG tablet Give 1 tablet by mouth in the morning and 2 tablets by mouth 3 hours prior to bedtime      temazepam (RESTORIL) 30 MG capsule Take 30 mg by mouth nightly as needed for sleep      traMADol (ULTRAM) 50 MG tablet Take 50 mg by mouth every 6  "hours as needed for severe pain         STOP taking these medications       artificial saliva (BIOTENE MT) AERS spray Comments:   Reason for Stopping:         hydrOXYzine (ATARAX) 50 MG tablet Comments:   Reason for Stopping:                  Allergies   Allergen Reactions     Blood Transfusion Related (Informational Only) Other (See Comments)     Patient has a history of a clinically significant antibody against RBC antigens.  A delay in compatible RBCs may occur.     Clonopin [Clonazepam]      \"Walk funny and Mind goes funny\"     Encort [Hydrocortisone Acetate] Swelling     Feet swelled.     Encort [Hydrocortisone] Swelling     Erythromycin Diarrhea     Flagyl [Metronidazole] Nausea     Gabapentin      Over eats     Lamictal [Lamotrigine]      Oxycodone Nausea and Nausea and Vomiting     Tegretol [Carbamazepine] Nausea and Vomiting        Review of Systems   Constitutional: Negative for fever.   Musculoskeletal:        Positive for hip pain.   Skin: Negative for color change.   Neurological: Negative for numbness.   All other systems reviewed and are negative.    A complete review of systems was performed with pertinent positives and negatives noted in the HPI, and all other systems negative.    Physical Exam   BP: 124/84  Pulse: 59  Heart Rate: 93  Temp: 98.5  F (36.9  C)  Resp: 18  Height: 165.1 cm (5' 5\")  Weight: 85.7 kg (189 lb)  SpO2: 97 %      Physical Exam   General: awake, alert, NAD  Head: normal cephalic  HEENT: pupils equal, conjugate gaze in tact  Neck: Supple  CV: regular rate, strong peripheral pulses  Lungs: Breathing comfortably on room air  EXT: Well-healed surgical scar noted over the right hip.  Patient does have tenderness to palpation over the greater trochanter and some along the joint line.  No redness, swelling, warmth.  Patient has normal flexion-extension internal and external rotation of the hip though this does elicit some pain.  Extremities warm, well-perfused.  Strong peripheral " pulses.  No calf swelling or tenderness.  Neuro: awake, answers questions appropriately. No focal deficits noted         ED Course   11:17 PM  The patient was seen and examined by Jordan Tierney MD in Room ED10.        Procedures             Results for orders placed or performed during the hospital encounter of 07/28/20 (from the past 24 hour(s))   XR Hip Left 2-3 Views    Narrative    EXAM: XR HIP LEFT 2-3 VIEWS  LOCATION: [facility], [place]  DATE/TIME: 7/28/2020 10:51 PM    INDICATION: Left hip pain  COMPARISON: None.      Impression    IMPRESSION: Mild degenerative changes left hip. Otherwise negative.    XR Hip Right 2-3 Views    Narrative    EXAM: XR HIP RIGHT 2-3 VIEWS  LOCATION: [facility], [place]  DATE/TIME: 7/28/2020 11:47 PM    INDICATION: Pain. Evaluate for displacement of hardware.    COMPARISON: 7/3/2020.      Impression    IMPRESSION: No change in alignment of the right total hip arthroplasty. Screws at the superior aspect of the acetabular component are again fractured. No acute bone fracture.    Social Work IP Consult    Esther Schneider LGSW     7/29/2020 10:44 AM  Social Work Services Progress Note  SW consulted for home care. RNCC notified to assist with   coordination for home care. PT confirmed that home with home care   was appropriate.     BRODERICK Oliveira  Unit 5/Unit 8 Ortho/Med/Surg & [facility] Adult ED  Phone: [phone] Pager: [phone]           *Note: Due to a large number of results and/or encounters for the requested time period, some results have not been displayed. A complete set of results can be found in Results Review.     Medications   naloxone (NARCAN) injection 0.1-0.4 mg (has no administration in time range)   acetaminophen (TYLENOL) tablet 975 mg (975 mg Oral Given 7/29/20 1408)   Lidocaine (LIDOCARE) 4 % Patch 1 patch (1 patch Transdermal Patch/Med Removed 7/29/20 1408)     And   lidocaine patch in PLACE ( Transdermal Patch in Place 7/29/20  0926)   oxyCODONE (ROXICODONE) tablet 5-10 mg (5 mg Oral Given 7/29/20 1529)   HYDROmorphone (PF) (DILAUDID) injection 0.3-0.5 mg (has no administration in time range)   senna-docusate (SENOKOT-S/PERICOLACE) 8.6-50 MG per tablet 1-2 tablet (2 tablets Oral Given 7/29/20 0844)   polyethylene glycol (MIRALAX) Packet 17 g (17 g Oral Given 7/29/20 0309)   ondansetron (ZOFRAN-ODT) ODT tab 4 mg (4 mg Oral Given 7/29/20 1529)     Or   ondansetron (ZOFRAN) injection 4 mg ( Intravenous See Alternative 7/29/20 1529)   albuterol (PROAIR HFA/PROVENTIL HFA/VENTOLIN HFA) 108 (90 Base) MCG/ACT inhaler 2 puff (has no administration in time range)   artificial saliva (BIOTENE MT) spray 2 spray (has no administration in time range)   cycloSPORINE (RESTASIS) 0.05 % ophthalmic emulsion 1 drop (1 drop Both Eyes Given 7/29/20 0845)   hydrOXYzine (ATARAX) tablet 25 mg (has no administration in time range)   lacosamide (VIMPAT) tablet 200 mg (200 mg Oral Given 7/29/20 0844)   levothyroxine (SYNTHROID/LEVOTHROID) tablet 50 mcg (50 mcg Oral Given 7/29/20 0844)   linaclotide (LINZESS) capsule 290 mcg (290 mcg Oral Given 7/29/20 0921)   liothyronine (CYTOMEL) tablet 10 mcg (10 mcg Oral Given 7/29/20 0921)   nefazodone (SERZONE) tablet 600 mg (has no administration in time range)   omeprazole (priLOSEC) CR capsule 40 mg (40 mg Oral Given 7/29/20 0844)   fluticasone-vilanterol (BREO ELLIPTA) 200-25 MCG/INH inhaler 1 puff (1 puff Inhalation Given 7/29/20 0921)   carboxymethylcellulose PF (REFRESH PLUS) 0.5 % ophthalmic solution 1 drop (1 drop Both Eyes Given 7/29/20 0932)   ondansetron (ZOFRAN-ODT) ODT tab 4 mg (4 mg Oral Given 7/28/20 2352)   oxyCODONE (ROXICODONE) tablet 5 mg (5 mg Oral Given 7/28/20 2351)             Assessments & Plan (with Medical Decision Making)   She is well-appearing, nontoxic.  Stable vital signs.  Denies systemic symptoms.  Discussed the case with orthopedics they recommended repeat imaging, if no acute displacement  plan would be to admit for placement as she awaits surgery.  Family is not convinced that they would want her to be placed in a TCU but would be open to PT consults, social work and trying to facilitate more in-home help.    She got 5 oxycodone and Zofran for pain control.    X-rays showed no change in the alignment of the right hip arthroplasty.  Discussed case with orthopedics plan is to admit to the orthopedic service for coordination of home care versus TCO and expediting definitive surgery.  Patient been vitally stable while in the emergency department and is in agreement with the plan.    I have reviewed the nursing notes.    I have reviewed the findings, diagnosis, plan and need for follow up with the patient.    Current Discharge Medication List      START taking these medications    Details   oxyCODONE (ROXICODONE) 5 MG tablet Take 1-2 tablets (5-10 mg) by mouth every 4 hours as needed for moderate to severe pain  Qty: 39 tablet, Refills: 0    Associated Diagnoses: Hip pain      polyethylene glycol (MIRALAX) 17 g packet Take 17 g by mouth daily as needed for constipation  Qty: 7 packet, Refills: 0    Associated Diagnoses: Hip pain      senna-docusate (SENOKOT-S/PERICOLACE) 8.6-50 MG tablet Take 1-2 tablets by mouth 2 times daily  Qty: 30 tablet, Refills: 0    Associated Diagnoses: Hip pain             Final diagnoses:   Hip pain, right     IRachel, am serving as a trained medical scribe to document services personally performed by Jordan Tierney MD, based on the provider's statements to me.      IJordan MD, was physically present and have reviewed and verified the accuracy of this note documented by Rachel Vazquez.    7/28/2020   [facility], [place], EMERGENCY DEPARTMENT     Jordan Tierney MD  07/29/20 9514

## 2020-07-29 NOTE — PROGRESS NOTES
07/29/20 1104   Quick Adds   Type of Visit Initial PT Evaluation       Present no   Language English   Living Environment   Lives With alone   Living Arrangements condominium   Home Accessibility no concerns   Transportation Anticipated family or friend will provide   Living Environment Comment Patient states can have her daughter and friends assist as needed   Self-Care   Usual Activity Tolerance good   Current Activity Tolerance fair   Regular Exercise No   Equipment Currently Used at Home walker, rolling  (4WW)   Functional Level Prior   Ambulation 1-->assistive equipment   Transferring 1-->assistive equipment   Toileting 0-->independent   Bathing 0-->independent   Communication 0-->understands/communicates without difficulty   Swallowing 0-->swallows foods/liquids without difficulty   Cognition 0 - no cognition issues reported   Fall history within last six months no   Which of the above functional risks had a recent onset or change? 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 Bermudez.    Precautions/Limitations fall precautions   Weight-Bearing Status - LUE full weight-bearing   Weight-Bearing Status - RUE full weight-bearing   Weight-Bearing Status - LLE full weight-bearing   Weight-Bearing Status - RLE nonweight-bearing   General Observations Supine in bed upon arrival, agreeable to PT   General Info Comments IV    Cognitive Status Examination   Orientation orientation to person, place and time   Level of Consciousness alert   Follows Commands and Answers Questions 100% of the time;able to follow multistep instructions   Personal Safety and Judgment intact   Memory intact   Pain Assessment   Patient Currently in Pain Yes, see Vital Sign flowsheet   Integumentary/Edema   Integumentary/Edema no deficits were identifed   Posture    Posture Forward head position;Protracted shoulders;Kyphosis   Posture Comments Mild to moderate sitting EOB and standing   Range of Motion (ROM)   ROM Comment Did not formally assess, demonstrates functional ROM with mobility   Strength   Strength Comments Did not formally assess, demonstrates functional strength with mobility   Bed Mobility   Bed Mobility Comments Completes supine<>sit transfer independently   Transfer Skills   Transfer Comments Completes sit<>stand transfer with supervision with use of walker, NWB maintained   Gait   Gait Comments Tolerates short distance ambulation with SBA using walker, NWB maintained   Balance   Balance Comments Independent sitting balance, SBA for standing balance with UE support from walker   Sensory Examination   Sensory Perception no deficits were identified   General Therapy Interventions   Planned Therapy Interventions bed mobility training;gait training;strengthening;transfer training;risk factor education;home program guidelines;progressive activity/exercise   Clinical Impression   Criteria for Skilled Therapeutic Intervention yes, treatment indicated   PT Diagnosis impaired tolerance for functional mobility   Influenced by the following impairments increased pain, precautions   Functional limitations due to impairments impaired tolerance for functional mobility   Clinical Presentation Stable/Uncomplicated   Clinical Presentation Rationale Mercedes Barber is a 65 year old female with 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 Bermudez. Mobilizing well   Clinical Decision Making (Complexity) Low complexity   Therapy Frequency   (1 time eval and treat)   Predicted Duration of Therapy Intervention (days/wks) 1 day   Anticipated Equipment Needs at Discharge front wheeled walker  (Says she will borrow FWW)   Anticipated Discharge Disposition Home with Assist;Home with Home Therapy   Risk & Benefits of therapy have been explained Yes   Patient, Family & other staff in agreement with plan of care Yes   [facility] AM-PAC TM \"6 Clicks\"   2016, Trustees of [facility], under license to [employer].  All rights reserved.   6 Clicks Short Forms Basic Mobility Inpatient Short Form   [facility] AM-PAC  \"6 Clicks\" V.2 Basic Mobility Inpatient Short Form   1. Turning from your back to your side while in a flat bed without using bedrails? 4 - None   2. Moving from lying on your back to sitting on the side of a flat bed without using bedrails? 4 - None   3. Moving to and from a bed to a chair (including a wheelchair)? 4 - None   4. Standing up from a chair using your arms (e.g., wheelchair, or bedside chair)? 4 - None   5. To walk in hospital room? 4 - None   6. Climbing 3-5 steps with a railing? 3 - A Little   Basic Mobility Raw Score (Score out of 24.Lower scores equate to lower levels of function) 23   Total Evaluation Time   Total Evaluation Time (Minutes) 10     "

## 2020-07-29 NOTE — TELEPHONE ENCOUNTER
[facility]  Orthopaedic Surgery, Joint Replacement Consultation  by Keanu Bermudez M.D.    Mercedes Barber MRN# [id number]   Age: 65 year old YOB: 1955     Requesting physician: Skyler Booth Dr., Dr.     Background history:  DX:  1. DJD R hip  2. Seizure disorder  3. Chronic pain syndrome  4. IBS    TREATMENTS:  1. 2004, R TKA, L TKA  (Jamie), [facility]  2. 7/23/2019, R SARAHI (Tom Wolff) , [facility]  3. 8/7/2019, Rev R SARAHI , bone graft and rev cup fixation with cerclage femoral stem.  (Tom Antonio), [facility] . , PMNs 28%. Culture x1 negative.  4. 8/26/2019, I and D R SARAHI (Dr. Chago Prabhakar) . Incisional Wound vac placement x 6 weeks. Cultures x 2 (-).  IV Ceftriaxone x 4-6 weeks, po cephalexin thru 12/2019  5. 5/29/2020, R hip aspiration at [facility]. Alpha defensin (-), WBC 1755, PMNs 14%  6. 6/3/2020, ESR 8, CRP 4.5      Pt called in stating she's having more hip pain.  I called her and advised coming to [facility] for radiographs of pelvis and also possible admission for either (1) pain control and nursing care needs given her severe limitation in mobility (bedrest NWB on   R hip with bathroom usage only) or (2) if her broken pelvis implant has displaced.  Her surgical date is 8/11/2020.    MD Terry Tavarez Family Professor  Oncology and Adult Reconstructive Surgery  Dept Orthopaedic Surgery, [facility]  [phone] office, [phone] pager  [email or web address]

## 2020-07-29 NOTE — PROGRESS NOTES
Care Coordinator Progress Note    Admission Date/Time:  7/28/2020  Attending MD:  Keanu Bermudez MD    Data  Chart reviewed, discussed with interdisciplinary team.   Patient was admitted for:    Hip pain, right  Hip pain  Mechanical complication of prosthetic hip implant, initial encounter (H).    Concerns with insurance coverage for discharge needs: None.  Current Living Situation: Patient lives alone.  Support System: Involved  Services Involved: Home Care  Transportation at Discharge: Family or friend will provide  Transportation to Medical Appointments:   - Not applicable  Barriers to Discharge: NA    Coordination of Care and Referrals: Provided patient/family with options for Home Care.        Assessment  Met with patient at bedside to discuss discharge planning. A referral was sent to [facility] for skilled nursing, physical therapy and HHA. No further discharge concerns at this time. RNCC available as needed.    [facility]  Phone  [phone]  Fax  [phone]      Plan  Anticipated Discharge Date:  7/30/2020  Anticipated Discharge Plan:  Home    Gini Rousseau RN, BSN  Care Coordinator, 8A  Phone [phone]  Pager [phone]

## 2020-07-29 NOTE — PLAN OF CARE
Pt is on OBSERVATION Status:    08:00AM  Pt has pain in the right hip and given 5mg Oxycodone, and pain is manageable.  10:00AM  Pt ate breakfast and has a good appetite.  12:00PM  Pt's pain is manageable and declined pain interventions at this time.  14:00PM  Pt has pain in the right hip and given Tylenol, Oxycodone, and pain is manageable.    Pt A/O X 4. Afebrile. VSS. Lungs-Clear bilaterally with both anterior and posterior. Bowels-Hyperactive in all four quadrants. Voids spontaneously without difficulty in the bathroom. Pt had slight intermittent nausea after taking Oxycodone, PO Zofran given which gave relief. CMS and Neuro's are intact. Baseline neuropathy in the bilateral legs. Has pain in the right hip and given Tylenol, Oxycodone, and pain is manageable. Is on a Regular diet and appetite was Good this shift. Scar on right hip area. Pt up in room with SBA and a FWW. No PIV access. Bilateral heels are elevated off the bed. Pt is able to make needs known, and call light is within reach. Continue to monitor.

## 2020-07-29 NOTE — PHARMACY-ADMISSION MEDICATION HISTORY
Admission medication history interview status for the 7/28/2020 admission is complete. See Epic admission navigator for allergy information, pharmacy, prior to admission medications and immunization status.     Medication history interview sources:  Patient, Surescripts and Care Everywhere    Changes made to PTA medication list (reason)  Added: Nothing.  Deleted: Acetaminophen 325 mg tablet, biotene spray, hydroxyzine 25 mg tablet and senna-doc 8.6 50 mg tablet.  Changed: Liothyronine 5 mcg tablet, take 10 mcg by mouth daily --> Liothyronine 5 mcg tablet, take 5 mcg by mouth daily.   -Pramipexole 1 mg tablet, take 1 tablet in the AM; and 2 tabs 1 hour prior to hs --> Pramipexole 1 mg tablet, take 1 tablet in the morning; and 2 tabs 3 hours prior to bedtime.   -Temazepam 15 mg capsule, take 1 capsule by mouth at bedtime --> Temazepam 30 mg capsule, take 1 capsule by mouth at bedtime    Additional medication history information (including reliability of information, actions taken by pharmacist): Patient uses both her albuterol and Symbicort inhalers as needed. Only 1 inhaler of each has been filled since December 2019 and January 2020 respectively.       Prior to Admission medications    Medication Sig Last Dose Taking? Auth Provider   albuterol (ALBUTEROL) 108 (90 BASE) MCG/ACT Inhaler Inhale 2 puffs into the lungs 4 times daily as needed for shortness of breath / dyspnea or wheezing Past Week at Unknown time Yes Skyler Álvarez MD   budesonide-formoterol (SYMBICORT) 160-4.5 MCG/ACT Inhaler Inhale 2 puffs into the lungs 2 times daily Past Week at Unknown time Yes Unknown, Entered By History   cycloSPORINE (RESTASIS) 0.05 % ophthalmic emulsion Place 1 drop into both eyes 2 times daily 7/28/2020 at PM Yes Unknown, Entered By History   hypromellose (ARTIFICIAL TEARS) 0.5 % SOLN ophthalmic solution Place 1 drop into both eyes 2 times daily 7/28/2020 at PM Yes Unknown, Entered By History   lacosamide (VIMPAT) 200 MG TABS  tablet Take 1 tablet (200 mg) by mouth 2 times daily 7/28/2020 at PM Yes Loreto Barlow APRN CNP   levothyroxine (SYNTHROID/LEVOTHROID) 50 MCG tablet Take 50 mcg by mouth daily 7/28/2020 at AM Yes Unknown, Entered By History   linaclotide (LINZESS) 290 MCG capsule Take 1 capsule (290 mcg) by mouth every morning (before breakfast) 7/28/2020 at AM Yes Farzana Ro MD   liothyronine (CYTOMEL) 5 MCG tablet Take 5 mcg by mouth daily  7/28/2020 at AM Yes Reported, Patient   nefazodone (SERZONE) 200 MG tablet Take 600 mg by mouth At Bedtime  7/28/2020 at PM Yes Reported, Patient   omeprazole (PRILOSEC) 40 MG DR capsule Take 1 capsule (40 mg) by mouth 2 times daily 7/28/2020 at PM Yes Skyler Álvarez MD   potassium citrate (UROCIT-K) 10 MEQ (1080 MG) CR tablet Take 10 mEq by mouth daily 7/28/2020 at AM Yes Unknown, Entered By History   pramipexole (MIRAPEX) 1 MG tablet Give 1 tab in the morning and 2 tabs 3 hours prior to bedtime 7/28/2020 at PM Yes Reported, Patient   temazepam (RESTORIL) 30 MG capsule Take 30 mg by mouth nightly as needed for sleep Past Month at Unknown time Yes Unknown, Entered By History   traMADol (ULTRAM) 50 MG tablet Take 50 mg by mouth every 6 hours as needed for severe pain Past Week at Unknown time Yes Unknown, Entered By History         Medication history completed by: Lily Kimble, PharmD Student

## 2020-07-29 NOTE — PLAN OF CARE
OT 5:  Defer.  Orders complete.     Discharge Planner OT   Patient plan for discharge: home with  services  Current status: Orders received.  Per chart review and conversation with PT, pt does not demonstrate skilled IP OT needs.  Safe discharge plan in place per PT.  Pt is able to complete basic mobility and ADL ind, recommend HHA for bathing.  OT will defer and complete orders.  Barriers to return to prior living situation: none from rehab  Recommendations for discharge: home with  OT/PT/HHA  Rationale for recommendations: Pt will benefit from skilled services at home for progression of ADL and mobility prior to surgery.           Entered by: Yuly Gross 07/29/2020 11:17 AM

## 2020-07-30 ENCOUNTER — PATIENT OUTREACH (OUTPATIENT)
Dept: CARE COORDINATION | Facility: CLINIC | Age: 65
End: 2020-07-30

## 2020-07-30 ENCOUNTER — TELEPHONE (OUTPATIENT)
Dept: ORTHOPEDICS | Facility: CLINIC | Age: 65
End: 2020-07-30

## 2020-07-30 VITALS
WEIGHT: 189 LBS | TEMPERATURE: 97.1 F | BODY MASS INDEX: 31.49 KG/M2 | DIASTOLIC BLOOD PRESSURE: 60 MMHG | HEART RATE: 54 BPM | OXYGEN SATURATION: 90 % | RESPIRATION RATE: 15 BRPM | SYSTOLIC BLOOD PRESSURE: 101 MMHG | HEIGHT: 65 IN

## 2020-07-30 LAB
SARS-COV-2 RNA SPEC QL NAA+PROBE: NOT DETECTED
SPECIMEN SOURCE: NORMAL

## 2020-07-30 PROCEDURE — G0378 HOSPITAL OBSERVATION PER HR: HCPCS

## 2020-07-30 PROCEDURE — 25000128 H RX IP 250 OP 636: Performed by: STUDENT IN AN ORGANIZED HEALTH CARE EDUCATION/TRAINING PROGRAM

## 2020-07-30 PROCEDURE — 25000132 ZZH RX MED GY IP 250 OP 250 PS 637: Mod: GY | Performed by: STUDENT IN AN ORGANIZED HEALTH CARE EDUCATION/TRAINING PROGRAM

## 2020-07-30 PROCEDURE — 25000132 ZZH RX MED GY IP 250 OP 250 PS 637: Mod: GY | Performed by: ORTHOPAEDIC SURGERY

## 2020-07-30 RX ORDER — CEFAZOLIN SODIUM 2 G/100ML
2 INJECTION, SOLUTION INTRAVENOUS
Status: CANCELLED | OUTPATIENT
Start: 2020-08-04

## 2020-07-30 RX ORDER — CARBOXYMETHYLCELLULOSE SODIUM 5 MG/ML
1 SOLUTION/ DROPS OPHTHALMIC 2 TIMES DAILY
COMMUNITY
Start: 2020-07-30

## 2020-07-30 RX ORDER — TRANEXAMIC ACID 650 MG/1
1950 TABLET ORAL ONCE
Status: CANCELLED | OUTPATIENT
Start: 2020-08-04

## 2020-07-30 RX ORDER — ACETAMINOPHEN 325 MG/1
975 TABLET ORAL ONCE
Status: CANCELLED | OUTPATIENT
Start: 2020-08-04

## 2020-07-30 RX ORDER — CELECOXIB 200 MG/1
400 CAPSULE ORAL ONCE
Status: CANCELLED | OUTPATIENT
Start: 2020-08-04

## 2020-07-30 RX ORDER — ONDANSETRON 4 MG/1
4 TABLET, ORALLY DISINTEGRATING ORAL EVERY 6 HOURS PRN
Qty: 20 TABLET | Refills: 0 | Status: SHIPPED | OUTPATIENT
Start: 2020-07-30 | End: 2024-01-16

## 2020-07-30 RX ORDER — CEFAZOLIN SODIUM 1 G/3ML
1 INJECTION, POWDER, FOR SOLUTION INTRAMUSCULAR; INTRAVENOUS SEE ADMIN INSTRUCTIONS
Status: CANCELLED | OUTPATIENT
Start: 2020-08-04

## 2020-07-30 RX ADMIN — LIOTHYRONINE SODIUM 10 MCG: 5 TABLET ORAL at 08:15

## 2020-07-30 RX ADMIN — LINACLOTIDE 290 MCG: 145 CAPSULE, GELATIN COATED ORAL at 08:14

## 2020-07-30 RX ADMIN — LEVOTHYROXINE SODIUM 50 MCG: 50 TABLET ORAL at 08:03

## 2020-07-30 RX ADMIN — OXYCODONE HYDROCHLORIDE 5 MG: 5 TABLET ORAL at 04:48

## 2020-07-30 RX ADMIN — OMEPRAZOLE 40 MG: 20 CAPSULE, DELAYED RELEASE ORAL at 08:03

## 2020-07-30 RX ADMIN — OXYCODONE HYDROCHLORIDE 5 MG: 5 TABLET ORAL at 08:14

## 2020-07-30 RX ADMIN — LIDOCAINE 1 PATCH: 560 PATCH PERCUTANEOUS; TOPICAL; TRANSDERMAL at 02:20

## 2020-07-30 RX ADMIN — FLUTICASONE FUROATE AND VILANTEROL TRIFENATATE 1 PUFF: 200; 25 POWDER RESPIRATORY (INHALATION) at 08:06

## 2020-07-30 RX ADMIN — ONDANSETRON 4 MG: 4 TABLET, ORALLY DISINTEGRATING ORAL at 04:48

## 2020-07-30 RX ADMIN — NEFAZODONE HYDROCHLORIDE 600 MG: 200 TABLET ORAL at 01:05

## 2020-07-30 RX ADMIN — DOCUSATE SODIUM 50 MG AND SENNOSIDES 8.6 MG 2 TABLET: 8.6; 5 TABLET, FILM COATED ORAL at 08:03

## 2020-07-30 RX ADMIN — CARBOXYMETHYLCELLULOSE SODIUM 1 DROP: 5 SOLUTION/ DROPS OPHTHALMIC at 08:14

## 2020-07-30 RX ADMIN — LACOSAMIDE 200 MG: 200 TABLET, FILM COATED ORAL at 08:15

## 2020-07-30 RX ADMIN — ACETAMINOPHEN 975 MG: 325 TABLET, FILM COATED ORAL at 08:02

## 2020-07-30 RX ADMIN — CYCLOSPORINE 1 DROP: 0.5 EMULSION OPHTHALMIC at 08:03

## 2020-07-30 NOTE — TELEPHONE ENCOUNTER
RN called and spoke with Mercedes.  She is aware of the covid call coming as to when and where she can get tested.  She is doing her H&P tomorrow at 1500 at the [facility].  She states that her daughter and son in law will be taking her to the [facility] for rehab.  She will remain NPO as Dr. Bermudez instructed her to do.  She does not have any further questions at this time and will theodore us back if she does.

## 2020-07-30 NOTE — PROGRESS NOTES
Patient has clinic visit within 24-48 hours of Discharge so no post DC follow up call is needed  7/31/2020 Status: Susan    Time: 3:00 PM Length: 30   Visit Type: PRE-OP [620] KYLER:     Provider: Elissa Campbell PA-C Department:  INTERNAL MEDICINE

## 2020-07-30 NOTE — PLAN OF CARE
VS: Stale.   O2: RA.   Output: Voiding adequately in bathroom.   Last BM: 7/28.   Activity: SBA with walker, WBAT but pt is not putting weight on RLE d/t pain.   Skin: Scars.   Pain: Aching pain in right hip managed with oxycodone, lidocaine patch, and ice.   CMS: Baseline neuropathy.   Dressing: NA.   Diet: Regular.   LDA: No IV access.   Equipment: Walker, call light within reach.   Plan: Discharge today with  home care. Surgery scheduled for August 11 but may be moved up.   Additional Info:       OBS goals:  Pain control and home care team    0918-4866: oxycodone given  [id number]: lidocaine patch placed  0540-3278: oxycodone and ice given  [id number]: pt resting comfortably

## 2020-07-30 NOTE — PLAN OF CARE
VS: VSS   O2: >90% on RA   Output: Adequate in BR   Last BM: 7/28   Activity: Order for WBAT; pt is utilizing NWB; SBA with FWW   Up for meals? Yes   Skin: CDI; scar R hip   Pain: 5mg oxycodone q4h   CMS: Baseline neuropathy BLE   Dressing: None   Diet: Regular   LDA: None   Equipment: FWW   Plan: Pt's surgery was planned for 8/11 with Dr. Bermudez. Pt unable to manage at home. Surgery will hopefully be moved up.   Additional Info: Pt was in the ED all night and did not sleep last night. Has been sleeping this shift.   Observation Goals:  1600:  Pain control: 5mg oxycodone q4h  Home care plan: Referral sent to [facility]  1800:  Pain control: 5mg oxycodone q4h  Home care plan: Referral sent to [facility]  2000:  Pain control: 5mg oxycodone q4h  Home care plan: Referral sent to [facility]  2200:  Pain control: 5mg oxycodone q4h  Home care plan: Referral sent to [facility]

## 2020-07-30 NOTE — PLAN OF CARE
Pt A/O X 4. Afebrile. VSS. Lungs-Clear bilaterally with both anterior and posterior. Bowels-Hyperactive in all four quadrants. Voids spontaneously without difficulty in the bathroom. Denies nausea and vomiting. CMS and Neuro's are intact. Pt has baseline neuropathy in the BLE's. Has pain in the right hip and given Oxycodone, and pain is manageable. Is on a Regular diet and appetite was Good this shift. Pt has a scar on the right hip that is C/D/I. Pt up in room with SBA and a FWW. No PIV access. Bilateral heels are elevated off the bed. Pt is able to make needs known, and call light is within reach. Pt. discharged at 09:30AM to home, and left with personal belongings. Pt. received complete discharge paperwork and PO medications as filled by discharge pharmacy. Pt. was given times of last dose for all discharge medications in writing on discharge medication sheets. Discharge teaching included PO medication, pain management, and her appointment for her Pre-Op which is scheduled on August 5, 2020. NP Carito Grullon web paged that pt would like for  to call her and her daughter Rachael. Pt. had no further questions at the time of discharge and no unmet needs were identified.

## 2020-07-30 NOTE — PLAN OF CARE
PT: Patient discharged to home    Physical Therapy Discharge Summary    Reason for therapy discharge:    Discharged to home with home therapy.    Progress towards therapy goal(s). See goals on Care Plan in [facility] electronic health record for goal details.  Goals partially met.  Barriers to achieving goals:   discharge from facility.    Therapy recommendation(s):    Continued therapy is recommended.  Rationale/Recommendations:  For safety evaluation and progression.

## 2020-07-30 NOTE — PROGRESS NOTES
Orthopaedic Surgery Progress Note 07/30/2020    S: No acute events overnight.  Pain controlled with 5mg oxycodone q4h PRN. Patient mobilizing with PT.    O:  Temp: 97.2  F (36.2  C) Temp src: Oral BP: 110/69 Pulse: 57 Heart Rate: 55 Resp: 15 SpO2: 98 % O2 Device: None (Room air)      Exam:  Gen: No acute distress, resting comfortably in bed.  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 XRs with no evidence of displacement.      RECOMMENDATIONS:   - Orthopedics primary  - Anticoagulation/DVT Prophylaxis: Mechanical, SCDs  - X-rays/Imaging: Obtained  - Activity: Up with assist  - Weight bearing: NWB RLE  - Pain control: po with IV for breakthrough  - Diet: Regular  - Consults: PT/OT, SW to eval for role of home cares vs TCU prior to surgery  - Follow-up: 8/11 for right acetabular ORIF with Dr. Bermudez (trying to move surgery up to 8/5)  - Disposition: Likely home with home care    Loan Gregorio MD  Orthopaedic Surgery PGY-4  Pager [phone]

## 2020-07-31 ENCOUNTER — TELEPHONE (OUTPATIENT)
Dept: ORTHOPEDICS | Facility: CLINIC | Age: 65
End: 2020-07-31

## 2020-07-31 ENCOUNTER — OFFICE VISIT (OUTPATIENT)
Dept: INTERNAL MEDICINE | Facility: CLINIC | Age: 65
End: 2020-07-31
Payer: MEDICARE

## 2020-07-31 ENCOUNTER — PATIENT OUTREACH (OUTPATIENT)
Dept: CARE COORDINATION | Facility: CLINIC | Age: 65
End: 2020-07-31

## 2020-07-31 ENCOUNTER — TELEPHONE (OUTPATIENT)
Dept: INTERNAL MEDICINE | Facility: CLINIC | Age: 65
End: 2020-07-31

## 2020-07-31 VITALS
HEIGHT: 65 IN | TEMPERATURE: 98.5 F | OXYGEN SATURATION: 98 % | BODY MASS INDEX: 31.32 KG/M2 | DIASTOLIC BLOOD PRESSURE: 80 MMHG | WEIGHT: 188 LBS | SYSTOLIC BLOOD PRESSURE: 104 MMHG | HEART RATE: 82 BPM | RESPIRATION RATE: 14 BRPM

## 2020-07-31 DIAGNOSIS — Z11.59 ENCOUNTER FOR SCREENING FOR OTHER VIRAL DISEASES: ICD-10-CM

## 2020-07-31 DIAGNOSIS — E03.8 OTHER SPECIFIED HYPOTHYROIDISM: ICD-10-CM

## 2020-07-31 DIAGNOSIS — Z96.649 MECHANICAL COMPLICATION OF PROSTHETIC HIP IMPLANT, INITIAL ENCOUNTER (H): ICD-10-CM

## 2020-07-31 DIAGNOSIS — M25.551 HIP PAIN, RIGHT: ICD-10-CM

## 2020-07-31 DIAGNOSIS — Z96.641 STATUS POST TOTAL HIP REPLACEMENT, RIGHT: ICD-10-CM

## 2020-07-31 DIAGNOSIS — T84.098A MECHANICAL COMPLICATION OF PROSTHETIC HIP IMPLANT, INITIAL ENCOUNTER (H): ICD-10-CM

## 2020-07-31 DIAGNOSIS — Z01.818 PREOP GENERAL PHYSICAL EXAM: Primary | ICD-10-CM

## 2020-07-31 LAB
ANION GAP SERPL CALCULATED.3IONS-SCNC: 5 MMOL/L (ref 3–14)
BASOPHILS # BLD AUTO: 0 10E9/L (ref 0–0.2)
BASOPHILS NFR BLD AUTO: 0.5 %
BUN SERPL-MCNC: 14 MG/DL (ref 7–30)
CALCIUM SERPL-MCNC: 9.4 MG/DL (ref 8.5–10.1)
CHLORIDE SERPL-SCNC: 107 MMOL/L (ref 94–109)
CO2 SERPL-SCNC: 27 MMOL/L (ref 20–32)
CREAT SERPL-MCNC: 0.81 MG/DL (ref 0.52–1.04)
DIFFERENTIAL METHOD BLD: NORMAL
EOSINOPHIL # BLD AUTO: 0.3 10E9/L (ref 0–0.7)
EOSINOPHIL NFR BLD AUTO: 4.1 %
ERYTHROCYTE [DISTWIDTH] IN BLOOD BY AUTOMATED COUNT: 14.7 % (ref 10–15)
FERRITIN SERPL-MCNC: 28 NG/ML (ref 8–252)
GFR SERPL CREATININE-BSD FRML MDRD: 75 ML/MIN/{1.73_M2}
GLUCOSE SERPL-MCNC: 106 MG/DL (ref 70–99)
HCT VFR BLD AUTO: 42.1 % (ref 35–47)
HGB BLD-MCNC: 13.3 G/DL (ref 11.7–15.7)
IRON SATN MFR SERPL: 19 % (ref 15–46)
IRON SERPL-MCNC: 66 UG/DL (ref 35–180)
LYMPHOCYTES # BLD AUTO: 1.4 10E9/L (ref 0.8–5.3)
LYMPHOCYTES NFR BLD AUTO: 23.4 %
MCH RBC QN AUTO: 28.1 PG (ref 26.5–33)
MCHC RBC AUTO-ENTMCNC: 31.6 G/DL (ref 31.5–36.5)
MCV RBC AUTO: 89 FL (ref 78–100)
MONOCYTES # BLD AUTO: 0.5 10E9/L (ref 0–1.3)
MONOCYTES NFR BLD AUTO: 8.6 %
MRSA DNA SPEC QL NAA+PROBE: POSITIVE
NEUTROPHILS # BLD AUTO: 3.9 10E9/L (ref 1.6–8.3)
NEUTROPHILS NFR BLD AUTO: 63.4 %
PLATELET # BLD AUTO: 271 10E9/L (ref 150–450)
POTASSIUM SERPL-SCNC: 4.3 MMOL/L (ref 3.4–5.3)
RBC # BLD AUTO: 4.74 10E12/L (ref 3.8–5.2)
SODIUM SERPL-SCNC: 139 MMOL/L (ref 133–144)
SPECIMEN SOURCE: ABNORMAL
TIBC SERPL-MCNC: 341 UG/DL (ref 240–430)
WBC # BLD AUTO: 6.2 10E9/L (ref 4–11)

## 2020-07-31 PROCEDURE — 36415 COLL VENOUS BLD VENIPUNCTURE: CPT | Performed by: PHYSICIAN ASSISTANT

## 2020-07-31 PROCEDURE — U0003 INFECTIOUS AGENT DETECTION BY NUCLEIC ACID (DNA OR RNA); SEVERE ACUTE RESPIRATORY SYNDROME CORONAVIRUS 2 (SARS-COV-2) (CORONAVIRUS DISEASE [COVID-19]), AMPLIFIED PROBE TECHNIQUE, MAKING USE OF HIGH THROUGHPUT TECHNOLOGIES AS DESCRIBED BY CMS-2020-01-R: HCPCS | Performed by: ORTHOPAEDIC SURGERY

## 2020-07-31 PROCEDURE — 83540 ASSAY OF IRON: CPT | Performed by: PHYSICIAN ASSISTANT

## 2020-07-31 PROCEDURE — 82728 ASSAY OF FERRITIN: CPT | Performed by: PHYSICIAN ASSISTANT

## 2020-07-31 PROCEDURE — 93000 ELECTROCARDIOGRAM COMPLETE: CPT | Performed by: PHYSICIAN ASSISTANT

## 2020-07-31 PROCEDURE — 85025 COMPLETE CBC W/AUTO DIFF WBC: CPT | Performed by: PHYSICIAN ASSISTANT

## 2020-07-31 PROCEDURE — 87641 MR-STAPH DNA AMP PROBE: CPT | Performed by: PHYSICIAN ASSISTANT

## 2020-07-31 PROCEDURE — 80048 BASIC METABOLIC PNL TOTAL CA: CPT | Performed by: PHYSICIAN ASSISTANT

## 2020-07-31 PROCEDURE — 99214 OFFICE O/P EST MOD 30 MIN: CPT | Performed by: PHYSICIAN ASSISTANT

## 2020-07-31 PROCEDURE — 83550 IRON BINDING TEST: CPT | Performed by: PHYSICIAN ASSISTANT

## 2020-07-31 PROCEDURE — 87640 STAPH A DNA AMP PROBE: CPT | Performed by: PHYSICIAN ASSISTANT

## 2020-07-31 ASSESSMENT — MIFFLIN-ST. JEOR: SCORE: 1393.64

## 2020-07-31 NOTE — TELEPHONE ENCOUNTER
[facility] Call Center    Phone Message    May a detailed message be left on voicemail: yes     Reason for Call: Other: Pt returned call to Lakisha, requesting call back. Pt stated she will be leaving at 2:30pm today for an appt and will not be available after that     Action Taken: Message routed to:  [facility] ortho

## 2020-07-31 NOTE — TELEPHONE ENCOUNTER
RN calling and states that she needs to come 1-3 days prior to OR for her T&S due to her + antibodies.  She is aware of this.   RN just wanted to firm up when she is coming to do this.  Please call us back.

## 2020-07-31 NOTE — TELEPHONE ENCOUNTER
[facility] now requests orders and shares plan of care/discharge summaries for some patients through [employer].  Please REPLY TO THIS MESSAGE OR ROUTE BACK TO THE AUTHOR in order to give authorization for orders when needed.  This is considered a verbal order, you will still receive a faxed copy of orders for signature.  Thank you for your assistance in improving collaboration for our patients.    FYI regarding medication concerns noted at start of care    1. Oxycodone and Nefazazodone can have decreased metabolism when used together  2. Breo Ellipta and Nefazazodone can have decreased metabolism when used together  3. Breo ellipta and Symbicort are duplicate inhaled steriods    Rachael Hickey, RN  [phone]

## 2020-07-31 NOTE — PATIENT INSTRUCTIONS
Take your usual medications in the morning with a sip of water.     Before Your Surgery      Call your surgeon if there is any change in your health. This includes signs of a cold or flu (such as a sore throat, runny nose, cough, rash or fever).    Do not smoke, drink alcohol or take over the counter medicine (unless your surgeon or primary care doctor tells you to) for the 24 hours before and after surgery.    If you take prescribed drugs: Follow your doctor s orders about which medicines to take and which to stop until after surgery.    Eating and drinking prior to surgery: follow the instructions from your surgeon    Take a shower or bath the night before surgery. Use the soap your surgeon gave you to gently clean your skin. If you do not have soap from your surgeon, use your regular soap. Do not shave or scrub the surgery site.  Wear clean pajamas and have clean sheets on your bed.

## 2020-07-31 NOTE — PROGRESS NOTES
[address] [id number] [phone]  Dept: [phone]    PRE-OP EVALUATION:  Today's date: 2020    Mercedes Barber (: 1955) presents for pre-operative evaluation assessment as requested by Keanu Bean.  She requires evaluation and anesthesia risk assessment prior to undergoing surgery/procedure for treatment of  Right hip placement .    Proposed Surgery/ Procedure: hip replacement  Date of Surgery/ Procedure: 20  Time of Surgery/ Procedure: [facility]  [facility]   Surgery Fax Number: Note does not need to be faxed, will be available electronically in Epic.  Primary Physician: Skyler Álvarez  Type of Anesthesia Anticipated: General    Preoperative Questionnaire:   No - Have you ever had a heart attack or stroke?  No - Have you ever had surgery on your heart or blood vessels, such as a stent, coronary (heart) bypass, or surgery on an artery in the head, neck, heart, or legs?  No - Do you have chest pain when you are physically active?  No - Do you have a history of heart failure?  No - Do you currently have a cold, bronchitis, or symptoms of other respiratory (head and chest) infections?  YES - DO YOU HAVE A COUGH, SHORTNESS OF BREATH, OR WHEEZING? Occasional cough no new coughing   YES - DO YOU OR ANYONE IN YOUR FAMILY HAVE A HISTORY OF BLOOD CLOTS? Hx of dvt right upper extremity  YES - DO YOU OR ANYONE IN YOUR FAMILY HAVE A SERIOUS BLEEDING PROBLEM, SUCH AS LONG-LASTING BLEEDING AFTER SURGERIES OR CUTS?   YES - HAVE YOU EVERY HAD ANEMIA OR BEEN TOLD TO TAKE IRON PILLS? On iron   No - Have you had any abnormal blood loss such as black, tarry or bloody stools, or abnormal vaginal bleeding?  YES - HAVE YOU EVER HAD A BLOOD TRANSFUSION? Last year with surgery   Yes - Are you willing to have a blood transfusion if it is medically needed before, during, or after your surgery?  No - Have you or anyone in your  family ever had problems with anesthesia (sedation for surgery)?  No - Do you have sleep apnea, excessive snoring, or daytime drowsiness?   No - Do you have any artifical heart valves or other implanted medical devices, such as a pacemaker, defibrillator, or continuous glucose monitor?  YES - DO YOU HAVE ANY ARTIFICIAL JOINTS? Knee and hip  No - Are you allergic to latex?  No - Is there any chance that you may be pregnant?    Patient has a Health Care Directive or Living Will:  YES     HPI:     HPI related to upcoming procedure: right hip pain, s/p arthroplasty and failed replacment       See problem list for active medical problems.  Problems all longstanding and stable, except as noted/documented.  See ROS for pertinent symptoms related to these conditions.      MEDICAL HISTORY:     Patient Active Problem List    Diagnosis Date Noted     Headache 10/14/2011     Priority: High     Problem list name updated by automated process. 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 Pt has had a hysterectomy. Plan cotest in 1 year per  provider.  12/28/18 Patient is lost to pap tracking follow-up         Other sleep apnea 11/11/2015     Priority: Medium     Gastroesophageal reflux disease without esophagitis 09/28/2015     Priority: Medium     Irritable bowel syndrome 09/28/2015     Priority: Medium     Other specified hypothyroidism 05/19/2015     Priority: Medium     Pain in joint involving ankle and foot 11/20/2014     Priority: Medium     Plantar fascial fibromatosis 10/07/2014     Priority: Medium     Pain in joint, lower leg 01/24/2014     Priority: Medium     Sciatica 09/23/2013     Priority: Medium     Pain in thoracic spine 02/12/2013     Priority: Medium     Lumbago 02/11/2013     Priority: Medium     Temporal sclerosis 08/27/2012     Priority: Medium     Vulvar lesion 07/13/2012     Priority: Medium     Mild major depression (H) 02/21/2012     Priority: Medium     Esophageal stricture 02/21/2012     Priority: Medium     Left shoulder pain 10/15/2011     Priority: Medium     S/p surgery Aug 2011       Anemia 10/15/2011     Priority: Medium     Dysphagia 04/14/2011     Priority: Medium     Vaginal pain 12/21/2010     Priority: Medium     CARDIOVASCULAR SCREENING; LDL GOAL LESS THAN 160 10/31/2010     Priority: Medium     Rectal prolapse 12/01/2009     Priority: Medium     Overactive bladder 06/29/2009     Priority: Medium     Renal anomaly 06/04/2009     Priority: Medium     Tubular sclerosis by hx       Vulvar pain 01/16/2009     Priority: Medium     Family history of breast cancer 06/05/2008     Priority: Medium     Sister w premenopausal breast ca       Menopausal syndrome (hot flashes) 05/30/2008     Priority: Medium      Past Medical History:   Diagnosis Date     Arthritis     Thumb     Cervical high risk HPV (human papillomavirus) test positive 07/17/2017 07/17/17: NIL pap, + HR HPV (not 16 or 18) result.      Cervical pain 9/15/2016     Constipation 8/27/2012     Diarrhea 4/30/2009     Esophageal stricture 2/21/2012      Gastro-oesophageal reflux disease      Hypothyroidism 9/18/2012     IBS (irritable bowel syndrome)      Mild major depression (H) 2/21/2012     Neuropathy of lower extremity      Rectal prolapse      Restless leg syndrome      Seizure disorder (H)     25 years ago     Sleep apnea     CPAP, no longer using CPAP     Temporal sclerosis 8/27/2012     Past Surgical History:   Procedure Laterality Date     Anterior COLPORRHAPHY, BLADDER/VAGINA      perigee mesh     ARTHROPLASTY HIP Right 7/23/2019    Procedure: Right total hip arthroplasty;  Surgeon: Anant Mejia MD;  Location: [facility]     ARTHROPLASTY PATELLO-FEMORAL (KNEE) Bilateral      ARTHROPLASTY REVISION HIP Right 8/7/2019    Procedure: Right hip revision total arthroplasty;  Surgeon: Tom Antonio MD;  Location: [facility]     CARPAL TUNNEL RELEASE RT/LT       DENTAL SURGERY      implant     DILATION AND CURETTAGE       DRAIN PILONIDAL CYST SIMPL       ENDOSCOPY DRUG INDUCED SLEEP N/A 11/18/2015    Procedure: ENDOSCOPY DRUG INDUCED SLEEP;  Surgeon: Park Ortiz MD;  [facility]     ESOPHAGOSCOPY, GASTROSCOPY, DUODENOSCOPY (EGD), COMBINED  4/5/2013    Procedure: COMBINED ESOPHAGOSCOPY, GASTROSCOPY, DUODENOSCOPY (EGD), BIOPSY SINGLE OR MULTIPLE;;  Surgeon: Mundo Mehta MD;  Location:  GI     EXCISE LESION TRUNK  8/10/2012    Procedure: EXCISE LESION TRUNK;;  Surgeon: Jerald Diggs MD;  Location: [facility]     HYSTERECTOMY       IRRIGATION AND DEBRIDEMENT HIP, COMBINED Right 8/26/2019    Procedure: 1.  Right hip wound irrigation and debridement with excisional debridement of nonviable skin, subcutaneous tissues, and fascia. 2. Application of incisional wound VAC, 27cm length incision.;  Surgeon: Tyson Prabhakar MD;  Location: [facility]     L shoulder fracture       rectal prolapse repair abdominally       RELEASE PLANTAR FASCIA Right 1/20/2015    Procedure: RELEASE PLANTAR FASCIA;  Surgeon: Maicol Liu DPM;  Location: [facility]      REMOVE MESH VAGINA  8/10/2012    Procedure: REMOVE MESH VAGINA;  Excision of Vaginal Mesh Exposure, Removal of Skin Tag Left Inner Leg;  Surgeon: Jerald Diggs MD;  Location: [facility]     REPAIR HAMMER TOE Right 1/20/2015    Procedure: REPAIR HAMMER TOE;  Surgeon: Maicol Liu DPM;  Location:  [facility]     TONSILLECTOMY & ADENOIDECTOMY       TUBAL LIGATION       Current Outpatient Medications   Medication Sig Dispense Refill     albuterol (ALBUTEROL) 108 (90 BASE) MCG/ACT Inhaler Inhale 2 puffs into the lungs 4 times daily as needed for shortness of breath / dyspnea or wheezing 1 Inhaler 0     artificial saliva (BIOTENE MT) AERS spray Take 2 sprays by mouth 3 times daily as needed for dry mouth       budesonide-formoterol (SYMBICORT) 160-4.5 MCG/ACT Inhaler Inhale 2 puffs into the lungs 2 times daily       carboxymethylcellulose PF (REFRESH PLUS) 0.5 % ophthalmic solution Place 1 drop into both eyes 2 times daily       cycloSPORINE (RESTASIS) 0.05 % ophthalmic emulsion Place 1 drop into both eyes 2 times daily       fluticasone-vilanterol (BREO ELLIPTA) 200-25 MCG/INH inhaler Inhale 1 puff into the lungs daily       hypromellose (ARTIFICIAL TEARS) 0.5 % SOLN ophthalmic solution Place 1 drop into both eyes 2 times daily       lacosamide (VIMPAT) 200 MG TABS tablet Take 1 tablet (200 mg) by mouth 2 times daily 30 tablet 0     levothyroxine (SYNTHROID/LEVOTHROID) 50 MCG tablet Take 50 mcg by mouth daily       linaclotide (LINZESS) 290 MCG capsule Take 1 capsule (290 mcg) by mouth every morning (before breakfast) 10 capsule 0     liothyronine (CYTOMEL) 5 MCG tablet Take 5 mcg by mouth daily        nefazodone (SERZONE) 200 MG tablet Take 600 mg by mouth At Bedtime        omeprazole (PRILOSEC) 40 MG DR capsule Take 1 capsule (40 mg) by mouth 2 times daily 180 capsule 3     ondansetron (ZOFRAN-ODT) 4 MG ODT tab Take 1 tablet (4 mg) by mouth every 6 hours as needed for nausea or vomiting 20 tablet 0     oxyCODONE  "(ROXICODONE) 5 MG tablet Take 1-2 tablets (5-10 mg) by mouth every 4 hours as needed for moderate to severe pain 39 tablet 0     polyethylene glycol (MIRALAX) 17 g packet Take 17 g by mouth daily as needed for constipation 7 packet 0     potassium citrate (UROCIT-K) 10 MEQ (1080 MG) CR tablet Take 10 mEq by mouth daily       pramipexole (MIRAPEX) 1 MG tablet Give 1 tablet by mouth in the morning and 2 tablets by mouth 3 hours prior to bedtime       senna-docusate (SENOKOT-S/PERICOLACE) 8.6-50 MG tablet Take 1-2 tablets by mouth 2 times daily 30 tablet 0     temazepam (RESTORIL) 30 MG capsule Take 30 mg by mouth nightly as needed for sleep       OTC products: None, except as noted above and no recent use of OTC ASA, NSAIDS or Steroids    Allergies   Allergen Reactions     Blood Transfusion Related (Informational Only) Other (See Comments)     Patient has a history of a clinically significant antibody against RBC antigens.  A delay in compatible RBCs may occur.     Clonopin [Clonazepam]      \"Walk funny and Mind goes funny\"     Encort [Hydrocortisone Acetate] Swelling     Feet swelled.     Encort [Hydrocortisone] Swelling     Erythromycin Diarrhea     Flagyl [Metronidazole] Nausea     Gabapentin      Over eats     Lamictal [Lamotrigine]      Oxycodone Nausea and Nausea and Vomiting     Tegretol [Carbamazepine] Nausea and Vomiting      Latex Allergy: NO    Social History     Tobacco Use     Smoking status: Never Smoker     Smokeless tobacco: Never Used   Substance Use Topics     Alcohol use: Yes     Frequency: Monthly or less     Comment: 1 glass of wine 2x/yr     History   Drug Use No       REVIEW OF SYSTEMS:   CONSTITUTIONAL: NEGATIVE for fever, chills, change in weight  INTEGUMENTARY/SKIN: NEGATIVE for worrisome rashes, moles or lesions  EYES: NEGATIVE for vision changes or irritation  ENT/MOUTH: NEGATIVE for ear, mouth and throat problems  RESP: NEGATIVE for significant cough or SOB  CV: NEGATIVE for chest pain, " "palpitations or peripheral edema  GI: NEGATIVE for nausea, abdominal pain, heartburn, or change in bowel habits  NEURO: NEGATIVE for weakness, dizziness or paresthesias  ENDOCRINE: NEGATIVE for temperature intolerance, skin/hair changes  HEME/ALLERGY/IMMUNE: NEGATIVE for bleeding problems  PSYCHIATRIC: tearful today in pain   ROS otherwise negative    EXAM:   /80   Pulse 82   Temp 98.5  F (36.9  C) (Tympanic)   Resp 14   Ht 1.651 m (5' 5\")   Wt 85.3 kg (188 lb)   LMP 03/11/2010   SpO2 98%   Breastfeeding No   BMI 31.28 kg/m    GENERAL APPEARANCE: healthy, alert and no distress  EYES: Eyes grossly normal to inspection, PERRL and conjunctivae and sclerae normal  HENT: ear canals and TM's normal and nose and mouth without ulcers or lesions  RESP: lungs clear to auscultation - no rales, rhonchi or wheezes  CV: regular rate and rhythm, normal S1 S2, no S3 or S4 and no murmur, click or rub   ABDOMEN: soft, nontender, no HSM or masses and bowel sounds normal  SKIN: no suspicious lesions or rashes  NEURO: Normal strength and tone, sensory exam grossly normal, mentation intact and speech normal    DIAGNOSTICS:     EKG: appears normal, NSR, normal axis, normal intervals, no acute ST/T changes c/w ischemia, no LVH by voltage criteria, unchanged from previous tracings  Labs Resulted Today:     Pending MRSA  Has covid test this afternoon   Results for orders placed or performed in visit on 07/31/20   CBC with platelets differential     Status: None   Result Value Ref Range    WBC 6.2 4.0 - 11.0 10e9/L    RBC Count 4.74 3.8 - 5.2 10e12/L    Hemoglobin 13.3 11.7 - 15.7 g/dL    Hematocrit 42.1 35.0 - 47.0 %    MCV 89 78 - 100 fl    MCH 28.1 26.5 - 33.0 pg    MCHC 31.6 31.5 - 36.5 g/dL    RDW 14.7 10.0 - 15.0 %    Platelet Count 271 150 - 450 10e9/L    % Neutrophils 63.4 %    % Lymphocytes 23.4 %    % Monocytes 8.6 %    % Eosinophils 4.1 %    % Basophils 0.5 %    Absolute Neutrophil 3.9 1.6 - 8.3 10e9/L    Absolute " Lymphocytes 1.4 0.8 - 5.3 10e9/L    Absolute Monocytes 0.5 0.0 - 1.3 10e9/L    Absolute Eosinophils 0.3 0.0 - 0.7 10e9/L    Absolute Basophils 0.0 0.0 - 0.2 10e9/L    Diff Method Automated Method    Basic metabolic panel     Status: Abnormal   Result Value Ref Range    Sodium 139 133 - 144 mmol/L    Potassium 4.3 3.4 - 5.3 mmol/L    Chloride 107 94 - 109 mmol/L    Carbon Dioxide 27 20 - 32 mmol/L    Anion Gap 5 3 - 14 mmol/L    Glucose 106 (H) 70 - 99 mg/dL    Urea Nitrogen 14 7 - 30 mg/dL    Creatinine 0.81 0.52 - 1.04 mg/dL    GFR Estimate 75 >60 mL/min/[1.73_m2]    GFR Estimate If Black 87 >60 mL/min/[1.73_m2]    Calcium 9.4 8.5 - 10.1 mg/dL       Recent Labs   Lab Test 06/03/20  1431 10/23/19  1533  09/06/19  1325  08/06/19  1818   HGB 12.0 11.6*   < > 9.0*   < >  --     407   < > 249   < >  --    INR  --   --   --  1.08  --  0.95   NA  --  138  --  140   < >  --    POTASSIUM  --  3.5  --  3.9   < >  --    CR  --  0.73  --  0.64   < >  --     < > = values in this interval not displayed.        IMPRESSION:   Reason for surgery/procedure: right hip pain, s/p total hip, complication of hip replacement   Diagnosis/reason for consult: hypothyroidism cardiopulmonary and clearance for surgery     The proposed surgical procedure is considered INTERMEDIATE risk.    REVISED CARDIAC RISK INDEX  The patient has the following serious cardiovascular risks for perioperative complications such as (MI, PE, VFib and 3  AV Block):  No serious cardiac risks  INTERPRETATION: 0 risks: Class I (very low risk - 0.4% complication rate)    The patient has the following additional risks for perioperative complications:  No identified additional risks      ICD-10-CM    1. Preop general physical exam  Z01.818 CBC with platelets differential     Basic metabolic panel     Ferritin     Iron and iron binding capacity   2. Hip pain, right  M25.551 EKG 12-lead complete w/read - Clinics     Methicillin Resist/Sens S. aureus PCR     CBC with  platelets differential     Basic metabolic panel     Ferritin     Iron and iron binding capacity   3. Status post total hip replacement, right  Z96.641 EKG 12-lead complete w/read - Clinics     Methicillin Resist/Sens S. aureus PCR     CBC with platelets differential     Basic metabolic panel   4. Mechanical complication of prosthetic hip implant, initial encounter (H)  T84.098A EKG 12-lead complete w/read - Clinics    Z96.649 Methicillin Resist/Sens S. aureus PCR     CBC with platelets differential     Basic metabolic panel   5. Other specified hypothyroidism  E03.8        RECOMMENDATIONS:         --Patient is to take all scheduled medications on the day of surgery    APPROVAL GIVEN to proceed with proposed procedure, without further diagnostic evaluation       Signed Electronically by: Elissa Campbell PA-C    Copy of this evaluation report is provided to requesting physician.    [facility] Preop Guidelines    Revised Cardiac Risk Index

## 2020-07-31 NOTE — PROGRESS NOTES
Clinic Care Coordination Contact    Situation: Patient chart reviewed by care coordinator.    Background: Pt was hospitalized at [facility] from 7/28/20 to 7/30/20 for hip pain. Pt has upcoming scheduled hip surgery on 8/4/20.     Assessment: Pt has received numerous other phone calls today and yesterday from clinic staff, specifically ortho staff in response to hospitalization and upcoming surgery. Pt has preop appt with PCP today.     Plan/Recommendations: [facility] will wait to outreach to pt until after hip surgery scheduled for 8/4/20. Pt plans to go to [facility] for rehab per note.     GOSIA Stokes   Social Work Clinic Care Coordinator   [facility]   PH: [phone]  [email or web address]

## 2020-07-31 NOTE — TELEPHONE ENCOUNTER
Rachael, RN with  HC calling, [phone]    Requesting orders for her upcoming surgery for her right hip, Patient HC orders from ED physician? See recent ER note.       [facility] requesting-    1)Skilled nursing Visits-2x a week for 2 weeks- starting next week.     2) HHA-2x a week for 2 weeks.    3) SW eval and treat      PCP please advise if ok with home care orders, LOV was on 12/30/2019?     Patient also discussed she is depressed and had expressed thoughts of SI but no plan for self harm. See old PHQ-9 rating below. Patient hoping mental health will improve after surgery. Per chart review has psychiatry through [facility] provider?   PHQ-9 score:    PHQ 1/26/2017   PHQ-9 Total Score 21   Q9: Thoughts of better off dead/self-harm past 2 weeks Several days       Patient has Pre-op today with Elissa JAMESON, RN, PHN

## 2020-07-31 NOTE — TELEPHONE ENCOUNTER
RN called and left a voice message for Mercedes.  She is wanting to do type and screen on Sunday at the [facility].  The lab is not open on the weekends.  Please call me back to discuss this.

## 2020-07-31 NOTE — TELEPHONE ENCOUNTER
RN called and spoke with Mercedes.  She needs to have the T&S 1-3 days prior to surgery on  08-04-20.  She is asking for lab to be done on Sunday.  RN will ask if lab is open on Sunday and call her back.

## 2020-08-01 LAB
SARS-COV-2 RNA SPEC QL NAA+PROBE: NOT DETECTED
SPECIMEN SOURCE: NORMAL

## 2020-08-03 ENCOUNTER — TELEPHONE (OUTPATIENT)
Dept: ORTHOPEDICS | Facility: CLINIC | Age: 65
End: 2020-08-03

## 2020-08-03 ENCOUNTER — HOSPITAL ENCOUNTER (OUTPATIENT)
Facility: CLINIC | Age: 65
Setting detail: SPECIMEN
DRG: 467 | End: 2020-08-03
Attending: ORTHOPAEDIC SURGERY | Admitting: ORTHOPAEDIC SURGERY
Payer: MEDICARE

## 2020-08-03 DIAGNOSIS — T84.098A MECHANICAL COMPLICATION OF PROSTHETIC HIP IMPLANT, INITIAL ENCOUNTER (H): Primary | ICD-10-CM

## 2020-08-03 DIAGNOSIS — Z96.649 MECHANICAL COMPLICATION OF PROSTHETIC HIP IMPLANT, INITIAL ENCOUNTER (H): Primary | ICD-10-CM

## 2020-08-03 DIAGNOSIS — T84.098A MECHANICAL COMPLICATION OF PROSTHETIC HIP IMPLANT, INITIAL ENCOUNTER (H): ICD-10-CM

## 2020-08-03 DIAGNOSIS — Z96.649 MECHANICAL COMPLICATION OF PROSTHETIC HIP IMPLANT, INITIAL ENCOUNTER (H): ICD-10-CM

## 2020-08-03 PROCEDURE — 86901 BLOOD TYPING SEROLOGIC RH(D): CPT | Performed by: ORTHOPAEDIC SURGERY

## 2020-08-03 PROCEDURE — 86850 RBC ANTIBODY SCREEN: CPT | Performed by: ORTHOPAEDIC SURGERY

## 2020-08-03 PROCEDURE — 86922 COMPATIBILITY TEST ANTIGLOB: CPT | Performed by: ORTHOPAEDIC SURGERY

## 2020-08-03 PROCEDURE — 86900 BLOOD TYPING SEROLOGIC ABO: CPT | Performed by: ORTHOPAEDIC SURGERY

## 2020-08-03 PROCEDURE — 86870 RBC ANTIBODY IDENTIFICATION: CPT | Performed by: ORTHOPAEDIC SURGERY

## 2020-08-03 PROCEDURE — 86902 BLOOD TYPE ANTIGEN DONOR EA: CPT | Performed by: ORTHOPAEDIC SURGERY

## 2020-08-03 PROCEDURE — 86880 COOMBS TEST DIRECT: CPT | Performed by: ORTHOPAEDIC SURGERY

## 2020-08-03 NOTE — TELEPHONE ENCOUNTER
[facility]    Phone Message    May a detailed message be left on voicemail: yes     Reason for Call: Other: Pt called and stated she needs to have a type and screen done prior to her surgery tomorrow with Dr. Bermudez, but she does not know where to go for that, requesting call back     Action Taken: Message routed to:  [facility] ortho

## 2020-08-04 ENCOUNTER — ANESTHESIA EVENT (OUTPATIENT)
Dept: SURGERY | Facility: CLINIC | Age: 65
DRG: 467 | End: 2020-08-04
Payer: MEDICARE

## 2020-08-04 DIAGNOSIS — Z96.649 PAIN DUE TO TOTAL HIP REPLACEMENT, INITIAL ENCOUNTER (H): ICD-10-CM

## 2020-08-04 DIAGNOSIS — T84.84XA PAIN DUE TO TOTAL HIP REPLACEMENT, INITIAL ENCOUNTER (H): ICD-10-CM

## 2020-08-04 LAB
ABO + RH BLD: ABNORMAL
ABO + RH BLD: ABNORMAL
BLD GP AB SCN SERPL QL: ABNORMAL
BLOOD BANK CMNT PATIENT-IMP: ABNORMAL
BLOOD BANK CMNT PATIENT-IMP: ABNORMAL
SPECIMEN EXP DATE BLD: ABNORMAL

## 2020-08-04 RX ORDER — ACETAMINOPHEN 325 MG/1
975 TABLET ORAL
Status: CANCELLED | OUTPATIENT
Start: 2020-08-05

## 2020-08-05 ENCOUNTER — APPOINTMENT (OUTPATIENT)
Dept: GENERAL RADIOLOGY | Facility: CLINIC | Age: 65
DRG: 467 | End: 2020-08-05
Attending: ORTHOPAEDIC SURGERY
Payer: MEDICARE

## 2020-08-05 ENCOUNTER — ANESTHESIA (OUTPATIENT)
Dept: SURGERY | Facility: CLINIC | Age: 65
DRG: 467 | End: 2020-08-05
Payer: MEDICARE

## 2020-08-05 ENCOUNTER — HOSPITAL ENCOUNTER (INPATIENT)
Facility: CLINIC | Age: 65
LOS: 2 days | Discharge: NURSING, REHAB OR LONG-TERM CARE | DRG: 467 | End: 2020-08-07
Attending: ORTHOPAEDIC SURGERY | Admitting: ORTHOPAEDIC SURGERY
Payer: MEDICARE

## 2020-08-05 DIAGNOSIS — T84.098A MECHANICAL COMPLICATION OF PROSTHETIC HIP IMPLANT, INITIAL ENCOUNTER (H): ICD-10-CM

## 2020-08-05 DIAGNOSIS — Z98.890 STATUS POST HIP SURGERY: Primary | ICD-10-CM

## 2020-08-05 DIAGNOSIS — Z96.649 MECHANICAL COMPLICATION OF PROSTHETIC HIP IMPLANT, INITIAL ENCOUNTER (H): ICD-10-CM

## 2020-08-05 LAB
ABO + RH BLD: ABNORMAL
ABO + RH BLD: ABNORMAL
BLD GP AB INVEST PLASRBC-IMP: ABNORMAL
BLD GP AB SCN SERPL QL: ABNORMAL
BLD PROD TYP BPU: ABNORMAL
BLOOD BANK CMNT PATIENT-IMP: ABNORMAL
BLOOD BANK CMNT PATIENT-IMP: ABNORMAL
DAT POLY-SP REAG RBC QL: ABNORMAL
GLUCOSE BLDC GLUCOMTR-MCNC: 79 MG/DL (ref 70–99)
LABORATORY COMMENT REPORT: NORMAL
NUM BPU REQUESTED: 1
SARS-COV-2 RNA SPEC QL NAA+PROBE: NEGATIVE
SARS-COV-2 RNA SPEC QL NAA+PROBE: NORMAL
SPECIMEN EXP DATE BLD: ABNORMAL
SPECIMEN SOURCE: NORMAL
SPECIMEN SOURCE: NORMAL

## 2020-08-05 PROCEDURE — 40000986 XR PELVIS AND HIP RIGHT 1 VIEW

## 2020-08-05 PROCEDURE — 25000125 ZZHC RX 250: Performed by: NURSE ANESTHETIST, CERTIFIED REGISTERED

## 2020-08-05 PROCEDURE — 36000072 ZZH SURGERY LEVEL 5 W FLUORO 1ST 30 MIN - UMMC: Performed by: ORTHOPAEDIC SURGERY

## 2020-08-05 PROCEDURE — 71000015 ZZH RECOVERY PHASE 1 LEVEL 2 EA ADDTL HR: Performed by: ORTHOPAEDIC SURGERY

## 2020-08-05 PROCEDURE — 25000128 H RX IP 250 OP 636: Performed by: ORTHOPAEDIC SURGERY

## 2020-08-05 PROCEDURE — 25800030 ZZH RX IP 258 OP 636: Performed by: ORTHOPAEDIC SURGERY

## 2020-08-05 PROCEDURE — 25000125 ZZHC RX 250: Performed by: STUDENT IN AN ORGANIZED HEALTH CARE EDUCATION/TRAINING PROGRAM

## 2020-08-05 PROCEDURE — 25000132 ZZH RX MED GY IP 250 OP 250 PS 637: Mod: GY | Performed by: ANESTHESIOLOGY

## 2020-08-05 PROCEDURE — 25000132 ZZH RX MED GY IP 250 OP 250 PS 637: Mod: GY | Performed by: PHYSICIAN ASSISTANT

## 2020-08-05 PROCEDURE — 25000132 ZZH RX MED GY IP 250 OP 250 PS 637: Mod: GY | Performed by: STUDENT IN AN ORGANIZED HEALTH CARE EDUCATION/TRAINING PROGRAM

## 2020-08-05 PROCEDURE — 25000566 ZZH SEVOFLURANE, EA 15 MIN: Performed by: ORTHOPAEDIC SURGERY

## 2020-08-05 PROCEDURE — 87075 CULTR BACTERIA EXCEPT BLOOD: CPT | Performed by: ORTHOPAEDIC SURGERY

## 2020-08-05 PROCEDURE — 25800030 ZZH RX IP 258 OP 636: Performed by: STUDENT IN AN ORGANIZED HEALTH CARE EDUCATION/TRAINING PROGRAM

## 2020-08-05 PROCEDURE — 71000014 ZZH RECOVERY PHASE 1 LEVEL 2 FIRST HR: Performed by: ORTHOPAEDIC SURGERY

## 2020-08-05 PROCEDURE — 27210794 ZZH OR GENERAL SUPPLY STERILE: Performed by: ORTHOPAEDIC SURGERY

## 2020-08-05 PROCEDURE — 37000008 ZZH ANESTHESIA TECHNICAL FEE, 1ST 30 MIN: Performed by: ORTHOPAEDIC SURGERY

## 2020-08-05 PROCEDURE — 36000070 ZZH SURGERY LEVEL 5 EA 15 ADDTL MIN - UMMC: Performed by: ORTHOPAEDIC SURGERY

## 2020-08-05 PROCEDURE — C1713 ANCHOR/SCREW BN/BN,TIS/BN: HCPCS | Performed by: ORTHOPAEDIC SURGERY

## 2020-08-05 PROCEDURE — 25000125 ZZHC RX 250: Performed by: ORTHOPAEDIC SURGERY

## 2020-08-05 PROCEDURE — 0SR903A REPLACEMENT OF RIGHT HIP JOINT WITH CERAMIC SYNTHETIC SUBSTITUTE, UNCEMENTED, OPEN APPROACH: ICD-10-PCS | Performed by: ORTHOPAEDIC SURGERY

## 2020-08-05 PROCEDURE — 25000128 H RX IP 250 OP 636: Performed by: NURSE ANESTHETIST, CERTIFIED REGISTERED

## 2020-08-05 PROCEDURE — 25800025 ZZH RX 258: Performed by: ORTHOPAEDIC SURGERY

## 2020-08-05 PROCEDURE — 25000132 ZZH RX MED GY IP 250 OP 250 PS 637: Mod: GY | Performed by: INTERNAL MEDICINE

## 2020-08-05 PROCEDURE — U0003 INFECTIOUS AGENT DETECTION BY NUCLEIC ACID (DNA OR RNA); SEVERE ACUTE RESPIRATORY SYNDROME CORONAVIRUS 2 (SARS-COV-2) (CORONAVIRUS DISEASE [COVID-19]), AMPLIFIED PROBE TECHNIQUE, MAKING USE OF HIGH THROUGHPUT TECHNOLOGIES AS DESCRIBED BY CMS-2020-01-R: HCPCS | Performed by: ORTHOPAEDIC SURGERY

## 2020-08-05 PROCEDURE — 25000128 H RX IP 250 OP 636: Performed by: STUDENT IN AN ORGANIZED HEALTH CARE EDUCATION/TRAINING PROGRAM

## 2020-08-05 PROCEDURE — 0SP90JZ REMOVAL OF SYNTHETIC SUBSTITUTE FROM RIGHT HIP JOINT, OPEN APPROACH: ICD-10-PCS | Performed by: ORTHOPAEDIC SURGERY

## 2020-08-05 PROCEDURE — 99232 SBSQ HOSP IP/OBS MODERATE 35: CPT | Performed by: INTERNAL MEDICINE

## 2020-08-05 PROCEDURE — 37000009 ZZH ANESTHESIA TECHNICAL FEE, EACH ADDTL 15 MIN: Performed by: ORTHOPAEDIC SURGERY

## 2020-08-05 PROCEDURE — 99207 ZZC CONSULT E&M CHANGED TO SUBSEQUENT LEVEL: CPT | Performed by: INTERNAL MEDICINE

## 2020-08-05 PROCEDURE — 87070 CULTURE OTHR SPECIMN AEROBIC: CPT | Performed by: ORTHOPAEDIC SURGERY

## 2020-08-05 PROCEDURE — C1762 CONN TISS, HUMAN(INC FASCIA): HCPCS | Performed by: ORTHOPAEDIC SURGERY

## 2020-08-05 PROCEDURE — 40000985 XR PELVIS PORT 1/2 VW

## 2020-08-05 PROCEDURE — 40000278 XR SURGERY CARM FLUORO LESS THAN 5 MIN: Mod: TC

## 2020-08-05 PROCEDURE — 12000001 ZZH R&B MED SURG/OB UMMC

## 2020-08-05 PROCEDURE — 25000128 H RX IP 250 OP 636: Performed by: PHYSICIAN ASSISTANT

## 2020-08-05 PROCEDURE — 25000128 H RX IP 250 OP 636: Performed by: ANESTHESIOLOGY

## 2020-08-05 PROCEDURE — 40000171 ZZH STATISTIC PRE-PROCEDURE ASSESSMENT III: Performed by: ORTHOPAEDIC SURGERY

## 2020-08-05 PROCEDURE — 87176 TISSUE HOMOGENIZATION CULTR: CPT | Performed by: ORTHOPAEDIC SURGERY

## 2020-08-05 PROCEDURE — 00000146 ZZHCL STATISTIC GLUCOSE BY METER IP

## 2020-08-05 DEVICE — IMPLANTABLE DEVICE: Type: IMPLANTABLE DEVICE | Site: HIP | Status: FUNCTIONAL

## 2020-08-05 DEVICE — IMPLANTABLE DEVICE
Type: IMPLANTABLE DEVICE | Site: HIP | Status: FUNCTIONAL
Brand: G7® DUAL MOBILITY ACETABULAR SYSTEM

## 2020-08-05 DEVICE — GRAFT BONE CRUSH CANC 30ML 400080: Type: IMPLANTABLE DEVICE | Site: HIP | Status: FUNCTIONAL

## 2020-08-05 DEVICE — GRAFT BONE CRUSH CANC 15ML 400075: Type: IMPLANTABLE DEVICE | Site: HIP | Status: FUNCTIONAL

## 2020-08-05 RX ORDER — LEVOTHYROXINE SODIUM 50 UG/1
50 TABLET ORAL DAILY
Status: DISCONTINUED | OUTPATIENT
Start: 2020-08-06 | End: 2020-08-07 | Stop reason: HOSPADM

## 2020-08-05 RX ORDER — CARBOXYMETHYLCELLULOSE SODIUM 5 MG/ML
1 SOLUTION/ DROPS OPHTHALMIC 2 TIMES DAILY
Status: DISCONTINUED | OUTPATIENT
Start: 2020-08-05 | End: 2020-08-07 | Stop reason: HOSPADM

## 2020-08-05 RX ORDER — BUDESONIDE AND FORMOTEROL FUMARATE DIHYDRATE 160; 4.5 UG/1; UG/1
2 AEROSOL RESPIRATORY (INHALATION) 2 TIMES DAILY
Status: DISCONTINUED | OUTPATIENT
Start: 2020-08-05 | End: 2020-08-05

## 2020-08-05 RX ORDER — BISACODYL 10 MG
10 SUPPOSITORY, RECTAL RECTAL DAILY PRN
Status: DISCONTINUED | OUTPATIENT
Start: 2020-08-05 | End: 2020-08-07 | Stop reason: HOSPADM

## 2020-08-05 RX ORDER — HYDROMORPHONE HYDROCHLORIDE 2 MG/1
2 TABLET ORAL
Status: DISCONTINUED | OUTPATIENT
Start: 2020-08-05 | End: 2020-08-05 | Stop reason: HOSPADM

## 2020-08-05 RX ORDER — HYDROMORPHONE HCL/0.9% NACL/PF 0.2MG/0.2
.2-.4 SYRINGE (ML) INTRAVENOUS
Status: DISCONTINUED | OUTPATIENT
Start: 2020-08-05 | End: 2020-08-07 | Stop reason: HOSPADM

## 2020-08-05 RX ORDER — FENTANYL CITRATE 50 UG/ML
INJECTION, SOLUTION INTRAMUSCULAR; INTRAVENOUS PRN
Status: DISCONTINUED | OUTPATIENT
Start: 2020-08-05 | End: 2020-08-05

## 2020-08-05 RX ORDER — SODIUM CHLORIDE, SODIUM LACTATE, POTASSIUM CHLORIDE, CALCIUM CHLORIDE 600; 310; 30; 20 MG/100ML; MG/100ML; MG/100ML; MG/100ML
INJECTION, SOLUTION INTRAVENOUS CONTINUOUS
Status: DISCONTINUED | OUTPATIENT
Start: 2020-08-05 | End: 2020-08-05 | Stop reason: HOSPADM

## 2020-08-05 RX ORDER — CYCLOSPORINE 0.5 MG/ML
1 EMULSION OPHTHALMIC 2 TIMES DAILY
Status: DISCONTINUED | OUTPATIENT
Start: 2020-08-05 | End: 2020-08-07 | Stop reason: HOSPADM

## 2020-08-05 RX ORDER — ONDANSETRON 2 MG/ML
INJECTION INTRAMUSCULAR; INTRAVENOUS PRN
Status: DISCONTINUED | OUTPATIENT
Start: 2020-08-05 | End: 2020-08-05

## 2020-08-05 RX ORDER — NEFAZODONE HYDROCHLORIDE 200 MG/1
600 TABLET ORAL AT BEDTIME
Status: DISCONTINUED | OUTPATIENT
Start: 2020-08-05 | End: 2020-08-07 | Stop reason: HOSPADM

## 2020-08-05 RX ORDER — DEXAMETHASONE SODIUM PHOSPHATE 4 MG/ML
INJECTION, SOLUTION INTRA-ARTICULAR; INTRALESIONAL; INTRAMUSCULAR; INTRAVENOUS; SOFT TISSUE PRN
Status: DISCONTINUED | OUTPATIENT
Start: 2020-08-05 | End: 2020-08-05

## 2020-08-05 RX ORDER — NALOXONE HYDROCHLORIDE 0.4 MG/ML
.1-.4 INJECTION, SOLUTION INTRAMUSCULAR; INTRAVENOUS; SUBCUTANEOUS
Status: DISCONTINUED | OUTPATIENT
Start: 2020-08-05 | End: 2020-08-07 | Stop reason: HOSPADM

## 2020-08-05 RX ORDER — ONDANSETRON 4 MG/1
4 TABLET, ORALLY DISINTEGRATING ORAL EVERY 6 HOURS PRN
Status: DISCONTINUED | OUTPATIENT
Start: 2020-08-05 | End: 2020-08-07 | Stop reason: HOSPADM

## 2020-08-05 RX ORDER — CEFAZOLIN SODIUM 2 G/100ML
2 INJECTION, SOLUTION INTRAVENOUS
Status: COMPLETED | OUTPATIENT
Start: 2020-08-05 | End: 2020-08-05

## 2020-08-05 RX ORDER — LACOSAMIDE 200 MG/1
200 TABLET ORAL 2 TIMES DAILY
Status: DISCONTINUED | OUTPATIENT
Start: 2020-08-05 | End: 2020-08-07 | Stop reason: HOSPADM

## 2020-08-05 RX ORDER — ACETAMINOPHEN 325 MG/1
650 TABLET ORAL EVERY 4 HOURS PRN
Status: DISCONTINUED | OUTPATIENT
Start: 2020-08-08 | End: 2020-08-07 | Stop reason: HOSPADM

## 2020-08-05 RX ORDER — AMOXICILLIN 250 MG
2 CAPSULE ORAL 2 TIMES DAILY
Status: DISCONTINUED | OUTPATIENT
Start: 2020-08-05 | End: 2020-08-07 | Stop reason: HOSPADM

## 2020-08-05 RX ORDER — FENTANYL CITRATE 50 UG/ML
25-50 INJECTION, SOLUTION INTRAMUSCULAR; INTRAVENOUS
Status: DISCONTINUED | OUTPATIENT
Start: 2020-08-05 | End: 2020-08-05 | Stop reason: HOSPADM

## 2020-08-05 RX ORDER — LIDOCAINE HYDROCHLORIDE 20 MG/ML
INJECTION, SOLUTION INFILTRATION; PERINEURAL PRN
Status: DISCONTINUED | OUTPATIENT
Start: 2020-08-05 | End: 2020-08-05

## 2020-08-05 RX ORDER — PRAMIPEXOLE DIHYDROCHLORIDE 1 MG/1
1 TABLET ORAL EVERY MORNING
Status: DISCONTINUED | OUTPATIENT
Start: 2020-08-06 | End: 2020-08-07 | Stop reason: HOSPADM

## 2020-08-05 RX ORDER — KETAMINE HYDROCHLORIDE 10 MG/ML
INJECTION INTRAMUSCULAR; INTRAVENOUS PRN
Status: DISCONTINUED | OUTPATIENT
Start: 2020-08-05 | End: 2020-08-05

## 2020-08-05 RX ORDER — CEFAZOLIN SODIUM 1 G/3ML
1 INJECTION, POWDER, FOR SOLUTION INTRAMUSCULAR; INTRAVENOUS SEE ADMIN INSTRUCTIONS
Status: DISCONTINUED | OUTPATIENT
Start: 2020-08-05 | End: 2020-08-05 | Stop reason: HOSPADM

## 2020-08-05 RX ORDER — HYDROMORPHONE HYDROCHLORIDE 1 MG/ML
.3-.5 INJECTION, SOLUTION INTRAMUSCULAR; INTRAVENOUS; SUBCUTANEOUS EVERY 5 MIN PRN
Status: DISCONTINUED | OUTPATIENT
Start: 2020-08-05 | End: 2020-08-05 | Stop reason: HOSPADM

## 2020-08-05 RX ORDER — NALOXONE HYDROCHLORIDE 0.4 MG/ML
.1-.4 INJECTION, SOLUTION INTRAMUSCULAR; INTRAVENOUS; SUBCUTANEOUS
Status: ACTIVE | OUTPATIENT
Start: 2020-08-05 | End: 2020-08-06

## 2020-08-05 RX ORDER — SODIUM CHLORIDE, SODIUM LACTATE, POTASSIUM CHLORIDE, CALCIUM CHLORIDE 600; 310; 30; 20 MG/100ML; MG/100ML; MG/100ML; MG/100ML
INJECTION, SOLUTION INTRAVENOUS CONTINUOUS
Status: DISCONTINUED | OUTPATIENT
Start: 2020-08-05 | End: 2020-08-07 | Stop reason: HOSPADM

## 2020-08-05 RX ORDER — ONDANSETRON 2 MG/ML
4 INJECTION INTRAMUSCULAR; INTRAVENOUS EVERY 30 MIN PRN
Status: DISCONTINUED | OUTPATIENT
Start: 2020-08-05 | End: 2020-08-05 | Stop reason: HOSPADM

## 2020-08-05 RX ORDER — TRANEXAMIC ACID 650 MG/1
1950 TABLET ORAL ONCE
Status: COMPLETED | OUTPATIENT
Start: 2020-08-05 | End: 2020-08-05

## 2020-08-05 RX ORDER — ALBUTEROL SULFATE 90 UG/1
2 AEROSOL, METERED RESPIRATORY (INHALATION) 4 TIMES DAILY PRN
Status: DISCONTINUED | OUTPATIENT
Start: 2020-08-05 | End: 2020-08-07 | Stop reason: HOSPADM

## 2020-08-05 RX ORDER — CELECOXIB 200 MG/1
400 CAPSULE ORAL ONCE
Status: COMPLETED | OUTPATIENT
Start: 2020-08-05 | End: 2020-08-05

## 2020-08-05 RX ORDER — SODIUM CHLORIDE, SODIUM LACTATE, POTASSIUM CHLORIDE, CALCIUM CHLORIDE 600; 310; 30; 20 MG/100ML; MG/100ML; MG/100ML; MG/100ML
INJECTION, SOLUTION INTRAVENOUS CONTINUOUS PRN
Status: DISCONTINUED | OUTPATIENT
Start: 2020-08-05 | End: 2020-08-05

## 2020-08-05 RX ORDER — ONDANSETRON 2 MG/ML
4 INJECTION INTRAMUSCULAR; INTRAVENOUS EVERY 6 HOURS PRN
Status: DISCONTINUED | OUTPATIENT
Start: 2020-08-05 | End: 2020-08-07 | Stop reason: HOSPADM

## 2020-08-05 RX ORDER — LIDOCAINE 40 MG/G
CREAM TOPICAL
Status: DISCONTINUED | OUTPATIENT
Start: 2020-08-05 | End: 2020-08-05 | Stop reason: HOSPADM

## 2020-08-05 RX ORDER — OXYCODONE HYDROCHLORIDE 5 MG/1
5-10 TABLET ORAL
Status: DISCONTINUED | OUTPATIENT
Start: 2020-08-05 | End: 2020-08-05

## 2020-08-05 RX ORDER — PRAMIPEXOLE DIHYDROCHLORIDE 1 MG/1
2 TABLET ORAL EVERY EVENING
Status: DISCONTINUED | OUTPATIENT
Start: 2020-08-05 | End: 2020-08-07 | Stop reason: HOSPADM

## 2020-08-05 RX ORDER — ACETAMINOPHEN 325 MG/1
975 TABLET ORAL EVERY 8 HOURS
Status: DISCONTINUED | OUTPATIENT
Start: 2020-08-05 | End: 2020-08-07 | Stop reason: HOSPADM

## 2020-08-05 RX ORDER — HYDROMORPHONE HYDROCHLORIDE 2 MG/1
2-4 TABLET ORAL
Status: DISCONTINUED | OUTPATIENT
Start: 2020-08-05 | End: 2020-08-07 | Stop reason: HOSPADM

## 2020-08-05 RX ORDER — LABETALOL HYDROCHLORIDE 5 MG/ML
10 INJECTION, SOLUTION INTRAVENOUS
Status: DISCONTINUED | OUTPATIENT
Start: 2020-08-05 | End: 2020-08-05 | Stop reason: HOSPADM

## 2020-08-05 RX ORDER — PROPOFOL 10 MG/ML
INJECTION, EMULSION INTRAVENOUS PRN
Status: DISCONTINUED | OUTPATIENT
Start: 2020-08-05 | End: 2020-08-05

## 2020-08-05 RX ORDER — LIDOCAINE 40 MG/G
CREAM TOPICAL
Status: DISCONTINUED | OUTPATIENT
Start: 2020-08-05 | End: 2020-08-07 | Stop reason: HOSPADM

## 2020-08-05 RX ORDER — ASPIRIN 81 MG/1
162 TABLET ORAL DAILY
Status: DISCONTINUED | OUTPATIENT
Start: 2020-08-06 | End: 2020-08-07 | Stop reason: HOSPADM

## 2020-08-05 RX ORDER — PROCHLORPERAZINE MALEATE 5 MG
5 TABLET ORAL EVERY 6 HOURS PRN
Status: DISCONTINUED | OUTPATIENT
Start: 2020-08-05 | End: 2020-08-07 | Stop reason: HOSPADM

## 2020-08-05 RX ORDER — ACETAMINOPHEN 325 MG/1
975 TABLET ORAL ONCE
Status: COMPLETED | OUTPATIENT
Start: 2020-08-05 | End: 2020-08-05

## 2020-08-05 RX ORDER — CEFAZOLIN SODIUM 1 G/50ML
1250 SOLUTION INTRAVENOUS
Status: COMPLETED | OUTPATIENT
Start: 2020-08-05 | End: 2020-08-05

## 2020-08-05 RX ORDER — HYDRALAZINE HYDROCHLORIDE 20 MG/ML
2.5-5 INJECTION INTRAMUSCULAR; INTRAVENOUS EVERY 10 MIN PRN
Status: DISCONTINUED | OUTPATIENT
Start: 2020-08-05 | End: 2020-08-05 | Stop reason: HOSPADM

## 2020-08-05 RX ORDER — POLYETHYLENE GLYCOL 3350 17 G/17G
17 POWDER, FOR SOLUTION ORAL DAILY
Status: DISCONTINUED | OUTPATIENT
Start: 2020-08-05 | End: 2020-08-07 | Stop reason: HOSPADM

## 2020-08-05 RX ORDER — LIOTHYRONINE SODIUM 5 UG/1
5 TABLET ORAL DAILY
Status: DISCONTINUED | OUTPATIENT
Start: 2020-08-06 | End: 2020-08-07 | Stop reason: HOSPADM

## 2020-08-05 RX ORDER — ONDANSETRON 4 MG/1
4 TABLET, ORALLY DISINTEGRATING ORAL EVERY 30 MIN PRN
Status: DISCONTINUED | OUTPATIENT
Start: 2020-08-05 | End: 2020-08-05 | Stop reason: HOSPADM

## 2020-08-05 RX ADMIN — HYDROMORPHONE HYDROCHLORIDE 0.5 MG: 1 INJECTION, SOLUTION INTRAMUSCULAR; INTRAVENOUS; SUBCUTANEOUS at 14:00

## 2020-08-05 RX ADMIN — FENTANYL CITRATE 50 MCG: 50 INJECTION, SOLUTION INTRAMUSCULAR; INTRAVENOUS at 14:15

## 2020-08-05 RX ADMIN — Medication 10 MG: at 14:00

## 2020-08-05 RX ADMIN — Medication 10 MG: at 15:46

## 2020-08-05 RX ADMIN — ACETAMINOPHEN 975 MG: 325 TABLET, FILM COATED ORAL at 18:55

## 2020-08-05 RX ADMIN — OMEPRAZOLE 40 MG: 20 CAPSULE, DELAYED RELEASE ORAL at 20:28

## 2020-08-05 RX ADMIN — ROCURONIUM BROMIDE 10 MG: 10 INJECTION INTRAVENOUS at 14:52

## 2020-08-05 RX ADMIN — VANCOMYCIN HYDROCHLORIDE 1250 MG: 1 INJECTION, POWDER, LYOPHILIZED, FOR SOLUTION INTRAVENOUS at 12:43

## 2020-08-05 RX ADMIN — LACOSAMIDE 200 MG: 200 TABLET, FILM COATED ORAL at 20:55

## 2020-08-05 RX ADMIN — FENTANYL CITRATE 100 MCG: 50 INJECTION, SOLUTION INTRAMUSCULAR; INTRAVENOUS at 14:30

## 2020-08-05 RX ADMIN — Medication 2 G: at 13:24

## 2020-08-05 RX ADMIN — ROCURONIUM BROMIDE 20 MG: 10 INJECTION INTRAVENOUS at 13:48

## 2020-08-05 RX ADMIN — SODIUM CHLORIDE, POTASSIUM CHLORIDE, SODIUM LACTATE AND CALCIUM CHLORIDE: 600; 310; 30; 20 INJECTION, SOLUTION INTRAVENOUS at 22:45

## 2020-08-05 RX ADMIN — HYDROMORPHONE HYDROCHLORIDE 2 MG: 2 TABLET ORAL at 17:59

## 2020-08-05 RX ADMIN — CARBOXYMETHYLCELLULOSE SODIUM 1 DROP: 5 SOLUTION/ DROPS OPHTHALMIC at 20:50

## 2020-08-05 RX ADMIN — LIDOCAINE HYDROCHLORIDE 100 MG: 20 INJECTION, SOLUTION INFILTRATION; PERINEURAL at 13:14

## 2020-08-05 RX ADMIN — PRAMIPEXOLE DIHYDROCHLORIDE 2 MG: 1 TABLET ORAL at 20:48

## 2020-08-05 RX ADMIN — FENTANYL CITRATE 50 MCG: 50 INJECTION, SOLUTION INTRAMUSCULAR; INTRAVENOUS at 17:09

## 2020-08-05 RX ADMIN — HYDROMORPHONE HYDROCHLORIDE 0.5 MG: 1 INJECTION, SOLUTION INTRAMUSCULAR; INTRAVENOUS; SUBCUTANEOUS at 16:59

## 2020-08-05 RX ADMIN — SODIUM CHLORIDE, POTASSIUM CHLORIDE, SODIUM LACTATE AND CALCIUM CHLORIDE: 600; 310; 30; 20 INJECTION, SOLUTION INTRAVENOUS at 15:56

## 2020-08-05 RX ADMIN — CYCLOSPORINE 1 DROP: 0.5 EMULSION OPHTHALMIC at 20:49

## 2020-08-05 RX ADMIN — ACETAMINOPHEN 975 MG: 325 TABLET, FILM COATED ORAL at 10:07

## 2020-08-05 RX ADMIN — HYDROMORPHONE HYDROCHLORIDE 0.5 MG: 1 INJECTION, SOLUTION INTRAMUSCULAR; INTRAVENOUS; SUBCUTANEOUS at 17:38

## 2020-08-05 RX ADMIN — CELECOXIB 400 MG: 200 CAPSULE ORAL at 10:07

## 2020-08-05 RX ADMIN — HYDROMORPHONE HYDROCHLORIDE 0.5 MG: 1 INJECTION, SOLUTION INTRAMUSCULAR; INTRAVENOUS; SUBCUTANEOUS at 16:12

## 2020-08-05 RX ADMIN — DEXAMETHASONE SODIUM PHOSPHATE 4 MG: 4 INJECTION, SOLUTION INTRAMUSCULAR; INTRAVENOUS at 13:37

## 2020-08-05 RX ADMIN — FENTANYL CITRATE 50 MCG: 50 INJECTION, SOLUTION INTRAMUSCULAR; INTRAVENOUS at 13:48

## 2020-08-05 RX ADMIN — FENTANYL CITRATE 50 MCG: 50 INJECTION, SOLUTION INTRAMUSCULAR; INTRAVENOUS at 17:14

## 2020-08-05 RX ADMIN — HYDROMORPHONE HYDROCHLORIDE 2 MG: 2 TABLET ORAL at 22:41

## 2020-08-05 RX ADMIN — HYDROMORPHONE HYDROCHLORIDE 0.5 MG: 1 INJECTION, SOLUTION INTRAMUSCULAR; INTRAVENOUS; SUBCUTANEOUS at 17:30

## 2020-08-05 RX ADMIN — ONDANSETRON 4 MG: 2 INJECTION INTRAMUSCULAR; INTRAVENOUS at 16:09

## 2020-08-05 RX ADMIN — PROPOFOL 100 MG: 10 INJECTION, EMULSION INTRAVENOUS at 13:14

## 2020-08-05 RX ADMIN — MIDAZOLAM 0.5 MG: 1 INJECTION INTRAMUSCULAR; INTRAVENOUS at 17:40

## 2020-08-05 RX ADMIN — Medication 10 MG: at 14:50

## 2020-08-05 RX ADMIN — FENTANYL CITRATE 50 MCG: 50 INJECTION, SOLUTION INTRAMUSCULAR; INTRAVENOUS at 17:25

## 2020-08-05 RX ADMIN — ROCURONIUM BROMIDE 50 MG: 10 INJECTION INTRAVENOUS at 13:14

## 2020-08-05 RX ADMIN — FENTANYL CITRATE 50 MCG: 50 INJECTION, SOLUTION INTRAMUSCULAR; INTRAVENOUS at 17:20

## 2020-08-05 RX ADMIN — DOCUSATE SODIUM AND SENNOSIDES 2 TABLET: 8.6; 5 TABLET, FILM COATED ORAL at 20:49

## 2020-08-05 RX ADMIN — PROPOFOL 30 MG: 10 INJECTION, EMULSION INTRAVENOUS at 13:19

## 2020-08-05 RX ADMIN — SODIUM CHLORIDE, POTASSIUM CHLORIDE, SODIUM LACTATE AND CALCIUM CHLORIDE: 600; 310; 30; 20 INJECTION, SOLUTION INTRAVENOUS at 13:06

## 2020-08-05 RX ADMIN — NEFAZODONE HYDROCHLORIDE 600 MG: 200 TABLET ORAL at 22:41

## 2020-08-05 RX ADMIN — HYDROMORPHONE HYDROCHLORIDE 2 MG: 2 TABLET ORAL at 20:28

## 2020-08-05 RX ADMIN — SUGAMMADEX 180 MG: 100 INJECTION, SOLUTION INTRAVENOUS at 16:40

## 2020-08-05 RX ADMIN — ROCURONIUM BROMIDE 10 MG: 10 INJECTION INTRAVENOUS at 14:20

## 2020-08-05 RX ADMIN — TRANEXAMIC ACID 1950 MG: 650 TABLET ORAL at 10:07

## 2020-08-05 RX ADMIN — HYDROMORPHONE HYDROCHLORIDE 0.5 MG: 1 INJECTION, SOLUTION INTRAMUSCULAR; INTRAVENOUS; SUBCUTANEOUS at 17:04

## 2020-08-05 ASSESSMENT — MIFFLIN-ST. JEOR: SCORE: 1403.88

## 2020-08-05 NOTE — H&P
History of Present Illness:   Mercedes is a very pleasant 65-year-old woman with a complex history related to her right hip.  Please see prior notes most recently by Dr. Landon for full history.  In brief the patient has a failed acetabular component.  She was planned to have a revision total hip replacement with Dr. Landon this week.  Unfortunately he has kidney stones and he asked if I could help with the procedure.  He spoke with the patient and she was amenable to this.  I met with the patient in the preoperative area.  She notes that she has severe pain and inability to weight-bear on the right hip.  She has otherwise been feeling well and is looking forward to proceeding with surgery.  There are no past medical history or other medical history changes when compared to the preoperative exam.         Past Medical History:     Patient Active Problem List   Diagnosis     Menopausal syndrome (hot flashes)     Family history of breast cancer     Vulvar pain     Renal anomaly     Overactive bladder     Rectal prolapse     CARDIOVASCULAR SCREENING; LDL GOAL LESS THAN 160     Vaginal pain     Dysphagia     Confusion     Headache     Left shoulder pain     Anemia     Mild major depression (H)     Esophageal stricture     Vulvar lesion     Temporal sclerosis     Lumbago     Pain in thoracic spine     Sciatica     Pain in joint, lower leg     Plantar fascial fibromatosis     Pain in joint involving ankle and foot     Other specified hypothyroidism     Gastroesophageal reflux disease without esophagitis     Irritable bowel syndrome     Other sleep apnea     Cervical high risk HPV (human papillomavirus) test positive     Restless legs syndrome (RLS)     Status post total hip replacement, right     Hip pain     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     Neuropathy of lower extremity      Rectal prolapse      Restless leg syndrome      Seizure disorder (H)     25 years ago     Sleep apnea     CPAP, no longer using CPAP     Temporal sclerosis 8/27/2012              Past Surgical History:     Past Surgical History:   Procedure Laterality Date     Anterior COLPORRHAPHY, BLADDER/VAGINA      perigee mesh     ARTHROPLASTY HIP Right 7/23/2019    Procedure: Right total hip arthroplasty;  Surgeon: Anant Mejia MD;  Location: [facility]     ARTHROPLASTY PATELLO-FEMORAL (KNEE) Bilateral      ARTHROPLASTY REVISION HIP Right 8/7/2019    Procedure: Right hip revision total arthroplasty;  Surgeon: Tom Antonio MD;  Location: [facility]     CARPAL TUNNEL RELEASE RT/LT       DENTAL SURGERY      implant     DILATION AND CURETTAGE       DRAIN PILONIDAL CYST SIMPL       ENDOSCOPY DRUG INDUCED SLEEP N/A 11/18/2015    Procedure: ENDOSCOPY DRUG INDUCED SLEEP;  Surgeon: Park Ortiz MD;  Location: [facility]     ESOPHAGOSCOPY, GASTROSCOPY, DUODENOSCOPY (EGD), COMBINED  4/5/2013    Procedure: COMBINED ESOPHAGOSCOPY, GASTROSCOPY, DUODENOSCOPY (EGD), BIOPSY SINGLE OR MULTIPLE;;  Surgeon: Mundo Mehta MD;  Location:  GI     EXCISE LESION TRUNK  8/10/2012    Procedure: EXCISE LESION TRUNK;;  Surgeon: Jerald Diggs MD;  Location: [facility]     HYSTERECTOMY       IRRIGATION AND DEBRIDEMENT HIP, COMBINED Right 8/26/2019    Procedure: 1.  Right hip  wound irrigation and debridement with excisional debridement of nonviable skin, subcutaneous tissues, and fascia. 2. Application of incisional wound VAC, 27cm length incision.;  Surgeon: Tyson Prabhakar MD;  Location: [facility]     L shoulder fracture       rectal prolapse repair abdominally       RELEASE PLANTAR FASCIA Right 1/20/2015    Procedure: RELEASE PLANTAR FASCIA;  Surgeon: Maicol Liu DPM;  Location: [facility]     REMOVE MESH VAGINA  8/10/2012    Procedure: REMOVE MESH VAGINA;  Excision of Vaginal Mesh Exposure, Removal of Skin Tag Left Inner Leg;  Surgeon: Jerald Diggs MD;  Location: [facility]     REPAIR HAMMER TOE Right 1/20/2015    Procedure: REPAIR HAMMER TOE;  Surgeon: Maicol Liu DPM;  Location: [facility]     TONSILLECTOMY & ADENOIDECTOMY       TUBAL LIGATION              Social History:     Social History     Socioeconomic History     Marital status:      Spouse name: Not on file     Number of children: Not on file     Years of education: Not on file     Highest education level: Not on file   Occupational History     Not on file   Social Needs     Financial resource strain: Not on file     Food insecurity     Worry: Not on file     Inability: Not on file     Transportation needs     Medical: Not on file     Non-medical: Not on file   Tobacco Use     Smoking status: Never Smoker     Smokeless tobacco: Never Used   Substance and Sexual Activity     Alcohol use: Yes     Frequency: Monthly or less     Comment: 1 glass of wine 2x/yr     Drug use: No     Sexual activity: Not Currently     Comment: vag hyst   Lifestyle     Physical activity     Days per week: Not on file     Minutes per session: Not on file     Stress: Not on file   Relationships     Social connections     Talks on phone: Not on file     Gets together: Not on file     Attends Gnosticism service: Not on file     Active member of club or organization: Not on file     Attends meetings of clubs or organizations: Not on file      Relationship status: Not on file     Intimate partner violence     Fear of current or ex partner: Not on file     Emotionally abused: Not on file     Physically abused: Not on file     Forced sexual activity: Not on file   Other Topics Concern     Parent/sibling w/ CABG, MI or angioplasty before 65F 55M? Not Asked   Social History Narrative     Not on file              Family History:       Family History   Problem Relation Age of Onset     Eye Disorder Mother      Lipids Mother         has CHF     Osteoporosis Mother      Diabetes Father      Alzheimer Disease Paternal Grandmother      Hypertension Brother      Alcohol/Drug Brother      Depression Brother      Gastrointestinal Disease Brother         hx of colonic polyps     Lipids Brother      Psychotic Disorder Brother      Breast Cancer Sister      Depression Sister      Lipids Sister      Thyroid Disease Sister      Congenital Anomalies Daughter      Neurologic Disorder Daughter               Medications:     Current Facility-Administered Medications   Medication     fentaNYL (PF) (SUBLIMAZE) injection 25-50 mcg     hydrALAZINE (APRESOLINE) injection 2.5-5 mg     HYDROmorphone (PF) (DILAUDID) injection 0.3-0.5 mg     labetalol (NORMODYNE/TRANDATE) injection 10 mg     naloxone (NARCAN) injection 0.1-0.4 mg     ondansetron (ZOFRAN-ODT) ODT tab 4 mg    Or     ondansetron (ZOFRAN) injection 4 mg     prochlorperazine (COMPAZINE) injection 5 mg     ropivacaine (NAROPIN) 150 mg, ketorolac (TORADOL) 15 mg, EPINEPHrine (ADRENALIN) 0.3 mg in sodium chloride 0.9 % 50 mL (ORTHO MARGIE STANDARD DOSE)     ropivacaine 200 mg, ketorolac 15 mg, EPINEPHrine 0.3 mg, morphine 2.5 mg in saline 50 mL (TV)     sodium chloride 0.9% (bag) irrigation     sodium chloride 0.9% (bottle) 500 mL with povidone-iodine (BETADINE) 5 % 30 mL sterile ophthalmic irrigation     Facility-Administered Medications Ordered in Other Encounters   Medication     dexamethasone (DECADRON) injection      "fentaNYL (PF) (SUBLIMAZE) injection     HYDROmorphone (DILAUDID) injection     ketamine (KETALAR) injection     lactated ringers infusion     lidocaine 2% injection (MDV)     ondansetron (ZOFRAN) injection     propofol (DIPRIVAN) injection 10 mg/mL vial     rocuronium injection     sugammadex (BRIDION) injection                Physical Exam:     EXAMINATION pertinent findings:   VITAL SIGNS: Blood pressure 122/85, pulse 71, temperature 98.2  F (36.8  C), temperature source Oral, resp. rate 20, height 1.651 m (5' 5\"), weight 86.3 kg (190 lb 4.1 oz), last menstrual period 03/11/2010, SpO2 97 %, not currently breastfeeding.  Body mass index is 31.66 kg/m .  GEN: AOx3, cooperative, no distress  RESP: non labored breathing   ABD: benign   SKIN: grossly normal   LYMPHATIC: grossly normal   NEURO: grossly normal   VASCULAR: satisfactory perfusion of all extremities  MUSCULOSKELETAL:   Her incision is well-healed.  She has pain with attempted right hip range of motion.  Quadricep motor and femoral nerve sensory is intact.  Ankle plantar flexion dorsiflexion is intact.  Intact sensation throughout her foot.  2+ DP pulse.             Data:   Imaging:   X-rays demonstrate failure of the acetabular component.  Prior infectious work-up is consistent with no infection.           Assessment and Plan:   Assessment:  Mercedes is a very pleasant 65-year-old woman with failure of an acetabular component.  She has extensive acetabular bone loss.  However the posterior and anterior column seem to be intact.  I do long discussion with her and her daughter regarding ongoing management.  We discussed surgical treatment in detail.  We discussed the risks and benefits of surgery.  We discussed the complexity of her case given the loss of the bone and the relatively high risk of complications including component failure, fracture, blood loss, neurovascular injury, dislocation, the possible need for further revision and infection.  We also " discussed the other risks of medical complications.  Based on this discussion we believe the benefits of surgery outweigh the risks and the patient would like to proceed with surgery.  Informed consent was obtained.      Pan Grey M.D.     Arthritis and Joint Replacement  Department of Orthopaedic Surgery, [facility]  [email or web address]  [phone] (pager)           Review of Systems:

## 2020-08-05 NOTE — ANESTHESIA PREPROCEDURE EVALUATION
Anesthesia Pre-Procedure Evaluation    Patient: Mercedes Barber   MRN:     [id number] Gender:   female   Age:    65 year old :      1955        Preoperative Diagnosis: Mechanical complication of prosthetic hip implant, initial encounter (H) [T84.180L, Z96.435]   Procedure(s):  Revision Right total hip arthroplasty     LABS:  CBC:   Lab Results   Component Value Date    WBC 6.2 2020    WBC 6.7 2020    HGB 13.3 2020    HGB 12.0 2020    HCT 42.1 2020    HCT 37.5 2020     2020     2020     BMP:   Lab Results   Component Value Date     2020     10/23/2019    POTASSIUM 4.3 2020    POTASSIUM 3.5 10/23/2019    CHLORIDE 107 2020    CHLORIDE 108 10/23/2019    CO2 27 2020    CO2 25 10/23/2019    BUN 14 2020    BUN 13 10/23/2019    CR 0.81 2020    CR 0.73 10/23/2019     (H) 2020     (H) 10/23/2019     COAGS:   Lab Results   Component Value Date    PTT 34 2019    INR 1.08 2019     POC:   Lab Results   Component Value Date    HCG Negative 10/17/2007     OTHER:   Lab Results   Component Value Date    LACT 0.7 2012    A1C 5.6 2011    SABA 9.4 2020    PHOS 4.1 2009    MAG 2.1 2019    ALBUMIN 4.0 10/23/2019    PROTTOTAL 7.4 10/23/2019    ALT 16 10/23/2019    AST 15 10/23/2019    ALKPHOS 83 10/23/2019    BILITOTAL 0.3 10/23/2019    LIPASE 87 2019    TSH 0.48 2019    T4 0.81 2017    CRP 4.7 2020    SED 8 2020        Preop Vitals    BP Readings from Last 3 Encounters:   20 104/80   20 101/60   20 120/76    Pulse Readings from Last 3 Encounters:   20 82   20 54   20 78      Resp Readings from Last 3 Encounters:   20 14   20 15   20 20    SpO2 Readings from Last 3 Encounters:   20 98%   20 90%   20 95%      Temp Readings from Last 1 Encounters:   20  "36.9  C (98.5  F) (Tympanic)    Ht Readings from Last 1 Encounters:   07/31/20 1.651 m (5' 5\")      Wt Readings from Last 1 Encounters:   07/31/20 85.3 kg (188 lb)    Estimated body mass index is 31.28 kg/m  as calculated from the following:    Height as of 7/31/20: 1.651 m (5' 5\").    Weight as of 7/31/20: 85.3 kg (188 lb).     LDA:  PICC Single Lumen 08/25/19 Right (Active)   Number of days: 345       External Urinary Catheter (Active)   Number of days: 361        Past Medical History:   Diagnosis Date     Arthritis     Thumb     Cervical high risk HPV (human papillomavirus) test positive 07/17/2017 07/17/17: NIL pap, + HR HPV (not 16 or 18) result.      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     Neuropathy of lower extremity      Rectal prolapse      Restless leg syndrome      Seizure disorder (H)     25 years ago     Sleep apnea     CPAP, no longer using CPAP     Temporal sclerosis 8/27/2012      Past Surgical History:   Procedure Laterality Date     Anterior COLPORRHAPHY, BLADDER/VAGINA      perigee mesh     ARTHROPLASTY HIP Right 7/23/2019    Procedure: Right total hip arthroplasty;  Surgeon: Anant Mejia MD;  Location: [facility]     ARTHROPLASTY PATELLO-FEMORAL (KNEE) Bilateral      ARTHROPLASTY REVISION HIP Right 8/7/2019    Procedure: Right hip revision total arthroplasty;  Surgeon: Tom Antonio MD;  Location: [facility]     CARPAL TUNNEL RELEASE RT/LT       DENTAL SURGERY      implant     DILATION AND CURETTAGE       DRAIN PILONIDAL CYST SIMPL       ENDOSCOPY DRUG INDUCED SLEEP N/A 11/18/2015    Procedure: ENDOSCOPY DRUG INDUCED SLEEP;  Surgeon: Park Ortiz MD;  Location: [facility]     ESOPHAGOSCOPY, GASTROSCOPY, DUODENOSCOPY (EGD), COMBINED  4/5/2013    Procedure: COMBINED " "ESOPHAGOSCOPY, GASTROSCOPY, DUODENOSCOPY (EGD), BIOPSY SINGLE OR MULTIPLE;;  Surgeon: Mundo Mehta MD;  Location:  GI     EXCISE LESION TRUNK  8/10/2012    Procedure: EXCISE LESION TRUNK;;  Surgeon: Jerald Diggs MD;  Location: [facility]     HYSTERECTOMY       IRRIGATION AND DEBRIDEMENT HIP, COMBINED Right 8/26/2019    Procedure: 1.  Right hip wound irrigation and debridement with excisional debridement of nonviable skin, subcutaneous tissues, and fascia. 2. Application of incisional wound VAC, 27cm length incision.;  Surgeon: Tyson Prabhakar MD;  Location: [facility]     L shoulder fracture       rectal prolapse repair abdominally       RELEASE PLANTAR FASCIA Right 1/20/2015    Procedure: RELEASE PLANTAR FASCIA;  Surgeon: Maicol Liu DPM;  Location: [facility]     REMOVE MESH VAGINA  8/10/2012    Procedure: REMOVE MESH VAGINA;  Excision of Vaginal Mesh Exposure, Removal of Skin Tag Left Inner Leg;  Surgeon: Jerald Diggs MD;  Location:  OR     REPAIR HAMMER TOE Right 1/20/2015    Procedure: REPAIR HAMMER TOE;  Surgeon: Maicol Liu DPM;  Location: [facility]     TONSILLECTOMY & ADENOIDECTOMY       TUBAL LIGATION        Allergies   Allergen Reactions     Blood Transfusion Related (Informational Only) Other (See Comments)     Patient has a history of a clinically significant antibody against RBC antigens.  A delay in compatible RBCs may occur.     Clonopin [Clonazepam]      \"Walk funny and Mind goes funny\"     Encort [Hydrocortisone Acetate] Swelling     Feet swelled.     Encort [Hydrocortisone] Swelling     Erythromycin Diarrhea     Flagyl [Metronidazole] Nausea     Gabapentin      Over eats     Lamictal [Lamotrigine]      Oxycodone Nausea and Nausea and Vomiting     Tegretol [Carbamazepine] Nausea and Vomiting        Anesthesia Evaluation     .             ROS/MED HX    ENT/Pulmonary:     (+)sleep apnea, , . .    Neurologic:     (+)neuropathy - BL LE, other neuro Neuropathic seizures (none in 25 " years?)    Cardiovascular:     (+) ----. : . . MORGAN, . :. . Previous cardiac testing Echodate:12/19results:Interpretation Summary     Left ventricular systolic function is normal.  The visual ejection fraction is estimated at 55-60%.  Right ventricle systolic pressure estimate normal  The right ventricular systolic function is normal.  The ascending aorta is Mildly dilated.  The study was technically adequate. There is no comparison study available.date: results:ECG reviewed date: results:NSR date: results:         (-) hypertension, CAD and CHF   METS/Exercise Tolerance:     Hematologic:     (+) History of blood clots pt is not anticoagulated, -      Musculoskeletal:   (+) arthritis,  -       GI/Hepatic:     (+) GERD Asymptomatic on medication, Inflammatory bowel disease, Other GI/Hepatic esophageal stricture      Renal/Genitourinary:  - ROS Renal section negative       Endo:     (+) thyroid problem hypothyroidism, .      Psychiatric:     (+) psychiatric history depression      Infectious Disease:   (+) MRSA,       Malignancy:      - no malignancy   Other:                         PHYSICAL EXAM:   Mental Status/Neuro: A/A/O   Airway: Facies: Feasible  Mallampati: II  Mouth/Opening: Full  TM distance: > 6 cm  Neck ROM: Full   Respiratory: Auscultation: CTAB     Resp. Rate: Normal     Resp. Effort: Normal      CV: Rhythm: Regular  Rate: Age appropriate  Heart: Normal Sounds  Edema: None   Comments:      Dental: Normal Dentition                Assessment:   ASA SCORE: 3    H&P: History and physical reviewed and following examination; no interval change.   Smoking Status:  Non-Smoker/Unknown   NPO Status: NPO Appropriate     Plan:   Anes. Type:  Spinal; MAC   Pre-Medication: Acetaminophen   Induction:  N/a   Airway: Native Airway   Access/Monitoring: PIV   Maintenance: N/a     Postop Plan:   Postop Pain: Regional  Postop Sedation/Airway: Not planned  Disposition: Inpatient/Admit     PONV Management:   Adult Risk Factors:  Female, Non-Smoker   Prevention: Ondansetron     CONSENT: Direct conversation   Plan and risks discussed with: Patient   Blood Products: Consent Deferred (Minimal Blood Loss)                   Sandra Gonzalez MD

## 2020-08-05 NOTE — PROGRESS NOTES
08/05/20 1110   Values Beliefs and Spiritual Care   C: Community: In support of your spiritual health, is there someone we may contact for you? (identify all that apply)   (shs/8.5)   Visit Information   Visit Made By Staff    Type of Visit On-call;Surgical   Interventions   Basic Spiritual Interventions    introduction/orientation to Spiritual Health Services;Prayer     SPIRITUAL HEALTH SERVICES  [facility] ([facility]) Pre-Op   PRE-SURGERY VISIT    Had pre-surgery visit with pt. and daughter, Ciara.  Provided spiritual support, prayer.     Amanda Greene MDiv.    Pager [phone]

## 2020-08-05 NOTE — OP NOTE
Orthopaedic Surgery Brief Op-Note      Patient: Mercedes Barber  : 1955  Date of Service: 2020 4:58 PM    Pre-operative Diagnosis: Mechanical complication of prosthetic hip implant, initial encounter (H) [T84.282R, Z96.715]  Post-operative Diagnosis: same    Procedure(s) Performed: Procedure(s):  Revision Right total hip arthroplasty    Staff: Pan Grey MD  Assistants:   Isaias Castaneda MD       Anesthesia: General  EBL: 450 cc    Implants:   Implant Name Type Inv. Item Serial No.  Lot No. LRB No. Used Action   IMP HEAD FEMORAL STRK BIOLOX DELTA CERAMIC 28MM 0MM Total Joint Component/Insert IMP HEAD FEMORAL STRK BIOLOX DELTA CERAMIC 28MM 0MM  [employer] [id number] Right 1 Explanted   IMP INSERT ACET STRK MDM COCR HIP 0DEG 46MM [facility] [id number] Total Joint Component/Insert IMP INSERT ACET STRK MDM COCR HIP 0DEG 46MM [facility] 6246F  [employer] [id number] Right 1 Explanted   IMP INSERT ACET STRK RSTRTN HIP ADM X3 [id number] 1236-2-852 Total Joint Component/Insert IMP INSERT ACET STRK RSTRTN HIP ADM X3 [id number] 1236-2-852  VIVIANA Chromatin [id number] Right 1 Explanted   Broken Portion of 4 screws      Right 1 Explanted   GRAFT BONE CRUSH CANC 15ML [id number] Bone/Tissue/Biologic GRAFT BONE CRUSH CANC 15ML [id number] MUSCULOSKELETAL CAMPBELL  Right 1 Implanted   GRAFT BONE CRUSH CANC 30ML [id number] Bone/Tissue/Biologic GRAFT BONE CRUSH CANC 30ML [id number] MUSCULOSKELETAL CAMPBELL  Right 1 Implanted   G7 OsseoTi Acetabular Shell 6mm     BIOMET [id number] Right 1 Implanted   IMP SCR ZIM 6.5X40MM ACET CUP SELF TAP -146-40 Metallic Hardware/[place] IMP SCR ZIM 6.5X40MM ACET CUP SELF TAP -466-40  [employer] [id number] Right 1 Implanted   IMP SCR ZIM 6.5X20MM ACET CUP SELF TAP -287-20 Metallic Hardware/[place] IMP SCR ZIM 6.5X20MM ACET CUP SELF TAP -328-20  [employer] [id number] Right 1 Implanted   IMP SCR ZIM 6.5X35MM ACET CUP SELF TAP  -909-35 Metallic Hardware/[place] IMP SCR ZIM 6.5X35MM ACET CUP SELF TAP -909-35  [employer] [id number] Right 1 Implanted   IMP SCR ZIM 6.5X30MM ACET CUP SELF TAP -435-30 Metallic Hardware/[place] IMP SCR ZIM 6.5X30MM ACET CUP SELF TAP -664-30  [employer] [id number] Right 1 Implanted   G7 Acetabular System Dual Mobility Acetabular Liner 54mm Bearing Size I Liner Size    BIOMET [id number] Right 1 Implanted   Active Articulation Hip System Dual Mobility Bearing E1 Antioxidant Infused 28mm Head Size, 54mm Bearing Size    BIOMET [id number] Right 1 Implanted   V-40/C-Taper Adapter Sleeve     VIVIANA [id number] Right 1 Implanted   BIOLOX Delta Ceramic C-Taper Femoral Head     VIVIANA [id number] Right 1 Implanted        Drains: 1 Hemevac, deep   Intra-op Labs/Cxs/Specimens: Synovial Tissue Culture  Complications: No apparent complications during procedure  Findings: Please see dictated operative note for details    Palpable dorsalis pedis pulse prior to leaving the OR.    Assessment/Plan:   Mercedes Barber is a 65 year old female s/p Procedure(s):  Revision Right total hip arthroplasty on 8/5/2020 with Dr. Grey    Activity: Up with assist.  Weight bearing status: Toe Touch Weight Bearing Right Lower Extremity  Antibiotics: Vancomycin x 24 Hrs  Diet: Begin with clear fluids and progress diet as tolerated.  Bowel regimen. Anti-emetics PRN.    DVT prophylaxis: aspirin 162mg Q24  Dressing: leave in place for 7-9 days post op  Drains: Monitor output; record each shift  Pain management: transition from IV to orals as tolerated.    Physical Therapy/Occupational Therapy  Consults: Medical Management  Follow-up: Clinic with Dr. Grey in 2 weeks  Isaias Castaneda DO  Adult Joint Reconstruction Fellow  Dept Orthopaedic Surgery, [facility]  [phone] Pager [phone] Office

## 2020-08-05 NOTE — OP NOTE
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  POSTOPERATIVE DIAGNOSIS: Same as pre-op.  PROCEDURE: Revision right total hip arthroplasty - acetabular component and femoral head  DATE OF SURGERY: 8/5/2020   ASSISTANTS:   Isaias Castaneda MD    COMPONENTS USED:  1. Biomet G7 OsseoTi Multihole Cup Size 66  2. Biomet DM Liner  With biomet DM bearing  3. Ernesto 28+5 ceramic head with taper Femoral Head  3. Retained accolade Femoral Component    ANESTHESIA: General   COMPLICATIONS: None  EBL: 400cc    FINDINGS:  The soft tissues and bone quality were good.    Soft tissue cultures were sent.  There was no evidence of purulence or infection.  The acetabular component was grossly loose.  The 4 broken screws were retained.  I thought that attempt at removal would further compromise her limited residual bone.  The component was removed without complication.  The bone quality was adequate with intact superior, anterior, posterior rim.  A new acetabular component was placed and had good provisional press fit.  It was further secured with 4 screws that had excellent purchase.  After placement of the final components the hip was stable to full extension and ER, Flexion > 90 and IR to 60 at 90 degrees of flexion and neutral abduction.      INDICATIONS:   Mercedes Barber is a 65 year old female with a failed right total hip arthroplasty.  that the discussion, the patient elected to proceed with total hip arthroplasty.  Please see prior notes for full details of her history.  We had a long discussion regarding the risks and benefits of revision surgery and the complexity of her case.  Based on the discussion we believe that the benefits outweigh the risks and the patient would like to proceed with surgery.    DESCRIPTION OF PROCEDURE: We met the patient in the preoperative area.  There we again reviewed the risks, benefits, and alternatives to revision total hip arthroplasty.   Informed written consent was obtained.  The operative hip was marked with an indelible marker after patient confirmation of the operative site.  All the patient's questions were answered.  The patient was taken to the operating room and underwent induction of  General  anesthesia.  The patient was placed on the operating table in the lateral decubitus position with the operative site up.  Bony prominences were well padded and was secured to the table with the Wixon positioner. The patient was prepped and draped in the normal sterile fashion.      A timeout was performed in which the patient's name, MRN, imaging, preoperative plan and laterality was reviewed.  We also confirmed that the patient received the appropriate preoperative IV antibiotics including vanco given her MRSA.    We performed a posterior approach using the prior incision, which was excised.  We carefully dissected through the skin and identified the IT band.  The IT band was incised, which identified the bursa.  The posterior capsule was previously repaired and was intact.  There was clear synovial fluid within the hip joint and no signs concerning for infection.  THere was evidence of metallosis and black tissue staining.  This was excised.   A large anterior scar excision was performed with the hip reduced.  Surrounding scar tissue was sent for culture.  Superior pouch was made for subluxation of the femoral component anteriorly. The hip was dislocated and the femoral head was removed without complication.  The trunnion was noted to be clear without evidence of corrosion.  The scar tissue surrounding the femoral component was removed back to the bone implant interface.  The bone implant interface was carefully examined and the femoral component was noted to be well fixed without any evidence of loosening.       We then proceeded with the acetabular exposure.  Remaining inferior and anterior scar tissue was removed.  Acetabular retractors were  placed and we had excellent exposure circumferentially of the acetabular component.  The component was examined and noted to be loose.  The liner was removed without complication.  We identified the 4 previously placed screws.  The screw heads of all 4 screws were removed.  All 4 screws were broken and the remainder of the screws were lodged within the acetabular bone.  I considered attempting to remove these.  However I thought that attempting to do so would further compromise her limited residual bone and could result in fracture and elected therefore to leave them.  The acetabular component was removed with essentially no additional bone loss.        At this point, we carefully examined the remaining acetabular bone stock.  The patient was noted to have adequate superior coverage, adequate anterior wall, adequate posterior wall, and no residual medial wall.  The prior bone graft was removed and culture was sent.  I carefully evaluated the residual bone stock.  There was no evidence of a discontinuity.  I considered using a cup cage construct.  Heart however, given her relatively young age I thought it ingrowth fixation would be adequate.  She also was noted to have excellent rim fit and I thought that by putting the cup in a more slightly vertical position would limit the risk of further protrusio and subsequent failure.  I also thought that using a larger cup would help limit this risk.  We carefully reamed up to a size 65 at which point we had excellent very solid rim fit and there was no possibility of the cup subluxing into the pelvis through the rim.  We bone grafted the medial space and a size 66 cup was placed.  I placed it slightly more vertical to minimize the risk of it protruding medially.  We are able to impacted the cup quite firmly without any evidence of further medial subluxation.  Is very happy with the provisional press-fit of the acetabular component.  We then placed 4 additional screws 1 of  which had fixation into the ischium.  A trial component was placed and intraoperative x-ray was obtained.  I was very happy with the position and fixation of the cup.  The hip was dislocated and the trial was removed.  Due to the revision nature of this case and high risk of dislocation I elected to use a dual mobility liner.  The liner was impacted.      At this point, we again examined the femur and it was noted to be well fixed.  We trialed with multiple head length options.  The +5 head was noted to be stable beyond 90 degrees as well as beyond 60 degrees of internal rotation with the hip at 90 of flexion and neutral abduction without any subluxation or dislocation of the hip.  It was also stable in the sleep position as well as full extension and external rotation without any posterior impingement.  At this point, we were happy with the construct.  The trial was removed.  The socket was irrigated.  The final 28+5/54 head was placed. The hip was reduced.  Again the hip was ranged and noted to be very stable as above.      The IT band was closed with #1 stratafix.  The deep layers were closed with #0 PDS and 2-0 stratafix.   The skin was closed with 2-0 nylon.  A sterile bandage was placed.  The patient was extubated, positioned in a hip abduction brace and transferred to the PACU in stable condition.        POSTOPERATIVE PLAN:    Weight bearing: TTWB x 6 weeks  ASA for DVT ppx  Abduction brace when OOB

## 2020-08-05 NOTE — ANESTHESIA CARE TRANSFER NOTE
Patient: Mercedes Barber    Procedure(s):  Revision Right total hip arthroplasty    Diagnosis: Mechanical complication of prosthetic hip implant, initial encounter (H) [T84.208A, Z96.649]  Diagnosis Additional Information: No value filed.    Anesthesia Type:   General     Note:  Airway :Face Mask  Patient transferred to:PACU  Handoff Report: Identifed the Patient, Identified the Reponsible Provider, Reviewed the pertinent medical history, Discussed the surgical course, Reviewed Intra-OP anesthesia mangement and issues during anesthesia, Set expectations for post-procedure period and Allowed opportunity for questions and acknowledgement of understanding      Vitals: (Last set prior to Anesthesia Care Transfer)    CRNA VITALS  8/5/2020 1623 - 8/5/2020 1700      8/5/2020             Resp Rate (observed):  (!) 1                Electronically Signed By: FROYLAN Aldana CRNA  August 5, 2020  5:00 PM

## 2020-08-05 NOTE — OR NURSING
"PACU to Inpatient Nursing Handoff    Patient Mercedes Barber is a 65 year old female who speaks English.   Procedure Procedure(s):  Revision Right total hip arthroplasty   Surgeon(s) Primary: Pan Grey MD  Resident - Assisting: Sandra Gonzalez MD     Allergies   Allergen Reactions     Blood Transfusion Related (Informational Only) Other (See Comments)     Patient has a history of a clinically significant antibody against RBC antigens.  A delay in compatible RBCs may occur.     Clonopin [Clonazepam]      \"Walk funny and Mind goes funny\"     Encort [Hydrocortisone Acetate] Swelling     Feet swelled.     Encort [Hydrocortisone] Swelling     Erythromycin Diarrhea     Flagyl [Metronidazole] Nausea     Gabapentin      Over eats     Lamictal [Lamotrigine]      Oxycodone Nausea and Nausea and Vomiting     Tegretol [Carbamazepine] Nausea and Vomiting       Isolation  [unfilled]     Past Medical History   has a past medical history of Arthritis, Cervical high risk HPV (human papillomavirus) test positive (07/17/2017), Cervical pain (9/15/2016), Constipation (8/27/2012), Diarrhea (4/30/2009), Esophageal stricture (2/21/2012), Gastro-oesophageal reflux disease, Hypothyroidism (9/18/2012), IBS (irritable bowel syndrome), Mild major depression (H) (2/21/2012), Neuropathy of lower extremity, Rectal prolapse, Restless leg syndrome, Seizure disorder (H), Sleep apnea, and Temporal sclerosis (8/27/2012).    Anesthesia General   Dermatome Level     Preop Meds acetaminophen (Tylenol) - time given: 1007  celecoxib (Celebrex) - time given: 1007   Nerve block Not applicable   Intraop Meds Fentanyl 200 mcg  zofran 4 mg at 1609  Decadron 4 at 1337  Ketamine 30 mg at 1546     Local Meds Yes - Local Cocktail (morphine, ropivacaine, epinephrine, Toradol)   Antibiotics cefazolin (Ancef) - last given at 1324     Pain Patient Currently in Pain: yes('medium' amount per pt.)  Comfort: intolerable  Pain Control: partially " effective   PACU meds  fentanyl (Sublimaze): 200 mcg (total dose) last given at 1725   hydromorphone (Dilaudid): 2.0 mg (total dose) last given at 1738   versed 0.5 mg at 1740   Hydromorphone 2 mg PO at 1805   PCA / epidural No   Capnography     Telemetry ECG Rhythm: Normal sinus rhythm   Inpatient Telemetry Monitor Ordered? No        Labs Glucose Lab Results   Component Value Date     07/31/2020       Hgb Lab Results   Component Value Date    HGB 13.3 07/31/2020       INR Lab Results   Component Value Date    INR 1.08 09/06/2019      PACU Imaging Completed     Wound/Incision Wound 08/30/19 Coccyx Other (comment) blanchable redness to inner buttock (Active)   Number of days: 341       Rash 08/26/19 1848 labia patch (Active)   Number of days: 345       Incision/Surgical Site 08/07/19 Right Hip (Active)   Number of days: 364       Incision/Surgical Site 08/05/20 Right Hip (Active)   Incision Assessment UTV 08/05/20 1750   Mallory-Incision Assessment UTV 08/05/20 1750   Closure SUSAN 08/05/20 1750   Incision Drainage Amount None 08/05/20 1750   Dressing Intervention Clean, dry, intact 08/05/20 1750   Number of days: 0      CMS        Equipment ice pack and abductor pillow   Other LDA       IV Access Peripheral IV 08/05/20 Right Lower forearm (Active)   Site Assessment WDL 08/05/20 1745   Line Status Infusing 08/05/20 1745   Phlebitis Scale 0-->no symptoms 08/05/20 1745   Infiltration Scale 0 08/05/20 1745   Infiltration Site Treatment Method  None 08/05/20 1053   Extravasation? No 08/05/20 1053   Dressing Intervention New dressing  08/05/20 1053   Number of days: 0       Peripheral IV 08/05/20 Left Hand (Active)   Site Assessment WDL 08/05/20 1745   Line Status Saline locked 08/05/20 1745   Phlebitis Scale 0-->no symptoms 08/05/20 1745   Infiltration Scale 0 08/05/20 1745   Number of days: 0       PICC Single Lumen 08/25/19 Right (Active)   Number of days: 346      Blood Products Not applicable    mL    Intake/Output Date 08/05/20 0700 - 08/06/20 0659   Shift 1015-4874 0607-6328 9541-6259 24 Hour Total   INTAKE   I.V. 700 800  1500   Shift Total(mL/kg) 700(8.11) 800(9.27)  1500(17.38)   OUTPUT   Urine 100 100  200   Blood  400  400   Shift Total(mL/kg) 100(1.16) 500(5.79)  600(6.95)   Weight (kg) 86.3 86.3 86.3 86.3      Drains / Vieyra Urethral Catheter Latex;Straight-tip 16 fr (Active)   Tube Description UTV 08/05/20 1655   Collection Container Standard 08/05/20 1655   Securement Method Securing device (Describe) 08/05/20 1655   Number of days: 0       External Urinary Catheter (Active)   Number of days: 362      Time of void PreOp Void Prior to Procedure: 1230 (08/05/20 1232)    PostOp      Diapered? No   Bladder Scan     PO    tolerating sips     Vitals    B/P: 128/74  T: 97  F (36.1  C)    Temp src: Temporal  P:  Pulse: 58 (08/05/20 1745)    Heart Rate: 63 (08/05/20 1745)     R: 9  O2:  SpO2: 99 %    O2 Device: Nasal cannula (08/05/20 1730)    Oxygen Delivery: 2 LPM (08/05/20 1730)         Family/support present no one here   Patient belongings     Patient transported on bed   DC meds/scripts (obs/outpt) Not applicable   Inpatient Pain Meds Released? Yes       Special needs/considerations None   Tasks needing completion None       Sandra Jackson RN  ASCOM [id number]

## 2020-08-06 ENCOUNTER — APPOINTMENT (OUTPATIENT)
Dept: PHYSICAL THERAPY | Facility: CLINIC | Age: 65
DRG: 467 | End: 2020-08-06
Attending: ORTHOPAEDIC SURGERY
Payer: MEDICARE

## 2020-08-06 ENCOUNTER — APPOINTMENT (OUTPATIENT)
Dept: OCCUPATIONAL THERAPY | Facility: CLINIC | Age: 65
DRG: 467 | End: 2020-08-06
Attending: ORTHOPAEDIC SURGERY
Payer: MEDICARE

## 2020-08-06 VITALS
SYSTOLIC BLOOD PRESSURE: 110 MMHG | DIASTOLIC BLOOD PRESSURE: 65 MMHG | WEIGHT: 190.26 LBS | RESPIRATION RATE: 16 BRPM | BODY MASS INDEX: 31.7 KG/M2 | HEIGHT: 65 IN | TEMPERATURE: 97.6 F | OXYGEN SATURATION: 98 % | HEART RATE: 68 BPM

## 2020-08-06 LAB
ANION GAP SERPL CALCULATED.3IONS-SCNC: 4 MMOL/L (ref 3–14)
BUN SERPL-MCNC: 11 MG/DL (ref 7–30)
CALCIUM SERPL-MCNC: 8.2 MG/DL (ref 8.5–10.1)
CHLORIDE SERPL-SCNC: 107 MMOL/L (ref 94–109)
CO2 SERPL-SCNC: 27 MMOL/L (ref 20–32)
CREAT SERPL-MCNC: 0.83 MG/DL (ref 0.52–1.04)
GFR SERPL CREATININE-BSD FRML MDRD: 74 ML/MIN/{1.73_M2}
GLUCOSE SERPL-MCNC: 127 MG/DL (ref 70–99)
HGB BLD-MCNC: 10.8 G/DL (ref 11.7–15.7)
POTASSIUM SERPL-SCNC: 4.5 MMOL/L (ref 3.4–5.3)
SODIUM SERPL-SCNC: 138 MMOL/L (ref 133–144)

## 2020-08-06 PROCEDURE — 97530 THERAPEUTIC ACTIVITIES: CPT | Mod: GO | Performed by: OCCUPATIONAL THERAPIST

## 2020-08-06 PROCEDURE — 25000128 H RX IP 250 OP 636: Performed by: STUDENT IN AN ORGANIZED HEALTH CARE EDUCATION/TRAINING PROGRAM

## 2020-08-06 PROCEDURE — 12000001 ZZH R&B MED SURG/OB UMMC

## 2020-08-06 PROCEDURE — 85018 HEMOGLOBIN: CPT | Performed by: STUDENT IN AN ORGANIZED HEALTH CARE EDUCATION/TRAINING PROGRAM

## 2020-08-06 PROCEDURE — 25000132 ZZH RX MED GY IP 250 OP 250 PS 637: Mod: GY | Performed by: INTERNAL MEDICINE

## 2020-08-06 PROCEDURE — 25000132 ZZH RX MED GY IP 250 OP 250 PS 637: Mod: GY | Performed by: STUDENT IN AN ORGANIZED HEALTH CARE EDUCATION/TRAINING PROGRAM

## 2020-08-06 PROCEDURE — L2624 HIP ADJ FLEX EXT ABDUCT CONT: HCPCS

## 2020-08-06 PROCEDURE — 25800030 ZZH RX IP 258 OP 636: Performed by: ORTHOPAEDIC SURGERY

## 2020-08-06 PROCEDURE — 97116 GAIT TRAINING THERAPY: CPT | Mod: GP | Performed by: PHYSICAL THERAPIST

## 2020-08-06 PROCEDURE — 97161 PT EVAL LOW COMPLEX 20 MIN: CPT | Mod: GP | Performed by: PHYSICAL THERAPIST

## 2020-08-06 PROCEDURE — 80048 BASIC METABOLIC PNL TOTAL CA: CPT | Performed by: STUDENT IN AN ORGANIZED HEALTH CARE EDUCATION/TRAINING PROGRAM

## 2020-08-06 PROCEDURE — L1686 HO POST-OP HIP ABDUCTION: HCPCS

## 2020-08-06 PROCEDURE — 97530 THERAPEUTIC ACTIVITIES: CPT | Mod: GP | Performed by: PHYSICAL THERAPIST

## 2020-08-06 PROCEDURE — 36415 COLL VENOUS BLD VENIPUNCTURE: CPT | Performed by: STUDENT IN AN ORGANIZED HEALTH CARE EDUCATION/TRAINING PROGRAM

## 2020-08-06 PROCEDURE — L2830 SOFT INTERFACE ABOVE KNEE SE: HCPCS

## 2020-08-06 PROCEDURE — 97535 SELF CARE MNGMENT TRAINING: CPT | Mod: GO | Performed by: OCCUPATIONAL THERAPIST

## 2020-08-06 PROCEDURE — 99232 SBSQ HOSP IP/OBS MODERATE 35: CPT | Performed by: INTERNAL MEDICINE

## 2020-08-06 PROCEDURE — 97165 OT EVAL LOW COMPLEX 30 MIN: CPT | Mod: GO | Performed by: OCCUPATIONAL THERAPIST

## 2020-08-06 PROCEDURE — 25000128 H RX IP 250 OP 636: Performed by: ORTHOPAEDIC SURGERY

## 2020-08-06 RX ORDER — POLYETHYLENE GLYCOL 3350 17 G/17G
17 POWDER, FOR SOLUTION ORAL DAILY
Qty: 7 PACKET | Refills: 0 | Status: SHIPPED | OUTPATIENT
Start: 2020-08-06 | End: 2020-08-06

## 2020-08-06 RX ORDER — ACETAMINOPHEN 325 MG/1
650 TABLET ORAL EVERY 4 HOURS PRN
Qty: 1 BOTTLE | Refills: 0 | DISCHARGE
Start: 2020-08-08 | End: 2023-02-20

## 2020-08-06 RX ORDER — HYDROMORPHONE HYDROCHLORIDE 2 MG/1
2-4 TABLET ORAL EVERY 4 HOURS PRN
Qty: 30 TABLET | Refills: 0 | Status: SHIPPED | DISCHARGE
Start: 2020-08-06 | End: 2020-08-10

## 2020-08-06 RX ORDER — ACETAMINOPHEN 325 MG/1
650 TABLET ORAL EVERY 4 HOURS PRN
Qty: 1 BOTTLE | Refills: 0 | Status: SHIPPED | OUTPATIENT
Start: 2020-08-08 | End: 2020-08-06

## 2020-08-06 RX ORDER — HYDROMORPHONE HYDROCHLORIDE 2 MG/1
2-4 TABLET ORAL EVERY 4 HOURS PRN
Qty: 30 TABLET | Refills: 0 | Status: SHIPPED | OUTPATIENT
Start: 2020-08-06 | End: 2020-08-06

## 2020-08-06 RX ORDER — QUETIAPINE FUMARATE 50 MG/1
50 TABLET, FILM COATED ORAL PRN
Status: ON HOLD | COMMUNITY
End: 2020-08-07

## 2020-08-06 RX ORDER — AMOXICILLIN 250 MG
2 CAPSULE ORAL 2 TIMES DAILY
Qty: 30 TABLET | Refills: 0 | Status: SHIPPED | OUTPATIENT
Start: 2020-08-06 | End: 2020-08-06

## 2020-08-06 RX ORDER — AMOXICILLIN 250 MG
2 CAPSULE ORAL 2 TIMES DAILY
Qty: 30 TABLET | Refills: 0 | DISCHARGE
Start: 2020-08-06 | End: 2021-06-30

## 2020-08-06 RX ORDER — POLYETHYLENE GLYCOL 3350 17 G/17G
17 POWDER, FOR SOLUTION ORAL DAILY
Qty: 7 PACKET | Refills: 0 | DISCHARGE
Start: 2020-08-06 | End: 2020-08-14

## 2020-08-06 RX ADMIN — HYDROMORPHONE HYDROCHLORIDE 4 MG: 2 TABLET ORAL at 15:08

## 2020-08-06 RX ADMIN — HYDROMORPHONE HYDROCHLORIDE 4 MG: 2 TABLET ORAL at 02:05

## 2020-08-06 RX ADMIN — HYDROMORPHONE HYDROCHLORIDE 4 MG: 2 TABLET ORAL at 08:48

## 2020-08-06 RX ADMIN — CARBOXYMETHYLCELLULOSE SODIUM 1 DROP: 5 SOLUTION/ DROPS OPHTHALMIC at 11:28

## 2020-08-06 RX ADMIN — HYDROMORPHONE HYDROCHLORIDE 4 MG: 2 TABLET ORAL at 11:40

## 2020-08-06 RX ADMIN — DOCUSATE SODIUM AND SENNOSIDES 2 TABLET: 8.6; 5 TABLET, FILM COATED ORAL at 11:28

## 2020-08-06 RX ADMIN — CARBOXYMETHYLCELLULOSE SODIUM 1 DROP: 5 SOLUTION/ DROPS OPHTHALMIC at 19:34

## 2020-08-06 RX ADMIN — LIOTHYRONINE SODIUM 5 MCG: 5 TABLET ORAL at 08:49

## 2020-08-06 RX ADMIN — PRAMIPEXOLE DIHYDROCHLORIDE 1 MG: 1 TABLET ORAL at 08:49

## 2020-08-06 RX ADMIN — Medication 0.4 MG: at 01:11

## 2020-08-06 RX ADMIN — NEFAZODONE HYDROCHLORIDE 600 MG: 200 TABLET ORAL at 21:29

## 2020-08-06 RX ADMIN — OMEPRAZOLE 40 MG: 20 CAPSULE, DELAYED RELEASE ORAL at 06:41

## 2020-08-06 RX ADMIN — LACOSAMIDE 200 MG: 200 TABLET, FILM COATED ORAL at 08:49

## 2020-08-06 RX ADMIN — CYCLOSPORINE 1 DROP: 0.5 EMULSION OPHTHALMIC at 11:29

## 2020-08-06 RX ADMIN — ACETAMINOPHEN 975 MG: 325 TABLET, FILM COATED ORAL at 19:33

## 2020-08-06 RX ADMIN — ASPIRIN 162 MG: 81 TABLET ORAL at 11:28

## 2020-08-06 RX ADMIN — CYCLOSPORINE 1 DROP: 0.5 EMULSION OPHTHALMIC at 19:33

## 2020-08-06 RX ADMIN — ACETAMINOPHEN 975 MG: 325 TABLET, FILM COATED ORAL at 11:28

## 2020-08-06 RX ADMIN — OMEPRAZOLE 40 MG: 20 CAPSULE, DELAYED RELEASE ORAL at 19:33

## 2020-08-06 RX ADMIN — HYDROMORPHONE HYDROCHLORIDE 4 MG: 2 TABLET ORAL at 18:20

## 2020-08-06 RX ADMIN — DOCUSATE SODIUM AND SENNOSIDES 2 TABLET: 8.6; 5 TABLET, FILM COATED ORAL at 19:33

## 2020-08-06 RX ADMIN — PRAMIPEXOLE DIHYDROCHLORIDE 2 MG: 1 TABLET ORAL at 19:33

## 2020-08-06 RX ADMIN — LEVOTHYROXINE SODIUM 50 MCG: 50 TABLET ORAL at 08:49

## 2020-08-06 RX ADMIN — Medication 0.4 MG: at 11:10

## 2020-08-06 RX ADMIN — LACOSAMIDE 200 MG: 200 TABLET, FILM COATED ORAL at 19:33

## 2020-08-06 RX ADMIN — LINACLOTIDE 290 MCG: 145 CAPSULE, GELATIN COATED ORAL at 06:41

## 2020-08-06 RX ADMIN — ACETAMINOPHEN 975 MG: 325 TABLET, FILM COATED ORAL at 02:19

## 2020-08-06 RX ADMIN — HYDROMORPHONE HYDROCHLORIDE 4 MG: 2 TABLET ORAL at 05:16

## 2020-08-06 RX ADMIN — VANCOMYCIN HYDROCHLORIDE 1500 MG: 10 INJECTION, POWDER, LYOPHILIZED, FOR SOLUTION INTRAVENOUS at 01:15

## 2020-08-06 ASSESSMENT — ACTIVITIES OF DAILY LIVING (ADL): PREVIOUS_RESPONSIBILITIES: MEAL PREP;HOUSEKEEPING;LAUNDRY;SHOPPING

## 2020-08-06 NOTE — PLAN OF CARE
VS: VSS, pt denies CP or SOB.   O2: Room air sat. > 90% on CAPNO monitor.   Output: Vieyra on place with adequate output.    Last BM: 08/05/20   Activity: Not out of bed yet.    Skin: Intact except surgical incision.   Pain: Comfortably manageable with PRN medication.    CMS: CMS and neuro intact to baseline.    Dressing: CDI   Diet: Regular tolerating without N/V.    LDA: PIV infusing.    Equipment: IV pole, PCD, CAPNO and personal belongings.    Plan: TBD.    Additional Info: Pt  arrived on 5 ortho via bed about 7:30 pm, pt oriented to room and call light, pt makes need known as call light is within reach, will continue to monitor.

## 2020-08-06 NOTE — PROGRESS NOTES
08/06/20 0800   Quick Adds   Type of Visit Initial Occupational Therapy Evaluation   Living Environment   Lives With alone   Living Arrangements condominium   Home Accessibility no concerns   Transportation Anticipated family or friend will provide   Living Environment Comment Pt reports having a long ways to get into condo   Self-Care   Usual Activity Tolerance good   Current Activity Tolerance fair   Regular Exercise No   Equipment Currently Used at Home walker, rolling;wheelchair, manual   Activity/Exercise/Self-Care Comment Pt reports being previously independent with activity, decreased independence with all surgeries   Functional Level   Ambulation 1-->assistive equipment   Transferring 1-->assistive equipment   Toileting 1-->assistive equipment   Bathing 1-->assistive equipment   Dressing 1-->assistive equipment   Eating 0-->independent   Communication 0-->understands/communicates without difficulty   Swallowing 0-->swallows foods/liquids without difficulty   Cognition 0 - no cognition issues reported   Fall history within last six months no   Which of the above functional risks had a recent onset or change? toileting;bathing;dressing;transferring   Prior Functional Level Comment Pt reports being fairly independent with use of AE as needed   General Information   Onset of Illness/Injury or Date of Surgery - Date 08/05/20   Referring Physician Pan Grey   Additional Occupational Profile Info/Pertinent History of Current Problem Mercedes Barber is a 65 year old female admitted on 8/5/2020. She has a knownn H/O seizures ( mild form of tuberous sclerosis), IBS, depression, H/O DVT,  Hypothyroidism, restless legs syndrome and reactive airway disease .She underwent revision of the right hip arthroplasty.    Precautions/Limitations right hip precautions   Weight-Bearing Status - LUE full weight-bearing   Weight-Bearing Status - RUE full weight-bearing   Weight-Bearing Status - LLE full weight-bearing    Weight-Bearing Status - RLE toe touch weight-bearing   Cognitive Status Examination   Orientation orientation to person, place and time   Level of Consciousness alert   Follows Commands (Cognition) WNL   Memory intact   Attention No deficits were identified   Organization/Problem Solving No deficits were identified   Visual Perception   Visual Perception No deficits were identified   Sensory Examination   Sensory Quick Adds No deficits were identified   Pain Assessment   Patient Currently in Pain Yes, see Vital Sign flowsheet   Integumentary/Edema   Integumentary/Edema no deficits were identifed   Range of Motion (ROM)   ROM Quick Adds No deficits were identified   ROM Comment B UE WFL   Strength   Manual Muscle Testing Quick Adds No deficits were identified   Strength Comments B UE WFL   Coordination   Upper Extremity Coordination No deficits were identified   Transfer Skill: Sit to Stand   Level of Coffee: Sit/Stand contact guard   Physical Assist/Nonphysical Assist: Sit/Stand 1 person assist   Transfer Skill: Sit to Stand toe touch weight-bearing   Instrumental Activities of Daily Living (IADL)   Previous Responsibilities meal prep;housekeeping;laundry;shopping   Activities of Daily Living Analysis   Impairments Contributing to Impaired Activities of Daily Living pain;post surgical precautions   General Therapy Interventions   Planned Therapy Interventions ADL retraining;IADL retraining;bed mobility training;ROM;strengthening;transfer training;home program guidelines;progressive activity/exercise   Clinical Impression   Criteria for Skilled Therapeutic Interventions Met yes, treatment indicated   OT Diagnosis decreased ADL independence   Influenced by the following impairments post surgcical precautions, pain   Assessment of Occupational Performance 3-5 Performance Deficits   Identified Performance Deficits dressing, bathing, toileting, grooming   Clinical Decision Making (Complexity) Low complexity  "  Therapy Frequency Daily   Predicted Duration of Therapy Intervention (days/wks) 3 days   Anticipated Equipment Needs at Discharge shower chair;reacher;sock aide;raised toilet seat   Anticipated Discharge Disposition Transitional Care Facility   Risks and Benefits of Treatment have been explained. Yes   Patient, Family & other staff in agreement with plan of care Yes   Clinical Impression Comments Pt presents with decreased activity tolerance and post surgical precautions resulting in decreased independence with ADL.  Pt will benefit from skilled OT services to increase safety and independence with ADL.   [facility]-P[facility] TM \"6 Clicks\"   2016, Trustees of [facility], under license to [employer].  All rights reserved.   6 Clicks Short Forms Daily Activity Inpatient Short Form   [facility] AM-PAC  \"6 Clicks\" Daily Activity Inpatient Short Form   1. Putting on and taking off regular lower body clothing? 2 - A Lot   2. Bathing (including washing, rinsing, drying)? 2 - A Lot   3. Toileting, which includes using toilet, bedpan or urinal? 2 - A Lot   4. Putting on and taking off regular upper body clothing? 4 - None   5. Taking care of personal grooming such as brushing teeth? 4 - None   6. Eating meals? 4 - None   Daily Activity Raw Score (Score out of 24.Lower scores equate to lower levels of function) 18   Total Evaluation Time   Total Evaluation Time (Minutes) 5     "

## 2020-08-06 NOTE — PLAN OF CARE
Discharge Planner OT   Patient plan for discharge: TCU  Current status: Therapist educated Pt on role of OT during inpatient stay and reviewing post surgical precautions.  Pt completed bed mobility and sit to stand with min A from therapist  Barriers to return to prior living situation: Medical status  Recommendations for discharge: TCU  Rationale for recommendations: Pt will benefit from continued skilled OT services to increase safety and independence with ADL and IADL       Entered by: Enrike Saenz 08/06/2020 12:09 PM

## 2020-08-06 NOTE — PLAN OF CARE
Discharge Planner PT   Patient plan for discharge: TCU  Current status: TTWB - Mod A x 1 for donning and doffing abd brace. Min a x 1 for bed mobility and SBA for sit<> stand from EOB to FWW. PT able to ambulate with FWW TTWB.  Barriers to return to prior living situation: WB tolerance, anxiety  Recommendations for discharge: TCU  Rationale for recommendations: Pt can benefit from skilled PT to advance independence and gait.        Entered by: Anant Gatica 08/06/2020 12:59 PM

## 2020-08-06 NOTE — PROGRESS NOTES
Social Work: Assessment with Discharge Plan    Patient Name:  Mercedes Barber  :  1955  Age:  65 year old  MRN:  [id number]  Completed assessment with:  Patient and Chart review.    Presenting Information   Reason for Referral:  Discharge plan  Date of Intake:  2020  Referral Source:  Chart Review  Decision Maker:  Patient  Alternate Decision Maker:  Per ACP would be Rachael Rivera  Health Care Directive:  Copy in Chart  Living Situation:  Apartment  Previous Functional Status:  Assistance with Other:  assitive devices only PTA per chart review.   Patient and family understanding of hospitalization:  Appropriate  Cultural/Language/Spiritual Considerations:  Pt is a 65 year old female who identifies as Moravian.   Adjustment to Illness:  Appropriate, pt hesitant about new brace.     Physical Health  Reason for Admission:    1. Status post hip surgery    2. Mechanical complication of prosthetic hip implant, initial encounter (H)      Services Needed/Recommended:  TCU    Mental Health/Chemical Dependency  Diagnosis:  Pt did not disclose mental health diagnoses but does indicate she sees someone via phone or computer weekly and has a psychiatrist. Pt denies any substance use.   Support/Services in Place:  Pt reports having psychiatrist and mental health support weekly and is involved in Yazidism.   Services Needed/Recommended:  n/a    Support System  Significant relationship at present time:  None indicated  Family of origin is available for support:  Yes, pt has daughter and son in law  Other support available:  Yes pt notes friends.   Gaps in support system:  Not identified.   Patient is caregiver to:  None     Provider Information   Primary Care Physician:  Skyler Álvarez   [phone]   Clinic:  [address] [place] [id number]      :  n/a    Financial   Income Source:  Pt reports she has some money from inheritance from mom passing, however did not disclose income.  "  Financial Concerns:  None indicated   Insurance:    Payor/Plan Subscriber Name Rel Member # Group #   MEDICARE - MEDICARE [place]*  [id number]       ATTN CLAIMS, [address]   BCBS - BCBS FEDERAL E* OLI [place]*  [id number]      [address] [address]       Discharge Plan   Patient and family discharge goal:  TCU - [facility]   Provided education on discharge plan:  YES  Patient agreeable to discharge plan:  YES  A list of Medicare Certified Facilities was provided to the patient and/or family to encourage patient choice. Patient's choices for facility are:  Pt did not wish for a list at this time pt reports that she would like to go to [facility], SW indicated understanding as SW had received call from Kandy in admissions this morning inquiring about pt. Pt then expressed desire to have more than 1 bath per week and would like her linens changed every other day.   Will NH provide Skilled rehabilitation or complex medical:  YES  General information regarding anticipated insurance coverage and possible out of pocket cost was discussed. Patient and patient's family are aware patient may incur the cost of transportation to the facility, pending insurance payment: NO, not at this time, SW will follow up pending TCU acceptance.   Barriers to discharge:  Medical Stability and accepting facility.     Discharge Recommendations   Anticipated Disposition:  Facility:  TCU  Transportation Needs:  Other:  TBD  Name of Transportation Company and Phone:  TBD    Additional comments     Pt is agreeable to TCU and asked good questions re; minimum nights needed for TCU stay so that she would not get \"in trouble with insurance\".    TYE Bonilla, [facility]   [facility]  Pager: [phone]      "

## 2020-08-06 NOTE — PLAN OF CARE
Pt. A&Ox4. VSS. Afebrile. Lung sounds CTA. Maintaining sats on RA. IS encouraged. Bowel sounds active, LBM 8/5, flatus +. CMS and neuro's are intact. Reports no change to baseline neuropathy. Denies nausea, shortness of breath, and chest pain. RLE pain managed w/ scheduled Tylenol, prn Oxycodone, and ice applied. Vieyra removed at 0645 this am. Tolerating regular diet. RLE incisional dressing is CDI. Hemovac in place, output 40mL overnight. Pt sat at EOB overnight but declined to get up. PIV is patent and infusing. PCD's on BLE's. Bilateral heels are elevated off the bed. Call light is within reach, pt able to make needs known and is resting comfortably between cares. Will continue to monitor.

## 2020-08-06 NOTE — PROGRESS NOTES
"   08/06/20 1100   Signing Clinician's Name / Credentials   Signing clinician's name / credentials Anant Morrissey Adds   Rehab Discipline PT   Gait Training   Minutes of Treatment (Gait Training) 10   Symptoms Noted During/After Treatment (Gait Training) fatigue;increased pain   [place] Level (Gait Training) stand-by assist   Physical Assistance Level (Gait Training) set-up required;verbal cues   Weight Bearing (Gait Training) toe touch weight-bearing   Assistive Device (Gait Training) rolling walker   Gait Distance 20   Therapeutic Activity   Minutes of Treatment 30 minutes   Treatment Detail PT: Pt tearful and anxious upon entry into room. Writer had to doff the abd brace due to poor fit and patient discomfort. Donned brace again and made adjustments. PT was able to sit pivot with brace donned. Pt Min A x 1 for sit pivot and Mod A x 1 for donning and doffing brace. Pt able stand to a FWW from EOB SBA. Pt was able to get back to EOB and scoot / shift SBA  She needed extra time for Pt education on brace use, expectations for healing and function and surgery expectations with regard to restrictions. Pt Min A x 1 for LE assist for the RLE to get back to the supine position from EOB. Pt was able to reposition herself in bed with verbal cues.    Additional Documentation   PT Plan Continued skilled therapy for gait.    [facility] TM \"6 Clicks\"   2016, Trustees of [facility], under license to [employer].  All rights reserved.   6 Clicks Short Forms Basic Mobility Inpatient Short Form   [facility] AM-PAC  \"6 Clicks\" V.2 Basic Mobility Inpatient Short Form   1. Turning from your back to your side while in a flat bed without using bedrails? 1 - Total   2. Moving from lying on your back to sitting on the side of a flat bed without using bedrails? 1 - Total   3. Moving to and from a bed to a chair (including a wheelchair)? 1 - Total   4. Standing up from a chair using your arms " (e.g., wheelchair, or bedside chair)? 1 - Total   5. To walk in hospital room? 1 - Total   6. Climbing 3-5 steps with a railing? 3 - A Little   Basic Mobility Raw Score (Score out of 24.Lower scores equate to lower levels of function) 8   Total Session Time   Total Session Time (minutes) 50 minutes

## 2020-08-06 NOTE — ANESTHESIA POSTPROCEDURE EVALUATION
Anesthesia POST Procedure Evaluation    Patient: Mercedes Barber   MRN:     [id number] Gender:   female   Age:    65 year old :      1955        Preoperative Diagnosis: Mechanical complication of prosthetic hip implant, initial encounter (H) [T84.944J, Z96.543]   Procedure(s):  Revision Right total hip arthroplasty   Postop Comments: No value filed.     Anesthesia Type: General       Disposition: Admission   Postop Pain Control: Uneventful            Sign Out: Well controlled pain   PONV: No   Neuro/Psych: Uneventful            Sign Out: Acceptable/Baseline neuro status   Airway/Respiratory: Uneventful            Sign Out: Acceptable/Baseline resp. status   CV/Hemodynamics: Uneventful            Sign Out: Acceptable CV status   Other NRE: NONE   DID A NON-ROUTINE EVENT OCCUR? No    Event details/Postop Comments:  Doing well. Alert, oriented. No sore throat or nausea. Pain control improved with prn medications, including low dose of IV midazolam for muscle spasm pain (patient takes temazepam regularly with no issues). Patient denies any concerns.            Last Anesthesia Record Vitals:  CRNA VITALS  2020 1623 - 2020 1723      2020             Temp:  36.1  C (97  F)     temporal          Last PACU Vitals:  Vitals Value Taken Time   /75 2020  9:37 PM   Temp 35.4  C (95.7  F) 2020  7:28 PM   Pulse 77 2020  9:37 PM   Resp 15 2020  9:37 PM   SpO2 99 % 2020  9:37 PM   Temp src     NIBP     Pulse     SpO2     Resp     Temp 36.1  C (97  F) 2020  5:00 PM   Ht Rate     Temp 2           Electronically Signed By: Meghan Gaffney MD, 2020, 10:05 PM

## 2020-08-06 NOTE — PROGRESS NOTES
"Orthopedic Surgery Progress Note   August 6, 2020    Subjective: No acute events overnight. Pain well controlled. Tolerating diet. Denies fever or chills, CP, SOB, numbness or tingling (beyond her baseline neuropathy), motor dysfunction or weakness. Expects she will need TCU for discharge.     Objective: /66 (BP Location: Left arm)   Pulse 69   Temp 96.8  F (36  C) (Oral)   Resp 15   Ht 1.651 m (5' 5\")   Wt 86.3 kg (190 lb 4.1 oz)   LMP 03/11/2010   SpO2 96%   BMI 31.66 kg/m      Drain: 130 -> 40cc last 2 shifts    General: NAD, alert and oriented, cooperative with exam.   Cardio: RRR, extremities wwp.   Respiratory: Non-labored breathing.  MSK: Focused examination of RLE: Toes wwp, DP 2+, bcr in all toes. +EHL/FHL/GSC/TA with 5/5 strength. SILT SP/DP/Sa/Hawk/T. Dressing c/d/i. Drain with serosanguinous output.    Labs: Hgb 10.4    Imaging: XR of the pelvis and R hip in PACU reviewed, new R SARAHI implants, improved position of the cup, 2 proximal femur tension cables placed, no obvious fractures seen, hip located.     Assessment and Plan:   Mercedes Barber is a 65 year old female s/p Procedure(s):  Revision Right total hip arthroplasty on 8/5/2020 with Dr. Grey. Doing well.     Activity: Up with assist.  Weight bearing status: Toe Touch Weight Bearing Right Lower Extremity  Antibiotics: Vancomycin x 24 Hrs  Diet: Begin with clear fluids and progress diet as tolerated.  Bowel regimen. Anti-emetics PRN.    DVT prophylaxis: aspirin 162mg Q24  Dressing: leave in place for 7-9 days post op  Drains: Monitor output; record each shift  Pain management: transition from IV to orals as tolerated.    Physical Therapy/Occupational Therapy  Consults: Medical Management  Follow-up: Clinic with Dr. Grey in 2 weeks    Dispo: pending progress with therapies, PO pain control, and medical stability, anticipate discharge to TCU in 1-2 days (pending TCU acceptance).     Tom Burden MD  Orthopedic Surgery PGY-4  Pager: " [phone]

## 2020-08-06 NOTE — PLAN OF CARE
VS:       Pt A/O X 4. Afebrile. VSS. Lungs-clear bilaterally with both anterior and posterior. IS encouraged. Denies nausea, shortness of breath, and chest pain.     Output:       Bowels- active in all four quadrants. Last BM 8/5 per pt report, pt is passing gas. Voids spontaneously without difficulty in the bathroom.      Activity:       Pt up in room and to bathroom with assist of standby, gait belt, and walker. Pt also must be wearing abduction brace when out of bed. TTWB to RLE.     Skin:   Incisions to right hip/thigh, otherwise intact.     Pain:       Has pain in the RLE and given prn IV Dilaudid, prn PO Dilaudid, and is tolerating well.      CMS:       CMS and Neuro's are intact. Pt has baseline neuropathy.      Dressing:       Right hip/thigh incisional dressings are clean, dry, and intact. Hemovac drain intact and patent and had output of 30 mL at 1500.     Diet:       Pt is on a regular diet and appetite was good this shift.       LDA:       PIV is patent in the left hand and SL.      Equipment:       Pt declined PCDs at this time. Abduction brace to be on pt when out of bed.     Plan:       Pt is able to make needs known and the call light is within the pt's reach. Continue to monitor.       Additional Info:       Plan to discharge to TCU when bed is available and medically ready.

## 2020-08-06 NOTE — PHARMACY-ADMISSION MEDICATION HISTORY
Admission Medication History Completed by Pharmacy    See [facility] Admission Navigator for allergy information, preferred outpatient pharmacy, prior to admission medications and immunization status.     Medication History Sources:     Spoke with patient    Medication fill history report    Changes made to PTA medication list (reason):    Added:     Quetiapine    Deleted: None    Changed:     Symbicort Last Dose    Additional Information:    Spoke with patient, seems understanding her medications, patient is not taking Breo due to pharmacy supply, takes Symbicort instead. Tramadol does not work for her in relieving the pain so she takes oxycodone prn.    Prior to Admission medications    Medication Sig Last Dose Taking? Auth Provider   acetaminophen (TYLENOL) 325 MG tablet Take 2 tablets (650 mg) by mouth every 4 hours as needed for other  Yes Brandy Grullon APRN CNS   albuterol (ALBUTEROL) 108 (90 BASE) MCG/ACT Inhaler Inhale 2 puffs into the lungs 4 times daily as needed for shortness of breath / dyspnea or wheezing Past Month at Unknown time Yes Skyler Álvarez MD   artificial saliva (BIOTENE MT) AERS spray Take 2 sprays by mouth 3 times daily as needed for dry mouth Past Week at Unknown time Yes Brandy Grullon APRN CNS   aspirin (ASA) 81 MG EC tablet Take 2 tablets (162 mg) by mouth daily  Yes Brandy Grullon APRN CNS   budesonide-formoterol (SYMBICORT) 160-4.5 MCG/ACT Inhaler Inhale 2 puffs into the lungs 2 times daily 8/5/2020 at Unknown time Yes Unknown, Entered By History   carboxymethylcellulose PF (REFRESH PLUS) 0.5 % ophthalmic solution Place 1 drop into both eyes 2 times daily 8/5/2020 at 0730 Yes Brandy Grullon APRN CNS   cycloSPORINE (RESTASIS) 0.05 % ophthalmic emulsion Place 1 drop into both eyes 2 times daily 8/5/2020 at 0730 Yes Unknown, Entered By History   HYDROmorphone (DILAUDID) 2 MG tablet Take 1-2 tablets (2-4 mg) by mouth every 4 hours as needed for moderate to severe pain   Yes Brandy Grullon APRN CNS   hypromellose (ARTIFICIAL TEARS) 0.5 % SOLN ophthalmic solution Place 1 drop into both eyes 2 times daily 8/5/2020 at 0730 Yes Unknown, Entered By History   lacosamide (VIMPAT) 200 MG TABS tablet Take 1 tablet (200 mg) by mouth 2 times daily 8/5/2020 at 0730 Yes Loreto Barlow APRN CNP   levothyroxine (SYNTHROID/LEVOTHROID) 50 MCG tablet Take 50 mcg by mouth daily 8/5/2020 at 0730 Yes Unknown, Entered By History   liothyronine (CYTOMEL) 5 MCG tablet Take 5 mcg by mouth daily  8/5/2020 at 0730 Yes Reported, Patient   nefazodone (SERZONE) 200 MG tablet Take 600 mg by mouth At Bedtime  8/4/2020 at 2200 Yes Reported, Patient   omeprazole (PRILOSEC) 40 MG DR capsule Take 1 capsule (40 mg) by mouth 2 times daily 8/5/2020 at 0730 Yes Skyler Álvarez MD   ondansetron (ZOFRAN-ODT) 4 MG ODT tab Take 1 tablet (4 mg) by mouth every 6 hours as needed for nausea or vomiting Past Week at Unknown time Yes Brandy Grullon APRN CNS   oxyCODONE (ROXICODONE) 5 MG tablet Take 1-2 tablets (5-10 mg) by mouth every 4 hours as needed for moderate to severe pain Past Week at Unknown time Yes Brandy Grullon APRN CNS   polyethylene glycol (MIRALAX) 17 g packet Take 17 g by mouth daily  Yes Brandy Grullon APRN CNS   potassium citrate (UROCIT-K) 10 MEQ (1080 MG) CR tablet Take 10 mEq by mouth daily 8/5/2020 at 0730 Yes Unknown, Entered By History   pramipexole (MIRAPEX) 1 MG tablet Give 1 tablet by mouth in the morning and 2 tablets by mouth 3 hours prior to bedtime 8/5/2020 at 0730 Yes Reported, Patient   QUEtiapine (SEROQUEL) 50 MG tablet Take 50 mg by mouth as needed   Yes Unknown, Entered By History   senna-docusate (SENOKOT-S/PERICOLACE) 8.6-50 MG tablet Take 2 tablets by mouth 2 times daily  Yes Brandy Grullon APRN CNS   fluticasone-vilanterol (BREO ELLIPTA) 200-25 MCG/INH inhaler Inhale 1 puff into the lungs daily Unknown at Unknown time  Brandy Grullon APRN CNS    linaclotide (LINZESS) 290 MCG capsule Take 1 capsule (290 mcg) by mouth every morning (before breakfast)   Farzana Ro MD   temazepam (RESTORIL) 30 MG capsule Take 30 mg by mouth nightly as needed for sleep   Unknown, Entered By History       Date completed: 08/06/20    Medication history completed by: Kimmy Feliz, Pharmacy Intern, PD4

## 2020-08-06 NOTE — PROGRESS NOTES
"[facility], [place]   Internal Medicine Daily Note           Interval History/Events     Hand off taken from overnight team  Reports feeling nauseous  Patient anxious about going home.   Pain is reasonably well controlled  Passing gas  No fever, chills       Review of Systems        4 point ROS including Respiratory, CV, GI and , other than that noted above is negative      Medications   I have reviewed current medications  in the \"current medication\" section of Epic.  Relevant changes include:     Physical Exam   General:       Vital signs:    Blood pressure 111/64, pulse 69, temperature 97.7  F (36.5  C), temperature source Oral, resp. rate 17, height 1.651 m (5' 5\"), weight 86.3 kg (190 lb 4.1 oz), last menstrual period 03/11/2010, SpO2 99 %, not currently breastfeeding.  Estimated body mass index is 31.66 kg/m  as calculated from the following:    Height as of this encounter: 1.651 m (5' 5\").    Weight as of this encounter: 86.3 kg (190 lb 4.1 oz).      Intake/Output Summary (Last 24 hours) at 8/6/2020 1025  Last data filed at 8/6/2020 1019  Gross per 24 hour   Intake 1850 ml   Output 2425 ml   Net -575 ml        Constitutional: Laying in bed in no acute distress  Eye: No icterus , no pallor  Mouth/ENT: Normal oral mucosa  Cardiovascular: S1, S2 normal.   Respiratory: B/l CTA  GI: Soft, NT, BS+  : No kay's catheter  Neurology: No focal neuro deficit.   Psych:   MSK:   Integumentary:   Heme/Lymph/Imm:      Laboratory and Imaging Studies     I have reviewed  laboratory and imaging studies in the Epic. Pertinent findings are as below:    BMP  Recent Labs   Lab 08/06/20  0533 07/31/20  1558    139   POTASSIUM 4.5 4.3   CHLORIDE 107 107   SABA 8.2* 9.4   CO2 27 27   BUN 11 14   CR 0.83 0.81   * 106*     CBC  Recent Labs   Lab 08/06/20  0533 07/31/20  1558   WBC  --  6.2   RBC  --  4.74   HGB 10.8* 13.3   HCT  --  42.1   MCV  --  89   MCH  --  28.1   MCHC  --  31.6   RDW  " --  14.7   PLT  --  271     INRNo lab results found in last 7 days.  LFTsNo lab results found in last 7 days.   PANCNo lab results found in last 7 days.        Impression/Plan        65 year old female admitted on 8/5/2020. She has a knownn H/O seizures ( mild form of tuberous sclerosis), IBS, depression, H/O DVT,  Hypothyroidism, restless legs syndrome and reactive airway disease .She underwent revision of the right hip arthroplasty. Internal medicine consulted for post operative co-management  Individual problems and their management are outlined below     S/P revision rt hip arthroplasty on 08/05:   Management primarily per Ortho team.  - Wound cares, Dressings, Surgical pain management, DVT Prophylaxis  per primary team.   - Monitor anemia, hemodynamics, Input/Output, Capnography (for 24 hours)  - Encourage Incentive spirometry  - Laxatives for constipation prophylaxis. On Senna and PEG.      # Anemia: Most likely due to blood loss, and post surgical inflammation.   Hg 10.8 gm/dl.   - Transfuse if Hg < 7 gm/dl          # Reactive Airway disease  Stable  - Continue Symbicort 2 puffs BID    - PRN Albuterol     # Hx of seizures:  No seizures in the last 25 years  - continue Vimpat 200 mg BID        # Hypothyroidism  Stable.   - Continue Levothyroxine 50 mcg and Cytomel 5 mg daily     # Depression:  - Continue Nefazodone 200 mg at bed time        # IBS:  Patient has severe problems with constipation. She is on Linzess for this every morning   - Continue Linaclotide     # Dry eyes: Refresh eye drops and restasis eye drops   - Continue      # Restless leg syndrome: PTA on Ropinirole.   - Continue         Pt's care was discussed with bedside RN, patient and  during Care Team Rounds.               Betito Sanchez MD  Hospitalist ( Internal medicine)  Pager: [phone]

## 2020-08-06 NOTE — PROGRESS NOTES
"Brief SW note:     CARLEY received call from [facility] in [place] P: [phone] F:  [phone] - from Kandy requesting return call from CARLEY re: this pt as pt has a friend that works at the facility.     CARLEY contacted Kandy and explained until it is confirmed that pt needs TCU services and that pt wants [facility] CARLEY is unable to share information, however, CARLEY did receive VM and will follow up when additional information is known.  Kandy acknowledged understanding and noted the sooner the better as they are \"tight on beds\".     TYE Bonilla, [facility]   [facility]  Pager: [phone]    "

## 2020-08-07 ENCOUNTER — APPOINTMENT (OUTPATIENT)
Dept: PHYSICAL THERAPY | Facility: CLINIC | Age: 65
DRG: 467 | End: 2020-08-07
Attending: ORTHOPAEDIC SURGERY
Payer: MEDICARE

## 2020-08-07 LAB
BLD PROD TYP BPU: NORMAL
BLD UNIT ID BPU: 0
BLOOD PRODUCT CODE: NORMAL
BPU ID: NORMAL
HGB BLD-MCNC: 10 G/DL (ref 11.7–15.7)
TRANSFUSION STATUS PATIENT QL: NORMAL
TRANSFUSION STATUS PATIENT QL: NORMAL

## 2020-08-07 PROCEDURE — 97116 GAIT TRAINING THERAPY: CPT | Mod: GP | Performed by: PHYSICAL THERAPIST

## 2020-08-07 PROCEDURE — 25000132 ZZH RX MED GY IP 250 OP 250 PS 637: Mod: GY | Performed by: INTERNAL MEDICINE

## 2020-08-07 PROCEDURE — 85018 HEMOGLOBIN: CPT | Performed by: STUDENT IN AN ORGANIZED HEALTH CARE EDUCATION/TRAINING PROGRAM

## 2020-08-07 PROCEDURE — 25000132 ZZH RX MED GY IP 250 OP 250 PS 637: Mod: GY | Performed by: STUDENT IN AN ORGANIZED HEALTH CARE EDUCATION/TRAINING PROGRAM

## 2020-08-07 PROCEDURE — 97530 THERAPEUTIC ACTIVITIES: CPT | Mod: GP | Performed by: PHYSICAL THERAPIST

## 2020-08-07 PROCEDURE — 99231 SBSQ HOSP IP/OBS SF/LOW 25: CPT | Performed by: INTERNAL MEDICINE

## 2020-08-07 PROCEDURE — 25000128 H RX IP 250 OP 636: Performed by: STUDENT IN AN ORGANIZED HEALTH CARE EDUCATION/TRAINING PROGRAM

## 2020-08-07 PROCEDURE — 36415 COLL VENOUS BLD VENIPUNCTURE: CPT | Performed by: STUDENT IN AN ORGANIZED HEALTH CARE EDUCATION/TRAINING PROGRAM

## 2020-08-07 RX ADMIN — ACETAMINOPHEN 975 MG: 325 TABLET, FILM COATED ORAL at 04:13

## 2020-08-07 RX ADMIN — LEVOTHYROXINE SODIUM 50 MCG: 50 TABLET ORAL at 08:21

## 2020-08-07 RX ADMIN — HYDROMORPHONE HYDROCHLORIDE 4 MG: 2 TABLET ORAL at 11:55

## 2020-08-07 RX ADMIN — Medication 2 SPRAY: at 11:55

## 2020-08-07 RX ADMIN — ASPIRIN 162 MG: 81 TABLET ORAL at 08:20

## 2020-08-07 RX ADMIN — OMEPRAZOLE 40 MG: 20 CAPSULE, DELAYED RELEASE ORAL at 08:21

## 2020-08-07 RX ADMIN — PRAMIPEXOLE DIHYDROCHLORIDE 1 MG: 1 TABLET ORAL at 08:21

## 2020-08-07 RX ADMIN — CYCLOSPORINE 1 DROP: 0.5 EMULSION OPHTHALMIC at 08:22

## 2020-08-07 RX ADMIN — HYDROMORPHONE HYDROCHLORIDE 4 MG: 2 TABLET ORAL at 01:43

## 2020-08-07 RX ADMIN — LIOTHYRONINE SODIUM 5 MCG: 5 TABLET ORAL at 08:22

## 2020-08-07 RX ADMIN — HYDROMORPHONE HYDROCHLORIDE 4 MG: 2 TABLET ORAL at 16:17

## 2020-08-07 RX ADMIN — ACETAMINOPHEN 975 MG: 325 TABLET, FILM COATED ORAL at 12:13

## 2020-08-07 RX ADMIN — DOCUSATE SODIUM AND SENNOSIDES 2 TABLET: 8.6; 5 TABLET, FILM COATED ORAL at 08:24

## 2020-08-07 RX ADMIN — POLYETHYLENE GLYCOL 3350 17 G: 17 POWDER, FOR SOLUTION ORAL at 08:24

## 2020-08-07 RX ADMIN — LINACLOTIDE 290 MCG: 145 CAPSULE, GELATIN COATED ORAL at 08:21

## 2020-08-07 RX ADMIN — HYDROMORPHONE HYDROCHLORIDE 4 MG: 2 TABLET ORAL at 07:08

## 2020-08-07 RX ADMIN — LACOSAMIDE 200 MG: 200 TABLET, FILM COATED ORAL at 08:22

## 2020-08-07 RX ADMIN — CARBOXYMETHYLCELLULOSE SODIUM 1 DROP: 5 SOLUTION/ DROPS OPHTHALMIC at 08:21

## 2020-08-07 RX ADMIN — Medication 0.4 MG: at 10:13

## 2020-08-07 NOTE — PROGRESS NOTES
Spoke with patient's daughter Rachael who had questions regarding the patient's discharge. I re-assured her that the patient had been cleared by PT to safely discharge to TCU, and I informed her that I personally pulled the patient's drain earlier this afternoon. All additional questions were addressed.    DAVIS Quiroz, PA-C  Physician Assistant, Orthopedic Surgery  Pager: [phone]

## 2020-08-07 NOTE — PROGRESS NOTES
"[facility], [employer]   Internal Medicine Daily Note           Interval History/Events     Hand off taken from overnight team  Pain is reasonably well controlled  Passing gas  No fever, chills  No nausea, vomiting       Review of Systems        4 point ROS including Respiratory, CV, GI and , other than that noted above is negative      Medications   I have reviewed current medications  in the \"current medication\" section of Epic.  Relevant changes include:     Physical Exam   General:       Vital signs:    Blood pressure 110/65, pulse 68, temperature 97.6  F (36.4  C), temperature source Oral, resp. rate 16, height 1.651 m (5' 5\"), weight 86.3 kg (190 lb 4.1 oz), last menstrual period 03/11/2010, SpO2 98 %, not currently breastfeeding.  Estimated body mass index is 31.66 kg/m  as calculated from the following:    Height as of this encounter: 1.651 m (5' 5\").    Weight as of this encounter: 86.3 kg (190 lb 4.1 oz).      Intake/Output Summary (Last 24 hours) at 8/6/2020 1025  Last data filed at 8/6/2020 1019  Gross per 24 hour   Intake 1850 ml   Output 2425 ml   Net -575 ml        Constitutional: Laying in bed in no acute distress  Eye: No icterus , no pallor  Mouth/ENT: Normal oral mucosa  Cardiovascular: S1, S2 normal.   Respiratory: B/l CTA  GI: Soft, NT, BS+  : No kay's catheter  Neurology: No focal neuro deficit.   Psych:   MSK:   Integumentary:   Heme/Lymph/Imm:      Laboratory and Imaging Studies     I have reviewed  laboratory and imaging studies in the Epic. Pertinent findings are as below:    BMP  Recent Labs   Lab 08/06/20  0533 07/31/20  1558    139   POTASSIUM 4.5 4.3   CHLORIDE 107 107   SABA 8.2* 9.4   CO2 27 27   BUN 11 14   CR 0.83 0.81   * 106*     CBC  Recent Labs   Lab 08/07/20  0633  07/31/20  1558   WBC  --   --  6.2   RBC  --   --  4.74   HGB 10.0*   < > 13.3   HCT  --   --  42.1   MCV  --   --  89   MCH  --   --  28.1   MCHC  --   --  31.6   RDW  --  "  --  14.7   PLT  --   --  271    < > = values in this interval not displayed.     INRNo lab results found in last 7 days.  LFTsNo lab results found in last 7 days.   PANCNo lab results found in last 7 days.        Impression/Plan        65 year old female admitted on 8/5/2020. She has a knownn H/O seizures ( mild form of tuberous sclerosis), IBS, depression, H/O DVT,  Hypothyroidism, restless legs syndrome and reactive airway disease .She underwent revision of the right hip arthroplasty. Internal medicine consulted for post operative co-management  Individual problems and their management are outlined below     S/P revision rt hip arthroplasty on 08/05:   Management primarily per Ortho team.  - Wound cares, Dressings, Surgical pain management, DVT Prophylaxis  per primary team.   - Monitor anemia, hemodynamics, Input/Output, Capnography (for 24 hours)  - Encourage Incentive spirometry  - Laxatives for constipation prophylaxis. On Senna and PEG.      # Anemia: Most likely due to blood loss, and post surgical inflammation.   Hg 10 gm/dl.   - Transfuse if Hg < 7 gm/dl          # Reactive Airway disease  Stable  - Continue Symbicort 2 puffs BID    - PRN Albuterol     # Hx of seizures:  No seizures in the last 25 years  - continue Vimpat 200 mg BID        # Hypothyroidism  Stable.   - Continue Levothyroxine 50 mcg and Cytomel 5 mg daily     # Depression:  - Continue Nefazodone 200 mg at bed time        # IBS:  Patient has severe problems with constipation. She is on Linzess for this every morning   - Continue Linaclotide     # Dry eyes: Refresh eye drops and restasis eye drops   - Continue      # Restless leg syndrome: PTA on Ropinirole.   - Continue         Pt's care was discussed with bedside RN, patient and  during Care Team Rounds.               Betito Sanchez MD  Hospitalist ( Internal medicine)  Pager: [phone]

## 2020-08-07 NOTE — DISCHARGE SUMMARY
ORTHOPAEDIC SURGERY DISCHARGE SUMMARY     Date of Admission: 8/5/2020  Date of Discharge: 8/7/2020  Disposition: Rehab  Staff Physician: Pan Grey MD  Primary Care Provider: Skyler Álvarez    DISCHARGE DIAGNOSIS:  Mechanical complication of prosthetic hip implant, initial encounter (H) [T84.098A, Z96.649]    PROCEDURES: Procedure(s):  Revision Right total hip arthroplasty on 8/5/2020    BRIEF HISTORY:  Mercedes is a very pleasant 65-year-old woman with a complex history related to her right hip.  Please see prior notes most recently by Dr. Bermudez for full history. In brief the patient has a failed acetabular component.  She was planned to have a revision total hip replacement with Dr. Bermudez this week, but unfortunately he was unable to do it and he asked if Dr. Grey could help with the procedure. Dr. Bermudez spoke with the patient and she was amenable to this. After discussion, the patient elected to proceed with revision total hip arthroplasty.  Please see prior notes for full details of her history. We had a long discussion regarding the risks and benefits of revision surgery and the complexity of her case.  Based on the discussion we believe that the benefits outweigh the risks and the patient would like to proceed with surgery.    HOSPITAL COURSE:    The patient was admitted following the above listed procedures for pain control and rehabilitation. Mercedes Barber did well post-operatively. Medicine was consulted post operatively to aid in management of medical co-morbidities. The patient received routine nursing cares and at the time of discharge was medically stable. Vital signs were stable throughout admission. The patient is tolerating a regular diet and is voiding spontaneously. PT and OT recommended TCU for discharge due to ongoing therapy needs. Pain is now controlled on oral medications which will be available on discharge. Stool softeners have been used while taking pain medications to help  prevent constipation. Mercedes Barber is deemed medically safe to discharge.     Antibiotics:  Vancomycin given periop and 24 hours postop.  DVT prophylaxis:  ASA 162mg daily initiated after surgery and will be continued for 4 weeks.   PT Progress:  Worked with PT/OT post-op and they recommended discharge to TCU for continued therapy needs.   Pain Meds:  Weaned off all IV pain meds by discharge.  Inpatient Events: No significant events or complications.    PHYSICAL EXAM:    General: NAD, alert and oriented, cooperative with exam.   Cardio: RRR, extremities wwp.   Respiratory: Non-labored breathing.  MSK: Focused examination of RLE: Toes wwp, DP 2+, bcr in all toes. +EHL/FHL/GSC/TA with 5/5 strength. SILT SP/DP/Sa/Hawk/T. Dressing c/d/i.    FOLLOWUP:    Follow up with Dr. Grey's team as below:    Future Appointments   Date Time Provider Department Center   8/7/2020  1:30 PM Yuly Gross OT [facility]   8/7/2020  3:00 PM Emily Mcclain, PT [facility]   8/28/2020 12:20 PM Pan Elena PA-C [facility]   9/16/2020  2:30 PM Pan Grey MD [facility]       Orthopaedic Surgery appointments are at the [facility] ([address] [place] [address]). Call [phone] to schedule a follow-up appointment at this location with your provider.     PLANNED DISCHARGE ORDERS:      Current Discharge Medication List      START taking these medications    Details   acetaminophen (TYLENOL) 325 MG tablet Take 2 tablets (650 mg) by mouth every 4 hours as needed for other  Qty: 1 Bottle, Refills: 0    Associated Diagnoses: Status post hip surgery      aspirin (ASA) 81 MG EC tablet Take 2 tablets (162 mg) by mouth daily  Qty: 28 tablet, Refills: 0    Associated Diagnoses: Status post hip surgery      HYDROmorphone (DILAUDID) 2 MG tablet Take 1-2 tablets (2-4 mg) by mouth every 4 hours as needed for moderate to severe pain  Qty: 30 tablet, Refills: 0    Associated  Diagnoses: Status post hip surgery         CONTINUE these medications which have CHANGED    Details   polyethylene glycol (MIRALAX) 17 g packet Take 17 g by mouth daily  Qty: 7 packet, Refills: 0    Associated Diagnoses: Status post hip surgery      senna-docusate (SENOKOT-S/PERICOLACE) 8.6-50 MG tablet Take 2 tablets by mouth 2 times daily  Qty: 30 tablet, Refills: 0    Associated Diagnoses: Status post hip surgery         CONTINUE these medications which have NOT CHANGED    Details   albuterol (ALBUTEROL) 108 (90 BASE) MCG/ACT Inhaler Inhale 2 puffs into the lungs 4 times daily as needed for shortness of breath / dyspnea or wheezing  Qty: 1 Inhaler, Refills: 0    Associated Diagnoses: Cough      artificial saliva (BIOTENE MT) AERS spray Take 2 sprays by mouth 3 times daily as needed for dry mouth  Qty:        budesonide-formoterol (SYMBICORT) 160-4.5 MCG/ACT Inhaler Inhale 2 puffs into the lungs 2 times daily      carboxymethylcellulose PF (REFRESH PLUS) 0.5 % ophthalmic solution Place 1 drop into both eyes 2 times daily  Qty:        cycloSPORINE (RESTASIS) 0.05 % ophthalmic emulsion Place 1 drop into both eyes 2 times daily      hypromellose (ARTIFICIAL TEARS) 0.5 % SOLN ophthalmic solution Place 1 drop into both eyes 2 times daily      lacosamide (VIMPAT) 200 MG TABS tablet Take 1 tablet (200 mg) by mouth 2 times daily  Qty: 30 tablet, Refills: 0    Associated Diagnoses: Seizure disorder (H)      levothyroxine (SYNTHROID/LEVOTHROID) 50 MCG tablet Take 50 mcg by mouth daily      liothyronine (CYTOMEL) 5 MCG tablet Take 5 mcg by mouth daily       nefazodone (SERZONE) 200 MG tablet Take 600 mg by mouth At Bedtime       omeprazole (PRILOSEC) 40 MG DR capsule Take 1 capsule (40 mg) by mouth 2 times daily  Qty: 180 capsule, Refills: 3    Associated Diagnoses: Gastroesophageal reflux disease without esophagitis; Esophageal stricture      ondansetron (ZOFRAN-ODT) 4 MG ODT tab Take 1 tablet (4 mg) by mouth every 6 hours  as needed for nausea or vomiting  Qty: 20 tablet, Refills: 0    Associated Diagnoses: Hip pain      oxyCODONE (ROXICODONE) 5 MG tablet Take 1-2 tablets (5-10 mg) by mouth every 4 hours as needed for moderate to severe pain  Qty: 39 tablet, Refills: 0    Associated Diagnoses: Hip pain      potassium citrate (UROCIT-K) 10 MEQ (1080 MG) CR tablet Take 10 mEq by mouth daily      pramipexole (MIRAPEX) 1 MG tablet Give 1 tablet by mouth in the morning and 2 tablets by mouth 3 hours prior to bedtime      QUEtiapine (SEROQUEL) 50 MG tablet Take 50 mg by mouth as needed       fluticasone-vilanterol (BREO ELLIPTA) 200-25 MCG/INH inhaler Inhale 1 puff into the lungs daily  Qty:        linaclotide (LINZESS) 290 MCG capsule Take 1 capsule (290 mcg) by mouth every morning (before breakfast)  Qty: 10 capsule, Refills: 0    Associated Diagnoses: Other irritable bowel syndrome      temazepam (RESTORIL) 30 MG capsule Take 30 mg by mouth nightly as needed for sleep               Discharge Procedure Orders   Reason for your hospital stay   Order Comments: You were admitted following your total hip arthroplasty     Discharge Instructions   Order Comments: TOTAL HIP ARTHROPLASTY POST OPERATIVE DISCHARGE INSTRUCTIONS    FOLLOW UP APPOINTMENT  You are scheduled for a post operative wound check with Dr. Grey's clinic approximately two weeks after surgery. At approximately six weeks after surgery, you will see Dr. Grey in clinic. During this visit, repeat X rays of your operative hip will be performed.      Your follow up appointments will be at the location that you regularly see Dr. Grey:    [facility]  [address] [place] [address] [phone]    [facility]  [address] [place] [phone]    Physical therapy:   A prescription for physical therapy will be provided at the time of discharge.       ACTIVITY  Weight bearing status:    You may bear weight on your operative extremity as tolerated, using assistive devices (walker, cane) as needed. As you begin to feel more comfortable ambulating, you may gradually transition from a walker to a cane. Eventually, you should wean from all assistive devices. Although we would like you to discontinue use of assistive devices as soon as possible, do not transition until you have worked with your physical therapist to achieve safe balance and comfort.     Posterior hip precautions:  You must maintain the following posterior hip precautions for the next 12 weeks with no exceptions:  1) no hip flexion >90 degrees (no bending down at the waist)  2) no internal rotation past neutral (no pivoting)  3) no adduction past midline (no crossing your legs)    Exercises:   Perform the following exercises at least three times per day for the first four weeks after surgery to prevent complications, such as blood clots in your legs:  1) Point and flex your feet  2) Move your ankle around in big circles  3) Wiggle your toes   Also, perform thigh muscle tightening exercises for 10 to 15 minutes at least three times per day for the first four weeks after surgery.    Athletic Activities:  Activities such as swimming, bicycling, jogging, running, and stop-and-go sports should be avoided until permitted by your provider.    Driving:  Driving is not permitted until directed by your provider. Under no circumstance are you permitted to drive while using narcotic pain medications.    Return to Work:  You may return to work when directed by your provider.       COMFORT AND PAIN MANAGEMENT  Swelling:  Some swelling in the leg is normal after surgery.  This type of swelling is usually gravity dependent and is improved in the morning after sleep. If you begin to experience worsening swelling or calf pain, please contact Dr. Grey's office. We may recommend presenting to the Emergency Department to obtain an ultrasound of the leg if  there is concern for a DVT.       Icing:  An ice pack will be provided to control swelling and discomfort after surgery. Place a thin towel on your skin and apply the ice pack overtop. You may apply ice for 20 minutes as often as two times per hour.    Pain Medications:  You will be discharged with acetaminophen (Tylenol) and a narcotic medication for pain management after surgery. Acetaminophen is most effective when it is taken per the schedule outlined by your provider (every four, six, or eight hours as prescribed). You may safely use acetaminophen as prescribed for the first four weeks after surgery provided you do not exceed the maximum daily dose prescribed by your provider (usually 3000 mg - 4000 mg). The narcotic pain medication should only be taken on an as-needed basis when necessary and should be reserved for severe pain that is not controlled with scheduled acetaminophen. In the first three days following surgery, your symptoms may warrant use of the narcotic pain medication every three, four, or six hours as prescribed. After three days, focus your efforts on decreasing (tapering) use of narcotic medications.   The most successful tapering strategy is to first, decrease the dose (number of tablets) and second, decrease the interval (time between doses). For example, if you begin taking two tablets every four hours after surgery, start your taper by decreasing one of these doses to one tablet. Every one to two days, decrease another dose to one tablet until you are eventually taking one tablet every four hours. Once this is achieved, focus on increasing the number of hours between doses, moving from one tablet every four hours to one tablet every six hours. As tolerated, continue to increase the interval to eight and twelve hours. Eventually, taper to one dose every evening and discontinue when no longer needed.      ANTICOAGULATION  Take the ASA 81 mg bid  prescribed for a total of  4 weeks after  surgery.  This is given to help minimize your risk of blood clot.      WOUND CARE AND SHOWERING  Wound care:  You have a clean Aquacel dressing on your surgical wound. This dressing should stay in place for seven days to allow the incision to heal. If the Aquacel dressing becomes excessively wet or saturated before removal on day seven, please contact Dr. Grey's office. After seven days, remove the dressing and leave the wound open to air (see showering instructions below). If there is any drainage from the incision after removal of the Aquacel, dress the wound with sterile 4x4 gauze, using tape over top to secure the dressing in place over the incision. Please contact Dr. Grey's office if you notice the following after removal of the Aquacel dressin) significant cloudy, bloody, or malodorous drainage from the incision, 2) excessive warmth and redness around the incision.    Showering:  You may shower 48 hours after surgery provided the Aquacel dressing is in place. Although you are strictly prohibited from soaking or submerging the surgical wound underwater, you may shower in the usual fashion, allowing water and soap to run over the Aquacel. Once the Aquacel is removed on the seventh day after surgery, you may continue to shower; however, the incision should be covered with saran wrap (or any other non-permeable covering) to allow the incision to remain dry and to prevent you from scrubbing/rubbing the incision. The Dermabond (surgical adhesive that is directly on the incision areas) should be left on until it falls off or is removed at your first office visit. After your incision is examined at your first office visit, you will likely be allowed to return to showering without covering the incision.     Tub Bathing:  Tub bathing, swimming, or any other activities that cause your incision to be submerged should be avoided until allowed by your provider. Typically, patients are allowed to return to these  activities six weeks after surgery.      CONTACTING YOUR PHYSICIAN:  You may experience symptoms that require follow-up before your scheduled two week appointment. Please contact Dr. Grey's office if you experience:  1) Pain that persists or worsens in the first few days after surgery  2) Excessive redness or drainage of cloudy or bloody material from the wounds (clear red tinted fluid and some mild drainage should be expected) or drainage of any kind five days after surgery  3) A temperature elevation greater than 101.5    4) Pain, swelling or redness in your calf  5) Numbness or weakness in your leg or foot      Regular business hours (Monday - Friday, 8am - 5pm):  [facility]: [phone]  [facility]: [phone]    After hours and weekends:  [facility] on call Orthopedic resident: [phone]     Diet   Order Comments: Return to your pre surgical diet     Order Specific Question Answer Comments   Is discharge order? Yes        Tom Burden MD  Orthopedic Surgery PGY-4  Pager: [phone]

## 2020-08-07 NOTE — PROGRESS NOTES
Social Work Services Discharge Note      Patient Name:  Mercedes Barber     Anticipated Discharge Date:  8/7/20    Discharge Disposition:   TCU:  [facility]   [address]  RN to RN report: [phone]  Fax: [phone]  Nursing Station Fax: [phone]    Following MD:  per facility     Pre-Admission Screening (PAS) online form has been completed.  The Level of Care (LOC) is:  Determined  Confirmation Code is:  [id number]  Patient/caregiver informed of referral to [facility] Line for Pre-Admission Screening for skilled nursing facility (SNF) placement and to expect a phone call post discharge from SNF.     Additional Services/Equipment Arranged:  SW discussed discharge transport w/ Pt and dtr as well as potential private pay cost. Pt's son-in-law plans to transport Pt via personal w/c at 5 PM . [facility] TCU Admissions states they need patient's to arrive by 6 PM.     Per Estefania (bedside nurse) pt has her personal w/c in her room and Estefania feels it would be appropriate for pt's daughter to provide transport at discharge.      Patient / Family response to discharge plan:  Pt and dtr Rachael were updated and both vocalized agreement with discharge plan to [facility] today. Pt's dtr expressed appreciation to Ortho PA's communication today.    Care Transitions staff discussed current COVID 19 pandemic and Medicare waiver of the traditional 3 day stay requirement with both Pt and dtr via phone. Pt/dtr expressed understanding and in agreement with discharge. Referred patient to medicare.gov, insurance provider, and admissions department at accepting facility for further questions or concerns on coverage for SNF stay.    This patient has been evacuated from a nursing home in the emergency area? NO  This patient is being discharged from a hospital (in the emergency area) in order to provide care to more seriously ill patients? YES  This patient needs SNF care as  a result of the emergency, regardless of whether she was in a hospital/nursing home prior to this stay? YES    Persons notified of above discharge plan:  Pt, Pt's dtr Jaclyn Cano, bedside nurse LUCIA Mac, [place] [facility] ([place])    Staff Discharge Instructions:  Please fax discharge orders and signed hard scripts for any controlled substances.  Please print a packet and send with patient.   RN to RN report: [phone]  Please fax narc copy to Nursing Station Fax: [phone]  Please send original narc script with Pt at discharge.     Medicare Notice of Rights provided to the patient/family:  YES    TYE Dawson, BRODERICK  7C (& 5C off-service) Unit   Phone: [phone]  Pager: [phone]

## 2020-08-07 NOTE — PROGRESS NOTES
Social Work Services Progress Note    Hospital Day: 2  Date of Initial Social Work Evaluation:  8/6/2020  Collaborated with:  [facility] LYNNU Janell     Data:  SW received call from [facility]CU in [place] and spoke with Janell who indicated that they are able to accept pt and did get the notes SW provided re: pt requesting baths 2x per week min and sheets being changed out every other day.  SW to provide update to [facility] once confirmed when pt is ready for discharge.     Intervention:  discharge planning.     Assessment:  SW did not speak with pt during this interaction.     Plan:    Anticipated Disposition:  Facility:  TCU [facility]    Barriers to d/c plan:  Medical Stabiltiy    Follow Up:  SW will continue to follow up for discharge planning.     TYE Bonilla, SW  [facility]   [facility]  Pager: [phone]

## 2020-08-07 NOTE — PLAN OF CARE
VS: VSS  Temp: 97.6  F (36.4  C) Temp src: Oral BP: 110/65 Pulse: 68   Resp: 16 SpO2: 98 % O2 Device: None (Room air)       O2: >90% on RA, denies SOB or CP   Output: Voiding frequently in BSC or bedpan    Last BM: LBM 8/4 per pt report, given miralax and senna. May want suppository once settled into facility.    Activity: Pt up with assist of 1, gait belt, and walker. TTWB to RLE. Must wear hip abduction brace when OOB at all times.   Skin: Skin intact ex for incision, skin is warm and dry. Scars noted to knees.    Pain: Pain is being managed with PRN dilaudid 4mg Q3H, IV dilaudid given x1 for breakthrough prior to therapy. Scheduled tylenol given.   CMS: Baseline N/T in BUE and BLE per pt report. DP +2 bilaterally, able to wiggle toes.   Dressing: Dressing to RLE CDI, no drainage visible.    Diet: Regular diet, tolerating without N/V. Adequate intake of PO fluids.   LDA: PIV removed   HV removed   Equipment: Personal belongings at bedside   Plan: Discharge to [facility] TCU facility at 5pm.    Additional Info: Pt extremely anxious, needs reassurance. Has had bad experiences in past.   Carpel tunnel in L hand- painful. Requesting shot while in hospital but MD requesting pt to follow-up with initial provider.      RN to RN handoff given. No further questions stated. Packet was sent with pt and told to give to facility. PIV was removed. Ensured pt had all personal belongings. Daughter to transport to facility.

## 2020-08-07 NOTE — PLAN OF CARE
Discharge Planner PT   Patient plan for discharge: TCU  Current status: TTWB SBA for bed mobility and transfers to FWW. Mod A x 1 for donning and doffing brace. Pt able to amb 100' with FWW TTWB.   Barriers to return to prior living situation: WB tolerance and strength  Recommendations for discharge: TCU  Rationale for recommendations: Pt requires skilled PT to progress strength        Entered by: Anant Gatica 08/07/2020 1:30 PM

## 2020-08-07 NOTE — PROGRESS NOTES
"Orthopedic Surgery Progress Note   August 6, 2020    Subjective: No acute events overnight. Pain worse today than yesterday. Upset that nobody told her about the hip abduction brace that was fit yesterday, complained that it hurt her lateral thigh (over surgical incision) when she put it on. Has been wearing it while out of bed. Needing TCU for discharge.     Objective: /65   Pulse 68   Temp 97.6  F (36.4  C) (Oral)   Resp 16   Ht 1.651 m (5' 5\")   Wt 86.3 kg (190 lb 4.1 oz)   LMP 03/11/2010   SpO2 98%   BMI 31.66 kg/m      Drain: 100cc last shift    General: NAD, alert and oriented, cooperative with exam.   Cardio: RRR, extremities wwp.   Respiratory: Non-labored breathing.  MSK: Focused examination of RLE: Toes wwp, DP 2+, bcr in all toes. +EHL/FHL/GSC/TA with 5/5 strength. SILT SP/DP/Sa/Hawk/T. Dressing c/d/i. Drain with serosanguinous output.    Labs: Hgb 10.0  Cultures: NGTD from 8/5 intraop     Imaging: XR of the pelvis and R hip in PACU reviewed, new R SARAHI implants, improved position of the cup, 2 proximal femur tension cables placed, no obvious fractures seen, hip located.     Assessment and Plan:   Mercedes Barber is a 65 year old female s/p Procedure(s):  Revision Right total hip arthroplasty on 8/5/2020 with Dr. Grey. Doing well.     Activity: Up with assist.  Weight bearing status: Toe Touch Weight Bearing Right Lower Extremity  Antibiotics: Vancomycin x 24 Hrs  Diet: Begin with clear fluids and progress diet as tolerated.  Bowel regimen. Anti-emetics PRN.    DVT prophylaxis: aspirin 162mg Q24  Dressing: leave in place for 7-9 days post op\  Brace: R hip abduction brace to be worn while out of bed.  Drains: Monitor output; record each shift  Pain management: transition from IV to orals as tolerated.    Physical Therapy/Occupational Therapy  Consults: Medical Management  Follow-up: Clinic with Dr. Grey in 2 weeks    Dispo: pending progress with therapies, PO pain control, and medical " stability, anticipate discharge to TCU in 1-2 days (pending TCU acceptance).     Tom Burden MD  Orthopedic Surgery PGY-4  Pager: [phone]

## 2020-08-07 NOTE — PLAN OF CARE
Pt is A&Ox4. VSS. LS clear, on RA. BS active, LBM on 8/5/2020. Voiding well. Pain managed with 4mg PO dilaudid. R hip surgical dressing is c/d/I. Pt has baseline neuropathy in LEs. HV patent and draining well. Pt up with 1 assist. Continue to monitor.

## 2020-08-08 ENCOUNTER — TELEPHONE (OUTPATIENT)
Dept: ORTHOPEDICS | Facility: CLINIC | Age: 65
End: 2020-08-08

## 2020-08-08 NOTE — PLAN OF CARE
Occupational Therapy Discharge Summary    Reason for therapy discharge:    Discharged to transitional care facility.    Progress towards therapy goal(s). See goals on Care Plan in [facility] electronic health record for goal details.  Goals partially met.  Barriers to achieving goals:   discharge from facility.    Therapy recommendation(s):    Continued therapy is recommended.  Rationale/Recommendations:  to maximize safety and IND w/ ADLs and functional mobility.

## 2020-08-08 NOTE — TELEPHONE ENCOUNTER
Patient status post Revision Right total hip arthroplasty on 8/5/2020 with Dr. Grey.     Called by rehab facility with regards to orders. Clarified the following items. Patient to be toe touch weight bearing, wear brace when out of bed and adhere to posterior hip precautions.     ALIYA Crabtree MD

## 2020-08-08 NOTE — PLAN OF CARE
Physical Therapy Discharge Summary    Reason for therapy discharge:    Discharged to transitional care facility.    Progress towards therapy goal(s). See goals on Care Plan in [facility] electronic health record for goal details.  Goals partially met.  Barriers to achieving goals:   discharge from facility.    Therapy recommendation(s):    Continued therapy is recommended.  Rationale/Recommendations:  pt would benefit from continued skilled PT services at [facility].

## 2020-08-09 ASSESSMENT — MIFFLIN-ST. JEOR: SCORE: 1404.53

## 2020-08-10 ENCOUNTER — PATIENT OUTREACH (OUTPATIENT)
Dept: CARE COORDINATION | Facility: CLINIC | Age: 65
End: 2020-08-10

## 2020-08-10 ENCOUNTER — NURSING HOME VISIT (OUTPATIENT)
Dept: GERIATRICS | Facility: CLINIC | Age: 65
End: 2020-08-10
Payer: MEDICARE

## 2020-08-10 VITALS
HEART RATE: 71 BPM | RESPIRATION RATE: 16 BRPM | SYSTOLIC BLOOD PRESSURE: 124 MMHG | OXYGEN SATURATION: 100 % | TEMPERATURE: 97.7 F | WEIGHT: 190.4 LBS | BODY MASS INDEX: 31.72 KG/M2 | HEIGHT: 65 IN | DIASTOLIC BLOOD PRESSURE: 73 MMHG

## 2020-08-10 DIAGNOSIS — Z96.641 AFTERCARE FOLLOWING RIGHT HIP JOINT REPLACEMENT SURGERY: ICD-10-CM

## 2020-08-10 DIAGNOSIS — G25.81 RESTLESS LEGS SYNDROME (RLS): ICD-10-CM

## 2020-08-10 DIAGNOSIS — M16.11 PRIMARY OSTEOARTHRITIS OF RIGHT HIP: Primary | ICD-10-CM

## 2020-08-10 DIAGNOSIS — Z47.1 AFTERCARE FOLLOWING RIGHT HIP JOINT REPLACEMENT SURGERY: ICD-10-CM

## 2020-08-10 DIAGNOSIS — K58.1 IRRITABLE BOWEL SYNDROME WITH CONSTIPATION: ICD-10-CM

## 2020-08-10 DIAGNOSIS — K59.03 DRUG-INDUCED CONSTIPATION: ICD-10-CM

## 2020-08-10 DIAGNOSIS — D62 ANEMIA DUE TO BLOOD LOSS, ACUTE: ICD-10-CM

## 2020-08-10 PROBLEM — Z86.19 HISTORY OF INFECTION: Status: ACTIVE | Noted: 2020-07-06

## 2020-08-10 PROBLEM — I82.629 DEEP VENOUS THROMBOSIS OF UPPER EXTREMITY (H): Status: ACTIVE | Noted: 2019-10-10

## 2020-08-10 PROBLEM — T84.50XA: Status: ACTIVE | Noted: 2019-10-08

## 2020-08-10 LAB
BACTERIA SPEC CULT: NO GROWTH
SPECIMEN SOURCE: NORMAL

## 2020-08-10 PROCEDURE — 99310 SBSQ NF CARE HIGH MDM 45: CPT | Performed by: NURSE PRACTITIONER

## 2020-08-10 RX ORDER — TRAMADOL HYDROCHLORIDE 50 MG/1
50-100 TABLET ORAL EVERY 4 HOURS PRN
Qty: 10 TABLET | Refills: 0 | Status: SHIPPED
Start: 2020-08-10 | End: 2020-08-13

## 2020-08-10 NOTE — LETTER
"    8/10/2020        RE: Mercedes Barber  [address]  [address]  [place] [address]        [facility]  PRIMARY CARE PROVIDER AND CLINIC:  Skyler Álvarez MD, [address] / [facility] [address]; Phone: [phone]; Fax: [phone]  Chief Complaint   Patient presents with     Hospital F/U     [place] Medical Record Number: [id number]  Place of Service where encounter took place: [facility] ([facility]) [[id number]]    Mercedes Barber is a 65 year old (1955), admitted to the above facility from  [facility]. Hospital stay 08/05/20 through 08/07/20. Admitted to this facility for rehab, medical management and nursing care.    HPI:    HPI information obtained from: facility chart records. Epic records  Brief Summary of Hospital Course:   PMH of seizure d/o (tuberous sclerosis) IBS, depression, H/o DVT, RLS, reactive airway disease who had a failed right SARAHI and underwent revision on 8/5/20  DJD s/p right SARAHI revision 8/5 Dr Grey  Anemia: Hgb 10, no transfusion required  IBS/constipation: taking linzess  Updates on Status Since Skilled nursing Admission: On exam today patient sitting up in WC, states pain in right hip is rated 7/10 patient states \"not bad\" she is requesting tramadol instead of dilaudid patient states she cannot tolerate \"heavy pain medications\" c/o some nausea and vomiting, states she has a terrible smell in her nose and she is very anxious about the smell not going away, states it causes her nausea. Likely due to anesthesia. No other concerns today    CODE STATUS/ADVANCE DIRECTIVES DISCUSSION: CPR/Full code   Patient's living condition: lives alone in a condominium  ALLERGIES: Blood transfusion related (informational only); Bupropion; Carbamazepine; Clonazepam; Encort [hydrocortisone acetate]; Encort [hydrocortisone]; Erythromycin; Erythromycin; Flagyl [metronidazole]; Gabapentin; Lamictal [lamotrigine]; Oxycodone; " and Tegretol [carbamazepine]  PAST MEDICAL HISTORY:  has a past medical history of Arthritis, Cervical high risk HPV (human papillomavirus) test positive (07/17/2017), Cervical pain (9/15/2016), Constipation (8/27/2012), Diarrhea (4/30/2009), Esophageal stricture (2/21/2012), Gastro-oesophageal reflux disease, Hypothyroidism (9/18/2012), IBS (irritable bowel syndrome), Mild major depression (H) (2/21/2012), Neuropathy of lower extremity, Rectal prolapse, Restless leg syndrome, Seizure disorder (H), Sleep apnea, and Temporal sclerosis (8/27/2012). She also has no past medical history of Complication of anesthesia, History of blood transfusion, or Malignant hyperthermia.  PAST SURGICAL HISTORY:   has a past surgical history that includes tonsillectomy & adenoidectomy; carpal tunnel release rt/lt; drain pilonidal cyst simpl; L shoulder fracture; rectal prolapse repair abdominally; tubal ligation; Dilation and curettage; Remove mesh vagina (8/10/2012); Excise lesion trunk (8/10/2012); Esophagoscopy, gastroscopy, duodenoscopy (EGD), combined (4/5/2013); Dental surgery; Release plantar fascia (Right, 1/20/2015); Repair hammer toe (Right, 1/20/2015); Endoscopy Drug Induced Sleep (N/A, 11/18/2015); Hysterectomy; Anterior COLPORRHAPHY, BLADDER/VAGINA; Arthroplasty patello-femoral (knee) (Bilateral); Arthroplasty hip (Right, 7/23/2019); Arthroplasty revision hip (Right, 8/7/2019); Irrigation and debridement hip, combined (Right, 8/26/2019); and Arthroplasty revision hip (Right, 8/5/2020).  FAMILY HISTORY: family history includes Alcohol/Drug in her brother; Alzheimer Disease in her paternal grandmother; Breast Cancer in her sister; Congenital Anomalies in her daughter; Depression in her brother and sister; Diabetes in her father; Eye Disorder in her mother; Gastrointestinal Disease in her brother; Hypertension in her brother; Lipids in her brother, mother, and sister; Neurologic Disorder in her daughter; Osteoporosis in her  mother; Psychotic Disorder in her brother; Thyroid Disease in her sister.  SOCIAL HISTORY:   reports that she has never smoked. She has never used smokeless tobacco. She reports current alcohol use. She reports that she does not use drugs.    Post Discharge Medication Reconciliation Status: discharge medications reconciled, continue medications without change    Current Outpatient Medications   Medication Sig Dispense Refill     acetaminophen (TYLENOL) 325 MG tablet Take 2 tablets (650 mg) by mouth every 4 hours as needed for other 1 Bottle 0     albuterol (ALBUTEROL) 108 (90 BASE) MCG/ACT Inhaler Inhale 2 puffs into the lungs 4 times daily as needed for shortness of breath / dyspnea or wheezing 1 Inhaler 0     artificial saliva (BIOTENE MT) AERS spray Take 2 sprays by mouth 3 times daily as needed for dry mouth       aspirin (ASA) 81 MG EC tablet Take 2 tablets (162 mg) by mouth daily 28 tablet 0     carboxymethylcellulose PF (REFRESH PLUS) 0.5 % ophthalmic solution Place 1 drop into both eyes 2 times daily       cycloSPORINE (RESTASIS) 0.05 % ophthalmic emulsion Place 1 drop into both eyes 2 times daily       fluticasone-vilanterol (BREO ELLIPTA) 200-25 MCG/INH inhaler Inhale 1 puff into the lungs daily       HYDROmorphone (DILAUDID) 2 MG tablet Take 1-2 tablets (2-4 mg) by mouth every 4 hours as needed for moderate to severe pain 30 tablet 0     hypromellose (ARTIFICIAL TEARS) 0.5 % SOLN ophthalmic solution Place 1 drop into both eyes 2 times daily       lacosamide (VIMPAT) 200 MG TABS tablet Take 1 tablet (200 mg) by mouth 2 times daily 30 tablet 0     levothyroxine (SYNTHROID/LEVOTHROID) 50 MCG tablet Take 50 mcg by mouth daily       linaclotide (LINZESS) 290 MCG capsule Take 1 capsule (290 mcg) by mouth every morning (before breakfast) 10 capsule 0     liothyronine (CYTOMEL) 5 MCG tablet Take 5 mcg by mouth daily        nefazodone (SERZONE) 200 MG tablet Take 600 mg by mouth At Bedtime        omeprazole  "(PRILOSEC) 40 MG DR capsule Take 1 capsule (40 mg) by mouth 2 times daily 180 capsule 3     ondansetron (ZOFRAN-ODT) 4 MG ODT tab Take 1 tablet (4 mg) by mouth every 6 hours as needed for nausea or vomiting 20 tablet 0     polyethylene glycol (MIRALAX) 17 g packet Take 17 g by mouth daily 7 packet 0     potassium citrate (UROCIT-K) 10 MEQ (1080 MG) CR tablet Take 10 mEq by mouth daily       pramipexole (MIRAPEX) 1 MG tablet Give 1 tablet by mouth in the morning and 2 tablets by mouth 3 hours prior to bedtime       senna-docusate (SENOKOT-S/PERICOLACE) 8.6-50 MG tablet Take 2 tablets by mouth 2 times daily 30 tablet 0         ROS:  10 point ROS of systems including Constitutional, Eyes, Respiratory, Cardiovascular, Gastroenterology, Genitourinary, Integumentary, Musculoskeletal, Psychiatric were all negative except for pertinent positives noted in my HPI.    Vitals:  /73   Pulse 71   Temp 97.7  F (36.5  C)   Resp 16   Ht 1.651 m (5' 5\")   Wt 86.4 kg (190 lb 6.4 oz)   LMP 03/11/2010   SpO2 100%   BMI 31.68 kg/m    Exam:  GENERAL APPEARANCE:  Alert, in no distress  ENT:  Mouth and posterior oropharynx normal, moist mucous membranes, normal hearing acuity  EYES:  EOM, conjunctivae, lids, pupils and irises normal, PERRL  RESP:  respiratory effort and palpation of chest normal, lungs clear to auscultation , no respiratory distress  CV:  Palpation and auscultation of heart done , regular rate and rhythm, no murmur, rub, or gallop, no edema  ABDOMEN:  normal bowel sounds, soft, nontender, no hepatosplenomegaly or other masses  M/S:   Examination of:   si  sitting up in WC, able to move all 4 extremities  SKIN:  Inspection of skin and subcutaneous tissue baseline, did not visualize right hip incision  NEURO:   Cranial nerves 2-12 are normal tested and grossly at patient's baseline, speech WNL  PSYCH:  affect and mood normal    Lab/Diagnostic data:    Most Recent 3 CBC's:  Recent Labs   Lab Test 08/07/20  0633 " "08/06/20  0533 07/31/20  1558 06/03/20  1431 10/23/19  1533   WBC  --   --  6.2 6.7 10.6   HGB 10.0* 10.8* 13.3 12.0 11.6*   MCV  --   --  89 87 89   PLT  --   --  271 251 407     Most Recent 3 BMP's:  Recent Labs   Lab Test 08/06/20  0533 07/31/20  1558 10/23/19  1533    139 138   POTASSIUM 4.5 4.3 3.5   CHLORIDE 107 107 108   CO2 27 27 25   BUN 11 14 13   CR 0.83 0.81 0.73   ANIONGAP 4 5 5   SABA 8.2* 9.4 9.2   * 106* 107*       ASSESSMENT/PLAN:  Primary osteoarthritis of right hip  Aftercare following right hip joint replacement surgery  Acute/ongoing; Right SARAHI revision Dr. Grey 8/5/20  PT and OT for strengthening, continue tylenol 650mg q 4 hours prn, DC dilaudid, order tramadol 50-100mg q 4 hours prn,   ASA 162mg QD  F/u with ortho as directed    Anemia due to blood loss, acute  Acute/ongoing: Hgb on Thursday, follow    Irritable bowel syndrome with constipation  Drug-induced constipation  Ongoing: continue linzess 290mcg QD, senna s 2 PO BID, miralax 17gm QD    Restless legs syndrome (RLS)  Ongoing: continue mirapex as orders       Orders written by provider at facility  DC dilaudid  Tramadol 50-100mg q 4 hours prn  BMP and Hgb on Thursday    Total time spent with patient visit at the skilled nursing facility was 35 min including patient visit and review of past records. Greater than 50% of total time spent with counseling and coordinating care due to discussed pain management, encouraged patient and gave education on use of tylenol prn and ice and making sure she stays ahead of pain, discussed anesthesia effects and \"terrible smell\" in nose, gave education and reassurance of anesthesia effects wearing off. .     Electronically signed by:  Tonya Lynn Haase, APRN CNP         Sincerely,        Tonya Lynn Haase, APRN CNP    "

## 2020-08-10 NOTE — PROGRESS NOTES
"[facility]  PRIMARY CARE PROVIDER AND CLINIC:  Skyler Álvarez MD, [address] [place] [address] / [facility] [address]; Phone: [phone]; Fax: [phone]  Chief Complaint   Patient presents with     Hospital F/U     [place] Medical Record Number: [id number]  Place of Service where encounter took place: [facility] ([facility]) [[id number]]    Mercedes Barber is a 65 year old (1955), admitted to the above facility from  [facility]. Hospital stay 08/05/20 through 08/07/20. Admitted to this facility for rehab, medical management and nursing care.    HPI:    HPI information obtained from: facility chart records. Epic records  Brief Summary of Hospital Course:   PMH of seizure d/o (tuberous sclerosis) IBS, depression, H/o DVT, RLS, reactive airway disease who had a failed right SARAHI and underwent revision on 8/5/20  DJD s/p right SARAHI revision 8/5 Dr Grey  Anemia: Hgb 10, no transfusion required  IBS/constipation: taking linzess  Updates on Status Since Skilled nursing Admission: On exam today patient sitting up in WC, states pain in right hip is rated 7/10 patient states \"not bad\" she is requesting tramadol instead of dilaudid patient states she cannot tolerate \"heavy pain medications\" c/o some nausea and vomiting, states she has a terrible smell in her nose and she is very anxious about the smell not going away, states it causes her nausea. Likely due to anesthesia. No other concerns today    CODE STATUS/ADVANCE DIRECTIVES DISCUSSION: CPR/Full code   Patient's living condition: lives alone in a condominium  ALLERGIES: Blood transfusion related (informational only); Bupropion; Carbamazepine; Clonazepam; Encort [hydrocortisone acetate]; Encort [hydrocortisone]; Erythromycin; Erythromycin; Flagyl [metronidazole]; Gabapentin; Lamictal [lamotrigine]; Oxycodone; and Tegretol [carbamazepine]  PAST MEDICAL HISTORY:  has a past medical history of Arthritis, Cervical high risk " HPV (human papillomavirus) test positive (07/17/2017), Cervical pain (9/15/2016), Constipation (8/27/2012), Diarrhea (4/30/2009), Esophageal stricture (2/21/2012), Gastro-oesophageal reflux disease, Hypothyroidism (9/18/2012), IBS (irritable bowel syndrome), Mild major depression (H) (2/21/2012), Neuropathy of lower extremity, Rectal prolapse, Restless leg syndrome, Seizure disorder (H), Sleep apnea, and Temporal sclerosis (8/27/2012). She also has no past medical history of Complication of anesthesia, History of blood transfusion, or Malignant hyperthermia.  PAST SURGICAL HISTORY:   has a past surgical history that includes tonsillectomy & adenoidectomy; carpal tunnel release rt/lt; drain pilonidal cyst simpl; L shoulder fracture; rectal prolapse repair abdominally; tubal ligation; Dilation and curettage; Remove mesh vagina (8/10/2012); Excise lesion trunk (8/10/2012); Esophagoscopy, gastroscopy, duodenoscopy (EGD), combined (4/5/2013); Dental surgery; Release plantar fascia (Right, 1/20/2015); Repair hammer toe (Right, 1/20/2015); Endoscopy Drug Induced Sleep (N/A, 11/18/2015); Hysterectomy; Anterior COLPORRHAPHY, BLADDER/VAGINA; Arthroplasty patello-femoral (knee) (Bilateral); Arthroplasty hip (Right, 7/23/2019); Arthroplasty revision hip (Right, 8/7/2019); Irrigation and debridement hip, combined (Right, 8/26/2019); and Arthroplasty revision hip (Right, 8/5/2020).  FAMILY HISTORY: family history includes Alcohol/Drug in her brother; Alzheimer Disease in her paternal grandmother; Breast Cancer in her sister; Congenital Anomalies in her daughter; Depression in her brother and sister; Diabetes in her father; Eye Disorder in her mother; Gastrointestinal Disease in her brother; Hypertension in her brother; Lipids in her brother, mother, and sister; Neurologic Disorder in her daughter; Osteoporosis in her mother; Psychotic Disorder in her brother; Thyroid Disease in her sister.  SOCIAL HISTORY:   reports that she has  never smoked. She has never used smokeless tobacco. She reports current alcohol use. She reports that she does not use drugs.    Post Discharge Medication Reconciliation Status: discharge medications reconciled, continue medications without change    Current Outpatient Medications   Medication Sig Dispense Refill     acetaminophen (TYLENOL) 325 MG tablet Take 2 tablets (650 mg) by mouth every 4 hours as needed for other 1 Bottle 0     albuterol (ALBUTEROL) 108 (90 BASE) MCG/ACT Inhaler Inhale 2 puffs into the lungs 4 times daily as needed for shortness of breath / dyspnea or wheezing 1 Inhaler 0     artificial saliva (BIOTENE MT) AERS spray Take 2 sprays by mouth 3 times daily as needed for dry mouth       aspirin (ASA) 81 MG EC tablet Take 2 tablets (162 mg) by mouth daily 28 tablet 0     carboxymethylcellulose PF (REFRESH PLUS) 0.5 % ophthalmic solution Place 1 drop into both eyes 2 times daily       cycloSPORINE (RESTASIS) 0.05 % ophthalmic emulsion Place 1 drop into both eyes 2 times daily       fluticasone-vilanterol (BREO ELLIPTA) 200-25 MCG/INH inhaler Inhale 1 puff into the lungs daily       HYDROmorphone (DILAUDID) 2 MG tablet Take 1-2 tablets (2-4 mg) by mouth every 4 hours as needed for moderate to severe pain 30 tablet 0     hypromellose (ARTIFICIAL TEARS) 0.5 % SOLN ophthalmic solution Place 1 drop into both eyes 2 times daily       lacosamide (VIMPAT) 200 MG TABS tablet Take 1 tablet (200 mg) by mouth 2 times daily 30 tablet 0     levothyroxine (SYNTHROID/LEVOTHROID) 50 MCG tablet Take 50 mcg by mouth daily       linaclotide (LINZESS) 290 MCG capsule Take 1 capsule (290 mcg) by mouth every morning (before breakfast) 10 capsule 0     liothyronine (CYTOMEL) 5 MCG tablet Take 5 mcg by mouth daily        nefazodone (SERZONE) 200 MG tablet Take 600 mg by mouth At Bedtime        omeprazole (PRILOSEC) 40 MG DR capsule Take 1 capsule (40 mg) by mouth 2 times daily 180 capsule 3     ondansetron (ZOFRAN-ODT) 4  "MG ODT tab Take 1 tablet (4 mg) by mouth every 6 hours as needed for nausea or vomiting 20 tablet 0     polyethylene glycol (MIRALAX) 17 g packet Take 17 g by mouth daily 7 packet 0     potassium citrate (UROCIT-K) 10 MEQ (1080 MG) CR tablet Take 10 mEq by mouth daily       pramipexole (MIRAPEX) 1 MG tablet Give 1 tablet by mouth in the morning and 2 tablets by mouth 3 hours prior to bedtime       senna-docusate (SENOKOT-S/PERICOLACE) 8.6-50 MG tablet Take 2 tablets by mouth 2 times daily 30 tablet 0         ROS:  10 point ROS of systems including Constitutional, Eyes, Respiratory, Cardiovascular, Gastroenterology, Genitourinary, Integumentary, Musculoskeletal, Psychiatric were all negative except for pertinent positives noted in my HPI.    Vitals:  /73   Pulse 71   Temp 97.7  F (36.5  C)   Resp 16   Ht 1.651 m (5' 5\")   Wt 86.4 kg (190 lb 6.4 oz)   LMP 03/11/2010   SpO2 100%   BMI 31.68 kg/m    Exam:  GENERAL APPEARANCE:  Alert, in no distress  ENT:  Mouth and posterior oropharynx normal, moist mucous membranes, normal hearing acuity  EYES:  EOM, conjunctivae, lids, pupils and irises normal, PERRL  RESP:  respiratory effort and palpation of chest normal, lungs clear to auscultation , no respiratory distress  CV:  Palpation and auscultation of heart done , regular rate and rhythm, no murmur, rub, or gallop, no edema  ABDOMEN:  normal bowel sounds, soft, nontender, no hepatosplenomegaly or other masses  M/S:   Examination of:   si  sitting up in WC, able to move all 4 extremities  SKIN:  Inspection of skin and subcutaneous tissue baseline, did not visualize right hip incision  NEURO:   Cranial nerves 2-12 are normal tested and grossly at patient's baseline, speech WNL  PSYCH:  affect and mood normal    Lab/Diagnostic data:    Most Recent 3 CBC's:  Recent Labs   Lab Test 08/07/20  0633 08/06/20  0533 07/31/20  1558 06/03/20  1431 10/23/19  1533   WBC  --   --  6.2 6.7 10.6   HGB 10.0* 10.8* 13.3 12.0 " "11.6*   MCV  --   --  89 87 89   PLT  --   --  271 251 407     Most Recent 3 BMP's:  Recent Labs   Lab Test 08/06/20  0533 07/31/20  1558 10/23/19  1533    139 138   POTASSIUM 4.5 4.3 3.5   CHLORIDE 107 107 108   CO2 27 27 25   BUN 11 14 13   CR 0.83 0.81 0.73   ANIONGAP 4 5 5   SABA 8.2* 9.4 9.2   * 106* 107*       ASSESSMENT/PLAN:  Primary osteoarthritis of right hip  Aftercare following right hip joint replacement surgery  Acute/ongoing; Right SARAHI revision Dr. Grey 8/5/20  PT and OT for strengthening, continue tylenol 650mg q 4 hours prn, DC dilaudid, order tramadol 50-100mg q 4 hours prn,   ASA 162mg QD  F/u with ortho as directed    Anemia due to blood loss, acute  Acute/ongoing: Hgb on Thursday, follow    Irritable bowel syndrome with constipation  Drug-induced constipation  Ongoing: continue linzess 290mcg QD, senna s 2 PO BID, miralax 17gm QD    Restless legs syndrome (RLS)  Ongoing: continue mirapex as orders       Orders written by provider at facility  DC dilaudid  Tramadol 50-100mg q 4 hours prn  BMP and Hgb on Thursday    Total time spent with patient visit at the skilled nursing facility was 35 min including patient visit and review of past records. Greater than 50% of total time spent with counseling and coordinating care due to discussed pain management, encouraged patient and gave education on use of tylenol prn and ice and making sure she stays ahead of pain, discussed anesthesia effects and \"terrible smell\" in nose, gave education and reassurance of anesthesia effects wearing off. .     Electronically signed by:  Tonya Lynn Haase, APRN CNP     "

## 2020-08-10 NOTE — PROGRESS NOTES
Clinic Care Coordination Contact  Care Coordination Transition Communication    Clinical Data: Patient was hospitalized at [facility] from 8/5/20 to 8/7/20 for revision right total hip arthroplasty, dx mechanical complication of prosthetic hip implant.    Transition to Facility:              Facility Name: [facility]              Contact name and phone number/fax: phone [phone], fax [phone]    Plan:  Care Coordinator will await notification from facility staff informing  Care Coordinator of patient's discharge plans/needs.  Care Coordinator will review chart and outreach to facility staff every 4 weeks and as needed.     GOSIA Stokes   Social Work Clinic Care Coordinator   [facility]   PH: [phone]  [email or web address]

## 2020-08-10 NOTE — LETTER
[facility]   To:   [facility]          Please give to facility    From:   [facility] Care Coordinator [facility]     Patient Name:  Mercedes Barber YOB: 1955   Admit date: 8/7/20      *Information Needed:  Please contact me when the patient will discharge (or if they will move to long term care)- include the discharge date, disposition, and main diagnosis   - If the patient is discharged with home care services, please provide the name of the agency    Also- Please inform me if a care conference is being held.   Phone, Fax or Email with information       Thank You,   GOSIA Stokes  Clinic Care Coordinator  [facility]  Ph: [phone]  [email or web address]

## 2020-08-11 ASSESSMENT — MIFFLIN-ST. JEOR
SCORE: 1408.61
SCORE: 1408.61

## 2020-08-12 ENCOUNTER — TELEPHONE (OUTPATIENT)
Dept: ORTHOPEDICS | Facility: CLINIC | Age: 65
End: 2020-08-12

## 2020-08-12 LAB
BACTERIA SPEC CULT: NORMAL
Lab: NORMAL
SPECIMEN SOURCE: NORMAL

## 2020-08-12 NOTE — TELEPHONE ENCOUNTER
How Severe Is Your Psoriasis?: moderate [facility]    Phone Message    May a detailed message be left on voicemail: yes     Reason for Call: Other:    Brisa, therapist with [facility] states that pt's abdoctor brace doesn't seem to fit correctly. Brisa would like to get an 'orthodist' out there to see if there's anything that they can do.     Please call Brisa back to advise.     Action Taken: Other:  ortho    Travel Screening: Not Applicable                                                                          Is This A New Presentation, Or A Follow-Up?: Follow Up Psoriasis

## 2020-08-13 NOTE — PROGRESS NOTES
[place] [facility]  [place] Medical Record Number:  [id number]  Place of Service where encounter took place:  [facility] (S) [[id number]]  Chief Complaint   Patient presents with     Nursing Home Acute       HPI:    Mercedes Barber  is a 65 year old (1955), who is being seen today for an episodic care visit.  HPI information obtained from: facility chart records, facility staff and patient report. Today's concern is:  SARAHI: on exam patient states her hip pain is well controlled on current pain regimen   Anemia: see labs  IBS: patient states bowels are still sluggish she is asking for an increase in miralax, states she takes a double dose of miralax in AM at home, denies abdominal pain or discomfort  Cough: denies cough or congestion, states at home she uses nebs, we have an inhaler for her to use here, nebs are not recommended to to Covid 19 and the aerolization of particles from breathing  Past Medical and Surgical History reviewed in Epic today.    MEDICATIONS:    Current Outpatient Medications   Medication Sig Dispense Refill     loratadine (CLARITIN REDITABS) 10 MG ODT Take 1 tablet (10 mg) by mouth daily as needed for allergies       acetaminophen (TYLENOL) 325 MG tablet Take 2 tablets (650 mg) by mouth every 4 hours as needed for other 1 Bottle 0     albuterol (ALBUTEROL) 108 (90 BASE) MCG/ACT Inhaler Inhale 2 puffs into the lungs 4 times daily as needed for shortness of breath / dyspnea or wheezing 1 Inhaler 0     artificial saliva (BIOTENE MT) AERS spray Take 2 sprays by mouth 3 times daily as needed for dry mouth       aspirin (ASA) 81 MG EC tablet Take 2 tablets (162 mg) by mouth daily 28 tablet 0     carboxymethylcellulose PF (REFRESH PLUS) 0.5 % ophthalmic solution Place 1 drop into both eyes 2 times daily       cycloSPORINE (RESTASIS) 0.05 % ophthalmic emulsion Place 1 drop into both eyes 2 times daily       fluticasone-vilanterol (BREO ELLIPTA) 200-25 MCG/INH inhaler Inhale  "1 puff into the lungs daily       hypromellose (ARTIFICIAL TEARS) 0.5 % SOLN ophthalmic solution Place 1 drop into both eyes 2 times daily       lacosamide (VIMPAT) 200 MG TABS tablet Take 1 tablet (200 mg) by mouth 2 times daily 30 tablet 0     levothyroxine (SYNTHROID/LEVOTHROID) 50 MCG tablet Take 50 mcg by mouth daily       linaclotide (LINZESS) 290 MCG capsule Take 1 capsule (290 mcg) by mouth every morning (before breakfast) 10 capsule 0     liothyronine (CYTOMEL) 5 MCG tablet Take 5 mcg by mouth daily        nefazodone (SERZONE) 200 MG tablet Take 600 mg by mouth At Bedtime        omeprazole (PRILOSEC) 40 MG DR capsule Take 1 capsule (40 mg) by mouth 2 times daily 180 capsule 3     ondansetron (ZOFRAN-ODT) 4 MG ODT tab Take 1 tablet (4 mg) by mouth every 6 hours as needed for nausea or vomiting 20 tablet 0     polyethylene glycol (MIRALAX) 17 g packet Take 17 g by mouth daily 7 packet 0     potassium citrate (UROCIT-K) 10 MEQ (1080 MG) CR tablet Take 10 mEq by mouth daily       pramipexole (MIRAPEX) 1 MG tablet Give 1 tablet by mouth in the morning and 2 tablets by mouth 3 hours prior to bedtime       senna-docusate (SENOKOT-S/PERICOLACE) 8.6-50 MG tablet Take 2 tablets by mouth 2 times daily 30 tablet 0         REVIEW OF SYSTEMS:  10 point ROS of systems including Constitutional, Eyes, Respiratory, Cardiovascular, Gastroenterology, Genitourinary, Integumentary, Musculoskeletal, Psychiatric were all negative except for pertinent positives noted in my HPI.    Objective:  /78   Pulse 70   Temp 97.6  F (36.4  C)   Resp 18   Ht 1.651 m (5' 5\")   Wt 86.8 kg (191 lb 4.8 oz)   LMP 03/11/2010   SpO2 99%   BMI 31.83 kg/m    Exam:  GENERAL APPEARANCE:  Alert, in no distress  ENT:  Mouth and posterior oropharynx normal, moist mucous membranes, normal hearing acuity  EYES:  EOM, conjunctivae, lids, pupils and irises normal, PERRL  RESP:  no respiratory distress  CV:  no edema  ABDOMEN:  abdomen flat  M/S:   " resting in bed, able to move all 4 extremities  SKIN:  Inspection of skin and subcutaneous tissue baseline, did not visualize right hip incision  NEURO:   speech WNL  PSYCH:  affect and mood normal    Labs:     Most Recent 3 CBC's:  Recent Labs   Lab Test 08/07/20  0633 08/06/20  0533 07/31/20  1558 06/03/20  1431 10/23/19  1533   WBC  --   --  6.2 6.7 10.6   HGB 10.0* 10.8* 13.3 12.0 11.6*   MCV  --   --  89 87 89   PLT  --   --  271 251 407     Most Recent 3 BMP's:  Recent Labs   Lab Test 08/06/20  0533 07/31/20  1558 10/23/19  1533    139 138   POTASSIUM 4.5 4.3 3.5   CHLORIDE 107 107 108   CO2 27 27 25   BUN 11 14 13   CR 0.83 0.81 0.73   ANIONGAP 4 5 5   SABA 8.2* 9.4 9.2   * 106* 107*       ASSESSMENT/PLAN:  Primary osteoarthritis of right hip  Aftercare following right hip joint replacement surgery  Acute/ongoing; Right SARAHI revision Dr. Grey 8/5/20  PT and OT for strengthening, continue tylenol 650mg q 4 hours prn, DC dilaudid, order tramadol 50-100mg q 4 hours prn,   ASA 162mg QD  F/u with ortho as directed     Anemia due to blood loss, acute  Acute/ongoing: Hgb on Thursday, follow     Cough: acute:   Started on claritin 10mg QD prn    Irritable bowel syndrome with constipation  Drug-induced constipation  Ongoing: continue linzess 290mcg QD, senna s 2 PO BID,increase miralax 34 gm QD scheduled and 34gm QD prn     Restless legs syndrome (RLS)  Ongoing: continue mirapex as orders          Orders written by provider at facility  Increase miralax to 34 gm QAM scheduled and 34gm QD prn    Total time spent with patient visit at the skilled nursing facility was 35 min including patient visit and review of past records. Greater than 50% of total time spent with counseling and coordinating care due to discussed pain control with patient, discussed bowels, hx of IBS and what patient takes at home for management. Education on drinking lots of water and moving as much as possible. Education on narcotic use  and side effects of constipation. .  Electronically signed by:  Tonya Lynn Haase, APRN CNP

## 2020-08-13 NOTE — TELEPHONE ENCOUNTER
Called Brisa back to inform her that orthotics had been contacted and will be out tomorrow, Friday 8/14 between 9:30 and 10:00 am to check the brace. Brisa stated that [facility] had already called to confirm this.

## 2020-08-14 ENCOUNTER — NURSING HOME VISIT (OUTPATIENT)
Dept: GERIATRICS | Facility: CLINIC | Age: 65
End: 2020-08-14
Payer: MEDICARE

## 2020-08-14 VITALS
RESPIRATION RATE: 18 BRPM | SYSTOLIC BLOOD PRESSURE: 123 MMHG | BODY MASS INDEX: 31.87 KG/M2 | WEIGHT: 191.3 LBS | HEART RATE: 70 BPM | TEMPERATURE: 97.6 F | DIASTOLIC BLOOD PRESSURE: 78 MMHG | HEIGHT: 65 IN | OXYGEN SATURATION: 99 %

## 2020-08-14 DIAGNOSIS — K59.03 DRUG-INDUCED CONSTIPATION: ICD-10-CM

## 2020-08-14 DIAGNOSIS — D62 ANEMIA DUE TO BLOOD LOSS, ACUTE: ICD-10-CM

## 2020-08-14 DIAGNOSIS — Z47.1 AFTERCARE FOLLOWING RIGHT HIP JOINT REPLACEMENT SURGERY: ICD-10-CM

## 2020-08-14 DIAGNOSIS — K58.1 IRRITABLE BOWEL SYNDROME WITH CONSTIPATION: ICD-10-CM

## 2020-08-14 DIAGNOSIS — Z98.890 STATUS POST HIP SURGERY: ICD-10-CM

## 2020-08-14 DIAGNOSIS — R05.9 COUGH: ICD-10-CM

## 2020-08-14 DIAGNOSIS — Z96.641 AFTERCARE FOLLOWING RIGHT HIP JOINT REPLACEMENT SURGERY: ICD-10-CM

## 2020-08-14 DIAGNOSIS — M16.11 PRIMARY OSTEOARTHRITIS OF RIGHT HIP: Primary | ICD-10-CM

## 2020-08-14 PROCEDURE — 99310 SBSQ NF CARE HIGH MDM 45: CPT | Performed by: NURSE PRACTITIONER

## 2020-08-14 RX ORDER — LORATADINE 10 MG/1
10 TABLET, ORALLY DISINTEGRATING ORAL DAILY PRN
Start: 2020-08-14

## 2020-08-14 RX ORDER — POLYETHYLENE GLYCOL 3350 17 G/17G
34 POWDER, FOR SOLUTION ORAL DAILY
Qty: 7 PACKET | Refills: 0
Start: 2020-08-14 | End: 2022-02-23

## 2020-08-14 NOTE — LETTER
8/14/2020        RE: Mercedes Barber  [address]  [address]  [place] [address]        [place] [facility]  [place] Medical Record Number:  [id number]  Place of Service where encounter took place:  [facility] (S) [[id number]]  Chief Complaint   Patient presents with     Nursing Home Acute       HPI:    Mercedes Barber  is a 65 year old (1955), who is being seen today for an episodic care visit.  HPI information obtained from: facility chart records, facility staff and patient report. Today's concern is:  SARAHI: on exam patient states her hip pain is well controlled on current pain regimen   Anemia: see labs  IBS: patient states bowels are still sluggish she is asking for an increase in miralax, states she takes a double dose of miralax in AM at home, denies abdominal pain or discomfort  Cough: denies cough or congestion, states at home she uses nebs, we have an inhaler for her to use here, nebs are not recommended to to Covid 19 and the aerolization of particles from breathing  Past Medical and Surgical History reviewed in Epic today.    MEDICATIONS:    Current Outpatient Medications   Medication Sig Dispense Refill     loratadine (CLARITIN REDITABS) 10 MG ODT Take 1 tablet (10 mg) by mouth daily as needed for allergies       acetaminophen (TYLENOL) 325 MG tablet Take 2 tablets (650 mg) by mouth every 4 hours as needed for other 1 Bottle 0     albuterol (ALBUTEROL) 108 (90 BASE) MCG/ACT Inhaler Inhale 2 puffs into the lungs 4 times daily as needed for shortness of breath / dyspnea or wheezing 1 Inhaler 0     artificial saliva (BIOTENE MT) AERS spray Take 2 sprays by mouth 3 times daily as needed for dry mouth       aspirin (ASA) 81 MG EC tablet Take 2 tablets (162 mg) by mouth daily 28 tablet 0     carboxymethylcellulose PF (REFRESH PLUS) 0.5 % ophthalmic solution Place 1 drop into both eyes 2 times daily       cycloSPORINE (RESTASIS) 0.05 % ophthalmic emulsion  "Place 1 drop into both eyes 2 times daily       fluticasone-vilanterol (BREO ELLIPTA) 200-25 MCG/INH inhaler Inhale 1 puff into the lungs daily       hypromellose (ARTIFICIAL TEARS) 0.5 % SOLN ophthalmic solution Place 1 drop into both eyes 2 times daily       lacosamide (VIMPAT) 200 MG TABS tablet Take 1 tablet (200 mg) by mouth 2 times daily 30 tablet 0     levothyroxine (SYNTHROID/LEVOTHROID) 50 MCG tablet Take 50 mcg by mouth daily       linaclotide (LINZESS) 290 MCG capsule Take 1 capsule (290 mcg) by mouth every morning (before breakfast) 10 capsule 0     liothyronine (CYTOMEL) 5 MCG tablet Take 5 mcg by mouth daily        nefazodone (SERZONE) 200 MG tablet Take 600 mg by mouth At Bedtime        omeprazole (PRILOSEC) 40 MG DR capsule Take 1 capsule (40 mg) by mouth 2 times daily 180 capsule 3     ondansetron (ZOFRAN-ODT) 4 MG ODT tab Take 1 tablet (4 mg) by mouth every 6 hours as needed for nausea or vomiting 20 tablet 0     polyethylene glycol (MIRALAX) 17 g packet Take 17 g by mouth daily 7 packet 0     potassium citrate (UROCIT-K) 10 MEQ (1080 MG) CR tablet Take 10 mEq by mouth daily       pramipexole (MIRAPEX) 1 MG tablet Give 1 tablet by mouth in the morning and 2 tablets by mouth 3 hours prior to bedtime       senna-docusate (SENOKOT-S/PERICOLACE) 8.6-50 MG tablet Take 2 tablets by mouth 2 times daily 30 tablet 0         REVIEW OF SYSTEMS:  10 point ROS of systems including Constitutional, Eyes, Respiratory, Cardiovascular, Gastroenterology, Genitourinary, Integumentary, Musculoskeletal, Psychiatric were all negative except for pertinent positives noted in my HPI.    Objective:  /78   Pulse 70   Temp 97.6  F (36.4  C)   Resp 18   Ht 1.651 m (5' 5\")   Wt 86.8 kg (191 lb 4.8 oz)   LMP 03/11/2010   SpO2 99%   BMI 31.83 kg/m    Exam:  GENERAL APPEARANCE:  Alert, in no distress  ENT:  Mouth and posterior oropharynx normal, moist mucous membranes, normal hearing acuity  EYES:  EOM, conjunctivae, " lids, pupils and irises normal, PERRL  RESP:  no respiratory distress  CV:  no edema  ABDOMEN:  abdomen flat  M/S:   resting in bed, able to move all 4 extremities  SKIN:  Inspection of skin and subcutaneous tissue baseline, did not visualize right hip incision  NEURO:   speech WNL  PSYCH:  affect and mood normal    Labs:     Most Recent 3 CBC's:  Recent Labs   Lab Test 08/07/20  0633 08/06/20  0533 07/31/20  1558 06/03/20  1431 10/23/19  1533   WBC  --   --  6.2 6.7 10.6   HGB 10.0* 10.8* 13.3 12.0 11.6*   MCV  --   --  89 87 89   PLT  --   --  271 251 407     Most Recent 3 BMP's:  Recent Labs   Lab Test 08/06/20  0533 07/31/20  1558 10/23/19  1533    139 138   POTASSIUM 4.5 4.3 3.5   CHLORIDE 107 107 108   CO2 27 27 25   BUN 11 14 13   CR 0.83 0.81 0.73   ANIONGAP 4 5 5   SABA 8.2* 9.4 9.2   * 106* 107*       ASSESSMENT/PLAN:  Primary osteoarthritis of right hip  Aftercare following right hip joint replacement surgery  Acute/ongoing; Right SARAHI revision Dr. Grey 8/5/20  PT and OT for strengthening, continue tylenol 650mg q 4 hours prn, DC dilaudid, order tramadol 50-100mg q 4 hours prn,   ASA 162mg QD  F/u with ortho as directed     Anemia due to blood loss, acute  Acute/ongoing: Hgb on Thursday, follow     Cough: acute:   Started on claritin 10mg QD prn    Irritable bowel syndrome with constipation  Drug-induced constipation  Ongoing: continue linzess 290mcg QD, senna s 2 PO BID,increase miralax 34 gm QD scheduled and 34gm QD prn     Restless legs syndrome (RLS)  Ongoing: continue mirapex as orders          Orders written by provider at facility  Increase miralax to 34 gm QAM scheduled and 34gm QD prn    Total time spent with patient visit at the [facility] nursing [facility] was 35 min including patient visit and review of past records. Greater than 50% of total time spent with counseling and coordinating care due to discussed pain control with patient, discussed bowels, hx of IBS and what patient takes  at home for management. Education on drinking lots of water and moving as much as possible. Education on narcotic use and side effects of constipation. .  Electronically signed by:  Tonya Lynn Haase, APRN CNP             Sincerely,        Tonya Lynn Haase, APRN CNP

## 2020-08-17 NOTE — PROGRESS NOTES
"[place] [facility]  [place] Medical Record Number:  [id number]  Place of Service where encounter took place:  [facility] (S) [[id number]]  Chief Complaint   Patient presents with     Nursing Home Acute       HPI:    Mercedes Barber  is a 65 year old (1955), who is being seen today for an episodic care visit.  HPI information obtained from: facility chart records, facility staff, patient report and [facility] chart review. Today's concern is:  SARAHI: on exam patient states right hip is \"sore\" but pain controlled, she is participating in therapy  Anemia: see labs  IBS: patient states bowels are working, no concerns    Past Medical and Surgical History reviewed in Epic today.    MEDICATIONS:    Current Outpatient Medications   Medication Sig Dispense Refill     acetaminophen (TYLENOL) 325 MG tablet Take 2 tablets (650 mg) by mouth every 4 hours as needed for other 1 Bottle 0     albuterol (ALBUTEROL) 108 (90 BASE) MCG/ACT Inhaler Inhale 2 puffs into the lungs 4 times daily as needed for shortness of breath / dyspnea or wheezing 1 Inhaler 0     artificial saliva (BIOTENE MT) AERS spray Take 2 sprays by mouth 3 times daily as needed for dry mouth       aspirin (ASA) 81 MG EC tablet Take 2 tablets (162 mg) by mouth daily 28 tablet 0     carboxymethylcellulose PF (REFRESH PLUS) 0.5 % ophthalmic solution Place 1 drop into both eyes 2 times daily       cycloSPORINE (RESTASIS) 0.05 % ophthalmic emulsion Place 1 drop into both eyes 2 times daily       fluticasone-vilanterol (BREO ELLIPTA) 200-25 MCG/INH inhaler Inhale 1 puff into the lungs daily       hypromellose (ARTIFICIAL TEARS) 0.5 % SOLN ophthalmic solution Place 1 drop into both eyes 2 times daily       lacosamide (VIMPAT) 200 MG TABS tablet Take 1 tablet (200 mg) by mouth 2 times daily 30 tablet 0     levothyroxine (SYNTHROID/LEVOTHROID) 50 MCG tablet Take 50 mcg by mouth daily       linaclotide (LINZESS) 290 MCG capsule Take 1 capsule " "(290 mcg) by mouth every morning (before breakfast) 10 capsule 0     liothyronine (CYTOMEL) 5 MCG tablet Take 5 mcg by mouth daily        loratadine (CLARITIN REDITABS) 10 MG ODT Take 1 tablet (10 mg) by mouth daily as needed for allergies       nefazodone (SERZONE) 200 MG tablet Take 600 mg by mouth At Bedtime        omeprazole (PRILOSEC) 40 MG DR capsule Take 1 capsule (40 mg) by mouth 2 times daily 180 capsule 3     ondansetron (ZOFRAN-ODT) 4 MG ODT tab Take 1 tablet (4 mg) by mouth every 6 hours as needed for nausea or vomiting 20 tablet 0     polyethylene glycol (MIRALAX) 17 g packet Take 34 g by mouth daily 7 packet 0     potassium citrate (UROCIT-K) 10 MEQ (1080 MG) CR tablet Take 10 mEq by mouth daily       pramipexole (MIRAPEX) 1 MG tablet Give 1 tablet by mouth in the morning and 2 tablets by mouth 3 hours prior to bedtime       senna-docusate (SENOKOT-S/PERICOLACE) 8.6-50 MG tablet Take 2 tablets by mouth 2 times daily 30 tablet 0         REVIEW OF SYSTEMS:  10 point ROS of systems including Constitutional, Eyes, Respiratory, Cardiovascular, Gastroenterology, Genitourinary, Integumentary, Musculoskeletal, Psychiatric were all negative except for pertinent positives noted in my HPI.    Objective:  /80   Pulse 70   Temp 97.7  F (36.5  C)   Resp 18   Ht 1.651 m (5' 5\")   Wt 86.8 kg (191 lb 4.8 oz)   LMP 03/11/2010   SpO2 96%   BMI 31.83 kg/m    Exam:  GENERAL APPEARANCE:  Alert, in no distress  ENT:  Mouth and posterior oropharynx normal, moist mucous membranes, normal hearing acuity  EYES:  EOM, conjunctivae, lids, pupils and irises normal, PERRL  RESP:  no respiratory distress  CV:  no edema  ABDOMEN:  abdomen flat  M/S:   resting in bed, able to move all 4 extremities  SKIN:  Inspection of skin and subcutaneous tissue baseline, did not visualize right hip incision  NEURO:   speech WNL  PSYCH:  affect and mood normal    Labs:     Most Recent 3 CBC's:  Recent Labs   Lab Test 08/07/20  0633 " 08/06/20  0533 07/31/20  1558 06/03/20  1431 10/23/19  1533   WBC  --   --  6.2 6.7 10.6   HGB 10.0* 10.8* 13.3 12.0 11.6*   MCV  --   --  89 87 89   PLT  --   --  271 251 407     Most Recent 3 BMP's:  Recent Labs   Lab Test 08/06/20  0533 07/31/20  1558 10/23/19  1533    139 138   POTASSIUM 4.5 4.3 3.5   CHLORIDE 107 107 108   CO2 27 27 25   BUN 11 14 13   CR 0.83 0.81 0.73   ANIONGAP 4 5 5   SABA 8.2* 9.4 9.2   * 106* 107*       ASSESSMENT/PLAN:  Primary osteoarthritis of right hip  Aftercare following right hip joint replacement surgery  Acute/ongoing; Right SARAHI revision Dr. Grey 8/5/20  PT and OT for strengthening, continue tylenol 650mg q 4 hours prn,  tramadol 50-100mg q 4 hours prn,   ASA 162mg QD  F/u with ortho as directed     Anemia due to blood loss, acute  Acute/ongoing: Hgb  follow       Irritable bowel syndrome with constipation  Drug-induced constipation  Ongoing: continue linzess 290mcg QD, senna s 2 PO BID, miralax 34 gm QD scheduled and 34gm QD prn     Restless legs syndrome (RLS)  Ongoing: continue mirapex as orders    Orders written by provider at facility  No new orders today      Electronically signed by:  Tonya Lynn Haase, FROYLAN CNP

## 2020-08-18 ENCOUNTER — NURSING HOME VISIT (OUTPATIENT)
Dept: GERIATRICS | Facility: CLINIC | Age: 65
End: 2020-08-18
Payer: MEDICARE

## 2020-08-18 VITALS
TEMPERATURE: 97.7 F | WEIGHT: 191.3 LBS | HEIGHT: 65 IN | DIASTOLIC BLOOD PRESSURE: 80 MMHG | BODY MASS INDEX: 31.87 KG/M2 | OXYGEN SATURATION: 96 % | RESPIRATION RATE: 18 BRPM | SYSTOLIC BLOOD PRESSURE: 126 MMHG | HEART RATE: 70 BPM

## 2020-08-18 DIAGNOSIS — Z47.1 AFTERCARE FOLLOWING RIGHT HIP JOINT REPLACEMENT SURGERY: ICD-10-CM

## 2020-08-18 DIAGNOSIS — M16.11 PRIMARY OSTEOARTHRITIS OF RIGHT HIP: Primary | ICD-10-CM

## 2020-08-18 DIAGNOSIS — D62 ANEMIA DUE TO BLOOD LOSS, ACUTE: ICD-10-CM

## 2020-08-18 DIAGNOSIS — Z96.641 AFTERCARE FOLLOWING RIGHT HIP JOINT REPLACEMENT SURGERY: ICD-10-CM

## 2020-08-18 DIAGNOSIS — K58.1 IRRITABLE BOWEL SYNDROME WITH CONSTIPATION: ICD-10-CM

## 2020-08-18 DIAGNOSIS — K59.03 DRUG-INDUCED CONSTIPATION: ICD-10-CM

## 2020-08-18 PROCEDURE — 99309 SBSQ NF CARE MODERATE MDM 30: CPT | Performed by: NURSE PRACTITIONER

## 2020-08-18 NOTE — LETTER
"    8/18/2020        RE: Mercedes Barber  [address]  [address]  [place] [address]        [place] [facility]  [place] [facility] Record Number:  [id number]  Place of Service where encounter took place:  [facility] (S) [[id number]]  Chief Complaint   Patient presents with     Nursing Home Acute       HPI:    Mercedes Barber  is a 65 year old (1955), who is being seen today for an episodic care visit.  HPI information obtained from: facility chart records, facility staff, patient report and [facility] chart review. Today's concern is:  SARAHI: on exam patient states right hip is \"sore\" but pain controlled, she is participating in therapy  Anemia: see labs  IBS: patient states bowels are working, no concerns    Past Medical and Surgical History reviewed in Epic today.    MEDICATIONS:    Current Outpatient Medications   Medication Sig Dispense Refill     acetaminophen (TYLENOL) 325 MG tablet Take 2 tablets (650 mg) by mouth every 4 hours as needed for other 1 Bottle 0     albuterol (ALBUTEROL) 108 (90 BASE) MCG/ACT Inhaler Inhale 2 puffs into the lungs 4 times daily as needed for shortness of breath / dyspnea or wheezing 1 Inhaler 0     artificial saliva (BIOTENE MT) AERS spray Take 2 sprays by mouth 3 times daily as needed for dry mouth       aspirin (ASA) 81 MG EC tablet Take 2 tablets (162 mg) by mouth daily 28 tablet 0     carboxymethylcellulose PF (REFRESH PLUS) 0.5 % ophthalmic solution Place 1 drop into both eyes 2 times daily       cycloSPORINE (RESTASIS) 0.05 % ophthalmic emulsion Place 1 drop into both eyes 2 times daily       fluticasone-vilanterol (BREO ELLIPTA) 200-25 MCG/INH inhaler Inhale 1 puff into the lungs daily       hypromellose (ARTIFICIAL TEARS) 0.5 % SOLN ophthalmic solution Place 1 drop into both eyes 2 times daily       lacosamide (VIMPAT) 200 MG TABS tablet Take 1 tablet (200 mg) by mouth 2 times daily 30 tablet 0     levothyroxine " "(SYNTHROID/LEVOTHROID) 50 MCG tablet Take 50 mcg by mouth daily       linaclotide (LINZESS) 290 MCG capsule Take 1 capsule (290 mcg) by mouth every morning (before breakfast) 10 capsule 0     liothyronine (CYTOMEL) 5 MCG tablet Take 5 mcg by mouth daily        loratadine (CLARITIN REDITABS) 10 MG ODT Take 1 tablet (10 mg) by mouth daily as needed for allergies       nefazodone (SERZONE) 200 MG tablet Take 600 mg by mouth At Bedtime        omeprazole (PRILOSEC) 40 MG DR capsule Take 1 capsule (40 mg) by mouth 2 times daily 180 capsule 3     ondansetron (ZOFRAN-ODT) 4 MG ODT tab Take 1 tablet (4 mg) by mouth every 6 hours as needed for nausea or vomiting 20 tablet 0     polyethylene glycol (MIRALAX) 17 g packet Take 34 g by mouth daily 7 packet 0     potassium citrate (UROCIT-K) 10 MEQ (1080 MG) CR tablet Take 10 mEq by mouth daily       pramipexole (MIRAPEX) 1 MG tablet Give 1 tablet by mouth in the morning and 2 tablets by mouth 3 hours prior to bedtime       senna-docusate (SENOKOT-S/PERICOLACE) 8.6-50 MG tablet Take 2 tablets by mouth 2 times daily 30 tablet 0         REVIEW OF SYSTEMS:  10 point ROS of systems including Constitutional, Eyes, Respiratory, Cardiovascular, Gastroenterology, Genitourinary, Integumentary, Musculoskeletal, Psychiatric were all negative except for pertinent positives noted in my HPI.    Objective:  /80   Pulse 70   Temp 97.7  F (36.5  C)   Resp 18   Ht 1.651 m (5' 5\")   Wt 86.8 kg (191 lb 4.8 oz)   LMP 03/11/2010   SpO2 96%   BMI 31.83 kg/m    Exam:  GENERAL APPEARANCE:  Alert, in no distress  ENT:  Mouth and posterior oropharynx normal, moist mucous membranes, normal hearing acuity  EYES:  EOM, conjunctivae, lids, pupils and irises normal, PERRL  RESP:  no respiratory distress  CV:  no edema  ABDOMEN:  abdomen flat  M/S:   resting in bed, able to move all 4 extremities  SKIN:  Inspection of skin and subcutaneous tissue baseline, did not visualize right hip " incision  NEURO:   speech WNL  PSYCH:  affect and mood normal    Labs:     Most Recent 3 CBC's:  Recent Labs   Lab Test 08/07/20  0633 08/06/20  0533 07/31/20  1558 06/03/20  1431 10/23/19  1533   WBC  --   --  6.2 6.7 10.6   HGB 10.0* 10.8* 13.3 12.0 11.6*   MCV  --   --  89 87 89   PLT  --   --  271 251 407     Most Recent 3 BMP's:  Recent Labs   Lab Test 08/06/20  0533 07/31/20  1558 10/23/19  1533    139 138   POTASSIUM 4.5 4.3 3.5   CHLORIDE 107 107 108   CO2 27 27 25   BUN 11 14 13   CR 0.83 0.81 0.73   ANIONGAP 4 5 5   SABA 8.2* 9.4 9.2   * 106* 107*       ASSESSMENT/PLAN:  Primary osteoarthritis of right hip  Aftercare following right hip joint replacement surgery  Acute/ongoing; Right SARAHI revision Dr. Grey 8/5/20  PT and OT for strengthening, continue tylenol 650mg q 4 hours prn,  tramadol 50-100mg q 4 hours prn,   ASA 162mg QD  F/u with ortho as directed     Anemia due to blood loss, acute  Acute/ongoing: Hgb  follow       Irritable bowel syndrome with constipation  Drug-induced constipation  Ongoing: continue linzess 290mcg QD, senna s 2 PO BID, miralax 34 gm QD scheduled and 34gm QD prn     Restless legs syndrome (RLS)  Ongoing: continue mirapex as orders    Orders written by provider at facility  No new orders today      Electronically signed by:  Tonya Lynn Haase, APRN CNP             Sincerely,        Tonya Lynn Haase, APRN CNP

## 2020-08-25 NOTE — DISCHARGE SUMMARY
ORTHOPAEDIC DISCHARGE SUMMARY     Date of Admission: 7/28/2020  Date of Discharge: 7/30/2020  9:33 AM  Disposition: Rehab  Staff Physician: No att. 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    HOSPITAL COURSE:    The patient received routine nursing cares and is medically stable. Vital signs are stable. The patient is tolerating a regular diet without GI distress/nausea or vomiting. Voiding spontaneously. All PT/OT goals have been met for safe mobility. Pain is now controlled on oral medications which will be available on discharge. Stool softeners have been used while taking pain medications to help prevent constipation. Mercedes Barber is deemed medically safe to discharge to rehab.     PT Progress: Has met PT/OT goals for safe mobility to rehab   Pain Meds:  Weaned off all IV pain meds by discharge.  Inpatient Events: No significant events or complications.     Discharge orders and instructions as below.    FOLLOWUP:      Future Appointments   Date Time Provider Department Center   8/28/2020 12:20 PM Pan Elena PA-C [facility]   9/16/2020  2:30 PM Pan Grey MD [facility]       Appointments on [facility]. Call [phone] if you haven't heard regarding these appointments within 7 days of discharge.    PLANNED DISCHARGE ORDERS:           Discharge Medication List as of 7/30/2020  9:08 AM      START taking these medications    Details   carboxymethylcellulose PF (REFRESH PLUS) 0.5 % ophthalmic solution Place 1 drop into both eyes 2 times daily, Historical      fluticasone-vilanterol (BREO ELLIPTA) 200-25 MCG/INH inhaler Inhale 1 puff into the lungs daily, Historical      ondansetron  (ZOFRAN-ODT) 4 MG ODT tab Take 1 tablet (4 mg) by mouth every 6 hours as needed for nausea or vomiting, Disp-20 tablet,R-0, E-Prescribe      oxyCODONE (ROXICODONE) 5 MG tablet Take 1-2 tablets (5-10 mg) by mouth every 4 hours as needed for moderate to severe pain, Disp-39 tablet,R-0, E-Prescribe      polyethylene glycol (MIRALAX) 17 g packet Take 17 g by mouth daily as needed for constipation, Disp-7 packet,R-0, E-Prescribe      senna-docusate (SENOKOT-S/PERICOLACE) 8.6-50 MG tablet Take 1-2 tablets by mouth 2 times daily, Disp-30 tablet,R-0, E-Prescribe         CONTINUE these medications which have CHANGED    Details   artificial saliva (BIOTENE MT) AERS spray Take 2 sprays by mouth 3 times daily as needed for dry mouth, Historical         CONTINUE these medications which have NOT CHANGED    Details   albuterol (ALBUTEROL) 108 (90 BASE) MCG/ACT Inhaler Inhale 2 puffs into the lungs 4 times daily as needed for shortness of breath / dyspnea or wheezing, Disp-1 Inhaler,R-0, E-Prescribe      cycloSPORINE (RESTASIS) 0.05 % ophthalmic emulsion Place 1 drop into both eyes 2 times daily, Historical      hypromellose (ARTIFICIAL TEARS) 0.5 % SOLN ophthalmic solution Place 1 drop into both eyes 2 times daily, Historical      lacosamide (VIMPAT) 200 MG TABS tablet Take 1 tablet (200 mg) by mouth 2 times daily, Disp-30 tablet,R-0, E-Prescribe      levothyroxine (SYNTHROID/LEVOTHROID) 50 MCG tablet Take 50 mcg by mouth daily, Historical      linaclotide (LINZESS) 290 MCG capsule Take 1 capsule (290 mcg) by mouth every morning (before breakfast), Disp-10 capsule,R-0, Local Print      liothyronine (CYTOMEL) 5 MCG tablet Take 5 mcg by mouth daily , Historical      nefazodone (SERZONE) 200 MG tablet Take 600 mg by mouth At Bedtime , Historical      omeprazole (PRILOSEC) 40 MG DR capsule Take 1 capsule (40 mg) by mouth 2 times daily, Disp-180 capsule,R-3, E-Prescribe      potassium citrate (UROCIT-K) 10 MEQ (1080 MG) CR tablet Take  10 mEq by mouth daily, Historical      pramipexole (MIRAPEX) 1 MG tablet Give 1 tablet by mouth in the morning and 2 tablets by mouth 3 hours prior to bedtime, Historical      budesonide-formoterol (SYMBICORT) 160-4.5 MCG/ACT Inhaler Inhale 2 puffs into the lungs 2 times daily, Historical      temazepam (RESTORIL) 30 MG capsule Take 30 mg by mouth nightly as needed for sleep, Historical         STOP taking these medications       hydrOXYzine (ATARAX) 50 MG tablet Comments:   Reason for Stopping:         traMADol (ULTRAM) 50 MG tablet Comments:   Reason for Stopping:                 Discharge Procedure Orders   Home care nursing referral   Referral Priority: Routine Referral Type: Home Health Therapies & Aides   Number of Visits Requested: 1     Reason for your hospital stay   Order Comments: Right acetabular protrusion     TO OR with Dr Bermudez on August 11.     Activity   Order Comments: Your activity upon discharge: activity as tolerated     Order Specific Question Answer Comments   Is discharge order? Yes      MD face to face encounter   Order Comments: Documentation of Face to Face and Certification for Home Health Services    I certify that patient: Mercedes Barber is under my care and that I, or a nurse practitioner or physician's assistant working with me, had a face-to-face encounter that meets the physician face-to-face encounter requirements with this patient on: July 29, 2020.    This encounter with the patient was in whole, or in part, for the following medical condition, which is the primary reason for home health care: Hip Pain.    I certify that, based on my findings, the following services are medically necessary home health services: Nursing and Physical Therapy.    My clinical findings support the need for the above services because: Nurse is needed: To provide assessment and oversight required in the home to assure adherence to the medical plan due to: Hip Pain and Physical Therapy Services are  needed to assess and treat the following functional impairments: mobility.    Further, I certify that my clinical findings support that this patient is homebound (i.e. absences from home require considerable and taxing effort and are for medical reasons or Congregational services or infrequently or of short duration when for other reasons) because: Requires assistance of another person or specialized equipment to access medical services because patient: Range of motion limitations prevents ability to exit home safely. and Requires supervision of another for safe transfer...    Based on the above findings. I certify that this patient is confined to the home and needs intermittent skilled nursing care, physical therapy and/or speech therapy.  The patient is under my care, and I have initiated the establishment of the plan of care.  This patient will be followed by a physician who will periodically review the plan of care.  Physician/Provider to provide follow up care: Skyler Álvarez    Attending hospital physician (the Medicare certified PEC provider): Keanu Bermudez MD  Physician Signature: See electronic signature associated with these discharge orders.  Date: 7/29/2020     Diet   Order Comments: Follow this diet upon discharge: Orders Placed This Encounter      Regular Diet Adult     Order Specific Question Answer Comments   Is discharge order? Yes        Loan Gregorio MD   Orthopedic Surgery Resident, PGY-4  [phone]

## 2020-08-26 ENCOUNTER — NURSING HOME VISIT (OUTPATIENT)
Dept: GERIATRICS | Facility: CLINIC | Age: 65
End: 2020-08-26
Payer: MEDICARE

## 2020-08-26 VITALS
DIASTOLIC BLOOD PRESSURE: 75 MMHG | BODY MASS INDEX: 32.11 KG/M2 | SYSTOLIC BLOOD PRESSURE: 115 MMHG | HEIGHT: 65 IN | RESPIRATION RATE: 16 BRPM | WEIGHT: 192.7 LBS | HEART RATE: 67 BPM | TEMPERATURE: 97.2 F | OXYGEN SATURATION: 96 %

## 2020-08-26 DIAGNOSIS — G40.909 SEIZURE DISORDER (H): ICD-10-CM

## 2020-08-26 DIAGNOSIS — F32.0 MILD MAJOR DEPRESSION (H): ICD-10-CM

## 2020-08-26 DIAGNOSIS — Z96.641 AFTERCARE FOLLOWING RIGHT HIP JOINT REPLACEMENT SURGERY: ICD-10-CM

## 2020-08-26 DIAGNOSIS — G25.81 RESTLESS LEGS SYNDROME (RLS): ICD-10-CM

## 2020-08-26 DIAGNOSIS — M16.11 PRIMARY OSTEOARTHRITIS OF RIGHT HIP: Primary | ICD-10-CM

## 2020-08-26 DIAGNOSIS — Z47.1 AFTERCARE FOLLOWING RIGHT HIP JOINT REPLACEMENT SURGERY: ICD-10-CM

## 2020-08-26 DIAGNOSIS — K58.1 IRRITABLE BOWEL SYNDROME WITH CONSTIPATION: ICD-10-CM

## 2020-08-26 DIAGNOSIS — D62 ANEMIA DUE TO BLOOD LOSS, ACUTE: ICD-10-CM

## 2020-08-26 DIAGNOSIS — K59.03 DRUG-INDUCED CONSTIPATION: ICD-10-CM

## 2020-08-26 DIAGNOSIS — E03.8 OTHER SPECIFIED HYPOTHYROIDISM: ICD-10-CM

## 2020-08-26 PROCEDURE — 99309 SBSQ NF CARE MODERATE MDM 30: CPT | Performed by: NURSE PRACTITIONER

## 2020-08-26 ASSESSMENT — MIFFLIN-ST. JEOR: SCORE: 1414.96

## 2020-08-26 NOTE — PROGRESS NOTES
[place] [facility]  [place] Medical Record Number:  [id number]  Place of Service where encounter took place:  [facility] (S) [[id number]]  Chief Complaint   Patient presents with     RECHECK       HPI:    Mercedes Barber  is a 65 year old (1955), who is being seen today for an episodic care visit.  HPI information obtained from: facility chart records, facility staff, patient report and [facility] chart review. Today's concern is:  Patient has been in TCU since 8/7 following hospitalization for revision of right SARAHI. PMH includes seizure disorder, IBS, depression, RLS, RAD, and h/o DVT. Post operative issues ABLA. She was transferred to TCU for ongoing rehab.   Today we discussed pain management. She reports ongoing pain. She did move about quite weill with changing her pants today. She also spoke about her bowels. She did have BM today but she worries about this.      Past Medical and Surgical History reviewed in Epic today.    MEDICATIONS:    Current Outpatient Medications   Medication Sig Dispense Refill     acetaminophen (TYLENOL) 325 MG tablet Take 2 tablets (650 mg) by mouth every 4 hours as needed for other 1 Bottle 0     albuterol (ALBUTEROL) 108 (90 BASE) MCG/ACT Inhaler Inhale 2 puffs into the lungs 4 times daily as needed for shortness of breath / dyspnea or wheezing 1 Inhaler 0     artificial saliva (BIOTENE MT) AERS spray Take 2 sprays by mouth 3 times daily as needed for dry mouth       aspirin (ASA) 81 MG EC tablet Take 2 tablets (162 mg) by mouth daily 28 tablet 0     carboxymethylcellulose PF (REFRESH PLUS) 0.5 % ophthalmic solution Place 1 drop into both eyes 2 times daily       cycloSPORINE (RESTASIS) 0.05 % ophthalmic emulsion Place 1 drop into both eyes 2 times daily       fluticasone-vilanterol (BREO ELLIPTA) 200-25 MCG/INH inhaler Inhale 1 puff into the lungs daily       hypromellose (ARTIFICIAL TEARS) 0.5 % SOLN ophthalmic solution Place 1 drop into both eyes 2  "times daily       lacosamide (VIMPAT) 200 MG TABS tablet Take 1 tablet (200 mg) by mouth 2 times daily 30 tablet 0     levothyroxine (SYNTHROID/LEVOTHROID) 50 MCG tablet Take 50 mcg by mouth daily       linaclotide (LINZESS) 290 MCG capsule Take 1 capsule (290 mcg) by mouth every morning (before breakfast) 10 capsule 0     liothyronine (CYTOMEL) 5 MCG tablet Take 5 mcg by mouth daily        loratadine (CLARITIN REDITABS) 10 MG ODT Take 1 tablet (10 mg) by mouth daily as needed for allergies       nefazodone (SERZONE) 200 MG tablet Take 600 mg by mouth At Bedtime        omeprazole (PRILOSEC) 40 MG DR capsule Take 1 capsule (40 mg) by mouth 2 times daily 180 capsule 3     ondansetron (ZOFRAN-ODT) 4 MG ODT tab Take 1 tablet (4 mg) by mouth every 6 hours as needed for nausea or vomiting 20 tablet 0     polyethylene glycol (MIRALAX) 17 g packet Take 34 g by mouth daily 7 packet 0     potassium citrate (UROCIT-K) 10 MEQ (1080 MG) CR tablet Take 10 mEq by mouth daily       pramipexole (MIRAPEX) 1 MG tablet Give 1 tablet by mouth in the morning and 2 tablets by mouth 3 hours prior to bedtime       senna-docusate (SENOKOT-S/PERICOLACE) 8.6-50 MG tablet Take 2 tablets by mouth 2 times daily 30 tablet 0         REVIEW OF SYSTEMS:  4 point ROS including Respiratory, CV, GI and , other than that noted in the HPI,  is negative    Objective:  /75   Pulse 67   Temp 97.2  F (36.2  C)   Resp 16   Ht 1.651 m (5' 5\")   Wt 87.4 kg (192 lb 11.2 oz)   LMP 03/11/2010   SpO2 96%   BMI 32.07 kg/m    Exam: limited due to covid precautions  GENERAL APPEARANCE:  Alert, in no distress  ENT:  Mouth and posterior oropharynx normal, moist mucous membranes, hearing acuity adequate   EYES:  EOM, conjunctivae, lids, pupils and irises normal    RESP:  respiratory effort chest normal, no respiratory distress,   CV:  Edema none    M/S:   Gait and station not observed but good bed mobility, Digits and nails normal   SKIN:  " Inspection/Palpation of skin and subcutaneous tissue incision is covered. No surrounding erythema  NEURO: 2-12 in normal limits and at patient's baseline  PSYCH:  insight and judgement, memory intact  , affect and mood normal    Labs:   8/13 hgb 9.7, na 140, K 4, creat 0.68    ASSESSMENT/PLAN:  Primary osteoarthritis of right hip  Aftercare following right hip joint replacement surgery  Patient in TCU following hospitalization due to revision of right SARAHI. Since in TCU she working with therapy. She is walking up to 55' activity is TTWB. She continue to have pain and has been taking tramadol.   -ask about acetaminophen  -taper tramadol 50-100mg q 6 h   -follow up with ortho on 8/28  -ASA 162mg q day for 4 weeks for DVT prophylaxis      Anemia due to blood loss, acute  hgb dropped from 13 to 10. Now stable close to 10    Irritable bowel syndrome with constipation  Drug-induced constipation  She continues on PTA linzess and miralax and senna s.  She is having daily BMs but she worries about this.     Restless legs syndrome (RLS)  She continues on PTA pramipexole 1mg q am and 2mg q pm    Other specified hypothyroidism  She continues on PTA levothyroxine and liothyronine    Mild major depression (H)  She continues on nefazodone 600mg q day. She reports having a hard time sleeping.   Seizure disorder (H)  She continue on PTA vimpat. No known seizure recently.         Electronically signed by:  FROYLAN Dias CNP

## 2020-08-26 NOTE — LETTER
8/26/2020        RE: Mercedes Barber  [address]  [address]  [place] [address]        [place] [facility]  [place] Medical Record Number:  [id number]  Place of Service where encounter took place:  [facility] [[id number]]  Chief Complaint   Patient presents with     RECHECK       HPI:    Mercedes Barber  is a 65 year old (1955), who is being seen today for an episodic care visit.  HPI information obtained from: facility chart records, facility staff, patient report and [facility] chart review. Today's concern is:  Patient has been in TCU since 8/7 following hospitalization for revision of right SARAHI. PMH includes seizure disorder, IBS, depression, RLS, RAD, and h/o DVT. Post operative issues ABLA. She was transferred to TCU for ongoing rehab.   Today we discussed pain management. She reports ongoing pain. She did move about quite weill with changing her pants today. She also spoke about her bowels. She did have BM today but she worries about this.      Past Medical and Surgical History reviewed in Epic today.    MEDICATIONS:    Current Outpatient Medications   Medication Sig Dispense Refill     acetaminophen (TYLENOL) 325 MG tablet Take 2 tablets (650 mg) by mouth every 4 hours as needed for other 1 Bottle 0     albuterol (ALBUTEROL) 108 (90 BASE) MCG/ACT Inhaler Inhale 2 puffs into the lungs 4 times daily as needed for shortness of breath / dyspnea or wheezing 1 Inhaler 0     artificial saliva (BIOTENE MT) AERS spray Take 2 sprays by mouth 3 times daily as needed for dry mouth       aspirin (ASA) 81 MG EC tablet Take 2 tablets (162 mg) by mouth daily 28 tablet 0     carboxymethylcellulose PF (REFRESH PLUS) 0.5 % ophthalmic solution Place 1 drop into both eyes 2 times daily       cycloSPORINE (RESTASIS) 0.05 % ophthalmic emulsion Place 1 drop into both eyes 2 times daily       fluticasone-vilanterol (BREO ELLIPTA) 200-25 MCG/INH inhaler Inhale 1 puff into  "the lungs daily       hypromellose (ARTIFICIAL TEARS) 0.5 % SOLN ophthalmic solution Place 1 drop into both eyes 2 times daily       lacosamide (VIMPAT) 200 MG TABS tablet Take 1 tablet (200 mg) by mouth 2 times daily 30 tablet 0     levothyroxine (SYNTHROID/LEVOTHROID) 50 MCG tablet Take 50 mcg by mouth daily       linaclotide (LINZESS) 290 MCG capsule Take 1 capsule (290 mcg) by mouth every morning (before breakfast) 10 capsule 0     liothyronine (CYTOMEL) 5 MCG tablet Take 5 mcg by mouth daily        loratadine (CLARITIN REDITABS) 10 MG ODT Take 1 tablet (10 mg) by mouth daily as needed for allergies       nefazodone (SERZONE) 200 MG tablet Take 600 mg by mouth At Bedtime        omeprazole (PRILOSEC) 40 MG DR capsule Take 1 capsule (40 mg) by mouth 2 times daily 180 capsule 3     ondansetron (ZOFRAN-ODT) 4 MG ODT tab Take 1 tablet (4 mg) by mouth every 6 hours as needed for nausea or vomiting 20 tablet 0     polyethylene glycol (MIRALAX) 17 g packet Take 34 g by mouth daily 7 packet 0     potassium citrate (UROCIT-K) 10 MEQ (1080 MG) CR tablet Take 10 mEq by mouth daily       pramipexole (MIRAPEX) 1 MG tablet Give 1 tablet by mouth in the morning and 2 tablets by mouth 3 hours prior to bedtime       senna-docusate (SENOKOT-S/PERICOLACE) 8.6-50 MG tablet Take 2 tablets by mouth 2 times daily 30 tablet 0         REVIEW OF SYSTEMS:  4 point ROS including Respiratory, CV, GI and , other than that noted in the HPI,  is negative    Objective:  /75   Pulse 67   Temp 97.2  F (36.2  C)   Resp 16   Ht 1.651 m (5' 5\")   Wt 87.4 kg (192 lb 11.2 oz)   LMP 03/11/2010   SpO2 96%   BMI 32.07 kg/m    Exam: limited due to covid precautions  GENERAL APPEARANCE:  Alert, in no distress  ENT:  Mouth and posterior oropharynx normal, moist mucous membranes, hearing acuity adequate   EYES:  EOM, conjunctivae, lids, pupils and irises normal    RESP:  respiratory effort chest normal, no respiratory distress,   CV:  Edema " none    M/S:   Gait and station not observed but good bed mobility, Digits and nails normal   SKIN:  Inspection/Palpation of skin and subcutaneous tissue incision is covered. No surrounding erythema  NEURO: 2-12 in normal limits and at patient's baseline  PSYCH:  insight and judgement, memory intact  , affect and mood normal    Labs:   8/13 hgb 9.7, na 140, K 4, creat 0.68    ASSESSMENT/PLAN:  Primary osteoarthritis of right hip  Aftercare following right hip joint replacement surgery  Patient in TCU following hospitalization due to revision of right SARAHI. Since in TCU she working with therapy. She is walking up to 55' activity is TTWB. She continue to have pain and has been taking tramadol.   -ask about acetaminophen  -taper tramadol 50-100mg q 6 h   -follow up with ortho on 8/28  -ASA 162mg q day for 4 weeks for DVT prophylaxis      Anemia due to blood loss, acute  hgb dropped from 13 to 10. Now stable close to 10    Irritable bowel syndrome with constipation  Drug-induced constipation  She continues on PTA linzess and miralax and senna s.  She is having daily BMs but she worries about this.     Restless legs syndrome (RLS)  She continues on PTA pramipexole 1mg q am and 2mg q pm    Other specified hypothyroidism  She continues on PTA levothyroxine and liothyronine    Mild major depression (H)  She continues on nefazodone 600mg q day. She reports having a hard time sleeping.   Seizure disorder (H)  She continue on PTA vimpat. No known seizure recently.         Electronically signed by:  FROYLAN Dias CNP               Sincerely,        FROYLAN Disa CNP

## 2020-08-28 ENCOUNTER — TELEPHONE (OUTPATIENT)
Dept: GERIATRICS | Facility: CLINIC | Age: 65
End: 2020-08-28

## 2020-08-28 ENCOUNTER — OFFICE VISIT (OUTPATIENT)
Dept: ORTHOPEDICS | Facility: CLINIC | Age: 65
End: 2020-08-28
Payer: MEDICARE

## 2020-08-28 DIAGNOSIS — Z98.890 STATUS POST HIP SURGERY: Primary | ICD-10-CM

## 2020-08-28 DIAGNOSIS — G40.909 SEIZURE DISORDER (H): ICD-10-CM

## 2020-08-28 RX ORDER — LACOSAMIDE 200 MG/1
200 TABLET ORAL 2 TIMES DAILY
Qty: 30 TABLET | Refills: 0 | Status: SHIPPED | OUTPATIENT
Start: 2020-08-28

## 2020-08-28 NOTE — LETTER
"    8/28/2020         RE: Mercedes Barber  [address]  [address]  [place] [address]        Dear Colleague,    Thank you for referring your patient, Mercedes Barber, to the [facility]. Please see a copy of my visit note below.    REASON FOR VIST/CC: Follow-up appointment following revision right total hip arthroplasty, revision of acetabular component and femoral head, with Dr. Grey on 8/5/2020.    HISTORY OF PRESENT ILLNESS:  Mercedes Barber is a 65 year old female who is approximately 2 weeks status post revision right total hip arthroplasty, revision of acetabular component and femoral head.  The patient reports that she is doing fair following surgery.  Her pain is been well controlled with the use of acetaminophen, and has use opioid medication sparingly.  Her biggest complaint is the hip abduction brace that was placed following surgery.  She does not like wearing it, and is eager to discontinue use.  She is ambulating with the assistance of a walker, and tolerating well.  She has been maintained toe-touch weightbearing.  She is adhering to this restriction \"most\" of the time.    The patient endorses swelling around the surgical incision but denies surrounding redness. The incision has been dry, without discharge or drainage. Mercedes denies recent fevers and chills, as well as any other symptoms concerning for infection.     Mercedes is currently taking aspirin 162 mg daily for DVT prophylaxis. Patient denies calf pain or tenderness.      MEDICATIONS:   Current Outpatient Rx   Medication Sig Dispense Refill     acetaminophen (TYLENOL) 325 MG tablet Take 2 tablets (650 mg) by mouth every 4 hours as needed for other 1 Bottle 0     albuterol (ALBUTEROL) 108 (90 BASE) MCG/ACT Inhaler Inhale 2 puffs into the lungs 4 times daily as needed for shortness of breath / dyspnea or wheezing 1 Inhaler 0     artificial saliva (BIOTENE MT) AERS spray Take 2 sprays by mouth 3 times " daily as needed for dry mouth       aspirin (ASA) 81 MG EC tablet Take 2 tablets (162 mg) by mouth daily 28 tablet 0     carboxymethylcellulose PF (REFRESH PLUS) 0.5 % ophthalmic solution Place 1 drop into both eyes 2 times daily       cycloSPORINE (RESTASIS) 0.05 % ophthalmic emulsion Place 1 drop into both eyes 2 times daily       fluticasone-vilanterol (BREO ELLIPTA) 200-25 MCG/INH inhaler Inhale 1 puff into the lungs daily       hypromellose (ARTIFICIAL TEARS) 0.5 % SOLN ophthalmic solution Place 1 drop into both eyes 2 times daily       levothyroxine (SYNTHROID/LEVOTHROID) 50 MCG tablet Take 50 mcg by mouth daily       linaclotide (LINZESS) 290 MCG capsule Take 1 capsule (290 mcg) by mouth every morning (before breakfast) 10 capsule 0     liothyronine (CYTOMEL) 5 MCG tablet Take 5 mcg by mouth daily        loratadine (CLARITIN REDITABS) 10 MG ODT Take 1 tablet (10 mg) by mouth daily as needed for allergies       nefazodone (SERZONE) 200 MG tablet Take 600 mg by mouth At Bedtime        omeprazole (PRILOSEC) 40 MG DR capsule Take 1 capsule (40 mg) by mouth 2 times daily 180 capsule 3     ondansetron (ZOFRAN-ODT) 4 MG ODT tab Take 1 tablet (4 mg) by mouth every 6 hours as needed for nausea or vomiting 20 tablet 0     polyethylene glycol (MIRALAX) 17 g packet Take 34 g by mouth daily 7 packet 0     potassium citrate (UROCIT-K) 10 MEQ (1080 MG) CR tablet Take 10 mEq by mouth daily       pramipexole (MIRAPEX) 1 MG tablet Give 1 tablet by mouth in the morning and 2 tablets by mouth 3 hours prior to bedtime       senna-docusate (SENOKOT-S/PERICOLACE) 8.6-50 MG tablet Take 2 tablets by mouth 2 times daily 30 tablet 0     lacosamide (VIMPAT) 200 MG TABS tablet Take 1 tablet (200 mg) by mouth 2 times daily 30 tablet 0         ALLERGIES: Blood transfusion related (informational only); Bupropion; Carbamazepine; Clonazepam; Encort [hydrocortisone acetate]; Encort [hydrocortisone]; Erythromycin; Erythromycin; Flagyl  [metronidazole]; Gabapentin; Lamictal [lamotrigine]; Oxycodone; and Tegretol [carbamazepine]       PHYSICAL EXAMINATION:   On physical examination the patient is comfortable and is in no acute distress. The affect is appropriate and breathing is non-labored.    Surgical wound: The surgical wound was exposed and found to be clean and dry, without drainage or discharge. There is mild edema around the incision. There is no erythema. The skin was appropriately warm to touch.       ASSESSMENT/PLAN:  Mercedes Barber is a 65 year old who is status post revision right total hip arthroplasty, revision of acetabular component and femoral head. The patient is progressing well.    Basic wound cares were performed at today's visit. We spent time discussing future wound/incision cares.    We spent several minutes discussing the importance of continuing use of the hip abduction brace, as well as maintaining her toe-touch weightbearing status.  Likely, these restrictions will be lifted when the patient returns to clinic for follow-up x-rays with Dr. Grey.  The time being, I instructed the patient to be strict with her weightbearing restrictions.  She understands the importance of this and agrees with the plan.    The patient will see  at six to eight weeks post op. Mercedes has our clinic number and will call with any questions or concerns.    Pan Elena PA-C  Orthopaedic Surgery

## 2020-08-28 NOTE — NURSING NOTE
Reason For Visit:   Chief Complaint   Patient presents with     RECHECK     DOS 8/5/2020 Revision right total hip arthroplasty       LMP 03/11/2010     Pain Assessment  Patient Currently in Pain: Yes  0-10 Pain Scale: 7  Primary Pain Location: Hip(Right)    Connie Roque ATC

## 2020-09-01 ENCOUNTER — NURSING HOME VISIT (OUTPATIENT)
Dept: GERIATRICS | Facility: CLINIC | Age: 65
End: 2020-09-01
Payer: MEDICARE

## 2020-09-01 VITALS
BODY MASS INDEX: 32.11 KG/M2 | DIASTOLIC BLOOD PRESSURE: 67 MMHG | SYSTOLIC BLOOD PRESSURE: 138 MMHG | HEIGHT: 65 IN | TEMPERATURE: 97.1 F | WEIGHT: 192.7 LBS | OXYGEN SATURATION: 98 % | RESPIRATION RATE: 18 BRPM | HEART RATE: 68 BPM

## 2020-09-01 DIAGNOSIS — K59.03 DRUG-INDUCED CONSTIPATION: ICD-10-CM

## 2020-09-01 DIAGNOSIS — Z96.641 AFTERCARE FOLLOWING RIGHT HIP JOINT REPLACEMENT SURGERY: ICD-10-CM

## 2020-09-01 DIAGNOSIS — K58.1 IRRITABLE BOWEL SYNDROME WITH CONSTIPATION: ICD-10-CM

## 2020-09-01 DIAGNOSIS — Z47.1 AFTERCARE FOLLOWING RIGHT HIP JOINT REPLACEMENT SURGERY: ICD-10-CM

## 2020-09-01 DIAGNOSIS — D62 ANEMIA DUE TO BLOOD LOSS, ACUTE: ICD-10-CM

## 2020-09-01 DIAGNOSIS — E03.8 OTHER SPECIFIED HYPOTHYROIDISM: ICD-10-CM

## 2020-09-01 DIAGNOSIS — M16.11 PRIMARY OSTEOARTHRITIS OF RIGHT HIP: Primary | ICD-10-CM

## 2020-09-01 DIAGNOSIS — F32.0 MILD MAJOR DEPRESSION (H): ICD-10-CM

## 2020-09-01 DIAGNOSIS — G25.81 RESTLESS LEGS SYNDROME (RLS): ICD-10-CM

## 2020-09-01 DIAGNOSIS — G40.909 SEIZURE DISORDER (H): ICD-10-CM

## 2020-09-01 PROCEDURE — 99309 SBSQ NF CARE MODERATE MDM 30: CPT | Performed by: NURSE PRACTITIONER

## 2020-09-01 ASSESSMENT — MIFFLIN-ST. JEOR: SCORE: 1414.96

## 2020-09-01 NOTE — PROGRESS NOTES
[facility]  [place] Medical Record Number:  [id number]  Place of Service where encounter took place:  [facility] ([facility]) [[id number]]  Chief Complaint   Patient presents with     RECHECK       HPI:    Mercedes Barber  is a 65 year old (1955), who is being seen today for an episodic care visit.  HPI information obtained from: facility chart records, facility staff, patient report and [facility] chart review. Today's concern is:  Patient has been in TCU since 8/7 following hospitalization for revision of right SARAHI. PMH includes seizure disorder, IBS, depression, RLS, RAD, and h/o DVT. Post operative issues ABLA. She was transferred to TCU for ongoing rehab.   Today she reports feeling tired. She just returned from home visit. She does not think she is ready to return to her condo as she lives alone and she is still TTWB. She continues to have pain and is taking tramadol 1-3 times per day  Past Medical and Surgical History reviewed in Epic today.    MEDICATIONS:    Current Outpatient Medications   Medication Sig Dispense Refill     acetaminophen (TYLENOL) 325 MG tablet Take 2 tablets (650 mg) by mouth every 4 hours as needed for other 1 Bottle 0     albuterol (ALBUTEROL) 108 (90 BASE) MCG/ACT Inhaler Inhale 2 puffs into the lungs 4 times daily as needed for shortness of breath / dyspnea or wheezing 1 Inhaler 0     artificial saliva (BIOTENE MT) AERS spray Take 2 sprays by mouth 3 times daily as needed for dry mouth       aspirin (ASA) 81 MG EC tablet Take 2 tablets (162 mg) by mouth daily 28 tablet 0     carboxymethylcellulose PF (REFRESH PLUS) 0.5 % ophthalmic solution Place 1 drop into both eyes 2 times daily       cycloSPORINE (RESTASIS) 0.05 % ophthalmic emulsion Place 1 drop into both eyes 2 times daily       fluticasone-vilanterol (BREO ELLIPTA) 200-25 MCG/INH inhaler Inhale 1 puff into the lungs daily       hypromellose (ARTIFICIAL TEARS) 0.5 % SOLN ophthalmic solution Place 1  "drop into both eyes 2 times daily       lacosamide (VIMPAT) 200 MG TABS tablet Take 1 tablet (200 mg) by mouth 2 times daily 30 tablet 0     levothyroxine (SYNTHROID/LEVOTHROID) 50 MCG tablet Take 50 mcg by mouth daily       linaclotide (LINZESS) 290 MCG capsule Take 1 capsule (290 mcg) by mouth every morning (before breakfast) 10 capsule 0     liothyronine (CYTOMEL) 5 MCG tablet Take 5 mcg by mouth daily        loratadine (CLARITIN REDITABS) 10 MG ODT Take 1 tablet (10 mg) by mouth daily as needed for allergies       nefazodone (SERZONE) 200 MG tablet Take 600 mg by mouth At Bedtime        omeprazole (PRILOSEC) 40 MG DR capsule Take 1 capsule (40 mg) by mouth 2 times daily 180 capsule 3     ondansetron (ZOFRAN-ODT) 4 MG ODT tab Take 1 tablet (4 mg) by mouth every 6 hours as needed for nausea or vomiting 20 tablet 0     polyethylene glycol (MIRALAX) 17 g packet Take 34 g by mouth daily 7 packet 0     potassium citrate (UROCIT-K) 10 MEQ (1080 MG) CR tablet Take 10 mEq by mouth daily       pramipexole (MIRAPEX) 1 MG tablet Give 1 tablet by mouth in the morning and 2 tablets by mouth 3 hours prior to bedtime       senna-docusate (SENOKOT-S/PERICOLACE) 8.6-50 MG tablet Take 2 tablets by mouth 2 times daily 30 tablet 0         REVIEW OF SYSTEMS:  4 point ROS including Respiratory, CV, GI and , other than that noted in the HPI,  is negative    Objective:  /67   Pulse 68   Temp 97.1  F (36.2  C)   Resp 18   Ht 1.651 m (5' 5\")   Wt 87.4 kg (192 lb 11.2 oz)   LMP 03/11/2010   SpO2 98%   BMI 32.07 kg/m    Exam: limited due to covid  GENERAL APPEARANCE:  Alert, in no distress  ENT:  Mouth and posterior oropharynx normal, moist mucous membranes, hearing acuity adequate   EYES:  EOM, conjunctivae, lids, pupils and irises normal    RESP:  respiratory effort  normal, no respiratory distress,   CV:   Edema none    M/S:   Gait and station not observed, Digits and nails normal   SKIN:  Inspection/Palpation of skin and " subcutaneous tissue no rash  NEURO: 2-12 in normal limits and at patient's baseline  PSYCH:  insight and judgement, memory intact , affect and mood normal    Labs:   8/13 hgb 9.7, na 140, K 4, creat 0.68    ASSESSMENT/PLAN:  Primary osteoarthritis of right hip  Aftercare following right hip joint replacement surgery  Aftercare following right hip joint replacement surgery  Patient in TCU following hospitalization due to revision of right SARAHI due to failed hardware. Since in TCU she working with therapy. She is walking up to 55-75' activity is TTWB. She continues to have restrictions in hip ROM. She has a brace that she should wear when OOB. She did follow up with ortho on 8/28.   She continue to have pain and has been taking tramadol. Dose was reduced last week to 100mg q 6 h prn. She is taking this 1-3 x per day. She did have home visit with therapy and she does not think she is ready to return home alone.   -continue prn acetaminophen  -continue tramadol 50-100mg q 6 h   -ASA 162mg q day for 4 weeks for DVT prophylaxis  -follow up with ortho as planned on 9/6  -continue physical therapy and TTWB  Anemia due to blood loss, acute  hgb dropped from 13 to 10. Now stable close to 10    Irritable bowel syndrome with constipation  Drug-induced constipation  She continues on PTA linzess and miralax and senna s.  She is having daily BMs but she worries about this.     Restless legs syndrome (RLS)  She continues on PTA pramipexole 1mg q am and 2mg q pm    Other specified hypothyroidism  She continues on PTA levothyroxine and liothyronine    Mild major depression (H)  She continues on nefazodone 600mg q day. She has reported having a hard time sleeping. she did have virtual visit with psychologist last week.   Seizure disorder (H)  Patient reports no seizure activity for years. She continues on PTA vimpat.     Electronically signed by:  FROYLAN Dias CNP

## 2020-09-01 NOTE — LETTER
9/1/2020        RE: Mercedes Barber  [address]  [address]  [place] [address]        [facility]  [place] Medical Record Number:  [id number]  Place of Service where encounter took place:  [facility] ([facility]) [[id number]]  Chief Complaint   Patient presents with     RECHECK       HPI:    Mercedes Barber  is a 65 year old (1955), who is being seen today for an episodic care visit.  HPI information obtained from: facility chart records, facility staff, patient report and [facility] chart review. Today's concern is:  Patient has been in TCU since 8/7 following hospitalization for revision of right SARAHI. PMH includes seizure disorder, IBS, depression, RLS, RAD, and h/o DVT. Post operative issues ABLA. She was transferred to TCU for ongoing rehab.   Today she reports feeling tired. She just returned from home visit. She does not think she is ready to return to her condo as she lives alone and she is still TTWB. She continues to have pain and is taking tramadol 1-3 times per day  Past Medical and Surgical History reviewed in Epic today.    MEDICATIONS:    Current Outpatient Medications   Medication Sig Dispense Refill     acetaminophen (TYLENOL) 325 MG tablet Take 2 tablets (650 mg) by mouth every 4 hours as needed for other 1 Bottle 0     albuterol (ALBUTEROL) 108 (90 BASE) MCG/ACT Inhaler Inhale 2 puffs into the lungs 4 times daily as needed for shortness of breath / dyspnea or wheezing 1 Inhaler 0     artificial saliva (BIOTENE MT) AERS spray Take 2 sprays by mouth 3 times daily as needed for dry mouth       aspirin (ASA) 81 MG EC tablet Take 2 tablets (162 mg) by mouth daily 28 tablet 0     carboxymethylcellulose PF (REFRESH PLUS) 0.5 % ophthalmic solution Place 1 drop into both eyes 2 times daily       cycloSPORINE (RESTASIS) 0.05 % ophthalmic emulsion Place 1 drop into both eyes 2 times daily       fluticasone-vilanterol (BREO ELLIPTA) 200-25 MCG/INH inhaler  "Inhale 1 puff into the lungs daily       hypromellose (ARTIFICIAL TEARS) 0.5 % SOLN ophthalmic solution Place 1 drop into both eyes 2 times daily       lacosamide (VIMPAT) 200 MG TABS tablet Take 1 tablet (200 mg) by mouth 2 times daily 30 tablet 0     levothyroxine (SYNTHROID/LEVOTHROID) 50 MCG tablet Take 50 mcg by mouth daily       linaclotide (LINZESS) 290 MCG capsule Take 1 capsule (290 mcg) by mouth every morning (before breakfast) 10 capsule 0     liothyronine (CYTOMEL) 5 MCG tablet Take 5 mcg by mouth daily        loratadine (CLARITIN REDITABS) 10 MG ODT Take 1 tablet (10 mg) by mouth daily as needed for allergies       nefazodone (SERZONE) 200 MG tablet Take 600 mg by mouth At Bedtime        omeprazole (PRILOSEC) 40 MG DR capsule Take 1 capsule (40 mg) by mouth 2 times daily 180 capsule 3     ondansetron (ZOFRAN-ODT) 4 MG ODT tab Take 1 tablet (4 mg) by mouth every 6 hours as needed for nausea or vomiting 20 tablet 0     polyethylene glycol (MIRALAX) 17 g packet Take 34 g by mouth daily 7 packet 0     potassium citrate (UROCIT-K) 10 MEQ (1080 MG) CR tablet Take 10 mEq by mouth daily       pramipexole (MIRAPEX) 1 MG tablet Give 1 tablet by mouth in the morning and 2 tablets by mouth 3 hours prior to bedtime       senna-docusate (SENOKOT-S/PERICOLACE) 8.6-50 MG tablet Take 2 tablets by mouth 2 times daily 30 tablet 0         REVIEW OF SYSTEMS:  4 point ROS including Respiratory, CV, GI and , other than that noted in the HPI,  is negative    Objective:  /67   Pulse 68   Temp 97.1  F (36.2  C)   Resp 18   Ht 1.651 m (5' 5\")   Wt 87.4 kg (192 lb 11.2 oz)   LMP 03/11/2010   SpO2 98%   BMI 32.07 kg/m    Exam: limited due to covid  GENERAL APPEARANCE:  Alert, in no distress  ENT:  Mouth and posterior oropharynx normal, moist mucous membranes, hearing acuity adequate   EYES:  EOM, conjunctivae, lids, pupils and irises normal    RESP:  respiratory effort  normal, no respiratory distress,   CV:   Edema " none    M/S:   Gait and station not observed, Digits and nails normal   SKIN:  Inspection/Palpation of skin and subcutaneous tissue no rash  NEURO: 2-12 in normal limits and at patient's baseline  PSYCH:  insight and judgement, memory intact , affect and mood normal    Labs:   8/13 hgb 9.7, na 140, K 4, creat 0.68    ASSESSMENT/PLAN:  Primary osteoarthritis of right hip  Aftercare following right hip joint replacement surgery  Aftercare following right hip joint replacement surgery  Patient in TCU following hospitalization due to revision of right SARAHI due to failed hardware. Since in TCU she working with therapy. She is walking up to 55-75' activity is TTWB. She continues to have restrictions in hip ROM. She has a brace that she should wear when OOB. She did follow up with ortho on 8/28.   She continue to have pain and has been taking tramadol. Dose was reduced last week to 100mg q 6 h prn. She is taking this 1-3 x per day. She did have home visit with therapy and she does not think she is ready to return home alone.   -continue prn acetaminophen  -continue tramadol 50-100mg q 6 h   -ASA 162mg q day for 4 weeks for DVT prophylaxis  -follow up with ortho as planned on 9/6  -continue physical therapy and TTWB  Anemia due to blood loss, acute  hgb dropped from 13 to 10. Now stable close to 10    Irritable bowel syndrome with constipation  Drug-induced constipation  She continues on PTA linzess and miralax and senna s.  She is having daily BMs but she worries about this.     Restless legs syndrome (RLS)  She continues on PTA pramipexole 1mg q am and 2mg q pm    Other specified hypothyroidism  She continues on PTA levothyroxine and liothyronine    Mild major depression (H)  She continues on nefazodone 600mg q day. She has reported having a hard time sleeping. she did have virtual visit with psychologist last week.   Seizure disorder (H)  Patient reports no seizure activity for years. She continues on PTA vimpat.      Electronically signed by:  FROYLAN Dias CNP               Sincerely,        FROYLAN Dias CNP

## 2020-09-04 NOTE — PROGRESS NOTES
"REASON FOR VIST/CC: Follow-up appointment following revision right total hip arthroplasty, revision of acetabular component and femoral head, with Dr. Grey on 8/5/2020.    HISTORY OF PRESENT ILLNESS:  Mercedes Barber is a 65 year old female who is approximately 2 weeks status post revision right total hip arthroplasty, revision of acetabular component and femoral head.  The patient reports that she is doing fair following surgery.  Her pain is been well controlled with the use of acetaminophen, and has use opioid medication sparingly.  Her biggest complaint is the hip abduction brace that was placed following surgery.  She does not like wearing it, and is eager to discontinue use.  She is ambulating with the assistance of a walker, and tolerating well.  She has been maintained toe-touch weightbearing.  She is adhering to this restriction \"most\" of the time.    The patient endorses swelling around the surgical incision but denies surrounding redness. The incision has been dry, without discharge or drainage. Mercedes denies recent fevers and chills, as well as any other symptoms concerning for infection.     Mercedes is currently taking aspirin 162 mg daily for DVT prophylaxis. Patient denies calf pain or tenderness.      MEDICATIONS:   Current Outpatient Rx   Medication Sig Dispense Refill     acetaminophen (TYLENOL) 325 MG tablet Take 2 tablets (650 mg) by mouth every 4 hours as needed for other 1 Bottle 0     albuterol (ALBUTEROL) 108 (90 BASE) MCG/ACT Inhaler Inhale 2 puffs into the lungs 4 times daily as needed for shortness of breath / dyspnea or wheezing 1 Inhaler 0     artificial saliva (BIOTENE MT) AERS spray Take 2 sprays by mouth 3 times daily as needed for dry mouth       aspirin (ASA) 81 MG EC tablet Take 2 tablets (162 mg) by mouth daily 28 tablet 0     carboxymethylcellulose PF (REFRESH PLUS) 0.5 % ophthalmic solution Place 1 drop into both eyes 2 times daily       cycloSPORINE (RESTASIS) 0.05 % " ophthalmic emulsion Place 1 drop into both eyes 2 times daily       fluticasone-vilanterol (BREO ELLIPTA) 200-25 MCG/INH inhaler Inhale 1 puff into the lungs daily       hypromellose (ARTIFICIAL TEARS) 0.5 % SOLN ophthalmic solution Place 1 drop into both eyes 2 times daily       levothyroxine (SYNTHROID/LEVOTHROID) 50 MCG tablet Take 50 mcg by mouth daily       linaclotide (LINZESS) 290 MCG capsule Take 1 capsule (290 mcg) by mouth every morning (before breakfast) 10 capsule 0     liothyronine (CYTOMEL) 5 MCG tablet Take 5 mcg by mouth daily        loratadine (CLARITIN REDITABS) 10 MG ODT Take 1 tablet (10 mg) by mouth daily as needed for allergies       nefazodone (SERZONE) 200 MG tablet Take 600 mg by mouth At Bedtime        omeprazole (PRILOSEC) 40 MG DR capsule Take 1 capsule (40 mg) by mouth 2 times daily 180 capsule 3     ondansetron (ZOFRAN-ODT) 4 MG ODT tab Take 1 tablet (4 mg) by mouth every 6 hours as needed for nausea or vomiting 20 tablet 0     polyethylene glycol (MIRALAX) 17 g packet Take 34 g by mouth daily 7 packet 0     potassium citrate (UROCIT-K) 10 MEQ (1080 MG) CR tablet Take 10 mEq by mouth daily       pramipexole (MIRAPEX) 1 MG tablet Give 1 tablet by mouth in the morning and 2 tablets by mouth 3 hours prior to bedtime       senna-docusate (SENOKOT-S/PERICOLACE) 8.6-50 MG tablet Take 2 tablets by mouth 2 times daily 30 tablet 0     lacosamide (VIMPAT) 200 MG TABS tablet Take 1 tablet (200 mg) by mouth 2 times daily 30 tablet 0         ALLERGIES: Blood transfusion related (informational only); Bupropion; Carbamazepine; Clonazepam; Encort [hydrocortisone acetate]; Encort [hydrocortisone]; Erythromycin; Erythromycin; Flagyl [metronidazole]; Gabapentin; Lamictal [lamotrigine]; Oxycodone; and Tegretol [carbamazepine]       PHYSICAL EXAMINATION:   On physical examination the patient is comfortable and is in no acute distress. The affect is appropriate and breathing is non-labored.    Surgical  wound: The surgical wound was exposed and found to be clean and dry, without drainage or discharge. There is mild edema around the incision. There is no erythema. The skin was appropriately warm to touch.       ASSESSMENT/PLAN:  Mercedes Barber is a 65 year old who is status post revision right total hip arthroplasty, revision of acetabular component and femoral head. The patient is progressing well.    Basic wound cares were performed at today's visit. We spent time discussing future wound/incision cares.    We spent several minutes discussing the importance of continuing use of the hip abduction brace, as well as maintaining her toe-touch weightbearing status.  Likely, these restrictions will be lifted when the patient returns to clinic for follow-up x-rays with Dr. Grey.  The time being, I instructed the patient to be strict with her weightbearing restrictions.  She understands the importance of this and agrees with the plan.    The patient will see  at six to eight weeks post op. Mercedes has our clinic number and will call with any questions or concerns.    Pan Elena PA-C  Orthopaedic Surgery

## 2020-09-08 DIAGNOSIS — Z96.641 HISTORY OF REVISION OF TOTAL REPLACEMENT OF RIGHT HIP JOINT: Primary | ICD-10-CM

## 2020-09-08 ASSESSMENT — MIFFLIN-ST. JEOR: SCORE: 1420.86

## 2020-09-09 ENCOUNTER — DISCHARGE SUMMARY NURSING HOME (OUTPATIENT)
Dept: GERIATRICS | Facility: CLINIC | Age: 65
End: 2020-09-09
Payer: MEDICARE

## 2020-09-09 VITALS
HEIGHT: 65 IN | HEART RATE: 80 BPM | DIASTOLIC BLOOD PRESSURE: 82 MMHG | RESPIRATION RATE: 16 BRPM | OXYGEN SATURATION: 97 % | SYSTOLIC BLOOD PRESSURE: 132 MMHG | WEIGHT: 194 LBS | TEMPERATURE: 97.7 F | BODY MASS INDEX: 32.32 KG/M2

## 2020-09-09 DIAGNOSIS — K59.03 DRUG-INDUCED CONSTIPATION: ICD-10-CM

## 2020-09-09 DIAGNOSIS — M16.11 PRIMARY OSTEOARTHRITIS OF RIGHT HIP: Primary | ICD-10-CM

## 2020-09-09 DIAGNOSIS — K58.1 IRRITABLE BOWEL SYNDROME WITH CONSTIPATION: ICD-10-CM

## 2020-09-09 DIAGNOSIS — Z96.641 AFTERCARE FOLLOWING RIGHT HIP JOINT REPLACEMENT SURGERY: ICD-10-CM

## 2020-09-09 DIAGNOSIS — D62 ANEMIA DUE TO BLOOD LOSS, ACUTE: ICD-10-CM

## 2020-09-09 DIAGNOSIS — G25.81 RESTLESS LEGS SYNDROME (RLS): ICD-10-CM

## 2020-09-09 DIAGNOSIS — Z47.1 AFTERCARE FOLLOWING RIGHT HIP JOINT REPLACEMENT SURGERY: ICD-10-CM

## 2020-09-09 PROCEDURE — 99316 NF DSCHRG MGMT 30 MIN+: CPT | Performed by: NURSE PRACTITIONER

## 2020-09-09 RX ORDER — TRAMADOL HYDROCHLORIDE 50 MG/1
50 TABLET ORAL EVERY 6 HOURS PRN
Qty: 30 TABLET | Refills: 0 | Status: SHIPPED | OUTPATIENT
Start: 2020-09-09 | End: 2020-12-28

## 2020-09-09 NOTE — PROGRESS NOTES
[place] [facility] DISCHARGE SUMMARY  PATIENT'S NAME: Mercedes Barber  YOB: 1955  MEDICAL RECORD NUMBER:  [id number]  Place of Service where encounter took place:  [facility] [[id number]]    PRIMARY CARE PROVIDER AND CLINIC RESPONSIBLE AFTER TRANSFER:   Skyler Álvarez MD, [address] [place] [address]    [facility] Provider     Transferring providers: Tonya Lynn Haase, APRN CNP, Dr. Missael Leyva MD  Recent Hospitalization/ED:  [facility] stay 8/5/20 to 8/7/20.  Date of SNF Admission: August / 07 / 2020  Date of SNF (anticipated) Discharge: September / 08 / 2020  Discharged to: previous independent home  Cognitive Scores: BIMS: 15/15 and SBT 4/28  Physical Function: Ambulating with NWB 1 x 65' 1 x 55' ft with RW  DME: Walker    CODE STATUS/ADVANCE DIRECTIVES DISCUSSION:  Full Code   ALLERGIES: Blood transfusion related (informational only); Bupropion; Carbamazepine; Clonazepam; Encort [hydrocortisone acetate]; Encort [hydrocortisone]; Erythromycin; Erythromycin; Flagyl [metronidazole]; Gabapentin; Lamictal [lamotrigine]; Oxycodone; and Tegretol [carbamazepine]    DISCHARGE DIAGNOSIS/NURSING FACILITY COURSE:   Patient is independent with mobility and IADL's from WC level, continues to be NWB right LE. She is able to hop using RW up to 50 feet. Patient will DC home with home PT and HHA through [facility].     Past Medical History:  has a past medical history of Arthritis, Cervical high risk HPV (human papillomavirus) test positive (07/17/2017), Cervical pain (9/15/2016), Constipation (8/27/2012), Diarrhea (4/30/2009), Esophageal stricture (2/21/2012), Gastro-oesophageal reflux disease, Hypothyroidism (9/18/2012), IBS (irritable bowel syndrome), Mild major depression (H) (2/21/2012), Neuropathy of lower extremity, Rectal prolapse, Restless leg syndrome, Seizure disorder (H), Sleep apnea, and Temporal sclerosis (8/27/2012). She also has no  past medical history of Complication of anesthesia, History of blood transfusion, or Malignant hyperthermia.    Discharge Medications:    Current Outpatient Medications   Medication Sig Dispense Refill     acetaminophen (TYLENOL) 325 MG tablet Take 2 tablets (650 mg) by mouth every 4 hours as needed for other 1 Bottle 0     albuterol (ALBUTEROL) 108 (90 BASE) MCG/ACT Inhaler Inhale 2 puffs into the lungs 4 times daily as needed for shortness of breath / dyspnea or wheezing 1 Inhaler 0     artificial saliva (BIOTENE MT) AERS spray Take 2 sprays by mouth 3 times daily as needed for dry mouth       aspirin (ASA) 81 MG EC tablet Take 2 tablets (162 mg) by mouth daily 28 tablet 0     carboxymethylcellulose PF (REFRESH PLUS) 0.5 % ophthalmic solution Place 1 drop into both eyes 2 times daily       cycloSPORINE (RESTASIS) 0.05 % ophthalmic emulsion Place 1 drop into both eyes 2 times daily       fluticasone-vilanterol (BREO ELLIPTA) 200-25 MCG/INH inhaler Inhale 1 puff into the lungs daily       hypromellose (ARTIFICIAL TEARS) 0.5 % SOLN ophthalmic solution Place 1 drop into both eyes 2 times daily       lacosamide (VIMPAT) 200 MG TABS tablet Take 1 tablet (200 mg) by mouth 2 times daily 30 tablet 0     levothyroxine (SYNTHROID/LEVOTHROID) 50 MCG tablet Take 50 mcg by mouth daily       linaclotide (LINZESS) 290 MCG capsule Take 1 capsule (290 mcg) by mouth every morning (before breakfast) 10 capsule 0     liothyronine (CYTOMEL) 5 MCG tablet Take 5 mcg by mouth daily        loratadine (CLARITIN REDITABS) 10 MG ODT Take 1 tablet (10 mg) by mouth daily as needed for allergies       nefazodone (SERZONE) 200 MG tablet Take 600 mg by mouth At Bedtime        omeprazole (PRILOSEC) 40 MG DR capsule Take 1 capsule (40 mg) by mouth 2 times daily 180 capsule 3     ondansetron (ZOFRAN-ODT) 4 MG ODT tab Take 1 tablet (4 mg) by mouth every 6 hours as needed for nausea or vomiting 20 tablet 0     polyethylene glycol (MIRALAX) 17 g packet  "Take 34 g by mouth daily 7 packet 0     potassium citrate (UROCIT-K) 10 MEQ (1080 MG) CR tablet Take 10 mEq by mouth daily       pramipexole (MIRAPEX) 1 MG tablet Give 1 tablet by mouth in the morning and 2 tablets by mouth 3 hours prior to bedtime       senna-docusate (SENOKOT-S/PERICOLACE) 8.6-50 MG tablet Take 2 tablets by mouth 2 times daily 30 tablet 0       Medication Changes/Rationale:     Encouraged patient to wean off tramadol     Controlled medications sent with patient:   Medication tramadol 50mg q 6 hours prn electronically prescribed to [facility] pharmacy     ROS:   10 point ROS of systems including Constitutional, Eyes, Respiratory, Cardiovascular, Gastroenterology, Genitourinary, Integumentary, Musculoskeletal, Psychiatric were all negative except for pertinent positives noted in my HPI.    Physical Exam:   Vitals: /82   Pulse 80   Temp 97.7  F (36.5  C)   Resp 16   Ht 1.651 m (5' 5\")   Wt 88 kg (194 lb)   LMP 03/11/2010   SpO2 97%   BMI 32.28 kg/m    BMI= Body mass index is 32.28 kg/m .  GENERAL APPEARANCE:  Alert, in no distress  ENT:  Mouth and posterior oropharynx normal, moist mucous membranes, normal hearing acuity  EYES:  EOM, conjunctivae, lids, pupils and irises normal, PERRL  NECK:  .  RESP:  no respiratory distress  CV:  no edema  ABDOMEN:  abdomen round  M/S:   patient able to move all 4 extremities  SKIN:  Inspection of skin and subcutaneous tissue baseline  NEURO:   speech WNL  PSYCH:  affect and mood normal     SNF labs:   Most Recent 3 CBC's:  Recent Labs   Lab Test 08/07/20 0633 08/06/20  0533 07/31/20  1558 06/03/20  1431 10/23/19  1533   WBC  --   --  6.2 6.7 10.6   HGB 10.0* 10.8* 13.3 12.0 11.6*   MCV  --   --  89 87 89   PLT  --   --  271 251 407     Most Recent 3 BMP's:  Recent Labs   Lab Test 08/06/20 0533 07/31/20  1558 10/23/19  1533    139 138   POTASSIUM 4.5 4.3 3.5   CHLORIDE 107 107 108   CO2 27 27 25   BUN 11 14 13   CR 0.83 0.81 0.73   ANIONGAP 4 5 " 5   SABA 8.2* 9.4 9.2   * 106* 107*     Primary osteoarthritis of right hip  Aftercare following right hip joint replacement surgery  Acute/ongoing; Right SARAHI revision Dr. Grey 8/5/20, continues NWB  DC home at   level with home PT and HHA through [facility]  , continue tylenol 650mg q 4 hours prn,  tramadol 50mg q 6 hours prn,   ASA 162mg QD  F/u with ortho as directed     Anemia due to blood loss, acute  Acute/ongoing: Hgb  stable        Irritable bowel syndrome with constipation  Drug-induced constipation  Ongoing: continue linzess 290mcg QD, senna s 2 PO BID, miralax 34 gm QD scheduled and 34gm QD prn     Restless legs syndrome (RLS)  Ongoing: continue mirapex as orders       DISCHARGE PLAN:    Follow up labs: No labs orders/due    Medical Follow Up:      Follow up with primary care provider in 1 weeks    MTM referral needed and placed by this provider: No    Current [facility] scheduled appointments:  Next 5 appointments (look out 90 days)    Sep 14, 2020  1:20 PM CDT  SHORT with Skyler Álvarez MD  [facility] ([facility] [address] [place] [address] [phone]         Future Appointments   Date Time Provider Department [place]   9/14/2020  1:20 PM Skyler Álvarez MD [facility]   9/16/2020  2:10 PM [id number] [facility]   9/16/2020  2:30 PM Pan Grey MD [facility]         Discharge Services: Home Care:  Physical Therapy and Home Health Aide    Discharge Instructions Verbalized to Patient at Discharge:     Weight bearing restrictions:  Non-weight bearing. RLE      TOTAL DISCHARGE TIME:   Greater than 30 minutes  Electronically signed by:  Tonya Lynn Haase, APRN CNP

## 2020-09-10 ENCOUNTER — PATIENT OUTREACH (OUTPATIENT)
Dept: NURSING | Facility: CLINIC | Age: 65
End: 2020-09-10
Payer: MEDICARE

## 2020-09-10 ENCOUNTER — TELEPHONE (OUTPATIENT)
Dept: INTERNAL MEDICINE | Facility: CLINIC | Age: 65
End: 2020-09-10

## 2020-09-10 ASSESSMENT — ACTIVITIES OF DAILY LIVING (ADL): DEPENDENT_IADLS:: INDEPENDENT

## 2020-09-10 NOTE — LETTER
[place] CARE COORDINATION  [facility]  [address] [address]  Tel. [phone]  Fax [phone]    September 10, 2020        Mercedes Barber  [address]  [place] [address]    Dear Mercedes,      Miguel is an updated complex care plan for your continued enrollment in clinic care coordination. Please let us know if you have additional questions, goals, or concerns that we can assist with.      GOSIA Stokes   Social Work Clinic Care Coordinator   [facility]   PH: [phone]  [email or web address]  Complex Care Plan  About Me:    Patient Name:  Mercedes Barber    YOB: 1955  Age:         65 year old   [facility] MRN:    [id number] Telephone Information:  Home Phone [phone]   Mobile [phone]       Address:  [address]  [place] [address] Email address:  No e-mail address on record      Emergency Contact(s)    Name Relationship Lgl Grd Work Phone Home Phone Mobile Phone   1. RANJITHMICHAEL Daughter No  [phone]   2. SILVIO ROMERO Friend No  [phone]   3. JULIO C KASEPR (* Relative No  [phone]           Primary language:  English     needed? No   [facility]:  [phone] op. 1  Other communication barriers: None  Preferred Method of Communication:  Phone  Current living arrangement: I live alone  Mobility Status/ Medical Equipment: Independent w/Device    Health Maintenance  Health Maintenance Reviewed: Due/Overdue   Health Maintenance Due   Topic Date Due     ASTHMA ACTION PLAN  1955     ASTHMA CONTROL TEST  1955     PHQ-9  07/26/2017     MEDICARE ANNUAL WELLNESS VISIT  04/24/2019     INFLUENZA VACCINE (1) 09/01/2020     My Access Plan  Medical Emergency 911   Primary Clinic Line [facility] -  [phone]   24 Hour Appointment Line [phone] or  [phone]) (toll-free)   24 Hour Nurse Line [phone] (toll-free)   Preferred Urgent Care [facility], [phone]   Preferred Hospital [facility]  [phone]   Preferred Pharmacy [facility] [place], [place] - [address] [place] [address]     Behavioral Health Crisis Line The National Suicide Prevention Lifeline at 1-431.948.2647 or 911     My Care Team Members  Patient Care Team       Relationship Specialty Notifications Start End    Skyler Álvarez MD PCP - General Internal Medicine  7/31/12     Phone: [phone] Fax: [phone]         [address] [address]    Samir Arguelles MD MD Neurology  10/19/15     Referred by Dr. Samir Arguelles from [facility] Neurological [facility] for sleep apnea     Phone: [phone] Fax: [phone]         [facility] NEUROLOGICAL [facility] [address] [facility] [address] [facility] [address]    Park jackson MD MD Otolaryngology  10/19/15     Referred by Dr. Samir Arguelles from [facility] for sleep apnea     Phone: [phone] Fax: [phone]         [address] [facility] [address]    Katy Calderon LSW Lead Care Coordinator Primary Care - CC  1/9/20     Raegan Chinchilla MA Community Health Worker Primary Care - CC  1/23/20     Phone: [phone]         Skyler Álvarez MD Assigned PCP   5/17/20     Phone: [phone] Fax: [phone]         [address] [facility] [address]    Skyler Chacko MD Referring Physician Orthopedics  7/17/20     Phone: [phone] Fax: [phone]         [facility] [address] [address] [facility] [address]    [facility]  HOME HEALTH AGENCY ([facility] ([place])  7/29/20     Phone: [phone]                 My Care Plans  Self Management and Treatment Plan  Goals and (Comments)  Goals        General    1.Psychosocial  (pt-stated)     Notes - Note edited  6/4/2020 12:34 PM by Raegan Chinchilla MA    Goal Statement: I will attend [employer] at least 1 time this month over the next 6 months. I will call ahead and ask for help from someone at [employer] to help me with my walker. This will help me connect with a social network and [employer] is something that is important to me.  Measure of Success: I will attend [employer] this month.   Supportive Steps to Achieve: Plan when I would like to attend [employer], call ahead and ask for help.   Barriers: Not wanting to ask for help. Getting tired easily.   Strengths: Motivated to seek a support network.   Date to Achieve By: 10/1/2020  Patient expressed understanding of goal: Yes    As of today's date 1/20/2020 goal is met at 26 - 50%.   Goal Status:  Active  As of today's date 5/1/2020 goal is met at 26 - 50%.   Goal Status:  Active   As of today's date 6/4/2020 goal is met at 26 - 50%.   Goal Status:  Active             2. Self Care/Stress Reduction (pt-stated)     Notes - Note edited  6/4/2020 12:34 PM by Raegan Chinchilla MA    Goal Statement: I will make time for myself each week to make cards to reduce my stress and increase my happiness over the next 6 months.   Measure of Success: I will make cards every week.   Supportive Steps to Achieve: Make time to create and craft cards, get supplies at craft store and call Help at your door to clean my craft room so I can utilize it again.   Barriers: Not getting help to clean my craft room.   Strengths: Motivated to get craft room clean, motivated to make cards as it makes me happy.   Date to Achieve By: 10/1/20  Patient expressed understanding of goal: Yes    As of today's date 1/20/2020 goal is met at 26 - 50%.   Goal Status:  Active  As of today's date 5/1/2020 goal is met at 26 - 50%.   Goal Status:  Active   As of today's date 6/4/2020 goal is met at 26 - 50%.   Goal Status:  Active                Action Plans on File:                       Advance Care  Plans/Directives Type:        My Medical and Care Information  Problem List   Patient Active Problem List   Diagnosis     Menopausal syndrome (hot flashes)     Family history of breast cancer     Vulvar pain     Renal anomaly     Overactive bladder     Rectal prolapse     CARDIOVASCULAR SCREENING; LDL GOAL LESS THAN 160     Vaginal pain     Dysphagia     Confusion     Headache     Left shoulder pain     Anemia     Mild major depression (H)     Esophageal stricture     Vulvar lesion     Temporal sclerosis     Lumbago     Pain in thoracic spine     Sciatica     Pain in joint, lower leg     Plantar fascial fibromatosis     Pain in joint involving ankle and foot     Other specified hypothyroidism     GERD (gastroesophageal reflux disease)     Irritable bowel syndrome     Other sleep apnea     Cervical high risk HPV (human papillomavirus) test positive     Restless legs syndrome (RLS)     History of total hip arthroplasty, right     Hip pain     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)     Cellulitis of right hip     Deep venous thrombosis of upper extremity (H)     Peripheral edema     Low iron stores     Mechanical complication of prosthetic hip implant, initial encounter (H)     Status post hip surgery     Chest pain, unspecified     Closed fracture of proximal end of left humerus     Generalized anxiety disorder     History of infection     Infection and inflammatory reaction due to unspecified internal joint prosthesis, initial encounter (H)     Ingrowing nail     Major depressive disorder, recurrent episode, moderate (H)     Mild intermittent asthma     Nausea     Partial epilepsy with intractable epilepsy (H)     Repeated falls     History of revision of total replacement of right hip joint     Seizure disorder (H)      Current Medications and Allergies:    Current Outpatient Medications   Medication     acetaminophen (TYLENOL) 325 MG tablet     albuterol (ALBUTEROL) 108 (90 BASE) MCG/ACT Inhaler     artificial  saliva (BIOTENE MT) AERS spray     aspirin (ASA) 81 MG EC tablet     carboxymethylcellulose PF (REFRESH PLUS) 0.5 % ophthalmic solution     cycloSPORINE (RESTASIS) 0.05 % ophthalmic emulsion     fluticasone-vilanterol (BREO ELLIPTA) 200-25 MCG/INH inhaler     hypromellose (ARTIFICIAL TEARS) 0.5 % SOLN ophthalmic solution     lacosamide (VIMPAT) 200 MG TABS tablet     levothyroxine (SYNTHROID/LEVOTHROID) 50 MCG tablet     linaclotide (LINZESS) 290 MCG capsule     liothyronine (CYTOMEL) 5 MCG tablet     loratadine (CLARITIN REDITABS) 10 MG ODT     nefazodone (SERZONE) 200 MG tablet     omeprazole (PRILOSEC) 40 MG DR capsule     ondansetron (ZOFRAN-ODT) 4 MG ODT tab     polyethylene glycol (MIRALAX) 17 g packet     potassium citrate (UROCIT-K) 10 MEQ (1080 MG) CR tablet     pramipexole (MIRAPEX) 1 MG tablet     senna-docusate (SENOKOT-S/PERICOLACE) 8.6-50 MG tablet     traMADol (ULTRAM) 50 MG tablet     No current facility-administered medications for this visit.      Care Coordination Start Date: 11/13/2019   Frequency of Care Coordination: monthly   Form Last Updated: 09/10/2020

## 2020-09-10 NOTE — PROGRESS NOTES
Clinic Care Coordination Contact  Care Coordination Communication    Referral Source: SNF/TCU    Clinical Data: Patient was hospitalized at [facility] from 8/5/20 to 8/7/20 with diagnosis of Mechanical complication of prosthetic hip implant, Revision Right total hip arthroplasty on 8/5/2020.     Home Care Contact:              Home Care Agency:  home care (resumption of care)              Contact name () and phone number:               Care Coordination contacted home care: No- already started with pt               Anticipated start of care date: 9/10/20, pt confirmed they were there today     Patient Contact:               Discharge instructions were reviewed with patient/caregiver.               Do you have any questions about your medications? No.              PCP follow up appointment is scheduled for 9/14/20 at 1:40 pm. Ortho appt is scheduled for 9/16/20 at 2:10 pm (xray) and 2:30 pm with Dr Grey.              Provided 24 Hour Nurse Line and/or 24 Hour Appointment Scheduling: Yes              Home care has contacted patient: Yes              Patient questions/concerns: Pt was brief, reported she had PT this morning and another home care staff was there on call. Pt confirmed upcoming appts, her daughter or TRUE is taking her.     Chart review indicates pt had therapy today. Reported her daughter has been helping her.     Home Care Advisor lists  HC RN CM as Rosemary Lyles.    Plan: Pt will work with home care. Pt will attend upcoming appts as listed. Pt will continue to have family support.      Care Coordinator will await notification from home care staff informing  Care Coordinator of patients discharge plans/needs.  CC will chart review every 6 weeks and outreach to patient as needed.  CC will send updated complex care plan to pt.     CHW will continue with outreaches at this time. CHW will inform  CC of any concerns or changes regarding patient as needed.     GOSIA Stokes   Social  Work Clinic Care Coordinator   [facility], [facility]   PH: [phone]  [email or web address]

## 2020-09-14 ENCOUNTER — OFFICE VISIT (OUTPATIENT)
Dept: INTERNAL MEDICINE | Facility: CLINIC | Age: 65
End: 2020-09-14
Payer: MEDICARE

## 2020-09-14 VITALS
HEART RATE: 94 BPM | OXYGEN SATURATION: 98 % | BODY MASS INDEX: 31.65 KG/M2 | DIASTOLIC BLOOD PRESSURE: 76 MMHG | RESPIRATION RATE: 15 BRPM | HEIGHT: 65 IN | TEMPERATURE: 98 F | SYSTOLIC BLOOD PRESSURE: 116 MMHG | WEIGHT: 190 LBS

## 2020-09-14 DIAGNOSIS — Z09 HOSPITAL DISCHARGE FOLLOW-UP: Primary | ICD-10-CM

## 2020-09-14 DIAGNOSIS — D50.9 IRON DEFICIENCY ANEMIA, UNSPECIFIED IRON DEFICIENCY ANEMIA TYPE: ICD-10-CM

## 2020-09-14 DIAGNOSIS — Z98.890 STATUS POST HIP SURGERY: ICD-10-CM

## 2020-09-14 PROCEDURE — 99495 TRANSJ CARE MGMT MOD F2F 14D: CPT | Performed by: INTERNAL MEDICINE

## 2020-09-14 ASSESSMENT — MIFFLIN-ST. JEOR: SCORE: 1402.71

## 2020-09-14 NOTE — PROGRESS NOTES
Subjective     Mercedes Barber is a 65 year old female who presents to clinic today for the following health issues:    [facility]         Hospital Follow-up Visit:    Hospital/Nursing Home/IP Rehab Facility: [facility]   Date of Admission: 8/5/20  Date of Discharge: 9/8/20  Reason(s) for Admission: Aftercare following right hip joint replacement       Was your hospitalization related to COVID-19? No   Problems taking medications regularly:  None  Medication changes since discharge: None  Problems adhering to non-medication therapy:  None    Summary of hospitalization:  [facility] discharge summary reviewed  Diagnostic Tests/Treatments reviewed.  Follow up needed: Orthopedics  Other Healthcare Providers Involved in Patient s Care:         None  Update since discharge: stable.       Post Discharge Medication Reconciliation: discharge medications reconciled, continue medications without change.  Plan of care communicated with patient              HOSPITAL COURSE:    The patient was admitted following the above listed procedures for pain control and rehabilitation. Mercedes Barber did well post-operatively. Medicine was consulted post operatively to aid in management of medical co-morbidities. The patient received routine nursing cares and at the time of discharge was medically stable. Vital signs were stable throughout admission. The patient is tolerating a regular diet and is voiding spontaneously. PT and OT recommended TCU for discharge due to ongoing therapy needs. Pain is now controlled on oral medications which will be available on discharge. Stool softeners have been used while taking pain medications to help prevent constipation. Mercedes Barber is deemed medically safe to discharge.      Antibiotics:                     Vancomycin given periop and 24 hours postop.  DVT prophylaxis:         ASA 162mg daily initiated after surgery and will be continued  "for 4 weeks.   PT Progress:                  Worked with PT/OT post-op and they recommended discharge to TCU for continued therapy needs.   Pain Meds:                     Weaned off all IV pain meds by discharge.  Inpatient Events:          No significant events or complications.    Review of Systems   CONSTITUTIONAL: NEGATIVE for fever, chills, mild change in weight  ENT/MOUTH: NEGATIVE for ear, mouth and throat problems  RESP: NEGATIVE for significant cough or SOB  CV: NEGATIVE for chest pain, palpitations or peripheral edema  GI: NEGATIVE for nausea, abdominal pain, heartburn, or change in bowel habits  : NEGATIVE for frequency, dysuria, or hematuria  NEURO: NEGATIVE for weakness, dizziness or paresthesias  HEME: NEGATIVE for bleeding problems  PSYCHIATRIC: NEGATIVE for changes in mood or affect      Objective    /76   Pulse 94   Temp 98  F (36.7  C) (Temporal)   Resp 15   Ht 1.651 m (5' 5\")   Wt 86.2 kg (190 lb)   LMP 03/11/2010   SpO2 98%   BMI 31.62 kg/m    Body mass index is 31.62 kg/m .  Physical Exam   GENERAL: alert and no distress  EYES: Eyes grossly normal to inspection, PERRL and conjunctivae and sclerae normal  HENT: ear canals and TM's normal, nose and mouth without ulcers or lesions  NECK: no adenopathy, no asymmetry, masses, or scars and thyroid normal to palpation  RESP: lungs clear to auscultation - no rales, rhonchi or wheezes  CV: regular rate and rhythm, normal S1 S2, no S3 or S4, no click or rub, no peripheral edema and peripheral pulses strong  MS: no gross musculoskeletal defects noted, IN WHEELCHAIR WITH BRACE RIGHT HIP.  PSYCH: mentation appears normal, affect normal/bright        TSH   Date Value Ref Range Status   07/02/2019 0.48 0.40 - 4.00 mU/L Final       Assessment & Plan     Hospital discharge follow-up  Appears to be doing relatively well at this point continuing as ordered with follow-up as directed.  I have suggested recheck hemoglobin and basic metabolic panel in " "1 month to make sure hemoglobin improved  - Basic metabolic panel; Future    Status post hip surgery  Continue with PT and OT and follow-up with orthopedics as scheduled on 9/16/2020 for removal of potential brace    Iron deficiency anemia, unspecified iron deficiency anemia type  Lab orders placed as future for hemoglobin recheck as defined above  - Hemoglobin; Future     BMI:   Estimated body mass index is 32.28 kg/m  as calculated from the following:    Height as of 9/8/20: 1.651 m (5' 5\").    Weight as of 9/8/20: 88 kg (194 lb).     See Patient Instructions, vies also benefit of updating flu vaccination  No follow-ups on file.    Skyler Álvarez MD  [facility]    "

## 2020-09-15 ASSESSMENT — ASTHMA QUESTIONNAIRES: ACT_TOTALSCORE: 25

## 2020-09-16 ENCOUNTER — OFFICE VISIT (OUTPATIENT)
Dept: ORTHOPEDICS | Facility: CLINIC | Age: 65
End: 2020-09-16
Payer: MEDICARE

## 2020-09-16 ENCOUNTER — ANCILLARY PROCEDURE (OUTPATIENT)
Dept: GENERAL RADIOLOGY | Facility: CLINIC | Age: 65
End: 2020-09-16
Attending: ORTHOPAEDIC SURGERY
Payer: MEDICARE

## 2020-09-16 DIAGNOSIS — Z96.641 HISTORY OF REVISION OF TOTAL REPLACEMENT OF RIGHT HIP JOINT: ICD-10-CM

## 2020-09-16 DIAGNOSIS — Z96.641 HISTORY OF RIGHT HIP REPLACEMENT: Primary | ICD-10-CM

## 2020-09-16 NOTE — NURSING NOTE
Reason For Visit:   Chief Complaint   Patient presents with     Surgical Followup     DOS 8/5/20 S/P Revision right total hip arthroplasty        Primary MD: Skyler Álvarez  Referring MD: Pan Grey      Pain Assessment  Patient Currently in Pain: Yes  0-10 Pain Scale: 5  Primary Pain Location: Hip  Pain Descriptors: Discomfort    Current Outpatient Medications   Medication     acetaminophen (TYLENOL) 325 MG tablet     albuterol (ALBUTEROL) 108 (90 BASE) MCG/ACT Inhaler     artificial saliva (BIOTENE MT) AERS spray     aspirin (ASA) 81 MG EC tablet     carboxymethylcellulose PF (REFRESH PLUS) 0.5 % ophthalmic solution     cycloSPORINE (RESTASIS) 0.05 % ophthalmic emulsion     fluticasone-vilanterol (BREO ELLIPTA) 200-25 MCG/INH inhaler     hypromellose (ARTIFICIAL TEARS) 0.5 % SOLN ophthalmic solution     lacosamide (VIMPAT) 200 MG TABS tablet     levothyroxine (SYNTHROID/LEVOTHROID) 50 MCG tablet     linaclotide (LINZESS) 290 MCG capsule     liothyronine (CYTOMEL) 5 MCG tablet     loratadine (CLARITIN REDITABS) 10 MG ODT     nefazodone (SERZONE) 200 MG tablet     omeprazole (PRILOSEC) 40 MG DR capsule     ondansetron (ZOFRAN-ODT) 4 MG ODT tab     polyethylene glycol (MIRALAX) 17 g packet     potassium citrate (UROCIT-K) 10 MEQ (1080 MG) CR tablet     pramipexole (MIRAPEX) 1 MG tablet     senna-docusate (SENOKOT-S/PERICOLACE) 8.6-50 MG tablet     traMADol (ULTRAM) 50 MG tablet     No current facility-administered medications for this visit.           Allergies   Allergen Reactions     Blood Transfusion Related (Informational Only) Other (See Comments)     Patient has a history of a clinically significant antibody against RBC antigens.  A delay in compatible RBCs may occur.     Bupropion Rash     Carbamazepine Diarrhea     Clonazepam Other (See Comments)     Hypotension, trouble walking, mind disturbance     Encort [Hydrocortisone Acetate] Swelling     Localized to feet     Encort [Hydrocortisone] Swelling      Erythromycin Diarrhea     Erythromycin Nausea and Vomiting     Flagyl [Metronidazole] Nausea     Gabapentin Other (See Comments)     Causes over-eating     Lamictal [Lamotrigine] Unknown     Oxycodone Nausea and Vomiting     Tegretol [Carbamazepine] Nausea and Vomiting           Lou Jordan, OS[facility]ION

## 2020-09-16 NOTE — LETTER
9/16/2020         RE: Mercedes Barber  [address]  [address]  [place] [address]        Dear Colleague,    Thank you for referring your patient, Mercedes Barber, to the [facility]. Please see a copy of my visit note below.           Interval History:   Mercedes Barber is a 65 year old female who is here follow up for Revision right SARAHI - 8/5/20    Whitley has been doing well following the revision total hip replacement.  She has been doing toe-touch weightbearing and is doing reasonably well with this.  She is able to transfer and take a few steps.  She has not had any problems with her incision.  She notes that the hip has been feeling quite good.  She has been weaning off of pain medications appropriately.         Physical Exam:     NAD  AOx3  Interactive and cooperative with the exam.  The incision is well-healed.  She has no pain with gentle hip range of motion.  She is otherwise well-appearing.         Imaging:      X-rays demonstrate well fixed well-positioned revision total hip replacement implants.  No evidence of loosening or implant related problems.       Assessment and Plan:        Mercedes Barber is a 65 year old female is s/p the above procedure.   She is doing very well following revision.  The implants appear to be well fixed well-positioned without any change in alignment.  At this point she can progress to partial weightbearing, 50%.  I will see her back in clinic in 6 weeks.  We will obtain repeat low AP pelvis at that visit.  If she is doing well would likely advance her to full weightbearing as tolerated.  She will let us know if she has any questions or concerns leading up to the next visit.    Pan Grey M.D.     Arthritis and Joint Replacement  Department of Orthopaedic Surgery, [facility]  [email or web address]  [phone] (pager)

## 2020-09-17 DIAGNOSIS — Z53.9 DIAGNOSIS NOT YET DEFINED: Primary | ICD-10-CM

## 2020-09-17 PROCEDURE — G0180 MD CERTIFICATION HHA PATIENT: HCPCS | Performed by: INTERNAL MEDICINE

## 2020-09-29 NOTE — PROGRESS NOTES
Interval History:   Mercedes Barber is a 65 year old female who is here follow up for Revision right SARAHI - 8/5/20    Whitley has been doing well following the revision total hip replacement.  She has been doing toe-touch weightbearing and is doing reasonably well with this.  She is able to transfer and take a few steps.  She has not had any problems with her incision.  She notes that the hip has been feeling quite good.  She has been weaning off of pain medications appropriately.         Physical Exam:     NAD  AOx3  Interactive and cooperative with the exam.  The incision is well-healed.  She has no pain with gentle hip range of motion.  She is otherwise well-appearing.         Imaging:      X-rays demonstrate well fixed well-positioned revision total hip replacement implants.  No evidence of loosening or implant related problems.       Assessment and Plan:        Mercedes Barber is a 65 year old female is s/p the above procedure.   She is doing very well following revision.  The implants appear to be well fixed well-positioned without any change in alignment.  At this point she can progress to partial weightbearing, 50%.  I will see her back in clinic in 6 weeks.  We will obtain repeat low AP pelvis at that visit.  If she is doing well would likely advance her to full weightbearing as tolerated.  She will let us know if she has any questions or concerns leading up to the next visit.    Pan Grey M.D.     Arthritis and Joint Replacement  Department of Orthopaedic Surgery, [facility]  [email or web address]  [phone] (pager)

## 2020-09-30 ENCOUNTER — THERAPY VISIT (OUTPATIENT)
Dept: PHYSICAL THERAPY | Facility: CLINIC | Age: 65
End: 2020-09-30
Attending: ORTHOPAEDIC SURGERY
Payer: MEDICARE

## 2020-09-30 DIAGNOSIS — Z09 HOSPITAL DISCHARGE FOLLOW-UP: ICD-10-CM

## 2020-09-30 DIAGNOSIS — D50.9 IRON DEFICIENCY ANEMIA, UNSPECIFIED IRON DEFICIENCY ANEMIA TYPE: ICD-10-CM

## 2020-09-30 DIAGNOSIS — M25.562 LEFT KNEE PAIN: ICD-10-CM

## 2020-09-30 LAB
ANION GAP SERPL CALCULATED.3IONS-SCNC: 6 MMOL/L (ref 3–14)
BUN SERPL-MCNC: 21 MG/DL (ref 7–30)
CALCIUM SERPL-MCNC: 9.2 MG/DL (ref 8.5–10.1)
CHLORIDE SERPL-SCNC: 106 MMOL/L (ref 94–109)
CO2 SERPL-SCNC: 26 MMOL/L (ref 20–32)
CREAT SERPL-MCNC: 0.7 MG/DL (ref 0.52–1.04)
GFR SERPL CREATININE-BSD FRML MDRD: 90 ML/MIN/{1.73_M2}
GLUCOSE SERPL-MCNC: 112 MG/DL (ref 70–99)
HGB BLD-MCNC: 11.3 G/DL (ref 11.7–15.7)
POTASSIUM SERPL-SCNC: 4.1 MMOL/L (ref 3.4–5.3)
SODIUM SERPL-SCNC: 138 MMOL/L (ref 133–144)

## 2020-09-30 PROCEDURE — 80048 BASIC METABOLIC PNL TOTAL CA: CPT | Performed by: INTERNAL MEDICINE

## 2020-09-30 PROCEDURE — 97110 THERAPEUTIC EXERCISES: CPT | Mod: GP | Performed by: PHYSICAL THERAPIST

## 2020-09-30 PROCEDURE — G0283 ELEC STIM OTHER THAN WOUND: HCPCS | Mod: GP | Performed by: PHYSICAL THERAPIST

## 2020-09-30 PROCEDURE — 85018 HEMOGLOBIN: CPT | Performed by: INTERNAL MEDICINE

## 2020-09-30 PROCEDURE — 97162 PT EVAL MOD COMPLEX 30 MIN: CPT | Mod: GP | Performed by: PHYSICAL THERAPIST

## 2020-09-30 PROCEDURE — 36415 COLL VENOUS BLD VENIPUNCTURE: CPT | Performed by: INTERNAL MEDICINE

## 2020-09-30 ASSESSMENT — ACTIVITIES OF DAILY LIVING (ADL)
SQUAT: I AM UNABLE TO DO THE ACTIVITY
RISE FROM A CHAIR: I AM UNABLE TO DO THE ACTIVITY
LIGHT_TO_MODERATE_WORK: SLIGHT DIFFICULTY
TWISTING/PIVOTING_ON_INVOLVED_LEG: EXTREME DIFFICULTY
STAND: ACTIVITY IS VERY DIFFICULT
GIVING WAY, BUCKLING OR SHIFTING OF KNEE: THE SYMPTOM PREVENTS ME FROM ALL DAILY ACTIVITIES
SIT WITH YOUR KNEE BENT: I AM UNABLE TO DO THE ACTIVITY
KNEE_ACTIVITY_OF_DAILY_LIVING_SUM: 1
LIMPING: THE SYMPTOM PREVENTS ME FROM ALL DAILY ACTIVITIES
GOING_UP_1_FLIGHT_OF_STAIRS: UNABLE TO DO
PUTTING_ON_SOCKS_AND_SHOES: EXTREME DIFFICULTY
GETTING_INTO_AND_OUT_OF_A_BATHTUB: UNABLE TO DO
STANDING_FOR_15_MINUTES: MODERATE DIFFICULTY
AS_A_RESULT_OF_YOUR_KNEE_INJURY,_HOW_WOULD_YOU_RATE_YOUR_CURRENT_LEVEL_OF_DAILY_ACTIVITY?: SEVERELY ABNORMAL
SITTING_FOR_15_MINUTES: UNABLE TO DO
HEAVY_WORK: UNABLE TO DO
GOING_DOWN_1_FLIGHT_OF_STAIRS: UNABLE TO DO
WALK: I AM UNABLE TO DO THE ACTIVITY
WEAKNESS: THE SYMPTOM PREVENTS ME FROM ALL DAILY ACTIVITIES
SWELLING: THE SYMPTOM PREVENTS ME FROM ALL DAILY ACTIVITIES
HOW_WOULD_YOU_RATE_THE_OVERALL_FUNCTION_OF_YOUR_KNEE_DURING_YOUR_USUAL_DAILY_ACTIVITIES?: SEVERELY ABNORMAL
WALKING_APPROXIMATELY_10_MINUTES: UNABLE TO DO
RECREATIONAL_ACTIVITIES: UNABLE TO DO
HOS_ADL_ITEM_SCORE_TOTAL: 13
PAIN: THE SYMPTOM PREVENTS ME FROM ALL DAILY ACTIVITIES
WALKING_15_MINUTES_OR_GREATER: UNABLE TO DO
HOW_WOULD_YOU_RATE_YOUR_CURRENT_LEVEL_OF_FUNCTION_DURING_YOUR_USUAL_ACTIVITIES_OF_DAILY_LIVING_FROM_0_TO_100_WITH_100_BEING_YOUR_LEVEL_OF_FUNCTION_PRIOR_TO_YOUR_HIP_PROBLEM_AND_0_BEING_THE_INABILITY_TO_PERFORM_ANY_OF_YOUR_USUAL_DAILY_ACTIVITIES?: 0
WALKING_DOWN_STEEP_HILLS: UNABLE TO DO
KNEEL ON THE FRONT OF YOUR KNEE: I AM UNABLE TO DO THE ACTIVITY
HOS_ADL_SCORE(%): 19.12
GETTING_INTO_AND_OUT_OF_AN_AVERAGE_CAR: SLIGHT DIFFICULTY
STEPPING_UP_AND_DOWN_CURBS: UNABLE TO DO
WALKING_UP_STEEP_HILLS: UNABLE TO DO
STIFFNESS: THE SYMPTOM PREVENTS ME FROM ALL DAILY ACTIVITIES
GO UP STAIRS: I AM UNABLE TO DO THE ACTIVITY
KNEE_ACTIVITY_OF_DAILY_LIVING_SCORE: 1.43
RAW_SCORE: 1
HOS_ADL_HIGHEST_POTENTIAL_SCORE: 68
GO DOWN STAIRS: I AM UNABLE TO DO THE ACTIVITY
HOW_WOULD_YOU_RATE_THE_CURRENT_FUNCTION_OF_YOUR_KNEE_DURING_YOUR_USUAL_DAILY_ACTIVITIES_ON_A_SCALE_FROM_0_TO_100_WITH_100_BEING_YOUR_LEVEL_OF_KNEE_FUNCTION_PRIOR_TO_YOUR_INJURY_AND_0_BEING_THE_INABILITY_TO_PERFORM_ANY_OF_YOUR_USUAL_DAILY_ACTIVITIES?: 0
HOS_ADL_COUNT: 17
DEEP_SQUATTING: UNABLE TO DO
ROLLING_OVER_IN_BED: MODERATE DIFFICULTY
WALKING_INITIALLY: MODERATE DIFFICULTY

## 2020-09-30 NOTE — LETTER
DEPARTMENT OF HEALTH AND HUMAN SERVICES  CENTERS FOR MEDICARE & MEDICAID SERVICES    PLAN/UPDATED PLAN OF PROGRESS FOR OUTPATIENT REHABILITATION@       PATIENTS NAME:  Mercedes Barber   : 1955  PROVIDER NUMBER:    [id number]  HICN:  [id number]   PROVIDER NAME: [place] [facility] PH[facility]  MEDICAL RECORD NUMBER: [id number]   START OF CARE DATE:  SOC Date: 20   TYPE:  PT  PRIMARY/TREATMENT DIAGNOSIS: (Pertinent Medical Diagnosis)  Left knee pain    VISITS FROM START OF CARE:  Rxs Used: 1     [facility] [facility] Initial Evaluation  Subjective:  Patient is referred with c/o acute posterior L knee pain. Sxs began on 20 while walking when she felt a sharp pain in the popliteal area of L knee. Went to urgent care and x-rays did not indicate intraarticular issue. PMH includes B TKAs, L TSA and recent R SARAHI performed on 20. She was doing very well post SARAHI. Will commence R hip rehab as L knee sxs resolve. Currently unable to ambulate with significant L knee pain. Using wheel chair at this time.    Therapist Generated HPI Evaluation  Tpe of problem:  Left knee.  This is a new condition.  Condition occurred with:  A twist.  Where condition occurred: in the community.  Patient reports pain:  Posterior and in the joint.  Pain is described as sharp and aching and is intermittent.  Pain is the same all the time.  Since onset symptoms are unchanged.  Associated symptoms:  Edema, loss of strength and buckling/giving out. Symptoms are exacerbated by bending/squatting, transfers, standing, weight bearing and walking  and relieved by rest.  Special tests included:  X-ray.  Barriers include:  Lives alone.    Patient Health History  Mercedes Barber being seen for L knee, R hip.   Pain is reported as 6/10 on pain scale.  General health as reported by patient is good.  Pertinent medical history includes: anemia, asthma, depression, fibromyalgia, osteoarthritis,  numbness/tingling, mental illness, overweight, sleep disorder/apnea and thyroid problems.   Medical allergies: none.   Surgeries include:  Orthopedic surgery (B TKA, R SARAHI, L TSA, CTS).    Current medications:  Anti-seizure medication, anti-depressants, sleep medication, pain medication and thyroid medication.    Objective:  Gait:    Gait Type:  Antalgic   Weight Bearing Status:  PWB   Assistive Devices:  Walker  Knee Evaluation:  ROM:    PROM  Extension: Left: 0    Right:  0  Flexion: Left: 105    Right:  127  Strength:   Extension:  Left: 3/5   Pain:      Right: 4/5   Pain:  Flexion:  Left: 3/5    Pain:++      Right: 4/5   Pain:    Quad Set Left: Good    Pain:   Quad Set Right: Good    Pain:  Ligament Testing:  Normal  Special Tests: Not Assessed  Palpation:    Left knee tenderness present at:  Popliteal; Biceps Femoral; Semitendinosus and Semembranosus  Edema:  Normal  Mobility Testing:  Not Assessed  Functional Testing:  not assessed    ROS  Assessment/Plan:    Patient is a 65 year old female with right side hip and left side knee complaints.    Patient has the following significant findings with corresponding treatment plan.                Diagnosis 1:  L distal hamstring strain, s/p R SARAHI  Pain -  hot/cold therapy, electric stimulation, self management, education and home program  Decreased ROM/flexibility - therapeutic exercise and home program  Decreased strength - therapeutic exercise, therapeutic activities and home program  Impaired balance - neuro re-education, therapeutic activities and home program  Impaired gait - gait training, assistive devices and home program  Impaired muscle performance - neuro re-education and home program  Decreased function - therapeutic activities and home program    Therapy Evaluation Codes:   1) History comprised of:   Personal factors that impact the plan of care:      Past/current experiences.    Comorbidity factors that impact the plan of care are:      Depression,  "Fibromyalgia, Mental illness, Osteoarthritis and Weakness.     Medications impacting care: Anti-depressant, Pain and Sleep.  2) Examination of Body Systems comprised of:   Body structures and functions that impact the plan of care:      Hip and Knee.   Activity limitations that impact the plan of care are:      Dressing, Squatting/kneeling, Stairs, Standing, Walking and Sleeping.  3) Clinical presentation characteristics are:   Evolving/Changing.  4) Decision-Making    Moderate complexity using standardized patient assessment instrument   and/or measureable assessment of functional outcome.      Cumulative Therapy Evaluation is: Moderate complexity.  Previous and current functional limitations:  (See Goal Flow Sheet for this information)    Short term and Long term goals: (See Goal Flow Sheet for this information)   Communication ability:  Patient appears to be able to clearly communicate and understand verbal and written communication and follow directions correctly.  Treatment Explanation - The following has been discussed with the patient:   RX ordered/plan of care  Anticipated outcomes  Possible risks and side effects  This patient would benefit from PT intervention to resume normal activities.   Rehab potential is good.  Frequency:  2 X week, once daily  Duration:  for 4 weeks  Discharge Plan:  Achieve all LTG.  Independent in home treatment program.  Reach maximal therapeutic benefit.    Caregiver Signature/Credentials _____________________________ Date ________       Treating Provider: Hayley Jones PT   I have reviewed and certified the need for these services and plan of treatment while under my care.        PHYSICIAN'S SIGNATURE:   ________________________________ Date___________                        Skyler Bartlett MD    Certification period:  Beginning of Cert date period: 09/30/20 to  End of Cert period date: 12/29/20.    Functional Level Progress Report: Please see attached \"Goal Flow sheet for " "Functional level.\"    ____X____ Continue Services or       ________ DC Services                Service dates: From  SOC Date: 09/30/20 date to present                         "

## 2020-09-30 NOTE — PROGRESS NOTES
[place] [facility] Initial Evaluation  Subjective:  Patient is referred with c/o acute posterior L knee pain. Sxs began on 9/29/20 while walking when she felt a sharp pain in the popliteal area of L knee. Went to urgent care and x-rays did not indicate intraarticular issue. PMH includes B TKAs, L TSA and recent R SARAHI performed on 8/5/20. She was doing very well post SARAHI. Will commence R hip rehab as L knee sxs resolve. Currently unable to ambulate with significant L knee pain. Using wheel chair at this time.    The history is provided by the patient.   Therapist Generated HPI Evaluation         Type of problem:  Left knee.    This is a new condition.  Condition occurred with:  A twist.  Where condition occurred: in the community.  Patient reports pain:  Posterior and in the joint.  Pain is described as sharp and aching and is intermittent.  Pain is the same all the time.  Since onset symptoms are unchanged.  Associated symptoms:  Edema, loss of strength and buckling/giving out. Symptoms are exacerbated by bending/squatting, transfers, standing, weight bearing and walking  and relieved by rest.  Special tests included:  X-ray.    Barriers include:  Lives alone.    Patient Health History  Mercedes Barber being seen for L knee, R hip.          Pain is reported as 6/10 on pain scale.  General health as reported by patient is good.  Pertinent medical history includes: anemia, asthma, depression, fibromyalgia, osteoarthritis, numbness/tingling, mental illness, overweight, sleep disorder/apnea and thyroid problems.     Medical allergies: none.   Surgeries include:  Orthopedic surgery (B TKA, R SARAHI, L TSA, CTS).    Current medications:  Anti-seizure medication, anti-depressants, sleep medication, pain medication and thyroid medication.                                           Objective:    Gait:    Gait Type:  Antalgic   Weight Bearing Status:  PWB   Assistive Devices:  Walker                                                         Knee Evaluation:  ROM:      PROM      Extension: Left: 0    Right:  0  Flexion: Left: 105    Right:  127      Strength:     Extension:  Left: 3/5   Pain:      Right: 4/5   Pain:  Flexion:  Left: 3/5    Pain:++      Right: 4/5   Pain:    Quad Set Left: Good    Pain:   Quad Set Right: Good    Pain:  Ligament Testing:  Normal                Special Tests: Not Assessed      Palpation:    Left knee tenderness present at:  Popliteal; Biceps Femoral; Semitendinosus and Semembranosus    Edema:  Normal    Mobility Testing:  Not Assessed            Functional Testing:  not assessed                  General     ROS    Assessment/Plan:    Patient is a 65 year old female with right side hip and left side knee complaints.    Patient has the following significant findings with corresponding treatment plan.                Diagnosis 1:  L distal hamstring strain, s/p R SARAHI  Pain -  hot/cold therapy, electric stimulation, self management, education and home program  Decreased ROM/flexibility - therapeutic exercise and home program  Decreased strength - therapeutic exercise, therapeutic activities and home program  Impaired balance - neuro re-education, therapeutic activities and home program  Impaired gait - gait training, assistive devices and home program  Impaired muscle performance - neuro re-education and home program  Decreased function - therapeutic activities and home program    Therapy Evaluation Codes:   1) History comprised of:   Personal factors that impact the plan of care:      Past/current experiences.    Comorbidity factors that impact the plan of care are:      Depression, Fibromyalgia, Mental illness, Osteoarthritis and Weakness.     Medications impacting care: Anti-depressant, Pain and Sleep.  2) Examination of Body Systems comprised of:   Body structures and functions that impact the plan of care:      Hip and Knee.   Activity limitations that impact the plan of care are:      Dressing,  Squatting/kneeling, Stairs, Standing, Walking and Sleeping.  3) Clinical presentation characteristics are:   Evolving/Changing.  4) Decision-Making    Moderate complexity using standardized patient assessment instrument and/or measureable assessment of functional outcome.  Cumulative Therapy Evaluation is: Moderate complexity.    Previous and current functional limitations:  (See Goal Flow Sheet for this information)    Short term and Long term goals: (See Goal Flow Sheet for this information)     Communication ability:  Patient appears to be able to clearly communicate and understand verbal and written communication and follow directions correctly.  Treatment Explanation - The following has been discussed with the patient:   RX ordered/plan of care  Anticipated outcomes  Possible risks and side effects  This patient would benefit from PT intervention to resume normal activities.   Rehab potential is good.    Frequency:  2 X week, once daily  Duration:  for 4 weeks  Discharge Plan:  Achieve all LTG.  Independent in home treatment program.  Reach maximal therapeutic benefit.    Please refer to the daily flowsheet for treatment today, total treatment time and time spent performing 1:1 timed codes.

## 2020-10-02 ENCOUNTER — THERAPY VISIT (OUTPATIENT)
Dept: PHYSICAL THERAPY | Facility: CLINIC | Age: 65
End: 2020-10-02
Payer: MEDICARE

## 2020-10-02 DIAGNOSIS — M25.562 LEFT KNEE PAIN: ICD-10-CM

## 2020-10-02 PROCEDURE — 97014 ELECTRIC STIMULATION THERAPY: CPT | Mod: GP | Performed by: PHYSICAL THERAPIST

## 2020-10-02 PROCEDURE — 97110 THERAPEUTIC EXERCISES: CPT | Mod: GP | Performed by: PHYSICAL THERAPIST

## 2020-10-02 PROCEDURE — 97010 HOT OR COLD PACKS THERAPY: CPT | Mod: GP | Performed by: PHYSICAL THERAPIST

## 2020-10-05 ENCOUNTER — TELEPHONE (OUTPATIENT)
Dept: ORTHOPEDICS | Facility: CLINIC | Age: 65
End: 2020-10-05

## 2020-10-05 NOTE — TELEPHONE ENCOUNTER
[facility] Call Center    Phone Message    May a detailed message be left on voicemail: yes     Reason for Call: Other: Pt needs a letter from Dr. Grey saying its ok to get dental work done. Please call regarding this.     Action Taken: Other: [facility]    Travel Screening: Not Applicable

## 2020-10-06 ENCOUNTER — THERAPY VISIT (OUTPATIENT)
Dept: PHYSICAL THERAPY | Facility: CLINIC | Age: 65
End: 2020-10-06
Payer: MEDICARE

## 2020-10-06 ENCOUNTER — TELEPHONE (OUTPATIENT)
Dept: ORTHOPEDICS | Facility: CLINIC | Age: 65
End: 2020-10-06

## 2020-10-06 DIAGNOSIS — Z47.1 AFTERCARE FOLLOWING RIGHT HIP JOINT REPLACEMENT SURGERY: ICD-10-CM

## 2020-10-06 DIAGNOSIS — Z96.641 AFTERCARE FOLLOWING RIGHT HIP JOINT REPLACEMENT SURGERY: ICD-10-CM

## 2020-10-06 PROCEDURE — 97110 THERAPEUTIC EXERCISES: CPT | Mod: GP | Performed by: PHYSICAL THERAPIST

## 2020-10-06 PROCEDURE — 97161 PT EVAL LOW COMPLEX 20 MIN: CPT | Mod: GP | Performed by: PHYSICAL THERAPIST

## 2020-10-06 NOTE — PROGRESS NOTES
"[place] [facility] Initial Evaluation  Subjective:  The history is provided by the patient. No  was used.   Therapist Generated HPI Evaluation  Problem details: Patient had right SARAHI 7-23-19.  She has had a complicated course, with a revision 8-7-19, surgical procedure for infection and drain placed 8-26-19 and another revision 8-5-20 due to loose components. She had home PT, last visit 2 weeks ago.  She is currently doing ankle pumps, sitting knee extension, sitting marching, heel slides supine, supine quad sets and gluteal isometrics.  Patient reports intermittent \"prickly\" feeling as well as \"dull/achy\" pain right lateral thigh, intermittent 0-4/10.  Symptoms reports increase with walking >30 minutes, however patient is not walking to the end of the abdul to get her mail, difficulty with getting off low seat.  Symptoms decrease with ice and Tramadol.     Patient lives in a condo alone with an elevator.  She has a grab bar next to the toilet, walk-in shower with a grab bar and chair. She has people getting groceries and running errands and getting her mail, hired help for housekeeping.  She uses a walker in the house, wheelchair out of the home. She states she is trying to follow 50% weight bearing restriction on right, but is not always compliant. .                     Pain is the same all the time.  Since onset symptoms are gradually improving.       Previous treatment includes physical therapy (home PT).   Barriers include:  Lives alone.    General health as reported by patient is good.  Pertinent medical history includes: anemia, asthma, depression, fibromyalgia, osteoarthritis, numbness/tingling, mental illness, overweight, sleep disorder/apnea and thyroid problems.      Medical allergies: none.   Surgeries include:  Orthopedic surgery (B TKA, R SARAHI, L TSA, CTS).    Current medications:  Anti-seizure medication, anti-depressants, sleep medication, pain medication and thyroid " medication.                           Objective:  Standing Alignment:            Hip deviations alignment: well-healed right hip incision, no redness, warmth or drainage         Gait:  Arrived in wheelchair; used clinic FWW to assess gait, needed cuing (and use of scale) for 50% WB right, step-to gait with small steps                 Physical Exam    General     ROS  MMT: left hip 4+/5  MMT:  Right hip flexion 4/5, abduction 3/5, extension 4/5, knee flexion and extension 4+/5, ankle DF 4+/5    AROM right hip flexion 90 (limited by protocol), abduction WNL    Assessment/Plan:    Patient is a 65 year old female with right side hip complaints.    Patient has the following significant findings with corresponding treatment plan.                Diagnosis 1:  S/p R SARAHI revision (2nd) 8-26-20    Pain -  self management, education and home program  Decreased strength - therapeutic exercise, therapeutic activities and home program  Impaired gait - gait training, assistive devices and home program  Decreased function - therapeutic activities and home program    Cumulative Therapy Evaluation is: Low complexity.    Previous and current functional limitations:  (See Goal Flow Sheet for this information)    Short term and Long term goals: (See Goal Flow Sheet for this information)     Communication ability:  Patient appears to be able to clearly communicate and understand verbal and written communication and follow directions correctly.  Treatment Explanation - The following has been discussed with the patient:   RX ordered/plan of care  Anticipated outcomes  Possible risks and side effects  This patient would benefit from PT intervention to resume normal activities.   Rehab potential is good.    Frequency:  2 X week, once daily  Duration:  for 6 weeks  Discharge Plan:  Achieve all LTG.  Independent in home treatment program.  Reach maximal therapeutic benefit.    Please refer to the daily flowsheet for treatment today, total  treatment time and time spent performing 1:1 timed codes.

## 2020-10-06 NOTE — TELEPHONE ENCOUNTER
[facility] Call Center    Phone Message    May a detailed message be left on voicemail: yes     Reason for Call: Other: Patient is returning Tessie's call. Please call patient back      Action Taken: Message routed to:  [facility]o    Travel Screening: Not Applicable

## 2020-10-06 NOTE — LETTER
"DEPARTMENT OF HEALTH AND HUMAN SERVICES  CENTERS FOR MEDICARE & MEDICAID SERVICES    PLAN/UPDATED PLAN OF PROGRESS FOR OUTPATIENT REHABILITATION       PATIENTS NAME:  Mercedes Barber   : 1955  PROVIDER NUMBER:    [id number]  HICN:  [id number]   PROVIDER NAME: [place] [facility] PHYSICAL THERAPY  MEDICAL RECORD NUMBER: [id number]   START OF CARE DATE:  SOC Date: 10/06/20   TYPE:  PT  PRIMARY/TREATMENT DIAGNOSIS: (Pertinent Medical Diagnosis)  Aftercare following right hip joint replacement surgery    VISITS FROM START OF CARE:  Rxs Used: 1     [place] [facility] Initial Evaluation  Therapist Generated HPI Evaluation  Problem details: Patient had right SARAHI 19.  She has had a complicated course, with a revision 19, surgical procedure for infection and drain placed 19 and another revision 20 due to loose components. She had home PT, last visit 2 weeks ago.  She is currently doing ankle pumps, sitting knee extension, sitting marching, heel slides supine, supine quad sets and gluteal isometrics.  Patient reports intermittent \"prickly\" feeling as well as \"dull/achy\" pain right lateral thigh, intermittent 0-4/10.  Symptoms reports increase with walking >30 minutes, however patient is not walking to the end of the abdul to get her mail, difficulty with getting off low seat.  Symptoms decrease with ice and Tramadol.   Patient lives in a condo alone with an elevator.  She has a grab bar next to the toilet, walk-in shower with a grab bar and chair. She has people getting groceries and running errands and getting her mail, hired help for housekeeping.  She uses a walker in the house, wheelchair out of the home. She states she is trying to follow 50% weight bearing restriction on right, but is not always compliant. .         Pain is the same all the time.  Since onset symptoms are gradually improving.  Previous treatment includes physical therapy (home PT). "   Barriers include:  Lives alone.  General health as reported by patient is good.  Pertinent medical history includes: anemia, asthma, depression, fibromyalgia, osteoarthritis, numbness/tingling, mental illness, overweight, sleep disorder/apnea and thyroid problems.   Medical allergies: none.   Surgeries include:  Orthopedic surgery (B TKA, R SARAHI, L TSA, CTS).    Current medications:  Anti-seizure medication, anti-depressants, sleep medication, pain medication and thyroid medication.      Objective:  Standing Alignment:    Hip deviations alignment: well-healed right hip incision, no redness, warmth or drainage   Gait:  Arrived in wheelchair; used clinic FWW to assess gait, needed cuing (and use of scale) for 50% WB right, step-to gait with small steps     ROS  MMT: left hip 4+/5  MMT:  Right hip flexion 4/5, abduction 3/5, extension 4/5, knee flexion and extension 4+/5, ankle DF 4+/5  AROM right hip flexion 90 (limited by protocol), abduction WNL    Assessment/Plan:    Patient is a 65 year old female with right side hip complaints.    Patient has the following significant findings with corresponding treatment plan.                Diagnosis 1:  S/p R SARAHI revision (2nd) 8-26-20  Pain -  self management, education and home program  Decreased strength - therapeutic exercise, therapeutic activities and home program  Impaired gait - gait training, assistive devices and home program  Decreased function - therapeutic activities and home program    Cumulative Therapy Evaluation is: Low complexity.    Previous and current functional limitations:  (See Goal Flow Sheet for this information)    Short term and Long term goals: (See Goal Flow Sheet for this information)   Communication ability:  Patient appears to be able to clearly communicate and understand verbal and written communication and follow directions correctly.  Treatment Explanation - The following has been discussed with the patient:   RX ordered/plan of  "care  Anticipated outcomes  Possible risks and side effects  This patient would benefit from PT intervention to resume normal activities.   Rehab potential is good.  Frequency:  2 X week, once daily  Duration:  for 6 weeks  Discharge Plan:  Achieve all LTG.  Independent in home treatment program.  Reach maximal therapeutic benefit.    Caregiver Signature/Credentials _____________________________ Date ________       Treating Provider: Jazmyn Riddle PT    I have reviewed and certified the need for these services and plan of treatment while under my care.        PHYSICIAN'S SIGNATURE:   ___________________________ Date___________                             Pan Grey MD    Certification period:  Beginning of Cert date period: 10/06/20 to  End of Cert period date: 01/03/21     Functional Level Progress Report: Please see attached \"Goal Flow sheet for Functional level.\"    ____X____ Continue Services or       ________ DC Services                Service dates: From  SOC Date: 10/06/20 date to present                         "

## 2020-10-06 NOTE — LETTER
[facility] [address]  [address] [facility] [address] [phone]        October 6, 2020    Regarding:  Mercedes Barber  [address]  [address] [place] [id number]              To Whom It May Concern;    The patient is only 2 months postop; the recommendation is to wait until 6 months postop for any kind of dental work unless it is a dental emergency. The patient needs antibiotic for any dental work. Please reach out to Dr. Grey's team for any questions.          Sincerely,        Tessie Winchester RN

## 2020-10-06 NOTE — TELEPHONE ENCOUNTER
ATC returned call to pt and she informed that her dental problems are non-emergent.     According to Tessie, if it's not an emergency the recommendation is to wait until 6 months post-op to do any dental work. The pt verified understanding. She will wait. She would like a letter that has the recommendation mailed to her. [facility] spoke with Tessie. This is ok. [facility] mailed letter to pt.              -ANAHI Chen- Orthopedics

## 2020-10-06 NOTE — TELEPHONE ENCOUNTER
Attempted to return call to patient. Left voicemail with callback information.    Patient is only 2 months postop; the recommendation is to wait until 6 months postop for any kind of dental work unless it is a dental emergency. Need to touch base with patient to discuss

## 2020-10-09 ENCOUNTER — THERAPY VISIT (OUTPATIENT)
Dept: PHYSICAL THERAPY | Facility: CLINIC | Age: 65
End: 2020-10-09
Payer: MEDICARE

## 2020-10-09 DIAGNOSIS — Z47.1 AFTERCARE FOLLOWING RIGHT HIP JOINT REPLACEMENT SURGERY: ICD-10-CM

## 2020-10-09 DIAGNOSIS — Z96.641 AFTERCARE FOLLOWING RIGHT HIP JOINT REPLACEMENT SURGERY: ICD-10-CM

## 2020-10-09 DIAGNOSIS — Z98.890 STATUS POST HIP SURGERY: Primary | ICD-10-CM

## 2020-10-09 DIAGNOSIS — M25.559 HIP PAIN: ICD-10-CM

## 2020-10-09 PROCEDURE — 97010 HOT OR COLD PACKS THERAPY: CPT | Mod: GP | Performed by: PHYSICAL THERAPIST

## 2020-10-09 PROCEDURE — 97110 THERAPEUTIC EXERCISES: CPT | Mod: GP | Performed by: PHYSICAL THERAPIST

## 2020-10-12 ENCOUNTER — THERAPY VISIT (OUTPATIENT)
Dept: PHYSICAL THERAPY | Facility: CLINIC | Age: 65
End: 2020-10-12
Payer: MEDICARE

## 2020-10-12 ENCOUNTER — MEDICAL CORRESPONDENCE (OUTPATIENT)
Dept: HEALTH INFORMATION MANAGEMENT | Facility: CLINIC | Age: 65
End: 2020-10-12

## 2020-10-12 DIAGNOSIS — Z47.1 AFTERCARE FOLLOWING RIGHT HIP JOINT REPLACEMENT SURGERY: ICD-10-CM

## 2020-10-12 DIAGNOSIS — Z96.641 AFTERCARE FOLLOWING RIGHT HIP JOINT REPLACEMENT SURGERY: ICD-10-CM

## 2020-10-12 PROCEDURE — 97010 HOT OR COLD PACKS THERAPY: CPT | Mod: GP | Performed by: PHYSICAL THERAPIST

## 2020-10-12 PROCEDURE — 97110 THERAPEUTIC EXERCISES: CPT | Mod: GP | Performed by: PHYSICAL THERAPIST

## 2020-10-13 DIAGNOSIS — E03.9 ACQUIRED HYPOTHYROIDISM: Primary | ICD-10-CM

## 2020-10-16 ENCOUNTER — PATIENT OUTREACH (OUTPATIENT)
Dept: NURSING | Facility: CLINIC | Age: 65
End: 2020-10-16
Payer: MEDICARE

## 2020-10-16 DIAGNOSIS — E03.9 ACQUIRED HYPOTHYROIDISM: ICD-10-CM

## 2020-10-16 LAB
T3FREE SERPL-MCNC: 2.5 PG/ML (ref 2.3–4.2)
T4 FREE SERPL-MCNC: 0.85 NG/DL (ref 0.76–1.46)
TSH SERPL DL<=0.005 MIU/L-ACNC: 0.61 MU/L (ref 0.4–4)

## 2020-10-16 PROCEDURE — 84443 ASSAY THYROID STIM HORMONE: CPT | Performed by: INTERNAL MEDICINE

## 2020-10-16 PROCEDURE — 84439 ASSAY OF FREE THYROXINE: CPT

## 2020-10-16 PROCEDURE — 36415 COLL VENOUS BLD VENIPUNCTURE: CPT | Performed by: INTERNAL MEDICINE

## 2020-10-16 PROCEDURE — 84481 FREE ASSAY (FT-3): CPT

## 2020-10-16 NOTE — PROGRESS NOTES
Clinic Care Coordination Contact  Community Health Worker Follow Up    Goals:   Goals Addressed as of 10/16/2020 at 3:38 PM                 Today    9/10/20       Patient Stated      1.Psychosocial (pt-stated)   50%  50%    Added 11/14/19 by Amber Nguyen LSW     Goal Statement: I will attend [employer] at least 1 time this month over the next 6 months. I will call ahead and ask for help from someone at [employer] to help me with my walker. This will help me connect with a social network and [employer] is something that is important to me.  Measure of Success: I will attend [employer] this month.   Supportive Steps to Achieve: Plan when I would like to attend [employer], call ahead and ask for help.   Barriers: Not wanting to ask for help. Getting tired easily.   Strengths: Motivated to seek a support network.   Date to Achieve By: 10/1/2020  Patient expressed understanding of goal: Yes    As of today's date 1/20/2020 goal is met at 26 - 50%.   Goal Status:  Active  As of today's date 5/1/2020 goal is met at 26 - 50%.   Goal Status:  Active   As of today's date 6/4/2020 goal is met at 26 - 50%.   Goal Status:  Active               2. Self Care/Stress Reduction (pt-stated)   50%  50%    Added 11/20/19 by Amber Nguyen, GOSIA     Goal Statement: I will make time for myself each week to make cards to reduce my stress and increase my happiness over the next 6 months.   Measure of Success: I will make cards every week.   Supportive Steps to Achieve: Make time to create and craft cards, get supplies at craft store and call Help at your door to clean my craft room so I can utilize it again.   Barriers: Not getting help to clean my craft room.   Strengths: Motivated to get craft room clean, motivated to make cards as it makes me happy.   Date to Achieve By: 10/1/20  Patient expressed understanding of goal: Yes    As of today's date 1/20/2020 goal is met at 26 - 50%.   Goal Status:  Active  As of today's date 5/1/2020 goal is met  "at 26 - 50%.   Goal Status:  Active   As of today's date 6/4/2020 goal is met at 26 - 50%.   Goal Status:  Active           Discussed:  Patient stated that \"things could be better\".  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  Patient stated that she pays $34.00 per hour, which is a lot of money and can't really afford.  By the end of the month, it is over $1200.00. Recently it was over $1500.00 per month when she was using [employer].  Patient stated that she received a bill over $500.00, and her [employer] helped her with the bill.  Patient stated that she is very frustrated with the service.  Patient stated that she has not applied for [facility] services.  .      Intervention and Education during outreach: CHW encouraged patient to contact CC if there are any other changes or resources needed.  Patient acknowledged understanding.       CHW Plan: scheduled a phone visit with patient to call [facility] for an assessment.  Patient is going to gather her income information.    Next Outreach:  10/23/20    CAMRON Hesetr  Clinic Care Coordination  [facility] [place]  Phone: [phone]  "

## 2020-10-20 ENCOUNTER — THERAPY VISIT (OUTPATIENT)
Dept: PHYSICAL THERAPY | Facility: CLINIC | Age: 65
End: 2020-10-20
Payer: MEDICARE

## 2020-10-20 DIAGNOSIS — Z96.641 HISTORY OF RIGHT HIP REPLACEMENT: Primary | ICD-10-CM

## 2020-10-20 DIAGNOSIS — Z47.1 AFTERCARE FOLLOWING RIGHT HIP JOINT REPLACEMENT SURGERY: ICD-10-CM

## 2020-10-20 DIAGNOSIS — Z96.641 AFTERCARE FOLLOWING RIGHT HIP JOINT REPLACEMENT SURGERY: ICD-10-CM

## 2020-10-20 PROCEDURE — 97110 THERAPEUTIC EXERCISES: CPT | Mod: GP | Performed by: PHYSICAL THERAPIST

## 2020-10-20 PROCEDURE — 97530 THERAPEUTIC ACTIVITIES: CPT | Mod: GP | Performed by: PHYSICAL THERAPIST

## 2020-10-23 ENCOUNTER — THERAPY VISIT (OUTPATIENT)
Dept: PHYSICAL THERAPY | Facility: CLINIC | Age: 65
End: 2020-10-23
Payer: MEDICARE

## 2020-10-23 ENCOUNTER — APPOINTMENT (OUTPATIENT)
Dept: NURSING | Facility: CLINIC | Age: 65
End: 2020-10-23
Payer: MEDICARE

## 2020-10-23 DIAGNOSIS — Z47.1 AFTERCARE FOLLOWING RIGHT HIP JOINT REPLACEMENT SURGERY: ICD-10-CM

## 2020-10-23 DIAGNOSIS — Z96.641 AFTERCARE FOLLOWING RIGHT HIP JOINT REPLACEMENT SURGERY: ICD-10-CM

## 2020-10-23 PROCEDURE — 97530 THERAPEUTIC ACTIVITIES: CPT | Mod: GP | Performed by: PHYSICAL THERAPIST

## 2020-10-23 PROCEDURE — 97110 THERAPEUTIC EXERCISES: CPT | Mod: GP | Performed by: PHYSICAL THERAPIST

## 2020-10-26 ENCOUNTER — THERAPY VISIT (OUTPATIENT)
Dept: PHYSICAL THERAPY | Facility: CLINIC | Age: 65
End: 2020-10-26
Payer: MEDICARE

## 2020-10-26 DIAGNOSIS — Z47.1 AFTERCARE FOLLOWING RIGHT HIP JOINT REPLACEMENT SURGERY: ICD-10-CM

## 2020-10-26 DIAGNOSIS — Z96.641 AFTERCARE FOLLOWING RIGHT HIP JOINT REPLACEMENT SURGERY: ICD-10-CM

## 2020-10-26 PROCEDURE — 97110 THERAPEUTIC EXERCISES: CPT | Mod: GP | Performed by: PHYSICAL THERAPIST

## 2020-10-26 PROCEDURE — 97530 THERAPEUTIC ACTIVITIES: CPT | Mod: GP | Performed by: PHYSICAL THERAPIST

## 2020-10-26 NOTE — PROGRESS NOTES
PROGRESS  REPORT    Progress reporting period is from 10-6-20 to 10-26-20.       SUBJECTIVE  Subjective changes noted by patient:  Pt continuing to report moderate pain. Pt reported she has had difficulty with consistency with the different cleaning services she has utilized; last week was called twice by the latest agency to say there was no available help. Pt reported she has gone through 2 different agencies with poor follow-up with each agency. Pt reported she feels she is overdoing it as she is trying to deal with housekeeping on her own.   Current pain level is (1-5/10) .     Previous pain level was: (0-4/10).   Changes in function:  Goals ongoing.  Adverse reaction to treatment or activity: activity - See above.    OBJECTIVE  Changes noted in objective findings:  Pt ambulating with FWW, partial weight bearing R LE (per MD's orders). Demonstrates independence with transfers from sit to supine/supine to sit. Able to perform a SLR on the L (x10). Hip abduction WFL's in sidelying; 2/5 muscle strength. Limited CKC due to 50% weight bearing R LE.        ASSESSMENT/PLAN  Updated problem list and treatment plan: Diagnosis 1:  S/p Revision R SARAHI  Pain -  self management, education and home program  Decreased ROM/flexibility - therapeutic exercise  Decreased strength - therapeutic exercise and therapeutic activities  Impaired balance - neuro re-education and therapeutic activities  Impaired gait - gait training  Impaired muscle performance - neuro re-education  Decreased function - therapeutic activities  STG/LTGs have been met or progress has been made towards goals:  Goals ongoing.  Assessment of Progress: The patient has had set backs in their progress.  Self Management Plans:  Patient has been instructed in a home treatment program.  I have re-evaluated this patient and find that the nature, scope, duration and intensity of the therapy is appropriate for the medical condition of the patient.  Mercedes continues to  require the following intervention to meet STG and LTG's:  PT    Recommendations:  Pt returns for follow-up with surgeon on 10-28-20. Will continue with current treatment with progression of HEP per surgeon's orders/recommendations.    Please refer to the daily flowsheet for treatment today, total treatment time and time spent performing 1:1 timed codes.

## 2020-10-28 ENCOUNTER — OFFICE VISIT (OUTPATIENT)
Dept: ORTHOPEDICS | Facility: CLINIC | Age: 65
End: 2020-10-28
Payer: MEDICARE

## 2020-10-28 ENCOUNTER — ANCILLARY PROCEDURE (OUTPATIENT)
Dept: GENERAL RADIOLOGY | Facility: CLINIC | Age: 65
End: 2020-10-28
Attending: ORTHOPAEDIC SURGERY
Payer: MEDICARE

## 2020-10-28 DIAGNOSIS — Z96.641 HISTORY OF RIGHT HIP REPLACEMENT: ICD-10-CM

## 2020-10-28 DIAGNOSIS — Z96.641 HISTORY OF RIGHT HIP REPLACEMENT: Primary | ICD-10-CM

## 2020-10-28 PROCEDURE — 72170 X-RAY EXAM OF PELVIS: CPT | Performed by: RADIOLOGY

## 2020-10-28 PROCEDURE — 99024 POSTOP FOLLOW-UP VISIT: CPT | Performed by: ORTHOPAEDIC SURGERY

## 2020-10-28 NOTE — NURSING NOTE
Reason For Visit:   Chief Complaint   Patient presents with     Surgical Followup     Revision Right total hip arthroplasty on 8/5/20       Primary MD: Skyler Álvarez  Referring MD: Pan Grey      Pain Assessment  Patient Currently in Pain: Yes  0-10 Pain Scale: 6  Primary Pain Location: Hip  Pain Descriptors: Discomfort    Current Outpatient Medications   Medication     acetaminophen (TYLENOL) 325 MG tablet     albuterol (ALBUTEROL) 108 (90 BASE) MCG/ACT Inhaler     artificial saliva (BIOTENE MT) AERS spray     aspirin (ASA) 81 MG EC tablet     carboxymethylcellulose PF (REFRESH PLUS) 0.5 % ophthalmic solution     cycloSPORINE (RESTASIS) 0.05 % ophthalmic emulsion     fluticasone-vilanterol (BREO ELLIPTA) 200-25 MCG/INH inhaler     hypromellose (ARTIFICIAL TEARS) 0.5 % SOLN ophthalmic solution     lacosamide (VIMPAT) 200 MG TABS tablet     levothyroxine (SYNTHROID/LEVOTHROID) 50 MCG tablet     linaclotide (LINZESS) 290 MCG capsule     liothyronine (CYTOMEL) 5 MCG tablet     loratadine (CLARITIN REDITABS) 10 MG ODT     nefazodone (SERZONE) 200 MG tablet     omeprazole (PRILOSEC) 40 MG DR capsule     ondansetron (ZOFRAN-ODT) 4 MG ODT tab     polyethylene glycol (MIRALAX) 17 g packet     potassium citrate (UROCIT-K) 10 MEQ (1080 MG) CR tablet     pramipexole (MIRAPEX) 1 MG tablet     senna-docusate (SENOKOT-S/PERICOLACE) 8.6-50 MG tablet     traMADol (ULTRAM) 50 MG tablet     No current facility-administered medications for this visit.           Allergies   Allergen Reactions     Blood Transfusion Related (Informational Only) Other (See Comments)     Patient has a history of a clinically significant antibody against RBC antigens.  A delay in compatible RBCs may occur.     Bupropion Rash     Carbamazepine Diarrhea     Clonazepam Other (See Comments)     Hypotension, trouble walking, mind disturbance     Encort [Hydrocortisone Acetate] Swelling     Localized to feet     Encort [Hydrocortisone] Swelling      Erythromycin Diarrhea     Erythromycin Nausea and Vomiting     Flagyl [Metronidazole] Nausea     Gabapentin Other (See Comments)     Causes over-eating     Lamictal [Lamotrigine] Unknown     Oxycodone Nausea and Vomiting     Tegretol [Carbamazepine] Nausea and Vomiting           Lou Jordan, OSORION

## 2020-10-28 NOTE — LETTER
10/28/2020         RE: Mercedes Barber  [address]  [address]  [place] [address]        Dear Colleague,    Thank you for referring your patient, Mercedes Barber, to the [facility] [place]. Please see a copy of my visit note below.           Interval History:   Mercedes Barber is a 65 year old female who is here follow up for Revision right SARAHI - 8/5/20    [place]   Continues to do well following revision right total hip replacement.  She notes that she has been doing slightly more weight-bearing with the walker over the last few weeks.  She has occasional groin pain as well as some pain in the thigh.  However, for the most part she is doing quite well.  She has not had any problems with the incision she is not requiring any opioid pain medication.       Physical Exam:     NAD  AOx3  Interactive and cooperative with the exam.  The incision is well-healed.  She has no pain with gentle hip range of motion.  She is otherwise well-appearing.         Imaging:      X-rays demonstrate well fixed well-positioned revision total hip replacement implants.  No evidence of loosening or implant related problems.       Assessment and Plan:        Mercedes Barber is a 65 year old female is s/p the above procedure.     She continues to do well following revision total hip replacement.  At this point she can begin to slowly progress to weight-bearing as tolerated with pain being her guide.  If she is having pain with weight-bearing I recommended she continue to use the walker and restrict her weight-bearing.  I will see her back in clinic in 6 weeks.  At that point will obtain repeat low AP pelvis radiographs.  If she is having increasing pain or any problems in the meantime, she will let us know.  She will otherwise let us know if she has any questions or concerns following today's visit.        Pan Grey M.D.     Arthritis and Joint  Replacement  Department of Orthopaedic Surgery, [facility]  [email or web address]  [phone] (pager)

## 2020-10-29 ENCOUNTER — PATIENT OUTREACH (OUTPATIENT)
Dept: CARE COORDINATION | Facility: CLINIC | Age: 65
End: 2020-10-29

## 2020-10-29 NOTE — PROGRESS NOTES
Interval History:   Mercedes Barber is a 65 year old female who is here follow up for Revision right SARAHI - 8/5/20    [place]   Continues to do well following revision right total hip replacement.  She notes that she has been doing slightly more weight-bearing with the walker over the last few weeks.  She has occasional groin pain as well as some pain in the thigh.  However, for the most part she is doing quite well.  She has not had any problems with the incision she is not requiring any opioid pain medication.       Physical Exam:     NAD  AOx3  Interactive and cooperative with the exam.  The incision is well-healed.  She has no pain with gentle hip range of motion.  She is otherwise well-appearing.         Imaging:      X-rays demonstrate well fixed well-positioned revision total hip replacement implants.  No evidence of loosening or implant related problems.       Assessment and Plan:        Mercedes Barber is a 65 year old female is s/p the above procedure.     She continues to do well following revision total hip replacement.  At this point she can begin to slowly progress to weight-bearing as tolerated with pain being her guide.  If she is having pain with weight-bearing I recommended she continue to use the walker and restrict her weight-bearing.  I will see her back in clinic in 6 weeks.  At that point will obtain repeat low AP pelvis radiographs.  If she is having increasing pain or any problems in the meantime, she will let us know.  She will otherwise let us know if she has any questions or concerns following today's visit.        Pan Grey M.D.     Arthritis and Joint Replacement  Department of Orthopaedic Surgery, [facility]  [email or web address]  [phone] (pager)

## 2020-10-29 NOTE — PROGRESS NOTES
Clinic Care Coordination Contact    Situation: Patient chart reviewed by care coordinator.    Background: Care Coordination following patient to assist with Goal as below.    Assessment: Per chart review, patient has been actively working with CC CHW to accomplish Goal.  Patient's goal remains appropriate and relevant at this time.    CHW and pt will call at next outreach to schedule a [facility] assessment with [facility] per notes.     [facility] extended goal dates.     Goals Addressed                 This Visit's Progress      1.Psychosocial (pt-stated)   50%     Goal Statement: I will attend [employer] at least 1 time this month over the next 6 months. I will call ahead and ask for help from someone at [employer] to help me with my walker. This will help me connect with a social network and [employer] is something that is important to me.  Date Created: 11/14/19 // extended 10/29/20  Measure of Success: I will attend [employer] this month.   Supportive Steps to Achieve: Plan when I would like to attend [employer], call ahead and ask for help.   Barriers: Not wanting to ask for help. Getting tired easily.   Strengths: Motivated to seek a support network.   Date to Achieve By: 10/1/2020 // extended 3/1/21  Patient expressed understanding of goal: Yes    As of today's date 1/20/2020 goal is met at 26 - 50%.   Goal Status:  Active  As of today's date 5/1/2020 goal is met at 26 - 50%.   Goal Status:  Active   As of today's date 6/4/2020 goal is met at 26 - 50%.   Goal Status:  Active               2. Self Care/Stress Reduction (pt-stated)   50%     Goal Statement: I will make time for myself each week to make cards to reduce my stress and increase my happiness over the next 6 months.   Date created: 11/20/19 // extended 10/29/20  Measure of Success: I will make cards every week.   Supportive Steps to Achieve: Make time to create and craft cards, get supplies at [employer] and call Help at your door to clean my craft room so I can  utilize it again.   Barriers: Not getting help to clean my craft room.   Strengths: Motivated to get craft room clean, motivated to make cards as it makes me happy.   Date to Achieve By: 10/1/20 // extended 3/1/21  Patient expressed understanding of goal: Yes    As of today's date 1/20/2020 goal is met at 26 - 50%.   Goal Status:  Active  As of today's date 5/1/2020 goal is met at 26 - 50%.   Goal Status:  Active   As of today's date 6/4/2020 goal is met at 26 - 50%.   Goal Status:  Active               Plan/Recommendations: The patient will continue working with Care Coordination to achieve Goal as above.      CHW will continue with outreaches at this time. CHW will inform SW CC of any concerns or changes regarding patient as needed. Please discuss health maintenance and other goals pt may have at this time.     CC SW will review patient chart again in approximately 6 weeks to assess patient status and goal progress with outreach to patient as indicated.    GOSIA Stokes   Social Work Clinic Care Coordinator   [facility]   PH: [phone]  [email or web address]

## 2020-10-30 ENCOUNTER — THERAPY VISIT (OUTPATIENT)
Dept: PHYSICAL THERAPY | Facility: CLINIC | Age: 65
End: 2020-10-30
Payer: MEDICARE

## 2020-10-30 DIAGNOSIS — Z47.1 AFTERCARE FOLLOWING RIGHT HIP JOINT REPLACEMENT SURGERY: ICD-10-CM

## 2020-10-30 DIAGNOSIS — Z96.641 AFTERCARE FOLLOWING RIGHT HIP JOINT REPLACEMENT SURGERY: ICD-10-CM

## 2020-10-30 PROCEDURE — 97530 THERAPEUTIC ACTIVITIES: CPT | Mod: GP | Performed by: PHYSICAL THERAPIST

## 2020-10-30 PROCEDURE — 97110 THERAPEUTIC EXERCISES: CPT | Mod: GP | Performed by: PHYSICAL THERAPIST

## 2020-11-02 ENCOUNTER — PATIENT OUTREACH (OUTPATIENT)
Dept: NURSING | Facility: CLINIC | Age: 65
End: 2020-11-02
Payer: MEDICARE

## 2020-11-02 ENCOUNTER — THERAPY VISIT (OUTPATIENT)
Dept: PHYSICAL THERAPY | Facility: CLINIC | Age: 65
End: 2020-11-02
Payer: MEDICARE

## 2020-11-02 DIAGNOSIS — Z47.1 AFTERCARE FOLLOWING RIGHT HIP JOINT REPLACEMENT SURGERY: ICD-10-CM

## 2020-11-02 DIAGNOSIS — Z96.641 AFTERCARE FOLLOWING RIGHT HIP JOINT REPLACEMENT SURGERY: ICD-10-CM

## 2020-11-02 PROCEDURE — 97530 THERAPEUTIC ACTIVITIES: CPT | Mod: GP | Performed by: PHYSICAL THERAPIST

## 2020-11-02 PROCEDURE — 97110 THERAPEUTIC EXERCISES: CPT | Mod: GP | Performed by: PHYSICAL THERAPIST

## 2020-11-02 NOTE — PROGRESS NOTES
Clinic Care Coordination Contact  Community Health Worker Follow Up    Goals:   Goals Addressed as of 11/2/2020 at 3:35 PM                 Today    10/29/20       Patient Stated      1.Psychosocial (pt-stated)   50%  50%    Added 11/14/19 by Amber Nguyen, GOSIA     Goal Statement: I will attend [employer] at least 1 time this month over the next 6 months. I will call ahead and ask for help from someone at [employer] to help me with my walker. This will help me connect with a social network and [employer] is something that is important to me.  Date Created: 11/14/19 // extended 10/29/20  Measure of Success: I will attend [employer] this month.   Supportive Steps to Achieve: Plan when I would like to attend [employer], call ahead and ask for help.   Barriers: Not wanting to ask for help. Getting tired easily.   Strengths: Motivated to seek a support network.   Date to Achieve By: 10/1/2020 // extended 3/1/21  Patient expressed understanding of goal: Yes    As of today's date 1/20/2020 goal is met at 26 - 50%.   Goal Status:  Active  As of today's date 5/1/2020 goal is met at 26 - 50%.   Goal Status:  Active   As of today's date 6/4/2020 goal is met at 26 - 50%.   Goal Status:  Active               2. Self Care/Stress Reduction (pt-stated)   50%  50%    Added 11/20/19 by Amber Nguyen, GOSIA     Goal Statement: I will make time for myself each week to make cards to reduce my stress and increase my happiness over the next 6 months.   Date created: 11/20/19 // extended 10/29/20  Measure of Success: I will make cards every week.   Supportive Steps to Achieve: Make time to create and craft cards, get supplies at [employer] store and call Help at your door to clean my craft room so I can utilize it again.   Barriers: Not getting help to clean my craft room.   Strengths: Motivated to get craft room clean, motivated to make cards as it makes me happy.   Date to Achieve By: 10/1/20 // extended 3/1/21  Patient expressed understanding of  goal: Yes    As of today's date 1/20/2020 goal is met at 26 - 50%.   Goal Status:  Active  As of today's date 5/1/2020 goal is met at 26 - 50%.   Goal Status:  Active   As of today's date 6/4/2020 goal is met at 26 - 50%.   Goal Status:  Active           Discussed:  Patient stated that she does not have her income amount, her sister has that information. 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    Intervention and Education during outreach: CHW encouraged patient to contact CC if there are any other changes or resources needed.  Patient acknowledged understanding.     Next Outreach:  11/10/20    CAMRON Hester  Clinic Care Coordination  [facility]: [facility]  Phone: [phone]        CHW Plan: will contact sister for monthly income.  Will schedule an assessment with [facility].

## 2020-11-09 ENCOUNTER — THERAPY VISIT (OUTPATIENT)
Dept: PHYSICAL THERAPY | Facility: CLINIC | Age: 65
End: 2020-11-09
Payer: MEDICARE

## 2020-11-09 DIAGNOSIS — Z47.1 AFTERCARE FOLLOWING RIGHT HIP JOINT REPLACEMENT SURGERY: ICD-10-CM

## 2020-11-09 DIAGNOSIS — Z96.641 AFTERCARE FOLLOWING RIGHT HIP JOINT REPLACEMENT SURGERY: ICD-10-CM

## 2020-11-09 PROCEDURE — 97110 THERAPEUTIC EXERCISES: CPT | Mod: GP | Performed by: PHYSICAL THERAPIST

## 2020-11-09 PROCEDURE — 97530 THERAPEUTIC ACTIVITIES: CPT | Mod: GP | Performed by: PHYSICAL THERAPIST

## 2020-11-13 ENCOUNTER — THERAPY VISIT (OUTPATIENT)
Dept: PHYSICAL THERAPY | Facility: CLINIC | Age: 65
End: 2020-11-13
Payer: MEDICARE

## 2020-11-13 DIAGNOSIS — Z96.641 AFTERCARE FOLLOWING RIGHT HIP JOINT REPLACEMENT SURGERY: ICD-10-CM

## 2020-11-13 DIAGNOSIS — Z47.1 AFTERCARE FOLLOWING RIGHT HIP JOINT REPLACEMENT SURGERY: ICD-10-CM

## 2020-11-13 PROCEDURE — 97530 THERAPEUTIC ACTIVITIES: CPT | Mod: GP | Performed by: PHYSICAL THERAPIST

## 2020-11-13 PROCEDURE — 97110 THERAPEUTIC EXERCISES: CPT | Mod: GP | Performed by: PHYSICAL THERAPIST

## 2020-11-16 ENCOUNTER — THERAPY VISIT (OUTPATIENT)
Dept: PHYSICAL THERAPY | Facility: CLINIC | Age: 65
End: 2020-11-16
Payer: MEDICARE

## 2020-11-16 DIAGNOSIS — Z96.641 AFTERCARE FOLLOWING RIGHT HIP JOINT REPLACEMENT SURGERY: ICD-10-CM

## 2020-11-16 DIAGNOSIS — Z47.1 AFTERCARE FOLLOWING RIGHT HIP JOINT REPLACEMENT SURGERY: ICD-10-CM

## 2020-11-16 PROCEDURE — 97530 THERAPEUTIC ACTIVITIES: CPT | Mod: GP | Performed by: PHYSICAL THERAPIST

## 2020-11-16 PROCEDURE — 97110 THERAPEUTIC EXERCISES: CPT | Mod: GP | Performed by: PHYSICAL THERAPIST

## 2020-11-17 DIAGNOSIS — D64.9 ANEMIA, UNSPECIFIED TYPE: Primary | ICD-10-CM

## 2020-11-19 ENCOUNTER — PATIENT OUTREACH (OUTPATIENT)
Dept: NURSING | Facility: CLINIC | Age: 65
End: 2020-11-19
Payer: MEDICARE

## 2020-11-19 NOTE — PROGRESS NOTES
Clinic Care Coordination Contact  The Community Health Worker called for a Care Coordination outreach.    Spoke to Nancy, patient's sister.    Per Nancy, the patient's income is $2400.00 take home.  Nancy is not sure what the gross amount is.     Next Outreach: 12/1/2020    CAMRON Hester  Clinic Care Coordination  [facility]: [facility]   Phone: [phone]

## 2020-11-20 ENCOUNTER — THERAPY VISIT (OUTPATIENT)
Dept: PHYSICAL THERAPY | Facility: CLINIC | Age: 65
End: 2020-11-20
Payer: MEDICARE

## 2020-11-20 DIAGNOSIS — Z96.641 AFTERCARE FOLLOWING RIGHT HIP JOINT REPLACEMENT SURGERY: ICD-10-CM

## 2020-11-20 DIAGNOSIS — Z47.1 AFTERCARE FOLLOWING RIGHT HIP JOINT REPLACEMENT SURGERY: ICD-10-CM

## 2020-11-20 PROCEDURE — 97110 THERAPEUTIC EXERCISES: CPT | Mod: GP | Performed by: PHYSICAL THERAPIST

## 2020-11-20 PROCEDURE — 97530 THERAPEUTIC ACTIVITIES: CPT | Mod: GP | Performed by: PHYSICAL THERAPIST

## 2020-11-20 NOTE — PROGRESS NOTES
"PROGRESS  REPORT    Progress reporting period is from 10-26-20 to 11-20-20.       SUBJECTIVE  Subjective changes noted by patient: Pt reported she has attempted walking without an assistive device in her home however notes she limps some without assistance device. Reports pain level remains moderate but indicates multiple pain sites to include left wrist/shoulder and low back.    Current pain level is 0-4/10  .     Previous pain level was (0-4/10).   Changes in function:  Progressing towards LTG  Adverse reaction to treatment or activity: activity - continues to report overall limited tolerance to activity.    OBJECTIVE  Objective: Continues to utilize walker for ambulation to clinic, however is able to demonstrate stable gait with use of SEC. Demonstrates ability to perform step-up to 6\" height with bilateral UE support for balance. Hip strength: hip abduction: 3/5; hip ER: 3-/5.    ASSESSMENT/PLAN  Updated problem list and treatment plan: Diagnosis 1:  S/p R revision SARAHI  Pain - therapeutic exercise and therapeutic activities  Decreased strength - therapeutic exercise and therapeutic activities  Impaired balance - therapeutic exercise and therapeutic activities  Decrease function - therapeutic exercise and therapeutic activities  STG/LTGs have been met or progress has been made towards goals:  Progressing towards LTG  Assessment of Progress: Patient is meeting short term goals and is progressing towards long term goals.  Self Management Plans:  Patient has been instructed in a home treatment program.  I have re-evaluated this patient and find that the nature, scope, duration and intensity of the therapy is appropriate for the medical condition of the patient.  Mercedes continues to require the following intervention to meet STG and LTG's:  PT    Recommendations:  Recommended continued therapy; requesting 6 add'l visits. Will contact surgeon for additional visit.    Please refer to the daily flowsheet for treatment " today, total treatment time and time spent performing 1:1 timed codes.

## 2020-11-23 ENCOUNTER — THERAPY VISIT (OUTPATIENT)
Dept: PHYSICAL THERAPY | Facility: CLINIC | Age: 65
End: 2020-11-23
Payer: MEDICARE

## 2020-11-23 ENCOUNTER — TELEPHONE (OUTPATIENT)
Dept: ORTHOPEDICS | Facility: CLINIC | Age: 65
End: 2020-11-23

## 2020-11-23 DIAGNOSIS — Z47.1 AFTERCARE FOLLOWING RIGHT HIP JOINT REPLACEMENT SURGERY: ICD-10-CM

## 2020-11-23 DIAGNOSIS — Z96.641 HISTORY OF RIGHT HIP REPLACEMENT: Primary | ICD-10-CM

## 2020-11-23 DIAGNOSIS — Z96.641 AFTERCARE FOLLOWING RIGHT HIP JOINT REPLACEMENT SURGERY: ICD-10-CM

## 2020-11-23 PROCEDURE — 97530 THERAPEUTIC ACTIVITIES: CPT | Mod: GP | Performed by: PHYSICAL THERAPIST

## 2020-11-23 PROCEDURE — 97110 THERAPEUTIC EXERCISES: CPT | Mod: GP | Performed by: PHYSICAL THERAPIST

## 2020-11-23 RX ORDER — AMOXICILLIN 500 MG/1
TABLET, FILM COATED ORAL
Qty: 4 TABLET | Refills: 4 | Status: SHIPPED | OUTPATIENT
Start: 2020-11-23

## 2020-11-23 NOTE — TELEPHONE ENCOUNTER
[facility] Call Center    Phone Message    May a detailed message be left on voicemail: yes     Reason for Call: Other: Patient stated she is having issues with her teeth and would like to be seen by dentist. She wanted to know if it was ok. Please call pt back.      Action Taken: Message routed to:  [facility] ortho    Travel Screening: Not Applicable

## 2020-11-23 NOTE — TELEPHONE ENCOUNTER
Patient requests antibiotics for dental work as she is having some issues with her teeth. Sent to patient's pharmacy. Patient called to inform.

## 2020-11-23 NOTE — TELEPHONE ENCOUNTER
Returned call to patient. Patient is past 3 months postop and is having issues with her teeth. Dental prophylactic antibiotics sent to patient's pharmacy. See refill encounter

## 2020-11-25 DIAGNOSIS — D64.9 ANEMIA, UNSPECIFIED TYPE: ICD-10-CM

## 2020-11-25 PROCEDURE — U0003 INFECTIOUS AGENT DETECTION BY NUCLEIC ACID (DNA OR RNA); SEVERE ACUTE RESPIRATORY SYNDROME CORONAVIRUS 2 (SARS-COV-2) (CORONAVIRUS DISEASE [COVID-19]), AMPLIFIED PROBE TECHNIQUE, MAKING USE OF HIGH THROUGHPUT TECHNOLOGIES AS DESCRIBED BY CMS-2020-01-R: HCPCS | Performed by: PSYCHIATRY & NEUROLOGY

## 2020-11-26 LAB
SARS-COV-2 RNA SPEC QL NAA+PROBE: NOT DETECTED
SPECIMEN SOURCE: NORMAL

## 2020-11-27 ENCOUNTER — TELEPHONE (OUTPATIENT)
Dept: ONCOLOGY | Facility: CLINIC | Age: 65
End: 2020-11-27

## 2020-11-27 ENCOUNTER — THERAPY VISIT (OUTPATIENT)
Dept: PHYSICAL THERAPY | Facility: CLINIC | Age: 65
End: 2020-11-27
Payer: MEDICARE

## 2020-11-27 DIAGNOSIS — Z47.1 AFTERCARE FOLLOWING RIGHT HIP JOINT REPLACEMENT SURGERY: ICD-10-CM

## 2020-11-27 DIAGNOSIS — Z96.641 AFTERCARE FOLLOWING RIGHT HIP JOINT REPLACEMENT SURGERY: ICD-10-CM

## 2020-11-27 PROCEDURE — 97110 THERAPEUTIC EXERCISES: CPT | Mod: GP | Performed by: PHYSICAL THERAPIST

## 2020-11-27 PROCEDURE — 97140 MANUAL THERAPY 1/> REGIONS: CPT | Mod: GP | Performed by: PHYSICAL THERAPIST

## 2020-11-27 PROCEDURE — 97530 THERAPEUTIC ACTIVITIES: CPT | Mod: GP | Performed by: PHYSICAL THERAPIST

## 2020-11-27 NOTE — TELEPHONE ENCOUNTER
Pharmacy note: iron not covered by insurance    Reymundo Dixon called me to say that medicare doesn't cover parenteral iron for restless legs syndrome.  I called Mercedes and informed her.  [facility] is not open today so I suggested that she call her primary physician to see if she is still anemic post hip surgery.  Iron deficiency anemia is a covered diagnosis.  She will do so.  I told her that we would be cancelling her Monday appt pending her primary physician's evaluation.    Martin Meese, [facility]

## 2020-11-30 ENCOUNTER — HOSPITAL ENCOUNTER (OUTPATIENT)
Dept: CT IMAGING | Facility: CLINIC | Age: 65
Discharge: HOME | End: 2020-11-30
Attending: PSYCHIATRY & NEUROLOGY | Admitting: PSYCHIATRY & NEUROLOGY
Payer: MEDICARE

## 2020-11-30 DIAGNOSIS — Q85.1 TUBEROUS SCLEROSIS (H): ICD-10-CM

## 2020-11-30 PROCEDURE — 71260 CT THORAX DX C+: CPT

## 2020-11-30 PROCEDURE — 250N000011 HC RX IP 250 OP 636: Performed by: RADIOLOGY

## 2020-11-30 PROCEDURE — 250N000009 HC RX 250: Performed by: RADIOLOGY

## 2020-11-30 RX ORDER — IOPAMIDOL 755 MG/ML
500 INJECTION, SOLUTION INTRAVASCULAR ONCE
Status: COMPLETED | OUTPATIENT
Start: 2020-11-30 | End: 2020-11-30

## 2020-11-30 RX ADMIN — SODIUM CHLORIDE 60 ML: 9 INJECTION, SOLUTION INTRAVENOUS at 12:48

## 2020-11-30 RX ADMIN — IOPAMIDOL 80 ML: 755 INJECTION, SOLUTION INTRAVENOUS at 12:48

## 2020-12-01 ENCOUNTER — TRANSFERRED RECORDS (OUTPATIENT)
Dept: HEALTH INFORMATION MANAGEMENT | Facility: CLINIC | Age: 65
End: 2020-12-01

## 2020-12-01 ENCOUNTER — TELEPHONE (OUTPATIENT)
Dept: ORTHOPEDICS | Facility: CLINIC | Age: 65
End: 2020-12-01

## 2020-12-01 NOTE — TELEPHONE ENCOUNTER
Health Call Center    Phone Message    May a detailed message be left on voicemail: yes     Reason for Call: Other: patient called to let Dr Que know that she had fallen yesterday 11/30/20. She fell on left side, does not seem to have had pain anywhere but low back right side, which she had previously. but would like to know if Dr Grey would want her to have an xray done prior to her next appt to make sure all is okay?      Action Taken: Message routed to:  [facility] Ortho    Travel Screening: Not Applicable

## 2020-12-02 NOTE — TELEPHONE ENCOUNTER
Returned call to patient. She states she is able to walk, that she is just sore. She has been icing and resting. She fell primarily on the left side and her buttocks. She will continue to monitor and reach out if she is not improving or getting worse. She sees her physical therapist tomorrow as well. Will get an x-ray at her follow up visit on 12/16. Patient expressed understanding.

## 2020-12-03 ENCOUNTER — THERAPY VISIT (OUTPATIENT)
Dept: PHYSICAL THERAPY | Facility: CLINIC | Age: 65
End: 2020-12-03
Payer: MEDICARE

## 2020-12-03 DIAGNOSIS — Z96.641 AFTERCARE FOLLOWING RIGHT HIP JOINT REPLACEMENT SURGERY: ICD-10-CM

## 2020-12-03 DIAGNOSIS — Z47.1 AFTERCARE FOLLOWING RIGHT HIP JOINT REPLACEMENT SURGERY: ICD-10-CM

## 2020-12-03 PROCEDURE — 97112 NEUROMUSCULAR REEDUCATION: CPT | Mod: GP | Performed by: PHYSICAL THERAPIST

## 2020-12-03 PROCEDURE — 97530 THERAPEUTIC ACTIVITIES: CPT | Mod: GP | Performed by: PHYSICAL THERAPIST

## 2020-12-03 PROCEDURE — 97110 THERAPEUTIC EXERCISES: CPT | Mod: GP | Performed by: PHYSICAL THERAPIST

## 2020-12-08 ENCOUNTER — THERAPY VISIT (OUTPATIENT)
Dept: PHYSICAL THERAPY | Facility: CLINIC | Age: 65
End: 2020-12-08
Payer: MEDICARE

## 2020-12-08 DIAGNOSIS — Z47.1 AFTERCARE FOLLOWING RIGHT HIP JOINT REPLACEMENT SURGERY: ICD-10-CM

## 2020-12-08 DIAGNOSIS — Z96.641 HISTORY OF REVISION OF TOTAL REPLACEMENT OF RIGHT HIP JOINT: Primary | ICD-10-CM

## 2020-12-08 DIAGNOSIS — Z96.641 AFTERCARE FOLLOWING RIGHT HIP JOINT REPLACEMENT SURGERY: ICD-10-CM

## 2020-12-08 PROCEDURE — 97110 THERAPEUTIC EXERCISES: CPT | Mod: GP | Performed by: PHYSICAL THERAPIST

## 2020-12-08 PROCEDURE — 97112 NEUROMUSCULAR REEDUCATION: CPT | Mod: GP | Performed by: PHYSICAL THERAPIST

## 2020-12-10 ENCOUNTER — PATIENT OUTREACH (OUTPATIENT)
Dept: NURSING | Facility: CLINIC | Age: 65
End: 2020-12-10
Payer: MEDICARE

## 2020-12-10 NOTE — PROGRESS NOTES
Clinic Care Coordination Contact  The Community Health Worker called for a Care Coordination outreach to follow up on goals and action steps.  Spoke to Mercedes.    Patient stated that she hasn't been sleeping and is in a lot of pain for the last three nights.  Patient stated that she is expecting a call from the clinic to discuss patient's lack of sleep and pain.  Patient asked to be called back next Tuesday 12/15/20.     A phone visit is scheduled on 12/15/20 at 1:00pm to discuss patient's goals and action steps.    Next Outreach: 12/15/20    CAMRON Hester  Clinic Care Coordination  [facility]: [facility]  Phone: [phone]

## 2020-12-11 ENCOUNTER — THERAPY VISIT (OUTPATIENT)
Dept: PHYSICAL THERAPY | Facility: CLINIC | Age: 65
End: 2020-12-11
Payer: MEDICARE

## 2020-12-11 DIAGNOSIS — Z96.641 AFTERCARE FOLLOWING RIGHT HIP JOINT REPLACEMENT SURGERY: ICD-10-CM

## 2020-12-11 DIAGNOSIS — Z47.1 AFTERCARE FOLLOWING RIGHT HIP JOINT REPLACEMENT SURGERY: ICD-10-CM

## 2020-12-11 PROCEDURE — 97110 THERAPEUTIC EXERCISES: CPT | Mod: GP | Performed by: PHYSICAL THERAPIST

## 2020-12-14 ENCOUNTER — THERAPY VISIT (OUTPATIENT)
Dept: PHYSICAL THERAPY | Facility: CLINIC | Age: 65
End: 2020-12-14
Payer: MEDICARE

## 2020-12-14 DIAGNOSIS — Z96.641 AFTERCARE FOLLOWING RIGHT HIP JOINT REPLACEMENT SURGERY: ICD-10-CM

## 2020-12-14 DIAGNOSIS — Z47.1 AFTERCARE FOLLOWING RIGHT HIP JOINT REPLACEMENT SURGERY: ICD-10-CM

## 2020-12-14 PROCEDURE — 97110 THERAPEUTIC EXERCISES: CPT | Mod: GP | Performed by: PHYSICAL THERAPIST

## 2020-12-14 PROCEDURE — 97530 THERAPEUTIC ACTIVITIES: CPT | Mod: GP | Performed by: PHYSICAL THERAPIST

## 2020-12-14 ASSESSMENT — ACTIVITIES OF DAILY LIVING (ADL)
STANDING_FOR_15_MINUTES: MODERATE DIFFICULTY
TWISTING/PIVOTING_ON_INVOLVED_LEG: UNABLE TO DO
GOING_DOWN_1_FLIGHT_OF_STAIRS: UNABLE TO DO
GETTING_INTO_AND_OUT_OF_AN_AVERAGE_CAR: MODERATE DIFFICULTY
SITTING_FOR_15_MINUTES: MODERATE DIFFICULTY
STEPPING_UP_AND_DOWN_CURBS: EXTREME DIFFICULTY
HOW_WOULD_YOU_RATE_YOUR_CURRENT_LEVEL_OF_FUNCTION_DURING_YOUR_USUAL_ACTIVITIES_OF_DAILY_LIVING_FROM_0_TO_100_WITH_100_BEING_YOUR_LEVEL_OF_FUNCTION_PRIOR_TO_YOUR_HIP_PROBLEM_AND_0_BEING_THE_INABILITY_TO_PERFORM_ANY_OF_YOUR_USUAL_DAILY_ACTIVITIES?: 40
HOS_ADL_ITEM_SCORE_TOTAL: 14
WALKING_15_MINUTES_OR_GREATER: EXTREME DIFFICULTY
ROLLING_OVER_IN_BED: EXTREME DIFFICULTY
RECREATIONAL_ACTIVITIES: UNABLE TO DO
GOING_UP_1_FLIGHT_OF_STAIRS: UNABLE TO DO
WALKING_UP_STEEP_HILLS: UNABLE TO DO
HOS_ADL_HIGHEST_POTENTIAL_SCORE: 64
DEEP_SQUATTING: UNABLE TO DO
WALKING_DOWN_STEEP_HILLS: UNABLE TO DO
HEAVY_WORK: UNABLE TO DO
LIGHT_TO_MODERATE_WORK: MODERATE DIFFICULTY
WALKING_APPROXIMATELY_10_MINUTES: MODERATE DIFFICULTY
PUTTING_ON_SOCKS_AND_SHOES: EXTREME DIFFICULTY
HOS_ADL_COUNT: 16
HOS_ADL_SCORE(%): 21.88
WALKING_INITIALLY: SLIGHT DIFFICULTY

## 2020-12-16 ENCOUNTER — OFFICE VISIT (OUTPATIENT)
Dept: ORTHOPEDICS | Facility: CLINIC | Age: 65
End: 2020-12-16
Payer: MEDICARE

## 2020-12-16 ENCOUNTER — ANCILLARY PROCEDURE (OUTPATIENT)
Dept: GENERAL RADIOLOGY | Facility: CLINIC | Age: 65
End: 2020-12-16
Attending: ORTHOPAEDIC SURGERY
Payer: MEDICARE

## 2020-12-16 DIAGNOSIS — Z96.641 HISTORY OF REVISION OF TOTAL REPLACEMENT OF RIGHT HIP JOINT: ICD-10-CM

## 2020-12-16 DIAGNOSIS — Z96.641 HISTORY OF RIGHT HIP REPLACEMENT: Primary | ICD-10-CM

## 2020-12-16 PROBLEM — M25.562 LEFT KNEE PAIN: Status: RESOLVED | Noted: 2020-09-30 | Resolved: 2020-12-16

## 2020-12-16 PROCEDURE — 73502 X-RAY EXAM HIP UNI 2-3 VIEWS: CPT | Mod: RT | Performed by: RADIOLOGY

## 2020-12-16 PROCEDURE — 99213 OFFICE O/P EST LOW 20 MIN: CPT | Performed by: ORTHOPAEDIC SURGERY

## 2020-12-16 RX ORDER — TEMAZEPAM 30 MG
CAPSULE ORAL
COMMUNITY
Start: 2020-11-10 | End: 2021-04-29

## 2020-12-16 NOTE — NURSING NOTE
Reason For Visit:   Chief Complaint   Patient presents with     Surgical Followup     Right hip pain. DOS 8/5/20 Revision Right total hip arthroplasty        LMP 03/11/2010     Pain Assessment  Patient Currently in Pain: Yes  0-10 Pain Scale: 5  Primary Pain Location: Hip(Right)  Pain Descriptors: Aching, Dull, Shooting  Alleviating Factors: Heat  Aggravating Factors: Other (comment), Bending(worse at night)    Smita Knapp ATC

## 2020-12-16 NOTE — LETTER
12/16/2020         RE: Mercedes Barber  [address]  [address]  [place] [address]        Dear Colleague,    Thank you for referring your patient, Mercedes Barber, to the [facility] [place]. Please see a copy of my visit note below.           Interval History:   Mercedes Barber is a 65 year old female who is here follow up for Revision right SARAHI - 8/5/20    Whitley continues to make good progress following the revision total hip replacement.  Since her last visit she notes that her mobility has greatly improved.  She is essentially walking without any assistive devices at this point.  Over the last couple weeks she has had some pain in the lateral aspect of her hip as well as her buttock.  She does not have any pain in the groin.  She notes that this pain is not consistent with the pain she had previously when the implants were loose and that this pain feels more muscular in nature and related to activity level.  She has not had any problems with the incision.         Physical Exam:     NAD  AOx3  Interactive and cooperative with the exam.  The incision is well-healed.  She has no pain with gentle hip range of motion.  She is otherwise well-appearing.         Imaging:      X-rays demonstrate well fixed well-positioned revision total hip replacement implants.  No evidence of loosening or implant related problems.       Assessment and Plan:        Mercedes Barber is a 65 year old female is s/p the above procedure.   My guess is that the current symptoms are related to ongoing strengthening and stretching of the muscles and some inflammation.  Her symptoms, exam, and imaging are not consistent with implant related failure or loosening.  She will continue with exercises and activities as tolerated and I would expect this to help with her symptoms as she continues to strengthen the muscles around the hip.  I will see her back in clinic in 3 months.  Will  obtain repeat low AP pelvis at that visit.  She will let me know if her current symptoms are not improving or if they are worsening.  If that were the case I would see her back in clinic earlier with repeat imaging.      Pan Grey M.D.     Arthritis and Joint Replacement  Department of Orthopaedic Surgery, [facility]  [email or web address]  [phone] (pager)

## 2020-12-16 NOTE — PROGRESS NOTES
Discharge Note    Progress reporting period is from last progress note on   to Oct 2, 2020.    Mercedes failed to follow up and current status is unknown.  Please see information below for last relevant information on current status.  Patient seen for 2 visits.    SUBJECTIVE  Subjective changes noted by patient:  Significant reduction in L knee pain noted.  .  Current pain level is  .     Previous pain level was  6/10.   Changes in function:  Yes (See Goal flowsheet attached for changes in current functional level)  Adverse reaction to treatment or activity: None    OBJECTIVE  Changes noted in objective findings: Transfer and ambulation with walker much better today. See flow.     ASSESSMENT/PLAN  Diagnosis: L knee pain, s/p R SARAHI   Updated problem list and treatment plan:   Pain - HEP  STG/LTGs have been met or progress has been made towards goals:  Yes, please see goal flowsheet for most current information  Assessment of Progress: current status is unknown.    Last current status: Pt is progressing well   Self Management Plans:  HEP  I have re-evaluated this patient and find that the nature, scope, duration and intensity of the therapy is appropriate for the medical condition of the patient.  Mercedes continues to require the following intervention to meet STG and LTG's:  HEP.    Recommendations:  Discharge with current home program.  Patient to follow up with MD as needed.    Please refer to the daily flowsheet for treatment today, total treatment time and time spent performing 1:1 timed codes.

## 2020-12-21 ENCOUNTER — PATIENT OUTREACH (OUTPATIENT)
Dept: CARE COORDINATION | Facility: CLINIC | Age: 65
End: 2020-12-21

## 2020-12-21 NOTE — PROGRESS NOTES
Clinic Care Coordination Contact    Situation: Patient chart reviewed by care coordinator.     Background: Care Coordination following patient to assist with Goals.     Assessment: Per chart review, CC CHW was scheduled for phone appt 12/15/20. Notes are unclear if this was completed. Message sent to CHW for information.      Plan/Recommendations:  CC will await update following CC CHW patient outreach.      GOSIA Stokes   Social Work Clinic Care Coordinator   [facility]  PH: [phone]  [email or web address]

## 2020-12-21 NOTE — LETTER
[place] CARE COORDINATION  [facility]  [address] [address]  Tel. [phone]  Fax [phone]    December 22, 2020        Mercedes Barber  [address]  [place] [address]    Dear Miguel Long is an updated complex care plan for your continued enrollment in clinic care coordination. CAMRON Carlin will continue to outreach to you on a monthly basis.     Please let us know if you have additional questions, goals, or concerns that we can assist with.      GOSIA Stokes   Social Work Clinic Care Coordinator   [facility]  PH: [phone]  [email or web address]  Complex Care Plan  About Me:    Patient Name:  Mercedes Barber    YOB: 1955  Age:         65 year old   [facility] MRN:    [id number] Telephone Information:  Home Phone [phone]   Mobile [phone]       Address:  [address] [address] Email address:  No e-mail address on record      Emergency Contact(s)    Name Relationship Lgl Grd Work Phone Home Phone Mobile Phone   1. RANJITHJOYCEN Daughter No  [phone]   2. SILVIO ROMERO Friend No  [phone]   3. JULIO C KASPER (* Relative No  [phone]           Primary language:  English     needed? No   [facility] [employer]:  [phone] op. 1  Other communication barriers: None  Preferred Method of Communication:  Phone  Current living arrangement: I live alone  Mobility Status/ Medical Equipment: Independent w/Device    Health Maintenance  Health Maintenance Reviewed: Due/Overdue   Health Maintenance Due   Topic Date Due     PHQ-9  07/26/2017     MEDICARE ANNUAL WELLNESS VISIT  04/24/2019     INFLUENZA VACCINE (1) 09/01/2020     My Access Plan  Medical Emergency 911   Primary Clinic Line [facility] - [phone]   24  Hour Appointment Line [phone] or  [phone] ([phone]) (toll-free)   24 Hour Nurse Line [phone] (toll-free)   Preferred Urgent Care [facility], [phone]   Preferred Hospital [facility]  [phone]   Preferred Pharmacy [facility] - [address]     Behavioral Health Crisis Line The National Suicide Prevention Lifeline at 1-981.182.8504 or 919     My Care Team Members  Patient Care Team       Relationship Specialty Notifications Start End    Skyler Álvarez MD PCP - General Internal Medicine  7/31/12     Phone: [phone] Fax: [phone]         [address] [place] [address]    Samir Arguelles MD MD Neurology  10/19/15     Referred by Dr. Samir Arguelles from [facility] Neurological [facility] for sleep apnea     Phone: [phone] Fax: [phone]         [facility] NEUROLOGICAL [facility] [address] [facility] [address]    Park Ortiz MD MD Otolaryngology  10/19/15     Referred by Dr. Samir Arguelles from [facility] for sleep apnea     Phone: [phone] Fax: [phone]         [address] [place] [address] [address] [address]    Katy Calderon LSW Lead Care Coordinator Primary Care - CC  1/9/20     Phone: [phone]         Raegan Chinchilla MA Community Health Worker Primary Care - CC  1/23/20     Phone: [phone]         Skyler Álvarez MD Assigned PCP   5/17/20     Phone: [phone] Fax: [phone]         [address] [place] [address]    Skyler Chacko MD Referring Physician Orthopedics  7/17/20     Phone: [phone] Fax: [phone]         [facility] [address] [place] [address] [address] [facility] [address]    Pan Grey MD Assigned Musculoskeletal Provider   10/23/20     Phone: [phone] Fax: [phone]         [facility] [address] [place] [address]            My Care Plans  Self  Management and Treatment Plan  Goals and (Comments)  Goals        General    1.Psychosocial (pt-stated)     Notes - Note edited  10/29/2020  2:43 PM by Katy Calderon LSW    Goal Statement: I will attend [employer] at least 1 time this month over the next 6 months. I will call ahead and ask for help from someone at [employer] to help me with my walker. This will help me connect with a social network and [employer] is something that is important to me.  Date Created: 11/14/19 // extended 10/29/20  Measure of Success: I will attend [employer] this month.   Supportive Steps to Achieve: Plan when I would like to attend [employer], call ahead and ask for help.   Barriers: Not wanting to ask for help. Getting tired easily.   Strengths: Motivated to seek a support network.   Date to Achieve By: 10/1/2020 // extended 3/1/21  Patient expressed understanding of goal: Yes    As of today's date 1/20/2020 goal is met at 26 - 50%.   Goal Status:  Active  As of today's date 5/1/2020 goal is met at 26 - 50%.   Goal Status:  Active   As of today's date 6/4/2020 goal is met at 26 - 50%.   Goal Status:  Active             2. Self Care/Stress Reduction (pt-stated)     Notes - Note edited  10/29/2020  2:42 PM by Katy Calderon LSW    Goal Statement: I will make time for myself each week to make cards to reduce my stress and increase my happiness over the next 6 months.   Date created: 11/20/19 // extended 10/29/20  Measure of Success: I will make cards every week.   Supportive Steps to Achieve: Make time to create and craft cards, get supplies at [employer] store and call Help at your door to clean my craft room so I can utilize it again.   Barriers: Not getting help to clean my craft room.   Strengths: Motivated to get craft room clean, motivated to make cards as it makes me happy.   Date to Achieve By: 10/1/20 // extended 3/1/21  Patient expressed understanding of goal: Yes    As of today's date 1/20/2020 goal is met at 26 - 50%.   Goal Status:  Active  As of  today's date 5/1/2020 goal is met at 26 - 50%.   Goal Status:  Active   As of today's date 6/4/2020 goal is met at 26 - 50%.   Goal Status:  Active                Action Plans on File:                       Advance Care Plans/Directives Type:        My Medical and Care Information  Problem List   Patient Active Problem List   Diagnosis     Menopausal syndrome (hot flashes)     Family history of breast cancer     Vulvar pain     Renal anomaly     Overactive bladder     Rectal prolapse     CARDIOVASCULAR SCREENING; LDL GOAL LESS THAN 160     Vaginal pain     Dysphagia     Confusion     Headache     Left shoulder pain     Anemia     Mild major depression (H)     Esophageal stricture     Vulvar lesion     Temporal sclerosis     Lumbago     Pain in thoracic spine     Sciatica     Pain in joint, lower leg     Plantar fascial fibromatosis     Pain in joint involving ankle and foot     Other specified hypothyroidism     GERD (gastroesophageal reflux disease)     Irritable bowel syndrome     Other sleep apnea     Cervical high risk HPV (human papillomavirus) test positive     Restless legs syndrome (RLS)     History of total hip arthroplasty, right     Hip pain     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)     Cellulitis of right hip     Deep venous thrombosis of upper extremity (H)     Peripheral edema     Low iron stores     Mechanical complication of prosthetic hip implant, initial encounter (H)     Status post hip surgery     Chest pain, unspecified     Closed fracture of proximal end of left humerus     Generalized anxiety disorder     History of infection     Infection and inflammatory reaction due to unspecified internal joint prosthesis, initial encounter (H)     Ingrowing nail     Major depressive disorder, recurrent episode, moderate (H)     Mild intermittent asthma     Nausea     Partial epilepsy with intractable epilepsy (H)     Repeated falls     History of revision of total replacement of right hip joint      Seizure disorder (H)     Aftercare following right hip joint replacement surgery      Current Medications and Allergies:   Current Outpatient Medications   Medication     acetaminophen (TYLENOL) 325 MG tablet     albuterol (ALBUTEROL) 108 (90 BASE) MCG/ACT Inhaler     amoxicillin (AMOXIL) 500 MG tablet     artificial saliva (BIOTENE MT) AERS spray     aspirin (ASA) 81 MG EC tablet     carboxymethylcellulose PF (REFRESH PLUS) 0.5 % ophthalmic solution     cycloSPORINE (RESTASIS) 0.05 % ophthalmic emulsion     fluticasone-vilanterol (BREO ELLIPTA) 200-25 MCG/INH inhaler     hypromellose (ARTIFICIAL TEARS) 0.5 % SOLN ophthalmic solution     lacosamide (VIMPAT) 200 MG TABS tablet     levothyroxine (SYNTHROID/LEVOTHROID) 50 MCG tablet     linaclotide (LINZESS) 290 MCG capsule     liothyronine (CYTOMEL) 5 MCG tablet     loratadine (CLARITIN REDITABS) 10 MG ODT     nefazodone (SERZONE) 200 MG tablet     omeprazole (PRILOSEC) 40 MG DR capsule     ondansetron (ZOFRAN-ODT) 4 MG ODT tab     polyethylene glycol (MIRALAX) 17 g packet     potassium citrate (UROCIT-K) 10 MEQ (1080 MG) CR tablet     pramipexole (MIRAPEX) 1 MG tablet     senna-docusate (SENOKOT-S/PERICOLACE) 8.6-50 MG tablet     temazepam (RESTORIL) 30 MG capsule     traMADol (ULTRAM) 50 MG tablet     No current facility-administered medications for this visit.      Care Coordination Start Date: 11/13/2019   Frequency of Care Coordination: monthly   Form Last Updated: 12/22/2020

## 2020-12-22 ENCOUNTER — PATIENT OUTREACH (OUTPATIENT)
Dept: NURSING | Facility: CLINIC | Age: 65
End: 2020-12-22
Payer: MEDICARE

## 2020-12-22 ENCOUNTER — NURSE TRIAGE (OUTPATIENT)
Dept: CARE COORDINATION | Facility: CLINIC | Age: 65
End: 2020-12-22

## 2020-12-22 ASSESSMENT — ACTIVITIES OF DAILY LIVING (ADL): DEPENDENT_IADLS:: INDEPENDENT

## 2020-12-22 NOTE — TELEPHONE ENCOUNTER
"  Additional Information    Negative: Pregnant > 20 weeks or postpartum (< 6 weeks after delivery) and new hand or face swelling    Negative: Pregnant > 20 weeks and BP > 140/90    Negative: Systolic BP >= 160 OR Diastolic >= 100, and any cardiac or neurologic symptoms (e.g., chest pain, difficulty breathing, unsteady gait, blurred vision)    Negative: Patient sounds very sick or weak to the triager    Negative: Systolic BP >= 180 OR Diastolic >= 110, and missed most recent dose of blood pressure medication    Negative: BP Systolic BP >= 140 OR Diastolic >= 90 and postpartum (from 0 to 6 weeks after delivery)    Negative: Systolic BP >= 180 OR Diastolic >= 110    Negative: Patient wants to be seen    Negative: Ran out of BP medications    Negative: Taking BP medications and feels is having side effects (e.g., impotence, cough, dizziness)    Negative: Systolic BP >= 160 OR Diastolic >= 100    Negative: Systolic BP >= 130 OR Diastolic >= 80, and pregnant    Systolic BP >= 130 OR Diastolic >= 80, and is not taking BP medications    Negative: Systolic BP >= 130 OR Diastolic >= 80, and is taking BP medications    Answer Assessment - Initial Assessment Questions  1. BLOOD PRESSURE: \"What is the blood pressure?\" \"Did you take at least two measurements 5 minutes apart?\"      Using Wrist BP cuff  2. ONSET: \"When did you take your blood pressure?\"      Last time BP was checked 107/72 and 141/80  Triage had patient recheck BP during call: 132/78  3. HOW: \"How did you obtain the blood pressure?\" (e.g., visiting nurse, automatic home BP monitor)      Automatic wrist BP cuff  4. HISTORY: \"Do you have a history of high blood pressure?\"      Per chart review  5. MEDICATIONS: \"Are you taking any medications for blood pressure?\" \"Have you missed any doses recently?\"      Per chart review  6. OTHER SYMPTOMS: \"Do you have any symptoms?\" (e.g., headache, chest pain, blurred vision, difficulty breathing, weakness)      Ankle swelling. " "Will notice indentations on ankles after she takes socks of. She is trying to place saida hose stockings but d/t pain in hip from surgery, hip is still sore- triage advised to ask for help when she is able to prevent clot formation and increase BLE circulation. Patient reports swelling goes away after taking socks off. No calf pain reported. Patient reports no chest pain. No headache. No difficulty breathing. No dizziness.   7. PREGNANCY: \"Is there any chance you are pregnant?\" \"When was your last menstrual period?\"      n/a    Protocols used: HIGH BLOOD PRESSURE-A-OH      "

## 2020-12-22 NOTE — PROGRESS NOTES
Clinic Care Coordination Contact    Situation: Patient chart reviewed by care coordinator.     Background: Care Coordination following patient to assist with goals as below.     Assessment: Per chart review, patient outreach completed by CC CHW on 12/22/20.  Patient is actively working to accomplish goals. Patient's goals remain appropriate and relevant at this time.    Pt is working with outpatient PT post hip replacement surgery.     CHW and pt/pt's sister have been discussing scheduling a MNChoice assessment with [facility] over the last two months. This has not been scheduled per notes. Pt has hired a private pay  per note 11/2/20.       Goals Addressed                 This Visit's Progress      1.Psychosocial (pt-stated)   60%     Goal Statement: I will attend [employer] at least 1 time this month over the next 6 months. I will call ahead and ask for help from someone at [employer] to help me with my walker. This will help me connect with a social network and [employer] is something that is important to me.  Date Created: 11/14/19 // extended 10/29/20  Measure of Success: I will attend [employer] this month.   Supportive Steps to Achieve: Plan when I would like to attend [employer], call ahead and ask for help.   Barriers: Not wanting to ask for help. Getting tired easily.   Strengths: Motivated to seek a support network.   Date to Achieve By: 10/1/2020 // extended 3/1/21  Patient expressed understanding of goal: Yes    As of today's date 1/20/2020 goal is met at 26 - 50%.   Goal Status:  Active  As of today's date 5/1/2020 goal is met at 26 - 50%.   Goal Status:  Active   As of today's date 6/4/2020 goal is met at 26 - 50%.   Goal Status:  Active               2. Self Care/Stress Reduction (pt-stated)   70%     Goal Statement: I will make time for myself each week to make cards to reduce my stress and increase my happiness over the next 6 months.   Date created: 11/20/19 // extended 10/29/20  Measure of Success: I  will make cards every week.   Supportive Steps to Achieve: Make time to create and craft cards, get supplies at craft store and call Help at your door to clean my craft room so I can utilize it again.   Barriers: Not getting help to clean my craft room.   Strengths: Motivated to get craft room clean, motivated to make cards as it makes me happy.   Date to Achieve By: 10/1/20 // extended 3/1/21  Patient expressed understanding of goal: Yes    As of today's date 1/20/2020 goal is met at 26 - 50%.   Goal Status:  Active  As of today's date 5/1/2020 goal is met at 26 - 50%.   Goal Status:  Active   As of today's date 6/4/2020 goal is met at 26 - 50%.   Goal Status:  Active               Plan/Recommendations: The patient will continue working with Care Coordination to achieve goals as above. Pt will continue to follow plan of care and recover from surgery.     CHW will continue with outreaches at this time. CHW will inform SW CC of any concerns or changes regarding patient as needed. See delegation.     SW CC will review patient chart again in approximately 6 weeks to assess patient status and goal progress with outreach to patient as indicated. SW CC will mail 90 day complex care plan to pt.     CHW Delegation:  1)  Due Date: At next outreach       Delegation: Finish conversation/workflow with pt surrounding scheduling a MNChoice assessment with [facility].     GOSIA Stokes   Social Work Clinic Care Coordinator   [facility] and [facility]  PH: [phone]  [email or web address]

## 2020-12-22 NOTE — TELEPHONE ENCOUNTER
Clinic Care Coordination Contact  Patient stated that her blood pressure has been high, and her ankles are swollen.  Please call patient to discuss.    CAMRON Hester  Clinic Care Coordination  [facility]: [facility]  Phone: [phone]

## 2020-12-22 NOTE — PROGRESS NOTES
Interval History:   Mercedes Barber is a 65 year old female who is here follow up for Revision right SARAHI - 8/5/20    Whitley continues to make good progress following the revision total hip replacement.  Since her last visit she notes that her mobility has greatly improved.  She is essentially walking without any assistive devices at this point.  Over the last couple weeks she has had some pain in the lateral aspect of her hip as well as her buttock.  She does not have any pain in the groin.  She notes that this pain is not consistent with the pain she had previously when the implants were loose and that this pain feels more muscular in nature and related to activity level.  She has not had any problems with the incision.         Physical Exam:     NAD  AOx3  Interactive and cooperative with the exam.  The incision is well-healed.  She has no pain with gentle hip range of motion.  She is otherwise well-appearing.         Imaging:      X-rays demonstrate well fixed well-positioned revision total hip replacement implants.  No evidence of loosening or implant related problems.       Assessment and Plan:        Mercedes Barber is a 65 year old female is s/p the above procedure.   My guess is that the current symptoms are related to ongoing strengthening and stretching of the muscles and some inflammation.  Her symptoms, exam, and imaging are not consistent with implant related failure or loosening.  She will continue with exercises and activities as tolerated and I would expect this to help with her symptoms as she continues to strengthen the muscles around the hip.  I will see her back in clinic in 3 months.  Will obtain repeat low AP pelvis at that visit.  She will let me know if her current symptoms are not improving or if they are worsening.  If that were the case I would see her back in clinic earlier with repeat imaging.      Pan Grey M.D.     Arthritis and Joint  Colon recall 10 years    Benita Yu MD     Replacement  Department of Orthopaedic Surgery, [facility]  [email or web address]  [phone] (pager)

## 2020-12-22 NOTE — PROGRESS NOTES
Clinic Care Coordination Contact  Community Health Worker Follow Up    Goals:   Goals Addressed as of 12/22/2020 at 3:09 PM                 Today    11/2/20       Patient Stated      1.Psychosocial (pt-stated)   60%  50%    Added 11/14/19 by Amber Nguyen, GOSIA     Goal Statement: I will attend [employer] at least 1 time this month over the next 6 months. I will call ahead and ask for help from someone at [employer] to help me with my walker. This will help me connect with a social network and [employer] is something that is important to me.  Date Created: 11/14/19 // extended 10/29/20  Measure of Success: I will attend [employer] this month.   Supportive Steps to Achieve: Plan when I would like to attend [employer], call ahead and ask for help.   Barriers: Not wanting to ask for help. Getting tired easily.   Strengths: Motivated to seek a support network.   Date to Achieve By: 10/1/2020 // extended 3/1/21  Patient expressed understanding of goal: Yes    As of today's date 1/20/2020 goal is met at 26 - 50%.   Goal Status:  Active  As of today's date 5/1/2020 goal is met at 26 - 50%.   Goal Status:  Active   As of today's date 6/4/2020 goal is met at 26 - 50%.   Goal Status:  Active               2. Self Care/Stress Reduction (pt-stated)   70%  50%    Added 11/20/19 by Amber Nguyen, GOSIA     Goal Statement: I will make time for myself each week to make cards to reduce my stress and increase my happiness over the next 6 months.   Date created: 11/20/19 // extended 10/29/20  Measure of Success: I will make cards every week.   Supportive Steps to Achieve: Make time to create and craft cards, get supplies at [employer] store and call Help at your door to clean my craft room so I can utilize it again.   Barriers: Not getting help to clean my craft room.   Strengths: Motivated to get craft room clean, motivated to make cards as it makes me happy.   Date to Achieve By: 10/1/20 // extended 3/1/21  Patient expressed understanding of  goal: Yes    As of today's date 1/20/2020 goal is met at 26 - 50%.   Goal Status:  Active  As of today's date 5/1/2020 goal is met at 26 - 50%.   Goal Status:  Active   As of today's date 6/4/2020 goal is met at 26 - 50%.   Goal Status:  Active           Discussed:  Patient stated that she is still in a lot of back and hip pain.  Patient stated that she will be seeing her Chiropractor in January.  Patient stated that she contacted her sleep doctor due to lack of sleep because of her pain, and she is wearing a watch to monitor her sleep.  Patient stated that her blood pressure has been on the high side and her legs are swelling.  Patient stated that she made all of her [place] cards, and she is doing some baking right now.    Patient stated that she is not attending Worship due to Covid-19, but she listens to the weekly Worship program every Sunday online.  Patient stated that her daughter and son in law will be visiting her on [place] day.    Intervention and Education during outreach:   Used Empathy and Active listening to understand the patients barriers and struggles.  CHW encouraged patient to contact CC if there are any other changes or resources needed.  Patient acknowledged understanding.       CHW Plan: will sent a message to [facility].  Will follow up in one month.    Next Outreach: 1/20/21     CAMRON Hester  Clinic Care Coordination  [facility]: [facility], [place], [facility], [facility], and [facility]  Phone: [phone]

## 2020-12-25 ENCOUNTER — NURSE TRIAGE (OUTPATIENT)
Dept: NURSING | Facility: CLINIC | Age: 65
End: 2020-12-25

## 2020-12-25 NOTE — TELEPHONE ENCOUNTER
"BP been running high. BP tonight is 161/94  Yesterday BP was normal  This morning 128 \"over something\"    Denies symptoms.   Not on any blood pressure medication.   Had hip surgery in August and states she is still in severe pain, hurts to bend down. Wonders if the pain is contributing to her BP running high.     Disposition: See PCP within 2-3 days. Has an appointment on Monday 12/28  Protocol and care advice reviewed.  Caller states understanding of the recommended disposition.  Advised to call back if further questions or concerns.    Bree Leavitt RN/[facility] Nurse [facility]        COVID 19 Nurse Triage Plan/Patient Instructions    Please be aware that novel coronavirus (COVID-19) may be circulating in the community. If you develop symptoms such as fever, cough, or SOB or if you have concerns about the presence of another infection including coronavirus (COVID-19), please contact your health care provider or visit [email or web address].     Disposition/Instructions    In-Person Visit with provider recommended. Reference Visit Selection Guide.    Thank you for taking steps to prevent the spread of this virus.  o Limit your contact with others.  o Wear a simple mask to cover your cough.  o Wash your hands well and often.    Resources    [facility]: About COVID-19: [email or web address]    CDC: What to Do If You're Sick: www.cdc.gov/coronavirus/2019-ncov/about/steps-when-sick.html    CDC: Ending Home Isolation: www.cdc.gov/coronavirus/2019-ncov/hcp/disposition-in-home-patients.html     CDC: Caring for Someone: www.cdc.gov/coronavirus/2019-ncov/if-you-are-sick/care-for-someone.html     [facility]: Interim Guidance for Hospital Discharge to Home: www.health.[facility].[place]/diseases/coronavirus/hcp/hospdischarge.pdf    [facility] clinical trials (COVID-19 research studies): clinicalaffairs.[facility]/umn-clinical-trials     Below are the COVID-19 hotlines at the [place] Department of Health " (MD). Interpreters are available.   o For health questions: Call 824-934-2045 or 1-507.178.6939 (7 a.m. to 7 p.m.)  o For questions about schools and childcare: Call 829-618-6486 or 1-259.176.4708 (7 a.m. to 7 p.m.)       Additional Information    Negative: [1] Chest pain lasts > 5 minutes AND [2] history of heart disease  (i.e., heart attack, bypass surgery, angina, angioplasty, CHF)    Negative: [1] Chest pain AND [2] took nitrogylcerin AND [3] pain was not relieved    Negative: [1] Weakness of the face, arm or leg on one side of the body AND [2] new onset    Negative: [1] Numbness (i.e., loss of sensation) of the face, arm or leg on one side of the body AND [2] new onset    Negative: Severe difficulty breathing (e.g., struggling for each breath, speaks in single words)    Negative: Difficult to awaken or acting confused (e.g., disoriented, slurred speech)    Negative: Sounds like a life-threatening emergency to the triager    Negative: Symptom is main concern  (e.g., headache, chest pain)    Negative: Low blood pressure is main concern    Negative: [1] Pregnant > 20 weeks (or postpartum < 6 weeks) AND [2] new hand or face swelling    Negative: [1] Pregnant > 20 weeks AND [2] BP Systolic BP  >= 140 OR Diastolic >= 90    Negative: [1] Systolic BP  >= 160 OR Diastolic >= 100 AND [2] cardiac or neurologic symptoms (e.g., chest pain, difficulty breathing, unsteady gait, blurred vision)    Negative: [1] Systolic BP  >= 200 OR Diastolic >= 120  AND [2] having NO cardiac or neurologic symptoms    Negative: [1] Systolic BP  >= 180 OR Diastolic >= 110 AND [2] missed most recent dose of blood pressure medication    Negative: [1] Postpartum < 6 weeks AND [2] BP Systolic BP  >= 140 OR Diastolic >= 90    Negative: Systolic BP  >= 180 OR Diastolic >= 110    Negative: Ran out of BP medications    Systolic BP  >= 160 OR Diastolic >= 100    Protocols used: HIGH BLOOD PRESSURE-A-AH

## 2020-12-28 ENCOUNTER — OFFICE VISIT (OUTPATIENT)
Dept: INTERNAL MEDICINE | Facility: CLINIC | Age: 65
End: 2020-12-28
Payer: MEDICARE

## 2020-12-28 VITALS
SYSTOLIC BLOOD PRESSURE: 140 MMHG | TEMPERATURE: 97.5 F | HEART RATE: 87 BPM | WEIGHT: 202 LBS | OXYGEN SATURATION: 97 % | RESPIRATION RATE: 15 BRPM | DIASTOLIC BLOOD PRESSURE: 82 MMHG | BODY MASS INDEX: 33.61 KG/M2

## 2020-12-28 DIAGNOSIS — Z12.31 VISIT FOR SCREENING MAMMOGRAM: ICD-10-CM

## 2020-12-28 DIAGNOSIS — M16.11 PRIMARY OSTEOARTHRITIS OF RIGHT HIP: ICD-10-CM

## 2020-12-28 DIAGNOSIS — I10 BENIGN ESSENTIAL HYPERTENSION: Primary | ICD-10-CM

## 2020-12-28 DIAGNOSIS — K21.9 GASTROESOPHAGEAL REFLUX DISEASE WITHOUT ESOPHAGITIS: ICD-10-CM

## 2020-12-28 PROCEDURE — 99214 OFFICE O/P EST MOD 30 MIN: CPT | Performed by: INTERNAL MEDICINE

## 2020-12-28 RX ORDER — AMLODIPINE BESYLATE 5 MG/1
5 TABLET ORAL DAILY
Qty: 90 TABLET | Refills: 0 | Status: SHIPPED | OUTPATIENT
Start: 2020-12-28 | End: 2021-02-01

## 2020-12-28 RX ORDER — LISINOPRIL 20 MG/1
20 TABLET ORAL DAILY
Qty: 90 TABLET | Refills: 0 | Status: CANCELLED | OUTPATIENT
Start: 2020-12-28

## 2020-12-28 RX ORDER — OMEPRAZOLE 40 MG/1
40 CAPSULE, DELAYED RELEASE ORAL 2 TIMES DAILY
Qty: 180 CAPSULE | Refills: 3 | Status: CANCELLED | OUTPATIENT
Start: 2020-12-28

## 2020-12-28 RX ORDER — TRAMADOL HYDROCHLORIDE 50 MG/1
50 TABLET ORAL EVERY 6 HOURS PRN
Qty: 30 TABLET | Refills: 0 | Status: ON HOLD | OUTPATIENT
Start: 2020-12-28 | End: 2021-05-01

## 2020-12-28 ASSESSMENT — PAIN SCALES - GENERAL: PAINLEVEL: WORST PAIN (10)

## 2020-12-28 NOTE — PROGRESS NOTES
Subjective     Mercedes Barber is a 65 year old female who presents to clinic today for the following health issues:    HPI         Hypertension Follow-up      Do you check your blood pressure regularly outside of the clinic? Yes     Are you following a low salt diet? Yes    Are your blood pressures ever more than 140 on the top number (systolic) OR more   than 90 on the bottom number (diastolic), for example 140/90? Yes      How many servings of fruits and vegetables do you eat daily?  2-3    On average, how many sweetened beverages do you drink each day (Examples: soda, juice, sweet tea, etc.  Do NOT count diet or artificially sweetened beverages)?   1    How many days per week do you exercise enough to make your heart beat faster? 3 or less    How many minutes a day do you exercise enough to make your heart beat faster? 9 or less    How many days per week do you miss taking your medication? FREQUENTLY      Review of Systems   CONSTITUTIONAL: NEGATIVE for fever, chills, change in weight  EYES: NEGATIVE for vision changes or irritation  ENT/MOUTH: NEGATIVE for ear, mouth and throat problems  RESP: NEGATIVE for significant cough or SOB  CV: NEGATIVE for chest pain, palpitations or peripheral edema  GI: NEGATIVE for nausea, abdominal pain, + heartburn, no change in bowel habits  : NEGATIVE for frequency, dysuria, or hematuria  HEME: NEGATIVE for bleeding problems  PSYCHIATRIC: NEGATIVE for changes in mood or affect      Objective    BP (!) 140/82   Pulse 87   Temp 97.5  F (36.4  C) (Temporal)   Resp 15   Wt 91.6 kg (202 lb)   LMP 03/11/2010   SpO2 97%   BMI 33.61 kg/m    There is no height or weight on file to calculate BMI.  Physical Exam   GENERAL: healthy, alert and no distress  EYES: Eyes grossly normal to inspection, PERRL and conjunctivae and sclerae normal  HENT: ear canals and TM's normal, nose and mouth without ulcers or lesions  NECK: no adenopathy, no asymmetry, masses, or scars and thyroid  normal to palpation  RESP: lungs clear to auscultation - no rales, rhonchi or wheezes  CV: regular rate and rhythm, normal S1 S2, no S3 or S4, no  click or rub, no peripheral edema and peripheral pulses strong  MS: Patient is using a cane for ambulation or gait is antalgic favoring the right  NEURO: No focal changes  PSYCH: mentation appears normal, affect normal/bright    Creatinine   Date Value Ref Range Status   09/30/2020 0.70 0.52 - 1.04 mg/dL Final     LDL Cholesterol Calculated   Date Value Ref Range Status   06/04/2018 101 (H) <100 mg/dL Final     Comment:     Above desirable:  100-129 mg/dl  Borderline High:  130-159 mg/dL  High:             160-189 mg/dL  Very high:       >189 mg/dl               Assessment & Plan     Benign essential hypertension  Suggest starting amlodipine 5 mg daily.  He will avoid ACE or ARB therapy due to patient's underlying oral potassium supplementation as per secondary provider, BP check 1 month  - amLODIPine (NORVASC) 5 MG tablet; Take 1 tablet (5 mg) by mouth daily    Gastroesophageal reflux disease without esophagitis  Stable continue with PPI therapy through [facility].    Primary osteoarthritis of right hip  Refilled for patient due to pain and suggested the patient be seen by her orthopedist for further assessment  - traMADol (ULTRAM) 50 MG tablet; Take 1 tablet (50 mg) by mouth every 6 hours as needed for severe pain    Visit for screening mammogram  Ordered his routine screening based on age.  - *MA Screening Digital Bilateral; Future        See Patient Instructions, advised patient follow-up for routine annual Medicare wellness exam    Return in about 1 month (around 1/28/2021) for BP Recheck, Medicare Wellness Exam.    Skyler Álvarez MD  [facility]

## 2021-01-04 ENCOUNTER — THERAPY VISIT (OUTPATIENT)
Dept: PHYSICAL THERAPY | Facility: CLINIC | Age: 66
End: 2021-01-04
Attending: ORTHOPAEDIC SURGERY
Payer: MEDICARE

## 2021-01-04 DIAGNOSIS — Z96.641 AFTERCARE FOLLOWING RIGHT HIP JOINT REPLACEMENT SURGERY: ICD-10-CM

## 2021-01-04 DIAGNOSIS — Z96.641 HISTORY OF RIGHT HIP REPLACEMENT: ICD-10-CM

## 2021-01-04 DIAGNOSIS — Z47.1 AFTERCARE FOLLOWING RIGHT HIP JOINT REPLACEMENT SURGERY: ICD-10-CM

## 2021-01-04 PROCEDURE — 97140 MANUAL THERAPY 1/> REGIONS: CPT | Mod: GP | Performed by: PHYSICAL THERAPIST

## 2021-01-04 PROCEDURE — 97530 THERAPEUTIC ACTIVITIES: CPT | Mod: GP | Performed by: PHYSICAL THERAPIST

## 2021-01-04 PROCEDURE — 97110 THERAPEUTIC EXERCISES: CPT | Mod: GP | Performed by: PHYSICAL THERAPIST

## 2021-01-04 NOTE — LETTER
DEPARTMENT OF HEALTH AND HUMAN SERVICES  CENTERS FOR MEDICARE & MEDICAID SERVICES    PLAN/UPDATED PLAN OF PROGRESS FOR OUTPATIENT REHABILITATION       PATIENTS NAME:  Mercedes Barber   : 1955  PROVIDER NUMBER:    [id number]  HICN:  [id number]   PROVIDER NAME: [place] [facility] PHYSICAL [facility]ERAP[facility]  MEDICAL RECORD NUMBER: [id number]   START OF CARE DATE:  SOC Date: 10/06/20   TYPE:  PT    PRIMARY/TREATMENT DIAGNOSIS: (Pertinent Medical Diagnosis)     History of right hip replacement  Aftercare following right hip joint replacement surgery    VISITS FROM START OF CARE:  Rxs Used: 19     PROGRESS  REPORT  Progress reporting period is from 20 to 21    SUBJECTIVE  Subjective changes noted by patient: Continued complaints of R posterior buttock pain; referred pain into R lateral thigh. Reports no pain beyond thigh with exception of bilateral anterior knee pain.  Current pain level is variable; 0-5-6/10.  Previous pain level was  (0-4/10).   Changes in function:  Yes (See Goal flowsheet attached for changes in current functional level)  Adverse reaction to treatment or activity: activity - difficulty with bending activities.    OBJECTIVE  Objective: Demonstrated ability to ascend stairs with a reciprocal gait w/railing and SEC; preferred descending stairs with a step-to gait secondary to a fall in the past. Able to ambulate without assistive device in the clinic; prefers to use assistive device outside her home.  FIS: fingertips to mid lower leg; provocation of R posterior buttock pain. EIS: WFL's; no provocation of sx's. Weakness R gluteus susan. LImited ability to perform squat; favors trunk flexion secondary to use of LE's.     ASSESSMENT/PLAN  Updated problem list and treatment plan: Diagnosis 1:  S/p revision R SARAHI  Pain -  self management, education and home program  Decreased strength - therapeutic exercise and therapeutic activities  Impaired balance - neuro re-education  "and therapeutic activities  Impaired gait - gait training  Impaired muscle performance - neuro re-education  Decreased function - therapeutic activities  STG/LTGs have been met or progress has been made towards goals:  Yes (See Goal flow sheet completed today.)  Assessment of Progress: The patient's condition is improving.  Self Management Plans:  Patient has been instructed in a home treatment program.  I have re-evaluated this patient and find that the nature, scope, duration and intensity of the therapy is appropriate for the medical condition of the patient.  Mercedes continues to require the following intervention to meet STG and LTG's:  PT    Recommendations:  Additional orders received for 8 more visits per surgeon.   Plan: 2x/week, once daily  Duration: 4 weeks.      Caregiver Signature/Credentials _____________________________ Date ________       Treating Provider: Nano Knight, PT   I have reviewed and certified the need for these services and plan of treatment while under my care.        PHYSICIAN'S SIGNATURE:   ________________________  Date___________                                        Pan Grey MD    Certification period:  Beginning of Cert date period: 01/04/21 to  End of Cert period date: 03/04/21     Functional Level Progress Report: Please see attached \"Goal Flow sheet for Functional level.\"    ____X____ Continue Services or       ________ DC Services                Service dates: From  SOC Date: 10/06/20 date to present                         "

## 2021-01-04 NOTE — LETTER
DEPARTMENT OF HEALTH AND HUMAN SERVICES  CENTERS FOR MEDICARE & MEDICAID SERVICES    PLAN/UPDATED PLAN OF PROGRESS FOR OUTPATIENT REHABILITATION       PATIENTS NAME:  Mercedes Barber   : 1955  PROVIDER NUMBER:    [id number]  HICN:  [id number]   PROVIDER NAME: [place] [facility] PHYSICAL THERAPY  MEDICAL RECORD NUMBER: [id number]   START OF CARE DATE:  SOC Date: 10/06/20   TYPE:  PT    PRIMARY/TREATMENT DIAGNOSIS: (Pertinent Medical Diagnosis)     History of right hip replacement  Aftercare following right hip joint replacement surgery    VISITS FROM START OF CARE:  Rxs Used: 19     PROGRESS  REPORT  Progress reporting period is from 20 to 21    SUBJECTIVE  Subjective changes noted by patient: Continued complaints of R posterior buttock pain; referred pain into R lateral thigh. Reports no pain beyond thigh with exception of bilateral anterior knee pain.  Current pain level is variable; 0-5-6/10.  Previous pain level was  (0-4/10).   Changes in function:  Yes (See Goal flowsheet attached for changes in current functional level)  Adverse reaction to treatment or activity: activity - difficulty with bending activities.    OBJECTIVE  Objective: Demonstrated ability to ascend stairs with a reciprocal gait w/railing and SEC; preferred descending stairs with a step-to gait secondary to a fall in the past. Able to ambulate without assistive device in the clinic; prefers to use assistive device outside her home.  FIS: fingertips to mid lower leg; provocation of R posterior buttock pain. EIS: WFL's; no provocation of sx's. Weakness R gluteus susan. LImited ability to perform squat; favors trunk flexion secondary to use of LE's.     ASSESSMENT/PLAN  Updated problem list and treatment plan: Diagnosis 1:  S/p revision R SARAHI  Pain -  self management, education and home program  Decreased strength - therapeutic exercise and therapeutic activities  Impaired balance - neuro re-education  "and therapeutic activities  Impaired gait - gait training  Impaired muscle performance - neuro re-education  Decreased function - therapeutic activities  STG/LTGs have been met or progress has been made towards goals:  Yes (See Goal flow sheet completed today.)  Assessment of Progress: The patient's condition is improving.  Self Management Plans:  Patient has been instructed in a home treatment program.  I have re-evaluated this patient and find that the nature, scope, duration and intensity of the therapy is appropriate for the medical condition of the patient.  Mercedes continues to require the following intervention to meet STG and LTG's:  PT    Recommendations:  Additional orders received for 8 more visits per surgeon.    Caregiver Signature/Credentials _____________________________ Date ________       Treating Provider: Nano Knight, PT   I have reviewed and certified the need for these services and plan of treatment while under my care.        PHYSICIAN'S SIGNATURE:   ____________________________ Date___________                              Pan Grey MD    Certification period:  Beginning of Cert date period: 01/04/21 to  End of Cert period date: 03/04/21     Functional Level Progress Report: Please see attached \"Goal Flow sheet for Functional level.\"    ____X____ Continue Services or       ________ DC Services                Service dates: From  SOC Date: 10/06/20 date to present                         "

## 2021-01-04 NOTE — PROGRESS NOTES
PROGRESS  REPORT    Progress reporting period is from 11-20-20 to 1-4-21    SUBJECTIVE  Subjective changes noted by patient: Continued complaints of R posterior buttock pain; referred pain into R lateral thigh. Reports no pain beyond thigh with exception of bilateral anterior knee pain.  Current pain level is variable; 0-5-6/10.  Previous pain level was  (0-4/10).   Changes in function:  Yes (See Goal flowsheet attached for changes in current functional level)  Adverse reaction to treatment or activity: activity - difficulty with bending activities.    OBJECTIVE  Objective: Demonstrated ability to ascend stairs with a reciprocal gait w/railing and SEC; preferred descending stairs with a step-to gait secondary to a fall in the past. Able to ambulate without assistive device in the clinic; prefers to use assistive device outside her home.  FIS: fingertips to mid lower leg; provocation of R posterior buttock pain. EIS: WFL's; no provocation of sx's. Weakness R gluteus susan. LImited ability to perform squat; favors trunk flexion secondary to use of LE's.     ASSESSMENT/PLAN  Updated problem list and treatment plan: Diagnosis 1:  S/p revision R SARAHI  Pain -  self management, education and home program  Decreased strength - therapeutic exercise and therapeutic activities  Impaired balance - neuro re-education and therapeutic activities  Impaired gait - gait training  Impaired muscle performance - neuro re-education  Decreased function - therapeutic activities  STG/LTGs have been met or progress has been made towards goals:  Yes (See Goal flow sheet completed today.)  Assessment of Progress: The patient's condition is improving.  Self Management Plans:  Patient has been instructed in a home treatment program.  I have re-evaluated this patient and find that the nature, scope, duration and intensity of the therapy is appropriate for the medical condition of the patient.  Mercedes continues to require the following  intervention to meet STG and LTG's:  PT    Recommendations:  Additional orders received for 8 more visits per surgeon.   Plan: 2x/week, once daily  Duration: 4 weeks.  Please refer to the daily flowsheet for treatment today, total treatment time and time spent performing 1:1 timed codes.

## 2021-01-06 ENCOUNTER — HOSPITAL ENCOUNTER (OUTPATIENT)
Dept: GENERAL RADIOLOGY | Facility: CLINIC | Age: 66
Discharge: HOME | End: 2021-01-06
Attending: STUDENT IN AN ORGANIZED HEALTH CARE EDUCATION/TRAINING PROGRAM | Admitting: STUDENT IN AN ORGANIZED HEALTH CARE EDUCATION/TRAINING PROGRAM
Payer: MEDICARE

## 2021-01-06 DIAGNOSIS — R11.2 NAUSEA AND VOMITING, INTRACTABILITY OF VOMITING NOT SPECIFIED, UNSPECIFIED VOMITING TYPE: ICD-10-CM

## 2021-01-06 PROCEDURE — 74018 RADEX ABDOMEN 1 VIEW: CPT

## 2021-01-07 ENCOUNTER — TELEPHONE (OUTPATIENT)
Dept: INTERNAL MEDICINE | Facility: CLINIC | Age: 66
End: 2021-01-07

## 2021-01-07 DIAGNOSIS — I10 BENIGN ESSENTIAL HYPERTENSION: Primary | ICD-10-CM

## 2021-01-07 NOTE — TELEPHONE ENCOUNTER
DME order done and signed and placed in [facility].  Patient is to be informed.  Unclear if insurance will cover.

## 2021-01-07 NOTE — TELEPHONE ENCOUNTER
Pt is requesting a prescription for a blood pressure cuff. She wants one to test at home.   Please call pt when written to let her know where to  the cuff.  [phone], ok to lv detailed message

## 2021-01-08 ENCOUNTER — THERAPY VISIT (OUTPATIENT)
Dept: PHYSICAL THERAPY | Facility: CLINIC | Age: 66
End: 2021-01-08
Attending: ORTHOPAEDIC SURGERY
Payer: MEDICARE

## 2021-01-08 DIAGNOSIS — Z47.1 AFTERCARE FOLLOWING RIGHT HIP JOINT REPLACEMENT SURGERY: ICD-10-CM

## 2021-01-08 DIAGNOSIS — Z96.641 AFTERCARE FOLLOWING RIGHT HIP JOINT REPLACEMENT SURGERY: ICD-10-CM

## 2021-01-08 PROCEDURE — 97140 MANUAL THERAPY 1/> REGIONS: CPT | Mod: GP | Performed by: PHYSICAL THERAPIST

## 2021-01-08 PROCEDURE — 97112 NEUROMUSCULAR REEDUCATION: CPT | Mod: GP | Performed by: PHYSICAL THERAPIST

## 2021-01-13 ENCOUNTER — ANCILLARY PROCEDURE (OUTPATIENT)
Dept: MAMMOGRAPHY | Facility: CLINIC | Age: 66
End: 2021-01-13
Attending: INTERNAL MEDICINE
Payer: MEDICARE

## 2021-01-13 DIAGNOSIS — Z12.31 VISIT FOR SCREENING MAMMOGRAM: ICD-10-CM

## 2021-01-13 PROCEDURE — 77067 SCR MAMMO BI INCL CAD: CPT | Mod: TC | Performed by: RADIOLOGY

## 2021-01-13 PROCEDURE — 77063 BREAST TOMOSYNTHESIS BI: CPT | Mod: TC | Performed by: RADIOLOGY

## 2021-01-15 ENCOUNTER — NURSE TRIAGE (OUTPATIENT)
Dept: NURSING | Facility: CLINIC | Age: 66
End: 2021-01-15

## 2021-01-15 NOTE — TELEPHONE ENCOUNTER
Triage call:    Calling to report that since starting on blood pressure medication has been getting a little swelling in her feet and her ankles. Started on amlodipine 5mg daily 1 week ago.  Says she has been walking a lot today and on her feet a lot.  Tonight the swelling seems to be above her sock line on ankles and into calves.Says not much on feet or lower ankle.   Denies any redness or any indent if she presses finger into either ankle/calf.    Denies breathing difficulty or SOB.  Says she has been taking her B/Ps at home and they have been steadily coming down to normal.    Triaged to disposition of See PCP within 3 days and care advice given.  Pt will see how her legs seem in the morning and will call the clinic for a f/u appt.          Additional Information    Negative: Severe difficulty breathing (e.g., struggling for each breath, speaks in single words)    Negative: Looks like a broken bone or dislocated joint (e.g., crooked or deformed)    Negative: Sounds like a life-threatening emergency to the triager    Negative: Chest pain    Negative: Followed a leg injury    Negative: [1] Small area of swelling AND [2] followed an insect bite to the area    Negative: Swelling of one ankle joint    Negative: Swelling of knee is main symptom    Negative: Pregnant    Negative: Postpartum (from 0 to 6 weeks after delivery)    Negative: Difficulty breathing at rest    Negative: Entire foot is cool or blue in comparison to other side    Negative: [1] Can't walk or can barely walk AND [2] new onset    Negative: [1] Difficulty breathing with exertion (e.g., walking) AND [2] new onset or worsening    Negative: [1] Red area or streak AND [2] fever    Negative: [1] Swelling is painful to touch AND [2] fever    Negative: [1] Cast on leg or ankle AND [2] now increased pain    Negative: Patient sounds very sick or weak to the triager    Negative: SEVERE leg swelling (e.g., swelling extends above knee, entire leg is swollen,  weeping fluid)    Negative: [1] Red area or streak [2] large (> 2 in. or 5 cm)    Negative: [1] Thigh or calf pain AND [2] only 1 side AND [3] present > 1 hour    Negative: [1] Thigh, calf, or ankle swelling AND [2] only 1 side    Negative: [1] Thigh, calf, or ankle swelling AND [2] bilateral AND [3] 1 side is more swollen    [1] MILD swelling of both ankles (i.e., pedal edema) AND [2] new onset or worsening    Negative: [1] MODERATE leg swelling (e.g., swelling extends up to knees) AND [2] new onset or worsening    Negative: Swelling of face, arm or hands  (Exception: slight puffiness of fingers occurring during hot weather)    Negative: Looks like a boil, infected sore, deep ulcer or other infected rash (spreading redness, pus)    Protocols used: LEG SWELLING AND EDEMA-A-AH

## 2021-01-19 ENCOUNTER — NURSE TRIAGE (OUTPATIENT)
Dept: NURSING | Facility: CLINIC | Age: 66
End: 2021-01-19

## 2021-01-19 NOTE — TELEPHONE ENCOUNTER
Mercedes reports the following:  - high /80  - leg swelling, worst last night as she was sitting/standing/walking too long yesterday. Elevated legs last night. This AM, legs don't look too bad right now. Leg swelling started 1-2 weeks ago  - severe back and leg pain due to recent hip surgery  - started amlodipine 3 weeks ago.    No chest pain, no SOB, no lightheadedness.    PLAN:  - see PCP in 2 weeks per protocol. She has phone appointment scheduled on 1/25 with PCP.  - care advice reviewed  - call back for further concerns    Loreto Canales RN/[facility] Nurse Advisor          Additional Information    Negative: Sounds like a life-threatening emergency to the triager    Negative: Systolic BP >= 160 OR Diastolic >= 100, and any cardiac or neurologic symptoms (e.g., chest pain, difficulty breathing, unsteady gait, blurred vision)    Negative: Patient sounds very sick or weak to the triager    Negative: BP Systolic BP >= 140 OR Diastolic >= 90 and postpartum (from 0 to 6 weeks after delivery)    Negative: Systolic BP >= 180 OR Diastolic >= 110, and missed most recent dose of blood pressure medication    Negative: Systolic BP >= 180 OR Diastolic >= 110    Negative: Patient wants to be seen    Negative: Ran out of BP medications    Negative: Taking BP medications and feels is having side effects (e.g., impotence, cough, dizziness)    Negative: Systolic BP >= 160 OR Diastolic >= 100    Negative: Systolic BP >= 130 OR Diastolic >= 80, and pregnant    Negative: Systolic BP >= 130 OR Diastolic >= 80, and is taking BP medications    Systolic BP >= 130 OR Diastolic >= 80, and is not taking BP medications    Protocols used: HIGH BLOOD PRESSURE-A-OH

## 2021-01-21 DIAGNOSIS — Z98.890 STATUS POST HIP SURGERY: Primary | ICD-10-CM

## 2021-01-22 ENCOUNTER — THERAPY VISIT (OUTPATIENT)
Dept: PHYSICAL THERAPY | Facility: CLINIC | Age: 66
End: 2021-01-22
Payer: MEDICARE

## 2021-01-22 DIAGNOSIS — Z47.1 AFTERCARE FOLLOWING RIGHT HIP JOINT REPLACEMENT SURGERY: ICD-10-CM

## 2021-01-22 DIAGNOSIS — Z96.641 AFTERCARE FOLLOWING RIGHT HIP JOINT REPLACEMENT SURGERY: ICD-10-CM

## 2021-01-22 PROCEDURE — 97110 THERAPEUTIC EXERCISES: CPT | Mod: GP | Performed by: PHYSICAL THERAPIST

## 2021-01-22 PROCEDURE — 97530 THERAPEUTIC ACTIVITIES: CPT | Mod: GP | Performed by: PHYSICAL THERAPIST

## 2021-01-25 ENCOUNTER — VIRTUAL VISIT (OUTPATIENT)
Dept: INTERNAL MEDICINE | Facility: CLINIC | Age: 66
End: 2021-01-25
Payer: MEDICARE

## 2021-01-25 ENCOUNTER — THERAPY VISIT (OUTPATIENT)
Dept: PHYSICAL THERAPY | Facility: CLINIC | Age: 66
End: 2021-01-25
Payer: MEDICARE

## 2021-01-25 DIAGNOSIS — Z96.641 AFTERCARE FOLLOWING RIGHT HIP JOINT REPLACEMENT SURGERY: ICD-10-CM

## 2021-01-25 DIAGNOSIS — I10 BENIGN ESSENTIAL HYPERTENSION: ICD-10-CM

## 2021-01-25 DIAGNOSIS — Z47.1 AFTERCARE FOLLOWING RIGHT HIP JOINT REPLACEMENT SURGERY: ICD-10-CM

## 2021-01-25 PROCEDURE — 99442 PR PHYSICIAN TELEPHONE EVALUATION 11-20 MIN: CPT | Mod: 95 | Performed by: INTERNAL MEDICINE

## 2021-01-25 PROCEDURE — 97110 THERAPEUTIC EXERCISES: CPT | Mod: GP | Performed by: PHYSICAL THERAPIST

## 2021-01-25 PROCEDURE — 97530 THERAPEUTIC ACTIVITIES: CPT | Mod: GP | Performed by: PHYSICAL THERAPIST

## 2021-01-25 RX ORDER — QUETIAPINE FUMARATE 50 MG/1
100 TABLET, FILM COATED ORAL
COMMUNITY
End: 2023-01-17

## 2021-01-25 NOTE — PROGRESS NOTES
Mercedes is a 65 year old who is being evaluated via a billable telephone visit.      What phone number would you like to be contacted at? [phone]  How would you like to obtain your AVS? Mail a copy     Assessment & Plan     Benign essential hypertension  I discussed with the patient the issues in regards to checking her blood pressure here in the clinic particularly in regards to the mild swelling that she is noted in her legs.  She is still recovering from her orthopedic hip repair and has been relatively not as ambulatory as she normally has been.  Reports blood pressures in the mid to upper 140s over mid 80s.  We discussed that her amlodipine may also potentially be a source for some of her swelling.  She apparently has an appointment scheduled in the middle of February and thus we will continue with her medications as ordered.  She is watching her salt intake.  She is trying to get more exercise.  I suggested ROMMEL hose for support.  I discussed with her that she is due to get some blood work done and some additional studies done to evaluate her edema if it does not improve.  She will contact me if her symptoms worsen.    20 minutes spent on the date of the encounter doing chart review, review of test results, interpretation of tests, patient visit and documentation        Work on weight loss  Regular exercise    Return in about 1 month (around 2/25/2021) for BP Recheck, Lab Work appointment.    Skyler Álvarez MD  [facility]     Mercedes is a 65 year old who presents to clinic today for the following health issues     HPI     Hypertension Follow-up      Do you check your blood pressure regularly outside of the clinic? Yes     Are you following a low salt diet? Yes    Are your blood pressures ever more than 140 on the top number (systolic) OR more   than 90 on the bottom number (diastolic), for example 140/90? Yes       How many servings of fruits and vegetables do  you eat daily?  2-3    On average, how many sweetened beverages do you drink each day (Examples: soda, juice, sweet tea, etc.  Do NOT count diet or artificially sweetened beverages)?   1    How many days per week do you exercise enough to make your heart beat faster? 3 or less    How many minutes a day do you exercise enough to make your heart beat faster? 9 or less    How many days per week do you miss taking your medication? 0    Other concerns:  1. Swelling in lower legs for 3 weeks, elevating legs helps     Review of Systems   CONSTITUTIONAL: NEGATIVE for fever, chills, minimal change in weight  ENT/MOUTH: NEGATIVE for ear, mouth and throat problems  RESP: NEGATIVE for significant cough or SOB  GI: NEGATIVE for nausea, abdominal pain, heartburn, or change in bowel habits  : NEGATIVE for frequency, dysuria, or hematuria  She has had chronic problems with hip pain due to her multiple surgeries and is following up with orthopedics.  NEURO: NEGATIVE for weakness, dizziness or paresthesias  HEME: NEGATIVE for bleeding problems  PSYCHIATRIC: NEGATIVE for changes in mood or affect      Objective       Vitals:  No vitals were obtained today due to virtual visit.    Physical Exam   healthy, alert and no distress  PSYCH: Alert and oriented times 3; coherent speech, normal   rate and volume, able to articulate logical thoughts, able   to abstract reason, no tangential thoughts, no hallucinations   or delusions  Her affect is normal  RESP: No cough, no audible wheezing, able to talk in full sentences  Remainder of exam unable to be completed due to telephone visits        Phone call duration: 13 minutes

## 2021-01-29 ENCOUNTER — THERAPY VISIT (OUTPATIENT)
Dept: PHYSICAL THERAPY | Facility: CLINIC | Age: 66
End: 2021-01-29
Payer: MEDICARE

## 2021-01-29 DIAGNOSIS — Z96.641 AFTERCARE FOLLOWING RIGHT HIP JOINT REPLACEMENT SURGERY: ICD-10-CM

## 2021-01-29 DIAGNOSIS — Z47.1 AFTERCARE FOLLOWING RIGHT HIP JOINT REPLACEMENT SURGERY: ICD-10-CM

## 2021-01-29 PROCEDURE — 97530 THERAPEUTIC ACTIVITIES: CPT | Mod: GP | Performed by: PHYSICAL THERAPIST

## 2021-01-29 PROCEDURE — 97110 THERAPEUTIC EXERCISES: CPT | Mod: GP | Performed by: PHYSICAL THERAPIST

## 2021-01-30 ENCOUNTER — NURSE TRIAGE (OUTPATIENT)
Dept: NURSING | Facility: CLINIC | Age: 66
End: 2021-01-30

## 2021-01-31 ENCOUNTER — APPOINTMENT (OUTPATIENT)
Dept: ULTRASOUND IMAGING | Facility: CLINIC | Age: 66
End: 2021-01-31
Attending: EMERGENCY MEDICINE
Payer: MEDICARE

## 2021-01-31 ENCOUNTER — HOSPITAL ENCOUNTER (EMERGENCY)
Facility: CLINIC | Age: 66
Discharge: HOME | End: 2021-01-31
Attending: EMERGENCY MEDICINE | Admitting: EMERGENCY MEDICINE
Payer: MEDICARE

## 2021-01-31 ENCOUNTER — APPOINTMENT (OUTPATIENT)
Dept: GENERAL RADIOLOGY | Facility: CLINIC | Age: 66
End: 2021-01-31
Attending: EMERGENCY MEDICINE
Payer: MEDICARE

## 2021-01-31 VITALS
OXYGEN SATURATION: 98 % | DIASTOLIC BLOOD PRESSURE: 93 MMHG | HEART RATE: 89 BPM | TEMPERATURE: 98.2 F | RESPIRATION RATE: 16 BRPM | SYSTOLIC BLOOD PRESSURE: 156 MMHG

## 2021-01-31 DIAGNOSIS — R60.0 PERIPHERAL EDEMA: ICD-10-CM

## 2021-01-31 DIAGNOSIS — I10 ESSENTIAL HYPERTENSION: ICD-10-CM

## 2021-01-31 LAB
ALBUMIN SERPL-MCNC: 3.6 G/DL (ref 3.4–5)
ALP SERPL-CCNC: 102 U/L (ref 40–150)
ALT SERPL W P-5'-P-CCNC: 24 U/L (ref 0–50)
ANION GAP SERPL CALCULATED.3IONS-SCNC: 2 MMOL/L (ref 3–14)
AST SERPL W P-5'-P-CCNC: 19 U/L (ref 0–45)
BASOPHILS # BLD AUTO: 0 10E9/L (ref 0–0.2)
BASOPHILS NFR BLD AUTO: 0.7 %
BILIRUB SERPL-MCNC: 0.3 MG/DL (ref 0.2–1.3)
BUN SERPL-MCNC: 18 MG/DL (ref 7–30)
CALCIUM SERPL-MCNC: 9.2 MG/DL (ref 8.5–10.1)
CHLORIDE SERPL-SCNC: 110 MMOL/L (ref 94–109)
CO2 SERPL-SCNC: 30 MMOL/L (ref 20–32)
CREAT SERPL-MCNC: 0.8 MG/DL (ref 0.52–1.04)
D DIMER PPP FEU-MCNC: 0.7 UG/ML FEU (ref 0–0.5)
DIFFERENTIAL METHOD BLD: ABNORMAL
EOSINOPHIL # BLD AUTO: 0.4 10E9/L (ref 0–0.7)
EOSINOPHIL NFR BLD AUTO: 6.8 %
ERYTHROCYTE [DISTWIDTH] IN BLOOD BY AUTOMATED COUNT: 16.3 % (ref 10–15)
GFR SERPL CREATININE-BSD FRML MDRD: 77 ML/MIN/{1.73_M2}
GLUCOSE SERPL-MCNC: 88 MG/DL (ref 70–99)
HCT VFR BLD AUTO: 37.7 % (ref 35–47)
HGB BLD-MCNC: 11.6 G/DL (ref 11.7–15.7)
IMM GRANULOCYTES # BLD: 0 10E9/L (ref 0–0.4)
IMM GRANULOCYTES NFR BLD: 0.4 %
LYMPHOCYTES # BLD AUTO: 1.3 10E9/L (ref 0.8–5.3)
LYMPHOCYTES NFR BLD AUTO: 23 %
MCH RBC QN AUTO: 27.7 PG (ref 26.5–33)
MCHC RBC AUTO-ENTMCNC: 30.8 G/DL (ref 31.5–36.5)
MCV RBC AUTO: 90 FL (ref 78–100)
MONOCYTES # BLD AUTO: 0.6 10E9/L (ref 0–1.3)
MONOCYTES NFR BLD AUTO: 11.2 %
NEUTROPHILS # BLD AUTO: 3.3 10E9/L (ref 1.6–8.3)
NEUTROPHILS NFR BLD AUTO: 57.9 %
NRBC # BLD AUTO: 0 10*3/UL
NRBC BLD AUTO-RTO: 0 /100
NT-PROBNP SERPL-MCNC: 183 PG/ML (ref 0–900)
PLATELET # BLD AUTO: 285 10E9/L (ref 150–450)
POTASSIUM SERPL-SCNC: 3.7 MMOL/L (ref 3.4–5.3)
PROT SERPL-MCNC: 6.9 G/DL (ref 6.8–8.8)
RBC # BLD AUTO: 4.19 10E12/L (ref 3.8–5.2)
SODIUM SERPL-SCNC: 142 MMOL/L (ref 133–144)
TROPONIN I SERPL-MCNC: <0.015 UG/L (ref 0–0.04)
WBC # BLD AUTO: 5.6 10E9/L (ref 4–11)

## 2021-01-31 PROCEDURE — 99285 EMERGENCY DEPT VISIT HI MDM: CPT | Mod: 25

## 2021-01-31 PROCEDURE — 80053 COMPREHEN METABOLIC PANEL: CPT | Performed by: EMERGENCY MEDICINE

## 2021-01-31 PROCEDURE — 83880 ASSAY OF NATRIURETIC PEPTIDE: CPT | Performed by: EMERGENCY MEDICINE

## 2021-01-31 PROCEDURE — 93005 ELECTROCARDIOGRAM TRACING: CPT

## 2021-01-31 PROCEDURE — 85379 FIBRIN DEGRADATION QUANT: CPT | Performed by: EMERGENCY MEDICINE

## 2021-01-31 PROCEDURE — 84484 ASSAY OF TROPONIN QUANT: CPT | Performed by: EMERGENCY MEDICINE

## 2021-01-31 PROCEDURE — 85025 COMPLETE CBC W/AUTO DIFF WBC: CPT | Performed by: EMERGENCY MEDICINE

## 2021-01-31 PROCEDURE — 250N000013 HC RX MED GY IP 250 OP 250 PS 637: Performed by: EMERGENCY MEDICINE

## 2021-01-31 PROCEDURE — 93970 EXTREMITY STUDY: CPT | Mod: TC

## 2021-01-31 PROCEDURE — 71045 X-RAY EXAM CHEST 1 VIEW: CPT

## 2021-01-31 RX ORDER — FUROSEMIDE 20 MG
20 TABLET ORAL ONCE
Status: COMPLETED | OUTPATIENT
Start: 2021-01-31 | End: 2021-01-31

## 2021-01-31 RX ORDER — FUROSEMIDE 20 MG
20 TABLET ORAL DAILY
Qty: 4 TABLET | Refills: 0 | Status: SHIPPED | OUTPATIENT
Start: 2021-02-01 | End: 2021-02-01

## 2021-01-31 RX ADMIN — FUROSEMIDE 20 MG: 20 TABLET ORAL at 19:28

## 2021-01-31 ASSESSMENT — ENCOUNTER SYMPTOMS
SHORTNESS OF BREATH: 1
VOMITING: 0
COUGH: 0
FEVER: 0

## 2021-01-31 NOTE — TELEPHONE ENCOUNTER
Mercedes says that her lower legs are swollen. Pt. says that her whole body seems swollen, too. Pt. Says that her mother  from CHF and she does not want to have CHF. Mercedes says that she is not going to go to an ER tonight. Mercedes says that she will try to see her Dr. On Monday. COVID 19 Nurse Triage Plan/Patient Instructions    Please be aware that novel coronavirus (COVID-19) may be circulating in the community. If you develop symptoms such as fever, cough, or SOB or if you have concerns about the presence of another infection including coronavirus (COVID-19), please contact your health care provider or visit [email or web address].     Disposition/Instructions    ED Visit recommended. Follow protocol based instructions.     Bring Your Own Device:  Please also bring your smart device(s) (smart phones, tablets, laptops) and their charging cables for your personal use and to communicate with your care team during your visit.    Thank you for taking steps to prevent the spread of this virus.  o Limit your contact with others.  o Wear a simple mask to cover your cough.  o Wash your hands well and often.    Resources    [facility]: About COVID-19: [email or web address]    CDC: What to Do If You're Sick: www.cdc.gov/coronavirus/2019-ncov/about/steps-when-sick.html    CDC: Ending Home Isolation: www.cdc.gov/coronavirus/2019-ncov/hcp/disposition-in-home-patients.html     CDC: Caring for Someone: www.cdc.gov/coronavirus/2019-ncov/if-you-are-sick/care-for-someone.html     [facility]: Interim Guidance for Hospital Discharge to Home: www.health.[facility].[place]/diseases/coronavirus/hcp/hospdischarge.pdf    [facility] clinical trials (COVID-19 research studies): clinicalaffairs.[facility]/umn-clinical-trials     Below are the COVID-19 hotlines at the [place] Department of [facility] ([facility]). Interpreters are available.   o For health questions: Call 971-617-1255 or 1-694.683.4226 (7 a.m. to 7 p.m.)  o For questions about  schools and childcare: Call 577-000-8852 or 1-260.676.7447 (7 a.m. to 7 p.m.)                       Reason for Disposition    [1] Can't walk or can barely walk AND [2] new onset    Additional Information    Negative: Severe difficulty breathing (e.g., struggling for each breath, speaks in single words)    Negative: Looks like a broken bone or dislocated joint (e.g., crooked or deformed)    Negative: Sounds like a life-threatening emergency to the triager    Negative: Chest pain    Negative: Followed a leg injury    Negative: [1] Small area of swelling AND [2] followed an insect bite to the area    Negative: Swelling of one ankle joint    Negative: Swelling of knee is main symptom    Negative: Pregnant    Negative: Postpartum (from 0 to 6 weeks after delivery)    Negative: Difficulty breathing at rest    Negative: Entire foot is cool or blue in comparison to other side    Protocols used: LEG SWELLING AND EDEMA-A-AH

## 2021-01-31 NOTE — ED TRIAGE NOTES
"Pt here for bilateral swollen legs, lower leg redness at htn for the last 4 days. Pt states that she recently had hip surgery and has been moving slower since then. Pt states that she has also been short of breath and feeling \"heavy\". History of blood clots. A+Ox4, no acute signs of distress. CMS intact.  "

## 2021-01-31 NOTE — ED PROVIDER NOTES
History   Chief Complaint:  Leg Swelling     The history is provided by the patient.      Mercedes Barber is a 65 year old female with history of hypertension and DVT who presents for evaluation of leg swelling.  Patient has noted symmetric lower extremity edema over the last 4 days.  There is no associated discomfort.  She does have a history of DVT in her upper extremity that occurred surrounding a surgery,. She was on anticoagulants for short period of time but is no longer taking anticoagulants. She has no known clotting disorder that she is aware of.  No recent immobilization, estrogen use, tobacco use, malignancy.    She did have increased blood pressure for which was initiated on amlodipine at the end of December.  She denies any chest pain but did admit to a brief episode of shortness of breath that occurred on the way to the emergency department that has since resolved.  She attributed this dyspnea to anxiety and again reports that she is no longer short of breath.    Review of Systems   Constitutional: Negative for fever.   Respiratory: Positive for shortness of breath. Negative for cough.    Cardiovascular: Negative for chest pain.   Gastrointestinal: Negative for vomiting.   All other systems reviewed and are negative.      Allergies:  Blood Transfusion Related (Informational Only)  Bupropion  Carbamazepine  Clonazepam  Encort [Hydrocortisone Acetate]  Encort [Hydrocortisone]  Erythromycin  Erythromycin  Flagyl [Metronidazole]  Gabapentin  Lamictal [Lamotrigine]  Oxycodone  Tegretol [Carbamazepine]    Medications:  Albuterol Inhaler  Aspirin 81 mg  Breo Ellipta  Vimpat  Synthroid  Cytomel  Loratadine  Serzone  Prilosec  Potassium Citrate  Mirapex  Seroquel  Senna-Docusate  Restoril  Ultram    Past Medical History:    Arthritis   Cervical high risk HPV test positive    Esophageal stricture   Gastro-oesophageal reflux disease   Hypothyroidism   IBS    Mild major depression   Neuropathy of lower  extremity   Rectal prolapse   Restless leg syndrome   Seizure disorder   Sleep apnea   Temporal sclerosis  Benign essential hypertension  DVT of upper extremity  Plantar fascial fibromatosis  Partial epilepsy with intractable epilepsy  Mild intermittent asthma  Generalized anxiety disorder  Major depressive disorder, recurrent episode, moderate     Past Surgical History:    Anterior Colporrhaphy, Bladder/Vagina  Arthoplasty Patello-Fermoal Bilateral  Arthoplasty Revision Hip  Carpal Tunnel Release RT/LT  Dental Surgery, Implant  Dilation and Curettage  Drain Pilonidal Cyst Simple  Endoscopy Drug Include Sleep  EGD Combined  Excise lesion Trunk  Hysterectomy  Irrigation and Debridement Hip  Release plantar fascia  Remove mesh vagina  Repair hammer toe  Tonsillectomy and adenoidectomy  Tubal ligation     Family History:    Eye disorder  Lipids  Osteoporosis  Diabetes  Lipids  Psychotic Disorder  Breast Cancer  Depression  Lipids  Thyroid Disease  Hypertension  Alcohol/Drug  Depression  GI Disease    Social History:  The patient presents to the ED alone.  Tobacco Use: No    Physical Exam     Patient Vitals for the past 24 hrs:   BP Temp Temp src Pulse Resp SpO2   01/31/21 1800 (!) 156/93 -- -- -- -- 98 %   01/31/21 1632 (!) 146/95 98.2  F (36.8  C) Oral 89 16 99 %       Physical Exam    Eyes:    Conjunctiva normal  Neck:    Supple, no meningismus.     CV:     Regular rate and rhythm.      No murmurs, rubs or gallops.       No unilateral leg swelling.       2+ radial pulses bilateral.       1+ bilateral lower extremity edema.  PULM:    Clear to auscultation bilateral.       No respiratory distress.      Good air exchange.     No rales or wheezing.     No stridor.  ABD:    Soft, non-tender, non-distended.       No pulsatile masses.       No rebound, guarding or rigidity.  MSK:     No gross deformity to all four extremities.   LYMPH:   No cervical lymphadenopathy.  NEURO:   Alert. Good muscle tone, no atrophy.  Skin:     Warm, dry and intact.    Psych:    Mood is good and affect is appropriate.        Emergency Department Course     ECG:  Indication: Leg Swelling  Completed at 1701.  Read at 1710.   Normal sinus rhythm. Normal ECG.   Rate 71 bpm. AZ interval 168. QRS duration 102. QT/QTc 410/445. P-R-T axes 50 1 22.    Imaging:  XR Chest Port 1 View:  Mild cardiomegaly. No evidence for CHF or pneumonia. No pleural effusion or pneumothorax. Postsurgical change in the proximal left humerus. Report per radiology     US Lower Extremity Venous Duplex Bilateral:  1.  No deep venous thrombosis in the bilateral lower extremities. Report per radiology     Laboratory:  CBC: WBC 5.6, HGB 11.6 (L),     CMP: Cl 110 (H), Anion Gap 2 (L), o/w WNL (Creat 0.80)    BNP: 183     Troponin: <0.015    D-Dimer: 0.7 (H)    Emergency Department Course:    Reviewed:  I reviewed nursing notes, vitals, past medical history and care everywhere    Assessments:  1648 I performed a physical exam of the patient. Findings as above.      EKG obtained in the ED, see results above.      IV was inserted and blood was drawn for laboratory testing, results above.     The patient was sent for a xr chest and ultrasound while in the emergency department, results above.     184 Patient rechecked and updated. Plan of care discussed and questions answered.     Interventions:  Medications   furosemide (LASIX) tablet 20 mg (has no administration in time range)     Disposition:  The patient was discharged to home.     Impression & Plan   Medical Decision Makin-year-old female presented to the ED with primary complaints of bilateral lower extremity swelling over the last 4 days.  She has no CHF, liver or renal failure on eval.  D-dimer was elevated thus Doppler ultrasound of the lower extremities were undertaken and negative for DVT.  Edema likely related to mild lymphedema.  I recommended compression stockings but she reports that she has great difficulty with  these secondary to neuropathy.  She will be initiated on low-dose Lasix over the next 5 days to assist with edema.  I will defer any further management management to her primary care physician.      She did report transient shortness of breath that she felt is most likely secondary to anxiety.  Given the absence of ongoing symptoms, I do not feel that she needs further investigation to pulmonary embolus.  No evidence of ACS, anemia, pulmonary edema, pneumothorax or pneumonia.  The remainder of her work-up was unremarkable.  Patient safe for discharge.    Diagnosis:    ICD-10-CM    1. Peripheral edema  R60.9    2. Essential hypertension  I10        Discharge Medications:  New Prescriptions    FUROSEMIDE (LASIX) 20 MG TABLET    Take 1 tablet (20 mg) by mouth daily for 4 days     Scribe Disclosure:  I, Xavier Alexander, am serving as a scribe at 4:46 PM on 1/31/2021 to document services personally performed by Pratik Jameson MD based on my observations and the provider's statements to me.     [facility]         Pratik Jameson MD  01/31/21 6500

## 2021-02-01 ENCOUNTER — TELEPHONE (OUTPATIENT)
Dept: ORTHOPEDICS | Facility: CLINIC | Age: 66
End: 2021-02-01

## 2021-02-01 ENCOUNTER — OFFICE VISIT (OUTPATIENT)
Dept: INTERNAL MEDICINE | Facility: CLINIC | Age: 66
End: 2021-02-01
Payer: MEDICARE

## 2021-02-01 VITALS
HEIGHT: 65 IN | OXYGEN SATURATION: 98 % | BODY MASS INDEX: 35.16 KG/M2 | WEIGHT: 211 LBS | TEMPERATURE: 97.6 F | RESPIRATION RATE: 15 BRPM | SYSTOLIC BLOOD PRESSURE: 118 MMHG | DIASTOLIC BLOOD PRESSURE: 74 MMHG | HEART RATE: 76 BPM

## 2021-02-01 DIAGNOSIS — K21.9 GASTROESOPHAGEAL REFLUX DISEASE WITHOUT ESOPHAGITIS: ICD-10-CM

## 2021-02-01 DIAGNOSIS — E66.01 MORBID OBESITY (H): ICD-10-CM

## 2021-02-01 DIAGNOSIS — I10 BENIGN ESSENTIAL HYPERTENSION: Primary | ICD-10-CM

## 2021-02-01 DIAGNOSIS — R60.0 PERIPHERAL EDEMA: ICD-10-CM

## 2021-02-01 DIAGNOSIS — I82.629 DEEP VEIN THROMBOSIS (DVT) OF UPPER EXTREMITY, UNSPECIFIED CHRONICITY, UNSPECIFIED LATERALITY, UNSPECIFIED VEIN (H): ICD-10-CM

## 2021-02-01 LAB — INTERPRETATION ECG - MUSE: NORMAL

## 2021-02-01 PROCEDURE — 99213 OFFICE O/P EST LOW 20 MIN: CPT | Performed by: INTERNAL MEDICINE

## 2021-02-01 RX ORDER — TRAMADOL HYDROCHLORIDE 50 MG/1
50 TABLET ORAL EVERY 6 HOURS PRN
Qty: 30 TABLET | Refills: 0 | Status: CANCELLED | OUTPATIENT
Start: 2021-02-01

## 2021-02-01 RX ORDER — OMEPRAZOLE 40 MG/1
40 CAPSULE, DELAYED RELEASE ORAL 2 TIMES DAILY
Qty: 180 CAPSULE | Refills: 3 | Status: SHIPPED | OUTPATIENT
Start: 2021-02-01 | End: 2023-06-29

## 2021-02-01 RX ORDER — AMLODIPINE BESYLATE 5 MG/1
5 TABLET ORAL DAILY
Qty: 90 TABLET | Refills: 0 | Status: SHIPPED | OUTPATIENT
Start: 2021-02-01 | End: 2021-02-19

## 2021-02-01 RX ORDER — FUROSEMIDE 20 MG
20 TABLET ORAL DAILY
Qty: 30 TABLET | Refills: 0 | Status: SHIPPED | OUTPATIENT
Start: 2021-02-01 | End: 2021-02-19

## 2021-02-01 ASSESSMENT — MIFFLIN-ST. JEOR: SCORE: 1497.97

## 2021-02-01 NOTE — TELEPHONE ENCOUNTER
[facility]    Phone Message    May a detailed message be left on voicemail: yes     Reason for Call: Symptoms or Concerns     If patient has red-flag symptoms, warm transfer to triage line    Current symptom or concern: severe leg pain & swelling    Symptoms have been present for:  2-3 week(s)    Has patient previously been seen for this? No    By: Dr. Grey    Date: August 2020    Are there any new or worsening symptoms? Yes: Patient doesn't know if this leg swelling & pain is part of her healing process as she last had surgery in August 2020.    She has been using ice/heat intermittently with little effect.    Patient states pain is severe all day but even worst at night and would rate a 10/10.    Please call patient to discuss.  Her home # is valid today until 1 PM.  After 1, please call her cell.      Action Taken: Message routed to:  [facility] ortho    Travel Screening: Not Applicable

## 2021-02-01 NOTE — PROGRESS NOTES
Assessment & Plan     Benign essential hypertension  Stable at present time and continue with current medical management.  - amLODIPine (NORVASC) 5 MG tablet; Take 1 tablet (5 mg) by mouth daily    Peripheral edema  Advise adding Lasix 20 mg daily with recheck of basic metabolic panel in 10 to 14 days  - Basic metabolic panel; Future  - furosemide (LASIX) 20 MG tablet; Take 1 tablet (20 mg) by mouth daily    Deep vein thrombosis (DVT) of upper extremity, unspecified chronicity, unspecified laterality, unspecified vein (H)  Stable with recent ultrasound negative.    Gastroesophageal reflux disease without esophagitis  Refilled omeprazole per request.  - omeprazole (PRILOSEC) 40 MG DR capsule; Take 1 capsule (40 mg) by mouth 2 times daily    Morbid obesity (H)  Encouraged ongoing weight loss      20 minutes spent on the date of the encounter doing chart review, review of outside records, review of test results, interpretation of tests, patient visit and documentation        See Patient Instructions    No follow-ups on file.    Skyler Álvarez MD  [facility]    Percy Long is a 65 year old who presents to clinic today for the following health issues     HPI     ED/UC Followup:    Facility:  [facility]   Date of visit: 1/31/21  Reason for visit: Peripheral edema, HTN   Current Status: Slight improvement       Patient has noted symmetric lower extremity edema.  There is no associated discomfort.  She does have a history of DVT in her upper extremity that occurred surrounding a surgery,. She was on anticoagulants for short period of time but is no longer taking anticoagulants. She has no known clotting disorder that she is aware of.  No recent immobilization, estrogen use, tobacco use, malignancy.     She did have increased blood pressure for which was initiated on amlodipine at the end of December.  She denies any chest pain but did admit to a brief episode of shortness of  "breath that occurred on the way to the emergency department that has since resolved.  She attributed this dyspnea to anxiety and again reports that she is no longer short of breath.       Review of Systems   CONSTITUTIONAL: NEGATIVE for fever, chills, mild change in weight  EYES: NEGATIVE for vision changes or irritation  ENT/MOUTH: NEGATIVE for ear, mouth and throat problems  RESP: NEGATIVE for significant cough or SOB  GI: NEGATIVE for nausea, abdominal pain, heartburn, or change in bowel habits  : NEGATIVE for frequency, dysuria, or hematuria  MUSCULOSKELETAL: NEGATIVE for significant arthralgias or myalgia  NEURO: NEGATIVE for weakness, dizziness or paresthesias  HEME: NEGATIVE for bleeding problems  PSYCHIATRIC:  + for changes in mood or affect with chronic sleep issues formally assessed.      Objective    /74   Pulse 76   Temp 97.6  F (36.4  C) (Temporal)   Resp 15   Ht 1.651 m (5' 5\")   Wt 95.7 kg (211 lb)   LMP 03/11/2010   SpO2 98%   BMI 35.11 kg/m    Body mass index is 35.11 kg/m .  Physical Exam   GENERAL: alert and no distress  EYES: Eyes grossly normal to inspection, PERRL and conjunctivae and sclerae normal  HENT: ear canals and TM's normal, nose and mouth without ulcers or lesions  NECK: no adenopathy, no asymmetry, masses, or scars and thyroid normal to palpation  RESP: lungs clear to auscultation - no rales, rhonchi or wheezes  CV: regular rate and rhythm, normal S1 S2, no S3 or S4, no  click or rub  NEURO: No focal changes, noted edema LE's bilaterally 2+  PSYCH: mentation appears normal, affect flat    Creatinine   Date Value Ref Range Status   01/31/2021 0.80 0.52 - 1.04 mg/dL Final     Potassium   Date Value Ref Range Status   01/31/2021 3.7 3.4 - 5.3 mmol/L Final         EXAM: US LOWER EXTREMITY VENOUS DUPLEX BILATERAL  LOCATION: [facility]  DATE/TIME: 1/31/2021 6:02 PM     INDICATION: ; leg swelling, elevated D dimer;  COMPARISON: None.  TECHNIQUE: Venous Duplex " ultrasound of bilateral lower extremities with and without compression, augmentation and duplex. Color flow and spectral Doppler with waveform analysis performed.     FINDINGS: Exam includes the common femoral, femoral, popliteal veins as well as segmentally visualized deep calf veins and greater saphenous vein.      RIGHT: No deep vein thrombosis. No superficial thrombophlebitis. No popliteal cyst.     LEFT: No deep vein thrombosis. No superficial thrombophlebitis. No popliteal cyst.                                                                      IMPRESSION:  1.  No deep venous thrombosis in the bilateral lower extremities.

## 2021-02-01 NOTE — TELEPHONE ENCOUNTER
Returned call to patient. Patient states she is having swelling in both lower extremities and saw her primary physician who started her on Lasix. She states she does have pain in her back and leg all the way down to the front and side of her calf. She is attending PT. Advised this sounds like it may be coming from her back; however, she will keep her follow up appointment on 2/17 with Dr. Grey to evaluate her postop leg (8/20). She will call if she develops any further concerns. Understanding expressed.

## 2021-02-02 ENCOUNTER — PATIENT OUTREACH (OUTPATIENT)
Dept: CARE COORDINATION | Facility: CLINIC | Age: 66
End: 2021-02-02

## 2021-02-02 ASSESSMENT — ACTIVITIES OF DAILY LIVING (ADL): DEPENDENT_IADLS:: INDEPENDENT

## 2021-02-02 NOTE — PROGRESS NOTES
Clinic Care Coordination Contact    Clinic Care Coordination Contact  OUTREACH    Referral Information:  Referral Source: ED Follow-Up    Primary Diagnosis: Orthopedic    Chief Complaint   Patient presents with     Clinic Care Coordination - Follow-up     Goal check in     Clinic Care Coordination - Post Hospital     ED follow up - peripheral edema, essential hypertension        Universal Utilization:   Clinic Utilization  Difficulty keeping appointments: No  Compliance Concerns: No  No-Show Concerns: No  No PCP office visit in Past Year: No    Utilization    Last refreshed: 2/2/2021 10:54 AM: Hospital Admissions 2           Last refreshed: 2/2/2021 10:54 AM: ED Visits 2           Last refreshed: 2/2/2021 10:54 AM: No Show Count (past year) 1              Current as of: 2/2/2021 10:54 AM              Clinical Concerns:  Current Medical Concerns: Pt presented to [facility] ED 1/31/21 for evaluation of leg swelling. Pt had PCP appt yesterday for follow up.     Patient Active Problem List   Diagnosis     Menopausal syndrome (hot flashes)     Family history of breast cancer     Vulvar pain     Renal anomaly     Overactive bladder     Rectal prolapse     CARDIOVASCULAR SCREENING; LDL GOAL LESS THAN 160     Vaginal pain     Dysphagia     Confusion     Headache     Left shoulder pain     Anemia     Mild major depression (H)     Esophageal stricture     Vulvar lesion     Temporal sclerosis     Lumbago     Pain in thoracic spine     Sciatica     Pain in joint, lower leg     Plantar fascial fibromatosis     Pain in joint involving ankle and foot     Other specified hypothyroidism     GERD (gastroesophageal reflux disease)     Irritable bowel syndrome     Other sleep apnea     Cervical high risk HPV (human papillomavirus) test positive     Restless legs syndrome (RLS)     History of total hip arthroplasty, right     Hip pain     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)     Cellulitis of right hip     Deep venous  thrombosis of upper extremity (H)     Peripheral edema     Low iron stores     Mechanical complication of prosthetic hip implant, initial encounter (H)     Status post hip surgery     Chest pain, unspecified     Closed fracture of proximal end of left humerus     Generalized anxiety disorder     History of infection     Infection and inflammatory reaction due to unspecified internal joint prosthesis, initial encounter (H)     Ingrowing nail     Major depressive disorder, recurrent episode, moderate (H)     Mild intermittent asthma     Nausea     Partial epilepsy with intractable epilepsy (H)     Repeated falls     History of revision of total replacement of right hip joint     Seizure disorder (H)     Aftercare following right hip joint replacement surgery     Benign essential hypertension     Morbid obesity (H)       Current Behavioral Concerns: n/a     Education Provided to patient: ED review, PCP appt review     CARLEY KAY outreach to pt for ED follow up.     Pt reported she is very tired today and needs a day of rest. She needs to go to pharmacy but may ask her daughter to do this for her. Pt reported her back and hip is hurting; has hot packs on right now. Elevating legs as well as they are swollen.     Pt discussed her ED visit; noted her BP was high, started lasix.    Discussed PCP appt yesterday.     Pt has upcoming appt with lung Dr 2/5/21.     Pt needs to  Vimpat script from neurologist, they didn't refill right away and pt has been out for a day or so. Pt reports she has not had a seizure in 20 years so not worried about missing a couple days. Pt reported 6 days worth is $300, but she usually uses a coupon from Vimpat website to get it for $20. CARLEY KAY looked up on line and pt can apply online. Encouraged pt to do so sooner than later.     Reviewed next appts in chart.     Pt plans to call PT - have a few visits left. Pt reports she cannot do her exercises much, as it hurts too much. CARLEY KAY advised pt to  let them know and they can make adjustments. Pt has been seeing PT since Sept and loves her therapist.     Pt reported she does not use tramadol unless in severe pain- does not take sleep medication alongside of it. Pt was a nurse.     Pt has chiropractic appt tomorrow 10 am. They are helping with neck, out of alignment.     Pt discussed that recently she did sleep monitoring with watch, only 4.5 hours sleep at night on average.     Pt has a nurse from [facility] that calls every couple months (Jeanne RN). They will call again next month.     Pt sees her psychologist weekly - a lot of loss this past year, most mom son 2 good friends, couple aunts.     Pt called a couple friends from [employer] yesterday; reports she gets a lot of support from friends from her [employer]. Pt misses going to [employer] - watches online on Sundays.     Discussed COVID-19 vaccine, talked to PCP about it yesterday. Pt has # to make appt, and plans to but is not in a hurry. Knows limitations to scheduling this many people. CARLEY CC advised pt to keep trying for appt.     Pt reported she baked yesterday, she likes to do it. She is making cards today for people at [employer].       Pain  Pain: Yes  Type: Acute (<3mo)    Health Maintenance Reviewed: Due/Overdue     Health Maintenance Due   Topic Date Due     PHQ-9  07/26/2017     MEDICARE ANNUAL WELLNESS VISIT  04/24/2019       Clinical Pathway: None    Medication Management:  No questions.      ED changes:   START taking:  furosemide (LASIX)  Start taking on: February 1, 2021    PCP changes: None     Post-discharge medication reconciliation status: Discharge medications reviewed and reconciled.  Continue medications without change.    Functional Status:  Dependent ADLs: Ambulation-walker  Dependent IADLs: Independent  Bed or wheelchair confined: No  Mobility Status: Independent w/Device  Fallen 2 or more times in the past year?: No  Any fall with injury in the past year?: No    Living Situation:  Current living  arrangement: I live alone  Type of residence: Apartment (Condo)    Lifestyle & Psychosocial Needs:  Diet: Regular  Inadequate nutrition: No  Tube Feeding: No  Inadequate activity/exercise: Yes  Significant changes in sleep pattern: No  Transportation means: Regular car  Financial/Insurance concerns: No  Episcopalian or spiritual beliefs that impact treatment: No  Mental health DX: Yes  Mental health DX how managed: Medication, Outpatient Counseling, Psychiatrist  Mental health management concern: No  Chemical Dependency Status: No Current Concerns  Informal Support system: Family, Children     Socioeconomic History     Marital status:      Spouse name: Not on file     Number of children: Not on file     Years of education: Not on file     Highest education level: Not on file     Tobacco Use     Smoking status: Never Smoker     Smokeless tobacco: Never Used   Substance and Sexual Activity     Alcohol use: Yes     Frequency: Monthly or less     Comment: 1 glass of wine 2x/yr     Drug use: No     Sexual activity: Not Currently     Comment: vag hyst       Care Coordinator has reviewed patient's Social Determinants of Health (SDoH) on this date. Upon review, changes were not made.      Resources and Interventions:  Current Resources:   Community Resources: DME, Home Care, Other (OP PT)  Supplies used at home: None  Equipment Currently Used at Home: walker, rolling, grab bar, tub/shower (grab bars toilet )  Employment Status: retired    Advance Care Plan/Directive  Advanced Care Plans/Directives on file: Yes  Type Advanced Care Plans/Directives: POLST, Advanced Directive - On File  Advanced Care Plan/Directive Status: Referral to Honoring Choices Facilitator    Referrals Placed: Community Resources, [facility] Resources (Help At your door)     Goals:   Goals        General    1. Psychosocial (pt-stated)     Notes - Note edited  10/29/2020  2:43 PM by Katy Calderon LSW    Goal Statement: I will attend [employer] at least 1  time this month over the next 6 months. I will call ahead and ask for help from someone at [employer] to help me with my walker. This will help me connect with a social network and [employer] is something that is important to me.  Date Created: 11/14/19 // extended 10/29/20  Measure of Success: I will attend [employer] this month.   Supportive Steps to Achieve: Plan when I would like to attend [employer], call ahead and ask for help.   Barriers: Not wanting to ask for help. Getting tired easily.   Strengths: Motivated to seek a support network.   Date to Achieve By: 10/1/2020 // extended 3/1/21  Patient expressed understanding of goal: Yes    As of today's date 1/20/2020 goal is met at 26 - 50%.   Goal Status:  Active  As of today's date 5/1/2020 goal is met at 26 - 50%.   Goal Status:  Active   As of today's date 6/4/2020 goal is met at 26 - 50%.   Goal Status:  Active             2. Self Care/Stress Reduction (pt-stated)     Notes - Note edited  10/29/2020  2:42 PM by Katy Calderon LSW    Goal Statement: I will make time for myself each week to make cards to reduce my stress and increase my happiness over the next 6 months.   Date created: 11/20/19 // extended 10/29/20  Measure of Success: I will make cards every week.   Supportive Steps to Achieve: Make time to create and craft cards, get supplies at craft store and call [employer] your [employer] to clean my craft room so I can utilize it again.   Barriers: Not getting help to clean my craft room.   Strengths: Motivated to get craft room clean, motivated to make cards as it makes me happy.   Date to Achieve By: 10/1/20 // extended 3/1/21  Patient expressed understanding of goal: Yes    As of today's date 1/20/2020 goal is met at 26 - 50%.   Goal Status:  Active  As of today's date 5/1/2020 goal is met at 26 - 50%.   Goal Status:  Active   As of today's date 6/4/2020 goal is met at 26 - 50%.   Goal Status:  Active               Patient/Caregiver understanding: Pt reports understanding  and denies any additional questions or concerns at this times. SW CC engaged in AIDET communication during encounter.    Outreach Frequency: Monthly    Future Appointments              In 2 weeks [id number]    In 2 weeks Pan Grey MD [facility]    In 2 weeks [facility] [place]    In 2 weeks Skyler Álvarez MD [facility],           Plan: CHW will continue with outreaches at this time, with next outreach in approx 30 days. CHW will inform SW CC of any concerns or changes regarding patient as needed.     SW CC will chart review every 6 weeks and outreach to patient as needed. SW CC will send updated complex care plan to pt.     Pt will attend upcoming appts as listed. Pt will look into coupon for medication coverage. Pt will follow plan of care.     GOSIA Stokes   Social Work Clinic Care Coordinator   [facility]  PH: [phone]  [email or web address]

## 2021-02-02 NOTE — LETTER
[place] CARE COORDINATION  [facility]  [address] [address]  February 2, 2021      Mercedes Barber  [address]  [address]  [place] [address]      Dear Miguel Long is an updated complex care plan for your continued enrollment in clinic care coordination.     Please let us know if you have additional questions, goals, or concerns that we can assist with.      GOSIA Stokes   Social Work Clinic Care Coordinator   [facility]  PH: [phone]  [email or web address]  Complex Care Plan  About Me:    Patient Name:  Mercedes Barber    YOB: 1955  Age:         65 year old   Jeremy MRN:    [id number] Telephone Information:  Home Phone [phone]   Mobile [phone]       Address:  [address]  [place] [address] Email address:  No e-mail address on record      Emergency Contact(s)    Name Relationship Lgl Grd Work Phone Home Phone Mobile Phone   1. SILVIO ROMERO Friend No  [phone]   2. RANJITH MICHEAL Daughter No  [phone]   3. JULIO C KASPER (* Relative No  [phone]           Primary language:  English     needed? No   [employer]:  [phone] op. 1  Other communication barriers: None  Preferred Method of Communication:  Phone  Current living arrangement: I live alone  Mobility Status/ Medical Equipment: Independent w/Device    Health Maintenance  Health Maintenance Reviewed: Due/Overdue   Health Maintenance Due   Topic Date Due     PHQ-9  07/26/2017     MEDICARE ANNUAL WELLNESS VISIT  04/24/2019     My Access Plan  Medical Emergency 911   Primary Clinic Line [facility] - [phone]   24 Hour Appointment Line [phone] or  [phone]) (toll-free)   24 Hour Nurse Line [phone] (toll-free)   Preferred Urgent  Care [facility] [place], [phone]   Preferred Hospital [facility] [place]  [phone]   Preferred Pharmacy [facility] [place], [place] - [address]     Behavioral Health Crisis Line The National Suicide Prevention Lifeline at 1-175.859.7762 or 911     My Care Team Members  Patient Care Team       Relationship Specialty Notifications Start End    Skyler Álvarez MD PCP - General Internal Medicine  7/31/12     Phone: [phone] Fax: [phone]         [address] [facility] [address]    Samir Arguelles MD MD Neurology  10/19/15     Referred by Dr. Samir Arguelles from [facility] Neurological [facility] for sleep apnea     Phone: [phone] Fax: [phone]         [facility] NEUROLOGICAL [facility] [address] [facility] [address]    [facility] [place] MD MD Otolaryngology  10/19/15     Referred by Dr. Samir Arguelles from [facility] for sleep apnea     Phone: [phone] Fax: [phone]         [address] [facility] [address]    Katy Calderon LSW Lead Care Coordinator Primary Care - CC  1/9/20     Phone: [phone] Fax: [phone]         [address] [place] [address]    Raegan Chinchilla MA Community Health Worker Primary Care - CC  1/23/20     Phone: [phone]         Skyler Chacko MD Referring Physician Orthopedics  7/17/20     Phone: [phone] Fax: [phone]         [facility] [address] [place] [address] [facility] [address]    Pan Grey MD Assigned Musculoskeletal Provider   10/23/20     Phone: [phone] Fax: [phone]         [facility] [address] [place] [address]    Skyler Álvarez MD Assigned PCP   1/17/21     Phone: [phone] Fax: [phone] [address] [place] [address]            My Care Plans  Self Management and Treatment Plan  Goals and (Comments)  Goals        General    1.  Psychosocial (pt-stated)     Notes - Note edited  10/29/2020  2:43 PM by Katy Calderon LSW    Goal Statement: I will attend [employer]ism at least 1 time this month over the next 6 months. I will call ahead and ask for help from someone at [employer] to help me with my walker. This will help me connect with a social network and Sikhism is something that is important to me.  Date Created: 11/14/19 // extended 10/29/20  Measure of Success: I will attend Sikhism this month.   Supportive Steps to Achieve: Plan when I would like to attend Sikhism, call ahead and ask for help.   Barriers: Not wanting to ask for help. Getting tired easily.   Strengths: Motivated to seek a support network.   Date to Achieve By: 10/1/2020 // extended 3/1/21  Patient expressed understanding of goal: Yes    As of today's date 1/20/2020 goal is met at 26 - 50%.   Goal Status:  Active  As of today's date 5/1/2020 goal is met at 26 - 50%.   Goal Status:  Active   As of today's date 6/4/2020 goal is met at 26 - 50%.   Goal Status:  Active             2. Self Care/Stress Reduction (pt-stated)     Notes - Note edited  10/29/2020  2:42 PM by Katy Calderon LSW    Goal Statement: I will make time for myself each week to make cards to reduce my stress and increase my happiness over the next 6 months.   Date created: 11/20/19 // extended 10/29/20  Measure of Success: I will make cards every week.   Supportive Steps to Achieve: Make time to create and craft cards, get supplies at craft store and call Help at your door to clean my craft room so I can utilize it again.   Barriers: Not getting help to clean my craft room.   Strengths: Motivated to get craft room clean, motivated to make cards as it makes me happy.   Date to Achieve By: 10/1/20 // extended 3/1/21  Patient expressed understanding of goal: Yes    As of today's date 1/20/2020 goal is met at 26 - 50%.   Goal Status:  Active  As of today's date 5/1/2020 goal is met at 26 - 50%.   Goal Status:  Active   As of  today's date 6/4/2020 goal is met at 26 - 50%.   Goal Status:  Active                Action Plans on File:                       Advance Care Plans/Directives Type:   Type Advanced Care Plans/Directives: POLST, Advanced Directive - On File    My Medical and Care Information  Problem List   Patient Active Problem List   Diagnosis     Menopausal syndrome (hot flashes)     Family history of breast cancer     Vulvar pain     Renal anomaly     Overactive bladder     Rectal prolapse     CARDIOVASCULAR SCREENING; LDL GOAL LESS THAN 160     Vaginal pain     Dysphagia     Confusion     Headache     Left shoulder pain     Anemia     Mild major depression (H)     Esophageal stricture     Vulvar lesion     Temporal sclerosis     Lumbago     Pain in thoracic spine     Sciatica     Pain in joint, lower leg     Plantar fascial fibromatosis     Pain in joint involving ankle and foot     Other specified hypothyroidism     GERD (gastroesophageal reflux disease)     Irritable bowel syndrome     Other sleep apnea     Cervical high risk HPV (human papillomavirus) test positive     Restless legs syndrome (RLS)     History of total hip arthroplasty, right     Hip pain     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)     Cellulitis of right hip     Deep venous thrombosis of upper extremity (H)     Peripheral edema     Low iron stores     Mechanical complication of prosthetic hip implant, initial encounter (H)     Status post hip surgery     Chest pain, unspecified     Closed fracture of proximal end of left humerus     Generalized anxiety disorder     History of infection     Infection and inflammatory reaction due to unspecified internal joint prosthesis, initial encounter (H)     Ingrowing nail     Major depressive disorder, recurrent episode, moderate (H)     Mild intermittent asthma     Nausea     Partial epilepsy with intractable epilepsy (H)     Repeated falls     History of revision of total replacement of right hip joint     Seizure  disorder (H)     Aftercare following right hip joint replacement surgery     Benign essential hypertension     Morbid obesity (H)      Current Medications and Allergies:   Current Outpatient Medications   Medication     acetaminophen (TYLENOL) 325 MG tablet     albuterol (ALBUTEROL) 108 (90 BASE) MCG/ACT Inhaler     amLODIPine (NORVASC) 5 MG tablet     amoxicillin (AMOXIL) 500 MG tablet     artificial saliva (BIOTENE MT) AERS spray     aspirin (ASA) 81 MG EC tablet     carboxymethylcellulose PF (REFRESH PLUS) 0.5 % ophthalmic solution     cycloSPORINE (RESTASIS) 0.05 % ophthalmic emulsion     fluticasone-vilanterol (BREO ELLIPTA) 200-25 MCG/INH inhaler     furosemide (LASIX) 20 MG tablet     hypromellose (ARTIFICIAL TEARS) 0.5 % SOLN ophthalmic solution     lacosamide (VIMPAT) 200 MG TABS tablet     levothyroxine (SYNTHROID/LEVOTHROID) 50 MCG tablet     linaclotide (LINZESS) 290 MCG capsule     liothyronine (CYTOMEL) 5 MCG tablet     loratadine (CLARITIN REDITABS) 10 MG ODT     nefazodone (SERZONE) 200 MG tablet     omeprazole (PRILOSEC) 40 MG DR capsule     ondansetron (ZOFRAN-ODT) 4 MG ODT tab     polyethylene glycol (MIRALAX) 17 g packet     potassium citrate (UROCIT-K) 10 MEQ (1080 MG) CR tablet     pramipexole (MIRAPEX) 1 MG tablet     QUEtiapine (SEROQUEL) 50 MG tablet     senna-docusate (SENOKOT-S/PERICOLACE) 8.6-50 MG tablet     temazepam (RESTORIL) 30 MG capsule     traMADol (ULTRAM) 50 MG tablet     No current facility-administered medications for this visit.      Care Coordination Start Date: 11/13/2019   Frequency of Care Coordination: monthly   Form Last Updated: 02/02/2021

## 2021-02-05 ENCOUNTER — THERAPY VISIT (OUTPATIENT)
Dept: PHYSICAL THERAPY | Facility: CLINIC | Age: 66
End: 2021-02-05
Payer: MEDICARE

## 2021-02-05 DIAGNOSIS — Z96.641 AFTERCARE FOLLOWING RIGHT HIP JOINT REPLACEMENT SURGERY: ICD-10-CM

## 2021-02-05 DIAGNOSIS — Z47.1 AFTERCARE FOLLOWING RIGHT HIP JOINT REPLACEMENT SURGERY: ICD-10-CM

## 2021-02-05 PROCEDURE — 97140 MANUAL THERAPY 1/> REGIONS: CPT | Mod: GP | Performed by: PHYSICAL THERAPIST

## 2021-02-05 PROCEDURE — 97530 THERAPEUTIC ACTIVITIES: CPT | Mod: GP | Performed by: PHYSICAL THERAPIST

## 2021-02-05 PROCEDURE — 97110 THERAPEUTIC EXERCISES: CPT | Mod: GP | Performed by: PHYSICAL THERAPIST

## 2021-02-10 ENCOUNTER — TRANSFERRED RECORDS (OUTPATIENT)
Dept: HEALTH INFORMATION MANAGEMENT | Facility: CLINIC | Age: 66
End: 2021-02-10

## 2021-02-11 ENCOUNTER — PATIENT OUTREACH (OUTPATIENT)
Dept: NURSING | Facility: CLINIC | Age: 66
End: 2021-02-11
Payer: MEDICARE

## 2021-02-11 NOTE — PROGRESS NOTES
Clinic Care Coordination Contact    Follow Up Progress Note      Assessment: Pt called inquiring about assistance in setting up her MyChart account.     Goals addressed this encounter:   Goals Addressed    None          Intervention/Education provided during outreach: CARLEY KAY returned call to pt. Her  is helping her set up her MyChart but her account doesn't show her medication list or any upcoming appts on it. Asked for MRN, gave to pt.     CARLEY KAY advised that it is good to get MyChart set up because that is how pt's register for COVID-19 vaccine.     CARLEY KAY gave [employer] Technical Assistance #[phone].      Outreach Frequency: Monthly    Plan: CHW will follow up with pt as previously scheduled.     GOSIA Stokes   Social Work Clinic Care Coordinator   [facility]  PH: [phone]  [email or web address]

## 2021-02-12 ENCOUNTER — PATIENT OUTREACH (OUTPATIENT)
Dept: NURSING | Facility: CLINIC | Age: 66
End: 2021-02-12
Payer: MEDICARE

## 2021-02-12 NOTE — PROGRESS NOTES
Clinic Care Coordination Contact    Follow Up Progress Note      Assessment: ACRLEY CC signed pt up for MyChart through Epic.     Goals addressed this encounter:   Goals Addressed    None          Intervention/Education provided during outreach: CARLEY CC received voicemail from pt. She is frustrated trying to sign up for MyChart. CARLEY CC returned call and signed pt up for MyChart. Sent login link to pt via e-mail.      Outreach Frequency: Monthly    Plan: CHW will follow up as previously scheduled.     GOSIA Stokes   Social Work Clinic Care Coordinator   [facility]  PH: [phone]  [email or web address]

## 2021-02-17 ENCOUNTER — ANCILLARY PROCEDURE (OUTPATIENT)
Dept: GENERAL RADIOLOGY | Facility: CLINIC | Age: 66
End: 2021-02-17
Attending: ORTHOPAEDIC SURGERY
Payer: MEDICARE

## 2021-02-17 ENCOUNTER — OFFICE VISIT (OUTPATIENT)
Dept: ORTHOPEDICS | Facility: CLINIC | Age: 66
End: 2021-02-17
Payer: MEDICARE

## 2021-02-17 DIAGNOSIS — Z96.641 HISTORY OF RIGHT HIP REPLACEMENT: Primary | ICD-10-CM

## 2021-02-17 DIAGNOSIS — Z96.641 HISTORY OF RIGHT HIP REPLACEMENT: ICD-10-CM

## 2021-02-17 DIAGNOSIS — Z98.890 STATUS POST HIP SURGERY: ICD-10-CM

## 2021-02-17 LAB
CRP SERPL-MCNC: 3.6 MG/L (ref 0–8)
ERYTHROCYTE [SEDIMENTATION RATE] IN BLOOD BY WESTERGREN METHOD: 10 MM/H (ref 0–30)

## 2021-02-17 PROCEDURE — 85652 RBC SED RATE AUTOMATED: CPT | Performed by: PATHOLOGY

## 2021-02-17 PROCEDURE — 36415 COLL VENOUS BLD VENIPUNCTURE: CPT | Performed by: PATHOLOGY

## 2021-02-17 PROCEDURE — 72170 X-RAY EXAM OF PELVIS: CPT | Performed by: RADIOLOGY

## 2021-02-17 PROCEDURE — 99213 OFFICE O/P EST LOW 20 MIN: CPT | Performed by: ORTHOPAEDIC SURGERY

## 2021-02-17 PROCEDURE — 86140 C-REACTIVE PROTEIN: CPT | Performed by: PATHOLOGY

## 2021-02-17 RX ORDER — CYCLOBENZAPRINE HCL 5 MG
5 TABLET ORAL 2 TIMES DAILY PRN
Qty: 60 TABLET | Refills: 1 | Status: SHIPPED | OUTPATIENT
Start: 2021-02-17 | End: 2021-10-27

## 2021-02-17 NOTE — LETTER
2/17/2021         RE: Mercedes Barber  [address]  [address]  [place] [address]        Dear Colleague,    Thank you for referring your patient, Mercedes Barber, to the [facility] [place]. Please see a copy of my visit note below.           Interval History:   Mercedes Barber is a 65 year old female who is here follow up for Revision right SARAHI - 8/5/20    Whitley notes that she has continued pain in her lower back and lateral thigh.  She feels like the pain radiates from her back down the thigh to the knee or so.  The pain is worse with weightbearing activities and she continues to use a cane for ambulation.  She has not had any problems with the incision.  She has noted some swelling in both of her lower extremities but this has improved over the last few days.  She is taking anti-inflammatories which is not greatly helping with the pain symptoms.  She notes that the symptoms feel very different than the symptoms felt like during the prior implant failure episode.  She states that the hip has been feeling very good and that it is more the lower back in the muscles around the hip that is bothering her.         Physical Exam:     NAD  AOx3  Interactive and cooperative with the exam.  The incision is well-healed.  She has no pain with gentle hip range of motion.  She is otherwise well-appearing.  She walks with a well-balanced gait.  She has no lateral hip tenderness palpation.         Imaging:      X-rays demonstrate well fixed well-positioned revision total hip replacement implants.  No evidence of loosening or implant related problems.  No change in position of the implants when compared to the prior imaging.       Assessment and Plan:        Mercedes Barber is a 65 year old female is s/p the above procedure.   She has persistent pain in the lateral aspect of the hip and her back.  Due to the persistence of the pain following the multiple a revised  right hip we will obtain ESR and CRP to evaluate for the possibility of infection however this is low on my suspicion.  She will continue to do the strengthening structures exercises on her own.  We also have her evaluated in the lower back clinic to see if her back could be contributing to these issues.  I will see her back in clinic in 6 weeks.  Will obtain repeat low AP pelvis and crosstable lateral of the right hip at that visit.  All in all I am quite reassured by the imaging.  Nonetheless we also discussed the small possibility that there could be failure of ingrowth despite the benign appearance of the imaging.    Pan Grey M.D.     Arthritis and Joint Replacement  Department of Orthopaedic Surgery, [facility]  [email or web address]  [phone] (pager)

## 2021-02-17 NOTE — PROGRESS NOTES
Interval History:   Mercedes Barber is a 65 year old female who is here follow up for Revision right SARAHI - 8/5/20    Whitley notes that she has continued pain in her lower back and lateral thigh.  She feels like the pain radiates from her back down the thigh to the knee or so.  The pain is worse with weightbearing activities and she continues to use a cane for ambulation.  She has not had any problems with the incision.  She has noted some swelling in both of her lower extremities but this has improved over the last few days.  She is taking anti-inflammatories which is not greatly helping with the pain symptoms.  She notes that the symptoms feel very different than the symptoms felt like during the prior implant failure episode.  She states that the hip has been feeling very good and that it is more the lower back in the muscles around the hip that is bothering her.         Physical Exam:     NAD  AOx3  Interactive and cooperative with the exam.  The incision is well-healed.  She has no pain with gentle hip range of motion.  She is otherwise well-appearing.  She walks with a well-balanced gait.  She has no lateral hip tenderness palpation.         Imaging:      X-rays demonstrate well fixed well-positioned revision total hip replacement implants.  No evidence of loosening or implant related problems.  No change in position of the implants when compared to the prior imaging.       Assessment and Plan:        Mercedes Barber is a 65 year old female is s/p the above procedure.   She has persistent pain in the lateral aspect of the hip and her back.  Due to the persistence of the pain following the multiple a revised right hip we will obtain ESR and CRP to evaluate for the possibility of infection however this is low on my suspicion.  She will continue to do the strengthening structures exercises on her own.  We also have her evaluated in the lower back clinic to see if her back could be contributing to  these issues.  I will see her back in clinic in 6 weeks.  Will obtain repeat low AP pelvis and crosstable lateral of the right hip at that visit.  All in all I am quite reassured by the imaging.  Nonetheless we also discussed the small possibility that there could be failure of ingrowth despite the benign appearance of the imaging.    Pan Grey M.D.     Arthritis and Joint Replacement  Department of Orthopaedic Surgery, [facility]  [email or web address]  [phone] (pager)

## 2021-02-18 ENCOUNTER — OFFICE VISIT (OUTPATIENT)
Dept: INTERNAL MEDICINE | Facility: CLINIC | Age: 66
End: 2021-02-18
Payer: MEDICARE

## 2021-02-18 VITALS
HEART RATE: 82 BPM | SYSTOLIC BLOOD PRESSURE: 112 MMHG | RESPIRATION RATE: 16 BRPM | HEIGHT: 65 IN | DIASTOLIC BLOOD PRESSURE: 70 MMHG | BODY MASS INDEX: 33.82 KG/M2 | WEIGHT: 203 LBS | TEMPERATURE: 98.2 F | OXYGEN SATURATION: 97 %

## 2021-02-18 DIAGNOSIS — R60.0 PERIPHERAL EDEMA: ICD-10-CM

## 2021-02-18 DIAGNOSIS — E03.8 OTHER SPECIFIED HYPOTHYROIDISM: ICD-10-CM

## 2021-02-18 DIAGNOSIS — E66.01 MORBID OBESITY (H): ICD-10-CM

## 2021-02-18 DIAGNOSIS — I10 BENIGN ESSENTIAL HYPERTENSION: Primary | ICD-10-CM

## 2021-02-18 LAB
ANION GAP SERPL CALCULATED.3IONS-SCNC: 5 MMOL/L (ref 3–14)
BUN SERPL-MCNC: 18 MG/DL (ref 7–30)
CALCIUM SERPL-MCNC: 9.3 MG/DL (ref 8.5–10.1)
CHLORIDE SERPL-SCNC: 111 MMOL/L (ref 94–109)
CO2 SERPL-SCNC: 27 MMOL/L (ref 20–32)
CREAT SERPL-MCNC: 0.9 MG/DL (ref 0.52–1.04)
GFR SERPL CREATININE-BSD FRML MDRD: 66 ML/MIN/{1.73_M2}
GLUCOSE SERPL-MCNC: 107 MG/DL (ref 70–99)
POTASSIUM SERPL-SCNC: 3.9 MMOL/L (ref 3.4–5.3)
SODIUM SERPL-SCNC: 143 MMOL/L (ref 133–144)

## 2021-02-18 PROCEDURE — 80048 BASIC METABOLIC PNL TOTAL CA: CPT | Performed by: INTERNAL MEDICINE

## 2021-02-18 PROCEDURE — 99213 OFFICE O/P EST LOW 20 MIN: CPT | Performed by: INTERNAL MEDICINE

## 2021-02-18 PROCEDURE — 36415 COLL VENOUS BLD VENIPUNCTURE: CPT | Performed by: INTERNAL MEDICINE

## 2021-02-18 ASSESSMENT — MIFFLIN-ST. JEOR: SCORE: 1461.68

## 2021-02-18 NOTE — PROGRESS NOTES
Assessment & Plan     Benign essential hypertension  Stable on therapy and continue with current medical management.    Morbid obesity (H)  Courage ongoing and continuing weight loss.    Other specified hypothyroidism  Average function test reviewed and stable as well as basic metabolic panel reviewed       Work on weight loss  Regular exercise    Return in about 6 months (around 8/18/2021) for BP Recheck, f/u Orthopedics.    Skyler Álvarez MD  [facility] LACEYTucson Heart HospitalKANCHAN Long is a 65 year old who presents for the following health issues     HPI     Hypertension Follow-up      Do you check your blood pressure regularly outside of the clinic? Yes     Are you following a low salt diet? Yes    Are your blood pressures ever more than 140 on the top number (systolic) OR more   than 90 on the bottom number (diastolic), for example 140/90? No      How many servings of fruits and vegetables do you eat daily?  2-3    On average, how many sweetened beverages do you drink each day (Examples: soda, juice, sweet tea, etc.  Do NOT count diet or artificially sweetened beverages)?   1    How many days per week do you exercise enough to make your heart beat faster? 3 or less    How many minutes a day do you exercise enough to make your heart beat faster? 9 or less    How many days per week do you miss taking your medication? 0    Other concerns:  1. Discuss using Voltaren gel for hand pain   2. Requesting cough medicine with codeine to have on hand for nighttime asthma cough - discussed and declined     Review of Systems   CONSTITUTIONAL: NEGATIVE for fever, chills, mild change in weight  EYES: NEGATIVE for vision changes or irritation  ENT/MOUTH: NEGATIVE for ear, mouth and throat problems  CV: NEGATIVE for chest pain, palpitations  GI: NEGATIVE for nausea, abdominal pain, heartburn, or change in bowel habits  : NEGATIVE for frequency, dysuria, or hematuria  HEME: NEGATIVE for bleeding  "problems  PSYCHIATRIC: NEGATIVE for changes in mood or affect      Objective    /70   Pulse 82   Temp 98.2  F (36.8  C) (Temporal)   Resp 16   Ht 1.651 m (5' 5\")   Wt 92.1 kg (203 lb)   LMP 03/11/2010   SpO2 97%   BMI 33.78 kg/m    Body mass index is 33.78 kg/m .  Physical Exam   GENERAL: alert and no distress  RESP: lungs clear to auscultation - no rales, rhonchi or wheezes  CV: regular rate and rhythm, normal S1 S2, no S3 or S4, no click or rub  MS: using assist device  NEURO: No focal changes  PSYCH: mentation appears normal, affect normal/bright    TSH   Date Value Ref Range Status   10/16/2020 0.61 0.40 - 4.00 mU/L Final               "

## 2021-02-19 ENCOUNTER — MYC MEDICAL ADVICE (OUTPATIENT)
Dept: INTERNAL MEDICINE | Facility: CLINIC | Age: 66
End: 2021-02-19

## 2021-02-19 DIAGNOSIS — I10 BENIGN ESSENTIAL HYPERTENSION: ICD-10-CM

## 2021-02-19 DIAGNOSIS — R60.0 PERIPHERAL EDEMA: ICD-10-CM

## 2021-02-19 RX ORDER — AMLODIPINE BESYLATE 5 MG/1
5 TABLET ORAL DAILY
Qty: 90 TABLET | Refills: 3 | Status: SHIPPED | OUTPATIENT
Start: 2021-02-19 | End: 2021-08-12

## 2021-02-19 RX ORDER — FUROSEMIDE 20 MG
20 TABLET ORAL DAILY
Qty: 30 TABLET | Refills: 0 | Status: SHIPPED | OUTPATIENT
Start: 2021-02-19 | End: 2021-03-08

## 2021-02-19 ASSESSMENT — ASTHMA QUESTIONNAIRES: ACT_TOTALSCORE: 20

## 2021-02-26 ENCOUNTER — TELEPHONE (OUTPATIENT)
Dept: INTERNAL MEDICINE | Facility: CLINIC | Age: 66
End: 2021-02-26

## 2021-02-26 ENCOUNTER — NURSE TRIAGE (OUTPATIENT)
Dept: INTERNAL MEDICINE | Facility: CLINIC | Age: 66
End: 2021-02-26

## 2021-02-26 ENCOUNTER — OFFICE VISIT (OUTPATIENT)
Dept: INTERNAL MEDICINE | Facility: CLINIC | Age: 66
End: 2021-02-26
Payer: MEDICARE

## 2021-02-26 VITALS
WEIGHT: 216.8 LBS | HEART RATE: 71 BPM | BODY MASS INDEX: 36.08 KG/M2 | RESPIRATION RATE: 18 BRPM | SYSTOLIC BLOOD PRESSURE: 106 MMHG | OXYGEN SATURATION: 95 % | DIASTOLIC BLOOD PRESSURE: 70 MMHG | TEMPERATURE: 98.6 F

## 2021-02-26 DIAGNOSIS — E66.01 MORBID OBESITY (H): ICD-10-CM

## 2021-02-26 DIAGNOSIS — R60.0 PERIPHERAL EDEMA: Primary | ICD-10-CM

## 2021-02-26 DIAGNOSIS — I10 BENIGN ESSENTIAL HYPERTENSION: ICD-10-CM

## 2021-02-26 PROCEDURE — 99214 OFFICE O/P EST MOD 30 MIN: CPT | Performed by: PHYSICIAN ASSISTANT

## 2021-02-26 RX ORDER — TRAZODONE HYDROCHLORIDE 150 MG/1
150 TABLET ORAL AT BEDTIME
COMMUNITY
Start: 2021-01-13

## 2021-02-26 NOTE — PATIENT INSTRUCTIONS
Elevate today and this weekend.  If able compression stocking or a ace wrap if needed.     For the next two day increase your lasix to 20 mg twice a day.     Really watch the salt and sodium in your diet.     Heart and lungs sounded ok today.

## 2021-02-26 NOTE — TELEPHONE ENCOUNTER
Patient was seen today by Fahad at [facility]- with swelling and pain in calf- wanting to know if the redness will go away-  Advised that it should fade with the reduction of the swelling     patient having trouble getting into mychart- gave help line # [phone]    Zully ADAM RN BSN  [facility]  [phone]

## 2021-02-26 NOTE — PROGRESS NOTES
Assessment & Plan     Peripheral edema  No sign if infection or clot    Morbid obesity (H)  Continue to work on weight    Benign essential hypertension  Stable                Patient Instructions   Elevate today and this weekend.  If able compression stocking or a ace wrap if needed.     For the next two day increase your lasix to 20 mg twice a day.     Really watch the salt and sodium in your diet.     Heart and lungs sounded ok today.            Return in about 6 months (around 8/26/2021) for Routine Visit, regular primary provider.    Elissa Campbell PA-C  [facility]    Percy Long is a 65 year old who presents for the following health issues     HPI       Concern - Leg swelling  Onset: On and off since January  Description: Both legs swollen  Intensity: moderate, severe  Progression of Symptoms:  worsening  Accompanying Signs & Symptoms: Redness, indenting   Previous history of similar problem: has been going since January  Intermittent even before this  Hx of right hip replacement with complication and has issues with more swelling in that leg baseline.   Precipitating factors:        Worsened by: moving around  Alleviating factors:        Improved by:   Therapies tried and outcome:  none   Swelling better this am, than last night         Review of Systems   Constitutional, HEENT, cardiovascular, pulmonary, gi and gu systems are negative, except as otherwise noted.      Objective    LMP 03/11/2010   There is no height or weight on file to calculate BMI.  Physical Exam   GENERAL: healthy, alert and no distress  RESP: lungs clear to auscultation - no rales, rhonchi or wheezes  CV: regular rates and rhythm, normal S1 S2, no S3 or S4, no murmur, click or rub, peripheral pulses strong and trace to 1+ lower extremity edema bilaterally   R> L   No swelling of the feet   Swelling from ankles to mid shin  MS: extremities normal- no gross deformities noted  SKIN: some  mild peripheral vascular skin changes noted.

## 2021-02-26 NOTE — TELEPHONE ENCOUNTER
"Patient calling. Went into ER in January for Redness and swelling in legs, more so the right. Put On BP med and lasix. Redness went away but this morning noticed it  Is Red by ankle and calf on right side. Left leg is slightly pink. No swelling right now. No pain. Gets swelling when up during the day. Tries to elevate legs. Right leg gets more swollen. Notes Dr. Álvarez Renewed lasix but not BP med, told her amlodipine and lasix both recently sent in. Hx blood clots in arm. Denies any new difficulty breathing or chest pain. Has cough, asthma, wheezing and sees a lung doctor. Later reported back pain, hip pain, and pain down legs but not new. Notes she has compromised immune system. Swelling not new and severity varies.    Scheduled appt with Elissa WOODARD this AM. Ok with plan?      Additional Information    Negative: Sounds like a life-threatening emergency to the triager    Negative: Chest pain    Negative: Small area of swelling and followed an insect bite to the area    Negative: Followed a knee injury    Negative: Ankle or foot injury    Negative: Pregnant with leg swelling or edema    Negative: Difficulty breathing at rest    Negative: Entire foot is cool or blue in comparison to other side    Negative: SEVERE swelling (e.g., swelling extends above knee, entire leg is swollen, weeping fluid)    Negative: Thigh or calf pain and only 1 side and present > 1 hour    Negative: Thigh, calf, or ankle swelling in only one leg    Thigh, calf, or ankle swelling in both legs, but one side is definitely more swollen    Answer Assessment - Initial Assessment Questions  1. ONSET: \"When did the swelling start?\" (e.g., minutes, hours, days)      Swelling not new, redness started again this morning  2. LOCATION: \"What part of the leg is swollen?\"  \"Are both legs swollen or just one leg?\"      By ankles  3. SEVERITY: \"How bad is the swelling?\" (e.g., localized; mild, moderate, severe)   - Localized - small area of swelling " "localized to one leg   - MILD pedal edema - swelling limited to foot and ankle, pitting edema < 1/4 inch (6 mm) deep, rest and elevation eliminate most or all swelling   - MODERATE edema - swelling of lower leg to knee, pitting edema > 1/4 inch (6 mm) deep, rest and elevation only partially reduce swelling   - SEVERE edema - swelling extends above knee, facial or hand swelling present       Varies, last night really swollen  4. REDNESS: \"Does the swelling look red or infected?\"      Red   5. PAIN: \"Is the swelling painful to touch?\" If so, ask: \"How painful is it?\"   (Scale 1-10; mild, moderate or severe)      no  6. FEVER: \"Do you have a fever?\" If so, ask: \"What is it, how was it measured, and when did it start?\"       Has not checked  7. CAUSE: \"What do you think is causing the leg swelling?\"      unsue  8. MEDICAL HISTORY: \"Do you have a history of heart failure, kidney disease, liver failure, or cancer?\"      CHF, heart condiitons  9. RECURRENT SYMPTOM: \"Have you had leg swelling before?\" If so, ask: \"When was the last time?\" \"What happened that time?\"      Swelling not new  10. OTHER SYMPTOMS: \"Do you have any other symptoms?\" (e.g., chest pain, difficulty breathing)        no  11. PREGNANCY: \"Is there any chance you are pregnant?\" \"When was your last menstrual period?\"        no    Protocols used: LEG SWELLING AND EDEMA-A-OH    "

## 2021-02-26 NOTE — TELEPHONE ENCOUNTER
See separate encounter, pt has appt with Elissa WOODARD today regarding symptoms. Patient also notes compromised immune system and several health issues/history. Says she is just going to wait to get COVID vaccine. Would you recommend she get this when able?

## 2021-02-26 NOTE — TELEPHONE ENCOUNTER
Patient calling. Went into ER in January for Redness and swelling in legs, more so the right. Put On BP med and lasix. Redness went away but this morning noticed it  Is Red by ankle and calf on right side. Left leg is slightly pink. No swelling right now. No pain. Gets swelling when up during the day. Tries to elevate legs. Right leg gets more swollen. Notes Dr. Álvarez Renewed lasix but not BP med, told her amlodipine and lasix both recently sent in. Hx blood clots in arm. Denies any new difficulty breathing or chest pain. Has cough, asthma, wheezing and sees a lung doctor. Later reported back pain, hip pain, and pain down legs but not new. Notes she has compromised immune system.    Scheduled appt with Elissa WOODARD this AM. Ok with plan?

## 2021-02-27 ENCOUNTER — MYC MEDICAL ADVICE (OUTPATIENT)
Dept: INTERNAL MEDICINE | Facility: CLINIC | Age: 66
End: 2021-02-27

## 2021-02-28 ENCOUNTER — MYC MEDICAL ADVICE (OUTPATIENT)
Dept: INTERNAL MEDICINE | Facility: CLINIC | Age: 66
End: 2021-02-28

## 2021-03-01 ENCOUNTER — NURSE TRIAGE (OUTPATIENT)
Dept: INTERNAL MEDICINE | Facility: CLINIC | Age: 66
End: 2021-03-01

## 2021-03-01 ENCOUNTER — MYC MEDICAL ADVICE (OUTPATIENT)
Dept: INTERNAL MEDICINE | Facility: CLINIC | Age: 66
End: 2021-03-01

## 2021-03-01 NOTE — TELEPHONE ENCOUNTER
"Patient calling to report concerns with BP being slightly elevated today at 135/85. States she is noticing some stress about needing to file her taxes this year.     She also notes new redness since her visit on Friday that is occurring in both legs from ankle half way up her leg. States, \"It is kind of just all over.\" She notes slight pain. She was in hospital end of January and had ultrasound at that time. Everything was normal.     She questions what else she should be doing? Seen on 2/26 by Elissa Campbell. Routing to provider to review/advise.     Additional Information    Negative: Possible contact with poison ivy or oak    Negative: Insect bite(s) suspected    Negative: Athlete's Foot suspected (i.e., itchy rash between the toes)    Negative: Jock Itch suspected (i.e., itchy rash on inner thighs near genital area)    Negative: Wound infection suspected (i.e., pain, spreading redness, or pus; in a cut, puncture, scrape or sutured wound)    Negative: Rash of external female genital area (vulva)    Negative: Rash of penis or scrotum    Negative: Small spot, skin growth, or mole    Negative: Sounds like a life-threatening emergency to the triager    Negative: Fever and localized purple or blood-colored spots or dots that are not from injury or friction    Negative: Fever and localized rash is very painful    Negative: Patient sounds very sick or weak to the triager    Answer Assessment - Initial Assessment Questions  1. BLOOD PRESSURE: \"What is the blood pressure?\" \"Did you take at least two measurements 5 minutes apart?\"    This morning was 112/72.   10 minutes ago 135/85        - she is worried right now about her taxes. She is wondering if she needs to file.     2. ONSET: \"When did you take your blood pressure?\"        See above     3. HOW: \"How did you obtain the blood pressure?\" (e.g., visiting nurse, automatic home BP monitor)    Automatic wrist cuff         4. HISTORY: \"Do you have a history of high blood " "pressure?\"     NO      Strong family history of heart disease.     5. MEDICATIONS: \"Are you taking any medications for blood pressure?\" \"Have you missed any doses recently?\"    Yes - but managed. Taking lasix and one other BP meds.        6. OTHER SYMPTOMS: \"Do you have any symptoms?\" (e.g., headache, chest pain, blurred vision, difficulty breathing, weakness)    No       Slight asthma    7. PREGNANCY: \"Is there any chance you are pregnant?\" \"When was your last menstrual period?\"        no    Answer Assessment - Initial Assessment Questions  1. APPEARANCE of RASH: \"Describe the rash.\"     Redness - from ankle, shelter up the calf       No bumps.    2. LOCATION: \"Where is the rash located?\"         See above     3. NUMBER: \"How many spots are there?\"     No         4. SIZE: \"How big are the spots?\" (Inches, centimeters or compare to size of a coin)     No, just one big area        5. ONSET: \"When did the rash start?\"     Redness started on Friday 2/26        6. ITCHING: \"Does the rash itch?\" If so, ask: \"How bad is the itch?\"  (Scale 1-10; or mild, moderate, severe)    NO        7. PAIN: \"Does the rash hurt?\" If so, ask: \"How bad is the pain?\"  (Scale 1-10; or mild, moderate, severe)        Slight pain this morning    8. OTHER SYMPTOMS: \"Do you have any other symptoms?\" (e.g., fever)    No fever         9. PREGNANCY: \"Is there any chance you are pregnant?\" \"When was your last menstrual period?\"    no            Was in ER end of January. They completed CT scan and was normal (in legs where redness is).    Protocols used: RASH OR REDNESS - [id number], HIGH BLOOD PRESSURE-A-OH      "

## 2021-03-01 NOTE — TELEPHONE ENCOUNTER
Spoke with patient and scheduled her for an appointment with her primary care physician.Zeny Ha RN

## 2021-03-01 NOTE — TELEPHONE ENCOUNTER
Redness is not related to any cellulitis.  She has some peripheral vascular skin changes. In both legs.  She was concerned about swelling last week and I had her increase lasix for a few days.  Compression is recommended too. - Suggest ace wraps if not able to do Stockings.   I did review that with her at her appointment.  She can schedule with her PCP for recheck of her legs later this week if desired. Or next.   She should be on medication for her blood pressure.  She did have refill approved on 2/19 -this was also reviewed with her at her visit.  JUST needs to  from pharmacy and continue

## 2021-03-02 ENCOUNTER — MYC MEDICAL ADVICE (OUTPATIENT)
Dept: INTERNAL MEDICINE | Facility: CLINIC | Age: 66
End: 2021-03-02

## 2021-03-08 ENCOUNTER — OFFICE VISIT (OUTPATIENT)
Dept: INTERNAL MEDICINE | Facility: CLINIC | Age: 66
End: 2021-03-08
Payer: MEDICARE

## 2021-03-08 VITALS
OXYGEN SATURATION: 96 % | DIASTOLIC BLOOD PRESSURE: 66 MMHG | HEIGHT: 65 IN | WEIGHT: 213 LBS | HEART RATE: 76 BPM | SYSTOLIC BLOOD PRESSURE: 106 MMHG | TEMPERATURE: 97.7 F | BODY MASS INDEX: 35.49 KG/M2 | RESPIRATION RATE: 15 BRPM

## 2021-03-08 DIAGNOSIS — R06.00 DYSPNEA, UNSPECIFIED TYPE: Primary | ICD-10-CM

## 2021-03-08 DIAGNOSIS — R60.0 PERIPHERAL EDEMA: ICD-10-CM

## 2021-03-08 PROCEDURE — 99213 OFFICE O/P EST LOW 20 MIN: CPT | Performed by: INTERNAL MEDICINE

## 2021-03-08 RX ORDER — FUROSEMIDE 20 MG
20 TABLET ORAL DAILY
Qty: 90 TABLET | Refills: 1 | Status: SHIPPED | OUTPATIENT
Start: 2021-03-08 | End: 2021-08-12

## 2021-03-08 ASSESSMENT — MIFFLIN-ST. JEOR: SCORE: 1507.04

## 2021-03-08 NOTE — PROGRESS NOTES
"    Assessment & Plan     Dyspnea, unspecified type  Suspect secondary to underlying pulmonary issues less potential need for sleep study.  She will follow up with the pulmonary clinic and I have advised her to use her inhalers regularly.  I also suggest that she may benefit from a echocardiogram for reassurance.  I reviewed with her her most recent chest x-ray.  - Echocardiogram Complete; Future    Peripheral edema  Appears improved.  We will continue with Lasix 20 mg daily and I recommended a basic metabolic panel in 1 month for reassurance of stability of her potassium and her renal function.  She was advised to use ROMMEL supports  - furosemide (LASIX) 20 MG tablet; Take 1 tablet (20 mg) by mouth daily  - Basic metabolic panel  (Ca, Cl, CO2, Creat, Gluc, K, Na, BUN); Future       BMI:   Estimated body mass index is 35.45 kg/m  as calculated from the following:    Height as of this encounter: 1.651 m (5' 5\").    Weight as of this encounter: 96.6 kg (213 lb).   Weight management plan: Discussed healthy diet and exercise guidelines    Work on weight loss  Regular exercise    Return in about 1 month (around 4/8/2021) for Lab Work appointment.    Skyler Álvarez MD  [facility]    Percy Long is a 65 year old who presents for the following health issues     HPI       Concern - Edema   Onset: ongoing- recheck from 2/26/21  Description: bilateral lower leg swelling R>L   Intensity: moderate  Progression of Symptoms:  Some improvement   Accompanying Signs & Symptoms: redness, worse at nighttime   Precipitating factors:        Worsened by: prolonged standing   Alleviating factors:        Improved by: elevating feet   Therapies tried and outcome: Increased lasix for 2 days, helped     Hypertension Follow-up      Do you check your blood pressure regularly outside of the clinic? Yes     Are you following a low salt diet? Yes    Are your blood pressures ever more than 140 on the top " "number (systolic) OR more   than 90 on the bottom number (diastolic), for example 140/90? Yes    Other concerns:  1. Increased shortness of breath with exertion, increased wheezing while laying in bed at night as due for f/u with Pulmonary physician for sleep study.  This has been a chronic issue for her.  She has been using her inhalers     Review of Systems   CONSTITUTIONAL: NEGATIVE for fever, chills, no major change in weight  ENT/MOUTH: NEGATIVE for ear, mouth and throat problems  GI: NEGATIVE for nausea, abdominal pain, heartburn, or change in bowel habits  : NEGATIVE for frequency, dysuria, or hematuria  MUSCULOSKELETAL: NEGATIVE for significant arthralgias or myalgia  NEURO: NEGATIVE for weakness, dizziness or paresthesias  HEME: NEGATIVE for bleeding problems  PSYCHIATRIC: NEGATIVE for changes in mood or affect      Objective    /66   Pulse 76   Temp 97.7  F (36.5  C) (Temporal)   Resp 15   Ht 1.651 m (5' 5\")   Wt 96.6 kg (213 lb)   LMP 03/11/2010   SpO2 96%   BMI 35.45 kg/m    Body mass index is 35.45 kg/m .  Physical Exam   GENERAL: alert and no distress  RESP: lungs clear to auscultation - no rales, rhonchi or wheezes  CV: regular rate and rhythm, normal S1 S2, no S3 or S4, no click or rub, no peripheral edema and peripheral pulses strong  ABDOMEN: obese  MS: no gross musculoskeletal defects noted, no obvious edema noted  No erythema.  NEURO: No focal changes  PSYCH: mentation appears normal, affect normal/bright          "

## 2021-03-10 ENCOUNTER — IMMUNIZATION (OUTPATIENT)
Dept: NURSING | Facility: CLINIC | Age: 66
End: 2021-03-10
Payer: MEDICARE

## 2021-03-10 ENCOUNTER — MYC MEDICAL ADVICE (OUTPATIENT)
Dept: INTERNAL MEDICINE | Facility: CLINIC | Age: 66
End: 2021-03-10

## 2021-03-10 PROCEDURE — 0001A PR COVID VAC PFIZER DIL RECON 30 MCG/0.3 ML IM: CPT

## 2021-03-10 PROCEDURE — 91300 PR COVID VAC PFIZER DIL RECON 30 MCG/0.3 ML IM: CPT

## 2021-03-12 ENCOUNTER — MYC MEDICAL ADVICE (OUTPATIENT)
Dept: INTERNAL MEDICINE | Facility: CLINIC | Age: 66
End: 2021-03-12

## 2021-03-12 DIAGNOSIS — Z96.641 HISTORY OF RIGHT HIP REPLACEMENT: Primary | ICD-10-CM

## 2021-03-14 ENCOUNTER — MYC MEDICAL ADVICE (OUTPATIENT)
Dept: INTERNAL MEDICINE | Facility: CLINIC | Age: 66
End: 2021-03-14

## 2021-03-15 ENCOUNTER — MYC MEDICAL ADVICE (OUTPATIENT)
Dept: INTERNAL MEDICINE | Facility: CLINIC | Age: 66
End: 2021-03-15

## 2021-03-17 ENCOUNTER — PATIENT OUTREACH (OUTPATIENT)
Dept: NURSING | Facility: CLINIC | Age: 66
End: 2021-03-17
Payer: MEDICARE

## 2021-03-17 NOTE — PROGRESS NOTES
Clinic Care Coordination Contact    Follow Up Progress Note      Assessment: Pt has been busy with numerous appts lately.     Goals addressed this encounter:   Goals Addressed                 This Visit's Progress      1. Medical (pt-stated)        Goal Statement: I will continue to attend my appointments, follow my plan of care, and access care as needed to manage my pain and medical needs over the next 8 months.   Date Goal set: 3/17/21  Barriers: Complex cares, lots of appts  Strengths: Connected to care, motivated   Date to Achieve By: 12/1/21  Patient expressed understanding of goal: Yes    Action steps to achieve this goal:  1. I will continue to attend my appointments as needed and recommended.   2. I will follow my plan of care as created by my PCP and specialty teams.   3. I will work with care coordination to understand my care plan, schedule appts as needed, and keep track of my appts as needed.          COMPLETED: 1. Psychosocial (pt-stated)        Goal Statement: I will attend [employer] at least 1 time this month over the next 6 months. I will call ahead and ask for help from someone at [employer] to help me with my walker. This will help me connect with a social network and [employer] is something that is important to me.  Date Created: 11/14/19 // extended 10/29/20  Measure of Success: I will attend [employer] this month.   Supportive Steps to Achieve: Plan when I would like to attend [employer], call ahead and ask for help.   Barriers: Not wanting to ask for help. Getting tired easily.   Strengths: Motivated to seek a support network.   Date to Achieve By: 10/1/2020 // extended 3/1/21  Patient expressed understanding of goal: Yes    As of today's date 1/20/2020 goal is met at 26 - 50%.   Goal Status:  Active  As of today's date 5/1/2020 goal is met at 26 - 50%.   Goal Status:  Active   As of today's date 6/4/2020 goal is met at 26 - 50%.   Goal Status:  Active               COMPLETED: 2. Self Care/Stress Reduction  (pt-stated)        Goal Statement: I will make time for myself each week to make cards to reduce my stress and increase my happiness over the next 6 months.   Date created: 11/20/19 // extended 10/29/20  Measure of Success: I will make cards every week.   Supportive Steps to Achieve: Make time to create and craft cards, get supplies at craft store and call [employer] at your [employer] to clean my craft room so I can utilize it again.   Barriers: Not getting help to clean my craft room.   Strengths: Motivated to get craft room clean, motivated to make cards as it makes me happy.   Date to Achieve By: 10/1/20 // extended 3/1/21  Patient expressed understanding of goal: Yes    As of today's date 1/20/2020 goal is met at 26 - 50%.   Goal Status:  Active  As of today's date 5/1/2020 goal is met at 26 - 50%.   Goal Status:  Active   As of today's date 6/4/2020 goal is met at 26 - 50%.   Goal Status:  Active                Intervention/Education provided during outreach: CARLEY KAY outreach to pt for goal check in. Pt's goals are due. Reviewed and adjusted as needed.      Discussed that pt now has MyChart is set up. She has a similar system with [facility] too, can get confusing especially now with Get Well loop.     Discussed appts lately for edema, swelling, and high BP. Pt reported her legs arent swollen anymore, BP good now. Pt is still taking medication. Reviewed PCP OV plan: Return in about 1 month (around 4/8/2021) for Lab Work appointment. Scheduled.     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     Had COVID-19 vaccine.     Pt is doing acupuncture now and not PT. Pt uses walker to get to car and cane when go out. Pt still some walking problems and some pain - seeing hip Dr 3/31/21. Pt is also seeing back Dr to see about pain/injection. Pt has been icing back and taking Advil. The pain sometimes wakes her up at night. Pt only takes harder pain medication when cannot stand pain anymore.     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 Pt uses Ace bandage.      Pt gets groceries delivered, attends [employer] online, and is still making cards. Recently sold one to a neighbor which felt good.     Outreach Frequency: Monthly    Plan: CHW will continue with outreaches at this time, with next outreach in approx 30 days. CHW will inform SW CC of any concerns or changes regarding patient as needed.     SW CC will chart review every 6 weeks and outreach to patient as needed.    ANGELA StokesW   Social Work Clinic Care Coordinator   [facility]  Larisa Ott, [facility] Morgan, and [facility]  PH: [phone]  [email or web address]

## 2021-03-21 ENCOUNTER — HEALTH MAINTENANCE LETTER (OUTPATIENT)
Age: 66
End: 2021-03-21

## 2021-03-22 ENCOUNTER — MYC MEDICAL ADVICE (OUTPATIENT)
Dept: INTERNAL MEDICINE | Facility: CLINIC | Age: 66
End: 2021-03-22

## 2021-03-23 ENCOUNTER — MYC MEDICAL ADVICE (OUTPATIENT)
Dept: INTERNAL MEDICINE | Facility: CLINIC | Age: 66
End: 2021-03-23

## 2021-03-26 ENCOUNTER — MYC MEDICAL ADVICE (OUTPATIENT)
Dept: INTERNAL MEDICINE | Facility: CLINIC | Age: 66
End: 2021-03-26

## 2021-03-31 ENCOUNTER — OFFICE VISIT (OUTPATIENT)
Dept: PHYSICAL MEDICINE AND REHAB | Facility: CLINIC | Age: 66
End: 2021-03-31
Attending: ORTHOPAEDIC SURGERY
Payer: MEDICARE

## 2021-03-31 ENCOUNTER — ANCILLARY PROCEDURE (OUTPATIENT)
Dept: GENERAL RADIOLOGY | Facility: CLINIC | Age: 66
End: 2021-03-31
Attending: ORTHOPAEDIC SURGERY
Payer: MEDICARE

## 2021-03-31 ENCOUNTER — IMMUNIZATION (OUTPATIENT)
Dept: NURSING | Facility: CLINIC | Age: 66
End: 2021-03-31
Attending: INTERNAL MEDICINE
Payer: MEDICARE

## 2021-03-31 ENCOUNTER — OFFICE VISIT (OUTPATIENT)
Dept: ORTHOPEDICS | Facility: CLINIC | Age: 66
End: 2021-03-31
Payer: MEDICARE

## 2021-03-31 VITALS
WEIGHT: 209 LBS | BODY MASS INDEX: 34.78 KG/M2 | OXYGEN SATURATION: 95 % | DIASTOLIC BLOOD PRESSURE: 79 MMHG | HEART RATE: 74 BPM | SYSTOLIC BLOOD PRESSURE: 128 MMHG

## 2021-03-31 DIAGNOSIS — M47.816 LUMBAR SPONDYLOSIS: ICD-10-CM

## 2021-03-31 DIAGNOSIS — M47.816 LUMBAR FACET ARTHROPATHY: ICD-10-CM

## 2021-03-31 DIAGNOSIS — Z96.641 HISTORY OF RIGHT HIP REPLACEMENT: Primary | ICD-10-CM

## 2021-03-31 DIAGNOSIS — M53.3 SACROILIAC JOINT PAIN: Primary | ICD-10-CM

## 2021-03-31 DIAGNOSIS — Z96.641 HISTORY OF RIGHT HIP REPLACEMENT: ICD-10-CM

## 2021-03-31 PROCEDURE — 73502 X-RAY EXAM HIP UNI 2-3 VIEWS: CPT | Mod: RT | Performed by: RADIOLOGY

## 2021-03-31 PROCEDURE — 99205 OFFICE O/P NEW HI 60 MIN: CPT | Performed by: PHYSICAL MEDICINE & REHABILITATION

## 2021-03-31 PROCEDURE — 0002A PR COVID VAC PFIZER DIL RECON 30 MCG/0.3 ML IM: CPT

## 2021-03-31 PROCEDURE — 91300 PR COVID VAC PFIZER DIL RECON 30 MCG/0.3 ML IM: CPT

## 2021-03-31 PROCEDURE — 99213 OFFICE O/P EST LOW 20 MIN: CPT | Performed by: ORTHOPAEDIC SURGERY

## 2021-03-31 ASSESSMENT — PAIN SCALES - GENERAL: PAINLEVEL: EXTREME PAIN (8)

## 2021-03-31 NOTE — NURSING NOTE
Instructed patient that a  will be needed to take the patient home.   Yes    Sedation, if offered, is conscious sedation and the patient will not be 'put out.'  Further instructions for sedation given to the patient. NPO solids 8 hour prior ot injections (including gum and mints), clear fluids 2 hours prior to injection (no dairy product). Patient should take daily medications with small sips of water.  Yes    Patient reminder given for Medial Branch Block. Pain level should be at least 5/10 or we will not be able to perform the diagnostic test. Patient should avoid or limit taking pain medications.   Not applicable    Patient asked if they are on any blood thinners.  Yes    Patient asked if they have had illness/infections/or been on any antibiotics in the last 7 days (GI disturbance, UTI, colds, dental abscess, anything infectious, fever, or 'feeling under the weather')? Patient knows to inform clinic if they experience any of the above prior to the procedure  Yes    Patient understands that they must also have a negative COVID-19 test within four days of their procedure. (Patient will be notified on how to test for this)  Yes    If able, patient should avoid scheduling steroid injections within 2-weeks (before, between, or after) COVID-19 vaccination. Patient understands:  Yes     Nacho Morel CMA

## 2021-03-31 NOTE — LETTER
3/31/2021       RE: Mercedes Barber  [address]  [address]  [place] [id number]     Dear Colleague,    Thank you for referring your patient, Mercedes Barber, to the [facility] PHYSICAL MEDICINE AND REHABILITATION CLINIC [place] at [facility]. Please see a copy of my visit note below.    Patient seen at the request of Dr Pan Grey for an opinion and evaluation of non-operative spine management.      HISTORY OF PRESENT ILLNESS:  Mercedes Barber is a 65 year old female who presents with a chief complaint of bilateral low back pain.     Pain began many years ago and is associated with trauma. 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 She had on-and-off again low back pain over the years. She has also had total prior right SARAHI with complications at an outside institution, ultimately undergoing replacement surgery here at [facility] with Dr. Grey. She has had follow up in orthopedic clinic with hardware, otherwise stable and intact. Following her revision surgery in the late Summer, she went to a rehabilitation facility. While getting therapies, her back pain increased over the last several months. She has been referred to the [facility] for further evaluation and management. She tries to remain as active as she cane. Ambulates with a walker for longer distances and straight cane for household and chores.     The pain is localized to the bilateral lumbosacral region (R=L 50:50%) primarily below the iliac crest but also at the level of her beltline and just above denies radicular pain; she does report numbness/tingling (relates to temporal sclerosis); described as a combination of aching, throbbing, sharp and shooting in nature. Pain is reported as 8/10 at rest today. Symptoms are worse with prolonged positioning whether sitting, standing or walking before needing to change  position (approx 15-20min) and bending forward; and improved with advil, ice/heat, rare tramadol. she does report pain-related sleep disturbance.     One goal is to improve sleep and be able to walk further with her neighbors. No recent falls, B/B issues, saddle anesthesia or weakness.     PRIOR INJURIES/TREATMENT:   Ice/Heat: alternates which helps  Physical Therapy:   Completed PT following SARAHI x3-4 months which incorporated some exercises for her low back (completed Dec 2020, Jan 2021)     - Current Pain Medications -   advil prn   Tramadol prn - for severe pain only  Flexeril prn     - Prior/Trialed Pain Medications -   Tylenol prn - does not provide much relief  OTC lidoderm patch - difficulty staying on      Prior Procedures:  None      Prior Related Surgery:   08/05/2020 Right total hip arthroplasty revision    2010 - Left TSA following fall and mult fractures to shoulder   2004 - Bilateral TKA at [facility]    Other (acupuncture, OMT, CMM, TENS, DME, etc.):   Acupuncture x2     Specialists Seen - (with most recent, available notes and clinic visits reviewed)   1. Orthopedic surgery joint replacement service - Dr. Pan Grey      IMAGING - reviewed   02/17/2021 XR Pelvis   IMPRESSION:  1. Stable right total hip arthroplasty with suspected acetabular screw  fracture, similar to prior.  2. Mild left hip degenerative change.    Review Of Systems:   I am responding to those symptoms which are directly relevant to the specific indication for my consultation. I recommend that the patient follow up with their primary or referring provider to pursue any other symptoms which may be of concern.    Medical History:  She  has a past medical history of Arthritis, Cervical high risk HPV (human papillomavirus) test positive (07/17/2017), Cervical pain (9/15/2016), Constipation (8/27/2012), Diarrhea (4/30/2009), Esophageal stricture (2/21/2012), Gastro-oesophageal reflux disease, Hypothyroidism (9/18/2012), IBS (irritable bowel  syndrome), Mild major depression (H) (2/21/2012), Neuropathy of lower extremity, Rectal prolapse, Restless leg syndrome, Seizure disorder (H), Sleep apnea, and Temporal sclerosis (8/27/2012).     She  has a past surgical history that includes tonsillectomy & adenoidectomy; carpal tunnel release rt/lt; drain pilonidal cyst simpl; L shoulder fracture; rectal prolapse repair abdominally; tubal ligation; Dilation and curettage; Remove mesh vagina (8/10/2012); Excise lesion trunk (8/10/2012); Esophagoscopy, gastroscopy, duodenoscopy (EGD), combined (4/5/2013); Dental surgery; Release plantar fascia (Right, 1/20/2015); Repair hammer toe (Right, 1/20/2015); Endoscopy Drug Induced Sleep (N/A, 11/18/2015); Hysterectomy; Anterior COLPORRHAPHY, BLADDER/VAGINA; Arthroplasty patello-femoral (knee) (Bilateral); Arthroplasty hip (Right, 7/23/2019); Arthroplasty revision hip (Right, 8/7/2019); Irrigation and debridement hip, combined (Right, 8/26/2019); and Arthroplasty revision hip (Right, 8/5/2020).      Family History  her family history includes Alcohol/Drug in her brother; Alzheimer Disease in her paternal grandmother; Breast Cancer in her sister; Congenital Anomalies in her daughter; Depression in her brother and sister; Diabetes in her father; Eye Disorder in her mother; Gastrointestinal Disease in her brother; Hypertension in her brother; Lipids in her brother, mother, and sister; Neurologic Disorder in her daughter; Osteoporosis in her mother; Psychotic Disorder in her brother; Thyroid Disease in her sister.     Social History:  Work: Retired RN  Current living situation: Lives in [place]  She  reports that she has never smoked. She has never used smokeless tobacco. She reports current alcohol use. She reports that she does not use drugs.      Current Medications:   She has a current medication list which includes the following prescription(s): acetaminophen, albuterol, amlodipine, amoxicillin, artificial saliva, aspirin,  carboxymethylcellulose pf, cyclobenzaprine, cyclosporine, fluticasone-vilanterol, furosemide, hypromellose, lacosamide, levothyroxine, linaclotide, liothyronine, loratadine, omeprazole, ondansetron, polyethylene glycol, potassium citrate, pramipexole, quetiapine, senna-docusate, temazepam, tramadol, and trazodone.       Allergies:    -- Blood Transfusion Related (Informational Only) -- Other (See Comments)    --  Patient has a history of a clinically significant             antibody against RBC antigens.  A delay in             compatible RBCs may occur.   -- Bupropion -- Rash   -- Carbamazepine -- Diarrhea   -- Clonazepam -- Other (See Comments)    --  Hypotension, trouble walking, mind disturbance   -- Encort [Hydrocortisone Acetate] -- Swelling    --  Localized to feet   -- Encort [Hydrocortisone] -- Swelling   -- Erythromycin -- Diarrhea   -- Erythromycin -- Nausea and Vomiting   -- Flagyl [Metronidazole] -- Nausea   -- Gabapentin -- Other (See Comments)    --  Causes over-eating   -- Lamictal [Lamotrigine] -- Unknown   -- Oxycodone -- Nausea and Vomiting   -- Tegretol [Carbamazepine] -- Nausea and Vomiting    PHYSICAL EXAMINATION:  /79   Pulse 74   Wt 94.8 kg (209 lb)   LMP 03/11/2010   SpO2 95%   BMI 34.78 kg/m     General: Pleasant, straightforward, WDWN individual.  Mental Status: Pleasant, direct, appropriate mood and affect  Resp: breathing is unlabored without audible wheeze  Vascular: Palpable pedal pulses, no cyanosis, no venous stasis changes  Heme: no visible ecchymosis or erythema on extremities  Skin: No notable rash    Neurologic:  Strength: All major muscle groups of the bilateral lower extremities have normal and symmetric muscle strength     Sensation: SILT in lower extremities bilaterally L3-S1     DTRs: bilateral lower extremity stretch reflexes are equal and symmetric     Musculoskeletal:  Lumbar Spine: Mildly reduced  ROM and pain with end-range flexion, gross pelvic obliquity  with right iliac crest higher compared to the left,  Lumbar paraspinals tender to palpation with multiple large sl mobile masses on the right ~L5 region (lipoma) - no particularly painful, anterior pelvic tilt, facet loading with generalized pain bilaterally, Str Leg Raise negative, hip provocative maneuvers negative.       Scooter SI joint maneuvers: TTP SIJ (sacral sulcus/PSIS), Ramy (pt pointing) pos, Pan pos, Gaenslen pos, Yeoman's pos, lat pelvic compression w/ focal pain, Gillet deferred      Gait with relatively normal mouna and stride with use of straight cane. Right knee standing valgus    ASSESSMENT:  Mercedes Barber is a pleasant 65 year old female who presents with chronic, bilateral axial low back:    #. Sacroiliac joint pain  (primary encounter diagnosis)  #. Lumbar spondylosis which is likely multifactorial related to facet OA and DDD. She does have soft right mobile soft tissue mass at the right LS region (~L5)    PLAN:  - Offered course of PT, however, she completed several months of PT following SARAHI. She may be interested in the future.   - MRI Lumbar spine   - Refer for Bilateral Sacroiliac joint injections for diagnostic and therapeutic modulation of pain.   - No medications changes. She will continue NSAIDs prn. Discussed the risks of chronic long-term NSAID-use (including but not limited to bleeding, GI/PUD, HTN, MI, and Stroke). And infrequent tramadol prn for severe pain prescribed through her primary care physician.   - The pathway from diagnostic medial branch blocks to radiofrequency denervation was discussed in detail with the patient today.  Risks and benefits were discussed and all questions were answered.  The patient states understanding and wishes to proceed after MRI and SI joint injections completed.  There are no contraindications.  The patient understands they will have 2 diagnostic medial branch blocks; and after each block they will be required to keep a pain diary  recording their pain scores approximately every 1/2 hour to hour until the pain has returned to baseline.  During the time after the block, the patient was instructed to perform activities which typically aggravates her pain.  The patient will contact the medical spine staff after each diagnostic block to assess the amount of benefit the patient received.  If the patient has a significantly positive response to each of these diagnostic blocks, they may move forward with the radiofrequency ablation procedure. We will plan to schedule after review of imaging and SI joint injection response   - RTC for procedure.     Ready to learn, no apparent learning barriers.  Education provided on treatment plan according to patient's preferred learning style.  Patient verbalizes understanding.   __________________________________  Celena Perez MD  Physical Medicine & Rehabilitation        60 minutes spent on the date of the encounter doing chart review, review of outside records, review of test results, patient visit and documentation

## 2021-03-31 NOTE — LETTER
3/31/2021         RE: Mercedes Barber  [address]  [address]  [place] [address]        Dear Colleague,    Thank you for referring your patient, Mercedes Barber, to the [facility] ORTHOPEDIC CLINIC [place]. Please see a copy of my visit note below.           Interval History:   Mercedes Barber is a 65 year old female who is here follow up for Revision right SARAHI - 8/5/20    Whitley notes that the hip pain has dramatically improved since her prior visit.  She essentially has no ongoing hip pain or issues.  She does have some difficulty and ongoing pain in her back.  However, she describes the symptoms as in the midline of her back and in the upper portion of the back just above the pelvis.  She describes the symptoms being very different than any symptoms she has ever had related to her hip issues.  She is now walking without any assistive devices and feels like things are steadily improving.  Her incision has also remained well-healed.         Physical Exam:     NAD  AOx3  Interactive and cooperative with the exam.   She has no pain with gentle hip range of motion.  She is otherwise well-appearing.  She walks with a well-balanced gait.  She has no lateral hip tenderness palpation.         Imaging:      X-rays demonstrate well fixed well-positioned revision total hip replacement implants.  No evidence of loosening or implant related problems.  No change in position of the implants when compared to the prior imaging.       Assessment and Plan:        Mercedes Barber is a 65 year old female is s/p the above procedure.   I am very happy with the progress she has made over the last few months.  It seems that the implants are likely well ingrown at this point.  She can continue with exercises and activities as tolerated without restrictions or precautions.  I will see her back in clinic for a routine 1 year follow-up visit.  She will let me know if she has any questions or  concerns in the meantime.    Pan Grey M.D.     Arthritis and Joint Replacement  Department of Orthopaedic Surgery, [facility]  [email or web address]  [phone] (pager)

## 2021-03-31 NOTE — PROGRESS NOTES
Patient seen at the request of Dr Pan Grey for an opinion and evaluation of non-operative spine management.      HISTORY OF PRESENT ILLNESS:  Mercedes Barber is a 65 year old female who presents with a chief complaint of bilateral low back pain.     Pain began many years ago and is associated with trauma. 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 She had on-and-off again low back pain over the years. She has also had total prior right SARAHI with complications at an outside institution, ultimately undergoing replacement surgery here at [facility] with Dr. Grey. She has had follow up in orthopedic clinic with hardware, otherwise stable and intact. Following her revision surgery in the late Summer, she went to a rehabilitation facility. While getting therapies, her back pain increased over the last several months. She has been referred to the [facility] for further evaluation and management. She tries to remain as active as she cane. Ambulates with a walker for longer distances and straight cane for household and chores.     The pain is localized to the bilateral lumbosacral region (R=L 50:50%) primarily below the iliac crest but also at the level of her beltline and just above denies radicular pain; she does report numbness/tingling (relates to temporal sclerosis); described as a combination of aching, throbbing, sharp and shooting in nature. Pain is reported as 8/10 at rest today. Symptoms are worse with prolonged positioning whether sitting, standing or walking before needing to change position (approx 15-20min) and bending forward; and improved with advil, ice/heat, rare tramadol. she does report pain-related sleep disturbance.     One goal is to improve sleep and be able to walk further with her neighbors. No recent falls, B/B issues, saddle anesthesia or weakness.     PRIOR INJURIES/TREATMENT:   Ice/Heat: alternates which helps  Physical Therapy:    Completed PT following SARAHI x3-4 months which incorporated some exercises for her low back (completed Dec 2020, Jan 2021)     - Current Pain Medications -   advil prn   Tramadol prn - for severe pain only  Flexeril prn     - Prior/Trialed Pain Medications -   Tylenol prn - does not provide much relief  OTC lidoderm patch - difficulty staying on      Prior Procedures:  None      Prior Related Surgery:   08/05/2020 Right total hip arthroplasty revision    2010 - Left TSA following fall and mult fractures to shoulder   2004 - Bilateral TKA at [facility]    Other (acupuncture, OMT, CMM, TENS, DME, etc.):   Acupuncture x2     Specialists Seen - (with most recent, available notes and clinic visits reviewed)   1. Orthopedic surgery joint replacement service - Dr. Pan Grey      IMAGING - reviewed   02/17/2021 XR Pelvis   IMPRESSION:  1. Stable right total hip arthroplasty with suspected acetabular screw  fracture, similar to prior.  2. Mild left hip degenerative change.    Review Of Systems:   I am responding to those symptoms which are directly relevant to the specific indication for my consultation. I recommend that the patient follow up with their primary or referring provider to pursue any other symptoms which may be of concern.    Medical History:  She  has a past medical history of Arthritis, Cervical high risk HPV (human papillomavirus) test positive (07/17/2017), Cervical pain (9/15/2016), Constipation (8/27/2012), Diarrhea (4/30/2009), Esophageal stricture (2/21/2012), Gastro-oesophageal reflux disease, Hypothyroidism (9/18/2012), IBS (irritable bowel syndrome), Mild major depression (H) (2/21/2012), Neuropathy of lower extremity, Rectal prolapse, Restless leg syndrome, Seizure disorder (H), Sleep apnea, and Temporal sclerosis (8/27/2012).     She  has a past surgical history that includes tonsillectomy & adenoidectomy; carpal tunnel release rt/lt; drain pilonidal cyst simpl; L shoulder fracture; rectal prolapse  repair abdominally; tubal ligation; Dilation and curettage; Remove mesh vagina (8/10/2012); Excise lesion trunk (8/10/2012); Esophagoscopy, gastroscopy, duodenoscopy (EGD), combined (4/5/2013); Dental surgery; Release plantar fascia (Right, 1/20/2015); Repair hammer toe (Right, 1/20/2015); Endoscopy Drug Induced Sleep (N/A, 11/18/2015); Hysterectomy; Anterior COLPORRHAPHY, BLADDER/VAGINA; Arthroplasty patello-femoral (knee) (Bilateral); Arthroplasty hip (Right, 7/23/2019); Arthroplasty revision hip (Right, 8/7/2019); Irrigation and debridement hip, combined (Right, 8/26/2019); and Arthroplasty revision hip (Right, 8/5/2020).      Family History  her family history includes Alcohol/Drug in her brother; Alzheimer Disease in her paternal grandmother; Breast Cancer in her sister; Congenital Anomalies in her daughter; Depression in her brother and sister; Diabetes in her father; Eye Disorder in her mother; Gastrointestinal Disease in her brother; Hypertension in her brother; Lipids in her brother, mother, and sister; Neurologic Disorder in her daughter; Osteoporosis in her mother; Psychotic Disorder in her brother; Thyroid Disease in her sister.     Social History:  Work: Retired RN  Current living situation: Lives in [place]  She  reports that she has never smoked. She has never used smokeless tobacco. She reports current alcohol use. She reports that she does not use drugs.      Current Medications:   She has a current medication list which includes the following prescription(s): acetaminophen, albuterol, amlodipine, amoxicillin, artificial saliva, aspirin, carboxymethylcellulose pf, cyclobenzaprine, cyclosporine, fluticasone-vilanterol, furosemide, hypromellose, lacosamide, levothyroxine, linaclotide, liothyronine, loratadine, omeprazole, ondansetron, polyethylene glycol, potassium citrate, pramipexole, quetiapine, senna-docusate, temazepam, tramadol, and trazodone.       Allergies:    -- Blood Transfusion Related  (Informational Only) -- Other (See Comments)    --  Patient has a history of a clinically significant             antibody against RBC antigens.  A delay in             compatible RBCs may occur.   -- Bupropion -- Rash   -- Carbamazepine -- Diarrhea   -- Clonazepam -- Other (See Comments)    --  Hypotension, trouble walking, mind disturbance   -- Encort [Hydrocortisone Acetate] -- Swelling    --  Localized to feet   -- Encort [Hydrocortisone] -- Swelling   -- Erythromycin -- Diarrhea   -- Erythromycin -- Nausea and Vomiting   -- Flagyl [Metronidazole] -- Nausea   -- Gabapentin -- Other (See Comments)    --  Causes over-eating   -- Lamictal [Lamotrigine] -- Unknown   -- Oxycodone -- Nausea and Vomiting   -- Tegretol [Carbamazepine] -- Nausea and Vomiting    PHYSICAL EXAMINATION:  /79   Pulse 74   Wt 94.8 kg (209 lb)   LMP 03/11/2010   SpO2 95%   BMI 34.78 kg/m     General: Pleasant, straightforward, WDWN individual.  Mental Status: Pleasant, direct, appropriate mood and affect  Resp: breathing is unlabored without audible wheeze  Vascular: Palpable pedal pulses, no cyanosis, no venous stasis changes  Heme: no visible ecchymosis or erythema on extremities  Skin: No notable rash    Neurologic:  Strength: All major muscle groups of the bilateral lower extremities have normal and symmetric muscle strength     Sensation: SILT in lower extremities bilaterally L3-S1     DTRs: bilateral lower extremity stretch reflexes are equal and symmetric     Musculoskeletal:  Lumbar Spine: Mildly reduced  ROM and pain with end-range flexion, gross pelvic obliquity with right iliac crest higher compared to the left,  Lumbar paraspinals tender to palpation with multiple large sl mobile masses on the right ~L5 region (lipoma) - no particularly painful, anterior pelvic tilt, facet loading with generalized pain bilaterally, Str Leg Raise negative, hip provocative maneuvers negative.       Scooter SI joint maneuvers: TTP SIJ (sacral  sulcus/PSIS), Ramy (pt pointing) pos, Pan pos, Gaenslen pos, Yeoman's pos, lat pelvic compression w/ focal pain, Gillet deferred      Gait with relatively normal mouna and stride with use of straight cane. Right knee standing valgus    ASSESSMENT:  Mercedes Barber is a pleasant 65 year old female who presents with chronic, bilateral axial low back:    #. Sacroiliac joint pain  (primary encounter diagnosis)  #. Lumbar spondylosis which is likely multifactorial related to facet OA and DDD. She does have soft right mobile soft tissue mass at the right LS region (~L5)    PLAN:  - Offered course of PT, however, she completed several months of PT following SARAHI. She may be interested in the future.   - MRI Lumbar spine   - Refer for Bilateral Sacroiliac joint injections for diagnostic and therapeutic modulation of pain.   - No medications changes. She will continue NSAIDs prn. Discussed the risks of chronic long-term NSAID-use (including but not limited to bleeding, GI/PUD, HTN, MI, and Stroke). And infrequent tramadol prn for severe pain prescribed through her primary care physician.   - The pathway from diagnostic medial branch blocks to radiofrequency denervation was discussed in detail with the patient today.  Risks and benefits were discussed and all questions were answered.  The patient states understanding and wishes to proceed after MRI and SI joint injections completed.  There are no contraindications.  The patient understands they will have 2 diagnostic medial branch blocks; and after each block they will be required to keep a pain diary recording their pain scores approximately every 1/2 hour to hour until the pain has returned to baseline.  During the time after the block, the patient was instructed to perform activities which typically aggravates her pain.  The patient will contact the medical spine staff after each diagnostic block to assess the amount of benefit the patient received.  If the  patient has a significantly positive response to each of these diagnostic blocks, they may move forward with the radiofrequency ablation procedure. We will plan to schedule after review of imaging and SI joint injection response   - RTC for procedure.     Ready to learn, no apparent learning barriers.  Education provided on treatment plan according to patient's preferred learning style.  Patient verbalizes understanding.   __________________________________  Celena Perez MD  Physical Medicine & Rehabilitation        60 minutes spent on the date of the encounter doing chart review, review of outside records, review of test results, patient visit and documentation

## 2021-03-31 NOTE — PATIENT INSTRUCTIONS
Patient Education     Medial Branch Neurotomy  Back or neck pain may be due to problems with certain nerves near your spine. If so, a medial branch neurotomy can help relieve your pain. Neurotomy destroys a nerve to relieve pain or stop involuntary movements. In some cases, your doctor may give you a nerve block to see how your body will respond to neurotomy. The treatment uses heat, cold, radiofrequency, or chemicals to destroy the nerves near a problem joint. This keeps some pain messages from traveling to your brain, and helps relieve your symptoms.   Medial branch nerves  Each vertebra in your spine has facets (flat surfaces). They touch where the vertebrae fit together. This forms a facet joint. Each facet joint has at least 2 medial branch nerves. They are part of the nerve pathway to and from each facet joint. A facet joint in your back or neck can become inflamed (swollen and irritated). Pain messages may then travel along the nerve pathway from the facet joint to your brain.     Blocking pain messages  Medial branch nerves in each facet joint send and carry messages about back or neck pain. Destroying a few of these nerves can keep certain pain messages from reaching your brain. This can help bring you relief. The relief typically lasts for months to years.   Risks and complications  Risks and complications are rare, but can include:    Infection    Increased pain, numbness, or weakness    Nerve damage    Bleeding    Failure to relieve pain  Sander last reviewed this educational content on 4/1/2018 2000-2020 [employer]. All rights reserved. This information is not intended as a substitute for professional medical care. Always follow your healthcare professional's instructions.    Patient Education     Medial Branch Neurotomy: Your Experience  Back or neck pain may be due to problems with certain nerves near your spine. A medial branch neurotomy may help relieve your pain. Neurotomy is a  procedure to destroy a nerve. This is done to relieve pain or stop movements you can't control. The treatment uses heat, cold, radiofrequency, or chemicals. This destroys the nerves near a problem joint. This keeps some pain messages from traveling to your brain. It can help relieve your symptoms.   Before you agree to this procedure, make sure to ask the healthcare provider:     Why do I need this procedure?    Are there any alternatives?    How many times have you done this procedure?    What are the risks?    When will I see the results?    Will the medicines in the injection interact with other medicines I'm taking?  If you don't feel OK asking these questions, ask a family member or friend to ask them. The answers are vital to your health and safety.   The treatment is done in a hospital or outpatient clinic. Your healthcare provider will ask you to sign a consent form. He or she will examine you and may give you an IV (intravenous) line for fluids and medicines.      Relax at home for the rest of the day after your treatment, even if you feel good.   Getting ready for your treatment  Ask your healthcare provider if you should stop taking any medicines before treatment. Follow directions for not eating or drinking before treatment.   Tell your healthcare provider:    If you are pregnant or could be    If you are allergic to any medicines    If you have had any recent illness    If you have other recent changes in your health  During the procedure  You will lie on an exam table. You will most likely on your stomach. This depends on where the nerve is that needs to be treated.     Your healthcare provider will clean the skin over the treatment site. He or she will then numb it with medicine.    Your provider uses X-ray imaging (fluoroscopy) to help see your spine. The X-rays help guide the treatment. Your provider may inject a contrast dye into the affected area. This is to help get a better image. If you are  allergic to iodine or had a reaction to a dye, tell your healthcare provider.    Your healthcare provider uses heat, cold, or chemicals to destroy part of the nerve near the inflamed facet joint. Nearby nerves may also be treated.  After the procedure  You can likely go home about 1 hour after the procedure. Have a family member or friend drive you. The treated spot may be swollen and may feel more sore than usual. This is normal. This may last for a day or so. It will be a few days before you feel relief from your symptoms. Your provider may prescribe pain medicines for you. Ask him or her when it s OK for you to go back to work.   When to call your healthcare provider  Call your healthcare provider if you have:    Fever of 100.4 F ( 38 C) or higher, or as advised by your healthcare provider    Chills    Redness or fluid leaking from the treatment area  Sander last reviewed this educational content on 4/1/2018 2000-2020 [employer]. All rights reserved. This information is not intended as a substitute for professional medical care. Always follow your healthcare professional's instructions.       Preparing for Spine Injection Therapy          Why Do I Need Spine Injection Therapy?  Your Health Care Provider is recommending spine injection therapy to  help relieve your back and neck pain. This will be in addition to other therapies such as medications and physical therapy. The purpose of these injections is to reduce the amount of inflammation (swelling, pain, heat, redness, loss of body function) around the nerves thus reducing the amount of pain.     The medications you will receive with the injections will include:   Anesthetic  - medication to numb the painful area.    Steroid - medication that prevents or reduces swelling and pain (anti-inflammatory).   To reduce your discomfort during the injection procedure, you will receive a numbing medication injection prior to the placement of the  needles. You will be lying on your stomach during the injection procedure. We will use a low-dose x-ray (fluoroscopy) to help guide needle placement.    What are the different types of injections and procedure?  Below, is a brief description of the different types of injections we use to deliver pain medication as close as possible to the nerves in the painful area:    Epidural injection: (picture on the right) Epidural injections place 2 medications in your epidural space.  This is the space alongside your spinal canal (not inside of it). Nerves from your spinal cord pass through this space. The medications will bathe those nerves.   You must have a  for this procedure. We will need to reschedule your injection if you do not have a  with you.      [employer] staff.    Facet joint injection: These injections place 2 medications into the joints of your neck or spine. You do not need to have a  with you.     SI joint injection: (picture on the left) deliver pain medications into the Sacroiliac joint that connects the hip bones. You do not need to have a  with you.  Hip joint injection: deliver pain medication to the joint that connect your hip and femur bones (the femur is the bone in the center of the leg that extends from the knee with the hip).  You will be lying on your side with your affected side up. For example, if we are injecting your left hip, then you will be lying on your right side. You do not need a .   Medial Branch Block injections: This is a test to see if your pain is      coming from a specific nerve. This injection is similar to a facet joint injection, but contains only the numbing medication .   You will keep a pain score diary for the rest of the day and the following morning after receiving the injection.  You do not need a .  Radiofrequency ablation: This is a procedure that uses radio waves and numbing medication to block the nerves that feel pain at the  joint.. The pain relief effects can last for a long time, but are not permanent. The procedure is similar to the Medial Branch Block but requires additional testing  to ensure that the needle is near the nerve before numbing and ablating it.  You do not need a  if you choose not to be sedated,  but doctors often order sedation for this procedure.  Please see the sections on sedation below.   Discogram: This is a procedure to place dye  into your disc(s) to help determine if a tear in the disc is the cause of your pain.  A disc is the soft pad positioned in between each of the vertebrae of the spine. You will receive intravenous antibiotics before this procedure to minimize the risk of infection.    What Should I Expect if I Receive Sedation? THIS IS ORDERED BY THE PHYSICIAN  This is conscious sedation.  The medications we give to you will help you relax and reduce your anxiety.  You will still be awake for the procedure so we can ask you questions and hear your answers.     What Are My Responsibilities With Sedation?  You must stop eating and drinking 6 (six) hours before your procedure. You can have clear fluids (water, coffee/tea without milk) up to 2 hours prior to the injections. Nothing by mouth for 2 hours prior to injection.  This includes gum, mints, or chew.  Take your morning medications with a small sip of water.    You must have a  who will check-in with you, and stay in the building while the procedure is underway.  If you do not have a  your sedation will be cancelled.  We will monitor you for at least 30 minutes after the procedure before being discharged home.      What Are The Risks and Complications For This Procedure?  Risks and complication are rare, but can still occur.  You should understand, discuss, and accept these risks before agreeing to the procedure. They include, but are not limited to:  infection  nerve damage   paralysis  injection failure or a need for further  injections or additional procedures  continued or worsening of symptoms/pain,   medication reaction,  dural leak (into the hole covering around the spinal cord. This may cause a a spinal headache)  leak of the medication into the spinal canal, nerves, or blood vessel.      What do I need to do before the procedure?   If you do not follow these instructions your procedure may be cancelled.  Tell us if you are on major blood thinners such as Coumadin, Xarelto , Plavix, Eliquis , Pradaxa , or others.    Contact the doctor who prescribed your blood thinner to ask for permission to stop taking it before you have the injection.    Schedule your pain injection procedure after your doctor gave their permission.   We will notify you when to stop and re-start your blood thinner.  Tell us if you have any allergies to contrast dye.  If you do, we may give steroids to take before the procedure.  Tell us if you are pregnant, or possibly pregnant.  If so, you cannot receive steroid medications or be exposed to fluoroscopic X-rays.  Tell us if you have been sick during the 10 days before the procedure. This includes:   colds   gastrointestinal illness or discomfort  dental sores,   skin infection, or any other type of  infection.  Tell us if you have taken antibiotics during the 10 days prior to the procedure.   Do not drink alcohol the night before or on the day of the procedure.  You must shower the night before and on the day of your procedure.  Wear comfortable, clean clothing.  If you have an outside MRI (Magnetic Resonance Imaging photo), please bring it with you.  What Will Happen After the Procedure?  If you did not receive sedation we will monitor you for 15 minutes after the procedure. If you received sedation, we will monitor you for at least 30 minutes after the procedure   How soon can I expect pain relief?  You have received  2 types of medications with your injection:    Anesthetic - numbing medication  which only  "acts for a few hours    Steroid which may take 3-14 days to be effective.   You can expect to feel your normal pain after the anesthetic wears off, until the steroid becomes effective.      How should I care for myself at home?  Get plenty of rest and avoid twisting, bending movements, heavy lifting, or strenuous activity for the first 24 hours. This will help the steroid be more effective. Medial Branch Block injections will have a different discharge instructions.  Those will be discussed at that injection appointment.  Resume your pain medications  Apply ice packs (on for 20 minutes at a time), every 2-3 hours to your injection area for the first 2-3 days to help with pain control.    Avoid heat (pads or water bottles), which can cause the veins to open up, making the steroid less effective. You can use heat after 48 hours.  Take showers only for the first 48 hours.  No baths, hot tubs, swimming, or soaking for 48 hours to reduce the risk of infection.     When should I call the doctor?  Call us if you have any of the following:   Fever more than 100.5 degrees Fahrenheit   Signs of infection   Severe headache   Severe back pain   Increased numbness or weakness in your legs or arms   Loss of bladder or bowel control  Nausea   Other concerns    What is the contact information?  During business hours Monday-Friday 8a-5p call [phone].   After business hours, on weekends and holidays call [phone] and ask for the [facility] On Call Doctor on call     Full Image credit:   [email or web address] staff. \"Refugio rascon 2014\". [employer]. DOI:10.94352/wjm/2014.010. ISSN [id number]. Own work. via [employer] CC BY 3.0            "

## 2021-04-01 ENCOUNTER — TELEPHONE (OUTPATIENT)
Dept: INTERNAL MEDICINE | Facility: CLINIC | Age: 66
End: 2021-04-01

## 2021-04-01 ENCOUNTER — MYC MEDICAL ADVICE (OUTPATIENT)
Dept: INTERNAL MEDICINE | Facility: CLINIC | Age: 66
End: 2021-04-01

## 2021-04-07 NOTE — PROGRESS NOTES
Interval History:   Mercedes Barber is a 65 year old female who is here follow up for Revision right SARAHI - 8/5/20    Whitley notes that the hip pain has dramatically improved since her prior visit.  She essentially has no ongoing hip pain or issues.  She does have some difficulty and ongoing pain in her back.  However, she describes the symptoms as in the midline of her back and in the upper portion of the back just above the pelvis.  She describes the symptoms being very different than any symptoms she has ever had related to her hip issues.  She is now walking without any assistive devices and feels like things are steadily improving.  Her incision has also remained well-healed.         Physical Exam:     NAD  AOx3  Interactive and cooperative with the exam.   She has no pain with gentle hip range of motion.  She is otherwise well-appearing.  She walks with a well-balanced gait.  She has no lateral hip tenderness palpation.         Imaging:      X-rays demonstrate well fixed well-positioned revision total hip replacement implants.  No evidence of loosening or implant related problems.  No change in position of the implants when compared to the prior imaging.       Assessment and Plan:        Mercedes Barber is a 65 year old female is s/p the above procedure.   I am very happy with the progress she has made over the last few months.  It seems that the implants are likely well ingrown at this point.  She can continue with exercises and activities as tolerated without restrictions or precautions.  I will see her back in clinic for a routine 1 year follow-up visit.  She will let me know if she has any questions or concerns in the meantime.    Pan Grey M.D.     Arthritis and Joint Replacement  Department of Orthopaedic Surgery, [facility]  [email or web address]  [phone] (pager)

## 2021-04-08 ENCOUNTER — MYC MEDICAL ADVICE (OUTPATIENT)
Dept: INTERNAL MEDICINE | Facility: CLINIC | Age: 66
End: 2021-04-08

## 2021-04-09 ENCOUNTER — MYC MEDICAL ADVICE (OUTPATIENT)
Dept: INTERNAL MEDICINE | Facility: CLINIC | Age: 66
End: 2021-04-09

## 2021-04-12 ENCOUNTER — MYC MEDICAL ADVICE (OUTPATIENT)
Dept: INTERNAL MEDICINE | Facility: CLINIC | Age: 66
End: 2021-04-12

## 2021-04-12 DIAGNOSIS — R60.0 PERIPHERAL EDEMA: ICD-10-CM

## 2021-04-12 LAB
ANION GAP SERPL CALCULATED.3IONS-SCNC: 1 MMOL/L (ref 3–14)
BUN SERPL-MCNC: 11 MG/DL (ref 7–30)
CALCIUM SERPL-MCNC: 9 MG/DL (ref 8.5–10.1)
CHLORIDE SERPL-SCNC: 107 MMOL/L (ref 94–109)
CO2 SERPL-SCNC: 31 MMOL/L (ref 20–32)
CREAT SERPL-MCNC: 0.96 MG/DL (ref 0.52–1.04)
GFR SERPL CREATININE-BSD FRML MDRD: 61 ML/MIN/{1.73_M2}
GLUCOSE SERPL-MCNC: 97 MG/DL (ref 70–99)
POTASSIUM SERPL-SCNC: 4.1 MMOL/L (ref 3.4–5.3)
SODIUM SERPL-SCNC: 139 MMOL/L (ref 133–144)

## 2021-04-12 PROCEDURE — 80048 BASIC METABOLIC PNL TOTAL CA: CPT | Performed by: INTERNAL MEDICINE

## 2021-04-12 PROCEDURE — 36415 COLL VENOUS BLD VENIPUNCTURE: CPT | Performed by: INTERNAL MEDICINE

## 2021-04-13 ENCOUNTER — HOSPITAL ENCOUNTER (OUTPATIENT)
Dept: CARDIOLOGY | Facility: CLINIC | Age: 66
Discharge: HOME | End: 2021-04-13
Attending: INTERNAL MEDICINE | Admitting: INTERNAL MEDICINE
Payer: MEDICARE

## 2021-04-13 DIAGNOSIS — R06.00 DYSPNEA, UNSPECIFIED TYPE: ICD-10-CM

## 2021-04-13 PROCEDURE — 93306 TTE W/DOPPLER COMPLETE: CPT

## 2021-04-13 PROCEDURE — 93306 TTE W/DOPPLER COMPLETE: CPT | Mod: 26 | Performed by: INTERNAL MEDICINE

## 2021-04-14 ENCOUNTER — MYC MEDICAL ADVICE (OUTPATIENT)
Dept: SURGERY | Facility: AMBULATORY SURGERY CENTER | Age: 66
End: 2021-04-14

## 2021-04-20 PROBLEM — Z96.641 AFTERCARE FOLLOWING RIGHT HIP JOINT REPLACEMENT SURGERY: Status: RESOLVED | Noted: 2020-10-06 | Resolved: 2021-04-20

## 2021-04-20 PROBLEM — Z47.1 AFTERCARE FOLLOWING RIGHT HIP JOINT REPLACEMENT SURGERY: Status: RESOLVED | Noted: 2020-10-06 | Resolved: 2021-04-20

## 2021-04-20 NOTE — PROGRESS NOTES
DISCHARGE REPORT    Progress reporting period is from 12-14-20 to 2-5-21.       SUBJECTIVE  Subjective changes noted by patient:  Pt reported she was in the ER this past weekend. 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 Pt reported she was put on Lasix; recently started a BP med several weeks ago. Reporting R anterior thigh pain; waking her during the night.    Current pain level is variable.     Previous pain level was (0-4/10).   Changes in function:  Goals ongoing at time of last vist.  Adverse reaction to treatment or activity: activity - low back pain complicating recovery of R hip rehab.    OBJECTIVE  Objective: Able to perform hip abduction AG on the R against resistance but reported increased lateral thigh pain. Difficulty performing SLR (supine) on the R secondary to R anterior thigh pain.     ASSESSMENT/PLAN  Updated problem list and treatment plan: Diagnosis 1:  S/p R hip revision   STG/LTGs have been met or progress has been made towards goals:  Progress made with stair climbing; currently working on ambulation goal of walking 1 block.  Assessment of Progress: The patient has not returned to therapy. Current status is unknown.  Self Management Plans:  Patient is independent in a home treatment program.  Mercedes continues to require the following intervention to meet STG and LTG's:  PT will be discontinued at this time.    Recommendations:  Discharge.    Please refer to the daily flowsheet for treatment today, total treatment time and time spent performing 1:1 timed codes.

## 2021-04-21 ENCOUNTER — PATIENT OUTREACH (OUTPATIENT)
Dept: NURSING | Facility: CLINIC | Age: 66
End: 2021-04-21
Payer: MEDICARE

## 2021-04-21 DIAGNOSIS — Z11.59 ENCOUNTER FOR SCREENING FOR OTHER VIRAL DISEASES: ICD-10-CM

## 2021-04-21 NOTE — PROGRESS NOTES
Clinic Care Coordination Contact  Community Health Worker Follow Up    Goals:   Goals Addressed as of 2021 at 10:49 AM                 Today       Patient Stated      1. Medical (pt-stated)   20%    Added 3/17/21 by Katy Calderon LSW     Goal Statement: I will continue to attend my appointments, follow my plan of care, and access care as needed to manage my pain and medical needs over the next 8 months.   Date Goal set: 3/17/21  Barriers: Complex cares, lots of appts  Strengths: Connected to care, motivated   Date to Achieve By: 21  Patient expressed understanding of goal: Yes    Action steps to achieve this goal:  1. I will continue to attend my appointments as needed and recommended.   2. I will follow my plan of care as created by my PCP and specialty teams.   3. I will work with care coordination to understand my care plan, schedule appts as needed, and keep track of my appts as needed.          Discussed:  Patient stated that she has a few upcoming doctor appointments.  Patient stated that she sees her psychologist weekly, and will be seeing a GI doctor.  Patient stated that she has had both of her Covid-19 vaccines.  Patient stated that her son  from suicide in .  Patient stated that her mom  last May 2020, and 2 close friends in .  Patient stated that she has been busy making cards to friends. Patient recently hand-made 30 cards.  Patient is close to many people at [employer].  Patient stated that her hip is good, but her back gives her a lot of pain.  Patient stated that she will be getting an injection for her back in May.  Patient stated that it is her birthday on , and she will be celebrating with a few friends.    Intervention and Education during outreach: CHW wished the patient a Happy Birthday.  CHW encouraged patient to contact CC if there are any other changes or resources needed.  Patient acknowledged understanding.      Plan: Patient will attend her medical  appointments.  Patient will continue to make cards as a therapy for her.    Next Outreach: 5/21/21    CAMRON Hester  Clinic Care Coordination  [facility] Clinics: [facility] [place], and [place] [facility]  Phone: [phone]

## 2021-04-22 ENCOUNTER — TRANSFERRED RECORDS (OUTPATIENT)
Dept: HEALTH INFORMATION MANAGEMENT | Facility: CLINIC | Age: 66
End: 2021-04-22

## 2021-04-26 ENCOUNTER — HOSPITAL ENCOUNTER (OUTPATIENT)
Dept: MRI IMAGING | Facility: CLINIC | Age: 66
Discharge: HOME | End: 2021-04-26
Attending: PHYSICAL MEDICINE & REHABILITATION | Admitting: PHYSICAL MEDICINE & REHABILITATION
Payer: MEDICARE

## 2021-04-26 DIAGNOSIS — M47.816 LUMBAR SPONDYLOSIS: ICD-10-CM

## 2021-04-26 DIAGNOSIS — M47.816 LUMBAR FACET ARTHROPATHY: ICD-10-CM

## 2021-04-26 DIAGNOSIS — M53.3 SACROILIAC JOINT PAIN: ICD-10-CM

## 2021-04-26 PROCEDURE — 72148 MRI LUMBAR SPINE W/O DYE: CPT | Mod: ME

## 2021-04-26 PROCEDURE — G1004 CDSM NDSC: HCPCS

## 2021-04-27 ENCOUNTER — MYC MEDICAL ADVICE (OUTPATIENT)
Dept: INTERNAL MEDICINE | Facility: CLINIC | Age: 66
End: 2021-04-27

## 2021-04-27 DIAGNOSIS — M16.11 PRIMARY OSTEOARTHRITIS OF RIGHT HIP: ICD-10-CM

## 2021-04-27 RX ORDER — TRAMADOL HYDROCHLORIDE 50 MG/1
50 TABLET ORAL EVERY 6 HOURS PRN
Qty: 30 TABLET | Refills: 0 | Status: CANCELLED | OUTPATIENT
Start: 2021-04-27

## 2021-04-27 NOTE — TELEPHONE ENCOUNTER
It appears patient has been prescribed lorazepam by another provider.  This in combination with narcotics is not indicated.  It appears the patient also had an MRI of her back done recently by different provider which made note of significant degenerative changes noted.  The issues in regards to treating these degenerative changes of her back should probably be directed to the ordering provider of that MRI.    In addition the patient had incidental note of some nonspecific pancreatic lesion that may be accountable for symptoms of back pain and should be discussed further.  If results have not been communicated with patient suggest patient make a follow-up appointment with me to discuss these results or the ordering physician who did the MRI scan to discuss findings

## 2021-04-28 ENCOUNTER — MYC MEDICAL ADVICE (OUTPATIENT)
Dept: INTERNAL MEDICINE | Facility: CLINIC | Age: 66
End: 2021-04-28

## 2021-04-28 ENCOUNTER — APPOINTMENT (OUTPATIENT)
Dept: GENERAL RADIOLOGY | Facility: CLINIC | Age: 66
End: 2021-04-28
Attending: EMERGENCY MEDICINE
Payer: MEDICARE

## 2021-04-28 ENCOUNTER — HOSPITAL ENCOUNTER (EMERGENCY)
Facility: CLINIC | Age: 66
Discharge: HOME | End: 2021-04-28
Attending: EMERGENCY MEDICINE | Admitting: EMERGENCY MEDICINE
Payer: MEDICARE

## 2021-04-28 VITALS
OXYGEN SATURATION: 93 % | TEMPERATURE: 97.6 F | DIASTOLIC BLOOD PRESSURE: 85 MMHG | SYSTOLIC BLOOD PRESSURE: 136 MMHG | WEIGHT: 209 LBS | RESPIRATION RATE: 18 BRPM | HEART RATE: 65 BPM | BODY MASS INDEX: 34.78 KG/M2

## 2021-04-28 DIAGNOSIS — S52.612A CLOSED TRAUMATIC MINIMALLY DISPLACED FRACTURE OF STYLOID PROCESS OF LEFT ULNA, INITIAL ENCOUNTER: ICD-10-CM

## 2021-04-28 DIAGNOSIS — S52.592A OTHER CLOSED FRACTURE OF DISTAL END OF LEFT RADIUS, INITIAL ENCOUNTER: ICD-10-CM

## 2021-04-28 LAB
LABORATORY COMMENT REPORT: NORMAL
SARS-COV-2 RNA RESP QL NAA+PROBE: NEGATIVE
SPECIMEN SOURCE: NORMAL

## 2021-04-28 PROCEDURE — U0003 INFECTIOUS AGENT DETECTION BY NUCLEIC ACID (DNA OR RNA); SEVERE ACUTE RESPIRATORY SYNDROME CORONAVIRUS 2 (SARS-COV-2) (CORONAVIRUS DISEASE [COVID-19]), AMPLIFIED PROBE TECHNIQUE, MAKING USE OF HIGH THROUGHPUT TECHNOLOGIES AS DESCRIBED BY CMS-2020-01-R: HCPCS | Performed by: STUDENT IN AN ORGANIZED HEALTH CARE EDUCATION/TRAINING PROGRAM

## 2021-04-28 PROCEDURE — U0005 INFEC AGEN DETEC AMPLI PROBE: HCPCS | Performed by: STUDENT IN AN ORGANIZED HEALTH CARE EDUCATION/TRAINING PROGRAM

## 2021-04-28 PROCEDURE — 99285 EMERGENCY DEPT VISIT HI MDM: CPT | Mod: 25

## 2021-04-28 PROCEDURE — 96374 THER/PROPH/DIAG INJ IV PUSH: CPT

## 2021-04-28 PROCEDURE — C9803 HOPD COVID-19 SPEC COLLECT: HCPCS

## 2021-04-28 PROCEDURE — 73110 X-RAY EXAM OF WRIST: CPT | Mod: LT

## 2021-04-28 PROCEDURE — 250N000011 HC RX IP 250 OP 636: Performed by: EMERGENCY MEDICINE

## 2021-04-28 PROCEDURE — 96375 TX/PRO/DX INJ NEW DRUG ADDON: CPT

## 2021-04-28 PROCEDURE — 73090 X-RAY EXAM OF FOREARM: CPT | Mod: LT

## 2021-04-28 PROCEDURE — 25560 CLTX RDL&ULN SHFT FX WO MNPJ: CPT | Mod: LT

## 2021-04-28 RX ORDER — ONDANSETRON 2 MG/ML
4 INJECTION INTRAMUSCULAR; INTRAVENOUS EVERY 30 MIN PRN
Status: DISCONTINUED | OUTPATIENT
Start: 2021-04-28 | End: 2021-04-28 | Stop reason: HOSPADM

## 2021-04-28 RX ORDER — MORPHINE SULFATE 4 MG/ML
4 INJECTION, SOLUTION INTRAMUSCULAR; INTRAVENOUS
Status: DISCONTINUED | OUTPATIENT
Start: 2021-04-28 | End: 2021-04-28 | Stop reason: HOSPADM

## 2021-04-28 RX ORDER — HYDROCODONE BITARTRATE AND ACETAMINOPHEN 5; 325 MG/1; MG/1
1 TABLET ORAL EVERY 6 HOURS PRN
Qty: 10 TABLET | Refills: 0 | Status: ON HOLD | OUTPATIENT
Start: 2021-04-28 | End: 2021-05-01

## 2021-04-28 RX ADMIN — ONDANSETRON 4 MG: 2 INJECTION INTRAMUSCULAR; INTRAVENOUS at 17:51

## 2021-04-28 RX ADMIN — MORPHINE SULFATE 4 MG: 4 INJECTION INTRAVENOUS at 18:01

## 2021-04-28 ASSESSMENT — ENCOUNTER SYMPTOMS
FEVER: 0
WOUND: 0
SHORTNESS OF BREATH: 0
NECK PAIN: 0
HEADACHES: 0
CHILLS: 0

## 2021-04-28 NOTE — ED PROVIDER NOTES
Emergency Department Attending Supervision Note  4/28/2021  5:22 PM      I evaluated this patient in conjunction with Mohini Palma DO.       Briefly, the patient presented with left wrist and arm pain after falling while shopping and injuring her arm. She is not quite sure exactly how her arm was hurt in the fall. EMS was called to bring her into the ED and she was provided 4 mg of Zofran and 50 mcg of Fentanyl prior to arrival.      On my exam,   General: The patient is alert, in no respiratory distress.    HENT: Mucous membranes moist.    Cardiovascular: Regular rate and rhythm. Good pulses in all four extremities. Normal capillary refill and skin turgor.     Respiratory: Lungs are clear. No nasal flaring. No retractions. No wheezing, no crackles.    Gastrointestinal: Abdomen soft.      Musculoskeletal: Splint on left arm with tendenress to the distal radius with minimal deformity.      Skin: No rashes or petechiae.     Neurologic: The patient is alert and oriented     Lymphatic: No cervical adenopathy. No lower extremity swelling.    Psychiatric: The patient is non-tearful.     Results:  Radius/Ulna XR, Left:  IMPRESSION: Acute comminuted fracture of the distal radius with impaction at the metaphysis and dorsal displacement and angulation of the distal radial fragments. Osteopenia. No proximal fractures are evident. There are styloid fracture is not   well-visualized.  Per radiology.      Wrist XR, Left:  IMPRESSION: Acute fracture of the distal radius with vertical extension to the radiocarpal articular surface and distal radial ulnar joint. There is impaction at the metaphysis with dorsal displacement and angulation of the distal radial fragments.   Mildly displaced fracture of the ulnar styloid. Osteopenia. No additional fractures are evident.  Per radiology.     Asymptomatic COVID19 Virus PCR by nasopharyngeal swab pending      ED course:  1723: I obtained history and examined the patient as noted above      1930:  A splint placement was performed as outlined in the procedure note above.  The patient tolerated well and there were no complications.        Splint Placement     An orthoglass sugar tong Splint was applied to the left upper extrmeity and after placement I checked and adjusted the fit to ensure proper positioning. Patient was more comfortable with splint in place. Sensation and circulation are intact after splint placement.  Indication was distal radius fracture.     My impression is distal radius fracture with impaction.  The patient will likely need surgery therefore manipulation is unlikely to be helpful.  She had good distal pulses.  A splint was applied and she was discharged to close outpatient follow-up.    Diagnosis    ICD-10-CM    1. Other closed fracture of distal end of left radius, initial encounter  S52.592A    2. Closed traumatic minimally displaced fracture of styloid process of left ulna, initial encounter  S52.612A       Eugene Elizabeth MD Farnan, Christopher M, MD  04/28/21 [id number]

## 2021-04-28 NOTE — ED NOTES
Bed: ED26  Expected date: 4/28/21  Expected time: 4:06 PM  Means of arrival: Ambulance  Comments:  [place]-fall/wrist

## 2021-04-28 NOTE — ED TRIAGE NOTES
Pt presents with EMS for a fall. Pt was in [employer] on the escalator with a shopping cart, heading for the 3rd floor, when the cart slipped and caused her to trip and fall. Pt landed on left wrist. Reports hitting her head, but no LOC. No HA. 20G in right wrist. Given 4mg zofran and 50mcg of fetanyl by EMS in route. VSS. ABC's intact.

## 2021-04-28 NOTE — ED PROVIDER NOTES
"  History   Chief Complaint:  Wrist Pain       HPI   Mercedes Barber is a 66 year old female with history of chronic back pain, carpal tunnel w/ previous release (RT/LT), and other complex pmh presenting for left arm pain after fall.     Around 3:30 PM pt was at [employer] on the escalator with a shopping cart heading up to the 3rd floor when the cart slipped and it caused her to fall. It fell on top of her. She isn't quite sure how she injured her left arm, stories include maybe it being caught behind her, maybe falling on it outstretched. She endorses pain all along her left arm extending into her wrist. She is right handed. She remembers hitting her head, but no LOC. She denies any current headache. Not on any blood thinners. No other areas of pain. She isn't aware of any open lesion or scrape. On the way here EMS gave 4 mg Zofran and 50 Mcg of Fentanyl.     Review of Systems   Constitutional: Negative for chills and fever.   Respiratory: Negative for shortness of breath.    Cardiovascular: Negative for chest pain.   Musculoskeletal: Negative for neck pain.        Left wrist and arm pain   Skin: Negative for wound.   Neurological: Negative for syncope and headaches.        Endorses some tingling and numbness in fingers but reports this is her baseline \"I have carpal tunnel\"   All other systems reviewed and are negative.      Allergies:  Bupropion  Carbamazepine  Clonazepam  Encort   Erythromycin  Flagyl   Gabapentin  Lamictal   Oxycodone  Tegretol     Medications:  Tylenol   Albuterol inhaler   Amlodipine   Amoxicillin  Aspirin 81 mg   Flexeril   Breo ellipta inhaler   Lasix   Vimpat   Levothyroxine   Linzess   Cytomel   Loratadine   Omeprazole   Zofran ODT   Miralax   Urocit-K   Pramipexole   Seroquel   Senna-docusate   Restoril   Tramadol   Trazodone     Past Medical History:    Arthritis   HPV test positive  Esophageal stricture   GERD   Hypothyroidism   Irritable bowel syndrome   Depression   Neuropathy of " lower extremity   Rectal prolapse   Restless leg syndrome   Seizure disorder   Sleep apnea   Temporal sclerosis   Hypertension   DVT of upper extremity      Past Surgical History:    Anterior colporrhaphy bladder/vagina   Right hip arthroplasty   Bilateral patello-femoral arthroplasty  Right hip arthroplasty revision   Carpal tunnel release   Dental surgery implants   Dilation and curettage   Drain pilonidal cyst   Endoscopy drug induced sleep   EGD, combined   Excise lesion trunk  Hysterectomy  Irrigation and debridement hip combined right   Left shoulder fracture repair   Rectal prolapse repair abdominal   Release plantar fascia right   Remove mesh vagina   Repair hammer toe, right   Tonsillectomy and adenoidectomy  Tubal ligation     Family History:    Eye disorder - Mother   CHF - Mother   Lipids - Mother, sister, and brother   Osteoporosis - Mother   Diabetes - Father   Hypertension - Brother   Depression - Brother and sister   Colonic polyps - Brother   Breast cancer - Sister   Thyroid disease - Sister   Neurologic disorder - Daughter     Social History:  The patient presents to the ED via EMS.     Physical Exam     Patient Vitals for the past 24 hrs:   BP Temp Temp src Pulse Resp SpO2 Weight   04/28/21 1900 136/85 -- -- 65 18 93 % --   04/28/21 1700 127/83 -- -- 68 -- -- --   04/28/21 1630 132/78 97.6  F (36.4  C) Oral 95 18 95 % 94.8 kg (209 lb)       Physical Exam  Constitutional:       Comments: In pain   HENT:      Head: Normocephalic and atraumatic.      Right Ear: External ear normal.      Left Ear: External ear normal.      Nose: Nose normal. No rhinorrhea.      Mouth/Throat:      Mouth: Mucous membranes are moist.      Pharynx: Oropharynx is clear. No oropharyngeal exudate or posterior oropharyngeal erythema.   Eyes:      General: No scleral icterus.     Extraocular Movements: Extraocular movements intact.      Conjunctiva/sclera: Conjunctivae normal.   Neck:      Musculoskeletal: Normal range of  motion. No muscular tenderness.   Cardiovascular:      Rate and Rhythm: Normal rate and regular rhythm.      Heart sounds: No murmur.   Pulmonary:      Effort: Pulmonary effort is normal. No respiratory distress.      Breath sounds: No wheezing, rhonchi or rales.   Abdominal:      General: Abdomen is flat.      Tenderness: There is no abdominal tenderness. There is no guarding or rebound.   Musculoskeletal:      Comments: Shoulder: No gross deformity or tenderness.   Elbow: No tenderness along medial or lateral epicondyle. No gross deformity.   Hand&Wrist: No gross deformity or pain in R wrist. Unable to actively flex and extend at L wrist 2/2 to pain. Gross deformity of wrist. Tenderness over distal radius and ulna. Effusion on extensor surface of carpal bones. Sensation to light touch intact and symmetrical radial, ulnar and median dermatomes. Two point discrimination intact. Capillary refill <2 in left and right index fingers. Radial pulse intact BL.  Spine: No pain along cervical, thoracic, or lumbar spine with no step offs.  Hip: No gross deformity or tenderness  Knee: No gross deformity or tenderness  Ankle: No gross deformity or tenderness   Skin:     General: Skin is warm and dry.      Capillary Refill: Capillary refill takes less than 2 seconds.      Findings: No lesion.   Neurological:      Mental Status: She is alert and oriented to person, place, and time.         Emergency Department Course     Imaging:  Radius/Ulna XR, Left:  IMPRESSION: Acute comminuted fracture of the distal radius with impaction at the metaphysis and dorsal displacement and angulation of the distal radial fragments. Osteopenia. No proximal fractures are evident. There are styloid fracture is not   well-visualized.  Per radiology.      Wrist XR, Left:  IMPRESSION: Acute fracture of the distal radius with vertical extension to the radiocarpal articular surface and distal radial ulnar joint. There is impaction at the metaphysis with  "dorsal displacement and angulation of the distal radial fragments.   Mildly displaced fracture of the ulnar styloid. Osteopenia. No additional fractures are evident.  Per radiology.     Laboratory:  Asymptomatic COVID19 Virus PCR by nasopharyngeal swab pending      Procedures        Splint Placement      An orthoglass sugar tong splint was applied to the left upper extrmeity and after placement I checked and adjusted the fit to ensure proper positioning. Patient was more comfortable with splint in place. Sensation and circulation are intact after splint placement.      Emergency Department Course:  Reviewed:  I reviewed nursing notes, vitals and past medical history    Assessments:  1640 I obtained history and examined the patient as noted above.   1930  I updated the patient on results and plan of care  1940 Splinted wrist with supervision with Dr. Elizabeth as described above   1950 Gave ED return precautions and discharge instructions      Consults:   None     Interventions:  1751 Zofran 4 mg IV   1801 Morphine 4 mg IV     Disposition:  The patient was discharged to home.       Impression & Plan     CMS Diagnoses: None    Medical Decision Making:  Mercedes Barber is a 66 year old female with history of chronic back pain, carpal tunnel w/ previous release (RT/LT), and other complex PMH presenting for left arm pain after mechanical fall.     Initial differential included wrist, radius, ulnar fracture. No likely compartment syndrome, pt does endorse some tingling/nubmness but has known history of carpal tunnel and she reports her symptoms are consistent with this. No pulselessness or pallor. No evidence of neurovascular compromise. Xrays showed \"acute fracture of the distal radius with vertical extension to the radiocarpal articular surface and distal radial ulnar joint. There is impaction at the metaphysis with dorsal displacement and angulation of the distal radial fragments. Mildly displaced fracture of the " "ulnar styloid.\" She received morphine and zofran for pain and nausea control. Her wrist was splinted as noted above. After pain medications and splint pt had some reduction in pain. Pt provided with short prescription of Norco with plan to return for increasing pain, swelling, numbness or any other concerning symptoms. I recommended she follow-up with orthopedics tomorrow 4/29 for surgical evaluation.     Covid-19  Mercedes Barber was evaluated during a global COVID-19 pandemic, which necessitated consideration that the patient might be at risk for infection with the SARS-CoV-2 virus that causes COVID-19.   Applicable protocols for evaluation were followed during the patient's care.   COVID-19 was considered as part of the patient's evaluation. The plan for testing is:  a test was obtained during this visit.     Diagnosis:    ICD-10-CM    1. Other closed fracture of distal end of left radius, initial encounter  S52.592A Asymptomatic SARS-CoV-2 COVID-19 Virus (Coronavirus) by PCR   2. Closed traumatic minimally displaced fracture of styloid process of left ulna, initial encounter  S52.612A        Discharge Medications:  Discharge Medication List as of 4/28/2021  7:56 PM      START taking these medications    Details   HYDROcodone-acetaminophen (NORCO) 5-325 MG tablet Take 1 tablet by mouth every 6 hours as needed for severe pain, Disp-10 tablet, R-0, Local Print           I took the history and personally performed the physical exam and medical decision making, and have verified the content of the note, which accurately reflects my assessment of the patient and the plan of care.  Yashira Palma DO  Family Medicine, PGY-1         Scribe Disclosure:  I, Kieran Hopkins, am serving as a scribe at 4:26 PM on 4/28/2021 to document services personally performed by Eugene Elizabeth MD based on my observations and the provider's statements to me.         Mohini Palma DO  Resident  04/28/21 2012    "

## 2021-04-29 ENCOUNTER — PATIENT OUTREACH (OUTPATIENT)
Dept: NURSING | Facility: CLINIC | Age: 66
End: 2021-04-29
Payer: MEDICARE

## 2021-04-29 ENCOUNTER — TELEPHONE (OUTPATIENT)
Dept: PHYSICAL MEDICINE AND REHAB | Facility: CLINIC | Age: 66
End: 2021-04-29

## 2021-04-29 ENCOUNTER — MEDICAL CORRESPONDENCE (OUTPATIENT)
Dept: HEALTH INFORMATION MANAGEMENT | Facility: CLINIC | Age: 66
End: 2021-04-29

## 2021-04-29 RX ORDER — BENZONATATE 200 MG/1
200 CAPSULE ORAL 3 TIMES DAILY PRN
COMMUNITY
End: 2023-06-21

## 2021-04-29 RX ORDER — POTASSIUM CITRATE 10 MEQ/1
10 TABLET, EXTENDED RELEASE ORAL DAILY
COMMUNITY
End: 2022-06-09

## 2021-04-29 RX ORDER — BUSPIRONE HYDROCHLORIDE 5 MG/1
5 TABLET ORAL 2 TIMES DAILY
COMMUNITY
End: 2023-06-21

## 2021-04-29 RX ORDER — TROLAMINE SALICYLATE 10 G/100G
CREAM TOPICAL 2 TIMES DAILY PRN
COMMUNITY
End: 2024-01-16

## 2021-04-29 RX ORDER — IBUPROFEN 600 MG/1
600 TABLET, FILM COATED ORAL EVERY 6 HOURS PRN
COMMUNITY

## 2021-04-29 RX ORDER — CODEINE PHOSPHATE AND GUAIFENESIN 10; 100 MG/5ML; MG/5ML
1-2 SOLUTION ORAL EVERY 4 HOURS PRN
COMMUNITY
End: 2022-02-23

## 2021-04-29 RX ORDER — BUDESONIDE AND FORMOTEROL FUMARATE DIHYDRATE 160; 4.5 UG/1; UG/1
2 AEROSOL RESPIRATORY (INHALATION) 2 TIMES DAILY
COMMUNITY

## 2021-04-29 RX ORDER — LORAZEPAM 0.5 MG/1
0.5 TABLET ORAL DAILY PRN
COMMUNITY
End: 2023-01-17

## 2021-04-29 ASSESSMENT — ACTIVITIES OF DAILY LIVING (ADL): DEPENDENT_IADLS:: INDEPENDENT

## 2021-04-29 NOTE — PROGRESS NOTES
Clinic Care Coordination Contact    Clinic Care Coordination Contact  OUTREACH    Referral Information:  Referral Source: ED Follow-Up    Primary Diagnosis: Psychosocial    Chief Complaint   Patient presents with     Clinic Care Coordination - Post Hospital     ED follow up - other closed fracture of distal end of left radius, initial encounter; closed traumatic minimally displaced fracture of styloid process of left ulna, initial encounter        Universal Utilization:   Clinic Utilization  Difficulty keeping appointments: No  Compliance Concerns: No  No-Show Concerns: No  No PCP office visit in Past Year: No    Utilization    Last refreshed: 4/29/2021  1:10 AM: Hospital Admissions 2           Last refreshed: 4/29/2021  1:10 AM: ED Visits 3           Last refreshed: 4/29/2021  1:10 AM: No Show Count (past year) 1              Current as of: 4/29/2021  1:10 AM              Clinical Concerns:  Current Medical Concerns: Pt presented to [facility] ED 4/28/21 for evaluation of fall w/ injury - fracture.       Patient Active Problem List   Diagnosis     Menopausal syndrome (hot flashes)     Family history of breast cancer     Vulvar pain     Renal anomaly     Overactive bladder     Rectal prolapse     CARDIOVASCULAR SCREENING; LDL GOAL LESS THAN 160     Vaginal pain     Dysphagia     Confusion     Headache     Left shoulder pain     Anemia     Mild major depression (H)     Esophageal stricture     Vulvar lesion     Temporal sclerosis     Lumbago     Pain in thoracic spine     Sciatica     Pain in joint, lower leg     Plantar fascial fibromatosis     Pain in joint involving ankle and foot     Other specified hypothyroidism     GERD (gastroesophageal reflux disease)     Irritable bowel syndrome     Other sleep apnea     Cervical high risk HPV (human papillomavirus) test positive     Restless legs syndrome (RLS)     History of total hip arthroplasty, right     Hip pain     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)     Cellulitis of right hip     Deep venous thrombosis of upper extremity (H)     Peripheral edema     Low iron stores     Mechanical complication of prosthetic hip implant, initial encounter (H)     Status post hip surgery     Chest pain, unspecified     Closed fracture of proximal end of left humerus     Generalized anxiety disorder     History of infection     Infection and inflammatory reaction due to unspecified internal joint prosthesis, initial encounter (H)     Ingrowing nail     Major depressive disorder, recurrent episode, moderate (H)     Mild intermittent asthma     Nausea     Partial epilepsy with intractable epilepsy (H)     Repeated falls     History of revision of total replacement of right hip joint     Seizure disorder (H)     Benign essential hypertension     Morbid obesity (H)     Sacroiliac joint pain       Current Behavioral Concerns: Complex care and specialists       Education Provided to patient: ED/AVS review, appt review     SW CC outreach to pt for ED follow up. Reviewed ED notes/AVS.     CC canceled pt's PCP appt that was scheduled for today at 2:20 pm - was to review MRI per pt request.     Pt reported she has neurologist appt next week. Noted she is not too worried about MRI results, as she has tuberous sclerosis and severe arthritis.     Discussed that pt broke her wrist. Pt already called [facility] today, has 10:10 am appt today.     Pt reported her neighbor helped her get dressed. Her TRUE is taking her to appt today. She was given some pain medication. Pt plans to  some more ice packs today.     Pt will keep CC updated if she has more needs.     AVS:  - Follow up with [facility] in 1 day for fracture    Pain  Pain: Yes  Type: Acute (<3mo)    Health Maintenance Reviewed: Due/Overdue     Health Maintenance Due   Topic Date Due     PHQ-9  07/26/2017     MEDICARE ANNUAL WELLNESS VISIT  04/24/2019       Clinical Pathway: None    Medication Management:  No questions. Has taken.      START  taking:  HYDROcodone-acetaminophen (NORCO)    Post-discharge medication reconciliation status: Discharge medications reviewed and reconciled.  Changed medications per note/orders (see AVS). ED updated medication list.     Functional Status:  Dependent ADLs: Ambulation-walker  Dependent IADLs: Independent  Bed or wheelchair confined: No  Mobility Status: Independent w/Device  Fallen 2 or more times in the past year?: Yes  Any fall with injury in the past year?: Yes    Living Situation:  Current living arrangement: I live alone  Type of residence: Apartment (Condo)    Lifestyle & Psychosocial Needs:  Diet: Regular  Inadequate nutrition: No  Tube Feeding: No  Inadequate activity/exercise: Yes  Significant changes in sleep pattern: No  Transportation means: Regular car  Financial/Insurance concerns: No  Rastafarian or spiritual beliefs that impact treatment: No  Mental health DX: Yes  Mental health DX how managed: Medication, Outpatient Counseling, Psychiatrist  Mental health management concern: No  Chemical Dependency Status: No Current Concerns  Informal Support system: Family, Children     Socioeconomic History     Marital status:      Spouse name: Not on file     Number of children: Not on file     Years of education: Not on file     Highest education level: Not on file     Tobacco Use     Smoking status: Never Smoker     Smokeless tobacco: Never Used   Substance and Sexual Activity     Alcohol use: Yes     Frequency: Monthly or less     Comment: 1 glass of wine 2x/yr     Drug use: No     Sexual activity: Not Currently     Comment: vag [facility]       Care Coordinator has reviewed patient's Social Determinants of Health (SDoH) on this date. Upon review, changes were not made.      Resources and Interventions:  Current Resources:   Community Resources: DME, Home Care, Other, Financial/Insurance  Supplies used at home: None  Equipment Currently Used at Home: walker, rolling, grab bar, tub/shower (grab bars  toilet)  Employment Status: retired    Advance Care Plan/Directive  Advanced Care Plans/Directives on file: Yes  Type Advanced Care Plans/Directives: POLST, Advanced Directive - On File  Advanced Care Plan/Directive Status: Referral to Honoring Choices Facilitator    Referrals Placed: Community Resources, [facility] Resources (Help At your door)     Goals:   Goals        General    1. Medical (pt-stated)     Notes - Note edited  3/17/2021 10:01 AM by Katy Calderon LSW    Goal Statement: I will continue to attend my appointments, follow my plan of care, and access care as needed to manage my pain and medical needs over the next 8 months.   Date Goal set: 3/17/21  Barriers: Complex cares, lots of appts  Strengths: Connected to care, motivated   Date to Achieve By: 12/1/21  Patient expressed understanding of goal: Yes    Action steps to achieve this goal:  1. I will continue to attend my appointments as needed and recommended.   2. I will follow my plan of care as created by my PCP and specialty teams.   3. I will work with care coordination to understand my care plan, schedule appts as needed, and keep track of my appts as needed.              Patient/Caregiver understanding: Pt reports understanding and denies any additional questions or concerns at this times. SW CC engaged in AIDET communication during encounter.    Outreach Frequency: Monthly    Future Appointments              In 3 weeks Celena Perez MD [facility] Physical Medicine and [facility]          Plan: Patient was provided with this writer's contact information and encouraged to call with any questions or concerns. Pt will attend upcoming appts.     CHW will continue with outreaches at this time, with next outreach in approx 30 days. CHW will inform [facility] CC of any concerns or changes regarding patient as needed.     SW CC will chart review every 6 weeks and outreach to patient as needed. SW CC will send updated complex  plan to patient. Changed lead CC.     Katy Calderon, [facility]   Social Work Clinic Care Coordinator   [facility]  Larisa Ott, [facility], and [facility]  PH: [phone]  [email or web address]

## 2021-04-29 NOTE — ED NOTES
A/O. VSS. PIV removed. Pt verbalized understanding of d/c instructions and ambulated to lobby steady gait.   Script rx norco given to pt at time of d/c

## 2021-04-29 NOTE — LETTER
[facility]  Complex Care Plan  About Me:    Patient Name:  Mercedes Barber    YOB: 1955  Age:         66 year old   [place] MRN:    [id number] Telephone Information:  Home Phone [phone]   Mobile [phone]       Address:  [address] [address] Email address:  [email or web address]      Emergency Contact(s)    Name Relationship Lgl Grd Work Phone Home Phone Mobile Phone   1. SILVIO ROMERO Friend No  [phone]   2. MICHAEL KASPER Daughter No  [phone]   3. TASHA KASPERKRISTYN (* Relative No  [phone]           Primary language:  English     needed? No   [facility]:  [phone] op. 1  Other communication barriers: None  Preferred Method of Communication:  Phone  Current living arrangement: I live alone  Mobility Status/ Medical Equipment: Independent w/Device    Health Maintenance  Health Maintenance Reviewed: Due/Overdue   Health Maintenance Due   Topic Date Due     PHQ-9  07/26/2017     MEDICARE ANNUAL WELLNESS VISIT  04/24/2019     My Access Plan  Medical Emergency 911   Primary Clinic Line [facility] - [phone]   24 Hour Appointment Line [phone] or  [phone]) (toll-free)   24 Hour Nurse Line [phone] (toll-free)   Preferred Urgent Care [facility], [phone]   Preferred Hospital [facility]  [phone]   Preferred Pharmacy [facility] - [place], [place] - [address] [place] [address]     Behavioral Health Crisis Line The National Suicide Prevention Lifeline at 1-209.912.2080 or 911     My Care Team Members  Patient Care Team       Relationship Specialty Notifications Start End    Skyler Álvarez MD PCP - General Internal Medicine  7/31/12     Phone: [phone] Fax: [phone]         [address] [facility] [address]    Blane  Samir Bartlett MD MD Neurology  10/19/15     Referred by Dr. Samir Arguelles from [facility] for sleep apnea     Phone: [phone] Fax: [phone]         [facility] NEUROLOGICAL [facility] [address] [facility] [address] [facility] [address]    Park Ortiz MD MD Otolaryngology  10/19/15     Referred by Dr. Samir Arguelles from [facility] for sleep apnea     Phone: [phone] Fax: [phone]         [address] [place] [address] [address] [facility] [address]    Raegan Chinchilla MA Community Health Worker Primary Care - CC  1/23/20     Phone: [phone]         Skyler Chacko MD Referring Physician Orthopedics  7/17/20     Phone: [phone] Fax: [phone]         [facility] [address] [address] [facility] [address]    Pan Grey MD Assigned Musculoskeletal Provider   10/23/20     Phone: [phone] Fax: [phone]         [facility] [address] [facility]  [facility] [address]    Skyler Álvarez MD Assigned PCP   1/17/21     Phone: [phone] Fax: [phone]         [address] [facility] [address]    Pan Elena PA-C Assigned Surgical Provider   3/17/21     Phone: [phone] Fax: [phone]         [address] [facility] [address]    Celena Perez MD Assigned Neuroscience Provider   4/11/21     Phone: [phone] Fax: [phone]         [address] [facility] [address] [facility] [address]    Twyla Baugh LSW Lead Care Coordinator Primary Care - CC  4/29/21     Phone: [phone]                 My Care Plans  Self Management and Treatment Plan  Goals and (Comments)  Goals        General    1. Medical (pt-stated)     Notes - Note edited  3/17/2021 10:01 AM by Katy Calderon LSW    Goal Statement: I will continue to attend my appointments, follow my plan of care, and access care as needed to manage my pain and medical needs over the next 8 months.   Date Goal set: 3/17/21  Barriers: Complex cares, lots of  appts  Strengths: Connected to care, motivated   Date to Achieve By: 12/1/21  Patient expressed understanding of goal: Yes    Action steps to achieve this goal:  1. I will continue to attend my appointments as needed and recommended.   2. I will follow my plan of care as created by my PCP and specialty teams.   3. I will work with care coordination to understand my care plan, schedule appts as needed, and keep track of my appts as needed.               Action Plans on File:                       Advance Care Plans/Directives Type:   Type Advanced Care Plans/Directives: POLST, Advanced Directive - On File    My Medical and Care Information  Problem List   Patient Active Problem List   Diagnosis     Menopausal syndrome (hot flashes)     Family history of breast cancer     Vulvar pain     Renal anomaly     Overactive bladder     Rectal prolapse     CARDIOVASCULAR SCREENING; LDL GOAL LESS THAN 160     Vaginal pain     Dysphagia     Confusion     Headache     Left shoulder pain     Anemia     Mild major depression (H)     Esophageal stricture     Vulvar lesion     Temporal sclerosis     Lumbago     Pain in thoracic spine     Sciatica     Pain in joint, lower leg     Plantar fascial fibromatosis     Pain in joint involving ankle and foot     Other specified hypothyroidism     GERD (gastroesophageal reflux disease)     Irritable bowel syndrome     Other sleep apnea     Cervical high risk HPV (human papillomavirus) test positive     Restless legs syndrome (RLS)     History of total hip arthroplasty, right     Hip pain     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)     Cellulitis of right hip     Deep venous thrombosis of upper extremity (H)     Peripheral edema     Low iron stores     Mechanical complication of prosthetic hip implant, initial encounter (H)     Status post hip surgery     Chest pain, unspecified     Closed fracture of proximal end of left humerus     Generalized anxiety disorder     History of infection      Infection and inflammatory reaction due to unspecified internal joint prosthesis, initial encounter (H)     Ingrowing nail     Major depressive disorder, recurrent episode, moderate (H)     Mild intermittent asthma     Nausea     Partial epilepsy with intractable epilepsy (H)     Repeated falls     History of revision of total replacement of right hip joint     Seizure disorder (H)     Benign essential hypertension     Morbid obesity (H)     Sacroiliac joint pain      Current Medications and Allergies:   Current Outpatient Medications   Medication     acetaminophen (TYLENOL) 325 MG tablet     albuterol (ALBUTEROL) 108 (90 BASE) MCG/ACT Inhaler     amLODIPine (NORVASC) 5 MG tablet     amoxicillin (AMOXIL) 500 MG tablet     artificial saliva (BIOTENE MT) AERS spray     aspirin (ASA) 81 MG EC tablet     carboxymethylcellulose PF (REFRESH PLUS) 0.5 % ophthalmic solution     cyclobenzaprine (FLEXERIL) 5 MG tablet     cycloSPORINE (RESTASIS) 0.05 % ophthalmic emulsion     fluticasone-vilanterol (BREO ELLIPTA) 200-25 MCG/INH inhaler     furosemide (LASIX) 20 MG tablet     HYDROcodone-acetaminophen (NORCO) 5-325 MG tablet     hypromellose (ARTIFICIAL TEARS) 0.5 % SOLN ophthalmic solution     lacosamide (VIMPAT) 200 MG TABS tablet     levothyroxine (SYNTHROID/LEVOTHROID) 50 MCG tablet     linaclotide (LINZESS) 290 MCG capsule     liothyronine (CYTOMEL) 5 MCG tablet     loratadine (CLARITIN REDITABS) 10 MG ODT     omeprazole (PRILOSEC) 40 MG DR capsule     ondansetron (ZOFRAN-ODT) 4 MG ODT tab     polyethylene glycol (MIRALAX) 17 g packet     potassium citrate (UROCIT-K) 10 MEQ (1080 MG) CR tablet     pramipexole (MIRAPEX) 1 MG tablet     QUEtiapine (SEROQUEL) 50 MG tablet     senna-docusate (SENOKOT-S/PERICOLACE) 8.6-50 MG tablet     temazepam (RESTORIL) 30 MG capsule     traMADol (ULTRAM) 50 MG tablet     traZODone (DESYREL) 150 MG tablet     No current facility-administered medications for this visit.      Care  Coordination Start Date: 11/13/2019   Frequency of Care Coordination: monthly   Form Last Updated: 04/29/2021

## 2021-04-29 NOTE — DISCHARGE INSTRUCTIONS
"Your x rays showed \"Acute fracture of the distal radius with vertical extension to the radiocarpal articular surface and distal radial ulnar joint. There is impaction at the metaphysis with dorsal displacement and angulation of the distal radial fragments. Mildly displaced fracture of the ulnar styloid. Osteopenia. No additional fractures are evident.\"    Please follow-up with [facility] tomorrow 4/29 for your fracture of your left radius and your styloid process of your left ulna.   Please return to the ED for uncontrolled pain, fever  >100.4 or fingers turning cold/blue.   You can take your pain medication Norco (hydrocodone-acetaminophen) as needed every 6 hours for pain. You can also take Ibuprofen as well.     "

## 2021-04-29 NOTE — TELEPHONE ENCOUNTER
[facility] Call Center    Phone Message    May a detailed message be left on voicemail: yes     Reason for Call: Other: pt requesting to reschedule the procedure with Dr. Perez. Please call pt . Thank you.     Action Taken: Message routed to:  [facility] PMR    Travel Screening: Not Applicable

## 2021-04-30 ENCOUNTER — ANESTHESIA (OUTPATIENT)
Dept: SURGERY | Facility: CLINIC | Age: 66
End: 2021-04-30
Payer: MEDICARE

## 2021-04-30 ENCOUNTER — HOSPITAL ENCOUNTER (OUTPATIENT)
Facility: CLINIC | Age: 66
Setting detail: OBSERVATION
Discharge: HOME | End: 2021-05-01
Attending: ORTHOPAEDIC SURGERY | Admitting: ORTHOPAEDIC SURGERY
Payer: MEDICARE

## 2021-04-30 ENCOUNTER — ANESTHESIA EVENT (OUTPATIENT)
Dept: SURGERY | Facility: CLINIC | Age: 66
End: 2021-04-30
Payer: MEDICARE

## 2021-04-30 ENCOUNTER — APPOINTMENT (OUTPATIENT)
Dept: ULTRASOUND IMAGING | Facility: CLINIC | Age: 66
End: 2021-04-30
Attending: PHYSICIAN ASSISTANT
Payer: MEDICARE

## 2021-04-30 ENCOUNTER — APPOINTMENT (OUTPATIENT)
Dept: GENERAL RADIOLOGY | Facility: CLINIC | Age: 66
End: 2021-04-30
Attending: ORTHOPAEDIC SURGERY
Payer: MEDICARE

## 2021-04-30 ENCOUNTER — NURSE TRIAGE (OUTPATIENT)
Dept: NURSING | Facility: CLINIC | Age: 66
End: 2021-04-30

## 2021-04-30 DIAGNOSIS — S62.102A CLOSED FRACTURE OF LEFT WRIST, INITIAL ENCOUNTER: Primary | ICD-10-CM

## 2021-04-30 LAB — INTERPRETATION ECG - MUSE: NORMAL

## 2021-04-30 PROCEDURE — 250N000009 HC RX 250: Performed by: ANESTHESIOLOGY

## 2021-04-30 PROCEDURE — 250N000013 HC RX MED GY IP 250 OP 250 PS 637: Performed by: ANESTHESIOLOGY

## 2021-04-30 PROCEDURE — 360N000083 HC SURGERY LEVEL 3 W/ FLUORO, PER MIN: Performed by: ORTHOPAEDIC SURGERY

## 2021-04-30 PROCEDURE — 370N000017 HC ANESTHESIA TECHNICAL FEE, PER MIN: Performed by: ORTHOPAEDIC SURGERY

## 2021-04-30 PROCEDURE — 250N000011 HC RX IP 250 OP 636: Performed by: NURSE ANESTHETIST, CERTIFIED REGISTERED

## 2021-04-30 PROCEDURE — 999N000179 XR SURGERY CARM FLUORO LESS THAN 5 MIN W STILLS: Mod: TC

## 2021-04-30 PROCEDURE — 999N000141 HC STATISTIC PRE-PROCEDURE NURSING ASSESSMENT: Performed by: ORTHOPAEDIC SURGERY

## 2021-04-30 PROCEDURE — 99207 PR APP CREDIT; MD BILLING SHARED VISIT: CPT | Performed by: PHYSICIAN ASSISTANT

## 2021-04-30 PROCEDURE — 250N000011 HC RX IP 250 OP 636: Performed by: ANESTHESIOLOGY

## 2021-04-30 PROCEDURE — 250N000011 HC RX IP 250 OP 636: Performed by: PHYSICIAN ASSISTANT

## 2021-04-30 PROCEDURE — G0378 HOSPITAL OBSERVATION PER HR: HCPCS

## 2021-04-30 PROCEDURE — 250N000013 HC RX MED GY IP 250 OP 250 PS 637: Performed by: ORTHOPAEDIC SURGERY

## 2021-04-30 PROCEDURE — 710N000009 HC RECOVERY PHASE 1, LEVEL 1, PER MIN: Performed by: ORTHOPAEDIC SURGERY

## 2021-04-30 PROCEDURE — 258N000003 HC RX IP 258 OP 636: Performed by: ANESTHESIOLOGY

## 2021-04-30 PROCEDURE — 710N000012 HC RECOVERY PHASE 2, PER MINUTE: Performed by: ORTHOPAEDIC SURGERY

## 2021-04-30 PROCEDURE — 250N000013 HC RX MED GY IP 250 OP 250 PS 637: Performed by: PHYSICIAN ASSISTANT

## 2021-04-30 PROCEDURE — 258N000003 HC RX IP 258 OP 636: Performed by: PHYSICIAN ASSISTANT

## 2021-04-30 PROCEDURE — 99213 OFFICE O/P EST LOW 20 MIN: CPT | Performed by: INTERNAL MEDICINE

## 2021-04-30 PROCEDURE — C1713 ANCHOR/SCREW BN/BN,TIS/BN: HCPCS | Performed by: ORTHOPAEDIC SURGERY

## 2021-04-30 PROCEDURE — 250N000009 HC RX 250: Performed by: NURSE ANESTHETIST, CERTIFIED REGISTERED

## 2021-04-30 PROCEDURE — 99207 PR CDG-CODE CATEGORY CHANGED: CPT | Performed by: INTERNAL MEDICINE

## 2021-04-30 PROCEDURE — 93971 EXTREMITY STUDY: CPT | Mod: RT

## 2021-04-30 PROCEDURE — 272N000001 HC OR GENERAL SUPPLY STERILE: Performed by: ORTHOPAEDIC SURGERY

## 2021-04-30 PROCEDURE — 250N000009 HC RX 250: Performed by: ORTHOPAEDIC SURGERY

## 2021-04-30 DEVICE — IMP SCR CORTICAL HANDINN 3.5X14MM CS14000: Type: IMPLANTABLE DEVICE | Site: WRIST | Status: FUNCTIONAL

## 2021-04-30 DEVICE — IMP PLATE HANDINN VOLAR NARROW LT DVRANL: Type: IMPLANTABLE DEVICE | Site: WRIST | Status: FUNCTIONAL

## 2021-04-30 DEVICE — IMP SCR CORTICAL HANDINN 3.5X12MM CS12000: Type: IMPLANTABLE DEVICE | Site: WRIST | Status: FUNCTIONAL

## 2021-04-30 DEVICE — IMPLANTABLE DEVICE: Type: IMPLANTABLE DEVICE | Site: WRIST | Status: FUNCTIONAL

## 2021-04-30 DEVICE — IMP PEG HANDINN SUBCHONDRAL 2.0X24MM P24000: Type: IMPLANTABLE DEVICE | Site: WRIST | Status: FUNCTIONAL

## 2021-04-30 DEVICE — IMP PEG HANDINN SUBCHONDRAL 2.0X22MM P22000: Type: IMPLANTABLE DEVICE | Site: WRIST | Status: FUNCTIONAL

## 2021-04-30 DEVICE — IMP PEG HANDINN MULDIR LOK 2.5X20MM 131211120: Type: IMPLANTABLE DEVICE | Site: WRIST | Status: FUNCTIONAL

## 2021-04-30 RX ORDER — LABETALOL HYDROCHLORIDE 5 MG/ML
10 INJECTION, SOLUTION INTRAVENOUS
Status: COMPLETED | OUTPATIENT
Start: 2021-04-30 | End: 2021-04-30

## 2021-04-30 RX ORDER — BUSPIRONE HYDROCHLORIDE 5 MG/1
5 TABLET ORAL 2 TIMES DAILY
Status: DISCONTINUED | OUTPATIENT
Start: 2021-04-30 | End: 2021-05-01 | Stop reason: HOSPADM

## 2021-04-30 RX ORDER — LIOTHYRONINE SODIUM 5 UG/1
5 TABLET ORAL DAILY
Status: DISCONTINUED | OUTPATIENT
Start: 2021-05-01 | End: 2021-05-01 | Stop reason: HOSPADM

## 2021-04-30 RX ORDER — MEPERIDINE HYDROCHLORIDE 25 MG/ML
12.5 INJECTION INTRAMUSCULAR; INTRAVENOUS; SUBCUTANEOUS
Status: DISCONTINUED | OUTPATIENT
Start: 2021-04-30 | End: 2021-04-30 | Stop reason: HOSPADM

## 2021-04-30 RX ORDER — AMLODIPINE BESYLATE 5 MG/1
5 TABLET ORAL DAILY
Status: DISCONTINUED | OUTPATIENT
Start: 2021-05-01 | End: 2021-05-01 | Stop reason: HOSPADM

## 2021-04-30 RX ORDER — SODIUM CHLORIDE, SODIUM LACTATE, POTASSIUM CHLORIDE, CALCIUM CHLORIDE 600; 310; 30; 20 MG/100ML; MG/100ML; MG/100ML; MG/100ML
INJECTION, SOLUTION INTRAVENOUS CONTINUOUS
Status: DISCONTINUED | OUTPATIENT
Start: 2021-04-30 | End: 2021-04-30 | Stop reason: HOSPADM

## 2021-04-30 RX ORDER — CEFAZOLIN SODIUM 2 G/100ML
2 INJECTION, SOLUTION INTRAVENOUS SEE ADMIN INSTRUCTIONS
Status: DISCONTINUED | OUTPATIENT
Start: 2021-04-30 | End: 2021-04-30 | Stop reason: HOSPADM

## 2021-04-30 RX ORDER — HYDROMORPHONE HYDROCHLORIDE 1 MG/ML
.3-.5 INJECTION, SOLUTION INTRAMUSCULAR; INTRAVENOUS; SUBCUTANEOUS EVERY 10 MIN PRN
Status: DISCONTINUED | OUTPATIENT
Start: 2021-04-30 | End: 2021-04-30 | Stop reason: HOSPADM

## 2021-04-30 RX ORDER — IBUPROFEN 600 MG/1
600 TABLET, FILM COATED ORAL EVERY 8 HOURS PRN
Status: DISCONTINUED | OUTPATIENT
Start: 2021-04-30 | End: 2021-05-01 | Stop reason: HOSPADM

## 2021-04-30 RX ORDER — ACETAMINOPHEN 325 MG/1
325 TABLET ORAL EVERY 4 HOURS PRN
Status: DISCONTINUED | OUTPATIENT
Start: 2021-04-30 | End: 2021-04-30

## 2021-04-30 RX ORDER — NALOXONE HYDROCHLORIDE 0.4 MG/ML
0.4 INJECTION, SOLUTION INTRAMUSCULAR; INTRAVENOUS; SUBCUTANEOUS
Status: DISCONTINUED | OUTPATIENT
Start: 2021-04-30 | End: 2021-04-30 | Stop reason: HOSPADM

## 2021-04-30 RX ORDER — NALOXONE HYDROCHLORIDE 0.4 MG/ML
0.2 INJECTION, SOLUTION INTRAMUSCULAR; INTRAVENOUS; SUBCUTANEOUS
Status: DISCONTINUED | OUTPATIENT
Start: 2021-04-30 | End: 2021-04-30 | Stop reason: HOSPADM

## 2021-04-30 RX ORDER — ACETAMINOPHEN 325 MG/1
975 TABLET ORAL ONCE
Status: COMPLETED | OUTPATIENT
Start: 2021-04-30 | End: 2021-04-30

## 2021-04-30 RX ORDER — OXYCODONE HYDROCHLORIDE 5 MG/1
5 TABLET ORAL EVERY 4 HOURS PRN
Status: DISCONTINUED | OUTPATIENT
Start: 2021-04-30 | End: 2021-05-01 | Stop reason: HOSPADM

## 2021-04-30 RX ORDER — NALOXONE HYDROCHLORIDE 0.4 MG/ML
0.2 INJECTION, SOLUTION INTRAMUSCULAR; INTRAVENOUS; SUBCUTANEOUS
Status: DISCONTINUED | OUTPATIENT
Start: 2021-04-30 | End: 2021-05-01 | Stop reason: HOSPADM

## 2021-04-30 RX ORDER — CYCLOSPORINE 0.5 MG/ML
1 EMULSION OPHTHALMIC 2 TIMES DAILY
Status: DISCONTINUED | OUTPATIENT
Start: 2021-04-30 | End: 2021-05-01 | Stop reason: HOSPADM

## 2021-04-30 RX ORDER — LACOSAMIDE 200 MG/1
200 TABLET ORAL 2 TIMES DAILY
Status: DISCONTINUED | OUTPATIENT
Start: 2021-04-30 | End: 2021-05-01 | Stop reason: HOSPADM

## 2021-04-30 RX ORDER — PROPOFOL 10 MG/ML
INJECTION, EMULSION INTRAVENOUS PRN
Status: DISCONTINUED | OUTPATIENT
Start: 2021-04-30 | End: 2021-04-30

## 2021-04-30 RX ORDER — PROPOFOL 10 MG/ML
INJECTION, EMULSION INTRAVENOUS CONTINUOUS PRN
Status: DISCONTINUED | OUTPATIENT
Start: 2021-04-30 | End: 2021-04-30

## 2021-04-30 RX ORDER — LIDOCAINE 40 MG/G
CREAM TOPICAL
Status: DISCONTINUED | OUTPATIENT
Start: 2021-04-30 | End: 2021-04-30 | Stop reason: HOSPADM

## 2021-04-30 RX ORDER — ASPIRIN 81 MG/1
81 TABLET ORAL DAILY
COMMUNITY
End: 2024-01-16

## 2021-04-30 RX ORDER — MAGNESIUM HYDROXIDE 1200 MG/15ML
LIQUID ORAL PRN
Status: DISCONTINUED | OUTPATIENT
Start: 2021-04-30 | End: 2021-04-30 | Stop reason: HOSPADM

## 2021-04-30 RX ORDER — FENTANYL CITRATE 50 UG/ML
25-50 INJECTION, SOLUTION INTRAMUSCULAR; INTRAVENOUS
Status: DISCONTINUED | OUTPATIENT
Start: 2021-04-30 | End: 2021-04-30 | Stop reason: HOSPADM

## 2021-04-30 RX ORDER — BENZONATATE 100 MG/1
200 CAPSULE ORAL 3 TIMES DAILY PRN
Status: DISCONTINUED | OUTPATIENT
Start: 2021-04-30 | End: 2021-05-01 | Stop reason: HOSPADM

## 2021-04-30 RX ORDER — ONDANSETRON 2 MG/ML
4 INJECTION INTRAMUSCULAR; INTRAVENOUS EVERY 30 MIN PRN
Status: DISCONTINUED | OUTPATIENT
Start: 2021-04-30 | End: 2021-04-30 | Stop reason: HOSPADM

## 2021-04-30 RX ORDER — ONDANSETRON 4 MG/1
4 TABLET, ORALLY DISINTEGRATING ORAL EVERY 30 MIN PRN
Status: DISCONTINUED | OUTPATIENT
Start: 2021-04-30 | End: 2021-04-30 | Stop reason: HOSPADM

## 2021-04-30 RX ORDER — IBUPROFEN 600 MG/1
600 TABLET, FILM COATED ORAL
Status: COMPLETED | OUTPATIENT
Start: 2021-04-30 | End: 2021-04-30

## 2021-04-30 RX ORDER — IPRATROPIUM BROMIDE AND ALBUTEROL SULFATE 2.5; .5 MG/3ML; MG/3ML
3 SOLUTION RESPIRATORY (INHALATION) EVERY 4 HOURS PRN
Status: DISCONTINUED | OUTPATIENT
Start: 2021-04-30 | End: 2021-05-01 | Stop reason: HOSPADM

## 2021-04-30 RX ORDER — LEVOTHYROXINE SODIUM 25 UG/1
50 TABLET ORAL
Status: DISCONTINUED | OUTPATIENT
Start: 2021-05-01 | End: 2021-05-01 | Stop reason: HOSPADM

## 2021-04-30 RX ORDER — HYDRALAZINE HYDROCHLORIDE 20 MG/ML
2.5-5 INJECTION INTRAMUSCULAR; INTRAVENOUS EVERY 10 MIN PRN
Status: DISCONTINUED | OUTPATIENT
Start: 2021-04-30 | End: 2021-04-30 | Stop reason: HOSPADM

## 2021-04-30 RX ORDER — CEFAZOLIN SODIUM 2 G/100ML
2 INJECTION, SOLUTION INTRAVENOUS
Status: COMPLETED | OUTPATIENT
Start: 2021-04-30 | End: 2021-04-30

## 2021-04-30 RX ORDER — KETAMINE HYDROCHLORIDE 10 MG/ML
INJECTION INTRAMUSCULAR; INTRAVENOUS PRN
Status: DISCONTINUED | OUTPATIENT
Start: 2021-04-30 | End: 2021-04-30

## 2021-04-30 RX ORDER — TRANEXAMIC ACID 650 MG/1
1950 TABLET ORAL ONCE
Status: DISCONTINUED | OUTPATIENT
Start: 2021-04-30 | End: 2021-04-30 | Stop reason: HOSPADM

## 2021-04-30 RX ORDER — OXYCODONE HYDROCHLORIDE 5 MG/1
5 TABLET ORAL EVERY 6 HOURS PRN
Qty: 6 TABLET | Refills: 0 | Status: SHIPPED | OUTPATIENT
Start: 2021-04-30 | End: 2021-05-03

## 2021-04-30 RX ORDER — PRAMIPEXOLE DIHYDROCHLORIDE 1 MG/1
1 TABLET ORAL
Status: DISCONTINUED | OUTPATIENT
Start: 2021-05-01 | End: 2021-05-01 | Stop reason: HOSPADM

## 2021-04-30 RX ORDER — OXYCODONE HYDROCHLORIDE 5 MG/1
5 TABLET ORAL EVERY 4 HOURS PRN
Status: DISCONTINUED | OUTPATIENT
Start: 2021-04-30 | End: 2021-04-30

## 2021-04-30 RX ORDER — NALOXONE HYDROCHLORIDE 0.4 MG/ML
0.4 INJECTION, SOLUTION INTRAMUSCULAR; INTRAVENOUS; SUBCUTANEOUS
Status: DISCONTINUED | OUTPATIENT
Start: 2021-04-30 | End: 2021-05-01 | Stop reason: HOSPADM

## 2021-04-30 RX ORDER — BUDESONIDE AND FORMOTEROL FUMARATE DIHYDRATE 160; 4.5 UG/1; UG/1
2 AEROSOL RESPIRATORY (INHALATION) 2 TIMES DAILY
Status: DISCONTINUED | OUTPATIENT
Start: 2021-04-30 | End: 2021-04-30

## 2021-04-30 RX ORDER — ACETAMINOPHEN 325 MG/1
975 TABLET ORAL EVERY 8 HOURS PRN
Status: DISCONTINUED | OUTPATIENT
Start: 2021-04-30 | End: 2021-05-01 | Stop reason: HOSPADM

## 2021-04-30 RX ORDER — FENTANYL CITRATE 50 UG/ML
INJECTION, SOLUTION INTRAMUSCULAR; INTRAVENOUS PRN
Status: DISCONTINUED | OUTPATIENT
Start: 2021-04-30 | End: 2021-04-30

## 2021-04-30 RX ORDER — ACETAMINOPHEN 325 MG/1
650 TABLET ORAL ONCE
Status: DISCONTINUED | OUTPATIENT
Start: 2021-04-30 | End: 2021-04-30 | Stop reason: ALTCHOICE

## 2021-04-30 RX ORDER — HYDROMORPHONE HYDROCHLORIDE 1 MG/ML
0.5 INJECTION, SOLUTION INTRAMUSCULAR; INTRAVENOUS; SUBCUTANEOUS ONCE
Status: COMPLETED | OUTPATIENT
Start: 2021-04-30 | End: 2021-04-30

## 2021-04-30 RX ORDER — ALBUTEROL SULFATE 0.83 MG/ML
2.5 SOLUTION RESPIRATORY (INHALATION) EVERY 4 HOURS PRN
Status: DISCONTINUED | OUTPATIENT
Start: 2021-04-30 | End: 2021-04-30 | Stop reason: HOSPADM

## 2021-04-30 RX ORDER — DEXAMETHASONE SODIUM PHOSPHATE 4 MG/ML
INJECTION, SOLUTION INTRA-ARTICULAR; INTRALESIONAL; INTRAMUSCULAR; INTRAVENOUS; SOFT TISSUE PRN
Status: DISCONTINUED | OUTPATIENT
Start: 2021-04-30 | End: 2021-04-30

## 2021-04-30 RX ORDER — ONDANSETRON 2 MG/ML
INJECTION INTRAMUSCULAR; INTRAVENOUS PRN
Status: DISCONTINUED | OUTPATIENT
Start: 2021-04-30 | End: 2021-04-30

## 2021-04-30 RX ORDER — ALBUTEROL SULFATE 90 UG/1
2 AEROSOL, METERED RESPIRATORY (INHALATION) 4 TIMES DAILY PRN
Status: DISCONTINUED | OUTPATIENT
Start: 2021-04-30 | End: 2021-05-01 | Stop reason: HOSPADM

## 2021-04-30 RX ADMIN — LIDOCAINE HYDROCHLORIDE 50 MG: 10 INJECTION, SOLUTION EPIDURAL; INFILTRATION; INTRACAUDAL; PERINEURAL at 10:13

## 2021-04-30 RX ADMIN — PROPOFOL 50 MCG/KG/MIN: 10 INJECTION, EMULSION INTRAVENOUS at 10:25

## 2021-04-30 RX ADMIN — LACOSAMIDE 200 MG: 200 TABLET, FILM COATED ORAL at 22:27

## 2021-04-30 RX ADMIN — ONDANSETRON HYDROCHLORIDE 4 MG: 2 INJECTION, SOLUTION INTRAVENOUS at 10:28

## 2021-04-30 RX ADMIN — BUSPIRONE HYDROCHLORIDE 5 MG: 5 TABLET ORAL at 22:30

## 2021-04-30 RX ADMIN — CYCLOSPORINE 1 DROP: 0.5 EMULSION OPHTHALMIC at 22:31

## 2021-04-30 RX ADMIN — MIDAZOLAM 2 MG: 1 INJECTION INTRAMUSCULAR; INTRAVENOUS at 10:06

## 2021-04-30 RX ADMIN — FENTANYL CITRATE 100 MCG: 50 INJECTION, SOLUTION INTRAMUSCULAR; INTRAVENOUS at 10:13

## 2021-04-30 RX ADMIN — CEFAZOLIN SODIUM 2 G: 2 INJECTION, SOLUTION INTRAVENOUS at 08:28

## 2021-04-30 RX ADMIN — ACETAMINOPHEN 975 MG: 325 TABLET, FILM COATED ORAL at 21:06

## 2021-04-30 RX ADMIN — OXYCODONE HYDROCHLORIDE 5 MG: 5 TABLET ORAL at 23:01

## 2021-04-30 RX ADMIN — DEXAMETHASONE SODIUM PHOSPHATE 4 MG: 4 INJECTION, SOLUTION INTRA-ARTICULAR; INTRALESIONAL; INTRAMUSCULAR; INTRAVENOUS; SOFT TISSUE at 10:13

## 2021-04-30 RX ADMIN — HYDROMORPHONE HYDROCHLORIDE 0.5 MG: 1 INJECTION, SOLUTION INTRAMUSCULAR; INTRAVENOUS; SUBCUTANEOUS at 08:58

## 2021-04-30 RX ADMIN — HYDROMORPHONE HYDROCHLORIDE 0.5 MG: 1 INJECTION, SOLUTION INTRAMUSCULAR; INTRAVENOUS; SUBCUTANEOUS at 12:13

## 2021-04-30 RX ADMIN — PROPOFOL 170 MG: 10 INJECTION, EMULSION INTRAVENOUS at 10:13

## 2021-04-30 RX ADMIN — OXYCODONE HYDROCHLORIDE 5 MG: 5 TABLET ORAL at 12:21

## 2021-04-30 RX ADMIN — ACETAMINOPHEN 975 MG: 325 TABLET, FILM COATED ORAL at 08:29

## 2021-04-30 RX ADMIN — IBUPROFEN 600 MG: 600 TABLET, FILM COATED ORAL at 21:06

## 2021-04-30 RX ADMIN — VANCOMYCIN HYDROCHLORIDE 1500 MG: 5 INJECTION, POWDER, LYOPHILIZED, FOR SOLUTION INTRAVENOUS at 08:51

## 2021-04-30 RX ADMIN — SODIUM CHLORIDE, POTASSIUM CHLORIDE, SODIUM LACTATE AND CALCIUM CHLORIDE: 600; 310; 30; 20 INJECTION, SOLUTION INTRAVENOUS at 09:00

## 2021-04-30 RX ADMIN — ONDANSETRON HYDROCHLORIDE 4 MG: 2 INJECTION, SOLUTION INTRAMUSCULAR; INTRAVENOUS at 11:44

## 2021-04-30 RX ADMIN — LABETALOL HYDROCHLORIDE 10 MG: 5 INJECTION, SOLUTION INTRAVENOUS at 12:19

## 2021-04-30 RX ADMIN — HYDROMORPHONE HYDROCHLORIDE 1 MG: 1 INJECTION, SOLUTION INTRAMUSCULAR; INTRAVENOUS; SUBCUTANEOUS at 10:22

## 2021-04-30 RX ADMIN — HYDROMORPHONE HYDROCHLORIDE 0.5 MG: 1 INJECTION, SOLUTION INTRAMUSCULAR; INTRAVENOUS; SUBCUTANEOUS at 15:47

## 2021-04-30 RX ADMIN — FENTANYL CITRATE 50 MCG: 50 INJECTION, SOLUTION INTRAMUSCULAR; INTRAVENOUS at 11:44

## 2021-04-30 RX ADMIN — OMEPRAZOLE 40 MG: 20 CAPSULE, DELAYED RELEASE ORAL at 22:28

## 2021-04-30 RX ADMIN — FENTANYL CITRATE 50 MCG: 50 INJECTION, SOLUTION INTRAMUSCULAR; INTRAVENOUS at 11:53

## 2021-04-30 RX ADMIN — Medication 50 MG: at 11:00

## 2021-04-30 RX ADMIN — IBUPROFEN 600 MG: 600 TABLET, FILM COATED ORAL at 12:21

## 2021-04-30 ASSESSMENT — MIFFLIN-ST. JEOR: SCORE: 1529.26

## 2021-04-30 ASSESSMENT — COLUMBIA-SUICIDE SEVERITY RATING SCALE - C-SSRS
2. HAVE YOU ACTUALLY HAD ANY THOUGHTS OF KILLING YOURSELF IN THE PAST MONTH?: NO
1. IN THE PAST MONTH, HAVE YOU WISHED YOU WERE DEAD OR WISHED YOU COULD GO TO SLEEP AND NOT WAKE UP?: NO

## 2021-04-30 NOTE — PHARMACY-ADMISSION MEDICATION HISTORY
Pharmacy reviewed prior to admission med list from pre-admitting [email or web address]        Prior to Admission medications    Medication Sig Last Dose Taking? Auth Provider   acetaminophen (TYLENOL) 325 MG tablet Take 2 tablets (650 mg) by mouth every 4 hours as needed for other Past Week at Unknown time Yes Brandy Grullon APRN CNS   albuterol (ALBUTEROL) 108 (90 BASE) MCG/ACT Inhaler Inhale 2 puffs into the lungs 4 times daily as needed for shortness of breath / dyspnea or wheezing Past Week at Unknown time Yes Skyler Álvarez MD   amLODIPine (NORVASC) 5 MG tablet Take 1 tablet (5 mg) by mouth daily 4/30/2021 at Unknown time Yes Skyler Álvarez MD   amoxicillin (AMOXIL) 500 MG tablet Take 4 tablets (2000 mg) by mouth 1 hour before dental procedures/cleanings.  Yes Pan Grey MD   aspirin 81 MG EC tablet Take 81 mg by mouth daily  Yes Unknown, Entered By History   benzocaine (ANBESOL) 10 % gel Take by mouth 4 times daily as needed for moderate pain  Yes Reported, Patient   budesonide-formoterol (SYMBICORT) 160-4.5 MCG/ACT Inhaler Inhale 2 puffs into the lungs 2 times daily 4/30/2021 at Unknown time Yes Reported, Patient   busPIRone (BUSPAR) 5 MG tablet Take 5 mg by mouth 2 times daily 4/28/2021 at Unknown time Yes Reported, Patient   carboxymethylcellulose PF (REFRESH PLUS) 0.5 % ophthalmic solution Place 1 drop into both eyes 2 times daily 4/29/2021 at Unknown time Yes Brandy Grullon APRN CNS   cyclobenzaprine (FLEXERIL) 5 MG tablet Take 1 tablet (5 mg) by mouth 2 times daily as needed for muscle spasms  Patient taking differently: Take 10 mg by mouth 3 times daily as needed for muscle spasms  4/29/2021 at Unknown time Yes Pan Grey MD   cycloSPORINE (RESTASIS) 0.05 % ophthalmic emulsion Place 1 drop into both eyes 2 times daily 4/29/2021 at Unknown time Yes Unknown, Entered By History   Dentifrices (BIOTENE DRY MOUTH DT) Apply 2 sprays to affected area 3 times daily as needed  Yes  Reported, Patient   fluticasone-vilanterol (BREO ELLIPTA) 200-25 MCG/INH inhaler Inhale 1 puff into the lungs daily 4/23/2021 Yes Brandy Grullon APRN CNS   furosemide (LASIX) 20 MG tablet Take 1 tablet (20 mg) by mouth daily 4/30/2021 at Unknown time Yes Skyler Álvarez MD   guaiFENesin-codeine (ROBITUSSIN AC) 100-10 MG/5ML solution Take 1-2 teaspoonful by mouth every 4 hours as needed for cough Past Month at Unknown time Yes Reported, Patient   HYDROcodone-acetaminophen (NORCO) 5-325 MG tablet Take 1 tablet by mouth every 6 hours as needed for severe pain 4/30/2021 at 0200 Yes Eugene Elizabeth MD   hypromellose (ARTIFICIAL TEARS) 0.5 % SOLN ophthalmic solution Place 1 drop into both eyes 2 times daily 4/29/2021 at Unknown time Yes Unknown, Entered By History   ibuprofen (ADVIL/MOTRIN) 600 MG tablet Take 600 mg by mouth every 6 hours as needed for moderate pain 4/29/2021 at Unknown time Yes Reported, Patient   lacosamide (VIMPAT) 200 MG TABS tablet Take 1 tablet (200 mg) by mouth 2 times daily 4/30/2021 at Unknown time Yes Haase, Tonya Lynn, APRN CNP   levothyroxine (SYNTHROID/LEVOTHROID) 50 MCG tablet Take 50 mcg by mouth daily 4/30/2021 at Unknown time Yes Unknown, Entered By History   linaclotide (LINZESS) 290 MCG capsule Take 1 capsule (290 mcg) by mouth every morning (before breakfast) Past Week at Unknown time Yes Farzana Ro MD   liothyronine (CYTOMEL) 5 MCG tablet Take 5 mcg by mouth daily  4/30/2021 at Unknown time Yes Reported, Patient   loratadine (CLARITIN REDITABS) 10 MG ODT Take 1 tablet (10 mg) by mouth daily as needed for allergies Past Month at Unknown time Yes Haase, Tonya Lynn, APRN CNP   LORazepam (ATIVAN) 0.5 MG tablet Take 0.5 mg by mouth daily as needed for anxiety 4/28/2021 Yes Reported, Patient   medical cannabis (Patient's own supply) See Admin Instructions (The purpose of this order is to document that the patient reports taking medical cannabis.  This is not a  prescription, and is not used to certify that the patient has a qualifying medical condition.) Past Month at Unknown time Yes Reported, Patient   omeprazole (PRILOSEC) 40 MG DR capsule Take 1 capsule (40 mg) by mouth 2 times daily 4/30/2021 at Unknown time Yes Skyler Álvarez MD   ondansetron (ZOFRAN-ODT) 4 MG ODT tab Take 1 tablet (4 mg) by mouth every 6 hours as needed for nausea or vomiting Past Month at Unknown time Yes Brandy Grullon APRN CNS   polyethylene glycol (MIRALAX) 17 g packet Take 34 g by mouth daily 4/29/2021 at Unknown time Yes Haase, Tonya Lynn, APRN CNP   potassium citrate (UROCIT-K) 10 MEQ (1080 MG) CR tablet Take 10 mEq by mouth daily 4/30/2021 at Unknown time Yes Reported, Patient   pramipexole (MIRAPEX) 1 MG tablet Give 1 tablet by mouth in the morning and 3 tablets by mouth 3 hours prior to bedtime 4/30/2021 at Unknown time Yes Reported, Patient   QUEtiapine (SEROQUEL) 50 MG tablet Take  mg by mouth nightly as needed  Past Week at Unknown time Yes Reported, Patient   senna-docusate (SENOKOT-S/PERICOLACE) 8.6-50 MG tablet Take 2 tablets by mouth 2 times daily 4/29/2021 at Unknown time Yes Brandy Grullon APRN CNS   traMADol (ULTRAM) 50 MG tablet Take 1 tablet (50 mg) by mouth every 6 hours as needed for severe pain Past Month at Unknown time Yes Skyler Álvarez MD   traZODone (DESYREL) 150 MG tablet 150 mg At Bedtime  4/29/2021 at Unknown time Yes Reported, Patient   trolamine salicylate (ASPERCREME) 10 % external cream Apply topically 2 times daily as needed for moderate pain To right hip incision area  Yes Reported, Patient   artificial saliva (BIOTENE MT) AERS spray Take 2 sprays by mouth 3 times daily as needed for dry mouth   Brandy Grullon APRN CNS   benzonatate (TESSALON) 200 MG capsule Take 200 mg by mouth 3 times daily as needed for cough More than a month at Unknown time  Reported, Patient   Estradiol-Estriol-Progesterone (BIEST/PROGESTERONE TD) Place 0.5 g onto  the skin nightly as needed More than a month at Unknown time  Reported, Patient

## 2021-04-30 NOTE — ANESTHESIA CARE TRANSFER NOTE
Patient: Mercedes Barber    Procedure(s):  Open reduction internal fixation left distal radius fracture    Diagnosis: Distal radius fracture [S52.509A]  Diagnosis Additional Information: No value filed.    Anesthesia Type:   General     Note:    Oropharynx: oropharynx clear of all foreign objects and spontaneously breathing  Level of Consciousness: drowsy  Oxygen Supplementation: face mask  Level of Supplemental Oxygen (L/min / FiO2): 6  Independent Airway: airway patency satisfactory and stable  Dentition: dentition unchanged  Vital Signs Stable: post-procedure vital signs reviewed and stable  Report to RN Given: handoff report given  Patient transferred to: PACU    Handoff Report: Identifed the Patient, Identified the Reponsible Provider, Reviewed the pertinent medical history, Discussed the surgical course, Reviewed Intra-OP anesthesia mangement and issues during anesthesia, Set expectations for post-procedure period and Allowed opportunity for questions and acknowledgement of understanding      Vitals: (Last set prior to Anesthesia Care Transfer)  CRNA VITALS  4/30/2021 1057 - 4/30/2021 1135      4/30/2021             SpO2:  97 %        Electronically Signed By: FROYLAN Kraus CRNA  April 30, 2021  11:35 AM

## 2021-04-30 NOTE — TELEPHONE ENCOUNTER
Patient is complaining of swelling in her left hand. Caller says she has an elastic bandage on the left hand due to it being fractured. Caller says she is scheduled for surgery this am. Ice cindy applies to area, and says there is some numbness, but she has neuropathy and their always numb. Caller denies any coolness or blueness to left fingers. Caller advised to elevate left hand and loosen elastic bandages. Triage guidelines recommend to home care. Caller verbalized and understands directives.  COVID 19 Nurse Triage Plan/Patient Instructions    Please be aware that novel coronavirus (COVID-19) may be circulating in the community. If you develop symptoms such as fever, cough, or SOB or if you have concerns about the presence of another infection including coronavirus (COVID-19), please contact your health care provider or visit [email or web address].     Disposition/Instructions    Home care recommended. Follow home care protocol based instructions.    Thank you for taking steps to prevent the spread of this virus.  o Limit your contact with others.  o Wear a simple mask to cover your cough.  o Wash your hands well and often.    Resources    [facility]: About COVID-19: [email or web address]    CDC: What to Do If You're Sick: www.cdc.gov/coronavirus/2019-ncov/about/steps-when-sick.html    CDC: Ending Home Isolation: www.cdc.gov/coronavirus/2019-ncov/hcp/disposition-in-home-patients.html     CDC: Caring for Someone: www.cdc.gov/coronavirus/2019-ncov/if-you-are-sick/care-for-someone.html     [facility]: Interim Guidance for Hospital Discharge to Home: www.health.[facility].[place]/diseases/coronavirus/hcp/hospdischarge.pdf    [facility] clinical trials (COVID-19 research studies): [email or web address]     Below are the COVID-19 hotlines at the [place] Department of Health ([facility]). Interpreters are available.   o For health questions: Call 138-522-7444 or 1-359.722.7349 (7  a.m. to 7 p.m.)  o For questions about schools and childcare: Call 372-103-3271 or 1-555.971.4750 (7 a.m. to 7 p.m.)                     Additional Information    Splint or elastic bandage problems or questions    Negative: Cast problems or questions    Negative: Chest pain    Negative: Difficulty breathing    Negative: [1] Numbness (loss of sensation) of fingers or toes AND [2] persists > 1 hour after loosening elastic bandage    Negative: [1] Tingling (pins and needles sensation) of fingers or toes AND [2] persists > 1 hour after loosening elastic bandage    Negative: [1] Blueness or pallor of fingers or toes (compared to noninjured side) AND [2] persists > 1 hour after loosening elastic bandage    Negative: Patient sounds very sick or weak to the triager    Negative: [1] SEVERE pain AND [2] not improved one hour after pain medicine, elevation, and loosening elastic bandage    Negative: [1] Increasing pain under splint AND [2] splint put on > 72 hours ago    Negative: [1] Caller has URGENT question AND [2] triager unable to answer question    Negative: Splint breaks or cracks    Negative: Edge of splint is causing a sore    Negative: [1] Plaster splint gets wet AND [2] is soft after attempted drying    Negative: [1] Splint lining gets wet AND [2] pins present and touching wet area    Negative: [1] Caller has NON-URGENT question AND [2] triager unable to answer question    Negative: [1] Fingers or toes are swollen (compared to noninjured side) AND [2] persists > 24 hours after loosening elastic bandage    Negative: Splint removal date, questions about    Negative: Pain from fractures or sprains    Negative: Symptoms from tight splint    Symptoms from tight elastic bandage without a splint (e.g., sprained ankle or knee)    Protocols used: CAST SYMPTOMS AND [id number], SPLINT SYMPTOMS AND [id number]

## 2021-04-30 NOTE — PROGRESS NOTES
Pt released from PACU and taken to phase 2. RN states pt seemed to be doing well, but more recently as she was going to be released to home, RN notes pt had 100% sats, but as she would doze off, sats would decrease to 60's, and would immediately bounce up when pt reasoned to the alarms. Decision made to not send pt home. RN to call Dr Sánchez and admit pt for overnight observation. Per RN, pt has not been receiving excess narcotic medications, and RR is not particularly low, but it seems to really hinge on obstruction when dozing off. Normally not a CPAP user. Dontae

## 2021-04-30 NOTE — ANESTHESIA POSTPROCEDURE EVALUATION
Patient: Mercedes Barber    Procedure(s):  Open reduction internal fixation left distal radius fracture    Diagnosis:Distal radius fracture [S52.509A]  Diagnosis Additional Information: No value filed.    Anesthesia Type:  General    Note:  Disposition: Outpatient   Postop Pain Control: Uneventful            Sign Out: Well controlled pain   PONV: No   Neuro/Psych: Uneventful            Sign Out: Acceptable/Baseline neuro status   Airway/Respiratory: Uneventful            Sign Out: Acceptable/Baseline resp. status   CV/Hemodynamics: Uneventful            Sign Out: Acceptable CV status; No obvious hypovolemia; No obvious fluid overload   Other NRE: NONE   DID A NON-ROUTINE EVENT OCCUR? No           Last vitals:  Vitals:    04/30/21 0910 04/30/21 1130 04/30/21 1135   BP:   (!) 161/88   Pulse: 69  82   Resp:   11   Temp:  97.3  F (36.3  C)    SpO2: 97%  98%       Last vitals prior to Anesthesia Care Transfer:  CRNA VITALS  4/30/2021 1057 - 4/30/2021 1147      4/30/2021             SpO2:  97 %          Electronically Signed By: Talon Diggs MD  April 30, 2021  11:47 AM

## 2021-04-30 NOTE — ANESTHESIA PROCEDURE NOTES
Airway       Patient location during procedure: OR  Staff -        Performed By: CRNA  Consent for Airway        Urgency: elective  Indications and Patient Condition       Indications for airway management: weston-procedural         Mask difficulty assessment: 1 - vent by mask    Final Airway Details       Final airway type: supraglottic airway    Supraglottic Airway Details        Type: LMA       Brand: I-Gel       LMA size: 4    Post intubation assessment        Placement verified by: capnometry, equal breath sounds and chest rise        Number of attempts at approach: 1       Secured with: plastic tape       Ease of procedure: easy       Dentition: Intact and Unchanged

## 2021-04-30 NOTE — DISCHARGE INSTRUCTIONS
DR. MIRANDA GONZALEZ M.D.              CLINIC PHONE NUMBER:  [phone]  [facility] ORTHOPEDICS      GENERAL ANESTHESIA OR SEDATION ADULT DISCHARGE INSTRUCTIONS   SPECIAL PRECAUTIONS FOR 24 HOURS AFTER SURGERY    IT IS NOT UNUSUAL TO FEEL LIGHT-HEADED OR FAINT, UP TO 24 HOURS AFTER SURGERY OR WHILE TAKING PAIN MEDICATION.  IF YOU HAVE THESE SYMPTOMS; SIT FOR A FEW MINUTES BEFORE STANDING AND HAVE SOMEONE ASSIST YOU WHEN YOU GET UP TO WALK OR USE THE BATHROOM.    YOU SHOULD REST AND RELAX FOR THE NEXT 24 HOURS AND YOU MUST MAKE ARRANGEMENTS TO HAVE SOMEONE STAY WITH YOU FOR AT LEAST 24 HOURS AFTER YOUR DISCHARGE.  AVOID HAZARDOUS AND STRENUOUS ACTIVITIES.  DO NOT MAKE IMPORTANT DECISIONS FOR 24 HOURS.    DO NOT DRIVE ANY VEHICLE OR OPERATE MECHANICAL EQUIPMENT FOR 24 HOURS FOLLOWING THE END OF YOUR SURGERY.  EVEN THOUGH YOU MAY FEEL NORMAL, YOUR REACTIONS MAY BE AFFECTED BY THE MEDICATION YOU HAVE RECEIVED.    DO NOT DRINK ALCOHOLIC BEVERAGES FOR 24 HOURS FOLLOWING YOUR SURGERY.    DRINK CLEAR LIQUIDS (APPLE JUICE, GINGER ALE, 7-UP, BROTH, ETC.).  PROGRESS TO YOUR REGULAR DIET AS YOU FEEL ABLE.    YOU MAY HAVE A DRY MOUTH, A SORE THROAT, MUSCLES ACHES OR TROUBLE SLEEPING.  THESE SHOULD GO AWAY AFTER 24 HOURS.    CALL YOUR DOCTOR FOR ANY OF THE FOLLOWING:  SIGNS OF INFECTION (FEVER, GROWING TENDERNESS AT THE SURGERY SITE, A LARGE AMOUNT OF DRAINAGE OR BLEEDING, SEVERE PAIN, FOUL-SMELLING DRAINAGE, REDNESS OR SWELLING.    IT HAS BEEN OVER 8 TO 10 HOURS SINCE SURGERY AND YOU ARE STILL NOT ABLE TO URINATE (PASS WATER).       Medications Received:  You received 975mg of Tylenol at 8:30am. Next dose of Tylenol after 2:30pm if needed.  Maximum acetaminophen (Tylenol) dose from all sources should not exceed 4 grams (4000 mg) per day.    You received ibuprofen (Motrin) at 12:20pm.  Do not take any ibuprofen products until after 6:20pm.    You received Oxycodone 5mg at 12:20pm if needed. Next dose of Oxycodone after 6:20pm  id needed.

## 2021-04-30 NOTE — OP NOTE
Procedure Date: 2021    PREOPERATIVE DIAGNOSIS:  Displaced intra-articular left distal radius fracture.    POSTOPERATIVE DIAGNOSIS:  Displaced intra-articular left distal radius fracture.    PROCEDURE PERFORMED:  Open reduction with internal fixation of displaced left intraarticular distal radius fracture.    SURGEON:  Luis Manuel Sánchez MD    ASSISTANT:  Kiesha Neil PA-C.    ANESTHESIA:  General with regional.    ESTIMATED BLOOD LOSS:  2 mL.    TOURNIQUET TIME:  Approximately 45 minutes.    COMPLICATIONS:  None.    DESCRIPTION OF PROCEDURE:  The patient was taken to the operating room where after administration of antibiotic prophylaxis, general anesthetic, regional anesthetic, and sterile prep and drape, a volar approach to the distal radius was used reflecting the pronator and exposing the fracture, which was reduced provisionally.  Due to the residual translation as well as loss of volar tilt, I used the plate to help reduce the fracture with proximal and distal screws of appropriate length and purchase.  I used the variable angle screws to avoid screw penetration into the joint.  Overall, the construct was stable and the fracture reduction nearly anatomic.  The wound was irrigated and the pronator closed followed by the remaining layered anatomic closure and a sterile dressing and splint.  Two views of the wrist during surgery showed near anatomic fracture alignment and position with no complications.    Luis Manuel Sánchez MD        D: 2021   T: 2021   MT: ARIANNA    Name:     OLI MOSELEY  MRN:      [id number]        Account:        [id number]   :      1955           Procedure Date: 2021     Document: [id number]

## 2021-04-30 NOTE — ANESTHESIA POSTPROCEDURE EVALUATION
Patient: Mercedes Barber    Procedure(s):  Open reduction internal fixation left distal radius fracture    Diagnosis:Distal radius fracture [S52.509A]  Diagnosis Additional Information: No value filed.    Anesthesia Type:  General    Note:  Disposition: Outpatient   Postop Pain Control: Uneventful            Sign Out: Well controlled pain   PONV: No   Neuro/Psych: Uneventful            Sign Out: Acceptable/Baseline neuro status   Airway/Respiratory: Uneventful            Sign Out: Acceptable/Baseline resp. status   CV/Hemodynamics: Uneventful            Sign Out: Acceptable CV status; No obvious hypovolemia; No obvious fluid overload   Other NRE: NONE   DID A NON-ROUTINE EVENT OCCUR? No           Last vitals:  Vitals:    04/30/21 1140 04/30/21 1145 04/30/21 1153   BP: (!) 169/82     Pulse: 78 84    Resp: 14 12    Temp:      SpO2: 95% 99% 99%       Last vitals prior to Anesthesia Care Transfer:  CRNA VITALS  4/30/2021 1057 - 4/30/2021 1157      4/30/2021             SpO2:  97 %          Electronically Signed By: Talon Diggs MD  April 30, 2021  11:58 AM

## 2021-04-30 NOTE — PROGRESS NOTES
Patient in ph 2, in chair. While awake 02 sats 100% on RA. When patient drifts off to sleep O2 sat drops as low as 40%. Patient awakens to monitor alarm, deep breaths, and O2 sats come back up to 100% quickly. Patient has history of sleep apnea, but no longer has CPAP at home - 'I just sleep on my side.' O2 sats have dropped several times in the last 30 minutes. Dr. Diggs, MDA updated. He would like patient to be observed with O2 sat monitor over night. Dr. Callum Galicia PA, updated and orders obtained for patient to be admitted and observed overnight.

## 2021-04-30 NOTE — ANESTHESIA PREPROCEDURE EVALUATION
Anesthesia Pre-Procedure Evaluation    Patient: Mercedes Barber   MRN: [id number] : 1955        Preoperative Diagnosis: Distal radius fracture [S52.509A]   Procedure : Procedure(s):  Open reduction internal fixation left distal radius fracture     Past Medical History:   Diagnosis Date     Arthritis     Thumb     Cervical high risk HPV (human papillomavirus) test positive 2017: NIL pap, + HR HPV (not 16 or 18) result.      Cervical pain 9/15/2016     Chronic infection     MRSA     Constipation 2012     Diarrhea 2009     Esophageal stricture 2012     Gastro-oesophageal reflux disease      History of blood transfusion      Hypertension      Hypothyroidism 2012     IBS (irritable bowel syndrome)      Mild major depression (H) 2012     Neuropathy of lower extremity      Rectal prolapse      Restless leg syndrome      Seizure disorder (H)     25 years ago     Sleep apnea     CPAP, no longer using CPAP     Temporal sclerosis 2012     Uncomplicated asthma       Past Surgical History:   Procedure Laterality Date     Anterior COLPORRHAPHY, BLADDER/VAGINA      perigee mesh     ARTHROPLASTY HIP Right 2019    Procedure: Right total hip arthroplasty;  Surgeon: Anant Mejia MD;  Location: [facility] OR     ARTHROPLASTY PATELLO-FEMORAL (KNEE) Bilateral      ARTHROPLASTY REVISION HIP Right 2019    Procedure: Right hip revision total arthroplasty;  Surgeon: Tom Antonio MD;  Location: [facility] OR     ARTHROPLASTY REVISION HIP Right 2020    Procedure: Revision Right total hip arthroplasty;  Surgeon: Pan Grey MD;  Location: [facility] OR     CARPAL TUNNEL RELEASE RT/LT       DENTAL SURGERY      implant     DILATION AND CURETTAGE       DRAIN PILONIDAL CYST SIMPL       ENDOSCOPY DRUG INDUCED SLEEP N/A 2015    Procedure: ENDOSCOPY DRUG INDUCED SLEEP;  Surgeon: Park Ortiz MD;  Location: [facility] OR     ESOPHAGOSCOPY, GASTROSCOPY, DUODENOSCOPY (EGD),  COMBINED  4/5/2013    Procedure: COMBINED ESOPHAGOSCOPY, GASTROSCOPY, DUODENOSCOPY (EGD), BIOPSY SINGLE OR MULTIPLE;;  Surgeon: Mundo Mehta MD;  Location:  GI     EXCISE LESION TRUNK  8/10/2012    Procedure: EXCISE LESION TRUNK;;  Surgeon: Jerald Diggs MD;  Location: [facility] OR     HYSTERECTOMY       IRRIGATION AND DEBRIDEMENT HIP, COMBINED Right 8/26/2019    Procedure: 1.  Right hip wound irrigation and debridement with excisional debridement of nonviable skin, subcutaneous tissues, and fascia. 2. Application of incisional wound VAC, 27cm length incision.;  Surgeon: Tyson Prabhakar MD;  Location: [facility] OR     L shoulder fracture       rectal prolapse repair abdominally       RELEASE PLANTAR FASCIA Right 1/20/2015    Procedure: RELEASE PLANTAR FASCIA;  Surgeon: Maicol Liu DPM;  Location: [facility]     REMOVE MESH VAGINA  8/10/2012    Procedure: REMOVE MESH VAGINA;  Excision of Vaginal Mesh Exposure, Removal of Skin Tag Left Inner Leg;  Surgeon: Jerald Diggs MD;  Location: [facility] OR     REPAIR HAMMER TOE Right 1/20/2015    Procedure: REPAIR HAMMER TOE;  Surgeon: Maicol Liu DPM;  Location: [facility]     TONSILLECTOMY & ADENOIDECTOMY       TUBAL LIGATION        Allergies   Allergen Reactions     Blood Transfusion Related (Informational Only) Other (See Comments)     Patient has a history of a clinically significant antibody against RBC antigens.  A delay in compatible RBCs may occur.     Budesonide      Edema     Bupropion Rash     Carbamazepine Diarrhea     Clonazepam Other (See Comments)     Hypotension, trouble walking, mind disturbance     Encort [Hydrocortisone Acetate] Swelling     Localized to feet     Encort [Hydrocortisone] Swelling     Erythromycin Diarrhea     Erythromycin Nausea and Vomiting and Diarrhea     Flagyl [Metronidazole] Nausea     Gabapentin Other (See Comments)     Causes over-eating     Lamictal [Lamotrigine] Dizziness     Oxycodone Nausea and Vomiting     Tegretol  [Carbamazepine] Nausea and Vomiting      Social History     Tobacco Use     Smoking status: Never Smoker     Smokeless tobacco: Never Used   Substance Use Topics     Alcohol use: Yes     Frequency: Monthly or less     Comment: 1 glass of wine 2x/yr      Wt Readings from Last 1 Encounters:   04/29/21 95.3 kg (210 lb)        Anesthesia Evaluation   Pt has had prior anesthetic.         ROS/MED HX  ENT/Pulmonary:     (+) asthma     Neurologic:       Cardiovascular:     (+) hypertension-----Previous cardiac testing   Echo: Date: 3/8/21 Results:  Interpretation Summary       The visual ejection fraction is estimated at 55%.   The right ventricle is normal in size and function.   The ascending aorta is mildly dilated at 41 mm.   The inferior vena cava was normal in size with preserved respiratory   variability.   PASP estimates appear normal.   No significant valve disease.     Stress Test: Date: Results:    ECG Reviewed: Date: Results:    Cath: Date: Results:      METS/Exercise Tolerance:     Hematologic:       Musculoskeletal:   (+) arthritis,     GI/Hepatic:     (+) GERD,     Renal/Genitourinary:       Endo:     (+) thyroid problem, Obesity,     Psychiatric/Substance Use:     (+) psychiatric history anxiety     Infectious Disease:       Malignancy:       Other:            Physical Exam    Airway        Mallampati: III   TM distance: > 3 FB   Neck ROM: full     Respiratory Devices and Support         Dental           Cardiovascular          Rhythm and rate: regular and normal     Pulmonary           breath sounds clear to auscultation           OUTSIDE LABS:  CBC:   Lab Results   Component Value Date    WBC 5.6 01/31/2021    WBC 6.2 07/31/2020    HGB 11.6 (L) 01/31/2021    HGB 11.3 (L) 09/30/2020    HCT 37.7 01/31/2021    HCT 42.1 07/31/2020     01/31/2021     07/31/2020     BMP:   Lab Results   Component Value Date     04/12/2021     02/18/2021    POTASSIUM 4.1 04/12/2021    POTASSIUM 3.9  02/18/2021    CHLORIDE 107 04/12/2021    CHLORIDE 111 (H) 02/18/2021    CO2 31 04/12/2021    CO2 27 02/18/2021    BUN 11 04/12/2021    BUN 18 02/18/2021    CR 0.96 04/12/2021    CR 0.90 02/18/2021    GLC 97 04/12/2021     (H) 02/18/2021     COAGS:   Lab Results   Component Value Date    PTT 34 09/06/2019    INR 1.08 09/06/2019     POC:   Lab Results   Component Value Date    BGM 79 08/05/2020    HCG Negative 10/17/2007     HEPATIC:   Lab Results   Component Value Date    ALBUMIN 3.6 01/31/2021    PROTTOTAL 6.9 01/31/2021    ALT 24 01/31/2021    AST 19 01/31/2021    ALKPHOS 102 01/31/2021    BILITOTAL 0.3 01/31/2021     OTHER:   Lab Results   Component Value Date    LACT 0.7 08/19/2012    A1C 5.6 08/26/2011    SABA 9.0 04/12/2021    PHOS 4.1 09/17/2009    MAG 2.1 08/25/2019    LIPASE 87 02/09/2019    TSH 0.61 10/16/2020    T4 0.85 10/16/2020    CRP 3.6 02/17/2021    SED 10 02/17/2021       Anesthesia Plan    ASA Status:  3   NPO Status:  NPO Appropriate    Anesthesia Type: General.   Induction: Intravenous.   Maintenance: Balanced.        Consents    Anesthesia Plan(s) and associated risks, benefits, and realistic alternatives discussed. Questions answered and patient/representative(s) expressed understanding.     - Discussed with:  Patient         Postoperative Care    Pain management: IV analgesics, Oral pain medications, Peripheral nerve block (Single Shot), Multi-modal analgesia.   PONV prophylaxis: Ondansetron (or other 5HT-3), Dexamethasone or Solumedrol     Comments:                Jay Mayorga MD

## 2021-05-01 VITALS
TEMPERATURE: 98.4 F | HEIGHT: 65 IN | RESPIRATION RATE: 16 BRPM | DIASTOLIC BLOOD PRESSURE: 63 MMHG | SYSTOLIC BLOOD PRESSURE: 114 MMHG | WEIGHT: 219 LBS | HEART RATE: 61 BPM | OXYGEN SATURATION: 98 % | BODY MASS INDEX: 36.49 KG/M2

## 2021-05-01 LAB — GLUCOSE BLDC GLUCOMTR-MCNC: 105 MG/DL (ref 70–99)

## 2021-05-01 PROCEDURE — 250N000013 HC RX MED GY IP 250 OP 250 PS 637: Performed by: ORTHOPAEDIC SURGERY

## 2021-05-01 PROCEDURE — 250N000013 HC RX MED GY IP 250 OP 250 PS 637: Performed by: INTERNAL MEDICINE

## 2021-05-01 PROCEDURE — 999N001017 HC STATISTIC GLUCOSE BY METER IP

## 2021-05-01 PROCEDURE — 99207 PR CDG-CODE CATEGORY CHANGED: CPT | Performed by: INTERNAL MEDICINE

## 2021-05-01 PROCEDURE — G0378 HOSPITAL OBSERVATION PER HR: HCPCS

## 2021-05-01 PROCEDURE — 250N000013 HC RX MED GY IP 250 OP 250 PS 637: Performed by: PHYSICIAN ASSISTANT

## 2021-05-01 PROCEDURE — 99213 OFFICE O/P EST LOW 20 MIN: CPT | Performed by: INTERNAL MEDICINE

## 2021-05-01 RX ORDER — FUROSEMIDE 20 MG
20 TABLET ORAL DAILY
Status: DISCONTINUED | OUTPATIENT
Start: 2021-05-01 | End: 2021-05-01 | Stop reason: HOSPADM

## 2021-05-01 RX ORDER — POTASSIUM CITRATE 10 MEQ/1
10 TABLET, EXTENDED RELEASE ORAL DAILY
Status: DISCONTINUED | OUTPATIENT
Start: 2021-05-01 | End: 2021-05-01 | Stop reason: HOSPADM

## 2021-05-01 RX ADMIN — PRAMIPEXOLE DIHYDROCHLORIDE 1 MG: 1 TABLET ORAL at 09:14

## 2021-05-01 RX ADMIN — AMLODIPINE BESYLATE 5 MG: 5 TABLET ORAL at 09:15

## 2021-05-01 RX ADMIN — IBUPROFEN 600 MG: 600 TABLET, FILM COATED ORAL at 12:15

## 2021-05-01 RX ADMIN — ACETAMINOPHEN 975 MG: 325 TABLET, FILM COATED ORAL at 04:10

## 2021-05-01 RX ADMIN — POTASSIUM CITRATE 10 MEQ: 10 TABLET ORAL at 09:15

## 2021-05-01 RX ADMIN — FUROSEMIDE 20 MG: 20 TABLET ORAL at 09:13

## 2021-05-01 RX ADMIN — LEVOTHYROXINE SODIUM 50 MCG: 0.03 TABLET ORAL at 09:13

## 2021-05-01 RX ADMIN — LACOSAMIDE 200 MG: 200 TABLET, FILM COATED ORAL at 09:16

## 2021-05-01 RX ADMIN — ACETAMINOPHEN 975 MG: 325 TABLET, FILM COATED ORAL at 12:15

## 2021-05-01 RX ADMIN — OMEPRAZOLE 40 MG: 20 CAPSULE, DELAYED RELEASE ORAL at 09:14

## 2021-05-01 RX ADMIN — IBUPROFEN 600 MG: 600 TABLET, FILM COATED ORAL at 04:10

## 2021-05-01 RX ADMIN — BUSPIRONE HYDROCHLORIDE 5 MG: 5 TABLET ORAL at 09:16

## 2021-05-01 RX ADMIN — LIOTHYRONINE SODIUM 5 MCG: 5 TABLET ORAL at 09:16

## 2021-05-01 NOTE — CONSULTS
[facility] Hospitalist Consult     Mercedes Barber MRN# [id number]   YOB: 1955 Age: 66 year old   Date of Admission: 4/30/2021  PCP is Skyler Álvarez  Date of Service: 4/30/2021    Referring MD & Reason for Visit: I was asked by Luis Manuel Sánchez MD, ASAP to manage chronic medical problems.  Internal Medicine Physician Assistant: Smita Greene PA-C         Assessment and Plan:   Mercedes Barber is a 66 year old female with a history of HTN, Asthma, GERD, Depression, Anxiety, DVT, RLS, IBS, Hypothyroidism, JORDAN who is s/p fall on 4/28 which resulted in a left radius fracture and displaced styloid fracture who is admitted to Orthopedic Surgery for planned surgery.  Now s/p ORIF of Left Radius Fracture.  EBL 2ml.  Internal Medicine service was asked to see ASAP this evening for management of chronic medical problems and desaturating while sleeping in the PACU.       Displaced intra-articular left distal radius fracture s/p ORIF - s/p mechanical fall on an escalator earlier this week now POD #0. Doing well.     - will defer diet, activity, DVT ppx, and pain control to primary team.     Post Op Hypoxia - pt noted to have significant desaturation while sleeping in the PACU.  No hypoxia noted while awake.  Lungs clear and asymptomatic.  Pt with a hx of JORDAN not recently on cpap.  Hypoxia likely related to JORDAN and sedation from anesthesia.  - monitor on continuous pulse oximetry  - pt reports JORDAN improved with sleeping on her side; can try this here  - use oxygen while sleeping  - patient was supposed to have a repeat sleep study recently; recommend follow up after this hospital stay  - limit narcotics as able  - hold pta prn sedating medications; Trazodone, Ativan, Seroquel  - Incentive spirometry    HTN - continue pta Amlodipine. Hold ASA; resume with ok with surgery.  Reassess resuming pta Lasix and Potassium in am    Asthma - no signs of acute flare.    - Continue pta Albuterol, Tessalon.   She does not regularly take Symbicort or BreoEllipta.  - prn Duonebs     GERD - continue Omeprazole    Depression/Anxiety - continue Buspirone.  Holding pta prn Ativan, Seroquel and Trazodone due to hypoxia.    Seizure Disorder - denies any recent seizures.  Continue pta Lacosamide    Hypothyroidism - continue pta Levothyroxine and Cytomel    RLS - continue pta Mirapex    Hx DVT - pt reports hx of UE DVT provoked from an IV.  She was on anticoagulation for several months.  No hx of LE DVT or clotting disorder.  Ambulation encouraged.  SCD ordered.      CODE: full  Diet/IVF: regular  GI ppx:  omeprazole  DVT ppx: scd      Smita Greene MS, PA-C  Internal Medicine Physician Assistant  [facility]  Pager: [phone]           Chief Complaint:   LUE Pain, Post op hypoxia         HPI:   History is obtained from the patient and medical record. This patient is a 66 year old female with a history of HTN, Asthma, GERD, Depression, Anxiety, DVT, RLS, IBS, Hypothyroidism, JORDAN who is s/p fall on 4/28 which resulted in a left radius fracture and displaced styloid fracture who is admitted to Orthopedic Surgery for planned surgery.  Now s/p ORIF of Left Radius Fracture.  EBL 2ml.  Internal Medicine service was asked to see ASAP this evening for management of chronic medical problems and desaturating while sleeping in the PACU.     Patient reports LUE pain.  She denies chest pain, shortness of breath, cough, wheezing.  Patient was noted to have hypoxia only while sleeping in the PACU.  She has a hx of JORDAN and was previously on cpap but has not been on that recently as her JORDAN is improved with sleeping on her side.         Past Medical History:     Past Medical History:   Diagnosis Date     Arthritis     Thumb     Cervical high risk HPV (human papillomavirus) test positive 07/17/2017 07/17/17: NIL pap, + HR HPV (not 16 or 18) result.      Cervical pain 9/15/2016     Chronic infection     MRSA     Constipation  8/27/2012     Diarrhea 4/30/2009     Esophageal stricture 2/21/2012     Gastro-oesophageal reflux disease      History of blood transfusion      Hypertension      Hypothyroidism 9/18/2012     IBS (irritable bowel syndrome)      Mild major depression (H) 2/21/2012     Neuropathy of lower extremity      Rectal prolapse      Restless leg syndrome      Seizure disorder (H)     25 years ago     Sleep apnea     CPAP, no longer using CPAP     Temporal sclerosis 8/27/2012     Uncomplicated asthma           Past Surgical History:     Past Surgical History:   Procedure Laterality Date     Anterior COLPORRHAPHY, BLADDER/VAGINA      perigee mesh     ARTHROPLASTY HIP Right 7/23/2019    Procedure: Right total hip arthroplasty;  Surgeon: Anant Mejia MD;  Location: [facility] OR     ARTHROPLASTY PATELLO-FEMORAL (KNEE) Bilateral      ARTHROPLASTY REVISION HIP Right 8/7/2019    Procedure: Right hip revision total arthroplasty;  Surgeon: Tom Antonio MD;  Location: [facility] OR     ARTHROPLASTY REVISION HIP Right 8/5/2020    Procedure: Revision Right total hip arthroplasty;  Surgeon: Pan Grey MD;  Location: [facility] OR     CARPAL TUNNEL RELEASE RT/LT       DENTAL SURGERY      implant     DILATION AND CURETTAGE       DRAIN PILONIDAL CYST SIMPL       ENDOSCOPY DRUG INDUCED SLEEP N/A 11/18/2015    Procedure: ENDOSCOPY DRUG INDUCED SLEEP;  Surgeon: Park Ortiz MD;  [facility]: [facility]     ESOPHAGOSCOPY, GASTROSCOPY, DUODENOSCOPY (EGD), COMBINED  4/5/2013    Procedure: COMBINED ESOPHAGOSCOPY, GASTROSCOPY, DUODENOSCOPY (EGD), BIOPSY SINGLE OR MULTIPLE;;  Surgeon: Mundo Mehta MD;  Location:  GI     EXCISE LESION TRUNK  8/10/2012    Procedure: EXCISE LESION TRUNK;;  Surgeon: Jerald Diggs MD;  Location: [facility] OR     HYSTERECTOMY       IRRIGATION AND DEBRIDEMENT HIP, COMBINED Right 8/26/2019    Procedure: 1.  Right hip wound irrigation and debridement with excisional debridement of nonviable skin, subcutaneous tissues,  and fascia. 2. Application of incisional wound VAC, 27cm length incision.;  Surgeon: Tyson Prabhakar MD;  Location: [facility] OR     L shoulder fracture       rectal prolapse repair abdominally       RELEASE PLANTAR FASCIA Right 1/20/2015    Procedure: RELEASE PLANTAR FASCIA;  Surgeon: Maicol Liu DPM;  Location: [facility]     REMOVE MESH VAGINA  8/10/2012    Procedure: REMOVE MESH VAGINA;  Excision of Vaginal Mesh Exposure, Removal of Skin Tag Left Inner Leg;  Surgeon: Jerald Diggs MD;  Location: [facility] OR     REPAIR HAMMER TOE Right 1/20/2015    Procedure: REPAIR HAMMER TOE;  Surgeon: Maicol Liu DPM;  Location: [facility]     TONSILLECTOMY & ADENOIDECTOMY       TUBAL LIGATION            Social History:     Social History     Socioeconomic History     Marital status:      Spouse name: Not on file     Number of children: Not on file     Years of education: Not on file     Highest education level: Not on file   Occupational History     Not on file   Social Needs     Financial resource strain: Not on file     Food insecurity     Worry: Not on file     Inability: Not on file     Transportation needs     Medical: Not on file     Non-medical: Not on file   Tobacco Use     Smoking status: Never Smoker     Smokeless tobacco: Never Used   Substance and Sexual Activity     Alcohol use: Yes     Frequency: Monthly or less     Comment: 1 glass of wine 2x/yr     Drug use: No     Sexual activity: Not Currently     Comment: vag hyst   Lifestyle     Physical activity     Days per week: Not on file     Minutes per session: Not on file     Stress: Not on file   Relationships     Social connections     Talks on phone: Not on file     Gets together: Not on file     Attends Restorationist service: Not on file     Active member of club or organization: Not on file     Attends meetings of clubs or organizations: Not on file     Relationship status: Not on file     Intimate partner violence     Fear of current or ex partner:  Not on file     Emotionally abused: Not on file     Physically abused: Not on file     Forced sexual activity: Not on file   Other Topics Concern     Parent/sibling w/ CABG, MI or angioplasty before 65F 55M? Not Asked   Social History Narrative     Not on file          Family History:     Family History   Problem Relation Age of Onset     Eye Disorder Mother      Lipids Mother         has CHF     Osteoporosis Mother      Diabetes Father      Alzheimer Disease Paternal Grandmother      Hypertension Brother      Alcohol/Drug Brother      Depression Brother      Gastrointestinal Disease Brother         hx of colonic polyps     Lipids Brother      Psychotic Disorder Brother      Breast Cancer Sister      Depression Sister      Lipids Sister      Thyroid Disease Sister      Congenital Anomalies Daughter      Neurologic Disorder Daughter           Allergies:      Allergies   Allergen Reactions     Blood Transfusion Related (Informational Only) Other (See Comments)     Patient has a history of a clinically significant antibody against RBC antigens.  A delay in compatible RBCs may occur.     Budesonide      Edema     Bupropion Rash     Carbamazepine Diarrhea     Clonazepam Other (See Comments)     Hypotension, trouble walking, mind disturbance     Encort [Hydrocortisone Acetate] Swelling     Localized to feet     Encort [Hydrocortisone] Swelling     Erythromycin Diarrhea     Erythromycin Nausea and Vomiting and Diarrhea     Flagyl [Metronidazole] Nausea     Gabapentin Other (See Comments)     Causes over-eating     Lamictal [Lamotrigine] Dizziness     Oxycodone Nausea and Vomiting     Tegretol [Carbamazepine] Nausea and Vomiting          Medications:     Prior to Admission medications    Medication Sig Last Dose Taking? Auth Provider   acetaminophen (TYLENOL) 325 MG tablet Take 2 tablets (650 mg) by mouth every 4 hours as needed for other Past Week at Unknown time Yes Brandy Grullon APRN CNS   albuterol (ALBUTEROL) 108  (90 BASE) MCG/ACT Inhaler Inhale 2 puffs into the lungs 4 times daily as needed for shortness of breath / dyspnea or wheezing Past Week at Unknown time Yes Skyler Álvarez MD   amLODIPine (NORVASC) 5 MG tablet Take 1 tablet (5 mg) by mouth daily 4/30/2021 at Unknown time Yes Skyler Álvarez MD   amoxicillin (AMOXIL) 500 MG tablet Take 4 tablets (2000 mg) by mouth 1 hour before dental procedures/cleanings.  Yes Pan Grey MD   aspirin 81 MG EC tablet Take 81 mg by mouth daily  Yes Unknown, Entered By History   benzocaine (ANBESOL) 10 % gel Take by mouth 4 times daily as needed for moderate pain  Yes Reported, Patient   budesonide-formoterol (SYMBICORT) 160-4.5 MCG/ACT Inhaler Inhale 2 puffs into the lungs 2 times daily 4/30/2021 at Unknown time Yes Reported, Patient   busPIRone (BUSPAR) 5 MG tablet Take 5 mg by mouth 2 times daily 4/28/2021 at Unknown time Yes Reported, Patient   carboxymethylcellulose PF (REFRESH PLUS) 0.5 % ophthalmic solution Place 1 drop into both eyes 2 times daily 4/29/2021 at Unknown time Yes Brandy Grullon APRN CNS   cyclobenzaprine (FLEXERIL) 5 MG tablet Take 1 tablet (5 mg) by mouth 2 times daily as needed for muscle spasms  Patient taking differently: Take 10 mg by mouth 3 times daily as needed for muscle spasms  4/29/2021 at Unknown time Yes Pan Grey MD   cycloSPORINE (RESTASIS) 0.05 % ophthalmic emulsion Place 1 drop into both eyes 2 times daily 4/29/2021 at Unknown time Yes Unknown, Entered By History   Dentifrices (BIOTENE DRY MOUTH DT) Apply 2 sprays to affected area 3 times daily as needed  Yes Reported, Patient   fluticasone-vilanterol (BREO ELLIPTA) 200-25 MCG/INH inhaler Inhale 1 puff into the lungs daily 4/23/2021 Yes Brandy Grullon APRN CNS   furosemide (LASIX) 20 MG tablet Take 1 tablet (20 mg) by mouth daily 4/30/2021 at Unknown time Yes Skyler Álvarez MD   guaiFENesin-codeine (ROBITUSSIN AC) 100-10 MG/5ML solution Take 1-2 teaspoonful by  mouth every 4 hours as needed for cough Past Month at Unknown time Yes Reported, Patient   HYDROcodone-acetaminophen (NORCO) 5-325 MG tablet Take 1 tablet by mouth every 6 hours as needed for severe pain 4/30/2021 at 0200 Yes Eugene Elizabeth MD   hypromellose (ARTIFICIAL TEARS) 0.5 % SOLN ophthalmic solution Place 1 drop into both eyes 2 times daily 4/29/2021 at Unknown time Yes Unknown, Entered By History   ibuprofen (ADVIL/MOTRIN) 600 MG tablet Take 600 mg by mouth every 6 hours as needed for moderate pain 4/29/2021 at Unknown time Yes Reported, Patient   lacosamide (VIMPAT) 200 MG TABS tablet Take 1 tablet (200 mg) by mouth 2 times daily 4/30/2021 at Unknown time Yes Haase, Tonya Lynn, APRN CNP   levothyroxine (SYNTHROID/LEVOTHROID) 50 MCG tablet Take 50 mcg by mouth daily 4/30/2021 at Unknown time Yes Unknown, Entered By History   linaclotide (LINZESS) 290 MCG capsule Take 1 capsule (290 mcg) by mouth every morning (before breakfast) Past Week at Unknown time Yes Farzana Ro MD   liothyronine (CYTOMEL) 5 MCG tablet Take 5 mcg by mouth daily  4/30/2021 at Unknown time Yes Reported, Patient   loratadine (CLARITIN REDITABS) 10 MG ODT Take 1 tablet (10 mg) by mouth daily as needed for allergies Past Month at Unknown time Yes Haase, Tonya Lynn, APRN CNP   LORazepam (ATIVAN) 0.5 MG tablet Take 0.5 mg by mouth daily as needed for anxiety 4/28/2021 Yes Reported, Patient   medical cannabis (Patient's own supply) See Admin Instructions (The purpose of this order is to document that the patient reports taking medical cannabis.  This is not a prescription, and is not used to certify that the patient has a qualifying medical condition.) Past Month at Unknown time Yes Reported, Patient   omeprazole (PRILOSEC) 40 MG DR capsule Take 1 capsule (40 mg) by mouth 2 times daily 4/30/2021 at Unknown time Yes Skyler Álvarez MD   ondansetron (ZOFRAN-ODT) 4 MG ODT tab Take 1 tablet (4 mg) by mouth every 6 hours as  needed for nausea or vomiting Past Month at Unknown time Yes Brandy Grullon APRN CNS   oxyCODONE (ROXICODONE) 5 MG tablet Take 1 tablet (5 mg) by mouth every 6 hours as needed for severe pain  Yes Kiesha Neil PA-C   polyethylene glycol (MIRALAX) 17 g packet Take 34 g by mouth daily 4/29/2021 at Unknown time Yes Haase, Tonya Lynn, APRN CNP   potassium citrate (UROCIT-K) 10 MEQ (1080 MG) CR tablet Take 10 mEq by mouth daily 4/30/2021 at Unknown time Yes Reported, Patient   pramipexole (MIRAPEX) 1 MG tablet Give 1 tablet by mouth in the morning and 3 tablets by mouth 3 hours prior to bedtime 4/30/2021 at Unknown time Yes Reported, Patient   QUEtiapine (SEROQUEL) 50 MG tablet Take  mg by mouth nightly as needed  Past Week at Unknown time Yes Reported, Patient   senna-docusate (SENOKOT-S/PERICOLACE) 8.6-50 MG tablet Take 2 tablets by mouth 2 times daily 4/29/2021 at Unknown time Yes Brandy Grullon APRN CNS   traMADol (ULTRAM) 50 MG tablet Take 1 tablet (50 mg) by mouth every 6 hours as needed for severe pain Past Month at Unknown time Yes Skyler Álvarez MD   traZODone (DESYREL) 150 MG tablet 150 mg At Bedtime  4/29/2021 at Unknown time Yes Reported, Patient   trolamine salicylate (ASPERCREME) 10 % external cream Apply topically 2 times daily as needed for moderate pain To right hip incision area  Yes Reported, Patient   artificial saliva (BIOTENE MT) AERS spray Take 2 sprays by mouth 3 times daily as needed for dry mouth   Brandy Grullon APRN CNS   benzonatate (TESSALON) 200 MG capsule Take 200 mg by mouth 3 times daily as needed for cough More than a month at Unknown time  Reported, Patient   Estradiol-Estriol-Progesterone (BIEST/PROGESTERONE TD) Place 0.5 g onto the skin nightly as needed More than a month at Unknown time  Reported, Patient          Review of Systems:   A complete ROS was performed and is negative other than what is stated in the HPI.       Physical Exam:   Blood  "pressure 100/59, pulse 66, temperature 98.2  F (36.8  C), temperature source Oral, resp. rate 16, height 1.651 m (5' 5\"), weight 99.3 kg (219 lb), last menstrual period 03/11/2010, SpO2 95 %, not currently breastfeeding. on room air  General: Alert, interactive, NAD, sitting up in bed  HEENT: AT/NC, sclera anicteric, PERRL, EOMI, OP clear with MMM  Neck: Supple, no JVD or cervical LAD  Chest/Resp: clear to auscultation bilaterally, no crackles or wheezes  Heart/CV: regular rate and rhythm, no murmur  Abdomen/GI: Soft, nontender, nondistended. +BS.  No HSM or masses, no rebound or guarding.  Extremities/MSK: No LE edema.  LUE in a cast/brace, sensation to fingers intact.  Skin: Warm and dry  Neuro: Alert & oriented x 3         Labs:   ROUTINE ICU LABS (Last four results)  CMPNo lab results found in last 7 days.  CBCNo lab results found in last 7 days.  INRNo lab results found in last 7 days.  Arterial Blood GasNo lab results found in last 7 days.      "

## 2021-05-01 NOTE — PLAN OF CARE
Has been on 2 L O2 overnight. Left fingers are swollen and she said the tips of her fingers are numb which is her normal. Fingers remain warm to touch. Rated wrist pain as 7 to 6. Medicated with Tylenol and Motrin. Awake much of night which she said is her normal. Left hand elevated on 3 pillows with cold packs. Tolerated Water and crackers. Steady when ambulating. Has sling on when up. Plan for discharge today.

## 2021-05-01 NOTE — PLAN OF CARE
[facility] surgeon on call paged to come see patient. Discharge order written yesterday, but discharge navigator not complete. Received message that surgeon would come to see patient after he is done in OR about 1100. Patient anxious to leave. Said she would wait until 11:15 to talk to  and then she would leave. Discharge instructions reviewed with patient. Discharge medication given to patient. Patient discharged to home at 12:30. Surgeon came by to see her right after she discharged. I informed surgeon of need to call patient to answer her questions, and of need to complete the MD portion of discharge navigator.

## 2021-05-01 NOTE — PROGRESS NOTES
[facility]  Hospitalist Progress Note  Lesley Yin MD 05/01/21  Text Page  Pager: [phone] (7am-6pm)    Reason for Stay (Diagnosis): left radius ORIF         Assessment and Plan:      Summary of Stay: Mercedes Barber is a 66 year old female with a history of HTN, Asthma, GERD, Depression, Anxiety, DVT, RLS, IBS, Hypothyroidism, JORDAN who is s/p fall on 4/28 which resulted in a left radius fracture and displaced styloid fracture who is admitted to Orthopedic Surgery for planned surgery.  Now s/p ORIF of Left Radius Fracture.  EBL 2ml.  Internal Medicine service was asked to see ASAP this evening for management of chronic medical problems and desaturating while sleeping in the PACU.     Problem List/Assessment and Plan:     Displaced intra-articular left distal radius fracture s/p ORIF - s/p mechanical fall on an escalator earlier this wee, underwent surgery on 4/30. Doing well.     - will defer diet, activity, DVT ppx, and pain control to primary team.      Post Op Hypoxia - Resolved. pt noted to have significant desaturation while sleeping in the PACU.  No hypoxia noted while awake.  Lungs clear and asymptomatic.  Pt with a hx of JORDAN not recently on cpap.  Hypoxia likely related to JORDAN and sedation from anesthesia.  Today no hypoxia or respiratory symptoms.  - patient was supposed to have a repeat sleep study recently; recommend follow up after this hospital stay     HTN - continue pta Amlodipine and Lasix. Hold ASA; resume with ok with surgery.      Asthma - no signs of acute flare.    - Continue pta Albuterol, Tessalon.  She does not regularly take Symbicort or BreoEllipta.  - prn Duonebs     GERD - continue Omeprazole     Depression/Anxiety - continue PTA medications including Buspirone, Ativan, Seroquel and Trazodone.     Seizure Disorder - denies any recent seizures.  Continue pta Lacosamide     Hypothyroidism - continue pta Levothyroxine and Cytomel     RLS - continue pta Mirapex     Hx  "DVT - pt reports hx of UE DVT provoked from an IV.  She was on anticoagulation for several months.  No hx of LE DVT or clotting disorder.  Ambulation encouraged.  SCD ordered.    Diet: Advance Diet as Tolerated: Regular Diet Adult    DVT Prophylaxis: Defer to primary service  Vieyra Catheter: not present  Code Status: Full Code      Disposition Plan   Expected discharge: Today, recommended to prior living arrangement once cleared by primary team.  Entered: Lesley Yin MD 05/01/2021, 8:58 AM       The patient's care was discussed with the Bedside Nurse and Patient.    Hospitalist Service  [facility]          Interval History (Subjective):      Patient states she is doing well and ready to leave.  Awaiting orthopedic evaluation this morning.  She does not know what restrictions she needs, has questions regarding surgery, discussed she needs to discuss this with ortho.  No nausea, vomiting, abdominal pain.  Eating fine.  No constipation.  Denies CP or SOB.                  Physical Exam:      Last Vital Signs:  /63 (BP Location: Right arm)   Pulse 61   Temp 98.4  F (36.9  C) (Oral)   Resp 16   Ht 1.651 m (5' 5\")   Wt 99.3 kg (219 lb)   LMP 03/11/2010   SpO2 98%   BMI 36.44 kg/m      General: Alert, awake, no acute distress.  HEENT: Normocephalic and atraumatic, eyes anicteric and without scleral injection, EOMI, face symmetric, MMM.  Cardiac: RRR, normal S1, S2. No m/g/r, no LE edema.  Pulmonary: Normal chest rise, normal work of breathing.  Lungs CTAB without crackles or wheezing.  Abdomen: soft, non-tender, non-distended.  Normoactive bowel sounds, no guarding or rebound tenderness.  Extremities: Left arm in ACE wrap/cast and sling.  Warm, well perfused.  Skin: no rashes or lesions.  Warm and Dry.  Neuro: No focal deficits.  Speech clear.  Coordination and strength grossly normal.  Psych: Alert and oriented x3. Appropriate affect.         Medications:      All current " medications were reviewed with changes reflected in problem list.         Data:      All new lab and imaging data was reviewed.   Labs:  No recent labs   Imaging:   Recent Results (from the past 24 hour(s))   XR Surgery YAMEL L/T 5 Min Fluoro w Stills    Narrative    This exam was marked as non-reportable because it will not be read by a   radiologist or a [facility] non-radiologist provider.         US Lower Extremity Venous Duplex Right    Narrative    EXAM: US LOWER EXTREMITY VENOUS DUPLEX RIGHT  LOCATION: [facility]  DATE/TIME: 4/30/2021 11:05 PM    INDICATION: Right lower extremity swelling and edema.  COMPARISON: None.  TECHNIQUE: Venous Duplex ultrasound of the right lower extremity with and without compression, augmentation and duplex. Color flow and spectral Doppler with waveform analysis performed.    FINDINGS: Exam includes the common femoral, femoral, popliteal, and contralateral common femoral veins as well as segmentally visualized deep calf veins and greater saphenous vein.     RIGHT: No deep vein thrombosis. No superficial thrombophlebitis. No popliteal cyst.      Impression    IMPRESSION:  1.  No deep venous thrombosis in the right lower extremity.       Lesley Yin MD

## 2021-05-01 NOTE — PROGRESS NOTES
Ortho Daily Progress Note  Left while I was in surg.  Called on phone. Pain controlled.  Denies cp or sob.    Temp:  [97.6  F (36.4  C)-98.6  F (37  C)] 98.4  F (36.9  C)  Pulse:  [57-71] 61  Resp:  [9-24] 16  BP: ()/(47-75) 114/63  SpO2:  [38 %-100 %] 98 %  Hemoglobin   Date Value Ref Range Status   01/31/2021 11.6 (L) 11.7 - 15.7 g/dL Final   ]    Alert, appropriate, and following commands          A/P - 66 year old female s/p ORIF distal radius  Home today  Splint c/d/i  Follow up 2 weeks Dr. Sánchez team  Answered all questions    Tom Antonio MD

## 2021-05-01 NOTE — PLAN OF CARE
Vital signs: Stable; afebrile.   Pain Control: Pain goal: 6; Pain controlled with PO Tylenol. PO Motrin, PO oxycodone, and ice.   Diet: Regular; tolerating well.   Output: Voiding. Loose stool x 1.                 Activity: Up to bathroom x 2 with 1 person assist.   Updates: Placed on 2 LPM NC for sleeping. Oxygen sats above 93% when awake. Ultrasound results for DVT rule-out pending. LS clear. Denies SOB. CMS intact except baseline neuropathy in all extremeties. LUE supported with sling and pillows.   Plan: Provide supplemental oxygen as needed. Maintain capnography monitoring. Pain control as needed.     Patient currently resting comfortably. Will continue to monitor and provide for cares.

## 2021-05-03 ENCOUNTER — MYC MEDICAL ADVICE (OUTPATIENT)
Dept: PHYSICAL MEDICINE AND REHAB | Facility: CLINIC | Age: 66
End: 2021-05-03

## 2021-05-03 ENCOUNTER — NURSE TRIAGE (OUTPATIENT)
Dept: INTERNAL MEDICINE | Facility: CLINIC | Age: 66
End: 2021-05-03

## 2021-05-03 NOTE — TELEPHONE ENCOUNTER
"Pt home on Sat because she spent the night due to low O2 levels.   appt with [facility] on May 10th.   And appt scheduled with Dr. Álvarez on 5/6/2021 2:40 PM.  She was prescribed oxycodone 5mg from the hospital. Has 8 pills left. And is in pain. Took 2 pills at bedtime last night. Took 4 advil & 2 tylenol this morning. Pain is currently 8-9/10.   Wondering if you can refill for her until appt? She is worried about runninng out and not having any available for the pain.     Also, has an ACE wrap around her wrist & hands. But her fingers are swollen, and her fingertips feel like pins & needles.   Is this normal postsurgery / postfracture?    She does have a call out to orthopedics at [facility]      Hospital/TCU/ED for chronic condition Discharge Protocol    \"Hi, my name is Bushra Quintero RN, a registered nurse, and I am calling from [facility].  I am calling to follow up and see how things are going for you after your recent emergency visit/hospital/TCU stay.\"    Tell me how you are doing now that you are home?\" see above      Discharge Instructions    \"Let's review your discharge instructions.  What is/are the follow-up recommendations?  Pt. Response: will defer diet, activity, DVT ppx, and pain control to primary team.     \"Has an appointment with your primary care provider been scheduled?\"   Yes. (confirm)    \"When you see the provider, I would recommend that you bring your medications with you.\"    Medications    \"Tell me what changed about your medicines when you discharged?\"    Changes to chronic meds?    0-1    \"What questions do you have about your medications?\"    None     New diagnoses of heart failure, COPD, diabetes, or MI?    No              Post Discharge Medication Reconciliation Status: discharge medications reconciled, continue medications without change.    Was MTM referral placed (*Make sure to put transitions as reason for referral)?   No    Call Summary    \"What questions or concerns do you have " "about your recent visit and your follow-up care?\"     none    \"If you have questions or things don't continue to improve, we encourage you contact us through the main clinic number (give number).  Even if the clinic is not open, triage nurses are available 24/7 to help you.     We would like you to know that our clinic has extended hours (provide information).  We also have urgent care (provide details on closest location and hours/contact info)\"      \"Thank you for your time and take care!\"                   "

## 2021-05-03 NOTE — TELEPHONE ENCOUNTER
Instructed patient that a  will be needed to take the patient home.   Yes, her daughter will drive her    Sedation, if offered, is conscious sedation and the patient will not be 'put out.'  Further instructions for sedation given to the patient. NPO solids 8 hour prior ot injections (including gum and mints), clear fluids 2 hours prior to injection (no dairy product). Patient should take daily medications with small sips of water.  N/A    Patient reminder given for Medial Branch Block. Pain level should be at least 5/10 or we will not be able to perform the diagnostic test. Patient should avoid or limit taking pain medications.   N/A    Patient asked if they are on any blood thinners.  Yes    Patient asked if they have had illness/infections/or been on any antibiotics in the last 7 days (GI disturbance, UTI, colds, dental abscess, anything infectious, fever, or 'feeling under the weather')? Patient knows to inform clinic if they experience any of the above prior to the procedure  yes    Patient understands that they must also have a negative COVID-19 test within four days of their procedure. (Patient will be notified on how to test for this)  Yes    If able, patient should avoid scheduling steroid injections within 2-weeks (before, between, or after) COVID-19 vaccination. Patient understands:  Yes, has already completed both COVID19 vaccines    Appt rescheduled for 5/24/21 at 9:30 am with Dr. Perez. Aware she should arrive one hour early.

## 2021-05-04 NOTE — TELEPHONE ENCOUNTER
Pt notified, she did talk to ortho provider yesterday, was told the tingling is most likely r/t circulation. And they did not dispense more pain meds due to her low O2 sats (reason for hospital admission, not wanting the pain meds to effect her breathing status at home).   has appt with orthopedics next Monday,   And today appt with with neurologist. fingertips still hurt today, she will discuss this with neurology - she also has neuropathy.   But the finger swelling is doing better today.   Appt on Thurs with Dr. Álvarez.   Any other questions r/t postoperative or surgery related to her fracture, she will call orthopedics- [facility].

## 2021-05-04 NOTE — TELEPHONE ENCOUNTER
Issues appear to be postoperative or surgery related to her fracture in regards to pain and wrap issues.  Suggest she contact orthopedics or whoever his her orthopedist for further assessment and pain control issues.  If her fingers are swollen would suggest that the wrap is probably too tight and may need to discuss with orthopedics about reevaluation and rewrap

## 2021-05-06 ENCOUNTER — OFFICE VISIT (OUTPATIENT)
Dept: INTERNAL MEDICINE | Facility: CLINIC | Age: 66
End: 2021-05-06
Payer: MEDICARE

## 2021-05-06 ENCOUNTER — NURSE TRIAGE (OUTPATIENT)
Dept: INTERNAL MEDICINE | Facility: CLINIC | Age: 66
End: 2021-05-06

## 2021-05-06 VITALS
HEART RATE: 75 BPM | SYSTOLIC BLOOD PRESSURE: 124 MMHG | TEMPERATURE: 97.5 F | OXYGEN SATURATION: 98 % | WEIGHT: 212 LBS | DIASTOLIC BLOOD PRESSURE: 74 MMHG | BODY MASS INDEX: 35.28 KG/M2

## 2021-05-06 DIAGNOSIS — Z09 HOSPITAL DISCHARGE FOLLOW-UP: Primary | ICD-10-CM

## 2021-05-06 DIAGNOSIS — G40.909 SEIZURE DISORDER (H): ICD-10-CM

## 2021-05-06 DIAGNOSIS — I10 BENIGN ESSENTIAL HYPERTENSION: ICD-10-CM

## 2021-05-06 DIAGNOSIS — S42.202D CLOSED FRACTURE OF PROXIMAL END OF LEFT HUMERUS WITH ROUTINE HEALING, UNSPECIFIED FRACTURE MORPHOLOGY, SUBSEQUENT ENCOUNTER: ICD-10-CM

## 2021-05-06 DIAGNOSIS — K86.89 PANCREATIC MASS: ICD-10-CM

## 2021-05-06 PROCEDURE — 99214 OFFICE O/P EST MOD 30 MIN: CPT | Performed by: INTERNAL MEDICINE

## 2021-05-06 NOTE — TELEPHONE ENCOUNTER
Reason for Disposition    MODERATE difficulty breathing (e.g., speaks in phrases, SOB even at rest, pulse 100-120) of new onset or worse than normal    Additional Information    Negative: Breathing stopped and hasn't returned    Negative: Choking on something    Negative: SEVERE difficulty breathing (e.g., struggling for each breath, speaks in single words, pulse > 120)    Negative: Bluish (or gray) lips or face    Negative: Difficult to awaken or acting confused (e.g., disoriented, slurred speech)    Negative: Passed out (i.e., fainted, collapsed and was not responding)    Negative: Wheezing started suddenly after medicine, an allergic food, or bee sting    Negative: Stridor    Negative: Slow, shallow and weak breathing    Negative: Sounds like a life-threatening emergency to the triager    Negative: Chest pain    Negative: Wheezing (high pitched whistling sound) and previous asthma attacks or use of asthma medicines    Negative: Difficulty breathing and only present when coughing    Negative: Difficulty breathing and only from stuffy or runny nose    Protocols used: BREATHING DIFFICULTY-A-OH

## 2021-05-06 NOTE — PROGRESS NOTES
Assessment & Plan     Hospital discharge follow-up  Stable and doing well.    Closed fracture of proximal end of left humerus with routine healing, unspecified fracture morphology, subsequent encounter  Continuing with rehab accordingly and follow-up with orthopedics as directed.    Pancreatic mass  Discussed results of MRI imaging study done by secondary physician.  Suggest MR abdomen MRCP for further evaluation, ordered and follow-up after results  - MR Abdomen MRCP w/o & w Contrast; Future    (I10) Benign essential hypertension  Comment: Stable and continue with current medical management as ordered.  Plan:     Creatinine   Date Value Ref Range Status   04/12/2021 0.96 0.52 - 1.04 mg/dL Final     Work on weight loss    Return in about 2 weeks (around 5/20/2021) for diagnostic test as ordered.    Skyler Álvarez MD  [facility]    Subjective :    Mercedes is a 66 year old who presents for the following health issues     HPI     Hospital Follow-up Visit:    Hospital/Nursing Home/IP Rehab Facility: [facility]   Date of Admission: 04/30/2021   Date of Discharge: 05/01/2021  Reason(s) for Admission: Left Wrist Fx      Was your hospitalization related to COVID-19? No   Problems taking medications regularly:  None  Medication changes since discharge: noted  Problems adhering to non-medication therapy:  None    Summary of hospitalization:  Discharge summary unavailable  Diagnostic Tests/Treatments reviewed.  Follow up needed: Orthopedics  Other Healthcare Providers Involved in Patient s Care:         Surgical follow-up appointment - [facility]  Update since discharge: stable.       Post Discharge Medication Reconciliation: discharge medications reconciled, continue medications without change.  Plan of care communicated with patient              Patient was also seen by an outside physician for evaluation of her back and had an MRI done which demonstrated an complete pancreatic cyst or  mass as listed below:    Partial visualization of a nonspecific T2 hyperintense/cystic  lesion superior to the body of the pancreas, which is nonspecific. A  cystic pancreatic neoplasm such as an intraductal papillary mucinous  neoplasm (IPMN) cannot be excluded. This is incompletely evaluated.  Recommend MRI/MRCP of the abdomen without and with contrast for  further evaluation    Review of Systems   CONSTITUTIONAL: NEGATIVE for fever, chills, change in weight  EYES: NEGATIVE for vision changes or irritation  ENT/MOUTH: NEGATIVE for ear, mouth and throat problems  RESP: NEGATIVE for significant cough or SOB  CV: NEGATIVE for chest pain, palpitations   GI: NEGATIVE for nausea, abdominal pain, heartburn  : NEGATIVE for frequency, dysuria, or hematuria  HEME: NEGATIVE for bleeding problems  PSYCHIATRIC: NEGATIVE for changes in mood or affect      Objective    /74   Pulse 75   Temp 97.5  F (36.4  C) (Temporal)   Wt 96.2 kg (212 lb)   LMP 03/11/2010   SpO2 98%   BMI 35.28 kg/m    Body mass index is 35.28 kg/m .  Physical Exam   GENERAL: alert and no distress  EYES: Eyes grossly normal to inspection, PERRL and conjunctivae and sclerae normal  HENT: ear canals and TM's normal, nose and mouth without ulcers or lesions  NECK: no adenopathy, no asymmetry, masses, or scars and thyroid normal to palpation  RESP: lungs clear to auscultation - no rales, rhonchi or wheezes  CV: regular rate and rhythm, normal S1 S2, no S3 or S4, no click or rub  Noted left arm soft cast in place, CMS is intact  NEURO: No focal changes  PSYCH: mentation appears normal, affect normal/bright

## 2021-05-07 ENCOUNTER — MYC MEDICAL ADVICE (OUTPATIENT)
Dept: SURGERY | Facility: AMBULATORY SURGERY CENTER | Age: 66
End: 2021-05-07

## 2021-05-10 ENCOUNTER — MYC MEDICAL ADVICE (OUTPATIENT)
Dept: SURGERY | Facility: AMBULATORY SURGERY CENTER | Age: 66
End: 2021-05-10

## 2021-05-21 DIAGNOSIS — Z11.59 ENCOUNTER FOR SCREENING FOR OTHER VIRAL DISEASES: ICD-10-CM

## 2021-05-21 LAB
LABORATORY COMMENT REPORT: NORMAL
SARS-COV-2 RNA RESP QL NAA+PROBE: NEGATIVE
SARS-COV-2 RNA RESP QL NAA+PROBE: NORMAL
SPECIMEN SOURCE: NORMAL
SPECIMEN SOURCE: NORMAL

## 2021-05-21 PROCEDURE — U0005 INFEC AGEN DETEC AMPLI PROBE: HCPCS | Performed by: PHYSICAL MEDICINE & REHABILITATION

## 2021-05-21 PROCEDURE — U0003 INFECTIOUS AGENT DETECTION BY NUCLEIC ACID (DNA OR RNA); SEVERE ACUTE RESPIRATORY SYNDROME CORONAVIRUS 2 (SARS-COV-2) (CORONAVIRUS DISEASE [COVID-19]), AMPLIFIED PROBE TECHNIQUE, MAKING USE OF HIGH THROUGHPUT TECHNOLOGIES AS DESCRIBED BY CMS-2020-01-R: HCPCS | Performed by: PHYSICAL MEDICINE & REHABILITATION

## 2021-05-24 ENCOUNTER — ANCILLARY PROCEDURE (OUTPATIENT)
Dept: RADIOLOGY | Facility: AMBULATORY SURGERY CENTER | Age: 66
End: 2021-05-24
Attending: PHYSICAL MEDICINE & REHABILITATION
Payer: MEDICARE

## 2021-05-24 ENCOUNTER — HOSPITAL ENCOUNTER (OUTPATIENT)
Facility: AMBULATORY SURGERY CENTER | Age: 66
Discharge: HOME | End: 2021-05-24
Attending: PHYSICAL MEDICINE & REHABILITATION | Admitting: PHYSICAL MEDICINE & REHABILITATION
Payer: MEDICARE

## 2021-05-24 VITALS
WEIGHT: 212 LBS | OXYGEN SATURATION: 99 % | DIASTOLIC BLOOD PRESSURE: 77 MMHG | HEIGHT: 65 IN | BODY MASS INDEX: 35.32 KG/M2 | SYSTOLIC BLOOD PRESSURE: 126 MMHG | HEART RATE: 68 BPM | RESPIRATION RATE: 16 BRPM | TEMPERATURE: 98.4 F

## 2021-05-24 DIAGNOSIS — M53.3 SACROILIAC JOINT PAIN: ICD-10-CM

## 2021-05-24 PROCEDURE — 27096 INJECT SACROILIAC JOINT: CPT | Mod: RT,LT

## 2021-05-24 RX ORDER — TRAMADOL HYDROCHLORIDE 50 MG/1
TABLET ORAL
COMMUNITY
Start: 2021-05-13 | End: 2022-04-12

## 2021-05-24 RX ORDER — TRIAMCINOLONE ACETONIDE 40 MG/ML
INJECTION, SUSPENSION INTRA-ARTICULAR; INTRAMUSCULAR PRN
Status: DISCONTINUED | OUTPATIENT
Start: 2021-05-24 | End: 2021-05-24 | Stop reason: HOSPADM

## 2021-05-24 RX ORDER — HYDROCODONE BITARTRATE AND ACETAMINOPHEN 5; 325 MG/1; MG/1
TABLET ORAL
COMMUNITY
Start: 2021-04-28 | End: 2021-08-12

## 2021-05-24 RX ORDER — IOPAMIDOL 408 MG/ML
INJECTION, SOLUTION INTRATHECAL PRN
Status: DISCONTINUED | OUTPATIENT
Start: 2021-05-24 | End: 2021-05-24 | Stop reason: HOSPADM

## 2021-05-24 RX ORDER — LIDOCAINE HYDROCHLORIDE 10 MG/ML
INJECTION, SOLUTION EPIDURAL; INFILTRATION; INTRACAUDAL; PERINEURAL PRN
Status: DISCONTINUED | OUTPATIENT
Start: 2021-05-24 | End: 2021-05-24 | Stop reason: HOSPADM

## 2021-05-24 ASSESSMENT — MIFFLIN-ST. JEOR: SCORE: 1497.51

## 2021-05-24 NOTE — PROCEDURES
PROCEDURE NOTE: Sacroiliac Joint Injection    PROCEDURE DATE: 5/24/2021    PATIENT NAME: Mercedes Barber  YOB: 1955    ATTENDING PHYSICIAN: Celena Perez MD   RESIDENT/FELLOW: None    PREOPERATIVE DIAGNOSIS: Bilateral Sacroiliac Joint Pain  POSTOPERATIVE DIAGNOSIS: Same    PROCEDURE PERFORMED: Bilateral Sacroiliac joint injection with fluoroscopic guidance      FLUOROSCOPY WAS USED.     INDICATIONS FOR PROCEDURE:   Mercedes Barber is a 66 year old female with a clinical picture consistent with Bilateral sacroiliac joint pain.    PROCEDURE AND FINDINGS:    She was greeted in the pre-procedure holding area. The risk, benefits and alternatives to the procedure were again reviewed with the patient and written informed consent was placed in the chart. An IV line was not placed.  A 500 mL bag of NS was not connected to the patient. she was taken to the procedure room and positioned prone on the fluoroscopy table.  Routine monitors were applied including EKG leads, blood pressure cuff, and pulse oximetry. Prior to the procedure a time out was completed, verifying correct patient, procedure, site, positioning, and implants and/or special equipment.     The skin was prepped with chlorhexidine and draped in the usual sterile fashion.  A  film was taken to identify the Bilateral sacroiliac joint and the inferior most intersection of the anterior and posterior sacroiliac joint lines. The overlying skin and subcutaneous tissues were anesthetized using a 25-gauge 1-1/2 inch needle with 1% preservative-free lidocaine for a total volume of 1.5 mls. A 22-gauge 5-inch Quincke spinal needle was advanced into the Bilateral sacroiliac joint space under intermittent fluoroscopic guidance. Needle position was confirmed on both AP and lateral views.     Then, after negative aspiration, isovue 200-220 mg/ml contrast was injected and confirmed adequate intraarticular spread. There was no evidence of intravascular  uptake. At this point, after negative aspiration, a total of 2.5mL volume of treatment injectate, consisting of 1mL Kenalog (40mg/mL) and 1.5mL of 1% Lidocaine, was injected easily per side.  The needle was then flushed with 0.5 ml of local anesthetic and removed. The needle insertion site was dressed appropriately.     Before the procedure, she reported a pain score of 5/10. After the procedure, she reported a pain score of 0/10.      Mercedes Barber was taken to the recovery room where she was monitored for a brief period of time. she tolerated the procedure well and was discharged home in stable condition with post procedural instructions.     Follow-up will be in clinic     COMPLICATIONS: None    COMMENTS: None

## 2021-05-24 NOTE — DISCHARGE INSTRUCTIONS
"PAIN INJECTION HOME CARE INSTRUCTIONS  Activity  -You may resume most normal activity levels with the exception of strenuous activity. It may help to move in ways that hurt before the injection, to see if the pain is still there, but do not overdo it. Take it easy for the rest of the day.    -DO NOT remove bandaid for 24 hours  -DO NOT shower for 24 hours      Pain  -You may feel immediate pain relief and numbness for a period of time after the injection. This may indicate the medication has reached the right spot.  -Your pain may return after this short pain-free period, or may even be a little worse for a day or two. It may be caused by needle irritation or by the medication itself. The medications usually take two or three days to start working, but can take as long as a week.    -You may use an ice pack for 20 minutes every 2 hours for the first 24 hours  -You may use a heating pad after the first 24 hours  -You may use Tylenol (acetaminophen) every 4 hours or other pain medicines as directed by your physician      DID YOU RECEIVE SEDATION TODAY?  {YES / NO:157260::\"Yes\"}    If you received sedation please follow these additional safety measures.    Sedation medicine, if given, may remain active for many hours. It is important for the next 24 hours that you do not:  -Drive a car  -Operate machines or power tools  -Consume alcohol, including beer  -Sign any important papers or legal documents    DID YOU RECEIVE STEROIDS TODAY?  {YES / NO:939543::\"Yes\"}    Common side effects of steroids:  Not everyone will experience corticosteroid side effects. If side effects are experienced, they will gradually subside in the 7-10 day period following an injection. Most common side effects include:  -Flushed face and/or chest  -Feeling of warmth, particularly in the face but could be an overall feeling of warmth  -Increased blood sugar in diabetic patients  -Menstrual irregularities my occur. If taking hormone-based birth " control an alternate method of birth control is recommended  -Sleep disturbances and/or mood swings are possible  -Leg cramps    PLEASE KEEP TRACK OF YOUR SYMPTOMS AND NOTE ANY CHANGES FOR YOUR DOCTOR.       Please contact us if you have:  -Severe pain  -Fever more than 101.5 degrees Fahrenheit  -Signs of infection at the injection site (redness, swelling, or drainage)    If you have questions, please contact the Pain Clinic at [phone] Option #1 between the hours of 7:00 am and 3:00 pm Monday through Friday. After office hours you can contact the on call provider by dialing [phone]. If you need immediate attention, we recommend that you go to a hospital emergency room or dial [phone].    For patients seen by the PM and R service, please call [phone].    If you have procedure scheduling questions please call [phone] Option #2

## 2021-05-27 ENCOUNTER — PATIENT OUTREACH (OUTPATIENT)
Dept: CARE COORDINATION | Facility: CLINIC | Age: 66
End: 2021-05-27

## 2021-05-27 ENCOUNTER — HOSPITAL ENCOUNTER (OUTPATIENT)
Dept: MRI IMAGING | Facility: CLINIC | Age: 66
Discharge: HOME | End: 2021-05-27
Attending: INTERNAL MEDICINE | Admitting: INTERNAL MEDICINE
Payer: MEDICARE

## 2021-05-27 DIAGNOSIS — K86.89 PANCREATIC MASS: ICD-10-CM

## 2021-05-27 PROCEDURE — A9585 GADOBUTROL INJECTION: HCPCS | Performed by: INTERNAL MEDICINE

## 2021-05-27 PROCEDURE — 74183 MRI ABD W/O CNTR FLWD CNTR: CPT | Mod: MG

## 2021-05-27 PROCEDURE — 255N000002 HC RX 255 OP 636: Performed by: INTERNAL MEDICINE

## 2021-05-27 RX ORDER — GADOBUTROL 604.72 MG/ML
10 INJECTION INTRAVENOUS ONCE
Status: COMPLETED | OUTPATIENT
Start: 2021-05-27 | End: 2021-05-27

## 2021-05-27 RX ORDER — GADOBUTROL 604.72 MG/ML
10 INJECTION INTRAVENOUS ONCE
Status: ACTIVE | OUTPATIENT
Start: 2021-05-27

## 2021-05-27 RX ADMIN — GADOBUTROL 10 ML: 604.72 INJECTION INTRAVENOUS at 15:30

## 2021-05-27 NOTE — PROGRESS NOTES
Clinic Care Coordination Contact  [facility]/Voicemail       Clinical Data: Care Coordinator Outreach  Outreach attempted x 1.  Left message on patient's voicemail with call back information and requested return call.  Plan: Care Coordinator will try to reach patient again in 10 business days.    Next Outreach: 6/10/21    CAMRON Hester  Clinic Care Coordination  [facility] Clinics: [facility] [place], [place], [facility], and [place] [facility]  Phone: [phone]

## 2021-06-10 ENCOUNTER — MYC MEDICAL ADVICE (OUTPATIENT)
Dept: INTERNAL MEDICINE | Facility: CLINIC | Age: 66
End: 2021-06-10

## 2021-06-11 ENCOUNTER — MYC MEDICAL ADVICE (OUTPATIENT)
Dept: INTERNAL MEDICINE | Facility: CLINIC | Age: 66
End: 2021-06-11

## 2021-06-17 ENCOUNTER — PATIENT OUTREACH (OUTPATIENT)
Dept: CARE COORDINATION | Facility: CLINIC | Age: 66
End: 2021-06-17

## 2021-06-17 NOTE — PROGRESS NOTES
Clinic Care Coordination Contact  [facility]/Voicemail  Patient called back.     Clinical Data: Care Coordinator Outreach  Outreach attempted x 1.  Left message on patient's voicemail with call back information and requested return call.  Plan: Care Coordinator will try to reach patient again in 10 business days.    CAMRON Hester  Clinic Care Coordination  [facility] Clinics: [place] [place], [place], [place], and [place] [facility]  Phone: [phone]

## 2021-06-22 ENCOUNTER — PATIENT OUTREACH (OUTPATIENT)
Dept: CARE COORDINATION | Facility: CLINIC | Age: 66
End: 2021-06-22

## 2021-06-22 NOTE — PROGRESS NOTES
Care Coordination Clinician Chart Review  Situation: Patient chart reviewed by care coordinator.       Background: Care Coordination initial assessment and enrollment to Care Coordination was 4/29/21.   Patient centered goals were developed with participation from patient.  CARLEY CC handed patient off to CHW for continued outreach every 30 days.        Assessment: Per chart review, patient outreach completed by CC CHW on 6/29/21 but pt was not reachable.  Patient is actively working to accomplish goal.  Patient's goal remains appropriate and relevant at this time.        Goals        Patient Stated      1. Medical (pt-stated)      Goal Statement: I will continue to attend my appointments, follow my plan of care, and access care as needed to manage my pain and medical needs over the next 8 months.   Date Goal set: 3/17/21  Barriers: Complex cares, lots of appts  Strengths: Connected to care, motivated   Date to Achieve By: 12/1/21  Patient expressed understanding of goal: Yes    Action steps to achieve this goal:  1. I will continue to attend my appointments as needed and recommended.   2. I will follow my plan of care as created by my PCP and specialty teams.   3. I will work with care coordination to understand my care plan, schedule appts as needed, and keep track of my appts as needed.                  Plan/Recommendations: The patient will continue working with Care Coordination to achieve goal as above.  CHW will involve CARLEY KAY as needed or if patient is ready to move to maintenance.   CC will continue to monitor progress to goals and CHW outreaches every 6 weeks.   Care Plan updated and mailed to patient: Elena Baugh MARITZA   Social Work Clinic Care Coordinator   [facility]  PH: [phone]  [email or web address]

## 2021-06-24 ENCOUNTER — VIRTUAL VISIT (OUTPATIENT)
Dept: PHYSICAL MEDICINE AND REHAB | Facility: CLINIC | Age: 66
End: 2021-06-24
Payer: MEDICARE

## 2021-06-24 DIAGNOSIS — M53.3 SACROILIAC JOINT PAIN: Primary | ICD-10-CM

## 2021-06-24 DIAGNOSIS — M47.816 LUMBAR SPONDYLOSIS: ICD-10-CM

## 2021-06-24 DIAGNOSIS — M47.816 LUMBAR FACET ARTHROPATHY: ICD-10-CM

## 2021-06-24 PROCEDURE — 99215 OFFICE O/P EST HI 40 MIN: CPT | Mod: 95 | Performed by: PHYSICAL MEDICINE & REHABILITATION

## 2021-06-24 ASSESSMENT — PAIN SCALES - GENERAL: PAINLEVEL: SEVERE PAIN (6)

## 2021-06-24 NOTE — PROGRESS NOTES
Mercedes is a 66 year old who is being evaluated via a billable video visit.      How would you like to obtain your AVS? MyChart  If the video visit is dropped, the invitation should be resent by: text [phone]  Will anyone else be joining your video visit? No      Video-Visit Details    Type of service:  Video Visit    Start: 06/24/2021 12:59 pm  Stop: 06/24/2021 01:20 pm    Originating Location (pt. Location): Home    Distant Location (provider location):  [facility] [place]     Platform used for Video Visit: Zing[employer]

## 2021-06-24 NOTE — NURSING NOTE
Chief Complaint   Patient presents with     RECHECK     follow up after injection     Nacho Morel CMA

## 2021-06-24 NOTE — PROGRESS NOTES
PM&R Follow-Up Clinic Visit -     Date of Initial Visit: 03/31/2021  LOV: 05/24/2021  TD: 6/24/2021     Recall: Mercedes Barber is a 66 year old female with history of right SARAHI s/p revision, s/p B TKA, and L TSA who follows up for chronic, bilateral axial low back pain at LS junction and slightly below (R=L 50:50%)    INTERVAL HISTORY:  Patient was seen for initial evaluation March 21, 2021.  At that visit, the plan was to refer for fluoroscopically guided bilateral corticosteroid injections of the sacroiliac joints for diagnostic and therapeutic modulation of pain.  This was completed May 24, 2021.    Additionally, we had ordered an MRI of her lumbar spine (see below) which shows multilevel degenerative changes with varying degrees of DDD, facet OA, and stenosis (both central and neuralforaminal). We had also discussed the pathway of medial branch blocks to radiofrequency ablation depending on her response to SI joint injections.     Since last time, she reports 100% improvement in her symptoms for about 3 weeks following sacroiliac joint injection.  Pain has not returned towards baseline and while she does have ongoing relief, she has difficulty further quantifying her response.  She does state that her daytime pain is still significantly improved and allows her to be more active and functional but she does have increased pain at nighttime.  Overall, she states that her pain and symptoms are much more manageable.  Pain score 4/10 today at rest and 7/10 toward the end of the day.     PRIOR INJURIES/TREATMENT:   Ice/Heat: alternates which helps  Physical Therapy:   Completed PT following SARAHI x3-4 months which incorporated some exercises for her low back (completed Dec 2020, Jan 2021)     - Current Pain Medications -   advil prn   Tramadol prn - for severe pain only  Flexeril prn     - Prior/Trialed Pain Medications -   Tylenol prn - does not provide much relief  OTC lidoderm patch - difficulty staying on      Prior  Procedures:  Date    Procedure   Improvement (%)  05/24/21 B SIJ CSI  100% [id number]; still ongoing relief with imp pain and fct (difficulty quantifying response)        Prior Related Surgery:   08/05/2020 Right total hip arthroplasty revision    2010 - Left TSA following fall and mult fractures to shoulder   2004 - Bilateral TKA at [facility]    Other (acupuncture, OMT, CMM, TENS, DME, etc.):   Acupuncture x2     Specialists Seen - (with most recent, available notes and clinic visits reviewed)   1. Orthopedic surgery joint replacement service - Dr. Pan Grey      IMAGING - reviewed   04/26/2021 MR L spine   IMPRESSION:  1. Interval progression of multilevel degenerative changes of the  lumbar spine, particularly at L2-L3, L3-L4, L4-L5 and L5-S1, as  described.  2. Mild central spinal canal stenosis at L3-L4 and L4-L5. Varying  degrees of multilevel lateral recess stenosis, most pronounced on the  left at L2-L3, on the left at L3-L4, and bilaterally at L4-L5.  3. Varying degrees of multilevel neural foraminal stenosis, greatest  on the right at L4-L5 and L5-S1.  4. Partial visualization of a nonspecific T2 hyperintense/cystic  lesion superior to the body of the pancreas, which is nonspecific. A  cystic pancreatic neoplasm such as an intraductal papillary mucinous  neoplasm (IPMN) cannot be excluded. This is incompletely evaluated.  Recommend MRI/MRCP of the abdomen without and with contrast for  further evaluation.      02/17/2021 XR Pelvis   IMPRESSION:  1. Stable right total hip arthroplasty with suspected acetabular screw  fracture, similar to prior.  2. Mild left hip degenerative change.    Review Of Systems:   I am responding to those symptoms which are directly relevant to the specific indication for my consultation. I recommend that the patient follow up with their primary or referring provider to pursue any other symptoms which may be of concern.    Medical History:  She  has a past medical history of Arthritis,  Cervical high risk HPV (human papillomavirus) test positive (07/17/2017), Cervical pain (9/15/2016), Constipation (8/27/2012), Diarrhea (4/30/2009), Esophageal stricture (2/21/2012), Gastro-oesophageal reflux disease, Hypothyroidism (9/18/2012), IBS (irritable bowel syndrome), Mild major depression (H) (2/21/2012), Neuropathy of lower extremity, Rectal prolapse, Restless leg syndrome, Seizure disorder (H), Sleep apnea, and Temporal sclerosis (8/27/2012).     She  has a past surgical history that includes tonsillectomy & adenoidectomy; carpal tunnel release rt/lt; drain pilonidal cyst simpl; L shoulder fracture; rectal prolapse repair abdominally; tubal ligation; Dilation and curettage; Remove mesh vagina (8/10/2012); Excise lesion trunk (8/10/2012); Esophagoscopy, gastroscopy, duodenoscopy (EGD), combined (4/5/2013); Dental surgery; Release plantar fascia (Right, 1/20/2015); Repair hammer toe (Right, 1/20/2015); Endoscopy Drug Induced Sleep (N/A, 11/18/2015); Hysterectomy; Anterior COLPORRHAPHY, BLADDER/VAGINA; Arthroplasty patello-femoral (knee) (Bilateral); Arthroplasty hip (Right, 7/23/2019); Arthroplasty revision hip (Right, 8/7/2019); Irrigation and debridement hip, combined (Right, 8/26/2019); and Arthroplasty revision hip (Right, 8/5/2020).      Family History  her family history includes Alcohol/Drug in her brother; Alzheimer Disease in her paternal grandmother; Breast Cancer in her sister; Congenital Anomalies in her daughter; Depression in her brother and sister; Diabetes in her father; Eye Disorder in her mother; Gastrointestinal Disease in her brother; Hypertension in her brother; Lipids in her brother, mother, and sister; Neurologic Disorder in her daughter; Osteoporosis in her mother; Psychotic Disorder in her brother; Thyroid Disease in her sister.     Social History:  Work: Retired RN  Current living situation: Lives in [place]  She  reports that she has never smoked. She has never used smokeless  tobacco. She reports current alcohol use. She reports that she does not use drugs.      Current Medications:   She has a current medication list which includes the following prescription(s): acetaminophen, albuterol, amlodipine, amoxicillin, artificial saliva, aspirin, benzocaine, benzonatate, budesonide-formoterol, buspirone, carboxymethylcellulose pf, cyclobenzaprine, cyclosporine, dentifrices, estradiol-estriol-progesterone, fluticasone-vilanterol, furosemide, guaifenesin-codeine, hydrocodone-acetaminophen, hypromellose, ibuprofen, lacosamide, levothyroxine, linaclotide, liothyronine, loratadine, lorazepam, medical cannabis, omeprazole, ondansetron, polyethylene glycol, potassium citrate, pramipexole, quetiapine, senna-docusate, tramadol, trazodone, and trolamine salicylate, and the following Facility-Administered Medications: gadobutrol.       Allergies:    -- Blood Transfusion Related (Informational Only) -- Other (See Comments)    --  Patient has a history of a clinically significant             antibody against RBC antigens.  A delay in             compatible RBCs may occur.   -- Bupropion -- Rash   -- Carbamazepine -- Diarrhea   -- Clonazepam -- Other (See Comments)    --  Hypotension, trouble walking, mind disturbance   -- Encort [Hydrocortisone Acetate] -- Swelling    --  Localized to feet   -- Encort [Hydrocortisone] -- Swelling   -- Erythromycin -- Diarrhea   -- Erythromycin -- Nausea and Vomiting   -- Flagyl [Metronidazole] -- Nausea   -- Gabapentin -- Other (See Comments)    --  Causes over-eating   -- Lamictal [Lamotrigine] -- Unknown   -- Oxycodone -- Nausea and Vomiting   -- Tegretol [Carbamazepine] -- Nausea and Vomiting    PHYSICAL EXAMINATION:  -Limited due to nature of virtual visit  General: Pleasant, straightforward, WDWN individual.  Mental Status: Pleasant, direct, appropriate mood and affect  Resp: breathing appears unlabored   Vascular: No grossly visible cyanosis, no venous stasis  changes  Heme: No grossly visible ecchymosis or erythema on extremities  Skin: No notable rash    Neurologic:  Strength: All major muscle groups of the bilateral upper and lower extremities appear to have symmetric and at least anti-gravity muscle strength     ASSESSMENT:  Mercedes Barber is a pleasant 66 year old female who presents with chronic, bilateral axial low back:    #. Sacroiliac joint pain. Good relief following B SIJ CSI (2021)  #. Lumbar spondylosis which is likely multifactorial related to facet OA and DDD.      She has experienced incomplete/inadequate symptomatic relief from multiple pharmacologic trials, repeated courses of physical therapy, interventional pain procedures.  Unfortunately, she still struggles with debilitating and functionally limiting pain.  Given the situation, I do believe that she would be a good candidate for peripheral nerve stimulation at the bilateral lumbar medial branch nerves.  We specifically spoke of the SPR/Sprint peripheral nerve stimulating system.    The patient has trialed many conservative therapies and has ongoing pain.  I believe her to be a good candidate for peripheral nerve stimulation.  The patient has had a positive response to a diagnostic/therapeutic block. We will plan on implanting peripheral nerve stimulating system.    The following medical barriers to safe implantation were discussed:  -Infections: The patient does not have history of recurrent skin, soft tissue, or wound infections.  Dentition is intact and healthy.  -Anticoagulation: The patient is not currently taking any anticoagulation medications.  There is no history of bleeding disorder abnormal coagulation parameters and lab work available.  -Immunosuppression: The patient does not have a history of immunosuppression, and/or taking immunosuppressive medications.    We discussed the advantages and disadvantages of peripheral nerve stimulation, includin.  Less invasive than other  spinal or peripheral nerve stimulation systems.  2.  Easily removed.  3.  Current lack of MRI compatibility  4.  Use of an external generator.     PLAN:  - MRI L spine reviewed in additional detail  - Information and education provided regarding SPR PNS for modulation of pain.   - Again, reviewed the pathway from diagnostic medial branch blocks to radiofrequency denervation was discussed in detail with the patient today.  Risks and benefits were discussed and all questions were answered.  The patient states understanding and wishes to proceed after MRI and SI joint injections completed.  There are no contraindications.  The patient understands they will have 2 diagnostic medial branch blocks; and after each block they will be required to keep a pain diary recording their pain scores approximately every 1/2 hour to hour until the pain has returned to baseline.  During the time after the block, the patient was instructed to perform activities which typically aggravates her pain.  The patient will contact the medical spine staff after each diagnostic block to assess the amount of benefit the patient received.  If the patient has a significantly positive response to each of these diagnostic blocks, they may move forward with the radiofrequency ablation procedure. Discussed bilateral L3, L4 medial branch nerve blocks and L5 dorsal rami nerve blocks   - She will review information regarding SPR PNS and reply back to me on how she wishes to proceed.     Ready to learn, no apparent learning barriers.  Education provided on treatment plan according to patient's preferred learning style.  Patient verbalizes understanding.   __________________________________  Celena Perez MD  Physical Medicine & Rehabilitation      40 minutes spent on the date of the encounter doing chart review, review of test results, patient visit and documentation

## 2021-06-24 NOTE — LETTER
6/24/2021       RE: Mercedes Barber  [address]  [place] [address]     Dear Colleague,    Thank you for referring your patient, Mercedes Barber, to the [facility] PHYSICAL MEDICINE AND REHABILITATION CLINIC [place] at [facility]. Please see a copy of my visit note below.    PM&R Follow-Up Clinic Visit -     Date of Initial Visit: 03/31/2021  LOV: 05/24/2021  TD: 6/24/2021     Recall: Mercedes Barber is a 66 year old female with history of right SARAHI s/p revision, s/p B TKA, and L TSA who follows up for chronic, bilateral axial low back pain at LS junction and slightly below (R=L 50:50%)    INTERVAL HISTORY:  Patient was seen for initial evaluation March 21, 2021.  At that visit, the plan was to refer for fluoroscopically guided bilateral corticosteroid injections of the sacroiliac joints for diagnostic and therapeutic modulation of pain.  This was completed May 24, 2021.    Additionally, we had ordered an MRI of her lumbar spine (see below) which shows multilevel degenerative changes with varying degrees of DDD, facet OA, and stenosis (both central and neuralforaminal). We had also discussed the pathway of medial branch blocks to radiofrequency ablation depending on her response to SI joint injections.     Since last time, she reports 100% improvement in her symptoms for about 3 weeks following sacroiliac joint injection.  Pain has not returned towards baseline and while she does have ongoing relief, she has difficulty further quantifying her response.  She does state that her daytime pain is still significantly improved and allows her to be more active and functional but she does have increased pain at nighttime.  Overall, she states that her pain and symptoms are much more manageable.  Pain score 4/10 today at rest and 7/10 toward the end of the day.     PRIOR INJURIES/TREATMENT:   Ice/Heat: alternates which helps  Physical  Therapy:   Completed PT following SARAHI x3-4 months which incorporated some exercises for her low back (completed Dec 2020, Jan 2021)     - Current Pain Medications -   advil prn   Tramadol prn - for severe pain only  Flexeril prn     - Prior/Trialed Pain Medications -   Tylenol prn - does not provide much relief  OTC lidoderm patch - difficulty staying on      Prior Procedures:  Date    Procedure   Improvement (%)  05/24/21 B SIJ CSI  100% [id number]; still ongoing relief with imp pain and fct (difficulty quantifying response)        Prior Related Surgery:   08/05/2020 Right total hip arthroplasty revision    2010 - Left TSA following fall and mult fractures to shoulder   2004 - [facility]ilateral TKA at [facility]    Other (acupuncture, OMT, CMM, TENS, DME, etc.):   Acupuncture x2     Specialists Seen - (with most recent, available notes and clinic visits reviewed)   1. Orthopedic surgery joint replacement service - Dr. Pan Grey      IMAGING - reviewed   04/26/2021 MR L spine   IMPRESSION:  1. Interval progression of multilevel degenerative changes of the  lumbar spine, particularly at L2-L3, L3-L4, L4-L5 and L5-S1, as  described.  2. Mild central spinal canal stenosis at L3-L4 and L4-L5. Varying  degrees of multilevel lateral recess stenosis, most pronounced on the  left at L2-L3, on the left at L3-L4, and bilaterally at L4-L5.  3. Varying degrees of multilevel neural foraminal stenosis, greatest  on the right at L4-L5 and L5-S1.  4. Partial visualization of a nonspecific T2 hyperintense/cystic  lesion superior to the body of the pancreas, which is nonspecific. A  cystic pancreatic neoplasm such as an intraductal papillary mucinous  neoplasm (IPMN) cannot be excluded. This is incompletely evaluated.  Recommend MRI/MRCP of the abdomen without and with contrast for  further evaluation.      02/17/2021 XR Pelvis   IMPRESSION:  1. Stable right total hip arthroplasty with suspected acetabular screw  fracture, similar to prior.  2.  Mild left hip degenerative change.    Review Of Systems:   I am responding to those symptoms which are directly relevant to the specific indication for my consultation. I recommend that the patient follow up with their primary or referring provider to pursue any other symptoms which may be of concern.    Medical History:  She  has a past medical history of Arthritis, Cervical high risk HPV (human papillomavirus) test positive (07/17/2017), Cervical pain (9/15/2016), Constipation (8/27/2012), Diarrhea (4/30/2009), Esophageal stricture (2/21/2012), Gastro-oesophageal reflux disease, Hypothyroidism (9/18/2012), IBS (irritable bowel syndrome), Mild major depression (H) (2/21/2012), Neuropathy of lower extremity, Rectal prolapse, Restless leg syndrome, Seizure disorder (H), Sleep apnea, and Temporal sclerosis (8/27/2012).     She  has a past surgical history that includes tonsillectomy & adenoidectomy; carpal tunnel release rt/lt; drain pilonidal cyst simpl; L shoulder fracture; rectal prolapse repair abdominally; tubal ligation; Dilation and curettage; Remove mesh vagina (8/10/2012); Excise lesion trunk (8/10/2012); Esophagoscopy, gastroscopy, duodenoscopy (EGD), combined (4/5/2013); Dental surgery; Release plantar fascia (Right, 1/20/2015); Repair hammer toe (Right, 1/20/2015); Endoscopy Drug Induced Sleep (N/A, 11/18/2015); Hysterectomy; Anterior COLPORRHAPHY, BLADDER/VAGINA; Arthroplasty patello-femoral (knee) (Bilateral); Arthroplasty hip (Right, 7/23/2019); Arthroplasty revision hip (Right, 8/7/2019); Irrigation and debridement hip, combined (Right, 8/26/2019); and Arthroplasty revision hip (Right, 8/5/2020).      Family History  her family history includes Alcohol/Drug in her brother; Alzheimer Disease in her paternal grandmother; Breast Cancer in her sister; Congenital Anomalies in her daughter; Depression in her brother and sister; Diabetes in her father; Eye Disorder in her mother; Gastrointestinal Disease in  her brother; Hypertension in her brother; Lipids in her brother, mother, and sister; Neurologic Disorder in her daughter; Osteoporosis in her mother; Psychotic Disorder in her brother; Thyroid Disease in her sister.     Social History:  Work: Retired RN  Current living situation: Lives in [place]  She  reports that she has never smoked. She has never used smokeless tobacco. She reports current alcohol use. She reports that she does not use drugs.      Current Medications:   She has a current medication list which includes the following prescription(s): acetaminophen, albuterol, amlodipine, amoxicillin, artificial saliva, aspirin, benzocaine, benzonatate, budesonide-formoterol, buspirone, carboxymethylcellulose pf, cyclobenzaprine, cyclosporine, dentifrices, estradiol-estriol-progesterone, fluticasone-vilanterol, furosemide, guaifenesin-codeine, hydrocodone-acetaminophen, hypromellose, ibuprofen, lacosamide, levothyroxine, linaclotide, liothyronine, loratadine, lorazepam, medical cannabis, omeprazole, ondansetron, polyethylene glycol, potassium citrate, pramipexole, quetiapine, senna-docusate, tramadol, trazodone, and trolamine salicylate, and the following Facility-Administered Medications: gadobutrol.       Allergies:    -- Blood Transfusion Related (Informational Only) -- Other (See Comments)    --  Patient has a history of a clinically significant             antibody against RBC antigens.  A delay in             compatible RBCs may occur.   -- Bupropion -- Rash   -- Carbamazepine -- Diarrhea   -- Clonazepam -- Other (See Comments)    --  Hypotension, trouble walking, mind disturbance   -- Encort [Hydrocortisone Acetate] -- Swelling    --  Localized to feet   -- Encort [Hydrocortisone] -- Swelling   -- Erythromycin -- Diarrhea   -- Erythromycin -- Nausea and Vomiting   -- Flagyl [Metronidazole] -- Nausea   -- Gabapentin -- Other (See Comments)    --  Causes over-eating   -- Lamictal [Lamotrigine] -- Unknown   --  Oxycodone -- Nausea and Vomiting   -- Tegretol [Carbamazepine] -- Nausea and Vomiting    PHYSICAL EXAMINATION:  -Limited due to nature of virtual visit  General: Pleasant, straightforward, WDWN individual.  Mental Status: Pleasant, direct, appropriate mood and affect  Resp: breathing appears unlabored   Vascular: No grossly visible cyanosis, no venous stasis changes  Heme: No grossly visible ecchymosis or erythema on extremities  Skin: No notable rash    Neurologic:  Strength: All major muscle groups of the bilateral upper and lower extremities appear to have symmetric and at least anti-gravity muscle strength     ASSESSMENT:  Mercedes Barber is a pleasant 66 year old female who presents with chronic, bilateral axial low back:    #. Sacroiliac joint pain. Good relief following B SIJ CSI (05/24/2021)  #. Lumbar spondylosis which is likely multifactorial related to facet OA and DDD.      She has experienced incomplete/inadequate symptomatic relief from multiple pharmacologic trials, repeated courses of physical therapy, interventional pain procedures.  Unfortunately, she still struggles with debilitating and functionally limiting pain.  Given the situation, I do believe that she would be a good candidate for peripheral nerve stimulation at the bilateral lumbar medial branch nerves.  We specifically spoke of the SPR/Sprint peripheral nerve stimulating system.    The patient has trialed many conservative therapies and has ongoing pain.  I believe her to be a good candidate for peripheral nerve stimulation.  The patient has had a positive response to a diagnostic/therapeutic block. We will plan on implanting peripheral nerve stimulating system.    The following medical barriers to safe implantation were discussed:  -Infections: The patient does not have history of recurrent skin, soft tissue, or wound infections.  Dentition is intact and healthy.  -Anticoagulation: The patient is not currently taking any  anticoagulation medications.  There is no history of bleeding disorder abnormal coagulation parameters and lab work available.  -Immunosuppression: The patient does not have a history of immunosuppression, and/or taking immunosuppressive medications.    We discussed the advantages and disadvantages of peripheral nerve stimulation, includin.  Less invasive than other spinal or peripheral nerve stimulation systems.  2.  Easily removed.  3.  Current lack of MRI compatibility  4.  Use of an external generator.     PLAN:  - MRI L spine reviewed in additional detail  - Information and education provided regarding SPR PNS for modulation of pain.   - Again, reviewed the pathway from diagnostic medial branch blocks to radiofrequency denervation was discussed in detail with the patient today.  Risks and benefits were discussed and all questions were answered.  The patient states understanding and wishes to proceed after MRI and SI joint injections completed.  There are no contraindications.  The patient understands they will have 2 diagnostic medial branch blocks; and after each block they will be required to keep a pain diary recording their pain scores approximately every 1/2 hour to hour until the pain has returned to baseline.  During the time after the block, the patient was instructed to perform activities which typically aggravates her pain.  The patient will contact the medical spine staff after each diagnostic block to assess the amount of benefit the patient received.  If the patient has a significantly positive response to each of these diagnostic blocks, they may move forward with the radiofrequency ablation procedure. Discussed bilateral L3, L4 medial branch nerve blocks and L5 dorsal rami nerve blocks   - She will review information regarding SPR PNS and reply back to me on how she wishes to proceed.     Ready to learn, no apparent learning barriers.  Education provided on treatment plan according to  patient's preferred learning style.  Patient verbalizes understanding.   __________________________________  Celena Perez MD  Physical Medicine & Rehabilitation      40 minutes spent on the date of the encounter doing chart review, review of test results, patient visit and documentation          Mercedes is a 66 year old who is being evaluated via a billable video visit.      How would you like to obtain your AVS? MyChart  If the video visit is dropped, the invitation should be resent by: text [phone]  Will anyone else be joining your video visit? No      Video-Visit Details    Type of service:  Video Visit    Start: 06/24/2021 12:59 pm  Stop: 06/24/2021 01:20 pm    Originating Location (pt. Location): [place]    Distant Location (provider location):  [facility] PHYSICAL MEDICINE AND REHABILITATION CLINIC [place]     Platform used for Video Visit: [facility]        Again, thank you for allowing me to participate in the care of your patient.      Sincerely,    Celena Perez MD

## 2021-06-29 ENCOUNTER — MYC MEDICAL ADVICE (OUTPATIENT)
Dept: PHYSICAL MEDICINE AND REHAB | Facility: CLINIC | Age: 66
End: 2021-06-29

## 2021-07-13 ENCOUNTER — MYC MEDICAL ADVICE (OUTPATIENT)
Dept: PHYSICAL MEDICINE AND REHAB | Facility: CLINIC | Age: 66
End: 2021-07-13

## 2021-07-15 ENCOUNTER — PATIENT OUTREACH (OUTPATIENT)
Dept: CARE COORDINATION | Facility: CLINIC | Age: 66
End: 2021-07-15

## 2021-07-15 NOTE — PROGRESS NOTES
Clinic Care Coordination Contact  [facility]/Voicemail     Clinical Data: Care Coordinator Outreach  Outreach attempted x 2.  Left message on patient's voicemail with call back information and requested return call.  Plan: Care Coordinator will try to reach patient again in 10 business days.    Next Outreach: 7/29/21    CAMRON Hester  Clinic Care Coordination  [facility] Clinics: [facility] [place], [place], [facility], and [place] [facility]  Phone: [phone]

## 2021-08-02 ENCOUNTER — MYC MEDICAL ADVICE (OUTPATIENT)
Dept: PHYSICAL MEDICINE AND REHAB | Facility: CLINIC | Age: 66
End: 2021-08-02

## 2021-08-03 ENCOUNTER — PATIENT OUTREACH (OUTPATIENT)
Dept: NURSING | Facility: CLINIC | Age: 66
End: 2021-08-03
Payer: MEDICARE

## 2021-08-03 DIAGNOSIS — Z96.641 HISTORY OF RIGHT HIP REPLACEMENT: ICD-10-CM

## 2021-08-03 NOTE — PROGRESS NOTES
Clinic Care Coordination Contact  Community Health Worker Follow Up    Goals:   Goals Addressed as of 8/3/2021 at 2:26 PM                    Today    4/29/21       Patient Stated       1. Medical (pt-stated)   50%  40%    Added 3/17/21 by Katy Calderon LSW      Goal Statement: I will continue to attend my appointments, follow my plan of care, and access care as needed to manage my pain and medical needs over the next 8 months.   Date Goal set: 3/17/21  Barriers: Complex cares, lots of appts  Strengths: Connected to care, motivated   Date to Achieve By: 12/1/21  Patient expressed understanding of goal: Yes    Action steps to achieve this goal:  1. I will continue to attend my appointments as needed and recommended.   2. I will follow my plan of care as created by my PCP and specialty teams.   3. I will work with care coordination to understand my care plan, schedule appts as needed, and keep track of my appts as needed.          Discussed:    Patient stated that she is really depressed today.  Patient stated that two are her doctors are leaving, and she is very depressed about this.  One is her Psychologist and the other doctor is her GI. Patient has seen both of these doctors to years, and over 25+ years for her Psychologist. Patient will be getting recommendations from both doctors.    Patient stated that she has clinical depression, and she takes medication for this.  Patient has an appointment on 8/5/21 in the afternoon with her psychologist.    Patient stated that her wrist has been sore since her trip to [place] to visit her sister.  Patient has an appointment in the morning on 8/5/21 at [facility].    Patient stated that her trip was good, she saw a high school friend for 3 hours, and plans to spend more time with her friend on her next visit to [place].    Patient stated that her daughter lives 5 miles away, and she sees her about once a month.  Patient stated that she will try to schedule more time with her  daughter.  Patient will see her daughter tomorrow.  Patient would like to see her daughter more.    Patient stated that her brother and his girlfriend will be visiting for an extended weekend next month.    Patient stated that she does see people from [employer] occasionally.  [place]    Patient stated that she met someone online on [employer], her name is Agnieszka.  They are going to meet for lunch on Friday.    Patient stated that she made some cards yesterday and did some watercolor painting.    Patient thanked CHW for calling.      Intervention and Education during outreach: CHW asked patient if she plans to go outside for a bit to get some sunshine.  Patient is planning on going outdoors to help her feel better.  Patient is also going to go on some errands.  CHW used Empathy and Active listening to understand the patients barriers and struggles. CHW gave patient her contact information.  CHW encouraged patient to contact CC if there are any other changes or resources needed.  Patient acknowledged understanding.      Plan: Patient will attend appointments as scheduled.  Patient will visit more with her daughter and friends / Taoist members.    CHW will follow up in one month.    Next Outreach: 9/2/21    CAMRON Hester  Clinic Care Coordination  [facility] Clinics: [place] [place], [facility]  Phone: [phone]

## 2021-08-04 ENCOUNTER — PATIENT OUTREACH (OUTPATIENT)
Dept: CARE COORDINATION | Facility: CLINIC | Age: 66
End: 2021-08-04

## 2021-08-04 RX ORDER — CYCLOBENZAPRINE HCL 5 MG
TABLET ORAL
Qty: 60 TABLET | Refills: 1 | OUTPATIENT
Start: 2021-08-04

## 2021-08-04 ASSESSMENT — ACTIVITIES OF DAILY LIVING (ADL): DEPENDENT_IADLS:: INDEPENDENT

## 2021-08-04 NOTE — PROGRESS NOTES
Clinic Care Coordination Contact    Clinic Care Coordination Contact    Situation: Patient chart reviewed by care coordinator.     Background: Care Coordination following patient to assist with Goals.     Assessment: Per chart review, CC CHW in process of contacting patient. Advised to try an additional time. ELIZA HOWE reviewed chart and noted conversation with patient and CHW:  Discussed:    Patient stated that she is really depressed today.  Patient stated that two are her doctors are leaving, and she is very depressed about this.  One is her Psychologist and the other doctor is her GI. Patient has seen both of these doctors to years, and over 25+ years for her Psychologist. Patient will be getting recommendations from both doctors.    Patient stated that she has clinical depression, and she takes medication for this.  Patient has an appointment on 8/5/21 in the afternoon with her psychologist.     Plan/Recommendations: [facility] CC will await update following CC CHW patient outreach.   Patient will attend appointments as scheduled.  Patient will visit more with her daughter and friends / Jewish members.    CHW will follow up in one month.     Next Outreach: 9/2/21    ELIZA HOWE will chart review in 6 weeks.      HEIDI Gonsales  Clinic Care Coordinator  [facility] and [facility]  [phone]  [email or web address]

## 2021-08-10 DIAGNOSIS — M47.816 LUMBAR FACET ARTHROPATHY: ICD-10-CM

## 2021-08-10 DIAGNOSIS — M47.816 LUMBAR SPONDYLOSIS: Primary | ICD-10-CM

## 2021-08-11 PROBLEM — M47.816 LUMBAR SPONDYLOSIS: Status: ACTIVE | Noted: 2021-08-11

## 2021-08-11 PROBLEM — M47.816 LUMBAR FACET ARTHROPATHY: Status: ACTIVE | Noted: 2021-08-11

## 2021-08-11 NOTE — TELEPHONE ENCOUNTER
Instructed patient that a  will be needed to take the patient home.   Yes    Sedation, if offered, is conscious sedation and the patient will not be 'put out.'  Further instructions for sedation given to the patient. NPO solids 8 hour prior ot injections (including gum and mints), clear fluids 2 hours prior to injection (no dairy product). Patient should take daily medications with small sips of water.  Not applicable    Patient reminder given for Medial Branch Block. Pain level should be at least 5/10 or we will not be able to perform the diagnostic test. Patient should avoid or limit taking pain medications.   Yes    Patient asked if they are on any blood thinners.  Yes    Patient asked if they have had illness/infections/or been on any antibiotics in the last 7 days (GI disturbance, UTI, colds, dental abscess, anything infectious, fever, or 'feeling under the weather')? Patient knows to inform clinic if they experience any of the above prior to the procedure  Yes    Patient understands that they must also have a negative COVID-19 test within four days of their procedure. (Patient will be notified on how to test for this)  Yes    If able, patient should avoid scheduling steroid injections within 2-weeks (before, between, or after) COVID-19 vaccination. Patient understands:  Yes     Appointments scheduled 9/13/21 and 9/20/21 at 1030 am. Aware she must arrive one hour early.

## 2021-08-12 ENCOUNTER — OFFICE VISIT (OUTPATIENT)
Dept: INTERNAL MEDICINE | Facility: CLINIC | Age: 66
End: 2021-08-12
Payer: MEDICARE

## 2021-08-12 VITALS
BODY MASS INDEX: 34.45 KG/M2 | HEART RATE: 75 BPM | OXYGEN SATURATION: 98 % | SYSTOLIC BLOOD PRESSURE: 114 MMHG | DIASTOLIC BLOOD PRESSURE: 72 MMHG | TEMPERATURE: 98.2 F | WEIGHT: 207 LBS

## 2021-08-12 DIAGNOSIS — Z13.6 CARDIOVASCULAR SCREENING; LDL GOAL LESS THAN 160: ICD-10-CM

## 2021-08-12 DIAGNOSIS — F33.1 MAJOR DEPRESSIVE DISORDER, RECURRENT EPISODE, MODERATE (H): ICD-10-CM

## 2021-08-12 DIAGNOSIS — S62.102A WRIST FRACTURE, LEFT, CLOSED, INITIAL ENCOUNTER: ICD-10-CM

## 2021-08-12 DIAGNOSIS — I10 BENIGN ESSENTIAL HYPERTENSION: Primary | ICD-10-CM

## 2021-08-12 DIAGNOSIS — R60.0 PERIPHERAL EDEMA: ICD-10-CM

## 2021-08-12 DIAGNOSIS — Z78.0 ASYMPTOMATIC POSTMENOPAUSAL STATUS: ICD-10-CM

## 2021-08-12 PROCEDURE — 99214 OFFICE O/P EST MOD 30 MIN: CPT | Performed by: INTERNAL MEDICINE

## 2021-08-12 RX ORDER — FUROSEMIDE 20 MG
20 TABLET ORAL DAILY
Qty: 90 TABLET | Refills: 1 | Status: SHIPPED | OUTPATIENT
Start: 2021-08-12 | End: 2021-12-01

## 2021-08-12 RX ORDER — AMLODIPINE BESYLATE 5 MG/1
5 TABLET ORAL DAILY
Qty: 90 TABLET | Refills: 3 | Status: SHIPPED | OUTPATIENT
Start: 2021-08-12 | End: 2022-05-23

## 2021-08-12 ASSESSMENT — PATIENT HEALTH QUESTIONNAIRE - PHQ9: SUM OF ALL RESPONSES TO PHQ QUESTIONS 1-9: 25

## 2021-08-12 NOTE — PROGRESS NOTES
Assessment & Plan     Benign essential hypertension  Stable and continue with current medical management.  - amLODIPine (NORVASC) 5 MG tablet; Take 1 tablet (5 mg) by mouth daily  - furosemide (LASIX) 20 MG tablet; Take 1 tablet (20 mg) by mouth daily    Creatinine   Date Value Ref Range Status   04/12/2021 0.96 0.52 - 1.04 mg/dL Final       Wrist fracture, left, closed, initial encounter  Suggest bone density scan based on prior wrist fracture.  - DX Hip/Pelvis/Spine; Future    Asymptomatic postmenopausal status  Continue calcium and vitamin D supplementation  - DX Hip/Pelvis/Spine; Future    CARDIOVASCULAR SCREENING; LDL GOAL LESS THAN 160  Fasting lab order placed, patient will schedule appointment  - Lipid panel reflex to direct LDL Fasting; Future    Peripheral edema  Refill for as needed use  - furosemide (LASIX) 20 MG tablet; Take 1 tablet (20 mg) by mouth daily    Major depressive disorder, recurrent episode, moderate (H)  Patient is following up with her mental health provider for medication treatment and counseling.  I reviewed her PHQ-9.  She does not manifest any active thought processes that would define any intent to currently harm herself although thoughts have been noted in the past also.  Her PHQ9 score appears fairly similar to that which was done on her recent assessment per her mental health provider dated 8/5/2021.     Depression Screening Follow Up    PHQ 8/12/2021   PHQ-9 Total Score 25   Q9: Thoughts of better off dead/self-harm past 2 weeks Nearly every day       Discussed the following ways the patient can remain in a safe environment.     See Patient Instructions    Return in about 1 week (around 8/19/2021) for Lab Work appointment, mental health provider.    Skyler Álvarez MD  [facility]    Percy Long is a 66 year old who presents for the following health issues  accompanied by her self:    HPI     Pt Is unsure why she is here but thinks it  is a Medication recheck    Since she last saw me she has had a fracture of her left wrist which is now healed.    She is in need of refills of her Lasix and amlodipine.    Has had some mental health issues and is in the process being seen by a psychologist and getting set up to see a new psychiatrist.    Review of Systems   CONSTITUTIONAL: NEGATIVE for fever, chills, change in weight  EYES: NEGATIVE for vision changes or irritation  ENT/MOUTH: NEGATIVE for ear, mouth and throat problems  RESP: NEGATIVE for significant cough or SOB  CV: NEGATIVE for chest pain, palpitations or peripheral edema  GI: NEGATIVE for nausea, abdominal pain, heartburn, or change in bowel habits  : NEGATIVE for frequency, dysuria, or hematuria  MUSCULOSKELETAL: NEGATIVE for significant arthralgias or myalgia  NEURO: NEGATIVE for weakness, dizziness or paresthesias  HEME: NEGATIVE for bleeding problems      Objective    /72 (Cuff Size: Adult Regular)   Pulse 75   Temp 98.2  F (36.8  C) (Tympanic)   Wt 207 lb (93.9 kg)   LMP 03/11/2010   SpO2 98%   BMI 34.45 kg/m    There is no height or weight on file to calculate BMI.  Physical Exam   GENERAL:  alert and no distress  EYES: Eyes grossly normal to inspection, PERRL and conjunctivae and sclerae normal  HENT: ear canals and TM's normal, nose and mouth without ulcers or lesions  NECK: no adenopathy, no asymmetry, masses, or scars and thyroid normal to palpation  RESP: lungs clear to auscultation - no rales, rhonchi or wheezes  CV: regular rate and rhythm, normal S1 S2, no S3 or S4, no murmur, click or rub  NEURO: Normal strength and tone, mentation intact and speech normal  PSYCH: mentation appears normal, affect flat but she does appear to have good insight and is actively involved in pursuing follow-up care with her mental health providers.      LDL Cholesterol Calculated   Date Value Ref Range Status   06/04/2018 101 (H) <100 mg/dL Final     Comment:     Above desirable:  100-129  mg/dl  Borderline High:  130-159 mg/dL  High:             160-189 mg/dL  Very high:       >189 mg/dl       Creatinine   Date Value Ref Range Status   04/12/2021 0.96 0.52 - 1.04 mg/dL Final

## 2021-08-13 ASSESSMENT — ASTHMA QUESTIONNAIRES: ACT_TOTALSCORE: 20

## 2021-08-17 ENCOUNTER — MYC MEDICAL ADVICE (OUTPATIENT)
Dept: INTERNAL MEDICINE | Facility: CLINIC | Age: 66
End: 2021-08-17

## 2021-08-18 DIAGNOSIS — Z11.59 ENCOUNTER FOR SCREENING FOR OTHER VIRAL DISEASES: ICD-10-CM

## 2021-08-24 ENCOUNTER — LAB (OUTPATIENT)
Dept: LAB | Facility: CLINIC | Age: 66
End: 2021-08-24
Payer: MEDICARE

## 2021-08-24 DIAGNOSIS — Z13.6 CARDIOVASCULAR SCREENING; LDL GOAL LESS THAN 160: ICD-10-CM

## 2021-08-24 LAB
CHOLEST SERPL-MCNC: 181 MG/DL
FASTING STATUS PATIENT QL REPORTED: YES
HDLC SERPL-MCNC: 70 MG/DL
LDLC SERPL CALC-MCNC: 95 MG/DL
NONHDLC SERPL-MCNC: 111 MG/DL
TRIGL SERPL-MCNC: 82 MG/DL

## 2021-08-24 PROCEDURE — 80061 LIPID PANEL: CPT

## 2021-08-24 PROCEDURE — 36415 COLL VENOUS BLD VENIPUNCTURE: CPT

## 2021-08-30 ENCOUNTER — MYC MEDICAL ADVICE (OUTPATIENT)
Dept: SURGERY | Facility: AMBULATORY SURGERY CENTER | Age: 66
End: 2021-08-30

## 2021-08-30 NOTE — TELEPHONE ENCOUNTER
Patient sent MyChart request to change appointment on 9/13. Message sent to injection  to change appointment from 9/13 to 9/27 per patient request.       Lavelle Conti RN, BSN, PHN

## 2021-09-01 ENCOUNTER — ANCILLARY PROCEDURE (OUTPATIENT)
Dept: BONE DENSITY | Facility: CLINIC | Age: 66
End: 2021-09-01
Payer: MEDICARE

## 2021-09-01 ENCOUNTER — ANCILLARY PROCEDURE (OUTPATIENT)
Dept: BONE DENSITY | Facility: CLINIC | Age: 66
End: 2021-09-01
Attending: INTERNAL MEDICINE
Payer: MEDICARE

## 2021-09-01 DIAGNOSIS — S62.102A WRIST FRACTURE, LEFT, CLOSED, INITIAL ENCOUNTER: ICD-10-CM

## 2021-09-01 DIAGNOSIS — Z78.0 ASYMPTOMATIC POSTMENOPAUSAL STATUS: ICD-10-CM

## 2021-09-01 DIAGNOSIS — S62.102S CLOSED FRACTURE OF LEFT WRIST, SEQUELA: ICD-10-CM

## 2021-09-01 PROCEDURE — 77080 DXA BONE DENSITY AXIAL: CPT | Mod: XU | Performed by: INTERNAL MEDICINE

## 2021-09-01 PROCEDURE — 77081 DXA BONE DENSITY APPENDICULR: CPT | Performed by: INTERNAL MEDICINE

## 2021-09-02 ENCOUNTER — MYC MEDICAL ADVICE (OUTPATIENT)
Dept: SURGERY | Facility: AMBULATORY SURGERY CENTER | Age: 66
End: 2021-09-02

## 2021-09-04 ENCOUNTER — HEALTH MAINTENANCE LETTER (OUTPATIENT)
Age: 66
End: 2021-09-04

## 2021-09-05 ENCOUNTER — MYC MEDICAL ADVICE (OUTPATIENT)
Dept: INTERNAL MEDICINE | Facility: CLINIC | Age: 66
End: 2021-09-05

## 2021-09-05 NOTE — ED TRIAGE NOTES
Pt had oral surgery last week, took 3 does of narcotic pain meds.  Now c/o constipation.  Had a good bm 2/6 but since has not gone a lot.  Did try miralax, mag citrate, and home enema with not change.  Feeling very bloated and general ab.  
hemoglobin - 6.9

## 2021-09-11 ENCOUNTER — OFFICE VISIT (OUTPATIENT)
Dept: URGENT CARE | Facility: URGENT CARE | Age: 66
End: 2021-09-11
Payer: MEDICARE

## 2021-09-11 ENCOUNTER — ANCILLARY PROCEDURE (OUTPATIENT)
Dept: GENERAL RADIOLOGY | Facility: CLINIC | Age: 66
End: 2021-09-11
Attending: INTERNAL MEDICINE
Payer: MEDICARE

## 2021-09-11 VITALS
RESPIRATION RATE: 16 BRPM | SYSTOLIC BLOOD PRESSURE: 124 MMHG | WEIGHT: 207 LBS | TEMPERATURE: 97.5 F | DIASTOLIC BLOOD PRESSURE: 77 MMHG | HEART RATE: 77 BPM | OXYGEN SATURATION: 98 % | BODY MASS INDEX: 34.45 KG/M2

## 2021-09-11 DIAGNOSIS — S91.145A: Primary | ICD-10-CM

## 2021-09-11 PROCEDURE — 99213 OFFICE O/P EST LOW 20 MIN: CPT | Mod: 25 | Performed by: INTERNAL MEDICINE

## 2021-09-11 PROCEDURE — 73660 X-RAY EXAM OF TOE(S): CPT | Mod: LT | Performed by: RADIOLOGY

## 2021-09-11 RX ORDER — CEPHALEXIN 500 MG/1
1000 CAPSULE ORAL 2 TIMES DAILY
Qty: 28 CAPSULE | Refills: 0 | Status: SHIPPED | OUTPATIENT
Start: 2021-09-11 | End: 2021-09-18

## 2021-09-12 PROCEDURE — 90471 IMMUNIZATION ADMIN: CPT | Performed by: INTERNAL MEDICINE

## 2021-09-12 PROCEDURE — 90715 TDAP VACCINE 7 YRS/> IM: CPT | Performed by: INTERNAL MEDICINE

## 2021-09-12 NOTE — PROGRESS NOTES
Prior to immunization administration, verified patients identity using patient s name and date of birth. Please see Immunization Activity for additional information.     Screening Questionnaire for Adult Immunization    Are you sick today?   No   Do you have allergies to medications, food, a vaccine component or latex?   No   Have you ever had a serious reaction after receiving a vaccination?   No   Do you have a long-term health problem with heart, lung, kidney, or metabolic disease (e.g., diabetes), asthma, a blood disorder, no spleen, complement component deficiency, a cochlear implant, or a spinal fluid leak?  Are you on long-term aspirin therapy?   No   Do you have cancer, leukemia, HIV/AIDS, or any other immune system problem?   No   Do you have a parent, brother, or sister with an immune system problem?   No   In the past 3 months, have you taken medications that affect  your immune system, such as prednisone, other steroids, or anticancer drugs; drugs for the treatment of rheumatoid arthritis, Crohn s disease, or psoriasis; or have you had radiation treatments?   No   Have you had a seizure, or a brain or other nervous system problem?   No   During the past year, have you received a transfusion of blood or blood    products, or been given immune (gamma) globulin or antiviral drug?   No   For women: Are you pregnant or is there a chance you could become       pregnant during the next month?   No   Have you received any vaccinations in the past 4 weeks?   No     Immunization questionnaire answers were all negative.        Per orders of Dr. Pierce, injection of TDAP given by Maliha Cervantes CMA. Patient instructed to remain in clinic for 15 minutes afterwards, and to report any adverse reaction to me immediately.       Screening performed by Maliha Cervantes CMA on 9/11/2021 at 9:03 PM.    Clinic Administered Medication Documentation          Injectable Medication Documentation    Patient was given TDAP. Prior to medication  administration, verified patients identity using patient s name and date of birth. Please see MAR and medication order for additional information. Patient instructed to remain in clinic for 15 minutes.      Was entire vial of medication used? Yes  Vial/Syringe: Single dose vial  Expiration Date:  02/07/2023  Was this medication supplied by the patient? No

## 2021-09-12 NOTE — PROGRESS NOTES
SUBJECTIVE:  This 66 year old female presents with a metal fragment protruding from the left third toe.  She reports that she was working on some crafts in her basement, walking barefoot, when she stepped on what she believes was a nail and suffered immediate pain and bleeding.      OBJECTIVE:  /77   Pulse 77   Temp 97.5  F (36.4  C)   Resp 16   Wt 93.9 kg (207 lb)   LMP 03/11/2010   SpO2 98%   BMI 34.45 kg/m    GEN: elderly female, not in acute distress  LEFT FOOT: there is a pointed sharp metal object protruding from the lateral aspect of the third toe with an entry wound on the medial aspect    Plain films of the left third toe show a radio-opaque sharp metallic fragment that has pierced the pad of the toe without damage to the distal phalanx; there is not a head on this fragment    FOREIGN BODY REMOVAL:  The left third toe is anesthetized using a digital block with 1% lidocaine without epinephrine.  After the toe is numbed up, the point of the metal fragment is grasped with a hemostat and pulled straight out in its entirety.  The metal fragment appears to be the tip of an X-Acto or other modeling knife.   Patient tolerated the procedure well    ASSESSMENT/PLAN:    ICD-10-CM    1. Puncture wound of lesser toe of left foot with foreign body without damage to nail, initial encounter  S91.145A XR Toe Left G/E 2 Views     TDAP VACCINE (Adacel, Boostrix)  [[id number]]     cephALEXin (KEFLEX) 500 MG capsule     Patient Instructions   Dress the toe as needed for comfort and if there is any drainage.    If you are noticing redness, swelling, tenderness, warmth or purulent drainage from the toe come in and have us check things as these could all indicate infection.     Brady Seo MD

## 2021-09-12 NOTE — PATIENT INSTRUCTIONS
Dress the toe as needed for comfort and if there is any drainage.    If you are noticing redness, swelling, tenderness, warmth or purulent drainage from the toe come in and have us check things as these could all indicate infection.

## 2021-09-15 ENCOUNTER — PATIENT OUTREACH (OUTPATIENT)
Dept: NURSING | Facility: CLINIC | Age: 66
End: 2021-09-15
Payer: MEDICARE

## 2021-09-15 NOTE — PROGRESS NOTES
Clinic Care Coordination Contact      ELIZA HOWE Follow Up Progress Note: ELIZA HOWE called patient to check in since last outreach.      Assessment: Patient is doing okay today, she states that she attended a  Monday for a close friend. She endorses some depression, but is utilizes her psychiatrist; he is retiring in the winter. He has given her a referral for another provider who will be able to do tele-health appointments. She will utilize this.     Care Gaps:    Health Maintenance Due   Topic Date Due     MEDICARE ANNUAL WELLNESS VISIT  2019     INFLUENZA VACCINE (1) 2021     ASTHMA ACTION PLAN  2021       Did not discuss     Goals addressed this encounter:   Goals Addressed                    This Visit's Progress       Patient Stated       1. Medical (pt-stated)   60%      Goal Statement: I will continue to attend my appointments, follow my plan of care, and access care as needed to manage my pain and medical needs over the next 8 months.   Date Goal set: 3/17/21  Barriers: Complex cares, lots of appts  Strengths: Connected to care, motivated   Date to Achieve By: 21  Patient expressed understanding of goal: Yes    Action steps to achieve this goal:  1. I will continue to attend my appointments as needed and recommended.   2. I will follow my plan of care as created by my PCP and specialty teams.   3. I will work with care coordination to understand my care plan, schedule appts as needed, and keep track of my appts as needed.              Intervention/Education provided during outreach: LEIZA HOWE called and spoke with patient; introduced self, discussed role of Care Coordination, and explained reason for call. Patient will be getting a Covid test tomorrow because she has a spinal injection schedule Monday.        Outreach Frequency: monthly    Plan:   - [facility] will outreach to patient in 1 month to check in.   - ELIZA HOWE to chart review in 6 weeks.   Care Coordinator will follow up in 6 weeks        Amber Nguyen, HEIDI  Clinic Care Coordinator  [facility]  [phone]  [email or web address]

## 2021-09-16 ENCOUNTER — LAB (OUTPATIENT)
Dept: URGENT CARE | Facility: URGENT CARE | Age: 66
End: 2021-09-16
Payer: MEDICARE

## 2021-09-16 ENCOUNTER — LAB (OUTPATIENT)
Dept: LAB | Facility: CLINIC | Age: 66
End: 2021-09-16

## 2021-09-16 DIAGNOSIS — Z11.59 ENCOUNTER FOR SCREENING FOR OTHER VIRAL DISEASES: ICD-10-CM

## 2021-09-16 PROCEDURE — U0003 INFECTIOUS AGENT DETECTION BY NUCLEIC ACID (DNA OR RNA); SEVERE ACUTE RESPIRATORY SYNDROME CORONAVIRUS 2 (SARS-COV-2) (CORONAVIRUS DISEASE [COVID-19]), AMPLIFIED PROBE TECHNIQUE, MAKING USE OF HIGH THROUGHPUT TECHNOLOGIES AS DESCRIBED BY CMS-2020-01-R: HCPCS

## 2021-09-16 PROCEDURE — U0005 INFEC AGEN DETEC AMPLI PROBE: HCPCS

## 2021-09-17 LAB — SARS-COV-2 RNA RESP QL NAA+PROBE: NEGATIVE

## 2021-09-20 ENCOUNTER — ANCILLARY PROCEDURE (OUTPATIENT)
Dept: RADIOLOGY | Facility: AMBULATORY SURGERY CENTER | Age: 66
End: 2021-09-20
Attending: PHYSICAL MEDICINE & REHABILITATION
Payer: MEDICARE

## 2021-09-20 ENCOUNTER — HOSPITAL ENCOUNTER (OUTPATIENT)
Facility: AMBULATORY SURGERY CENTER | Age: 66
Discharge: HOME | End: 2021-09-20
Attending: PHYSICAL MEDICINE & REHABILITATION | Admitting: PHYSICAL MEDICINE & REHABILITATION
Payer: MEDICARE

## 2021-09-20 VITALS
RESPIRATION RATE: 16 BRPM | OXYGEN SATURATION: 99 % | BODY MASS INDEX: 34.49 KG/M2 | WEIGHT: 207 LBS | TEMPERATURE: 98 F | HEIGHT: 65 IN | HEART RATE: 68 BPM | DIASTOLIC BLOOD PRESSURE: 91 MMHG | SYSTOLIC BLOOD PRESSURE: 146 MMHG

## 2021-09-20 DIAGNOSIS — M47.816 LUMBAR SPONDYLOSIS: ICD-10-CM

## 2021-09-20 DIAGNOSIS — M47.816 LUMBAR FACET ARTHROPATHY: ICD-10-CM

## 2021-09-20 PROCEDURE — 64494 INJ PARAVERT F JNT L/S 2 LEV: CPT | Mod: RT,LT

## 2021-09-20 PROCEDURE — 64493 INJ PARAVERT F JNT L/S 1 LEV: CPT | Mod: RT,LT

## 2021-09-20 RX ORDER — IOPAMIDOL 408 MG/ML
INJECTION, SOLUTION INTRATHECAL PRN
Status: DISCONTINUED | OUTPATIENT
Start: 2021-09-20 | End: 2021-09-20 | Stop reason: HOSPADM

## 2021-09-20 ASSESSMENT — MIFFLIN-ST. JEOR: SCORE: 1474.83

## 2021-09-20 NOTE — DISCHARGE INSTRUCTIONS
PAIN INJECTION HOME CARE INSTRUCTIONS  Activity  -You may resume most normal activity levels with the exception of strenuous activity. It may help to move in ways that hurt before the injection, to see if the pain is still there, but do not overdo it. Take it easy for the rest of the day.    -DO NOT remove bandaid for 24 hours  -DO NOT shower for 24 hours      Pain  -You may feel immediate pain relief and numbness for a period of time after the injection. This may indicate the medication has reached the right spot.  -Your pain may return after this short pain-free period, or may even be a little worse for a day or two. It may be caused by needle irritation or by the medication itself. The medications usually take two or three days to start working, but can take as long as a week.    -You may use an ice pack for 20 minutes every 2 hours for the first 24 hours  -You may use a heating pad after the first 24 hours  -You may use Tylenol (acetaminophen) every 4 hours or other pain medicines as directed by your physician      DID YOU RECEIVE SEDATION TODAY?  no    If you received sedation please follow these additional safety measures.    Sedation medicine, if given, may remain active for many hours. It is important for the next 24 hours that you do not:  -Drive a car  -Operate machines or power tools  -Consume alcohol, including beer  -Sign any important papers or legal documents    DID YOU RECEIVE STEROIDS TODAY?  No    Common side effects of steroids:  Not everyone will experience corticosteroid side effects. If side effects are experienced, they will gradually subside in the 7-10 day period following an injection. Most common side effects include:  -Flushed face and/or chest  -Feeling of warmth, particularly in the face but could be an overall feeling of warmth  -Increased blood sugar in diabetic patients  -Menstrual irregularities my occur. If taking hormone-based birth control an alternate method of birth control is  recommended  -Sleep disturbances and/or mood swings are possible  -Leg cramps    PLEASE KEEP TRACK OF YOUR SYMPTOMS AND NOTE ANY CHANGES FOR YOUR DOCTOR.       Please contact us if you have:  -Severe pain  -Fever more than 101.5 degrees Fahrenheit  -Signs of infection at the injection site (redness, swelling, or drainage)    If you have questions, please contact the Pain Clinic at [phone] Option #1 between the hours of 7:00 am and 3:00 pm Monday through Friday. After office hours you can contact the on call provider by dialing [phone]. If you need immediate attention, we recommend that you go to a hospital emergency room or dial [phone].    For patients seen by the PM and R service, please call [phone].    If you have procedure scheduling questions please call [phone] Option #2

## 2021-09-20 NOTE — PROCEDURES
PROCEDURE NOTE: 1ST Diagnostic Lumbar Medial Branch Block    PROCEDURE DATE: 9/20/2021    PATIENT NAME: Mercedes Barber  YOB: 1955    ATTENDING PHYSICIAN: Celena Perez MD   [id number]: Maicol Jonas MD     PREOPERATIVE DIAGNOSIS:   Lumbar spondylosis  Lumbar facet arthropathy   POSTOPERATIVE DIAGNOSIS: same    OPERATION PERFORMED: 1st diagnostic medial branch block at the Bilateral L4, L5, Sacral ala to treat the L4-5 and L5-S1 facet joints      FLUOROSCOPY WAS USED.     INDICATIONS FOR PROCEDURE:   Mercedes Barber is a 66 year old female with a clinical picture consistent with bilateral axial low back pain and lumbar facet arthropathy who presents for diagnostic medial branch block at the levels noted above to assess if there is a facetogenic component to her low back pain.     PROCEDURE AND FINDINGS:    Mercedes was greeted in the pre-procedure holding area. The risk, benefits and alternatives to the procedure were again reviewed with the patient and written informed consent was placed in the chart. An IV line was not placed.  A 500 mL bag of NS was not connected to the patient. She was taken to the procedure room and positioned prone on the fluoroscopy table.  Routine monitors were applied including blood pressure cuff, and pulse oximetry. Prior to the procedure a time out was completed, verifying correct patient, procedure, site, positioning, and implants and/or special equipment.     The skin was prepped and draped in the usual sterile fashion. An AP fluoroscopic view was obtained to identify the vertebral bodies, the transverse processes and the superior articulating processes at the L4, L5, Sacral ala aforementioned levels on the bilateral sides. The skin overlying the junction of the TP-SAP at the aforementioned levels was anesthetized using a 27-gauge 1-1/4 inch needle with 1% preservative-free lidocaine for a total volume of 1 ml per level. Next, a 25-gauge 3 1/2 inch Quincke spinal  needle was advanced under an oblique fluoroscopic view to the junction of the superior articular process and transverse process(es). Correct needle position was then confirmed in the AP and lateral views.      After negative aspiration, 0.3 mls of Isovue-M 200 contrast was injected at each site under live fluoroscopy; no vascular run off was identified and the contrast spread was adequate. At this point, after negative aspiration, 0.5mL of 0.5% Bupivacaine, was injected at each site.  The needle was then re-styletted removed. The needle insertion site was dressed appropriately.      Mercedes was taken to the recovery room where she was monitored for a brief period of time. She tolerated the procedure well and was discharged home in stable condition with post procedural instructions.    Before the procedure, she reported a pain score of 7/10. After the procedure, she reported a pain score of 0/10.      Follow-up will be Determined after block sheet reviewed.    COMPLICATIONS: None    COMMENTS: None

## 2021-09-21 ENCOUNTER — MEDICAL CORRESPONDENCE (OUTPATIENT)
Dept: HEALTH INFORMATION MANAGEMENT | Facility: CLINIC | Age: 66
End: 2021-09-21

## 2021-09-21 ENCOUNTER — TELEPHONE (OUTPATIENT)
Dept: PHYSICAL MEDICINE AND REHAB | Facility: CLINIC | Age: 66
End: 2021-09-21

## 2021-09-21 NOTE — TELEPHONE ENCOUNTER
Medial Branch Block Post-Procedure Pain Diary    Facet joints are small joints of the spine that can cause pain.  They send pain signals through the Medial Branch nerves.  To determine if the facet joint is the source of your pain, today's injection interrupted this pain signal.  Depending on your response, a different procedure could be considered to treat your pain for a longer duration.  How long the medication last varies from person to person.  What we need to know is how well you did do while the medication was effective.  You should be active during the time the medication is in effect.  DO NOT take pain medication, if you do, please record it.    Please base your response only on the area and/or side injected.  Do not include the pain from other areas of the body or the expected soreness from the needles used during the procedure.    Which side was the procedure performed? Left           Side of body:  Left Side Right Side   Time Pain Score (1-10) Pain Score (1-10)   Before Injection 9    Immediately after injection 0    2 hours after injection 0    4 hours after injection 0    Dinner time 1.5    Bed time 1.5    Next Day 2-3        Comments:    Was it easier to do activities that normally bother you?  (Examples:  walking, flexibility, dishes)      Much easier to bend and lift.          Please call [phone] to report your response the next day OR scan the results to the patient portal.  Please inform the call center that you are reporting your 'Pain Diary'

## 2021-09-22 ENCOUNTER — MYC MEDICAL ADVICE (OUTPATIENT)
Dept: SURGERY | Facility: AMBULATORY SURGERY CENTER | Age: 66
End: 2021-09-22

## 2021-09-23 ENCOUNTER — LAB (OUTPATIENT)
Dept: URGENT CARE | Facility: URGENT CARE | Age: 66
End: 2021-09-23
Attending: PHYSICAL MEDICINE & REHABILITATION
Payer: MEDICARE

## 2021-09-23 ENCOUNTER — OFFICE VISIT (OUTPATIENT)
Dept: INTERNAL MEDICINE | Facility: CLINIC | Age: 66
End: 2021-09-23
Payer: MEDICARE

## 2021-09-23 VITALS
TEMPERATURE: 98.3 F | RESPIRATION RATE: 16 BRPM | SYSTOLIC BLOOD PRESSURE: 120 MMHG | BODY MASS INDEX: 34.61 KG/M2 | OXYGEN SATURATION: 97 % | WEIGHT: 208 LBS | DIASTOLIC BLOOD PRESSURE: 70 MMHG | HEART RATE: 88 BPM

## 2021-09-23 DIAGNOSIS — M85.80 OSTEOPENIA, UNSPECIFIED LOCATION: Primary | ICD-10-CM

## 2021-09-23 DIAGNOSIS — Z11.59 ENCOUNTER FOR SCREENING FOR OTHER VIRAL DISEASES: ICD-10-CM

## 2021-09-23 LAB
DEPRECATED CALCIDIOL+CALCIFEROL SERPL-MC: 40 UG/L (ref 20–75)
TOTAL PROTEIN SERUM FOR ELP: 6.5 G/DL (ref 6.8–8.8)

## 2021-09-23 PROCEDURE — 99214 OFFICE O/P EST MOD 30 MIN: CPT | Performed by: INTERNAL MEDICINE

## 2021-09-23 PROCEDURE — 84155 ASSAY OF PROTEIN SERUM: CPT | Performed by: INTERNAL MEDICINE

## 2021-09-23 PROCEDURE — U0005 INFEC AGEN DETEC AMPLI PROBE: HCPCS

## 2021-09-23 PROCEDURE — 84165 PROTEIN E-PHORESIS SERUM: CPT | Performed by: PATHOLOGY

## 2021-09-23 PROCEDURE — 36415 COLL VENOUS BLD VENIPUNCTURE: CPT | Performed by: INTERNAL MEDICINE

## 2021-09-23 PROCEDURE — U0003 INFECTIOUS AGENT DETECTION BY NUCLEIC ACID (DNA OR RNA); SEVERE ACUTE RESPIRATORY SYNDROME CORONAVIRUS 2 (SARS-COV-2) (CORONAVIRUS DISEASE [COVID-19]), AMPLIFIED PROBE TECHNIQUE, MAKING USE OF HIGH THROUGHPUT TECHNOLOGIES AS DESCRIBED BY CMS-2020-01-R: HCPCS

## 2021-09-23 PROCEDURE — 82306 VITAMIN D 25 HYDROXY: CPT | Performed by: INTERNAL MEDICINE

## 2021-09-23 RX ORDER — ALENDRONATE SODIUM 70 MG/1
70 TABLET ORAL
Qty: 12 TABLET | Refills: 1 | Status: SHIPPED | OUTPATIENT
Start: 2021-09-23 | End: 2022-03-07

## 2021-09-23 NOTE — PROGRESS NOTES
"    Assessment & Plan     Osteopenia, unspecified location  Discussed and reviewed options of treatment with patient in regards to recent bone density scan.  Reviewed side effects of medicines and benefits as well as risks.  Reviewed prior bone density scan done in distant past and compared to recent 1.  Based on discussion patient would like to initiate 1200 mg of calcium and 1000 units of vitamin D daily along with a trial of a bisphosphonate.  Suggest recheck bone density scan 2 years  - Vitamin D Deficiency; Future  - Protein electrophoresis; Future  - alendronate (FOSAMAX) 70 MG tablet; Take 1 tablet (70 mg) by mouth every 7 days    Instructions on Fosamax use and side effects - particularly esophageal adverse events - are carefully reviewed with her. This drug must be taken upon arising for the day on an empty stomach, with a large 6-8 ounce glass of water; she must remain NPO in the upright position for at least 30 minutes afterwards and until after the first food of the day. If esophageal irritation is noted, she will stop the drug and call my office.       BMI:   Estimated body mass index is 34.61 kg/m  as calculated from the following:    Height as of 9/20/21: 1.651 m (5' 5\").    Weight as of this encounter: 94.3 kg (208 lb).       Regular exercise    Return in about 1 year (around 9/23/2022) for Medicare Wellness Exam.    Skyler Álvarez MD  [facility]    Subjective :    Mercedes is a 66 year old who presents for the following health issues  accompanied by her self:    HPI     Pt to discuss dexa scan results:      FINDINGS:               Lumbar Spine (L1-L4)      T-score:  -0.4, marked degenerative changes present                Left Femoral Neck            T-score:  -2.3               Forearm (radius 33%)      T-score:  -1.5  The right and left femur is not acceptable for evaluation due to previous arthroplasty.                    Lumbar (L1-L4) BMD: 1.147  Previous: " "1.345                          Left Total Hip BMD: 0.763  Previous: 0.944               Forearm (radius 33%) BMD: 0.755   Previous: NA     Comparison is made to another DXA performed on the same [employer]  machine on 02/05/2009. There was a study performed in 2013 but the images are not available.      IMPRESSION  Osteopenia., Degenerative changes of the lumbar spine which may falsely elevate results.      Recommendations include ensuring adequate Calcium and Vitamin D.     The current NOF Guidelines recommend treatment for patients with prior hip or vertebral fracture, T-score -2.5 or below, or 10 year risk of any major osteoporotic fracture >20% or 10 year risk of hip fracture >3%, as calculated using the FRAX calculator (www.shef.ac.uk/FRAX or you can google \"FRAX\").       This patient's risks based on available information, with the use of FRAX, are 13 % for major osteoporotic fracture and 3.6 % for hip fracture.   Based on these guidelines, treatment (in addition to calcium and vitamin D) is recommended for this patient, after ruling out other causes of osteoporosis.  This is meant as an aid to clinical decision making; one must still use clinical judgement    Review of Systems   CONSTITUTIONAL: NEGATIVE for fever, chills, change in weight  EYES: NEGATIVE for vision changes or irritation  ENT/MOUTH: NEGATIVE for ear, mouth and throat problems  RESP: NEGATIVE for significant cough or SOB  CV: NEGATIVE for chest pain, palpitations or peripheral edema  GI: NEGATIVE for nausea, abdominal pain, heartburn, or change in bowel habits  : NEGATIVE for frequency, dysuria, or hematuria  MUSCULOSKELETAL: NEGATIVE for significant arthralgias or myalgia  NEURO: NEGATIVE for weakness, dizziness or paresthesias  HEME: NEGATIVE for bleeding problems  PSYCHIATRIC: NEGATIVE for changes in mood or affect      Objective    /70 (BP Location: Left arm, Patient Position: Chair, Cuff Size: Adult Regular)   Pulse 88   Temp " 98.3  F (36.8  C) (Oral)   Resp 16   Wt 94.3 kg (208 lb)   LMP 03/11/2010   SpO2 97%   BMI 34.61 kg/m    Body mass index is 34.61 kg/m .     Physical Exam   GENERAL: healthy, alert and no distress  EYES: Eyes grossly normal to inspection, PERRL and conjunctivae and sclerae normal  HENT: ear canals and TM's normal, nose and mouth without ulcers or lesions  NECK: no adenopathy, no asymmetry, masses, or scars and thyroid normal to palpation  RESP: lungs clear to auscultation - no rales, rhonchi or wheezes  CV: regular rate and rhythm, normal S1 S2, no S3 or S4, no murmur, click or rub, no peripheral edema and peripheral pulses strong  MS: no gross musculoskeletal defects noted  PSYCH: mentation appears normal, affect normal/bright

## 2021-09-24 LAB
ALBUMIN SERPL ELPH-MCNC: 4 G/DL (ref 3.7–5.1)
ALPHA1 GLOB SERPL ELPH-MCNC: 0.3 G/DL (ref 0.2–0.4)
ALPHA2 GLOB SERPL ELPH-MCNC: 0.9 G/DL (ref 0.5–0.9)
B-GLOBULIN SERPL ELPH-MCNC: 0.7 G/DL (ref 0.6–1)
GAMMA GLOB SERPL ELPH-MCNC: 0.7 G/DL (ref 0.7–1.6)
M PROTEIN SERPL ELPH-MCNC: 0 G/DL
PROT PATTERN SERPL ELPH-IMP: NORMAL
SARS-COV-2 RNA RESP QL NAA+PROBE: NEGATIVE

## 2021-09-27 ENCOUNTER — ANCILLARY PROCEDURE (OUTPATIENT)
Dept: RADIOLOGY | Facility: AMBULATORY SURGERY CENTER | Age: 66
End: 2021-09-27
Attending: PHYSICAL MEDICINE & REHABILITATION
Payer: MEDICARE

## 2021-09-27 ENCOUNTER — HOSPITAL ENCOUNTER (OUTPATIENT)
Facility: AMBULATORY SURGERY CENTER | Age: 66
Discharge: HOME | End: 2021-09-27
Attending: PHYSICAL MEDICINE & REHABILITATION | Admitting: PHYSICAL MEDICINE & REHABILITATION
Payer: MEDICARE

## 2021-09-27 VITALS
WEIGHT: 208 LBS | OXYGEN SATURATION: 98 % | TEMPERATURE: 97.7 F | DIASTOLIC BLOOD PRESSURE: 70 MMHG | HEART RATE: 75 BPM | SYSTOLIC BLOOD PRESSURE: 138 MMHG | HEIGHT: 65 IN | BODY MASS INDEX: 34.66 KG/M2 | RESPIRATION RATE: 15 BRPM

## 2021-09-27 DIAGNOSIS — M47.816 LUMBAR FACET ARTHROPATHY: ICD-10-CM

## 2021-09-27 DIAGNOSIS — M47.816 LUMBAR SPONDYLOSIS: ICD-10-CM

## 2021-09-27 DIAGNOSIS — R52 PAIN: ICD-10-CM

## 2021-09-27 PROCEDURE — 64494 INJ PARAVERT F JNT L/S 2 LEV: CPT | Mod: RT,LT

## 2021-09-27 PROCEDURE — 64493 INJ PARAVERT F JNT L/S 1 LEV: CPT | Mod: RT,LT

## 2021-09-27 RX ORDER — IOPAMIDOL 408 MG/ML
INJECTION, SOLUTION INTRATHECAL PRN
Status: DISCONTINUED | OUTPATIENT
Start: 2021-09-27 | End: 2021-09-27 | Stop reason: HOSPADM

## 2021-09-27 RX ORDER — LIDOCAINE HYDROCHLORIDE 20 MG/ML
INJECTION, SOLUTION INFILTRATION; PERINEURAL PRN
Status: DISCONTINUED | OUTPATIENT
Start: 2021-09-27 | End: 2021-09-27 | Stop reason: HOSPADM

## 2021-09-27 ASSESSMENT — MIFFLIN-ST. JEOR: SCORE: 1479.36

## 2021-09-27 NOTE — PROCEDURES
PROCEDURE NOTE: 2ND Diagnostic Lumbar Medial Branch Block    PROCEDURE DATE: 9/27/2021    PATIENT NAME: Mercedes Barber  YOB: 1955    ATTENDING PHYSICIAN: Celena Perez MD   Co-PHYSICIAN: Maicol Jonas MD     PREOPERATIVE DIAGNOSIS:   Lumbar spondylosis  Lumbar facet arthropathy  POSTOPERATIVE DIAGNOSIS: same    OPERATION PERFORMED: 2nd diagnostic medial branch block at the Bilateral  L4, L5, Sacral ala to treat the L4-5 and L5-S1 facet joints (bilateral L3, L4 medial branch nerve blocks and L5 dorsal rami nerve blocks)     FLUOROSCOPY WAS USED.     INDICATIONS FOR PROCEDURE:   Mercedes Barber is a 66 year old female with a clinical picture consistent with bilateral axial low back pain and lumbar facet arthropathy who presents for diagnostic medial branch block at the levels noted above to assess if there is a facetogenic component to her low back pain.     PROCEDURE AND FINDINGS:    Mercedes was greeted in the pre-procedure holding area. The risk, benefits and alternatives to the procedure were again reviewed with the patient and written informed consent was placed in the chart. An IV line was not placed.  A 500 mL bag of NS was not connected to the patient. She was taken to the procedure room and positioned prone on the fluoroscopy table.  Routine monitors were applied including blood pressure cuff, and pulse oximetry. Prior to the procedure a time out was completed, verifying correct patient, procedure, site, positioning, and implants and/or special equipment.     The skin was prepped and draped in the usual sterile fashion. An AP fluoroscopic view was obtained to identify the vertebral bodies, the transverse processes and the superior articulating processes at the L4, L5, Sacral ala aforementioned levels on the bilateral sides. The skin overlying the junction of the TP-SAP at the aforementioned levels was anesthetized using a 27-gauge 1-1/4 inch needle with 1% preservative-free lidocaine for a  total volume of 1 ml per level. Next, a 25-gauge 3 1/2 inch Quincke spinal needle was advanced under an oblique fluoroscopic view to the junction of the superior articular process and transverse process(es). Correct needle position was then confirmed in the AP and lateral views.      After negative aspiration, 0.3 mls of Isovue-M 200 contrast was injected at each site under live fluoroscopy; no vascular run off was identified and the contrast spread was adequate. At this point, after negative aspiration, 0.5mL of 2% Lidocaine, was injected at each site.  The needle was then re-styletted removed. The needle insertion site was dressed appropriately.     Mercedes was taken to the recovery room where she was monitored for a brief period of time. She tolerated the procedure well and was discharged home in stable condition with post procedural instructions.    Before the procedure, she reported a pain score of 5/10. After the procedure, she reported a pain score of 0/10.      Follow-up will be Determined after block sheet reviewed.    COMPLICATIONS: None    COMMENTS: None

## 2021-09-27 NOTE — DISCHARGE INSTRUCTIONS
Home Care Instructions after a Medial Branch Block      In a medial branch block, a local anesthetic (numbing medicine) is injected near the medial branch nerve. This stops the transmission of pain signals from the facet joint. If this reduces your pain and improves your mobility, it may tell the doctor which facet joint is causing the pain. This procedure is a diagnostic procedure and is typically short lasting. With this injection, a steroid to increase the longevity of the blocks effect may or may not be used.    Activity  -You may resume most normal activity levels with the exception of strenuous activity. It is important for us to know if your pain with normal activity is relieved after this injection.  -DO NOT shower for 24 hours  -DO NOT remove bandaid for 24 hours    Pain  -You may experience soreness at the injection site for one or two days  -You may use an ice pack for 20 minutes every 2 hours for the first 24 hours  -You may use a heating pad after the first 24 hours  -You may use Tylenol (acetaminophen) every 4 hours or other pain medicines as     directed by your physician    You may experience numbness radiating into your legs or arms (depending on the procedure location). This numbness may last several hours. Until sensation returns to normal; please use caution in walking, climbing stairs, and stepping out of your vehicle, etc.        DID YOU RECEIVE STEROIDS TODAY?  No    Common side effects of steroids:  Not everyone will experience corticosteroid side effects. If side effects are experienced, they will gradually subside in the 7-10 day period following an injection. Most common side effects include:  -Flushed face and/or chest  -Feeling of warmth, particularly in the face but could be an overall feeling of warmth  -Increased blood sugar in diabetic patients  -Menstrual irregularities my occur. If taking hormone-based birth control an alternate method of birth control is recommended  -Sleep  disturbances and/or mood swings are possible  -Leg cramps      PLEASE KEEP TRACK OF YOUR SYMPTOMS AND NOTE YOUR IMPROVEMENT FOR YOUR DOCTOR.       Fill out the pain diary and fax ([phone]) it back to us. This cues us to call the patient to follow up with scheduling the next procedure. They do not need to call us they have faxed the Pain Diary.     If they do not have access to a fax machine, they can attach it to vogogo and we will follow up with them. They do not need to call us.     If they cannot fax or [employer], then call our call center and request to speak with a nurse for follow up after a procedure [phone].    Please contact us if you have:  -Severe pain  -Fever more than 101.5 degrees Fahrenheit  -Signs of infection at the injection site (redness, swelling, or drainage)    If you have questions, please contact our office at [phone] between the hours of 7:00 am and 3:00 pm Monday through Friday. After office hours you can contact the on call provider by dialing [phone]. If you need immediate attention, we recommend that you go to a hospital emergency room or dial [phone].

## 2021-10-01 DIAGNOSIS — M47.816 LUMBAR FACET ARTHROPATHY: ICD-10-CM

## 2021-10-01 DIAGNOSIS — M47.816 LUMBAR SPONDYLOSIS: Primary | ICD-10-CM

## 2021-10-01 RX ORDER — LIDOCAINE 40 MG/G
CREAM TOPICAL
Status: CANCELLED | OUTPATIENT
Start: 2021-10-01

## 2021-10-01 NOTE — Clinical Note
Jonh Zuniga can you schedule R and L L4-5 and L5-S1 facet joint radiofrequency ablation x3-4 weeks apart followed by 4-6 week post-procedure visit? Thanks!

## 2021-10-01 NOTE — PROGRESS NOTES
Reviewed pain diary for Bilateral L3, L4 medial branch nerve blocks and L5 dorsal rami nerve blocks #2 with 100% improvement. Right and Left L4-5 and L5-S1 facet joint radiofrequency ablations ordered to be scheduled x3-4 weeks apart.

## 2021-10-04 ENCOUNTER — TRANSFERRED RECORDS (OUTPATIENT)
Dept: HEALTH INFORMATION MANAGEMENT | Facility: CLINIC | Age: 66
End: 2021-10-04

## 2021-10-06 ENCOUNTER — HOSPITAL ENCOUNTER (OUTPATIENT)
Facility: AMBULATORY SURGERY CENTER | Age: 66
End: 2021-10-06
Attending: PHYSICAL MEDICINE & REHABILITATION
Payer: MEDICARE

## 2021-10-06 DIAGNOSIS — Z11.59 ENCOUNTER FOR SCREENING FOR OTHER VIRAL DISEASES: ICD-10-CM

## 2021-10-11 ENCOUNTER — PATIENT OUTREACH (OUTPATIENT)
Dept: NURSING | Facility: CLINIC | Age: 66
End: 2021-10-11
Payer: MEDICARE

## 2021-10-13 ENCOUNTER — TELEPHONE (OUTPATIENT)
Dept: INTERNAL MEDICINE | Facility: CLINIC | Age: 66
End: 2021-10-13

## 2021-10-13 NOTE — TELEPHONE ENCOUNTER
PRESTON PCP:    Patient took the following meds at 8:30p before bed as scheduled.  Buspar  Trazadone  Mirapex  Lorazapam    She then woke up during the middle of the night and in error took them again around 3-4AM.    Only reported symptoms are drowsiness.  Should she be concerned and should she take the medications again this evening before bed.  Please advise.    Daiana Calles RN

## 2021-10-13 NOTE — TELEPHONE ENCOUNTER
Spoke with the patient. Reports Dr. Cervantes ([facility] psychiatrist) is the prescribing provider for Buspar, Trazodone, Mirapex and Lorazepam. Notified that Dr. Álvarez advised to resume dosing at scheduled time. Also advised the patient to reach out to Dr. Cervantes. Patient in agreement with plan.     Ana Maria Chandler RN

## 2021-10-13 NOTE — TELEPHONE ENCOUNTER
It appears that the medications that are listed are not medicines that are being prescribed by me.  Patient should be okay with medicine to resume dosing but may want to talk to originating provider who has been filling prescriptions.

## 2021-10-14 ENCOUNTER — PATIENT OUTREACH (OUTPATIENT)
Dept: CARE COORDINATION | Facility: CLINIC | Age: 66
End: 2021-10-14

## 2021-10-14 NOTE — PROGRESS NOTES
Clinic Care Coordination Contact    Clinic Care Coordination Contact    Situation: Patient chart reviewed by care coordinator.     Background: Care Coordination following patient to assist with Goals.     Assessment: Per chart review, CC CHW in process of contacting patient. Advised to try an additional time.      Plan/Recommendations:  CC will await update following CC CHW patient outreach.        BLANCA GonsalesW  Clinic Care Coordinator  [facility]  [phone]  [email or web address]

## 2021-10-18 ENCOUNTER — PATIENT OUTREACH (OUTPATIENT)
Dept: NURSING | Facility: CLINIC | Age: 66
End: 2021-10-18
Payer: MEDICARE

## 2021-10-18 NOTE — PROGRESS NOTES
"Clinic Care Coordination Contact  Community Health Worker Follow Up      Goals:   Goals Addressed as of 10/18/2021 at 1:37 PM                    Today    9/15/21       Patient Stated       1. Medical (pt-stated)   70%  60%    Added 3/17/21 by Katy Calderon LSW      Goal Statement: I will continue to attend my appointments, follow my plan of care, and access care as needed to manage my pain and medical needs over the next 8 months.   Date Goal set: 3/17/21  Barriers: Complex cares, lots of appts  Strengths: Connected to care, motivated   Date to Achieve By: 21  Patient expressed understanding of goal: Yes    Action steps to achieve this goal:  1. I will continue to attend my appointments as needed and recommended.   2. I will follow my plan of care as created by my PCP and specialty teams.   3. I will work with care coordination to understand my care plan, schedule appts as needed, and keep track of my appts as needed.          Discussed:    Patient stated she slept in today, which she normally doesn't do or sleep well.  Patient stated she went to bed at 10:30pm and woke up at 10:00am this morning.    Patient stated she has been doing a lot of painting, and it is keeping her busy.    Patient stated she was \"real sad\" yesterday.  She misses her son.  Patient stated her son  5 years ago, and she thinks of him everyday. Patient stated she also really misses her mom.  Her mom  1 1/2 years ago, and they were really close.    Patient stated she has been sad a lot, she misses her family.  Her sister and brother live out of state, and she doesn't see her daughter too often.  Her daughter lives nearby.    Patient stated her daughter and her  are at the [employer] today in [place].  Her daughter's  have tests schedule.    Patient stated her daughter will be leaving to go to [place] this week, it the test results are negative for her .  Her daughter will be gone for one week.    Patient stated she " has a trip planned to visit her sister in [place] in January.  She will be bringing a friend, Soheila.    Patient stated her psychologist is leaving in December.  She has seen this person for 27 years.    Patient stated she will be getting her Covid booster and the flu shot at [facility] this week.    Patient stated she reads the Bible on her sad days, and paints.  She also watches her favorite TV channel- [email or web address], [employer] too.     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    Patient stated she has attended her appointments as scheduled.    Intervention and Education during outreach: CHW used Empathy and Active listening to understand the patients barriers and struggles. CHW encouraged patient to contact CC if there are any other changes or resources needed.  Patient acknowledged understanding.     Plan: Patient will attend scheduled appointments.  Patient will do self-care when sad, painting, read the Bible, or calling friends.  CHW will outreach in one month.    CAMRON Hester  Clinic Care Coordination  [facility]: [facility], [place], [facility], and [facility]  Phone: [phone]

## 2021-10-20 ENCOUNTER — PATIENT OUTREACH (OUTPATIENT)
Dept: CARE COORDINATION | Facility: CLINIC | Age: 66
End: 2021-10-20

## 2021-10-20 NOTE — LETTER
[facility]  Patient Centered Plan of Care  About Me:        Patient Name:  Mercedes Barber    YOB: 1955  Age:         66 year old   Jeremy MRN:    [id number] Telephone Information:  Home Phone [phone]   Mobile [phone]       Address:  [address] [address] Email address:  [email or web address]      Emergency Contact(s)    Name Relationship Lgl Grd Work Phone Home Phone Mobile Phone   1. YESENIA ROMEROYL Friend No  [phone]   2. JOYCE KASPERN Daughter No  [phone]   3. JULIO C KASPER (* Relative No  [phone]   4. RANJITH MICHAEL Other    [phone]   5. JULIO C KASPER Son-in-Law    [phone]           Primary language:  English     needed? No   [employer]:  [phone] op. 1  Other communication barriers:None    Preferred Method of Communication:  Phone  Current living arrangement: I live alone    Mobility Status/ Medical Equipment: Independent w/Device        Health Maintenance  Health Maintenance Reviewed: Due/Overdue     My Access Plan  Medical Emergency 911   Primary Clinic Line [facility] - [phone]   24 Hour Appointment Line [phone] or  [phone]) (toll-free)   24 Hour Nurse Line [phone] (toll-free)   Preferred Urgent Care [facility], [phone]     Preferred Hospital [facility]  [phone]     Preferred Pharmacy [facility] - [place], [place] - [address] [place] [address]     Behavioral Health Crisis Line The National Suicide Prevention Lifeline at 1-402.940.8419 or 911             My Care Team Members  Patient Care Team       Relationship Specialty Notifications Start End    Skyler Álvarez MD PCP - General Internal Medicine  7/31/12     Phone: [phone] Fax: [phone]         [address] [address]    Samir Arguelles  MD [facility]  10/19/15     Referred by Dr. Samir Arguelles from [facility]rological [facility] for sleep apnea     Phone: [phone] Fax: [phone]         [facility] NEUROLOGICAL [facility] [address] [facility] [address] [facility] [address]    Park Ortiz MD MD Otolaryngology  10/19/15     Referred by Dr. Samir Arguelles from [facility] for sleep apnea     Phone: [phone] Fax: [phone]         [address] [facility] [address] [facility] [address]    Raegan Chinchilla MA Community Health Worker Primary Care - CC  1/23/20     Phone: [phone]         Skyler Chacko MD Referring Physician Orthopedics  7/17/20     Phone: [phone] Fax: [phone]         [facility] [address] [place] [address] [facility] [address]    Skyler Álvarez MD Assigned PCP   1/17/21     Phone: [phone] Fax: [phone]         [address] [facility] [address]    Celena Perez MD Assigned Neuroscience Provider   4/11/21     Phone: [phone] Fax: [phone]         [address] [facility] [address] [facility] [address]    Amber Nguyen LSW Lead Care Coordinator  Admissions 7/1/21     Keanu Bermudez MD Assigned Musculoskeletal Provider   7/25/21     Phone: [phone] Fax: [phone]         [address] [facility] [address]            My Care Plans  Self Management and Treatment Plan  Goals and (Comments)  Goals        General     1. Medical (pt-stated)      Notes - Note edited  3/17/2021 10:01 AM by Katy Calderon LSW     Goal Statement: I will continue to attend my appointments, follow my plan of care, and access care as needed to manage my pain and medical needs over the next 8 months.   Date Goal set: 3/17/21  Barriers: Complex cares, lots of appts  Strengths: Connected to care, motivated   Date to Achieve By: 12/1/21  Patient expressed understanding of goal: Yes    Action steps to achieve this goal:  1. I will continue to attend my appointments as needed and  recommended.   2. I will follow my plan of care as created by my PCP and specialty teams.   3. I will work with care coordination to understand my care plan, schedule appts as needed, and keep track of my appts as needed.               Action Plans on File:                       Advance Care Plans/Directives Type:   No data recorded    My Medical and Care Information  Problem List   Patient Active Problem List   Diagnosis     Menopausal syndrome (hot flashes)     Family history of breast cancer     Vulvar pain     Renal anomaly     Overactive bladder     Rectal prolapse     CARDIOVASCULAR SCREENING; LDL GOAL LESS THAN 160     Vaginal pain     Dysphagia     Confusion     Headache     Left shoulder pain     Anemia     Mild major depression (H)     Esophageal stricture     Vulvar lesion     Temporal sclerosis     Lumbago     Pain in thoracic spine     Sciatica     Pain in joint, lower leg     Plantar fascial fibromatosis     Pain in joint involving ankle and foot     Other specified hypothyroidism     GERD (gastroesophageal reflux disease)     Irritable bowel syndrome     Other sleep apnea     Cervical high risk HPV (human papillomavirus) test positive     Restless legs syndrome (RLS)     History of total hip arthroplasty, right     Hip pain     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)     Cellulitis of right hip     Deep venous thrombosis of upper extremity (H)     Peripheral edema     Low iron stores     Mechanical complication of prosthetic hip implant, initial encounter (H)     Status post hip surgery     Chest pain, unspecified     Closed fracture of proximal end of left humerus     Generalized anxiety disorder     History of infection     Infection and inflammatory reaction due to unspecified internal joint prosthesis, initial encounter (H)     Ingrowing nail     Major depressive disorder, recurrent episode, moderate (H)     Mild intermittent asthma     Nausea     Partial epilepsy with intractable epilepsy (H)      Repeated falls     History of revision of total replacement of right hip joint     Seizure disorder (H)     Benign essential hypertension     Morbid obesity (H)     Sacroiliac joint pain     Closed fracture of left wrist, initial encounter     Lumbar spondylosis     Lumbar facet arthropathy      Current Medications and Allergies:  See printed Medication Report.    Care Coordination Start Date: 11/13/2019   Frequency of Care Coordination: monthly     Form Last Updated: 10/20/2021

## 2021-10-20 NOTE — PROGRESS NOTES
Clinic Care Coordination Contact    Care Coordination Clinician Chart Review  Situation: Patient chart reviewed by care coordinator.       Background: Care Coordination initial assessment and enrollment to Care Coordination was 07/01/2021.   Patient centered goals were developed with participation from patient.  CARLEY KAY handed patient off to CHW for continued outreach every 30 days.        Assessment: Per chart review, patient outreach completed by CC CHW on 10/18/2021.  Patient is actively working to accomplish goal.  Patient's goal remains appropriate and relevant at this time.   Patient Is due for updated Plan of Care.  Annual assessment will be due 7/1/2022.      Goals        Patient Stated       1. Medical (pt-stated)       Goal Statement: I will continue to attend my appointments, follow my plan of care, and access care as needed to manage my pain and medical needs over the next 8 months.   Date Goal set: 3/17/21  Barriers: Complex cares, lots of appts  Strengths: Connected to care, motivated   Date to Achieve By: 12/1/21  Patient expressed understanding of goal: Yes    Action steps to achieve this goal:  1. I will continue to attend my appointments as needed and recommended.   2. I will follow my plan of care as created by my PCP and specialty teams.   3. I will work with care coordination to understand my care plan, schedule appts as needed, and keep track of my appts as needed.                  Plan/Recommendations: The patient will continue working with Care Coordination to achieve goal as above.  CHW will involve CARLEY KAY as needed or if patient is ready to move to maintenance.  CARLEY CC will continue to monitor progress to goals and CHW outreaches every 6 weeks.   Plan of Care updated and mailed to patient: Yes, sent via my chart.

## 2021-10-21 ENCOUNTER — TELEPHONE (OUTPATIENT)
Dept: NURSING | Facility: CLINIC | Age: 66
End: 2021-10-21

## 2021-10-22 NOTE — TELEPHONE ENCOUNTER
Mercedes had a Covid booster vaccination yesterday    She reports fever, body aches, soreness at the injection site and a lump on her arm.    She wanted to know if she can still get her scheduled Flu shot tomorrow.    Advised that she can still get flu shot.    She is having trouble accessing her [employer] account.    Transferred to scheduling for [employer] assistance    COVID 19 Nurse Triage Plan/Patient Instructions    Please be aware that novel coronavirus (COVID-19) may be circulating in the community. If you develop symptoms such as fever, cough, or SOB or if you have concerns about the presence of another infection including coronavirus (COVID-19), please contact your health care provider or visit [email or web address].     Disposition/Instructions    Home care recommended. Follow home care protocol based instructions.    Thank you for taking steps to prevent the spread of this virus.  o Limit your contact with others.  o Wear a simple mask to cover your cough.  o Wash your hands well and often.    Resources    [facility]: About COVID-19: [email or web address]    CDC: What to Do If You're Sick: www.cdc.gov/coronavirus/2019-ncov/about/steps-when-sick.html    CDC: Ending Home Isolation: www.cdc.gov/coronavirus/2019-ncov/hcp/disposition-in-home-patients.html     CDC: Caring for Someone: www.cdc.gov/coronavirus/2019-ncov/if-you-are-sick/care-for-someone.html     [facility]: Interim Guidance for Hospital Discharge to Home: www.health.[facility].[place]/diseases/coronavirus/hcp/hospdischarge.pdf    [facility] clinical trials (COVID-19 research studies): clinicalaffairs.[facility]-clinical-trials     Below are the COVID-19 hotlines at the [facility] ([facility]). Interpreters are available.   o For health questions: Call 135-809-7469 or 1-570.505.9762 (7 a.m. to 7 p.m.)  o For questions about schools and childcare: Call 826-226-0336 or 1-566.269.3972 (7 a.m. to 7 p.m.)     Anayeli  SAGE Fernández  [facility] Nurse Advisors

## 2021-10-23 ENCOUNTER — LAB (OUTPATIENT)
Dept: URGENT CARE | Facility: URGENT CARE | Age: 66
End: 2021-10-23
Payer: MEDICARE

## 2021-10-23 DIAGNOSIS — Z11.59 ENCOUNTER FOR SCREENING FOR OTHER VIRAL DISEASES: ICD-10-CM

## 2021-10-23 LAB — SARS-COV-2 RNA RESP QL NAA+PROBE: NEGATIVE

## 2021-10-23 PROCEDURE — U0003 INFECTIOUS AGENT DETECTION BY NUCLEIC ACID (DNA OR RNA); SEVERE ACUTE RESPIRATORY SYNDROME CORONAVIRUS 2 (SARS-COV-2) (CORONAVIRUS DISEASE [COVID-19]), AMPLIFIED PROBE TECHNIQUE, MAKING USE OF HIGH THROUGHPUT TECHNOLOGIES AS DESCRIBED BY CMS-2020-01-R: HCPCS

## 2021-10-23 PROCEDURE — U0005 INFEC AGEN DETEC AMPLI PROBE: HCPCS

## 2021-10-24 ENCOUNTER — MYC MEDICAL ADVICE (OUTPATIENT)
Dept: INTERNAL MEDICINE | Facility: CLINIC | Age: 66
End: 2021-10-24

## 2021-10-24 DIAGNOSIS — Z11.59 ENCOUNTER FOR SCREENING FOR OTHER VIRAL DISEASES: ICD-10-CM

## 2021-10-26 ENCOUNTER — MEDICAL CORRESPONDENCE (OUTPATIENT)
Dept: HEALTH INFORMATION MANAGEMENT | Facility: CLINIC | Age: 66
End: 2021-10-26
Payer: MEDICARE

## 2021-10-27 ENCOUNTER — HOSPITAL ENCOUNTER (OUTPATIENT)
Facility: AMBULATORY SURGERY CENTER | Age: 66
Discharge: HOME | End: 2021-10-27
Attending: PHYSICAL MEDICINE & REHABILITATION | Admitting: PHYSICAL MEDICINE & REHABILITATION
Payer: MEDICARE

## 2021-10-27 ENCOUNTER — ANCILLARY PROCEDURE (OUTPATIENT)
Dept: RADIOLOGY | Facility: AMBULATORY SURGERY CENTER | Age: 66
End: 2021-10-27
Attending: PHYSICAL MEDICINE & REHABILITATION
Payer: MEDICARE

## 2021-10-27 VITALS
DIASTOLIC BLOOD PRESSURE: 70 MMHG | RESPIRATION RATE: 16 BRPM | WEIGHT: 208 LBS | BODY MASS INDEX: 34.66 KG/M2 | OXYGEN SATURATION: 97 % | SYSTOLIC BLOOD PRESSURE: 125 MMHG | TEMPERATURE: 97 F | HEART RATE: 70 BPM | HEIGHT: 65 IN

## 2021-10-27 DIAGNOSIS — E03.9 HYPOTHYROIDISM, ADULT: ICD-10-CM

## 2021-10-27 DIAGNOSIS — M47.816 LUMBAR FACET ARTHROPATHY: ICD-10-CM

## 2021-10-27 DIAGNOSIS — R52 PAIN: ICD-10-CM

## 2021-10-27 DIAGNOSIS — F32.A DEPRESSION: Primary | ICD-10-CM

## 2021-10-27 DIAGNOSIS — M47.816 LUMBAR SPONDYLOSIS: ICD-10-CM

## 2021-10-27 PROCEDURE — 64635 DESTROY LUMB/SAC FACET JNT: CPT | Mod: LT

## 2021-10-27 PROCEDURE — 64636 DESTROY L/S FACET JNT ADDL: CPT | Mod: LT

## 2021-10-27 PROCEDURE — 99152 MOD SED SAME PHYS/QHP 5/>YRS: CPT

## 2021-10-27 RX ORDER — LIDOCAINE HYDROCHLORIDE 40 MG/ML
INJECTION, SOLUTION RETROBULBAR PRN
Status: DISCONTINUED | OUTPATIENT
Start: 2021-10-27 | End: 2021-10-27 | Stop reason: HOSPADM

## 2021-10-27 RX ORDER — MIDAZOLAM HYDROCHLORIDE 1 MG/ML
INJECTION INTRAMUSCULAR; INTRAVENOUS PRN
Status: DISCONTINUED | OUTPATIENT
Start: 2021-10-27 | End: 2021-10-27 | Stop reason: HOSPADM

## 2021-10-27 RX ORDER — FENTANYL CITRATE 50 UG/ML
INJECTION, SOLUTION INTRAMUSCULAR; INTRAVENOUS PRN
Status: DISCONTINUED | OUTPATIENT
Start: 2021-10-27 | End: 2021-10-27 | Stop reason: HOSPADM

## 2021-10-27 RX ORDER — LIDOCAINE HYDROCHLORIDE 10 MG/ML
INJECTION, SOLUTION EPIDURAL; INFILTRATION; INTRACAUDAL; PERINEURAL PRN
Status: DISCONTINUED | OUTPATIENT
Start: 2021-10-27 | End: 2021-10-27 | Stop reason: HOSPADM

## 2021-10-27 RX ORDER — LIDOCAINE 40 MG/G
CREAM TOPICAL
Status: DISCONTINUED | OUTPATIENT
Start: 2021-10-27 | End: 2021-10-28 | Stop reason: HOSPADM

## 2021-10-27 ASSESSMENT — MIFFLIN-ST. JEOR: SCORE: 1479.36

## 2021-10-27 NOTE — DISCHARGE INSTRUCTIONS
Home Care Instructions after a Radio Frequency Ablation      Activity  -Rest today  -Do not work today  -Resume normal activity in 2-3 days  -No heavy lifting, turning or twisting for 24 hours  -DO NOT shower or soak in tub for 24 hours  -DO NOT remove bandaid for 24 hours    Pain  -You may experience soreness at the injection site for one or two days  -You may use an ice pack for 20 minutes every 2 hours for the first 24 hours, longer if needed for comfort, do not use heat  -You may use Tylenol (acetaminophen) every 4 hours or other pain medicines as directed by your physician  -If other pain medications are prescribed by your physician, please follow dosing instructions carefully.    What to expect  You may experience numbness radiating into your legs or arms (depending on the procedure location). This numbness may last several hours. Until sensation returns to normal, please use caution in walking, climbing stairs, and stepping out of your vehicle, etc. Relief of your initial symptoms can take up to 4 weeks to feel better, this is normal and due to the healing process. The procedure site may feel like a deep burn. Using ice will greatly minimize this discomfort. Do not use numbing creams or patches over your injection sites immediately following your procedure. Please keep injection sites covered, clean and dry for 24 hours.    DID YOU RECEIVE SEDATION TODAY?  Yes    Safety  Sedation medicine, if given, may remain active for many hours. It is important for the next 24 hours that you do not:  -Drive a car  -Operate machines or power tools  -Consume alcohol, including beer  -Sign any important papers or legal documents  -Please have a responsible adult with you for 24 hours following any sedation    DID YOU RECEIVE STEROIDS TODAY?    Common side effects of steroids:  Not everyone will experience corticosteroid side effects. If side effects are experienced, they will gradually subside in the 7-10 day period  following an injection. Most common side effects include:  -Flushed face and/or chest  -Feeling of warmth, particularly in the face but could be an overall feeling of warmth  -Increased blood sugar in diabetic patients  -Menstrual irregularities my occur. If taking hormone-based birth control an alternate method of birth control is recommended  -Sleep disturbances and/or mood swings are possible  -Leg cramps      Please contact us if you have:  -Severe pain  -Fever more than 101.5 degrees Fahrenheit  -Signs of infection at the injection site (redness, swelling, or drainage)    If you have questions, please contact our office at [phone] Option #1 between the hours of 7:00 am and 3:00 pm Monday through Friday. After office hours you can contact the on call provider by dialing [phone]. If you need immediate attention, we recommend that you go to a hospital emergency room or dial [phone].    If you have procedure scheduling questions please call [phone] Option #2    If you are a PM&R patient, please call [phone] for questions

## 2021-11-01 ENCOUNTER — LAB (OUTPATIENT)
Dept: LAB | Facility: CLINIC | Age: 66
End: 2021-11-01
Payer: MEDICARE

## 2021-11-01 DIAGNOSIS — E03.9 HYPOTHYROIDISM, ADULT: ICD-10-CM

## 2021-11-01 DIAGNOSIS — F32.A DEPRESSION: ICD-10-CM

## 2021-11-01 LAB
T3FREE SERPL-MCNC: 5.8 PG/ML (ref 2.3–4.2)
VIT B12 SERPL-MCNC: 376 PG/ML (ref 193–986)

## 2021-11-01 PROCEDURE — 84443 ASSAY THYROID STIM HORMONE: CPT

## 2021-11-01 PROCEDURE — 84481 FREE ASSAY (FT-3): CPT

## 2021-11-01 PROCEDURE — 36415 COLL VENOUS BLD VENIPUNCTURE: CPT

## 2021-11-01 PROCEDURE — 82607 VITAMIN B-12: CPT

## 2021-11-01 PROCEDURE — 84439 ASSAY OF FREE THYROXINE: CPT

## 2021-11-02 LAB
T4 FREE SERPL-MCNC: 1.67 NG/DL (ref 0.76–1.46)
TSH SERPL DL<=0.005 MIU/L-ACNC: <0.01 MU/L (ref 0.4–4)

## 2021-11-08 NOTE — PROCEDURES
PROCEDURE NOTE: Lumbar Radiofrequency Ablation    PROCEDURE DATE: 10/27/2021    PATIENT NAME: Mercedes Barber  YOB: 1955    ATTENDING PHYSICIAN: Celena Perez MD  RESIDENT/FELLOW PHYSICIAN: Maicol Jonas MD     PREOPERATIVE DIAGNOSIS:   Lumbar spondylosis  Lumbar facet arthropathy   POSTOPERATIVE DIAGNOSIS: same    PROCEDURE PERFORMED: Lumbar Medial Branch Radiofrequency Ablation, at the Left L4, L5, and ala to treat the L4-5 and L5-S1 facet joints (L3, L4 medial branch nerves and L5 dorsal rami)     IV SEDATION #1: Midazolam: 1mg   IV SEDATION #2  Fentanyl: 100mcg  Sedation RN was present for medication administration additional monitoring.     ESTIMATED BLOOD LOSS:  None    FLUOROSCOPY WAS USED.    TOTAL SEDATION TIME: 26 minutes.    INDICATIONS FOR PROCEDURE:   Mercedes Barber is a 66 year old female with a clinical picture consistent with left lumbar facet arthropathy as determined after 2 rounds of medial branch blocks with local anesthetic, extensive conversation with the patient and evaluation of the patient's pain diaries after each MBB.    PROCEDURE AND FINDINGS:    Mercedes was greeted in the pre-procedure holding area. The risk, benefits and alternatives to the procedure were again reviewed with the patient and written informed consent was placed in the chart. An IV line was placed.  A 500 mL bag of NS was connected to the patient. She was taken to the procedure room and positioned prone on the fluoroscopy table.  Routine monitors were applied including EKG leads, blood pressure cuff, and pulse oximetry. Prior to the procedure a time out was completed, verifying correct patient, procedure, site, positioning, and implants and/or special equipment.     An AP fluoroscopic  film was taken to identify the L4,L5, and ala aforementioned levels. The skin was prepped with chlorhexidine and draped in the usual sterile fashion. The overlying skin and subcutaneous tissue was anesthetized using a  27-gauge 1-1/4-inch needle with 1% preservative free lidocaine for a total volume of 1mls per level.    Utilizing an ipsilateral oblique fluoroscopic view (approximately 15-25 ), the 100mm, 18-gauge SMK needles with a 10 mm active tip were advanced towards the junction of the superior articular process and the transverse processes (SAP-TP) with a caudal angulation of the needle at the left L4, L5 vertebral bodies. The active tip was confirmed to be at the SAP-TP junction levels with additional AP and lateral fluoroscopic views. Then, a 10-20  caudad tilt was applied in the AP view to identify the left sacral ala. The 100mm, 18-gauge SMK needles with a 10 mm active tip were advanced towards the in this view. Confirmation of the location of the needle tip was obtained utilizing a lateral fluoroscopic image. A correction fluoroscopic view, utilizing 45  of ipsilateral oblique tilt, was used to visualize the grove of the SAP-TP junction and advance the needles to the ideal location.     Next, sensory stimulation was performed at 50 Hz and up to 1 V with reproduction of sensation at 1.0 V. Motor stimulation was then performed at 2 Hz and up to 2V without any lower extremity stimulation observed (which was confirmed by the patient's self report). Once the SMK needle position was confirmed and prior to ablation, 0.5mL of 4% Lidocaine was injected at each level.     Radiofrequency ablation lesioning was carried out utilizing the following parameters: 80 C for 90 seconds. After lesioning was completed, the SMK needles were slightly withdrawn/rotated and noted to be still in the groove corresponding levels. Lesioning was then again carried out at these positions at 80 C for 90 seconds. Following lesioning the needles were removed. The needle insertion site was dressed appropriately.     She was taken to the recovery room where she was monitored for a brief period of time. She tolerated the procedure well and was discharged  home in stable condition with post procedural instructions.     Follow-up will be for right RFA    COMPLICATIONS:  None    Comment(s):  None

## 2021-11-09 RX ORDER — CYCLOBENZAPRINE HCL 5 MG
5 TABLET ORAL 2 TIMES DAILY PRN
Qty: 60 TABLET | Refills: 3 | COMMUNITY
Start: 2021-11-09 | End: 2023-06-21

## 2021-11-09 NOTE — PROGRESS NOTES
Verbal order given for flexeril 5mg BID PRN, 60 tabs with 3 refills per Dr. Perez. VORB given to pt's pharmacy at [facility] on [address] per pt request. My Chart message sent to pt to inform that medication has been ordered.       Katina Borges, RNCC  Neurology

## 2021-11-12 ENCOUNTER — MYC MEDICAL ADVICE (OUTPATIENT)
Dept: SURGERY | Facility: AMBULATORY SURGERY CENTER | Age: 66
End: 2021-11-12
Payer: MEDICARE

## 2021-11-12 ENCOUNTER — TRANSFERRED RECORDS (OUTPATIENT)
Dept: HEALTH INFORMATION MANAGEMENT | Facility: CLINIC | Age: 66
End: 2021-11-12
Payer: MEDICARE

## 2021-11-12 NOTE — TELEPHONE ENCOUNTER
Contacted patient and she decided she would like December 13th at 10:15 AM for her appointment change. (She is not able to get a ride for current date of November 24th). Justin (ASC scheduling), was messaged to make this change in Dr Perez's schedule.

## 2021-11-23 ENCOUNTER — TELEPHONE (OUTPATIENT)
Dept: PHYSICAL MEDICINE AND REHAB | Facility: CLINIC | Age: 66
End: 2021-11-23
Payer: MEDICARE

## 2021-11-23 NOTE — TELEPHONE ENCOUNTER
I spoke to the patient regarding her procedure date on 12/13. Patient is still having a lot of back pain and wants to hold off on another injection. Procedure, COVID test and follow up appt canceled. Patient is aware that his next available date if she should change her mind is Jan 2022. Patient reported that is fine. I let the patient know that I sent her message to Dr. Perez and his nurse to call her regarding her back pain.

## 2021-11-23 NOTE — TELEPHONE ENCOUNTER
"[facility] Call Center    Phone Message    May a detailed message be left on voicemail: yes     Reason for Call: Other: pt reporting that this is the 4th or 5th time she has left a message regarding this issue. pt does not understand \"why\" Dr. Perez has not gotten back to her. Pt is confused by the non-response. Pt is wondering if Dr. Perez is mad at her?  Pt reporting that Dr. Perez's nurse called her back and indicated to the pt that the nurse would speak to Dr. Perez on behalf of the pt. Pt reporting that her back still hurts from the October 2021 injection. Pt does not want to move forward with the 12/13/21 injection while her back is still having pain. Please call this pt please. Thank you.    Action Taken: Message routed to:  [facility]    Travel Screening: Not Applicable                                                                      "

## 2021-11-26 ENCOUNTER — PATIENT OUTREACH (OUTPATIENT)
Dept: NURSING | Facility: CLINIC | Age: 66
End: 2021-11-26
Payer: MEDICARE

## 2021-11-26 NOTE — PROGRESS NOTES
Clinic Care Coordination Contact  The Community Health Worker called for a Care Coordination outreach to follow up on goals and action steps. Spoke to Mercedes.    Patient stated she does not have time to talk, her daughter is on her way over to visit.  Patient asked to be called back next week.    CHW and patient scheduled a phone visit for 12/2/21 at 11:00am.    Raegan Chinchilla, CAMRON  Clinic Care Coordination  [facility]: [facility] [place], [place], [place], and [place] for Women  Phone: [phone]

## 2021-11-30 ENCOUNTER — TRANSFERRED RECORDS (OUTPATIENT)
Dept: HEALTH INFORMATION MANAGEMENT | Facility: CLINIC | Age: 66
End: 2021-11-30
Payer: MEDICARE

## 2021-11-30 DIAGNOSIS — R60.0 PERIPHERAL EDEMA: ICD-10-CM

## 2021-11-30 DIAGNOSIS — I10 BENIGN ESSENTIAL HYPERTENSION: ICD-10-CM

## 2021-12-01 ENCOUNTER — PATIENT OUTREACH (OUTPATIENT)
Dept: CARE COORDINATION | Facility: CLINIC | Age: 66
End: 2021-12-01

## 2021-12-01 RX ORDER — FUROSEMIDE 20 MG
20 TABLET ORAL DAILY
Qty: 90 TABLET | Refills: 2 | Status: SHIPPED | OUTPATIENT
Start: 2021-12-01 | End: 2022-05-23

## 2021-12-01 NOTE — TELEPHONE ENCOUNTER
Prescription approved per [facility] Refill Protocol.  Chava Martinez RN  [facility] Triage Nurse

## 2021-12-01 NOTE — PROGRESS NOTES
Clinic Care Coordination Contact  Clinic Care Coordination Contact    Situation: Patient chart reviewed by care coordinator.     Background: Care Coordination following patient to assist with Goals.     Assessment: Per chart review, CC CHW in process of contacting patient. Advised to try an additional time.      Plan/Recommendations:  CC will await update following CC CHW patient outreach.     BLANCA GonsalesW  Clinic Care Coordinator  [facility] and [facility] [place]  [phone]  [email or web address]

## 2021-12-02 ENCOUNTER — PATIENT OUTREACH (OUTPATIENT)
Dept: NURSING | Facility: CLINIC | Age: 66
End: 2021-12-02
Payer: MEDICARE

## 2021-12-02 NOTE — PROGRESS NOTES
"Clinic Care Coordination Contact    Community Health Worker Follow Up    Care Gaps:     Health Maintenance Due   Topic Date Due     MEDICARE ANNUAL WELLNESS VISIT  2019     ASTHMA ACTION PLAN  2021       Discussed on 21    Goals:   Goals Addressed as of 2021 at 11:40 AM                    Today    10/18/21       Patient Stated       1. Medical (pt-stated)   80%  70%    Added 3/17/21 by Katy Calderon LSW      Goal Statement: I will continue to attend my appointments, follow my plan of care, and access care as needed to manage my pain and medical needs over the next 8 months.   Date Goal set: 3/17/21  Barriers: Complex cares, lots of appts  Strengths: Connected to care, motivated   Date to Achieve By: 21  Patient expressed understanding of goal: Yes    Action steps to achieve this goal:  1. I will continue to attend my appointments as needed and recommended.   2. I will follow my plan of care as created by my PCP and specialty teams.   3. I will work with care coordination to understand my care plan, schedule appts as needed, and keep track of my appts as needed.          Discussed:    Patient stated her wrist has been bothering her.  Patient had surgery in .  Patient stated she plans to contact O.    Patient stated she had a problem with swallowing, and she is now doing good.      Patient stated her psychologist is leaving on 21.  Patient stated she has been a bit depressed, it's been approximately one month since her last visit.  Patient has an upcoming appointment and will ask for referrals during this visit.    Patient stated this is a difficult time of the year for her. Her son committed suicide 4 years ago on 11/15/17.  Patient stated her son was 24 years of age.  Patient stated she is having a \"really hard time with this\".  Patient stated she has been sad lately.    Patient stated she attended a grief group when her son first , decided this was not what she " needed nor did it help her.    Patient stated two of her friends past away last week.    Patient stated she saw her daughter over Thanksgiving.    Patient stated she hasn't been out too much due to Covid-19.    Patient stated she has been busy doing a lot of painting, watching [employer] Gaurav movies, and baking.    Patient stated she made Gaurav cards and will be mailing them out soon.    Patient stated she has been watching the birds outside to boost her spirits.    Patient stated she is looking forward to visiting her sister in [place] in February.  Patient is bringing her friend along.    Intervention and Education during outreach: CHW offered to assist in scheduling a Medicare Annual Wellness Appointment.  Patient declined.  Patient stated she will call to schedule this soon.  CHW asked if patient has any resource needs at this time patient declined.  CHW encouraged patient to contact CC if there are any other changes or resources needed.  Patient acknowledged understanding.      CHW Plan: CHW will outreach in one month.    CAMRON Hester  Clinic Care Coordination  [facility]: [place] [place], [place], [place], and [place] for Women  Phone: [phone]

## 2021-12-08 ENCOUNTER — PATIENT OUTREACH (OUTPATIENT)
Dept: CARE COORDINATION | Facility: CLINIC | Age: 66
End: 2021-12-08
Payer: MEDICARE

## 2021-12-08 NOTE — PROGRESS NOTES
"Clinic Care Coordination Contact  Care Coordination Clinician Chart Review  Situation: Patient chart reviewed by care coordinator.       Background: Care Coordination initial assessment and enrollment to Care Coordination was 2021.   Patient centered goals were developed with participation from patient.  CARLEY KAY handed patient off to CHW for continued outreach every 30 days.        Assessment: Per chart review, patient outreach completed by CC CHW on 2021.  Patient is actively working to accomplish goal.  Patient's goal remains appropriate and relevant at this time.   Patient is not due for updated Plan of Care.  Annual assessment will be due 2022.       Discussed:    Patient stated her wrist has been bothering her.  Patient had surgery in .  Patient stated she plans to contact O.    Patient stated she had a problem with swallowing, and she is now doing good.      Patient stated her psychologist is leaving on 21.  Patient stated she has been a bit depressed, it's been approximately one month since her last visit.  Patient has an upcoming appointment and will ask for referrals during this visit.    Patient stated this is a difficult time of the year for her. Her son committed suicide 4 years ago on 11/15/17.  Patient stated her son was 24 years of age.  Patient stated she is having a \"really hard time with this\".  Patient stated she has been sad lately.    Patient stated she attended a grief group when her son first , decided this was not what she needed nor did it help her.    Patient stated two of her friends past away last week.    Patient stated she saw her daughter over Thanksgiving.    Patient stated she hasn't been out too much due to Covid-19.    Patient stated she has been busy doing a lot of painting, watching [employer] Gaurav movies, and baking.    Patient stated she made [place] cards and will be mailing them out soon.    Patient stated she has been watching the birds " outside to boost her spirits.    Patient stated she is looking forward to visiting her sister in [place] in February.  Patient is bringing her friend along.    Goals        Patient Stated       1. Medical (pt-stated)       Goal Statement: I will continue to attend my appointments, follow my plan of care, and access care as needed to manage my pain and medical needs over the next 8 months.   Date Goal set: 3/17/21  Barriers: Complex cares, lots of appts  Strengths: Connected to care, motivated   Date to Achieve By: 12/1/21  Patient expressed understanding of goal: Yes    Action steps to achieve this goal:  1. I will continue to attend my appointments as needed and recommended.   2. I will follow my plan of care as created by my PCP and specialty teams.   3. I will work with care coordination to understand my care plan, schedule appts as needed, and keep track of my appts as needed.                  Plan/Recommendations: The patient will continue working with Care Coordination to achieve goal as above.  CHW will involve SW CC as needed or if patient is ready to move to maintenance.  SW CC will continue to monitor progress to goals and CHW outreaches every 6 weeks.   Plan of Care updated and mailed to patient: HEIDI Anguiano  Clinic Care Coordinator  [facility] and [facility] [place]  [phone]  [email or web address]

## 2022-01-12 ENCOUNTER — PATIENT OUTREACH (OUTPATIENT)
Dept: CARE COORDINATION | Facility: CLINIC | Age: 67
End: 2022-01-12

## 2022-01-12 ENCOUNTER — PATIENT OUTREACH (OUTPATIENT)
Dept: NURSING | Facility: CLINIC | Age: 67
End: 2022-01-12
Payer: MEDICARE

## 2022-01-12 NOTE — PROGRESS NOTES
Clinic Care Coordination Contact  .  Clinic Care Coordination Contact    Situation: Patient chart reviewed by care coordinator.     Background: Care Coordination following patient to assist with Goals.     Assessment: Per chart review, CC CHW in process of contacting patient. Advised to try an additional time.      Plan/Recommendations:  CC will await update following CC CHW patient outreach.       BLANCA GonsalesW  Clinic Care Coordinator  [facility] [place]  [phone]  [email or web address]

## 2022-01-12 NOTE — PROGRESS NOTES
Clinic Care Coordination Contact  The Community Health Worker called for a Care Coordination outreach to follow up on goals and action steps. Spoke to Mercedes.    Patient stated she just got home from an overnight sleep study, and she is tired.  Patient stated she is meeting a friend for lunch shortly.  Patient requested a call back.    CHW and patient scheduled a phone visit for 1/18/22 at 2:00pm.    Next Outreach: 1/18/22    CAMRON Hester  Clinic Care Coordination  [facility] Clin[facility]: [facility] [place], [place], [place], and [place] for Women  Phone: [phone]

## 2022-01-18 ENCOUNTER — PATIENT OUTREACH (OUTPATIENT)
Dept: NURSING | Facility: CLINIC | Age: 67
End: 2022-01-18
Payer: MEDICARE

## 2022-01-18 NOTE — PROGRESS NOTES
Clinic Care Coordination Contact    Community Health Worker Follow Up    Care Gaps:     Health Maintenance Due   Topic Date Due     MEDICARE ANNUAL WELLNESS VISIT  04/24/2019     ASTHMA ACTION PLAN  09/14/2021     ASTHMA CONTROL TEST  02/12/2022     PHQ-9  02/12/2022       Care Gaps Last addressed on 1/18/22    Goals:   Goals Addressed as of 1/18/2022 at 2:26 PM                    Today    12/2/21       Patient Stated       1. Medical (pt-stated)   90%  80%    Added 3/17/21 by Katy Calderon LSW      Goal Statement: I will continue to attend my appointments, follow my plan of care, and access care as needed to manage my pain and medical needs over the next 3 months.   Date Goal set: 3/17/21 extended 1/12/2022  Barriers: Complex cares, lots of appts  Strengths: Connected to care, motivated   Date to Achieve By: 12/1/21 extended 4/12/2022  Patient expressed understanding of goal: Yes    Action steps to achieve this goal:  1. I will continue to attend my appointments as needed and recommended.   2. I will follow my plan of care as created by my PCP and specialty teams.   3. I will work with care coordination to understand my care plan, schedule appts as needed, and keep track of my appts as needed.          Discussed:    Patient stated she was laying down, not feeling too well today.  She didn't sleep much last night.    Patient stated she has an appointment on 1.25/22 to discuss results from her sleep study.    Patient stated she met with a new therapist for the first time, and it was okay.  Patient stated she talked about her grief from her son's death / suicide.    Patient stated she has an appointment next month with a new psychiatrist.    Patient stated she and a friend will be visiting her sister in [place].  She is looking forward to the trip.    Patient stated she has been staying home.  She reads her Bible, paints, and watches movies.    Patient stated she was sitting outside and 8 Cardinals flew around her.   Patient stated this gave her a very peaceful feeling.      Intervention and Education during outreach: CHW asked patient if she would like to schedule a Medicare Annual Wellness Visit, patient agreed.  CHW encouraged patient to contact CC if there are any other changes or resources needed.  Patient acknowledged understanding.     Plan: CHW assisted patient in scheduling a Medicare Annual Wellness Visit for 3/15/22 at 10:40am with patient's PCP.    CHW will outreach next month.    CAMRON Hester  Clinic Care Coordination  [facility]: [facility] [place], [facility]  Phone: [phone]

## 2022-01-20 ENCOUNTER — NURSE TRIAGE (OUTPATIENT)
Dept: INTERNAL MEDICINE | Facility: CLINIC | Age: 67
End: 2022-01-20
Payer: MEDICARE

## 2022-01-20 NOTE — TELEPHONE ENCOUNTER
TO PCP: routing to PCP due to per protocol pt needs to be seen but patient refuses     Pt called c/o diarrhea started 1.5 days ago     Very watery - runs into the bathroom. Has been 4-5 times today already     No fever, no aches     No nausea or vomiting, no abdominal pain     Started at about 7am today. Yesterday was all night long     Hydration? Drinking Gatorade and water. Denies dehydration concerns     No recent travel, exposure to anyone with diarrhea, or spoiled food, no recent abx      Has history or IBS so unsure if it's related to that     No bleeding concerns     Per protocol: Go To ED/UCC Now (Or To Office With PCP Approval) - patient states it's too cold to go anywhere and she feels okay other than the constant loose stools. Did agreed to seek emergency care if she develops weakness or dehydration and agreed to call back if no improvement with home care.     In meantime is continuing Gatorade and BRAT diet     Keiry MONTENEGRO, Triage RN  [facility]       FOOD AND NUTRITION DURING MILD-MODERATE DIARRHEA  * Maintaining some food intake during episodes of diarrhea is important.  * Begin with boiled starches / cereals (e.g., potatoes, rice, noodles, wheat, oats) with a small amount of salt to taste.  * Other foods that are OK include: bananas, yogurt, crackers, soup.  * As the diarrhea starts to get better, you can slowly return to a normal diet.    FOOD AND NUTRITION DURING SEVERE DIARRHEA:  * Drinking enough liquids is more important that eating when one has severe diarrhea.  * As the diarrhea starts to get better, you can slowly return to a normal diet.  * Begin with boiled starches / cereals (e.g., potatoes, rice, noodles, wheat, oats) with a small amount of salt to taste.  * Other foods that are OK include: bananas, yogurt, crackers, soup.                                              Reason for Disposition    SEVERE diarrhea (e.g., 7 or more times / day more than normal)  and age > 60 years    SEVERE diarrhea (e.g., 7 or more times / day more than normal)    Additional Information    Negative: Shock suspected (e.g., cold/pale/clammy skin, too weak to stand, low BP, rapid pulse)    Negative: Difficult to awaken or acting confused (e.g., disoriented, slurred speech)    Negative: Sounds like a life-threatening emergency to the triager    Negative: Vomiting also present and worse than the diarrhea    Negative: Blood in stool and without diarrhea    Negative: SEVERE abdominal pain (e.g., excruciating) and present > 1 hour    Negative: SEVERE abdominal pain and age > 60    Negative: Bloody, black, or tarry bowel movements (Exception: chronic-unchanged black-grey bowel movements and is taking iron pills or Pepto-bismol)    Protocols used: DIARRHEA-A-[place]

## 2022-01-24 ENCOUNTER — MEDICAL CORRESPONDENCE (OUTPATIENT)
Dept: HEALTH INFORMATION MANAGEMENT | Facility: CLINIC | Age: 67
End: 2022-01-24
Payer: MEDICARE

## 2022-01-24 NOTE — TELEPHONE ENCOUNTER
Pt called back reporting no improvement of diarrhea. It has been one week of loose watery diarrhea. She has had fluids, eating bananas and taking imodium. Notes hx of IBS. Denies weakness or dizziness. Pt was advised to be seen today.  Pt has seen GI before. She agreed to contact GI for appt today or call clinic back if unable to be seen with specialist.

## 2022-01-25 ENCOUNTER — ANCILLARY PROCEDURE (OUTPATIENT)
Dept: MAMMOGRAPHY | Facility: CLINIC | Age: 67
End: 2022-01-25
Attending: INTERNAL MEDICINE
Payer: MEDICARE

## 2022-01-25 ENCOUNTER — ANCILLARY PROCEDURE (OUTPATIENT)
Dept: GENERAL RADIOLOGY | Facility: CLINIC | Age: 67
End: 2022-01-25
Attending: INTERNAL MEDICINE
Payer: MEDICARE

## 2022-01-25 DIAGNOSIS — Z12.31 BREAST CANCER SCREENING BY MAMMOGRAM: ICD-10-CM

## 2022-01-25 DIAGNOSIS — R19.4 FREQUENT BOWEL MOVEMENTS: ICD-10-CM

## 2022-01-25 DIAGNOSIS — Z12.31 VISIT FOR SCREENING MAMMOGRAM: ICD-10-CM

## 2022-01-25 PROCEDURE — 74019 RADEX ABDOMEN 2 VIEWS: CPT | Performed by: RADIOLOGY

## 2022-01-25 PROCEDURE — 77063 BREAST TOMOSYNTHESIS BI: CPT | Mod: TC | Performed by: RADIOLOGY

## 2022-01-25 PROCEDURE — 77067 SCR MAMMO BI INCL CAD: CPT | Mod: TC | Performed by: RADIOLOGY

## 2022-01-26 ENCOUNTER — PATIENT OUTREACH (OUTPATIENT)
Dept: CARE COORDINATION | Facility: CLINIC | Age: 67
End: 2022-01-26
Payer: MEDICARE

## 2022-01-26 ENCOUNTER — NURSE TRIAGE (OUTPATIENT)
Dept: NURSING | Facility: CLINIC | Age: 67
End: 2022-01-26
Payer: MEDICARE

## 2022-01-26 ENCOUNTER — TRANSFERRED RECORDS (OUTPATIENT)
Dept: HEALTH INFORMATION MANAGEMENT | Facility: CLINIC | Age: 67
End: 2022-01-26
Payer: MEDICARE

## 2022-01-26 NOTE — LETTER
[facility] CARE COORDINATION  [facility]  [address] [phone]    January 26, 2022        Mercedes Barber  [address] [address]  [place] [facility] [address]          Dear Mercedes,     Miguel is an updated Patient Centered Plan of Care for your continued enrollment in Care Coordination. Please let us know if you have additional questions, concerns, or goals that we can assist with.    Sincerely,    Amber Nguyen, BLANCAW  Clinic Care Coordinator  [facility] [place]  [phone]  [email or web address]  Patient Centered Plan of Care  About Me:        Patient Name:  Mercedes Barber    YOB: 1955  Age:         66 year old   [place] MRN:    [id number] Telephone Information:  Home Phone [phone]   Mobile [phone]       Address:  [address]  [place] [address] Email address:  [email or web address]      Emergency Contact(s)    Name Relationship Lgl Grd Work Phone Home Phone Mobile Phone   1. YESENIA ROMEROYL Friend No  [phone]   2. MICHAEL KASPER Daughter No  [phone]   3. JULIO C KASPER (* Relative No  [phone]   4. MICHAEL KASPER Other    [phone]   5. JULIO C KASPER Son-in-Law    [phone]           Primary language:  English     needed? No   [place] [employer]:  [phone] op. 1  Other communication barriers:None    Preferred Method of Communication:  Phone  Current living arrangement: I live alone    Mobility Status/ Medical Equipment: Independent w/Device        Health Maintenance  Health Maintenance Reviewed: Due/Overdue       My Access Plan  Medical Emergency 911   Primary Clinic Line [facility] - [phone]   24 Hour Appointment Line [phone] or  [phone]  [phone]) (toll-free)   24 Hour Nurse Line [phone] (toll-free)   Preferred Urgent Care [facility], [phone]     Preferred Hospital [facility]  [phone]     Preferred Pharmacy [facility] - [place], [place] - [address] [facility]     Behavioral Health Crisis Line The National Suicide Prevention Lifeline at 1-872.360.9568 or 911             My Care Team Members  Patient Care Team       Relationship Specialty Notifications Start End    Skyler Álvarez MD PCP - General Internal Medicine  7/31/12     Phone: [phone] Fax: [phone]         [address] [place] [address]    Samir Arguelles MD MD Neurology  10/19/15     Referred by Dr. Samir Arguelles from [facility] for sleep apnea     Phone: [phone] Fax: [phone]         [facility] NEUROLOGICAL [facility] [address] [facility] [address] [facility] [address]    Park jackson MD MD Otolaryngology  10/19/15     Referred by Dr. Samir Arguelles from [facility] for sleep apnea     Phone: [phone] Fax: [phone]         [address] [facility] [address] [facility] [address]    Raegan Chinchilla MA Community Health Worker Primary Care - CC  1/23/20     Phone: [phone]         Skyler Chacko MD Referring Physician Orthopedics  7/17/20     Phone: [phone] Fax: [phone]         [facility] [address] [place] [address] [address] [facility] [address]    Skyler Álvarez MD Assigned PCP   1/17/21     Phone: [phone] Fax: [phone]         [address] [facility] [address]    Celena Perez MD Assigned Neuroscience Provider   4/11/21     Phone: [phone] Fax: [phone]         [address] [facility] [address] [facility] [address]    Amber Nguyen, LSW Lead Care Coordinator  Admissions 7/1/21     Keanu Bermudez MD Assigned Musculoskeletal Provider   7/25/21     Phone: [phone] Fax: [phone] [address] [facility] [address]            My Care Plans  Self Management and Treatment Plan  Goals and (Comments)  Goals        General     1. Medical (pt-stated)      Notes - Note edited  1/12/2022 11:56 AM by Amber Nguyen LSW     Goal Statement: I will continue to attend my appointments, follow my plan of care, and access care as needed to manage my pain and medical needs over the next 3 months.   Date Goal set: 3/17/21 extended 1/12/2022  Barriers: Complex cares, lots of appts  Strengths: Connected to care, motivated   Date to Achieve By: 12/1/21 extended 4/12/2022  Patient expressed understanding of goal: Yes    Action steps to achieve this goal:  1. I will continue to attend my appointments as needed and recommended.   2. I will follow my plan of care as created by my PCP and specialty teams.   3. I will work with care coordination to understand my care plan, schedule appts as needed, and keep track of my appts as needed.               Action Plans on File:                       Advance Care Plans/Directives Type:   No data recorded    My Medical and Care Information  Problem List   Patient Active Problem List   Diagnosis     Menopausal syndrome (hot flashes)     Family history of breast cancer     Vulvar pain     Renal anomaly     Overactive bladder     Rectal prolapse     CARDIOVASCULAR SCREENING; LDL GOAL LESS THAN 160     Vaginal pain     Dysphagia     Confusion     Headache     Left shoulder pain     Anemia     Mild major depression (H)     Esophageal stricture     Vulvar lesion     Temporal sclerosis     Lumbago     Pain in thoracic spine     Sciatica     Pain in joint, lower leg     Plantar fascial fibromatosis     Pain in joint involving ankle and foot     Other specified hypothyroidism     GERD (gastroesophageal reflux disease)     Irritable bowel syndrome     Other sleep apnea     Cervical high risk HPV (human papillomavirus) test positive     Restless legs syndrome (RLS)     History of  total hip arthroplasty, right     Hip pain     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)     Cellulitis of right hip     Deep venous thrombosis of upper extremity (H)     Peripheral edema     Low iron stores     Mechanical complication of prosthetic hip implant, initial encounter (H)     Status post hip surgery     Chest pain, unspecified     Closed fracture of proximal end of left humerus     Generalized anxiety disorder     History of infection     Infection and inflammatory reaction due to unspecified internal joint prosthesis, initial encounter (H)     Ingrowing nail     Major depressive disorder, recurrent episode, moderate (H)     Mild intermittent asthma     Nausea     Partial epilepsy with intractable epilepsy (H)     Repeated falls     History of revision of total replacement of right hip joint     Seizure disorder (H)     Benign essential hypertension     Morbid obesity (H)     Sacroiliac joint pain     Closed fracture of left wrist, initial encounter     Lumbar spondylosis     Lumbar facet arthropathy      Current Medications and Allergies:  See printed Medication Report.    Care Coordination Start Date: 11/13/2019   Frequency of Care Coordination: monthly     Form Last Updated: 01/26/2022

## 2022-01-26 NOTE — PROGRESS NOTES
Clinic Care Coordination Contact      Care Coordination Clinician Chart Review  Situation: Patient chart reviewed by care coordinator.       Background: Care Coordination initial assessment and enrollment to Care Coordination was 7/1/2021.   Patient centered goals were developed with participation from patient.  [facility] CC handed patient off to CHW for continued outreach every 30 days.        Assessment: Per chart review, patient outreach completed by CC CHW on 1/18/2022.  Patient is actively working to accomplish goal.  Patient's goal remains appropriate and relevant at this time.   Patient is due for updated Plan of Care.  Annual assessment will be due 7/1/2022.      Goals        Patient Stated       1. Medical (pt-stated)       Goal Statement: I will continue to attend my appointments, follow my plan of care, and access care as needed to manage my pain and medical needs over the next 3 months.   Date Goal set: 3/17/21 extended 1/12/2022  Barriers: Complex cares, lots of appts  Strengths: Connected to care, motivated   Date to Achieve By: 12/1/21 extended 4/12/2022  Patient expressed understanding of goal: Yes    Action steps to achieve this goal:  1. I will continue to attend my appointments as needed and recommended.   2. I will follow my plan of care as created by my PCP and specialty teams.   3. I will work with care coordination to understand my care plan, schedule appts as needed, and keep track of my appts as needed.                  Plan/Recommendations: The patient will continue working with Care Coordination to achieve goal as above.  CHW will involve CARLEY KAY as needed or if patient is ready to move to maintenance.  [facility] CC will continue to monitor progress to goals and CHW outreaches every 6 weeks.   Plan of Care updated and mailed to patient: Yes, sent via my chart.        HEIDI Gonsales  Clinic Care Coordinator  [facility] and [facility] [place]  [phone]  [email or web address]

## 2022-01-26 NOTE — TELEPHONE ENCOUNTER
Pt phoned because she is unable to get into [place] to read a letter that was sent to her yesterday     [facility]   [address]  [facility] [address]               Mercedes L Sunil  [address] [facility]   [place] [facility] [address]           January 25, 2022     Date of Exam: 1/25/22     Dear Mercedes:     Thank you for your recent visit.      Breast Imaging Result: We are pleased to inform you that the results of your recent breast imaging show no evidence of malignancy (cancer).     Breast Density: Your breast imaging shows that you have dense breast tissue. This means you have slightly more risk of getting breast cancer. It also means your breast imaging will be harder to read. While it's harder to read, it's still important to do breast imaging. In fact, yearly breast imaging is even more important for anyone at higher risk. You may need to do more testing if you have enough risk.     If you are having any problems such as a lump or pain in your breast, please discuss this with your health care team if you haven't already. You may need more testing.     As you know, it's important to find cancer early. Not all cancers are found through breast imaging, but it's the most accurate method for finding cancer early. A complete check should include breast imaging, physical exams, and breast self-exams. The American College of Radiology and the Society of Breast Imaging advises that yearly breast imaging should start at age 40.     A report of your breast imaging results was sent to: Skyler Álvarez     Your breast imaging will become part of your medical file here at [facility] for at least 10 years. You are responsible for telling any new health care team or breast imaging site of the date and place of this exam.      We appreciate the opportunity to participate in your health care.     Sincerely,  Silvino Cruz MD  [facility]  [facility]         No Triage     Jolynn Gonzalez RN  [place] Nurse Advisor  4:53 AM 1/26/2022      COVID 19 Nurse Triage Plan/Patient Instructions    Please be aware that novel coronavirus (COVID-19) may be circulating in the community. If you develop symptoms such as fever, cough, or SOB or if you have concerns about the presence of another infection including coronavirus (COVID-19), please contact your health care provider or visit [email or web address].     Disposition/Instructions    Home care recommended. Follow home care protocol based instructions.    Thank you for taking steps to prevent the spread of this virus.  o Limit your contact with others.  o Wear a simple mask to cover your cough.  o Wash your hands well and often.    Resources    [facility]: About COVID-19: [email or web address]    CDC: What to Do If You're Sick: www.cdc.gov/coronavirus/2019-ncov/about/steps-when-sick.html    CDC: Ending Home Isolation: www.cdc.gov/coronavirus/2019-ncov/hcp/disposition-in-home-patients.html     CDC: Caring for Someone: www.cdc.gov/coronavirus/2019-ncov/if-you-are-sick/care-for-someone.html     [facility]: Interim Guidance for Hospital Discharge to Home: www.health.[facility].[place]/diseases/coronavirus/hcp/hospdischarge.pdf    [facility] clinical trials (COVID-19 research studies): clinicalaffairs.[facility]-clinical-trials     Below are the COVID-19 hotlines at the [facility] of Health ([facility]). Interpreters are available.   o For health questions: Call 411-851-3255 or 1-373.263.1262 (7 a.m. to 7 p.m.)  o For questions about schools and childcare: Call 832-106-1078 or 1-286.287.8839 (7 a.m. to 7 p.m.)                         Reason for Disposition    Health Information question, no triage required and triager able to answer question    Additional Information    Negative: [1] Caller is not with the adult (patient) AND [2] reporting urgent symptoms    Negative: Lab result  questions    Negative: Medication questions    Negative: Caller can't be reached by phone    Negative: Caller has already spoken to PCP or another triager    Negative: RN needs further essential information from caller in order to complete triage    Negative: Requesting regular office appointment    Negative: [1] Caller requesting NON-URGENT health information AND [2] PCP's office is the best resource    Protocols used: INFORMATION ONLY CALL - NO TRIAGE-A-

## 2022-01-27 ENCOUNTER — TRANSFERRED RECORDS (OUTPATIENT)
Dept: HEALTH INFORMATION MANAGEMENT | Facility: CLINIC | Age: 67
End: 2022-01-27
Payer: MEDICARE

## 2022-01-28 ENCOUNTER — TRANSFERRED RECORDS (OUTPATIENT)
Dept: HEALTH INFORMATION MANAGEMENT | Facility: CLINIC | Age: 67
End: 2022-01-28
Payer: MEDICARE

## 2022-01-31 ENCOUNTER — OFFICE VISIT (OUTPATIENT)
Dept: INTERNAL MEDICINE | Facility: CLINIC | Age: 67
End: 2022-01-31
Payer: MEDICARE

## 2022-01-31 ENCOUNTER — NURSE TRIAGE (OUTPATIENT)
Dept: NURSING | Facility: CLINIC | Age: 67
End: 2022-01-31

## 2022-01-31 VITALS
TEMPERATURE: 97.8 F | BODY MASS INDEX: 33.9 KG/M2 | WEIGHT: 203.7 LBS | SYSTOLIC BLOOD PRESSURE: 122 MMHG | RESPIRATION RATE: 16 BRPM | DIASTOLIC BLOOD PRESSURE: 62 MMHG | OXYGEN SATURATION: 94 % | HEART RATE: 74 BPM

## 2022-01-31 DIAGNOSIS — R82.998 DARK URINE: ICD-10-CM

## 2022-01-31 DIAGNOSIS — R19.7 DIARRHEA, UNSPECIFIED TYPE: Primary | ICD-10-CM

## 2022-01-31 LAB
ALBUMIN UR-MCNC: NEGATIVE MG/DL
ANION GAP SERPL CALCULATED.3IONS-SCNC: 4 MMOL/L (ref 3–14)
APPEARANCE UR: CLEAR
BASOPHILS # BLD AUTO: 0 10E3/UL (ref 0–0.2)
BASOPHILS NFR BLD AUTO: 0 %
BILIRUB UR QL STRIP: NEGATIVE
BUN SERPL-MCNC: 8 MG/DL (ref 7–30)
CALCIUM SERPL-MCNC: 9.1 MG/DL (ref 8.5–10.1)
CHLORIDE BLD-SCNC: 111 MMOL/L (ref 94–109)
CO2 SERPL-SCNC: 25 MMOL/L (ref 20–32)
COLOR UR AUTO: YELLOW
CREAT SERPL-MCNC: 0.96 MG/DL (ref 0.52–1.04)
EOSINOPHIL # BLD AUTO: 0.2 10E3/UL (ref 0–0.7)
EOSINOPHIL NFR BLD AUTO: 3 %
ERYTHROCYTE [DISTWIDTH] IN BLOOD BY AUTOMATED COUNT: 14.8 % (ref 10–15)
GFR SERPL CREATININE-BSD FRML MDRD: 65 ML/MIN/1.73M2
GLUCOSE BLD-MCNC: 106 MG/DL (ref 70–99)
GLUCOSE UR STRIP-MCNC: NEGATIVE MG/DL
HCT VFR BLD AUTO: 37.7 % (ref 35–47)
HGB BLD-MCNC: 11.4 G/DL (ref 11.7–15.7)
HGB UR QL STRIP: NEGATIVE
KETONES UR STRIP-MCNC: NEGATIVE MG/DL
LEUKOCYTE ESTERASE UR QL STRIP: NEGATIVE
LYMPHOCYTES # BLD AUTO: 1.9 10E3/UL (ref 0.8–5.3)
LYMPHOCYTES NFR BLD AUTO: 26 %
MCH RBC QN AUTO: 25.4 PG (ref 26.5–33)
MCHC RBC AUTO-ENTMCNC: 30.2 G/DL (ref 31.5–36.5)
MCV RBC AUTO: 84 FL (ref 78–100)
MONOCYTES # BLD AUTO: 1.1 10E3/UL (ref 0–1.3)
MONOCYTES NFR BLD AUTO: 15 %
NEUTROPHILS # BLD AUTO: 3.9 10E3/UL (ref 1.6–8.3)
NEUTROPHILS NFR BLD AUTO: 55 %
NITRATE UR QL: NEGATIVE
PH UR STRIP: 6 [PH] (ref 5–7)
PLATELET # BLD AUTO: 294 10E3/UL (ref 150–450)
POTASSIUM BLD-SCNC: 3.7 MMOL/L (ref 3.4–5.3)
RBC # BLD AUTO: 4.48 10E6/UL (ref 3.8–5.2)
SODIUM SERPL-SCNC: 140 MMOL/L (ref 133–144)
SP GR UR STRIP: 1.01 (ref 1–1.03)
UROBILINOGEN UR STRIP-ACNC: 0.2 E.U./DL
WBC # BLD AUTO: 7.1 10E3/UL (ref 4–11)

## 2022-01-31 PROCEDURE — 36415 COLL VENOUS BLD VENIPUNCTURE: CPT | Performed by: PHYSICIAN ASSISTANT

## 2022-01-31 PROCEDURE — 81003 URINALYSIS AUTO W/O SCOPE: CPT | Performed by: PHYSICIAN ASSISTANT

## 2022-01-31 PROCEDURE — 85025 COMPLETE CBC W/AUTO DIFF WBC: CPT | Performed by: PHYSICIAN ASSISTANT

## 2022-01-31 PROCEDURE — 99214 OFFICE O/P EST MOD 30 MIN: CPT | Performed by: PHYSICIAN ASSISTANT

## 2022-01-31 PROCEDURE — 80048 BASIC METABOLIC PNL TOTAL CA: CPT | Performed by: PHYSICIAN ASSISTANT

## 2022-01-31 RX ORDER — TOPIRAMATE 25 MG/1
TABLET, FILM COATED ORAL
COMMUNITY
Start: 2022-01-14 | End: 2023-01-17

## 2022-01-31 NOTE — PROGRESS NOTES
Assessment & Plan     Diarrhea, unspecified type    - Basic metabolic panel  (Ca, Cl, CO2, Creat, Gluc, K, Na, BUN); Future  - CBC with platelets and differential; Future    Dark urine    - UA Macro with Reflex to Micro and Culture - lab collect; Future      I spent a total of 30 minutes on the day of the visit.   Time spent doing chart review, history and exam, documentation and further activities per the note       Reviewed will get results this morning  If needing IV fluids then will refer to ADS in [place]     See Patient Instructions    Return in about 6 weeks (around 3/14/2022) for Routine Visit, regular primary provider.    LINDA [facility] DIPTI Long is a 66 year old who presents for the following health issues     HPI     Diarrhea  Onset/Duration: 2-3 weeks  Description:       Consistency of stool: watery       Blood in stool: no       Number of loose stools past 24 hours: 0  Progression of Symptoms: same  Accompanying signs and symptoms:       Fever: no       Nausea/Vomiting: YES- Nausea       Abdominal pain: YES       Weight loss: no       Episodes of constipation: no   Does have SOB with exertion of walking  History   Ill contacts: no  Recent use of antibiotics: no  Recent travels: no  Recent medication-new or changes(Rx or OTC): no  Precipitating or alleviating factors: None  Therapies tried and outcome: Gatorade  Last night did take imodium last night- no BM yet today.            Review of Systems   Constitutional, HEENT, cardiovascular, pulmonary, gi and gu systems are negative, except as otherwise noted.      Objective    /62   Pulse 74   Temp 97.8  F (36.6  C) (Tympanic)   Resp 16   Wt 92.4 kg (203 lb 11.2 oz)   LMP 03/11/2010   SpO2 94%   BMI 33.90 kg/m    Body mass index is 33.9 kg/m .  Physical Exam   GENERAL: healthy, alert and no distress  NECK: no adenopathy, no asymmetry, masses, or scars and thyroid normal  to palpation  RESP: lungs clear to auscultation - no rales, rhonchi or wheezes  CV: regular rates and rhythm and normal S1 S2, no S3 or S4  ABDOMEN: soft, nontender, no hepatosplenomegaly, no masses and bowel sounds normal  SKIN: no suspicious lesions or rashes    Results for orders placed or performed in visit on 01/31/22 (from the past 24 hour(s))   Basic metabolic panel  (Ca, Cl, CO2, Creat, Gluc, K, Na, BUN)   Result Value Ref Range    Sodium 140 133 - 144 mmol/L    Potassium 3.7 3.4 - 5.3 mmol/L    Chloride 111 (H) 94 - 109 mmol/L    Carbon Dioxide (CO2) 25 20 - 32 mmol/L    Anion Gap 4 3 - 14 mmol/L    Urea Nitrogen 8 7 - 30 mg/dL    Creatinine 0.96 0.52 - 1.04 mg/dL    Calcium 9.1 8.5 - 10.1 mg/dL    Glucose 106 (H) 70 - 99 mg/dL    GFR Estimate 65 >60 mL/min/1.73m2   CBC with platelets and differential    Narrative    The following orders were created for panel order CBC with platelets and differential.  Procedure                               Abnormality         Status                     ---------                               -----------         ------                     CBC with platelets and d...[[id number]]  Abnormal            Final result                 Please view results for these tests on the individual orders.   CBC with platelets and differential   Result Value Ref Range    WBC Count 7.1 4.0 - 11.0 10e3/uL    RBC Count 4.48 3.80 - 5.20 10e6/uL    Hemoglobin 11.4 (L) 11.7 - 15.7 g/dL    Hematocrit 37.7 35.0 - 47.0 %    MCV 84 78 - 100 fL    MCH 25.4 (L) 26.5 - 33.0 pg    MCHC 30.2 (L) 31.5 - 36.5 g/dL    RDW 14.8 10.0 - 15.0 %    Platelet Count 294 150 - 450 10e3/uL    % Neutrophils 55 %    % Lymphocytes 26 %    % Monocytes 15 %    % Eosinophils 3 %    % Basophils 0 %    Absolute Neutrophils 3.9 1.6 - 8.3 10e3/uL    Absolute Lymphocytes 1.9 0.8 - 5.3 10e3/uL    Absolute Monocytes 1.1 0.0 - 1.3 10e3/uL    Absolute Eosinophils 0.2 0.0 - 0.7 10e3/uL    Absolute Basophils 0.0 0.0 - 0.2 10e3/uL   UA  Macro with Reflex to Micro and Culture - lab collect    Specimen: Urine, Clean Catch   Result Value Ref Range    Color Urine Yellow Colorless, Straw, Light Yellow, Yellow    Appearance Urine Clear Clear    Glucose Urine Negative Negative mg/dL    Bilirubin Urine Negative Negative    Ketones Urine Negative Negative mg/dL    Specific Gravity Urine 1.010 1.003 - 1.035    Blood Urine Negative Negative    pH Urine 6.0 5.0 - 7.0    Protein Albumin Urine Negative Negative mg/dL    Urobilinogen Urine 0.2 0.2, 1.0 E.U./dL    Nitrite Urine Negative Negative    Leukocyte Esterase Urine Negative Negative    Narrative    Microscopic not indicated     *Note: Due to a large number of results and/or encounters for the requested time period, some results have not been displayed. A complete set of results can be found in Results Review.

## 2022-01-31 NOTE — TELEPHONE ENCOUNTER
Triage call:   Diarrhea for the last couple weeks (2-3 weeks)  Drinking Gatorade, Pedialyte, and water  No additional symptoms with the diarrhea   States that within the last 24 hours she hasn't had anything to eat she hasn't had a stool   Has been following a BRAT diet   States that she has a GI doc but she hasn't been happy with them- states that they haven't helped her  States that last night she had some gas and bloating and her stomach felt hard  Drinking fluids well- urinating well  Taking immodium- states took 2 yesterday, none today     Advised that patient should be seen within the next 3 days- reviewed additional care advice with patient and she verbalizes understanding. Assisted in transferring to scheduling.     Staci Hunter RN BSN 1/31/2022 7:17 AM    COVID 19 Nurse Triage Plan/Patient Instructions    Please be aware that novel coronavirus (COVID-19) may be circulating in the community. If you develop symptoms such as fever, cough, or SOB or if you have concerns about the presence of another infection including coronavirus (COVID-19), please contact your health care provider or visit [email or web address].     Disposition/Instructions    In-Person Visit with provider recommended. Reference Visit Selection Guide.    Thank you for taking steps to prevent the spread of this virus.  o Limit your contact with others.  o Wear a simple mask to cover your cough.  o Wash your hands well and often.    Resources    [facility]: About COVID-19: [email or web address]    CDC: What to Do If You're Sick: www.cdc.gov/coronavirus/2019-ncov/about/steps-when-sick.html    CDC: Ending Home Isolation: www.cdc.gov/coronavirus/2019-ncov/hcp/disposition-in-home-patients.html     CDC: Caring for Someone: www.cdc.gov/coronavirus/2019-ncov/if-you-are-sick/care-for-someone.html     [facility]: Interim Guidance for Hospital Discharge to Home:  www.health.[facility].mn.us/diseases/coronavirus/hcp/hospdischarge.pdf    [facility] clinical trials (COVID-19 research studies): clinicalaffairs.[facility]/umn-clinical-trials     Below are the COVID-19 hotlines at the [place] Department of Health ([facility]). Interpreters are available.   o For health questions: Call 536-526-4162 or 1-421.824.4329 (7 a.m. to 7 p.m.)  o For questions about schools and childcare: Call 637-980-0982 or 1-129.908.2808 (7 a.m. to 7 p.m.)   Reason for Disposition    Patient wants to be seen    Additional Information    Negative: Shock suspected (e.g., cold/pale/clammy skin, too weak to stand, low BP, rapid pulse)    Negative: Difficult to awaken or acting confused (e.g., disoriented, slurred speech)    Negative: Sounds like a life-threatening emergency to the triager    Negative: Vomiting also present and worse than the diarrhea    Negative: Blood in stool and without diarrhea    Negative: SEVERE abdominal pain (e.g., excruciating) and present > 1 hour    Negative: SEVERE abdominal pain and age > 60    Negative: Bloody, black, or tarry bowel movements (Exception: chronic-unchanged black-grey bowel movements and is taking iron pills or Pepto-bismol)    Negative: SEVERE diarrhea (e.g., 7 or more times / day more than normal) and age > 60 years    Negative: Constant abdominal pain lasting > 2 hours    Negative: Drinking very little and has signs of dehydration (e.g., no urine > 12 hours, very dry mouth, very lightheaded)    Negative: Patient sounds very sick or weak to the triager    Negative: SEVERE diarrhea (e.g., 7 or more times / day more than normal) and present > 24 hours (1 day)    Negative: MODERATE diarrhea (e.g., 4-6 times / day more than normal) and present > 48 hours (2 days)    Negative: MODERATE diarrhea (e.g., 4-6 times / day more than normal) and age > 70 years    Negative: Abdominal pain  (Exception: pain clears completely with each passage of diarrhea stool)    Negative:  Fever > 101 F (38.3 C)    Negative: Blood in the stool    Negative: Mucus or pus in stool has been present > 2 days and diarrhea is more than mild    Negative: Weak immune system (e.g., HIV positive, cancer chemo, splenectomy, organ transplant, chronic steroids)    Negative: Travel to a foreign country in past month    Negative: Recent antibiotic therapy (i.e., within last 2 months) and diarrhea present > 3 days since antibiotic was stopped    Negative: Recent hospitalization and diarrhea present > 3 days    Negative: Tube feedings (e.g., nasogastric, g-tube, j-tube)    Negative: MILD diarrhea (e.g., 1-3 or more stools than normal in past 24 hours) diarrhea without known cause and present > 7 days    Protocols used: DIARRHEA-A-[place]

## 2022-02-01 ENCOUNTER — NURSE TRIAGE (OUTPATIENT)
Dept: INTERNAL MEDICINE | Facility: CLINIC | Age: 67
End: 2022-02-01
Payer: MEDICARE

## 2022-02-01 NOTE — TELEPHONE ENCOUNTER
"Patient calling per [employer] message. Had diarrhea for 3 weeks. Patient has been eating 'Brat' diet (bananas, rice, applesauce, toast), added eggs and abdomen has been slightly upset. Patient has taken 1 immodium approx 10 minutes ago and had formed/soft stool. Patient asking if loose stool is OK as she has had recent dizziness/ feeling lightheaded yesterday and today, reports she used cane while in store due to feeling of 'passing out' and to prevent falling. RN asked patient to take BP with home cuff. See assessment below. Patient switched cuffs after 2 very low BP readings, patient was adamant her cuff was not working properly. Second BP cuff giving low BP readings also. Yesterday BP in /62 HR 74.    Per disposition, Patient needs to go to UC/ED now due to fall in systolic BP >20 mm Hg. Patient is upset, stated she will go to UC but will not stay if she needs to wait more than 1 hour.     1. BLOOD PRESSURE: \"What is the blood pressure?\" \"Did you take at least two measurements 5 minutes apart?\"      Sitting BP 99/67 3:08pm and BP 91/61 HR 97 3:15pm   HR 88        NEW CUFF 99/69 P87 3:25PM  2. ONSET: \"When did you take your blood pressure?\"      While talking to Triage RN per RN request  3. HOW: \"How did you obtain the blood pressure?\" (e.g., visiting nurse, automatic home BP monitor)      2 automatic BP cuffs  4. HISTORY: \"Do you have a history of low blood pressure?\" \"What is your blood pressure normally?\"      No Hx of hypotension. Yesterday /62 in OV  5. MEDICATIONS: \"Are you taking any medications for blood pressure?\" If yes: \"Have they been changed recently?\"      Amlodipine, no recent changes and no missed or extra doses  6. PULSE RATE: \"Do you know what your pulse rate is?\"       87  7. OTHER SYMPTOMS: \"Have you been sick recently?\" \"Have you had a recent injury?\"      Diarrhea for past 3 weeks, No diarrhea since yesterday.      Reason for Disposition    Fall in systolic BP > 20 mm Hg from normal " "and feeling dizzy, lightheaded, or weak    Additional Information    Negative: Systolic BP < 80 and NOT dizzy, lightheaded or weak (feels normal)    Negative: Abdominal pain    Negative: Major surgery in the past month    Negative: Fever > 100.4 F (38.0 C)    Negative: Drinking very little and has signs of dehydration (e.g., no urine > 12 hours, very dry mouth, very lightheaded)    Negative: Systolic BP < 90 and feeling dizzy, lightheaded, or weak    Negative: Started suddenly after an allergic medicine, an allergic food, or bee sting    Negative: Shock suspected (e.g., cold/pale/clammy skin, too weak to stand, low BP, rapid pulse)    Negative: Difficult to awaken or acting confused  (e.g., disoriented, slurred speech)    Negative: Fainted    Negative: Chest pain    Negative: Bleeding (e.g., vomiting blood, rectal bleeding or tarry stools, severe vaginal bleeding)    Negative: Extra heart beats or heart is beating fast  (i.e., \"palpitations\")    Negative: Sounds like a life-threatening emergency to the triager    Protocols used: LOW BLOOD PRESSURE-A-OH      "

## 2022-02-04 ENCOUNTER — TRANSFERRED RECORDS (OUTPATIENT)
Dept: HEALTH INFORMATION MANAGEMENT | Facility: CLINIC | Age: 67
End: 2022-02-04
Payer: MEDICARE

## 2022-02-07 ENCOUNTER — DOCUMENTATION ONLY (OUTPATIENT)
Dept: LAB | Facility: CLINIC | Age: 67
End: 2022-02-07
Payer: MEDICARE

## 2022-02-07 NOTE — PROGRESS NOTES
I need some verification as to what labs patient is coming in for as I am unclear.  Is this for a wellness check, follow-up hemoglobin, etc.  Patient has not seen me since September of last year.

## 2022-02-07 NOTE — PROGRESS NOTES
Please place or confirm orders for upcoming lab appointment on 02/08/2022. Thank you.    If no labs are needed, please have MA call and inform patient, thank you.

## 2022-02-09 ENCOUNTER — OFFICE VISIT (OUTPATIENT)
Dept: INTERNAL MEDICINE | Facility: CLINIC | Age: 67
End: 2022-02-09
Payer: MEDICARE

## 2022-02-09 ENCOUNTER — ANCILLARY PROCEDURE (OUTPATIENT)
Dept: GENERAL RADIOLOGY | Facility: CLINIC | Age: 67
End: 2022-02-09
Attending: NURSE PRACTITIONER
Payer: MEDICARE

## 2022-02-09 ENCOUNTER — LAB (OUTPATIENT)
Dept: LAB | Facility: CLINIC | Age: 67
End: 2022-02-09

## 2022-02-09 ENCOUNTER — NURSE TRIAGE (OUTPATIENT)
Dept: INTERNAL MEDICINE | Facility: CLINIC | Age: 67
End: 2022-02-09

## 2022-02-09 VITALS
BODY MASS INDEX: 33.49 KG/M2 | DIASTOLIC BLOOD PRESSURE: 60 MMHG | OXYGEN SATURATION: 97 % | TEMPERATURE: 98.2 F | HEART RATE: 74 BPM | SYSTOLIC BLOOD PRESSURE: 100 MMHG | HEIGHT: 65 IN | WEIGHT: 201 LBS

## 2022-02-09 DIAGNOSIS — G40.909 SEIZURE DISORDER (H): ICD-10-CM

## 2022-02-09 DIAGNOSIS — M94.0 COSTOCHONDRITIS: ICD-10-CM

## 2022-02-09 DIAGNOSIS — Q85.1 TUBEROUS SCLEROSIS (H): ICD-10-CM

## 2022-02-09 DIAGNOSIS — I82.629 DEEP VEIN THROMBOSIS (DVT) OF UPPER EXTREMITY, UNSPECIFIED CHRONICITY, UNSPECIFIED LATERALITY, UNSPECIFIED VEIN (H): ICD-10-CM

## 2022-02-09 DIAGNOSIS — E03.9 HYPOTHYROIDISM, UNSPECIFIED: Primary | ICD-10-CM

## 2022-02-09 DIAGNOSIS — M94.0 COSTOCHONDRITIS: Primary | ICD-10-CM

## 2022-02-09 DIAGNOSIS — F33.1 MODERATE EPISODE OF RECURRENT MAJOR DEPRESSIVE DISORDER (H): ICD-10-CM

## 2022-02-09 DIAGNOSIS — E66.01 MORBID OBESITY (H): ICD-10-CM

## 2022-02-09 DIAGNOSIS — E03.9 HYPOTHYROIDISM, UNSPECIFIED: ICD-10-CM

## 2022-02-09 LAB
T3FREE SERPL-MCNC: 7.8 PG/ML (ref 2.3–4.2)
T4 FREE SERPL-MCNC: 2.45 NG/DL (ref 0.76–1.46)
TSH SERPL DL<=0.005 MIU/L-ACNC: <0.01 MU/L (ref 0.4–4)

## 2022-02-09 PROCEDURE — 36415 COLL VENOUS BLD VENIPUNCTURE: CPT

## 2022-02-09 PROCEDURE — 71046 X-RAY EXAM CHEST 2 VIEWS: CPT | Performed by: RADIOLOGY

## 2022-02-09 PROCEDURE — 84443 ASSAY THYROID STIM HORMONE: CPT

## 2022-02-09 PROCEDURE — 84481 FREE ASSAY (FT-3): CPT

## 2022-02-09 PROCEDURE — 99214 OFFICE O/P EST MOD 30 MIN: CPT | Performed by: NURSE PRACTITIONER

## 2022-02-09 PROCEDURE — 84439 ASSAY OF FREE THYROXINE: CPT

## 2022-02-09 PROCEDURE — 93000 ELECTROCARDIOGRAM COMPLETE: CPT | Performed by: NURSE PRACTITIONER

## 2022-02-09 ASSESSMENT — MIFFLIN-ST. JEOR: SCORE: 1447.61

## 2022-02-09 NOTE — PATIENT INSTRUCTIONS
-Your pain is more consistent with musculoskeletal pain  -Please get a chest xray on your way out of clinic- I will be in touch with you re: results  -Your EKG today looked ok  -Recent labs looked ok  -Try some heat and ibuprofen for the pain  -Return if symptoms worsen/fail to improve

## 2022-02-09 NOTE — PROGRESS NOTES
Assessment & Plan     Costochondritis  - Reports anterior chest wall TTP after waking this AM; attributes pain to having too many heavy blankets on overnight.  She has some ant chest wall prominence with ?prominent chest sub-q fat.    - recent labs looked ok  - EKG done in clinic- NSR- some artifact, but no acute changes; occasional PAC  - CXR ordered for on way out of clinic- will notify patient of results.  - Try heat, ibuprofen for pain  - RTC/seek care with worsening/failing to improve symptoms  - EKG 12-lead complete w/read - Clinics  - XR Chest 2 Views    Tuberous sclerosis (H)  - Stable, follows with neurology    Seizure disorder (H)  - Stable, due to tuberous sclerosis; follows with Neurology    Moderate episode of recurrent major depressive disorder (H)  - Stable; follows with Psychiatry    Deep vein thrombosis (DVT) of upper extremity, unspecified chronicity, unspecified laterality, unspecified vein (H)  - Stable; sounds like this was provoked in the setting of having a PICC line after she had hip surgery; not currently on anticoagulation    Morbid obesity (H)  - BMI 33.45- has lost ~ 18 lbs over the past year; states she has been trying to lose weight.         See Patient Instructions    No follow-ups on file.    FROYLAN Jung CNP  [facility]    Percy Long is a 66 year old who presents for the following health issues      HPI     Concern - sternum pain  Onset: this morning  Description: Lump sensation in mid sternum  Intensity: moderate  Progression of Symptoms:  improving and intermittent  Accompanying Signs & Symptoms: none  Previous history of similar problem: none  Precipitating factors:        Worsened by: none  Alleviating factors:        Improved by: nothing  Therapies tried and outcome:  none     Patient is seen in clinic today for concerns of anterior chest wall pain.  Reports waking to the entire anterior chest wall hurting, is worse when  "she touches the area.  She denies any difficulty breathing.  She is swallowing fine.  Of note, triage note states that she had a lump in her throat and difficulty swallowing, which she denies.  She denies any fevers or chills.  She states that she had a mammogram recently that was within normal limits.  She thinks her pain actually started because she thinks she had too many covers on her overnight last night.  She is moving around okay.  She is eating and drinking without any difficulty.  She does note some weight loss, and per review of EMR, since to the thousand 21, April, she has lost about 18 pounds over the course of that time.  However, over the past month, her weight has been actually pretty stable.    Review of Systems   CONSTITUTIONAL:NEGATIVE for fever, chills; + weight loss over the course of the year  CHEST WALL: Anterior chest wall tenderness to palpation, with prominence of her anterior chest wall, no specific lumps or bumps  RESP:NEGATIVE for significant cough or SOB  BREAST: NEGATIVE for masses, tenderness or discharge  CV: NEGATIVE for chest pain, palpitations or peripheral edema  GI: NEGATIVE for nausea, abdominal pain, heartburn, or change in bowel habits and states she had 3 weeks of diarrhea that has recently stopped  PSYCHIATRIC: NEGATIVE for changes in mood or affect and follows with psychiatry      Objective    /60   Pulse 74   Temp 98.2  F (36.8  C) (Tympanic)   Ht 1.651 m (5' 5\")   Wt 91.2 kg (201 lb)   LMP 03/11/2010   SpO2 97%   Breastfeeding No   BMI 33.45 kg/m    Body mass index is 33.45 kg/m .     Physical Exam   GENERAL APPEARANCE: healthy, alert and no distress  EYES: Eyes grossly normal to inspection, PERRL and conjunctivae and sclerae normal  HENT: ear canals and TM's normal and nose and mouth without ulcers or lesions  NECK: no adenopathy, no asymmetry, masses, or scars and thyroid normal to palpation  CHEST WALL: Anterior chest wall tenderness to palpation, no " erythema, warmth.  Appears to have some prominent ?fatty tissue and prominence of ant chest wall/rib cage, but no specific masses  RESP: lungs clear to auscultation - no rales, rhonchi or wheezes  CV: regular rates and rhythm, normal S1 S2, no S3 or S4 and no murmur, click or rub  ABDOMEN: soft, nontender, without hepatosplenomegaly or masses and bowel sounds normal  MS: extremities normal- no gross deformities noted  SKIN: no suspicious lesions or rashes  PSYCH: mentation appears normal and affect normal/bright    -Recent labs

## 2022-02-09 NOTE — TELEPHONE ENCOUNTER
"Pt called, states she woke up with \"lump\" on center of chest and a \"bruising\" sensation of pain     Initially pt called it chest pain, triaged for chest pain, but later pt said \"no it's not actually even chest pain, it's higher up. It more like a lump in my throat. It's not even really a pain. I know it's not related to my heart\"     142, 138, 125 systolic BP   68, 72 diastolic BP     Constant, does not come/go     Cardiac risk factors? Mother's side has heart disease, states she had an echo in past     Sees pulmonology, on inhalers     Painful to touch     Per protocol, advised ED. Pt states \"well it actually isn't even chest pain, it's really up by my throat. It's actually a lump in my throat\"     Pt said she is not going to go to the ER and requested an appointment. Strongly advised if any SOB/radiating pain/or new symptoms then go to ED. Pt cannot come this AM, scheduled for this afternoon    Namrata Shelley RN  [facility]       Appointments in Next Year    Feb 09, 2022  1:30 PM  Lab visit with [email or web address]  [facility] ([facility] ) [phone]   Feb 09, 2022  2:30 PM  (Arrive by 2:10 PM)  Provider Visit with FROYLAN Jung CNP  [facility] ([facility] ) [phone]   Mar 15, 2022 11:00 AM  (Arrive by 10:40 AM)  Welcome to Medicare Visit with Skyler Álvarez MD  [facility] ([facility] ) [phone]        Reason for Disposition    Chest pain lasting longer than 5 minutes and ANY of the following:* Over 45 years old* Over 30 years old and at least one cardiac risk factor (e.g., diabetes, high blood pressure, high cholesterol, smoker, or strong family history of heart disease)* History of heart disease (i.e., angina, heart attack, heart failure, bypass " surgery, takes nitroglycerin)* Pain is crushing, pressure-like, or heavy    Additional Information    Negative: Severe difficulty breathing (e.g., struggling for each breath, speaks in single words)    Negative: Passed out (i.e., fainted, collapsed and was not responding)    Negative: Difficult to awaken or acting confused (e.g., disoriented, slurred speech)    Negative: Shock suspected (e.g., cold/pale/clammy skin, too weak to stand, low BP, rapid pulse)    Protocols used: CHEST PAIN-A-OH

## 2022-02-10 ENCOUNTER — TRANSFERRED RECORDS (OUTPATIENT)
Dept: HEALTH INFORMATION MANAGEMENT | Facility: CLINIC | Age: 67
End: 2022-02-10
Payer: MEDICARE

## 2022-02-10 ENCOUNTER — TELEPHONE (OUTPATIENT)
Dept: INTERNAL MEDICINE | Facility: CLINIC | Age: 67
End: 2022-02-10
Payer: MEDICARE

## 2022-02-10 NOTE — TELEPHONE ENCOUNTER
Dr. Maicol Araiza called to notify pt's PCP the pt's hypothyroidism has turned into hyperthyroidism and he does not have the skills or expertise to manage this. He instructed the pt to stop her thyroid medications and f/u with PCP to manage this. Pt has appointment scheduled with Dr. Álvarez next month, but if you want her to f/u before this is same day slot OK to use to schedule this?    Dr. Araiza: [phone]    Neida BURNS RN  [facility]

## 2022-02-11 NOTE — TELEPHONE ENCOUNTER
Suggest scheduling patient for appointment if not already done so as patient has not seen me since September of last year.

## 2022-02-14 ENCOUNTER — MYC MEDICAL ADVICE (OUTPATIENT)
Dept: INTERNAL MEDICINE | Facility: CLINIC | Age: 67
End: 2022-02-14
Payer: MEDICARE

## 2022-02-22 ENCOUNTER — PATIENT OUTREACH (OUTPATIENT)
Dept: NURSING | Facility: CLINIC | Age: 67
End: 2022-02-22
Payer: MEDICARE

## 2022-02-22 NOTE — TELEPHONE ENCOUNTER
Called patient to discuss any questions she is having related to 2/24 appt.     Patient asking why she was told to stop taking thyroid medication by previous provider, was upset she was not given information at the time. I relayed message from 2/10 Dr. Maicol Araiza called to notify pt's PCP the pt's hypothyroidism has turned into hyperthyroidism and he does not have the skills or expertise to manage this. He instructed the pt to stop her thyroid medications and f/u with PCP to manage this.     Patient understands and appreciated the information. Has no further questions, and has stopped taking medications as asked by previous provider.

## 2022-02-24 ENCOUNTER — OFFICE VISIT (OUTPATIENT)
Dept: INTERNAL MEDICINE | Facility: CLINIC | Age: 67
End: 2022-02-24
Payer: MEDICARE

## 2022-02-24 VITALS
SYSTOLIC BLOOD PRESSURE: 116 MMHG | TEMPERATURE: 97 F | HEIGHT: 65 IN | WEIGHT: 196 LBS | DIASTOLIC BLOOD PRESSURE: 72 MMHG | RESPIRATION RATE: 15 BRPM | HEART RATE: 83 BPM | BODY MASS INDEX: 32.65 KG/M2 | OXYGEN SATURATION: 96 %

## 2022-02-24 DIAGNOSIS — F33.9 EPISODE OF RECURRENT MAJOR DEPRESSIVE DISORDER, UNSPECIFIED DEPRESSION EPISODE SEVERITY (H): ICD-10-CM

## 2022-02-24 DIAGNOSIS — E03.8 OTHER SPECIFIED HYPOTHYROIDISM: Primary | ICD-10-CM

## 2022-02-24 DIAGNOSIS — I10 BENIGN ESSENTIAL HYPERTENSION: ICD-10-CM

## 2022-02-24 PROCEDURE — 99214 OFFICE O/P EST MOD 30 MIN: CPT | Performed by: INTERNAL MEDICINE

## 2022-03-07 ENCOUNTER — LAB (OUTPATIENT)
Dept: LAB | Facility: CLINIC | Age: 67
End: 2022-03-07
Payer: MEDICARE

## 2022-03-07 DIAGNOSIS — E03.8 OTHER SPECIFIED HYPOTHYROIDISM: ICD-10-CM

## 2022-03-07 LAB
T3 SERPL-MCNC: 198 NG/DL (ref 60–181)
T4 FREE SERPL-MCNC: 1.72 NG/DL (ref 0.76–1.46)
TSH SERPL DL<=0.005 MIU/L-ACNC: <0.01 MU/L (ref 0.4–4)

## 2022-03-07 PROCEDURE — 84480 ASSAY TRIIODOTHYRONINE (T3): CPT

## 2022-03-07 PROCEDURE — 84443 ASSAY THYROID STIM HORMONE: CPT

## 2022-03-07 PROCEDURE — 36415 COLL VENOUS BLD VENIPUNCTURE: CPT

## 2022-03-07 PROCEDURE — 84439 ASSAY OF FREE THYROXINE: CPT

## 2022-03-08 ENCOUNTER — MYC MEDICAL ADVICE (OUTPATIENT)
Dept: INTERNAL MEDICINE | Facility: CLINIC | Age: 67
End: 2022-03-08
Payer: MEDICARE

## 2022-03-08 NOTE — TELEPHONE ENCOUNTER
Patient calling to ask [employer] message. Patient reports she has not taken thyroid medication for 4 weeks now and is having intense itching/ heat flashes/ is not sleeping well.     1) Patient asking if there is anything she can do to ease the symptoms she is having? Patient has scheduled appt 3/15/22.

## 2022-03-15 ENCOUNTER — MYC MEDICAL ADVICE (OUTPATIENT)
Dept: INTERNAL MEDICINE | Facility: CLINIC | Age: 67
End: 2022-03-15

## 2022-03-15 ENCOUNTER — PATIENT OUTREACH (OUTPATIENT)
Dept: CARE COORDINATION | Facility: CLINIC | Age: 67
End: 2022-03-15

## 2022-03-15 ENCOUNTER — OFFICE VISIT (OUTPATIENT)
Dept: INTERNAL MEDICINE | Facility: CLINIC | Age: 67
End: 2022-03-15
Payer: MEDICARE

## 2022-03-15 VITALS
DIASTOLIC BLOOD PRESSURE: 68 MMHG | SYSTOLIC BLOOD PRESSURE: 110 MMHG | WEIGHT: 193 LBS | HEART RATE: 79 BPM | OXYGEN SATURATION: 97 % | RESPIRATION RATE: 16 BRPM | HEIGHT: 65 IN | BODY MASS INDEX: 32.15 KG/M2 | TEMPERATURE: 97.8 F

## 2022-03-15 DIAGNOSIS — D50.9 IRON DEFICIENCY ANEMIA, UNSPECIFIED IRON DEFICIENCY ANEMIA TYPE: ICD-10-CM

## 2022-03-15 DIAGNOSIS — Z00.00 ENCOUNTER FOR SUBSEQUENT ANNUAL WELLNESS VISIT IN MEDICARE PATIENT: Primary | ICD-10-CM

## 2022-03-15 DIAGNOSIS — Z13.6 CARDIOVASCULAR SCREENING; LDL GOAL LESS THAN 160: ICD-10-CM

## 2022-03-15 DIAGNOSIS — I10 BENIGN ESSENTIAL HYPERTENSION: ICD-10-CM

## 2022-03-15 DIAGNOSIS — E03.9 ACQUIRED HYPOTHYROIDISM: Primary | ICD-10-CM

## 2022-03-15 DIAGNOSIS — E03.9 ACQUIRED HYPOTHYROIDISM: ICD-10-CM

## 2022-03-15 DIAGNOSIS — Z12.11 COLON CANCER SCREENING: ICD-10-CM

## 2022-03-15 DIAGNOSIS — G40.909 SEIZURE DISORDER (H): ICD-10-CM

## 2022-03-15 PROCEDURE — G0438 PPPS, INITIAL VISIT: HCPCS | Performed by: INTERNAL MEDICINE

## 2022-03-15 ASSESSMENT — ENCOUNTER SYMPTOMS
CONSTIPATION: 1
DIZZINESS: 0
PALPITATIONS: 0
SORE THROAT: 0
ARTHRALGIAS: 1
COUGH: 0
CHILLS: 0
HEADACHES: 0
FREQUENCY: 0
HEMATURIA: 0
BREAST MASS: 0
NAUSEA: 0
JOINT SWELLING: 0
WEAKNESS: 0
FEVER: 0
HEMATOCHEZIA: 0
DIARRHEA: 1
NERVOUS/ANXIOUS: 0
MYALGIAS: 1
SHORTNESS OF BREATH: 0
HEARTBURN: 0
PARESTHESIAS: 0
EYE PAIN: 0
DYSURIA: 0
ABDOMINAL PAIN: 0

## 2022-03-15 ASSESSMENT — PATIENT HEALTH QUESTIONNAIRE - PHQ9
10. IF YOU CHECKED OFF ANY PROBLEMS, HOW DIFFICULT HAVE THESE PROBLEMS MADE IT FOR YOU TO DO YOUR WORK, TAKE CARE OF THINGS AT HOME, OR GET ALONG WITH OTHER PEOPLE: SOMEWHAT DIFFICULT
SUM OF ALL RESPONSES TO PHQ QUESTIONS 1-9: 13
SUM OF ALL RESPONSES TO PHQ QUESTIONS 1-9: 13

## 2022-03-15 ASSESSMENT — ASTHMA QUESTIONNAIRES: ACT_TOTALSCORE: 24

## 2022-03-15 ASSESSMENT — ACTIVITIES OF DAILY LIVING (ADL): CURRENT_FUNCTION: NO ASSISTANCE NEEDED

## 2022-03-15 NOTE — TELEPHONE ENCOUNTER
Dr. Álvarez    New endo referral pended with expiration date of 1 year as requested by endo scheduling     Ana Maria Chandler RN

## 2022-03-15 NOTE — PROGRESS NOTES
"SUBJECTIVE:   Mercedes Barber is a 66 year old female who presents for Preventive Visit.      Patient has been advised of split billing requirements and indicates understanding: Yes  Are you in the first 12 months of your Medicare coverage?  No    Healthy Habits:     In general, how would you rate your overall health?  Good    Frequency of exercise:  2-3 days/week    Duration of exercise:  15-30 minutes    Do you usually eat at least 4 servings of fruit and vegetables a day, include whole grains    & fiber and avoid regularly eating high fat or \"junk\" foods?  Yes    Taking medications regularly:  Yes    Medication side effects:  None    Ability to successfully perform activities of daily living:  No assistance needed    Home Safety:  No safety concerns identified    Hearing Impairment:  No hearing concerns    In the past 6 months, have you been bothered by leaking of urine?  No    In general, how would you rate your overall mental or emotional health?  Fair      PHQ-2 Total Score: 4    Additional concerns today:  No    Do you feel safe in your environment? Yes    Have you ever done Advance Care Planning? (For example, a Health Directive, POLST, or a discussion with a medical provider or your loved ones about your wishes):        Fall risk  Fallen 2 or more times in the past year?: No  Any fall with injury in the past year?: No    Cognitive Screening   1) Repeat 3 items (Leader, Season, Table)    2) Clock draw: NORMAL  3) 3 item recall: Recalls 2 objects   Results: NORMAL clock, 1-2 items recalled: COGNITIVE IMPAIRMENT LESS LIKELY    Mini-CogTM Copyright SCOOBY Hidalgo. Licensed by the author for use in [facility]; reprinted with permission (manuel@.[facility]). All rights reserved.      Do you have sleep apnea, excessive snoring or daytime drowsiness?: no    Reviewed and updated as needed this visit by clinical staff                  Reviewed and updated as needed this visit by Provider                 Social " History     Tobacco Use     Smoking status: Never Smoker     Smokeless tobacco: Never Used   Substance Use Topics     Alcohol use: Yes     Comment: 1 glass of wine 2x/yr     If you drink alcohol do you typically have >3 drinks per day or >7 drinks per week? No    No flowsheet data found.      Patient has had some issues with the some overactive thyroid function test.  She apparently has been taking thyroid replacement hormone and was seen at the [facility] through the [facility].  At that point she saw a provider there who apparently added Cytomel to her current regimen.  Patient is unclear why it was added or how long she took it but started to develop symptoms consistent with hyperthyroidism.  She apparently had some lab work done which demonstrated an elevated T4 and T3 level with a suppressed TSH level.  She was advised to follow-up with me to address these issues.  With this she has had symptoms of loose stool anxiousness itchy skin.  Based on her lab work she has been holding her thyroid function medication.  Her most recent thyroid function tests are listed in note continuing hyperthyroid state but a decrease in her free T4 from baseline.    Current providers sharing in care for this patient include:   Patient Care Team:  Skyler Álvarez MD as PCP - General (Internal Medicine)  Samir Arguelles MD as MD (Neurology)  Park Ortiz MD as MD (Otolaryngology)  Raegan Chinchilla MA as Community Health Worker (Primary Care - CC)  Skyler Chacko MD as Referring Physician (Orthopedics)  Skyler Álvarez MD as Assigned PCP  Celena Perez MD as Assigned Neuroscience Provider  Amber Nguyen LSW as Lead Care Coordinator  Pan Grey MD as Assigned Musculoskeletal Provider    The following health maintenance items are reviewed in Epic and correct as of today:  Health Maintenance Due   Topic Date Due     MEDICARE ANNUAL WELLNESS VISIT  04/24/2019  "    ASTHMA ACTION PLAN  09/14/2021     ASTHMA CONTROL TEST  02/12/2022     PHQ-9  02/12/2022       Any new diagnosis of family breast, ovarian, or bowel cancer? No    FHS-7:   Breast CA Risk Assessment (FHS-7) 1/25/2022   Did any of your first-degree relatives have breast or ovarian cancer? Yes   Did any of your relatives have bilateral breast cancer? No   Did any man in your family have breast cancer? No   Did any woman in your family have breast and ovarian cancer? No   Did any woman in your family have breast cancer before age 50 y? Yes   Do you have 2 or more relatives with breast and/or ovarian cancer? No   Do you have 2 or more relatives with breast and/or bowel cancer? No       Pertinent mammograms are reviewed under the imaging tab.    Review of Systems   Constitutional: Negative for chills and fever.   HENT: Negative for congestion, ear pain, hearing loss and sore throat.    Eyes: Negative for pain and visual disturbance.   Respiratory: Negative for cough and shortness of breath.    Cardiovascular: Negative for chest pain, palpitations and peripheral edema.   Gastrointestinal: Positive for constipation and diarrhea. Negative for abdominal pain, heartburn, hematochezia and nausea.   Breasts:  Negative for tenderness, breast mass and discharge.   Genitourinary: Negative for dysuria, frequency, genital sores, hematuria, pelvic pain, urgency, vaginal bleeding and vaginal discharge.   Musculoskeletal: Positive for arthralgias and myalgias. Negative for joint swelling.   Skin: Negative for rash.   Neurological: Negative for dizziness, weakness, headaches and paresthesias.   Psychiatric/Behavioral: The patient is not nervous/anxious.        OBJECTIVE:   /68   Pulse 79   Temp 97.8  F (36.6  C) (Tympanic)   Resp 16   Ht 1.651 m (5' 5\")   Wt 87.5 kg (193 lb)   LMP 03/11/2010   SpO2 97%   BMI 32.12 kg/m   Estimated body mass index is 32.62 kg/m  as calculated from the following:    Height as of 2/24/22: " "1.651 m (5' 5\").    Weight as of 2/24/22: 88.9 kg (196 lb).  Physical Exam  GENERAL: healthy, alert and no distress  EYES: Eyes grossly normal to inspection, PERRL and conjunctivae and sclerae normal  HENT: ear canals and TM's normal, nose and mouth without ulcers or lesions  NECK: no adenopathy, no asymmetry, masses, or scars and thyroid normal to palpation  RESP: lungs clear to auscultation - no rales, rhonchi or wheezes  CV: regular rate and rhythm, normal S1 S2, no S3 or S4, no click or rub  NEURO: No focal changes  PSYCH: mentation appears normal, affect normal/bright        ASSESSMENT / PLAN:   (Z00.00) Encounter for subsequent annual wellness visit in Medicare patient  (primary encounter diagnosis)  Comment: Advised patient to make follow-up lab appointment, fasting.  Plan: CBC with platelets, Comprehensive metabolic         Panel, reviewed mammogram results with patient.  Viewed recent DEXA scan and bisphosphonate therapy with patient.            (E03.9) Acquired hypothyroidism  Comment: Continuing to hold thyroid replacement hormone due to hyperthyroid state, suspect medication induced.  Repeat free T4, total T3 and TSH as ordered  Plan: Adult Endocrinology  Referral, TSH         with free T4 reflex, T3, total        Patient request endocrinology referral which I feel is appropriate.    (I10) Benign essential hypertension  Comment: Stable on therapy continue with current medical management.  Plan: Comprehensive metabolic panel            (G40.909) Seizure disorder (H)  Comment: Stable without active evidence of concern.  Plan:     (Z13.6) CARDIOVASCULAR SCREENING; LDL GOAL LESS THAN 160  Comment:   LDL Cholesterol Calculated   Date Value Ref Range Status   08/24/2021 95 <=100 mg/dL Final     Comment:     Age 0-19 years:  Desirable: 0-110 mg/dL   Borderline high: 110-129 mg/dL   High: >= 130 mg/dL    Age 20 years and older:  Desirable: <100mg/dL  Above desirable: 100-129 mg/dL   Borderline high: " "130-159 mg/dL   High: 160-189 mg/dL   Very high: >= 190 mg/dL   06/04/2018 101 (H) <100 mg/dL Final     Comment:     Above desirable:  100-129 mg/dl  Borderline High:  130-159 mg/dL  High:             160-189 mg/dL  Very high:       >189 mg/dl       Plan: At goal on therapy continuing with annual lipid panel    (D50.9) Iron deficiency anemia, unspecified iron deficiency anemia type  Comment: Patient states that she has undergone an iron infusion therapy at an outside site about 3 weeks ago.  Suspect need to repeat hemoglobin  Plan: CBC with platelets            (Z12.11) Colon cancer screening  Comment: Prior colonoscopy reviewed with patient suggest update fit test  Plan: Fecal colorectal cancer screen FIT              Patient has been advised of split billing requirements and indicates understanding: Yes    COUNSELING:  Reviewed preventive health counseling, as reflected in patient instructions       Regular exercise       Healthy diet/nutrition    Estimated body mass index is 32.62 kg/m  as calculated from the following:    Height as of 2/24/22: 1.651 m (5' 5\").    Weight as of 2/24/22: 88.9 kg (196 lb).        She reports that she has never smoked. She has never used smokeless tobacco.      Appropriate preventive services were discussed with this patient, including applicable screening as appropriate for cardiovascular disease, diabetes, osteopenia/osteoporosis, and glaucoma.  As appropriate for age/gender, discussed screening for colorectal cancer, prostate cancer, breast cancer, and cervical cancer. Checklist reviewing preventive services available has been given to the patient.    Reviewed patients plan of care and provided an AVS. The Basic Care Plan (routine screening as documented in Health Maintenance) for Mercedes meets the Care Plan requirement. This Care Plan has been established and reviewed with the .    Counseling Resources:  ATP IV Guidelines  Pooled Cohorts Equation Calculator  Breast Cancer Risk " Calculator  Breast Cancer: Medication to Reduce Risk  FRAX Risk Assessment  ICSI Preventive Guidelines  Dietary Guidelines for Americans, 2010  [employer]'s MyPlate  ASA Prophylaxis  Lung CA Screening    Skyler Álvarez MD  [facility]    Identified Health Risks:  Answers for HPI/ROS submitted by the patient on 3/15/2022  If you checked off any problems, how difficult have these problems made it for you to do your work, take care of things at home, or get along with other people?: Somewhat difficult  PHQ9 TOTAL SCORE: 13

## 2022-03-15 NOTE — PROGRESS NOTES
Clinic Care Coordination Contact    Care Coordination Clinician Chart Review     Situation: Patient chart reviewed by care coordinator.?     Background: Initial assessment and enrollment to Care Coordination was 7/1/2021.?? Patient centered goals were developed with participation from patient.? Lead CC handed patient off to CHW for continued outreach every 30 days.??     Assessment: Per chart review, patient outreach completed by CC CHW on 2/22/2022.? Patient is actively working to accomplish goal(s).? Patient's goal(s) remain(s) appropriate at this time.? Patient is not due for updated Plan of Care.? Annual assessment will be due 7/1/2022.     CHW outreach reviewed:  Discussed:    Patient stated she had diarrhea for 3 weeks.  She was really sick and weak.  She stayed in bed. She didn't leave her home during this period.  Patient stated she was drinking a lot of water.    Patient stated she lost 18 lbs during the 3 week period.      Patient stated she saw her GI doctor and had her blood drawn.    Patient stated she stopped taking her Thyroid medication, per her PCP her Thyroid is fine.    Patient stated she is seeing her PCP on 2/24/22.    Patient stated she is leaving on Friday, 2/25/22 to visit her sister in [place] with a friend.  Patient stated she is looking forward to this trip.    Patient stated she plans to see her psychologist and psychiatrist next month.    Patient stated Feb 6 was her son's birthday, and it was a difficult day for her.      Goals        Patient Stated       1. Medical (pt-stated)       Goal Statement: I will continue to attend my appointments, follow my plan of care, and access care as needed to manage my pain and medical needs over the next 3 months.   Date Goal set: 3/17/21 extended 1/12/2022  Barriers: Complex cares, lots of appts  Strengths: Connected to care, motivated   Date to Achieve By: 12/1/21 extended 4/12/2022  Patient expressed understanding of goal: Yes    Action steps to  achieve this goal:  1. I will continue to attend my appointments as needed and recommended.   2. I will follow my plan of care as created by my PCP and specialty teams.   3. I will work with care coordination to understand my care plan, schedule appts as needed, and keep track of my appts as needed.          ??     Plan/Recommendations: The patient will continue working with Care Coordination to achieve above goal(s).? CHW will involve Lead CC as needed or if patient is ready to move to maintenance.? Lead CC will continue to monitor CHW s monthly outreaches and progress to goal(s) every 6 weeks.?     Plan of Care updated and sent to patient: No,not due.       HEIDI Gonsales  Clinic Care Coordinator  [facility] and [facility] [place]  [phone]  [email or web address]

## 2022-03-15 NOTE — TELEPHONE ENCOUNTER
Dr. Álvarez,     Please see  message    Pt referring to endocrinology referral. Called endo and they are 3-5 months out for scheduling. Is it okay for pt to wait this long to see endo? If, so please place new referral with no expiration date    Ana Maria Chandler RN

## 2022-03-16 ASSESSMENT — PATIENT HEALTH QUESTIONNAIRE - PHQ9: SUM OF ALL RESPONSES TO PHQ QUESTIONS 1-9: 13

## 2022-03-17 ENCOUNTER — MYC MEDICAL ADVICE (OUTPATIENT)
Dept: INTERNAL MEDICINE | Facility: CLINIC | Age: 67
End: 2022-03-17
Payer: MEDICARE

## 2022-03-24 ENCOUNTER — PATIENT OUTREACH (OUTPATIENT)
Dept: NURSING | Facility: CLINIC | Age: 67
End: 2022-03-24
Payer: MEDICARE

## 2022-03-24 NOTE — PROGRESS NOTES
"Clinic Care Coordination Contact    Community Health Worker Follow Up    Care Gaps:     Health Maintenance Due   Topic Date Due     ASTHMA ACTION PLAN  09/14/2021       Did not discuss    Goals:   Goals Addressed as of 3/24/2022 at 9:51 AM                    Today    2/22/22       Patient Stated       1. Medical (pt-stated)   90%  90%    Added 3/17/21 by Katy Calderon LSW      Goal Statement: I will continue to attend my appointments, follow my plan of care, and access care as needed to manage my pain and medical needs over the next 3 months.   Date Goal set: 3/17/21 extended 1/12/2022  Barriers: Complex cares, lots of appts  Strengths: Connected to care, motivated   Date to Achieve By: 12/1/21 extended 4/12/2022  Patient expressed understanding of goal: Yes    Action steps to achieve this goal:  1. I will continue to attend my appointments as needed and recommended.   2. I will follow my plan of care as created by my PCP and specialty teams.   3. I will work with care coordination to understand my care plan, schedule appts as needed, and keep track of my appts as needed.          Discussed:    Patient stated she saw her psychiatrist and therapist on Monday.  Patient stated in two weeks she will see her therapist again for a few more times.  Patient will see her psychiatrist at the end of April.      Patient stated she stopped taking her tyroid medication.  It has been two months, and she has lost 28 pounds.    Patient stated she has been very \"itchy\".  Patient has talked with her PCP about this.  Patient is taking Benadryl at bedtime, and uses cream during the day.    Patient stated her trip to visit her sister in [place] was great, went by too fast.  Patient will plan to stay longer next time.  Patient stated she may plan a trip to see her sister in [place] for a week.    Patient stated she has been busy making cards. She sends them to friends and family members.    Patient stated there is a game night at [employer] " on Saturday, she plans to attend.    Patient went out to dinner on Tuesday night with a friend.      Patient stated she doesn't go out too much, but it is nice to see friends.    Intervention and Education during outreach: CHW encouraged patient to contact CC if there are any other changes or resources needed.  Patient acknowledged understanding.      Plan: Patient will attend all scheduled appointments.  CHW will outreach in one month.    CAMRON Hester  Clinic Care Coordination  [facility] Clinics: [facility] [place], [place], [place], and [place]  Phone: [phone]

## 2022-04-04 ENCOUNTER — PATIENT OUTREACH (OUTPATIENT)
Dept: CARE COORDINATION | Facility: CLINIC | Age: 67
End: 2022-04-04
Payer: MEDICARE

## 2022-04-04 NOTE — PROGRESS NOTES
Clinic Care Coordination Contact    Care Coordination Clinician Chart Review     Situation: Patient chart reviewed by care coordinator.?     Background: Initial assessment and enrollment to Care Coordination was 07/01/2021.?? Patient centered goals were developed with participation from patient.? Lead CC handed patient off to CHW for continued outreach every 30 days.??     Assessment: Per chart review, patient outreach completed by CC CHW on 3/24/2022.? Patient is actively working to accomplish goal(s).? Patient's goal(s) remain(s) appropriate at this time.? Patient is not due for updated Plan of Care.? Annual assessment will be due 7/1/2022.     Goals        Patient Stated       1. Medical (pt-stated)       Goal Statement: I will continue to attend my appointments, follow my plan of care, and access care as needed to manage my pain and medical needs over the next 3 months.   Date Goal set: 3/17/21 extended 1/12/2022  Barriers: Complex cares, lots of appts  Strengths: Connected to care, motivated   Date to Achieve By: 12/1/21 extended 4/12/2022  Patient expressed understanding of goal: Yes    Action steps to achieve this goal:  1. I will continue to attend my appointments as needed and recommended.   2. I will follow my plan of care as created by my PCP and specialty teams.   3. I will work with care coordination to understand my care plan, schedule appts as needed, and keep track of my appts as needed.          ??     Plan/Recommendations: The patient will continue working with Care Coordination to achieve above goal(s).? CHW will involve Lead CC as needed or if patient is ready to move to maintenance.? Lead CC will continue to monitor CHW s monthly outreaches and progress to goal(s) every 6 weeks.?     Plan of Care updated and sent to patient: No, not due.     CC CARLEY transitioned patient to CC CARLEY Cuevas for continued care coordination.       Amber Nguyen, BLANCAW  Clinic Care Coordinator  [facility]  [place] and [place]  [phone]  [email or web address] [email or web address]

## 2022-04-05 ENCOUNTER — OFFICE VISIT (OUTPATIENT)
Dept: PODIATRY | Facility: CLINIC | Age: 67
End: 2022-04-05
Payer: MEDICARE

## 2022-04-05 ENCOUNTER — ANCILLARY PROCEDURE (OUTPATIENT)
Dept: GENERAL RADIOLOGY | Facility: CLINIC | Age: 67
End: 2022-04-05
Attending: PODIATRIST
Payer: MEDICARE

## 2022-04-05 VITALS
SYSTOLIC BLOOD PRESSURE: 112 MMHG | WEIGHT: 193 LBS | HEIGHT: 65 IN | BODY MASS INDEX: 32.15 KG/M2 | DIASTOLIC BLOOD PRESSURE: 72 MMHG

## 2022-04-05 DIAGNOSIS — M20.42 HAMMERTOE OF LEFT FOOT: ICD-10-CM

## 2022-04-05 DIAGNOSIS — M20.42 HAMMERTOE OF LEFT FOOT: Primary | ICD-10-CM

## 2022-04-05 PROCEDURE — 99203 OFFICE O/P NEW LOW 30 MIN: CPT | Performed by: PODIATRIST

## 2022-04-05 PROCEDURE — 73630 X-RAY EXAM OF FOOT: CPT | Mod: LT | Performed by: RADIOLOGY

## 2022-04-05 NOTE — PATIENT INSTRUCTIONS
"PATIENT INSTRUCTIONS - Podiatry / Foot & Ankle Surgery    Hammertoes  Crest pads - relieves pressure at the tip of the toe  Budin splint, Metatarsal pads- relieve pressure underneath the ball of the foot    Toe Spacers:  -Single toe spacers - prevent adjacent toes from rubbing  Codility      \"Toe yoga\":    Intrinsic foot muscle strengthening. For personal instruction on this,  please request a Physical Therapy referral from your doctor.    Surgery:  Hammertoe correction, Weil osteotomy, possible tendon tranfer.  Recovery 6 weeks walking in a boot - possible retained pin      "

## 2022-04-05 NOTE — LETTER
4/5/2022         RE: Mercedes Barber  [address]  [place] [address]        Dear Colleague,    Thank you for referring your patient, Mercedes Barber, to the [facility]. Please see a copy of my visit note below.    Assessment:      ICD-10-CM    1. Hammertoe of left foot  M20.42 XR Foot Left G/E 3 Views        Plan:  Orders Placed This Encounter   Procedures     XR Foot Left G/E 3 Views       Discussed the etiology and treatment of the condition with the patient.  Imaging studies reviewed and discussed with the patient.  Discussed surgical and conservative options.  Transverse plane deformity 2nd toe, similar to hammertoe but not exactly the same    Toe spacers  Surgical correction discussed - Weil, HTC, possible tendon transfer      Return:  No follow-ups on file.    Drea Lozano DPM                Chief Complaint:     Patient presents with:  Ingrown Toenail     left foot pain    HPI:  Mercedes Barber is a 66 year old year old female who presents for evaluation of foot pain.    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      Past Medical & Surgical History:  Past Medical History:   Diagnosis Date     Arthritis     Thumb     Cervical high risk HPV (human papillomavirus) test positive 07/17/2017 07/17/17: NIL pap, + HR HPV (not 16 or 18) result.      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     Neuropathy of lower extremity      Other sleep apnea      Rectal prolapse      Restless leg syndrome      Seizure disorder (H)     25 years ago      Sleep apnea     CPAP, no longer using CPAP     Temporal sclerosis 8/27/2012     Uncomplicated asthma       Past Surgical History:   Procedure Laterality Date     Anterior COLPORRHAPHY, BLADDER/VAGINA      perigee mesh     ARTHROPLASTY HIP Right 7/23/2019    Procedure: Right total hip arthroplasty;  Surgeon: Anant Mejia MD;  Location: [facility]     ARTHROPLASTY PATELLO-FEMORAL (KNEE) Bilateral      ARTHROPLASTY REVISION HIP Right 8/7/2019    Procedure: Right hip revision total arthroplasty;  Surgeon: Tom Antonio MD;  Location: [facility] OR     ARTHROPLASTY REVISION HIP Right 8/5/2020    Procedure: Revision Right total hip arthroplasty;  Surgeon: Pan Grey MD;  [facility]: [facility]     CARPAL TUNNEL RELEASE RT/LT       DENTAL SURGERY      implant     DESTRUCTION OF PARAVERTEBRAL FACET LUMBAR / SACRAL SINGLE Left 10/27/2021    Procedure: DESTRUCTION, NERVE, FACET JOINT, LUMBOSACRAL, Left L4-5 and L5-S1 facet joint radiofrequency ablation;  Surgeon: Celena Perez MD;  Location: [facility] OR     DILATION AND CURETTAGE       DRAIN PILONIDAL CYST SIMPL       ENDOSCOPY DRUG INDUCED SLEEP N/A 11/18/2015    Procedure: ENDOSCOPY DRUG INDUCED SLEEP;  Surgeon: Park Ortiz MD;  [facility]     ESOPHAGOSCOPY, GASTROSCOPY, DUODENOSCOPY (EGD), COMBINED  4/5/2013    Procedure: COMBINED ESOPHAGOSCOPY, GASTROSCOPY, DUODENOSCOPY (EGD), BIOPSY SINGLE OR MULTIPLE;;  Surgeon: Mundo Mehta MD;  Location: [facility]     EXCISE LESION TRUNK  8/10/2012    Procedure: EXCISE LESION TRUNK;;  Surgeon: Jerald Diggs MD;  Location: [facility] OR     HYSTERECTOMY       INJECT BLOCK MEDIAL BRANCH CERVICAL/THORACIC/LUMBAR Bilateral 9/20/2021    Procedure: BLOCK, NERVE, FACET JOINT, MEDIAL BRANCH, DIAGNOSTIC - Bilateral L4-5 and L5-S1 Medial branch blocks;  Surgeon: Celena Perez MD;  Location: [facility] OR     INJECT BLOCK MEDIAL BRANCH CERVICAL/THORACIC/LUMBAR Bilateral 9/27/2021    Procedure: BLOCK, NERVE, FACET JOINT, MEDIAL  BRANCH, DIAGNOSTIC - Bilateral L4-5 and L5-S1 Medial branch blocks;  Surgeon: Celena Perez MD;  Location: [facility] OR     INJECT SACROILIAC JOINT Bilateral 5/24/2021    Procedure: INJECTION, SACROILIAC JOINT;  Surgeon: Celena Perez MD;  Location: [facility] OR     IRRIGATION AND DEBRIDEMENT HIP, COMBINED Right 8/26/2019    Procedure: 1.  Right hip wound irrigation and debridement with excisional debridement of nonviable skin, subcutaneous tissues, and fascia. 2. Application of incisional wound VAC, 27cm length incision.;  Surgeon: Tyson Prabhakar MD;  Location: [facility]     L shoulder fracture       OPEN REDUCTION INTERNAL FIXATION WRIST Left 4/30/2021    Procedure: Open reduction with internal fixation of displaced left intraarticular distal radius fracture;  Surgeon: Luis Manuel Sánchez MD;  Location: [facility]     rectal prolapse repair abdominally       RELEASE PLANTAR FASCIA Right 1/20/2015    Procedure: RELEASE PLANTAR FASCIA;  Surgeon: Maicol Liu DPM;  Location: [facility]     REMOVE MESH VAGINA  8/10/2012    Procedure: REMOVE MESH VAGINA;  Excision of Vaginal Mesh Exposure, Removal of Skin Tag Left Inner Leg;  Surgeon: Jerald Diggs MD;  Location: [facility]     REPAIR HAMMER TOE Right 1/20/2015    Procedure: REPAIR HAMMER TOE;  Surgeon: Maicol Liu DPM;  Location: [facility]     TONSILLECTOMY & ADENOIDECTOMY       TUBAL LIGATION        Family History   Problem Relation Age of Onset     Eye Disorder Mother      Lipids Mother         has CHF     Osteoporosis Mother      Diabetes Father      Alzheimer Disease Paternal Grandmother      Hypertension Brother      Alcohol/Drug Brother      Depression Brother      Gastrointestinal Disease Brother         hx of colonic polyps     Lipids Brother      Psychotic Disorder Brother      Breast Cancer Sister      Depression Sister      Lipids Sister      Thyroid Disease Sister      Congenital Anomalies Daughter      Neurologic Disorder Daughter         Social  History:  ?  History   Smoking Status     Never Smoker   Smokeless Tobacco     Never Used     History   Drug Use No     Social History    Substance and Sexual Activity      Alcohol use: Yes        Comment: 1 glass of wine 2x/yr      Allergies:  ?   Allergies   Allergen Reactions     Blood Transfusion Related (Informational Only) Other (See Comments)     Patient has a history of a clinically significant antibody against RBC antigens.  A delay in compatible RBCs may occur.     Budesonide      Edema     Bupropion Rash     Carbamazepine Diarrhea     Clonazepam Other (See Comments)     Hypotension, trouble walking, mind disturbance     Encort [Hydrocortisone Acetate] Swelling     Localized to feet     Encort [Hydrocortisone] Swelling     Erythromycin Diarrhea     Erythromycin Nausea and Vomiting and Diarrhea     Flagyl [Metronidazole] Nausea     Gabapentin Other (See Comments)     Causes over-eating     Lamictal [Lamotrigine] Dizziness     Oxycodone Nausea and Vomiting     Tegretol [Carbamazepine] Nausea and Vomiting        Medications:    Current Outpatient Medications   Medication     acetaminophen (TYLENOL) 325 MG tablet     albuterol (ALBUTEROL) 108 (90 BASE) MCG/ACT Inhaler     alendronate (FOSAMAX) 70 MG tablet     amLODIPine (NORVASC) 5 MG tablet     amoxicillin (AMOXIL) 500 MG tablet     aspirin 81 MG EC tablet     benzonatate (TESSALON) 200 MG capsule     budesonide-formoterol (SYMBICORT) 160-4.5 MCG/ACT Inhaler     busPIRone (BUSPAR) 5 MG tablet     carboxymethylcellulose PF (REFRESH PLUS) 0.5 % ophthalmic solution     cyclobenzaprine (FLEXERIL) 5 MG tablet     cycloSPORINE (RESTASIS) 0.05 % ophthalmic emulsion     Dentifrices (BIOTENE DRY MOUTH DT)     Estradiol-Estriol-Progesterone (BIEST/PROGESTERONE TD)     fluticasone-vilanterol (BREO ELLIPTA) 200-25 MCG/INH inhaler     furosemide (LASIX) 20 MG tablet     hypromellose (ARTIFICIAL TEARS) 0.5 % SOLN ophthalmic solution     ibuprofen (ADVIL/MOTRIN) 600 MG  "tablet     lacosamide (VIMPAT) 200 MG TABS tablet     linaclotide (LINZESS) 290 MCG capsule     loratadine (CLARITIN REDITABS) 10 MG ODT     LORazepam (ATIVAN) 0.5 MG tablet     omeprazole (PRILOSEC) 40 MG DR capsule     ondansetron (ZOFRAN-ODT) 4 MG ODT tab     potassium citrate (UROCIT-K) 10 MEQ (1080 MG) CR tablet     pramipexole (MIRAPEX) 1 MG tablet     QUEtiapine (SEROQUEL) 50 MG tablet     topiramate (TOPAMAX) 25 MG tablet     traMADol (ULTRAM) 50 MG tablet     traZODone (DESYREL) 150 MG tablet     trolamine salicylate (ASPERCREME) 10 % external cream     No current facility-administered medications for this visit.     Facility-Administered Medications Ordered in Other Visits   Medication     gadobutrol (GADAVIST) injection 10 mL       Physical Exam:  ?  Vitals:  /72   Ht 1.651 m (5' 5\")   Wt 87.5 kg (193 lb)   LMP 03/11/2010   BMI 32.12 kg/m     General:  WD/WN, in NAD.  A&O x3.  Dermatologic:    Skin is intact, open lesions absent.   Skin texture, turgor is normal.  Vascular:  Pulses palpable bilateral.  Digital capillary refill time normal bilateral.  Skin temperature is normal bilateral.  Generalized edema- trace bilateral.  Focal edema- none bilateral.  Neurologic:    Gross sensation normal.  Gait and balance abnormal, poor balance.  Musculoskeletal:  Maximal pain to palpation of medial 2nd digit where it abuts hallux, left.  2nd digit w/ mainly adduction at MPJ, mild sagittal plane 2nd digit contracture, left  Hallux recuts, left  no pain to palpation of 1st MPJ left.  1st MPJ ROM limited, crepitation present, minimally painful, left.    Muscle strength 5/5  foot and ankle bilateral.    Stance:    Clinical deformity: adducted 2nd digit, rectus hallux    Imaging:   x-ray independently reviewed and interpreted by myself today.  Weight-bearing views left foot dated 04/05/22, reveal short 1st met w/ 1st MPJ degeneration, relatively long 2nd/3rd mets w/ transverse plane adduction of 2nd digit.  Pes " lita madrigal/ KANDI fault.                    Again, thank you for allowing me to participate in the care of your patient.        Sincerely,        Drea Lozano DPM

## 2022-04-05 NOTE — PROGRESS NOTES
Assessment:      ICD-10-CM    1. Hammertoe of left foot  M20.42 XR Foot Left G/E 3 Views        Plan:  Orders Placed This Encounter   Procedures     XR Foot Left G/E 3 Views       Discussed the etiology and treatment of the condition with the patient.  Imaging studies reviewed and discussed with the patient.  Discussed surgical and conservative options.  Transverse plane deformity 2nd toe, similar to hammertoe but not exactly the same    Toe spacers  Surgical correction discussed - Weil, HTC, possible tendon transfer      Return:  No follow-ups on file.    Drea Lozano DPM                Chief Complaint:     Patient presents with:  Ingrown Toenail     left foot pain    HPI:  Mercedes Barber is a 66 year old year old female who presents for evaluation of foot pain.    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      Past Medical & Surgical History:  Past Medical History:   Diagnosis Date     Arthritis     Thumb     Cervical high risk HPV (human papillomavirus) test positive 07/17/2017 07/17/17: NIL pap, + HR HPV (not 16 or 18) result.      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     Neuropathy of lower extremity      Other sleep apnea      Rectal prolapse      Restless leg syndrome      Seizure disorder (H)     25 years ago     Sleep apnea     CPAP, no longer using CPAP     Temporal sclerosis 8/27/2012     Uncomplicated asthma       Past Surgical History:   Procedure Laterality Date     Anterior COLPORRHAPHY, BLADDER/VAGINA      perigee mesh     ARTHROPLASTY HIP Right 7/23/2019    Procedure: Right  total hip arthroplasty;  Surgeon: Anant Mejia MD;  Location: [facility]     ARTHROPLASTY PATELLO-FEMORAL (KNEE) Bilateral      ARTHROPLASTY REVISION HIP Right 8/7/2019    Procedure: Right hip revision total arthroplasty;  Surgeon: Tom Antonio MD;  Location: [facility]     ARTHROPLASTY REVISION HIP Right 8/5/2020    Procedure: Revision Right total hip arthroplasty;  Surgeon: Pan Grey MD;  [facility]     CARPAL TUNNEL RELEASE RT/LT       DENTAL SURGERY      implant     DESTRUCTION OF PARAVERTEBRAL FACET LUMBAR / SACRAL SINGLE Left 10/27/2021    Procedure: DESTRUCTION, NERVE, FACET JOINT, LUMBOSACRAL, Left L4-5 and L5-S1 facet joint radiofrequency ablation;  Surgeon: Celena Perez MD;  Location: [facility] OR     DILATION AND CURETTAGE       DRAIN PILONIDAL CYST SIMPL       ENDOSCOPY DRUG INDUCED SLEEP N/A 11/18/2015    Procedure: ENDOSCOPY DRUG INDUCED SLEEP;  Surgeon: Park Ortiz MD;  [facility]: [facility]     ESOPHAGOSCOPY, GASTROSCOPY, DUODENOSCOPY (EGD), COMBINED  4/5/2013    Procedure: COMBINED ESOPHAGOSCOPY, GASTROSCOPY, DUODENOSCOPY (EGD), BIOPSY SINGLE OR MULTIPLE;;  Surgeon: Mundo Mehta MD;  Location:  GI     EXCISE LESION TRUNK  8/10/2012    Procedure: EXCISE LESION TRUNK;;  Surgeon: Jerald Diggs MD;  Location: [facility]     HYSTERECTOMY       INJECT BLOCK MEDIAL BRANCH CERVICAL/THORACIC/LUMBAR Bilateral 9/20/2021    Procedure: BLOCK, NERVE, FACET JOINT, MEDIAL BRANCH, DIAGNOSTIC - Bilateral L4-5 and L5-S1 Medial branch blocks;  Surgeon: Celena Perez MD;  Location: [facility] OR     INJECT BLOCK MEDIAL BRANCH CERVICAL/THORACIC/LUMBAR Bilateral 9/27/2021    Procedure: BLOCK, NERVE, FACET JOINT, MEDIAL BRANCH, DIAGNOSTIC - Bilateral L4-5 and L5-S1 Medial branch blocks;  Surgeon: Celena Perez MD;  Location: [facility] OR     INJECT SACROILIAC JOINT Bilateral 5/24/2021    Procedure: INJECTION, SACROILIAC JOINT;  Surgeon: Celena Perez MD;  Location: [facility] OR     IRRIGATION AND  DEBRIDEMENT HIP, COMBINED Right 8/26/2019    Procedure: 1.  Right hip wound irrigation and debridement with excisional debridement of nonviable skin, subcutaneous tissues, and fascia. 2. Application of incisional wound VAC, 27cm length incision.;  Surgeon: Tyson Prabhakar MD;  Location: [facility]     L shoulder fracture       OPEN REDUCTION INTERNAL FIXATION WRIST Left 4/30/2021    Procedure: Open reduction with internal fixation of displaced left intraarticular distal radius fracture;  Surgeon: Luis Manuel Sánchez MD;  Location: [facility]     rectal prolapse repair abdominally       RELEASE PLANTAR FASCIA Right 1/20/2015    Procedure: RELEASE PLANTAR FASCIA;  Surgeon: Maicol Liu DPM;  Location: [facility]     REMOVE MESH VAGINA  8/10/2012    Procedure: REMOVE MESH VAGINA;  Excision of Vaginal Mesh Exposure, Removal of Skin Tag Left Inner Leg;  Surgeon: Jerald Diggs MD;  [facility]: [facility]     REPAIR HAMMER TOE Right 1/20/2015    Procedure: REPAIR HAMMER TOE;  Surgeon: Maicol Liu DPM;  Location: [facility]     TONSILLECTOMY & ADENOIDECTOMY       TUBAL LIGATION        Family History   Problem Relation Age of Onset     Eye Disorder Mother      Lipids Mother         has CHF     Osteoporosis Mother      Diabetes Father      Alzheimer Disease Paternal Grandmother      Hypertension Brother      Alcohol/Drug Brother      Depression Brother      Gastrointestinal Disease Brother         hx of colonic polyps     Lipids Brother      Psychotic Disorder Brother      Breast Cancer Sister      Depression Sister      Lipids Sister      Thyroid Disease Sister      Congenital Anomalies Daughter      Neurologic Disorder Daughter         Social History:  ?  History   Smoking Status     Never Smoker   Smokeless Tobacco     Never Used     History   Drug Use No     Social History    Substance and Sexual Activity      Alcohol use: Yes        Comment: 1 glass of wine 2x/yr      Allergies:  ?   Allergies   Allergen Reactions      Blood Transfusion Related (Informational Only) Other (See Comments)     Patient has a history of a clinically significant antibody against RBC antigens.  A delay in compatible RBCs may occur.     Budesonide      Edema     Bupropion Rash     Carbamazepine Diarrhea     Clonazepam Other (See Comments)     Hypotension, trouble walking, mind disturbance     Encort [Hydrocortisone Acetate] Swelling     Localized to feet     Encort [Hydrocortisone] Swelling     Erythromycin Diarrhea     Erythromycin Nausea and Vomiting and Diarrhea     Flagyl [Metronidazole] Nausea     Gabapentin Other (See Comments)     Causes over-eating     Lamictal [Lamotrigine] Dizziness     Oxycodone Nausea and Vomiting     Tegretol [Carbamazepine] Nausea and Vomiting        Medications:    Current Outpatient Medications   Medication     acetaminophen (TYLENOL) 325 MG tablet     albuterol (ALBUTEROL) 108 (90 BASE) MCG/ACT Inhaler     alendronate (FOSAMAX) 70 MG tablet     amLODIPine (NORVASC) 5 MG tablet     amoxicillin (AMOXIL) 500 MG tablet     aspirin 81 MG EC tablet     benzonatate (TESSALON) 200 MG capsule     budesonide-formoterol (SYMBICORT) 160-4.5 MCG/ACT Inhaler     busPIRone (BUSPAR) 5 MG tablet     carboxymethylcellulose PF (REFRESH PLUS) 0.5 % ophthalmic solution     cyclobenzaprine (FLEXERIL) 5 MG tablet     cycloSPORINE (RESTASIS) 0.05 % ophthalmic emulsion     Dentifrices (BIOTENE DRY MOUTH DT)     Estradiol-Estriol-Progesterone (BIEST/PROGESTERONE TD)     fluticasone-vilanterol (BREO ELLIPTA) 200-25 MCG/INH inhaler     furosemide (LASIX) 20 MG tablet     hypromellose (ARTIFICIAL TEARS) 0.5 % SOLN ophthalmic solution     ibuprofen (ADVIL/MOTRIN) 600 MG tablet     lacosamide (VIMPAT) 200 MG TABS tablet     linaclotide (LINZESS) 290 MCG capsule     loratadine (CLARITIN REDITABS) 10 MG ODT     LORazepam (ATIVAN) 0.5 MG tablet     omeprazole (PRILOSEC) 40 MG DR capsule     ondansetron (ZOFRAN-ODT) 4 MG ODT tab     potassium citrate  "(UROCIT-K) 10 MEQ (1080 MG) CR tablet     pramipexole (MIRAPEX) 1 MG tablet     QUEtiapine (SEROQUEL) 50 MG tablet     topiramate (TOPAMAX) 25 MG tablet     traMADol (ULTRAM) 50 MG tablet     traZODone (DESYREL) 150 MG tablet     trolamine salicylate (ASPERCREME) 10 % external cream     No current facility-administered medications for this visit.     Facility-Administered Medications Ordered in Other Visits   Medication     gadobutrol (GADAVIST) injection 10 mL       Physical Exam:  ?  Vitals:  /72   Ht 1.651 m (5' 5\")   Wt 87.5 kg (193 lb)   LMP 03/11/2010   BMI 32.12 kg/m     General:  WD/WN, in NAD.  A&O x3.  Dermatologic:    Skin is intact, open lesions absent.   Skin texture, turgor is normal.  Vascular:  Pulses palpable bilateral.  Digital capillary refill time normal bilateral.  Skin temperature is normal bilateral.  Generalized edema- trace bilateral.  Focal edema- none bilateral.  Neurologic:    Gross sensation normal.  Gait and balance abnormal, poor balance.  Musculoskeletal:  Maximal pain to palpation of medial 2nd digit where it abuts hallux, left.  2nd digit w/ mainly adduction at MPJ, mild sagittal plane 2nd digit contracture, left  Hallux recuts, left  no pain to palpation of 1st MPJ left.  1st MPJ ROM limited, crepitation present, minimally painful, left.    Muscle strength 5/5  foot and ankle bilateral.    Stance:    Clinical deformity: adducted 2nd digit, rectus hallux    Imaging:   x-ray independently reviewed and interpreted by myself today.  Weight-bearing views left foot dated 04/05/22, reveal short 1st met w/ 1st MPJ degeneration, relatively long 2nd/3rd mets w/ transverse plane adduction of 2nd digit.  Pes valgus w/ NCJ fault.                "

## 2022-04-07 ENCOUNTER — LAB (OUTPATIENT)
Dept: LAB | Facility: CLINIC | Age: 67
End: 2022-04-07
Payer: MEDICARE

## 2022-04-07 DIAGNOSIS — Z12.11 COLON CANCER SCREENING: ICD-10-CM

## 2022-04-07 DIAGNOSIS — I10 BENIGN ESSENTIAL HYPERTENSION: ICD-10-CM

## 2022-04-07 DIAGNOSIS — Z00.00 ENCOUNTER FOR SUBSEQUENT ANNUAL WELLNESS VISIT IN MEDICARE PATIENT: ICD-10-CM

## 2022-04-07 DIAGNOSIS — E03.9 ACQUIRED HYPOTHYROIDISM: ICD-10-CM

## 2022-04-07 DIAGNOSIS — D50.9 IRON DEFICIENCY ANEMIA, UNSPECIFIED IRON DEFICIENCY ANEMIA TYPE: ICD-10-CM

## 2022-04-07 LAB
ALBUMIN SERPL-MCNC: 3.5 G/DL (ref 3.4–5)
ALP SERPL-CCNC: 130 U/L (ref 40–150)
ALT SERPL W P-5'-P-CCNC: 20 U/L (ref 0–50)
ANION GAP SERPL CALCULATED.3IONS-SCNC: 3 MMOL/L (ref 3–14)
AST SERPL W P-5'-P-CCNC: 13 U/L (ref 0–45)
BILIRUB SERPL-MCNC: 0.4 MG/DL (ref 0.2–1.3)
BUN SERPL-MCNC: 15 MG/DL (ref 7–30)
CALCIUM SERPL-MCNC: 9.1 MG/DL (ref 8.5–10.1)
CHLORIDE BLD-SCNC: 113 MMOL/L (ref 94–109)
CO2 SERPL-SCNC: 25 MMOL/L (ref 20–32)
CREAT SERPL-MCNC: 0.74 MG/DL (ref 0.52–1.04)
ERYTHROCYTE [DISTWIDTH] IN BLOOD BY AUTOMATED COUNT: 15.6 % (ref 10–15)
GFR SERPL CREATININE-BSD FRML MDRD: 88 ML/MIN/1.73M2
GLUCOSE BLD-MCNC: 101 MG/DL (ref 70–99)
HCT VFR BLD AUTO: 38.8 % (ref 35–47)
HGB BLD-MCNC: 12.1 G/DL (ref 11.7–15.7)
MCH RBC QN AUTO: 27 PG (ref 26.5–33)
MCHC RBC AUTO-ENTMCNC: 31.2 G/DL (ref 31.5–36.5)
MCV RBC AUTO: 87 FL (ref 78–100)
PLATELET # BLD AUTO: 286 10E3/UL (ref 150–450)
POTASSIUM BLD-SCNC: 3.7 MMOL/L (ref 3.4–5.3)
PROT SERPL-MCNC: 6.5 G/DL (ref 6.8–8.8)
RBC # BLD AUTO: 4.48 10E6/UL (ref 3.8–5.2)
SODIUM SERPL-SCNC: 141 MMOL/L (ref 133–144)
T3 SERPL-MCNC: 137 NG/DL (ref 60–181)
T4 FREE SERPL-MCNC: 1.43 NG/DL (ref 0.76–1.46)
TSH SERPL DL<=0.005 MIU/L-ACNC: <0.01 MU/L (ref 0.4–4)
WBC # BLD AUTO: 5 10E3/UL (ref 4–11)

## 2022-04-07 PROCEDURE — 84480 ASSAY TRIIODOTHYRONINE (T3): CPT

## 2022-04-07 PROCEDURE — 36415 COLL VENOUS BLD VENIPUNCTURE: CPT

## 2022-04-07 PROCEDURE — 85027 COMPLETE CBC AUTOMATED: CPT

## 2022-04-07 PROCEDURE — 84443 ASSAY THYROID STIM HORMONE: CPT

## 2022-04-07 PROCEDURE — 80053 COMPREHEN METABOLIC PANEL: CPT

## 2022-04-07 PROCEDURE — 84439 ASSAY OF FREE THYROXINE: CPT

## 2022-04-12 ENCOUNTER — OFFICE VISIT (OUTPATIENT)
Dept: INTERNAL MEDICINE | Facility: CLINIC | Age: 67
End: 2022-04-12
Payer: MEDICARE

## 2022-04-12 VITALS
OXYGEN SATURATION: 96 % | HEIGHT: 65 IN | RESPIRATION RATE: 14 BRPM | TEMPERATURE: 97 F | DIASTOLIC BLOOD PRESSURE: 70 MMHG | HEART RATE: 77 BPM | BODY MASS INDEX: 31.82 KG/M2 | WEIGHT: 191 LBS | SYSTOLIC BLOOD PRESSURE: 116 MMHG

## 2022-04-12 DIAGNOSIS — E03.9 ACQUIRED HYPOTHYROIDISM: Primary | ICD-10-CM

## 2022-04-12 PROCEDURE — 99214 OFFICE O/P EST MOD 30 MIN: CPT | Performed by: INTERNAL MEDICINE

## 2022-04-12 PROCEDURE — 91305 COVID-19,PF,PFIZER (12+ YRS): CPT | Performed by: INTERNAL MEDICINE

## 2022-04-12 PROCEDURE — 0054A COVID-19,PF,PFIZER (12+ YRS): CPT | Performed by: INTERNAL MEDICINE

## 2022-04-12 NOTE — PROGRESS NOTES
Assessment & Plan     Acquired hypothyroidism  Significant improvement in patient's subjective complaints.  Continuing to hold meds, recheck thyroid function test in 1 month.  - TSH with free T4 reflex; Future  - T3, total; Future    Advised update of COVID booster.     See Patient Instructions    Return in about 1 month (around 5/12/2022) for Lab Work appointment.    Skyler Álvarez MD  [facility] DIPTI Long is a 66 year old who presents for the following health issues     History of Present Illness       Hypothyroidism:     Since last visit, patient describes the following symptoms::  Anxiety, Dry skin and Weight loss    Weight loss::  5 lbs.        Follow up 4/7/22 thyroid results:    Patient informs me she has been going to an integrative medicine clinic at which time and for quite some time she has been taking some thyroid hormone replacement.  She states that she was informed in the past that her thyroid tests were underactive.  She states that in addition to her levothyroxine she had some Cytomel added at some point in the past also.  She is unclear exact dates nor how long she has been on the medication although I do see refills being granted.  She apparently had some lab work in November of last year which demonstrated a hyperthyroid panel.  She reports to me that apparently no distinct changes were made in her medication dosing at that time per her report.  Since then she has noted issues of loose stool, weight loss, itchy skin, mild agitation and feeling warm.    She reports no prior history of thyroid malignancy.    Have been holding her levothyroxine as well as her Cytomel and she has slowly been seeing her symptoms improve and most recently her T4 and T3 have started to return to within normal range although her TSH is expected to still slightly suppressed.    Component      Latest Ref Rng & Units 2/9/2022 3/7/2022 4/7/2022   TSH      0.40 - 4.00 mU/L  "<0.01 (L) <0.01 (L) <0.01 (L)   T4 Free      0.76 - 1.46 ng/dL 2.45 (H) 1.72 (H) 1.43   Free T3      2.3 - 4.2 pg/mL 7.8 (H)     Triiodothyronine (T3)      60 - 181 ng/dL  198 (H) 137     Review of Systems   CONSTITUTIONAL: NEGATIVE for fever, chills, change in weight  EYES: NEGATIVE for vision changes or irritation  ENT/MOUTH: NEGATIVE for ear, mouth and throat problems  RESP: NEGATIVE for significant cough or SOB  CV: NEGATIVE for chest pain, palpitations or peripheral edema  GI: NEGATIVE for nausea, abdominal pain, heartburn, or change in bowel habits  : NEGATIVE for frequency, dysuria, or hematuria  NEURO: NEGATIVE for weakness, dizziness or paresthesias  HEME: NEGATIVE for bleeding problems  PSYCHIATRIC: NEGATIVE for changes in mood or affect      Objective    /70   Pulse 77   Temp 97  F (36.1  C) (Temporal)   Resp 14   Ht 1.651 m (5' 5\")   Wt 86.6 kg (191 lb)   LMP 03/11/2010   SpO2 96%   BMI 31.78 kg/m    Body mass index is 31.78 kg/m .  Physical Exam   GENERAL: healthy, alert and no distress  EYES: Eyes grossly normal to inspection, PERRL and conjunctivae and sclerae normal  HENT: ear canals and TM's normal, nose and mouth without ulcers or lesions  NECK: no adenopathy, no asymmetry, masses, or scars and thyroid normal to palpation  RESP: lungs clear to auscultation - no rales, rhonchi or wheezes  CV: regular rate and rhythm, normal S1 S2, no S3 or S4, no click or rub,  NEURO: No focal changes  PSYCH: mentation appears normal, affect normal/bright            "

## 2022-05-03 ENCOUNTER — PATIENT OUTREACH (OUTPATIENT)
Dept: NURSING | Facility: CLINIC | Age: 67
End: 2022-05-03
Payer: MEDICARE

## 2022-05-03 NOTE — PROGRESS NOTES
Clinic Care Coordination Contact    Community Health Worker Follow Up    Care Gaps:     Health Maintenance Due   Topic Date Due     Pneumococcal Vaccine: 65+ Years (2 - PCV) 12/30/2020     DTAP/TDAP/TD IMMUNIZATION (1 - Tdap) 09/12/2021       Did not discuss    Goals:    Goals Addressed as of 5/3/2022 at 1:01 PM                    Today    3/24/22       Patient Stated       1. Medical (pt-stated)   90%  90%    Added 3/17/21 by Katy Calderon LSW      Goal Statement: I will continue to attend my appointments, follow my plan of care, and access care as needed to manage my pain and medical needs over the next 3 months.   Date Goal set: 3/17/21 extended 1/12/2022   Barriers: Complex cares, lots of appts  Strengths: Connected to care, motivated   Date to Achieve By: 12/1/21 extended 4/12/2022  Patient expressed understanding of goal: Yes    Action steps to achieve this goal:  1. I will continue to attend my appointments as needed and recommended.   2. I will follow my plan of care as created by my PCP and specialty teams.   3. I will work with care coordination to understand my care plan, schedule appts as needed, and keep track of my appts as needed.            Discussed:    Patient stated she missed her psychology appointment.  She has IBS, and it was flaring up yesterday.    Patient stated she is feeling better now.    Patient stated her thyroid is better.  She is not itching as much.    Patient stated she has a blood draw on 5/12.    Patient stated she lost more weight.  Patient is eating healthier. Patient stated she would like to lose 20 more pounds.    Patient stated she has a class reunion in August in [facility].  This is a two day event.  Patient stated she plans to go, and may get a hotel room for the weekend.    Patient stated she is making cards for people.  Today she mailed six cards to friends.    Patient stated her sister had Covid with a high temp 102degrees.  She is feeling better.    Intervention and  Education during outreach: CHW asked patient if she has any resource needs, patient declined.  CHW encouraged patient to contact CC if there are any other changes or resources needed.  Patient acknowledged understanding.      Plan: CHW will outreach in one month.    CAMRON Hester  Clinic Care Coordination  [facility] [place], [place], [place] [place] [facility]  Phone: [phone]

## 2022-05-12 ENCOUNTER — MYC MEDICAL ADVICE (OUTPATIENT)
Dept: INTERNAL MEDICINE | Facility: CLINIC | Age: 67
End: 2022-05-12

## 2022-05-12 ENCOUNTER — LAB (OUTPATIENT)
Dept: LAB | Facility: CLINIC | Age: 67
End: 2022-05-12
Payer: MEDICARE

## 2022-05-12 DIAGNOSIS — E03.9 ACQUIRED HYPOTHYROIDISM: ICD-10-CM

## 2022-05-12 LAB
T3 SERPL-MCNC: 222 NG/DL (ref 60–181)
T4 FREE SERPL-MCNC: 2.15 NG/DL (ref 0.76–1.46)
TSH SERPL DL<=0.005 MIU/L-ACNC: <0.01 MU/L (ref 0.4–4)

## 2022-05-12 PROCEDURE — 84439 ASSAY OF FREE THYROXINE: CPT

## 2022-05-12 PROCEDURE — 84480 ASSAY TRIIODOTHYRONINE (T3): CPT

## 2022-05-12 PROCEDURE — 84443 ASSAY THYROID STIM HORMONE: CPT

## 2022-05-12 PROCEDURE — 36415 COLL VENOUS BLD VENIPUNCTURE: CPT

## 2022-05-16 ENCOUNTER — OFFICE VISIT (OUTPATIENT)
Dept: INTERNAL MEDICINE | Facility: CLINIC | Age: 67
End: 2022-05-16
Payer: MEDICARE

## 2022-05-16 VITALS
TEMPERATURE: 98.4 F | DIASTOLIC BLOOD PRESSURE: 70 MMHG | RESPIRATION RATE: 16 BRPM | WEIGHT: 188 LBS | HEART RATE: 86 BPM | BODY MASS INDEX: 31.28 KG/M2 | SYSTOLIC BLOOD PRESSURE: 120 MMHG | OXYGEN SATURATION: 97 %

## 2022-05-16 DIAGNOSIS — E03.9 ACQUIRED HYPOTHYROIDISM: Primary | ICD-10-CM

## 2022-05-16 PROCEDURE — 99214 OFFICE O/P EST MOD 30 MIN: CPT | Performed by: INTERNAL MEDICINE

## 2022-05-16 NOTE — LETTER
May 17, 2022      Mercedes Barber  [address]   [place] [address]        Dear ,    We are writing to inform you of your test results.    {results letter list:[id number]}    No results found from the In Basket message.    If you have any questions or concerns, please call the clinic at the number listed above.       Sincerely,

## 2022-05-16 NOTE — Clinical Note
Dr. Smith,  Just a quick note to introduce you to Ms. Barber who you are going to be seeing in the clinic.  She was seen by me in follow-up after she was being seen by different provider that had her on a combination of levothyroxine and Cytomel.  She developed significant weight loss and symptomatic hyperthyroidism.  I have stopped her medicine for period of time and we were starting to see progress as per her 4/7/2022 labs but recently her labs have taken another hyperthyroid trend upward.  I decided to order a thyroid ultrasound as well as an uptake scan for reassurance and a few additional antibody studies but was wondering if you had any further suggestions of need prior to her seeing you in the clinic.  I believe her appointment is not until July.  Sincerely  Skyler Álvarez MD [facility] internal medicine.

## 2022-05-16 NOTE — PROGRESS NOTES
Assessment & Plan     Acquired hypothyroidism  Have suggested the patient noted that at this point we might want to pursue a thyroid ultrasound and an uptake scan.  We will repeat her labs accordingly and she has a follow-up appointment scheduled in July with an endocrinologist.  Pending the results of these uptake scans and repeat tests I may be in contact with her endocrinologist prior.  - NM Thyroid Uptake and Scan; Future  - US Thyroid; Future  - Adult Endocrinology  Referral; Future  - Thyroid stimulating immunoglobulin; Future  - Thyroid peroxidase antibody; Future  - TSH with free T4 reflex; Future  - T3, total; Future     See Patient Instructions    Return in about 2 weeks (around 5/30/2022) for diagnostic test as ordered, Lab Work appointment.    Skyler Álvarez MD  [facility]    Percy Long is a 67 year old who presents for the following health issues  accompanied by her self.    History of Present Illness       Reason for visit:  High thyroid  Symptom onset:  1-2 weeks ago    She eats 4 or more servings of fruits and vegetables daily.She exercises with enough effort to increase her heart rate 30 to 60 minutes per day.    She is taking medications regularly.       Patient is here today for follow-up.  In the past she has had symptoms consistent with hyperthyroid state.  She was seen by outside provider and in addition to levothyroxine she was taken she was given Cytomel.  She apparently took the Cytomel in combination for some time and then began developing mild agitation and constitutional complaints at which time her thyroid function tests were repeated and found to be hyperthyroid.  Her medications were held.  In the process of holding her medications her constitutional symptoms started to improve and her thyroid function test also were beginning to normalize up until recently when again her thyroid function appears to have increased.  She is here  today for follow-up to discuss.  She reports she has not taken any replacement hormone recently.  Reports constitutional complaints of fatigue    Review of Systems   CONSTITUTIONAL: NEGATIVE for fever, chills, change in weight  EYES: NEGATIVE for vision changes or irritation  ENT/MOUTH: NEGATIVE for ear, mouth and throat problems  RESP: NEGATIVE for significant cough or SOB  CV: NEGATIVE for chest pain, palpitations or peripheral edema  GI: NEGATIVE for nausea, abdominal pain, heartburn, or change in bowel habits  : NEGATIVE for frequency, dysuria, or hematuria  MUSCULOSKELETAL: NEGATIVE for significant arthralgias or myalgia  HEME: NEGATIVE for bleeding problems  PSYCHIATRIC: NEGATIVE for changes in mood or affect      Objective    /70 (BP Location: Left arm, Patient Position: Chair, Cuff Size: Adult Regular)   Pulse 86   Temp 98.4  F (36.9  C) (Oral)   Resp 16   Wt 85.3 kg (188 lb)   LMP 03/11/2010   SpO2 97%   BMI 31.28 kg/m    Body mass index is 31.28 kg/m .     Physical Exam   GENERAL: alert and no distress  EYES: Eyes grossly normal to inspection, PERRL and conjunctivae and sclerae normal  HENT: ear canals and TM's normal, nose and mouth without ulcers or lesions  NECK: Thyroid is not distinctly enlarged not tender.  Slight prominence potentially to the left lobe of the thyroid no distinct nodules identified  RESP: lungs clear to auscultation - no rales, rhonchi or wheezes  CV: regular rate and rhythm, normal S1 S2, no S3 or S4, noclick or rub, no peripheral edema and peripheral pulses strong  MS: no gross musculoskeletal defects noted, no edema  PSYCH: mentation appears normal, affect normal/bright    Component      Latest Ref Rng & Units 11/1/2021 2/9/2022 3/7/2022 4/7/2022   Free T3      2.3 - 4.2 pg/mL 5.8 (H) 7.8 (H)     T4 Free      0.76 - 1.46 ng/dL 1.67 (H) 2.45 (H) 1.72 (H) 1.43   TSH      0.40 - 4.00 mU/L <0.01 (L) <0.01 (L) <0.01 (L) <0.01 (L)   Triiodothyronine (T3)      60 - 181  ng/dL   198 (H) 137     Component      Latest Ref Rng & Units 5/12/2022   Free T3      2.3 - 4.2 pg/mL    T4 Free      0.76 - 1.46 ng/dL 2.15 (H)   TSH      0.40 - 4.00 mU/L <0.01 (L)   Triiodothyronine (T3)      60 - 181 ng/dL 222 (H)

## 2022-05-17 ENCOUNTER — MYC MEDICAL ADVICE (OUTPATIENT)
Dept: INTERNAL MEDICINE | Facility: CLINIC | Age: 67
End: 2022-05-17
Payer: MEDICARE

## 2022-05-17 DIAGNOSIS — I10 BENIGN ESSENTIAL HYPERTENSION: ICD-10-CM

## 2022-05-17 DIAGNOSIS — R60.0 PERIPHERAL EDEMA: ICD-10-CM

## 2022-05-20 ENCOUNTER — MYC MEDICAL ADVICE (OUTPATIENT)
Dept: INTERNAL MEDICINE | Facility: CLINIC | Age: 67
End: 2022-05-20
Payer: MEDICARE

## 2022-05-23 ENCOUNTER — LAB (OUTPATIENT)
Dept: LAB | Facility: CLINIC | Age: 67
End: 2022-05-23
Payer: MEDICARE

## 2022-05-23 ENCOUNTER — PATIENT OUTREACH (OUTPATIENT)
Dept: CARE COORDINATION | Facility: CLINIC | Age: 67
End: 2022-05-23

## 2022-05-23 DIAGNOSIS — E03.9 ACQUIRED HYPOTHYROIDISM: ICD-10-CM

## 2022-05-23 LAB
T3 SERPL-MCNC: 187 NG/DL (ref 60–181)
T4 FREE SERPL-MCNC: 1.77 NG/DL (ref 0.76–1.46)
TSH SERPL DL<=0.005 MIU/L-ACNC: <0.01 MU/L (ref 0.4–4)

## 2022-05-23 PROCEDURE — 84445 ASSAY OF TSI GLOBULIN: CPT | Mod: 90

## 2022-05-23 PROCEDURE — 84443 ASSAY THYROID STIM HORMONE: CPT

## 2022-05-23 PROCEDURE — 99000 SPECIMEN HANDLING OFFICE-LAB: CPT

## 2022-05-23 PROCEDURE — 84439 ASSAY OF FREE THYROXINE: CPT

## 2022-05-23 PROCEDURE — 84480 ASSAY TRIIODOTHYRONINE (T3): CPT

## 2022-05-23 PROCEDURE — 36415 COLL VENOUS BLD VENIPUNCTURE: CPT

## 2022-05-23 PROCEDURE — 86376 MICROSOMAL ANTIBODY EACH: CPT

## 2022-05-23 RX ORDER — FUROSEMIDE 20 MG
20 TABLET ORAL DAILY
Qty: 90 TABLET | Refills: 2 | Status: SHIPPED | OUTPATIENT
Start: 2022-05-23 | End: 2022-10-25

## 2022-05-23 RX ORDER — AMLODIPINE BESYLATE 5 MG/1
5 TABLET ORAL DAILY
Qty: 90 TABLET | Refills: 2 | Status: SHIPPED | OUTPATIENT
Start: 2022-05-23 | End: 2023-01-04

## 2022-05-23 ASSESSMENT — ACTIVITIES OF DAILY LIVING (ADL): DEPENDENT_IADLS:: INDEPENDENT

## 2022-05-23 NOTE — LETTER
[facility] COORDINATION  [address] [facility] [address]    May 24, 2022        Mercedes Barber  [address]  [place] [address]          Dear Miguel Long is an updated Patient Centered Plan of Care for your continued enrollment in Care Coordination. Please let us know if you have additional questions, concerns, or goals that we can assist with.    Sincerely,    Covering Care Coordinator:   Peter Monae [facility]  Clinic Care Coordinator  [facility]  [phone]  [email or web address]  Patient Centered Plan of Care  About Me:        Patient Name:  Mercedes Barber    YOB: 1955  Age:         67 year old   Jeremy MRN:    [id number] Telephone Information:  Home Phone [phone]   Mobile [phone]       Address:  [address]  [place] [address] Email address:  [email or web address]      Emergency Contact(s)    Name Relationship Lgl Grd Work Phone Home Phone Mobile Phone   1. NICK SILVIO Friend No  [phone]   2. MICHAEL KASPER Daughter No  [phone]   3. JULIO C KASPER (* Relative No  [phone]   4. MICHAEL KASPER Other    [phone]   5. JULIO C KASPER Son-in-Law    [phone]           Primary language:  English     needed? No   [facility] [employer] [facility]:  [phone] op. 1  Other communication barriers:None    Preferred Method of Communication:  Phone  Current living arrangement: I live alone    Mobility Status/ Medical Equipment: Independent w/Device        Health Maintenance  Health Maintenance Reviewed:   Health Maintenance Due   Topic Date Due     Pneumococcal Vaccine: 65+ Years (2 - PCV) 12/30/2020         My Access Plan  Medical Emergency 911   Primary Clinic Line [facility] - [phone]    24 Hour Appointment Line [phone] or  [phone]) (toll-free)   24 Hour Nurse Line [phone] (toll-free)   Preferred Urgent Care [facility], [phone]     Preferred Hospital [facility]  [phone]     Preferred Pharmacy [facility] - [place], [place] - [address]     Behavioral Health Crisis Line The National Suicide Prevention Lifeline at 1-676.210.5357 or 911             My Care Team Members  Patient Care Team       Relationship Specialty Notifications Start End    Skyler Álvarez MD PCP - General Internal Medicine  7/31/12     Phone: [phone] Fax: [phone]         [address] [place] [address]    Samir Arguelles MD MD Neurology  10/19/15     Referred by Dr. Samir Arguelles from [facility] for sleep apnea     Phone: [phone] Fax: [phone]         [facility] [address] [facility] [address] [facility] [address]    Park Ortiz MD MD Otolaryngology  10/19/15     Referred by Dr. Samir Arguelles from [facility] for sleep apnea     Phone: [phone] Fax: [phone]         [address] [facility] [address] [facility] [address]    Raegan Chinchilla MA Community Health Worker Primary Care - CC  1/23/20     Phone: [phone]         Skyler Chacko MD Referring Physician Orthopedics  7/17/20     Phone: [phone] Fax: [phone]         [facility] [address] [place] [address] [address] [facility] [address]    Skyler Álvarez MD Assigned PCP   1/17/21     Phone: [phone] Fax: [phone]         [address] [facility] [address]    Celena Perez MD Assigned Neuroscience Provider   4/11/21     Phone: [phone] Fax: [phone]         [address] [address]    Pan Grey MD Assigned Musculoskeletal Provider   1/23/22     Phone: [phone] Fax: [phone]         [facility] [address] [place] [address]    Rachael Smith MD MD Endocrinology, Diabetes, and Metabolism  3/17/22     Phone: [phone] Fax: [phone]         [facility] [place] [address]    Drea Lozano DPM Assigned Surgical Provider   4/10/22     Phone: [phone] Fax: [phone] [address] [facility] [address]    Peter Monae LSW Lead Care Coordinator Primary Care - CC Admissions 4/22/22     Phone: [phone]                 My Care Plans  Self Management and Treatment Plan  Goals and (Comments)   Goals        General     1. Medical (pt-stated)      Notes - Note edited  5/3/2022  1:00 PM by Raegan Chinchilla MA     Goal Statement: I will continue to attend my appointments, follow my plan of care, and access care as needed to manage my pain and medical needs over the next 3 months.   Date Goal set: 3/17/21 extended 1/12/2022   Barriers: Complex cares, lots of appts  Strengths: Connected to care, motivated   Date to Achieve By: 12/1/21 extended 4/12/2022  Patient expressed understanding of goal: Yes    Action steps to achieve this goal:  1. I will continue to attend my appointments as needed and recommended.   2. I will follow my plan of care as created by my PCP and specialty teams.   3. I will work with care coordination to understand my care plan, schedule appts as needed, and keep track of my appts as needed.                 Advance Care Plans/Directives Type:   POLST; Advanced Directive - On File      My Medical and Care Information  Problem List   Patient Active Problem List   Diagnosis     Menopausal syndrome (hot flashes)     Family history of breast cancer     Vulvar pain     Renal anomaly     Overactive bladder     Rectal prolapse     CARDIOVASCULAR SCREENING; LDL GOAL LESS THAN 160     Vaginal pain     Dysphagia     Confusion     Headache     Left shoulder pain     Anemia     Mild major depression (H)     Esophageal stricture     Vulvar lesion      Temporal sclerosis     Lumbago     Pain in thoracic spine     Sciatica     Pain in joint, lower leg     Plantar fascial fibromatosis     Pain in joint involving ankle and foot     Other specified hypothyroidism     GERD (gastroesophageal reflux disease)     Irritable bowel syndrome     Other sleep apnea     Cervical high risk HPV (human papillomavirus) test positive     Restless legs syndrome (RLS)     History of total hip arthroplasty, right     Hip pain     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)     Cellulitis of right hip     Deep venous thrombosis of upper extremity (H)     Peripheral edema     Low iron stores     Mechanical complication of prosthetic hip implant, initial encounter (H)     Status post hip surgery     Chest pain, unspecified     Closed fracture of proximal end of left humerus     Generalized anxiety disorder     History of infection     Infection and inflammatory reaction due to unspecified internal joint prosthesis, initial encounter (H)     Ingrowing nail     Major depressive disorder, recurrent episode, moderate (H)     Mild intermittent asthma     Nausea     Partial epilepsy with intractable epilepsy (H)     Repeated falls     History of revision of total replacement of right hip joint     Seizure disorder (H)     Benign essential hypertension     Morbid obesity (H)     Sacroiliac joint pain     Closed fracture of left wrist, initial encounter     Lumbar spondylosis     Lumbar facet arthropathy     Tuberous sclerosis (H)      Current Medications and Allergies:       Allergies   Allergen Reactions     Blood Transfusion Related (Informational Only) Other (See Comments)     Patient has a history of a clinically significant antibody against RBC antigens.  A delay in compatible RBCs may occur.     Budesonide      Edema     Bupropion Rash     Carbamazepine Diarrhea     Clonazepam Other (See Comments)     Hypotension, trouble walking, mind disturbance     Encort [Hydrocortisone Acetate]  Swelling     Localized to feet     Encort [Hydrocortisone] Swelling     Erythromycin Diarrhea     Erythromycin Nausea and Vomiting and Diarrhea     Flagyl [Metronidazole] Nausea     Gabapentin Other (See Comments)     Causes over-eating     Lamictal [Lamotrigine] Dizziness     Oxycodone Nausea and Vomiting     Tegretol [Carbamazepine] Nausea and Vomiting     Current Outpatient Medications   Medication     acetaminophen (TYLENOL) 325 MG tablet     albuterol (ALBUTEROL) 108 (90 BASE) MCG/ACT Inhaler     alendronate (FOSAMAX) 70 MG tablet     amLODIPine (NORVASC) 5 MG tablet     amoxicillin (AMOXIL) 500 MG tablet     aspirin 81 MG EC tablet     benzonatate (TESSALON) 200 MG capsule     budesonide-formoterol (SYMBICORT) 160-4.5 MCG/ACT Inhaler     busPIRone (BUSPAR) 5 MG tablet     carboxymethylcellulose PF (REFRESH PLUS) 0.5 % ophthalmic solution     cyclobenzaprine (FLEXERIL) 5 MG tablet     cycloSPORINE (RESTASIS) 0.05 % ophthalmic emulsion     Dentifrices (BIOTENE DRY MOUTH DT)     Estradiol-Estriol-Progesterone (BIEST/PROGESTERONE TD)     fluticasone-vilanterol (BREO ELLIPTA) 200-25 MCG/INH inhaler     furosemide (LASIX) 20 MG tablet     hypromellose (ARTIFICIAL TEARS) 0.5 % SOLN ophthalmic solution     ibuprofen (ADVIL/MOTRIN) 600 MG tablet     lacosamide (VIMPAT) 200 MG TABS tablet     linaclotide (LINZESS) 290 MCG capsule     loratadine (CLARITIN REDITABS) 10 MG ODT     LORazepam (ATIVAN) 0.5 MG tablet     omeprazole (PRILOSEC) 40 MG DR capsule     ondansetron (ZOFRAN-ODT) 4 MG ODT tab     potassium citrate (UROCIT-K) 10 MEQ (1080 MG) CR tablet     pramipexole (MIRAPEX) 1 MG tablet     QUEtiapine (SEROQUEL) 50 MG tablet     topiramate (TOPAMAX) 25 MG tablet     traZODone (DESYREL) 150 MG tablet     trolamine salicylate (ASPERCREME) 10 % external cream     No current facility-administered medications for this visit.     Facility-Administered Medications Ordered in Other Visits   Medication     gadobutrol  (GADAVIST) injection 10 mL         Care Coordination Start Date: 11/13/2019   Frequency of Care Coordination: monthly     Form Last Updated: 05/24/2022

## 2022-05-23 NOTE — PROGRESS NOTES
Clinic Care Coordination Contact    Situation: Patient chart reviewed by SW/care coordinator.    Background: Initial assessment and enrollment to Care Coordination was 07/01/2021.?? Patient centered goals were developed with participation from patient.? Lead CC handed patient off to CHW for continued outreach every 30 days.?    Assessment: Per chart review, patient outreach completed by CC CHW on 5/3/2022.? Patient is actively working to accomplish goal(s).? Patient's goal(s) remain(s) appropriate at this time.  Annual assessment will be due 7/1/2022.    Mundo Tolentino, SW/CC, for Peter Monae, SW/CC  [phone]

## 2022-05-24 ENCOUNTER — ANCILLARY PROCEDURE (OUTPATIENT)
Dept: ULTRASOUND IMAGING | Facility: CLINIC | Age: 67
End: 2022-05-24
Attending: INTERNAL MEDICINE
Payer: MEDICARE

## 2022-05-24 DIAGNOSIS — E03.9 ACQUIRED HYPOTHYROIDISM: ICD-10-CM

## 2022-05-24 LAB — THYROPEROXIDASE AB SERPL-ACNC: <10 IU/ML

## 2022-05-24 PROCEDURE — 76536 US EXAM OF HEAD AND NECK: CPT | Mod: GC | Performed by: RADIOLOGY

## 2022-05-25 ENCOUNTER — HOSPITAL ENCOUNTER (OUTPATIENT)
Dept: NUCLEAR MEDICINE | Facility: CLINIC | Age: 67
Setting detail: NUCLEAR MEDICINE
Discharge: HOME | End: 2022-05-25
Attending: INTERNAL MEDICINE
Payer: MEDICARE

## 2022-05-25 DIAGNOSIS — E03.9 ACQUIRED HYPOTHYROIDISM: ICD-10-CM

## 2022-05-25 PROCEDURE — 78014 THYROID IMAGING W/BLOOD FLOW: CPT | Mod: ME

## 2022-05-25 PROCEDURE — A9516 IODINE I-123 SOD IODIDE MIC: HCPCS | Performed by: INTERNAL MEDICINE

## 2022-05-25 PROCEDURE — 343N000001 HC RX 343: Performed by: INTERNAL MEDICINE

## 2022-05-25 RX ADMIN — Medication 236 UCI.: at 12:16

## 2022-05-26 ENCOUNTER — HOSPITAL ENCOUNTER (OUTPATIENT)
Dept: NUCLEAR MEDICINE | Facility: CLINIC | Age: 67
Setting detail: NUCLEAR MEDICINE
Discharge: HOME | End: 2022-05-26
Attending: INTERNAL MEDICINE
Payer: MEDICARE

## 2022-05-26 ENCOUNTER — VIRTUAL VISIT (OUTPATIENT)
Dept: INTERNAL MEDICINE | Facility: CLINIC | Age: 67
End: 2022-05-26
Payer: MEDICARE

## 2022-05-26 DIAGNOSIS — F33.1 MAJOR DEPRESSIVE DISORDER, RECURRENT EPISODE, MODERATE (H): ICD-10-CM

## 2022-05-26 DIAGNOSIS — E05.90 HYPERTHYROIDISM: Primary | ICD-10-CM

## 2022-05-26 PROCEDURE — 99214 OFFICE O/P EST MOD 30 MIN: CPT | Mod: GT | Performed by: INTERNAL MEDICINE

## 2022-05-26 ASSESSMENT — PATIENT HEALTH QUESTIONNAIRE - PHQ9
SUM OF ALL RESPONSES TO PHQ QUESTIONS 1-9: 14
SUM OF ALL RESPONSES TO PHQ QUESTIONS 1-9: 14
10. IF YOU CHECKED OFF ANY PROBLEMS, HOW DIFFICULT HAVE THESE PROBLEMS MADE IT FOR YOU TO DO YOUR WORK, TAKE CARE OF THINGS AT HOME, OR GET ALONG WITH OTHER PEOPLE: VERY DIFFICULT

## 2022-05-26 NOTE — Clinical Note
Dr. Smith  This is just a follow-up on our mutual patient who you will be seeing in consultation Ms. Mercedes Barber.  As reminder, she was a patient I saw in the clinic and was receiving a large amount of levothyroxine and Cytomel from an outside provider.  She had subjective hyperthyroid symptoms with supportive labs.  The symptoms started to improve as did her thyroid function studies upon cessation of the medication.  Her most recent labs although still off medication started to elevate.  Follow-up studies have demonstrated a stable thyroid ultrasound but a uptake scan consistent with Graves' disease. I am wondering if she needs to be seen sooner rather than later and if something can be done to accommodate an appointment for her in a more timely fashion as he seems to be somewhat uncomfortable in regards to her current symptoms and he has seen some notable weight loss.  Would appreciate your input.  Skyler Álvarez MD  [facility] internal medicine.

## 2022-05-26 NOTE — PROGRESS NOTES
Mercedes is a 67 year old who is being evaluated via a billable video visit.    How would you like to obtain your AVS? MyChart  If the video visit is dropped, the invitation should be resent by: Send to e-mail at: [email or web address]  Will anyone else be joining your video visit? No    Video Start Time: 309PM    Assessment & Plan     (E05.90) Hyperthyroidism  Comment: Discussed with patient results as noted.  I have forwarded information to patient's endocrinologist in hopes of trying to get the patient in to be seen sooner so that we can determine whether or not she needs ongoing more aggressive treatment based on uptake scan results.  Plan:      (F33.1) Major depressive disorder, recurrent episode, moderate (H)  (primary encounter diagnosis)  Comment:   Plan: Discussed with patient issues in regards to PHQ-9 score.  Patient reports that she has intermittent episodes of feeling down due to the fact that her son's committed suicide in the past.  Patient states that she has a good support group through her [employer] and she is involved with a mental health provider.  At the present time she is not have any thoughts of harming her self or others and feels that the issues are such are in relationship to her son that she is working through with her mental health provider      Depression Screening Follow Up    PHQ 5/26/2022   PHQ-9 Total Score 14   Q9: Thoughts of better off dead/self-harm past 2 weeks Several days   F/U: Thoughts of suicide or self-harm No   F/U: Safety concerns Yes       Return in about 1 month (around 6/26/2022) for f/u with routine sub-specialist.    Skyler Álvarez MD  [facility]   Mercedes is a 67 year old who presents for the following health issues  accompanied by her self.    HPI     Hypothyroidism Follow-up      Since last visit, patient describes the following symptoms: weight loss of 2 lbs, constipation, anxiety, depression and fatigue    Pt's  feet and toes hurt so back cut barely walk. Nails are brittle. Pt has an appointment with the podiatrist on 5/31/2022.     Review of Systems:     CONSTITUTIONAL: NEGATIVE for fever, chills with + change in weight  RESP: NEGATIVE for significant cough or SOB  CV: NEGATIVE for chest pain, palpitations or peripheral edema  GI: NEGATIVE for nausea, abdominal pain, heartburn, or change in bowel habits  : NEGATIVE for frequency, dysuria, or hematuria  HEME: NEGATIVE for bleeding problems  PSYCHIATRIC: NEGATIVE for changes in mood or affect      Objective           Vitals:  No vitals were obtained today due to virtual visit.    Physical Exam   GENERAL: alert and no distress  EYES: Eyes grossly normal to inspection.  No discharge or erythema, or obvious scleral/conjunctival abnormalities.  RESP: No audible wheeze, cough, or visible cyanosis.  No visible retractions or increased work of breathing.    SKIN: Visible skin clear. No significant rash, abnormal pigmentation or lesions.  NEURO: Cranial nerves grossly intact.  Mentation and speech appropriate for age.  PSYCH: Mentation appears normal, affect normal/bright, judgement and insight intact, normal speech and appearance well-groomed.        Component      Latest Ref Rng & Units 4/7/2022 5/12/2022 5/23/2022   TSH      0.40 - 4.00 mU/L <0.01 (L) <0.01 (L) <0.01 (L)   Triiodothyronine (T3)      60 - 181 ng/dL 137 222 (H) 187 (H)   T4 Free      0.76 - 1.46 ng/dL 1.43 2.15 (H) 1.77 (H)   Thyroid Peroxidase Antibody      <35 IU/mL   <10       US THYROID 5/24/2022 1:18 PM     COMPARISON: None     HISTORY: Acquired hypothyroidism     FINDINGS:   Thyroid parenchyma: Homogenous  The right lobe of the thyroid measures: 5.3 x 1.2 x 2 cm  The left lobe of the thyroid measures: 2.9 x 1.3 x 1.1 cm  The thyroid isthmus measures: 0.3 cm     No thyroid nodules identified.                                                                       Impression: Normal sonographic appearance of  the thyroid. No thyroid  Nodules.      Nuclear med thyroid uptake and scan:    FINDINGS: 24-hour uptake: 47.3% (Normal range: 10-30%).                                                                      IMPRESSION:     Diffuse radiotracer uptake throughout the thyroid gland suspicious for Graves' disease without hot/cold nodule.      Video-Visit Details    Type of service:  Video Visit    Video End Time:330PM    Originating Location (pt. Location): Home    Distant Location (provider location):  [facility]     Platform used for Video Visit: Sharifa

## 2022-05-26 NOTE — Clinical Note
Rachael,  We have tried to get patient in sooner prior to her July appointment but have been unsuccessful.  Would appreciate any suggestions in interim to help patient until you can see her regarding labs tests and recent imaging studies.  Thanks  Skyler

## 2022-05-27 ENCOUNTER — TELEPHONE (OUTPATIENT)
Dept: INTERNAL MEDICINE | Facility: CLINIC | Age: 67
End: 2022-05-27

## 2022-05-27 ENCOUNTER — MYC MEDICAL ADVICE (OUTPATIENT)
Dept: INTERNAL MEDICINE | Facility: CLINIC | Age: 67
End: 2022-05-27
Payer: MEDICARE

## 2022-05-27 DIAGNOSIS — E05.00 GRAVES DISEASE: Primary | ICD-10-CM

## 2022-05-27 LAB — TSI SER-ACNC: 4.3 TSI INDEX

## 2022-05-27 NOTE — TELEPHONE ENCOUNTER
Please inform patient:    I placed a referral for an eye doctor for her and they should be calling for an appointment.    Dr. Álvarez

## 2022-05-27 NOTE — TELEPHONE ENCOUNTER
Please see mychart from patient and advise appropriate course of action.    Do you recommend ophthalmologist visit for patient?      Chava Martinez RN  [facility] Triage Nurse

## 2022-05-27 NOTE — TELEPHONE ENCOUNTER
Received a call from the patient stating she called Rachael Smith, endocrinologist, and was able to get an appointment scheduled but not until 7/6/2022. Patient is wondering if this is soon enough or if Dr. Álvarez is able to contact the clinic to get the patient in sooner?    Phone Number for Clinic: [phone]    Patient would like a call back with an update.    Routing to PCP for review.     Can we leave a detailed message on this number? YES  Phone number patient can be reached at: Home number on file [phone] (home)    Cindy Prather RN  [facility] Triage

## 2022-05-31 ENCOUNTER — OFFICE VISIT (OUTPATIENT)
Dept: PODIATRY | Facility: CLINIC | Age: 67
End: 2022-05-31
Payer: MEDICARE

## 2022-05-31 VITALS
BODY MASS INDEX: 31.32 KG/M2 | WEIGHT: 188 LBS | DIASTOLIC BLOOD PRESSURE: 74 MMHG | SYSTOLIC BLOOD PRESSURE: 116 MMHG | HEIGHT: 65 IN

## 2022-05-31 DIAGNOSIS — G62.9 PERIPHERAL POLYNEUROPATHY: Primary | ICD-10-CM

## 2022-05-31 PROCEDURE — 99213 OFFICE O/P EST LOW 20 MIN: CPT | Performed by: PODIATRIST

## 2022-05-31 NOTE — PATIENT INSTRUCTIONS
PATIENT INSTRUCTIONS - Podiatry / Foot & Ankle Surgery    All of the toes hurting - neuropathy pain likely - follow- up with PCP for medical management (medication)      ROUTINE FOOT CARE (NAIL TRIMMING / CALLUSES)    Go to afcna.org ([employer] Nurses Association) and search for providers near you.  Otherwise, this is a list we have gathered of  recommended locations/providers in [place].    [facility]   [phone]   [facility]  [phone]  [email or web address]   [facility]  [phone]  [place] + [place] mile radius [facility]  [phone]  [facility].   [facility]  [address], [place] [address] [phone] Samir Daniels DPM  [address] [place] [address] [phone]   [facility]  [phone] [address] [place] [address]  [facility]  Dr. Jerald Dodson  [phone]  [facility]  [phone]  [place] & [place] Locations  (does not take BCBS) FYI:  *Some providers accept insurance while others are out of pocket. Please contact them for details*

## 2022-05-31 NOTE — PROGRESS NOTES
Assessment:      ICD-10-CM    1. Peripheral polyneuropathy  G62.9         Plan:  No orders of the defined types were placed in this encounter.      Discussed the etiology and treatment of the condition with the patient.  Imaging studies reviewed and discussed with the patient.  Discussed surgical and conservative options.    Pain in all of the toes.  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  PCP for neuropathy management    Foot care nurses for nails          Return:  No follow-ups on file.    Drea Lozano DPM                Chief Complaint:     Patient presents with:  RECHECK     left foot pain    HPI:  Mercedes Barber is a 66 year old year old female who presents for evaluation of foot pain.    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     Neuropathy of lower extremity      Other sleep apnea      Rectal prolapse      Restless leg syndrome      Seizure disorder (H)     25 years ago     Sleep apnea     CPAP, no longer using CPAP     Temporal sclerosis 8/27/2012     Uncomplicated asthma       Past Surgical History:   Procedure Laterality Date     Anterior COLPORRHAPHY, BLADDER/VAGINA      perigee mesh     ARTHROPLASTY HIP Right 7/23/2019    Procedure: Right total hip arthroplasty;  Surgeon: Anant Mejia MD;  Location: [facility]     ARTHROPLASTY PATELLO-FEMORAL (KNEE)  Bilateral      ARTHROPLASTY REVISION HIP Right 8/7/2019    Procedure: Right hip revision total arthroplasty;  Surgeon: Tom Antonio MD;  [facility]: [facility]     ARTHROPLASTY REVISION HIP Right 8/5/2020    Procedure: Revision Right total hip arthroplasty;  Surgeon: Pan Grey MD;  [facility]     CARPAL TUNNEL RELEASE RT/LT       DENTAL SURGERY      implant     DESTRUCTION OF PARAVERTEBRAL FACET LUMBAR / SACRAL SINGLE Left 10/27/2021    Procedure: DESTRUCTION, NERVE, FACET JOINT, LUMBOSACRAL, Left L4-5 and L5-S1 facet joint radiofrequency ablation;  Surgeon: Celena Perez MD;  Location: [facility] OR     DILATION AND CURETTAGE       DRAIN PILONIDAL CYST SIMPL       ENDOSCOPY DRUG INDUCED SLEEP N/A 11/18/2015    Procedure: ENDOSCOPY DRUG INDUCED SLEEP;  Surgeon: Park Ortiz MD;  [facility]: [facility]     ESOPHAGOSCOPY, GASTROSCOPY, DUODENOSCOPY (EGD), COMBINED  4/5/2013    Procedure: COMBINED ESOPHAGOSCOPY, GASTROSCOPY, DUODENOSCOPY (EGD), BIOPSY SINGLE OR MULTIPLE;;  Surgeon: Mundo Mehta MD;  Location: [facility]     EXCISE LESION TRUNK  8/10/2012    Procedure: EXCISE LESION TRUNK;;  Surgeon: Jerald Diggs MD;  [facility]: [facility]     HYSTERECTOMY       INJECT BLOCK MEDIAL BRANCH CERVICAL/THORACIC/LUMBAR Bilateral 9/20/2021    Procedure: BLOCK, NERVE, FACET JOINT, MEDIAL BRANCH, DIAGNOSTIC - Bilateral L4-5 and L5-S1 Medial branch blocks;  Surgeon: Celena Perez MD;  Location: [facility] OR     INJECT BLOCK MEDIAL BRANCH CERVICAL/THORACIC/LUMBAR Bilateral 9/27/2021    Procedure: BLOCK, NERVE, FACET JOINT, MEDIAL BRANCH, DIAGNOSTIC - Bilateral L4-5 and L5-S1 Medial branch blocks;  Surgeon: Celena Perez MD;  Location: [facility] OR     INJECT SACROILIAC JOINT Bilateral 5/24/2021    Procedure: INJECTION, SACROILIAC JOINT;  Surgeon: Celena Perez MD;  Location: [facility] OR     IRRIGATION AND DEBRIDEMENT HIP, COMBINED Right 8/26/2019    Procedure: 1.  Right hip wound irrigation and debridement with  excisional debridement of nonviable skin, subcutaneous tissues, and fascia. 2. Application of incisional wound VAC, 27cm length incision.;  Surgeon: Tyson Prabhakar MD;  [facility]: [facility]     L shoulder fracture       OPEN REDUCTION INTERNAL FIXATION WRIST Left 4/30/2021    Procedure: Open reduction with internal fixation of displaced left intraarticular distal radius fracture;  Surgeon: Luis Manuel Sánchez MD;  [facility]: [facility]     rectal prolapse repair abdominally       RELEASE PLANTAR FASCIA Right 1/20/2015    Procedure: RELEASE PLANTAR FASCIA;  Surgeon: Maicol Liu DPM;  Location: [facility]     REMOVE MESH VAGINA  8/10/2012    Procedure: REMOVE MESH VAGINA;  Excision of Vaginal Mesh Exposure, Removal of Skin Tag Left Inner Leg;  Surgeon: Jerald Diggs MD;  [facility]: [facility]     REPAIR HAMMER TOE Right 1/20/2015    Procedure: REPAIR HAMMER TOE;  Surgeon: Maicol Liu DPM;  Location: [facility]     TONSILLECTOMY & ADENOIDECTOMY       TUBAL LIGATION        Family History   Problem Relation Age of Onset     Eye Disorder Mother      Lipids Mother         has CHF     Osteoporosis Mother      Diabetes Father      Alzheimer Disease Paternal Grandmother      Hypertension Brother      Alcohol/Drug Brother      Depression Brother      Gastrointestinal Disease Brother         hx of colonic polyps     Lipids Brother      Psychotic Disorder Brother      Breast Cancer Sister      Depression Sister      Lipids Sister      Thyroid Disease Sister      Congenital Anomalies Daughter      Neurologic Disorder Daughter         Social History:  ?  History   Smoking Status     Never Smoker   Smokeless Tobacco     Never Used     History   Drug Use No     Social History    Substance and Sexual Activity      Alcohol use: Yes        Comment: 1 glass of wine 2x/yr      Allergies:  ?   Allergies   Allergen Reactions     Blood Transfusion Related (Informational Only) Other (See Comments)     Patient has a history of a  clinically significant antibody against RBC antigens.  A delay in compatible RBCs may occur.     Budesonide      Edema     Bupropion Rash     Carbamazepine Diarrhea     Clonazepam Other (See Comments)     Hypotension, trouble walking, mind disturbance     Encort [Hydrocortisone Acetate] Swelling     Localized to feet     Encort [Hydrocortisone] Swelling     Erythromycin Diarrhea     Erythromycin Nausea and Vomiting and Diarrhea     Flagyl [Metronidazole] Nausea     Gabapentin Other (See Comments)     Causes over-eating     Lamictal [Lamotrigine] Dizziness     Oxycodone Nausea and Vomiting     Tegretol [Carbamazepine] Nausea and Vomiting        Medications:    Current Outpatient Medications   Medication     acetaminophen (TYLENOL) 325 MG tablet     albuterol (ALBUTEROL) 108 (90 BASE) MCG/ACT Inhaler     alendronate (FOSAMAX) 70 MG tablet     amLODIPine (NORVASC) 5 MG tablet     amoxicillin (AMOXIL) 500 MG tablet     aspirin 81 MG EC tablet     benzonatate (TESSALON) 200 MG capsule     budesonide-formoterol (SYMBICORT) 160-4.5 MCG/ACT Inhaler     busPIRone (BUSPAR) 5 MG tablet     carboxymethylcellulose PF (REFRESH PLUS) 0.5 % ophthalmic solution     cyclobenzaprine (FLEXERIL) 5 MG tablet     cycloSPORINE (RESTASIS) 0.05 % ophthalmic emulsion     Dentifrices (BIOTENE DRY MOUTH DT)     Estradiol-Estriol-Progesterone (BIEST/PROGESTERONE TD)     fluticasone-vilanterol (BREO ELLIPTA) 200-25 MCG/INH inhaler     furosemide (LASIX) 20 MG tablet     hypromellose (ARTIFICIAL TEARS) 0.5 % SOLN ophthalmic solution     ibuprofen (ADVIL/MOTRIN) 600 MG tablet     lacosamide (VIMPAT) 200 MG TABS tablet     linaclotide (LINZESS) 290 MCG capsule     loratadine (CLARITIN REDITABS) 10 MG ODT     LORazepam (ATIVAN) 0.5 MG tablet     omeprazole (PRILOSEC) 40 MG DR capsule     ondansetron (ZOFRAN-ODT) 4 MG ODT tab     potassium citrate (UROCIT-K) 10 MEQ (1080 MG) CR tablet     pramipexole (MIRAPEX) 1 MG tablet     QUEtiapine (SEROQUEL) 50  "MG tablet     topiramate (TOPAMAX) 25 MG tablet     traZODone (DESYREL) 150 MG tablet     trolamine salicylate (ASPERCREME) 10 % external cream     No current facility-administered medications for this visit.     Facility-Administered Medications Ordered in Other Visits   Medication     gadobutrol (GADAVIST) injection 10 mL       Physical Exam:  ?  Vitals:  /74   Ht 1.651 m (5' 5\")   Wt 85.3 kg (188 lb)   LMP 03/11/2010   BMI 31.28 kg/m     General:  WD/WN, in NAD.  A&O x3.  Dermatologic:    Skin is intact, open lesions absent.   Skin texture, turgor is normal.  Vascular:  Pulses palpable bilateral.  Digital capillary refill time normal bilateral.  Skin temperature is normal bilateral.  Generalized edema- trace bilateral.  Focal edema- none bilateral.  Neurologic:    Gross sensation normal.  Gait and balance abnormal, poor balance.  Musculoskeletal:  2nd digit w/ mainly adduction at MPJ, mild sagittal plane 2nd digit contracture, left  Hallux recuts, left  no pain to palpation of 1st MPJ left.  1st MPJ ROM limited, crepitation present, minimally painful, left.    Muscle strength 5/5  foot and ankle bilateral.    Stance:  Clinical deformity: adducted 2nd digit, rectus hallux    Imaging:   x-ray independently reviewed and interpreted by myself today.  Weight-bearing views left foot dated 04/05/22, reveal short 1st met w/ 1st MPJ degeneration, relatively long 2nd/3rd mets w/ transverse plane adduction of 2nd digit.  Pes valgus w/ NCJ fault.              "

## 2022-05-31 NOTE — LETTER
5/31/2022         RE: Mercedes Barber  [address]  [place] [address]        Dear Colleague,    Thank you for referring your patient, Mercedes Barber, to the [facility]. Please see a copy of my visit note below.    Assessment:      ICD-10-CM    1. Peripheral polyneuropathy  G62.9         Plan:  No orders of the defined types were placed in this encounter.      Discussed the etiology and treatment of the condition with the patient.  Imaging studies reviewed and discussed with the patient.  Discussed surgical and conservative options.    Pain in all of the toes.  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  PCP for neuropathy management    Foot care nurses for nails          Return:  No follow-ups on file.    Drea Lozano DPM                Chief Complaint:     Patient presents with:  RECHECK     left foot pain    HPI:  Mercedes Barber is a 66 year old year old female who presents for evaluation of foot pain.    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     Neuropathy of lower extremity      Other sleep apnea      Rectal prolapse      Restless leg syndrome      Seizure disorder (H)     25 years ago     Sleep apnea     CPAP, no longer using CPAP     Temporal sclerosis 8/27/2012     Uncomplicated asthma       Past  Surgical History:   Procedure Laterality Date     Anterior COLPORRHAPHY, BLADDER/VAGINA      perigee mesh     ARTHROPLASTY HIP Right 7/23/2019    Procedure: Right total hip arthroplasty;  Surgeon: Anant Mejia MD;  [facility]: [facility]     ARTHROPLASTY PATELLO-FEMORAL (KNEE) Bilateral      ARTHROPLASTY REVISION HIP Right 8/7/2019    Procedure: Right hip revision total arthroplasty;  Surgeon: Tom Antonio MD;  [facility]: [facility]     ARTHROPLASTY REVISION HIP Right 8/5/2020    Procedure: Revision Right total hip arthroplasty;  Surgeon: Pan Grey MD;  [facility]     CARPAL TUNNEL RELEASE RT/LT       DENTAL SURGERY      implant     DESTRUCTION OF PARAVERTEBRAL FACET LUMBAR / SACRAL SINGLE Left 10/27/2021    Procedure: DESTRUCTION, NERVE, FACET JOINT, LUMBOSACRAL, Left L4-5 and L5-S1 facet joint radiofrequency ablation;  Surgeon: Celena Perez MD;  Location: [facility] OR     DILATION AND CURETTAGE       DRAIN PILONIDAL CYST SIMPL       ENDOSCOPY DRUG INDUCED SLEEP N/A 11/18/2015    Procedure: ENDOSCOPY DRUG INDUCED SLEEP;  Surgeon: Park Ortiz MD;  Location: [facility]     ESOPHAGOSCOPY, GASTROSCOPY, DUODENOSCOPY (EGD), COMBINED  4/5/2013    Procedure: COMBINED ESOPHAGOSCOPY, GASTROSCOPY, DUODENOSCOPY (EGD), BIOPSY SINGLE OR MULTIPLE;;  Surgeon: Mundo Mehta MD;  Location:  [facility]     EXCISE LESION TRUNK  8/10/2012    Procedure: EXCISE LESION TRUNK;;  Surgeon: Jerald Diggs MD;  [facility]: [facility]     HYSTERECTOMY       INJECT BLOCK MEDIAL BRANCH CERVICAL/THORACIC/LUMBAR Bilateral 9/20/2021    Procedure: BLOCK, NERVE, FACET JOINT, MEDIAL BRANCH, DIAGNOSTIC - Bilateral L4-5 and L5-S1 Medial branch blocks;  Surgeon: Celena Perez MD;  Location: [facility] OR     INJECT BLOCK MEDIAL BRANCH CERVICAL/THORACIC/LUMBAR Bilateral 9/27/2021    Procedure: BLOCK, NERVE, FACET JOINT, MEDIAL BRANCH, DIAGNOSTIC - Bilateral L4-5 and L5-S1 Medial branch blocks;  Surgeon: Celena Perez MD;  Location: [facility]  OR     INJECT SACROILIAC JOINT Bilateral 5/24/2021    Procedure: INJECTION, SACROILIAC JOINT;  Surgeon: Celena Perez MD;  Location: [facility] OR     IRRIGATION AND DEBRIDEMENT HIP, COMBINED Right 8/26/2019    Procedure: 1.  Right hip wound irrigation and debridement with excisional debridement of nonviable skin, subcutaneous tissues, and fascia. 2. Application of incisional wound VAC, 27cm length incision.;  Surgeon: Tyson Prabhakar MD;  [facility]: [facility]     L shoulder fracture       OPEN REDUCTION INTERNAL FIXATION WRIST Left 4/30/2021    Procedure: Open reduction with internal fixation of displaced left intraarticular distal radius fracture;  Surgeon: Luis Manuel Sánchez MD;  [facility]: [facility]     rectal prolapse repair abdominally       RELEASE PLANTAR FASCIA Right 1/20/2015    Procedure: RELEASE PLANTAR FASCIA;  Surgeon: Maicol Liu DPM;  Location: [facility]     REMOVE MESH VAGINA  8/10/2012    Procedure: REMOVE MESH VAGINA;  Excision of Vaginal Mesh Exposure, Removal of Skin Tag Left Inner Leg;  Surgeon: Jerald Diggs MD;  [facility]: [facility]     REPAIR HAMMER TOE Right 1/20/2015    Procedure: REPAIR HAMMER TOE;  Surgeon: Maicol Liu DPM;  Location: [facility]     TONSILLECTOMY & ADENOIDECTOMY       TUBAL LIGATION        Family History   Problem Relation Age of Onset     Eye Disorder Mother      Lipids Mother         has CHF     Osteoporosis Mother      Diabetes Father      Alzheimer Disease Paternal Grandmother      Hypertension Brother      Alcohol/Drug Brother      Depression Brother      Gastrointestinal Disease Brother         hx of colonic polyps     Lipids Brother      Psychotic Disorder Brother      Breast Cancer Sister      Depression Sister      Lipids Sister      Thyroid Disease Sister      Congenital Anomalies Daughter      Neurologic Disorder Daughter         Social History:  ?  History   Smoking Status     Never Smoker   Smokeless Tobacco     Never Used     History   Drug Use No      Social History    Substance and Sexual Activity      Alcohol use: Yes        Comment: 1 glass of wine 2x/yr      Allergies:  ?   Allergies   Allergen Reactions     Blood Transfusion Related (Informational Only) Other (See Comments)     Patient has a history of a clinically significant antibody against RBC antigens.  A delay in compatible RBCs may occur.     Budesonide      Edema     Bupropion Rash     Carbamazepine Diarrhea     Clonazepam Other (See Comments)     Hypotension, trouble walking, mind disturbance     Encort [Hydrocortisone Acetate] Swelling     Localized to feet     Encort [Hydrocortisone] Swelling     Erythromycin Diarrhea     Erythromycin Nausea and Vomiting and Diarrhea     Flagyl [Metronidazole] Nausea     Gabapentin Other (See Comments)     Causes over-eating     Lamictal [Lamotrigine] Dizziness     Oxycodone Nausea and Vomiting     Tegretol [Carbamazepine] Nausea and Vomiting        Medications:    Current Outpatient Medications   Medication     acetaminophen (TYLENOL) 325 MG tablet     albuterol (ALBUTEROL) 108 (90 BASE) MCG/ACT Inhaler     alendronate (FOSAMAX) 70 MG tablet     amLODIPine (NORVASC) 5 MG tablet     amoxicillin (AMOXIL) 500 MG tablet     aspirin 81 MG EC tablet     benzonatate (TESSALON) 200 MG capsule     budesonide-formoterol (SYMBICORT) 160-4.5 MCG/ACT Inhaler     busPIRone (BUSPAR) 5 MG tablet     carboxymethylcellulose PF (REFRESH PLUS) 0.5 % ophthalmic solution     cyclobenzaprine (FLEXERIL) 5 MG tablet     cycloSPORINE (RESTASIS) 0.05 % ophthalmic emulsion     Dentifrices (BIOTENE DRY MOUTH DT)     Estradiol-Estriol-Progesterone (BIEST/PROGESTERONE TD)     fluticasone-vilanterol (BREO ELLIPTA) 200-25 MCG/INH inhaler     furosemide (LASIX) 20 MG tablet     hypromellose (ARTIFICIAL TEARS) 0.5 % SOLN ophthalmic solution     ibuprofen (ADVIL/MOTRIN) 600 MG tablet     lacosamide (VIMPAT) 200 MG TABS tablet     linaclotide (LINZESS) 290 MCG capsule     loratadine (CLARITIN  "REDITABS) 10 MG ODT     LORazepam (ATIVAN) 0.5 MG tablet     omeprazole (PRILOSEC) 40 MG DR capsule     ondansetron (ZOFRAN-ODT) 4 MG ODT tab     potassium citrate (UROCIT-K) 10 MEQ (1080 MG) CR tablet     pramipexole (MIRAPEX) 1 MG tablet     QUEtiapine (SEROQUEL) 50 MG tablet     topiramate (TOPAMAX) 25 MG tablet     traZODone (DESYREL) 150 MG tablet     trolamine salicylate (ASPERCREME) 10 % external cream     No current facility-administered medications for this visit.     Facility-Administered Medications Ordered in Other Visits   Medication     gadobutrol (GADAVIST) injection 10 mL       Physical Exam:  ?  Vitals:  /74   Ht 1.651 m (5' 5\")   Wt 85.3 kg (188 lb)   LMP 03/11/2010   BMI 31.28 kg/m     General:  WD/WN, in NAD.  A&O x3.  Dermatologic:    Skin is intact, open lesions absent.   Skin texture, turgor is normal.  Vascular:  Pulses palpable bilateral.  Digital capillary refill time normal bilateral.  Skin temperature is normal bilateral.  Generalized edema- trace bilateral.  Focal edema- none bilateral.  Neurologic:    Gross sensation normal.  Gait and balance abnormal, poor balance.  Musculoskeletal:  2nd digit w/ mainly adduction at MPJ, mild sagittal plane 2nd digit contracture, left  Hallux recuts, left  no pain to palpation of 1st MPJ left.  1st MPJ ROM limited, crepitation present, minimally painful, left.    Muscle strength 5/5  foot and ankle bilateral.    Stance:  Clinical deformity: adducted 2nd digit, rectus hallux    Imaging:   x-ray independently reviewed and interpreted by myself today.  Weight-bearing views left foot dated 04/05/22, reveal short 1st met w/ 1st MPJ degeneration, relatively long 2nd/3rd mets w/ transverse plane adduction of 2nd digit.  Pes valgus w/ NCJ fault.                  Again, thank you for allowing me to participate in the care of your patient.        Sincerely,        Drea Lozano DPM    "

## 2022-06-01 ENCOUNTER — NURSE TRIAGE (OUTPATIENT)
Dept: NURSING | Facility: CLINIC | Age: 67
End: 2022-06-01
Payer: MEDICARE

## 2022-06-02 ENCOUNTER — NURSE TRIAGE (OUTPATIENT)
Dept: INTERNAL MEDICINE | Facility: CLINIC | Age: 67
End: 2022-06-02
Payer: MEDICARE

## 2022-06-02 NOTE — TELEPHONE ENCOUNTER
Nurse Triage SBAR    Is this a 2nd Level Triage? NO    Situation: See MyChart:  Mercedes Barber  to Skyler Álvarez MD          6/2/22 2:14 AM  Dr. Álvarez I had a bad reaction this morning with itching and redness. And then this afternoon this evening I had itching in and then I took Benadryl and slept and I m wondering if I will continue to have this.     Thank you  Mercedes Barber    Patient states that she used to only itch at night. Yesterday was in the morning as well. Controlled with Benadryl and lots of hydrocortisone at night.     Background: Recently diagnosed with Graves disease. Sees endocrinologist 7/6.     Assessment: Worsening itching.     Protocol Recommended Disposition:   See Within 2 Weeks In Office    Recommendation: Other home measures?  Triage did recommend Calamine lotion and Claritin or Allegra.   Does the patient need follow up with PCP or OK to wait until 7/6 endocrinologist appt?    Routed to provider    Does the patient meet one of the following criteria for ADS visit consideration? 15+ years old, with an [facility] PCP     TIP  Providers, please consider if this condition is appropriate for management at one of our Acute and Diagnostic Services sites.     If patient is a good candidate, please use dotphrase <dot>triageresponse and select Refer to ADS to document.    Reason for Disposition    Itching is a chronic symptom (recurrent or ongoing AND present > 4 weeks)    Additional Information    Negative: Life-threatening reaction (anaphylaxis) in the past to similar substance (e.g., food, insect bite/sting, chemical, etc.) and < 2 hours since exposure    Negative: Difficulty breathing or wheezing    Negative: Difficulty swallowing or slurred speech and sudden onset    Negative: Sounds like a life-threatening emergency to the triager    Negative: Insect bites suspected    Negative: Hives suspected (urticaria, itchy pink bumps with pale centers that come and go over minutes to hours)     "Negative: Widespread rash also present    Negative: Itching in just one area or spot    Negative: Patient sounds very sick or weak to the triager    Negative: MODERATE-SEVERE widespread itching (i.e., interferes with sleep, normal activities or school) and not improved after 24 hours of itching Care Advice    Negative: MODERATE-SEVERE widespread itching (i.e., interferes with sleep, normal activities or school) and pregnant    Negative: Patient wants to be seen    Negative: MILD widespread itching AND pregnant    Negative: Hand or foot itching AND pregnant    Negative: Taking prescription medication that could cause itching (e.g., codeine/morphine/other opiates, aspirin)    Answer Assessment - Initial Assessment Questions  1. DESCRIPTION: \"Describe the itching you are having.\"      Usually at night, but yesterday was in the morning as well.   2. SEVERITY: \"How bad is it?\"     - MILD - doesn't interfere with normal activities    - MODERATE-SEVERE: interferes with work, school, sleep, or other activities       If uses hydrocortisone cream and benadryl it's controlled. Benadryl makes her sleepy.  3. SCRATCHING: \"Are there any scratch marks? Bleeding?\"      no  4. ONSET: \"When did this begin?\"       Itching started long time ago. (3 months). Recently diagnosed with Grave's disease.  5. CAUSE: \"What do you think is causing the itching?\" (ask about swimming pools, pollen, animals, soaps, etc.)      Thinking related to the Graves.   6. OTHER SYMPTOMS: \"Do you have any other symptoms?\"       Tired.   7. PREGNANCY: \"Is there any chance you are pregnant?\" \"When was your last menstrual period?\"      NA    Protocols used: ITCHING - WIDESPREAD-A-[place]    Henrietta Whalen RN    "

## 2022-06-02 NOTE — TELEPHONE ENCOUNTER
"Patient has been itchy.  She was told to use hydrocordisone cream by her provider.  Patient wanted to know if she can use it on her face.  I explained yes, just keep away from the eyes.    No further questions.    Park Alaniz RN   06/01/22 9:01 PM  [facility] Nurse Advisor    Reason for Disposition    Caller has medication question, adult has minor symptoms, caller declines triage, AND triager answers question    Additional Information    Negative: Drug overdose and triager unable to answer question    Negative: Caller requesting information unrelated to medicine    Negative: Caller requesting a prescription for Strep throat and has a positive culture result    Negative: Rash while taking a medication or within 3 days of stopping it    Negative: Immunization reaction suspected    Negative: [1] Asthma and [2] having symptoms of asthma (cough, wheezing, etc.)    Negative: [1] Influenza symptoms AND [2] anti-viral med prescription request, such as Tamiflu    Negative: [1] Symptom of illness (e.g., headache, abdominal pain, earache, vomiting) AND [2] more than mild    Negative: MORE THAN A DOUBLE DOSE of a prescription or over-the-counter (OTC) drug    Negative: [1] DOUBLE DOSE (an extra dose or lesser amount) of over-the-counter (OTC) drug AND [2] any symptoms (e.g., dizziness, nausea, pain, sleepiness)    Negative: [1] DOUBLE DOSE (an extra dose or lesser amount) of prescription drug AND [2] any symptoms (e.g., dizziness, nausea, pain, sleepiness)    Negative: Took another person's prescription drug    Negative: [1] DOUBLE DOSE (an extra dose or lesser amount) of prescription drug AND [2] NO symptoms (Exception: a double dose of antibiotics)    Negative: Diabetes drug error or overdose (e.g., took wrong type of insulin or took extra dose)    Negative: [1] Request for URGENT new prescription or refill of \"essential\" medication (i.e., likelihood of harm to patient if not taken) AND [2] triager unable to " fill per unit policy    Negative: [1] Prescription not at pharmacy AND [2] was prescribed by PCP recently    Negative: [1] Pharmacy calling with prescription questions AND [2] triager unable to answer question    Negative: [1] Caller has URGENT medication question about med that PCP or specialist prescribed AND [2] triager unable to answer question    Negative: [1] Caller has NON-URGENT medication question about med that PCP prescribed AND [2] triager unable to answer question    Negative: [1] Caller requesting a NON-URGENT new prescription or refill AND [2] triager unable to refill per unit policy    Negative: [1] Caller has medication question about med not prescribed by PCP AND [2] triager unable to answer question (e.g., compatibility with other med, storage)    Negative: Caller requesting a CONTROLLED substance prescription refill (e.g., narcotics, ADHD medicines)    Negative: Caller wants to use a complementary or alternative medicine    Negative: [1] Prescription prescribed recently is not at pharmacy AND [2] triager has access to patient's EMR AND [3] prescription is recorded in the EMR    Negative: [1] DOUBLE DOSE (an extra dose or lesser amount) of over-the-counter (OTC) drug AND [2] NO symptoms    Negative: [1] DOUBLE DOSE (an extra dose or lesser amount) of antibiotic drug AND [2] NO symptoms    Negative: Caller has medication question only, adult not sick, and triager answers question    Protocols used: MEDICATION QUESTION CALL-A-

## 2022-06-02 NOTE — TELEPHONE ENCOUNTER
Provider Recommendation Follow Up:   Reached patient/caregiver. Informed of provider's recommendations. Patient verbalized understanding and agrees with the plan.     Scheduled visit with bryce as patient states itching if fine right now    Patient will go to [facility] if worse before appointment    Chava Martinez RN

## 2022-06-02 NOTE — TELEPHONE ENCOUNTER
Recommend in-person visit ([facility] or primary care) for further evaluation.    Dr. Álvarez is out until next week.

## 2022-06-06 ENCOUNTER — OFFICE VISIT (OUTPATIENT)
Dept: INTERNAL MEDICINE | Facility: CLINIC | Age: 67
End: 2022-06-06
Payer: MEDICARE

## 2022-06-06 VITALS
SYSTOLIC BLOOD PRESSURE: 112 MMHG | RESPIRATION RATE: 16 BRPM | DIASTOLIC BLOOD PRESSURE: 68 MMHG | OXYGEN SATURATION: 97 % | TEMPERATURE: 98.3 F | HEART RATE: 75 BPM

## 2022-06-06 DIAGNOSIS — E05.90 HYPERTHYROIDISM: Primary | ICD-10-CM

## 2022-06-06 DIAGNOSIS — L29.9 ITCHING: ICD-10-CM

## 2022-06-06 DIAGNOSIS — L30.9 DERMATITIS: ICD-10-CM

## 2022-06-06 PROCEDURE — 99213 OFFICE O/P EST LOW 20 MIN: CPT | Performed by: PHYSICIAN ASSISTANT

## 2022-06-06 RX ORDER — TRIAMCINOLONE ACETONIDE 1 MG/G
CREAM TOPICAL 3 TIMES DAILY
Qty: 30 G | Refills: 1 | Status: SHIPPED | OUTPATIENT
Start: 2022-06-06 | End: 2022-06-09

## 2022-06-06 NOTE — PROGRESS NOTES
Assessment & Plan     Hyperthyroidism      Itching  Reviewed can be related to graves/hyperthyroidism  Ok to continue with prn benadryl at night     Dermatitis  To lesion on the right arm   If not improving then to review with derm at upcoming appointment  - triamcinolone (KENALOG) 0.1 % external cream; Apply topically 3 times daily                 Return in about 4 weeks (around 7/4/2022) for endocrinology.    Elissa Campbell PA-C  [facility]    Percy Long is a 67 year old who presents for the following health issues     HPI     Concern - itching  Onset: Jan or Feb  Description: Better  Intensity: mild  Progression of Symptoms:  improving  Accompanying Signs & Symptoms:   Previous history of similar problem:   Precipitating factors:        Worsened by:   Alleviating factors:        Improved by:   Therapies tried and outcome: wondering if related to Hyperthyroidism  Taking benadryl prn at night and this helps    Notes a lesion on right upper arm that is itching      Review of Systems   Constitutional, HEENT, cardiovascular, pulmonary, gi and gu systems are negative, except as otherwise noted.      Objective    /68   Pulse 75   Temp 98.3  F (36.8  C) (Tympanic)   Resp 16   LMP 03/11/2010   SpO2 97%   There is no height or weight on file to calculate BMI.  Physical Exam   GENERAL: healthy, alert and no distress  MS: no gross musculoskeletal defects noted, no edema  SKIN: no hives or generalized rash  Lesion upper outside of the right arm red raised and flakey circular patch about 1 cm in diameter

## 2022-06-07 ENCOUNTER — MYC MEDICAL ADVICE (OUTPATIENT)
Dept: INTERNAL MEDICINE | Facility: CLINIC | Age: 67
End: 2022-06-07
Payer: MEDICARE

## 2022-06-07 DIAGNOSIS — I10 BENIGN ESSENTIAL HYPERTENSION: Primary | ICD-10-CM

## 2022-06-07 DIAGNOSIS — L30.9 DERMATITIS: ICD-10-CM

## 2022-06-07 ASSESSMENT — PATIENT HEALTH QUESTIONNAIRE - PHQ9
SUM OF ALL RESPONSES TO PHQ QUESTIONS 1-9: 15
10. IF YOU CHECKED OFF ANY PROBLEMS, HOW DIFFICULT HAVE THESE PROBLEMS MADE IT FOR YOU TO DO YOUR WORK, TAKE CARE OF THINGS AT HOME, OR GET ALONG WITH OTHER PEOPLE: VERY DIFFICULT
SUM OF ALL RESPONSES TO PHQ QUESTIONS 1-9: 15

## 2022-06-07 ASSESSMENT — ENCOUNTER SYMPTOMS
EYE REDNESS: 0
EYE WATERING: 1
DOUBLE VISION: 0
EYE IRRITATION: 1
EYE PAIN: 0

## 2022-06-07 NOTE — TELEPHONE ENCOUNTER
RECORDS RECEIVED FROM: internal/ce    DATE RECEIVED: 6.13.22    NOTES (FOR ALL VISITS) STATUS DETAILS   OFFICE NOTES from referring provider internal   Dr. Skyler Álvarez     MEDICATION LIST internal     IMAGING      DEXASCAN internal  9.1.21   MRI (BRAIN)     XR (Chest) internal  2.9.22, 4.30.21, 1.31.21,    CT (HEAD/NECK/CHEST/ABDOMEN) internal  11.30.20,    NUCLEAR  internal  nm thyroid- 5.25.22     ULTRASOUND (HEAD/NECK) internal  5.24.22   LABS     DIABETES: HBGA1C, CREATININE, FASTING LIPIDS, MICROALBUMIN URINE, POTASSIUM, TSH, T4    THYROID: TSH, T4, CBC, THYRODLONULIN, TOTAL T3, FREE T4, CALCITONIN, CEA internal /ce

## 2022-06-08 ENCOUNTER — MYC MEDICAL ADVICE (OUTPATIENT)
Dept: INTERNAL MEDICINE | Facility: CLINIC | Age: 67
End: 2022-06-08
Payer: MEDICARE

## 2022-06-08 NOTE — TELEPHONE ENCOUNTER
Please see mychart from patient and advise appropriate course of action.      Is this a medication you would like patient to continue or manage?      Chava Martinez RN  [facility] Triage Nurse

## 2022-06-09 RX ORDER — POTASSIUM CITRATE 10 MEQ/1
10 TABLET, EXTENDED RELEASE ORAL DAILY
Qty: 90 TABLET | Refills: 1 | Status: SHIPPED | OUTPATIENT
Start: 2022-06-09 | End: 2022-12-07

## 2022-06-09 RX ORDER — TRIAMCINOLONE ACETONIDE 1 MG/G
CREAM TOPICAL 3 TIMES DAILY
Qty: 30 G | Refills: 0 | Status: SHIPPED | OUTPATIENT
Start: 2022-06-09 | End: 2024-08-01

## 2022-06-09 NOTE — TELEPHONE ENCOUNTER
Please see [facility] and advise     Patient asking if PCP is able to take over Potassium Citrate management    Medication pended

## 2022-06-09 NOTE — TELEPHONE ENCOUNTER
Pharmacy calling to follow up on below message. Medication prescribed by Same day provider Shannan on 6/6/22. Previously routed to ordering provider.     Also routing to PCP to see if ok to dispense triamcinolone (KENALOG) 0.1 % external cream?    Call back [phone], option #2    Reference #: [id number]

## 2022-06-09 NOTE — TELEPHONE ENCOUNTER
"Routing refill request to provider for review/approval because:  Please see note from pharmacy below    \"Patient has indicated to us that s/he is either allergic or sensitive to corticosteroids. There is possible cross-allergy. May we dispense? Please respond with appropriate changes or comment to Pharmacy. \"    Chava Martinez RN  [facility] Triage Nurse   "

## 2022-06-13 ENCOUNTER — PRE VISIT (OUTPATIENT)
Dept: ENDOCRINOLOGY | Facility: CLINIC | Age: 67
End: 2022-06-13

## 2022-06-13 ENCOUNTER — VIRTUAL VISIT (OUTPATIENT)
Dept: ENDOCRINOLOGY | Facility: CLINIC | Age: 67
End: 2022-06-13
Payer: MEDICARE

## 2022-06-13 ENCOUNTER — MYC MEDICAL ADVICE (OUTPATIENT)
Dept: INTERNAL MEDICINE | Facility: CLINIC | Age: 67
End: 2022-06-13

## 2022-06-13 DIAGNOSIS — E05.00 GRAVES DISEASE: Primary | ICD-10-CM

## 2022-06-13 DIAGNOSIS — E03.9 ACQUIRED HYPOTHYROIDISM: ICD-10-CM

## 2022-06-13 PROCEDURE — 99204 OFFICE O/P NEW MOD 45 MIN: CPT | Mod: 95 | Performed by: INTERNAL MEDICINE

## 2022-06-13 RX ORDER — ATENOLOL 25 MG/1
25 TABLET ORAL DAILY
Qty: 90 TABLET | Refills: 0 | Status: SHIPPED | OUTPATIENT
Start: 2022-06-13 | End: 2022-09-06

## 2022-06-13 RX ORDER — ATENOLOL 50 MG/1
50 TABLET ORAL DAILY
Qty: 90 TABLET | Refills: 0 | Status: SHIPPED | OUTPATIENT
Start: 2022-06-13 | End: 2022-11-11

## 2022-06-13 NOTE — PROGRESS NOTES
Mercedes is a 67 year old who is being evaluated via a billable video visit.      How would you like to obtain your AVS? MyChart  If the video visit is dropped, the invitation should be resent by: Text to cell phone: [phone]  Will anyone else be joining your video visit? No        Mercedes Barber is a 67 year old female who is being evaluated via a billable video visit.        [facility]    Consulting provider: Skyler Álvarez MD  [address] [place] [phone]    Reason for consultation: hYPERTHYRODISM    HPI:   Mercedes Barber is a 67 year old woman coming in for thyrotoxicosis. Has been on  Levothroxine 25 mcg until 1/22. At that point noted most importantly itchiness all over her body.      Symptoms of hypo- and hyperthyroidism:  Weight change 20LBS ; heat or cold intolerance MILD HEAT; abnormal bowel movements diarrhea;change in hair or skin pattern fingernails; anxiety  Or depression longstanding; fatigue no; heart racing no; tremors no;   Fingernails  Symptoms in the anterior neck: dysphagia some ; dysphonia no; pain no;    Exposure to radiation: no  FH of thyroid Cancer: no    Current Problem List:   Patient Active Problem List   Diagnosis     Menopausal syndrome (hot flashes)     Family history of breast cancer     Vulvar pain     Renal anomaly     Overactive bladder     Rectal prolapse     CARDIOVASCULAR SCREENING; LDL GOAL LESS THAN 160     Vaginal pain     Dysphagia     Confusion     Headache     Left shoulder pain     Anemia     Mild major depression (H)     Esophageal stricture     Vulvar lesion     Temporal sclerosis     Lumbago     Pain in thoracic spine     Sciatica     Pain in joint, lower leg     Plantar fascial fibromatosis     Pain in joint involving ankle and foot     Other specified hypothyroidism     GERD (gastroesophageal reflux disease)     Irritable bowel syndrome     Other sleep apnea     Cervical high risk HPV (human papillomavirus) test positive      Restless legs syndrome (RLS)     History of total hip arthroplasty, right     Hip pain     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)     Cellulitis of right hip     Deep venous thrombosis of upper extremity (H)     Peripheral edema     Low iron stores     Mechanical complication of prosthetic hip implant, initial encounter (H)     Status post hip surgery     Chest pain, unspecified     Closed fracture of proximal end of left humerus     Generalized anxiety disorder     History of infection     Infection and inflammatory reaction due to unspecified internal joint prosthesis, initial encounter (H)     Ingrowing nail     Major depressive disorder, recurrent episode, moderate (H)     Mild intermittent asthma     Nausea     Partial epilepsy with intractable epilepsy (H)     Repeated falls     History of revision of total replacement of right hip joint     Seizure disorder (H)     Benign essential hypertension     Morbid obesity (H)     Sacroiliac joint pain     Closed fracture of left wrist, initial encounter     Lumbar spondylosis     Lumbar facet arthropathy     Tuberous sclerosis (H)     Hyperthyroidism         Assessment:   Graves disease  Elderly woman with thyrotoxicosis with suppressed TSH increased free T4 and total T3 and increase TSI.  Thyroid uptake and scan is homogeneously increased.  Overall most consistent with Graves' disease.  Treatment is indicated as patient has overt thyrotoxicosis.  Treatment modalities including radioiodine treatment, antithyroid medications and total thyroidectomy were discussed.  Patient very much would like to avoid neutropenia that can be a complication from antithyroid medications.  Therefore opted for radioiodine treatment.  Reviewed that 95% of the time hypothyroidism occurs after treatment, about 5% require retreatment.  Reviewed worsening of thyrotoxicosis initially after treatment, reviewed risk of dry mouth which typically is transient for which she should use some  lemon drops or lemon's right after the treatment for prevention.  Also discussed risk of weight regain with normalization of thyroid hormone levels, this risk is higher after radiative iodine treatment compared to treatment with medication.  Reviewed possibility of spontaneous resolution of Graves' disease which would be a reason to use methimazole.    Reviewed adverse effect of atenolol particularly worsening of dizziness; patient currently has no cardiac symptoms    Plan:   Start Atenolol 25 mg daily  Referral for radioiodine ablation for Graves' disease, patient prefers self they will  Referral for weight management BMI 31, patient prefers self scale  Blood work for thyroid hormone levels in 6 weeks in 3 months, patient prefers clinic in  Follow-up with me, patient prefers Jennie Caraballo MD  Endocrinology and Diabetes  Telephone contact:  [facility] [phone]  [facility] [phone]        Past Medical and Past Surgical History:  Past Medical History:   Diagnosis Date     Arthritis     Thumb     Cervical high risk HPV (human papillomavirus) test positive 07/17/2017 07/17/17: NIL pap, + HR HPV (not 16 or 18) result.      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     Neuropathy of lower extremity      Other sleep apnea      Rectal prolapse      Restless leg syndrome      Seizure disorder (H)     25 years ago     Sleep apnea     CPAP, no longer using CPAP     Temporal sclerosis 8/27/2012     Uncomplicated asthma        Past Surgical History:   Procedure Laterality Date     Anterior COLPORRHAPHY, BLADDER/VAGINA      perigee mesh     ARTHROPLASTY HIP  Right 7/23/2019    Procedure: Right total hip arthroplasty;  Surgeon: Anant Mejia MD;  Location: [facility]     ARTHROPLASTY PATELLO-FEMORAL (KNEE) Bilateral      ARTHROPLASTY REVISION HIP Right 8/7/2019    Procedure: Right hip revision total arthroplasty;  Surgeon: Tom Antonio MD;  Location: [facility]     ARTHROPLASTY REVISION HIP Right 8/5/2020    Procedure: Revision Right total hip arthroplasty;  Surgeon: Pan Grey MD;  [facility]: [facility]     CARPAL TUNNEL RELEASE RT/LT       DENTAL SURGERY      implant     DESTRUCTION OF PARAVERTEBRAL FACET LUMBAR / SACRAL SINGLE Left 10/27/2021    Procedure: DESTRUCTION, NERVE, FACET JOINT, LUMBOSACRAL, Left L4-5 and L5-S1 facet joint radiofrequency ablation;  Surgeon: Celena Perez MD;  Location: [facility] OR     DILATION AND CURETTAGE       DRAIN PILONIDAL CYST SIMPL       ENDOSCOPY DRUG INDUCED SLEEP N/A 11/18/2015    Procedure: ENDOSCOPY DRUG INDUCED SLEEP;  Surgeon: Park Ortiz MD;  [facility]: [facility]     ESOPHAGOSCOPY, GASTROSCOPY, DUODENOSCOPY (EGD), COMBINED  4/5/2013    Procedure: COMBINED ESOPHAGOSCOPY, GASTROSCOPY, DUODENOSCOPY (EGD), BIOPSY SINGLE OR MULTIPLE;;  Surgeon: Mundo Mehta MD;  Location:  GI     EXCISE LESION TRUNK  8/10/2012    Procedure: EXCISE LESION TRUNK;;  Surgeon: Jerald Diggs MD;  [facility]: [facility]     HYSTERECTOMY       INJECT BLOCK MEDIAL BRANCH CERVICAL/THORACIC/LUMBAR Bilateral 9/20/2021    Procedure: BLOCK, NERVE, FACET JOINT, MEDIAL BRANCH, DIAGNOSTIC - Bilateral L4-5 and L5-S1 Medial branch blocks;  Surgeon: Celena Perez MD;  Location: [facility] OR     INJECT BLOCK MEDIAL BRANCH CERVICAL/THORACIC/LUMBAR Bilateral 9/27/2021    Procedure: BLOCK, NERVE, FACET JOINT, MEDIAL BRANCH, DIAGNOSTIC - Bilateral L4-5 and L5-S1 Medial branch blocks;  Surgeon: Celena Perez MD;  Location: [facility] OR     INJECT SACROILIAC JOINT Bilateral 5/24/2021    Procedure: INJECTION, SACROILIAC JOINT;  Surgeon: Celena Perez MD;   Location: [facility] OR     IRRIGATION AND DEBRIDEMENT HIP, COMBINED Right 8/26/2019    Procedure: 1.  Right hip wound irrigation and debridement with excisional debridement of nonviable skin, subcutaneous tissues, and fascia. 2. Application of incisional wound VAC, 27cm length incision.;  Surgeon: Tyson Prabhakar MD;  Location: [facility]     L shoulder fracture       OPEN REDUCTION INTERNAL FIXATION WRIST Left 4/30/2021    Procedure: Open reduction with internal fixation of displaced left intraarticular distal radius fracture;  Surgeon: Luis Manuel Sánchez MD;  Location: [facility] OR     rectal prolapse repair abdominally       RELEASE PLANTAR FASCIA Right 1/20/2015    Procedure: RELEASE PLANTAR FASCIA;  Surgeon: Maicol Liu DPM;  Location: [facility]     REMOVE MESH VAGINA  8/10/2012    Procedure: REMOVE MESH VAGINA;  Excision of Vaginal Mesh Exposure, Removal of Skin Tag Left Inner Leg;  Surgeon: Jerald Diggs MD;  [facility]: [facility]     REPAIR HAMMER TOE Right 1/20/2015    Procedure: REPAIR HAMMER TOE;  Surgeon: Maicol Liu DPM;  Location: [facility]     TONSILLECTOMY & ADENOIDECTOMY       TUBAL LIGATION         Medications:   Current Outpatient Medications   Medication Sig Dispense Refill     acetaminophen (TYLENOL) 325 MG tablet Take 2 tablets (650 mg) by mouth every 4 hours as needed for other 1 Bottle 0     albuterol (ALBUTEROL) 108 (90 BASE) MCG/ACT Inhaler Inhale 2 puffs into the lungs 4 times daily as needed for shortness of breath / dyspnea or wheezing 1 Inhaler 0     alendronate (FOSAMAX) 70 MG tablet TAKE 1 TABLET(70 MG) BY MOUTH EVERY 7 DAYS 12 tablet 2     amLODIPine (NORVASC) 5 MG tablet Take 1 tablet (5 mg) by mouth daily 90 tablet 2     amoxicillin (AMOXIL) 500 MG tablet Take 4 tablets (2000 mg) by mouth 1 hour before dental procedures/cleanings. 4 tablet 4     aspirin 81 MG EC tablet Take 81 mg by mouth daily       benzonatate (TESSALON) 200 MG capsule Take 200 mg by mouth 3 times daily as needed  for cough       budesonide-formoterol (SYMBICORT) 160-4.5 MCG/ACT Inhaler Inhale 2 puffs into the lungs 2 times daily       busPIRone (BUSPAR) 5 MG tablet Take 5 mg by mouth 2 times daily       carboxymethylcellulose PF (REFRESH PLUS) 0.5 % ophthalmic solution Place 1 drop into both eyes 2 times daily       cyclobenzaprine (FLEXERIL) 5 MG tablet Take 1 tablet (5 mg) by mouth 2 times daily as needed for muscle spasms 60 tablet 3     cycloSPORINE (RESTASIS) 0.05 % ophthalmic emulsion Place 1 drop into both eyes 2 times daily        Dentifrices (BIOTENE DRY MOUTH DT) Apply 2 sprays to affected area 3 times daily as needed        Estradiol-Estriol-Progesterone (BIEST/PROGESTERONE TD) Place 0.5 g onto the skin nightly as needed       fluticasone-vilanterol (BREO ELLIPTA) 200-25 MCG/INH inhaler Inhale 1 puff into the lungs daily       furosemide (LASIX) 20 MG tablet Take 1 tablet (20 mg) by mouth daily 90 tablet 2     hypromellose (ARTIFICIAL TEARS) 0.5 % SOLN ophthalmic solution Place 1 drop into both eyes 2 times daily       ibuprofen (ADVIL/MOTRIN) 600 MG tablet Take 600 mg by mouth every 6 hours as needed for moderate pain       lacosamide (VIMPAT) 200 MG TABS tablet Take 1 tablet (200 mg) by mouth 2 times daily 30 tablet 0     linaclotide (LINZESS) 290 MCG capsule Take 1 capsule (290 mcg) by mouth every morning (before breakfast) 10 capsule 0     loratadine (CLARITIN REDITABS) 10 MG ODT Take 1 tablet (10 mg) by mouth daily as needed for allergies       LORazepam (ATIVAN) 0.5 MG tablet Take 0.5 mg by mouth daily as needed for anxiety       omeprazole (PRILOSEC) 40 MG DR capsule Take 1 capsule (40 mg) by mouth 2 times daily 180 capsule 3     ondansetron (ZOFRAN-ODT) 4 MG ODT tab Take 1 tablet (4 mg) by mouth every 6 hours as needed for nausea or vomiting 20 tablet 0     potassium citrate (UROCIT-K) 10 MEQ (1080 MG) CR tablet Take 1 tablet (10 mEq) by mouth daily 90 tablet 1     pramipexole (MIRAPEX) 1 MG tablet Give 1  tablet by mouth in the morning and 3 tablets by mouth 3 hours prior to bedtime       QUEtiapine (SEROQUEL) 50 MG tablet Take 100 mg by mouth nightly as needed        topiramate (TOPAMAX) 25 MG tablet Take 25 mg in the morning and 50 mg at bedtime.       traZODone (DESYREL) 150 MG tablet 150 mg At Bedtime        triamcinolone (KENALOG) 0.1 % external cream APPLY TOPICALLY 3 TIMES    DAILY. 30 g 0     trolamine salicylate (ASPERCREME) 10 % external cream Apply topically 2 times daily as needed for moderate pain To right hip incision area         Allergies:   Allergies   Allergen Reactions     Blood Transfusion Related (Informational Only) Other (See Comments)     Patient has a history of a clinically significant antibody against RBC antigens.  A delay in compatible RBCs may occur.     Budesonide      Edema     Bupropion Rash     Carbamazepine Diarrhea     Clonazepam Other (See Comments)     Hypotension, trouble walking, mind disturbance     Encort [Hydrocortisone Acetate] Swelling     Localized to feet     Encort [Hydrocortisone] Swelling     Erythromycin Diarrhea     Erythromycin Nausea and Vomiting and Diarrhea     Flagyl [Metronidazole] Nausea     Gabapentin Other (See Comments)     Causes over-eating     Lamictal [Lamotrigine] Dizziness     Oxycodone Nausea and Vomiting     Tegretol [Carbamazepine] Nausea and Vomiting       Social History     Tobacco Use     Smoking status: Never Smoker     Smokeless tobacco: Never Used   Substance Use Topics     Alcohol use: Yes     Comment: 1 glass of wine 2x/yr       Family History   Problem Relation Age of Onset     Eye Disorder Mother      Lipids Mother         has CHF     Osteoporosis Mother      Diabetes Father      Alzheimer Disease Paternal Grandmother      Hypertension Brother      Alcohol/Drug Brother      Depression Brother      Gastrointestinal Disease Brother         hx of colonic polyps     Lipids Brother      Psychotic Disorder Brother      Breast Cancer Sister       Depression Sister      Lipids Sister      Thyroid Disease Sister      Congenital Anomalies Daughter      Neurologic Disorder Daughter        Physical Examination:  Wt Readings from Last 4 Encounters:   05/31/22 85.3 kg (188 lb)   05/16/22 85.3 kg (188 lb)   04/12/22 86.6 kg (191 lb)   04/05/22 87.5 kg (193 lb)       Last menstrual period 03/11/2010, not currently breastfeeding.  There is no height or weight on file to calculate BMI.    Wt Readings from Last 6 Encounters:   05/31/22 85.3 kg (188 lb)   05/16/22 85.3 kg (188 lb)   04/12/22 86.6 kg (191 lb)   04/05/22 87.5 kg (193 lb)   03/15/22 87.5 kg (193 lb)   02/24/22 88.9 kg (196 lb)   Weight 2/9/22 201 lbs       Reported vitals:  There were no vitals taken for this visit.   healthy, alert and no distress  PSYCH: Alert and oriented times 3; coherent speech, normal   rate and volume, able to articulate logical thoughts, able   to abstract reason, no tangential thoughts, no hallucinations   or delusions  Her affect is normal and pleasant  RESP: No cough, no audible wheezing, able to talk in full sentences  Remainder of exam unable to be completed due to telephone visits     Labs and Studies:   Lab Results   Component Value Date    SABA 9.1 04/07/2022    SABA 9.1 01/31/2022    SABA 9.0 04/12/2021    SABA 9.3 02/18/2021    ALBUMIN 3.5 04/07/2022    ALT 20 04/07/2022    PHOS 4.1 09/17/2009    AST 13 04/07/2022    BILITOTAL 0.4 04/07/2022    CR 0.74 04/07/2022    CR 0.96 01/31/2022    CR 0.96 04/12/2021     04/07/2022    TSH <0.01 (L) 05/23/2022    ALKPHOS 130 04/07/2022    HGB 12.1 04/07/2022               Results for orders placed in visit on 05/24/22    US Thyroid    Narrative  US THYROID 5/24/2022 1:18 PM    COMPARISON: None    HISTORY: Acquired hypothyroidism    FINDINGS:  Thyroid parenchyma: Homogenous  The right lobe of the thyroid measures: 5.3 x 1.2 x 2 cm  The left lobe of the thyroid measures: 2.9 x 1.3 x 1.1 cm  The thyroid isthmus measures: 0.3  cm    No thyroid nodules identified.    Impression  Impression: Normal sonographic appearance of the thyroid. No thyroid  nodules.    I have personally reviewed the examination and initial interpretation  and I agree with the findings.    BRISEIDA BOB MD      SYSTEM ID:  [id number]  ;    Results for orders placed during the hospital encounter of 22    NM Thyroid Uptake and Scan    Narrative  EXAM: NM THYROID UPTAKE AND SCAN  LOCATION: [facility]  DATE/TIME: 2022 11:55 AM    INDICATION: Clinical and/or laboratory evidence of hyperthyroidism.  COMPARISON: Thyroid ultrasound dated 2022.  TECHNIQUE: 236 uCi I-123, oral ingestion. 24-hour neck uptake and imaging.    FINDINGS: 24-hour uptake: 47.3% (Normal range: 10-30%).    Impression  IMPRESSION:    Diffuse radiotracer uptake throughout the thyroid gland suspicious for Graves' disease without hot/cold nodule.  '    Results for orders placed in visit on 21    DX Hip/Pelvis/Spine    Narrative  BONE DENSITOMETRY  [facility] [address] [place] [address]  2021    PATIENT: Mercedes Barber  CHART: [id number]  :  1955  AGE:  66 year old  SEX:  female  REFERRING PROVIDER:  Skyler Álvarez MD    PROCEDURE:  Bone density scanning was performed using DXA technology of the lumbar spine and hip.  Scanning was performed on a Lunar Prodigy scanner.  Reporting is completed in the form of a T-score.  The T-score represents the standard deviation from peak bone mass based on a young healthy adult.    REFERENCE T-SCORES:  Normal                -1.0 and greater  Osteopenia         Between -1.0 and -2.5  Osteoporosis     -2.5 and less    RISK FACTORS:  Post-menopausal, Height loss of 2 inches, Fractures of wrist and humerus    CURRENT TREATMENT:  None listed    FINDINGS:  Lumbar Spine (L1-L4)      T-score:  -0.4, marked degenerative changes present  Left Femoral Neck            T-score:   "-2.3  Forearm (radius 33%)      T-score:  -1.5  The right and left femur is not acceptable for evaluation due to previous arthroplasty.    Lumbar (L1-L4) BMD: 1.147  Previous: 1.345  Left Total Hip BMD: 0.763  Previous: 0.944  Forearm (radius 33%) BMD: 0.755   Previous: NA    Comparison is made to another DXA performed on the same Lunar Prodigy  machine on 02/05/2009. There was a study performed in 2013 but the images are not available.    IMPRESSION  Osteopenia., Degenerative changes of the lumbar spine which may falsely elevate results.    There has been significant decrease in bone density of the lumbar spine. There has been significant decrease in bone density of the hip. The forearm was not included on the previous study so comparison is not possible.    Recommendations include ensuring adequate Calcium and Vitamin D.    The current NOF Guidelines recommend treatment for patients with prior hip or vertebral fracture, T-score -2.5 or below, or 10 year risk of any major osteoporotic fracture >20% or 10 year risk of hip fracture >3%, as calculated using the FRAX calculator (www.shef.ac.uk/FRAX or you can google \"FRAX\").    This patient's risks based on available information, with the use of FRAX, are 13 % for major osteoporotic fracture and 3.6 % for hip fracture.  Based on these guidelines, treatment (in addition to calcium and vitamin D) is recommended for this patient, after ruling out other causes of osteoporosis.  This is meant as an aid to clinical decision making; one must still use clinical judgement.      Follow up can be considered in 2 years.  ___________________  Donya Oliveira M.D.  Electronically signed    No results found for this or any previous visit.                        Answers for HPI/ROS submitted by the patient on 6/7/2022  If you checked off any problems, how difficult have these problems made it for you to do your work, take care of things at home, or get along with other people?: Very " difficult  PHQ9 TOTAL SCORE: 15  General Symptoms: No  Skin Symptoms: No  HENT Symptoms: No  EYE SYMPTOMS: Yes  HEART SYMPTOMS: No  LUNG SYMPTOMS: No  INTESTINAL SYMPTOMS: No  URINARY SYMPTOMS: No  GYNECOLOGIC SYMPTOMS: No  BREAST SYMPTOMS: No  SKELETAL SYMPTOMS: No  BLOOD SYMPTOMS: No  NERVOUS SYSTEM SYMPTOMS: No  MENTAL HEALTH SYMPTOMS: No  Eye pain: No  Vision loss: No  Dry eyes: Yes  Watery eyes: Yes  Eye bulging: No  Double vision: No  Flashing of lights: No  Spots: No  Floaters: Yes  Redness: No  Crossed eyes: No  Tunnel Vision: No  Yellowing of eyes: No  Eye irritation: Yes

## 2022-06-13 NOTE — PROGRESS NOTES
Lab Results   Component Value Date     04/07/2022    CHLORIDE 113 (H) 04/07/2022    CO2 25 04/07/2022     (H) 04/07/2022    CR 0.74 04/07/2022    CR 0.96 01/31/2022    CR 0.96 04/12/2021    CR 0.90 02/18/2021    CR 0.80 01/31/2021    SABA 9.1 04/07/2022    MAG 2.1 08/25/2019    ALBUMIN 3.5 04/07/2022    ALKPHOS 130 04/07/2022    LDL 95 08/24/2021    HDL 70 08/24/2021    TRIG 82 08/24/2021    TSH <0.01 (L) 05/23/2022    TSH <0.01 (L) 05/12/2022    TSH <0.01 (L) 04/07/2022     No results found for: MICROL  Lab Results   Component Value Date    A1C 5.6 08/26/2011    A1C 5.6 02/08/2011       Lab Results   Component Value Date    HGB 12.1 04/07/2022     Recent Labs   Lab Test 05/23/22  1433 05/12/22  0924 04/07/22  0941 03/07/22  1418 02/09/22  1334 11/01/21  1434 11/01/21  1434 10/16/20  1433   T4 1.77* 2.15* 1.43   < > 2.45*   < > 1.67* 0.85   FT3  --   --   --   --  7.8*  --  5.8* 2.5   T3 187* 222* 137   < >  --   --   --   --    TSH <0.01* <0.01* <0.01*   < > <0.01*   < > <0.01* 0.61    < > = values in this interval not displayed.     .  Answers for HPI/ROS submitted by the patient on 6/7/2022  If you checked off any problems, how difficult have these problems made it for you to do your work, take care of things at home, or get along with other people?: Very difficult  PHQ9 TOTAL SCORE: 15  General Symptoms: No  Skin Symptoms: No  HENT Symptoms: No  EYE SYMPTOMS: Yes  HEART SYMPTOMS: No  LUNG SYMPTOMS: No  INTESTINAL SYMPTOMS: No  URINARY SYMPTOMS: No  GYNECOLOGIC SYMPTOMS: No  BREAST SYMPTOMS: No  SKELETAL SYMPTOMS: No  BLOOD SYMPTOMS: No  NERVOUS SYSTEM SYMPTOMS: No  MENTAL HEALTH SYMPTOMS: No  Eye pain: No  Vision loss: No  Dry eyes: Yes  Watery eyes: Yes  Eye bulging: No  Double vision: No  Flashing of lights: No  Spots: No  Floaters: Yes  Redness: No  Crossed eyes: No  Tunnel Vision: No  Yellowing of eyes: No  Eye irritation: Yes

## 2022-06-13 NOTE — LETTER
6/13/2022       RE: Mercedes Barber  [address]  [place] [address]     Dear Colleague,    Thank you for referring your patient, Mercedes Barber, to the [facility] ENDOCRINOLOG[facility] [place] [facility]. Please see a copy of my visit note below.    Lab Results   Component Value Date     04/07/2022    CHLORIDE 113 (H) 04/07/2022    CO2 25 04/07/2022     (H) 04/07/2022    CR 0.74 04/07/2022    CR 0.96 01/31/2022    CR 0.96 04/12/2021    CR 0.90 02/18/2021    CR 0.80 01/31/2021    SABA 9.1 04/07/2022    MAG 2.1 08/25/2019    ALBUMIN 3.5 04/07/2022    ALKPHOS 130 04/07/2022    LDL 95 08/24/2021    HDL 70 08/24/2021    TRIG 82 08/24/2021    TSH <0.01 (L) 05/23/2022    TSH <0.01 (L) 05/12/2022    TSH <0.01 (L) 04/07/2022     No results found for: MICROL  Lab Results   Component Value Date    A1C 5.6 08/26/2011    A1C 5.6 02/08/2011       Lab Results   Component Value Date    HGB 12.1 04/07/2022     Recent Labs   Lab Test 05/23/22  1433 05/12/22  0924 04/07/22  0941 03/07/22  1418 02/09/22  1334 11/01/21  1434 11/01/21  1434 10/16/20  1433   T4 1.77* 2.15* 1.43   < > 2.45*   < > 1.67* 0.85   FT3  --   --   --   --  7.8*  --  5.8* 2.5   T3 187* 222* 137   < >  --   --   --   --    TSH <0.01* <0.01* <0.01*   < > <0.01*   < > <0.01* 0.61    < > = values in this interval not displayed.     .  Answers for HPI/ROS submitted by the patient on 6/7/2022  If you checked off any problems, how difficult have these problems made it for you to do your work, take care of things at home, or get along with other people?: Very difficult  PHQ9 TOTAL SCORE: 15  General Symptoms: No  Skin Symptoms: No  HENT Symptoms: No  EYE SYMPTOMS: Yes  HEART SYMPTOMS: No  LUNG SYMPTOMS: No  INTESTINAL SYMPTOMS: No  URINARY SYMPTOMS: No  GYNECOLOGIC SYMPTOMS: No  BREAST SYMPTOMS: No  SKELETAL SYMPTOMS: No  BLOOD SYMPTOMS: No  NERVOUS SYSTEM SYMPTOMS:  No  MENTAL HEALTH SYMPTOMS: No  Eye pain: No  Vision loss: No  Dry eyes: Yes  Watery eyes: Yes  Eye bulging: No  Double vision: No  Flashing of lights: No  Spots: No  Floaters: Yes  Redness: No  Crossed eyes: No  Tunnel Vision: No  Yellowing of eyes: No  Eye irritation: Yes        Mercedes is a 67 year old who is being evaluated via a billable video visit.      How would you like to obtain your AVS? MyChart  If the video visit is dropped, the invitation should be resent by: Text to cell phone: [phone]  Will anyone else be joining your video visit? No        Mercedes Barber is a 67 year old female who is being evaluated via a billable video visit.        Endocrinology and Diabetes Clinic    Consulting provider: Skyler Álvarez MD  [address] [place] [phone]    Reason for consultation: hYPERTHYRODISM    HPI:   Mercedes Barber is a 67 year old woman coming in for thyrotoxicosis. Has been on  Levothroxine 25 mcg until 1/22. At that point noted most importantly itchiness all over her body.      Symptoms of hypo- and hyperthyroidism:  Weight change 20LBS ; heat or cold intolerance MILD HEAT; abnormal bowel movements diarrhea;change in hair or skin pattern fingernails; anxiety  Or depression longstanding; fatigue no; heart racing no; tremors no;   Fingernails  Symptoms in the anterior neck: dysphagia some ; dysphonia no; pain no;    Exposure to radiation: no  FH of thyroid Cancer: no    Current Problem List:   Patient Active Problem List   Diagnosis     Menopausal syndrome (hot flashes)     Family history of breast cancer     Vulvar pain     Renal anomaly     Overactive bladder     Rectal prolapse     CARDIOVASCULAR SCREENING; LDL GOAL LESS THAN 160     Vaginal pain     Dysphagia     Confusion     Headache     Left shoulder pain     Anemia     Mild major depression (H)     Esophageal stricture     Vulvar lesion     Temporal sclerosis     Lumbago     Pain in thoracic spine     Sciatica     Pain in joint,  lower leg     Plantar fascial fibromatosis     Pain in joint involving ankle and foot     Other specified hypothyroidism     GERD (gastroesophageal reflux disease)     Irritable bowel syndrome     Other sleep apnea     Cervical high risk HPV (human papillomavirus) test positive     Restless legs syndrome (RLS)     History of total hip arthroplasty, right     Hip pain     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)     Cellulitis of right hip     Deep venous thrombosis of upper extremity (H)     Peripheral edema     Low iron stores     Mechanical complication of prosthetic hip implant, initial encounter (H)     Status post hip surgery     Chest pain, unspecified     Closed fracture of proximal end of left humerus     Generalized anxiety disorder     History of infection     Infection and inflammatory reaction due to unspecified internal joint prosthesis, initial encounter (H)     Ingrowing nail     Major depressive disorder, recurrent episode, moderate (H)     Mild intermittent asthma     Nausea     Partial epilepsy with intractable epilepsy (H)     Repeated falls     History of revision of total replacement of right hip joint     Seizure disorder (H)     Benign essential hypertension     Morbid obesity (H)     Sacroiliac joint pain     Closed fracture of left wrist, initial encounter     Lumbar spondylosis     Lumbar facet arthropathy     Tuberous sclerosis (H)     Hyperthyroidism         Assessment:   Graves disease  Elderly woman with thyrotoxicosis with suppressed TSH increased free T4 and total T3 and increase TSI.  Thyroid uptake and scan is homogeneously increased.  Overall most consistent with Graves' disease.  Treatment is indicated as patient has overt thyrotoxicosis.  Treatment modalities including radioiodine treatment, antithyroid medications and total thyroidectomy were discussed.  Patient very much would like to avoid neutropenia that can be a complication from antithyroid medications.  Therefore opted  for radioiodine treatment.  Reviewed that 95% of the time hypothyroidism occurs after treatment, about 5% require retreatment.  Reviewed worsening of thyrotoxicosis initially after treatment, reviewed risk of dry mouth which typically is transient for which she should use some lemon drops or lemon's right after the treatment for prevention.  Also discussed risk of weight regain with normalization of thyroid hormone levels, this risk is higher after radiative iodine treatment compared to treatment with medication.  Reviewed possibility of spontaneous resolution of Graves' disease which would be a reason to use methimazole.    Reviewed adverse effect of atenolol particularly worsening of dizziness; patient currently has no cardiac symptoms    Plan:   Start Atenolol 25 mg daily  Referral for radioiodine ablation for Graves' disease, patient prefers self they will  Referral for weight management BMI 31, patient prefers self scale  Blood work for thyroid hormone levels in 6 weeks in 3 months, patient prefers clinic in  Follow-up with me, patient prefers Jennie Caraballo MD  Endocrinology and Diabetes  Telephone contact:  [facility] [place] [phone]  [facility] [phone]        Past Medical and Past Surgical History:  Past Medical History:   Diagnosis Date     Arthritis     Thumb     Cervical high risk HPV (human papillomavirus) test positive 07/17/2017 07/17/17: NIL pap, + HR HPV (not 16 or 18) result.      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     Neuropathy of lower extremity      Other sleep apnea      Rectal prolapse      Restless leg syndrome       Seizure disorder (H)     25 years ago     Sleep apnea     CPAP, no longer using CPAP     Temporal sclerosis 8/27/2012     Uncomplicated asthma        Past Surgical History:   Procedure Laterality Date     Anterior COLPORRHAPHY, BLADDER/VAGINA      perigee mesh     ARTHROPLASTY HIP Right 7/23/2019    Procedure: Right total hip arthroplasty;  Surgeon: Anant Mejia MD;  Location: [facility]     ARTHROPLASTY PATELLO-FEMORAL (KNEE) Bilateral      ARTHROPLASTY REVISION HIP Right 8/7/2019    Procedure: Right hip revision total arthroplasty;  Surgeon: Tom Antonio MD;  Location: [facility] OR     ARTHROPLASTY REVISION HIP Right 8/5/2020    Procedure: Revision Right total hip arthroplasty;  Surgeon: Pan Grey MD;  [facility]     CARPAL TUNNEL RELEASE RT/LT       DENTAL SURGERY      implant     DESTRUCTION OF PARAVERTEBRAL FACET LUMBAR / SACRAL SINGLE Left 10/27/2021    Procedure: DESTRUCTION, NERVE, FACET JOINT, LUMBOSACRAL, Left L4-5 and L5-S1 facet joint radiofrequency ablation;  Surgeon: Celena Perez MD;  Location: [facility] OR     DILATION AND CURETTAGE       DRAIN PILONIDAL CYST SIMPL       ENDOSCOPY DRUG INDUCED SLEEP N/A 11/18/2015    Procedure: ENDOSCOPY DRUG INDUCED SLEEP;  Surgeon: Park Ortiz MD;  [facility]: [facility]     ESOPHAGOSCOPY, GASTROSCOPY, DUODENOSCOPY (EGD), COMBINED  4/5/2013    Procedure: COMBINED ESOPHAGOSCOPY, GASTROSCOPY, DUODENOSCOPY (EGD), BIOPSY SINGLE OR MULTIPLE;;  Surgeon: Mundo Mehta MD;  Location:  GI     EXCISE LESION TRUNK  8/10/2012    Procedure: EXCISE LESION TRUNK;;  Surgeon: Jerald Diggs MD;  Location: [facility] OR     HYSTERECTOMY       INJECT BLOCK MEDIAL BRANCH CERVICAL/THORACIC/LUMBAR Bilateral 9/20/2021    Procedure: BLOCK, NERVE, FACET JOINT, MEDIAL BRANCH, DIAGNOSTIC - Bilateral L4-5 and L5-S1 Medial branch blocks;  Surgeon: Celena Perez MD;  Location: [facility] OR     INJECT BLOCK MEDIAL BRANCH CERVICAL/THORACIC/LUMBAR Bilateral 9/27/2021     Procedure: BLOCK, NERVE, FACET JOINT, MEDIAL BRANCH, DIAGNOSTIC - Bilateral L4-5 and L5-S1 Medial branch blocks;  Surgeon: Celena Perez MD;  Location: [facility] OR     INJECT SACROILIAC JOINT Bilateral 5/24/2021    Procedure: INJECTION, SACROILIAC JOINT;  Surgeon: Celena Perez MD;  Location: [facility] OR     IRRIGATION AND DEBRIDEMENT HIP, COMBINED Right 8/26/2019    Procedure: 1.  Right hip wound irrigation and debridement with excisional debridement of nonviable skin, subcutaneous tissues, and fascia. 2. Application of incisional wound VAC, 27cm length incision.;  Surgeon: Tyson Prabhakar MD;  Location: [facility] OR     L shoulder fracture       OPEN REDUCTION INTERNAL FIXATION WRIST Left 4/30/2021    Procedure: Open reduction with internal fixation of displaced left intraarticular distal radius fracture;  Surgeon: Luis Manuel Sánchez MD;  Location: [facility] OR     rectal prolapse repair abdominally       RELEASE PLANTAR FASCIA Right 1/20/2015    Procedure: RELEASE PLANTAR FASCIA;  Surgeon: Maicol Liu DPM;  Location: [facility]     REMOVE MESH VAGINA  8/10/2012    Procedure: REMOVE MESH VAGINA;  Excision of Vaginal Mesh Exposure, Removal of Skin Tag Left Inner Leg;  Surgeon: Jerald Diggs MD;  [facility]: [facility]     REPAIR HAMMER TOE Right 1/20/2015    Procedure: REPAIR HAMMER TOE;  Surgeon: Maicol Liu DPM;  Location: [facility]     TONSILLECTOMY & ADENOIDECTOMY       TUBAL LIGATION         Medications:   Current Outpatient Medications   Medication Sig Dispense Refill     acetaminophen (TYLENOL) 325 MG tablet Take 2 tablets (650 mg) by mouth every 4 hours as needed for other 1 Bottle 0     albuterol (ALBUTEROL) 108 (90 BASE) MCG/ACT Inhaler Inhale 2 puffs into the lungs 4 times daily as needed for shortness of breath / dyspnea or wheezing 1 Inhaler 0     alendronate (FOSAMAX) 70 MG tablet TAKE 1 TABLET(70 MG) BY MOUTH EVERY 7 DAYS 12 tablet 2     amLODIPine (NORVASC) 5 MG tablet Take 1 tablet (5 mg) by mouth  daily 90 tablet 2     amoxicillin (AMOXIL) 500 MG tablet Take 4 tablets (2000 mg) by mouth 1 hour before dental procedures/cleanings. 4 tablet 4     aspirin 81 MG EC tablet Take 81 mg by mouth daily       benzonatate (TESSALON) 200 MG capsule Take 200 mg by mouth 3 times daily as needed for cough       budesonide-formoterol (SYMBICORT) 160-4.5 MCG/ACT Inhaler Inhale 2 puffs into the lungs 2 times daily       busPIRone (BUSPAR) 5 MG tablet Take 5 mg by mouth 2 times daily       carboxymethylcellulose PF (REFRESH PLUS) 0.5 % ophthalmic solution Place 1 drop into both eyes 2 times daily       cyclobenzaprine (FLEXERIL) 5 MG tablet Take 1 tablet (5 mg) by mouth 2 times daily as needed for muscle spasms 60 tablet 3     cycloSPORINE (RESTASIS) 0.05 % ophthalmic emulsion Place 1 drop into both eyes 2 times daily        Dentifrices (BIOTENE DRY MOUTH DT) Apply 2 sprays to affected area 3 times daily as needed        Estradiol-Estriol-Progesterone (BIEST/PROGESTERONE TD) Place 0.5 g onto the skin nightly as needed       fluticasone-vilanterol (BREO ELLIPTA) 200-25 MCG/INH inhaler Inhale 1 puff into the lungs daily       furosemide (LASIX) 20 MG tablet Take 1 tablet (20 mg) by mouth daily 90 tablet 2     hypromellose (ARTIFICIAL TEARS) 0.5 % SOLN ophthalmic solution Place 1 drop into both eyes 2 times daily       ibuprofen (ADVIL/MOTRIN) 600 MG tablet Take 600 mg by mouth every 6 hours as needed for moderate pain       lacosamide (VIMPAT) 200 MG TABS tablet Take 1 tablet (200 mg) by mouth 2 times daily 30 tablet 0     linaclotide (LINZESS) 290 MCG capsule Take 1 capsule (290 mcg) by mouth every morning (before breakfast) 10 capsule 0     loratadine (CLARITIN REDITABS) 10 MG ODT Take 1 tablet (10 mg) by mouth daily as needed for allergies       LORazepam (ATIVAN) 0.5 MG tablet Take 0.5 mg by mouth daily as needed for anxiety       omeprazole (PRILOSEC) 40 MG DR capsule Take 1 capsule (40 mg) by mouth 2 times daily 180 capsule  3     ondansetron (ZOFRAN-ODT) 4 MG ODT tab Take 1 tablet (4 mg) by mouth every 6 hours as needed for nausea or vomiting 20 tablet 0     potassium citrate (UROCIT-K) 10 MEQ (1080 MG) CR tablet Take 1 tablet (10 mEq) by mouth daily 90 tablet 1     pramipexole (MIRAPEX) 1 MG tablet Give 1 tablet by mouth in the morning and 3 tablets by mouth 3 hours prior to bedtime       QUEtiapine (SEROQUEL) 50 MG tablet Take 100 mg by mouth nightly as needed        topiramate (TOPAMAX) 25 MG tablet Take 25 mg in the morning and 50 mg at bedtime.       traZODone (DESYREL) 150 MG tablet 150 mg At Bedtime        triamcinolone (KENALOG) 0.1 % external cream APPLY TOPICALLY 3 TIMES    DAILY. 30 g 0     trolamine salicylate (ASPERCREME) 10 % external cream Apply topically 2 times daily as needed for moderate pain To right hip incision area         Allergies:   Allergies   Allergen Reactions     Blood Transfusion Related (Informational Only) Other (See Comments)     Patient has a history of a clinically significant antibody against RBC antigens.  A delay in compatible RBCs may occur.     Budesonide      Edema     Bupropion Rash     Carbamazepine Diarrhea     Clonazepam Other (See Comments)     Hypotension, trouble walking, mind disturbance     Encort [Hydrocortisone Acetate] Swelling     Localized to feet     Encort [Hydrocortisone] Swelling     Erythromycin Diarrhea     Erythromycin Nausea and Vomiting and Diarrhea     Flagyl [Metronidazole] Nausea     Gabapentin Other (See Comments)     Causes over-eating     Lamictal [Lamotrigine] Dizziness     Oxycodone Nausea and Vomiting     Tegretol [Carbamazepine] Nausea and Vomiting       Social History     Tobacco Use     Smoking status: Never Smoker     Smokeless tobacco: Never Used   Substance Use Topics     Alcohol use: Yes     Comment: 1 glass of wine 2x/yr       Family History   Problem Relation Age of Onset     Eye Disorder Mother      Lipids Mother         has CHF     Osteoporosis Mother       Diabetes Father      Alzheimer Disease Paternal Grandmother      Hypertension Brother      Alcohol/Drug Brother      Depression Brother      Gastrointestinal Disease Brother         hx of colonic polyps     Lipids Brother      Psychotic Disorder Brother      Breast Cancer Sister      Depression Sister      Lipids Sister      Thyroid Disease Sister      Congenital Anomalies Daughter      Neurologic Disorder Daughter        Physical Examination:  Wt Readings from Last 4 Encounters:   05/31/22 85.3 kg (188 lb)   05/16/22 85.3 kg (188 lb)   04/12/22 86.6 kg (191 lb)   04/05/22 87.5 kg (193 lb)       Last menstrual period 03/11/2010, not currently breastfeeding.  There is no height or weight on file to calculate BMI.    Wt Readings from Last 6 Encounters:   05/31/22 85.3 kg (188 lb)   05/16/22 85.3 kg (188 lb)   04/12/22 86.6 kg (191 lb)   04/05/22 87.5 kg (193 lb)   03/15/22 87.5 kg (193 lb)   02/24/22 88.9 kg (196 lb)   Weight 2/9/22 201 lbs       Reported vitals:  There were no vitals taken for this visit.   healthy, alert and no distress  PSYCH: Alert and oriented times 3; coherent speech, normal   rate and volume, able to articulate logical thoughts, able   to abstract reason, no tangential thoughts, no hallucinations   or delusions  Her affect is normal and pleasant  RESP: No cough, no audible wheezing, able to talk in full sentences  Remainder of exam unable to be completed due to telephone visits     Labs and Studies:   Lab Results   Component Value Date    SABA 9.1 04/07/2022    SABA 9.1 01/31/2022    SABA 9.0 04/12/2021    SABA 9.3 02/18/2021    ALBUMIN 3.5 04/07/2022    ALT 20 04/07/2022    PHOS 4.1 09/17/2009    AST 13 04/07/2022    BILITOTAL 0.4 04/07/2022    CR 0.74 04/07/2022    CR 0.96 01/31/2022    CR 0.96 04/12/2021     04/07/2022    TSH <0.01 (L) 05/23/2022    ALKPHOS 130 04/07/2022    HGB 12.1 04/07/2022               Results for orders placed in visit on 05/24/22    US Thyroid    Narrative  US  THYROID 2022 1:18 PM    COMPARISON: None    HISTORY: Acquired hypothyroidism    FINDINGS:  Thyroid parenchyma: Homogenous  The right lobe of the thyroid measures: 5.3 x 1.2 x 2 cm  The left lobe of the thyroid measures: 2.9 x 1.3 x 1.1 cm  The thyroid isthmus measures: 0.3 cm    No thyroid nodules identified.    Impression  Impression: Normal sonographic appearance of the thyroid. No thyroid  nodules.    I have personally reviewed the examination and initial interpretation  and I agree with the findings.    BRISEIDA BOB MD      SYSTEM ID:  [id number]  ;    Results for orders placed during the hospital encounter of 22    NM Thyroid Uptake and Scan    Narrative  EXAM: NM THYROID UPTAKE AND SCAN  LOCATION: [facility]  DATE/TIME: 2022 11:55 AM    INDICATION: Clinical and/or laboratory evidence of hyperthyroidism.  COMPARISON: Thyroid ultrasound dated 2022.  TECHNIQUE: 236 uCi I-123, oral ingestion. 24-hour neck uptake and imaging.    FINDINGS: 24-hour uptake: 47.3% (Normal range: 10-30%).    Impression  IMPRESSION:    Diffuse radiotracer uptake throughout the thyroid gland suspicious for Graves' disease without hot/cold nodule.  '    Results for orders placed in visit on 21    DX Hip/Pelvis/Spine    Narrative  BONE DENSITOMETRY  [facility] [address] [place] [address]  2021    PATIENT: Mercedes Barber  CHART: [id number]  :  1955  AGE:  66 year old  SEX:  female  REFERRING PROVIDER:  Skyler Álvarez MD    PROCEDURE:  Bone density scanning was performed using DXA technology of the lumbar spine and hip.  Scanning was performed on a Lunar Prodigy scanner.  Reporting is completed in the form of a T-score.  The T-score represents the standard deviation from peak bone mass based on a young healthy adult.    REFERENCE T-SCORES:  Normal                -1.0 and greater  Osteopenia         Between -1.0 and -2.5  Osteoporosis      "-2.5 and less    RISK FACTORS:  Post-menopausal, Height loss of 2 inches, Fractures of wrist and humerus    CURRENT TREATMENT:  None listed    FINDINGS:  Lumbar Spine (L1-L4)      T-score:  -0.4, marked degenerative changes present  Left Femoral Neck            T-score:  -2.3  Forearm (radius 33%)      T-score:  -1.5  The right and left femur is not acceptable for evaluation due to previous arthroplasty.    Lumbar (L1-L4) BMD: 1.147  Previous: 1.345  Left Total Hip BMD: 0.763  Previous: 0.944  Forearm (radius 33%) BMD: 0.755   Previous: NA    Comparison is made to another DXA performed on the same Lunar Prodigy  machine on 02/05/2009. There was a study performed in 2013 but the images are not available.    IMPRESSION  Osteopenia., Degenerative changes of the lumbar spine which may falsely elevate results.    There has been significant decrease in bone density of the lumbar spine. There has been significant decrease in bone density of the hip. The forearm was not included on the previous study so comparison is not possible.    Recommendations include ensuring adequate Calcium and Vitamin D.    The current NOF Guidelines recommend treatment for patients with prior hip or vertebral fracture, T-score -2.5 or below, or 10 year risk of any major osteoporotic fracture >20% or 10 year risk of hip fracture >3%, as calculated using the FRAX calculator (www.shef.ac.uk/FRAX or you can google \"FRAX\").    This patient's risks based on available information, with the use of FRAX, are 13 % for major osteoporotic fracture and 3.6 % for hip fracture.  Based on these guidelines, treatment (in addition to calcium and vitamin D) is recommended for this patient, after ruling out other causes of osteoporosis.  This is meant as an aid to clinical decision making; one must still use clinical judgement.      Follow up can be considered in 2 years.  ___________________  Donya Oliveira M.D.  Electronically signed    No results found for this or " any previous visit.                        Answers for HPI/ROS submitted by the patient on 6/7/2022  If you checked off any problems, how difficult have these problems made it for you to do your work, take care of things at home, or get along with other people?: Very difficult  PHQ9 TOTAL SCORE: 15  General Symptoms: No  Skin Symptoms: No  HENT Symptoms: No  EYE SYMPTOMS: Yes  HEART SYMPTOMS: No  LUNG SYMPTOMS: No  INTESTINAL SYMPTOMS: No  URINARY SYMPTOMS: No  GYNECOLOGIC SYMPTOMS: No  BREAST SYMPTOMS: No  SKELETAL SYMPTOMS: No  BLOOD SYMPTOMS: No  NERVOUS SYSTEM SYMPTOMS: No  MENTAL HEALTH SYMPTOMS: No  Eye pain: No  Vision loss: No  Dry eyes: Yes  Watery eyes: Yes  Eye bulging: No  Double vision: No  Flashing of lights: No  Spots: No  Floaters: Yes  Redness: No  Crossed eyes: No  Tunnel Vision: No  Yellowing of eyes: No  Eye irritation: Yes

## 2022-06-13 NOTE — PATIENT INSTRUCTIONS
Blood work in 6 weeks and 3 months  Follow up in 3 months with me     Atenolol 25 mg daily    Blood work at [facility]  Weight loss clinic and the nuclear medicine scan in [facility]    Please follow with me in [place] (in  person or virtual)

## 2022-06-14 ENCOUNTER — TELEPHONE (OUTPATIENT)
Dept: ENDOCRINOLOGY | Facility: CLINIC | Age: 67
End: 2022-06-14
Payer: MEDICARE

## 2022-06-14 NOTE — TELEPHONE ENCOUNTER
[facility] Call Center    Phone Message    May a detailed message be left on voicemail: yes     Reason for Call: Other: Per caller she needs someone to contact her insurance regarding the weight managment referral   They need procedure code, diagnosis code , number for the procedure that is being referred for the patient. Please contact [employer] at [phone]    Action Taken: Other: ENDO    Travel Screening: Not Applicable

## 2022-06-15 ENCOUNTER — NURSE TRIAGE (OUTPATIENT)
Dept: NURSING | Facility: CLINIC | Age: 67
End: 2022-06-15
Payer: MEDICARE

## 2022-06-15 ENCOUNTER — TELEPHONE (OUTPATIENT)
Dept: INTERNAL MEDICINE | Facility: CLINIC | Age: 67
End: 2022-06-15
Payer: MEDICARE

## 2022-06-15 NOTE — TELEPHONE ENCOUNTER
Triage Call:    Patient called with a medication question.  She was calling to inquire if she should take both newly prescribed atenolol with amlodipine.  Patient spoke to nurse this evening and note was routed to Dr Rebollar to address.  Advised patient that she will be contacted after physician reads note.  Patient guido comfortable waiting to take atenolol until she hears from PCP.    Lisa Vaughan RN  06/15/22 7:02 PM  [facility] Nurse Advisor      COVID 19 Nurse Triage Plan/Patient Instructions    Please be aware that novel coronavirus (COVID-19) may be circulating in the community. If you develop symptoms such as fever, cough, or SOB or if you have concerns about the presence of another infection including coronavirus (COVID-19), please contact your health care provider or visit [email or web address].     Disposition/Instructions    Home care recommended. Follow home care protocol based instructions.    Thank you for taking steps to prevent the spread of this virus.  o Limit your contact with others.  o Wear a simple mask to cover your cough.  o Wash your hands well and often.    Resources    [facility]: About COVID-19: [email or web address]    CDC: What to Do If You're Sick: www.cdc.gov/coronavirus/2019-ncov/about/steps-when-sick.html    CDC: Ending Home Isolation: www.cdc.gov/coronavirus/2019-ncov/hcp/disposition-in-home-patients.html     CDC: Caring for Someone: www.cdc.gov/coronavirus/2019-ncov/if-you-are-sick/care-for-someone.html     [facility]: Interim Guidance for Hospital Discharge to Home: www.health.[facility].mn.us/diseases/coronavirus/hcp/hospdischarge.pdf    [facility] clinical trials (COVID-19 research studies): clinicalaffairs.[facility]-clinical-trials     Below are the COVID-19 hotlines at the [facility] ([facility]). Interpreters are available.   o For health questions: Call 761-247-6715 or 1-512.448.8213 (7 a.m. to 7 p.m.)  o For questions  "about schools and childcare: Call 823-290-1893 or 1-749.196.7061 (7 a.m. to 7 p.m.)     Reason for Disposition    Medication questions    [1] Caller has NON-URGENT medication question about med that PCP prescribed AND [2] triager unable to answer question    Additional Information    Negative: Lab result questions    Negative: [1] Caller is not with the adult (patient) AND [2] reporting urgent symptoms    Negative: Drug overdose and triager unable to answer question    Negative: Caller requesting information unrelated to medicine    Negative: Caller requesting a prescription for Strep throat and has a positive culture result    Negative: Rash while taking a medication or within 3 days of stopping it    Negative: Immunization reaction suspected    Negative: [1] Asthma and [2] having symptoms of asthma (cough, wheezing, etc.)    Negative: [1] Influenza symptoms AND [2] anti-viral med prescription request, such as Tamiflu    Negative: [1] Symptom of illness (e.g., headache, abdominal pain, earache, vomiting) AND [2] more than mild    Negative: MORE THAN A DOUBLE DOSE of a prescription or over-the-counter (OTC) drug    Negative: [1] DOUBLE DOSE (an extra dose or lesser amount) of over-the-counter (OTC) drug AND [2] any symptoms (e.g., dizziness, nausea, pain, sleepiness)    Negative: [1] DOUBLE DOSE (an extra dose or lesser amount) of prescription drug AND [2] any symptoms (e.g., dizziness, nausea, pain, sleepiness)    Negative: Took another person's prescription drug    Negative: [1] DOUBLE DOSE (an extra dose or lesser amount) of prescription drug AND [2] NO symptoms (Exception: a double dose of antibiotics)    Negative: Diabetes drug error or overdose (e.g., took wrong type of insulin or took extra dose)    Negative: [1] Request for URGENT new prescription or refill of \"essential\" medication (i.e., likelihood of harm to patient if not taken) AND [2] triager unable to fill per unit policy    Negative: [1] Prescription " not at pharmacy AND [2] was prescribed by PCP recently    Negative: [1] Pharmacy calling with prescription questions AND [2] triager unable to answer question    Negative: [1] Caller has URGENT medication question about med that PCP or specialist prescribed AND [2] triager unable to answer question    Protocols used: INFORMATION ONLY CALL - NO TRIAGE-A-AH, MEDICATION QUESTION CALL-A-AH

## 2022-06-15 NOTE — TELEPHONE ENCOUNTER
Pt is calling for a status update regarding insurance company being contacted.  Please call with an update- pt stated she will be home today.

## 2022-06-15 NOTE — TELEPHONE ENCOUNTER
Received a call from the patient stating she was prescribed Atenolol by Sade Caraballo (25 mg tablet, once per day). Patient wants to make sure it is safe to be on this medication along with her Amlodipine (5 mg tablet, once per day). Patient states her blood pressure already runs on the lower side (110s/70s) after she takes her Amlodipine every day.     Routing to PCP for recommendations.     Cindy Prather RN

## 2022-06-15 NOTE — TELEPHONE ENCOUNTER
Should be okay to try that medication in combination.  Would watch for orthostasis but atenolol is low dose

## 2022-06-16 DIAGNOSIS — E66.811 CLASS 1 OBESITY DUE TO EXCESS CALORIES WITHOUT SERIOUS COMORBIDITY WITH BODY MASS INDEX (BMI) OF 31.0 TO 31.9 IN ADULT: Primary | ICD-10-CM

## 2022-06-16 DIAGNOSIS — E66.09 CLASS 1 OBESITY DUE TO EXCESS CALORIES WITHOUT SERIOUS COMORBIDITY WITH BODY MASS INDEX (BMI) OF 31.0 TO 31.9 IN ADULT: Primary | ICD-10-CM

## 2022-06-16 NOTE — TELEPHONE ENCOUNTER
Called and relayed message to the patient from the provider. Patient expressed understanding and had no additional questions at this time.     Patient did state she is having a hard time finding her bottle of Amlodipine-patient states she has enough pills for a week or two. Patient was given a 90 day supply but cannot find the remaining pills. Informed patient to look around her house and if she cannot find the additional pills to reach out to [facility] to see if they can send her more medication. If not, reach back out to the clinic. Pt agreeable to this plan.     Cindy Prather RN

## 2022-06-16 NOTE — TELEPHONE ENCOUNTER
Called [employer] at number provided. Unable to speak with a representative. Called patient to discuss. Advised patient to call the number on the back of her insurance card to check coverage for this appointment. Informed patient that referral diagnosis code was changed by Dr. Caraballo. Patient requesting an appointment in [place]. Gave scheduling number for contact center to help arrange.

## 2022-06-17 ENCOUNTER — PATIENT OUTREACH (OUTPATIENT)
Dept: CARE COORDINATION | Facility: CLINIC | Age: 67
End: 2022-06-17
Payer: MEDICARE

## 2022-06-17 NOTE — PROGRESS NOTES
Clinic Care Coordination Contact    Community Health Worker Follow Up    CHW spoke with patient briefly. Patient stated that she was out and about at the moment.  She said she is doing well. She stated that she is talking to a therapist for depression which has been going really well. She stated she has been making and attending further appointments.     After this, about 3 minutes into the call, phone line was disconnected. CHW tried calling back but it was busy.    CHW will outreach in one week.     Care Gaps:     Health Maintenance Due   Topic Date Due     Pneumococcal Vaccine: 65+ Years (2 - PCV) 12/30/2020       Did not discuss     Goals:    Goals Addressed as of 6/17/2022 at 12:42 PM                    Today    5/23/22       1. Medical (pt-stated)   On track  On track    Added 3/17/21 by Katy Calderon LSW      Goal Statement: I will continue to attend my appointments, follow my plan of care, and access care as needed to manage my pain and medical needs over the next 3 months.   Date Goal set: 3/17/21 extended 1/12/2022   Barriers: Complex cares, lots of appts  Strengths: Connected to care, motivated   Date to Achieve By: 12/1/21 extended 4/12/2022  Patient expressed understanding of goal: Yes    Action steps to achieve this goal:  1. I will continue to attend my appointments as needed and recommended.   2. I will follow my plan of care as created by my PCP and specialty teams.   3. I will work with care coordination to understand my care plan, schedule appts as needed, and keep track of my appts as needed.              CHW Plan: outreach in one week        Agnieszka Rose  Community Health Worker  [facility]  Clinic Care Coordination  [email or web address]  Office: [phone]

## 2022-06-21 ENCOUNTER — TELEPHONE (OUTPATIENT)
Dept: ENDOCRINOLOGY | Facility: CLINIC | Age: 67
End: 2022-06-21
Payer: MEDICARE

## 2022-06-21 ENCOUNTER — TELEPHONE (OUTPATIENT)
Dept: ENDOCRINOLOGY | Facility: CLINIC | Age: 67
End: 2022-06-21

## 2022-06-21 NOTE — TELEPHONE ENCOUNTER
[facility] Call [facility]    Phone Message    May a detailed message be left on voicemail: yes     Reason for Call: Other: Per patient [employer] sent forms to clinic to fill out for her to see a weight management   doctor she wants to know the status of that being completed ? Please follow up .     Action Taken: Other: ENDO    Travel Screening: Not Applicable

## 2022-06-21 NOTE — TELEPHONE ENCOUNTER
Writer called patient and transferred her to weight management scheduling to see if they would be able to further assist with status.    Donna Juarez [facility]  Adult Endocrinology  [facility]

## 2022-06-21 NOTE — TELEPHONE ENCOUNTER
[facility] Call [facility]    Phone Message    May a detailed message be left on voicemail: yes     Reason for Call: Other: Per caller she needs someone to contact her insurance regarding the weight managment referral. They need procedure code, diagnosis code , number for the procedure that is being referred for the patient. Please contact [facility] at [phone]      Pt will need this referral in order to be covered by insurance and she stated she is having a procedure that will cause her to gain weight, per Ilya.      Action Taken: Message routed to:  Adult Clinics: Endocrinology p [id number]    Travel Screening: Not Applicable

## 2022-06-22 NOTE — TELEPHONE ENCOUNTER
Writer called [employer] at [phone] and was told by representative that clinic will need to submit a predetermination from their website [email or web address] and that our providers or someone from our clinic should already have access to this when. I told representative I don't have access but can check to get more information. Routing to weight management pool to further assist.    Donna Juarez [facility]  Adult Endocrinology  [facility]

## 2022-06-22 NOTE — TELEPHONE ENCOUNTER
Writer called and provided patient with information from Lizz[facility]. She is scheduled for the referral. She plans to reach her insurance about coverage for these codes.    Donna Juarez [facility]  Adult Endocrinology  [facility]

## 2022-06-30 ENCOUNTER — HOSPITAL ENCOUNTER (OUTPATIENT)
Dept: NUCLEAR MEDICINE | Facility: CLINIC | Age: 67
Setting detail: NUCLEAR MEDICINE
Discharge: HOME | End: 2022-06-30
Admitting: INTERNAL MEDICINE
Payer: MEDICARE

## 2022-06-30 DIAGNOSIS — E05.00 GRAVES DISEASE: ICD-10-CM

## 2022-06-30 PROCEDURE — 79005 NUCLEAR RX ORAL ADMIN: CPT

## 2022-06-30 PROCEDURE — 344N000001 HC RX 344

## 2022-06-30 PROCEDURE — A9517 I131 IODIDE CAP, RX: HCPCS

## 2022-06-30 RX ADMIN — Medication 16.1 MCI.: at 08:55

## 2022-07-01 ENCOUNTER — MYC MEDICAL ADVICE (OUTPATIENT)
Dept: ENDOCRINOLOGY | Facility: CLINIC | Age: 67
End: 2022-07-01

## 2022-07-01 NOTE — TELEPHONE ENCOUNTER
Mercedes Barber sent a Nekted message:    I didn t know what to expect and all I know is I have to clean the toilet each time time and clean  The sink as well.! I ve had diarrhea as well as heartburn and nausea and still itch ness, ir that normal. I would gave appreciated of side effects    I tried to reach her by phone to go over safety precautions but unsuccessful.   Will send her a Nekted message but I will also forward this message to Dr.Susanne Caraballo to address symptoms.  Not sure they are related to the radioactive capsule.    Carla Alexis RN, [facility]

## 2022-07-03 NOTE — PLAN OF CARE
Pt A&O, VSS on RA.  Up with SBA to bedside commode.  Pain managed with po tramadol and one dose of IV dilaudid.  Wound vac in place, blanchable redness around incision on R hip.  CMS with baseline numbness to feet.  Voiding adequate amounts.  Tolerating regular diet.  Pt will go to TCU upon discharge.    negative...

## 2022-07-05 NOTE — TELEPHONE ENCOUNTER
Odalys Caraballo MD  [facility] Endocrinology Adult [place] 18 minutes ago (4:28 PM)     Yes,sounds good. Odalys            Sent StandardNinet message to patient.    Danni Tineo, MSN, RN  [facility]

## 2022-07-08 ENCOUNTER — PATIENT OUTREACH (OUTPATIENT)
Dept: CARE COORDINATION | Facility: CLINIC | Age: 67
End: 2022-07-08

## 2022-07-08 ASSESSMENT — ACTIVITIES OF DAILY LIVING (ADL): DEPENDENT_IADLS:: INDEPENDENT

## 2022-07-08 NOTE — PROGRESS NOTES
Clinic Care Coordination Contact    Community Health Worker Follow Up    Care Gaps: Did not discuss    Health Maintenance Due   Topic Date Due     Pneumococcal Vaccine: 65+ Years (2 - PCV) 12/30/2020       Goals:    Goals Addressed as of 7/8/2022 at 2:08 PM                    Today    6/17/22       1. Medical (pt-stated)   On track  On track    Added 3/17/21 by Katy Calderon LSW      Goal Statement: I will continue to attend my appointments, follow my plan of care, and access care as needed to manage my pain and medical needs over the next 3 months.   Date Goal set: 3/17/21 extended 1/12/2022   Barriers: Complex cares, lots of appts  Strengths: Connected to care, motivated   Date to Achieve By: 12/1/21 extended 4/12/2022  Patient expressed understanding of goal: Yes    Action steps to achieve this goal:  1. I will continue to attend my appointments as needed and recommended.   2. I will follow my plan of care as created by my PCP and specialty teams.   3. I will work with care coordination to understand my care plan, schedule appts as needed, and keep track of my appts as needed.                Intervention and Education during outreach:     Pt underwent radioactive iodine treatment on 6/30/22. She has 2 lab visits scheduled to assess thyroid levels (7/23/22 and 8/25/22).    Pt continues to itch, however, uses hydrocortisone cream and benadryl to manage itching symptoms. Pt states, if the radioactive iodine treatment is successful, her thyroid numbers will normalize and the itching should decrease or subside completely    Pt took down CAMRON Carlin's phone number, [phone], should she need assistance from  before next monthly outreach    Pt continues to see a psychologist weekly and Dr. Cervantes, psychiatrist, monthly. Her psychologist will be transferring to [place]; she hopes to follow her to [facility]. Pt plans to call [facility] to see if she is able to follow her psychologist to [facility].    CHW asks if pt has any further  concerns or need for resources as this time. Pt states she does not. CHW made sure pt has CHW contact information. Pt will contact CHW with questions or updates before next outreach.     CHW Plan: CHW will follow up with pt in one month.    Clarita Burger, covering for Raegan Chinchilla  Community Heatl Worker  [facility] [place]  [phone]

## 2022-07-08 NOTE — PROGRESS NOTES
Clinic Care Coordination Contact  Chart review    Situation: Patient chart reviewed by care coordinator.       Background: Care Coordination initial assessment and enrollment took place 11/13/2019.   Upon initial assessment patient-centered goals were discussed and developed with participation from patient.  [facility] handed patient follow up and monitoring of goal progression off to CHW for continued outreach every 30 days.        Assessment: Per chart review, patient outreach completed by CC CHW on 7/8/2022.  Patient is actively working to accomplish goal and patient's goal remains appropriate and relevant at this time.        Goals        1. Medical (pt-stated)       Goal Statement: I will continue to attend my appointments, follow my plan of care, and access care as needed to manage my pain and medical needs over the next 3 months.   Date Goal set: 3/17/21 extended 1/12/2022   Barriers: Complex cares, lots of appts  Strengths: Connected to care, motivated   Date to Achieve By: 12/1/21 extended 4/12/2022 // Extended to 9/1/2022  Patient expressed understanding of goal: Yes    Action steps to achieve this goal:  1. I will continue to attend my appointments as needed and recommended.   2. I will follow my plan of care as created by my PCP and specialty teams.   3. I will work with care coordination to understand my care plan, schedule appts as needed, and keep track of my appts as needed.                    Plan/Recommendations: [facility] Clinical Assessments to be completed annually or as needed. The patient will continue working with Care Coordination to achieve goal as above.  CHW will involve [facility] as needed or if patient is ready to transition to goal status of Maintenance.  [facility] will continue to review progress to goals and CHW outreaches every 6 weeks.     Complex Care Plan:  Patient is not due for Complex Care Plan to be updated.  Care Plan updated and mailed to patient: No    Annual Assessment due before next  Outreach: No  Scheduled: Not Applicable    [facility]  Clinic Care Coordinator  [facility] [place] [facility]  [facility]  [phone]  [email or web address]

## 2022-07-23 ENCOUNTER — LAB (OUTPATIENT)
Dept: LAB | Facility: CLINIC | Age: 67
End: 2022-07-23
Payer: MEDICARE

## 2022-07-23 DIAGNOSIS — E05.00 GRAVES DISEASE: ICD-10-CM

## 2022-07-23 LAB
T4 FREE SERPL-MCNC: 1.51 NG/DL (ref 0.76–1.46)
TSH SERPL DL<=0.005 MIU/L-ACNC: <0.01 MU/L (ref 0.4–4)

## 2022-07-23 PROCEDURE — 36415 COLL VENOUS BLD VENIPUNCTURE: CPT

## 2022-07-23 PROCEDURE — 84443 ASSAY THYROID STIM HORMONE: CPT

## 2022-07-23 PROCEDURE — 84439 ASSAY OF FREE THYROXINE: CPT

## 2022-08-06 ENCOUNTER — MYC MEDICAL ADVICE (OUTPATIENT)
Dept: INTERNAL MEDICINE | Facility: CLINIC | Age: 67
End: 2022-08-06

## 2022-08-06 DIAGNOSIS — M85.80 OSTEOPENIA, UNSPECIFIED LOCATION: ICD-10-CM

## 2022-08-11 RX ORDER — ALENDRONATE SODIUM 70 MG/1
TABLET ORAL
Qty: 12 TABLET | Refills: 2 | Status: SHIPPED | OUTPATIENT
Start: 2022-08-11 | End: 2023-05-30

## 2022-08-15 ENCOUNTER — PATIENT OUTREACH (OUTPATIENT)
Dept: CARE COORDINATION | Facility: CLINIC | Age: 67
End: 2022-08-15

## 2022-08-15 ENCOUNTER — OFFICE VISIT (OUTPATIENT)
Dept: DERMATOLOGY | Facility: CLINIC | Age: 67
End: 2022-08-15
Payer: MEDICARE

## 2022-08-15 DIAGNOSIS — L82.1 SEBORRHEIC KERATOSES: Primary | ICD-10-CM

## 2022-08-15 DIAGNOSIS — L57.8 ACTINIC SKIN DAMAGE: ICD-10-CM

## 2022-08-15 PROCEDURE — 99203 OFFICE O/P NEW LOW 30 MIN: CPT | Performed by: STUDENT IN AN ORGANIZED HEALTH CARE EDUCATION/TRAINING PROGRAM

## 2022-08-15 ASSESSMENT — PAIN SCALES - GENERAL: PAINLEVEL: NO PAIN (0)

## 2022-08-15 NOTE — PROGRESS NOTES
"Clinic Care Coordination Contact    Community Health Worker Follow Up    Care Gaps:     Health Maintenance Due   Topic Date Due     Pneumococcal Vaccine: 65+ Years (2 - PCV) 12/30/2020     ASTHMA CONTROL TEST  09/15/2022       Did Not Address.    Goals:    Goals Addressed as of 8/15/2022 at 10:06 AM                    Today    7/8/22       Patient Stated       1. Medical (pt-stated)   On track  On track    Added 3/17/21 by Katy Calderon LSW      Goal Statement: I will continue to attend my appointments, follow my plan of care, and access care as needed to manage my pain and medical needs over the next 3 months.   Date Goal set: 3/17/21 extended 1/12/2022   Barriers: Complex cares, lots of appts  Strengths: Connected to care, motivated   Date to Achieve By: 12/1/21 extended 4/12/2022 // Extended to 9/1/2022  Patient expressed understanding of goal: Yes    Action steps to achieve this goal:  1. I will continue to attend my appointments as needed and recommended.   2. I will follow my plan of care as created by my PCP and specialty teams.   3. I will work with care coordination to understand my care plan, schedule appts as needed, and keep track of my appts as needed.            Discussed:    Patient stated she has been lonely.  Her support system are out of the country.  Her sister is in [place], and her brother is in [place].  Patient stated her sister has called her a few times while in [place] to \"check-in\".    Patient stated she will see her daughter later this week.  She is looking forward to the visit.    Patient stated she has not been sleeping well.  Patient stated she has a psychologist appt today, and will discuss her sleep further.    Patient stated she reads her Bible before bed.    Patient stated she plans to walk more.    CHW suggested listening to calming nature music before bed.  Patient stated she is going to try this.      Intervention and Education during outreach: CHW encouraged patient to " contact CC if there are any other changes or resources needed.  Patient acknowledged understanding.      CHW Plan: will outreach in one month.    Raegan Chinchilla, CHW  Clinic Care Coordination  [facility] Clinics: [facility] [place], and [place] [facility]  Phone: [phone]

## 2022-08-15 NOTE — PROGRESS NOTES
[facility] Dermatology Note    Encounter Date: Aug 15, 2022    Dermatology Problem List:      ______________________________________    Impression/Plan:  Mercedes was seen today for lesion.    Diagnoses and all orders for this visit:    Seborrheic keratoses  - benign    Actinic skin damage  - discussed sunprotective behaviors, clothing, and the use of sunscreen            Follow-up in .       Staff Involved:  Staff Only    MD Juaquin   of Dermatology  Department of Dermatology  [facility]      CC:   Chief Complaint   Patient presents with     Lesion       History of Present Illness:  Ms. Mercedes Barber is a 67 year old female who presents as a new patient.    Spot on R arm present for months, not changing significantly    Labs:      Physical exam:  Vitals: LMP 03/11/2010   Breastfeeding No   GEN: This is a well developed, well-nourished female in no acute distress, in a pleasant mood.    SKIN: Casey phototype 1  - Sun-exposed skin, which includes the head/face, neck, both arms, digits, and/or nails was examined.   - Stuck on brown papules on trunk and extremities   - actinic skin damage on arms and face   - No other lesions of concern on areas examined.     Past Medical History:   Past Medical History:   Diagnosis Date     Arthritis     Thumb     Cervical high risk HPV (human papillomavirus) test positive 07/17/2017 07/17/17: NIL pap, + HR HPV (not 16 or 18) result.      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     Neuropathy of lower extremity      Other sleep apnea      Rectal prolapse      Restless leg syndrome      Seizure disorder (H)      25 years ago     Sleep apnea     CPAP, no longer using CPAP     Temporal sclerosis 08/27/2012     Uncomplicated asthma      Past Surgical History:   Procedure Laterality Date     Anterior COLPORRHAPHY, BLADDER/VAGINA      perigee mesh     ARTHROPLASTY HIP Right 7/23/2019    Procedure: Right total hip arthroplasty;  Surgeon: Anant Mejia MD;  [facility]: [facility]     ARTHROPLASTY PATELLO-FEMORAL (KNEE) Bilateral      ARTHROPLASTY REVISION HIP Right 8/7/2019    Procedure: Right hip revision total arthroplasty;  Surgeon: Tom Antonio MD;  [facility]: [facility]     ARTHROPLASTY REVISION HIP Right 8/5/2020    Procedure: Revision Right total hip arthroplasty;  Surgeon: Pan Grey MD;  [facility]     CARPAL TUNNEL RELEASE RT/LT       DENTAL SURGERY      implant     DESTRUCTION OF PARAVERTEBRAL FACET LUMBAR / SACRAL SINGLE Left 10/27/2021    Procedure: DESTRUCTION, NERVE, FACET JOINT, LUMBOSACRAL, Left L4-5 and L5-S1 facet joint radiofrequency ablation;  Surgeon: Celena Perez MD;  Location: [facility] OR     DILATION AND CURETTAGE       DRAIN PILONIDAL CYST SIMPL       ENDOSCOPY DRUG INDUCED SLEEP N/A 11/18/2015    Procedure: ENDOSCOPY DRUG INDUCED SLEEP;  Surgeon: Park Ortiz MD;  [facility]: [facility]     ESOPHAGOSCOPY, GASTROSCOPY, DUODENOSCOPY (EGD), COMBINED  4/5/2013    Procedure: COMBINED ESOPHAGOSCOPY, GASTROSCOPY, DUODENOSCOPY (EGD), BIOPSY SINGLE OR MULTIPLE;;  Surgeon: Mundo Mehta MD;  Location: [facility]     EXCISE LESION TRUNK  8/10/2012    Procedure: EXCISE LESION TRUNK;;  Surgeon: Jerald Diggs MD;  [facility]: [facility]     HYSTERECTOMY       INJECT BLOCK MEDIAL BRANCH CERVICAL/THORACIC/LUMBAR Bilateral 9/20/2021    Procedure: BLOCK, NERVE, FACET JOINT, MEDIAL BRANCH, DIAGNOSTIC - Bilateral L4-5 and L5-S1 Medial branch blocks;  Surgeon: Celena Perez MD;  Location: [facility] OR     INJECT BLOCK MEDIAL BRANCH CERVICAL/THORACIC/LUMBAR Bilateral 9/27/2021    Procedure: BLOCK, NERVE,  FACET JOINT, MEDIAL BRANCH, DIAGNOSTIC - Bilateral L4-5 and L5-S1 Medial branch blocks;  Surgeon: Celena Perez MD;  Location: [facility] OR     INJECT SACROILIAC JOINT Bilateral 5/24/2021    Procedure: INJECTION, SACROILIAC JOINT;  Surgeon: Celena Perez MD;  Location: [facility] OR     IRRIGATION AND DEBRIDEMENT HIP, COMBINED Right 8/26/2019    Procedure: 1.  Right hip wound irrigation and debridement with excisional debridement of nonviable skin, subcutaneous tissues, and fascia. 2. Application of incisional wound VAC, 27cm length incision.;  Surgeon: Tyson Prabhakar MD;  [facility]: [facility]     L shoulder fracture       OPEN REDUCTION INTERNAL FIXATION WRIST Left 4/30/2021    Procedure: Open reduction with internal fixation of displaced left intraarticular distal radius fracture;  Surgeon: Luis Manuel Sánchez MD;  Location: [facility]     rectal prolapse repair abdominally       RELEASE PLANTAR FASCIA Right 1/20/2015    Procedure: RELEASE PLANTAR FASCIA;  Surgeon: Maicol Liu DPM;  Location: [facility]     REMOVE MESH VAGINA  8/10/2012    Procedure: REMOVE MESH VAGINA;  Excision of Vaginal Mesh Exposure, Removal of Skin Tag Left Inner Leg;  Surgeon: Jerald Diggs MD;  [facility]: [facility]     REPAIR HAMMER TOE Right 1/20/2015    Procedure: REPAIR HAMMER TOE;  Surgeon: Maicol Liu DPM;  Location: [facility]     TONSILLECTOMY & ADENOIDECTOMY       TUBAL LIGATION         Social History:   reports that she has never smoked. She has never used smokeless tobacco. She reports current alcohol use. She reports that she does not use drugs.    Family History:  Family History   Problem Relation Age of Onset     Eye Disorder Mother      Lipids Mother         has CHF     Osteoporosis Mother      Diabetes Father      Breast Cancer Sister      Depression Sister      Lipids Sister      Thyroid Disease Sister      Hypertension Brother      Alcohol/Drug Brother      Depression Brother      Gastrointestinal Disease Brother          hx of colonic polyps     Lipids Brother      Psychotic Disorder Brother      Alzheimer Disease Paternal Grandmother      Congenital Anomalies Daughter      Neurologic Disorder Daughter      Suicide Son        Medications:  Current Outpatient Medications   Medication Sig Dispense Refill     acetaminophen (TYLENOL) 325 MG tablet Take 2 tablets (650 mg) by mouth every 4 hours as needed for other 1 Bottle 0     albuterol (ALBUTEROL) 108 (90 BASE) MCG/ACT Inhaler Inhale 2 puffs into the lungs 4 times daily as needed for shortness of breath / dyspnea or wheezing 1 Inhaler 0     alendronate (FOSAMAX) 70 MG tablet TAKE 1 TABLET(70 MG) BY MOUTH EVERY 7 DAYS 12 tablet 2     amLODIPine (NORVASC) 5 MG tablet Take 1 tablet (5 mg) by mouth daily 90 tablet 2     amoxicillin (AMOXIL) 500 MG tablet Take 4 tablets (2000 mg) by mouth 1 hour before dental procedures/cleanings. 4 tablet 4     aspirin 81 MG EC tablet Take 81 mg by mouth daily       atenolol (TENORMIN) 25 MG tablet Take 1 tablet (25 mg) by mouth daily 90 tablet 0     atenolol (TENORMIN) 50 MG tablet Take 1 tablet (50 mg) by mouth daily 90 tablet 0     benzonatate (TESSALON) 200 MG capsule Take 200 mg by mouth 3 times daily as needed for cough       budesonide-formoterol (SYMBICORT) 160-4.5 MCG/ACT Inhaler Inhale 2 puffs into the lungs 2 times daily       busPIRone (BUSPAR) 5 MG tablet Take 5 mg by mouth 2 times daily       carboxymethylcellulose PF (REFRESH PLUS) 0.5 % ophthalmic solution Place 1 drop into both eyes 2 times daily       cyclobenzaprine (FLEXERIL) 5 MG tablet Take 1 tablet (5 mg) by mouth 2 times daily as needed for muscle spasms 60 tablet 3     cycloSPORINE (RESTASIS) 0.05 % ophthalmic emulsion Place 1 drop into both eyes 2 times daily        Dentifrices (BIOTENE DRY MOUTH DT) Apply 2 sprays to affected area 3 times daily as needed        Estradiol-Estriol-Progesterone (BIEST/PROGESTERONE TD) Place 0.5 g onto the skin nightly as needed        fluticasone-vilanterol (BREO ELLIPTA) 200-25 MCG/INH inhaler Inhale 1 puff into the lungs daily       furosemide (LASIX) 20 MG tablet Take 1 tablet (20 mg) by mouth daily 90 tablet 2     hypromellose (ARTIFICIAL TEARS) 0.5 % SOLN ophthalmic solution Place 1 drop into both eyes 2 times daily       ibuprofen (ADVIL/MOTRIN) 600 MG tablet Take 600 mg by mouth every 6 hours as needed for moderate pain       lacosamide (VIMPAT) 200 MG TABS tablet Take 1 tablet (200 mg) by mouth 2 times daily 30 tablet 0     linaclotide (LINZESS) 290 MCG capsule Take 1 capsule (290 mcg) by mouth every morning (before breakfast) 10 capsule 0     loratadine (CLARITIN REDITABS) 10 MG ODT Take 1 tablet (10 mg) by mouth daily as needed for allergies       LORazepam (ATIVAN) 0.5 MG tablet Take 0.5 mg by mouth daily as needed for anxiety       omeprazole (PRILOSEC) 40 MG DR capsule Take 1 capsule (40 mg) by mouth 2 times daily 180 capsule 3     ondansetron (ZOFRAN-ODT) 4 MG ODT tab Take 1 tablet (4 mg) by mouth every 6 hours as needed for nausea or vomiting 20 tablet 0     potassium citrate (UROCIT-K) 10 MEQ (1080 MG) CR tablet Take 1 tablet (10 mEq) by mouth daily 90 tablet 1     pramipexole (MIRAPEX) 1 MG tablet Give 1 tablet by mouth in the morning and 3 tablets by mouth 3 hours prior to bedtime       QUEtiapine (SEROQUEL) 50 MG tablet Take 100 mg by mouth nightly as needed        topiramate (TOPAMAX) 25 MG tablet Take 25 mg in the morning and 50 mg at bedtime.       traZODone (DESYREL) 150 MG tablet 150 mg At Bedtime        triamcinolone (KENALOG) 0.1 % external cream APPLY TOPICALLY 3 TIMES    DAILY. 30 g 0     trolamine salicylate (ASPERCREME) 10 % external cream Apply topically 2 times daily as needed for moderate pain To right hip incision area       Allergies   Allergen Reactions     Blood Transfusion Related (Informational Only) Other (See Comments)     Patient has a history of a clinically significant antibody against RBC antigens.  A  delay in compatible RBCs may occur.     Budesonide      Edema     Bupropion Rash     Carbamazepine Diarrhea     Clonazepam Other (See Comments)     Hypotension, trouble walking, mind disturbance     Encort [Hydrocortisone Acetate] Swelling     Localized to feet     Encort [Hydrocortisone] Swelling     Erythromycin Diarrhea     Erythromycin Nausea and Vomiting and Diarrhea     Flagyl [Metronidazole] Nausea     Gabapentin Other (See Comments)     Causes over-eating     Lamictal [Lamotrigine] Dizziness     Oxycodone Nausea and Vomiting     Tegretol [Carbamazepine] Nausea and Vomiting

## 2022-08-15 NOTE — LETTER
8/15/2022         RE: Mercedes Barber  [address]  [place] [address]        Dear Colleague,    Thank you for referring your patient, Mercedes Barber, to the [facility]. Please see a copy of my visit note below.    [facility] Dermatology Note    Encounter Date: Aug 15, 2022    Dermatology Problem List:      ______________________________________    Impression/Plan:  Mercedes was seen today for lesion.    Diagnoses and all orders for this visit:    Seborrheic keratoses  - benign    Actinic skin damage  - discussed sunprotective behaviors, clothing, and the use of sunscreen            Follow-up in .       Staff Involved:  Staff Only    MD Juaquin   of Dermatology  Department of Dermatology  [facility]      CC:   Chief Complaint   Patient presents with     Lesion       History of Present Illness:  Ms. Mercedes Barber is a 67 year old female who presents as a new patient.    Spot on R arm present for months, not changing significantly    Labs:      Physical exam:  Vitals: LMP 03/11/2010   Breastfeeding No   GEN: This is a well developed, well-nourished female in no acute distress, in a pleasant mood.    SKIN: Casey phototype 1  - Sun-exposed skin, which includes the head/face, neck, both arms, digits, and/or nails was examined.   - Stuck on brown papules on trunk and extremities   - actinic skin damage on arms and face   - No other lesions of concern on areas examined.     Past Medical History:   Past Medical History:   Diagnosis Date     Arthritis     Thumb     Cervical high risk HPV (human papillomavirus) test positive 07/17/2017 07/17/17: NIL pap, + HR HPV (not 16 or 18) result.      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     Neuropathy of lower extremity      Other sleep apnea      Rectal prolapse      Restless leg syndrome      Seizure disorder (H)     25 years ago     Sleep apnea     CPAP, no longer using CPAP     Temporal sclerosis 08/27/2012     Uncomplicated asthma      Past Surgical History:   Procedure Laterality Date     Anterior COLPORRHAPHY, BLADDER/VAGINA      perigee mesh     ARTHROPLASTY HIP Right 7/23/2019    Procedure: Right total hip arthroplasty;  Surgeon: Anant Mejia MD;  [facility]: [facility]     ARTHROPLASTY PATELLO-FEMORAL (KNEE) Bilateral      ARTHROPLASTY REVISION HIP Right 8/7/2019    Procedure: Right hip revision total arthroplasty;  Surgeon: Tom Antonio MD;  [facility]: [facility]     ARTHROPLASTY REVISION HIP Right 8/5/2020    Procedure: Revision Right total hip arthroplasty;  Surgeon: Pan Grey MD;  [facility]     CARPAL TUNNEL RELEASE RT/LT       DENTAL SURGERY      implant     DESTRUCTION OF PARAVERTEBRAL FACET LUMBAR / SACRAL SINGLE Left 10/27/2021    Procedure: DESTRUCTION, NERVE, FACET JOINT, LUMBOSACRAL, Left L4-5 and L5-S1 facet joint radiofrequency ablation;  Surgeon: Celena Perez MD;  Location: [facility] OR     DILATION AND CURETTAGE       DRAIN PILONIDAL CYST SIMPL       ENDOSCOPY DRUG INDUCED SLEEP N/A 11/18/2015    Procedure: ENDOSCOPY DRUG INDUCED SLEEP;  Surgeon: Park Ortiz MD;  [facility]: [facility]     ESOPHAGOSCOPY, GASTROSCOPY, DUODENOSCOPY (EGD), COMBINED  4/5/2013    Procedure: COMBINED ESOPHAGOSCOPY, GASTROSCOPY, DUODENOSCOPY (EGD), BIOPSY SINGLE OR MULTIPLE;;  Surgeon: Mundo Mehta MD;  Location:  GI     EXCISE LESION TRUNK  8/10/2012    Procedure: EXCISE LESION TRUNK;;  Surgeon: Jerald Diggs MD;  [facility]: [facility]     HYSTERECTOMY       INJECT BLOCK MEDIAL BRANCH CERVICAL/THORACIC/LUMBAR Bilateral  9/20/2021    Procedure: BLOCK, NERVE, FACET JOINT, MEDIAL BRANCH, DIAGNOSTIC - Bilateral L4-5 and L5-S1 Medial branch blocks;  Surgeon: Celena Perez MD;  Location: [facility] OR     INJECT BLOCK MEDIAL BRANCH CERVICAL/THORACIC/LUMBAR Bilateral 9/27/2021    Procedure: BLOCK, NERVE, FACET JOINT, MEDIAL BRANCH, DIAGNOSTIC - Bilateral L4-5 and L5-S1 Medial branch blocks;  Surgeon: Celena Perez MD;  Location: [facility] OR     INJECT SACROILIAC JOINT Bilateral 5/24/2021    Procedure: INJECTION, SACROILIAC JOINT;  Surgeon: Celena Perez MD;  Location: [facility] OR     IRRIGATION AND DEBRIDEMENT HIP, COMBINED Right 8/26/2019    Procedure: 1.  Right hip wound irrigation and debridement with excisional debridement of nonviable skin, subcutaneous tissues, and fascia. 2. Application of incisional wound VAC, 27cm length incision.;  Surgeon: Tyson Prabhakar MD;  [facility]     L shoulder fracture       OPEN REDUCTION INTERNAL FIXATION WRIST Left 4/30/2021    Procedure: Open reduction with internal fixation of displaced left intraarticular distal radius fracture;  Surgeon: Luis Manuel Sánchez MD;  [facility]: [facility]     rectal prolapse repair abdominally       RELEASE PLANTAR FASCIA Right 1/20/2015    Procedure: RELEASE PLANTAR FASCIA;  Surgeon: Maicol Liu DPM;  Location: [facility]     REMOVE MESH VAGINA  8/10/2012    Procedure: REMOVE MESH VAGINA;  Excision of Vaginal Mesh Exposure, Removal of Skin Tag Left Inner Leg;  Surgeon: Jerald Diggs MD;  [facility]     REPAIR HAMMER TOE Right 1/20/2015    Procedure: REPAIR HAMMER TOE;  Surgeon: Maicol Liu DPM;  Location: [facility]     TONSILLECTOMY & ADENOIDECTOMY       TUBAL LIGATION         Social History:   reports that she has never smoked. She has never used smokeless tobacco. She reports current alcohol use. She reports that she does not use drugs.    Family History:  Family History   Problem Relation Age of Onset     Eye Disorder Mother      Lipids Mother          has CHF     Osteoporosis Mother      Diabetes Father      Breast Cancer Sister      Depression Sister      Lipids Sister      Thyroid Disease Sister      Hypertension Brother      Alcohol/Drug Brother      Depression Brother      Gastrointestinal Disease Brother         hx of colonic polyps     Lipids Brother      Psychotic Disorder Brother      Alzheimer Disease Paternal Grandmother      Congenital Anomalies Daughter      Neurologic Disorder Daughter      Suicide Son        Medications:  Current Outpatient Medications   Medication Sig Dispense Refill     acetaminophen (TYLENOL) 325 MG tablet Take 2 tablets (650 mg) by mouth every 4 hours as needed for other 1 Bottle 0     albuterol (ALBUTEROL) 108 (90 BASE) MCG/ACT Inhaler Inhale 2 puffs into the lungs 4 times daily as needed for shortness of breath / dyspnea or wheezing 1 Inhaler 0     alendronate (FOSAMAX) 70 MG tablet TAKE 1 TABLET(70 MG) BY MOUTH EVERY 7 DAYS 12 tablet 2     amLODIPine (NORVASC) 5 MG tablet Take 1 tablet (5 mg) by mouth daily 90 tablet 2     amoxicillin (AMOXIL) 500 MG tablet Take 4 tablets (2000 mg) by mouth 1 hour before dental procedures/cleanings. 4 tablet 4     aspirin 81 MG EC tablet Take 81 mg by mouth daily       atenolol (TENORMIN) 25 MG tablet Take 1 tablet (25 mg) by mouth daily 90 tablet 0     atenolol (TENORMIN) 50 MG tablet Take 1 tablet (50 mg) by mouth daily 90 tablet 0     benzonatate (TESSALON) 200 MG capsule Take 200 mg by mouth 3 times daily as needed for cough       budesonide-formoterol (SYMBICORT) 160-4.5 MCG/ACT Inhaler Inhale 2 puffs into the lungs 2 times daily       busPIRone (BUSPAR) 5 MG tablet Take 5 mg by mouth 2 times daily       carboxymethylcellulose PF (REFRESH PLUS) 0.5 % ophthalmic solution Place 1 drop into both eyes 2 times daily       cyclobenzaprine (FLEXERIL) 5 MG tablet Take 1 tablet (5 mg) by mouth 2 times daily as needed for muscle spasms 60 tablet 3     cycloSPORINE (RESTASIS) 0.05 %  ophthalmic emulsion Place 1 drop into both eyes 2 times daily        Dentifrices (BIOTENE DRY MOUTH DT) Apply 2 sprays to affected area 3 times daily as needed        Estradiol-Estriol-Progesterone (BIEST/PROGESTERONE TD) Place 0.5 g onto the skin nightly as needed       fluticasone-vilanterol (BREO ELLIPTA) 200-25 MCG/INH inhaler Inhale 1 puff into the lungs daily       furosemide (LASIX) 20 MG tablet Take 1 tablet (20 mg) by mouth daily 90 tablet 2     hypromellose (ARTIFICIAL TEARS) 0.5 % SOLN ophthalmic solution Place 1 drop into both eyes 2 times daily       ibuprofen (ADVIL/MOTRIN) 600 MG tablet Take 600 mg by mouth every 6 hours as needed for moderate pain       lacosamide (VIMPAT) 200 MG TABS tablet Take 1 tablet (200 mg) by mouth 2 times daily 30 tablet 0     linaclotide (LINZESS) 290 MCG capsule Take 1 capsule (290 mcg) by mouth every morning (before breakfast) 10 capsule 0     loratadine (CLARITIN REDITABS) 10 MG ODT Take 1 tablet (10 mg) by mouth daily as needed for allergies       LORazepam (ATIVAN) 0.5 MG tablet Take 0.5 mg by mouth daily as needed for anxiety       omeprazole (PRILOSEC) 40 MG DR capsule Take 1 capsule (40 mg) by mouth 2 times daily 180 capsule 3     ondansetron (ZOFRAN-ODT) 4 MG ODT tab Take 1 tablet (4 mg) by mouth every 6 hours as needed for nausea or vomiting 20 tablet 0     potassium citrate (UROCIT-K) 10 MEQ (1080 MG) CR tablet Take 1 tablet (10 mEq) by mouth daily 90 tablet 1     pramipexole (MIRAPEX) 1 MG tablet Give 1 tablet by mouth in the morning and 3 tablets by mouth 3 hours prior to bedtime       QUEtiapine (SEROQUEL) 50 MG tablet Take 100 mg by mouth nightly as needed        topiramate (TOPAMAX) 25 MG tablet Take 25 mg in the morning and 50 mg at bedtime.       traZODone (DESYREL) 150 MG tablet 150 mg At Bedtime        triamcinolone (KENALOG) 0.1 % external cream APPLY TOPICALLY 3 TIMES    DAILY. 30 g 0     trolamine salicylate (ASPERCREME) 10 % external cream Apply  topically 2 times daily as needed for moderate pain To right hip incision area       Allergies   Allergen Reactions     Blood Transfusion Related (Informational Only) Other (See Comments)     Patient has a history of a clinically significant antibody against RBC antigens.  A delay in compatible RBCs may occur.     Budesonide      Edema     Bupropion Rash     Carbamazepine Diarrhea     Clonazepam Other (See Comments)     Hypotension, trouble walking, mind disturbance     Encort [Hydrocortisone Acetate] Swelling     Localized to feet     Encort [Hydrocortisone] Swelling     Erythromycin Diarrhea     Erythromycin Nausea and Vomiting and Diarrhea     Flagyl [Metronidazole] Nausea     Gabapentin Other (See Comments)     Causes over-eating     Lamictal [Lamotrigine] Dizziness     Oxycodone Nausea and Vomiting     Tegretol [Carbamazepine] Nausea and Vomiting                   Again, thank you for allowing me to participate in the care of your patient.        Sincerely,        Juaquin Fournier MD

## 2022-08-15 NOTE — PATIENT INSTRUCTIONS
"   SUNSCREENS  UVA and UVB PROTECTION    Sunlight consists of two types of light that can cause or worsen many skin problems:   - UVA (ultraviolet A): Less variation with seasons  All year round  All day strong  Passes through glass and clouds --this is important to remember while you drive   -  UVB (ultraviolet B):  Strongest in summer  Peak hours 10 am to 4pm  SPF rates UVB protection, SPF 30 blocks 97% of UVB rays (if applied correctly).     Things that can be caused or worsened by UV light, either by the sun or by tanning beds:  Skin cancers  Sunburns   Increased number of moles, brown spots, age spots  Wrinkles and other types of aging, thinning/weakening/increasing fragility of the skin, easier bruising, \"weather-beaten look\"  Rosacea  Certain autoimmune conditions     Sunscreens that block both UVA and UVB light contain these ingredients:  Zinc Oxide (should contain at least 4% zinc oxide)  Titanium Dioxide  Parsol or Avobenzone (additives improve stability of Avobenzone)  There are other UVA blocking ingredients but they are not as complete as these three.     Tips/Suggestions:   SPF of 30 or higher, but some literature suggests that even higher numbers might provide even better protection  Zinc Oxide or other UVA block such as Helioplex or Anthelios in product  Remember there is no safe UV light so there is no such thing as a safe suntan.     Apply sunscreen daily (even in winter and on cloudy days) and reapply depending on activity--every 1.5 to 2 hours if out in sun for a long time.  Apply sunscreen at least 15 to 30 minutes before sun exposure  Approximately one ounce (a shot glass) is necessary to adequately cover the entire body.  Seek shade when possible      Examples of sunscreens:  - Elta MD (you can order it online or buy it at cosmetic [employer] [place] [employer]. It has microsized Zinc oxide and/or titanium dioxide)  - Neutrogena sun block lotion for sensitive skin with SPF 30  - Oil " "of Olay complete defense daily UV moisturizer with SPF 30  -Cetaphil SPF 30 for normal skin or SPF 50 for dry skin  Sunblocks that only have \"physical\" ingredients (zinc oxide, titanium dioxide), no \"chemical\" sunscreen ingredients:  CeraVe Mineral   LA ROCHE-POSAY Mineral  Neutrogena Sheer Zinc Dry-Touch  Vanicream Broad Spectrum 50+  Goddess Garden  Sunscreens that are light-weight and often desirable for people who are very active or sweat a lot:   Neutrogena Sport  Neutrogena Hydroboost       "

## 2022-08-19 ENCOUNTER — PATIENT OUTREACH (OUTPATIENT)
Dept: CARE COORDINATION | Facility: CLINIC | Age: 67
End: 2022-08-19

## 2022-08-19 NOTE — PROGRESS NOTES
Care Coordination Clinician Chart Review     Situation: Patient chart reviewed by care coordinator.?     Background: Initial assessment and enrollment to Care Coordination was 11/13/19.?? Patient centered goals were developed with participation from patient.? Lead CC handed patient off to CHW for continued outreach every 30 days.??     Assessment: Per chart review, patient outreach completed by CC CHW on 8/15/22.? Patient is actively working to accomplish goal(s).? Patient's goal(s) remain(s) appropriate at this time.? Patient is not due for updated Plan of Care.? Annual assessment will be due 11/13/22.      Goals        1. Medical (pt-stated)       Goal Statement: I will continue to attend my appointments, follow my plan of care, and access care as needed to manage my pain and medical needs over the next 3 months.   Date Goal set: 3/17/21 extended 1/12/2022   Barriers: Complex cares, lots of appts  Strengths: Connected to care, motivated   Date to Achieve By: 12/1/21 extended 4/12/2022 // Extended to 9/1/2022  Patient expressed understanding of goal: Yes    Action steps to achieve this goal:  1. I will continue to attend my appointments as needed and recommended.   2. I will follow my plan of care as created by my PCP and specialty teams.   3. I will work with care coordination to understand my care plan, schedule appts as needed, and keep track of my appts as needed.            ??     Plan/Recommendations: The patient will continue working with Care Coordination to achieve above goal(s).? CHW will involve Lead CC as needed or if patient is ready to move to maintenance.? Lead CC will continue to monitor CHW s monthly outreaches and progress to goal(s) every 6 weeks.?     Plan of Care updated and sent to patient: No    Gabriella Balderas [facility] Care Coordination   [facility]  Social Work Care Coordinator  [phone]

## 2022-08-22 ENCOUNTER — MYC MEDICAL ADVICE (OUTPATIENT)
Dept: INTERNAL MEDICINE | Facility: CLINIC | Age: 67
End: 2022-08-22

## 2022-08-23 ENCOUNTER — NURSE TRIAGE (OUTPATIENT)
Dept: INTERNAL MEDICINE | Facility: CLINIC | Age: 67
End: 2022-08-23

## 2022-08-23 ENCOUNTER — OFFICE VISIT (OUTPATIENT)
Dept: URGENT CARE | Facility: URGENT CARE | Age: 67
End: 2022-08-23
Payer: MEDICARE

## 2022-08-23 VITALS
OXYGEN SATURATION: 97 % | DIASTOLIC BLOOD PRESSURE: 81 MMHG | HEART RATE: 69 BPM | SYSTOLIC BLOOD PRESSURE: 136 MMHG | TEMPERATURE: 97.9 F

## 2022-08-23 DIAGNOSIS — N39.0 URINARY TRACT INFECTION WITHOUT HEMATURIA, SITE UNSPECIFIED: ICD-10-CM

## 2022-08-23 DIAGNOSIS — R30.9 PAINFUL URINATION: Primary | ICD-10-CM

## 2022-08-23 LAB
ALBUMIN UR-MCNC: NEGATIVE MG/DL
APPEARANCE UR: CLEAR
BACTERIA #/AREA URNS HPF: ABNORMAL /HPF
BILIRUB UR QL STRIP: NEGATIVE
COLOR UR AUTO: YELLOW
GLUCOSE UR STRIP-MCNC: NEGATIVE MG/DL
HGB UR QL STRIP: NEGATIVE
KETONES UR STRIP-MCNC: NEGATIVE MG/DL
LEUKOCYTE ESTERASE UR QL STRIP: ABNORMAL
NITRATE UR QL: NEGATIVE
PH UR STRIP: 5.5 [PH] (ref 5–7)
RBC #/AREA URNS AUTO: ABNORMAL /HPF
SP GR UR STRIP: 1.01 (ref 1–1.03)
SQUAMOUS #/AREA URNS AUTO: ABNORMAL /LPF
UROBILINOGEN UR STRIP-ACNC: 0.2 E.U./DL
WBC #/AREA URNS AUTO: ABNORMAL /HPF

## 2022-08-23 PROCEDURE — 81001 URINALYSIS AUTO W/SCOPE: CPT | Performed by: PHYSICIAN ASSISTANT

## 2022-08-23 PROCEDURE — 87086 URINE CULTURE/COLONY COUNT: CPT | Performed by: PHYSICIAN ASSISTANT

## 2022-08-23 PROCEDURE — 99213 OFFICE O/P EST LOW 20 MIN: CPT | Performed by: PHYSICIAN ASSISTANT

## 2022-08-23 RX ORDER — CEFDINIR 300 MG/1
300 CAPSULE ORAL 2 TIMES DAILY
Qty: 14 CAPSULE | Refills: 0 | Status: SHIPPED | OUTPATIENT
Start: 2022-08-23 | End: 2022-08-30

## 2022-08-23 NOTE — PROGRESS NOTES
Assessment & Plan     Painful urination    Bladder infections are not contagious. You can't get one from someone else, from a toilet seat, or from sharing a bath.  The most common cause of bladder infections is bacteria from the bowels. The bacteria get onto the skin around the opening of the urethra. From there, they can get into the urine. Then they travel up to the bladder, causing inflammation and infection. This often happens because of:    Wiping incorrectly after urinating. Always wipe from front to back.    Bowel incontinence    Pregnancy    Procedures such as having a catheter put in    Older age    Not emptying your bladder. This can give bacteria a chance to grow in your urine.    Fluid loss (dehydration)    Constipation    Having sex    Using a diaphragm for birth control   Treatment  Bladder infections are diagnosed by a urine test and urine culture. They are treated with antibiotics. They often clear up quickly without problems. Treatment helps prevent a more serious kidney infection.  Medicines  Medicines can help in the treatment of a bladder infection:    Take antibiotics until they are used up, even if you feel better. It's important to finish them to make sure the infection has cleared.    You can use acetaminophen or ibuprofen for pain, fever, or discomfort, unless another medicine was prescribed. If you have long-term (chronic) liver or kidney disease, talk with your healthcare provider before using these medicines. Also talk with your provider if you've ever had a stomach ulcer or GI (gastrointestinal) bleeding, or are taking blood-thinner medicines.    If you are given phenazopydridine to reduce burning with urination, it will make your urine a bright orange color. This can stain clothing.  Care and prevention  These self-care steps can help prevent future infections:    Drink plenty of fluids. This helps to prevent dehydration and flush out your bladder. Do this unless you must restrict  fluids for other health reasons, or your healthcare provider told you not to.    Clean yourself correctly after going to the bathroom. Wipe from front to back after using the toilet. This helps prevent the spread of bacteria.    Urinate more often. Don't try to hold urine in for a long time.    Wear loose-fitting clothes and cotton underwear. Don't wear tight-fitting pants.    Improve your diet and prevent constipation. Eat more fresh fruits and vegetables, and fiber. Eat less junk foods and fatty foods.    Don't have sex until your symptoms are gone.    Don't have caffeine, alcohol, and spicy foods. These can irritate your bladder.    Urinate right after you have sex to flush out your bladder.    - UA macro with reflex to Microscopic and Culture - Clinc Collect  - Urine Microscopic  - Urine Culture  - cefdinir (OMNICEF) 300 MG capsule; Take 1 capsule (300 mg) by mouth 2 times daily for 7 days    Urinary tract infection without hematuria, site unspecified    - Urine Culture  - cefdinir (OMNICEF) 300 MG capsule; Take 1 capsule (300 mg) by mouth 2 times daily for 7 days     At today's visit with Mercedes Barber , we discussed results, diagnosis, medications and formulated a plan.  We also discussed red flags for immediate return to clinic/ER, as well as indications for follow up with PCP if not improved in 3 days. Patient understood and agreed to plan. Mercedes Barber was discharged with stable vitals and has no further questions.       No follow-ups on file.    Anant Rodriguez, [facility], PA-C  [facility] URGENT CARE [facility]    Percy Long is a 67 year old, presenting for the following health issues:  UTI (Burning, frequency, since Monday/)      HPI     Mercedes Barber, 67 year old, female presents to the urgent care today with:   UTI (Burning, frequency, since Monday/)      Review of Systems   Constitutional, HEENT, cardiovascular, pulmonary, gi and gu systems are negative, except as otherwise noted.       Objective    /81 (BP Location: Right arm, Patient Position: Sitting, Cuff Size: Adult Regular)   Pulse 69   Temp 97.9  F (36.6  C) (Oral)   LMP 03/11/2010   SpO2 97%   There is no height or weight on file to calculate BMI.  Physical Exam   GENERAL: healthy, alert and no distress  ABDOMEN: soft, nontender, no hepatosplenomegaly, no masses and bowel sounds normal  MS: no gross musculoskeletal defects noted, no edema  SKIN: no suspicious lesions or rashes  NEURO: Normal strength and tone, mentation intact and speech normal  PSYCH: mentation appears normal, affect normal/bright                    .  ..

## 2022-08-23 NOTE — TELEPHONE ENCOUNTER
"CC: Patient calling reporting concern for a UTI, reporting painful urination    SEVERITY: 5/10  FREQUENCY: yes, 4 times  PATTERN: every time   ONSET: yesterday  FEVER: denies  PAST UTI: \"yes I have had them, but its been a long time\"  CAUSE: \"I think I have a bladder infection   OTHER SYMPTOMS: DENIES vaginal discharge, genital sores, blood in urine, fever     Patient reports urgency and frequency. Also states that she does have some low back pain but is not sure if this is new/related to her other symptoms.    Writer advised that patient go to office now. No appointments available with PCP or within clinic today, patient advised could go to UCC/ walk in clinic. Patient is requesting to see if PCP would be willing to order UA/UC without appointment. Patient states \"I thought this might be faster\"    Please advise if patient should go to UCC/walk in clinic to be evaluated in person or proceed with UA/UC? Please advise     Patient expressed verbal understanding to go to UCC with new/worsening symptoms     Callback - [phone] - ok to leave detailed VM    Marty Lam RN  [facility]      Reason for Disposition    Side (flank) or lower back pain present    Additional Information    Negative: Shock suspected (e.g., cold/pale/clammy skin, too weak to stand, low BP, rapid pulse)    Negative: Sounds like a life-threatening emergency to the triager    Negative: Unable to urinate (or only a few drops) and bladder feels very full    Negative: Vomiting    Negative: Patient sounds very sick or weak to the triager    Negative: SEVERE pain with urination    Negative: Fever > 100.4 F (38.0 C)    Protocols used: URINATION PAIN - FEMALE-A-OH      "

## 2022-08-23 NOTE — TELEPHONE ENCOUNTER
Called pt and relayed Dr. Álvarez's message to pt. Pt agrees to go to  at [facility]. Advised pt to call us back if she has any further questions. Pt agrees to plan.

## 2022-08-25 ENCOUNTER — LAB (OUTPATIENT)
Dept: LAB | Facility: CLINIC | Age: 67
End: 2022-08-25
Payer: MEDICARE

## 2022-08-25 DIAGNOSIS — E05.00 GRAVES DISEASE: ICD-10-CM

## 2022-08-25 LAB
BACTERIA UR CULT: NORMAL
T4 FREE SERPL-MCNC: 0.83 NG/DL (ref 0.76–1.46)
TSH SERPL DL<=0.005 MIU/L-ACNC: <0.01 MU/L (ref 0.4–4)

## 2022-08-25 PROCEDURE — 84439 ASSAY OF FREE THYROXINE: CPT

## 2022-08-25 PROCEDURE — 84443 ASSAY THYROID STIM HORMONE: CPT

## 2022-08-25 PROCEDURE — 36415 COLL VENOUS BLD VENIPUNCTURE: CPT

## 2022-08-29 NOTE — TELEPHONE ENCOUNTER
Per chart review, patient was seen in [facility] [place] 8/23/22 for below concern.    Signing encounter.    Marty Lam RN  [facility]

## 2022-09-06 DIAGNOSIS — E05.00 GRAVES DISEASE: ICD-10-CM

## 2022-09-06 RX ORDER — ATENOLOL 25 MG/1
25 TABLET ORAL DAILY
Qty: 90 TABLET | Refills: 0 | Status: SHIPPED | OUTPATIENT
Start: 2022-09-06 | End: 2023-06-21

## 2022-09-07 ENCOUNTER — ANCILLARY PROCEDURE (OUTPATIENT)
Dept: GENERAL RADIOLOGY | Facility: CLINIC | Age: 67
End: 2022-09-07
Attending: PODIATRIST
Payer: MEDICARE

## 2022-09-07 ENCOUNTER — OFFICE VISIT (OUTPATIENT)
Dept: PODIATRY | Facility: CLINIC | Age: 67
End: 2022-09-07

## 2022-09-07 VITALS
DIASTOLIC BLOOD PRESSURE: 72 MMHG | WEIGHT: 179 LBS | HEIGHT: 65 IN | SYSTOLIC BLOOD PRESSURE: 120 MMHG | BODY MASS INDEX: 29.82 KG/M2

## 2022-09-07 DIAGNOSIS — S99.922A FOOT INJURY, LEFT, INITIAL ENCOUNTER: ICD-10-CM

## 2022-09-07 DIAGNOSIS — M79.675 TOE PAIN, LEFT: ICD-10-CM

## 2022-09-07 DIAGNOSIS — S92.502A CLOSED FRACTURE OF PHALANX OF LEFT FIFTH TOE, INITIAL ENCOUNTER: Primary | ICD-10-CM

## 2022-09-07 PROCEDURE — 99213 OFFICE O/P EST LOW 20 MIN: CPT | Performed by: PODIATRIST

## 2022-09-07 PROCEDURE — 73630 X-RAY EXAM OF FOOT: CPT | Mod: TC | Performed by: RADIOLOGY

## 2022-09-07 NOTE — PATIENT INSTRUCTIONS
"Thank you for choosing [facility] Podiatry / Foot & Ankle Surgery!    DR. DODD'S CLINIC LOCATIONS:     [facility] TRIAGE LINE: [phone]   [address] APPOINTMENTS: [phone]   [place] [address] RADIOLOGY: [phone]    SET UP SURGERY: [phone]    BILLING QUESTIONS: [phone]   [facility] FAX: [phone] [id number]    [place] [address]      Follow up: 1 month    AIRCAST / CAM WALKING BOOT INSTRUCTIONS  - Do NOT drive with CAM walker on. This is due to safety and legal issues.   - Do NOT wear the CAM walker on long car/train rides or on an airplane.  - Remove the CAM walker several times a day and do ankle range of motion (ROM) exercises/wiggle toes.  - It is recommended that a thick-soled shoe be worn on the other foot to offset any created leg length issue.    - You can purchase an \"even up\" on Amazon to place under the other shoe to help too.  - The boot does not have to be worn at night.   - There is an increased risk of developing a blood clot with lower extremity immobilization. ROM exercises and knee-high compression (tenso /ACE wrap) is recommended to lower that risk.   - You should seek medical attention if you experience calf swelling and/or pain, chest pain, or shortness of breath.   If you need to return or exchange the boot, please contact [facility] @ [phone]    PRICE THERAPY  Many aches and pains throughout the foot and ankle can be helped with many simple treatments. This is usually described as PRICE Therapy.      P - Protection - often times, inflammation/pain in the lower extremity is not able to improve simply because the areas involved are never allowed to rest. Every step we take can bother the problematic area. Protecting those areas is an important step in the healing process. This may involve a walking cast boot, a special insert/orthotic device, an ankle brace, or simply avoiding barefoot walking.    R - " Rest - in addition to protecting the foot/ankle, resting is an important, but often times difficult, treatment option. Getting off your feet when they bother you, and specifically avoiding activities that cause pain/discomfort, are very beneficial to prevent, and treat, foot/ankle pain.      I - Ice - icing regularly can help to decrease inflammation and swelling in the foot, thus decreasing pain. Using an ice pack or a bag of frozen veggies works very well. Ice for 20 minutes multiple times per day as needed.  Do not place the ice directly on the skin as this can cause tissue damage.    C - Compression - using a compression wrap or an ACE wrap can help to decrease swelling, which can help to decrease pain. Wearing the wraps is generally not needed at night, but they should be worn on a regular basis when you are going to be on your feet for prolonged periods as gravity tends to pull fluids down to your feet/ankles.    E - Elevation - elevating your lower extremities multiple times daily for 15-20 minutes can help to decrease swelling, which works well in decreasing pain levels.    NSAID/Tylenol - Anti-inflammatories like Aleve or ibuprofen, and/or a pain medication, such as Tylenol, can help to improve pain levels and get the issue resolved sooner rather than later. Anyone with liver issues should be careful with Tylenol, and anyone with high blood pressure or heart, stomach or kidney issues should be careful with anti-inflammatories. Please ask if you have questions about these medications, including dosage.

## 2022-09-07 NOTE — LETTER
9/7/2022         RE: Mercedes Barber  [address]  [place] [address]        Dear Colleague,    Thank you for referring your patient, Mercedes Barber, to the [facility]. Please see a copy of my visit note below.    ASSESSMENT:  Encounter Diagnoses   Name Primary?     Closed fracture of phalanx of left fifth toe, initial encounter Yes     Foot injury, left, initial encounter      Toe pain, left      MEDICAL DECISION MAKING:  I personally reviewed the x-ray images with Mercedes.  I I showed her the subtle changes suspicious for fracture at the distal aspect, proximal phalanx, left fifth toe.    We discussed how a stiffer sole can help protect the toe during gait.  Due to her degree of pain, she elected to use a short Aircast.  Encouraged her to remove this whenever seated for ankle range of motion exercises.  The risk of DVT and deconditioning was discussed.  I suggest she use the boot for 2 weeks and no longer than 4.  Wil splinting is optional.  Relative rest.  Follow-up in 4 weeks.    Disclaimer: This note consists of symbols derived from keyboarding, dictation and/or voice recognition software. As a result, there may be errors in the script that have gone undetected. Please consider this when interpreting information found in this chart.    Maicol Liu DPM, FACFAS, [facility] Department of Podiatry/Foot & Ankle Surgery      ____________________________________________________________________    HPI:       Mercedes presents today reporting a 1.5 to 2-week history of left fifth toe pain.  She was running for the phone and stubbed her toe.  She continues to have pain that she rates a 10 out of 10 at worst.  Aching, throbbing, shooting.  Walking makes the pain worse.  She has soaked her foot in Epson salt water.  This helps reduce pain.    *  Past Medical History:   Diagnosis Date     Arthritis     Thumb     Cervical high risk HPV (human  papillomavirus) test positive 07/17/2017 07/17/17: NIL pap, + HR HPV (not 16 or 18) result.      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     Neuropathy of lower extremity      Other sleep apnea      Rectal prolapse      Restless leg syndrome      Seizure disorder (H)     25 years ago     Sleep apnea     CPAP, no longer using CPAP     Temporal sclerosis 08/27/2012     Uncomplicated asthma    *  *  Past Surgical History:   Procedure Laterality Date     Anterior COLPORRHAPHY, BLADDER/VAGINA      perigee mesh     ARTHROPLASTY HIP Right 7/23/2019    Procedure: Right total hip arthroplasty;  Surgeon: Anant Mejia MD;  Location: [facility] OR     ARTHROPLASTY PATELLO-FEMORAL (KNEE) Bilateral      ARTHROPLASTY REVISION HIP Right 8/7/2019    Procedure: Right hip revision total arthroplasty;  Surgeon: Tom Antonio MD;  Location: [facility] OR     ARTHROPLASTY REVISION HIP Right 8/5/2020    Procedure: Revision Right total hip arthroplasty;  Surgeon: Pan Grey MD;  [facility]: [facility] OR     CARPAL TUNNEL RELEASE RT/LT       DENTAL SURGERY      implant     DESTRUCTION OF PARAVERTEBRAL FACET LUMBAR / SACRAL SINGLE Left 10/27/2021    Procedure: DESTRUCTION, NERVE, FACET JOINT, LUMBOSACRAL, Left L4-5 and L5-S1 facet joint radiofrequency ablation;  Surgeon: Celena Perez MD;  Location: [facility] OR     DILATION AND CURETTAGE       DRAIN PILONIDAL CYST SIMPL       ENDOSCOPY DRUG INDUCED SLEEP N/A 11/18/2015    Procedure: ENDOSCOPY DRUG INDUCED SLEEP;  Surgeon: Park Ortiz MD;  [facility]     ESOPHAGOSCOPY, GASTROSCOPY, DUODENOSCOPY (EGD), COMBINED  4/5/2013    Procedure: COMBINED ESOPHAGOSCOPY, GASTROSCOPY,  DUODENOSCOPY (EGD), BIOPSY SINGLE OR MULTIPLE;;  Surgeon: Mundo Mehta MD;  Location:  GI     EXCISE LESION TRUNK  8/10/2012    Procedure: EXCISE LESION TRUNK;;  Surgeon: Jerald Diggs MD;  Location: [facility] OR     HYSTERECTOMY       INJECT BLOCK MEDIAL BRANCH CERVICAL/THORACIC/LUMBAR Bilateral 9/20/2021    Procedure: BLOCK, NERVE, FACET JOINT, MEDIAL BRANCH, DIAGNOSTIC - Bilateral L4-5 and L5-S1 Medial branch blocks;  Surgeon: Celena Perez MD;  Location: [facility] OR     INJECT BLOCK MEDIAL BRANCH CERVICAL/THORACIC/LUMBAR Bilateral 9/27/2021    Procedure: BLOCK, NERVE, FACET JOINT, MEDIAL BRANCH, DIAGNOSTIC - Bilateral L4-5 and L5-S1 Medial branch blocks;  Surgeon: Celena Perez MD;  Location: [facility] OR     INJECT SACROILIAC JOINT Bilateral 5/24/2021    Procedure: INJECTION, SACROILIAC JOINT;  Surgeon: Celena Perez MD;  Location: [facility] OR     IRRIGATION AND DEBRIDEMENT HIP, COMBINED Right 8/26/2019    Procedure: 1.  Right hip wound irrigation and debridement with excisional debridement of nonviable skin, subcutaneous tissues, and fascia. 2. Application of incisional wound VAC, 27cm length incision.;  Surgeon: Tyson Prabhakar MD;  Location: [facility] OR     L shoulder fracture       OPEN REDUCTION INTERNAL FIXATION WRIST Left 4/30/2021    Procedure: Open reduction with internal fixation of displaced left intraarticular distal radius fracture;  Surgeon: Luis Manuel Sánchez MD;  Location: [facility] OR     rectal prolapse repair abdominally       RELEASE PLANTAR FASCIA Right 1/20/2015    Procedure: RELEASE PLANTAR FASCIA;  Surgeon: Maicol Liu DPM;  Location: [facility]     REMOVE MESH VAGINA  8/10/2012    Procedure: REMOVE MESH VAGINA;  Excision of Vaginal Mesh Exposure, Removal of Skin Tag Left Inner Leg;  Surgeon: Jerald Diggs MD;  Location: [facility] OR     REPAIR HAMMER TOE Right 1/20/2015    Procedure: REPAIR HAMMER TOE;  Surgeon: Maicol Liu DPM;  Location: [facility]     TONSILLECTOMY & ADENOIDECTOMY        TUBAL LIGATION     *  *  Current Outpatient Medications   Medication Sig Dispense Refill     acetaminophen (TYLENOL) 325 MG tablet Take 2 tablets (650 mg) by mouth every 4 hours as needed for other 1 Bottle 0     albuterol (ALBUTEROL) 108 (90 BASE) MCG/ACT Inhaler Inhale 2 puffs into the lungs 4 times daily as needed for shortness of breath / dyspnea or wheezing 1 Inhaler 0     alendronate (FOSAMAX) 70 MG tablet TAKE 1 TABLET(70 MG) BY MOUTH EVERY 7 DAYS 12 tablet 2     amLODIPine (NORVASC) 5 MG tablet Take 1 tablet (5 mg) by mouth daily 90 tablet 2     amoxicillin (AMOXIL) 500 MG tablet Take 4 tablets (2000 mg) by mouth 1 hour before dental procedures/cleanings. 4 tablet 4     aspirin 81 MG EC tablet Take 81 mg by mouth daily       atenolol (TENORMIN) 25 MG tablet Take 1 tablet (25 mg) by mouth daily 90 tablet 0     atenolol (TENORMIN) 50 MG tablet Take 1 tablet (50 mg) by mouth daily 90 tablet 0     benzonatate (TESSALON) 200 MG capsule Take 200 mg by mouth 3 times daily as needed for cough       budesonide-formoterol (SYMBICORT) 160-4.5 MCG/ACT Inhaler Inhale 2 puffs into the lungs 2 times daily       busPIRone (BUSPAR) 5 MG tablet Take 5 mg by mouth 2 times daily       carboxymethylcellulose PF (REFRESH PLUS) 0.5 % ophthalmic solution Place 1 drop into both eyes 2 times daily       cyclobenzaprine (FLEXERIL) 5 MG tablet Take 1 tablet (5 mg) by mouth 2 times daily as needed for muscle spasms 60 tablet 3     cycloSPORINE (RESTASIS) 0.05 % ophthalmic emulsion Place 1 drop into both eyes 2 times daily        Dentifrices (BIOTENE DRY MOUTH DT) Apply 2 sprays to affected area 3 times daily as needed        Estradiol-Estriol-Progesterone (BIEST/PROGESTERONE TD) Place 0.5 g onto the skin nightly as needed       fluticasone-vilanterol (BREO ELLIPTA) 200-25 MCG/INH inhaler Inhale 1 puff into the lungs daily       furosemide (LASIX) 20 MG tablet Take 1 tablet (20 mg) by mouth daily 90 tablet 2     hypromellose  (ARTIFICIAL TEARS) 0.5 % SOLN ophthalmic solution Place 1 drop into both eyes 2 times daily       ibuprofen (ADVIL/MOTRIN) 600 MG tablet Take 600 mg by mouth every 6 hours as needed for moderate pain       lacosamide (VIMPAT) 200 MG TABS tablet Take 1 tablet (200 mg) by mouth 2 times daily 30 tablet 0     levothyroxine (SYNTHROID/LEVOTHROID) 75 MCG tablet Take 1 tablet (75 mcg) by mouth daily 90 tablet 1     linaclotide (LINZESS) 290 MCG capsule Take 1 capsule (290 mcg) by mouth every morning (before breakfast) 10 capsule 0     loratadine (CLARITIN REDITABS) 10 MG ODT Take 1 tablet (10 mg) by mouth daily as needed for allergies       LORazepam (ATIVAN) 0.5 MG tablet Take 0.5 mg by mouth daily as needed for anxiety       omeprazole (PRILOSEC) 40 MG DR capsule Take 1 capsule (40 mg) by mouth 2 times daily 180 capsule 3     ondansetron (ZOFRAN-ODT) 4 MG ODT tab Take 1 tablet (4 mg) by mouth every 6 hours as needed for nausea or vomiting 20 tablet 0     potassium citrate (UROCIT-K) 10 MEQ (1080 MG) CR tablet Take 1 tablet (10 mEq) by mouth daily 90 tablet 1     pramipexole (MIRAPEX) 1 MG tablet Give 1 tablet by mouth in the morning and 3 tablets by mouth 3 hours prior to bedtime       QUEtiapine (SEROQUEL) 50 MG tablet Take 100 mg by mouth nightly as needed        topiramate (TOPAMAX) 25 MG tablet Take 25 mg in the morning and 50 mg at bedtime.       traZODone (DESYREL) 150 MG tablet 150 mg At Bedtime        triamcinolone (KENALOG) 0.1 % external cream APPLY TOPICALLY 3 TIMES    DAILY. 30 g 0     trolamine salicylate (ASPERCREME) 10 % external cream Apply topically 2 times daily as needed for moderate pain To right hip incision area           EXAM:    Vitals: LMP 03/11/2010   BMI: There is no height or weight on file to calculate BMI.    Constitutional:  Mercedes Barber is in no apparent distress, appears well-nourished.  Cooperative with history and physical exam.    Vascular:  Pedal pulses are palpable for both the DP  and PT arteries.  CFT < 3 sec. there is some generalized edema of the left foot, as seen by imprints from her sandal straps.    Neuro: Light touch sensation is intact to the L4, L5, S1 distributions  No evidence of weakness, spasticity, or contracture in the lower extremities.     Derm: Normal texture and turgor.  No erythema, ecchymosis, or cyanosis.  No open lesions.     Musculoskeletal:    There is some localized swelling around the left fifth metatarsal phalangeal joint.  This area is painful on palpation, including the proximal phalanx.    X-Ray Findings:  I personally reviewed the left foot images.  Please see comments above.    XR FOOT LEFT G/E 3 VIEWS 9/7/2022 8:50 AM      HISTORY: left 5th toe injury with pain involving the distal 5th  metatarsal; Foot injury, left, initial encounter     COMPARISON: None.                                                                       IMPRESSION: Acute, nondisplaced oblique fracture of the fifth toe  proximal phalanx. No evidence of intra-articular extension. Soft  tissue swelling.     SHREE BRISCOE MD                 Again, thank you for allowing me to participate in the care of your patient.        Sincerely,        Miacol Liu DPM

## 2022-09-07 NOTE — PROGRESS NOTES
ASSESSMENT:  Encounter Diagnoses   Name Primary?     Closed fracture of phalanx of left fifth toe, initial encounter Yes     Foot injury, left, initial encounter      Toe pain, left      MEDICAL DECISION MAKING:  I personally reviewed the x-ray images with Mercedes.  I I showed her the subtle changes suspicious for fracture at the distal aspect, proximal phalanx, left fifth toe.    We discussed how a stiffer sole can help protect the toe during gait.  Due to her degree of pain, she elected to use a short Aircast.  Encouraged her to remove this whenever seated for ankle range of motion exercises.  The risk of DVT and deconditioning was discussed.  I suggest she use the boot for 2 weeks and no longer than 4.  Wil splinting is optional.  Relative rest.  Follow-up in 4 weeks.    Disclaimer: This note consists of symbols derived from keyboarding, dictation and/or voice recognition software. As a result, there may be errors in the script that have gone undetected. Please consider this when interpreting information found in this chart.    Maicol Liu DPM, FACFAS, [facility] Department of Podiatry/Foot & Ankle Surgery      ____________________________________________________________________    HPI:       Mercedes presents today reporting a 1.5 to 2-week history of left fifth toe pain.  She was running for the phone and stubbed her toe.  She continues to have pain that she rates a 10 out of 10 at worst.  Aching, throbbing, shooting.  Walking makes the pain worse.  She has soaked her foot in Epson salt water.  This helps reduce pain.    *  Past Medical History:   Diagnosis Date     Arthritis     Thumb     Cervical high risk HPV (human papillomavirus) test positive 07/17/2017 07/17/17: NIL pap, + HR HPV (not 16 or 18) result.      Cervical pain 09/15/2016     Chronic infection     MRSA     Constipation 08/27/2012     Diarrhea 04/30/2009     Esophageal stricture 02/21/2012     Gastro-oesophageal reflux disease       History of blood transfusion      Hypertension      Hypothyroidism 09/18/2012     IBS (irritable bowel syndrome)      Mild major depression (H) 02/21/2012     Neuropathy of lower extremity      Other sleep apnea      Rectal prolapse      Restless leg syndrome      Seizure disorder (H)     25 years ago     Sleep apnea     CPAP, no longer using CPAP     Temporal sclerosis 08/27/2012     Uncomplicated asthma    *  *  Past Surgical History:   Procedure Laterality Date     Anterior COLPORRHAPHY, BLADDER/VAGINA      perigee mesh     ARTHROPLASTY HIP Right 7/23/2019    Procedure: Right total hip arthroplasty;  Surgeon: Anant Mejia MD;  [facility]     ARTHROPLASTY PATELLO-FEMORAL (KNEE) Bilateral      ARTHROPLASTY REVISION HIP Right 8/7/2019    Procedure: Right hip revision total arthroplasty;  Surgeon: Tom Antonio MD;  Location: [facility] OR     ARTHROPLASTY REVISION HIP Right 8/5/2020    Procedure: Revision Right total hip arthroplasty;  Surgeon: Pan Grey MD;  [facility]     CARPAL TUNNEL RELEASE RT/LT       DENTAL SURGERY      implant     DESTRUCTION OF PARAVERTEBRAL FACET LUMBAR / SACRAL SINGLE Left 10/27/2021    Procedure: DESTRUCTION, NERVE, FACET JOINT, LUMBOSACRAL, Left L4-5 and L5-S1 facet joint radiofrequency ablation;  Surgeon: Celena Perez MD;  Location: [facility] OR     DILATION AND CURETTAGE       DRAIN PILONIDAL CYST SIMPL       ENDOSCOPY DRUG INDUCED SLEEP N/A 11/18/2015    Procedure: ENDOSCOPY DRUG INDUCED SLEEP;  Surgeon: Park Ortiz MD;  [facility]     ESOPHAGOSCOPY, GASTROSCOPY, DUODENOSCOPY (EGD), COMBINED  4/5/2013    Procedure: COMBINED ESOPHAGOSCOPY, GASTROSCOPY, DUODENOSCOPY (EGD), BIOPSY SINGLE OR MULTIPLE;;  Surgeon: Mundo Mehta MD;  Location:  GI     EXCISE LESION TRUNK  8/10/2012    Procedure: EXCISE LESION TRUNK;;  Surgeon: Jerald Diggs MD;  [facility]: [facility]     HYSTERECTOMY       INJECT BLOCK MEDIAL BRANCH CERVICAL/THORACIC/LUMBAR  Bilateral 9/20/2021    Procedure: BLOCK, NERVE, FACET JOINT, MEDIAL BRANCH, DIAGNOSTIC - Bilateral L4-5 and L5-S1 Medial branch blocks;  Surgeon: Celena Perez MD;  Location: [facility] OR     INJECT BLOCK MEDIAL BRANCH CERVICAL/THORACIC/LUMBAR Bilateral 9/27/2021    Procedure: BLOCK, NERVE, FACET JOINT, MEDIAL BRANCH, DIAGNOSTIC - Bilateral L4-5 and L5-S1 Medial branch blocks;  Surgeon: Celena Perez MD;  Location: [facility] OR     INJECT SACROILIAC JOINT Bilateral 5/24/2021    Procedure: INJECTION, SACROILIAC JOINT;  Surgeon: Celena Perez MD;  Location: [facility] OR     IRRIGATION AND DEBRIDEMENT HIP, COMBINED Right 8/26/2019    Procedure: 1.  Right hip wound irrigation and debridement with excisional debridement of nonviable skin, subcutaneous tissues, and fascia. 2. Application of incisional wound VAC, 27cm length incision.;  Surgeon: Tyson Prabhakar MD;  [facility]: [facility] OR     L shoulder fracture       OPEN REDUCTION INTERNAL FIXATION WRIST Left 4/30/2021    Procedure: Open reduction with internal fixation of displaced left intraarticular distal radius fracture;  Surgeon: Luis Manuel Sánchez MD;  [facility] OR     rectal prolapse repair abdominally       RELEASE PLANTAR FASCIA Right 1/20/2015    Procedure: RELEASE PLANTAR FASCIA;  Surgeon: Maicol Liu DPM;  Location: [facility]     REMOVE MESH VAGINA  8/10/2012    Procedure: REMOVE MESH VAGINA;  Excision of Vaginal Mesh Exposure, Removal of Skin Tag Left Inner Leg;  Surgeon: Jerald Diggs MD;  [facility]: [facility]     REPAIR HAMMER TOE Right 1/20/2015    Procedure: REPAIR HAMMER TOE;  Surgeon: Maicol Liu DPM;  Location: [facility]     TONSILLECTOMY & ADENOIDECTOMY       TUBAL LIGATION     *  *  Current Outpatient Medications   Medication Sig Dispense Refill     acetaminophen (TYLENOL) 325 MG tablet Take 2 tablets (650 mg) by mouth every 4 hours as needed for other 1 Bottle 0     albuterol (ALBUTEROL) 108 (90 BASE) MCG/ACT Inhaler Inhale 2 puffs into  the lungs 4 times daily as needed for shortness of breath / dyspnea or wheezing 1 Inhaler 0     alendronate (FOSAMAX) 70 MG tablet TAKE 1 TABLET(70 MG) BY MOUTH EVERY 7 DAYS 12 tablet 2     amLODIPine (NORVASC) 5 MG tablet Take 1 tablet (5 mg) by mouth daily 90 tablet 2     amoxicillin (AMOXIL) 500 MG tablet Take 4 tablets (2000 mg) by mouth 1 hour before dental procedures/cleanings. 4 tablet 4     aspirin 81 MG EC tablet Take 81 mg by mouth daily       atenolol (TENORMIN) 25 MG tablet Take 1 tablet (25 mg) by mouth daily 90 tablet 0     atenolol (TENORMIN) 50 MG tablet Take 1 tablet (50 mg) by mouth daily 90 tablet 0     benzonatate (TESSALON) 200 MG capsule Take 200 mg by mouth 3 times daily as needed for cough       budesonide-formoterol (SYMBICORT) 160-4.5 MCG/ACT Inhaler Inhale 2 puffs into the lungs 2 times daily       busPIRone (BUSPAR) 5 MG tablet Take 5 mg by mouth 2 times daily       carboxymethylcellulose PF (REFRESH PLUS) 0.5 % ophthalmic solution Place 1 drop into both eyes 2 times daily       cyclobenzaprine (FLEXERIL) 5 MG tablet Take 1 tablet (5 mg) by mouth 2 times daily as needed for muscle spasms 60 tablet 3     cycloSPORINE (RESTASIS) 0.05 % ophthalmic emulsion Place 1 drop into both eyes 2 times daily        Dentifrices (BIOTENE DRY MOUTH DT) Apply 2 sprays to affected area 3 times daily as needed        Estradiol-Estriol-Progesterone (BIEST/PROGESTERONE TD) Place 0.5 g onto the skin nightly as needed       fluticasone-vilanterol (BREO ELLIPTA) 200-25 MCG/INH inhaler Inhale 1 puff into the lungs daily       furosemide (LASIX) 20 MG tablet Take 1 tablet (20 mg) by mouth daily 90 tablet 2     hypromellose (ARTIFICIAL TEARS) 0.5 % SOLN ophthalmic solution Place 1 drop into both eyes 2 times daily       ibuprofen (ADVIL/MOTRIN) 600 MG tablet Take 600 mg by mouth every 6 hours as needed for moderate pain       lacosamide (VIMPAT) 200 MG TABS tablet Take 1 tablet (200 mg) by mouth 2 times daily 30  tablet 0     levothyroxine (SYNTHROID/LEVOTHROID) 75 MCG tablet Take 1 tablet (75 mcg) by mouth daily 90 tablet 1     linaclotide (LINZESS) 290 MCG capsule Take 1 capsule (290 mcg) by mouth every morning (before breakfast) 10 capsule 0     loratadine (CLARITIN REDITABS) 10 MG ODT Take 1 tablet (10 mg) by mouth daily as needed for allergies       LORazepam (ATIVAN) 0.5 MG tablet Take 0.5 mg by mouth daily as needed for anxiety       omeprazole (PRILOSEC) 40 MG DR capsule Take 1 capsule (40 mg) by mouth 2 times daily 180 capsule 3     ondansetron (ZOFRAN-ODT) 4 MG ODT tab Take 1 tablet (4 mg) by mouth every 6 hours as needed for nausea or vomiting 20 tablet 0     potassium citrate (UROCIT-K) 10 MEQ (1080 MG) CR tablet Take 1 tablet (10 mEq) by mouth daily 90 tablet 1     pramipexole (MIRAPEX) 1 MG tablet Give 1 tablet by mouth in the morning and 3 tablets by mouth 3 hours prior to bedtime       QUEtiapine (SEROQUEL) 50 MG tablet Take 100 mg by mouth nightly as needed        topiramate (TOPAMAX) 25 MG tablet Take 25 mg in the morning and 50 mg at bedtime.       traZODone (DESYREL) 150 MG tablet 150 mg At Bedtime        triamcinolone (KENALOG) 0.1 % external cream APPLY TOPICALLY 3 TIMES    DAILY. 30 g 0     trolamine salicylate (ASPERCREME) 10 % external cream Apply topically 2 times daily as needed for moderate pain To right hip incision area           EXAM:    Vitals: [facility] 03/11/2010   BMI: There is no height or weight on file to calculate BMI.    Constitutional:  Mercedes Barber is in no apparent distress, appears well-nourished.  Cooperative with history and physical exam.    Vascular:  Pedal pulses are palpable for both the DP and PT arteries.  CFT < 3 sec. there is some generalized edema of the left foot, as seen by imprints from her sandal straps.    Neuro: Light touch sensation is intact to the L4, L5, S1 distributions  No evidence of weakness, spasticity, or contracture in the lower extremities.     Derm:  Normal texture and turgor.  No erythema, ecchymosis, or cyanosis.  No open lesions.     Musculoskeletal:    There is some localized swelling around the left fifth metatarsal phalangeal joint.  This area is painful on palpation, including the proximal phalanx.    X-Ray Findings:  I personally reviewed the left foot images.  Please see comments above.    XR FOOT LEFT G/E 3 VIEWS 9/7/2022 8:50 AM      HISTORY: left 5th toe injury with pain involving the distal 5th  metatarsal; Foot injury, left, initial encounter     COMPARISON: None.                                                                       IMPRESSION: Acute, nondisplaced oblique fracture of the fifth toe  proximal phalanx. No evidence of intra-articular extension. Soft  tissue swelling.     SHREE BRISCOE MD

## 2022-09-14 ENCOUNTER — PATIENT OUTREACH (OUTPATIENT)
Dept: CARE COORDINATION | Facility: CLINIC | Age: 67
End: 2022-09-14

## 2022-09-14 NOTE — PROGRESS NOTES
Clinic Care Coordination Contact  [facility]/Voicemail       Clinical Data: Care Coordinator Outreach  Outreach attempted x 1.  Left message on patient's voicemail with call back information and requested return call.    Plan: Care Coordinator will try to reach patient again in 10 business days.    CAMRON Hester  Clinic Care Coordination  [facility] Clinics: [facility] [place], [place], [place], and [facility]  Phone: [phone]

## 2022-09-22 ENCOUNTER — DOCUMENTATION ONLY (OUTPATIENT)
Dept: PODIATRY | Facility: CLINIC | Age: 67
End: 2022-09-22

## 2022-09-22 DIAGNOSIS — S92.512A CLOSED DISPLACED FRACTURE OF PROXIMAL PHALANX OF LESSER TOE OF LEFT FOOT, INITIAL ENCOUNTER: Primary | ICD-10-CM

## 2022-09-27 ENCOUNTER — LAB (OUTPATIENT)
Dept: LAB | Facility: CLINIC | Age: 67
End: 2022-09-27
Payer: MEDICARE

## 2022-09-27 DIAGNOSIS — E05.00 GRAVES DISEASE: ICD-10-CM

## 2022-09-27 LAB — TSH SERPL DL<=0.005 MIU/L-ACNC: 0.91 MU/L (ref 0.4–4)

## 2022-09-27 PROCEDURE — 84443 ASSAY THYROID STIM HORMONE: CPT

## 2022-09-27 PROCEDURE — 36415 COLL VENOUS BLD VENIPUNCTURE: CPT

## 2022-09-28 ENCOUNTER — PATIENT OUTREACH (OUTPATIENT)
Dept: CARE COORDINATION | Facility: CLINIC | Age: 67
End: 2022-09-28

## 2022-09-28 NOTE — LETTER
[facility]  Patient Centered Plan of Care  About Me:        Patient Name:  Mercedes Barber    YOB: 1955  Age:         67 year old   Jeremy MRN:    [id number] Telephone Information:  Home Phone [phone]   Mobile [phone]       Address:  [address] [place] [id number] Email address:  [email or web address]      Emergency Contact(s)    Name Relationship Lgl Grd Work Phone Home Phone Mobile Phone   1. RANJITHMICHAEL Daughter No  [phone]   2. JULIO C KASPER (S* Relative No  [phone]   3. JULIO C KASPER Son-in-Law    [phone]   4. SILVIO ROMERO Friend No  [phone]   5. MICHAEL KASPER Other    [phone]           Primary language:  English     needed? No   [facility]:  [phone] op. 1  Other communication barriers:None    Preferred Method of Communication:  Phone  Current living arrangement: I live alone    Mobility Status/ Medical Equipment: Independent w/Device        Health Maintenance  Health Maintenance Reviewed: Due/Overdue   Health Maintenance Due   Topic Date Due     Pneumococcal Vaccine: 65+ Years (2 - PCV) 12/30/2020     COVID-19 Vaccine (5 - Booster for Pfizer series) 06/07/2022     INFLUENZA VACCINE (1) 09/01/2022     ASTHMA CONTROL TEST  09/15/2022         My Access Plan  Medical Emergency 911   Primary Clinic Line [facility] - [phone]   24 Hour Appointment Line [phone] or  [phone]) (toll-free)   24 Hour Nurse Line [phone] (toll-free)   Preferred Urgent Care [facility], [phone]     Preferred Hospital [facility]  [phone]     Preferred Pharmacy [facility] - [address]     Behavioral Health Crisis Line The National Suicide Prevention Lifeline at 1-106.102.7953 or Text/Call 249             My Care Team  Members  Patient Care Team       Relationship Specialty Notifications Start End    Skyler Álvarez MD PCP - General Internal Medicine  7/31/12     Phone: [phone] Fax: [phone]         [address] [facility] [address]    Samir Arguelles MD MD Neurology  10/19/15     Referred by Dr. Samir Arguelles from [facility] for sleep apnea     Phone: [phone] Fax: [phone]         [facility] NEUROLOGICAL [facility] [address] [facility] [address] [facility] [address]    Park jackson MD MD Otolaryngology  10/19/15     Referred by Dr. Samir Arguelles from [facility] for sleep apnea     Phone: [phone] Fax: [phone]         [address] [place] [address]    Gabriella Balderas LSW Lead Care Coordinator  Admissions 11/13/19     Raegan Chinchilla MA Community Health Worker Primary Care - [facility] Admissions 1/23/20     Phone: [phone]         Skyler Chacko MD Referring Physician Orthopedics  7/17/20     Phone: [phone] Fax: [phone]         [facility] [address] [facility] [address] [facility] [address]    Skyler Álvarez MD Assigned PCP   1/17/21     Phone: [phone] Fax: [phone]         [address] [address]    Celena Perez MD Assigned Neuroscience Provider   4/11/21     Phone: [phone] Fax: [phone]         [address] [facility] [address] [facility] [address]    Rachael Smith MD MD Endocrinology, Diabetes, and Metabolism  3/17/22     Phone: [phone] Fax: [phone]         [facility] [place] [address]    Drea Lozano DPM Assigned Surgical Provider   4/10/22     Phone: [phone] Fax: [phone] [address] [facility] [address]    LcRachael short PA-C Physician Assistant Dermatology  6/3/22     Phone: [phone] Fax: [phone] [address] [address]    Odalys Caraballo MD Assigned Endocrinology Provider   6/18/22     Phone: [phone]  Fax: [phone]         [address] [facility] [address] [facility] [address]    Maicol Liu DPM Assigned Musculoskeletal Provider   9/10/22     Phone: [phone] Fax: [phone] [address] [facility] [address] [facility] [address]            My Care Plans  Self Management and Treatment Plan  Care Plan  Care Plan: General     Problem: Medical     Goal: I will continue to attend my appointments, follow my plan of care, and access care as needed to manage my pain and medical needs over the next 3 months.     Start Date: 3/17/2021 Expected End Date: 12/1/2022    Note:     oal Statement: I will continue to attend my appointments, follow my plan of care, and access care as needed to manage my pain and medical needs over the next 3 months.   Date Goal set: 3/17/21 extended 1/12/2022   Barriers: Complex cares, lots of appts  Strengths: Connected to care, motivated   Date to Achieve By: 12/1/21 extended to 12/1/22  Patient expressed understanding of goal: Yes    Action steps to achieve this goal:  1. I will continue to attend my appointments as needed and recommended.   2. I will follow my plan of care as created by my PCP and specialty teams.   3. I will work with care coordination to understand my care plan, schedule appts as needed, and keep track of my appts as needed.                           Action Plans on File:                       Advance Care Plans/Directives Type:   POLST; Advanced Directive - On File      My Medical and Care Information  Problem List   Patient Active Problem List   Diagnosis     Menopausal syndrome (hot flashes)     Family history of breast cancer     Vulvar pain     Renal anomaly     Overactive bladder     Rectal prolapse     CARDIOVASCULAR SCREENING; LDL GOAL LESS THAN 160     Vaginal pain     Dysphagia     Confusion     Headache     Left shoulder pain     Anemia     Mild major depression (H)     Esophageal stricture     Vulvar lesion     Temporal sclerosis     Lumbago     Pain  in thoracic spine     Sciatica     Pain in joint, lower leg     Plantar fascial fibromatosis     Pain in joint involving ankle and foot     Other specified hypothyroidism     GERD (gastroesophageal reflux disease)     Irritable bowel syndrome     Other sleep apnea     Cervical high risk HPV (human papillomavirus) test positive     Restless legs syndrome (RLS)     History of total hip arthroplasty, right     Hip pain     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)     Cellulitis of right hip     Deep venous thrombosis of upper extremity (H)     Peripheral edema     Low iron stores     Mechanical complication of prosthetic hip implant, initial encounter (H)     Status post hip surgery     Chest pain, unspecified     Closed fracture of proximal end of left humerus     Generalized anxiety disorder     History of infection     Infection and inflammatory reaction due to unspecified internal joint prosthesis, initial encounter (H)     Ingrowing nail     Major depressive disorder, recurrent episode, moderate (H)     Mild intermittent asthma     Nausea     Partial epilepsy with intractable epilepsy (H)     Repeated falls     History of revision of total replacement of right hip joint     Seizure disorder (H)     Benign essential hypertension     Morbid obesity (H)     Sacroiliac joint pain     Closed fracture of left wrist, initial encounter     Lumbar spondylosis     Lumbar facet arthropathy     Tuberous sclerosis (H)     Hyperthyroidism     Graves disease      Current Medications and Allergies:  See printed Medication Report.    Care Coordination Start Date: 11/13/2019   Frequency of Care Coordination: monthly     Form Last Updated: 09/28/2022

## 2022-09-28 NOTE — PROGRESS NOTES
Clinic Care Coordination Contact    Situation: Patient chart reviewed by care coordinator.    Background: Pt is enrolled in care coordination and CHW is assigned staff for follow ups.     Assessment: CHW attempted follow up on 9/14/22 and left a vm with pt.     Plan/Recommendations:   CHW will continue to attempt to reach pt.  CC will continue to review chart every 6 weeks.     GOSIA Leija  [facility] Care Coordination   [facility]  Social Work Care Coordinator  [phone]

## 2022-09-29 ENCOUNTER — PATIENT OUTREACH (OUTPATIENT)
Dept: CARE COORDINATION | Facility: CLINIC | Age: 67
End: 2022-09-29
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
## 2022-09-29 NOTE — PROGRESS NOTES
Clinic Care Coordination Contact  [facility]/Voicemail    Patient called back.      Clinical Data: Care Coordinator Outreach  Outreach attempted x 1.  Left message on patient's voicemail with call back information and requested return call.     Plan: Care Coordinator will try to reach patient again in 10 business days.     CAMRON Hester  Clinic Care Coordination  [facility] Clinics: [facility] [place], [place], [place], and [place] [facility]  Phone: [phone]

## 2022-10-07 ENCOUNTER — TRANSFERRED RECORDS (OUTPATIENT)
Dept: HEALTH INFORMATION MANAGEMENT | Facility: CLINIC | Age: 67
End: 2022-10-07

## 2022-10-17 ENCOUNTER — MYC MEDICAL ADVICE (OUTPATIENT)
Dept: INTERNAL MEDICINE | Facility: CLINIC | Age: 67
End: 2022-10-17

## 2022-10-20 ENCOUNTER — TRANSFERRED RECORDS (OUTPATIENT)
Dept: HEALTH INFORMATION MANAGEMENT | Facility: CLINIC | Age: 67
End: 2022-10-20

## 2022-10-22 ENCOUNTER — HEALTH MAINTENANCE LETTER (OUTPATIENT)
Age: 67
End: 2022-10-22

## 2022-10-23 DIAGNOSIS — R60.0 PERIPHERAL EDEMA: ICD-10-CM

## 2022-10-23 DIAGNOSIS — I10 BENIGN ESSENTIAL HYPERTENSION: ICD-10-CM

## 2022-10-24 ENCOUNTER — LAB (OUTPATIENT)
Dept: LAB | Facility: CLINIC | Age: 67
End: 2022-10-24
Payer: MEDICARE

## 2022-10-24 DIAGNOSIS — R41.3 LOSS OF MEMORY: ICD-10-CM

## 2022-10-24 DIAGNOSIS — G47.33 OBSTRUCTIVE SLEEP APNEA SYNDROME: ICD-10-CM

## 2022-10-24 DIAGNOSIS — E03.9 HYPOTHYROIDISM: Primary | ICD-10-CM

## 2022-10-24 DIAGNOSIS — E83.10 DISORDER OF IRON METABOLISM, UNSPECIFIED: ICD-10-CM

## 2022-10-24 DIAGNOSIS — R26.89 ABNORMALITY OF GAIT DUE TO IMPAIRMENT OF BALANCE: ICD-10-CM

## 2022-10-24 DIAGNOSIS — G25.81 RESTLESS LEG SYNDROME: ICD-10-CM

## 2022-10-24 DIAGNOSIS — G47.50 PARASOMNIA: ICD-10-CM

## 2022-10-24 DIAGNOSIS — R26.9 ABNORMAL GAIT: ICD-10-CM

## 2022-10-24 DIAGNOSIS — E05.00 GRAVES DISEASE: ICD-10-CM

## 2022-10-24 DIAGNOSIS — G60.3 IDIOPATHIC PROGRESSIVE POLYNEUROPATHY: ICD-10-CM

## 2022-10-24 DIAGNOSIS — Q85.1 TUBEROUS SCLEROSIS (H): ICD-10-CM

## 2022-10-24 DIAGNOSIS — G43.909 MIGRAINE: ICD-10-CM

## 2022-10-24 DIAGNOSIS — R06.83 SNORING: ICD-10-CM

## 2022-10-24 DIAGNOSIS — G40.B09 JUVENILE MYOCLONIC EPILEPSY, NOT INTRACTABLE, WITHOUT STATUS EPILEPTICUS (H): ICD-10-CM

## 2022-10-24 LAB
T4 FREE SERPL-MCNC: 0.84 NG/DL (ref 0.76–1.46)
TSH SERPL DL<=0.005 MIU/L-ACNC: 6.04 MU/L (ref 0.4–4)

## 2022-10-24 PROCEDURE — 36415 COLL VENOUS BLD VENIPUNCTURE: CPT

## 2022-10-24 PROCEDURE — 84443 ASSAY THYROID STIM HORMONE: CPT

## 2022-10-24 PROCEDURE — 84439 ASSAY OF FREE THYROXINE: CPT

## 2022-10-24 PROCEDURE — 82728 ASSAY OF FERRITIN: CPT

## 2022-10-25 RX ORDER — FUROSEMIDE 20 MG
TABLET ORAL
Qty: 90 TABLET | Refills: 1 | Status: SHIPPED | OUTPATIENT
Start: 2022-10-25 | End: 2023-05-01

## 2022-10-27 LAB — FERRITIN SERPL-MCNC: 43 NG/ML (ref 8–252)

## 2022-10-31 ENCOUNTER — PATIENT OUTREACH (OUTPATIENT)
Dept: CARE COORDINATION | Facility: CLINIC | Age: 67
End: 2022-10-31

## 2022-10-31 NOTE — PROGRESS NOTES
Clinic Care Coordination Contact  [facility]/Voicemail       Clinical Data: Care Coordinator Outreach  Outreach attempted x 1.  Left message on patient's voicemail with call back  information and requested return call.    Plan:Care Coordinator will try to reach patient again in 5-7 business days.    CAMRON Hester  Clinic Care Coordination  [facility] Clinics: [facility] [place], [place], [facility], and [place] [facility]  Phone: [phone]

## 2022-11-01 ENCOUNTER — MYC MEDICAL ADVICE (OUTPATIENT)
Dept: ENDOCRINOLOGY | Facility: CLINIC | Age: 67
End: 2022-11-01
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
## 2022-11-08 NOTE — PROGRESS NOTES
Endocrinology and Diabetes Clinic      Endocrinology and Diabetes Clinic    Mercedes Barber is a 67 year old female who is being evaluated via a billable telephone visit.      Duration of Telephone interaction:12 min    Follow up for thyrotoxicosis and osteoporosis    Interval history  Elderly woman with history of Graves' disease, osteoporosis    Thyroid  Radioiodine treatment with 16.1 my millicuries iodine on June 30, 2022    Patient has been on levothyroxine 75 mcg daily    Patient was concerned about her weight gain and a nutritional referral was placed on 9/16/2022, has been avoiding carbohydrates, able to maintain prior weight loss, unable to achieve additional weight loss      Symptoms of hypo- and hyperthyroidism:  Weight change none; heat or cold intolerance no; abnormal bowel movements no; hair loss no, change in skin pattern no; anxietyno, depression no; fatigue YES; heart racing no; tremors no; menses na      Current Problem List:   Patient Active Problem List   Diagnosis     Menopausal syndrome (hot flashes)     Family history of breast cancer     Vulvar pain     Renal anomaly     Overactive bladder     Rectal prolapse     CARDIOVASCULAR SCREENING; LDL GOAL LESS THAN 160     Vaginal pain     Dysphagia     Confusion     Headache     Left shoulder pain     Anemia     Mild major depression (H)     Esophageal stricture     Vulvar lesion     Temporal sclerosis     Lumbago     Pain in thoracic spine     Sciatica     Pain in joint, lower leg     Plantar fascial fibromatosis     Pain in joint involving ankle and foot     Other specified hypothyroidism     GERD (gastroesophageal reflux disease)     Irritable bowel syndrome     Other sleep apnea     Cervical high risk HPV (human papillomavirus) test positive     Restless legs syndrome (RLS)     History of total hip arthroplasty, right     Hip pain     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)     Cellulitis of right hip     Deep venous thrombosis of upper  extremity (H)     Peripheral edema     Low iron stores     Mechanical complication of prosthetic hip implant, initial encounter (H)     Status post hip surgery     Chest pain, unspecified     Closed fracture of proximal end of left humerus     Generalized anxiety disorder     History of infection     Infection and inflammatory reaction due to unspecified internal joint prosthesis, initial encounter (H)     Ingrowing nail     Major depressive disorder, recurrent episode, moderate (H)     Mild intermittent asthma     Nausea     Partial epilepsy with intractable epilepsy (H)     Repeated falls     History of revision of total replacement of right hip joint     Seizure disorder (H)     Benign essential hypertension     Morbid obesity (H)     Sacroiliac joint pain     Closed fracture of left wrist, initial encounter     Lumbar spondylosis     Lumbar facet arthropathy     Tuberous sclerosis (H)     Hyperthyroidism     Graves disease         Assessment/Plan   Graves disease  Elderly woman with thyrotoxicosis with suppressed TSH increased free T4 and total T3 and increase TSI.  Thyroid uptake and scan is homogeneously increased.  Overall most consistent with Graves' disease.  Treatment is indicated as patient has overt thyrotoxicosis.    Patient s/p radioiodine treatment, now on levothyroxine.  TSH levels had been somewhat variable, most recent elevated.  Clinically hypothyroid with inability to lose weight and fatigue.  Fatigue might be related to low blood pressure, review to contact primary care to adjust blood pressure medications     Increase levothyroxine from 75 to 88 mcg daily    Osteoporosis  Patient has been on alendronate 70 mg once a day.  Obtain x-ray of femur on the right due to new thigh pain to rule out atypical femur fracture    Follow-up with me, patient prefers Jennie Caraballo MD  Endocrinology and Diabetes  Telephone contact:  [facility]  [phone]  [facility] [phone]        Past Medical and Past Surgical History:  Past Medical History:   Diagnosis Date     Arthritis     Thumb     Cervical high risk HPV (human papillomavirus) test positive 07/17/2017 07/17/17: NIL pap, + HR HPV (not 16 or 18) result.      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     Neuropathy of lower extremity      Other sleep apnea      Rectal prolapse      Restless leg syndrome      Seizure disorder (H)     25 years ago     Sleep apnea     CPAP, no longer using CPAP     Temporal sclerosis 08/27/2012     Uncomplicated asthma        Past Surgical History:   Procedure Laterality Date     Anterior COLPORRHAPHY, BLADDER/VAGINA      perigee mesh     ARTHROPLASTY HIP Right 7/23/2019    Procedure: Right total hip arthroplasty;  Surgeon: Anant Mejia MD;  Location: [facility]     ARTHROPLASTY PATELLO-FEMORAL (KNEE) Bilateral      ARTHROPLASTY REVISION HIP Right 8/7/2019    Procedure: Right hip revision total arthroplasty;  Surgeon: Tom Antonio MD;  Location: [facility] OR     ARTHROPLASTY REVISION HIP Right 8/5/2020    Procedure: Revision Right total hip arthroplasty;  Surgeon: Pan Grey MD;  [facility]: [facility] OR     CARPAL TUNNEL RELEASE RT/LT       DENTAL SURGERY      implant     DESTRUCTION OF PARAVERTEBRAL FACET LUMBAR / SACRAL SINGLE Left 10/27/2021    Procedure: DESTRUCTION, NERVE, FACET JOINT, LUMBOSACRAL, Left L4-5 and L5-S1 facet joint radiofrequency ablation;  Surgeon: Celena Perez MD;  Location: [facility] OR     DILATION AND CURETTAGE       DRAIN PILONIDAL CYST SIMPL       ENDOSCOPY DRUG INDUCED SLEEP N/A 11/18/2015    Procedure: ENDOSCOPY DRUG  INDUCED SLEEP;  Surgeon: Park Ortiz MD;  [facility]     ESOPHAGOSCOPY, GASTROSCOPY, DUODENOSCOPY (EGD), COMBINED  4/5/2013    Procedure: COMBINED ESOPHAGOSCOPY, GASTROSCOPY, DUODENOSCOPY (EGD), BIOPSY SINGLE OR MULTIPLE;;  Surgeon: Mundo Mehta MD;  Location:  GI     EXCISE LESION TRUNK  8/10/2012    Procedure: EXCISE LESION TRUNK;;  Surgeon: Jerald Diggs MD;  Location: [facility] OR     HYSTERECTOMY       INJECT BLOCK MEDIAL BRANCH CERVICAL/THORACIC/LUMBAR Bilateral 9/20/2021    Procedure: BLOCK, NERVE, FACET JOINT, MEDIAL BRANCH, DIAGNOSTIC - Bilateral L4-5 and L5-S1 Medial branch blocks;  Surgeon: Celena Perez MD;  Location: [facility] OR     INJECT BLOCK MEDIAL BRANCH CERVICAL/THORACIC/LUMBAR Bilateral 9/27/2021    Procedure: BLOCK, NERVE, FACET JOINT, MEDIAL BRANCH, DIAGNOSTIC - Bilateral L4-5 and L5-S1 Medial branch blocks;  Surgeon: Celena Perez MD;  Location: [facility] OR     INJECT SACROILIAC JOINT Bilateral 5/24/2021    Procedure: INJECTION, SACROILIAC JOINT;  Surgeon: Celena Perez MD;  Location: [facility] OR     IRRIGATION AND DEBRIDEMENT HIP, COMBINED Right 8/26/2019    Procedure: 1.  Right hip wound irrigation and debridement with excisional debridement of nonviable skin, subcutaneous tissues, and fascia. 2. Application of incisional wound VAC, 27cm length incision.;  Surgeon: Tyson Prabhakar MD;  Location: [facility] OR     L shoulder fracture       OPEN REDUCTION INTERNAL FIXATION WRIST Left 4/30/2021    Procedure: Open reduction with internal fixation of displaced left intraarticular distal radius fracture;  Surgeon: Luis Manuel Sánchez MD;  Location: [facility] OR     rectal prolapse repair abdominally       RELEASE PLANTAR FASCIA Right 1/20/2015    Procedure: RELEASE PLANTAR FASCIA;  Surgeon: Maicol Liu DPM;  [facility]     REMOVE MESH VAGINA  8/10/2012    Procedure: REMOVE MESH VAGINA;  Excision of Vaginal Mesh Exposure, Removal of Skin Tag Left Inner Leg;  Surgeon:  Jerald Diggs MD;  Location: [facility] OR     REPAIR HAMMER TOE Right 1/20/2015    Procedure: REPAIR HAMMER TOE;  Surgeon: Maicol Liu DPM;  Location: [facility]     TONSILLECTOMY & ADENOIDECTOMY       TUBAL LIGATION         Medications:   Current Outpatient Medications   Medication Sig Dispense Refill     acetaminophen (TYLENOL) 325 MG tablet Take 2 tablets (650 mg) by mouth every 4 hours as needed for other 1 Bottle 0     albuterol (ALBUTEROL) 108 (90 BASE) MCG/ACT Inhaler Inhale 2 puffs into the lungs 4 times daily as needed for shortness of breath / dyspnea or wheezing 1 Inhaler 0     alendronate (FOSAMAX) 70 MG tablet TAKE 1 TABLET(70 MG) BY MOUTH EVERY 7 DAYS 12 tablet 2     amLODIPine (NORVASC) 5 MG tablet Take 1 tablet (5 mg) by mouth daily 90 tablet 2     amoxicillin (AMOXIL) 500 MG tablet Take 4 tablets (2000 mg) by mouth 1 hour before dental procedures/cleanings. 4 tablet 4     aspirin 81 MG EC tablet Take 81 mg by mouth daily       atenolol (TENORMIN) 25 MG tablet Take 1 tablet (25 mg) by mouth daily 90 tablet 0     atenolol (TENORMIN) 50 MG tablet Take 1 tablet (50 mg) by mouth daily 90 tablet 0     benzonatate (TESSALON) 200 MG capsule Take 200 mg by mouth 3 times daily as needed for cough       budesonide-formoterol (SYMBICORT) 160-4.5 MCG/ACT Inhaler Inhale 2 puffs into the lungs 2 times daily       busPIRone (BUSPAR) 5 MG tablet Take 5 mg by mouth 2 times daily       carboxymethylcellulose PF (REFRESH PLUS) 0.5 % ophthalmic solution Place 1 drop into both eyes 2 times daily       cyclobenzaprine (FLEXERIL) 5 MG tablet Take 1 tablet (5 mg) by mouth 2 times daily as needed for muscle spasms 60 tablet 3     cycloSPORINE (RESTASIS) 0.05 % ophthalmic emulsion Place 1 drop into both eyes 2 times daily        Dentifrices (BIOTENE DRY MOUTH DT) Apply 2 sprays to affected area 3 times daily as needed        Estradiol-Estriol-Progesterone (BIEST/PROGESTERONE TD) Place 0.5 g onto the skin nightly as needed        fluticasone-vilanterol (BREO ELLIPTA) 200-25 MCG/INH inhaler Inhale 1 puff into the lungs daily       furosemide (LASIX) 20 MG tablet TAKE 1 TABLET DAILY 90 tablet 1     hypromellose (ARTIFICIAL TEARS) 0.5 % SOLN ophthalmic solution Place 1 drop into both eyes 2 times daily       ibuprofen (ADVIL/MOTRIN) 600 MG tablet Take 600 mg by mouth every 6 hours as needed for moderate pain       lacosamide (VIMPAT) 200 MG TABS tablet Take 1 tablet (200 mg) by mouth 2 times daily 30 tablet 0     levothyroxine (SYNTHROID/LEVOTHROID) 75 MCG tablet Take 1 tablet (75 mcg) by mouth daily 90 tablet 1     linaclotide (LINZESS) 290 MCG capsule Take 1 capsule (290 mcg) by mouth every morning (before breakfast) 10 capsule 0     loratadine (CLARITIN REDITABS) 10 MG ODT Take 1 tablet (10 mg) by mouth daily as needed for allergies       LORazepam (ATIVAN) 0.5 MG tablet Take 0.5 mg by mouth daily as needed for anxiety       omeprazole (PRILOSEC) 40 MG DR capsule Take 1 capsule (40 mg) by mouth 2 times daily 180 capsule 3     ondansetron (ZOFRAN-ODT) 4 MG ODT tab Take 1 tablet (4 mg) by mouth every 6 hours as needed for nausea or vomiting 20 tablet 0     potassium citrate (UROCIT-K) 10 MEQ (1080 MG) CR tablet Take 1 tablet (10 mEq) by mouth daily 90 tablet 1     pramipexole (MIRAPEX) 1 MG tablet Give 1 tablet by mouth in the morning and 3 tablets by mouth 3 hours prior to bedtime       QUEtiapine (SEROQUEL) 50 MG tablet Take 100 mg by mouth nightly as needed        topiramate (TOPAMAX) 25 MG tablet Take 25 mg in the morning and 50 mg at bedtime.       traZODone (DESYREL) 150 MG tablet 150 mg At Bedtime        triamcinolone (KENALOG) 0.1 % external cream APPLY TOPICALLY 3 TIMES    DAILY. 30 g 0     trolamine salicylate (ASPERCREME) 10 % external cream Apply topically 2 times daily as needed for moderate pain To right hip incision area         Allergies:   Allergies   Allergen Reactions     Blood Transfusion Related (Informational Only) Other  (See Comments)     Patient has a history of a clinically significant antibody against RBC antigens.  A delay in compatible RBCs may occur.     Budesonide      Edema     Bupropion Rash     Carbamazepine Diarrhea     Clonazepam Other (See Comments)     Hypotension, trouble walking, mind disturbance     Encort [Hydrocortisone Acetate] Swelling     Localized to feet     Encort [Hydrocortisone] Swelling     Erythromycin Diarrhea     Erythromycin Nausea and Vomiting and Diarrhea     Flagyl [Metronidazole] Nausea     Gabapentin Other (See Comments)     Causes over-eating     Lamictal [Lamotrigine] Dizziness     Oxycodone Nausea and Vomiting     Tegretol [Carbamazepine] Nausea and Vomiting       Social History     Tobacco Use     Smoking status: Never     Smokeless tobacco: Never   Substance Use Topics     Alcohol use: Yes     Comment: 1 glass of wine 2x/yr       Family History   Problem Relation Age of Onset     Eye Disorder Mother      Lipids Mother         has CHF     Osteoporosis Mother      Diabetes Father      Breast Cancer Sister      Depression Sister      Lipids Sister      Thyroid Disease Sister      Hypertension Brother      Alcohol/Drug Brother      Depression Brother      Gastrointestinal Disease Brother         hx of colonic polyps     Lipids Brother      Psychotic Disorder Brother      Alzheimer Disease Paternal Grandmother      Congenital Anomalies Daughter      Neurologic Disorder Daughter      Suicide Son        Physical Examination:  Wt Readings from Last 4 Encounters:   09/07/22 81.2 kg (179 lb)   05/31/22 85.3 kg (188 lb)   05/16/22 85.3 kg (188 lb)   04/12/22 86.6 kg (191 lb)   weight today 175 lbs per pt report     Reported vitals:  There were no vitals taken for this visit.   healthy, alert and no distress  PSYCH: Alert and oriented times 3; coherent speech, normal   rate and volume, able to articulate logical thoughts, able   to abstract reason, no tangential thoughts, no hallucinations   or  delusions  Her affect is normal and pleasant  RESP: No cough, no audible wheezing, able to talk in full sentences  Remainder of exam unable to be completed due to telephone visits        Labs and Studies:   Lab Results   Component Value Date    TSH 6.04 (H) 10/24/2022    TSH 0.91 09/27/2022    TSH <0.01 (L) 08/25/2022    TSH <0.01 (L) 07/23/2022    TSH <0.01 (L) 05/23/2022     Lab Results   Component Value Date    SABA 9.1 04/07/2022    SABA 9.1 01/31/2022    SABA 9.0 04/12/2021    SABA 9.3 02/18/2021    ALBUMIN 3.5 04/07/2022    ALT 20 04/07/2022    PHOS 4.1 09/17/2009    AST 13 04/07/2022    BILITOTAL 0.4 04/07/2022    CR 0.74 04/07/2022    CR 0.96 01/31/2022    CR 0.96 04/12/2021     04/07/2022    TSH 6.04 (H) 10/24/2022    ALKPHOS 130 04/07/2022    HGB 12.1 04/07/2022       Results for orders placed in visit on 05/24/22    US Thyroid    Narrative  US THYROID 5/24/2022 1:18 PM    COMPARISON: None    HISTORY: Acquired hypothyroidism    FINDINGS:  Thyroid parenchyma: Homogenous  The right lobe of the thyroid measures: 5.3 x 1.2 x 2 cm  The left lobe of the thyroid measures: 2.9 x 1.3 x 1.1 cm  The thyroid isthmus measures: 0.3 cm    No thyroid nodules identified.    Impression  Impression: Normal sonographic appearance of the thyroid. No thyroid  nodules.    I have personally reviewed the examination and initial interpretation  and I agree with the findings.    BRISEIDA BOB MD      SYSTEM ID:  [id number]  ;    Results for orders placed during the hospital encounter of 05/25/22    NM Thyroid Uptake and Scan    Narrative  EXAM: NM THYROID UPTAKE AND SCAN  LOCATION: [facility]  DATE/TIME: 5/25/2022 11:55 AM    INDICATION: Clinical and/or laboratory evidence of hyperthyroidism.  COMPARISON: Thyroid ultrasound dated 05/24/2022.  TECHNIQUE: 236 uCi I-123, oral ingestion. 24-hour neck uptake and imaging.    FINDINGS: 24-hour uptake: 47.3% (Normal range:  10-30%).    Impression  IMPRESSION:    Diffuse radiotracer uptake throughout the thyroid gland suspicious for Graves' disease without hot/cold nodule.  '    Results for orders placed in visit on 21    DX Hip/Pelvis/Spine    Narrative  BONE DENSITOMETRY  [facility] [address] [place] [address]  2021    PATIENT: Merceeds Barber  CHART: [id number]  :  1955  AGE:  66 year old  SEX:  female  REFERRING PROVIDER:  Skyler Álvarez MD    PROCEDURE:  Bone density scanning was performed using DXA technology of the lumbar spine and hip.  Scanning was performed on a Lunar Prodigy scanner.  Reporting is completed in the form of a T-score.  The T-score represents the standard deviation from peak bone mass based on a young healthy adult.    REFERENCE T-SCORES:  Normal                -1.0 and greater  Osteopenia         Between -1.0 and -2.5  Osteoporosis     -2.5 and less    RISK FACTORS:  Post-menopausal, Height loss of 2 inches, Fractures of wrist and humerus    CURRENT TREATMENT:  None listed    FINDINGS:  Lumbar Spine (L1-L4)      T-score:  -0.4, marked degenerative changes present  Left Femoral Neck            T-score:  -2.3  Forearm (radius 33%)      T-score:  -1.5  The right and left femur is not acceptable for evaluation due to previous arthroplasty.    Lumbar (L1-L4) BMD: 1.147  Previous: 1.345  Left Total Hip BMD: 0.763  Previous: 0.944  Forearm (radius 33%) BMD: 0.755   Previous: NA    Comparison is made to another DXA performed on the same Lunar Prodigy  machine on 2009. There was a study performed in  but the images are not available.    IMPRESSION  Osteopenia., Degenerative changes of the lumbar spine which may falsely elevate results.    There has been significant decrease in bone density of the lumbar spine. There has been significant decrease in bone density of the hip. The forearm was not included on the previous study so comparison is not  "possible.    Recommendations include ensuring adequate Calcium and Vitamin D.    The current NOF Guidelines recommend treatment for patients with prior hip or vertebral fracture, T-score -2.5 or below, or 10 year risk of any major osteoporotic fracture >20% or 10 year risk of hip fracture >3%, as calculated using the FRAX calculator (www.shef.ac.uk/FRAX or you can google \"FRAX\").    This patient's risks based on available information, with the use of FRAX, are 13 % for major osteoporotic fracture and 3.6 % for hip fracture.  Based on these guidelines, treatment (in addition to calcium and vitamin D) is recommended for this patient, after ruling out other causes of osteoporosis.  This is meant as an aid to clinical decision making; one must still use clinical judgement.      Follow up can be considered in 2 years.  ___________________  Donya Oliveira M.D.  Electronically signed    HPI:   Mercedes Barber is a elderly woman with history of thyrotoxicosis, now with post iodine ablation and hypothyroidism     "

## 2022-11-10 ENCOUNTER — TELEPHONE (OUTPATIENT)
Dept: ENDOCRINOLOGY | Facility: CLINIC | Age: 67
End: 2022-11-10

## 2022-11-10 ENCOUNTER — MYC MEDICAL ADVICE (OUTPATIENT)
Dept: ENDOCRINOLOGY | Facility: CLINIC | Age: 67
End: 2022-11-10

## 2022-11-10 ENCOUNTER — MYC MEDICAL ADVICE (OUTPATIENT)
Dept: INTERNAL MEDICINE | Facility: CLINIC | Age: 67
End: 2022-11-10

## 2022-11-10 NOTE — TELEPHONE ENCOUNTER
Patient also sent MyChart. Please refer to that for more details.       Zoie Zhang RN  Endocrine Care Coordinator  [facility]

## 2022-11-10 NOTE — TELEPHONE ENCOUNTER
[facility] Call Center    Phone Message    May a detailed message be left on voicemail: yes     Reason for Call: Other: Patient states that she has an appointment on 11/11/2022 but is not feeling the greatest and is wondering if MD would be able to do a virtual visit with patient instead. Please call patient to discuss.      Action Taken: Other: Endo     Travel Screening: Not Applicable

## 2022-11-10 NOTE — TELEPHONE ENCOUNTER
Patient not feeling well.  Wished to change appointment to virtual.  Patient notifed of change.     Violette Pablo on 11/10/2022 at 11:00 AM

## 2022-11-11 ENCOUNTER — MYC MEDICAL ADVICE (OUTPATIENT)
Dept: ENDOCRINOLOGY | Facility: CLINIC | Age: 67
End: 2022-11-11

## 2022-11-11 ENCOUNTER — MYC MEDICAL ADVICE (OUTPATIENT)
Dept: INTERNAL MEDICINE | Facility: CLINIC | Age: 67
End: 2022-11-11

## 2022-11-11 ENCOUNTER — VIRTUAL VISIT (OUTPATIENT)
Dept: ENDOCRINOLOGY | Facility: CLINIC | Age: 67
End: 2022-11-11
Payer: MEDICARE

## 2022-11-11 DIAGNOSIS — T45.8X5A ADVERSE EFFECT OF BISPHOSPHONATE, INITIAL ENCOUNTER: ICD-10-CM

## 2022-11-11 DIAGNOSIS — E03.9 ACQUIRED HYPOTHYROIDISM: ICD-10-CM

## 2022-11-11 DIAGNOSIS — M81.0 AGE-RELATED OSTEOPOROSIS WITHOUT CURRENT PATHOLOGICAL FRACTURE: Primary | ICD-10-CM

## 2022-11-11 PROCEDURE — 99214 OFFICE O/P EST MOD 30 MIN: CPT | Mod: 95 | Performed by: INTERNAL MEDICINE

## 2022-11-11 RX ORDER — LEVOTHYROXINE SODIUM 88 UG/1
88 TABLET ORAL DAILY
Qty: 90 TABLET | Refills: 3 | Status: SHIPPED | OUTPATIENT
Start: 2022-11-11 | End: 2023-09-21

## 2022-11-11 NOTE — TELEPHONE ENCOUNTER
Responded in a separate MyChart encounter.  Closing encounter.     Violette Pablo on 11/11/2022 at 2:51 PM

## 2022-11-11 NOTE — LETTER
11/11/2022         RE: Mercedes Barber  [address]  [place] [address]        Dear Colleague,    Thank you for referring your patient, Mercedes Barber, to the [facility]. Please see a copy of my visit note below.    [facility]      [facility]ocrinology and [facility] Clinic    Mercedes Barber is a 67 year old female who is being evaluated via a billable telephone visit.      Duration of Telephone interaction:12 min    Follow up for thyrotoxicosis and osteoporosis    Interval history  Elderly woman with history of Graves' disease, osteoporosis    Thyroid  Radioiodine treatment with 16.1 my millicuries iodine on June 30, 2022    Patient has been on levothyroxine 75 mcg daily    Patient was concerned about her weight gain and a nutritional referral was placed on 9/16/2022, has been avoiding carbohydrates, able to maintain prior weight loss, unable to achieve additional weight loss      Symptoms of hypo- and hyperthyroidism:  Weight change none; heat or cold intolerance no; abnormal bowel movements no; hair loss no, change in skin pattern no; anxietyno, depression no; fatigue YES; heart racing no; tremors no; menses na      Current Problem List:   Patient Active Problem List   Diagnosis     Menopausal syndrome (hot flashes)     Family history of breast cancer     Vulvar pain     Renal anomaly     Overactive bladder     Rectal prolapse     CARDIOVASCULAR SCREENING; LDL GOAL LESS THAN 160     Vaginal pain     Dysphagia     Confusion     Headache     Left shoulder pain     Anemia     Mild major depression (H)     Esophageal stricture     Vulvar lesion     Temporal sclerosis     Lumbago     Pain in thoracic spine     Sciatica     Pain in joint, lower leg     Plantar fascial fibromatosis     Pain in joint involving ankle and foot     Other specified hypothyroidism     GERD (gastroesophageal reflux disease)     Irritable bowel syndrome     Other sleep  apnea     Cervical high risk HPV (human papillomavirus) test positive     Restless legs syndrome (RLS)     History of total hip arthroplasty, right     Hip pain     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)     Cellulitis of right hip     Deep venous thrombosis of upper extremity (H)     Peripheral edema     Low iron stores     Mechanical complication of prosthetic hip implant, initial encounter (H)     Status post hip surgery     Chest pain, unspecified     Closed fracture of proximal end of left humerus     Generalized anxiety disorder     History of infection     Infection and inflammatory reaction due to unspecified internal joint prosthesis, initial encounter (H)     Ingrowing nail     Major depressive disorder, recurrent episode, moderate (H)     Mild intermittent asthma     Nausea     Partial epilepsy with intractable epilepsy (H)     Repeated falls     History of revision of total replacement of right hip joint     Seizure disorder (H)     Benign essential hypertension     Morbid obesity (H)     Sacroiliac joint pain     Closed fracture of left wrist, initial encounter     Lumbar spondylosis     Lumbar facet arthropathy     Tuberous sclerosis (H)     Hyperthyroidism     Graves disease         Assessment/Plan   Graves disease  Elderly woman with thyrotoxicosis with suppressed TSH increased free T4 and total T3 and increase TSI.  Thyroid uptake and scan is homogeneously increased.  Overall most consistent with Graves' disease.  Treatment is indicated as patient has overt thyrotoxicosis.    Patient s/p radioiodine treatment, now on levothyroxine.  TSH levels had been somewhat variable, most recent elevated.  Clinically hypothyroid with inability to lose weight and fatigue.  Fatigue might be related to low blood pressure, review to contact primary care to adjust blood pressure medications     Increase levothyroxine from 75 to 88 mcg daily    Osteoporosis  Patient has been on alendronate 70 mg once a  day.  Obtain x-ray of femur on the right due to new thigh pain to rule out atypical femur fracture    Follow-up with me, patient prefers Jennie Caraballo MD  Endocrinology and Diabetes  Telephone contact:  [facility] [place] [phone]  [facility] [place] [phone]        Past Medical and Past Surgical History:  Past Medical History:   Diagnosis Date     Arthritis     Thumb     Cervical high risk HPV (human papillomavirus) test positive 07/17/2017 07/17/17: NIL pap, + HR HPV (not 16 or 18) result.      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     Neuropathy of lower extremity      Other sleep apnea      Rectal prolapse      Restless leg syndrome      Seizure disorder (H)     25 years ago     Sleep apnea     CPAP, no longer using CPAP     Temporal sclerosis 08/27/2012     Uncomplicated asthma        Past Surgical History:   Procedure Laterality Date     Anterior COLPORRHAPHY, BLADDER/VAGINA      perigee mesh     ARTHROPLASTY HIP Right 7/23/2019    Procedure: Right total hip arthroplasty;  Surgeon: Anant Mejia MD;  Location: [facility]     ARTHROPLASTY PATELLO-FEMORAL (KNEE) Bilateral      ARTHROPLASTY REVISION HIP Right 8/7/2019    Procedure: Right hip revision total arthroplasty;  Surgeon: Tom Antonio MD;  Location: [facility]     ARTHROPLASTY REVISION HIP Right 8/5/2020    Procedure: Revision Right total hip arthroplasty;  Surgeon: Pan Grey MD;  Location: [facility]     CARPAL TUNNEL RELEASE RT/LT       DENTAL SURGERY      implant     DESTRUCTION OF PARAVERTEBRAL FACET LUMBAR / SACRAL SINGLE Left 10/27/2021    Procedure: DESTRUCTION, NERVE,  FACET JOINT, LUMBOSACRAL, Left L4-5 and L5-S1 facet joint radiofrequency ablation;  Surgeon: Celena Perez MD;  Location: [facility] OR     DILATION AND CURETTAGE       DRAIN PILONIDAL CYST SIMPL       ENDOSCOPY DRUG INDUCED SLEEP N/A 11/18/2015    Procedure: ENDOSCOPY DRUG INDUCED SLEEP;  Surgeon: Park Ortiz MD;  Location: [facility]     ESOPHAGOSCOPY, GASTROSCOPY, DUODENOSCOPY (EGD), COMBINED  4/5/2013    Procedure: COMBINED ESOPHAGOSCOPY, GASTROSCOPY, DUODENOSCOPY (EGD), BIOPSY SINGLE OR MULTIPLE;;  Surgeon: Mundo Mehta MD;  Location: [facility]     EXCISE LESION TRUNK  8/10/2012    Procedure: EXCISE LESION TRUNK;;  Surgeon: Jerald Diggs MD;  Location: [facility]     HYSTERECTOMY       INJECT BLOCK MEDIAL BRANCH CERVICAL/THORACIC/LUMBAR Bilateral 9/20/2021    Procedure: BLOCK, NERVE, FACET JOINT, MEDIAL BRANCH, DIAGNOSTIC - Bilateral L4-5 and L5-S1 Medial branch blocks;  Surgeon: Celena Perez MD;  Location: [facility] OR     INJECT BLOCK MEDIAL BRANCH CERVICAL/THORACIC/LUMBAR Bilateral 9/27/2021    Procedure: BLOCK, NERVE, FACET JOINT, MEDIAL BRANCH, DIAGNOSTIC - Bilateral L4-5 and L5-S1 Medial branch blocks;  Surgeon: Celena Perez MD;  Location: [facility] OR     INJECT SACROILIAC JOINT Bilateral 5/24/2021    Procedure: INJECTION, SACROILIAC JOINT;  Surgeon: Celena Perez MD;  Location: [facility] OR     IRRIGATION AND DEBRIDEMENT HIP, COMBINED Right 8/26/2019    Procedure: 1.  Right hip wound irrigation and debridement with excisional debridement of nonviable skin, subcutaneous tissues, and fascia. 2. Application of incisional wound VAC, 27cm length incision.;  Surgeon: Tyson Prabhakar MD;  Location: [facility]     L shoulder fracture       OPEN REDUCTION INTERNAL FIXATION WRIST Left 4/30/2021    Procedure: Open reduction with internal fixation of displaced left intraarticular distal radius fracture;  Surgeon: Luis Manuel Sánchez MD;  Location: [facility]     rectal prolapse repair abdominally       RELEASE  PLANTAR FASCIA Right 1/20/2015    Procedure: RELEASE PLANTAR FASCIA;  Surgeon: Maicol Liu DPM;  Location: [facility]     REMOVE MESH VAGINA  8/10/2012    Procedure: REMOVE MESH VAGINA;  Excision of Vaginal Mesh Exposure, Removal of Skin Tag Left Inner Leg;  Surgeon: Jerald Diggs MD;  Location: [facility]     REPAIR HAMMER TOE Right 1/20/2015    Procedure: REPAIR HAMMER TOE;  Surgeon: Maicol Liu DPM;  Location: [facility]     TONSILLECTOMY & ADENOIDECTOMY       TUBAL LIGATION         Medications:   Current Outpatient Medications   Medication Sig Dispense Refill     acetaminophen (TYLENOL) 325 MG tablet Take 2 tablets (650 mg) by mouth every 4 hours as needed for other 1 Bottle 0     albuterol (ALBUTEROL) 108 (90 BASE) MCG/ACT Inhaler Inhale 2 puffs into the lungs 4 times daily as needed for shortness of breath / dyspnea or wheezing 1 Inhaler 0     alendronate (FOSAMAX) 70 MG tablet TAKE 1 TABLET(70 MG) BY MOUTH EVERY 7 DAYS 12 tablet 2     amLODIPine (NORVASC) 5 MG tablet Take 1 tablet (5 mg) by mouth daily 90 tablet 2     amoxicillin (AMOXIL) 500 MG tablet Take 4 tablets (2000 mg) by mouth 1 hour before dental procedures/cleanings. 4 tablet 4     aspirin 81 MG EC tablet Take 81 mg by mouth daily       atenolol (TENORMIN) 25 MG tablet Take 1 tablet (25 mg) by mouth daily 90 tablet 0     atenolol (TENORMIN) 50 MG tablet Take 1 tablet (50 mg) by mouth daily 90 tablet 0     benzonatate (TESSALON) 200 MG capsule Take 200 mg by mouth 3 times daily as needed for cough       budesonide-formoterol (SYMBICORT) 160-4.5 MCG/ACT Inhaler Inhale 2 puffs into the lungs 2 times daily       busPIRone (BUSPAR) 5 MG tablet Take 5 mg by mouth 2 times daily       carboxymethylcellulose PF (REFRESH PLUS) 0.5 % ophthalmic solution Place 1 drop into both eyes 2 times daily       cyclobenzaprine (FLEXERIL) 5 MG tablet Take 1 tablet (5 mg) by mouth 2 times daily as needed for muscle spasms 60 tablet 3     cycloSPORINE (RESTASIS) 0.05 %  ophthalmic emulsion Place 1 drop into both eyes 2 times daily        Dentifrices (BIOTENE DRY MOUTH DT) Apply 2 sprays to affected area 3 times daily as needed        Estradiol-Estriol-Progesterone (BIEST/PROGESTERONE TD) Place 0.5 g onto the skin nightly as needed       fluticasone-vilanterol (BREO ELLIPTA) 200-25 MCG/INH inhaler Inhale 1 puff into the lungs daily       furosemide (LASIX) 20 MG tablet TAKE 1 TABLET DAILY 90 tablet 1     hypromellose (ARTIFICIAL TEARS) 0.5 % SOLN ophthalmic solution Place 1 drop into both eyes 2 times daily       ibuprofen (ADVIL/MOTRIN) 600 MG tablet Take 600 mg by mouth every 6 hours as needed for moderate pain       lacosamide (VIMPAT) 200 MG TABS tablet Take 1 tablet (200 mg) by mouth 2 times daily 30 tablet 0     levothyroxine (SYNTHROID/LEVOTHROID) 75 MCG tablet Take 1 tablet (75 mcg) by mouth daily 90 tablet 1     linaclotide (LINZESS) 290 MCG capsule Take 1 capsule (290 mcg) by mouth every morning (before breakfast) 10 capsule 0     loratadine (CLARITIN REDITABS) 10 MG ODT Take 1 tablet (10 mg) by mouth daily as needed for allergies       LORazepam (ATIVAN) 0.5 MG tablet Take 0.5 mg by mouth daily as needed for anxiety       omeprazole (PRILOSEC) 40 MG DR capsule Take 1 capsule (40 mg) by mouth 2 times daily 180 capsule 3     ondansetron (ZOFRAN-ODT) 4 MG ODT tab Take 1 tablet (4 mg) by mouth every 6 hours as needed for nausea or vomiting 20 tablet 0     potassium citrate (UROCIT-K) 10 MEQ (1080 MG) CR tablet Take 1 tablet (10 mEq) by mouth daily 90 tablet 1     pramipexole (MIRAPEX) 1 MG tablet Give 1 tablet by mouth in the morning and 3 tablets by mouth 3 hours prior to bedtime       QUEtiapine (SEROQUEL) 50 MG tablet Take 100 mg by mouth nightly as needed        topiramate (TOPAMAX) 25 MG tablet Take 25 mg in the morning and 50 mg at bedtime.       traZODone (DESYREL) 150 MG tablet 150 mg At Bedtime        triamcinolone (KENALOG) 0.1 % external cream APPLY TOPICALLY 3  TIMES    DAILY. 30 g 0     trolamine salicylate (ASPERCREME) 10 % external cream Apply topically 2 times daily as needed for moderate pain To right hip incision area         Allergies:   Allergies   Allergen Reactions     Blood Transfusion Related (Informational Only) Other (See Comments)     Patient has a history of a clinically significant antibody against RBC antigens.  A delay in compatible RBCs may occur.     Budesonide      Edema     Bupropion Rash     Carbamazepine Diarrhea     Clonazepam Other (See Comments)     Hypotension, trouble walking, mind disturbance     Encort [Hydrocortisone Acetate] Swelling     Localized to feet     Encort [Hydrocortisone] Swelling     Erythromycin Diarrhea     Erythromycin Nausea and Vomiting and Diarrhea     Flagyl [Metronidazole] Nausea     Gabapentin Other (See Comments)     Causes over-eating     Lamictal [Lamotrigine] Dizziness     Oxycodone Nausea and Vomiting     Tegretol [Carbamazepine] Nausea and Vomiting       Social History     Tobacco Use     Smoking status: Never     Smokeless tobacco: Never   Substance Use Topics     Alcohol use: Yes     Comment: 1 glass of wine 2x/yr       Family History   Problem Relation Age of Onset     Eye Disorder Mother      Lipids Mother         has CHF     Osteoporosis Mother      Diabetes Father      Breast Cancer Sister      Depression Sister      Lipids Sister      Thyroid Disease Sister      Hypertension Brother      Alcohol/Drug Brother      Depression Brother      Gastrointestinal Disease Brother         hx of colonic polyps     Lipids Brother      Psychotic Disorder Brother      Alzheimer Disease Paternal Grandmother      Congenital Anomalies Daughter      Neurologic Disorder Daughter      Suicide Son        Physical Examination:  Wt Readings from Last 4 Encounters:   09/07/22 81.2 kg (179 lb)   05/31/22 85.3 kg (188 lb)   05/16/22 85.3 kg (188 lb)   04/12/22 86.6 kg (191 lb)   weight today 175 lbs per pt report     Reported vitals:   There were no vitals taken for this visit.   healthy, alert and no distress  PSYCH: Alert and oriented times 3; coherent speech, normal   rate and volume, able to articulate logical thoughts, able   to abstract reason, no tangential thoughts, no hallucinations   or delusions  Her affect is normal and pleasant  RESP: No cough, no audible wheezing, able to talk in full sentences  Remainder of exam unable to be completed due to telephone visits        Labs and Studies:   Lab Results   Component Value Date    TSH 6.04 (H) 10/24/2022    TSH 0.91 09/27/2022    TSH <0.01 (L) 08/25/2022    TSH <0.01 (L) 07/23/2022    TSH <0.01 (L) 05/23/2022     Lab Results   Component Value Date    SABA 9.1 04/07/2022    SABA 9.1 01/31/2022    SABA 9.0 04/12/2021    SABA 9.3 02/18/2021    ALBUMIN 3.5 04/07/2022    ALT 20 04/07/2022    PHOS 4.1 09/17/2009    AST 13 04/07/2022    BILITOTAL 0.4 04/07/2022    CR 0.74 04/07/2022    CR 0.96 01/31/2022    CR 0.96 04/12/2021     04/07/2022    TSH 6.04 (H) 10/24/2022    ALKPHOS 130 04/07/2022    HGB 12.1 04/07/2022       Results for orders placed in visit on 05/24/22    US Thyroid    Narrative  US THYROID 5/24/2022 1:18 PM    COMPARISON: None    HISTORY: Acquired hypothyroidism    FINDINGS:  Thyroid parenchyma: Homogenous  The right lobe of the thyroid measures: 5.3 x 1.2 x 2 cm  The left lobe of the thyroid measures: 2.9 x 1.3 x 1.1 cm  The thyroid isthmus measures: 0.3 cm    No thyroid nodules identified.    Impression  Impression: Normal sonographic appearance of the thyroid. No thyroid  nodules.    I have personally reviewed the examination and initial interpretation  and I agree with the findings.    BRISEIDA BOB MD      SYSTEM ID:  [id number]  ;    Results for orders placed during the hospital encounter of 05/25/22    NM Thyroid Uptake and Scan    Narrative  EXAM: NM THYROID UPTAKE AND SCAN  LOCATION: [facility]  DATE/TIME: 5/25/2022 11:55 AM    INDICATION:  Clinical and/or laboratory evidence of hyperthyroidism.  COMPARISON: Thyroid ultrasound dated 2022.  TECHNIQUE: 236 uCi I-123, oral ingestion. 24-hour neck uptake and imaging.    FINDINGS: 24-hour uptake: 47.3% (Normal range: 10-30%).    Impression  IMPRESSION:    Diffuse radiotracer uptake throughout the thyroid gland suspicious for Graves' disease without hot/cold nodule.  '    Results for orders placed in visit on 21    DX Hip/Pelvis/Spine    Narrative  BONE DENSITOMETRY  [facility] [address] [place] [address]  2021    PATIENT: Mercedes Barber  CHART: [id number]  :  1955  AGE:  66 year old  SEX:  female  REFERRING PROVIDER:  Skyler Álvarez MD    PROCEDURE:  Bone density scanning was performed using DXA technology of the lumbar spine and hip.  Scanning was performed on a Lunar Prodigy scanner.  Reporting is completed in the form of a T-score.  The T-score represents the standard deviation from peak bone mass based on a young healthy adult.    REFERENCE T-SCORES:  Normal                -1.0 and greater  Osteopenia         Between -1.0 and -2.5  Osteoporosis     -2.5 and less    RISK FACTORS:  Post-menopausal, Height loss of 2 inches, Fractures of wrist and humerus    CURRENT TREATMENT:  None listed    FINDINGS:  Lumbar Spine (L1-L4)      T-score:  -0.4, marked degenerative changes present  Left Femoral Neck            T-score:  -2.3  Forearm (radius 33%)      T-score:  -1.5  The right and left femur is not acceptable for evaluation due to previous arthroplasty.    Lumbar (L1-L4) BMD: 1.147  Previous: 1.345  Left Total Hip BMD: 0.763  Previous: 0.944  Forearm (radius 33%) BMD: 0.755   Previous: NA    Comparison is made to another DXA performed on the same Lunar Prodigy  machine on 2009. There was a study performed in  but the images are not available.    IMPRESSION  Osteopenia., Degenerative changes of the lumbar spine which may falsely elevate  "results.    There has been significant decrease in bone density of the lumbar spine. There has been significant decrease in bone density of the hip. The forearm was not included on the previous study so comparison is not possible.    Recommendations include ensuring adequate Calcium and Vitamin D.    The current NOF Guidelines recommend treatment for patients with prior hip or vertebral fracture, T-score -2.5 or below, or 10 year risk of any major osteoporotic fracture >20% or 10 year risk of hip fracture >3%, as calculated using the FRAX calculator (www.shef.ac.uk/FRAX or you can google \"FRAX\").    This patient's risks based on available information, with the use of FRAX, are 13 % for major osteoporotic fracture and 3.6 % for hip fracture.  Based on these guidelines, treatment (in addition to calcium and vitamin D) is recommended for this patient, after ruling out other causes of osteoporosis.  This is meant as an aid to clinical decision making; one must still use clinical judgement.      Follow up can be considered in 2 years.  ___________________  Donya Oliveira M.D.  Electronically signed    HPI:   Mercedes Barber is a elderly woman with history of thyrotoxicosis, now with post iodine ablation and hypothyroidism       Mercedes Barber  is being evaluated via a billable video visit.      How would you like to obtain your AVS? MyChart  For the video visit, send the invitation by: Text to cell phone: [phone]  Will anyone else be joining your video visit? No            Again, thank you for allowing me to participate in the care of your patient.        Sincerely,        Odalys Caraballo MD    "

## 2022-11-11 NOTE — PROGRESS NOTES
Mercedes Barber  is being evaluated via a billable video visit.      How would you like to obtain your AVS? [employer]  For the video visit, send the invitation by: Text to cell phone: [phone]  Will anyone else be joining your video visit? No

## 2022-11-14 ENCOUNTER — ANCILLARY PROCEDURE (OUTPATIENT)
Dept: GENERAL RADIOLOGY | Facility: CLINIC | Age: 67
End: 2022-11-14
Payer: MEDICARE

## 2022-11-14 ENCOUNTER — PATIENT OUTREACH (OUTPATIENT)
Dept: CARE COORDINATION | Facility: CLINIC | Age: 67
End: 2022-11-14

## 2022-11-14 ENCOUNTER — OFFICE VISIT (OUTPATIENT)
Dept: DERMATOLOGY | Facility: CLINIC | Age: 67
End: 2022-11-14
Payer: MEDICARE

## 2022-11-14 DIAGNOSIS — L82.1 SEBORRHEIC KERATOSES: ICD-10-CM

## 2022-11-14 DIAGNOSIS — L85.3 XEROSIS OF SKIN: ICD-10-CM

## 2022-11-14 DIAGNOSIS — L57.8 ACTINIC SKIN DAMAGE: Primary | ICD-10-CM

## 2022-11-14 DIAGNOSIS — L29.9 PRURITUS: ICD-10-CM

## 2022-11-14 PROCEDURE — 99213 OFFICE O/P EST LOW 20 MIN: CPT | Performed by: STUDENT IN AN ORGANIZED HEALTH CARE EDUCATION/TRAINING PROGRAM

## 2022-11-14 PROCEDURE — 73552 X-RAY EXAM OF FEMUR 2/>: CPT | Mod: TC | Performed by: RADIOLOGY

## 2022-11-14 NOTE — PROGRESS NOTES
[facility] Dermatology Note    Encounter Date: Nov 14, 2022    Dermatology Problem List:  -    ______________________________________    Impression/Plan:  Mercedes was seen today for skin check.    Diagnoses and all orders for this visit:    Actinic skin damage  - discussed sunprotective behaviors, clothing, and the use of sunscreen    Seborrheic keratoses  Lentigines  - benign    Follow-up in 1 year.       Staff Involved:  Staff Only    Juaquin Fournier MD   of Dermatology  Department of Dermatology  [facility]      CC:   Chief Complaint   Patient presents with     Skin Check       History of Present Illness:  Ms. Mercedes Barber is a 67 year old female who presents as a return patient.    Pt here for FBSE, examination of brown spots     Labs:      Physical exam:  Vitals: LMP 03/11/2010   GEN: well developed, well-nourished, in no acute distress, in a pleasant mood.     SKIN: Casey phototype 1  - Full skin, which includes the head/face, both arms, chest, back, abdomen,both legs, genitalia and/or groin buttocks, digits and/or nails, was examined.  - Stuck on brown papules on trunk and extremities   - Flat brown macules and patches in a sun exposed areas on face and extremities  - No other lesions of concern on areas examined.     Past Medical History:   Past Medical History:   Diagnosis Date     Arthritis     Thumb     Cervical high risk HPV (human papillomavirus) test positive 07/17/2017 07/17/17: NIL pap, + HR HPV (not 16 or 18) result.      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     Neuropathy of lower extremity      Other sleep apnea       Rectal prolapse      Restless leg syndrome      Seizure disorder (H)     25 years ago     Sleep apnea     CPAP, no longer using CPAP     Temporal sclerosis 08/27/2012     Uncomplicated asthma      Past Surgical History:   Procedure Laterality Date     Anterior COLPORRHAPHY, BLADDER/VAGINA      perigee mesh     ARTHROPLASTY HIP Right 7/23/2019    Procedure: Right total hip arthroplasty;  Surgeon: Anant Mejia MD;  [facility]     ARTHROPLASTY PATELLO-FEMORAL (KNEE) Bilateral      ARTHROPLASTY REVISION HIP Right 8/7/2019    Procedure: Right hip revision total arthroplasty;  Surgeon: Tom Antonio MD;  [facility]     ARTHROPLASTY REVISION HIP Right 8/5/2020    Procedure: Revision Right total hip arthroplasty;  Surgeon: Pan Grey MD;  [facility]     CARPAL TUNNEL RELEASE RT/LT       DENTAL SURGERY      implant     DESTRUCTION OF PARAVERTEBRAL FACET LUMBAR / SACRAL SINGLE Left 10/27/2021    Procedure: DESTRUCTION, NERVE, FACET JOINT, LUMBOSACRAL, Left L4-5 and L5-S1 facet joint radiofrequency ablation;  Surgeon: Celena Perez MD;  Location: [facility] OR     DILATION AND CURETTAGE       DRAIN PILONIDAL CYST SIMPL       ENDOSCOPY DRUG INDUCED SLEEP N/A 11/18/2015    Procedure: ENDOSCOPY DRUG INDUCED SLEEP;  Surgeon: Park Ortiz MD;  [facility]     ESOPHAGOSCOPY, GASTROSCOPY, DUODENOSCOPY (EGD), COMBINED  4/5/2013    Procedure: COMBINED ESOPHAGOSCOPY, GASTROSCOPY, DUODENOSCOPY (EGD), BIOPSY SINGLE OR MULTIPLE;;  Surgeon: Mundo Mehta MD;  Location:  GI     EXCISE LESION TRUNK  8/10/2012    Procedure: EXCISE LESION TRUNK;;  Surgeon: Jerald Diggs MD;  [facility]     HYSTERECTOMY       INJECT BLOCK MEDIAL BRANCH CERVICAL/THORACIC/LUMBAR Bilateral 9/20/2021    Procedure: BLOCK, NERVE, FACET JOINT, MEDIAL BRANCH, DIAGNOSTIC - Bilateral L4-5 and L5-S1 Medial branch blocks;  Surgeon: Celena Perez MD;  Location: [facility] OR     INJECT BLOCK MEDIAL BRANCH  CERVICAL/THORACIC/LUMBAR Bilateral 9/27/2021    Procedure: BLOCK, NERVE, FACET JOINT, MEDIAL BRANCH, DIAGNOSTIC - Bilateral L4-5 and L5-S1 Medial branch blocks;  Surgeon: Celena Perez MD;  Location: [facility] OR     INJECT SACROILIAC JOINT Bilateral 5/24/2021    Procedure: INJECTION, SACROILIAC JOINT;  Surgeon: Celena Perez MD;  Location: [facility] OR     IRRIGATION AND DEBRIDEMENT HIP, COMBINED Right 8/26/2019    Procedure: 1.  Right hip wound irrigation and debridement with excisional debridement of nonviable skin, subcutaneous tissues, and fascia. 2. Application of incisional wound VAC, 27cm length incision.;  Surgeon: Tyson Prabhakar MD;  [facility]     L shoulder fracture       OPEN REDUCTION INTERNAL FIXATION WRIST Left 4/30/2021    Procedure: Open reduction with internal fixation of displaced left intraarticular distal radius fracture;  Surgeon: Luis Manuel Sánchez MD;  [facility]     rectal prolapse repair abdominally       RELEASE PLANTAR FASCIA Right 1/20/2015    Procedure: RELEASE PLANTAR FASCIA;  Surgeon: Maicol Liu DPM;  Location: [facility]     REMOVE MESH VAGINA  8/10/2012    Procedure: REMOVE MESH VAGINA;  Excision of Vaginal Mesh Exposure, Removal of Skin Tag Left Inner Leg;  Surgeon: Jerald Diggs MD;  [facility]     REPAIR HAMMER TOE Right 1/20/2015    Procedure: REPAIR HAMMER TOE;  Surgeon: Maicol Liu DPM;  Location: [facility]     TONSILLECTOMY & ADENOIDECTOMY       TUBAL LIGATION         Social History:   reports that she has never smoked. She has never used smokeless tobacco. She reports current alcohol use. She reports that she does not use drugs.    Family History:  Family History   Problem Relation Age of Onset     Eye Disorder Mother      Lipids Mother         has CHF     Osteoporosis Mother      Diabetes Father      Breast Cancer Sister      Depression Sister      Lipids Sister      Thyroid Disease Sister      Hypertension Brother      Alcohol/Drug  Brother      Depression Brother      Gastrointestinal Disease Brother         hx of colonic polyps     Lipids Brother      Psychotic Disorder Brother      Alzheimer Disease Paternal Grandmother      Congenital Anomalies Daughter      Neurologic Disorder Daughter      Suicide Son        Medications:  Current Outpatient Medications   Medication Sig Dispense Refill     acetaminophen (TYLENOL) 325 MG tablet Take 2 tablets (650 mg) by mouth every 4 hours as needed for other 1 Bottle 0     albuterol (ALBUTEROL) 108 (90 BASE) MCG/ACT Inhaler Inhale 2 puffs into the lungs 4 times daily as needed for shortness of breath / dyspnea or wheezing 1 Inhaler 0     alendronate (FOSAMAX) 70 MG tablet TAKE 1 TABLET(70 MG) BY MOUTH EVERY 7 DAYS 12 tablet 2     amLODIPine (NORVASC) 5 MG tablet Take 1 tablet (5 mg) by mouth daily 90 tablet 2     amoxicillin (AMOXIL) 500 MG tablet Take 4 tablets (2000 mg) by mouth 1 hour before dental procedures/cleanings. 4 tablet 4     aspirin 81 MG EC tablet Take 81 mg by mouth daily       atenolol (TENORMIN) 25 MG tablet Take 1 tablet (25 mg) by mouth daily 90 tablet 0     benzonatate (TESSALON) 200 MG capsule Take 200 mg by mouth 3 times daily as needed for cough       budesonide-formoterol (SYMBICORT) 160-4.5 MCG/ACT Inhaler Inhale 2 puffs into the lungs 2 times daily       busPIRone (BUSPAR) 5 MG tablet Take 5 mg by mouth 2 times daily       carboxymethylcellulose PF (REFRESH PLUS) 0.5 % ophthalmic solution Place 1 drop into both eyes 2 times daily       cyclobenzaprine (FLEXERIL) 5 MG tablet Take 1 tablet (5 mg) by mouth 2 times daily as needed for muscle spasms 60 tablet 3     cycloSPORINE (RESTASIS) 0.05 % ophthalmic emulsion Place 1 drop into both eyes 2 times daily        Dentifrices (BIOTENE DRY MOUTH DT) Apply 2 sprays to affected area 3 times daily as needed        Estradiol-Estriol-Progesterone (BIEST/PROGESTERONE TD) Place 0.5 g onto the skin nightly as needed       fluticasone-vilanterol  (BREO ELLIPTA) 200-25 MCG/INH inhaler Inhale 1 puff into the lungs daily       furosemide (LASIX) 20 MG tablet TAKE 1 TABLET DAILY 90 tablet 1     hypromellose (ARTIFICIAL TEARS) 0.5 % SOLN ophthalmic solution Place 1 drop into both eyes 2 times daily       ibuprofen (ADVIL/MOTRIN) 600 MG tablet Take 600 mg by mouth every 6 hours as needed for moderate pain       lacosamide (VIMPAT) 200 MG TABS tablet Take 1 tablet (200 mg) by mouth 2 times daily 30 tablet 0     levothyroxine (SYNTHROID/LEVOTHROID) 88 MCG tablet Take 1 tablet (88 mcg) by mouth daily 90 tablet 3     linaclotide (LINZESS) 290 MCG capsule Take 1 capsule (290 mcg) by mouth every morning (before breakfast) 10 capsule 0     loratadine (CLARITIN REDITABS) 10 MG ODT Take 1 tablet (10 mg) by mouth daily as needed for allergies       LORazepam (ATIVAN) 0.5 MG tablet Take 0.5 mg by mouth daily as needed for anxiety       omeprazole (PRILOSEC) 40 MG DR capsule Take 1 capsule (40 mg) by mouth 2 times daily 180 capsule 3     ondansetron (ZOFRAN-ODT) 4 MG ODT tab Take 1 tablet (4 mg) by mouth every 6 hours as needed for nausea or vomiting 20 tablet 0     potassium citrate (UROCIT-K) 10 MEQ (1080 MG) CR tablet Take 1 tablet (10 mEq) by mouth daily 90 tablet 1     pramipexole (MIRAPEX) 1 MG tablet Give 1 tablet by mouth in the morning and 3 tablets by mouth 3 hours prior to bedtime       QUEtiapine (SEROQUEL) 50 MG tablet Take 100 mg by mouth nightly as needed        topiramate (TOPAMAX) 25 MG tablet Take 25 mg in the morning and 50 mg at bedtime.       traZODone (DESYREL) 150 MG tablet 150 mg At Bedtime        triamcinolone (KENALOG) 0.1 % external cream APPLY TOPICALLY 3 TIMES    DAILY. 30 g 0     trolamine salicylate (ASPERCREME) 10 % external cream Apply topically 2 times daily as needed for moderate pain To right hip incision area       Allergies   Allergen Reactions     Blood Transfusion Related (Informational Only) Other (See Comments)     Patient has a  history of a clinically significant antibody against RBC antigens.  A delay in compatible RBCs may occur.     Budesonide      Edema     Bupropion Rash     Carbamazepine Diarrhea     Clonazepam Other (See Comments)     Hypotension, trouble walking, mind disturbance     Encort [Hydrocortisone Acetate] Swelling     Localized to feet     Encort [Hydrocortisone] Swelling     Erythromycin Diarrhea     Erythromycin Nausea and Vomiting and Diarrhea     Flagyl [Metronidazole] Nausea     Gabapentin Other (See Comments)     Causes over-eating     Lamictal [Lamotrigine] Dizziness     Oxycodone Nausea and Vomiting     Tegretol [Carbamazepine] Nausea and Vomiting

## 2022-11-14 NOTE — LETTER
11/14/2022         RE: Mercedes Barber  [address]  [place] [address]        Dear Colleague,    Thank you for referring your patient, Mercedes Barber, to the [facility]. Please see a copy of my visit note below.    [facility] Dermatology Note    Encounter Date: Nov 14, 2022    Dermatology Problem List:  -    ______________________________________    Impression/Plan:  Mercedes was seen today for skin check.    Diagnoses and all orders for this visit:    Actinic skin damage  - discussed sunprotective behaviors, clothing, and the use of sunscreen    Seborrheic keratoses  Lentigines  - benign    Follow-up in 1 year.       Staff Involved:  Staff Only    Juaquin Fournier MD   of Dermatology  Department of Dermatology  [facility]      CC:   Chief Complaint   Patient presents with     Skin Check       History of Present Illness:  Ms. Mercedes Barber is a 67 year old female who presents as a return patient.    Pt here for FBSE, examination of brown spots     Labs:      Physical exam:  Vitals: LMP 03/11/2010   GEN: well developed, well-nourished, in no acute distress, in a pleasant mood.     SKIN: Casey phototype 1  - Full skin, which includes the head/face, both arms, chest, back, abdomen,both legs, genitalia and/or groin buttocks, digits and/or nails, was examined.  - Stuck on brown papules on trunk and extremities   - Flat brown macules and patches in a sun exposed areas on face and extremities  - No other lesions of concern on areas examined.     Past Medical History:   Past Medical History:   Diagnosis Date     Arthritis     Thumb     Cervical high risk HPV (human papillomavirus) test positive 07/17/2017 07/17/17: NIL pap, + HR HPV (not 16 or 18) result.      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/21/2012     Neuropathy of lower extremity      Other sleep apnea      Rectal prolapse      Restless leg syndrome      Seizure disorder (H)     25 years ago     Sleep apnea     CPAP, no longer using CPAP     Temporal sclerosis 08/27/2012     Uncomplicated asthma      Past Surgical History:   Procedure Laterality Date     Anterior COLPORRHAPHY, BLADDER/VAGINA      perigee mesh     ARTHROPLASTY HIP Right 7/23/2019    Procedure: Right total hip arthroplasty;  Surgeon: Anant Mejia MD;  [facility]     ARTHROPLASTY PATELLO-FEMORAL (KNEE) Bilateral      ARTHROPLASTY REVISION HIP Right 8/7/2019    Procedure: Right hip revision total arthroplasty;  Surgeon: Tom Antonio MD;  [facility]: [facility]     ARTHROPLASTY REVISION HIP Right 8/5/2020    Procedure: Revision Right total hip arthroplasty;  Surgeon: Pan Grey MD;  [facility]     CARPAL TUNNEL RELEASE RT/LT       DENTAL SURGERY      implant     DESTRUCTION OF PARAVERTEBRAL FACET LUMBAR / SACRAL SINGLE Left 10/27/2021    Procedure: DESTRUCTION, NERVE, FACET JOINT, LUMBOSACRAL, Left L4-5 and L5-S1 facet joint radiofrequency ablation;  Surgeon: Celena Perez MD;  Location: [facility] OR     DILATION AND CURETTAGE       DRAIN PILONIDAL CYST SIMPL       ENDOSCOPY DRUG INDUCED SLEEP N/A 11/18/2015    Procedure: ENDOSCOPY DRUG INDUCED SLEEP;  Surgeon: Park Ortiz MD;  [facility]     ESOPHAGOSCOPY, GASTROSCOPY, DUODENOSCOPY (EGD), COMBINED  4/5/2013    Procedure: COMBINED ESOPHAGOSCOPY, GASTROSCOPY, DUODENOSCOPY (EGD), BIOPSY SINGLE OR MULTIPLE;;  Surgeon: Mundo Mehta MD;  Location:  GI     EXCISE LESION TRUNK  8/10/2012    Procedure: EXCISE LESION TRUNK;;  Surgeon: Jerald Diggs MD;  [facility]     HYSTERECTOMY        INJECT BLOCK MEDIAL BRANCH CERVICAL/THORACIC/LUMBAR Bilateral 9/20/2021    Procedure: BLOCK, NERVE, FACET JOINT, MEDIAL BRANCH, DIAGNOSTIC - Bilateral L4-5 and L5-S1 Medial branch blocks;  Surgeon: Celena Perez MD;  Location: [facility] OR     INJECT BLOCK MEDIAL BRANCH CERVICAL/THORACIC/LUMBAR Bilateral 9/27/2021    Procedure: BLOCK, NERVE, FACET JOINT, MEDIAL BRANCH, DIAGNOSTIC - Bilateral L4-5 and L5-S1 Medial branch blocks;  Surgeon: Celena Perez MD;  Location: [facility] OR     INJECT SACROILIAC JOINT Bilateral 5/24/2021    Procedure: INJECTION, SACROILIAC JOINT;  Surgeon: Celena Perez MD;  Location: [facility] OR     IRRIGATION AND DEBRIDEMENT HIP, COMBINED Right 8/26/2019    Procedure: 1.  Right hip wound irrigation and debridement with excisional debridement of nonviable skin, subcutaneous tissues, and fascia. 2. Application of incisional wound VAC, 27cm length incision.;  Surgeon: Tyson Prabhakar MD;  [facility]     L shoulder fracture       OPEN REDUCTION INTERNAL FIXATION WRIST Left 4/30/2021    Procedure: Open reduction with internal fixation of displaced left intraarticular distal radius fracture;  Surgeon: Luis Manuel Sánchez MD;  [facility]     rectal prolapse repair abdominally       RELEASE PLANTAR FASCIA Right 1/20/2015    Procedure: RELEASE PLANTAR FASCIA;  Surgeon: Maicol Liu DPM;  Location: [facility]     REMOVE MESH VAGINA  8/10/2012    Procedure: REMOVE MESH VAGINA;  Excision of Vaginal Mesh Exposure, Removal of Skin Tag Left Inner Leg;  Surgeon: Jerald Diggs MD;  [facility]     REPAIR HAMMER TOE Right 1/20/2015    Procedure: REPAIR HAMMER TOE;  Surgeon: Maicol Liu DPM;  Location: [facility]     TONSILLECTOMY & ADENOIDECTOMY       TUBAL LIGATION         Social History:   reports that she has never smoked. She has never used smokeless tobacco. She reports current alcohol use. She reports that she does not use drugs.    Family History:  Family History    Problem Relation Age of Onset     Eye Disorder Mother      Lipids Mother         has CHF     Osteoporosis Mother      Diabetes Father      Breast Cancer Sister      Depression Sister      Lipids Sister      Thyroid Disease Sister      Hypertension Brother      Alcohol/Drug Brother      Depression Brother      Gastrointestinal Disease Brother         hx of colonic polyps     Lipids Brother      Psychotic Disorder Brother      Alzheimer Disease Paternal Grandmother      Congenital Anomalies Daughter      Neurologic Disorder Daughter      Suicide Son        Medications:  Current Outpatient Medications   Medication Sig Dispense Refill     acetaminophen (TYLENOL) 325 MG tablet Take 2 tablets (650 mg) by mouth every 4 hours as needed for other 1 Bottle 0     albuterol (ALBUTEROL) 108 (90 BASE) MCG/ACT Inhaler Inhale 2 puffs into the lungs 4 times daily as needed for shortness of breath / dyspnea or wheezing 1 Inhaler 0     alendronate (FOSAMAX) 70 MG tablet TAKE 1 TABLET(70 MG) BY MOUTH EVERY 7 DAYS 12 tablet 2     amLODIPine (NORVASC) 5 MG tablet Take 1 tablet (5 mg) by mouth daily 90 tablet 2     amoxicillin (AMOXIL) 500 MG tablet Take 4 tablets (2000 mg) by mouth 1 hour before dental procedures/cleanings. 4 tablet 4     aspirin 81 MG EC tablet Take 81 mg by mouth daily       atenolol (TENORMIN) 25 MG tablet Take 1 tablet (25 mg) by mouth daily 90 tablet 0     benzonatate (TESSALON) 200 MG capsule Take 200 mg by mouth 3 times daily as needed for cough       budesonide-formoterol (SYMBICORT) 160-4.5 MCG/ACT Inhaler Inhale 2 puffs into the lungs 2 times daily       busPIRone (BUSPAR) 5 MG tablet Take 5 mg by mouth 2 times daily       carboxymethylcellulose PF (REFRESH PLUS) 0.5 % ophthalmic solution Place 1 drop into both eyes 2 times daily       cyclobenzaprine (FLEXERIL) 5 MG tablet Take 1 tablet (5 mg) by mouth 2 times daily as needed for muscle spasms 60 tablet 3     cycloSPORINE (RESTASIS) 0.05 % ophthalmic  emulsion Place 1 drop into both eyes 2 times daily        Dentifrices (BIOTENE DRY MOUTH DT) Apply 2 sprays to affected area 3 times daily as needed        Estradiol-Estriol-Progesterone (BIEST/PROGESTERONE TD) Place 0.5 g onto the skin nightly as needed       fluticasone-vilanterol (BREO ELLIPTA) 200-25 MCG/INH inhaler Inhale 1 puff into the lungs daily       furosemide (LASIX) 20 MG tablet TAKE 1 TABLET DAILY 90 tablet 1     hypromellose (ARTIFICIAL TEARS) 0.5 % SOLN ophthalmic solution Place 1 drop into both eyes 2 times daily       ibuprofen (ADVIL/MOTRIN) 600 MG tablet Take 600 mg by mouth every 6 hours as needed for moderate pain       lacosamide (VIMPAT) 200 MG TABS tablet Take 1 tablet (200 mg) by mouth 2 times daily 30 tablet 0     levothyroxine (SYNTHROID/LEVOTHROID) 88 MCG tablet Take 1 tablet (88 mcg) by mouth daily 90 tablet 3     linaclotide (LINZESS) 290 MCG capsule Take 1 capsule (290 mcg) by mouth every morning (before breakfast) 10 capsule 0     loratadine (CLARITIN REDITABS) 10 MG ODT Take 1 tablet (10 mg) by mouth daily as needed for allergies       LORazepam (ATIVAN) 0.5 MG tablet Take 0.5 mg by mouth daily as needed for anxiety       omeprazole (PRILOSEC) 40 MG DR capsule Take 1 capsule (40 mg) by mouth 2 times daily 180 capsule 3     ondansetron (ZOFRAN-ODT) 4 MG ODT tab Take 1 tablet (4 mg) by mouth every 6 hours as needed for nausea or vomiting 20 tablet 0     potassium citrate (UROCIT-K) 10 MEQ (1080 MG) CR tablet Take 1 tablet (10 mEq) by mouth daily 90 tablet 1     pramipexole (MIRAPEX) 1 MG tablet Give 1 tablet by mouth in the morning and 3 tablets by mouth 3 hours prior to bedtime       QUEtiapine (SEROQUEL) 50 MG tablet Take 100 mg by mouth nightly as needed        topiramate (TOPAMAX) 25 MG tablet Take 25 mg in the morning and 50 mg at bedtime.       traZODone (DESYREL) 150 MG tablet 150 mg At Bedtime        triamcinolone (KENALOG) 0.1 % external cream APPLY TOPICALLY 3 TIMES     DAILY. 30 g 0     trolamine salicylate (ASPERCREME) 10 % external cream Apply topically 2 times daily as needed for moderate pain To right hip incision area       Allergies   Allergen Reactions     Blood Transfusion Related (Informational Only) Other (See Comments)     Patient has a history of a clinically significant antibody against RBC antigens.  A delay in compatible RBCs may occur.     Budesonide      Edema     Bupropion Rash     Carbamazepine Diarrhea     Clonazepam Other (See Comments)     Hypotension, trouble walking, mind disturbance     Encort [Hydrocortisone Acetate] Swelling     Localized to feet     Encort [Hydrocortisone] Swelling     Erythromycin Diarrhea     Erythromycin Nausea and Vomiting and Diarrhea     Flagyl [Metronidazole] Nausea     Gabapentin Other (See Comments)     Causes over-eating     Lamictal [Lamotrigine] Dizziness     Oxycodone Nausea and Vomiting     Tegretol [Carbamazepine] Nausea and Vomiting                   Again, thank you for allowing me to participate in the care of your patient.        Sincerely,        Juaquin Fournier MD

## 2022-11-14 NOTE — PROGRESS NOTES
Clinic Care Coordination Contact  [facility]/Voicemail    Pt is due for annual assessment.      Clinical Data: Care Coordinator Outreach  Outreach attempted x 1.  Left message on patient's voicemail with call back information and requested return call.  Plan: Care Coordinator will try to reach patient again in 10 business days.    GOSIA Leija  [facility] Care Coordination   [facility]  Social Work Care Coordinator  [phone]

## 2022-11-15 ENCOUNTER — TELEPHONE (OUTPATIENT)
Dept: ENDOCRINOLOGY | Facility: CLINIC | Age: 67
End: 2022-11-15

## 2022-11-15 NOTE — TELEPHONE ENCOUNTER
Left Voicemail (1st Attempt) for the patient to call back and schedule the following:    Appointment type: return   Provider: dr. newton  Return date: 11/11/2023  Specialty phone number: [phone]   Additonal Notes: Return in about 1 year (around 11/11/2023) for Follow up    Chica abdul Procedure   Orthopedics, Podiatry, Sports Medicine, Ent ,Eye , Audiology, Adult Endocrine & Diabetes, Nutrition & Medication Therapy Management Specialties   [facility]

## 2022-11-17 ENCOUNTER — IMMUNIZATION (OUTPATIENT)
Dept: INTERNAL MEDICINE | Facility: CLINIC | Age: 67
End: 2022-11-17
Payer: MEDICARE

## 2022-11-17 DIAGNOSIS — Z23 NEED FOR PROPHYLACTIC VACCINATION AND INOCULATION AGAINST INFLUENZA: Primary | ICD-10-CM

## 2022-11-17 PROCEDURE — 99207 PR NO CHARGE NURSE ONLY: CPT

## 2022-11-17 PROCEDURE — G0008 ADMIN INFLUENZA VIRUS VAC: HCPCS

## 2022-11-17 PROCEDURE — 90662 IIV NO PRSV INCREASED AG IM: CPT

## 2022-11-27 ENCOUNTER — MYC MEDICAL ADVICE (OUTPATIENT)
Dept: INTERNAL MEDICINE | Facility: CLINIC | Age: 67
End: 2022-11-27

## 2022-11-28 ENCOUNTER — PATIENT OUTREACH (OUTPATIENT)
Dept: CARE COORDINATION | Facility: CLINIC | Age: 67
End: 2022-11-28

## 2022-11-28 NOTE — LETTER
[facility] COORDINATION  [address]  [place] [address]    November 28, 2022    Mercedes Barber  [address]   [place] [address]      Dear Mercedes,    I have been attempting to reach you since our last contact. I would like to continue to work with you and provide any additional support you may need on achieving your health care related goals. I would appreciate if you would give me a call at [phone] to let me know if you would like to continue working together. I know that there are many things that can affect our ability to communicate and I hope we can continue to work together.    All of us at the [facility] are invested in your health and are here to assist you in meeting your goals.     Sincerely,    GOSIA Morales

## 2022-11-28 NOTE — PROGRESS NOTES
Clinic Care Coordination Contact  [facility]/Voicemail       Clinical Data: Care Coordinator Outreach  Outreach attempted x 2.  Left message on patient's voicemail with call back information and requested return call.  Plan: Care Coordinator will send unable to contact letter with care coordinator contact information via [employer]. Care Coordinator will try to reach patient again in 10 business days.    GOSIA Leija  [facility] Care Coordination   [facility]  Social Work Care Coordinator  [phone]

## 2022-11-30 NOTE — TELEPHONE ENCOUNTER
"S-(situation): Called and spoke to the patient in relation to the [email or web address] message sent below.     B-(background): Patient states she has been experiencing muscle soreness in her R leg (moves from the front to the side to her hip) for the past 3 months. Patient states she had an X-ray completed and there was no sign of a fracture.     A-(assessment): Upon conversing with the patient, the patient states her leg is \"not too bad currently\" but it does come and go. Patient states the soreness gets worse when she is sitting for a long period of time and tends to get better when she starts walking around. Patient does states the soreness affects her ability to walk but she has been able to complete all of her ADLs. Patient states she has numbness and tingling in her feet at baseline with neuropathy. No discoloration to the leg. No fever present. Patient states she cannot recall injuring the leg or falling. Patient states she has been using a rolling pin and numbing foam which has provided some relief.     R-(recommendations): Patient is wondering about next steps?     Will route to PCP for review and recommendations.     Can we leave a detailed message on this number? YES  Phone number patient can be reached at: Cell number on file:    Telephone Information:   Mobile [phone]       Cindy Prather RN  [facility] Triage        "

## 2022-12-02 ENCOUNTER — MYC MEDICAL ADVICE (OUTPATIENT)
Dept: INTERNAL MEDICINE | Facility: CLINIC | Age: 67
End: 2022-12-02

## 2022-12-05 NOTE — TELEPHONE ENCOUNTER
Patient was called and appt scheduled.     Appointments in Next Year    Dec 07, 2022  5:30 PM  (Arrive by 5:10 PM)  Provider Visit with Skyler Álvarez MD  [facility] ([facility] ) [phone]

## 2022-12-07 ENCOUNTER — VIRTUAL VISIT (OUTPATIENT)
Dept: INTERNAL MEDICINE | Facility: CLINIC | Age: 67
End: 2022-12-07
Payer: MEDICARE

## 2022-12-07 DIAGNOSIS — M79.604 RIGHT LEG PAIN: Primary | ICD-10-CM

## 2022-12-07 DIAGNOSIS — I10 BENIGN ESSENTIAL HYPERTENSION: ICD-10-CM

## 2022-12-07 PROCEDURE — 99442 PR PHYSICIAN TELEPHONE EVALUATION 11-20 MIN: CPT | Mod: 95 | Performed by: INTERNAL MEDICINE

## 2022-12-07 RX ORDER — POTASSIUM CITRATE 10 MEQ/1
10 TABLET, EXTENDED RELEASE ORAL DAILY
Qty: 90 TABLET | Refills: 1 | Status: SHIPPED | OUTPATIENT
Start: 2022-12-07 | End: 2024-01-16

## 2022-12-07 NOTE — PROGRESS NOTES
"Mercedes is a 67 year old who is being evaluated via a billable telephone visit.      What phone number would you like to be contacted at? [phone]  How would you like to obtain your AVS? MyChart    Assessment & Plan     Right leg pain  Discussed options with patient.  Symptoms appear to be more muscular in origin.  Suggest starting with physical therapy.  Imaging reviewed.  No distinct radicular complaints  - Physical Therapy Referral; Future    Benign essential hypertension  Refilled per patient request.  Continue as long as taking diuretic  - potassium citrate (UROCIT-K) 10 MEQ (1080 MG) CR tablet; Take 1 tablet (10 mEq) by mouth daily     BMI:   Estimated body mass index is 29.79 kg/m  as calculated from the following:    Height as of 9/7/22: 1.651 m (5' 5\").    Weight as of 9/7/22: 81.2 kg (179 lb).   Weight management plan: Discussed healthy diet and exercise guidelines    See Patient Instructions    Return in about 3 months (around 3/7/2023) for if symptoms recur or worsen, follow-up physical therapy.    Skyler Álvarez MD  [facility]   Mercedes is a 67 year old accompanied by her self, presenting for the following health issues:    Leg Problem      HPI :    Patient states she has been experiencing muscle soreness in her R leg (moves from the front to the side to her hip) for the past 3 months. Patient states she had an X-ray completed and there was no sign of a fracture.     Patient states the soreness gets worse when she is sitting for a long period of time and tends to get better when she starts walking around. Patient does states the soreness affects her ability to walk but she has been able to complete all of her ADLs. Patient states she has numbness and tingling in her feet at baseline with neuropathy. No discoloration to the leg. No fever present. Patient states she cannot recall injuring the leg or falling. Patient states she has been using a rolling pin " and numbing foam which has provided some relief.     INDICATION: Right femur pain.   COMPARISON: None.                                                                      IMPRESSION:  1.  No femur fracture.  2.  Right total hip arthroplasty with proximal femur cerclage wire  fixation. Hardware is intact.  3.  Right total knee arthroplasty. The visualized hardware is intact.  4.  No joint malalignment.    Current Outpatient Medications   Medication     potassium citrate (UROCIT-K) 10 MEQ (1080 MG) CR tablet     acetaminophen (TYLENOL) 325 MG tablet     albuterol (ALBUTEROL) 108 (90 BASE) MCG/ACT Inhaler     alendronate (FOSAMAX) 70 MG tablet     amLODIPine (NORVASC) 5 MG tablet     amoxicillin (AMOXIL) 500 MG tablet     aspirin 81 MG EC tablet     atenolol (TENORMIN) 25 MG tablet     benzonatate (TESSALON) 200 MG capsule     budesonide-formoterol (SYMBICORT) 160-4.5 MCG/ACT Inhaler     busPIRone (BUSPAR) 5 MG tablet     carboxymethylcellulose PF (REFRESH PLUS) 0.5 % ophthalmic solution     cyclobenzaprine (FLEXERIL) 5 MG tablet     cycloSPORINE (RESTASIS) 0.05 % ophthalmic emulsion     Dentifrices (BIOTENE DRY MOUTH DT)     Estradiol-Estriol-Progesterone (BIEST/PROGESTERONE TD)     fluticasone-vilanterol (BREO ELLIPTA) 200-25 MCG/INH inhaler     furosemide (LASIX) 20 MG tablet     hypromellose (ARTIFICIAL TEARS) 0.5 % SOLN ophthalmic solution     ibuprofen (ADVIL/MOTRIN) 600 MG tablet     lacosamide (VIMPAT) 200 MG TABS tablet     levothyroxine (SYNTHROID/LEVOTHROID) 88 MCG tablet     linaclotide (LINZESS) 290 MCG capsule     loratadine (CLARITIN REDITABS) 10 MG ODT     LORazepam (ATIVAN) 0.5 MG tablet     omeprazole (PRILOSEC) 40 MG DR capsule     ondansetron (ZOFRAN-ODT) 4 MG ODT tab     pramipexole (MIRAPEX) 1 MG tablet     QUEtiapine (SEROQUEL) 50 MG tablet     topiramate (TOPAMAX) 25 MG tablet     traZODone (DESYREL) 150 MG tablet     triamcinolone (KENALOG) 0.1 % external cream     trolamine salicylate  (ASPERCREME) 10 % external cream     No current facility-administered medications for this visit.     Facility-Administered Medications Ordered in Other Visits   Medication     gadobutrol (GADAVIST) injection 10 mL         Review of Systems:    CONSTITUTIONAL: NEGATIVE for fever, chills, change in weight  EYES: NEGATIVE for vision changes or irritation  ENT/MOUTH: NEGATIVE for ear, mouth and throat problems  RESP: NEGATIVE for significant cough or SOB  CV: NEGATIVE for chest pain, palpitations or peripheral edema  GI: NEGATIVE for nausea, abdominal pain, heartburn, or change in bowel habits  : NEGATIVE for frequency, dysuria, or hematuria  NEURO: NEGATIVE for weakness, dizziness or paresthesias  HEME: NEGATIVE for bleeding problems  PSYCHIATRIC: NEGATIVE for changes in mood or affect      Objective       Vitals:  No vitals were obtained today due to virtual visit.    Physical Exam   alert and no distress  PSYCH: Alert and oriented times 3; coherent speech, normal   rate and volume, able to articulate logical thoughts, able   to abstract reason, no tangential thoughts, no hallucinations   or delusions  Her affect is normal  RESP: No cough, no audible wheezing, able to talk in full sentences  Remainder of exam unable to be completed due to telephone visits          Phone call duration: 14 minutes

## 2022-12-12 ENCOUNTER — PATIENT OUTREACH (OUTPATIENT)
Dept: CARE COORDINATION | Facility: CLINIC | Age: 67
End: 2022-12-12

## 2022-12-12 NOTE — PROGRESS NOTES
Clinic Care Coordination Contact  [facility] [[email or web address]]/[email or web address]       Clinical Data: Care Coordinator Outreach  Outreach attempted x 3.  Left message on patient's voicemail with call back information and requested return call.  Plan: Care Coordinator will send disenrollment letter with care coordinator contact information via [employer]. Care Coordinator will do no further outreaches at this time.    GOSIA Leija  [facility] Care Coordination   [facility]  Social Work Care Coordinator  [phone]

## 2022-12-12 NOTE — LETTER
[facility] CARE COORDINATION  [address] [facility] [address]    December 12, 2022    Mercedes Barber  [address] [facility] [address] [place] [id number]      Dear Mercedes,    I have been unsuccessful in reaching you since our last contact. At this time the Care Coordination team will make no further attempts to reach you, however this does not change your ability to continue receiving care from your providers at your primary care clinic. If you need additional support from a care coordinator in the future please contact Gabriella at [phone].    All of us at [facility] are invested in your health and are here to assist you in meeting your goals.     Sincerely,    GOSIA Leija  [facility] Care Coordination   [facility]  Social Work Care Coordinator  [phone]

## 2023-01-13 ENCOUNTER — IMMUNIZATION (OUTPATIENT)
Dept: NURSING | Facility: CLINIC | Age: 68
End: 2023-01-13
Payer: MEDICARE

## 2023-01-13 PROCEDURE — 0124A COVID-19 VACCINE BIVALENT BOOSTER 12+ (PFIZER): CPT

## 2023-01-13 PROCEDURE — 91312 COVID-19 VACCINE BIVALENT BOOSTER 12+ (PFIZER): CPT

## 2023-01-16 ENCOUNTER — LAB (OUTPATIENT)
Dept: URGENT CARE | Facility: URGENT CARE | Age: 68
End: 2023-01-16
Payer: MEDICARE

## 2023-01-16 ENCOUNTER — NURSE TRIAGE (OUTPATIENT)
Dept: NURSING | Facility: CLINIC | Age: 68
End: 2023-01-16

## 2023-01-16 DIAGNOSIS — Z20.822 SUSPECTED 2019 NOVEL CORONAVIRUS INFECTION: ICD-10-CM

## 2023-01-16 LAB — SARS-COV-2 RNA RESP QL NAA+PROBE: POSITIVE

## 2023-01-16 PROCEDURE — U0003 INFECTIOUS AGENT DETECTION BY NUCLEIC ACID (DNA OR RNA); SEVERE ACUTE RESPIRATORY SYNDROME CORONAVIRUS 2 (SARS-COV-2) (CORONAVIRUS DISEASE [COVID-19]), AMPLIFIED PROBE TECHNIQUE, MAKING USE OF HIGH THROUGHPUT TECHNOLOGIES AS DESCRIBED BY CMS-2020-01-R: HCPCS

## 2023-01-16 PROCEDURE — U0005 INFEC AGEN DETEC AMPLI PROBE: HCPCS

## 2023-01-16 NOTE — TELEPHONE ENCOUNTER
"Nurse Triage    Is this a 2nd Level Triage? NO    Situation: Patient calling with questions regarding possible side effects related to the Covid booster she received on 1/13/2023.  Consent: not needed    Background: Patient received Covid booster on 1/13/2023 and now she has a \"few chills\" and temps that range between 99.5 - 100 orally. Patient also has a chronic cough which is unchanged and diarrhea, which is not new. Pt reports having IBS. Patient also has a history of asthma for which she uses an inhaler.     Assessment: Patient denies SOB or chest pain. Patient does report having some soreness at the injection site, but she does not believe the area has any red streaks or redness at the site, but she still has a Band-Aid on the site. Pt has a low-grade fever and some chills.     Protocol Recommended Disposition:   Home Care    Recommendation: Advised patient to do home care. Home care reviewed.  Care advice given. Reviewed concerning symptoms and when to call back.     Meghan Strickland RN [facility] 1/16/2023 12:18 AM    Reason for Disposition    COVID-19 vaccine, systemic reactions (e.g., fatigue, fever, muscle aches), questions about    Additional Information    Negative: [1] Difficulty breathing or swallowing AND [2] starts within 2 hours after injection    Negative: Sounds like a life-threatening emergency to the triager    Negative: [1] Symptoms of COVID-19 (e.g., cough, fever, SOB, or others) AND [2] within 14 days of EXPOSURE (close contact) with diagnosed or suspected COVID-19 patient    Negative: [1] Symptoms of COVID-19 (e.g., cough, fever, SOB, or others) AND [2] within 14 days of being at a crowded indoor or outdoor event (e.g., concert, festival, rally, wedding)    Negative: Typical COVID-19 symptoms (e.g., cough, difficulty breathing, loss of taste or smell, runny nose, sore throat) that are NOT expected from vaccine    Negative: [1] COVID-19 exposure AND [2] no symptoms, or symptoms not " typical of COVID-19    Negative: Fever > 104 F (40 C)    Negative: Sounds like a severe, unusual reaction to the triager    Negative: [1] Redness or red streak around the injection site AND [2] started > 48 hours after getting vaccine AND [3] fever    Negative: [1] Fever > 101 F (38.3 C) AND [2] age > 60 years AND [3] started > 48 hours after getting vaccine    Negative: [1] Fever > 100.0 F (37.8 C) AND [2] bedridden (e.g., nursing home patient, CVA, chronic illness, recovering from surgery) AND [3] started > 48 hours after getting vaccine    Negative: [1] Fever > 100.0 F (37.8 C) AND [2] diabetes mellitus or weak immune system (e.g., HIV positive, cancer chemo, splenectomy, organ transplant, chronic steroids) AND [3] started > 48 hours after getting vaccine    Negative: [1] Fever > 100.0 F (37.8 C) AND [2] present > 3 days (72 hours)    Negative: [1] Fever > 100.0 F (37.8 C) AND [2] healthcare worker    Negative: [1] Redness around the injection site AND [2] started > 48 hours after getting vaccine AND [3] no fever  (Exception: red area < 1 inch or 2.5 cm wide)    Negative: [1] Pain, tenderness, or swelling at the injection site AND [2] over 3 days (72 hours) since vaccine AND [3] getting worse    Negative: [1] Pain, tenderness, or swelling at the injection site AND [2] lasts > 7 days    Negative: [1] Lymph node swelling (i.e., armpit or neck on side of vaccine) AND [2] lasts > 3 weeks    Negative: [1] Requesting COVID-19 vaccine AND [2] healthcare worker (e.g., EMS first responders, doctors, nurses)    Negative: [1] Requesting COVID-19 vaccine AND [2] resident of a long-term care facility (e.g., nursing home)    Negative: Requesting COVID-19 vaccine    Negative: COVID-19 vaccine, injection site reaction (e.g., pain, redness, swelling), question about    Protocols used: CORONAVIRUS (COVID-19) VACCINE QUESTIONS AND [id number]

## 2023-01-17 ENCOUNTER — TELEPHONE (OUTPATIENT)
Dept: NURSING | Facility: CLINIC | Age: 68
End: 2023-01-17

## 2023-01-17 ENCOUNTER — NURSE TRIAGE (OUTPATIENT)
Dept: INTERNAL MEDICINE | Facility: CLINIC | Age: 68
End: 2023-01-17

## 2023-01-17 ENCOUNTER — TELEPHONE (OUTPATIENT)
Dept: INTERNAL MEDICINE | Facility: CLINIC | Age: 68
End: 2023-01-17

## 2023-01-17 ENCOUNTER — MYC MEDICAL ADVICE (OUTPATIENT)
Dept: INTERNAL MEDICINE | Facility: CLINIC | Age: 68
End: 2023-01-17

## 2023-01-17 ENCOUNTER — VIRTUAL VISIT (OUTPATIENT)
Dept: INTERNAL MEDICINE | Facility: CLINIC | Age: 68
End: 2023-01-17
Payer: MEDICARE

## 2023-01-17 DIAGNOSIS — U07.1 INFECTION DUE TO 2019 NOVEL CORONAVIRUS: Primary | ICD-10-CM

## 2023-01-17 PROCEDURE — 99442 PR PHYSICIAN TELEPHONE EVALUATION 11-20 MIN: CPT | Mod: 95 | Performed by: INTERNAL MEDICINE

## 2023-01-17 NOTE — PROGRESS NOTES
Mercedes is a 67 year old who is being evaluated via a billable telephone visit.      What phone number would you like to be contacted at? [phone]  How would you like to obtain your AVS? MyChart  Distant Location (provider location):  On-site    Assessment & Plan     Infection due to 2019 novel coronavirus  Discussed options with patient as well as quarantine issues.  She is interested in treatment.  We discussed the dosing and side effect profile.  Her medication list was reviewed.  - nirmatrelvir and ritonavir (PAXLOVID) therapy pack; Take 3 tablets by mouth 2 times daily for 5 days (Take 2 Nirmatrelvir tablets and 1 Ritonavir tablet twice daily for 5 days)    Discussed with patient Paxlovid that options, discussed dosing and risk factors.  Suggest reducing amlodipine and trazodone by 50% while on therapy and for 4 days beyond treatment.  See Patient Instructions    Return in about 2 weeks (around 1/31/2023) for if symptoms recur or worsen.    Skyler Álvarez MD  [facility]    Subjective :    Mercedes is a 67 year old accompanied by her self, presenting for the following health issues:    Covid Concern    HPI     COVID-19 Symptom Review  How many days ago did these symptoms start? 3 days     Are any of the following symptoms significant for you?    New or worsening difficulty breathing? No    Worsening cough? Yes, it's a dry cough.     Fever or chills? Yes, I felt feverish or had chills.    Headache: YES    Sore throat: No    Chest pain: No    Diarrhea: No    Body aches? YES    What treatments has patient tried? advil   Does patient live in a nursing home, group home, or shelter? No  Does patient have a way to get food/medications during quarantined? Yes, I have a friend or family member who can help me.      Review of Systems   ENT/MOUTH: NEGATIVE for ear, mouth and throat problems  CV: NEGATIVE for chest pain, palpitations or peripheral edema  GI: NEGATIVE for nausea, abdominal  pain, heartburn, or change in bowel habits  : NEGATIVE for frequency, dysuria, or hematuria  NEURO: NEGATIVE for weakness, dizziness or paresthesias  ENDOCRINE: NEGATIVE for temperature intolerance, skin/hair changes  HEME: NEGATIVE for bleeding problems  PSYCHIATRIC: NEGATIVE for changes in mood or affect      Objective         Vitals:  No vitals were obtained today due to virtual visit.    Physical Exam   healthy, alert and no distress  PSYCH: Alert and oriented times 3; coherent speech, normal   rate and volume, able to articulate logical thoughts, able   to abstract reason, no tangential thoughts, no hallucinations   or delusions  Her affect is normal  RESP: No cough, no audible wheezing, able to talk in full sentences  Remainder of exam unable to be completed due to telephone visits    Creatinine   Date Value Ref Range Status   04/07/2022 0.74 0.52 - 1.04 mg/dL Final   04/12/2021 0.96 0.52 - 1.04 mg/dL Final           Telephone call duration: 15 minutes

## 2023-01-17 NOTE — TELEPHONE ENCOUNTER
Hello, checking rx for paxlovid and identified a drug interaction with Paxlovid and Buspar.   Summary CY Inhibitors (Strong) may increase the serum concentration of BusPIRone.  Patient Management Limit the buspirone dose to 2.5 mg daily and monitor patients for increased buspirone effects/toxicities if combined with strong CY inhibitors. Dose adjustments of buspirone or a strong CY inhibitor should be based on clinical assessment.  Would you like to adjust this medication as well?     Thanks, .  Emily Toth, PharmD, [facility]  Pharmacy Specialist  [facility]  [phone]

## 2023-01-17 NOTE — TELEPHONE ENCOUNTER
Coronavirus (COVID-19) Notification    Caller Name (Patient, parent, daughter/son, grandparent, etc)  Patient    Reason for call  Notify of Positive Coronavirus (COVID-19) lab results, assess symptoms,  review [facility] recommendations    Lab Result    Lab test:  2019-nCoV rRt-PCR or SARS-CoV-2 PCR    Oropharyngeal AND/OR nasopharyngeal swabs is POSITIVE for 2019-nCoV RNA/SARS-COV-2 PCR (COVID-19 virus)      Gather patient reported symptoms   Assessment   Current Symptoms at time of phone call, reported by patient: (if no symptoms, document: No symptoms] Body ache, headache, cough, fever, diarrhea   Date of symptom(s) onset (if applicable) 1/15     If at time of call, Patients symptoms have worsened, the Patient should contact 911 or have someone drive them to Emergency Dept promptly:      If Patient calling 911, inform 911 personal that you have tested positive for the Coronavirus (COVID-19).  Place mask on and await 911 to arrive.    If Emergency Dept, If possible, please have another adult drive you to the Emergency Dept but you need to wear mask when in contact with other people.      Treatment Options:   Patient classified as COVID treatment eligible by Epic high risk algorithm: Yes  Is the patient symptomatic at the time of result notification? Yes. Was the onset of symptoms within the last 5 days? Yes.   There are now oral medications available for the treatment of COVID-19.  Taking one of these medications within the first five days of symptoms (when people may not yet feel severely ill) has been shown to make people feel better, prevent them from getting sicker, and preventing hospitalization and death.   Does the patient agree to have a visit with a provider to discuss treatment options? Yes. Is the patient seen at [facility]?  No: Warm transfer caller to [phone] to be scheduled with a virtual urgent provider.  During transfer, instruct  on appropriate time frame for visit      Review information with Patient    Your result was positive. This means you have COVID-19 (coronavirus).    How can I protect others?    These guidelines are for isolating before returning to work, school or .    If you DO have symptoms    Stay home and away from others     For at least 5 days after your symptoms started, AND    You are fever free for 24 hours (with no medicine that reduces fever), AND    Your other symptoms are better    Wear a mask for 10 full days anytime you are around others    If you DON'T have symptoms    Stay home and away from others for at least 5 days after your positive test    Wear a mask for 10 full days anytime you are around others    There may be different guidelines for healthcare facilities.  Please check with the specific sites before arriving.    If you have been told by a doctor that you were severely ill with COVID-19 or are immunocompromised, you should isolate for at least 10 days.    You should not go back to work until you meet the guidelines above for ending your home isolation. You don't need to be retested for COVID-19 before going back to work--studies show that you won't spread the virus if it's been at least 10 days since your symptoms started (or 20 days, if you have a weak immune system).    Employers, schools, and daycares: This is an official notice for this person's medical guidelines for returning in-person.  They must meet the above guidelines before going back to work, school or  in person.    You will receive a positive COVID-19 letter via [employer] or the mail soon with additional self-care information.    Would you like me to review some of that information with you now?  No    If you were tested for an upcoming procedure, please contact your provider for next steps.    Lillian Durán

## 2023-01-17 NOTE — TELEPHONE ENCOUNTER
Nurse Triage SBAR    Is this a 2nd Level Triage? NO    Situation: Patient calling clinic seeking Covid treatment after recent positive test 1/16/2023. Patient is experiencing Covid symptoms as well.      Background: Patient had Covid booster on 1/13/2023 and started experiencing symptoms that evening. Patient has had worsening symptoms since Covid Booster and tested positive 1/16/2023.      Assessment: Patient stated she has been experiencing scratchy throat, body aches, fever of 100.3, cough, and fatigue. Patient does not endorse chest pain, difficulty breathing, or SOB.       Protocol Recommended Disposition:   See More Appropriate Protocol    Recommendation: RN advised patient to continue treating symptoms at home as well as running the RN Covid tx protocol with her. Patient agreed with plan of care, see protocol results below.        Does the patient meet one of the following criteria for ADS visit consideration? 16+ years old, with an [facility] PCP     TIP  Providers, please consider if this condition is appropriate for management at one of our Acute and Diagnostic Services sites.     If patient is a good candidate, please use dotphrase <dot>triageresponse and select Refer to ADS to document.      Reason for Disposition    COVID-19 vaccine reaction suspected (e.g., fever, headache, muscle aches) occurring 1 to 3 days after getting vaccine    Additional Information    Negative: SEVERE difficulty breathing (e.g., struggling for each breath, speaks in single words)    Negative: Difficult to awaken or acting confused (e.g., disoriented, slurred speech)    Negative: Bluish (or gray) lips or face now    Negative: Shock suspected (e.g., cold/pale/clammy skin, too weak to stand, low BP, rapid pulse)    Negative: Sounds like a life-threatening emergency to the triager    Negative: [1] Diagnosed or suspected COVID-19 AND [2] symptoms lasting 3 or more weeks    Negative: [1] COVID-19 exposure AND [2] no symptoms    Negative:  Difficulty breathing or swallowing and starts within 2 hours after injection    Negative: Sounds like a life-threatening emergency to the triager    Negative: [1] Symptoms of COVID-19 (e.g., cough, fever, SOB, or others) AND [2] within 14 days of EXPOSURE (close contact) with diagnosed or suspected COVID-19 patient    Negative: [1] Symptoms of COVID-19 (e.g., cough, fever, SOB, or others) AND [2] within 14 days of being at a crowded indoor or outdoor event (e.g., concert, festival, rally, wedding)    Negative: Typical COVID-19 symptoms (e.g., cough, difficulty breathing, loss of taste or smell, runny nose, sore throat) that are NOT expected from vaccine    Negative: [1] COVID-19 exposure AND [2] no symptoms, or symptoms not typical of COVID-19    Negative: Fever > 104 F (40 C)    Negative: Sounds like a severe, unusual reaction to the triager    Negative: [1] Fever > 101 F (38.3 C) AND [2] over 60 years of age AND [3] started > 48 hours after getting vaccine    Negative: [1] Fever > 100.0 F (37.8 C) AND [2] bedridden (e.g., nursing home patient, CVA, chronic illness, recovering from surgery) AND [3] started > 48 hours after getting vaccine    Negative: [1] Fever > 100.0 F (37.8 C) AND [2] diabetes mellitus or weak immune system (e.g., HIV positive, cancer chemo, splenectomy, organ transplant, chronic steroids) AND [3] started > 48 hours after getting vaccine    Negative: [1] Fever > 100.0 F (37.8 C) AND [2] present > 3 days (72 hours)    Negative: [1] Fever > 100.0 F (37.8 C) AND [2] healthcare worker    Negative: [1] Pain, tenderness, or swelling at the injection site AND [2] over 3 days (72 hours) since vaccine AND [3] getting worse    Negative: [1] Redness around the injection site AND [2] started > 48 hours after getting vaccine (Exception: red area < 1 inch or 2.5 cm wide)    Negative: [1] Pain, tenderness, or swelling at the injection site AND [2] lasts > 7 days    Negative: [1] Lymph node swelling (i.e.,  armpit or neck on side of vaccine) AND [2] lasts > 3 weeks    Negative: Requesting COVID-19 vaccine    Negative: COVID-19 vaccine, injection site reaction (e.g., pain, redness, swelling), question about    Negative: COVID-19 vaccine, systemic reactions (e.g., fatigue, fever, muscle aches), questions about    Negative: COVID-19 vaccine, Frequently Asked Questions (FAQs)    Negative: Up-to-date on COVID-19 vaccination and exposure to COVID-19, Frequently Asked Questions (FAQs)    Negative: COVID-19 Prevention and Healthy Living, questions about    Protocols used: CORONAVIRUS (COVID-19) DIAGNOSED OR [id number] 1.18.2022, CORONAVIRUS (COVID-19) VACCINE QUESTIONS AND [id number] 1.18.2022      RN COVID TREATMENT VISIT  01/17/23    Mercedes Barber  67 year old  Current weight? 185 lbs    Has the patient been seen by a primary care provider at an [facility] or [facility] within the past two years? Yes.   Have you been in close proximity to/do you have a known exposure to a person with a confirmed case of influenza? No.     Date of positive COVID test (PCR or at home)?  1/16/2023    Current COVID symptoms: fever or chills, cough, fatigue, muscle or body aches, headache, congestion or runny nose and diarrhea    Date COVID symptoms began: 1/15/2023    Do you have any of the following conditions that place you at risk of being very sick from COVID-19? 65 years or older, dementia or other neurologic conditions, disabilities, mental health conditions and solid organ or blood cell transplant    Is patient eligible to continue? Yes, established patient, 12 years or older weighing at least 88.2 lbs, who has COVID symptoms that started in the past 5 days and is at risk for being very sick from COVID-19.       Have you received monoclonal antibodies or oral antiviral medications since testing positive to COVID-19? No    Are you currently hospitalized for COVID-19? No    Do you have a history of  hepatitis? No    Are you currently pregnant or nursing? No    Do you have a clinically significant hypersensitivity to nirmatrelvir, ritonavir, or molnupiravir? No    Do you have any history of severe renal impairment (eGFR < 30mL/min)? No    Do you have any history of hepatic impairment or abnormalities (e.g. hepatic panel, ALT, AST, ALK Phos, bilirubin)? No    Have you had a coronary stent placed in the previous 6 months? No    Is patient eligible to continue?   Yes, patient meets all eligibility requirements for the RN COVID treatment (as denoted by all no responses above).     Current Outpatient Medications   Medication Sig Dispense Refill     acetaminophen (TYLENOL) 325 MG tablet Take 2 tablets (650 mg) by mouth every 4 hours as needed for other 1 Bottle 0     albuterol (ALBUTEROL) 108 (90 BASE) MCG/ACT Inhaler Inhale 2 puffs into the lungs 4 times daily as needed for shortness of breath / dyspnea or wheezing 1 Inhaler 0     alendronate (FOSAMAX) 70 MG tablet TAKE 1 TABLET(70 MG) BY MOUTH EVERY 7 DAYS 12 tablet 2     amLODIPine (NORVASC) 5 MG tablet TAKE 1 TABLET DAILY 90 tablet 2     amoxicillin (AMOXIL) 500 MG tablet Take 4 tablets (2000 mg) by mouth 1 hour before dental procedures/cleanings. 4 tablet 4     aspirin 81 MG EC tablet Take 81 mg by mouth daily       atenolol (TENORMIN) 25 MG tablet Take 1 tablet (25 mg) by mouth daily 90 tablet 0     benzonatate (TESSALON) 200 MG capsule Take 200 mg by mouth 3 times daily as needed for cough       budesonide-formoterol (SYMBICORT) 160-4.5 MCG/ACT Inhaler Inhale 2 puffs into the lungs 2 times daily       busPIRone (BUSPAR) 5 MG tablet Take 5 mg by mouth 2 times daily       carboxymethylcellulose PF (REFRESH PLUS) 0.5 % ophthalmic solution Place 1 drop into both eyes 2 times daily       cyclobenzaprine (FLEXERIL) 5 MG tablet Take 1 tablet (5 mg) by mouth 2 times daily as needed for muscle spasms 60 tablet 3     cycloSPORINE (RESTASIS) 0.05 % ophthalmic emulsion  Place 1 drop into both eyes 2 times daily        Dentifrices (BIOTENE DRY MOUTH DT) Apply 2 sprays to affected area 3 times daily as needed        Estradiol-Estriol-Progesterone (BIEST/PROGESTERONE TD) Place 0.5 g onto the skin nightly as needed       fluticasone-vilanterol (BREO ELLIPTA) 200-25 MCG/INH inhaler Inhale 1 puff into the lungs daily       furosemide (LASIX) 20 MG tablet TAKE 1 TABLET DAILY 90 tablet 1     hypromellose (ARTIFICIAL TEARS) 0.5 % SOLN ophthalmic solution Place 1 drop into both eyes 2 times daily       ibuprofen (ADVIL/MOTRIN) 600 MG tablet Take 600 mg by mouth every 6 hours as needed for moderate pain       lacosamide (VIMPAT) 200 MG TABS tablet Take 1 tablet (200 mg) by mouth 2 times daily 30 tablet 0     levothyroxine (SYNTHROID/LEVOTHROID) 88 MCG tablet Take 1 tablet (88 mcg) by mouth daily 90 tablet 3     linaclotide (LINZESS) 290 MCG capsule Take 1 capsule (290 mcg) by mouth every morning (before breakfast) 10 capsule 0     loratadine (CLARITIN REDITABS) 10 MG ODT Take 1 tablet (10 mg) by mouth daily as needed for allergies       LORazepam (ATIVAN) 0.5 MG tablet Take 0.5 mg by mouth daily as needed for anxiety       omeprazole (PRILOSEC) 40 MG DR capsule Take 1 capsule (40 mg) by mouth 2 times daily 180 capsule 3     ondansetron (ZOFRAN-ODT) 4 MG ODT tab Take 1 tablet (4 mg) by mouth every 6 hours as needed for nausea or vomiting 20 tablet 0     potassium citrate (UROCIT-K) 10 MEQ (1080 MG) CR tablet Take 1 tablet (10 mEq) by mouth daily 90 tablet 1     pramipexole (MIRAPEX) 1 MG tablet Give 1 tablet by mouth in the morning and 3 tablets by mouth 3 hours prior to bedtime       QUEtiapine (SEROQUEL) 50 MG tablet Take 100 mg by mouth nightly as needed        topiramate (TOPAMAX) 25 MG tablet Take 25 mg in the morning and 50 mg at bedtime.       traZODone (DESYREL) 150 MG tablet 150 mg At Bedtime        triamcinolone (KENALOG) 0.1 % external cream APPLY TOPICALLY 3 TIMES    DAILY. 30 g 0      trolamine salicylate (ASPERCREME) 10 % external cream Apply topically 2 times daily as needed for moderate pain To right hip incision area         Medications from List 1 of the standing order (on medications that exclude the use of Paxlovid) that patient is taking: NONE. Is patient taking Hawa's Wort? No  Is patient taking [place]'s Wort or any meds from List 1? No.   Medications from List 2 of the standing order (on meds that provider needs to adjust) that patient is taking: amlodipine (Norvasc), explained a provider visit is necessary to discuss medication adjustments while taking Paxlovid. Is patient on any of the meds from List 2? Yes. Patient will be transferred to a  at the end of this call. Justice L. Phoenix, RN

## 2023-01-17 NOTE — TELEPHONE ENCOUNTER
"Pt contacted regarding [facility]ong message for positive COVID. She has appt scheduled with Dr. Álvarez later this afternoon. Declined care advice for home \"I already know all that\".    "

## 2023-02-01 ENCOUNTER — LAB (OUTPATIENT)
Dept: LAB | Facility: CLINIC | Age: 68
End: 2023-02-01
Payer: MEDICARE

## 2023-02-01 DIAGNOSIS — R25.2 CRAMP OF LIMB: Primary | ICD-10-CM

## 2023-02-01 LAB
ALP SERPL-CCNC: 79 U/L (ref 35–104)
ALT SERPL W P-5'-P-CCNC: 13 U/L (ref 10–35)
AST SERPL W P-5'-P-CCNC: 23 U/L (ref 10–35)
BILIRUB DIRECT SERPL-MCNC: <0.2 MG/DL (ref 0–0.3)
BILIRUB SERPL-MCNC: 0.4 MG/DL
BUN SERPL-MCNC: 11.2 MG/DL (ref 8–23)
CALCIUM SERPL-MCNC: 9.3 MG/DL (ref 8.8–10.2)
CHLORIDE SERPL-SCNC: 103 MMOL/L (ref 98–107)
CREAT SERPL-MCNC: 0.99 MG/DL (ref 0.51–0.95)
CREAT UR-MCNC: 34 MG/DL
DEPRECATED HCO3 PLAS-SCNC: 23 MMOL/L (ref 22–29)
FASTING STATUS PATIENT QL REPORTED: NO
GFR SERPL CREATININE-BSD FRML MDRD: 62 ML/MIN/1.73M2
GLUCOSE SERPL-MCNC: 103 MG/DL (ref 70–99)
MICROALBUMIN UR-MCNC: <12 MG/L
MICROALBUMIN/CREAT UR: NORMAL MG/G{CREAT}
POTASSIUM SERPL-SCNC: 3.7 MMOL/L (ref 3.4–5.3)
PROT SERPL-MCNC: 6.9 G/DL (ref 6.4–8.3)
SODIUM SERPL-SCNC: 140 MMOL/L (ref 136–145)

## 2023-02-01 PROCEDURE — 84075 ASSAY ALKALINE PHOSPHATASE: CPT

## 2023-02-01 PROCEDURE — 82247 BILIRUBIN TOTAL: CPT

## 2023-02-01 PROCEDURE — 82570 ASSAY OF URINE CREATININE: CPT

## 2023-02-01 PROCEDURE — 82248 BILIRUBIN DIRECT: CPT

## 2023-02-01 PROCEDURE — 36415 COLL VENOUS BLD VENIPUNCTURE: CPT

## 2023-02-01 PROCEDURE — 80048 BASIC METABOLIC PNL TOTAL CA: CPT

## 2023-02-01 PROCEDURE — 84450 TRANSFERASE (AST) (SGOT): CPT

## 2023-02-01 PROCEDURE — 84155 ASSAY OF PROTEIN SERUM: CPT

## 2023-02-01 PROCEDURE — 84460 ALANINE AMINO (ALT) (SGPT): CPT

## 2023-02-01 PROCEDURE — 82043 UR ALBUMIN QUANTITATIVE: CPT

## 2023-02-16 ENCOUNTER — MYC MEDICAL ADVICE (OUTPATIENT)
Dept: INTERNAL MEDICINE | Facility: CLINIC | Age: 68
End: 2023-02-16
Payer: MEDICARE

## 2023-02-17 ENCOUNTER — NURSE TRIAGE (OUTPATIENT)
Dept: INTERNAL MEDICINE | Facility: CLINIC | Age: 68
End: 2023-02-17

## 2023-02-17 NOTE — TELEPHONE ENCOUNTER
"Cyst on L wrist  Hurts all the time  Soaks with epsom salt  Applies bacitracin at night and covers  Elevates at night    Sees ortho 2/27    Tries not to use it very much, seems to help    Dispo: See in office today  No available OV at  today.   Patient declines UC stating she does not want to wait for hours and be exposed to other illnesses. Patient agrees to see available provider at  clinic.           Reason for Disposition    Swelling is painful to touch and no fever    Additional Information    Negative: Small growth, spot, bump, or pigmented area of skin (e.g., moles, skin tags, wart, melanoma, skin cancer)    Negative: Followed a skin injury    Negative: Follows an insect bite    Negative: Swelling of lymph node suspected    Negative: Swelling of vaccination site    Negative: Swelling of entire face    Negative: Swelling of scrotum    Negative: Swelling of labia    Negative: Swelling of surgical incision    Negative: Swelling with a skin rash    Negative: Sounds like a life-threatening emergency to the triager    Negative: Hernia suspected (bulge in groin or abdomen) and painful or vomiting    Negative: Swollen lump in groin and pulsating (like heartbeat)    Negative: Patient sounds very sick or weak to the triager    Negative: SEVERE pain (e.g., excruciating)    Negative: Swelling is painful to touch AND fever    Negative: Swelling is red and fever    Negative: Swelling is red and size > 2 inches (5.0 cm)    Answer Assessment - Initial Assessment Questions  1. APPEARANCE of SWELLING: \"What does it look like?\" (e.g., lymph node, insect bite, mole)      Hard and white head comes to a point and is a little pink.  2. SIZE: \"How large is the swelling?\" (e.g., inches, cm; or compare to size of pinhead, tip of pen, eraser, coin, pea, grape, ping pong ball)       Size of a dime  3. LOCATION: \"Where is the swelling located?\"      L wrist below thumb  4. ONSET: \"When did the swelling start?\"      2/13/23, size has " "not changed over the week  5. PAIN: \"Is it painful?\" If Yes, ask: \"How much?\"      Yes. Rated 9/10 when using L hand      Currently, hand is still, no pain.  6. ITCH: \"Does it itch?\" If Yes, ask: \"How much?\"      No  7. CAUSE: \"What do you think caused the swelling?\"      Unknown  8. OTHER SYMPTOMS: \"Do you have any other symptoms?\" (e.g., fever)      No fever    Protocols used: SKIN LUMP OR LOCALIZED SWELLING-A-OH      "

## 2023-02-20 ENCOUNTER — ANCILLARY PROCEDURE (OUTPATIENT)
Dept: GENERAL RADIOLOGY | Facility: CLINIC | Age: 68
End: 2023-02-20
Attending: FAMILY MEDICINE
Payer: MEDICARE

## 2023-02-20 ENCOUNTER — OFFICE VISIT (OUTPATIENT)
Dept: FAMILY MEDICINE | Facility: CLINIC | Age: 68
End: 2023-02-20
Payer: MEDICARE

## 2023-02-20 VITALS
DIASTOLIC BLOOD PRESSURE: 76 MMHG | SYSTOLIC BLOOD PRESSURE: 114 MMHG | TEMPERATURE: 98.1 F | RESPIRATION RATE: 14 BRPM | HEART RATE: 70 BPM | BODY MASS INDEX: 29.99 KG/M2 | HEIGHT: 65 IN | OXYGEN SATURATION: 96 % | WEIGHT: 180 LBS

## 2023-02-20 DIAGNOSIS — M18.12 ARTHRITIS OF CARPOMETACARPAL (CMC) JOINT OF LEFT THUMB: ICD-10-CM

## 2023-02-20 DIAGNOSIS — M18.12 ARTHRITIS OF CARPOMETACARPAL (CMC) JOINT OF LEFT THUMB: Primary | ICD-10-CM

## 2023-02-20 DIAGNOSIS — E05.00 GRAVES DISEASE: ICD-10-CM

## 2023-02-20 DIAGNOSIS — F33.9 EPISODE OF RECURRENT MAJOR DEPRESSIVE DISORDER, UNSPECIFIED DEPRESSION EPISODE SEVERITY (H): ICD-10-CM

## 2023-02-20 LAB
ERYTHROCYTE [SEDIMENTATION RATE] IN BLOOD BY WESTERGREN METHOD: 11 MM/HR (ref 0–30)
URATE SERPL-MCNC: 4.6 MG/DL (ref 2.4–5.7)

## 2023-02-20 PROCEDURE — 86038 ANTINUCLEAR ANTIBODIES: CPT | Performed by: FAMILY MEDICINE

## 2023-02-20 PROCEDURE — 84550 ASSAY OF BLOOD/URIC ACID: CPT | Performed by: FAMILY MEDICINE

## 2023-02-20 PROCEDURE — 86431 RHEUMATOID FACTOR QUANT: CPT | Performed by: FAMILY MEDICINE

## 2023-02-20 PROCEDURE — 99214 OFFICE O/P EST MOD 30 MIN: CPT | Performed by: FAMILY MEDICINE

## 2023-02-20 PROCEDURE — 85652 RBC SED RATE AUTOMATED: CPT | Performed by: FAMILY MEDICINE

## 2023-02-20 PROCEDURE — 73120 X-RAY EXAM OF HAND: CPT | Mod: TC | Performed by: RADIOLOGY

## 2023-02-20 PROCEDURE — 86039 ANTINUCLEAR ANTIBODIES (ANA): CPT | Performed by: FAMILY MEDICINE

## 2023-02-20 PROCEDURE — 36415 COLL VENOUS BLD VENIPUNCTURE: CPT | Performed by: FAMILY MEDICINE

## 2023-02-20 RX ORDER — MELOXICAM 15 MG/1
15 TABLET ORAL DAILY
Qty: 30 TABLET | Refills: 1 | Status: SHIPPED | OUTPATIENT
Start: 2023-02-20 | End: 2023-06-21

## 2023-02-20 ASSESSMENT — PAIN SCALES - GENERAL: PAINLEVEL: WORST PAIN (10)

## 2023-02-20 ASSESSMENT — ASTHMA QUESTIONNAIRES
QUESTION_5 LAST FOUR WEEKS HOW WOULD YOU RATE YOUR ASTHMA CONTROL: WELL CONTROLLED
ACT_TOTALSCORE: 20
QUESTION_2 LAST FOUR WEEKS HOW OFTEN HAVE YOU HAD SHORTNESS OF BREATH: ONCE OR TWICE A WEEK
QUESTION_4 LAST FOUR WEEKS HOW OFTEN HAVE YOU USED YOUR RESCUE INHALER OR NEBULIZER MEDICATION (SUCH AS ALBUTEROL): ONCE A WEEK OR LESS
ACT_TOTALSCORE: 20
QUESTION_1 LAST FOUR WEEKS HOW MUCH OF THE TIME DID YOUR ASTHMA KEEP YOU FROM GETTING AS MUCH DONE AT WORK, SCHOOL OR AT HOME: A LITTLE OF THE TIME
QUESTION_3 LAST FOUR WEEKS HOW OFTEN DID YOUR ASTHMA SYMPTOMS (WHEEZING, COUGHING, SHORTNESS OF BREATH, CHEST TIGHTNESS OR PAIN) WAKE YOU UP AT NIGHT OR EARLIER THAN USUAL IN THE MORNING: ONCE OR TWICE

## 2023-02-20 ASSESSMENT — ANXIETY QUESTIONNAIRES
IF YOU CHECKED OFF ANY PROBLEMS ON THIS QUESTIONNAIRE, HOW DIFFICULT HAVE THESE PROBLEMS MADE IT FOR YOU TO DO YOUR WORK, TAKE CARE OF THINGS AT HOME, OR GET ALONG WITH OTHER PEOPLE: NOT DIFFICULT AT ALL
1. FEELING NERVOUS, ANXIOUS, OR ON EDGE: NOT AT ALL
6. BECOMING EASILY ANNOYED OR IRRITABLE: SEVERAL DAYS
7. FEELING AFRAID AS IF SOMETHING AWFUL MIGHT HAPPEN: NOT AT ALL
GAD7 TOTAL SCORE: 3
3. WORRYING TOO MUCH ABOUT DIFFERENT THINGS: NOT AT ALL
2. NOT BEING ABLE TO STOP OR CONTROL WORRYING: NOT AT ALL
GAD7 TOTAL SCORE: 3
5. BEING SO RESTLESS THAT IT IS HARD TO SIT STILL: SEVERAL DAYS
4. TROUBLE RELAXING: SEVERAL DAYS
8. IF YOU CHECKED OFF ANY PROBLEMS, HOW DIFFICULT HAVE THESE MADE IT FOR YOU TO DO YOUR WORK, TAKE CARE OF THINGS AT HOME, OR GET ALONG WITH OTHER PEOPLE?: NOT DIFFICULT AT ALL
7. FEELING AFRAID AS IF SOMETHING AWFUL MIGHT HAPPEN: NOT AT ALL
GAD7 TOTAL SCORE: 3

## 2023-02-20 ASSESSMENT — PATIENT HEALTH QUESTIONNAIRE - PHQ9
SUM OF ALL RESPONSES TO PHQ QUESTIONS 1-9: 12
10. IF YOU CHECKED OFF ANY PROBLEMS, HOW DIFFICULT HAVE THESE PROBLEMS MADE IT FOR YOU TO DO YOUR WORK, TAKE CARE OF THINGS AT HOME, OR GET ALONG WITH OTHER PEOPLE: SOMEWHAT DIFFICULT
SUM OF ALL RESPONSES TO PHQ QUESTIONS 1-9: 12

## 2023-02-20 NOTE — ASSESSMENT & PLAN NOTE
Patient describes a large lump which is gone down suggesting ganglion.  However, today she has discomfort with slight erythema but without palpable synovitis about the CMC joint of the left thumb.  X-rays confirm osteoarthritic changes.  Discussed natural history therapeutic options.  Broaden database, NSAID

## 2023-02-20 NOTE — PROGRESS NOTES
Assessment & Plan   Problem List Items Addressed This Visit     Arthritis of carpometacarpal (CMC) joint of left thumb - Primary     Patient describes a large lump which is gone down suggesting ganglion.  However, today she has discomfort with slight erythema but without palpable synovitis about the CMC joint of the left thumb.  X-rays confirm osteoarthritic changes.  Discussed natural history therapeutic options.  Broaden database, NSAID         Relevant Medications    meloxicam (MOBIC) 15 MG tablet    Other Relevant Orders    XR Hand Left 2 Views (Completed)    ESR: Erythrocyte sedimentation rate (Completed)    Rheumatoid factor    Anti Nuclear Elda IgG by IFA with Reflex    Uric acid    Occupational Therapy Referral    Graves disease     Endocrinology.  Status post MANDY.  Her model is that her thyroid was not watched closely enough.   TSH   Date Value Ref Range Status   10/24/2022 6.04 (H) 0.40 - 4.00 mU/L Final   10/16/2020 0.61 0.40 - 4.00 mU/L Final     On repletion         Relevant Medications    meloxicam (MOBIC) 15 MG tablet    Mild major depression (H)     Regular psychology visits, psychiatry                      Depression Screening Follow Up    PHQ 2/20/2023   PHQ-9 Total Score 12   Q9: Thoughts of better off dead/self-harm past 2 weeks Several days   F/U: Thoughts of suicide or self-harm No   F/U: Safety concerns No                 Follow Up    Follow Up Actions Taken      Discussed the following ways the patient can remain in a safe environment:        Return for Lab Work, XR, stay.    Tian Crum MD  [facility]    Percy Long is a 67 year old, presenting for the following health issues:  Derm Problem (On wrist)      HPI     Concern - Cyst  Onset: 1 week   Description: Cyst on wrist - painful  Intensity: severe  Progression of Symptoms:  same  Accompanying Signs & Symptoms: hard lump under skin on base of thumb  Previous history of similar problem:  "none  Precipitating factors:        Worsened by: grabbing items, lifting time.   Alleviating factors:        Improved by: none  Therapies tried and outcome: Soaked in Epsom salt, applied bacitracin at night, elevation        Review of Systems   As described      Objective    /76 (BP Location: Right arm, Patient Position: Sitting, Cuff Size: Adult Large)   Pulse 70   Temp 98.1  F (36.7  C) (Oral)   Resp 14   Ht 1.651 m (5' 5\")   Wt 81.6 kg (180 lb)   LMP 03/11/2010   SpO2 96%   BMI 29.95 kg/m    Body mass index is 29.95 kg/m .  Physical Exam   As described        Results for orders placed or performed in visit on 02/20/23   XR Hand Left 2 Views     Status: None    Narrative    XR HAND LEFT 2 VIEWS 2/20/2023 2:03 PM     HISTORY: Arthritis of carpometacarpal (CMC) joint of left thumb    COMPARISON: None.      Impression    IMPRESSION: Moderate degenerative changes in the first CMC joint.  Moderate degenerative changes in the IP joint of the thumb.  Degenerative subchondral cystic change in the proximal pole of the  scaphoid and radial styloid. Mild degenerative changes in multiple IP  joints. Internal fixation of the distal radius. Old ununited fracture  of the ulnar styloid. Chronic ossicle adjacent to the head of the  scaphoid. Osteopenia.    LILIANA MARRERO MD         SYSTEM ID:  [id number]   Results for orders placed or performed in visit on 02/20/23   ESR: Erythrocyte sedimentation rate     Status: Normal   Result Value Ref Range    Erythrocyte Sedimentation Rate 11 0 - 30 mm/hr     Tian Crum MD              "

## 2023-02-20 NOTE — ASSESSMENT & PLAN NOTE
Endocrinology.  Status post MANDY.  Her model is that her thyroid was not watched closely enough.   TSH   Date Value Ref Range Status   10/24/2022 6.04 (H) 0.40 - 4.00 mU/L Final   10/16/2020 0.61 0.40 - 4.00 mU/L Final     On repletion

## 2023-02-21 ENCOUNTER — ANCILLARY PROCEDURE (OUTPATIENT)
Dept: MAMMOGRAPHY | Facility: CLINIC | Age: 68
End: 2023-02-21
Attending: INTERNAL MEDICINE
Payer: MEDICARE

## 2023-02-21 DIAGNOSIS — Z12.31 VISIT FOR SCREENING MAMMOGRAM: ICD-10-CM

## 2023-02-21 LAB
ANA PAT SER IF-IMP: ABNORMAL
ANA SER QL IF: POSITIVE
ANA TITR SER IF: ABNORMAL {TITER}

## 2023-02-21 PROCEDURE — 77067 SCR MAMMO BI INCL CAD: CPT | Mod: TC | Performed by: STUDENT IN AN ORGANIZED HEALTH CARE EDUCATION/TRAINING PROGRAM

## 2023-02-21 PROCEDURE — 77063 BREAST TOMOSYNTHESIS BI: CPT | Mod: TC | Performed by: STUDENT IN AN ORGANIZED HEALTH CARE EDUCATION/TRAINING PROGRAM

## 2023-02-22 PROBLEM — R76.0 ANTINUCLEAR ANTIBODY (ANA) TITER GREATER THAN 1:80: Status: ACTIVE | Noted: 2023-02-22

## 2023-02-22 LAB — RHEUMATOID FACT SER NEPH-ACNC: <7 IU/ML

## 2023-02-22 RX ORDER — MELOXICAM 15 MG/1
TABLET ORAL
Qty: 90 TABLET | OUTPATIENT
Start: 2023-02-22

## 2023-02-22 NOTE — RESULT ENCOUNTER NOTE
One of your arthritis test is slightly positive.  This is not very strong result, and likely means nothing.  However, the next time you see Dr. Álvarez, ask him to look into it a bit further  Tian Crum MD

## 2023-03-06 ENCOUNTER — LAB (OUTPATIENT)
Dept: LAB | Facility: CLINIC | Age: 68
End: 2023-03-06
Payer: MEDICARE

## 2023-03-06 DIAGNOSIS — E89.0 POSTABLATIVE HYPOTHYROIDISM: ICD-10-CM

## 2023-03-06 LAB — TSH SERPL DL<=0.005 MIU/L-ACNC: 3.69 UIU/ML (ref 0.3–4.2)

## 2023-03-06 PROCEDURE — 36415 COLL VENOUS BLD VENIPUNCTURE: CPT

## 2023-03-06 PROCEDURE — 84443 ASSAY THYROID STIM HORMONE: CPT

## 2023-03-07 ENCOUNTER — MYC MEDICAL ADVICE (OUTPATIENT)
Dept: ENDOCRINOLOGY | Facility: CLINIC | Age: 68
End: 2023-03-07
Payer: MEDICARE

## 2023-03-20 ENCOUNTER — MYC MEDICAL ADVICE (OUTPATIENT)
Dept: INTERNAL MEDICINE | Facility: CLINIC | Age: 68
End: 2023-03-20
Payer: MEDICARE

## 2023-03-20 DIAGNOSIS — M18.12 ARTHRITIS OF CARPOMETACARPAL (CMC) JOINT OF LEFT THUMB: ICD-10-CM

## 2023-03-21 NOTE — TELEPHONE ENCOUNTER
Called and spoke to pt. Pt did not have much time to talk as she was out the door. Has not checked blood pressure today. Reports BP last night was 94/68.     Pt states that she has issues with her neck sometimes and will get dizzy occasionally. Reports unsure if due to lower blood pressure or to neck issues.     Pt states she has not fainted and feels comfortable going about her daily activities. Reports she gets dizzy more frequently at night when she sleep on her side.     Pt reports she has noticed lower blood pressures for the last year or so. Pt unsure how long she's been having SBPs in the 90s    BP Readings from Last 6 Encounters:   02/20/23 114/76   09/07/22 120/72   08/23/22 136/81   06/06/22 112/68   05/31/22 116/74   05/16/22 120/70       Pt wondering if medication for BP may need adjustment. Pt also wondering about Derrek Hose use (see mychart).     Routing to provider for review. Need for VV?

## 2023-03-21 NOTE — TELEPHONE ENCOUNTER
Suggest observing BP and if remains low and symptomatic then reduce Amlodipine dosing by 50% and schedule f/u clinic or virtual visit for discussion

## 2023-03-22 RX ORDER — MELOXICAM 15 MG/1
TABLET ORAL
Qty: 90 TABLET | OUTPATIENT
Start: 2023-03-22

## 2023-03-22 NOTE — TELEPHONE ENCOUNTER
Duplicate request. Prescription refilled 2/20/23. Refusing refill request and closing encounter.     Tiff MARCANO RN  [facility]

## 2023-03-23 ENCOUNTER — MYC MEDICAL ADVICE (OUTPATIENT)
Dept: INTERNAL MEDICINE | Facility: CLINIC | Age: 68
End: 2023-03-23
Payer: MEDICARE

## 2023-03-23 ENCOUNTER — TELEPHONE (OUTPATIENT)
Dept: INTERNAL MEDICINE | Facility: CLINIC | Age: 68
End: 2023-03-23
Payer: MEDICARE

## 2023-03-28 ENCOUNTER — MYC MEDICAL ADVICE (OUTPATIENT)
Dept: INTERNAL MEDICINE | Facility: CLINIC | Age: 68
End: 2023-03-28
Payer: MEDICARE

## 2023-03-29 ENCOUNTER — MYC MEDICAL ADVICE (OUTPATIENT)
Dept: INTERNAL MEDICINE | Facility: CLINIC | Age: 68
End: 2023-03-29
Payer: MEDICARE

## 2023-03-30 ENCOUNTER — NURSE TRIAGE (OUTPATIENT)
Dept: NURSING | Facility: CLINIC | Age: 68
End: 2023-03-30

## 2023-03-30 ENCOUNTER — MYC MEDICAL ADVICE (OUTPATIENT)
Dept: INTERNAL MEDICINE | Facility: CLINIC | Age: 68
End: 2023-03-30

## 2023-03-30 ENCOUNTER — NURSE TRIAGE (OUTPATIENT)
Dept: INTERNAL MEDICINE | Facility: CLINIC | Age: 68
End: 2023-03-30

## 2023-03-30 ENCOUNTER — VIRTUAL VISIT (OUTPATIENT)
Dept: INTERNAL MEDICINE | Facility: CLINIC | Age: 68
End: 2023-03-30
Payer: MEDICARE

## 2023-03-30 DIAGNOSIS — N76.0 BV (BACTERIAL VAGINOSIS): Primary | ICD-10-CM

## 2023-03-30 DIAGNOSIS — B96.89 BV (BACTERIAL VAGINOSIS): Primary | ICD-10-CM

## 2023-03-30 LAB
ALBUMIN UR-MCNC: NEGATIVE MG/DL
APPEARANCE UR: CLEAR
BILIRUB UR QL STRIP: NEGATIVE
CLUE CELLS: PRESENT
COLOR UR AUTO: YELLOW
GLUCOSE UR STRIP-MCNC: NEGATIVE MG/DL
HGB UR QL STRIP: NEGATIVE
KETONES UR STRIP-MCNC: NEGATIVE MG/DL
LEUKOCYTE ESTERASE UR QL STRIP: NEGATIVE
NITRATE UR QL: NEGATIVE
PH UR STRIP: 5.5 [PH] (ref 5–7)
SP GR UR STRIP: 1.02 (ref 1–1.03)
TRICHOMONAS, WET PREP: ABNORMAL
UROBILINOGEN UR STRIP-ACNC: 0.2 E.U./DL
WBC'S/HIGH POWER FIELD, WET PREP: ABNORMAL
YEAST, WET PREP: ABNORMAL

## 2023-03-30 PROCEDURE — 99441 PR PHYSICIAN TELEPHONE EVALUATION 5-10 MIN: CPT | Mod: 95 | Performed by: INTERNAL MEDICINE

## 2023-03-30 PROCEDURE — 81003 URINALYSIS AUTO W/O SCOPE: CPT | Performed by: INTERNAL MEDICINE

## 2023-03-30 PROCEDURE — 87210 SMEAR WET MOUNT SALINE/INK: CPT | Performed by: INTERNAL MEDICINE

## 2023-03-30 RX ORDER — CLINDAMYCIN HCL 300 MG
300 CAPSULE ORAL 2 TIMES DAILY
Qty: 14 CAPSULE | Refills: 0 | Status: SHIPPED | OUTPATIENT
Start: 2023-03-30 | End: 2023-06-21

## 2023-03-30 NOTE — PROGRESS NOTES
Mercedes is a 67 year old who is being evaluated via a billable telephone visit.      What phone number would you like to be contacted at? [phone]  How would you like to obtain your AVS? MyChart  Distant Location (provider location):  On-site    Assessment & Plan   BV (bacterial vaginosis)  U/A WNL. Wet prep showing clue cells. Will treat for BV. Unfortunately, patient allergic to preferred treatment of metronidazole. Rx'd clindamycin instead. Cautioned re: diarrhea and to let us know if she experiences it during course or 2-3 weeks after.  - Wet prep - Clinic Collect  - UA Macroscopic with reflex to Microscopic and Culture  - clindamycin (CLEOCIN) 300 MG capsule; Take 1 capsule (300 mg) by mouth 2 times daily    Silvino Kelly MD  [facility]    Subjective   Mercedes is a 67 year old who presents for a same day acute care virtual telephone visit with chief concern of:  UTI  This is the first time I have met Mercedes.    HPI   Genitourinary - Female  Onset/Duration: 1 week   Description:   Painful urination (Dysuria): YES           Frequency: No  Blood in urine (Hematuria): No  Delay in urine (Hesitency): No  Intensity: 5/10  Progression of Symptoms:  same  Accompanying Signs & Symptoms:  Fever/chills: No  Flank pain: No  Nausea and vomiting: No  Vaginal symptoms:  itching  Abdominal/Pelvic Pain: YES  History:   History of frequent UTI s: No  History of kidney stones: No  Sexually Active: No  Possibility of pregnancy: No  Precipitating or alleviating factors: None  Therapies tried and outcome: Increase fluid intake and  vaginitis cream      Review of Systems   Constitutional, gu systems are negative, except as otherwise noted.      Objective       Vitals: No vitals were obtained today due to virtual visit.    Physical Exam   healthy, alert and no distress  PSYCH: Alert and oriented times 3; coherent speech, normal rate and volume, able to articulate logical thoughts, able to abstract  reason, no tangential thoughts, no hallucinations or delusions. Her affect is normal  RESP: No cough, no audible wheezing, able to talk in full sentences  Remainder of exam unable to be completed due to telephone visits    Phone call duration: 5 minutes

## 2023-03-30 NOTE — TELEPHONE ENCOUNTER
Patient is calling and states that she had an appointment with lab today to drop off a urinalysis and patient got lost in her driving directions.    Patient is calling to reschedule another lab appointment.  Transferred patient through to scheduling.      Reason for Disposition    Information only question and nurse able to answer    Additional Information    Negative: Nursing judgment    Negative: Nursing judgment    Negative: Nursing judgment    Negative: Nursing judgment    Protocols used: INFORMATION ONLY CALL - NO TRIAGE-A-OH

## 2023-03-30 NOTE — TELEPHONE ENCOUNTER
"Nurse Triage SBAR    Is this a 2nd Level Triage? YES, LICENSED PRACTITIONER REVIEW IS REQUIRED    Situation: Received a call from the patient stating she has been experiencing some urinary symptoms for the past week including burning with urination, itchiness, and dryness.     Background: N/A    Assessment: Upon conversing with the patient, the patient states she also been experiencing some abdominal pain and lower back pain. No fever present. No blood present in the urine. Patient states she has noticed some odor present to the urine.     Protocol Recommended Disposition:   See in Office Today    Recommendation: Triage protocol recommending patient be seen in the office today. Triage RN scheduled patient for an appointment with Meghan Leary this AM.     Appointments in Next Year    Mar 30, 2023 10:30 AM  (Arrive by 10:10 AM)  Provider Visit with Meghan Leary PA-C  [facility] ([facility] ) [phone]     Does the patient meet one of the following criteria for ADS visit consideration? 16+ years old, with an [facility] PCP     TIP  Providers, please consider if this condition is appropriate for management at one of our Acute and Diagnostic Services sites.     If patient is a good candidate, please use dotphrase <dot>triageresponse and select Refer to ADS to document.    1. SYMPTOM: \"What's the main symptom you're concerned about?\" (e.g., frequency, incontinence)      Burning with urination, itchiness, and dryness   2. ONSET: \"When did the symptoms start?\"      About a week  3. PAIN: \"Is there any pain?\" If Yes, ask: \"How bad is it?\" (Scale: 1-10; mild, moderate, severe)      Some abdominal pain and back pain, pain: 5/10  4. CAUSE: \"What do you think is causing the symptoms?\"      UTI?  5. OTHER SYMPTOMS: \"Do you have any other symptoms?\" (e.g., fever, flank pain, blood in urine, pain with urination)      Flank pain, no fever, no blood in urine, burning with urination, odor " "present to the urine   6. PREGNANCY: \"Is there any chance you are pregnant?\" \"When was your last menstrual period?\"      No    Reason for Disposition    Side (flank) or lower back pain present    Additional Information    Negative: Shock suspected (e.g., cold/pale/clammy skin, too weak to stand, low BP, rapid pulse)    Negative: Sounds like a life-threatening emergency to the triager    Negative: Followed a female genital area injury (e.g., vagina, vulva)    Negative: Followed a male genital area injury (penis, scrotum)    Negative: Vaginal discharge    Negative: Pus (white, yellow) or bloody discharge from end of penis    Negative: Pain or burning with passing urine (urination) and pregnant    Negative: Pain or burning with passing urine (urination) and female    Negative: Pain or burning with passing urine (urination) and male    Negative: Pain or itching in the vulvar area    Negative: Pain in scrotum is main symptom    Negative: Blood in the urine is main symptom    Negative: Symptoms arising from use of a urinary catheter (e.g., coude, Vieyra)    Negative: Unable to urinate (or only a few drops) > 4 hours and bladder feels very full (e.g., palpable bladder or strong urge to urinate)    Negative: Decreased urination and drinking very little and dehydration suspected (e.g., dark urine, no urine > 12 hours, very dry mouth, very lightheaded)    Negative: Patient sounds very sick or weak to the triager    Negative: Fever > 100.4 F  (38.0 C)    Protocols used: URINARY SYMPTOMS-A-OH    Cindy Prather RN  "

## 2023-04-11 ENCOUNTER — MYC MEDICAL ADVICE (OUTPATIENT)
Dept: INTERNAL MEDICINE | Facility: CLINIC | Age: 68
End: 2023-04-11
Payer: MEDICARE

## 2023-04-18 ENCOUNTER — ALLIED HEALTH/NURSE VISIT (OUTPATIENT)
Dept: INTERNAL MEDICINE | Facility: CLINIC | Age: 68
End: 2023-04-18
Payer: MEDICARE

## 2023-04-18 DIAGNOSIS — Z23 NEED FOR VACCINATION: Primary | ICD-10-CM

## 2023-04-18 PROCEDURE — 99207 PR NO CHARGE NURSE ONLY: CPT

## 2023-04-18 PROCEDURE — 90677 PCV20 VACCINE IM: CPT

## 2023-04-18 PROCEDURE — G0009 ADMIN PNEUMOCOCCAL VACCINE: HCPCS

## 2023-04-20 ENCOUNTER — MYC MEDICAL ADVICE (OUTPATIENT)
Dept: ENDOCRINOLOGY | Facility: CLINIC | Age: 68
End: 2023-04-20
Payer: MEDICARE

## 2023-04-27 NOTE — TELEPHONE ENCOUNTER
Documentation refaxed to [phone].     Patient notified.     Violette Pablo on 4/27/2023 at 2:01 PM

## 2023-04-29 ENCOUNTER — MYC MEDICAL ADVICE (OUTPATIENT)
Dept: INTERNAL MEDICINE | Facility: CLINIC | Age: 68
End: 2023-04-29
Payer: MEDICARE

## 2023-05-01 ENCOUNTER — MYC MEDICAL ADVICE (OUTPATIENT)
Dept: INTERNAL MEDICINE | Facility: CLINIC | Age: 68
End: 2023-05-01
Payer: MEDICARE

## 2023-05-01 DIAGNOSIS — R60.0 PERIPHERAL EDEMA: ICD-10-CM

## 2023-05-01 DIAGNOSIS — I10 BENIGN ESSENTIAL HYPERTENSION: ICD-10-CM

## 2023-05-01 RX ORDER — FUROSEMIDE 20 MG
20 TABLET ORAL DAILY
Qty: 90 TABLET | Refills: 0 | Status: SHIPPED | OUTPATIENT
Start: 2023-05-01 | End: 2023-12-04

## 2023-05-01 NOTE — TELEPHONE ENCOUNTER
Routing refill request to provider for review/approval because:  Labs out of range:    Creatinine   Date Value Ref Range Status   02/01/2023 0.99 (H) 0.51 - 0.95 mg/dL Final   04/12/2021 0.96 0.52 - 1.04 mg/dL Final     Justice L. Phoenix, RN

## 2023-05-30 DIAGNOSIS — M85.80 OSTEOPENIA, UNSPECIFIED LOCATION: ICD-10-CM

## 2023-05-30 RX ORDER — ALENDRONATE SODIUM 70 MG/1
TABLET ORAL
Qty: 12 TABLET | Refills: 2 | Status: SHIPPED | OUTPATIENT
Start: 2023-05-30 | End: 2023-11-20

## 2023-06-18 ENCOUNTER — HEALTH MAINTENANCE LETTER (OUTPATIENT)
Age: 68
End: 2023-06-18

## 2023-06-21 ENCOUNTER — OFFICE VISIT (OUTPATIENT)
Dept: INTERNAL MEDICINE | Facility: CLINIC | Age: 68
End: 2023-06-21
Payer: MEDICARE

## 2023-06-21 VITALS
BODY MASS INDEX: 30.49 KG/M2 | OXYGEN SATURATION: 95 % | WEIGHT: 183 LBS | TEMPERATURE: 97.6 F | HEIGHT: 65 IN | RESPIRATION RATE: 16 BRPM | DIASTOLIC BLOOD PRESSURE: 63 MMHG | SYSTOLIC BLOOD PRESSURE: 99 MMHG | HEART RATE: 64 BPM

## 2023-06-21 DIAGNOSIS — Z13.6 CARDIOVASCULAR SCREENING; LDL GOAL LESS THAN 160: ICD-10-CM

## 2023-06-21 DIAGNOSIS — I82.629 DEEP VEIN THROMBOSIS (DVT) OF UPPER EXTREMITY, UNSPECIFIED CHRONICITY, UNSPECIFIED LATERALITY, UNSPECIFIED VEIN (H): ICD-10-CM

## 2023-06-21 DIAGNOSIS — F33.9 EPISODE OF RECURRENT MAJOR DEPRESSIVE DISORDER, UNSPECIFIED DEPRESSION EPISODE SEVERITY (H): ICD-10-CM

## 2023-06-21 DIAGNOSIS — G40.909 SEIZURE DISORDER (H): ICD-10-CM

## 2023-06-21 DIAGNOSIS — Z12.11 COLON CANCER SCREENING: ICD-10-CM

## 2023-06-21 DIAGNOSIS — Z00.00 ENCOUNTER FOR SUBSEQUENT ANNUAL WELLNESS VISIT IN MEDICARE PATIENT: Primary | ICD-10-CM

## 2023-06-21 DIAGNOSIS — Q85.1 TUBEROUS SCLEROSIS (H): ICD-10-CM

## 2023-06-21 DIAGNOSIS — I10 BENIGN ESSENTIAL HYPERTENSION: ICD-10-CM

## 2023-06-21 PROBLEM — E66.01 MORBID OBESITY (H): Status: RESOLVED | Noted: 2021-02-01 | Resolved: 2023-06-21

## 2023-06-21 LAB
CHOLEST SERPL-MCNC: 200 MG/DL
ERYTHROCYTE [DISTWIDTH] IN BLOOD BY AUTOMATED COUNT: 13.2 % (ref 10–15)
HCT VFR BLD AUTO: 37.5 % (ref 35–47)
HDLC SERPL-MCNC: 59 MG/DL
HGB BLD-MCNC: 12.2 G/DL (ref 11.7–15.7)
LDLC SERPL CALC-MCNC: 122 MG/DL
MCH RBC QN AUTO: 30.4 PG (ref 26.5–33)
MCHC RBC AUTO-ENTMCNC: 32.5 G/DL (ref 31.5–36.5)
MCV RBC AUTO: 94 FL (ref 78–100)
NONHDLC SERPL-MCNC: 141 MG/DL
PLATELET # BLD AUTO: 236 10E3/UL (ref 150–450)
RBC # BLD AUTO: 4.01 10E6/UL (ref 3.8–5.2)
TRIGL SERPL-MCNC: 94 MG/DL
WBC # BLD AUTO: 6.8 10E3/UL (ref 4–11)

## 2023-06-21 PROCEDURE — 99213 OFFICE O/P EST LOW 20 MIN: CPT | Mod: 25 | Performed by: INTERNAL MEDICINE

## 2023-06-21 PROCEDURE — 85027 COMPLETE CBC AUTOMATED: CPT | Performed by: INTERNAL MEDICINE

## 2023-06-21 PROCEDURE — 36415 COLL VENOUS BLD VENIPUNCTURE: CPT | Performed by: INTERNAL MEDICINE

## 2023-06-21 PROCEDURE — 80061 LIPID PANEL: CPT | Performed by: INTERNAL MEDICINE

## 2023-06-21 PROCEDURE — G0439 PPPS, SUBSEQ VISIT: HCPCS | Performed by: INTERNAL MEDICINE

## 2023-06-21 RX ORDER — TOPIRAMATE 50 MG/1
2 TABLET, FILM COATED ORAL AT BEDTIME
COMMUNITY
Start: 2023-05-31 | End: 2024-01-16

## 2023-06-21 RX ORDER — MIRTAZAPINE 15 MG/1
TABLET, FILM COATED ORAL
COMMUNITY
Start: 2022-09-01 | End: 2024-01-16

## 2023-06-21 RX ORDER — LORAZEPAM 0.5 MG/1
TABLET ORAL
COMMUNITY
End: 2023-06-21

## 2023-06-21 ASSESSMENT — ENCOUNTER SYMPTOMS
HEARTBURN: 0
SORE THROAT: 0
MYALGIAS: 1
CHILLS: 0
ABDOMINAL PAIN: 0
EYE PAIN: 0
HEADACHES: 0
HEMATURIA: 0
HEMATOCHEZIA: 0
DIZZINESS: 1
FREQUENCY: 1
DYSURIA: 0
PARESTHESIAS: 0
NERVOUS/ANXIOUS: 1
NAUSEA: 0
COUGH: 0
CONSTIPATION: 0
WEAKNESS: 1
PALPITATIONS: 0
DIARRHEA: 0
SHORTNESS OF BREATH: 0
FEVER: 0
ARTHRALGIAS: 1
JOINT SWELLING: 1

## 2023-06-21 ASSESSMENT — PATIENT HEALTH QUESTIONNAIRE - PHQ9
10. IF YOU CHECKED OFF ANY PROBLEMS, HOW DIFFICULT HAVE THESE PROBLEMS MADE IT FOR YOU TO DO YOUR WORK, TAKE CARE OF THINGS AT HOME, OR GET ALONG WITH OTHER PEOPLE: EXTREMELY DIFFICULT
SUM OF ALL RESPONSES TO PHQ QUESTIONS 1-9: 15
SUM OF ALL RESPONSES TO PHQ QUESTIONS 1-9: 15

## 2023-06-21 ASSESSMENT — ACTIVITIES OF DAILY LIVING (ADL): CURRENT_FUNCTION: NO ASSISTANCE NEEDED

## 2023-06-21 ASSESSMENT — PAIN SCALES - GENERAL: PAINLEVEL: NO PAIN (0)

## 2023-06-21 NOTE — PATIENT INSTRUCTIONS
"  Patient Education   Personalized Prevention Plan  You are due for the preventive services outlined below.  Your care team is available to assist you in scheduling these services.  If you have already completed any of these items, please share that information with your care team to update in your medical record.  Health Maintenance Due   Topic Date Due     Asthma Action Plan - yearly  04/12/2023     COVID-19 Vaccine (6 - Pfizer series) 05/13/2023       Depression and Suicide in Older Adults  Nearly 2 million older adults in the [place] have some type of depression. Some of them even take their own lives. Yet depression among older adults is often ignored. Learning about the warning signs of depression may help spare a loved one needless pain. You may also save a life.   What is depression?  Depression is a common and serious illness. It affects the way you think and feel. It is not a normal part of aging. It is not a sign of weakness, a character flaw, or something you can \"snap out of.\" Most people with depression need treatment to get better. The most common symptom is a feeling of deep sadness. People who are depressed also may seem tired and listless. And nothing seems to give them pleasure. It s normal to grieve or be sad sometimes. But sadness lessens or passes with time. Depression rarely goes away or improves on its own. Other symptoms of depression are:     Sleeping more or less than normal    Eating more or less than normal    Having headaches, stomachaches, or other pains that don t go away    Feeling nervous,  empty,  or worthless    Crying a lot    Thinking or talking about suicide or death    Loss of interest in activities previously enjoyed    Social isolation    Feeling confused or forgetful  What causes it?  The causes of depression aren t fully known. But it is thought to result from a complex blend of these factors:     Biochemistry. Certain chemicals in the brain play a role.    Genes. Depression " does run in families.    Life stress. Life stresses can also trigger depression in some people. Older adults often face many stressors. These may include isolation, the death of friends or a spouse, health problems, and financial concerns.    Chronic health conditions. This includes diabetes, heart disease, or cancer. These can cause symptoms of depression. Medicine side effects can cause changes in thoughts and behaviors.  Giving support    Depressed people often may not want to ask for help. When they do, they may be ignored. Or they may get the wrong treatment. You can help by showing parents and older friends love and support. If they seem depressed, don t lecture the person or ignore the symptoms. Don't discount the symptoms as a  normal  part of aging. They are not. Get involved, listen, and show interest and support.   Help them understand that depression is a treatable illness. Tell them you can help them find the right treatment. Offer to go to their healthcare provider's appointment with them for support when the symptoms are discussed. With their approval, contact a local mental health center, social service agency, or hospital about services.   Helping at healthcare visits  You can be an advocate for them at healthcare appointments. Many older adults have chronic illnesses. Many of these can cause symptoms of depression. Medicine side effects can change thoughts and behaviors.   You can help make sure that the healthcare provider looks at all of these factors. They should refer your family member or friend to a mental healthcare provider when needed. In some cases, untreated depression can lead to a misdiagnosis. A person may be diagnosed with a brain disorder such as dementia. If the healthcare provider does not take the issue of depression seriously, help your family member or friend find another provider.   Asking about self-harm thoughts  If you think an older person you care about could be suicidal,  ask,  Have you thought about suicide?  Most people will tell you the truth. If they say yes, they may already have a plan for how and when they will attempt it. Find out as much as you can. The more detailed the plan, and the easier it is to carry out, the more danger the person is in right now. Tell the person you are there for them and you do not want them to get harmed. Don't wait to get help for the person. Call the person's healthcare provider, local hospital, or emergency services.   Call 988 in a crisis   Never leave the person alone. A person who is actively suicidal needs crisis care right away. They need constant supervision. Never leave the person out of sight. Call 988 Tell the crisis counselor you need help for a person who is thinking about suicide. The counselor will help you get the right level of crisis help. You may be advised to take the person to the nearest emergency room.   The National Suicide Prevention Lifeline is available at 988, or 503-444-HFWU (368-863-3711), or www.suicidepreventionlifeline.org. When you call or text 988, you will be connected to trained counselors who are part of the National Suicide Prevention Lifeline network. An online chat option is also available. Lifeline is free and available 24/7.   To learn more    National Suicide Prevention Lifeline at www.suicidepreventionlifeline.org  or 536-556-XSYU (883-889-8460)    National [place] on Mental Illness at www.dex.org  or 912-344-ZAHS (137-205-7574)    Mental Health [place] at www.nmha.org  or 473-645-5784    National [place] of Mental Health at www.nimh.nih.gov  or 888-208-9885    Sander last reviewed this educational content on 7/1/2022 2000-2022 [employer]. All rights reserved. This information is not intended as a substitute for professional medical care. Always follow your healthcare professional's instructions.

## 2023-06-21 NOTE — PROGRESS NOTES
SUBJECTIVE:   Mercedes is a 68 year old who presents for Preventive Visit.        Are you in the first 12 months of your Medicare coverage?  No    HPI    Have you ever done Advance Care Planning? (For example, a Health Directive, POLST, or a discussion with a medical provider or your loved ones about your wishes): Yes, advance care planning is on file.    Fall risk:  low    Cognitive Screening   1) Repeat 3 items (Leader, Season, Table)    2) Clock draw: NORMAL  3) 3 item recall: Recalls 3 objects  Results: 3 items recalled: COGNITIVE IMPAIRMENT LESS LIKELY    Mini-CogTM Copyright S [employer]. Licensed by the author for use in [facility]; reprinted with permission (manuel@[facility]). All rights reserved.      Do you have sleep apnea, excessive snoring or daytime drowsiness?: no    Reviewed and updated as needed this visit by clinical staff                Reviewed and updated as needed this visit by Provider                 Social History     Tobacco Use     Smoking status: Never     Smokeless tobacco: Never   Substance Use Topics     Alcohol use: Yes     Comment: 1 glass of wine 2x/yr           3/15/2022    10:50 AM   Alcohol Use   Prescreen: >3 drinks/day or >7 drinks/week? No     Do you have a current opioid prescription? No  Do you use any other controlled substances or medications that are not prescribed by a provider? None      Current Outpatient Medications   Medication     albuterol (ALBUTEROL) 108 (90 BASE) MCG/ACT Inhaler     alendronate (FOSAMAX) 70 MG tablet     amLODIPine (NORVASC) 5 MG tablet     amoxicillin (AMOXIL) 500 MG tablet     aspirin 81 MG EC tablet     budesonide-formoterol (SYMBICORT) 160-4.5 MCG/ACT Inhaler     carboxymethylcellulose PF (REFRESH PLUS) 0.5 % ophthalmic solution     cycloSPORINE (RESTASIS) 0.05 % ophthalmic emulsion     Dentifrices (BIOTENE DRY MOUTH DT)     Estradiol-Estriol-Progesterone (BIEST/PROGESTERONE TD)     FLUoxetine (PROZAC) 20 MG capsule      fluticasone-vilanterol (BREO ELLIPTA) 200-25 MCG/INH inhaler     furosemide (LASIX) 20 MG tablet     hypromellose (ARTIFICIAL TEARS) 0.5 % SOLN ophthalmic solution     ibuprofen (ADVIL/MOTRIN) 600 MG tablet     lacosamide (VIMPAT) 200 MG TABS tablet     levothyroxine (SYNTHROID/LEVOTHROID) 88 MCG tablet     linaclotide (LINZESS) 290 MCG capsule     loratadine (CLARITIN REDITABS) 10 MG ODT     mirtazapine (REMERON) 15 MG tablet     omeprazole (PRILOSEC) 40 MG DR capsule     ondansetron (ZOFRAN-ODT) 4 MG ODT tab     potassium citrate (UROCIT-K) 10 MEQ (1080 MG) CR tablet     pramipexole (MIRAPEX) 1 MG tablet     Suvorexant (BELSOMRA) 5 MG tablet     topiramate (TOPAMAX) 50 MG tablet     traZODone (DESYREL) 150 MG tablet     triamcinolone (KENALOG) 0.1 % external cream     trolamine salicylate (ASPERCREME) 10 % external cream     No current facility-administered medications for this visit.     Facility-Administered Medications Ordered in Other Visits   Medication     gadobutrol (GADAVIST) injection 10 mL       Current providers sharing in care for this patient include:   Patient Care Team:  Skyler Álvarez MD as PCP - General (Internal Medicine)  Samir Arguelles MD as MD (Neurology)  Park Ortiz MD as MD (Otolaryngology)  Skyler Chacko MD as Referring Physician (Orthopedics)  Skyler Álvarez MD as Assigned PCP  Rachael Smith MD as MD (Endocrinology, Diabetes, and Metabolism)  Rachael Platt PA-C as Physician Assistant (Dermatology)  Odalys Caraballo MD as Assigned Endocrinology Provider  Juaquin Fournier MD as Assigned Surgical Provider    The following health maintenance items are reviewed in Epic and correct as of today:  Health Maintenance   Topic Date Due     MEDICARE ANNUAL WELLNESS VISIT  03/15/2023     FALL RISK ASSESSMENT  03/15/2023     ASTHMA ACTION PLAN  04/12/2023     COVID-19 Vaccine (6 - Pfizer series) 05/13/2023     ASTHMA CONTROL TEST  08/20/2023     PHQ-9  08/20/2023      ANNUAL REVIEW OF HM ORDERS  01/17/2024     TSH W/FREE T4 REFLEX  03/06/2024     ADVANCE CARE PLANNING  08/30/2024     MAMMO SCREENING  02/21/2025     COLORECTAL CANCER SCREENING  12/09/2025     LIPID  08/24/2026     DTAP/TDAP/TD IMMUNIZATION (7 - Td or Tdap) 09/12/2031     DEXA  09/01/2036     HEPATITIS C SCREENING  Completed     DEPRESSION ACTION PLAN  Completed     INFLUENZA VACCINE  Completed     Pneumococcal Vaccine: 65+ Years  Completed     ZOSTER IMMUNIZATION  Completed     IPV IMMUNIZATION  Aged Out     MENINGITIS IMMUNIZATION  Aged Out       Immunization History   Administered Date(s) Administered     COVID-19 Bivalent 12+ (Pfizer) 01/13/2023     COVID-19 MONOVALENT 12+ (Pfizer) 03/10/2021, 03/31/2021, 10/20/2021     COVID-19 Monovalent 12+ (Pfizer 2022) 04/12/2022     Flu, Unspecified 12/01/2000, 10/15/2007     Influenza (High Dose) 3 valent vaccine 10/04/2019, 11/01/2020     Influenza (IIV3) PF 11/01/2004, 10/21/2005, 10/15/2007, 10/30/2008, 09/14/2009, 12/02/2009, 09/20/2010, 11/01/2011, 09/01/2012, 08/25/2014, 09/17/2015, 10/01/2016     Influenza Vaccine 50-64 or 18-64 w/egg allergy (Flublok) 09/20/2018     Influenza Vaccine 65+ (Fluzone HD) 10/28/2021, 11/17/2022     Influenza Vaccine >6 months (Alfuria,Fluzone) 08/29/2016, 09/27/2017     Influenza Vaccine, 6+MO IM (QUADRIVALENT W/PRESERVATIVES) 10/04/2019     Influenza, Whole Virus 11/08/2002     Pneumococcal 20 valent Conjugate (Prevnar 20) 04/18/2023     Pneumococcal 23 valent 12/30/2019     TDAP (Adacel,Boostrix) 09/12/2021     TDAP Vaccine (Adacel) 03/04/1997, 03/11/2010     Tdap (Adult) Unspecified Formulation 03/11/2010, 12/28/2010, 08/23/2011     Zoster recombinant adjuvanted (SHINGRIX) 10/05/2018, 02/13/2019     Zoster vaccine, live 04/25/2012         FHS-7:       1/25/2022    12:47 PM 2/21/2023    10:17 AM   Breast CA Risk Assessment (FHS-7)   Did any of your first-degree relatives have breast or ovarian cancer? Yes Yes   Did any of your  "relatives have bilateral breast cancer? No No   Did any man in your family have breast cancer? No No   Did any woman in your family have breast and ovarian cancer? No No   Did any woman in your family have breast cancer before age 50 y? Yes No   Do you have 2 or more relatives with breast and/or ovarian cancer? No No   Do you have 2 or more relatives with breast and/or bowel cancer? No No     Pertinent mammograms are reviewed under the imaging tab.    Review of Systems  CONSTITUTIONAL: NEGATIVE for fever, chills, change in weight  EYES: NEGATIVE for vision changes or irritation  ENT/MOUTH: NEGATIVE for ear, mouth and throat problems  RESP: NEGATIVE for significant cough or SOB  CV: NEGATIVE for chest pain, palpitations or peripheral edema  GI: NEGATIVE for nausea, abdominal pain, heartburn, or change in bowel habits  : NEGATIVE for frequency, dysuria, or hematuria  MUSCULOSKELETAL: NEGATIVE for significant arthralgias or myalgia  NEURO: NEGATIVE for weakness, dizziness or paresthesias  HEME: NEGATIVE for bleeding problems  PSYCHIATRIC: NEGATIVE for changes in mood or affect    OBJECTIVE:   BP 99/63 (BP Location: Left arm, Patient Position: Sitting, Cuff Size: Adult Regular)   Pulse 64   Temp 97.6  F (36.4  C) (Temporal)   Resp 16   Ht 1.651 m (5' 5\")   Wt 83 kg (183 lb)   LMP 03/11/2010   SpO2 95%   BMI 30.45 kg/m   Estimated body mass index is 29.95 kg/m  as calculated from the following:    Height as of 2/20/23: 1.651 m (5' 5\").    Weight as of 2/20/23: 81.6 kg (180 lb).     Physical Exam  GENERAL: alert and no distress  EYES: Eyes grossly normal to inspection, PERRL and conjunctivae and sclerae normal  HENT: ear canals and TM's normal, nose and mouth without ulcers or lesions  RESP: lungs clear to auscultation - no rales, rhonchi or wheezes  CV: regular rate and rhythm, normal S1 S2, no S3 or S4, no click or rub  ABDOMEN: soft, nontender, no hepatosplenomegaly, no masses and bowel sounds normal  MS: no " "gross musculoskeletal defects noted  NEURO: No focal changes  PSYCH: mentation appears normal, affect normal/bright      ASSESSMENT / PLAN:   (Z00.00) Encounter for subsequent annual wellness visit in Medicare patient  (primary encounter diagnosis)  Comment: Stable and discussed with patient updating COVID-vaccine when able  Plan: CBC with platelets            (I10) Benign essential hypertension  Comment: Stable on therapy and continue with current medical management.  Patient has no evidence of current orthostasis  Plan:     (Z13.6) CARDIOVASCULAR SCREENING; LDL GOAL LESS THAN 160  Comment: Labs ordered as fasting per routine.  Plan: Lipid Profile            (Z12.11) Colon cancer screening  Comment: Prior colonoscopy reviewed and recommend annual FIT testing  Plan: Fecal colorectal cancer screen FIT            (F33.9) Episode of recurrent major depressive disorder, unspecified depression episode severity (H)  Comment: Patient following up with her routine mental health provider.  Continue with routine meds as previously ordered  Plan:     (I82.629) Deep vein thrombosis (DVT) of upper extremity, unspecified chronicity, unspecified laterality, unspecified vein (H)  Comment: Noted as prior history.  Plan:         Patient has been advised of split billing requirements and indicates understanding: Yes      COUNSELING:  Reviewed preventive health counseling, as reflected in patient instructions       Regular exercise       Healthy diet/nutrition      BMI:   Estimated body mass index is 29.95 kg/m  as calculated from the following:    Height as of 2/20/23: 1.651 m (5' 5\").    Weight as of 2/20/23: 81.6 kg (180 lb).       She reports that she has never smoked. She has never used smokeless tobacco.      Appropriate preventive services were discussed with this patient, including applicable screening as appropriate for cardiovascular disease, diabetes, osteopenia/osteoporosis, and glaucoma.  As appropriate for age/gender, " discussed screening for colorectal cancer, prostate cancer, breast cancer, and cervical cancer. Checklist reviewing preventive services available has been given to the patient.    Reviewed patients plan of care and provided an AVS. The Basic Care Plan (routine screening as documented in Health Maintenance) for Mercedes meets the Care Plan requirement. This Care Plan has been established and reviewed with the Patient.    Skyler Álvarez MD  [facility]

## 2023-08-04 ENCOUNTER — MEDICAL CORRESPONDENCE (OUTPATIENT)
Dept: HEALTH INFORMATION MANAGEMENT | Facility: CLINIC | Age: 68
End: 2023-08-04

## 2023-08-09 DIAGNOSIS — I10 BENIGN ESSENTIAL HYPERTENSION: ICD-10-CM

## 2023-08-10 RX ORDER — AMLODIPINE BESYLATE 5 MG/1
TABLET ORAL
Qty: 90 TABLET | Refills: 2 | OUTPATIENT
Start: 2023-08-10

## 2023-08-15 DIAGNOSIS — I10 BENIGN ESSENTIAL HYPERTENSION: ICD-10-CM

## 2023-08-15 RX ORDER — AMLODIPINE BESYLATE 5 MG/1
5 TABLET ORAL DAILY
Qty: 90 TABLET | Refills: 1 | Status: SHIPPED | OUTPATIENT
Start: 2023-08-15 | End: 2024-02-08

## 2023-08-29 ENCOUNTER — OFFICE VISIT (OUTPATIENT)
Dept: INTERNAL MEDICINE | Facility: CLINIC | Age: 68
End: 2023-08-29
Payer: MEDICARE

## 2023-08-29 VITALS
RESPIRATION RATE: 15 BRPM | TEMPERATURE: 97.3 F | OXYGEN SATURATION: 100 % | SYSTOLIC BLOOD PRESSURE: 108 MMHG | HEIGHT: 65 IN | WEIGHT: 186.5 LBS | DIASTOLIC BLOOD PRESSURE: 68 MMHG | BODY MASS INDEX: 31.07 KG/M2 | HEART RATE: 60 BPM

## 2023-08-29 DIAGNOSIS — F33.1 MAJOR DEPRESSIVE DISORDER, RECURRENT EPISODE, MODERATE (H): ICD-10-CM

## 2023-08-29 DIAGNOSIS — G40.909 SEIZURE DISORDER (H): ICD-10-CM

## 2023-08-29 DIAGNOSIS — M79.642 PAIN OF LEFT HAND: ICD-10-CM

## 2023-08-29 DIAGNOSIS — I10 BENIGN ESSENTIAL HYPERTENSION: ICD-10-CM

## 2023-08-29 DIAGNOSIS — G47.39 OTHER SLEEP APNEA: ICD-10-CM

## 2023-08-29 DIAGNOSIS — Z01.818 PRE-OPERATIVE GENERAL PHYSICAL EXAMINATION: Primary | ICD-10-CM

## 2023-08-29 DIAGNOSIS — I82.629 DEEP VEIN THROMBOSIS (DVT) OF UPPER EXTREMITY, UNSPECIFIED CHRONICITY, UNSPECIFIED LATERALITY, UNSPECIFIED VEIN (H): ICD-10-CM

## 2023-08-29 DIAGNOSIS — J45.20 MILD INTERMITTENT ASTHMA WITHOUT COMPLICATION: ICD-10-CM

## 2023-08-29 LAB
ANION GAP SERPL CALCULATED.3IONS-SCNC: 11 MMOL/L (ref 7–15)
BUN SERPL-MCNC: 19.5 MG/DL (ref 8–23)
CALCIUM SERPL-MCNC: 9.3 MG/DL (ref 8.8–10.2)
CHLORIDE SERPL-SCNC: 107 MMOL/L (ref 98–107)
CREAT SERPL-MCNC: 1.14 MG/DL (ref 0.51–0.95)
DEPRECATED HCO3 PLAS-SCNC: 24 MMOL/L (ref 22–29)
ERYTHROCYTE [DISTWIDTH] IN BLOOD BY AUTOMATED COUNT: 13 % (ref 10–15)
GFR SERPL CREATININE-BSD FRML MDRD: 52 ML/MIN/1.73M2
GLUCOSE SERPL-MCNC: 98 MG/DL (ref 70–99)
HCT VFR BLD AUTO: 37.1 % (ref 35–47)
HGB BLD-MCNC: 12.1 G/DL (ref 11.7–15.7)
MCH RBC QN AUTO: 30.9 PG (ref 26.5–33)
MCHC RBC AUTO-ENTMCNC: 32.6 G/DL (ref 31.5–36.5)
MCV RBC AUTO: 95 FL (ref 78–100)
PLATELET # BLD AUTO: 220 10E3/UL (ref 150–450)
POTASSIUM SERPL-SCNC: 4.6 MMOL/L (ref 3.4–5.3)
RBC # BLD AUTO: 3.91 10E6/UL (ref 3.8–5.2)
SODIUM SERPL-SCNC: 142 MMOL/L (ref 136–145)
WBC # BLD AUTO: 6.2 10E3/UL (ref 4–11)

## 2023-08-29 PROCEDURE — 85027 COMPLETE CBC AUTOMATED: CPT | Performed by: INTERNAL MEDICINE

## 2023-08-29 PROCEDURE — 99214 OFFICE O/P EST MOD 30 MIN: CPT | Mod: 25 | Performed by: INTERNAL MEDICINE

## 2023-08-29 PROCEDURE — 80048 BASIC METABOLIC PNL TOTAL CA: CPT | Performed by: INTERNAL MEDICINE

## 2023-08-29 PROCEDURE — 93000 ELECTROCARDIOGRAM COMPLETE: CPT | Performed by: INTERNAL MEDICINE

## 2023-08-29 PROCEDURE — 36415 COLL VENOUS BLD VENIPUNCTURE: CPT | Performed by: INTERNAL MEDICINE

## 2023-08-29 ASSESSMENT — PATIENT HEALTH QUESTIONNAIRE - PHQ9
SUM OF ALL RESPONSES TO PHQ QUESTIONS 1-9: 20
SUM OF ALL RESPONSES TO PHQ QUESTIONS 1-9: 20
10. IF YOU CHECKED OFF ANY PROBLEMS, HOW DIFFICULT HAVE THESE PROBLEMS MADE IT FOR YOU TO DO YOUR WORK, TAKE CARE OF THINGS AT HOME, OR GET ALONG WITH OTHER PEOPLE: VERY DIFFICULT

## 2023-08-29 NOTE — LETTER
September 5, 2023      Mercedes Barber  [address]   [place] [address]        Dear ,    We are writing to inform you of your test results.        Resulted Orders   CBC with platelets   Result Value Ref Range    WBC Count 6.2 4.0 - 11.0 10e3/uL    RBC Count 3.91 3.80 - 5.20 10e6/uL    Hemoglobin 12.1 11.7 - 15.7 g/dL    Hematocrit 37.1 35.0 - 47.0 %    MCV 95 78 - 100 fL    MCH 30.9 26.5 - 33.0 pg    MCHC 32.6 31.5 - 36.5 g/dL    RDW 13.0 10.0 - 15.0 %    Platelet Count 220 150 - 450 10e3/uL   Basic metabolic panel  (Ca, Cl, CO2, Creat, Gluc, K, Na, BUN)   Result Value Ref Range    Sodium 142 136 - 145 mmol/L    Potassium 4.6 3.4 - 5.3 mmol/L    Chloride 107 98 - 107 mmol/L    Carbon Dioxide (CO2) 24 22 - 29 mmol/L    Anion Gap 11 7 - 15 mmol/L    Urea Nitrogen 19.5 8.0 - 23.0 mg/dL    Creatinine 1.14 (H) 0.51 - 0.95 mg/dL    Calcium 9.3 8.8 - 10.2 mg/dL    Glucose 98 70 - 99 mg/dL    GFR Estimate 52 (L) >60 mL/min/1.73m2       If you have any questions or concerns, please call the clinic at the number listed above.       Sincerely,      Skyler Álvarez MD

## 2023-08-29 NOTE — PROGRESS NOTES
[address] [address]  Phone: [phone]  Primary Provider: Naomi Álvarez  Pre-op Performing Provider: NAOMI ÁLVAREZ      PREOPERATIVE EVALUATION:  Today's date: 8/29/2023    Mercedes Barber is a 68 year old female who presents for a preoperative evaluation.      Surgical Information:  Surgery/Procedure: Left basilar thumb repair   Surgery Location: [facility]   Surgeon: Dr. Sy   Surgery Date: 9/14/23  Time of Surgery: TBD   Where patient plans to recover: At home with family  Fax number for surgical facility: [phone]    Assessment & Plan     The proposed surgical procedure is considered LOW risk.    Pre-operative general physical examination  To be scheduled accordingly and proceed as ordered pending blood work  - CBC with platelets; Future  - Basic metabolic panel  (Ca, Cl, CO2, Creat, Gluc, K, Na, BUN); Future  - EKG 12-lead complete w/read - Clinics      (M79.642) Pain of left hand  Comment: Routine postoperative course as scheduled.  Plan:     Benign essential hypertension  Stable on therapy and continuing with current medical management    Seizure disorder (H)  Stable without active complaints and recurrent issues.    Mild intermittent asthma without complication  Noted as baseline and continue with current medical management    Deep vein thrombosis (DVT) of upper extremity, unspecified chronicity, unspecified laterality, unspecified vein (H)  Noted as prior history.    Other sleep apnea  Patient states most recent study was negative for CPAP    Major depressive disorder, recurrent episode, moderate (H)  Offered assessment but patient states she has appointment with her psychologist.     - No identified additional risk factors other than previously addressed    Antiplatelet or Anticoagulation Medication Instructions:   - aspirin: Discontinue aspirin 7-10 days prior to procedure to reduce bleeding risk. It  should be resumed postoperatively.   Patient advised to stop all traditional anti-inflammatories and aspirin like products 7 days prior to procedure.  Also recommended to stop all herbal supplements of similar duration.    Additional Medication Instructions:  Routine medications were discussed in regards to dosing prior to procedure.    RECOMMENDATION:  APPROVAL GIVEN to proceed with proposed procedure, without further diagnostic evaluation.    Subjective       HPI related to upcoming procedure: Left basilar thumb repair    Health Care Directive:  Patient has a Health Care Directive on file      Status of Chronic Conditions:  See problem list for active medical problems.  Problems all longstanding and stable, except as noted/documented.  See ROS for pertinent symptoms related to these conditions.    Review of Systems  CONSTITUTIONAL: NEGATIVE for fever, chills, change in weight  EYES: NEGATIVE for vision changes or irritation  ENT/MOUTH: NEGATIVE for ear, mouth and throat problems  RESP: NEGATIVE for significant cough or SOB  CV: NEGATIVE for chest pain, palpitations or peripheral edema  GI: NEGATIVE for nausea, abdominal pain, heartburn, or change in bowel habits  : NEGATIVE for frequency, dysuria, or hematuria  MUSCULOSKELETAL: NEGATIVE for significant arthralgias or myalgia  NEURO: NEGATIVE for weakness, dizziness or paresthesias  ENDOCRINE: NEGATIVE for temperature intolerance, skin/hair changes  HEME: NEGATIVE for bleeding problems  PSYCHIATRIC: NEGATIVE for changes in mood or affect    Patient Active Problem List    Diagnosis Date Noted    Headache 10/14/2011     Priority: High     Problem list name updated by automated process. 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     Priority: Medium    Lumbar spondylosis 08/11/2021     Priority: Medium     Added automatically from request for surgery [id number]      Lumbar facet arthropathy 08/11/2021     Priority: Medium     Added automatically from request for surgery [id number]      Closed fracture of left wrist, initial encounter 04/30/2021     Priority: Medium    Sacroiliac joint pain 03/31/2021     Priority: Medium     Added automatically from request for surgery [id number]      Benign essential hypertension 01/25/2021     Priority: Medium    Seizure disorder (H)      Priority: Medium     25 years ago      Status post hip surgery 08/05/2020     Priority: Medium    Mechanical complication of prosthetic hip implant, initial encounter (H) 07/27/2020     Priority: Medium     Added automatically from request for surgery [id number]      History of infection 07/06/2020     Priority: Medium    History of revision of total replacement of right hip joint 07/06/2020     Priority: Medium    Low iron stores 03/06/2020     Priority: Medium    Peripheral edema 12/02/2019     Priority: Medium    Deep venous thrombosis of upper extremity (H) 10/10/2019     Priority: Medium    Infection and inflammatory reaction due to unspecified internal joint prosthesis, initial encounter (H) 10/08/2019     Priority: Medium    Cellulitis of right hip 08/29/2019     Priority: Medium    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 Pt has had a hysterectomy. Plan cotest in 1 year per provider.  12/28/18 Patient is lost  to pap tracking follow-up        Other sleep apnea 11/11/2015     Priority: Medium    Irritable bowel syndrome 09/28/2015     Priority: Medium    Other specified hypothyroidism 05/19/2015     Priority: Medium    Pain in joint involving ankle and foot 11/20/2014     Priority: Medium    Plantar fascial fibromatosis 10/07/2014     Priority: Medium    Pain in joint, lower leg 01/24/2014     Priority: Medium    Sciatica 09/23/2013     Priority: Medium    Pain in thoracic spine 02/12/2013     Priority: Medium    Lumbago 02/11/2013     Priority: Medium    Vulvar lesion 07/13/2012     Priority: Medium    Mild major depression (H) 02/21/2012     Priority: Medium    Esophageal stricture 02/21/2012     Priority: Medium    Ingrowing nail 02/02/2012     Priority: Medium    Chest pain, unspecified 12/14/2011     Priority: Medium    Repeated falls 12/14/2011     Priority: Medium    Left shoulder pain 10/15/2011     Priority: Medium     S/p surgery Aug 2011      Anemia 10/15/2011     Priority: Medium    Closed fracture of proximal end of left humerus 09/14/2011     Priority: Medium    Dysphagia 04/14/2011     Priority: Medium    CARDIOVASCULAR SCREENING; LDL GOAL LESS THAN 160 10/31/2010     Priority: Medium    Nausea 09/13/2010     Priority: Medium    Partial epilepsy with intractable epilepsy (H) 09/13/2010     Priority: Medium    Rectal prolapse 12/01/2009     Priority: Medium    Mild intermittent asthma 11/24/2009     Priority: Medium    Overactive bladder 06/29/2009     Priority: Medium    Renal anomaly 06/04/2009     Priority: Medium     Tubular sclerosis by hx      GERD (gastroesophageal reflux disease) 05/12/2009     Priority: Medium    Vulvar pain 01/16/2009     Priority: Medium    Family history of breast cancer 06/05/2008     Priority: Medium     Sister w premenopausal breast ca      Menopausal syndrome (hot flashes) 05/30/2008     Priority: Medium    Generalized anxiety disorder 08/08/2006     Priority: Medium    Major  depressive disorder, recurrent episode, moderate (H) 06/16/2006     Priority: Medium      Past Medical History:   Diagnosis Date    Antinuclear antibody (DOC) titer greater than 1:80 2/22/2023    1:160    Arthritis     Thumb    Cervical high risk HPV (human papillomavirus) test positive 07/17/2017 07/17/17: NIL pap, + HR HPV (not 16 or 18) result.     Cervical pain 09/15/2016    Chronic infection     MRSA    Constipation 08/27/2012    Diarrhea 04/30/2009    Esophageal stricture 02/21/2012    Gastro-oesophageal reflux disease     History of blood transfusion     Hypertension     Hypothyroidism 09/18/2012    IBS (irritable bowel syndrome)     Mild major depression (H) 02/21/2012    Neuropathy of lower extremity     Other sleep apnea     Rectal prolapse     Restless leg syndrome     Seizure disorder (H)     25 years ago    Sleep apnea     CPAP, no longer using CPAP    Temporal sclerosis 08/27/2012    Tuberous sclerosis (H)     Uncomplicated asthma      Past Surgical History:   Procedure Laterality Date    Anterior COLPORRHAPHY, BLADDER/VAGINA      perigee mesh    ARTHROPLASTY HIP Right 7/23/2019    Procedure: Right total hip arthroplasty;  Surgeon: Anant Mejia MD;  Location: [facility] OR    ARTHROPLASTY PATELLO-FEMORAL (KNEE) Bilateral     ARTHROPLASTY REVISION HIP Right 8/7/2019    Procedure: Right hip revision total arthroplasty;  Surgeon: Tom Antonio MD;  Location: [facility] OR    ARTHROPLASTY REVISION HIP Right 8/5/2020    Procedure: Revision Right total hip arthroplasty;  Surgeon: Pan Grey MD;  Location: [facility]    CARPAL TUNNEL RELEASE RT/LT      DENTAL SURGERY      implant    DESTRUCTION OF PARAVERTEBRAL FACET LUMBAR / SACRAL SINGLE Left 10/27/2021    Procedure: DESTRUCTION, NERVE, FACET JOINT, LUMBOSACRAL, Left L4-5 and L5-S1 facet joint radiofrequency ablation;  Surgeon: Celena Perez MD;  Location: [facility] OR    DILATION AND CURETTAGE      DRAIN PILONIDAL CYST SIMPL      ENDOSCOPY DRUG INDUCED  SLEEP N/A 11/18/2015    Procedure: ENDOSCOPY DRUG INDUCED SLEEP;  Surgeon: Park Ortiz MD;  Location: [facility] OR    ESOPHAGOSCOPY, GASTROSCOPY, DUODENOSCOPY (EGD), COMBINED  4/5/2013    Procedure: COMBINED ESOPHAGOSCOPY, GASTROSCOPY, DUODENOSCOPY (EGD), BIOPSY SINGLE OR MULTIPLE;;  Surgeon: Mundo Mehta MD;  Location:  GI    EXCISE LESION TRUNK  8/10/2012    Procedure: EXCISE LESION TRUNK;;  Surgeon: Jerald Diggs MD;  Location: [facility]    HYSTERECTOMY      INJECT BLOCK MEDIAL BRANCH CERVICAL/THORACIC/LUMBAR Bilateral 9/20/2021    Procedure: BLOCK, NERVE, FACET JOINT, MEDIAL BRANCH, DIAGNOSTIC - Bilateral L4-5 and L5-S1 Medial branch blocks;  Surgeon: Celena Perez MD;  Location: [facility] OR    INJECT BLOCK MEDIAL BRANCH CERVICAL/THORACIC/LUMBAR Bilateral 9/27/2021    Procedure: BLOCK, NERVE, FACET JOINT, MEDIAL BRANCH, DIAGNOSTIC - Bilateral L4-5 and L5-S1 Medial branch blocks;  Surgeon: Celena Perez MD;  Location: [facility] OR    INJECT SACROILIAC JOINT Bilateral 5/24/2021    Procedure: INJECTION, SACROILIAC JOINT;  Surgeon: Celena Perez MD;  Location: [facility] OR    IRRIGATION AND DEBRIDEMENT HIP, COMBINED Right 8/26/2019    Procedure: 1.  Right hip wound irrigation and debridement with excisional debridement of nonviable skin, subcutaneous tissues, and fascia. 2. Application of incisional wound VAC, 27cm length incision.;  Surgeon: Tyson Prabhakar MD;  Location: [facility]    L shoulder fracture      OPEN REDUCTION INTERNAL FIXATION WRIST Left 4/30/2021    Procedure: Open reduction with internal fixation of displaced left intraarticular distal radius fracture;  Surgeon: Luis Manuel Sánchez MD;  Location: [facility] OR    rectal prolapse repair abdominally      RELEASE PLANTAR FASCIA Right 1/20/2015    Procedure: RELEASE PLANTAR FASCIA;  Surgeon: Maicol Liu DPM;  [facility]    REMOVE MESH VAGINA  8/10/2012    Procedure: REMOVE MESH VAGINA;  Excision of Vaginal Mesh Exposure, Removal of  Skin Tag Left Inner Leg;  Surgeon: Jerald Diggs MD;  Location: [facility]    REPAIR HAMMER TOE Right 1/20/2015    Procedure: REPAIR HAMMER TOE;  Surgeon: Maicol Liu DPM;  Location: [facility]    TONSILLECTOMY & ADENOIDECTOMY      TUBAL LIGATION       Current Outpatient Medications   Medication Sig Dispense Refill    albuterol (ALBUTEROL) 108 (90 BASE) MCG/ACT Inhaler Inhale 2 puffs into the lungs 4 times daily as needed for shortness of breath / dyspnea or wheezing 1 Inhaler 0    alendronate (FOSAMAX) 70 MG tablet TAKE 1 TABLET EVERY 7 DAYS 12 tablet 2    amLODIPine (NORVASC) 5 MG tablet Take 1 tablet (5 mg) by mouth daily 90 tablet 1    amoxicillin (AMOXIL) 500 MG tablet Take 4 tablets (2000 mg) by mouth 1 hour before dental procedures/cleanings. 4 tablet 4    aspirin 81 MG EC tablet Take 81 mg by mouth daily      budesonide-formoterol (SYMBICORT) 160-4.5 MCG/ACT Inhaler Inhale 2 puffs into the lungs 2 times daily      carboxymethylcellulose PF (REFRESH PLUS) 0.5 % ophthalmic solution Place 1 drop into both eyes 2 times daily      cycloSPORINE (RESTASIS) 0.05 % ophthalmic emulsion Place 1 drop into both eyes 2 times daily       Dentifrices (BIOTENE DRY MOUTH DT) Apply 2 sprays to affected area 3 times daily as needed       Estradiol-Estriol-Progesterone (BIEST/PROGESTERONE TD) Place 0.5 g onto the skin nightly as needed      FLUoxetine (PROZAC) 20 MG capsule       fluticasone-vilanterol (BREO ELLIPTA) 200-25 MCG/INH inhaler Inhale 1 puff into the lungs daily      furosemide (LASIX) 20 MG tablet Take 1 tablet (20 mg) by mouth daily 90 tablet 0    hypromellose (ARTIFICIAL TEARS) 0.5 % SOLN ophthalmic solution Place 1 drop into both eyes 2 times daily      ibuprofen (ADVIL/MOTRIN) 600 MG tablet Take 600 mg by mouth every 6 hours as needed for moderate pain      lacosamide (VIMPAT) 200 MG TABS tablet Take 1 tablet (200 mg) by mouth 2 times daily 30 tablet 0    levothyroxine (SYNTHROID/LEVOTHROID) 88 MCG tablet Take 1  tablet (88 mcg) by mouth daily 90 tablet 3    linaclotide (LINZESS) 290 MCG capsule Take 1 capsule (290 mcg) by mouth every morning (before breakfast) 10 capsule 0    loratadine (CLARITIN REDITABS) 10 MG ODT Take 1 tablet (10 mg) by mouth daily as needed for allergies      mirtazapine (REMERON) 15 MG tablet       omeprazole (PRILOSEC) 40 MG DR capsule Take 1 capsule (40 mg) by mouth 2 times daily 180 capsule 0    ondansetron (ZOFRAN-ODT) 4 MG ODT tab Take 1 tablet (4 mg) by mouth every 6 hours as needed for nausea or vomiting 20 tablet 0    potassium citrate (UROCIT-K) 10 MEQ (1080 MG) CR tablet Take 1 tablet (10 mEq) by mouth daily 90 tablet 1    pramipexole (MIRAPEX) 1 MG tablet Give 1 tablet by mouth in the morning and 3 tablets by mouth 3 hours prior to bedtime      Suvorexant (BELSOMRA) 5 MG tablet Take 5 mg by mouth At Bedtime      topiramate (TOPAMAX) 50 MG tablet Take 2 tablets by mouth At Bedtime      traZODone (DESYREL) 150 MG tablet 150 mg At Bedtime       triamcinolone (KENALOG) 0.1 % external cream APPLY TOPICALLY 3 TIMES    DAILY. 30 g 0    trolamine salicylate (ASPERCREME) 10 % external cream Apply topically 2 times daily as needed for moderate pain To right hip incision area         Allergies   Allergen Reactions    Blood Transfusion Related (Informational Only) Other (See Comments)     Patient has a history of a clinically significant antibody against RBC antigens.  A delay in compatible RBCs may occur.    Pregabalin      Other reaction(s): Weight gain    Rotigotine      Other reaction(s): Worsening RLS; wt gain    Zolpidem      Other reaction(s): Hx of Parasomnias; should not be on Ambien    Iron      Other reaction(s): Constipation    Budesonide      Edema    Bupropion Rash    Carbamazepine Diarrhea    Clonazepam Other (See Comments)     Hypotension, trouble walking, mind disturbance    Encort [Hydrocortisone Acetate] Swelling     Localized to feet    Encort [Hydrocortisone] Swelling     "Erythromycin Diarrhea    Erythromycin Nausea and Vomiting and Diarrhea    Flagyl [Metronidazole] Nausea    Gabapentin Other (See Comments)     Causes over-eating    Lamictal [Lamotrigine] Dizziness    Oxycodone Nausea and Vomiting    Tegretol [Carbamazepine And Analogs] Nausea and Vomiting        Social History     Tobacco Use    Smoking status: Never    Smokeless tobacco: Never   Substance Use Topics    Alcohol use: Yes     Comment: 1 glass of wine 2x/yr       History   Drug Use No         Objective     /68   Pulse 60   Temp 97.3  F (36.3  C) (Temporal)   Resp 15   Ht 1.651 m (5' 5\")   Wt 84.6 kg (186 lb 8 oz)   LMP 03/11/2010   SpO2 100%   BMI 31.04 kg/m      Physical Exam    GENERAL APPEARANCE: healthy, alert and no distress     EYES: EOMI, PERRL     HENT: ear canals and TM's normal and nose and mouth without ulcers or lesions     NECK: no adenopathy, no asymmetry, masses, or scars and thyroid normal to palpation     RESP: lungs clear to auscultation - no rales, rhonchi or wheezes     CV: regular rates and rhythm, normal S1 S2, no S3 or S4 and no murmur, click or rub     ABDOMEN:  soft, nontender, no HSM or masses and bowel sounds normal     NEURO: No focal changes     Pain noted base of left thumb     PSYCH: mentation appears normal. and affect normal/bright    Recent Labs   Lab Test 06/21/23  1646 02/01/23  1010 04/07/22  0941   HGB 12.2  --  12.1     --  286   NA  --  140 141   POTASSIUM  --  3.7 3.7   CR  --  0.99* 0.74        Diagnostics:  Labs pending at this time.  Results will be reviewed when available.   EG demonstrates a normal sinus rhythm with a bradycardic rate of 55 bpm.  There are no acute changes noted per baseline EKG and comparison EKG noted 2/9/2022.  That EKG did demonstrate premature atrial contractions which are not present on current EKG.    Revised Cardiac Risk Index (RCRI):  The patient has the following serious cardiovascular risks for perioperative complications:   " - No serious cardiac risks = 0 points     RCRI Interpretation: 1 point: Class II (low risk - 0.9% complication rate)         Signed Electronically by: Skyler Álvarez MD  Copy of this evaluation report is provided to requesting physician, Dr. Sy.    Answers submitted by the patient for this visit:  Patient Health Questionnaire (Submitted on 8/29/2023)  If you checked off any problems, how difficult have these problems made it for you to do your work, take care of things at home, or get along with other people?: Very difficult  PHQ9 TOTAL SCORE: 20

## 2023-08-31 NOTE — PROGRESS NOTES
Pre op physical, labs and EKG manually faxed to [facility] at [phone].

## 2023-09-02 DIAGNOSIS — E03.9 ACQUIRED HYPOTHYROIDISM: ICD-10-CM

## 2023-09-07 RX ORDER — LEVOTHYROXINE SODIUM 88 MCG
88 TABLET ORAL DAILY
Qty: 90 TABLET | Refills: 3 | OUTPATIENT
Start: 2023-09-07

## 2023-09-20 ENCOUNTER — HOSPITAL ENCOUNTER (EMERGENCY)
Facility: CLINIC | Age: 68
Discharge: HOME | End: 2023-09-20
Attending: EMERGENCY MEDICINE | Admitting: EMERGENCY MEDICINE
Payer: MEDICARE

## 2023-09-20 ENCOUNTER — MYC MEDICAL ADVICE (OUTPATIENT)
Dept: ENDOCRINOLOGY | Facility: CLINIC | Age: 68
End: 2023-09-20

## 2023-09-20 ENCOUNTER — NURSE TRIAGE (OUTPATIENT)
Dept: INTERNAL MEDICINE | Facility: CLINIC | Age: 68
End: 2023-09-20
Payer: MEDICARE

## 2023-09-20 VITALS
RESPIRATION RATE: 16 BRPM | SYSTOLIC BLOOD PRESSURE: 131 MMHG | HEART RATE: 66 BPM | TEMPERATURE: 97.3 F | DIASTOLIC BLOOD PRESSURE: 81 MMHG | OXYGEN SATURATION: 98 %

## 2023-09-20 DIAGNOSIS — R30.0 DYSURIA: Primary | ICD-10-CM

## 2023-09-20 DIAGNOSIS — E03.9 ACQUIRED HYPOTHYROIDISM: ICD-10-CM

## 2023-09-20 LAB
ALBUMIN UR-MCNC: NEGATIVE MG/DL
APPEARANCE UR: CLEAR
BILIRUB UR QL STRIP: NEGATIVE
COLOR UR AUTO: ABNORMAL
GLUCOSE UR STRIP-MCNC: NEGATIVE MG/DL
HGB UR QL STRIP: NEGATIVE
KETONES UR STRIP-MCNC: NEGATIVE MG/DL
LEUKOCYTE ESTERASE UR QL STRIP: NEGATIVE
MUCOUS THREADS #/AREA URNS LPF: PRESENT /LPF
NITRATE UR QL: NEGATIVE
PH UR STRIP: 5 [PH] (ref 5–7)
RBC URINE: 0 /HPF
SP GR UR STRIP: 1.01 (ref 1–1.03)
SQUAMOUS EPITHELIAL: 1 /HPF
UROBILINOGEN UR STRIP-MCNC: NORMAL MG/DL
WBC URINE: <1 /HPF

## 2023-09-20 PROCEDURE — 99283 EMERGENCY DEPT VISIT LOW MDM: CPT

## 2023-09-20 PROCEDURE — 81001 URINALYSIS AUTO W/SCOPE: CPT | Performed by: EMERGENCY MEDICINE

## 2023-09-20 ASSESSMENT — ACTIVITIES OF DAILY LIVING (ADL): ADLS_ACUITY_SCORE: 33

## 2023-09-20 NOTE — ED PROVIDER NOTES
History     Chief Complaint:  Dysuria       The history is provided by the patient.      Mercedes Barber is a 68 year old female with history of IBS, hysterectomy, and bladder surgery who presents to the ED with dysuria. The patient presents to the ED with diarrhea all day yesterday and dysuria. She states that she is experiencing abdominal pain. She adds that she does experience blood when she wipes but that this is from her hemorrhoids. She reports that she has only had 6 hours of sleep within 3 days due to insomnia. She denies fever, blood in stool, or antidiarrheal medication use.She states that she is currently waiting from a urine specimen result that she took today. She states that she recently had surgery on her left arm. She confirms that she still has her appendix and gallbladder.     Independent Historian:   None - Patient Only    Medications:    Lacosamide  Linaclotide  Albuterol  Ibuprofen  Omeprazole  Symbicort   Amoxicillin  Aspirin 81 mg   Fluticasone furoate-vilanteroL   Sennosides-docusate   Pramipexole  Ondansetron  Amlodipine   Furosemide  Benzonatate  Hydrocodone-acetaminophen   Potassium chloride   Alendronate  Ascorbic acid   Levothyroxine  Potassium citrate   Celecoxib  Meloxicam  Trazodone  Fluoxetine     Past Medical History:    Antinuclear antibody (DOC) titer greater than 1:80  Arthritis  Cervical high risk HPV (human papillomavirus) test positive  Chronic infection  Cellulitis of right hip   Esophageal stricture  Gastro-oesophageal reflux disease  History of blood transfusion  Hypertension  Hypothyroidism  IBS (irritable bowel syndrome)  Mild major depression (H)  Neuropathy of lower extremity  Other sleep apnea  Rectal prolapse  Restless leg syndrome  Seizure disorder (H)  Sleep apnea  Temporal sclerosis  Tuberous sclerosis (H)  Uncomplicated asthma  Lumbago   GERD  Graves disease   DVT   Anemia   PTSD (post-traumatic stress disorder)   Intermittent Laryngospasm   Dysthymic  disorder   Tuberous sclerosis   Infection and inflammatory reaction due to unspecified internal joint prosthesis, initial encounter   Myoneural disorder (CMS-HCC)     Past Surgical History:    Destruction of paravertebral facet lumbar / sacral single (Left)   Inject block medial branch cervical/thoracic/lumbar (Bilateral)   Inject block medial branch cervical/thoracic/lumbar (Bilateral)   Inject sacroiliac joint (Bilateral)  Open reduction internal fixation wrist (Left)   Arthroplasty revision hip (Right)   Irrigation and debridement hip, combined (Right)   Arthroplasty revision hip (Right)   Arthroplasty hip (Right)   Endoscopy drug induced sleep (N/A)   Release plantar fascia (Right)   Repair hammer toe (Right)   Esophagoscopy, gastroscopy, duodenoscopy (egd), combined   Remove mesh vagina   Excise lesion trunk   Anterior COLPORRHAPHY, BLADDER/VAGINA [Other]   Arthroplasty patello-femoral (knee) (Bilateral)  Carpal tunnel release rt/lt  Dental surgery  Dilation and curettage  Drain pilonidal cyst simple  Hysterectomy  L shoulder fracture [Other]  rectal prolapse repair abdominally [Other]  Tonsillectomy & adenoidectomy  Tubal ligation      Physical Exam   Patient Vitals for the past 24 hrs:   BP Temp Temp src Pulse Resp SpO2   09/20/23 1353 131/81 97.3  F (36.3  C) Temporal 66 16 98 %        Physical Exam  General: Alert, appears well-developed and well-nourished. Cooperative.     In no acute distress  HEENT:  Head:  Atraumatic  Ears:  External ears are normal  Mouth/Throat:  Oropharynx is without erythema or exudate and mucous membranes are moist.   Eyes:   Conjunctivae normal and EOM are normal. No scleral icterus.  CV:  Normal rate, regular rhythm, normal heart sounds and radial pulses are 2+ and symmetric.  No murmur.  Resp:  Breath sounds are clear bilaterally    Non-labored, no retractions or accessory muscle use  GI:  Abdomen is soft, no distension, no tenderness. No rebound or guarding.  No CVA tenderness  bilaterally  MS:  Normal range of motion. No edema.    Normal strength in all 4 extremities.     Back atraumatic.    No midline cervical, thoracic, or lumbar tenderness  Skin:  Warm and dry.  No rash or lesions noted.  Neuro:   Alert. Normal strength.  GCS: 15  Psych: Normal mood and affect.    Emergency Department Course   Laboratory:  Labs Ordered and Resulted from Time of ED Arrival to Time of ED Departure   ROUTINE UA WITH MICROSCOPIC REFLEX TO CULTURE - Abnormal       Result Value    Color Urine Straw      Appearance Urine Clear      Glucose Urine Negative      Bilirubin Urine Negative      Ketones Urine Negative      Specific Gravity Urine 1.007      Blood Urine Negative      pH Urine 5.0      Protein Albumin Urine Negative      Urobilinogen Urine Normal      Nitrite Urine Negative      Leukocyte Esterase Urine Negative      Mucus Urine Present (*)     RBC Urine 0      WBC Urine <1      Squamous Epithelials Urine 1          Emergency Department Course & Assessments:           Interventions:  Medications - No data to display     Independent Interpretation (X-rays, CTs, rhythm strip):  None    Assessments/Consultations/Discussion of Management or Tests:  ED Course as of 09/20/23 1610   Wed Sep 20, 2023   1503 I briefly obtained history and examined the patient in triage.        Social Determinants of Health affecting care:   None    Disposition:  The patient was discharged to home.     Impression & Plan    Medical Decision Making:  Patient is a 68-year-old female with a history of IBS who presents with dysuria.  Thankfully urinalysis shows no sign of infection.  No significant abdominal pain on physical examination.  Low concern for acute urinary retention.  Normal vital signs.  She did have diarrhea for 1 day but has not yet tried any antidiarrheal medications.  We discussed potentially obtaining laboratory work to evaluate for potential electrolyte abnormalities although very low concern for this given only 1  day of diarrheal symptoms.  We will plan to trial over-the-counter antidiarrheals and closely follow-up with primary care provider.  Certainly if she develops fever vomiting or any new abdominal discomfort she will return to the emergency department for repeat evaluation.  Close follow-up encouraged with primary care.  After all questions answered, discharged home.      Diagnosis:    ICD-10-CM    1. Dysuria  R30.0            Discharge Medications:  Discharge Medication List as of 9/20/2023  3:10 PM             Scribe Disclosure:  Gricel DREW, am serving as a scribe at 3:41 PM on 9/20/2023 to document services personally performed by Colton Nation MD  based on my observations and the provider's statements to me.     9/20/2023   Colton Nation MD White, Scott, MD  09/20/23 2022

## 2023-09-20 NOTE — TELEPHONE ENCOUNTER
"Nurse Triage SBAR    Is this a 2nd Level Triage? NO    Situation: Pt called the clinic reporting urinary retention.     Background: Pt had hand surgery on 9/14/23, and was constipated after taking oxycodone. She took miralax, and has since been having liquid stools. She states it's hard to tell if she has urinated or not because of the liquid stools, but the last time she is sure she urinated was 9/18/23.     Assessment: Pt denies any pain or flank discomfort. Denies a fever. Reports she is very fatigued and has been unable to sleep through the night. Pt stated she feels like she needs to urinate, but cannot. In the past 2 days, she has taken miralax, oxycodone, tylenol, and Advil. She last took Oxycodone at 1am this morning. She states she has gained 8 lbs since her surgery, but doesn't know why since she is having frequent stools.     Protocol Recommended Disposition:   Go to ED Now    Recommendation: Pt advised to go to the ED now. States she will go, but needs to call a friend to bring her there since she cannot drive after her surgery. Pt advised to call 911 if unable to find a ride, pt declined and stated her friend will be in town by 11am and can bring her to [facility] in [place].     Does the patient meet one of the following criteria for ADS visit consideration? 16+ years old, with an [facility] PCP     1. SYMPTOM: \"What's the main symptom you're concerned about?\" (e.g., frequency, incontinence)      Urinary retention   2. ONSET: \"When did the  retention  start?\"      9/18  3. PAIN: \"Is there any pain?\" If Yes, ask: \"How bad is it?\" (Scale: 1-10; mild, moderate, severe)      No pain  4. CAUSE: \"What do you think is causing the symptoms?\"      Medications post surgery   5. OTHER SYMPTOMS: \"Do you have any other symptoms?\" (e.g., blood in urine, fever, flank pain, pain with urination)      Denies fever or flank pain, pt states she feels like she needs to go but she can't   6. PREGNANCY: \"Is there any " "chance you are pregnant?\" \"When was your last menstrual period?\"      N/A    Reason for Disposition   Unable to urinate (or only a few drops) > 4 hours and bladder feels very full (e.g., palpable bladder or strong urge to urinate)    Additional Information   Negative: Shock suspected (e.g., cold/pale/clammy skin, too weak to stand, low BP, rapid pulse)   Negative: Sounds like a life-threatening emergency to the triager   Negative: Followed a female genital area injury (e.g., labia, vagina, vulva)   Negative: Followed a male genital area injury (penis, scrotum)   Negative: Vaginal discharge   Negative: Pus (white, yellow) or bloody discharge from end of penis   Negative: Pain or burning with passing urine (urination) and pregnant   Negative: Pain or burning with passing urine (urination) and female   Negative: Pain or burning with passing urine (urination) and male   Negative: Pain or itching in the vulvar area   Negative: Pain in scrotum is main symptom   Negative: Blood in the urine is main symptom   Negative: Symptoms arising from use of a urinary catheter (e.g., Coude, Vieyra)    Protocols used: Urinary Symptoms-A-OH  Thank you,  Ynes Rhodes RN    "

## 2023-09-20 NOTE — ED TRIAGE NOTES
Patient reports having diarrhea yesterday throughout the day. Today she states that she feels like she has to urinate, but is unable to do so. She is able to urinate, but only in small amounts. Called clinic and they instructed her to come to ER.

## 2023-09-21 ENCOUNTER — PATIENT OUTREACH (OUTPATIENT)
Dept: INTERNAL MEDICINE | Facility: CLINIC | Age: 68
End: 2023-09-21
Payer: MEDICARE

## 2023-09-21 RX ORDER — LEVOTHYROXINE SODIUM 88 UG/1
88 TABLET ORAL DAILY
Qty: 90 TABLET | Refills: 0 | Status: SHIPPED | OUTPATIENT
Start: 2023-09-21 | End: 2023-11-20

## 2023-09-21 NOTE — TELEPHONE ENCOUNTER
"Pt requesting refill for: Estradiol-Estriol-Progesterone (BIEST/PROGESTERONE TD) d/t \"dryness\"    Unable to pend medication. She would like it sent to the [facility] pharmacy.     Thank you,  Ynes Rhodes RN            ED/Discharge Protocol    \"Hi, my name is Ynes Rhodes RN, a registered nurse, and I am calling on behalf of Dr. Álvarez's office at [place] [facility].  I am calling to follow up and see how things are going for you after your recent visit.\"    \"I see that you were in the (ER/UC/IP) on 9/20/23.    How are you doing now that you are home?\"   \"Doing okay\", having a hard time getting around since surgery on her hand    Is patient experiencing symptoms that may require a hospital visit?  Pt does not have any symptoms currently, but is having trouble sleeping (2-3 hrs/night), will contact sleep doctor     Discharge Instructions    \"Let's review your discharge instructions.  What is/are the follow-up recommendations?  Pt. Response: None    \"Were you instructed to make a follow-up appointment?\"  Pt. Response: No.       \"When you see the provider, I would recommend that you bring your discharge instructions with you.    Medications    \"How many new medications are you on since your hospitalization/ED visit?\"    0-1  \"How many of your current medicines changed (dose, timing, name, etc.) while you were in the hospital/ED visit?\"   0-1  \"Do you have questions about your medications?\"   No  \"Were you newly diagnosed with heart failure, COPD, diabetes or did you have a heart attack?\"   No  For patients on insulin: \"Did you start on insulin in the hospital or did you have your insulin dose changed?\"   No  Was MTM referral placed (*Make sure to put transitions as reason for referral)?   No    Call Summary    \"Do you have any questions or concerns about your condition or care plan at the moment?\"    No  Triage nurse advice given: Call the clinic back with any new or worsening concerns.     Patient was in ER 1 in " "the past year (assess appropriateness of ER visits.)      \"If you have questions or things don't continue to improve, we encourage you contact us through the main clinic number,  [phone].  Even if the clinic is not open, triage nurses are available 24/7 to help you.     We would like you to know that our clinic has extended hours (provide information).  We also have urgent care (provide details on closest location and hours/contact info)\"    Thank you,  Ynes Rhodes RN        "

## 2023-09-21 NOTE — TELEPHONE ENCOUNTER
Called and spoke with pt to relay provider below.     Pt verbalized understanding and will request this medication from the original prescriber.     Thank you,  Ynes Rhodes RN

## 2023-09-21 NOTE — TELEPHONE ENCOUNTER
levothyroxine (SYNTHROID/LEVOTHROID) 88 MCG tablet     90 tablet 3 11/11/2022 11/11/2022  [facility] Odalys Ha MD  Endocrinology, Diabetes, and Metabolism       Nv:  11/20/23

## 2023-09-21 NOTE — TELEPHONE ENCOUNTER
What type of discharge? Emergency Department  Risk of Hospital admission or ED visit: 68%  Is a TCM episode required? No  When should the patient follow up with PCP? RAÚL Art RN  [facility]

## 2023-09-30 ENCOUNTER — LAB (OUTPATIENT)
Dept: LAB | Facility: CLINIC | Age: 68
End: 2023-09-30
Payer: MEDICARE

## 2023-09-30 DIAGNOSIS — E03.9 ACQUIRED HYPOTHYROIDISM: ICD-10-CM

## 2023-09-30 PROCEDURE — 84443 ASSAY THYROID STIM HORMONE: CPT

## 2023-09-30 PROCEDURE — 36415 COLL VENOUS BLD VENIPUNCTURE: CPT

## 2023-10-01 LAB — TSH SERPL DL<=0.005 MIU/L-ACNC: 3.79 UIU/ML (ref 0.3–4.2)

## 2023-10-06 ENCOUNTER — TRANSFERRED RECORDS (OUTPATIENT)
Dept: HEALTH INFORMATION MANAGEMENT | Facility: CLINIC | Age: 68
End: 2023-10-06
Payer: MEDICARE

## 2023-10-23 ENCOUNTER — TRANSFERRED RECORDS (OUTPATIENT)
Dept: HEALTH INFORMATION MANAGEMENT | Facility: CLINIC | Age: 68
End: 2023-10-23
Payer: MEDICARE

## 2023-10-26 ENCOUNTER — DOCUMENTATION ONLY (OUTPATIENT)
Dept: ENDOCRINOLOGY | Facility: CLINIC | Age: 68
End: 2023-10-26

## 2023-10-26 NOTE — PROGRESS NOTES
[employer] requesting Urgent requisition for at home Immunodeficiency testing.     Writer reached out to company left voice mail for them to call back if we can wait till provider is back at this office on 11/03/2023.     Form as 2 signature requests patient and provider.       Placed in Providers Friday's folder for Ilya's next clinic day.     Jessica Wick CMA  Adult Endocrinology   [facility]

## 2023-11-02 ENCOUNTER — IMMUNIZATION (OUTPATIENT)
Dept: INTERNAL MEDICINE | Facility: CLINIC | Age: 68
End: 2023-11-02
Payer: MEDICARE

## 2023-11-02 PROCEDURE — 90662 IIV NO PRSV INCREASED AG IM: CPT

## 2023-11-02 PROCEDURE — G0008 ADMIN INFLUENZA VIRUS VAC: HCPCS

## 2023-11-07 ENCOUNTER — TRANSFERRED RECORDS (OUTPATIENT)
Dept: HEALTH INFORMATION MANAGEMENT | Facility: CLINIC | Age: 68
End: 2023-11-07
Payer: MEDICARE

## 2023-11-20 ENCOUNTER — VIRTUAL VISIT (OUTPATIENT)
Dept: ENDOCRINOLOGY | Facility: CLINIC | Age: 68
End: 2023-11-20
Payer: MEDICARE

## 2023-11-20 DIAGNOSIS — M85.80 OSTEOPENIA, UNSPECIFIED LOCATION: ICD-10-CM

## 2023-11-20 DIAGNOSIS — Z78.0 MENOPAUSE: ICD-10-CM

## 2023-11-20 DIAGNOSIS — E03.9 ACQUIRED HYPOTHYROIDISM: Primary | ICD-10-CM

## 2023-11-20 PROCEDURE — 99214 OFFICE O/P EST MOD 30 MIN: CPT | Mod: 95 | Performed by: INTERNAL MEDICINE

## 2023-11-20 RX ORDER — LEVOTHYROXINE SODIUM 100 UG/1
TABLET ORAL
Qty: 80 TABLET | Refills: 4 | Status: SHIPPED | OUTPATIENT
Start: 2023-11-20 | End: 2023-12-07

## 2023-11-20 RX ORDER — LORAZEPAM 0.5 MG/1
TABLET ORAL
COMMUNITY
Start: 2023-10-23

## 2023-11-20 RX ORDER — ALENDRONATE SODIUM 70 MG/1
TABLET ORAL
Qty: 12 TABLET | Refills: 4 | Status: SHIPPED | OUTPATIENT
Start: 2023-11-20

## 2023-11-20 RX ORDER — HYDROXYZINE PAMOATE 25 MG/1
CAPSULE ORAL
COMMUNITY

## 2023-11-20 RX ORDER — BENZONATATE 200 MG/1
CAPSULE ORAL
COMMUNITY
Start: 2023-09-17

## 2023-11-20 NOTE — NURSING NOTE
Patient notes taking following medications:   Lorazepam 1 mg per patient  Restasis eye drops twice a day, alternates with another eye drop     Vitmain D, Vitamin C  Benadryl for itching   Hydrocortisone cream   Patient notes using cream for dry skin (unknown what kind it is)      Patient notes concern with eyes. Seeing doctor at the end of November 2023.     Is the patient currently in the state of [place]? YES    Visit mode:VIDEO    If the visit is dropped, the patient can be reconnected by: VIDEO VISIT: Text to cell phone:   Telephone Information:   Mobile [phone]       Will anyone else be joining the visit? NO not that pt mentioned during rooming process call  (If patient encounters technical issues they should call [phone])    How would you like to obtain your AVS? MyChart    Are changes needed to the allergy or medication list? Yes See note above , patient declined allergy review today. She however noted no changes with allergies.    Reason for visit: RECHECK (Patient has concerns about sleep and it is an ongoing issue. )    Rachel CARMEN

## 2023-11-20 NOTE — PATIENT INSTRUCTIONS
Calcium citrate 900 mg once a day either with lunch dinner or bedtime    Increase Levothyroxine 100 mcg with quater Monday - Saturday    Please do DXA scan soon    Lab check in 6 and in 12 months

## 2023-11-20 NOTE — PROGRESS NOTES
[facility]    Mercedes Barber is a 68 year old female who is being evaluated via a billable telephone visit.        Duration of Telephone interaction:20 min      Mercedes Barber complains of    Chief Complaint   Patient presents with    RECHECK     Patient has concerns about sleep and it is an ongoing issue.        I have reviewed and updated the patient's Past Medical History, Social History, Family History and Medication List.    ALLERGIES  Blood transfusion related (informational only), Pregabalin, Rotigotine, Zolpidem, Iron, Budesonide, Bupropion, Carbamazepine, Clonazepam, Encort [hydrocortisone acetate], Encort [hydrocortisone], Erythromycin, Erythromycin, Flagyl [metronidazole], Gabapentin, Lamictal [lamotrigine], Oxycodone, and Tegretol [carbamazepine and analogs]            [facility]           Follow up for thyrotoxicosis and osteoporosis      Assessment/Plan   Graves disease  Elderly woman with thyrotoxicosis with suppressed TSH increased free T4 and total T3 and increase TSI.  Thyroid uptake and scan is homogeneously increased.  Overall most consistent with Graves' disease.  Treatment is indicated as patient has overt thyrotoxicosis.    Tired, only sleeping 2 hours.   Patient s/p radioiodine treatment, now on levothyroxine.  TSH levels had been somewhat variable, most recent elevated.  Clinically hypothyroid with inability to lose weight and fatigue.  Fatigue might be related to low blood pressure, review to contact primary care to adjust blood pressure medications     Increase levothyroxine from 88 mcg     Osteoporosis  Patient has been on alendronate 70 mg once a day.  Obtain x-ray of femur on the right due to new thigh pain to rule out atypical femur fracture    Follow-up with me, patient prefers Jennie Caraballo MD  Endocrinology and Diabetes  Telephone contact:  [facility] [place] [phone]  [facility]  Jeremy Henriquez [phone]        Interval history  1y follow up    Elderly woman with history of Graves' disease, osteoporosis    Thyroid  Radioiodine treatment with 16.1 my millicuries iodine on June 30, 2022    Patient has been on levothyroxine 75 mcg daily    Patient was concerned about her weight gain and a nutritional referral was placed on 9/16/2022, has been avoiding carbohydrates, able to maintain prior weight loss, unable to achieve additional weight loss      Symptoms of hypo- and hyperthyroidism:  Weight change none; heat or cold intolerance no; abnormal bowel movements no; hair loss no, change in skin pattern no; anxietyno, depression no; fatigue YES; heart racing no; tremors no; menses na    Osteoporosis  - teeth well  - weight stable, abdominal dyscomfort no  - since last visit falls no, fractures no,    Most recen DXA scan 9/1/2021  T-score -2.3 left fem neck    Osteoporosis medication since last visit: Alendronate 70 mg weekly since 9/2021    Calcium supplement no  Vitamin D: no  Protein intake good  Physical activity: limited due to dizziness        Current Problem List:   Patient Active Problem List   Diagnosis    Menopausal syndrome (hot flashes)    Family history of breast cancer    Vulvar pain    Renal anomaly    Overactive bladder    Rectal prolapse    CARDIOVASCULAR SCREENING; LDL GOAL LESS THAN 160    Dysphagia    Confusion    Headache    Left shoulder pain    Anemia    Mild major depression (H)    Esophageal stricture    Vulvar lesion    Lumbago    Pain in thoracic spine    Sciatica    Pain in joint, lower leg    Plantar fascial fibromatosis    Pain in joint involving ankle and foot    Other specified hypothyroidism    GERD (gastroesophageal reflux disease)    Irritable bowel syndrome    Other sleep apnea    Cervical high risk HPV (human papillomavirus) test positive    Restless legs syndrome (RLS)    History of total hip arthroplasty, right    Hip pain    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)    Cellulitis of right hip    Deep venous thrombosis of upper extremity (H)    Peripheral edema    Low iron stores    Mechanical complication of prosthetic hip implant, initial encounter     Status post hip surgery    Chest pain, unspecified    Closed fracture of proximal end of left humerus    Generalized anxiety disorder    History of infection    Infection and inflammatory reaction due to unspecified internal joint prosthesis, initial encounter (H24)    Ingrowing nail    Major depressive disorder, recurrent episode, moderate (H)    Mild intermittent asthma    Nausea    Partial epilepsy with intractable epilepsy (H)    Repeated falls    History of revision of total replacement of right hip joint    Seizure disorder (H)    Benign essential hypertension    Sacroiliac joint pain    Closed fracture of left wrist, initial encounter    Lumbar spondylosis    Lumbar facet arthropathy    Tuberous sclerosis (H)    Hyperthyroidism    Graves disease    Arthritis of carpometacarpal (CMC) joint of left thumb    Antinuclear antibody (DOC) titer greater than 1:80                 Past Medical and Past Surgical History:  Past Medical History:   Diagnosis Date    Antinuclear antibody (DOC) titer greater than 1:80 2/22/2023    1:160    Arthritis     Thumb    Cervical high risk HPV (human papillomavirus) test positive 07/17/2017 07/17/17: NIL pap, + HR HPV (not 16 or 18) result.     Cervical pain 09/15/2016    Chronic infection     MRSA    Constipation 08/27/2012    Diarrhea 04/30/2009    Esophageal stricture 02/21/2012    Gastro-oesophageal reflux disease     History of blood transfusion     Hypertension     Hypothyroidism 09/18/2012    IBS (irritable bowel syndrome)     Mild major depression (H) 02/21/2012    Neuropathy of lower extremity     Other sleep apnea     Rectal prolapse     Restless leg syndrome     Seizure disorder (H)     25 years ago    Sleep apnea     CPAP, no longer using CPAP    Temporal  sclerosis 08/27/2012    Tuberous sclerosis (H)     Uncomplicated asthma        Past Surgical History:   Procedure Laterality Date    Anterior COLPORRHAPHY, BLADDER/VAGINA      perigee mesh    ARTHROPLASTY HIP Right 7/23/2019    Procedure: Right total hip arthroplasty;  Surgeon: Anant Mejia MD;  Location: [facility]    ARTHROPLASTY PATELLO-FEMORAL (KNEE) Bilateral     ARTHROPLASTY REVISION HIP Right 8/7/2019    Procedure: Right hip revision total arthroplasty;  Surgeon: Tom Antonio MD;  Location: [facility] OR    ARTHROPLASTY REVISION HIP Right 8/5/2020    Procedure: Revision Right total hip arthroplasty;  Surgeon: Pan Grey MD;  [facility]: [facility]    CARPAL TUNNEL RELEASE RT/LT      DENTAL SURGERY      implant    DESTRUCTION OF PARAVERTEBRAL FACET LUMBAR / SACRAL SINGLE Left 10/27/2021    Procedure: DESTRUCTION, NERVE, FACET JOINT, LUMBOSACRAL, Left L4-5 and L5-S1 facet joint radiofrequency ablation;  Surgeon: Celena Perez MD;  Location: [facility] OR    DILATION AND CURETTAGE      DRAIN PILONIDAL CYST SIMPL      ENDOSCOPY DRUG INDUCED SLEEP N/A 11/18/2015    Procedure: ENDOSCOPY DRUG INDUCED SLEEP;  Surgeon: Park Ortiz MD;  Location: [facility]    ESOPHAGOSCOPY, GASTROSCOPY, DUODENOSCOPY (EGD), COMBINED  4/5/2013    Procedure: COMBINED ESOPHAGOSCOPY, GASTROSCOPY, DUODENOSCOPY (EGD), BIOPSY SINGLE OR MULTIPLE;;  Surgeon: Mundo Mehta MD;  Location:  GI    EXCISE LESION TRUNK  8/10/2012    Procedure: EXCISE LESION TRUNK;;  Surgeon: Jerald Diggs MD;  Location: [facility]    HYSTERECTOMY      INJECT BLOCK MEDIAL BRANCH CERVICAL/THORACIC/LUMBAR Bilateral 9/20/2021    Procedure: BLOCK, NERVE, FACET JOINT, MEDIAL BRANCH, DIAGNOSTIC - Bilateral L4-5 and L5-S1 Medial branch blocks;  Surgeon: Celena Perez MD;  Location: [facility] OR    INJECT BLOCK MEDIAL BRANCH CERVICAL/THORACIC/LUMBAR Bilateral 9/27/2021    Procedure: BLOCK, NERVE, FACET JOINT, MEDIAL BRANCH, DIAGNOSTIC - Bilateral L4-5 and L5-S1  Medial branch blocks;  Surgeon: Celena Perez MD;  Location: [facility] OR    INJECT SACROILIAC JOINT Bilateral 5/24/2021    Procedure: INJECTION, SACROILIAC JOINT;  Surgeon: Celena Perez MD;  Location: [facility] OR    IRRIGATION AND DEBRIDEMENT HIP, COMBINED Right 8/26/2019    Procedure: 1.  Right hip wound irrigation and debridement with excisional debridement of nonviable skin, subcutaneous tissues, and fascia. 2. Application of incisional wound VAC, 27cm length incision.;  Surgeon: Tyson Prabhakar MD;  Location: [facility]    L shoulder fracture      OPEN REDUCTION INTERNAL FIXATION WRIST Left 4/30/2021    Procedure: Open reduction with internal fixation of displaced left intraarticular distal radius fracture;  Surgeon: Luis Manuel Sánchez MD;  Location: [facility] OR    rectal prolapse repair abdominally      RELEASE PLANTAR FASCIA Right 1/20/2015    Procedure: RELEASE PLANTAR FASCIA;  Surgeon: Maicol Liu DPM;  Location: [facility]    REMOVE MESH VAGINA  8/10/2012    Procedure: REMOVE MESH VAGINA;  Excision of Vaginal Mesh Exposure, Removal of Skin Tag Left Inner Leg;  Surgeon: Jerald Diggs MD;  Location: [facility]    REPAIR HAMMER TOE Right 1/20/2015    Procedure: REPAIR HAMMER TOE;  Surgeon: Maicol Liu DPM;  Location: [facility]    TONSILLECTOMY & ADENOIDECTOMY      TUBAL LIGATION         Medications:   Current Outpatient Medications   Medication Sig Dispense Refill    albuterol (ALBUTEROL) 108 (90 BASE) MCG/ACT Inhaler Inhale 2 puffs into the lungs 4 times daily as needed for shortness of breath / dyspnea or wheezing 1 Inhaler 0    alendronate (FOSAMAX) 70 MG tablet TAKE 1 TABLET EVERY 7 DAYS 12 tablet 2    amLODIPine (NORVASC) 5 MG tablet Take 1 tablet (5 mg) by mouth daily 90 tablet 1    amoxicillin (AMOXIL) 500 MG tablet Take 4 tablets (2000 mg) by mouth 1 hour before dental procedures/cleanings. 4 tablet 4    aspirin 81 MG EC tablet Take 81 mg by mouth daily      budesonide-formoterol (SYMBICORT)  160-4.5 MCG/ACT Inhaler Inhale 2 puffs into the lungs 2 times daily      carboxymethylcellulose PF (REFRESH PLUS) 0.5 % ophthalmic solution Place 1 drop into both eyes 2 times daily      cycloSPORINE (RESTASIS) 0.05 % ophthalmic emulsion Place 1 drop into both eyes 2 times daily       Dentifrices (BIOTENE DRY MOUTH DT) Apply 2 sprays to affected area 3 times daily as needed       Estradiol-Estriol-Progesterone (BIEST/PROGESTERONE TD) Place 0.5 g onto the skin nightly as needed      FLUoxetine (PROZAC) 20 MG capsule       fluticasone-vilanterol (BREO ELLIPTA) 200-25 MCG/INH inhaler Inhale 1 puff into the lungs daily      furosemide (LASIX) 20 MG tablet Take 1 tablet (20 mg) by mouth daily 90 tablet 0    hypromellose (ARTIFICIAL TEARS) 0.5 % SOLN ophthalmic solution Place 1 drop into both eyes 2 times daily      ibuprofen (ADVIL/MOTRIN) 600 MG tablet Take 600 mg by mouth every 6 hours as needed for moderate pain      lacosamide (VIMPAT) 200 MG TABS tablet Take 1 tablet (200 mg) by mouth 2 times daily 30 tablet 0    levothyroxine (SYNTHROID/LEVOTHROID) 88 MCG tablet Take 1 tablet (88 mcg) by mouth daily Please keep appt 11/20/23 90 tablet 0    linaclotide (LINZESS) 290 MCG capsule Take 1 capsule (290 mcg) by mouth every morning (before breakfast) 10 capsule 0    loratadine (CLARITIN REDITABS) 10 MG ODT Take 1 tablet (10 mg) by mouth daily as needed for allergies      mirtazapine (REMERON) 15 MG tablet       omeprazole (PRILOSEC) 40 MG DR capsule Take 1 capsule (40 mg) by mouth 2 times daily 180 capsule 0    ondansetron (ZOFRAN-ODT) 4 MG ODT tab Take 1 tablet (4 mg) by mouth every 6 hours as needed for nausea or vomiting 20 tablet 0    potassium citrate (UROCIT-K) 10 MEQ (1080 MG) CR tablet Take 1 tablet (10 mEq) by mouth daily 90 tablet 1    pramipexole (MIRAPEX) 1 MG tablet Give 1 tablet by mouth in the morning and 3 tablets by mouth 3 hours prior to bedtime      Suvorexant (BELSOMRA) 5 MG tablet Take 5 mg by mouth At  Bedtime      topiramate (TOPAMAX) 50 MG tablet Take 2 tablets by mouth At Bedtime      traZODone (DESYREL) 150 MG tablet 150 mg At Bedtime       triamcinolone (KENALOG) 0.1 % external cream APPLY TOPICALLY 3 TIMES    DAILY. 30 g 0    trolamine salicylate (ASPERCREME) 10 % external cream Apply topically 2 times daily as needed for moderate pain To right hip incision area         Allergies:   Allergies   Allergen Reactions    Blood Transfusion Related (Informational Only) Other (See Comments)     Patient has a history of a clinically significant antibody against RBC antigens.  A delay in compatible RBCs may occur.    Pregabalin      Other reaction(s): Weight gain    Rotigotine      Other reaction(s): Worsening RLS; wt gain    Zolpidem      Other reaction(s): Hx of Parasomnias; should not be on Ambien    Iron      Other reaction(s): Constipation    Budesonide      Edema    Bupropion Rash    Carbamazepine Diarrhea    Clonazepam Other (See Comments)     Hypotension, trouble walking, mind disturbance    Encort [Hydrocortisone Acetate] Swelling     Localized to feet    Encort [Hydrocortisone] Swelling    Erythromycin Diarrhea    Erythromycin Nausea and Vomiting and Diarrhea    Flagyl [Metronidazole] Nausea    Gabapentin Other (See Comments)     Causes over-eating    Lamictal [Lamotrigine] Dizziness    Oxycodone Nausea and Vomiting    Tegretol [Carbamazepine And Analogs] Nausea and Vomiting       Social History     Tobacco Use    Smoking status: Never    Smokeless tobacco: Never   Substance Use Topics    Alcohol use: Yes     Comment: 1 glass of wine 2x/yr       Family History   Problem Relation Age of Onset    Eye Disorder Mother     Lipids Mother         has CHF    Osteoporosis Mother     Diabetes Father     Breast Cancer Sister     Depression Sister     Lipids Sister     Thyroid Disease Sister     Hypertension Brother     Alcohol/Drug Brother     Depression Brother     Gastrointestinal Disease Brother         hx of  colonic polyps    Lipids Brother     Psychotic Disorder Brother     Alzheimer Disease Paternal Grandmother     Congenital Anomalies Daughter     Neurologic Disorder Daughter     Suicide Son        Physical Examination:  Wt Readings from Last 4 Encounters:   08/29/23 84.6 kg (186 lb 8 oz)   06/21/23 83 kg (183 lb)   02/20/23 81.6 kg (180 lb)   09/07/22 81.2 kg (179 lb)   weight today 175 lbs per pt report     Reported vitals:  There were no vitals taken for this visit.   healthy, alert and no distress  PSYCH: Alert and oriented times 3; coherent speech, normal   rate and volume, able to articulate logical thoughts, able   to abstract reason, no tangential thoughts, no hallucinations   or delusions  Her affect is normal and pleasant  RESP: No cough, no audible wheezing, able to talk in full sentences  Remainder of exam unable to be completed due to telephone visits        Labs and Studies:   Lab Results   Component Value Date    TSH 3.79 09/30/2023    TSH 3.69 03/06/2023    TSH 6.04 (H) 10/24/2022    TSH 0.91 09/27/2022    TSH <0.01 (L) 08/25/2022     Lab Results   Component Value Date    SABA 9.3 08/29/2023    SABA 9.3 02/01/2023    SABA 9.1 04/07/2022    SABA 9.1 01/31/2022    ALBUMIN 3.5 04/07/2022    ALT 13 02/01/2023    PHOS 4.1 09/17/2009    AST 23 02/01/2023    BILITOTAL 0.4 02/01/2023    CR 1.14 (H) 08/29/2023    CR 0.99 (H) 02/01/2023    CR 0.74 04/07/2022     08/29/2023    TSH 3.79 09/30/2023    ALKPHOS 79 02/01/2023    HGB 12.1 08/29/2023       Results for orders placed in visit on 11/14/22    XR Femur Right 2 Views    Narrative  EXAM: FEMUR RIGHT TWO VIEWS  DATE/TIME: 11/14/2022 1:50 PM    INDICATION: Right femur pain.  COMPARISON: None.    Impression  IMPRESSION:  1.  No femur fracture.  2.  Right total hip arthroplasty with proximal femur cerclage wire  fixation. Hardware is intact.  3.  Right total knee arthroplasty. The visualized hardware is intact.  4.  No joint malalignment.    ROHINI DE LUNA,  MD      SYSTEM ID:  [id number]        Results for orders placed in visit on 22    US Thyroid    Narrative  US THYROID 2022 1:18 PM    COMPARISON: None    HISTORY: Acquired hypothyroidism    FINDINGS:  Thyroid parenchyma: Homogenous  The right lobe of the thyroid measures: 5.3 x 1.2 x 2 cm  The left lobe of the thyroid measures: 2.9 x 1.3 x 1.1 cm  The thyroid isthmus measures: 0.3 cm    No thyroid nodules identified.    Impression  Impression: Normal sonographic appearance of the thyroid. No thyroid  nodules.    I have personally reviewed the examination and initial interpretation  and I agree with the findings.    BRISEIDA BOB MD      SYSTEM ID:  [id number]  ;    Results for orders placed during the hospital encounter of 22    NM Thyroid Uptake and Scan    Narrative  EXAM: NM THYROID UPTAKE AND SCAN  LOCATION: [facility]  DATE/TIME: 2022 11:55 AM    INDICATION: Clinical and/or laboratory evidence of hyperthyroidism.  COMPARISON: Thyroid ultrasound dated 2022.  TECHNIQUE: 236 uCi I-123, oral ingestion. 24-hour neck uptake and imaging.    FINDINGS: 24-hour uptake: 47.3% (Normal range: 10-30%).    Impression  IMPRESSION:    Diffuse radiotracer uptake throughout the thyroid gland suspicious for Graves' disease without hot/cold nodule.  '    Results for orders placed in visit on 21    DX Hip/Pelvis/Spine    Narrative  BONE DENSITOMETRY  [facility] [address] [place] [address]  2021    PATIENT: Mercedes Barber  CHART: [id number]  :  1955  AGE:  66 year old  SEX:  female  REFERRING PROVIDER:  Skyler Álvarez MD    PROCEDURE:  Bone density scanning was performed using DXA technology of the lumbar spine and hip.  Scanning was performed on a Lunar Prodigy scanner.  Reporting is completed in the form of a T-score.  The T-score represents the standard deviation from peak bone mass based on a young healthy  "adult.    REFERENCE T-SCORES:  Normal                -1.0 and greater  Osteopenia         Between -1.0 and -2.5  Osteoporosis     -2.5 and less    RISK FACTORS:  Post-menopausal, Height loss of 2 inches, Fractures of wrist and humerus    CURRENT TREATMENT:  None listed    FINDINGS:  Lumbar Spine (L1-L4)      T-score:  -0.4, marked degenerative changes present  Left Femoral Neck            T-score:  -2.3  Forearm (radius 33%)      T-score:  -1.5  The right and left femur is not acceptable for evaluation due to previous arthroplasty.    Lumbar (L1-L4) BMD: 1.147  Previous: 1.345  Left Total Hip BMD: 0.763  Previous: 0.944  Forearm (radius 33%) BMD: 0.755   Previous: NA    Comparison is made to another DXA performed on the same Lunar Prodigy  machine on 02/05/2009. There was a study performed in 2013 but the images are not available.    IMPRESSION  Osteopenia., Degenerative changes of the lumbar spine which may falsely elevate results.    There has been significant decrease in bone density of the lumbar spine. There has been significant decrease in bone density of the hip. The forearm was not included on the previous study so comparison is not possible.    Recommendations include ensuring adequate Calcium and Vitamin D.    The current NOF Guidelines recommend treatment for patients with prior hip or vertebral fracture, T-score -2.5 or below, or 10 year risk of any major osteoporotic fracture >20% or 10 year risk of hip fracture >3%, as calculated using the FRAX calculator (www.shef.ac.uk/FRAX or you can google \"FRAX\").    This patient's risks based on available information, with the use of FRAX, are 13 % for major osteoporotic fracture and 3.6 % for hip fracture.  Based on these guidelines, treatment (in addition to calcium and vitamin D) is recommended for this patient, after ruling out other causes of osteoporosis.  This is meant as an aid to clinical decision making; one must still use clinical " judgement.      Follow up can be considered in 2 years.  ___________________  Donya Oliveira M.D.  Electronically signed    HPI:   Mercedes Barber is a elderly woman with history of thyrotoxicosis, now with post iodine ablation and hypothyroidism

## 2023-11-20 NOTE — LETTER
11/20/2023         RE: Mercedes Barber  [address]  [place] [address]        Dear Colleague,    Thank you for referring your patient, Mercedes Barber, to the [facility]. Please see a copy of my visit note below.    Endocrinology and Diabetes Clinic    Mercedes Barber is a 68 year old female who is being evaluated via a billable telephone visit.        Duration of Telephone interaction:20 min      Mercedes Barber complains of    Chief Complaint   Patient presents with     RECHECK     Patient has concerns about sleep and it is an ongoing issue.        I have reviewed and updated the patient's Past Medical History, Social History, Family History and Medication List.    ALLERGIES  Blood transfusion related (informational only), Pregabalin, Rotigotine, Zolpidem, Iron, Budesonide, Bupropion, Carbamazepine, Clonazepam, Encort [hydrocortisone acetate], Encort [hydrocortisone], Erythromycin, Erythromycin, Flagyl [metronidazole], Gabapentin, Lamictal [lamotrigine], Oxycodone, and Tegretol [carbamazepine and analogs]            Endocrinology and Diabetes Clinic           Follow up for thyrotoxicosis and osteoporosis      Assessment/Plan   Graves disease  Elderly woman with thyrotoxicosis with suppressed TSH increased free T4 and total T3 and increase TSI.  Thyroid uptake and scan is homogeneously increased.  Overall most consistent with Graves' disease.  Treatment is indicated as patient has overt thyrotoxicosis.    Tired, only sleeping 2 hours.   Patient s/p radioiodine treatment, now on levothyroxine.  TSH levels had been somewhat variable, most recent elevated.  Clinically hypothyroid with inability to lose weight and fatigue.  Fatigue might be related to low blood pressure, review to contact primary care to adjust blood pressure medications     Increase levothyroxine from 88 mcg     Osteoporosis  Patient has been on alendronate 70 mg once a day.  Obtain x-ray of  femur on the right due to new thigh pain to rule out atypical femur fracture    Follow-up with me, patient prefers Jennie Caraballo MD  Endocrinology and Diabetes  Telephone contact:  [facility] [phone]  [facility] [phone]        Interval history  1y follow up    Elderly woman with history of Graves' disease, osteoporosis    Thyroid  Radioiodine treatment with 16.1 my millicuries iodine on June 30, 2022    Patient has been on levothyroxine 75 mcg daily    Patient was concerned about her weight gain and a nutritional referral was placed on 9/16/2022, has been avoiding carbohydrates, able to maintain prior weight loss, unable to achieve additional weight loss      Symptoms of hypo- and hyperthyroidism:  Weight change none; heat or cold intolerance no; abnormal bowel movements no; hair loss no, change in skin pattern no; anxietyno, depression no; fatigue YES; heart racing no; tremors no; menses na    Osteoporosis  - teeth well  - weight stable, abdominal dyscomfort no  - since last visit falls no, fractures no,    Most recen DXA scan 9/1/2021  T-score -2.3 left fem neck    Osteoporosis medication since last visit: Alendronate 70 mg weekly since 9/2021    Calcium supplement no  Vitamin D: no  Protein intake good  Physical activity: limited due to dizziness        Current Problem List:   Patient Active Problem List   Diagnosis     Menopausal syndrome (hot flashes)     Family history of breast cancer     Vulvar pain     Renal anomaly     Overactive bladder     Rectal prolapse     CARDIOVASCULAR SCREENING; LDL GOAL LESS THAN 160     Dysphagia     Confusion     Headache     Left shoulder pain     Anemia     Mild major depression (H)     Esophageal stricture     Vulvar lesion     Lumbago     Pain in thoracic spine     Sciatica     Pain in joint, lower leg     Plantar fascial fibromatosis     Pain in joint involving ankle and foot     Other  specified hypothyroidism     GERD (gastroesophageal reflux disease)     Irritable bowel syndrome     Other sleep apnea     Cervical high risk HPV (human papillomavirus) test positive     Restless legs syndrome (RLS)     History of total hip arthroplasty, right     Hip pain     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)     Cellulitis of right hip     Deep venous thrombosis of upper extremity (H)     Peripheral edema     Low iron stores     Mechanical complication of prosthetic hip implant, initial encounter      Status post hip surgery     Chest pain, unspecified     Closed fracture of proximal end of left humerus     Generalized anxiety disorder     History of infection     Infection and inflammatory reaction due to unspecified internal joint prosthesis, initial encounter (H24)     Ingrowing nail     Major depressive disorder, recurrent episode, moderate (H)     Mild intermittent asthma     Nausea     Partial epilepsy with intractable epilepsy (H)     Repeated falls     History of revision of total replacement of right hip joint     Seizure disorder (H)     Benign essential hypertension     Sacroiliac joint pain     Closed fracture of left wrist, initial encounter     Lumbar spondylosis     Lumbar facet arthropathy     Tuberous sclerosis (H)     Hyperthyroidism     Graves disease     Arthritis of carpometacarpal (CMC) joint of left thumb     Antinuclear antibody (DOC) titer greater than 1:80                 Past Medical and Past Surgical History:  Past Medical History:   Diagnosis Date     Antinuclear antibody (DOC) titer greater than 1:80 2/22/2023    1:160     Arthritis     Thumb     Cervical high risk HPV (human papillomavirus) test positive 07/17/2017 07/17/17: NIL pap, + HR HPV (not 16 or 18) result.      Cervical pain 09/15/2016     Chronic infection     MRSA     Constipation 08/27/2012     Diarrhea 04/30/2009     Esophageal stricture 02/21/2012     Gastro-oesophageal reflux disease      History of  blood transfusion      Hypertension      Hypothyroidism 09/18/2012     IBS (irritable bowel syndrome)      Mild major depression (H) 02/21/2012     Neuropathy of lower extremity      Other sleep apnea      Rectal prolapse      Restless leg syndrome      Seizure disorder (H)     25 years ago     Sleep apnea     CPAP, no longer using CPAP     Temporal sclerosis 08/27/2012     Tuberous sclerosis (H)      Uncomplicated asthma        Past Surgical History:   Procedure Laterality Date     Anterior COLPORRHAPHY, BLADDER/VAGINA      perigee mesh     ARTHROPLASTY HIP Right 7/23/2019    Procedure: Right total hip arthroplasty;  Surgeon: Anant Mejia MD;  [facility]: [facility]     ARTHROPLASTY PATELLO-FEMORAL (KNEE) Bilateral      ARTHROPLASTY REVISION HIP Right 8/7/2019    Procedure: Right hip revision total arthroplasty;  Surgeon: Tom Antonio MD;  Location: [facility]     ARTHROPLASTY REVISION HIP Right 8/5/2020    Procedure: Revision Right total hip arthroplasty;  Surgeon: Pan Grey MD;  [facility]     CARPAL TUNNEL RELEASE RT/LT       DENTAL SURGERY      implant     DESTRUCTION OF PARAVERTEBRAL FACET LUMBAR / SACRAL SINGLE Left 10/27/2021    Procedure: DESTRUCTION, NERVE, FACET JOINT, LUMBOSACRAL, Left L4-5 and L5-S1 facet joint radiofrequency ablation;  Surgeon: Celena Perez MD;  Location: [facility] OR     DILATION AND CURETTAGE       DRAIN PILONIDAL CYST SIMPL       ENDOSCOPY DRUG INDUCED SLEEP N/A 11/18/2015    Procedure: ENDOSCOPY DRUG INDUCED SLEEP;  Surgeon: Park Ortiz MD;  [facility]     ESOPHAGOSCOPY, GASTROSCOPY, DUODENOSCOPY (EGD), COMBINED  4/5/2013    Procedure: COMBINED ESOPHAGOSCOPY, GASTROSCOPY, DUODENOSCOPY (EGD), BIOPSY SINGLE OR MULTIPLE;;  Surgeon: Mundo Mehta MD;  Location:  GI     EXCISE LESION TRUNK  8/10/2012    Procedure: EXCISE LESION TRUNK;;  Surgeon: Jerald Diggs MD;  [facility]: [facility]     HYSTERECTOMY       INJECT BLOCK MEDIAL BRANCH  CERVICAL/THORACIC/LUMBAR Bilateral 9/20/2021    Procedure: BLOCK, NERVE, FACET JOINT, MEDIAL BRANCH, DIAGNOSTIC - Bilateral L4-5 and L5-S1 Medial branch blocks;  Surgeon: Celena Perez MD;  Location: [facility] OR     INJECT BLOCK MEDIAL BRANCH CERVICAL/THORACIC/LUMBAR Bilateral 9/27/2021    Procedure: BLOCK, NERVE, FACET JOINT, MEDIAL BRANCH, DIAGNOSTIC - Bilateral L4-5 and L5-S1 Medial branch blocks;  Surgeon: Celena Perez MD;  Location: [facility] OR     INJECT SACROILIAC JOINT Bilateral 5/24/2021    Procedure: INJECTION, SACROILIAC JOINT;  Surgeon: Celena Perez MD;  Location: [facility] OR     IRRIGATION AND DEBRIDEMENT HIP, COMBINED Right 8/26/2019    Procedure: 1.  Right hip wound irrigation and debridement with excisional debridement of nonviable skin, subcutaneous tissues, and fascia. 2. Application of incisional wound VAC, 27cm length incision.;  Surgeon: Tyson Prabhakar MD;  [facility]: [facility]     L shoulder fracture       OPEN REDUCTION INTERNAL FIXATION WRIST Left 4/30/2021    Procedure: Open reduction with internal fixation of displaced left intraarticular distal radius fracture;  Surgeon: Luis Manuel Sánchez MD;  Location: [facility]     rectal prolapse repair abdominally       RELEASE PLANTAR FASCIA Right 1/20/2015    Procedure: RELEASE PLANTAR FASCIA;  Surgeon: Maicol Liu DPM;  Location: [facility]     REMOVE MESH VAGINA  8/10/2012    Procedure: REMOVE MESH VAGINA;  Excision of Vaginal Mesh Exposure, Removal of Skin Tag Left Inner Leg;  Surgeon: Jerald Diggs MD;  [facility]: [facility]     REPAIR HAMMER TOE Right 1/20/2015    Procedure: REPAIR HAMMER TOE;  Surgeon: Maicol Liu DPM;  Location: [facility]     TONSILLECTOMY & ADENOIDECTOMY       TUBAL LIGATION         Medications:   Current Outpatient Medications   Medication Sig Dispense Refill     albuterol (ALBUTEROL) 108 (90 BASE) MCG/ACT Inhaler Inhale 2 puffs into the lungs 4 times daily as needed for shortness of breath / dyspnea or wheezing 1  Inhaler 0     alendronate (FOSAMAX) 70 MG tablet TAKE 1 TABLET EVERY 7 DAYS 12 tablet 2     amLODIPine (NORVASC) 5 MG tablet Take 1 tablet (5 mg) by mouth daily 90 tablet 1     amoxicillin (AMOXIL) 500 MG tablet Take 4 tablets (2000 mg) by mouth 1 hour before dental procedures/cleanings. 4 tablet 4     aspirin 81 MG EC tablet Take 81 mg by mouth daily       budesonide-formoterol (SYMBICORT) 160-4.5 MCG/ACT Inhaler Inhale 2 puffs into the lungs 2 times daily       carboxymethylcellulose PF (REFRESH PLUS) 0.5 % ophthalmic solution Place 1 drop into both eyes 2 times daily       cycloSPORINE (RESTASIS) 0.05 % ophthalmic emulsion Place 1 drop into both eyes 2 times daily        Dentifrices (BIOTENE DRY MOUTH DT) Apply 2 sprays to affected area 3 times daily as needed        Estradiol-Estriol-Progesterone (BIEST/PROGESTERONE TD) Place 0.5 g onto the skin nightly as needed       FLUoxetine (PROZAC) 20 MG capsule        fluticasone-vilanterol (BREO ELLIPTA) 200-25 MCG/INH inhaler Inhale 1 puff into the lungs daily       furosemide (LASIX) 20 MG tablet Take 1 tablet (20 mg) by mouth daily 90 tablet 0     hypromellose (ARTIFICIAL TEARS) 0.5 % SOLN ophthalmic solution Place 1 drop into both eyes 2 times daily       ibuprofen (ADVIL/MOTRIN) 600 MG tablet Take 600 mg by mouth every 6 hours as needed for moderate pain       lacosamide (VIMPAT) 200 MG TABS tablet Take 1 tablet (200 mg) by mouth 2 times daily 30 tablet 0     levothyroxine (SYNTHROID/LEVOTHROID) 88 MCG tablet Take 1 tablet (88 mcg) by mouth daily Please keep appt 11/20/23 90 tablet 0     linaclotide (LINZESS) 290 MCG capsule Take 1 capsule (290 mcg) by mouth every morning (before breakfast) 10 capsule 0     loratadine (CLARITIN REDITABS) 10 MG ODT Take 1 tablet (10 mg) by mouth daily as needed for allergies       mirtazapine (REMERON) 15 MG tablet        omeprazole (PRILOSEC) 40 MG DR capsule Take 1 capsule (40 mg) by mouth 2 times daily 180 capsule 0      ondansetron (ZOFRAN-ODT) 4 MG ODT tab Take 1 tablet (4 mg) by mouth every 6 hours as needed for nausea or vomiting 20 tablet 0     potassium citrate (UROCIT-K) 10 MEQ (1080 MG) CR tablet Take 1 tablet (10 mEq) by mouth daily 90 tablet 1     pramipexole (MIRAPEX) 1 MG tablet Give 1 tablet by mouth in the morning and 3 tablets by mouth 3 hours prior to bedtime       Suvorexant (BELSOMRA) 5 MG tablet Take 5 mg by mouth At Bedtime       topiramate (TOPAMAX) 50 MG tablet Take 2 tablets by mouth At Bedtime       traZODone (DESYREL) 150 MG tablet 150 mg At Bedtime        triamcinolone (KENALOG) 0.1 % external cream APPLY TOPICALLY 3 TIMES    DAILY. 30 g 0     trolamine salicylate (ASPERCREME) 10 % external cream Apply topically 2 times daily as needed for moderate pain To right hip incision area         Allergies:   Allergies   Allergen Reactions     Blood Transfusion Related (Informational Only) Other (See Comments)     Patient has a history of a clinically significant antibody against RBC antigens.  A delay in compatible RBCs may occur.     Pregabalin      Other reaction(s): Weight gain     Rotigotine      Other reaction(s): Worsening RLS; wt gain     Zolpidem      Other reaction(s): Hx of Parasomnias; should not be on Ambien     Iron      Other reaction(s): Constipation     Budesonide      Edema     Bupropion Rash     Carbamazepine Diarrhea     Clonazepam Other (See Comments)     Hypotension, trouble walking, mind disturbance     Encort [Hydrocortisone Acetate] Swelling     Localized to feet     Encort [Hydrocortisone] Swelling     Erythromycin Diarrhea     Erythromycin Nausea and Vomiting and Diarrhea     Flagyl [Metronidazole] Nausea     Gabapentin Other (See Comments)     Causes over-eating     Lamictal [Lamotrigine] Dizziness     Oxycodone Nausea and Vomiting     Tegretol [Carbamazepine And Analogs] Nausea and Vomiting       Social History     Tobacco Use     Smoking status: Never     Smokeless tobacco: Never    Substance Use Topics     Alcohol use: Yes     Comment: 1 glass of wine 2x/yr       Family History   Problem Relation Age of Onset     Eye Disorder Mother      Lipids Mother         has CHF     Osteoporosis Mother      Diabetes Father      Breast Cancer Sister      Depression Sister      Lipids Sister      Thyroid Disease Sister      Hypertension Brother      Alcohol/Drug Brother      Depression Brother      Gastrointestinal Disease Brother         hx of colonic polyps     Lipids Brother      Psychotic Disorder Brother      Alzheimer Disease Paternal Grandmother      Congenital Anomalies Daughter      Neurologic Disorder Daughter      Suicide Son        Physical Examination:  Wt Readings from Last 4 Encounters:   08/29/23 84.6 kg (186 lb 8 oz)   06/21/23 83 kg (183 lb)   02/20/23 81.6 kg (180 lb)   09/07/22 81.2 kg (179 lb)   weight today 175 lbs per pt report     Reported vitals:  There were no vitals taken for this visit.   healthy, alert and no distress  PSYCH: Alert and oriented times 3; coherent speech, normal   rate and volume, able to articulate logical thoughts, able   to abstract reason, no tangential thoughts, no hallucinations   or delusions  Her affect is normal and pleasant  RESP: No cough, no audible wheezing, able to talk in full sentences  Remainder of exam unable to be completed due to telephone visits        Labs and Studies:   Lab Results   Component Value Date    TSH 3.79 09/30/2023    TSH 3.69 03/06/2023    TSH 6.04 (H) 10/24/2022    TSH 0.91 09/27/2022    TSH <0.01 (L) 08/25/2022     Lab Results   Component Value Date    SABA 9.3 08/29/2023    SABA 9.3 02/01/2023    SABA 9.1 04/07/2022    SABA 9.1 01/31/2022    ALBUMIN 3.5 04/07/2022    ALT 13 02/01/2023    PHOS 4.1 09/17/2009    AST 23 02/01/2023    BILITOTAL 0.4 02/01/2023    CR 1.14 (H) 08/29/2023    CR 0.99 (H) 02/01/2023    CR 0.74 04/07/2022     08/29/2023    TSH 3.79 09/30/2023    ALKPHOS 79 02/01/2023    HGB 12.1 08/29/2023        Results for orders placed in visit on 11/14/22    XR Femur Right 2 Views    Narrative  EXAM: FEMUR RIGHT TWO VIEWS  DATE/TIME: 11/14/2022 1:50 PM    INDICATION: Right femur pain.  COMPARISON: None.    Impression  IMPRESSION:  1.  No femur fracture.  2.  Right total hip arthroplasty with proximal femur cerclage wire  fixation. Hardware is intact.  3.  Right total knee arthroplasty. The visualized hardware is intact.  4.  No joint malalignment.    ROHINI DE LUNA MD      SYSTEM ID:  [id number]        Results for orders placed in visit on 05/24/22    US Thyroid    Narrative  US THYROID 5/24/2022 1:18 PM    COMPARISON: None    HISTORY: Acquired hypothyroidism    FINDINGS:  Thyroid parenchyma: Homogenous  The right lobe of the thyroid measures: 5.3 x 1.2 x 2 cm  The left lobe of the thyroid measures: 2.9 x 1.3 x 1.1 cm  The thyroid isthmus measures: 0.3 cm    No thyroid nodules identified.    Impression  Impression: Normal sonographic appearance of the thyroid. No thyroid  nodules.    I have personally reviewed the examination and initial interpretation  and I agree with the findings.    BRISEIDA BOB MD      SYSTEM ID:  [id number]  ;    Results for orders placed during the hospital encounter of 05/25/22    NM Thyroid Uptake and Scan    Narrative  EXAM: NM THYROID UPTAKE AND SCAN  LOCATION: [facility]  DATE/TIME: 5/25/2022 11:55 AM    INDICATION: Clinical and/or laboratory evidence of hyperthyroidism.  COMPARISON: Thyroid ultrasound dated 05/24/2022.  TECHNIQUE: 236 uCi I-123, oral ingestion. 24-hour neck uptake and imaging.    FINDINGS: 24-hour uptake: 47.3% (Normal range: 10-30%).    Impression  IMPRESSION:    Diffuse radiotracer uptake throughout the thyroid gland suspicious for Graves' disease without hot/cold nodule.  '    Results for orders placed in visit on 09/01/21    DX Hip/Pelvis/Spine    Narrative  BONE DENSITOMETRY  [facility] [address] [place] [address]  2021    PATIENT: Mercedes Barber  CHART: [id number]  :  1955  AGE:  66 year old  SEX:  female  REFERRING PROVIDER:  Skyler Álvarez MD    PROCEDURE:  Bone density scanning was performed using DXA technology of the lumbar spine and hip.  Scanning was performed on a Lunar Prodigy scanner.  Reporting is completed in the form of a T-score.  The T-score represents the standard deviation from peak bone mass based on a young healthy adult.    REFERENCE T-SCORES:  Normal                -1.0 and greater  Osteopenia         Between -1.0 and -2.5  Osteoporosis     -2.5 and less    RISK FACTORS:  Post-menopausal, Height loss of 2 inches, Fractures of wrist and humerus    CURRENT TREATMENT:  None listed    FINDINGS:  Lumbar Spine (L1-L4)      T-score:  -0.4, marked degenerative changes present  Left Femoral Neck            T-score:  -2.3  Forearm (radius 33%)      T-score:  -1.5  The right and left femur is not acceptable for evaluation due to previous arthroplasty.    Lumbar (L1-L4) BMD: 1.147  Previous: 1.345  Left Total Hip BMD: 0.763  Previous: 0.944  Forearm (radius 33%) BMD: 0.755   Previous: NA    Comparison is made to another DXA performed on the same Lunar Prodigy  machine on 2009. There was a study performed in  but the images are not available.    IMPRESSION  Osteopenia., Degenerative changes of the lumbar spine which may falsely elevate results.    There has been significant decrease in bone density of the lumbar spine. There has been significant decrease in bone density of the hip. The forearm was not included on the previous study so comparison is not possible.    Recommendations include ensuring adequate Calcium and Vitamin D.    The current NOF Guidelines recommend treatment for patients with prior hip or vertebral fracture, T-score -2.5 or below, or 10 year risk of any major osteoporotic fracture >20% or 10 year risk of hip fracture >3%, as calculated using the  "FRAX calculator (www.shef.ac.uk/FRAX or you can google \"FRAX\").    This patient's risks based on available information, with the use of FRAX, are 13 % for major osteoporotic fracture and 3.6 % for hip fracture.  Based on these guidelines, treatment (in addition to calcium and vitamin D) is recommended for this patient, after ruling out other causes of osteoporosis.  This is meant as an aid to clinical decision making; one must still use clinical judgement.      Follow up can be considered in 2 years.  ___________________  Donya Oliveira M.D.  Electronically signed    HPI:   Mercedes Barber is a elderly woman with history of thyrotoxicosis, now with post iodine ablation and hypothyroidism       Again, thank you for allowing me to participate in the care of your patient.        Sincerely,        Odalys Caraballo MD  "

## 2023-11-29 NOTE — PROGRESS NOTES
Clinic Care Coordination Contact   The Community Health Worker called for a Care Coordination outreach to follow up on goals and action steps. Spoke to Mercedes.    Patient stated she was painting and asked to be called back.  CHW scheduled with patient a phone visit for 10/18/21 at 1:00pm.    CAMRON Hester  Clinic Care Coordination  [facility]: [facility] [place], [place], [facility], and [facility]  Phone: [phone]    Statement Selected

## 2023-12-01 DIAGNOSIS — R60.0 PERIPHERAL EDEMA: ICD-10-CM

## 2023-12-01 DIAGNOSIS — I10 BENIGN ESSENTIAL HYPERTENSION: ICD-10-CM

## 2023-12-04 RX ORDER — FUROSEMIDE 20 MG
20 TABLET ORAL DAILY
Qty: 90 TABLET | Refills: 1 | Status: SHIPPED | OUTPATIENT
Start: 2023-12-04 | End: 2024-01-16

## 2023-12-05 ENCOUNTER — LAB (OUTPATIENT)
Dept: LAB | Facility: CLINIC | Age: 68
End: 2023-12-05
Payer: MEDICARE

## 2023-12-05 DIAGNOSIS — E05.00 GRAVES DISEASE: Primary | ICD-10-CM

## 2023-12-05 LAB — TSH SERPL DL<=0.005 MIU/L-ACNC: 6.94 UIU/ML (ref 0.3–4.2)

## 2023-12-05 PROCEDURE — 84443 ASSAY THYROID STIM HORMONE: CPT

## 2023-12-05 PROCEDURE — 36415 COLL VENOUS BLD VENIPUNCTURE: CPT

## 2023-12-05 PROCEDURE — 84445 ASSAY OF TSI GLOBULIN: CPT | Mod: 90

## 2023-12-05 PROCEDURE — 99000 SPECIMEN HANDLING OFFICE-LAB: CPT

## 2023-12-05 PROCEDURE — 83520 IMMUNOASSAY QUANT NOS NONAB: CPT | Mod: 90

## 2023-12-06 LAB — TSH RECEP AB SER-ACNC: 5.96 IU/L (ref 0–1.75)

## 2023-12-07 ENCOUNTER — TELEPHONE (OUTPATIENT)
Dept: ENDOCRINOLOGY | Facility: CLINIC | Age: 68
End: 2023-12-07
Payer: MEDICARE

## 2023-12-07 DIAGNOSIS — E03.9 ACQUIRED HYPOTHYROIDISM: Primary | ICD-10-CM

## 2023-12-07 RX ORDER — LEVOTHYROXINE SODIUM 112 UG/1
112 TABLET ORAL DAILY
Qty: 90 TABLET | Refills: 3 | Status: SHIPPED | OUTPATIENT
Start: 2023-12-07

## 2023-12-07 NOTE — TELEPHONE ENCOUNTER
TSH checked by PCP at 6. Decided with pt to increase LT4 100 -> 112 mcg daily    Odalys Caraballo MD  Staff Physician  Division of Endocrinology  [facility]  Pager #[phone]

## 2023-12-08 LAB — TSI SER-ACNC: <1 TSI INDEX

## 2023-12-14 DIAGNOSIS — Q85.1 TUBEROUS SCLEROSIS (H): Primary | ICD-10-CM

## 2024-01-16 ENCOUNTER — OFFICE VISIT (OUTPATIENT)
Dept: INTERNAL MEDICINE | Facility: CLINIC | Age: 69
End: 2024-01-16
Payer: MEDICARE

## 2024-01-16 VITALS
HEART RATE: 65 BPM | RESPIRATION RATE: 12 BRPM | DIASTOLIC BLOOD PRESSURE: 68 MMHG | TEMPERATURE: 98 F | HEIGHT: 65 IN | BODY MASS INDEX: 33.15 KG/M2 | SYSTOLIC BLOOD PRESSURE: 108 MMHG | WEIGHT: 199 LBS | OXYGEN SATURATION: 96 %

## 2024-01-16 DIAGNOSIS — F33.1 MAJOR DEPRESSIVE DISORDER, RECURRENT EPISODE, MODERATE (H): ICD-10-CM

## 2024-01-16 DIAGNOSIS — E03.9 ACQUIRED HYPOTHYROIDISM: ICD-10-CM

## 2024-01-16 DIAGNOSIS — I82.629 DEEP VEIN THROMBOSIS (DVT) OF UPPER EXTREMITY, UNSPECIFIED CHRONICITY, UNSPECIFIED LATERALITY, UNSPECIFIED VEIN (H): ICD-10-CM

## 2024-01-16 DIAGNOSIS — Z01.818 PRE-OPERATIVE GENERAL PHYSICAL EXAMINATION: Primary | ICD-10-CM

## 2024-01-16 DIAGNOSIS — Q85.1 TUBEROUS SCLEROSIS (H): ICD-10-CM

## 2024-01-16 DIAGNOSIS — I10 BENIGN ESSENTIAL HYPERTENSION: ICD-10-CM

## 2024-01-16 DIAGNOSIS — G40.909 SEIZURE DISORDER (H): ICD-10-CM

## 2024-01-16 DIAGNOSIS — H02.539 LID LAG: ICD-10-CM

## 2024-01-16 DIAGNOSIS — E66.01 MORBID OBESITY (H): ICD-10-CM

## 2024-01-16 DIAGNOSIS — R60.0 PERIPHERAL EDEMA: ICD-10-CM

## 2024-01-16 DIAGNOSIS — J45.20 MILD INTERMITTENT ASTHMA WITHOUT COMPLICATION: ICD-10-CM

## 2024-01-16 LAB
ERYTHROCYTE [DISTWIDTH] IN BLOOD BY AUTOMATED COUNT: 12.1 % (ref 10–15)
HCT VFR BLD AUTO: 38 % (ref 35–47)
HGB BLD-MCNC: 12.6 G/DL (ref 11.7–15.7)
MCH RBC QN AUTO: 31.4 PG (ref 26.5–33)
MCHC RBC AUTO-ENTMCNC: 33.2 G/DL (ref 31.5–36.5)
MCV RBC AUTO: 95 FL (ref 78–100)
PLATELET # BLD AUTO: 237 10E3/UL (ref 150–450)
RBC # BLD AUTO: 4.01 10E6/UL (ref 3.8–5.2)
WBC # BLD AUTO: 6 10E3/UL (ref 4–11)

## 2024-01-16 PROCEDURE — 90480 ADMN SARSCOV2 VAC 1/ONLY CMP: CPT | Performed by: INTERNAL MEDICINE

## 2024-01-16 PROCEDURE — 36415 COLL VENOUS BLD VENIPUNCTURE: CPT | Performed by: INTERNAL MEDICINE

## 2024-01-16 PROCEDURE — 84443 ASSAY THYROID STIM HORMONE: CPT | Performed by: INTERNAL MEDICINE

## 2024-01-16 PROCEDURE — 99214 OFFICE O/P EST MOD 30 MIN: CPT | Mod: 25 | Performed by: INTERNAL MEDICINE

## 2024-01-16 PROCEDURE — 85027 COMPLETE CBC AUTOMATED: CPT | Performed by: INTERNAL MEDICINE

## 2024-01-16 PROCEDURE — 91320 SARSCV2 VAC 30MCG TRS-SUC IM: CPT | Performed by: INTERNAL MEDICINE

## 2024-01-16 PROCEDURE — 80048 BASIC METABOLIC PNL TOTAL CA: CPT | Performed by: INTERNAL MEDICINE

## 2024-01-16 PROCEDURE — 93000 ELECTROCARDIOGRAM COMPLETE: CPT | Performed by: INTERNAL MEDICINE

## 2024-01-16 RX ORDER — POTASSIUM CITRATE 10 MEQ/1
10 TABLET, EXTENDED RELEASE ORAL DAILY
Qty: 90 TABLET | Refills: 1 | Status: SHIPPED | OUTPATIENT
Start: 2024-01-16 | End: 2024-08-01

## 2024-01-16 RX ORDER — FUROSEMIDE 20 MG
20 TABLET ORAL DAILY
Qty: 90 TABLET | Refills: 1 | Status: SHIPPED | OUTPATIENT
Start: 2024-01-16 | End: 2024-08-01

## 2024-01-16 ASSESSMENT — ASTHMA QUESTIONNAIRES
QUESTION_5 LAST FOUR WEEKS HOW WOULD YOU RATE YOUR ASTHMA CONTROL: COMPLETELY CONTROLLED
QUESTION_2 LAST FOUR WEEKS HOW OFTEN HAVE YOU HAD SHORTNESS OF BREATH: NOT AT ALL
QUESTION_1 LAST FOUR WEEKS HOW MUCH OF THE TIME DID YOUR ASTHMA KEEP YOU FROM GETTING AS MUCH DONE AT WORK, SCHOOL OR AT HOME: NONE OF THE TIME
QUESTION_4 LAST FOUR WEEKS HOW OFTEN HAVE YOU USED YOUR RESCUE INHALER OR NEBULIZER MEDICATION (SUCH AS ALBUTEROL): TWO OR THREE TIMES PER WEEK
ACT_TOTALSCORE: 22
QUESTION_3 LAST FOUR WEEKS HOW OFTEN DID YOUR ASTHMA SYMPTOMS (WHEEZING, COUGHING, SHORTNESS OF BREATH, CHEST TIGHTNESS OR PAIN) WAKE YOU UP AT NIGHT OR EARLIER THAN USUAL IN THE MORNING: ONCE OR TWICE
ACT_TOTALSCORE: 22

## 2024-01-16 NOTE — PROGRESS NOTES
[address] [address]  Phone: [phone]  Primary Provider: Naomi Álvarez  Pre-op Performing Provider: NAOMI ÁLVAREZ      PREOPERATIVE EVALUATION:  Today's date: 1/16/2024    Mercedes is a 68 year old, presenting for the following:  Pre-Op Exam    Surgical Information:  Surgery/Procedure: Blepharoplasty   Surgery Location: [facility]   Surgeon: Dr. Reddy  Surgery Date: 2/7/24  Time of Surgery: TBD  Where patient plans to recover: At home alone  Fax number for surgical facility: [phone]    Assessment & Plan     The proposed surgical procedure is considered LOW risk.    Pre-operative general physical examination  To proceed as scheduled.  - CBC with platelets; Future  - Basic metabolic panel  (Ca, Cl, CO2, Creat, Gluc, K, Na, BUN); Future  - EKG 12-lead complete w/read - Clinics    Lid lag  Routine postoperative course per surgery as directed    Benign essential hypertension  Stable on medical therapy as noted    Seizure disorder (H)  Noted as prior history, stable    Morbid obesity (H)  Courage ongoing weight loss.    Mild intermittent asthma without complication  Stable without active complaints and advised to continue with inhalers    Acquired hypothyroidism  TSH   Date Value Ref Range Status   12/05/2023 6.94 (H) 0.30 - 4.20 uIU/mL Final   10/24/2022 6.04 (H) 0.40 - 4.00 mU/L Final   10/16/2020 0.61 0.40 - 4.00 mU/L Final       (R60.9) Peripheral edema  Comment: as ordered, meds refilled  Plan: furosemide (LASIX) 20 MG tablet            Major depressive disorder, recurrent episode, moderate (H)  Discussed need to follow-up accordingly    Deep vein thrombosis (DVT) of upper extremity, unspecified chronicity, unspecified laterality, unspecified vein (H)  Prior history noted    Tuberous sclerosis (H)  Noted also as prior history and followed      Risks and Recommendations:  The patient has the following additional risks  and recommendations for perioperative complications:   - No identified additional risk factors other than previously addressed    Antiplatelet or Anticoagulation Medication Instructions:   - aspirin: Discontinue aspirin 7-10 days prior to procedure to reduce bleeding risk. It should be resumed postoperatively.     Additional Medication Instructions:  Patient advised also to stop all traditional anti-inflammatories if dosing no over-the-counter herbal supplements also as recommended    RECOMMENDATION:  APPROVAL GIVEN to proceed with proposed procedure, without further diagnostic evaluation.    Subjective       HPI related to upcoming procedure: Blepharoplasty           1/16/2024     2:02 PM   Preop Questions   1. Have you ever had a heart attack or stroke? No   2. Have you ever had surgery on your heart or blood vessels, such as a stent placement, a coronary artery bypass, or surgery on an artery in your head, neck, heart, or legs? No   3. Do you have chest pain with activity? No   4. Do you have a history of  heart failure? No   5. Do you currently have a cold, bronchitis or symptoms of other infection? No   6. Do you have a cough, shortness of breath, or wheezing? YES -    7. Do you or anyone in your family have previous history of blood clots? No   8. Do you or does anyone in your family have a serious bleeding problem such as prolonged bleeding following surgeries or cuts? No   9. Have you ever had problems with anemia or been told to take iron pills? YES -    10. Have you had any abnormal blood loss such as black, tarry or bloody stools, or abnormal vaginal bleeding? No   11. Have you ever had a blood transfusion? YES -    11a. Have you ever had a transfusion reaction? No   12. Are you willing to have a blood transfusion if it is medically needed before, during, or after your surgery? Yes   13. Have you or any of your relatives ever had problems with anesthesia? No   14. Do you have sleep apnea, excessive  snoring or daytime drowsiness? YES -    14a. Do you have a CPAP machine? No   15. Do you have any artifical heart valves or other implanted medical devices like a pacemaker, defibrillator, or continuous glucose monitor? No   16. Do you have artificial joints? YES -    17. Are you allergic to latex? No     Health Care Directive:  Patient has a Health Care Directive on file      Status of Chronic Conditions:  See problem list for active medical problems.  Problems all longstanding and stable, except as noted/documented.  See ROS for pertinent symptoms related to these conditions.    Review of Systems:    CONSTITUTIONAL: NEGATIVE for fever, chills, change in weight  ENT/MOUTH: NEGATIVE for ear, mouth and throat problems  RESP: NEGATIVE for significant cough or SOB  CV: NEGATIVE for chest pain, palpitations or peripheral edema  GI: NEGATIVE for nausea, abdominal pain, heartburn, or change in bowel habits  : NEGATIVE for frequency, dysuria, or hematuria  MUSCULOSKELETAL: NEGATIVE for significant arthralgias or myalgia  NEURO: NEGATIVE for weakness, dizziness or paresthesias  ENDOCRINE: NEGATIVE for temperature intolerance, skin/hair changes  HEME: NEGATIVE for bleeding problems  PSYCHIATRIC: NEGATIVE for changes in mood or affect    Patient Active Problem List    Diagnosis Date Noted    Headache 10/14/2011     Priority: High     Problem list name updated by automated process. Provider to review      Confusion 10/13/2011     Priority: High    Antinuclear antibody (DOC) titer greater than 1:80 02/22/2023     Priority: Medium     1:160      Arthritis of carpometacarpal (CMC) joint of left thumb 02/20/2023     Priority: Medium    Graves disease 06/13/2022     Priority: Medium    Hyperthyroidism 05/26/2022     Priority: Medium    Tuberous sclerosis (H) 02/09/2022     Priority: Medium    Lumbar spondylosis 08/11/2021     Priority: Medium     Added automatically from request for surgery [id number]      Lumbar facet arthropathy  08/11/2021     Priority: Medium     Added automatically from request for surgery [id number]      Closed fracture of left wrist, initial encounter 04/30/2021     Priority: Medium    Sacroiliac joint pain 03/31/2021     Priority: Medium     Added automatically from request for surgery [id number]      Benign essential hypertension 01/25/2021     Priority: Medium    Seizure disorder (H)      Priority: Medium     25 years ago      Status post hip surgery 08/05/2020     Priority: Medium    Mechanical complication of prosthetic hip implant, initial encounter  (H24) 07/27/2020     Priority: Medium     Added automatically from request for surgery [id number]      History of infection 07/06/2020     Priority: Medium    History of revision of total replacement of right hip joint 07/06/2020     Priority: Medium    Low iron stores 03/06/2020     Priority: Medium    Peripheral edema 12/02/2019     Priority: Medium    Deep venous thrombosis of upper extremity (H) 10/10/2019     Priority: Medium    Infection and inflammatory reaction due to unspecified internal joint prosthesis, initial encounter (H24) 10/08/2019     Priority: Medium    Cellulitis of right hip 08/29/2019     Priority: Medium    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 Pt has had a hysterectomy. Plan cotest in 1 year per provider.  12/28/18 Patient is lost to pap tracking follow-up        Other sleep apnea 11/11/2015     Priority: Medium    Irritable bowel syndrome 09/28/2015     Priority: Medium    Other specified hypothyroidism 05/19/2015     Priority: Medium    Pain in joint involving ankle and foot  11/20/2014     Priority: Medium    Plantar fascial fibromatosis 10/07/2014     Priority: Medium    Pain in joint, lower leg 01/24/2014     Priority: Medium    Sciatica 09/23/2013     Priority: Medium    Pain in thoracic spine 02/12/2013     Priority: Medium    Lumbago 02/11/2013     Priority: Medium    Vulvar lesion 07/13/2012     Priority: Medium    Mild major depression (H) 02/21/2012     Priority: Medium    Esophageal stricture 02/21/2012     Priority: Medium    Ingrowing nail 02/02/2012     Priority: Medium    Chest pain, unspecified 12/14/2011     Priority: Medium    Repeated falls 12/14/2011     Priority: Medium    Left shoulder pain 10/15/2011     Priority: Medium     S/p surgery Aug 2011      Anemia 10/15/2011     Priority: Medium    Closed fracture of proximal end of left humerus 09/14/2011     Priority: Medium    Dysphagia 04/14/2011     Priority: Medium    CARDIOVASCULAR SCREENING; LDL GOAL LESS THAN 160 10/31/2010     Priority: Medium    Nausea 09/13/2010     Priority: Medium    Partial epilepsy with intractable epilepsy (H) 09/13/2010     Priority: Medium    Rectal prolapse 12/01/2009     Priority: Medium    Mild intermittent asthma 11/24/2009     Priority: Medium    Overactive bladder 06/29/2009     Priority: Medium    Renal anomaly 06/04/2009     Priority: Medium     Tubular sclerosis by hx      GERD (gastroesophageal reflux disease) 05/12/2009     Priority: Medium    Vulvar pain 01/16/2009     Priority: Medium    Family history of breast cancer 06/05/2008     Priority: Medium     Sister w premenopausal breast ca      Menopausal syndrome (hot flashes) 05/30/2008     Priority: Medium    Generalized anxiety disorder 08/08/2006     Priority: Medium    Major depressive disorder, recurrent episode, moderate (H) 06/16/2006     Priority: Medium      Past Medical History:   Diagnosis Date    Antinuclear antibody (DOC) titer greater than 1:80 2/22/2023    1:160    Arthritis     Thumb    Cervical high risk HPV  (human papillomavirus) test positive 07/17/2017 07/17/17: NIL pap, + HR HPV (not 16 or 18) result.     Cervical pain 09/15/2016    Chronic infection     MRSA    Constipation 08/27/2012    Diarrhea 04/30/2009    Esophageal stricture 02/21/2012    Gastro-oesophageal reflux disease     History of blood transfusion     Hypertension     Hypothyroidism 09/18/2012    IBS (irritable bowel syndrome)     Mild major depression (H24) 02/21/2012    Neuropathy of lower extremity     Other sleep apnea     Rectal prolapse     Restless leg syndrome     Seizure disorder (H)     25 years ago    Sleep apnea     CPAP, no longer using CPAP    Temporal sclerosis 08/27/2012    Tuberous sclerosis (H)     Uncomplicated asthma      Past Surgical History:   Procedure Laterality Date    Anterior COLPORRHAPHY, BLADDER/VAGINA      perigee mesh    ARTHROPLASTY HIP Right 7/23/2019    Procedure: Right total hip arthroplasty;  Surgeon: Anant Mejia MD;  Location: [facility]    ARTHROPLASTY PATELLO-FEMORAL (KNEE) Bilateral     ARTHROPLASTY REVISION HIP Right 8/7/2019    Procedure: Right hip revision total arthroplasty;  Surgeon: Tom Antonio MD;  Location: [facility]    ARTHROPLASTY REVISION HIP Right 8/5/2020    Procedure: Revision Right total hip arthroplasty;  Surgeon: Pan Grey MD;  Location: [facility]    CARPAL TUNNEL RELEASE RT/LT      DENTAL SURGERY      implant    DESTRUCTION OF PARAVERTEBRAL FACET LUMBAR / SACRAL SINGLE Left 10/27/2021    Procedure: DESTRUCTION, NERVE, FACET JOINT, LUMBOSACRAL, Left L4-5 and L5-S1 facet joint radiofrequency ablation;  Surgeon: Celena Perez MD;  Location: [facility] OR    DILATION AND CURETTAGE      DRAIN PILONIDAL CYST SIMPL      ENDOSCOPY DRUG INDUCED SLEEP N/A 11/18/2015    Procedure: ENDOSCOPY DRUG INDUCED SLEEP;  Surgeon: Park Ortiz MD;  Location: [facility]    ESOPHAGOSCOPY, GASTROSCOPY, DUODENOSCOPY (EGD), COMBINED  4/5/2013    Procedure: COMBINED ESOPHAGOSCOPY, GASTROSCOPY,  DUODENOSCOPY (EGD), BIOPSY SINGLE OR MULTIPLE;;  Surgeon: Mundo Mehta MD;  Location:  GI    EXCISE LESION TRUNK  8/10/2012    Procedure: EXCISE LESION TRUNK;;  Surgeon: Jerald Diggs MD;  Location: [facility]    HYSTERECTOMY      INJECT BLOCK MEDIAL BRANCH CERVICAL/THORACIC/LUMBAR Bilateral 9/20/2021    Procedure: BLOCK, NERVE, FACET JOINT, MEDIAL BRANCH, DIAGNOSTIC - Bilateral L4-5 and L5-S1 Medial branch blocks;  Surgeon: Celena Perez MD;  Location: [facility] OR    INJECT BLOCK MEDIAL BRANCH CERVICAL/THORACIC/LUMBAR Bilateral 9/27/2021    Procedure: BLOCK, NERVE, FACET JOINT, MEDIAL BRANCH, DIAGNOSTIC - Bilateral L4-5 and L5-S1 Medial branch blocks;  Surgeon: Celena Perez MD;  Location: [facility] OR    INJECT SACROILIAC JOINT Bilateral 5/24/2021    Procedure: INJECTION, SACROILIAC JOINT;  Surgeon: Celena Perez MD;  Location: [facility] OR    IRRIGATION AND DEBRIDEMENT HIP, COMBINED Right 8/26/2019    Procedure: 1.  Right hip wound irrigation and debridement with excisional debridement of nonviable skin, subcutaneous tissues, and fascia. 2. Application of incisional wound VAC, 27cm length incision.;  Surgeon: Tyson Prabhakar MD;  Location: [facility]    L shoulder fracture      OPEN REDUCTION INTERNAL FIXATION WRIST Left 4/30/2021    Procedure: Open reduction with internal fixation of displaced left intraarticular distal radius fracture;  Surgeon: Luis Manuel Sánchez MD;  Location: [facility] OR    rectal prolapse repair abdominally      RELEASE PLANTAR FASCIA Right 1/20/2015    Procedure: RELEASE PLANTAR FASCIA;  Surgeon: Maicol Liu DPM;  Location: [facility]    REMOVE MESH VAGINA  8/10/2012    Procedure: REMOVE MESH VAGINA;  Excision of Vaginal Mesh Exposure, Removal of Skin Tag Left Inner Leg;  Surgeon: Jerald Diggs MD;  Location: [facility]    REPAIR HAMMER TOE Right 1/20/2015    Procedure: REPAIR HAMMER TOE;  Surgeon: Maicol Liu DPM;  Location: [facility]    TONSILLECTOMY & ADENOIDECTOMY      TUBAL  LIGATION       Current Outpatient Medications   Medication Sig Dispense Refill    albuterol (ALBUTEROL) 108 (90 BASE) MCG/ACT Inhaler Inhale 2 puffs into the lungs 4 times daily as needed for shortness of breath / dyspnea or wheezing 1 Inhaler 0    alendronate (FOSAMAX) 70 MG tablet TAKE 1 TABLET EVERY 7 DAYS 12 tablet 4    amLODIPine (NORVASC) 5 MG tablet Take 1 tablet (5 mg) by mouth daily 90 tablet 1    amoxicillin (AMOXIL) 500 MG tablet Take 4 tablets (2000 mg) by mouth 1 hour before dental procedures/cleanings. 4 tablet 4    benzonatate (TESSALON) 200 MG capsule       budesonide-formoterol (SYMBICORT) 160-4.5 MCG/ACT Inhaler Inhale 2 puffs into the lungs 2 times daily      carboxymethylcellulose PF (REFRESH PLUS) 0.5 % ophthalmic solution Place 1 drop into both eyes 2 times daily      cycloSPORINE (RESTASIS) 0.05 % ophthalmic emulsion Place 1 drop into both eyes 2 times daily       Dentifrices (BIOTENE DRY MOUTH DT) Apply 2 sprays to affected area 3 times daily as needed       FLUoxetine (PROZAC) 20 MG capsule       fluticasone-vilanterol (BREO ELLIPTA) 200-25 MCG/INH inhaler Inhale 1 puff into the lungs daily      furosemide (LASIX) 20 MG tablet TAKE 1 TABLET DAILY 90 tablet 1    hydrOXYzine (VISTARIL) 25 MG capsule HYDROXYZINE PAMOATE 25 MG CAPS      hypromellose (ARTIFICIAL TEARS) 0.5 % SOLN ophthalmic solution Place 1 drop into both eyes 2 times daily      ibuprofen (ADVIL/MOTRIN) 600 MG tablet Take 600 mg by mouth every 6 hours as needed for moderate pain      lacosamide (VIMPAT) 200 MG TABS tablet Take 1 tablet (200 mg) by mouth 2 times daily 30 tablet 0    levothyroxine (SYNTHROID/LEVOTHROID) 112 MCG tablet Take 1 tablet (112 mcg) by mouth daily 90 tablet 3    linaclotide (LINZESS) 290 MCG capsule Take 1 capsule (290 mcg) by mouth every morning (before breakfast) 10 capsule 0    loratadine (CLARITIN REDITABS) 10 MG ODT Take 1 tablet (10 mg) by mouth daily as needed for allergies      LORazepam (ATIVAN)  "0.5 MG tablet as needed      omeprazole (PRILOSEC) 40 MG DR capsule Take 1 capsule (40 mg) by mouth 2 times daily 180 capsule 0    pramipexole (MIRAPEX) 1 MG tablet Give 1 tablet by mouth in the morning and 3 tablets by mouth 3 hours prior to bedtime      traZODone (DESYREL) 150 MG tablet 150 mg At Bedtime       triamcinolone (KENALOG) 0.1 % external cream APPLY TOPICALLY 3 TIMES    DAILY. 30 g 0       Allergies   Allergen Reactions    Blood Transfusion Related (Informational Only) Other (See Comments)     Patient has a history of a clinically significant antibody against RBC antigens.  A delay in compatible RBCs may occur.    Pregabalin      Other reaction(s): Weight gain    Rotigotine      Other reaction(s): Worsening RLS; wt gain    Zolpidem      Other reaction(s): Hx of Parasomnias; should not be on Ambien    Iron      Other reaction(s): Constipation    Budesonide      Edema    Bupropion Rash    Carbamazepine Diarrhea    Clonazepam Other (See Comments)     Hypotension, trouble walking, mind disturbance    Encort [Hydrocortisone Acetate] Swelling     Localized to feet    Encort [Hydrocortisone] Swelling    Erythromycin Diarrhea    Erythromycin Nausea and Vomiting and Diarrhea    Flagyl [Metronidazole] Nausea    Gabapentin Other (See Comments)     Causes over-eating    Lamictal [Lamotrigine] Dizziness    Oxycodone Nausea and Vomiting    Tegretol [Carbamazepine And Analogs] Nausea and Vomiting        Social History     Tobacco Use    Smoking status: Never    Smokeless tobacco: Never   Substance Use Topics    Alcohol use: Yes     Comment: 1 glass of wine 2x/yr       History   Drug Use No         Objective     /68   Pulse 65   Temp 98  F (36.7  C) (Temporal)   Resp 12   Ht 1.651 m (5' 5\")   Wt 90.3 kg (199 lb)   LMP 03/11/2010   SpO2 96%   BMI 33.12 kg/m      Physical Exam    GENERAL APPEARANCE: alert and no distress     EYES: EOMI, PERRL, lid lag noted     HENT: ear canals and TM's normal and nose and " mouth without ulcers or lesions     NECK: no adenopathy, no asymmetry, masses, or scars and thyroid normal to palpation     RESP: lungs clear to auscultation - no rales, rhonchi or wheezes     CV: regular rates and rhythm, normal S1 S2, no S3 or S4 and no murmur, click or rub     ABDOMEN: obese     MS: extremities normal- no gross deformities noted.     NEURO: No focal changes     PSYCH: mentation appears normal. and affect normal/bright    Recent Labs   Lab Test 08/29/23  1450 06/21/23  1646 02/01/23  1010   HGB 12.1 12.2  --     236  --      --  140   POTASSIUM 4.6  --  3.7   CR 1.14*  --  0.99*        Diagnostics:  Labs pending at this time.  Results will be reviewed when available.   EKG: EKG demonstrates a normal sinus rhythm with a sinus bradycardia and a heart rate of 57 bpm.  No acute changes are noted from baseline EKG as compared to August 2023.    Revised Cardiac Risk Index (RCRI):  The patient has the following serious cardiovascular risks for perioperative complications:   - No serious cardiac risks = 0 points     RCRI Interpretation: 1 point: Class II (low risk - 0.9% complication rate)         Signed Electronically by: Skyler Álvarez MD  Copy of this evaluation report is provided to requesting physician, Dr. Reddy.      Answers submitted by the patient for this visit:  Patient Health Questionnaire (Submitted on 1/16/2024)  If you checked off any problems, how difficult have these problems made it for you to do your work, take care of things at home, or get along with other people?: Very difficult  PHQ9 TOTAL SCORE: 15

## 2024-01-17 LAB
ANION GAP SERPL CALCULATED.3IONS-SCNC: 11 MMOL/L (ref 7–15)
BUN SERPL-MCNC: 18.3 MG/DL (ref 8–23)
CALCIUM SERPL-MCNC: 9.6 MG/DL (ref 8.8–10.2)
CHLORIDE SERPL-SCNC: 104 MMOL/L (ref 98–107)
CREAT SERPL-MCNC: 0.99 MG/DL (ref 0.51–0.95)
DEPRECATED HCO3 PLAS-SCNC: 25 MMOL/L (ref 22–29)
EGFRCR SERPLBLD CKD-EPI 2021: 61 ML/MIN/1.73M2
GLUCOSE SERPL-MCNC: 93 MG/DL (ref 70–99)
POTASSIUM SERPL-SCNC: 4.1 MMOL/L (ref 3.4–5.3)
SODIUM SERPL-SCNC: 140 MMOL/L (ref 135–145)
TSH SERPL DL<=0.005 MIU/L-ACNC: 2.63 UIU/ML (ref 0.3–4.2)

## 2024-01-17 NOTE — PROGRESS NOTES
Pre op physical and labs manually faxed to [facility] at [phone].

## 2024-01-30 ENCOUNTER — ANCILLARY PROCEDURE (OUTPATIENT)
Dept: BONE DENSITY | Facility: CLINIC | Age: 69
End: 2024-01-30
Payer: MEDICARE

## 2024-01-30 DIAGNOSIS — Z78.0 MENOPAUSE: ICD-10-CM

## 2024-01-30 DIAGNOSIS — M85.80 OSTEOPENIA, UNSPECIFIED LOCATION: ICD-10-CM

## 2024-01-30 PROCEDURE — 77080 DXA BONE DENSITY AXIAL: CPT | Mod: TC | Performed by: PHYSICIAN ASSISTANT

## 2024-01-30 PROCEDURE — 77081 DXA BONE DENSITY APPENDICULR: CPT | Mod: TC | Performed by: PHYSICIAN ASSISTANT

## 2024-02-01 ENCOUNTER — MYC MEDICAL ADVICE (OUTPATIENT)
Dept: INTERNAL MEDICINE | Facility: CLINIC | Age: 69
End: 2024-02-01
Payer: MEDICARE

## 2024-02-08 DIAGNOSIS — I10 BENIGN ESSENTIAL HYPERTENSION: ICD-10-CM

## 2024-02-08 RX ORDER — AMLODIPINE BESYLATE 5 MG/1
5 TABLET ORAL DAILY
Qty: 90 TABLET | Refills: 1 | Status: SHIPPED | OUTPATIENT
Start: 2024-02-08 | End: 2024-06-13

## 2024-03-01 ENCOUNTER — TRANSFERRED RECORDS (OUTPATIENT)
Dept: HEALTH INFORMATION MANAGEMENT | Facility: CLINIC | Age: 69
End: 2024-03-01
Payer: MEDICARE

## 2024-03-06 ENCOUNTER — MYC MEDICAL ADVICE (OUTPATIENT)
Dept: INTERNAL MEDICINE | Facility: CLINIC | Age: 69
End: 2024-03-06
Payer: MEDICARE

## 2024-03-18 ENCOUNTER — HOSPITAL ENCOUNTER (OUTPATIENT)
Dept: MAMMOGRAPHY | Facility: CLINIC | Age: 69
Discharge: HOME | End: 2024-03-18
Attending: INTERNAL MEDICINE | Admitting: INTERNAL MEDICINE
Payer: MEDICARE

## 2024-03-18 DIAGNOSIS — Z12.31 VISIT FOR SCREENING MAMMOGRAM: ICD-10-CM

## 2024-03-18 PROCEDURE — 77063 BREAST TOMOSYNTHESIS BI: CPT

## 2024-03-19 ENCOUNTER — TRANSFERRED RECORDS (OUTPATIENT)
Dept: HEALTH INFORMATION MANAGEMENT | Facility: CLINIC | Age: 69
End: 2024-03-19
Payer: MEDICARE

## 2024-05-23 ENCOUNTER — MYC MEDICAL ADVICE (OUTPATIENT)
Dept: INTERNAL MEDICINE | Facility: CLINIC | Age: 69
End: 2024-05-23
Payer: MEDICARE

## 2024-05-23 ENCOUNTER — PATIENT OUTREACH (OUTPATIENT)
Dept: CARE COORDINATION | Facility: CLINIC | Age: 69
End: 2024-05-23
Payer: MEDICARE

## 2024-05-23 NOTE — TELEPHONE ENCOUNTER
There is no appt booked for this patient. Assist the pt to get scheduled for their annual wellness exam  Maty Randle

## 2024-05-29 NOTE — TELEPHONE ENCOUNTER
Sent Attributort message to elizabeth to get more clarification on appt. She was notified there is no upcoming appt scheduled for her in June.

## 2024-06-03 ENCOUNTER — MYC MEDICAL ADVICE (OUTPATIENT)
Dept: INTERNAL MEDICINE | Facility: CLINIC | Age: 69
End: 2024-06-03
Payer: MEDICARE

## 2024-06-13 DIAGNOSIS — I10 BENIGN ESSENTIAL HYPERTENSION: ICD-10-CM

## 2024-06-13 RX ORDER — AMLODIPINE BESYLATE 5 MG/1
5 TABLET ORAL DAILY
Qty: 90 TABLET | Refills: 1 | OUTPATIENT
Start: 2024-06-13

## 2024-06-13 RX ORDER — AMLODIPINE BESYLATE 5 MG/1
5 TABLET ORAL DAILY
Qty: 90 TABLET | Refills: 1 | Status: SHIPPED | OUTPATIENT
Start: 2024-06-13

## 2024-07-28 SDOH — HEALTH STABILITY: PHYSICAL HEALTH: ON AVERAGE, HOW MANY MINUTES DO YOU ENGAGE IN EXERCISE AT THIS LEVEL?: 20 MIN

## 2024-07-28 SDOH — HEALTH STABILITY: PHYSICAL HEALTH: ON AVERAGE, HOW MANY DAYS PER WEEK DO YOU ENGAGE IN MODERATE TO STRENUOUS EXERCISE (LIKE A BRISK WALK)?: 3 DAYS

## 2024-07-28 ASSESSMENT — SOCIAL DETERMINANTS OF HEALTH (SDOH): HOW OFTEN DO YOU GET TOGETHER WITH FRIENDS OR RELATIVES?: TWICE A WEEK

## 2024-07-31 ENCOUNTER — MYC MEDICAL ADVICE (OUTPATIENT)
Dept: INTERNAL MEDICINE | Facility: CLINIC | Age: 69
End: 2024-07-31
Payer: MEDICARE

## 2024-08-01 ENCOUNTER — OFFICE VISIT (OUTPATIENT)
Dept: INTERNAL MEDICINE | Facility: CLINIC | Age: 69
End: 2024-08-01
Payer: MEDICARE

## 2024-08-01 ENCOUNTER — MYC MEDICAL ADVICE (OUTPATIENT)
Dept: INTERNAL MEDICINE | Facility: CLINIC | Age: 69
End: 2024-08-01

## 2024-08-01 VITALS
HEART RATE: 59 BPM | RESPIRATION RATE: 14 BRPM | BODY MASS INDEX: 34.82 KG/M2 | WEIGHT: 209 LBS | SYSTOLIC BLOOD PRESSURE: 114 MMHG | TEMPERATURE: 97.6 F | OXYGEN SATURATION: 97 % | DIASTOLIC BLOOD PRESSURE: 72 MMHG | HEIGHT: 65 IN

## 2024-08-01 DIAGNOSIS — Q85.1 TUBEROUS SCLEROSIS (H): ICD-10-CM

## 2024-08-01 DIAGNOSIS — E66.01 MORBID OBESITY (H): ICD-10-CM

## 2024-08-01 DIAGNOSIS — J45.20 MILD INTERMITTENT ASTHMA WITHOUT COMPLICATION: ICD-10-CM

## 2024-08-01 DIAGNOSIS — Z12.31 VISIT FOR SCREENING MAMMOGRAM: ICD-10-CM

## 2024-08-01 DIAGNOSIS — E03.8 OTHER SPECIFIED HYPOTHYROIDISM: ICD-10-CM

## 2024-08-01 DIAGNOSIS — Z00.00 ENCOUNTER FOR SUBSEQUENT ANNUAL WELLNESS VISIT (AWV) IN MEDICARE PATIENT: Primary | ICD-10-CM

## 2024-08-01 DIAGNOSIS — K21.9 GASTROESOPHAGEAL REFLUX DISEASE WITHOUT ESOPHAGITIS: ICD-10-CM

## 2024-08-01 DIAGNOSIS — R60.0 PERIPHERAL EDEMA: ICD-10-CM

## 2024-08-01 DIAGNOSIS — I10 BENIGN ESSENTIAL HYPERTENSION: ICD-10-CM

## 2024-08-01 DIAGNOSIS — L30.9 DERMATITIS: ICD-10-CM

## 2024-08-01 DIAGNOSIS — G40.909 SEIZURE DISORDER (H): ICD-10-CM

## 2024-08-01 DIAGNOSIS — F33.1 MAJOR DEPRESSIVE DISORDER, RECURRENT EPISODE, MODERATE (H): ICD-10-CM

## 2024-08-01 DIAGNOSIS — Z12.11 COLON CANCER SCREENING: ICD-10-CM

## 2024-08-01 LAB
ALBUMIN SERPL BCG-MCNC: 4.6 G/DL (ref 3.5–5.2)
ALP SERPL-CCNC: 62 U/L (ref 40–150)
ALT SERPL W P-5'-P-CCNC: 10 U/L (ref 0–50)
ANION GAP SERPL CALCULATED.3IONS-SCNC: 12 MMOL/L (ref 7–15)
AST SERPL W P-5'-P-CCNC: 19 U/L (ref 0–45)
BILIRUB SERPL-MCNC: 0.4 MG/DL
BUN SERPL-MCNC: 20.3 MG/DL (ref 8–23)
CALCIUM SERPL-MCNC: 9.3 MG/DL (ref 8.8–10.4)
CHLORIDE SERPL-SCNC: 103 MMOL/L (ref 98–107)
CHOLEST SERPL-MCNC: 187 MG/DL
CREAT SERPL-MCNC: 0.99 MG/DL (ref 0.51–0.95)
EGFRCR SERPLBLD CKD-EPI 2021: 61 ML/MIN/1.73M2
FASTING STATUS PATIENT QL REPORTED: YES
FASTING STATUS PATIENT QL REPORTED: YES
GLUCOSE SERPL-MCNC: 87 MG/DL (ref 70–99)
HCO3 SERPL-SCNC: 25 MMOL/L (ref 22–29)
HDLC SERPL-MCNC: 58 MG/DL
LDLC SERPL CALC-MCNC: 117 MG/DL
NONHDLC SERPL-MCNC: 129 MG/DL
POTASSIUM SERPL-SCNC: 4.3 MMOL/L (ref 3.4–5.3)
PROT SERPL-MCNC: 7 G/DL (ref 6.4–8.3)
SODIUM SERPL-SCNC: 140 MMOL/L (ref 135–145)
T4 FREE SERPL-MCNC: 1.39 NG/DL (ref 0.9–1.7)
TRIGL SERPL-MCNC: 62 MG/DL
TSH SERPL DL<=0.005 MIU/L-ACNC: 4.91 UIU/ML (ref 0.3–4.2)

## 2024-08-01 PROCEDURE — 84439 ASSAY OF FREE THYROXINE: CPT | Performed by: INTERNAL MEDICINE

## 2024-08-01 PROCEDURE — 80061 LIPID PANEL: CPT | Performed by: INTERNAL MEDICINE

## 2024-08-01 PROCEDURE — G0439 PPPS, SUBSEQ VISIT: HCPCS | Performed by: INTERNAL MEDICINE

## 2024-08-01 PROCEDURE — 84443 ASSAY THYROID STIM HORMONE: CPT | Performed by: INTERNAL MEDICINE

## 2024-08-01 PROCEDURE — 80053 COMPREHEN METABOLIC PANEL: CPT | Performed by: INTERNAL MEDICINE

## 2024-08-01 PROCEDURE — 36415 COLL VENOUS BLD VENIPUNCTURE: CPT | Performed by: INTERNAL MEDICINE

## 2024-08-01 PROCEDURE — 99214 OFFICE O/P EST MOD 30 MIN: CPT | Mod: 25 | Performed by: INTERNAL MEDICINE

## 2024-08-01 RX ORDER — ALBUTEROL SULFATE 90 UG/1
2 AEROSOL, METERED RESPIRATORY (INHALATION) 4 TIMES DAILY PRN
Qty: 18 G | Refills: 0 | Status: SHIPPED | OUTPATIENT
Start: 2024-08-01

## 2024-08-01 RX ORDER — FUROSEMIDE 20 MG
20 TABLET ORAL DAILY
Qty: 90 TABLET | Refills: 1 | Status: SHIPPED | OUTPATIENT
Start: 2024-08-01

## 2024-08-01 RX ORDER — OMEPRAZOLE 40 MG/1
40 CAPSULE, DELAYED RELEASE ORAL 2 TIMES DAILY
Qty: 180 CAPSULE | Refills: 3 | Status: SHIPPED | OUTPATIENT
Start: 2024-08-01

## 2024-08-01 RX ORDER — POTASSIUM CITRATE 10 MEQ/1
10 TABLET, EXTENDED RELEASE ORAL DAILY
Qty: 90 TABLET | Refills: 1 | Status: SHIPPED | OUTPATIENT
Start: 2024-08-01

## 2024-08-01 RX ORDER — TRIAMCINOLONE ACETONIDE 1 MG/G
CREAM TOPICAL 3 TIMES DAILY
Qty: 30 G | Refills: 0 | Status: SHIPPED | OUTPATIENT
Start: 2024-08-01 | End: 2024-08-06

## 2024-08-01 ASSESSMENT — ASTHMA QUESTIONNAIRES
ACT_TOTALSCORE: 18
ACT_TOTALSCORE: 18
QUESTION_4 LAST FOUR WEEKS HOW OFTEN HAVE YOU USED YOUR RESCUE INHALER OR NEBULIZER MEDICATION (SUCH AS ALBUTEROL): TWO OR THREE TIMES PER WEEK
QUESTION_3 LAST FOUR WEEKS HOW OFTEN DID YOUR ASTHMA SYMPTOMS (WHEEZING, COUGHING, SHORTNESS OF BREATH, CHEST TIGHTNESS OR PAIN) WAKE YOU UP AT NIGHT OR EARLIER THAN USUAL IN THE MORNING: TWO OR THREE NIGHTS A WEEK
QUESTION_1 LAST FOUR WEEKS HOW MUCH OF THE TIME DID YOUR ASTHMA KEEP YOU FROM GETTING AS MUCH DONE AT WORK, SCHOOL OR AT HOME: NONE OF THE TIME
QUESTION_5 LAST FOUR WEEKS HOW WOULD YOU RATE YOUR ASTHMA CONTROL: WELL CONTROLLED
QUESTION_2 LAST FOUR WEEKS HOW OFTEN HAVE YOU HAD SHORTNESS OF BREATH: ONCE OR TWICE A WEEK

## 2024-08-01 ASSESSMENT — PATIENT HEALTH QUESTIONNAIRE - PHQ9
SUM OF ALL RESPONSES TO PHQ QUESTIONS 1-9: 8
10. IF YOU CHECKED OFF ANY PROBLEMS, HOW DIFFICULT HAVE THESE PROBLEMS MADE IT FOR YOU TO DO YOUR WORK, TAKE CARE OF THINGS AT HOME, OR GET ALONG WITH OTHER PEOPLE: SOMEWHAT DIFFICULT
SUM OF ALL RESPONSES TO PHQ QUESTIONS 1-9: 8

## 2024-08-01 NOTE — PROGRESS NOTES
Preventive Care Visit  [facility]  Skyler Álvarez MD, Internal Medicine  Aug 1, 2024      Assessment & Plan     Encounter for subsequent annual wellness visit (AWV) in Medicare patient  Advised patient to consider updating routine vaccines accordingly.  - Comprehensive metabolic panel; Future  - Lipid Profile; Future    Benign essential hypertension  Stable on therapy continue with current medical management.  - Comprehensive metabolic panel; Future  - potassium citrate (UROCIT-K) 10 MEQ (1080 MG) CR tablet; Take 1 tablet (10 mEq) by mouth daily  - furosemide (LASIX) 20 MG tablet; Take 1 tablet (20 mg) by mouth daily    Morbid obesity (H)  Encouraged ongoing weight loss and weight reduction.  Patient states she will be using Golo.    Seizure disorder (H)  Noted as prior history clinically stable    Mild intermittent asthma without complication  Refilled inhaler for as needed use  - albuterol (PROAIR HFA) 108 (90 Base) MCG/ACT inhaler; Inhale 2 puffs into the lungs 4 times daily as needed for shortness of breath or wheezing      Major depressive disorder, recurrent episode, moderate (H)  States mental health is stable at present time.    Tuberous sclerosis (H)  Prior history and clinically stable    Colon cancer screening  Prior colonoscopy noted and suggest annual FIT testing  - Fecal colorectal cancer screen (FIT); Future    Visit for screening mammogram  Mammography reviewed and recommend annual screening    Peripheral edema  A-fib with intermittent exacerbation.  Continue with Lasix use  - furosemide (LASIX) 20 MG tablet; Take 1 tablet (20 mg) by mouth daily    Dermatitis  Refilled topical cream for patient as needed use  - triamcinolone (KENALOG) 0.1 % external cream; Apply topically 3 times daily    Gastroesophageal reflux disease without esophagitis  Stable on current dose.  - omeprazole (PRILOSEC) 40 MG DR capsule; Take 1 capsule (40 mg) by mouth 2 times daily  Consider attempt at dose  "reduced to 1 tablet daily    Other specified hypothyroidism  Recheck thyroid function test accordingly  - TSH with free T4 reflex; Future        BMI  Estimated body mass index is 34.78 kg/m  as calculated from the following:    Height as of this encounter: 1.651 m (5' 5\").    Weight as of this encounter: 94.8 kg (209 lb).   Weight management plan: Discussed healthy diet and exercise guidelines    Counseling  Appropriate preventive services were addressed with this patient via screening, questionnaire, or discussion as appropriate for fall prevention, nutrition, physical activity, Tobacco-use cessation, weight loss and cognition.  Checklist reviewing preventive services available has been given to the patient.  Reviewed patient's diet, addressing concerns and/or questions.   She is at risk for lack of exercise and has been provided with information to increase physical activity for the benefit of her well-being.   Discussed possible causes of fatigue. Updated plan of care.  Patient reported difficulty with activities of daily living were addressed today.The patient's PHQ-9 score is consistent with mild depression. She was provided with information regarding depression.       Work on weight loss  Regular exercise    Percy Long is a 69 year old, presenting for the following:  Wellness Visit      Health Care Directive  Patient has a Health Care Directive on file  Advance care planning document is on file and is current.    HPI    Wellness exam.        7/28/2024   General Health   How would you rate your overall physical health? (!) FAIR   Feel stress (tense, anxious, or unable to sleep) To some extent      (!) STRESS CONCERN      7/28/2024   Nutrition   Diet: Low salt    Gluten-free/reduced       Multiple values from one day are sorted in reverse-chronological order         7/28/2024   Exercise   Days per week of moderate/strenous exercise 3 days   Average minutes spent exercising at this level 20 min           "  7/28/2024   Social Factors   Frequency of gathering with friends or relatives Twice a week   Worry food won't last until get money to buy more Yes   Food not last or not have enough money for food? No   Do you have housing? (Housing is defined as stable permanent housing and does not include staying ouside in a car, in a tent, in an abandoned building, in an overnight shelter, or couch-surfing.) No   Are you worried about losing your housing? No   Lack of transportation? No   Unable to get utilities (heat,electricity)? Yes   Want help with housing or utility concern? No      (!) FOOD SECURITY CONCERN PRESENT(!) HOUSING CONCERN PRESENT(!) FINANCIAL RESOURCE STRAIN CONCERN      8/1/2024   Fall Risk   Fallen 2 or more times in the past year? No   Trouble with walking or balance? Yes   Gait Speed Test (Document in seconds) 4.11   Gait Speed Test Interpretation Less than or equal to 5.00 seconds - PASS            7/28/2024   Activities of Daily Living- Home Safety   Needs help with the following daily activites Housework    None of the above   Safety concerns in the home None of the above       Multiple values from one day are sorted in reverse-chronological order         7/28/2024   Dental   Dentist two times every year? Yes            7/28/2024   Hearing Screening   Hearing concerns? None of the above            7/28/2024   Driving Risk Screening   Patient/family members have concerns about driving No            7/28/2024   General Alertness/Fatigue Screening   Have you been more tired than usual lately? (!) YES            7/28/2024   Urinary Incontinence Screening   Bothered by leaking urine in past 6 months No            7/28/2024   TB Screening   Were you born outside of the [place]? No          Today's PHQ-9 Score:       8/1/2024    10:29 AM   PHQ-9 SCORE   PHQ-9 Total Score MyChart 8 (Mild depression)   PHQ-9 Total Score 8           7/28/2024   Substance Use   Alcohol more than 3/day or more than 7/wk No   Do you have  a current opioid prescription? No   How severe/bad is pain from 1 to 10? 4/10   Do you use any other substances recreationally? No        Social History     Tobacco Use    Smoking status: Never    Smokeless tobacco: Never   Vaping Use    Vaping status: Never Used   Substance Use Topics    Alcohol use: Yes     Comment: 1 glass of wine 2x/yr    Drug use: No           3/18/2024   LAST FHS-7 RESULTS   1st degree relative breast or ovarian cancer Yes   Any relative bilateral breast cancer No   Any male have breast cancer No   Any ONE woman have BOTH breast AND ovarian cancer No   Any woman with breast cancer before 50yrs Yes   2 or more relatives with breast AND/OR ovarian cancer No   2 or more relatives with breast AND/OR bowel cancer No          Mammogram Screening - Mammogram every 1-2 years updated in Health Maintenance based on mutual decision making      History of abnormal Pap smear: No - age 65 or older with pap indicated due to inadequate prior screening (3 consecutive negative cytology results, 2 consecutive negative cotesting results, or 2 consecutive negative HrHPV test results within 10 years, with the most recent test occurring within the recommended screening interval for the test used)        Latest Ref Rng & Units 7/17/2017    10:45 AM 7/17/2017    10:34 AM 1/6/2012     3:52 PM   PAP / HPV   PAP (Historical)   NIL  NIL    HPV 16 DNA NEG Negative      HPV 18 DNA NEG Negative      Other HR HPV NEG Positive        ASCVD Risk   The 10-year ASCVD risk score (Delmy MOODY, et al., 2019) is: 10%    Values used to calculate the score:      Age: 69 years      Sex: Female      Is Non- : No      Diabetic: No      Tobacco smoker: No      Systolic Blood Pressure: 114 mmHg      Is BP treated: Yes      HDL Cholesterol: 59 mg/dL      Total Cholesterol: 200 mg/dL      Reviewed and updated as needed this visit by Provider                    Lab work is in process  Current providers sharing in  care for this patient include:  Patient Care Team:  Skyler Álvarez MD as PCP - General (Internal Medicine)  Samir Arguelles MD as MD (Neurology)  Park Ortiz MD as MD (Otolaryngology)  Skyler Chacko MD as Referring Physician (Orthopedics)  Skyler Álvarez MD as Assigned PCP  Rachael Smith MD as MD (Endocrinology, Diabetes, and Metabolism)  Rachael Platt PA-C as Physician Assistant (Dermatology)  Odalys Caraballo MD as Assigned Endocrinology Provider    The following health maintenance items are reviewed in Epic and correct as of today:  Health Maintenance   Topic Date Due    RSV VACCINE (Pregnancy & 60+) (1 - 1-dose 60+ series) Never done    ASTHMA ACTION PLAN  04/12/2023    ANNUAL REVIEW OF HM ORDERS  01/17/2024    COVID-19 Vaccine (7 - 2023-24 season) 05/16/2024    INFLUENZA VACCINE (1) 09/01/2024    TSH W/FREE T4 REFLEX  01/16/2025    ASTHMA CONTROL TEST  02/01/2025    PHQ-9  02/01/2025    MEDICARE ANNUAL WELLNESS VISIT  08/01/2025    FALL RISK ASSESSMENT  08/01/2025    COLORECTAL CANCER SCREENING  12/09/2025    MAMMO SCREENING  03/18/2026    GLUCOSE  01/16/2027    LIPID  06/21/2028    ADVANCE CARE PLANNING  08/01/2029    DTAP/TDAP/TD IMMUNIZATION (7 - Td or Tdap) 09/12/2031    DEXA  01/30/2039    HEPATITIS C SCREENING  Completed    DEPRESSION ACTION PLAN  Completed    Pneumococcal Vaccine: 65+ Years  Completed    ZOSTER IMMUNIZATION  Completed    IPV IMMUNIZATION  Aged Out    HPV IMMUNIZATION  Aged Out    MENINGITIS IMMUNIZATION  Aged Out    RSV MONOCLONAL ANTIBODY  Aged Out         Review of Systems  CONSTITUTIONAL: NEGATIVE for fever, chills, change in weight  EYES: NEGATIVE for vision changes or irritation  ENT/MOUTH: NEGATIVE for ear, mouth and throat problems  RESP: NEGATIVE for significant cough or SOB  CV: NEGATIVE for chest pain, palpitations or peripheral edema  GI: NEGATIVE for nausea, abdominal pain, heartburn, or change in bowel habits  : NEGATIVE for  "frequency, dysuria, or hematuria  MUSCULOSKELETAL: NEGATIVE for significant arthralgias or myalgia  NEURO: NEGATIVE for weakness, dizziness or paresthesias  ENDOCRINE: NEGATIVE for temperature intolerance, skin/hair changes  HEME: NEGATIVE for bleeding problems  PSYCHIATRIC: NEGATIVE for changes in mood or affect     Objective    Exam  /72   Pulse 59   Temp 97.6  F (36.4  C) (Temporal)   Resp 14   Ht 1.651 m (5' 5\")   Wt 94.8 kg (209 lb)   LMP 03/11/2010   SpO2 97%   BMI 34.78 kg/m     Estimated body mass index is 34.78 kg/m  as calculated from the following:    Height as of this encounter: 1.651 m (5' 5\").    Weight as of this encounter: 94.8 kg (209 lb).    Physical Exam  GENERAL: alert and no distress  EYES: Eyes grossly normal to inspection, PERRL and conjunctivae and sclerae normal  HENT: ear canals and TM's normal, nose and mouth without ulcers or lesions  NECK: no adenopathy, no asymmetry, masses, or scars  RESP: lungs clear to auscultation - no rales, rhonchi or wheezes  CV: regular rate and rhythm, normal S1 S2, no S3 or S4, no murmur, click or rub  ABDOMEN: soft, nontender, no hepatosplenomegaly, no masses and bowel sounds normal  MS: no gross musculoskeletal defects noted, no edema  NEURO: No FOCAL CHANGES  PSYCH: mentation appears normal, affect normal/bright         8/1/2024   Mini Cog   Clock Draw Score 2 Normal   3 Item Recall 3 objects recalled   Mini Cog Total Score 5                 Signed Electronically by: Skyler Álvarez MD    Answers submitted by the patient for this visit:  Patient Health Questionnaire (Submitted on 8/1/2024)  If you checked off any problems, how difficult have these problems made it for you to do your work, take care of things at home, or get along with other people?: Somewhat difficult  PHQ9 TOTAL SCORE: 8    "

## 2024-08-03 DIAGNOSIS — L30.9 DERMATITIS: ICD-10-CM

## 2024-08-05 NOTE — TELEPHONE ENCOUNTER
Please advise on pharmacy comments regarding med.       Pharmacy comment: Patient has indicated to us that they are either allergic or sensitive to Corticosteroids. There is possible cross-allergy. May we dispense? Please respond with appropriate changes or comment to Pharmacy.  Patient has indicated to us that they are either allergic or sensitive to Corticosteroids. There is possible cross-allergy. May we dispense? Please respond with appropriate changes or comment to Pharmacy.

## 2024-08-06 RX ORDER — TRIAMCINOLONE ACETONIDE 1 MG/G
CREAM TOPICAL 3 TIMES DAILY
Qty: 30 G | Refills: 0 | Status: SHIPPED | OUTPATIENT
Start: 2024-08-06

## 2024-08-07 PROCEDURE — 82274 ASSAY TEST FOR BLOOD FECAL: CPT | Performed by: INTERNAL MEDICINE

## 2024-08-08 LAB — HEMOCCULT STL QL IA: NEGATIVE

## 2024-09-25 ENCOUNTER — TRANSFERRED RECORDS (OUTPATIENT)
Dept: HEALTH INFORMATION MANAGEMENT | Facility: CLINIC | Age: 69
End: 2024-09-25
Payer: MEDICARE

## 2024-10-10 NOTE — TELEPHONE ENCOUNTER
Additional Information    Negative: Severe difficulty breathing (e.g., struggling for each breath, speaks in single words)    Negative: Bluish (or gray) lips or face    Negative: Shock suspected (e.g., cold/pale/clammy skin, too weak to stand, low BP, rapid pulse)    Negative: Sounds like a life-threatening emergency to the triager    Negative: Sounds like a cold and there is no fever    Negative: Cough and there is no fever    Negative: Severe cough    Negative: Throat pain and there is no fever    Negative: Severe sore throat    Negative: Influenza vaccine reaction is suspected    Negative: Headache and stiff neck (can't touch chin to chest)    Negative: Chest pain (EXCEPTION: MILD central chest pain, present only when coughing)    Negative: Difficulty breathing that is not severe and not relieved by cleaning out the nose    Negative: Patient sounds very sick or weak to the triager    Negative: Fever > 104 F (40 C)    Negative: Fever > 101 F (38.3 C) and over 60 years of age    Negative: Fever > 100.0 F (37.8 C) and diabetes mellitus or weak immune system (e.g., HIV positive, cancer chemo, splenectomy, organ transplant, chronic steroids)    Negative: Fever > 100.0 F (37.8 C) and bedridden (e.g., nursing home patient, stroke, chronic illness, recovering from surgery)    Negative: Using nasal washes and pain medicine > 24 hours and sinus pain (lower forehead, cheekbone, or eye) persists    Negative: Fever present > 3 days (72 hours)    Negative: Fever returns after gone for over 24 hours and symptoms worse (or not improved)    Negative: Earache    Negative: Patient wants to be seen    Negative: Nasal discharge present > 10 days    Negative: Cough present > 3 weeks    Patient requests antiviral medicine for influenza and flu symptoms present < 48 hours    Protocols used: INFLUENZA - SEASONAL-A-OH    Influenza-Like Illness (CED) Protocol    Mercedes Barber      Age: 64 year old     Date of Birth:  Patient : Darrion Claros Age: 4 year old Sex: male   MRN: [id number] Encounter Date: (Not on file)    History     Chief Complaint   Patient presents with    Cough         Cough  This is a recurrent problem. The current episode started more than 1 week ago. The problem has not changed since onset.The cough is Non-productive. There has been no fever. Associated symptoms include shortness of breath and wheezing. Pertinent negatives include no chest pain, no ear pain, no rhinorrhea and no sore throat. He has tried cough syrup for the symptoms. The treatment provided mild relief.       Darrion Claros is a 4 year old presenting to the emergency department cough that has been present for the past 4 to 5 weeks.  He was seen in intermediate care approximately 2 weeks ago and was told that he had a virus that.  Presents today stating that the cough has not resolved and now the child is having coughing fits that is resulting in some vomiting after the fits and some wheezing.  Child is denying headaches, ear pain, throat pain.  Grandmother states that child does seem to be congested without significant rhinorrhea.  Denies fevers.        Past/Family/Social History     Allergies   Allergen Reactions    Penicillins HIVES     Per grandma: Father and grandfather have allergy and pt hasn't received it in case there is an allergy       No current facility-administered medications for this encounter.     Current Outpatient Medications   Medication Sig    fluticasone (VERAMYST) 27.5 MCG/SPRAY nasal spray Spray 2 sprays in each nostril daily.    cetirizine (ZyrTEC) 5 MG/5ML solution Take 5 mLs by mouth nightly.    albuterol 108 (90 Base) MCG/ACT inhaler Inhale 2 puffs into the lungs every 4 hours as needed for Wheezing.    azithromycin (Zithromax) 100 MG/5ML suspension 140mg day 1, then 70 mg days 2-5 (Patient not taking: Reported on 9/5/2024)    ibuprofen (CHILDRENS ADVIL) 100 MG/5ML suspension Take by mouth every 8 hours as needed  1955    Please select the type of triage protocol you used for this patient? Epic [facility] or another form of electronic triage protocol was used.     Influenza-Like Illness (CED) Standing Order    Has the patient been seen by a primary care provider at an [facility] within the past two years? Yes     Do any of the following exclusions apply to the patient?  Does the patient have a history of CrCl less than or equal to 60 ml/min No   Is the patient taking Probenecid No   Was an Intranasal live attenuated influenza vaccine (LAIV) received by the patient 2 weeks before antiviral medication No   Was an LAIV received 48 hours after administration of influenza antiviral drugs, if planning on obtaining? No     Does this patient have ANY of the above exclusions answered Yes?  No.      Is this patient currently showing symptoms of Influenza like Illness (CED) after close proximity to someone with a verified diagnosis of Influenza, or is this patient not sick but has had known exposure within less than or equal to 48 hours through close proximity with someone that has a verified diagnosis of Influenza? No except mother lives in assisted living  This patient is currently sick with CED type symptoms     Does this patient have ANY of the following specialty conditions?  Chemotherapy or radiation within the last 3 months No   Organ or bone marrow transplant No   Metabolic disorder No   HIV patient with CD4 count <200 No     Does this patient have ANY of the above specialty conditions? No     Have the symptoms of CED been present for less than or equal to 48 hours? Yes     Adult Evaluation for Possible Influenza Treatment due to CED Symptoms      Below are conditions which place adults at increased risk for the more severe complications of influenza.    Does this patient have ANY of the following conditions?  Is 65 years of age or older Yes  for Fever. (Patient not taking: Reported on 9/5/2024)    acetaminophen (TYLENOL CHILDRENS) 160 MG/5ML suspension Take 3.6 mLs by mouth every 4 hours as needed for Fever. (Patient not taking: Reported on 9/5/2024)       Past Medical History:   Diagnosis Date    No known problems        Past Surgical History:   Procedure Laterality Date    No past surgeries         Family History   Problem Relation Age of Onset    Patient is unaware of any medical problems Mother     Asthma Father     Asthma Maternal Grandmother     Hypertension Maternal Grandfather     Rheumatoid Arthritis Paternal Grandmother     Patient is unaware of any medical problems Paternal Grandfather        Social History     Tobacco Use    Passive exposure: Yes   Substance Use Topics    Drug use: Not Currently          Review of Systems   Review of Symptoms     Review of Systems   Constitutional:  Negative for appetite change, fever and irritability.   HENT:  Positive for congestion. Negative for ear pain, rhinorrhea and sore throat.    Respiratory:  Positive for cough, shortness of breath and wheezing.    Cardiovascular:  Negative for chest pain.   Gastrointestinal:  Positive for vomiting.   Neurological: Negative.           Physical Exam   Physical Exam     ED Triage Vitals   ED Triage Vitals Group      Temp 10/10/24 1742 97.9 °F (36.6 °C)      Heart Rate 10/10/24 1742 92      Resp 10/10/24 1742 26      BP 10/10/24 1742 108/69      SpO2 10/10/24 1742 99 %      EtCO2 mmHg --       Height --       Weight 10/10/24 1739 51 lb 9.4 oz (23.4 kg)      Weight Scale Used 10/10/24 1739 Standing scale      BMI (Calculated) --       IBW/kg (Calculated) --        Physical Exam  Constitutional:       General: He is active. He is not in acute distress.     Appearance: He is normal weight. He is not toxic-appearing.   HENT:      Head: Normocephalic and atraumatic.      Right Ear: Tympanic membrane normal.      Left Ear: Tympanic membrane normal.      Nose: Congestion and    Chronic pulmonary disease such as asthma or COPD No   Heart disease (CHF, CAD, anticoagulation d/t arrhytmia, congenital heart anomaly) *HTN alone is excluded No   Kidney disease (renal failure, insufficiency or dialysis) No   Hepatic or Hematologic disorder (e.g. chronic liver disease patient, sickle cell disease) No   Diabetes (Type 1 or Type 2) No   Neurologic and Neurodevelopment Conditions (including disorders of the brain, spinal cord, peripheral nerve, and muscle, such as cerebral palsy, epilepsy (seizure disorders), stroke, intellectual disability, moderate to severe developmental delay, muscular dystrophy, or spinal cord injury) No   Obese with BMI >40 No   Immunosuppression caused by medications such as those taking prednisone in excess of 20mg daily, or caused by HIV/AIDS with CD4 count more than 200 No   Is pregnant, may be pregnant, or is within two weeks after delivery No   Is a resident of a [facility] care facility No   Is patient  or [place] native No   Is <19 years old and is receiving long term aspirin- or salicylate-containing medications No     Does this patient have ANY of the above conditions?  No. Recommend a virtual visit such as an [facility] appointment age 1 to 65, or a telephone visit with the provider (LIP).  If virtual visit is not available, consider an in-office visit with provider based on same or next day access. If virtual visit not available, consider in-office visit with provider based on same day or next day access.        Pt does not have computer and and wanting Tamiflu will MD prescribe ?          Additional educational resources include:    http://www.mdhSchoolControl.com    http://www.cdc.gov/flu/  Latha Ha RN   rhinorrhea present.      Mouth/Throat:      Mouth: Mucous membranes are moist.      Pharynx: Oropharynx is clear. No oropharyngeal exudate or posterior oropharyngeal erythema.      Neck: Normal range of motion and neck supple.   Cardiovascular:      Rate and Rhythm: Normal rate and regular rhythm.      Pulses: Normal pulses.   Pulmonary:      Effort: Pulmonary effort is normal.      Breath sounds: Normal breath sounds.   Lymphadenopathy:      Cervical: No cervical adenopathy.   Skin:     General: Skin is warm.      Capillary Refill: Capillary refill takes less than 2 seconds.   Neurological:      General: No focal deficit present.      Mental Status: He is alert and oriented for age.      Cranial Nerves: No cranial nerve deficit.            Procedures   ED Procedures     Procedures     Lab Results   ED Lab     No results found for this visit on 10/10/24.       Radiology Results   ED Radiology Results     Imaging Results              XR CHEST PA AND LATERAL 2 VIEWS (Final result)  Result time 10/10/24 17:52:45      Final result                   Impression:    No focal consolidation.        Electronically Signed by: SHREYAS RASHEED M.D.   Signed on: 10/10/2024 5:52 PM   Workstation ID: [id number]               Narrative:    EXAM: CHEST, 2 VIEW    CLINICAL INDICATION:  Cough.    COMPARISON: None.    FINDINGS: The cardiac silhouette is normal.  There is bronchial wall  thickening centrally.  No focal consolidation is seen.  There is no  evidence of pleural effusion or pneumothorax.                                         ED Medications   ED Medications     ED Medication Orders (From admission, onward)      None               ED Course     Vitals:    10/10/24 1739 10/10/24 1742   BP:  108/69   Pulse:  92   Resp:  26   Temp:  97.9 °F (36.6 °C)   SpO2:  99%   Weight: 23.4 kg (51 lb 9.4 oz)        ED Course as of 10/10/24 1836   Thu Oct 10, 2024   1811 XR CHEST PA AND LATERAL 2 VIEWS  No focal consolidation. [LL]       ED Course User Index  [LL] Kate Ochoa CNP       Independent Review Completed in ED course        Consults                    MDM:    Overall the patient is well-appearing.  He is lung sounds are clear.  He does not have any acute signs of upper respiratory virus at this time.  As the patient has already been treated with azithromycin and albuterol I will refill the patient's albuterol but I do not believe that any additional antibiotics are necessary at this time.  I do believe that the patient is likely experiencing a postviral cough.  Sent in prescriptions for Zyrtec and Flonase for trial.  Encouraging the patient to follow-up with the pediatrician in the next 2 weeks.  We discussed strict emergency department return precautions as well as appropriate follow-up and home care.  The father at bedside verbalizes understanding.  With shared decision making the father does feel safe for the patient to be discharged home.  Chest x-ray was negative for any acute consolidates.  Patient was stable, ambulatory, no respiratory distress upon discharge.    Disposition       Clinical Impression and Diagnosis  6:36 PM       ED Diagnosis       Diagnosis Comment Associated Orders       Final diagnosis    Post-viral cough syndrome -- --            Follow Up:  Lucian Warren MD  [address] [place] [address]  [phone]    Call in 2 days  As needed, If symptoms worsen          Summary of your Discharge Medications        Take these Medications        Details   albuterol 108 (90 Base) MCG/ACT inhaler   Inhale 2 puffs into the lungs every 4 hours as needed for Wheezing.     cetirizine 5 MG/5ML solution  Commonly known as: ZyrTEC   Take 5 mLs by mouth nightly.     fluticasone 27.5 MCG/SPRAY nasal spray  Commonly known as: VERAMYST   Spray 2 sprays in each nostril daily.              Pt is discharged to home/self care in stable condition.              Discharge 10/10/2024  6:34 PM  Darrion Claros discharge to  home/self care.                       Kate Ochoa, CNP  10/10/24 5537

## 2024-11-13 ENCOUNTER — LAB (OUTPATIENT)
Dept: LAB | Facility: CLINIC | Age: 69
End: 2024-11-13
Payer: COMMERCIAL

## 2024-11-13 DIAGNOSIS — M85.80 OSTEOPENIA, UNSPECIFIED LOCATION: ICD-10-CM

## 2024-11-13 DIAGNOSIS — E03.9 ACQUIRED HYPOTHYROIDISM: ICD-10-CM

## 2024-11-13 LAB
CALCIUM SERPL-MCNC: 9.6 MG/DL (ref 8.8–10.4)
CREAT SERPL-MCNC: 1 MG/DL (ref 0.51–0.95)
EGFRCR SERPLBLD CKD-EPI 2021: 60 ML/MIN/1.73M2
TSH SERPL DL<=0.005 MIU/L-ACNC: 1.72 UIU/ML (ref 0.3–4.2)

## 2024-11-13 PROCEDURE — 36415 COLL VENOUS BLD VENIPUNCTURE: CPT

## 2024-11-13 PROCEDURE — 84443 ASSAY THYROID STIM HORMONE: CPT

## 2024-11-13 PROCEDURE — 82310 ASSAY OF CALCIUM: CPT

## 2024-11-13 PROCEDURE — 82565 ASSAY OF CREATININE: CPT

## 2024-12-03 ENCOUNTER — TRANSCRIBE ORDERS (OUTPATIENT)
Dept: OTHER | Age: 69
End: 2024-12-03

## 2024-12-03 DIAGNOSIS — Z98.890 S/P ORIF (OPEN REDUCTION INTERNAL FIXATION) FRACTURE: ICD-10-CM

## 2024-12-03 DIAGNOSIS — S42.209A PROXIMAL HUMERUS FRACTURE: ICD-10-CM

## 2024-12-03 DIAGNOSIS — M19.112 POST-TRAUMATIC OSTEOARTHRITIS OF LEFT SHOULDER: Primary | ICD-10-CM

## 2024-12-03 DIAGNOSIS — Z87.81 S/P ORIF (OPEN REDUCTION INTERNAL FIXATION) FRACTURE: ICD-10-CM

## 2024-12-10 ENCOUNTER — THERAPY VISIT (OUTPATIENT)
Dept: PHYSICAL THERAPY | Facility: CLINIC | Age: 69
End: 2024-12-10
Payer: COMMERCIAL

## 2024-12-10 DIAGNOSIS — M79.622 PAIN OF LEFT UPPER ARM: Primary | ICD-10-CM

## 2024-12-10 PROCEDURE — 97110 THERAPEUTIC EXERCISES: CPT | Mod: GP | Performed by: PHYSICAL THERAPIST

## 2024-12-10 PROCEDURE — 97161 PT EVAL LOW COMPLEX 20 MIN: CPT | Mod: GP | Performed by: PHYSICAL THERAPIST

## 2024-12-10 PROCEDURE — 97530 THERAPEUTIC ACTIVITIES: CPT | Mod: GP | Performed by: PHYSICAL THERAPIST

## 2024-12-10 ASSESSMENT — ACTIVITIES OF DAILY LIVING (ADL)
WASHING_YOUR_HAIR?: 7
PUSHING_WITH_THE_INVOLVED_ARM: 9
PUTTING_ON_AN_UNDERSHIRT_OR_A_PULLOVER_SWEATER: 8
CARRYING_A_HEAVY_OBJECT_OF_10_POUNDS: 10
REACHING_FOR_SOMETHING_ON_A_HIGH_SHELF: 10
WHEN_LYING_ON_THE_INVOLVED_SIDE: 9
AT_ITS_WORST?: 10
REMOVING_SOMETHING_FROM_YOUR_BACK_POCKET: 8
WASHING_YOUR_BACK: 7
PLACING_AN_OBJECT_ON_A_HIGH_SHELF: 10
PLEASE_INDICATE_YOR_PRIMARY_REASON_FOR_REFERRAL_TO_THERAPY:: SHOULDER
TOUCHING_THE_BACK_OF_YOUR_NECK: 9
PUTTING_ON_A_SHIRT_THAT_BUTTONS_DOWN_THE_FRONT: 7
PUTTING_ON_YOUR_PANTS: 8

## 2024-12-10 NOTE — PROGRESS NOTES
PHYSICAL THERAPY EVALUATION  Type of Visit: Evaluation       Fall Risk Screen:  Fall screen completed by: PT  Have you fallen 2 or more times in the past year?: Yes  Have you fallen and had an injury in the past year?: No  Timed Up and Go score (seconds): 13  Is patient a fall risk?: Yes    Subjective         Presenting condition or subjective complaint: (Patient-Rptd) my shoulder is very sore  Date of onset: 12/03/24 (MD order date)    Relevant medical history:     Dates & types of surgery:  s/p R SARAHI, left wrist ORIF, B TKA, left shoulder ORIF    Prior diagnostic imaging/testing results: (Patient-Rptd) MRI; CT scan; X-ray     Prior therapy history for the same diagnosis, illness or injury: (Patient-Rptd) No      Prior Level of Function  Transfers: Independent  Ambulation: Independent  ADL: Independent      Living Environment  Social support: (Patient-Rptd) Alone   Type of home: (Patient-Rptd) Apartment/condo; 1 level   Stairs to enter the home:         Ramp: (Patient-Rptd) No   Stairs inside the home: (Patient-Rptd) No       Help at home: (Patient-Rptd) None; Emergency call system  Equipment owned: (Patient-Rptd) Straight Cane; Grab bars; Raised toilet seat; Bath bench     Employment: (Patient-Rptd) No    Hobbies/Interests: (Patient-Rptd) making cards, painting,baking,make jewerly.    Patient goals for therapy: (Patient-Rptd) lift things from my cupboards and fold towels and laundry    Pain had insidious onset of pain about 1 year ago,  cervical, left and right upper trapezius, left scapula and left shoulder to the elbow.  Pain is constant 5-8/10.  She had a fall and fractured proximal humerus ~2010 with ORIF, wondering if current symptoms are related.  Symptoms increase with prolonged sitting, turning the head, especially to the left, any movement to the shoulder, losing at least 1/2 her night of sleep, limited time lying on the left.  Symptoms decrease with nothing.         Objective     Posture  Poor, fwd head,  rounded shoulders, increased thoracic kyphosis, increased flexion angle at cervicothoracic junction.      Cervical ROM  Flexion: WNL  Extension:  50%  Left rotation:  66% with pain   Right rotation:  75%    Shoulder AROM  Right shoulder WNL, ext/IR to T9.   Left shoulder flexion 82 with ERP, abduction 51 with pain throughout movement, extension WNL.      Repeated movement testing  Pretest symptoms sitting:  Pain bilateral upper trapezius, cervical, left scapula and left UE to the elbow  Correction of sitting posture:  no effect   Repeated retraction:  decrease left UE sxs, increase AROM left shoulder flexion to 93, abduction to 81 degrees.         Assessment & Plan   CLINICAL IMPRESSIONS  Medical Diagnosis: Left shoulder OA, ORIF L shldr s/p humerus fx    Treatment Diagnosis: Left arm pain and cervical pain   Impression/Assessment: Patient is a 69 year old female with cervical and left UE complaints.  The following significant findings have been identified: Pain, Decreased ROM/flexibility, Decreased strength, Decreased activity tolerance, and Impaired posture. These impairments interfere with their ability to perform self care tasks, recreational activities, and household chores as compared to previous level of function.     Clinical Decision Making (Complexity):  Clinical Presentation: Stable/Uncomplicated  Clinical Presentation Rationale: based on medical and personal factors listed in PT evaluation  Clinical Decision Making (Complexity): Low complexity    PLAN OF CARE  Treatment Interventions:  Interventions: Neuromuscular Re-education, Therapeutic Activity, Therapeutic Exercise, Self-Care/Home Management    Long Term Goals     PT Goal 1  Goal Identifier: Reaching  Goal Description: AROM left shoulder flexion and abduction 120 or greater with 2/10 pain or less  Rationale: to maximize safety and independence with performance of ADLs and functional tasks;to maximize safety and independence within the home;to  maximize safety and independence within the community;to maximize safety and independence with self cares  Target Date: 02/04/25      Frequency of Treatment: 1x/week for 4 weeks then 2x/month for 2 visits  Duration of Treatment: 8 weeks      Education Assessment:        Risks and benefits of evaluation/treatment have been explained.   Patient/Family/caregiver agrees with Plan of Care.     Evaluation Time:     PT Eval, Low Complexity Minutes (38441): 25       Signing Clinician: Jazmyn Riddle PT        [facility]                                                                                   OUTPATIENT PHYSICAL THERAPY      PLAN OF TREATMENT FOR OUTPATIENT REHABILITATION   Patient's Last Name, First Name, Mercedes Carmona YOB: 1955   Provider's Name   [facility]   Medical Record No.  [id number]     Onset Date: 12/03/24 (MD order date)  Start of Care Date: 12/10/24     Medical Diagnosis:  Left shoulder OA, ORIF L shldr s/p humerus fx      PT Treatment Diagnosis:  Left arm pain and cervical pain Plan of Treatment  Frequency/Duration: 1x/week for 4 weeks then 2x/month for 2 visits/ 8 weeks    Certification date from 12/10/24 to 02/04/25         See note for plan of treatment details and functional goals     Jazmyn Riddle, PT                         I CERTIFY THE NEED FOR THESE SERVICES FURNISHED UNDER        THIS PLAN OF TREATMENT AND WHILE UNDER MY CARE     (Physician attestation of this document indicates review and certification of the therapy plan).              Referring Provider:  Driss Marroquin    Initial Assessment  See Epic Evaluation- Start of Care Date: 12/10/24

## 2024-12-18 ENCOUNTER — MYC MEDICAL ADVICE (OUTPATIENT)
Dept: ENDOCRINOLOGY | Facility: CLINIC | Age: 69
End: 2024-12-18
Payer: MEDICARE

## 2024-12-18 DIAGNOSIS — E03.9 ACQUIRED HYPOTHYROIDISM: ICD-10-CM

## 2024-12-19 RX ORDER — LEVOTHYROXINE SODIUM 112 UG/1
112 TABLET ORAL DAILY
Qty: 90 TABLET | Refills: 4 | Status: SHIPPED | OUTPATIENT
Start: 2024-12-19

## 2024-12-19 NOTE — TELEPHONE ENCOUNTER
"   levothyroxine (SYNTHROID/LEVOTHROID) 112 MCG tablet     Last Written Prescription Date:  12/7/23  Last Fill Quantity: 90,   # refills: 3  Last Office Visit : 11/20/23  Future Office visit:  none  TSH   Date Value Ref Range Status   11/13/2024 1.72 0.30 - 4.20 uIU/mL Final   10/24/2022 6.04 (H) 0.40 - 4.00 mU/L Final   10/16/2020 0.61 0.40 - 4.00 mU/L Final     T4 Free   Date Value Ref Range Status   10/16/2020 0.85 0.76 - 1.46 ng/dL Final     Free T4   Date Value Ref Range Status   08/01/2024 1.39 0.90 - 1.70 ng/dL Final       Routing refill request to provider for review/approval because:  Patient last seen> 1 year ago( 11/20/23). Last thyroid labs done 11/17/24 TSH (1.72) and on 8/1/24  T4 (1.39)    \"I need a refill on Synthroid 112mcg. I just  used my last refill, I would appreciate it you send in a new refill in , I m out it. Used my last refill. Thank you very much.  Mercedes Barber \"  "

## 2025-01-21 ENCOUNTER — TRANSFERRED RECORDS (OUTPATIENT)
Dept: HEALTH INFORMATION MANAGEMENT | Facility: CLINIC | Age: 70
End: 2025-01-21

## 2025-01-21 DIAGNOSIS — M85.80 OSTEOPENIA, UNSPECIFIED LOCATION: ICD-10-CM

## 2025-01-23 ENCOUNTER — MYC REFILL (OUTPATIENT)
Dept: ENDOCRINOLOGY | Facility: CLINIC | Age: 70
End: 2025-01-23
Payer: MEDICARE

## 2025-01-23 DIAGNOSIS — M85.80 OSTEOPENIA, UNSPECIFIED LOCATION: ICD-10-CM

## 2025-01-24 ENCOUNTER — TRANSFERRED RECORDS (OUTPATIENT)
Dept: HEALTH INFORMATION MANAGEMENT | Facility: CLINIC | Age: 70
End: 2025-01-24
Payer: MEDICARE

## 2025-01-27 ENCOUNTER — THERAPY VISIT (OUTPATIENT)
Dept: PHYSICAL THERAPY | Facility: CLINIC | Age: 70
End: 2025-01-27
Payer: MEDICARE

## 2025-01-27 DIAGNOSIS — M79.622 PAIN OF LEFT UPPER ARM: ICD-10-CM

## 2025-01-27 DIAGNOSIS — M54.2 CERVICAL PAIN: Primary | ICD-10-CM

## 2025-01-27 PROCEDURE — 97110 THERAPEUTIC EXERCISES: CPT | Mod: GP | Performed by: PHYSICAL THERAPIST

## 2025-01-28 RX ORDER — ALENDRONATE SODIUM 70 MG/1
TABLET ORAL
Qty: 12 TABLET | Refills: 4 | OUTPATIENT
Start: 2025-01-28

## 2025-01-28 NOTE — TELEPHONE ENCOUNTER
Last Written Prescription:  alendronate (FOSAMAX) 70 MG tablet 12 tablet 4 11/20/2023     ----------------------  Last Visit Date: 11/20/23    Refill decision: Medication unable to be refilled by RN due to:   Nadiya Crockett RN  [facility] Triage & Med Refill Team           Request from pharmacy:  Requested Prescriptions   Pending Prescriptions Disp Refills    alendronate (FOSAMAX) 70 MG tablet [Pharmacy Med Name: ALENDRONATE  TAB 70MG] 12 tablet 4     Sig: TAKE 1 TABLET EVERY 7 DAYS       Bisphosphonates Failed - 1/28/2025  4:05 PM        Failed - Recent (12 mo) or future (90 days) visit within the authorizing provider's specialty     The patient must have completed an in-person or virtual visit within the past 12 months or has a future visit scheduled within the next 90 days with the authorizing provider s specialty.  Urgent care and e-visits do not qualify as an office visit for this protocol.          Passed - Dexa scan completed in the past 48-months     Please review last Dexa result.           Passed - Medication is active on med list        Passed - Medication indicated for associated diagnosis     The medication is prescribed for one or more of the following conditions:     Osteoporosis   Osteitis Deformans (Paget's Disease)   Postmenopausal    Osteopenia   Arthroplasty   Crohn's Disease   Cystic Fibrosis   Fibrous Dysplasia of bone   Growth Hormone Deficiency   Hypercalcemia   Juvenile Osteoporosis   Hypogonadism          Passed - Most recent Creatinine Clearance in last 12 months >35        Passed - Patient is age 18 or older

## 2025-01-28 NOTE — TELEPHONE ENCOUNTER
Duplicate message. Please refer to 1/23/2025 note. Patient has viewed but not responded to [facility] messages.       Zoie Zhang RN  Endocrine Care Coordinator  [facility]

## 2025-01-29 NOTE — TELEPHONE ENCOUNTER
1/29 Called and left voicemail, provided phone number [phone] to schedule a follow up appointment with Dr. Caraballo.     [facility]   Orthopedics, Podiatry, Sports Medicine, Ent ,Eye , Audiology, Adult Endocrine & Diabetes, Nutrition & Medication Therapy Management Specialties   [facility]

## 2025-01-30 RX ORDER — ALENDRONATE SODIUM 70 MG/1
TABLET ORAL
Qty: 12 TABLET | Refills: 4 | Status: SHIPPED | OUTPATIENT
Start: 2025-01-30

## 2025-02-14 ENCOUNTER — MEDICAL CORRESPONDENCE (OUTPATIENT)
Dept: HEALTH INFORMATION MANAGEMENT | Facility: CLINIC | Age: 70
End: 2025-02-14
Payer: MEDICARE

## 2025-02-16 ENCOUNTER — OFFICE VISIT (OUTPATIENT)
Dept: URGENT CARE | Facility: URGENT CARE | Age: 70
End: 2025-02-16
Payer: COMMERCIAL

## 2025-02-16 ENCOUNTER — ANCILLARY PROCEDURE (OUTPATIENT)
Dept: GENERAL RADIOLOGY | Facility: CLINIC | Age: 70
End: 2025-02-16
Attending: EMERGENCY MEDICINE
Payer: COMMERCIAL

## 2025-02-16 VITALS
SYSTOLIC BLOOD PRESSURE: 139 MMHG | RESPIRATION RATE: 17 BRPM | DIASTOLIC BLOOD PRESSURE: 83 MMHG | OXYGEN SATURATION: 94 % | TEMPERATURE: 98.7 F | HEART RATE: 61 BPM

## 2025-02-16 DIAGNOSIS — J45.21 MILD INTERMITTENT ASTHMA WITH ACUTE EXACERBATION: ICD-10-CM

## 2025-02-16 DIAGNOSIS — J06.9 UPPER RESPIRATORY TRACT INFECTION, UNSPECIFIED TYPE: Primary | ICD-10-CM

## 2025-02-16 DIAGNOSIS — J06.9 UPPER RESPIRATORY TRACT INFECTION, UNSPECIFIED TYPE: ICD-10-CM

## 2025-02-16 PROCEDURE — 71046 X-RAY EXAM CHEST 2 VIEWS: CPT | Mod: TC | Performed by: RADIOLOGY

## 2025-02-16 PROCEDURE — 99214 OFFICE O/P EST MOD 30 MIN: CPT | Performed by: EMERGENCY MEDICINE

## 2025-02-16 RX ORDER — BENZONATATE 100 MG/1
100 CAPSULE ORAL 3 TIMES DAILY PRN
Qty: 21 CAPSULE | Refills: 0 | Status: SHIPPED | OUTPATIENT
Start: 2025-02-16

## 2025-02-16 NOTE — PROGRESS NOTES
Assessment & Plan     Diagnosis:    ICD-10-CM    1. Upper respiratory tract infection, unspecified type  J06.9 XR Chest 2 Views     benzonatate (TESSALON) 100 MG capsule     dextromethorphan (DELSYM) 30 MG/5ML liquid      2. Mild intermittent asthma with acute exacerbation  J45.21 predniSONE (DELTASONE) 20 MG tablet          Medical Decision Making:  Mercedes Barber is a 69 year old female who presents for evaluation of cough, chest congestion, wheezing.  This is consistent with an upper respiratory tract infection and asthma exacerbation.  There is no signs at this point of serious bacterial infection such as OM, RPA, epiglottitis, PTA, strep pharyngitis.    I did a CXR to eval for pneumonia. This shows no acute infiltrate or effusion. There are no signs of complications of URI/asthma exacerbation at this point such as  hypoxia, respiratory failure or compromise.     There are no gastrointestinal symptoms at this point and no signs of dehydration.  Will start steroid course and Tessalon Perles. Close followup with PCP is indicated.  Go to ED for fever > 102 F, protracted vomiting, worsening shortness of breath, chest pain or other concerns.  Patient verbalized understanding and agreement with the plan was discharged in stable condition.      Prakash Sandoval PA-C  [facility] URGENT CARE    Subjective     Mercedes Barber is a 69 year old female who presents to clinic today for the following health issues:  Chief Complaint   Patient presents with    Cough     Productive cough, wheezing and congestion for the last week.        HPI  Patient states for the last week she has had a productive cough, wheezing and chest congestion.  Has gotten a little bit worse, but she is still able to breathe okay with minimal exertion and not to get too winded.  She is using her inhalers more frequently.  She denies any chest pain, fevers, leg swelling, ear pain, sore throat, nausea, vomiting, diarrhea or other concerns.  No  recent steroid course or hospitalization for asthma.    Review of Systems    See HPI    Objective      Vitals: /83 (BP Location: Left arm, Patient Position: Sitting, Cuff Size: Adult Large)   Pulse 61   Temp 98.7  F (37.1  C) (Oral)   Resp 17   LMP 03/11/2010   SpO2 94%         Vital signs reviewed by: Prakash Sandoval PA-C    Physical Exam   Constitutional: Patient is alert and cooperative. No acute distress.  Ears: Right TM is normal. Left TM is normal. External ear canals are normal.  Mouth: Mucous membranes are moist. Normal tongue and tonsil. Posterior oropharynx is clear.  Cardiovascular: Regular rate and rhythm  Pulmonary/Chest: Scant expiratory wheezes throughout all lung fields.  No rales or rhonchi.  No retractions.  Speaking in full sentences, no respiratory distress.  Neurological: Alert and oriented x3.  Skin: No rash noted on visualized skin.  Psychiatric:The patient has a normal mood and affect.     Labs/Imaging:  Results for orders placed or performed in visit on 02/16/25   XR Chest 2 Views     Status: None    Narrative    EXAM: XR CHEST 2 VIEWS  LOCATION: [facility]  DATE: 2/16/2025    INDICATION:  Upper respiratory tract infection, unspecified type  COMPARISON: None.      Impression    IMPRESSION: No acute cardiopulmonary process.     Reading per radiology      Prakash Sandoval PA-C, February 16, 2025

## 2025-02-17 ENCOUNTER — TELEPHONE (OUTPATIENT)
Dept: INTERNAL MEDICINE | Facility: CLINIC | Age: 70
End: 2025-02-17
Payer: MEDICARE

## 2025-02-17 DIAGNOSIS — J06.9 VIRAL UPPER RESPIRATORY TRACT INFECTION: Primary | ICD-10-CM

## 2025-02-17 RX ORDER — DEXTROMETHORPHAN POLISTIREX 30 MG/5ML
60 SUSPENSION ORAL 2 TIMES DAILY
Qty: 148 ML | Refills: 0 | Status: SHIPPED | OUTPATIENT
Start: 2025-02-17 | End: 2025-02-17

## 2025-02-17 RX ORDER — PREDNISONE 20 MG/1
40 TABLET ORAL DAILY
Qty: 10 TABLET | Refills: 0 | Status: SHIPPED | OUTPATIENT
Start: 2025-02-17 | End: 2025-02-17

## 2025-02-17 RX ORDER — PREDNISONE 20 MG/1
40 TABLET ORAL DAILY
Qty: 10 TABLET | Refills: 0 | Status: SHIPPED | OUTPATIENT
Start: 2025-02-17

## 2025-02-17 RX ORDER — DEXTROMETHORPHAN POLISTIREX 30 MG/5ML
60 SUSPENSION ORAL 2 TIMES DAILY
Qty: 148 ML | Refills: 0 | Status: SHIPPED | OUTPATIENT
Start: 2025-02-17

## 2025-02-17 NOTE — TELEPHONE ENCOUNTER
Patient called the clinic and stated that she is really needing the medication sent in today. Routing to the urgent care team to review and advise.     Griselda DEJESUS RN  [facility] Triage Team

## 2025-02-17 NOTE — TELEPHONE ENCOUNTER
Prakahs/team, please advise.    Pt calling to request steroid that was mentioned during 2/16 UC visit - per chart review, no steroids were ordered. Pt also requesting a cough medicine order - per pt, cough starting to interfere with her sleep/comfort.     Pended pharmacy for your use. Pt expecting a call back ASAP regarding request.    Nadia Trujillo RN

## 2025-02-17 NOTE — ADDENDUM NOTE
Addended by: BRANDY FRANK on: 2/17/2025 03:59 PM     Modules accepted: Orders    
Addended by: BRANDY FRANK on: 2/17/2025 04:23 PM     Modules accepted: Orders    
Detail Level: Zone
Quality 130: Documentation Of Current Medications In The Medical Record: Current Medications Documented
Quality 226: Preventive Care And Screening: Tobacco Use: Screening And Cessation Intervention: Patient screened for tobacco use and is an ex/non-smoker
Quality 128: Preventive Care And Screening: Body Mass Index (Bmi) Screening And Follow-Up Plan: BMI documented as out of range, follow up plan not documented, reason not otherwise specified.

## 2025-02-17 NOTE — TELEPHONE ENCOUNTER
Patient called the clinic again. She stated that she really needs a different medication to help with the cough and is needing it sent as soon as possible to her pharmacy. Informed patient that the urgent care provider are currently seeing patients but will see the message when they can. She again stated that she is needing a different medication ASAP.     Griselda DEJESUS RN  [facility] Triage Team

## 2025-02-18 ENCOUNTER — MYC MEDICAL ADVICE (OUTPATIENT)
Dept: URGENT CARE | Facility: URGENT CARE | Age: 70
End: 2025-02-18

## 2025-02-18 DIAGNOSIS — R05.9 COUGH, UNSPECIFIED TYPE: Primary | ICD-10-CM

## 2025-02-19 RX ORDER — CODEINE PHOSPHATE AND GUAIFENESIN 10; 100 MG/5ML; MG/5ML
1-2 SOLUTION ORAL
Qty: 120 ML | Refills: 0 | Status: SHIPPED | OUTPATIENT
Start: 2025-02-19

## 2025-02-19 NOTE — TELEPHONE ENCOUNTER
Provider Justin did contact patient and was given prednisone and delsym. Patient states through my chart message: My cough is not getting any better. I have a bed which I can elevate the bed as well as the foot. The head of the bed is elevated. I just need some cough syrup to help my cough so I can sleep. I m taking prednisone. Today is the 2nd day. I m going back to bed. Please advise.

## 2025-02-19 NOTE — TELEPHONE ENCOUNTER
We can try a small amount of guaifenesin with codeine.  Rx done and to the nurses' station.  Please call patient.    Brady Seo MD

## 2025-02-20 ENCOUNTER — OFFICE VISIT (OUTPATIENT)
Dept: URGENT CARE | Facility: URGENT CARE | Age: 70
End: 2025-02-20
Payer: MEDICARE

## 2025-02-20 ENCOUNTER — ANCILLARY PROCEDURE (OUTPATIENT)
Dept: GENERAL RADIOLOGY | Facility: CLINIC | Age: 70
End: 2025-02-20
Attending: PHYSICIAN ASSISTANT
Payer: COMMERCIAL

## 2025-02-20 VITALS
RESPIRATION RATE: 20 BRPM | WEIGHT: 209 LBS | SYSTOLIC BLOOD PRESSURE: 125 MMHG | DIASTOLIC BLOOD PRESSURE: 76 MMHG | TEMPERATURE: 98.6 F | BODY MASS INDEX: 34.78 KG/M2 | HEART RATE: 67 BPM | OXYGEN SATURATION: 92 %

## 2025-02-20 DIAGNOSIS — R06.2 WHEEZING: Primary | ICD-10-CM

## 2025-02-20 DIAGNOSIS — J20.8 ACUTE BACTERIAL BRONCHITIS: ICD-10-CM

## 2025-02-20 DIAGNOSIS — R05.9 COUGH, UNSPECIFIED TYPE: ICD-10-CM

## 2025-02-20 DIAGNOSIS — R09.89 CHEST CONGESTION: ICD-10-CM

## 2025-02-20 DIAGNOSIS — R06.2 WHEEZING: ICD-10-CM

## 2025-02-20 DIAGNOSIS — B96.89 ACUTE BACTERIAL BRONCHITIS: ICD-10-CM

## 2025-02-20 RX ORDER — MAGNESIUM OXIDE 200 MG
TABLET ORAL
COMMUNITY

## 2025-02-20 RX ORDER — ALBUTEROL SULFATE 0.83 MG/ML
2.5 SOLUTION RESPIRATORY (INHALATION) EVERY 4 HOURS PRN
Qty: 90 ML | Refills: 0 | Status: SHIPPED | OUTPATIENT
Start: 2025-02-20

## 2025-02-20 RX ORDER — DOXYCYCLINE 100 MG/1
100 CAPSULE ORAL 2 TIMES DAILY
Qty: 20 CAPSULE | Refills: 0 | Status: SHIPPED | OUTPATIENT
Start: 2025-02-20

## 2025-02-20 RX ORDER — PREDNISONE 20 MG/1
TABLET ORAL
Qty: 9 TABLET | Refills: 0 | Status: SHIPPED | OUTPATIENT
Start: 2025-02-20

## 2025-02-20 RX ORDER — BENZONATATE 200 MG/1
200 CAPSULE ORAL 3 TIMES DAILY PRN
Qty: 21 CAPSULE | Refills: 0 | Status: SHIPPED | OUTPATIENT
Start: 2025-02-20 | End: 2025-02-27

## 2025-02-21 NOTE — PROGRESS NOTES
Assessment & Plan     Wheezing    Chest xary Negative for acute findings, read by Anant DUPREE at time of visit.    DME neb machine  Albuterol for wheezing  - XR Chest 2 Views  - predniSONE (DELTASONE) 20 MG tablet  Dispense: 9 tablet; Refill: 0  - albuterol (PROVENTIL) (2.5 MG/3ML) 0.083% neb solution  Dispense: 90 mL; Refill: 0  - Nebulizer and Supplies Order for DME - ONLY FOR DME    Chest congestion    Chest xray Negative for acute findings, read by Anant DUPREE at time of visit.    Continue prednisone and albuterol  - XR Chest 2 Views  - predniSONE (DELTASONE) 20 MG tablet  Dispense: 9 tablet; Refill: 0    Acute bacterial bronchitis    Bronchitis is inflammation of the bronchial tubes, which carry air to the lungs. The tubes swell and produce mucus, or phlegm. The mucus and inflamed bronchial tubes make you cough. You may have trouble breathing.  Most cases of bronchitis are caused by viruses but in your case we are more concerned about a bacterial infection. . Antibiotics usually are helpful in this situation to help you clear this bacterial infection.  Bronchitis usually develops rapidly and lasts about 2 to 3 weeks in otherwise healthy people.    - doxycycline monohydrate (MONODOX) 100 MG capsule  Dispense: 20 capsule; Refill: 0  - benzonatate (TESSALON) 200 MG capsule  Dispense: 21 capsule; Refill: 0    Cough, unspecified type    Tessalon for coughing  - benzonatate (TESSALON) 200 MG capsule  Dispense: 21 capsule; Refill: 0       At today's visit with Mercedes Barber , we discussed results, diagnosis, medications and formulated a plan.  We also discussed red flags for immediate return to clinic/ER, as well as indications for follow up with PCP if not improved in 3 days. Patient understood and agreed to plan. Mercedes Barber was discharged with stable vitals and has no further questions.       No follow-ups on file.    LEIA Givens, LINDA  [facility] URGENT CARE  DIPTI Long is a 69 year old female who presents to clinic today for the following health issues:  Chief Complaint   Patient presents with    Urgent Care     Pt states her cough has not improved she was here on 2/16 and they did xray no changes.       HPI  Review of Systems  Constitutional, HEENT, cardiovascular, pulmonary, GI, , musculoskeletal, neuro, skin, endocrine and psych systems are negative, except as otherwise noted.      Objective    /76   Pulse 67   Temp 98.6  F (37  C) (Tympanic)   Resp 20   Wt 94.8 kg (209 lb)   LMP 03/11/2010   SpO2 92%   BMI 34.78 kg/m    Physical Exam   GENERAL: alert and no distress  EYES: Eyes grossly normal to inspection, PERRL and conjunctivae and sclerae normal  HENT: ear canals and TM's normal, nose and mouth without ulcers or lesions  NECK: no adenopathy, no asymmetry, masses, or scars  RESP: expiratory wheezes throughout  CV: regular rate and rhythm, normal S1 S2, no S3 or S4, no murmur, click or rub, no peripheral edema  MS: no gross musculoskeletal defects noted, no edema  SKIN: no suspicious lesions or rashes  NEURO: Normal strength and tone, mentation intact and speech normal  PSYCH: mentation appears normal, affect normal/bright      Chest xary Negative for acute findings, read by Anant DUPREE at time of visit.

## 2025-02-23 ENCOUNTER — NURSE TRIAGE (OUTPATIENT)
Dept: NURSING | Facility: CLINIC | Age: 70
End: 2025-02-23
Payer: MEDICARE

## 2025-02-23 NOTE — TELEPHONE ENCOUNTER
Patient has been to the  twice. Patient can't stop coughing and has been up all night. Patient is on prednisone, nebulizer, and antibiotic. Patient also taking tessalon, robitussin, has the head of her bed raised and continues to cough. Patient has thin clear sinus drainage and some post nasal drip.   Denies chest pain, fever, worsening symptoms.   Patient has been on antibiotic since Friday.   Patient requesting stronger cough medicine with codeine  Patient care advice given. Patient will call back with worsening symptoms and will call clinic in the morning for RX request.   Nano Moya RN   02/23/25 10:52 AM  [facility] Nurse Advisor        Reason for Disposition   [1] Taking antibiotic > 72 hours (3 days) AND [2] symptoms (other than fever) not improved    Additional Information   Negative: SEVERE difficulty breathing (e.g., struggling for each breath, speaks in single words)   Negative: Sounds like a life-threatening emergency to the triager   Negative: MODERATE difficulty breathing (e.g., speaks in phrases, SOB even at rest, pulse 100-120)   Negative: Fever > 103 F (39.4 C)   Negative: Patient sounds very sick or weak to the triager   Negative: [1] Taking antibiotics > 24 hours AND [2] symptoms WORSE   Negative: [1] Recent hospitalization AND [2] symptoms WORSE   Negative: [1] Diabetes mellitus or weak immune system (e.g., HIV positive, cancer chemo, splenectomy, organ transplant, chronic steroids) AND [2] new-onset of fever > 100.0 F (37.8 C)   Negative: [1] Caller has URGENT question AND [2] triager unable to answer question   Negative: [1] Taking antibiotic AND [2] new-onset of fever   Negative: [1] Taking antibiotic > 48 hours (2 days) AND [2] fever still present (SAME)    Protocols used: Infection on Antibiotic Follow-up Call-A-

## 2025-02-24 ENCOUNTER — NURSE TRIAGE (OUTPATIENT)
Dept: INTERNAL MEDICINE | Facility: CLINIC | Age: 70
End: 2025-02-24
Payer: MEDICARE

## 2025-02-24 RX ORDER — CODEINE PHOSPHATE AND GUAIFENESIN 10; 100 MG/5ML; MG/5ML
1-2 SOLUTION ORAL
Qty: 118 ML | Refills: 0 | Status: SHIPPED | OUTPATIENT
Start: 2025-02-24

## 2025-02-24 NOTE — TELEPHONE ENCOUNTER
Nurse Triage SBAR    Is this a 2nd Level Triage? NO    Situation:  Cough    Background:  Pt called in earlier today. She states [place] [facility] [place]. She states she has tried tea, fluids, humidifier, nebs, and a steam shower.     Pt states it was hard for her to sleep last night and is requesting something with Codeine.  Writer discussed with pt that the Robitussin prescription has Codeine. Pt states she did not know that & claims she never went to the desk to get that prescription.     At her visit on 2/20 they did a CXR and ruled on pneumonia but did start pt on abx and steroids for concern for bronchitis. Please see below from the providers summary:          Assessment: Pt reports a constant cough. Pt did not cough the first 5 minutes of the call until writer asked about the frequency observing that pt had not coughed during the call. Pt than started coughing immediately after. Pt coughed on other time during our 17 minute call. Pt agrees it is a dry cough.     Protocol Recommended Disposition:   Call PCP When Office is Open    Recommendation:  Writer advised pt should follow up with PCP/clinic per [facility] provider advisement since symptoms have not improved 3 days after starting the abx.  Discussed she can also discuss with them the Robitussin prescription that she didn't  and is wanting to have filled. Protocol and care advice reviewed. Advised to call back with any new or worsening signs, symptoms, concerns, or questions. They verbalized understanding and agreed to follow advice given.    Reason for Disposition   Nursing judgment or information in reference    Protocols used: No Guideline [id number]

## 2025-02-24 NOTE — TELEPHONE ENCOUNTER
Script printed out.  She can  medication from     Thank you  Anant Rodriguez [facility] PA-C

## 2025-02-24 NOTE — TELEPHONE ENCOUNTER
New Medication Request    Contacts       Contact Date/Time Type Contact Phone/Fax    02/24/2025 06:57 AM CST Phone (Incoming) BarberMercedes edwards (Self) [phone] (M)            What medication are you requesting?: cough medication with coden    Reason for medication request: coughing and wont stop has been to urgent care 2x in the last week    Have you taken this medication before?: Yes: works    Controlled Substance Agreement on file:   CSA -- Patient Level:    CSA: None found at the patient level.         Patient offered an appointment? No  Jeremy in [place]    Could we send this information to you in [employer] or would you prefer to receive a phone call?:   Patient would prefer a phone call   Okay to leave a detailed message?: Yes at Home number on file [phone] ([place])

## 2025-02-24 NOTE — TELEPHONE ENCOUNTER
Nurse Triage SBAR    Is this a 2nd Level Triage? NO    Situation: please see previous note. Pt called pharmacy and they are out of stock with cough medication and codeine. Pt was seen in  for cough with shortness of breath and wheezing at bedtime. Pt reporting soup and neb treatments are helping.   Triage completed, symptoms continued: shortness of breath with activity and wheezing at bedtime. Pt refused dispo.     Requesting OTC meds for cough at bedtime    Background:   Pt reporting her symptoms have improved since she was last seen in . Triage completed. Pt reporting shortness of breath with activity and occasional wheezing at bedtime. Pt reporting meds prescribed in  helped. Pt was dispo'd to be seen by provider within 4 hours. Pt refused stating that she has already been to  and her CXR came back normal. Pt reporting she does not think she has pneumonia because her symptoms were different in the past. She had a high fever and chest discomfort when she had pneumonia, but denies those symptoms now. Pt states she has a lung doctor.     Pt requesting medications to try at bedtime. Pt has tried warm mist from shower, warm fluids, honey with partial relief. Discussed cough drops and dayquil.     Assessment: MD review order request and advise    Protocol Recommended Disposition:   See HCP Within 4 Hours (Or PCP Triage)    Recommendation: pt was advised to be seen for eval/tx.  Pt will treat symptoms at home and go to ER for red flags.     Routing question to provider; Pt is wondering if there is something else over the counter she can take at bedtime to help alleviate her cough? Pt reporting her cough has been keeping her awake overnight. Pt is wondering if ok to take nyquil?     Routed to provider    Does the patient meet one of the following criteria for ADS visit consideration? 16+ years old, with an [facility] PCP     TIP  Providers, please consider if this condition is appropriate for management at one of our  "Acute and Diagnostic Services sites.     If patient is a good candidate, please use dotphrase <dot>triageresponse and select Refer to ADS to document.          1. ONSET: \"When did the cough begin?\"       2/11/25  2. SEVERITY: \"How bad is the cough today?\"       Moderate, small production   3. SPUTUM: \"Describe the color of your sputum\" (none, dry cough; clear, white, yellow, green)      Clear   4. HEMOPTYSIS: \"Are you coughing up any blood?\" If so ask: \"How much?\" (flecks, streaks, tablespoons, etc.)      Denies   5. DIFFICULTY BREATHING: \"Are you having difficulty breathing?\" If Yes, ask: \"How bad is it?\" (e.g., mild, moderate, severe)     - MILD: No SOB at rest, mild SOB with walking, speaks normally in sentences, can lie down, no retractions, pulse < 100.     - MODERATE: SOB at rest, SOB with minimal exertion and prefers to sit, cannot lie down flat, speaks in phrases, mild retractions, audible wheezing, pulse 100-120.     - SEVERE: Very SOB at rest, speaks in single words, struggling to breathe, sitting hunched forward, retractions, pulse > 120       Wheezing at night with cough uses inhaler   6. FEVER: \"Do you have a fever?\" If Yes, ask: \"What is your temperature, how was it measured, and when did it start?\"      Denies   7. CARDIAC HISTORY: \"Do you have any history of heart disease?\" (e.g., heart attack, congestive heart failure)       Htn   8. LUNG HISTORY: \"Do you have any history of lung disease?\"  (e.g., pulmonary embolus, asthma, emphysema)      Asthma, followed by lung MD   9. PE RISK FACTORS: \"Do you have a history of blood clots?\" (or: recent major surgery, recent prolonged travel, bedridden)      Denies   10. OTHER SYMPTOMS: \"Do you have any other symptoms?\" (e.g., runny nose, wheezing, chest pain)        Runny nose, denies chest pain, wheezing at bedtime   11. PREGNANCY: \"Is there any chance you are pregnant?\" \"When was your last menstrual period?\"        No   12. TRAVEL: \"Have you traveled out of " "the country in the last month?\" (e.g., travel history, exposures)        No      Reason for Disposition   [1] MILD difficulty breathing (e.g., minimal/no SOB at rest, SOB with walking, pulse <100) AND [2] still present when not coughing    Additional Information   Negative: SEVERE difficulty breathing (e.g., struggling for each breath, speaks in single words)   Negative: Bluish (or gray) lips or face now   Negative: [1] Difficulty breathing AND [2] exposure to flames, smoke, or fumes   Negative: [1] Stridor AND [2] difficulty breathing   Negative: Sounds like a life-threatening emergency to the triager   Negative: [1] Previous asthma attacks AND [2] this feels like asthma attack   Negative: Dry cough (non-productive;  no sputum or minimal clear sputum)   Negative: [1] MODERATE difficulty breathing (e.g., speaks in phrases, SOB even at rest, pulse 100-120) AND [2] still present when not coughing   Negative: Chest pain  (Exception: MILD central chest pain, present only when coughing.)   Negative: Patient sounds very sick or weak to the triager    Protocols used: Cough - Acute [id number]    "

## 2025-02-25 NOTE — TELEPHONE ENCOUNTER
Provider Response to 2nd Level Triage Request    I have reviewed the RN documentation. My recommendation is:  Refer to Urgent Care if symptoms are worsening as I have not seen patient since 8/2024, perhaps she needs a folow-up with me at some point.

## 2025-02-25 NOTE — TELEPHONE ENCOUNTER
Patient Contact    Attempt # 1    Was call answered?  No.  Left message on voicemail with information to call triage nurse back.

## 2025-02-25 NOTE — TELEPHONE ENCOUNTER
Pt called back.   Reviewed symptoms again, and pt now feels she is improving.  Pt will call around to see if she can get robitussin with codeine as almost every pharmacy is out of it.   Scheduled for [facility] out in August.   Will call back for follow up visit if anything is getting worse.

## 2025-03-05 ENCOUNTER — NURSE TRIAGE (OUTPATIENT)
Dept: NURSING | Facility: CLINIC | Age: 70
End: 2025-03-05
Payer: MEDICARE

## 2025-03-06 NOTE — TELEPHONE ENCOUNTER
Nurse Triage SBAR    Is this a 2nd Level Triage? NO    Situation: Patient calling.     Background: Bronchitis follow up.     Assessment: Pt has had four weeks of symptoms. She has been treated with prednisone and an antibiotic for bronchitis. Still has a cough, productive of clear mucous. Temp 99.1 orally this evening. Lack of energy. Symptoms are improving and pt concerned that it is taking weeks to feel better.     Protocol Recommended Disposition:   Call PCP Within 24 Hours    Recommendation: Call provider within 24 hours. Pt agreeable.           Does the patient meet one of the following criteria for ADS visit consideration? 16+ years old, with an [facility] PCP     TIP  Providers, please consider if this condition is appropriate for management at one of our Acute and Diagnostic Services sites.     If patient is a good candidate, please use dotphrase <dot>triageresponse and select Refer to ADS to document.    Reason for Disposition   [1] Caller has NON-URGENT question (includes prescribed medication questions) AND [2] triager unable to answer    Additional Information   Negative: Shock suspected (e.g., cold/pale/clammy skin, too weak to stand, low BP, rapid pulse)   Negative: Difficult to awaken or acting confused (e.g., disoriented, slurred speech)   Negative: Sounds like a life-threatening emergency to the triager   Negative: [1] Drinking very little AND [2] dehydration suspected (e.g., no urine > 12 hours, very dry mouth, very lightheaded)   Negative: Patient sounds very sick or weak to the triager   Negative: Fever > 104 F (40 C)   Negative: [1] SEVERE pain (e.g., excruciating, pain scale 8-10) AND [2] not improved after pain medications   Negative: [1] Recent medical visit within 24 hours AND [2] condition / symptoms WORSE   Negative: [1] Recent medical visit within 24 hours AND [2] NEW symptom AND [3] that could be serious   Negative: [1] Caller has URGENT question (includes prescribed medication questions) AND  [2] triager unable to answer question   Negative: [1] Recent medical visit within 24 hours AND [2] new-onset of fever AND [3] doctor (or NP/PA) said to call if this occurred    Protocols used: Recent Medical Visit for Illness Follow-up Call-A-AH

## 2025-03-26 ENCOUNTER — ANCILLARY PROCEDURE (OUTPATIENT)
Dept: MAMMOGRAPHY | Facility: CLINIC | Age: 70
End: 2025-03-26
Attending: INTERNAL MEDICINE
Payer: COMMERCIAL

## 2025-03-26 DIAGNOSIS — Z12.31 VISIT FOR SCREENING MAMMOGRAM: ICD-10-CM

## 2025-03-26 PROCEDURE — 77063 BREAST TOMOSYNTHESIS BI: CPT | Mod: TC | Performed by: RADIOLOGY

## 2025-03-26 PROCEDURE — 77067 SCR MAMMO BI INCL CAD: CPT | Mod: TC | Performed by: RADIOLOGY

## 2025-04-07 ENCOUNTER — THERAPY VISIT (OUTPATIENT)
Dept: PHYSICAL THERAPY | Facility: CLINIC | Age: 70
End: 2025-04-07
Payer: COMMERCIAL

## 2025-04-07 DIAGNOSIS — M54.2 CERVICAL PAIN: Primary | ICD-10-CM

## 2025-04-07 DIAGNOSIS — M79.622 PAIN OF LEFT UPPER ARM: ICD-10-CM

## 2025-04-07 PROCEDURE — 97110 THERAPEUTIC EXERCISES: CPT | Mod: GP | Performed by: PHYSICAL THERAPIST

## 2025-04-07 NOTE — PROGRESS NOTES
[facility]                                                                                   OUTPATIENT PHYSICAL THERAPY    PLAN OF TREATMENT FOR OUTPATIENT REHABILITATION   Patient's Last Name, First Name, Mercedes Carmona YOB: 1955   Provider's Name   [facility]   Medical Record No.  [id number]     Onset Date: 12/03/24 (MD order date)  Start of Care Date: 12/10/24     Medical Diagnosis:  Left shoulder OA, ORIF L shldr s/p humerus fx      PT Treatment Diagnosis:  Left arm pain and cervical pain Plan of Treatment  Frequency/Duration: 1x/week for 4 weeks then 2x/month for 2 visits/ 8 weeks    Certification date from (P) 02/05/25 to (P) 05/04/25         See note for plan of treatment details and functional goals     Jazmyn Riddle, PT                         I CERTIFY THE NEED FOR THESE SERVICES FURNISHED UNDER        THIS PLAN OF TREATMENT AND WHILE UNDER MY CARE     (Physician attestation of this document indicates review and certification of the therapy plan).              Referring Provider:  Driss Marroquin    Initial Assessment  See Epic Evaluation- Start of Care Date: 12/10/24            PLAN  Patient has not been in for 2+ months due to illness.  Exacerbation due to significant coughing.  Restarted PT today.  Continue therapy per current plan of care.   04/07/25 0500   Appointment Info   Signing clinician's name / credentials Jazmyn Riddle PT   Total/Authorized Visits 6   Visits Used 4   Medical Diagnosis Left shoulder OA, ORIF L shldr s/p humerus fx   PT Tx Diagnosis Left arm pain and cervical pain   Other pertinent information Left shoulder fx/ORIF 2010, was doing well until about 1 year ago with onset of current sxs   Progress Note/Certification   Start of Care Date 12/10/24   Onset of illness/injury or Date of Surgery 12/03/24  (MD order date)   Therapy Frequency 1x/week for 4 weeks then 2x/month for 2 visits   Predicted  Duration 8 weeks   Certification date from 02/05/25   Certification date to 05/04/25   PT Goal 1   Goal Identifier Reaching   Goal Description AROM left shoulder flexion and abduction 120 or greater with 2/10 pain or less   Rationale to maximize safety and independence with performance of ADLs and functional tasks;to maximize safety and independence within the home;to maximize safety and independence within the community;to maximize safety and independence with self cares   Goal Progress not met, flexion 88, abduction 79; has not been in for 2.5 months, restart goal   Target Date 05/05/25   Subjective Report   Subjective Report Patient returns after 2.5 months due to upper illness with significant coughing, better now. She was unable to do her HEP, recently restarted cervical retraction in the last week, up to 3x/day, and is using lumbar roll most of the time.  Did not restart shoulder exercises.  Pain is constant post cervical 5-8/10, intermittent bilateral upper trapezius and left UE to the elbow.  Has been trying to get 2 miles total steps/walking during the day, in home and in the hallways.  Feels her balance has gotten much worse.   Objective Measure 1   Objective Measure Cervical AROM left rotation 75% with pain, RR 75% with pain, ext 25% with pain   Details Left shoulder AROM flexion 88, abduction 79 degrees with stiffness and pain end range   Objective Measure 2   Objective Measure Poor sitting posture without cuing/roll   Treatment Interventions (PT)   Interventions Therapeutic Procedure/Exercise;Therapeutic Activity   Therapeutic Procedure/Exercise   Therapeutic Procedures: strength, endurance, ROM, flexibility minutes (57311) 40   Ther Proc 1 Seated rep cerv retraction with pt OP, review/corrected with vscs, mcs, vcs, focus on getting to end ROM and avoiding protraction with each rep after retraction. PT assist, mob gr II seated upper thoracic and low cervical trial.  Left shoulder flexion AROM to 112  following.  To cont (corrected) HEP, increase frequency to minimum 6x/day   Ther Proc 1 - Details Codmans 2' all directions - review, restarte HEP, encouraged 2-3x/day   Ther Proc 2 Table slide flexion x15 - reinstruct.  Trial facing counter as well in standing x 10.  To restart, prefer table if able 1-2x/day 10 reps, avoid sxs   Ther Proc 2 - Details Table slide ER with wand , reinstruct, cuing for technique - clinic only for now, did not restart HEP   Ther Proc 3 Shldr submax isometric flexion - has not been doing due to illness, cont to hold   Ther Proc 3 - Details Shldr submax isometric abduction - did not restart due to illness, cont to hold   Ther Proc 4 Shldr submax isometric extension - did not do due to illness, did not restart   Skilled Intervention Instruction in exercise to increase ROM and strength to decrease pain and increase function   Therapeutic Activity   Ther Act 1 - Details Cont using lumbar roll   Ther Act 2 CP - prn   Skilled Intervention Education and exercise to decrease pain and increase function   Plan   Updates to plan of care Restart PT after prolonged illness   Total Session Time   Timed Code Treatment Minutes 40   Total Treatment Time (sum of timed and untimed services) 40         Beginning/End Dates of Progress Note Reporting Period:    to 04/07/2025    Referring Provider:  Driss Marroquin

## 2025-04-30 ENCOUNTER — HOSPITAL ENCOUNTER (EMERGENCY)
Facility: CLINIC | Age: 70
Discharge: HOME | End: 2025-04-30
Attending: EMERGENCY MEDICINE | Admitting: EMERGENCY MEDICINE
Payer: MEDICARE

## 2025-04-30 ENCOUNTER — MYC MEDICAL ADVICE (OUTPATIENT)
Dept: INTERNAL MEDICINE | Facility: CLINIC | Age: 70
End: 2025-04-30
Payer: MEDICARE

## 2025-04-30 ENCOUNTER — OFFICE VISIT (OUTPATIENT)
Dept: URGENT CARE | Facility: URGENT CARE | Age: 70
End: 2025-04-30
Payer: COMMERCIAL

## 2025-04-30 VITALS
BODY MASS INDEX: 39.82 KG/M2 | TEMPERATURE: 98 F | OXYGEN SATURATION: 98 % | HEIGHT: 65 IN | DIASTOLIC BLOOD PRESSURE: 78 MMHG | HEART RATE: 65 BPM | WEIGHT: 239 LBS | RESPIRATION RATE: 16 BRPM | SYSTOLIC BLOOD PRESSURE: 130 MMHG

## 2025-04-30 VITALS
TEMPERATURE: 97.9 F | OXYGEN SATURATION: 99 % | HEIGHT: 65 IN | RESPIRATION RATE: 20 BRPM | SYSTOLIC BLOOD PRESSURE: 135 MMHG | BODY MASS INDEX: 39.82 KG/M2 | DIASTOLIC BLOOD PRESSURE: 77 MMHG | WEIGHT: 239 LBS | HEART RATE: 64 BPM

## 2025-04-30 DIAGNOSIS — M79.604 PAIN OF RIGHT LOWER EXTREMITY: Primary | ICD-10-CM

## 2025-04-30 DIAGNOSIS — R60.0 PERIPHERAL EDEMA: ICD-10-CM

## 2025-04-30 LAB
ALBUMIN SERPL BCG-MCNC: 4.2 G/DL (ref 3.5–5.2)
ALP SERPL-CCNC: 74 U/L (ref 40–150)
ALT SERPL W P-5'-P-CCNC: 14 U/L (ref 0–50)
ANION GAP SERPL CALCULATED.3IONS-SCNC: 10 MMOL/L (ref 7–15)
AST SERPL W P-5'-P-CCNC: 18 U/L (ref 0–45)
BASOPHILS # BLD AUTO: 0.1 10E3/UL (ref 0–0.2)
BASOPHILS NFR BLD AUTO: 1 %
BILIRUB SERPL-MCNC: 0.3 MG/DL
BUN SERPL-MCNC: 19.6 MG/DL (ref 8–23)
CALCIUM SERPL-MCNC: 9.5 MG/DL (ref 8.8–10.4)
CHLORIDE SERPL-SCNC: 102 MMOL/L (ref 98–107)
CREAT SERPL-MCNC: 0.9 MG/DL (ref 0.51–0.95)
EGFRCR SERPLBLD CKD-EPI 2021: 68 ML/MIN/1.73M2
EOSINOPHIL # BLD AUTO: 0.5 10E3/UL (ref 0–0.7)
EOSINOPHIL NFR BLD AUTO: 6 %
ERYTHROCYTE [DISTWIDTH] IN BLOOD BY AUTOMATED COUNT: 13.3 % (ref 10–15)
GLUCOSE SERPL-MCNC: 107 MG/DL (ref 70–99)
HCO3 SERPL-SCNC: 26 MMOL/L (ref 22–29)
HCT VFR BLD AUTO: 38.2 % (ref 35–47)
HGB BLD-MCNC: 12.9 G/DL (ref 11.7–15.7)
HOLD SPECIMEN: NORMAL
HOLD SPECIMEN: NORMAL
IMM GRANULOCYTES # BLD: 0 10E3/UL
IMM GRANULOCYTES NFR BLD: 0 %
LYMPHOCYTES # BLD AUTO: 1.4 10E3/UL (ref 0.8–5.3)
LYMPHOCYTES NFR BLD AUTO: 18 %
MCH RBC QN AUTO: 31.2 PG (ref 26.5–33)
MCHC RBC AUTO-ENTMCNC: 33.8 G/DL (ref 31.5–36.5)
MCV RBC AUTO: 92 FL (ref 78–100)
MONOCYTES # BLD AUTO: 0.7 10E3/UL (ref 0–1.3)
MONOCYTES NFR BLD AUTO: 9 %
NEUTROPHILS # BLD AUTO: 4.9 10E3/UL (ref 1.6–8.3)
NEUTROPHILS NFR BLD AUTO: 65 %
NRBC # BLD AUTO: 0 10E3/UL
NRBC BLD AUTO-RTO: 0 /100
PLATELET # BLD AUTO: 258 10E3/UL (ref 150–450)
POTASSIUM SERPL-SCNC: 3.9 MMOL/L (ref 3.4–5.3)
PROT SERPL-MCNC: 6.8 G/DL (ref 6.4–8.3)
RBC # BLD AUTO: 4.14 10E6/UL (ref 3.8–5.2)
SODIUM SERPL-SCNC: 138 MMOL/L (ref 135–145)
WBC # BLD AUTO: 7.6 10E3/UL (ref 4–11)

## 2025-04-30 PROCEDURE — 3075F SYST BP GE 130 - 139MM HG: CPT | Performed by: PHYSICIAN ASSISTANT

## 2025-04-30 PROCEDURE — 3078F DIAST BP <80 MM HG: CPT | Performed by: PHYSICIAN ASSISTANT

## 2025-04-30 PROCEDURE — 99214 OFFICE O/P EST MOD 30 MIN: CPT | Performed by: PHYSICIAN ASSISTANT

## 2025-04-30 PROCEDURE — 85004 AUTOMATED DIFF WBC COUNT: CPT | Performed by: EMERGENCY MEDICINE

## 2025-04-30 PROCEDURE — 99284 EMERGENCY DEPT VISIT MOD MDM: CPT | Mod: 25

## 2025-04-30 PROCEDURE — 36415 COLL VENOUS BLD VENIPUNCTURE: CPT | Performed by: EMERGENCY MEDICINE

## 2025-04-30 PROCEDURE — 80053 COMPREHEN METABOLIC PANEL: CPT | Performed by: EMERGENCY MEDICINE

## 2025-04-30 ASSESSMENT — COLUMBIA-SUICIDE SEVERITY RATING SCALE - C-SSRS
2. HAVE YOU ACTUALLY HAD ANY THOUGHTS OF KILLING YOURSELF IN THE PAST MONTH?: NO
1. IN THE PAST MONTH, HAVE YOU WISHED YOU WERE DEAD OR WISHED YOU COULD GO TO SLEEP AND NOT WAKE UP?: NO
6. HAVE YOU EVER DONE ANYTHING, STARTED TO DO ANYTHING, OR PREPARED TO DO ANYTHING TO END YOUR LIFE?: NO

## 2025-04-30 ASSESSMENT — ACTIVITIES OF DAILY LIVING (ADL)
ADLS_ACUITY_SCORE: 46
ADLS_ACUITY_SCORE: 46

## 2025-04-30 NOTE — PATIENT INSTRUCTIONS
(R54.673) Pain of right lower extremity  (primary encounter diagnosis)  Comment:   Plan: To Emergency Department now for further evaluation, needs ultrasound to rule out blood clot

## 2025-04-30 NOTE — PROGRESS NOTES
Urgent Care Clinic Visit  Rapid rooming was initiated.  Provider was consulted, patient will remain in room and provider will be with patient as soon as possible.  Chief Complaint   Patient presents with    Leg Pain     Swelling worse at night               4/30/2025     6:20 PM   Additional Questions   Roomed by eliana hunter   Accompanied by self

## 2025-04-30 NOTE — PROGRESS NOTES
Patient presents with:  Leg Pain: Swelling worse at night    (M79.604) Pain of right lower extremity  (primary encounter diagnosis)  Comment:   Plan: To Emergency Department now for further evaluation, needs ultrasound to rule out blood clot        SUBJECTIVE:   Mercedes Barber is a 70 year old female who presents today with right lower extremity pain and swelling onset about a month ago, but significantly worse recently.  She was seen by podiatry today and her podiatrist is concerned about a blood clot.  She has been referred to vascular clinic and is scheduled for an ultrasound sometime next week, but she is very concerned, as the leg has become very painful.        Patient Active Problem List   Diagnosis    Menopausal syndrome (hot flashes)    Family history of breast cancer    Vulvar pain    Renal anomaly    Overactive bladder    Rectal prolapse    CARDIOVASCULAR SCREENING; LDL GOAL LESS THAN 160    Dysphagia    Confusion    Headache    Left shoulder pain    Anemia    Mild major depression (H)    Esophageal stricture    Vulvar lesion    Lumbago    Pain in thoracic spine    Sciatica    Pain in joint, lower leg    Plantar fascial fibromatosis    Pain in joint involving ankle and foot    Other specified hypothyroidism    GERD (gastroesophageal reflux disease)    Irritable bowel syndrome    Other sleep apnea    Cervical pain    Cervical high risk HPV (human papillomavirus) test positive    Restless legs syndrome (RLS)    History of total hip arthroplasty, right    Hip pain    Infection of right prosthetic hip joint    Cellulitis of right hip    Deep venous thrombosis of upper extremity (H)    Peripheral edema    Low iron stores    Mechanical complication of prosthetic hip implant, initial encounter    Status post hip surgery    Chest pain, unspecified    Closed fracture of proximal end of left humerus    Generalized anxiety disorder    History of infection    Infection and inflammatory reaction due to unspecified  internal joint prosthesis, initial encounter    Ingrowing nail    Major depressive disorder, recurrent episode, moderate (H)    Mild intermittent asthma    Nausea    Partial epilepsy with intractable epilepsy (H)    Repeated falls    History of revision of total replacement of right hip joint    Seizure disorder (H)    Benign essential hypertension    Sacroiliac joint pain    Closed fracture of left wrist, initial encounter    Lumbar spondylosis    Lumbar facet arthropathy    Tuberous sclerosis (H)    Hyperthyroidism    Graves disease    Arthritis of carpometacarpal (CMC) joint of left thumb    Antinuclear antibody (DOC) titer greater than 1:80    Pain of left upper arm         Past Medical History:   Diagnosis Date    Antinuclear antibody (DOC) titer greater than 1:80 2/22/2023    1:160    Arthritis     Thumb    Cervical high risk HPV (human papillomavirus) test positive 07/17/2017 07/17/17: NIL pap, + HR HPV (not 16 or 18) result.     Cervical pain 09/15/2016    Chronic infection     MRSA    Constipation 08/27/2012    Diarrhea 04/30/2009    Esophageal stricture 02/21/2012    Gastro-oesophageal reflux disease     History of blood transfusion     Hypertension     Hypothyroidism 09/18/2012    IBS (irritable bowel syndrome)     Mild major depression 02/21/2012    Neuropathy of lower extremity     Other sleep apnea     Rectal prolapse     Restless leg syndrome     Seizure disorder (H)     25 years ago    Sleep apnea     CPAP, no longer using CPAP    Temporal sclerosis 08/27/2012    Tuberous sclerosis (H)     Uncomplicated asthma          Current Outpatient Medications   Medication Sig Dispense Refill    Multiple Vitamins-Iron (DAILY-MALIK/IRON/BETA-CAROTENE) TABS TAKE 1 TABLET BY MOUTH DAILY. (Patient not taking: Reported on 10/19/2020) 30 tablet 7     Social History     Tobacco Use    Smoking status: Never Smoker    Smokeless tobacco: Never Used   Substance Use Topics    Alcohol use: Not on file     Family History  "  Problem Relation Age of Onset    Diabetes Mother     Diabetes Father          ROS:    10 point ROS of systems including Constitutional, Eyes, Respiratory, Cardiovascular, Gastroenterology, Genitourinary, Integumentary, Muscularskeletal, Psychiatric ,neurological were all negative except for pertinent positives noted in my HPI       OBJECTIVE:  /77   Pulse 64   Temp 97.9  F (36.6  C) (Oral)   Resp 20   Ht 1.651 m (5' 5\")   Wt 108.4 kg (239 lb)   LMP 03/11/2010   SpO2 99%   BMI 39.77 kg/m    Physical Exam:  GENERAL APPEARANCE: healthy, alert and no distress  Extremities: Right lower extremity: Edematous with positive Homans' sign.  SKIN: no suspicious lesions or rashes, however right lower extremity is mildly erythematous at distal aspect      "

## 2025-05-01 NOTE — ED PROVIDER NOTES
Emergency Department Note      History of Present Illness     Chief Complaint   Leg Swelling      HPI     Mercedes Barber is a 70 year old female presents with leg swelling.  Patient has noted intermittent lower extremity edema over the last month.  Symptoms have worsened over the last week.  Swelling is worse in the right lower extremity.  She has noted increased pain with the onset of increased swelling.  She has noted skin discoloration for 1 month.  She denies fever, nausea, vomiting, history of DVT, PE, recent immobilization, malignancy, estrogen use.  She was referred by her primary clinic to the ED to rule out DVT.    Independent Historian   None    Review of External Notes   None    Past Medical History     Medical History and Problem List   Past Medical History:   Diagnosis Date    Antinuclear antibody (DOC) titer greater than 1:80 2/22/2023    Arthritis     Cervical high risk HPV (human papillomavirus) test positive 07/17/2017    Cervical pain 09/15/2016    Chronic infection     Constipation 08/27/2012    Diarrhea 04/30/2009    Esophageal stricture 02/21/2012    Gastro-oesophageal reflux disease     History of blood transfusion     Hypertension     Hypothyroidism 09/18/2012    IBS (irritable bowel syndrome)     Mild major depression 02/21/2012    Neuropathy of lower extremity     Other sleep apnea     Rectal prolapse     Restless leg syndrome     Seizure disorder (H)     Sleep apnea     Temporal sclerosis 08/27/2012    Tuberous sclerosis (H)     Uncomplicated asthma        Medications   albuterol (PROAIR HFA) 108 (90 Base) MCG/ACT inhaler  albuterol (PROVENTIL) (2.5 MG/3ML) 0.083% neb solution  alendronate (FOSAMAX) 70 MG tablet  amLODIPine (NORVASC) 5 MG tablet  amoxicillin (AMOXIL) 500 MG tablet  benzonatate (TESSALON) 100 MG capsule  benzonatate (TESSALON) 200 MG capsule  budesonide-formoterol (SYMBICORT) 160-4.5 MCG/ACT Inhaler  carboxymethylcellulose PF (REFRESH PLUS) 0.5 % ophthalmic  solution  cycloSPORINE (RESTASIS) 0.05 % ophthalmic emulsion  Dentifrices (BIOTENE DRY MOUTH DT)  dextromethorphan (DELSYM) 30 MG/5ML liquid  doxycycline monohydrate (MONODOX) 100 MG capsule  FLUoxetine (PROZAC) 20 MG capsule  fluticasone-vilanterol (BREO ELLIPTA) 200-25 MCG/INH inhaler  furosemide (LASIX) 20 MG tablet  guaiFENesin-codeine (ROBITUSSIN AC) 100-10 MG/5ML solution  guaiFENesin-codeine (ROBITUSSIN AC) 100-10 MG/5ML solution  hydrOXYzine (VISTARIL) 25 MG capsule  hypromellose (ARTIFICIAL TEARS) 0.5 % SOLN ophthalmic solution  ibuprofen (ADVIL/MOTRIN) 600 MG tablet  lacosamide (VIMPAT) 200 MG TABS tablet  levothyroxine (SYNTHROID/LEVOTHROID) 112 MCG tablet  linaclotide (LINZESS) 290 MCG capsule  loratadine (CLARITIN REDITABS) 10 MG ODT  LORazepam (ATIVAN) 0.5 MG tablet  magnesium oxide (KP MAG-OXIDE MAGNESIUM) 200 MG TABS  omeprazole (PRILOSEC) 40 MG DR capsule  potassium citrate (UROCIT-K) 10 MEQ (1080 MG) CR tablet  pramipexole (MIRAPEX) 1 MG tablet  predniSONE (DELTASONE) 20 MG tablet  predniSONE (DELTASONE) 20 MG tablet  traZODone (DESYREL) 150 MG tablet  triamcinolone (KENALOG) 0.1 % external cream        Surgical History   Past Surgical History:   Procedure Laterality Date    Anterior COLPORRHAPHY, BLADDER/VAGINA      perigee mesh    ARTHROPLASTY HIP Right 7/23/2019    Procedure: Right total hip arthroplasty;  Surgeon: Anant Mejia MD;  Location: [facility] OR    ARTHROPLASTY PATELLO-FEMORAL (KNEE) Bilateral     ARTHROPLASTY REVISION HIP Right 8/7/2019    Procedure: Right hip revision total arthroplasty;  Surgeon: Tom Antonio MD;  Location: [facility] OR    ARTHROPLASTY REVISION HIP Right 8/5/2020    Procedure: Revision Right total hip arthroplasty;  Surgeon: Pan Grey MD;  Location: [facility]    CARPAL TUNNEL RELEASE RT/LT      DENTAL SURGERY      implant    DESTRUCTION OF PARAVERTEBRAL FACET LUMBAR / SACRAL SINGLE Left 10/27/2021    Procedure: DESTRUCTION, NERVE, FACET JOINT, LUMBOSACRAL,  Left L4-5 and L5-S1 facet joint radiofrequency ablation;  Surgeon: Celena Perez MD;  Location: [facility] OR    DILATION AND CURETTAGE      DRAIN PILONIDAL CYST SIMPL      ENDOSCOPY DRUG INDUCED SLEEP N/A 11/18/2015    Procedure: ENDOSCOPY DRUG INDUCED SLEEP;  Surgeon: Park Ortiz MD;  Location: [facility]    ESOPHAGOSCOPY, GASTROSCOPY, DUODENOSCOPY (EGD), COMBINED  4/5/2013    Procedure: COMBINED ESOPHAGOSCOPY, GASTROSCOPY, DUODENOSCOPY (EGD), BIOPSY SINGLE OR MULTIPLE;;  Surgeon: Mundo Mehta MD;  Location: [facility]    EXCISE LESION TRUNK  8/10/2012    Procedure: EXCISE LESION TRUNK;;  Surgeon: Jerald Diggs MD;  Location: [facility] OR    HYSTERECTOMY      INJECT BLOCK MEDIAL BRANCH CERVICAL/THORACIC/LUMBAR Bilateral 9/20/2021    Procedure: BLOCK, NERVE, FACET JOINT, MEDIAL BRANCH, DIAGNOSTIC - Bilateral L4-5 and L5-S1 Medial branch blocks;  Surgeon: Celena Perez MD;  Location: [facility] OR    INJECT BLOCK MEDIAL BRANCH CERVICAL/THORACIC/LUMBAR Bilateral 9/27/2021    Procedure: BLOCK, NERVE, FACET JOINT, MEDIAL BRANCH, DIAGNOSTIC - Bilateral L4-5 and L5-S1 Medial branch blocks;  Surgeon: Celena Perez MD;  Location: [facility] OR    INJECT SACROILIAC JOINT Bilateral 5/24/2021    Procedure: INJECTION, SACROILIAC JOINT;  Surgeon: Celena Perez MD;  Location: [facility] OR    IRRIGATION AND DEBRIDEMENT HIP, COMBINED Right 8/26/2019    Procedure: 1.  Right hip wound irrigation and debridement with excisional debridement of nonviable skin, subcutaneous tissues, and fascia. 2. Application of incisional wound VAC, 27cm length incision.;  Surgeon: Tyson Prabhakar MD;  Location: [facility]    L shoulder fracture      OPEN REDUCTION INTERNAL FIXATION WRIST Left 4/30/2021    Procedure: Open reduction with internal fixation of displaced left intraarticular distal radius fracture;  Surgeon: Luis Manuel Sánchez MD;  Location: [facility] OR    rectal prolapse repair abdominally      RELEASE PLANTAR FASCIA Right 1/20/2015     "Procedure: RELEASE PLANTAR FASCIA;  Surgeon: Maicol Liu DPM;  Location: [facility]    REMOVE MESH VAGINA  8/10/2012    Procedure: REMOVE MESH VAGINA;  Excision of Vaginal Mesh Exposure, Removal of Skin Tag Left Inner Leg;  Surgeon: Jerald Diggs MD;  Location: [facility]    REPAIR HAMMER TOE Right 1/20/2015    Procedure: REPAIR HAMMER TOE;  Surgeon: Maicol Liu DPM;  Location: [facility]    TONSILLECTOMY & ADENOIDECTOMY      TUBAL LIGATION         Physical Exam     Patient Vitals for the past 24 hrs:   BP Temp Temp src Pulse Resp SpO2 Height Weight   04/30/25 2153 130/78 98  F (36.7  C) Oral 65 16 98 % -- --   04/30/25 1931 133/83 97  F (36.1  C) Temporal 63 18 95 % 1.651 m (5' 5\") 108.4 kg (239 lb)     Physical Exam      Eyes:    Conjunctiva normal  Neck:     Supple, no meningismus.     CV:     Regular rate and rhythm.      No murmurs, rubs or gallops.       2+ DP pulses bilateral.       1+ bilateral lower extremity edema.  PULM:    Clear to auscultation bilateral.       No respiratory distress.      Good air exchange.  ABD:    Soft, non-tender, non-distended.       No pulsatile masses.       No rebound, guarding or rigidity.  MSK:     Right lower extremity:      No pain with compression of the calf.      Negative Keysha's sign.      Compartments are soft and compressible.       Poorly circumscribed blanching erythematous patch to lower leg        No warmth or tenderness  LYMPH:   No cervical lymphadenopathy.  NEURO:   Alert ; good muscle tone     No tremor     Strength and sensation intact to lower extremities  Skin:    Warm, dry and intact.    Psych:    Mood is good and affect is appropriate.      Diagnostics     Lab Results   Labs Ordered and Resulted from Time of ED Arrival to Time of ED Departure   COMPREHENSIVE METABOLIC PANEL - Abnormal       Result Value    Sodium 138      Potassium 3.9      Carbon Dioxide (CO2) 26      Anion Gap 10      Urea Nitrogen 19.6      Creatinine 0.90      GFR Estimate 68      " Calcium 9.5      Chloride 102      Glucose 107 (*)     Alkaline Phosphatase 74      AST 18      ALT 14      Protein Total 6.8      Albumin 4.2      Bilirubin Total 0.3     CBC WITH PLATELETS AND DIFFERENTIAL    WBC Count 7.6      RBC Count 4.14      Hemoglobin 12.9      Hematocrit 38.2      MCV 92      MCH 31.2      MCHC 33.8      RDW 13.3      Platelet Count 258      % Neutrophils 65      % Lymphocytes 18      % Monocytes 9      % Eosinophils 6      % Basophils 1      % Immature Granulocytes 0      NRBCs per 100 WBC 0      Absolute Neutrophils 4.9      Absolute Lymphocytes 1.4      Absolute Monocytes 0.7      Absolute Eosinophils 0.5      Absolute Basophils 0.1      Absolute Immature Granulocytes 0.0      Absolute NRBCs 0.0         Imaging   US Lower Extremity Venous Duplex Right   Final Result   IMPRESSION:   1.  No deep venous thrombosis in the right lower extremity.          Independent Interpretation   None    ED Course      Medications Administered   Medications - No data to display    Procedures   Procedures     Discussion of Management   None    ED Course        Additional Documentation  None    Medical Decision Making / Diagnosis     CMS Diagnoses: None    MIPS       None    MDM     Mercedes Barber is a 70 year old female presents with concerns of peripheral edema for 1 month.  No evidence of arterial insufficiency.  Doppler ultrasound is negative for DVT.  She has no developing renal or hepatic failure.  No clinical signs of heart failure.  She has evidence of venous stasis without clinical signs of cellulitis throughout lower extremity.  Evaluation most consistent with venous stasis and less likely lymphedema.  Patient encouraged to wear compression stockings although she is hesitant to.  She is on 20 mg of Lasix daily.  I will have her increase to 40 mg daily for the next 3 days and then follow-up with PCP.  Return to ED for worsening symptoms.    Disposition   The patient was discharged.     Diagnosis      ICD-10-CM    1. Peripheral edema  R60.0            Discharge Medications   Discharge Medication List as of 4/30/2025  9:50 PM            MD Trino Roman Jeremiah R, MD  04/30/25 1437

## 2025-05-01 NOTE — DISCHARGE INSTRUCTIONS
Please increase your furosemide/Lasix to 2 tablets a day for 3 days then return to your normal schedule dose until you can have a further discussion with your primary care physician.

## 2025-05-01 NOTE — ED TRIAGE NOTES
Pt arrives from  with right leg swelling and tenderness. She was sent for a DVT work up.  Pt states it has been swollen and red for about a month.  She has concerns for the other leg as well.       Triage Assessment (Adult)       Row Name 04/30/25 1929          Triage Assessment    Airway WDL WDL        Respiratory WDL    Respiratory WDL WDL        Cardiac WDL    Cardiac WDL WDL

## 2025-05-05 DIAGNOSIS — I10 BENIGN ESSENTIAL HYPERTENSION: ICD-10-CM

## 2025-05-05 RX ORDER — POTASSIUM CITRATE 1080 MG/1
10 TABLET, EXTENDED RELEASE ORAL DAILY
Qty: 90 TABLET | Refills: 1 | Status: SHIPPED | OUTPATIENT
Start: 2025-05-05

## 2025-05-06 ENCOUNTER — TRANSFERRED RECORDS (OUTPATIENT)
Dept: HEALTH INFORMATION MANAGEMENT | Facility: CLINIC | Age: 70
End: 2025-05-06
Payer: MEDICARE

## 2025-05-07 PROBLEM — M79.622 PAIN OF LEFT UPPER ARM: Status: RESOLVED | Noted: 2024-12-10 | Resolved: 2025-05-07

## 2025-05-28 ENCOUNTER — LAB (OUTPATIENT)
Dept: LAB | Facility: CLINIC | Age: 70
End: 2025-05-28
Payer: COMMERCIAL

## 2025-05-28 DIAGNOSIS — I10 BENIGN ESSENTIAL HYPERTENSION: ICD-10-CM

## 2025-05-28 PROCEDURE — 36415 COLL VENOUS BLD VENIPUNCTURE: CPT

## 2025-05-28 PROCEDURE — 80048 BASIC METABOLIC PNL TOTAL CA: CPT

## 2025-05-29 ENCOUNTER — RESULTS FOLLOW-UP (OUTPATIENT)
Dept: INTERNAL MEDICINE | Facility: CLINIC | Age: 70
End: 2025-05-29

## 2025-05-29 LAB
ANION GAP SERPL CALCULATED.3IONS-SCNC: 13 MMOL/L (ref 7–15)
BUN SERPL-MCNC: 25.6 MG/DL (ref 8–23)
CALCIUM SERPL-MCNC: 9.5 MG/DL (ref 8.8–10.4)
CHLORIDE SERPL-SCNC: 105 MMOL/L (ref 98–107)
CREAT SERPL-MCNC: 1.14 MG/DL (ref 0.51–0.95)
EGFRCR SERPLBLD CKD-EPI 2021: 51 ML/MIN/1.73M2
GLUCOSE SERPL-MCNC: 109 MG/DL (ref 70–99)
HCO3 SERPL-SCNC: 22 MMOL/L (ref 22–29)
POTASSIUM SERPL-SCNC: 4.7 MMOL/L (ref 3.4–5.3)
SODIUM SERPL-SCNC: 140 MMOL/L (ref 135–145)

## 2025-06-11 ENCOUNTER — OFFICE VISIT (OUTPATIENT)
Dept: INTERNAL MEDICINE | Facility: CLINIC | Age: 70
End: 2025-06-11
Payer: MEDICARE

## 2025-06-11 ENCOUNTER — RESULTS FOLLOW-UP (OUTPATIENT)
Dept: INTERNAL MEDICINE | Facility: CLINIC | Age: 70
End: 2025-06-11

## 2025-06-11 VITALS
DIASTOLIC BLOOD PRESSURE: 66 MMHG | HEIGHT: 65 IN | SYSTOLIC BLOOD PRESSURE: 108 MMHG | HEART RATE: 76 BPM | WEIGHT: 219 LBS | TEMPERATURE: 98.1 F | OXYGEN SATURATION: 95 % | BODY MASS INDEX: 36.49 KG/M2 | RESPIRATION RATE: 17 BRPM

## 2025-06-11 DIAGNOSIS — E66.01 MORBID OBESITY (H): ICD-10-CM

## 2025-06-11 DIAGNOSIS — Z13.6 CARDIOVASCULAR SCREENING; LDL GOAL LESS THAN 160: ICD-10-CM

## 2025-06-11 DIAGNOSIS — Z12.11 COLON CANCER SCREENING: ICD-10-CM

## 2025-06-11 DIAGNOSIS — J45.20 MILD INTERMITTENT ASTHMA WITHOUT COMPLICATION: ICD-10-CM

## 2025-06-11 DIAGNOSIS — G40.909 SEIZURE DISORDER (H): ICD-10-CM

## 2025-06-11 DIAGNOSIS — E03.9 ACQUIRED HYPOTHYROIDISM: ICD-10-CM

## 2025-06-11 DIAGNOSIS — Z00.00 ENCOUNTER FOR SUBSEQUENT ANNUAL WELLNESS VISIT IN MEDICARE PATIENT: Primary | ICD-10-CM

## 2025-06-11 DIAGNOSIS — I10 BENIGN ESSENTIAL HYPERTENSION: ICD-10-CM

## 2025-06-11 DIAGNOSIS — N18.31 CHRONIC KIDNEY DISEASE, STAGE 3A (H): ICD-10-CM

## 2025-06-11 DIAGNOSIS — Z12.31 VISIT FOR SCREENING MAMMOGRAM: ICD-10-CM

## 2025-06-11 DIAGNOSIS — F33.9 EPISODE OF RECURRENT MAJOR DEPRESSIVE DISORDER, UNSPECIFIED DEPRESSION EPISODE SEVERITY: ICD-10-CM

## 2025-06-11 LAB
ANION GAP SERPL CALCULATED.3IONS-SCNC: 13 MMOL/L (ref 7–15)
BUN SERPL-MCNC: 22.5 MG/DL (ref 8–23)
CALCIUM SERPL-MCNC: 9.5 MG/DL (ref 8.8–10.4)
CHLORIDE SERPL-SCNC: 105 MMOL/L (ref 98–107)
CHOLEST SERPL-MCNC: 184 MG/DL
CREAT SERPL-MCNC: 1.07 MG/DL (ref 0.51–0.95)
EGFRCR SERPLBLD CKD-EPI 2021: 55 ML/MIN/1.73M2
FASTING STATUS PATIENT QL REPORTED: NO
FASTING STATUS PATIENT QL REPORTED: NO
GLUCOSE SERPL-MCNC: 100 MG/DL (ref 70–99)
HCO3 SERPL-SCNC: 24 MMOL/L (ref 22–29)
HDLC SERPL-MCNC: 49 MG/DL
LDLC SERPL CALC-MCNC: 113 MG/DL
NONHDLC SERPL-MCNC: 135 MG/DL
POTASSIUM SERPL-SCNC: 3.9 MMOL/L (ref 3.4–5.3)
SODIUM SERPL-SCNC: 142 MMOL/L (ref 135–145)
TRIGL SERPL-MCNC: 111 MG/DL

## 2025-06-11 PROCEDURE — 99214 OFFICE O/P EST MOD 30 MIN: CPT | Mod: 25 | Performed by: INTERNAL MEDICINE

## 2025-06-11 PROCEDURE — 80061 LIPID PANEL: CPT | Performed by: INTERNAL MEDICINE

## 2025-06-11 PROCEDURE — 3074F SYST BP LT 130 MM HG: CPT | Performed by: INTERNAL MEDICINE

## 2025-06-11 PROCEDURE — 3078F DIAST BP <80 MM HG: CPT | Performed by: INTERNAL MEDICINE

## 2025-06-11 PROCEDURE — 80048 BASIC METABOLIC PNL TOTAL CA: CPT | Performed by: INTERNAL MEDICINE

## 2025-06-11 PROCEDURE — 36415 COLL VENOUS BLD VENIPUNCTURE: CPT | Performed by: INTERNAL MEDICINE

## 2025-06-11 PROCEDURE — G0439 PPPS, SUBSEQ VISIT: HCPCS | Performed by: INTERNAL MEDICINE

## 2025-06-11 RX ORDER — METHYLPHENIDATE HYDROCHLORIDE 5 MG/1
2.5-5 TABLET ORAL PRN
COMMUNITY
Start: 2025-06-03

## 2025-06-11 SDOH — HEALTH STABILITY: PHYSICAL HEALTH: ON AVERAGE, HOW MANY DAYS PER WEEK DO YOU ENGAGE IN MODERATE TO STRENUOUS EXERCISE (LIKE A BRISK WALK)?: 7 DAYS

## 2025-06-11 SDOH — HEALTH STABILITY: PHYSICAL HEALTH: ON AVERAGE, HOW MANY MINUTES DO YOU ENGAGE IN EXERCISE AT THIS LEVEL?: 60 MIN

## 2025-06-11 ASSESSMENT — ASTHMA QUESTIONNAIRES
QUESTION_1 LAST FOUR WEEKS HOW MUCH OF THE TIME DID YOUR ASTHMA KEEP YOU FROM GETTING AS MUCH DONE AT WORK, SCHOOL OR AT HOME: A LITTLE OF THE TIME
QUESTION_3 LAST FOUR WEEKS HOW OFTEN DID YOUR ASTHMA SYMPTOMS (WHEEZING, COUGHING, SHORTNESS OF BREATH, CHEST TIGHTNESS OR PAIN) WAKE YOU UP AT NIGHT OR EARLIER THAN USUAL IN THE MORNING: ONCE OR TWICE
QUESTION_4 LAST FOUR WEEKS HOW OFTEN HAVE YOU USED YOUR RESCUE INHALER OR NEBULIZER MEDICATION (SUCH AS ALBUTEROL): ONCE A WEEK OR LESS
QUESTION_2 LAST FOUR WEEKS HOW OFTEN HAVE YOU HAD SHORTNESS OF BREATH: ONCE OR TWICE A WEEK
ACT_TOTALSCORE: 20
QUESTION_5 LAST FOUR WEEKS HOW WOULD YOU RATE YOUR ASTHMA CONTROL: WELL CONTROLLED

## 2025-06-11 ASSESSMENT — SOCIAL DETERMINANTS OF HEALTH (SDOH): HOW OFTEN DO YOU GET TOGETHER WITH FRIENDS OR RELATIVES?: MORE THAN THREE TIMES A WEEK

## 2025-06-11 NOTE — PROGRESS NOTES
Preventive Care Visit  [facility]  Skyler Álvarez MD, Internal Medicine  Jun 11, 2025      Assessment & Plan     Encounter for subsequent annual wellness visit in Medicare patient  Advised patient to consider updating routine vaccinations as noted.    Seizure disorder (H)  Stable and following up with neurology as previously directed.  Medically managed.    Chronic kidney disease, stage 3a (H)  Encouraged hydration and suggest rechecking basic metabolic panel.  Consider dose adjustment of furosemide pending result  - Basic metabolic panel  (Ca, Cl, CO2, Creat, Gluc, K, Na, BUN); Future      Episode of recurrent major depressive disorder, unspecified depression episode severity  Stable per patient.  Medically managed and followed through her mental health provider.    Mild intermittent asthma without complication  Stable without active complaints.    CARDIOVASCULAR SCREENING; LDL GOAL LESS THAN 160  Labs ordered as fasting per routine  - Lipid Profile; Future      Benign essential hypertension  Currently stable.  Continue with medical management.  - Basic metabolic panel  (Ca, Cl, CO2, Creat, Gluc, K, Na, BUN); Future      Colon cancer screening  Patient states she is tentatively going to be scheduled for a colonoscopy in December of this year.  - Colonoscopy Screening  Referral; Future    Morbid obesity (H)  Encouraged ongoing weight loss and weight reduction    Acquired hypothyroidism  TSH   Date Value Ref Range Status   03/21/2025 1.80 0.30 - 4.20 uIU/mL Final   10/24/2022 6.04 (H) 0.40 - 4.00 mU/L Final   10/16/2020 0.61 0.40 - 4.00 mU/L Final   Recent thyroid function panel reviewed and stable      Visit for screening mammogram  Discussed updated routine mammography as ordered      Patient has been advised of split billing requirements and indicates understanding: Yes        BMI  Estimated body mass index is 36.44 kg/m  as calculated from the following:    Height as of this  "encounter: 1.651 m (5' 5\").    Weight as of this encounter: 99.3 kg (219 lb).   Weight management plan: Discussed healthy diet and exercise guidelines    Counseling  Appropriate preventive services were addressed with this patient via screening, questionnaire, or discussion as appropriate for fall prevention, nutrition, physical activity, Tobacco-use cessation, social engagement, weight loss and cognition.  Checklist reviewing preventive services available has been given to the patient.  Reviewed patient's diet, addressing concerns and/or questions.   Discussed possible causes of fatigue. Updated plan of care.  Patient reported difficulty with activities of daily living were addressed today.    Percy Long is a 70 year old, presenting for the following:  Wellness Visit       HPI     Requesting future labs for September follow up visit of glucose and kidney function     Advance Care Planning  Document on file is a Health Care Directive or POLST.        6/11/2025   General Health   How would you rate your overall physical health? Good   Feel stress (tense, anxious, or unable to sleep) Only a little   (!) STRESS CONCERN      6/11/2025   Nutrition   Diet: Regular (no restrictions)         6/11/2025   Exercise   Days per week of moderate/strenous exercise 7 days   Average minutes spent exercising at this level 60 min         6/11/2025   Social Factors   Frequency of gathering with friends or relatives More than three times a week   Worry food won't last until get money to buy more No   Food not last or not have enough money for food? No   Do you have housing? (Housing is defined as stable permanent housing and does not include staying outside in a car, in a tent, in an abandoned building, in an overnight shelter, or couch-surfing.) Yes   Are you worried about losing your housing? No   Lack of transportation? No   Unable to get utilities (heat,electricity)? No         6/11/2025   Fall Risk   Fallen 2 or more times " in the past year? No    Trouble with walking or balance? Yes    Gait Speed Test (Document in seconds) 3.85   Gait Speed Test Interpretation Less than or equal to 5.00 seconds - PASS       Proxy-reported          6/11/2025   Activities of Daily Living- Home Safety   Needs help with the following daily activites Housework   Safety concerns in the home None of the above         6/11/2025   Dental   Dentist two times every year? Yes         6/11/2025   Hearing Screening   Hearing concerns? None of the above         6/11/2025   Driving Risk Screening   Patient/family members have concerns about driving No         6/11/2025   General Alertness/Fatigue Screening   Have you been more tired than usual lately? (!) YES         6/11/2025   Urinary Incontinence Screening   Bothered by leaking urine in past 6 months No       Today's PHQ-9 Score:       8/1/2024    10:29 AM   PHQ-9 SCORE   PHQ-9 Total Score MyChart 8 (Mild depression)   PHQ-9 Total Score 8           6/11/2025   Substance Use   Alcohol more than 3/day or more than 7/wk No   Do you have a current opioid prescription? No   How severe/bad is pain from 1 to 10? 0/10 (No Pain)   Do you use any other substances recreationally? No     Social History     Tobacco Use    Smoking status: Never    Smokeless tobacco: Never   Vaping Use    Vaping status: Never Used   Substance Use Topics    Alcohol use: Yes     Comment: 1 glass of wine 2x/yr    Drug use: No           3/26/2025   LAST FHS-7 RESULTS   1st degree relative breast or ovarian cancer No   Any relative bilateral breast cancer No   Any male have breast cancer No   Any ONE woman have BOTH breast AND ovarian cancer No   Any woman with breast cancer before 50yrs No   2 or more relatives with breast AND/OR ovarian cancer No   2 or more relatives with breast AND/OR bowel cancer Yes       Mammogram Screening - Mammogram every 1-2 years updated in Health Maintenance based on mutual decision making    ASCVD Risk   The  10-year ASCVD risk score (Delmy MOODY, et al., 2019) is: 9.9%    Values used to calculate the score:      Age: 70 years      Sex: Female      Is Non- : No      Diabetic: No      Tobacco smoker: No      Systolic Blood Pressure: 108 mmHg      Is BP treated: Yes      HDL Cholesterol: 58 mg/dL      Total Cholesterol: 187 mg/dL        Reviewed and updated as needed this visit by Provider                    Lab work is in process  Current providers sharing in care for this patient include:  Patient Care Team:  Skyler Álvarez MD as PCP - General (Internal Medicine)  Samir Arguelles MD as MD (Neurology)  Park Ortiz MD as MD (Otolaryngology)  Skyler Chacko MD as Referring Physician (Orthopedics)  Skyler Álvarez MD as Assigned PCP  Rachael Smith MD as MD (Endocrinology, Diabetes, and Metabolism)  Rachael Platt PA-C as Physician Assistant (Dermatology)    The following health maintenance items are reviewed in Epic and correct as of today:  Health Maintenance   Topic Date Due    RSV VACCINE (1 - Risk 60-74 years 1-dose series) Never done    ASTHMA ACTION PLAN  04/12/2023    ANNUAL REVIEW OF HM ORDERS  01/17/2024    MICROALBUMIN  02/01/2024    COVID-19 VACCINE (7 - 2024-25 season) 09/01/2024    PHQ-9  02/01/2025    LIPID  08/01/2025    COLORECTAL CANCER SCREENING  12/09/2025    ASTHMA CONTROL TEST  12/11/2025    TSH W/FREE T4 REFLEX  03/21/2026    HEMOGLOBIN  04/30/2026    BMP  05/28/2026    MEDICARE ANNUAL WELLNESS VISIT  06/11/2026    FALL RISK ASSESSMENT  06/11/2026    MAMMO SCREENING  03/26/2027    DIABETES SCREENING  05/28/2028    ADVANCE CARE PLANNING  08/01/2029    DTAP/TDAP/TD VACCINE (7 - Td or Tdap) 09/12/2031    DEXA  01/30/2039    HEPATITIS C SCREENING  Completed    DEPRESSION ACTION PLAN  Completed    INFLUENZA VACCINE  Completed    PNEUMOCOCCAL VACCINE 50+ YEARS  Completed    URINALYSIS  Completed    ZOSTER VACCINE  Completed    HPV VACCINE  Aged Out  "   MENINGITIS VACCINE  Aged Out         Review of Systems  CONSTITUTIONAL: NEGATIVE for fever, chills, change in weight  EYES: NEGATIVE for vision changes or irritation  ENT/MOUTH: NEGATIVE for ear, mouth and throat problems  RESP: NEGATIVE for significant cough or SOB  CV: NEGATIVE for chest pain, palpitations or peripheral edema  GI: NEGATIVE for nausea, abdominal pain, heartburn, or change in bowel habits  : NEGATIVE for frequency, dysuria, or hematuria  MUSCULOSKELETAL: NEGATIVE for significant arthralgias or myalgia  NEURO: NEGATIVE for weakness, dizziness or paresthesias  ENDOCRINE: NEGATIVE for temperature intolerance, skin/hair changes  HEME: NEGATIVE for bleeding problems  PSYCHIATRIC: NEGATIVE for changes in mood or affect     Objective    Exam  /66   Pulse 76   Temp 98.1  F (36.7  C) (Temporal)   Resp 17   Ht 1.651 m (5' 5\")   Wt 99.3 kg (219 lb)   LMP 03/11/2010   SpO2 95%   BMI 36.44 kg/m     Estimated body mass index is 36.44 kg/m  as calculated from the following:    Height as of this encounter: 1.651 m (5' 5\").    Weight as of this encounter: 99.3 kg (219 lb).    Physical Exam:    GENERAL: alert and no distress  EYES: Eyes grossly normal to inspection, PERRL and conjunctivae and sclerae normal  HENT: ear canals and TM's normal, nose and mouth without ulcers or lesions  NECK: no adenopathy, no asymmetry, masses, or scars  RESP: lungs clear to auscultation - no rales, rhonchi or wheezes  CV: regular rate and rhythm, normal S1 S2, no S3 or S4, no murmur, click or rub, no peripheral edema  ABDOMEN: obese, soft, nontender, no hepatosplenomegaly, no masses and bowel sounds normal  MS: no gross musculoskeletal defects noted, no edema  NEURO: No focal changes  PSYCH: mentation appears normal, affect normal/bright         6/11/2025   Mini Cog   Clock Draw Score 2 Normal   3 Item Recall 2 objects recalled   Mini Cog Total Score 4          Signed Electronically by: Skyler Álvarez MD    "

## 2025-06-11 NOTE — PATIENT INSTRUCTIONS
Patient Education   Preventive Care Advice   This is general advice given by our system to help you stay healthy. However, your care team may have specific advice just for you. Please talk to your care team about your preventive care needs.  Nutrition  Eat 5 or more servings of fruits and vegetables each day.  Try wheat bread, brown rice and whole grain pasta (instead of white bread, rice, and pasta).  Get enough calcium and vitamin D. Check the label on foods and aim for 100% of the RDA (recommended daily allowance).  Lifestyle  Exercise at least 150 minutes each week  (30 minutes a day, 5 days a week).  Do muscle strengthening activities 2 days a week. These help control your weight and prevent disease.  No smoking.  Wear sunscreen to prevent skin cancer.  Have a dental exam and cleaning every 6 months.  Yearly exams  See your health care team every year to talk about:  Any changes in your health.  Any medicines your care team has prescribed.  Preventive care, family planning, and ways to prevent chronic diseases.  Shots (vaccines)   HPV shots (up to age 26), if you've never had them before.  Hepatitis B shots (up to age 59), if you've never had them before.  COVID-19 shot: Get this shot when it's due.  Flu shot: Get a flu shot every year.  Tetanus shot: Get a tetanus shot every 10 years.  Pneumococcal, hepatitis A, and RSV shots: Ask your care team if you need these based on your risk.  Shingles shot (for age 50 and up)  General health tests  Diabetes screening:  Starting at age 35, Get screened for diabetes at least every 3 years.  If you are younger than age 35, ask your care team if you should be screened for diabetes.  Cholesterol test: At age 39, start having a cholesterol test every 5 years, or more often if advised.  Bone density scan (DEXA): At age 50, ask your care team if you should have this scan for osteoporosis (brittle bones).  Hepatitis C: Get tested at least once in your life.  STIs (sexually  transmitted infections)  Before age 24: Ask your care team if you should be screened for STIs.  After age 24: Get screened for STIs if you're at risk. You are at risk for STIs (including HIV) if:  You are sexually active with more than one person.  You don't use condoms every time.  You or a partner was diagnosed with a sexually transmitted infection.  If you are at risk for HIV, ask about PrEP medicine to prevent HIV.  Get tested for HIV at least once in your life, whether you are at risk for HIV or not.  Cancer screening tests  Cervical cancer screening: If you have a cervix, begin getting regular cervical cancer screening tests starting at age 21.  Breast cancer scan (mammogram): If you've ever had breasts, begin having regular mammograms starting at age 40. This is a scan to check for breast cancer.  Colon cancer screening: It is important to start screening for colon cancer at age 45.  Have a colonoscopy test every 10 years (or more often if you're at risk) Or, ask your provider about stool tests like a FIT test every year or Cologuard test every 3 years.  To learn more about your testing options, visit:   .  For help making a decision, visit:   https://bit.ly/cn57737.  Prostate cancer screening test: If you have a prostate, ask your care team if a prostate cancer screening test (PSA) at age 55 is right for you.  Lung cancer screening: If you are a current or former smoker ages 50 to 80, ask your care team if ongoing lung cancer screenings are right for you.  For informational purposes only. Not to replace the advice of your health care provider. Copyright   2023 [facility]. All rights reserved. Clinically reviewed by the [facility]. [employer] 244231 - REV 01/24.  Learning About Activities of Daily Living  What are activities of daily living?     Activities of daily living (ADLs) are the basic self-care tasks you do every day. These include eating, bathing, dressing,  and moving around.  As you age, and if you have health problems, you may find that it's harder to do some of these tasks. If so, your doctor can suggest ideas that may help.  To measure what kind of help you may need, your doctor will ask how well you are able to do ADLs. Let your doctor know if there are any tasks that you are having trouble doing. This is an important first step to getting help. And when you have the help you need, you can stay as independent as possible.  How will a doctor assess your ADLs?  Asking about ADLs is part of a routine health checkup your doctor will likely do as you age. Your health check might be done in a doctor's office, in your home, or at a hospital. The goal is to find out if you are having any problems that could make it hard to care for yourself or that make it unsafe for you to be on your own.  To measure your ADLs, your doctor will ask how hard it is for you to do routine tasks. Your doctor may also want to know if you have changed the way you do a task because of a health problem. Your doctor may watch how you:  Walk back and forth.  Keep your balance while you stand or walk.  Move from sitting to standing or from a bed to a chair.  Button or unbutton a shirt or sweater.  Remove and put on your shoes.  It's common to feel a little worried or anxious if you find you can't do all the things you used to be able to do. Talking with your doctor about ADLs is a way to make sure you're as safe as possible and able to care for yourself as well as you can. You may want to bring a caregiver, friend, or family member to your checkup. They can help you talk to your doctor.  Follow-up care is a key part of your treatment and safety. Be sure to make and go to all appointments, and call your doctor if you are having problems. It's also a good idea to know your test results and keep a list of the medicines you take.  Current as of: October 24, 2024  Content Version: 14.5    5176-5243  [employer].   Care instructions adapted under license by your healthcare professional. If you have questions about a medical condition or this instruction, always ask your healthcare professional. [employer] disclaims any warranty or liability for your use of this information.    Preventing Falls: Care Instructions  Injuries and health problems such as trouble walking or poor eyesight can increase your risk of falling. So can some medicines. But there are things you can do to help prevent falls. You can exercise to get stronger. You can also arrange your home to make it safer.    Talk to your doctor about the medicines you take. Ask if any of them increase the risk of falls and whether they can be changed or stopped.   Try to exercise regularly. It can help improve your strength and balance. This can help lower your risk of falling.         Practice fall safety and prevention.   Wear low-heeled shoes that fit well and give your feet good support. Talk to your doctor if you have foot problems that make this hard.  Carry a cellphone or wear a medical alert device that you can use to call for help.  Use stepladders instead of chairs to reach high objects. Don't climb if you're at risk for falls. Ask for help, if needed.  Wear the correct eyeglasses, if you need them.        Make your home safer.   Remove rugs, cords, clutter, and furniture from walkways.  Keep your house well lit. Use night-lights in hallways and bathrooms.  Install and use sturdy handrails on stairways.  Wear nonskid footwear, even inside. Don't walk barefoot or in socks without shoes.        Be safe outside.   Use handrails, curb cuts, and ramps whenever possible.  Keep your hands free by using a shoulder bag or backpack.  Try to walk in well-lit areas. Watch out for uneven ground, changes in pavement, and debris.  Be careful in the winter. Walk on the grass or gravel when sidewalks are slippery. Use de-icer on steps and  "walkways. Add non-slip devices to shoes.    Put grab bars and nonskid mats in your shower or tub and near the toilet. Try to use a shower chair or bath bench when bathing.   Get into a tub or shower by putting in your weaker leg first. Get out with your strong side first. Have a phone or medical alert device in the bathroom with you.   Where can you learn more?  Go to https://www.[employer]/patiented  Enter G117 in the search box to learn more about \"Preventing Falls: Care Instructions.\"  Current as of: July 31, 2024  Content Version: 14.5    4621-7533 [employer].   Care instructions adapted under license by your healthcare professional. If you have questions about a medical condition or this instruction, always ask your healthcare professional. [employer] disclaims any warranty or liability for your use of this information.    Learning About Sleeping Well  What does sleeping well mean?     Sleeping well means getting enough sleep to feel good and stay healthy. How much sleep is enough varies among people.  The number of hours you sleep and how you feel when you wake up are both important. If you do not feel refreshed, you probably need more sleep. Another sign of not getting enough sleep is feeling tired during the day.  Experts recommend that adults get at least 7 or more hours of sleep per day. Children and older adults need more sleep.  Why is getting enough sleep important?  Getting enough quality sleep is a basic part of good health. When your sleep suffers, your physical health, mood, and your thoughts can suffer too. You may find yourself feeling more grumpy or stressed. Not getting enough sleep also can lead to serious problems, including injury, accidents, anxiety, and depression.  What might cause poor sleeping?  Many things can cause sleep problems, including:  Changes to your sleep schedule.  Stress. Stress can be caused by fear about a single event, such as giving a speech. " "Or you may have ongoing stress, such as worry about work or school.  Depression, anxiety, and other mental or emotional conditions.  Changes in your sleep habits or surroundings. This includes changes that happen where you sleep, such as noise, light, or sleeping in a different bed. It also includes changes in your sleep pattern, such as having jet lag or working a late shift.  Health problems, such as pain, breathing problems, and restless legs syndrome.  Lack of regular exercise.  Using alcohol, nicotine, or caffeine before bed.  How can you help yourself?  Here are some tips that may help you sleep more soundly and wake up feeling more refreshed.  Your sleeping area   Use your bedroom only for sleeping and sex. A bit of light reading may help you fall asleep. But if it doesn't, do your reading elsewhere in the house. Try not to use your TV, computer, smartphone, or tablet while you are in bed.  Be sure your bed is big enough to stretch out comfortably, especially if you have a sleep partner.  Keep your bedroom quiet, dark, and cool. Use curtains, blinds, or a sleep mask to block out light. To block out noise, use earplugs, soothing music, or a \"white noise\" machine.  Your evening and bedtime routine   Create a relaxing bedtime routine. You might want to take a warm shower or bath, or listen to soothing music.  Go to bed at the same time every night. And get up at the same time every morning, even if you feel tired.  What to avoid   Limit caffeine (coffee, tea, caffeinated sodas) during the day, and don't have any for at least 6 hours before bedtime.  Avoid drinking alcohol before bedtime. Alcohol can cause you to wake up more often during the night.  Try not to smoke or use tobacco, especially in the evening. Nicotine can keep you awake.  Limit naps during the day, especially close to bedtime.  Avoid lying in bed awake for too long. If you can't fall asleep or if you wake up in the middle of the night and can't " "get back to sleep within about 20 minutes, get out of bed and go to another room until you feel sleepy.  Avoid taking medicine right before bed that may keep you awake or make you feel hyper or energized. Your doctor can tell you if your medicine may do this and if you can take it earlier in the day.  If you can't sleep   Imagine yourself in a peaceful, pleasant scene. Focus on the details and feelings of being in a place that is relaxing.  Get up and do a quiet or boring activity until you feel sleepy.  Avoid drinking any liquids before going to bed to help prevent waking up often to use the bathroom.  Where can you learn more?  Go to https://www.[employer].net/patiented  Enter J942 in the search box to learn more about \"Learning About Sleeping Well.\"  Current as of: July 31, 2024  Content Version: 14.5    9332-3886 [employer].   Care instructions adapted under license by your healthcare professional. If you have questions about a medical condition or this instruction, always ask your healthcare professional. [employer] disclaims any warranty or liability for your use of this information.       "

## 2025-07-26 NOTE — PROGRESS NOTES
[facility]           Follow up for Graves/postablative hypothyroidism and osteoporosis    Assessment/Plan  Graves disease  70 y old woman with postablative hypothyroidism and osteoporosis    Hypothyroidism  St post I131 treatment in 6/2022 for Graves disease. On levothyroxine 112 mcg, Tsh in normal range. TSH levels had been somewhat variable, most recent 1.80. Clinically hypothyroid with inability to lose weight and fatigue.   - Recheck TSH  - Continue levothyroxine    Osteoporosis  Patient has been on alendronate 70 mg once a week, takes on Sundays - will finish 5 year course July 2026. Has some acid reflux with medication which is well controlled on omeprazole. Most recent DXA consistent with osteopenia (January 2024).   - Continue alendronate   - Recheck DXA after completing 5 year course of alendronate   - Continue omeprazole as needed    Fatigue  Patient has chronic fatigue in the setting of hypothyroidism. Many other etiologies of fatigue have been investigated and treated including low iron, medication side effects, and manifestation of known RICARDA/depression. PCP could consider role of low blood pressure and adjust HTN medications, BP continues to be low-normal at this visit.     Follow-up with Dr. Caraballo, patient prefers Jennie Oliveros, MS-4    I, Odalys Caraballo, saw this patient with the student. I discussed the patient with the student, reviewed the chart including notes, imaging and labs. I confirmed key parts of history and exam. I agree with the assessment and plan of care as documented in the student's note.     Key Findings: Hypothyroidism St post iodine ablation on LT4 with lab in normal range, multiple symptoms including fatigue and inability to loose weight. Opted to continue LT4 at current dose. Osteoporosis- on Alendronate x 4 years, cont until 8/2026 for 5 years of treatment.     The longitudinal plan of hypothyroidism and osteoporosis as documented were  addressed during this visit. Due to the added complexity in care, I will continue to support Mercedes in the subsequent management and with ongoing continuity of care.      Odalys Caraballo MD  Endocrinology and Diabetes  Telephone contact:  [facility] [phone]  [facility] [phone]     Interval history  2y follow up    Thyroid  Radioiodine treatment with 16.1 my millicuries iodine on June 30, 2022    Patient has been on levothyroxine 112 mcg daily    Patient continues to be concerned about her weight gain and a weight loss clinic referral was placed on 7/28/25, unable to achieve additional weight loss    Symptoms of hypo- and hyperthyroidism:  Has gained weight; some cold intolerance; has constipation, well controlled with current bowel regimen; hair loss no, change in skin pattern no; diagnosed with anxiety and depression, follows closely with psych for this; fatigue YES; heart racing no; tremors no; menses na    Osteoporosis  - teeth well, some GERD well controlled with prn omeprazole   - weight stable, abdominal dyscomfort no  - since last visit falls no, fractures no,    Most recen DXA scan 1/30/2024 lowest T-score -2.3 at the left fem neck, 3.1% increase at the left fem, 7% at the distal radius with T-score of -2.0 compared to 2022    Osteoporosis medication: Alendronate 70 mg weekly since 9/2021    Calcium supplement: no  Vitamin D: no  Protein intake: good  Physical activity: limited due to dizziness    Current Problem List:   Patient Active Problem List   Diagnosis    Menopausal syndrome (hot flashes)    Family history of breast cancer    Vulvar pain    Renal anomaly    Overactive bladder    Rectal prolapse    CARDIOVASCULAR SCREENING; LDL GOAL LESS THAN 160    Dysphagia    Confusion    Headache    Left shoulder pain    Anemia    Mild major depression (H)    Esophageal stricture    Vulvar lesion    Lumbago    Pain in thoracic spine    Sciatica     Pain in joint, lower leg    Plantar fascial fibromatosis    Pain in joint involving ankle and foot    Other specified hypothyroidism    GERD (gastroesophageal reflux disease)    Irritable bowel syndrome    Other sleep apnea    Cervical high risk HPV (human papillomavirus) test positive    Restless legs syndrome (RLS)    History of total hip arthroplasty, right    Hip pain    Infection of right prosthetic hip joint    Cellulitis of right hip    Deep venous thrombosis of upper extremity (H)    Peripheral edema    Low iron stores    Mechanical complication of prosthetic hip implant, initial encounter    Status post hip surgery    Chest pain, unspecified    Closed fracture of proximal end of left humerus    Generalized anxiety disorder    History of infection    Infection and inflammatory reaction due to unspecified internal joint prosthesis, initial encounter    Ingrowing nail    Major depressive disorder, recurrent episode, moderate (H)    Mild intermittent asthma    Nausea    Partial epilepsy with intractable epilepsy (H)    Repeated falls    History of revision of total replacement of right hip joint    Seizure disorder (H)    Benign essential hypertension    Sacroiliac joint pain    Closed fracture of left wrist, initial encounter    Lumbar spondylosis    Lumbar facet arthropathy    Tuberous sclerosis (H)    Hyperthyroidism    Graves disease    Arthritis of carpometacarpal (CMC) joint of left thumb    Antinuclear antibody (DOC) titer greater than 1:80    Chronic kidney disease, stage 3a (H)       Past Medical and Past Surgical History:  Past Medical History:   Diagnosis Date    Antinuclear antibody (DOC) titer greater than 1:80 02/22/2023    1:160    Arthritis     Thumb    Cervical high risk HPV (human papillomavirus) test positive 07/17/2017 07/17/17: NIL pap, + HR HPV (not 16 or 18) result.     Cervical pain 09/15/2016    Chronic infection     MRSA    Constipation 08/27/2012    Depressive disorder     Diarrhea  04/30/2009    Esophageal stricture 02/21/2012    Gastro-oesophageal reflux disease     History of blood transfusion     Hypertension     Hypothyroidism 09/18/2012    IBS (irritable bowel syndrome)     Mild major depression 02/21/2012    Neuropathy of lower extremity     Other sleep apnea     Rectal prolapse     Restless leg syndrome     Seizure disorder (H)     25 years ago    Sleep apnea     CPAP, no longer using CPAP    Temporal sclerosis 08/27/2012    Tuberous sclerosis (H)     Uncomplicated asthma        Past Surgical History:   Procedure Laterality Date    Anterior COLPORRHAPHY, BLADDER/VAGINA      perigee mesh    ARTHROPLASTY HIP Right 7/23/2019    Procedure: Right total hip arthroplasty;  Surgeon: Anant Mejia MD;  Location: [facility]    ARTHROPLASTY PATELLO-FEMORAL (KNEE) Bilateral     ARTHROPLASTY REVISION HIP Right 8/7/2019    Procedure: Right hip revision total arthroplasty;  Surgeon: Tom Antonio MD;  Location: [facility]    ARTHROPLASTY REVISION HIP Right 8/5/2020    Procedure: Revision Right total hip arthroplasty;  Surgeon: Pan Grey MD;  [facility]: [facility]    CARPAL TUNNEL RELEASE RT/LT      DENTAL SURGERY      implant    DESTRUCTION OF PARAVERTEBRAL FACET LUMBAR / SACRAL SINGLE Left 10/27/2021    Procedure: DESTRUCTION, NERVE, FACET JOINT, LUMBOSACRAL, Left L4-5 and L5-S1 facet joint radiofrequency ablation;  Surgeon: Celena Perez MD;  Location: [facility] OR    DILATION AND CURETTAGE      DRAIN PILONIDAL CYST SIMPL      ENDOSCOPY DRUG INDUCED SLEEP N/A 11/18/2015    Procedure: ENDOSCOPY DRUG INDUCED SLEEP;  Surgeon: Park Ortiz MD;  Location: [facility]    ESOPHAGOSCOPY, GASTROSCOPY, DUODENOSCOPY (EGD), COMBINED  4/5/2013    Procedure: COMBINED ESOPHAGOSCOPY, GASTROSCOPY, DUODENOSCOPY (EGD), BIOPSY SINGLE OR MULTIPLE;;  Surgeon: Mundo Mehta MD;  Location:  GI    EXCISE LESION TRUNK  8/10/2012    Procedure: EXCISE LESION TRUNK;;  Surgeon: Jerald Diggs MD;  Location:  [facility]    HYSTERECTOMY      INJECT BLOCK MEDIAL BRANCH CERVICAL/THORACIC/LUMBAR Bilateral 9/20/2021    Procedure: BLOCK, NERVE, FACET JOINT, MEDIAL BRANCH, DIAGNOSTIC - Bilateral L4-5 and L5-S1 Medial branch blocks;  Surgeon: Celena Perez MD;  Location: [facility] OR    INJECT BLOCK MEDIAL BRANCH CERVICAL/THORACIC/LUMBAR Bilateral 9/27/2021    Procedure: BLOCK, NERVE, FACET JOINT, MEDIAL BRANCH, DIAGNOSTIC - Bilateral L4-5 and L5-S1 Medial branch blocks;  Surgeon: Celena Perez MD;  Location: [facility] OR    INJECT SACROILIAC JOINT Bilateral 5/24/2021    Procedure: INJECTION, SACROILIAC JOINT;  Surgeon: Celena Perez MD;  Location: [facility] OR    IRRIGATION AND DEBRIDEMENT HIP, COMBINED Right 8/26/2019    Procedure: 1.  Right hip wound irrigation and debridement with excisional debridement of nonviable skin, subcutaneous tissues, and fascia. 2. Application of incisional wound VAC, 27cm length incision.;  Surgeon: Tyson Prabhakar MD;  Location: [facility]    L shoulder fracture      OPEN REDUCTION INTERNAL FIXATION WRIST Left 4/30/2021    Procedure: Open reduction with internal fixation of displaced left intraarticular distal radius fracture;  Surgeon: Luis Manuel Sánchez MD;  Location: [facility] OR    rectal prolapse repair abdominally      RELEASE PLANTAR FASCIA Right 1/20/2015    Procedure: RELEASE PLANTAR FASCIA;  Surgeon: Maicol Liu DPM;  Location: [facility]    REMOVE MESH VAGINA  8/10/2012    Procedure: REMOVE MESH VAGINA;  Excision of Vaginal Mesh Exposure, Removal of Skin Tag Left Inner Leg;  Surgeon: Jerald Diggs MD;  [facility]: [facility]    REPAIR HAMMER TOE Right 1/20/2015    Procedure: REPAIR HAMMER TOE;  Surgeon: Maicol Liu DPM;  Location: [facility]    TONSILLECTOMY & ADENOIDECTOMY      TUBAL LIGATION         Medications:   Current Outpatient Medications   Medication Sig Dispense Refill    albuterol (PROAIR HFA) 108 (90 Base) MCG/ACT inhaler Inhale 2 puffs into the lungs 4 times daily as needed for  shortness of breath or wheezing 18 g 0    alendronate (FOSAMAX) 70 MG tablet TAKE 1 TABLET EVERY 7 DAYS 12 tablet 4    amLODIPine (NORVASC) 5 MG tablet Take 1 tablet (5 mg) by mouth daily. 90 tablet 1    amoxicillin (AMOXIL) 500 MG tablet Take 4 tablets (2000 mg) by mouth 1 hour before dental procedures/cleanings. 4 tablet 4    budesonide-formoterol (SYMBICORT) 160-4.5 MCG/ACT Inhaler Inhale 2 puffs into the lungs 2 times daily      carboxymethylcellulose PF (REFRESH PLUS) 0.5 % ophthalmic solution Place 1 drop into both eyes 2 times daily      cycloSPORINE (RESTASIS) 0.05 % ophthalmic emulsion Place 1 drop into both eyes 2 times daily       Dentifrices (BIOTENE DRY MOUTH DT) Apply 2 sprays to affected area 3 times daily as needed       FLUoxetine (PROZAC) 20 MG capsule       fluticasone-vilanterol (BREO ELLIPTA) 200-25 MCG/INH inhaler Inhale 1 puff into the lungs daily      furosemide (LASIX) 20 MG tablet Take 2 tablets (40 mg) by mouth daily. 90 tablet 1    hydrOXYzine (VISTARIL) 25 MG capsule HYDROXYZINE PAMOATE 25 MG CAPS      hypromellose (ARTIFICIAL TEARS) 0.5 % SOLN ophthalmic solution Place 1 drop into both eyes 2 times daily      ibuprofen (ADVIL/MOTRIN) 600 MG tablet Take 600 mg by mouth every 6 hours as needed for moderate pain      lacosamide (VIMPAT) 200 MG TABS tablet Take 1 tablet (200 mg) by mouth 2 times daily 30 tablet 0    levothyroxine (SYNTHROID/LEVOTHROID) 112 MCG tablet Take 1 tablet (112 mcg) by mouth daily. 90 tablet 4    linaclotide (LINZESS) 290 MCG capsule Take 1 capsule (290 mcg) by mouth every morning (before breakfast) 10 capsule 0    loratadine (CLARITIN REDITABS) 10 MG ODT Take 1 tablet (10 mg) by mouth daily as needed for allergies      LORazepam (ATIVAN) 0.5 MG tablet nightly as needed for sleep      magnesium oxide (KP MAG-OXIDE MAGNESIUM) 200 MG TABS KP MAG-OXIDE MAGNESIUM 200 MG TABS      methylphenidate (RITALIN) 5 MG tablet Take 2.5-5 mg by mouth as needed.      omeprazole  (PRILOSEC) 40 MG DR capsule Take 1 capsule (40 mg) by mouth 2 times daily 180 capsule 3    potassium citrate (UROCIT-K) 10 MEQ (1080 MG) CR tablet TAKE 1 TABLET DAILY 90 tablet 1    pramipexole (MIRAPEX) 1 MG tablet 3 tablets by mouth 3 hours prior to bedtime      traZODone (DESYREL) 150 MG tablet 150 mg At Bedtime       triamcinolone (KENALOG) 0.1 % external cream APPLY TOPICALLY 3 TIMES    DAILY. 30 g 0       Allergies:   Allergies   Allergen Reactions    Blood Transfusion Related (Informational Only) Other (See Comments)     Patient has a history of a clinically significant antibody against RBC antigens.  A delay in compatible RBCs may occur.    Pregabalin      Other reaction(s): Weight gain    Rotigotine      Other reaction(s): Worsening RLS; wt gain    Zolpidem      Other reaction(s): Hx of Parasomnias; should not be on Ambien    Iron      Other reaction(s): Constipation    Budesonide      Edema    Bupropion Rash    Carbamazepine Diarrhea    Clonazepam Other (See Comments)     Hypotension, trouble walking, mind disturbance    Encort [Hydrocortisone Acetate] Swelling     Localized to feet    Encort [Hydrocortisone] Swelling    Erythromycin Diarrhea    Erythromycin Nausea and Vomiting and Diarrhea    Flagyl [Metronidazole] Nausea    Gabapentin Other (See Comments)     Causes over-eating    Lamictal [Lamotrigine] Dizziness    Oxycodone Nausea and Vomiting    Tegretol [Carbamazepine, Eslicarbazepine, And Oxcarbazepine] Nausea and Vomiting       Social History     Tobacco Use    Smoking status: Never    Smokeless tobacco: Never   Substance Use Topics    Alcohol use: Yes     Comment: 1 glass of wine 2x/yr       Family History   Problem Relation Age of Onset    Eye Disorder Mother     Lipids Mother         has CHF    Osteoporosis Mother     Diabetes Father     Breast Cancer Sister     Depression Sister     Lipids Sister     Thyroid Disease Sister     Hypertension Brother     Alcohol/Drug Brother     Depression Brother      Gastrointestinal Disease Brother         hx of colonic polyps    Lipids Brother     Psychotic Disorder Brother     Alzheimer Disease Paternal Grandmother     Congenital Anomalies Daughter     Neurologic Disorder Daughter     Suicide Son     Thyroid Disease Sister        Physical Examination:  LMP 03/11/2010     Wt Readings from Last 4 Encounters:   06/11/25 99.3 kg (219 lb)   04/30/25 108.4 kg (239 lb)   04/30/25 108.4 kg (239 lb)   02/20/25 94.8 kg (209 lb)   weight today 219 lbs per pt report  GEN: stated age appearing, NAD   HEENT: mild ptosis in bilateral eyes, no exophthalmos noted  PSYCH: Alert and oriented times 3; coherent speech, normal   rate and volume, able to articulate logical thoughts, able   to abstract reason, no tangential thoughts, no hallucinations   or delusions  Her affect is normal and pleasant  RESP: No cough, no audible wheezing, able to talk in full sentences     Labs and Studies:   Lab Results   Component Value Date    TSH 1.80 03/21/2025    TSH 1.72 11/13/2024    TSH 4.91 (H) 08/01/2024    TSH 2.63 01/16/2024    TSH 6.94 (H) 12/05/2023     Lab Results   Component Value Date    SABA 9.5 06/11/2025    SABA 9.5 05/28/2025    SABA 9.5 04/30/2025    SABA 9.2 03/21/2025    ALBUMIN 4.2 04/30/2025    ALT 14 04/30/2025    PHOS 4.1 09/17/2009    AST 18 04/30/2025    BILITOTAL 0.3 04/30/2025    CR 1.07 (H) 06/11/2025    CR 1.14 (H) 05/28/2025    CR 0.90 04/30/2025     06/11/2025    TSH 1.80 03/21/2025    ALKPHOS 74 04/30/2025    HGB 12.9 04/30/2025       Results for orders placed in visit on 11/14/22    XR Femur Right 2 Views    Narrative  EXAM: FEMUR RIGHT TWO VIEWS  DATE/TIME: 11/14/2022 1:50 PM    INDICATION: Right femur pain.  COMPARISON: None.    Impression  IMPRESSION:  1.  No femur fracture.  2.  Right total hip arthroplasty with proximal femur cerclage wire  fixation. Hardware is intact.  3.  Right total knee arthroplasty. The visualized hardware is intact.  4.  No joint  malalignment.    ROHINI DE LUNA MD      SYSTEM ID:  [id number]        Results for orders placed in visit on 22    US Thyroid    Narrative  US THYROID 2022 1:18 PM    COMPARISON: None    HISTORY: Acquired hypothyroidism    FINDINGS:  Thyroid parenchyma: Homogenous  The right lobe of the thyroid measures: 5.3 x 1.2 x 2 cm  The left lobe of the thyroid measures: 2.9 x 1.3 x 1.1 cm  The thyroid isthmus measures: 0.3 cm    No thyroid nodules identified.    Impression  Impression: Normal sonographic appearance of the thyroid. No thyroid  nodules.    I have personally reviewed the examination and initial interpretation  and I agree with the findings.    BRISEIDA BOB MD      SYSTEM ID:  [id number]  ;    Results for orders placed during the hospital encounter of 22    NM Thyroid Uptake and Scan    Narrative  EXAM: NM THYROID UPTAKE AND SCAN  LOCATION: [facility]  DATE/TIME: 2022 11:55 AM    INDICATION: Clinical and/or laboratory evidence of hyperthyroidism.  COMPARISON: Thyroid ultrasound dated 2022.  TECHNIQUE: 236 uCi I-123, oral ingestion. 24-hour neck uptake and imaging.    FINDINGS: 24-hour uptake: 47.3% (Normal range: 10-30%).    Impression  IMPRESSION:    Diffuse radiotracer uptake throughout the thyroid gland suspicious for Graves' disease without hot/cold nodule.  '    Results for orders placed in visit on 21    DX Hip/Pelvis/Spine    Narrative  BONE DENSITOMETRY  [facility] [address] [place] [address]  2021    PATIENT: Mercedes Barber  CHART: [id number]  :  1955  AGE:  66 year old  SEX:  female  REFERRING PROVIDER:  Skyler Álvarez MD    PROCEDURE:  Bone density scanning was performed using DXA technology of the lumbar spine and hip.  Scanning was performed on a Lunar Prodigy scanner.  Reporting is completed in the form of a T-score.  The T-score represents the standard deviation from peak bone mass based on  "a young healthy adult.    REFERENCE T-SCORES:  Normal                -1.0 and greater  Osteopenia         Between -1.0 and -2.5  Osteoporosis     -2.5 and less    RISK FACTORS:  Post-menopausal, Height loss of 2 inches, Fractures of wrist and humerus    CURRENT TREATMENT:  None listed    FINDINGS:  Lumbar Spine (L1-L4)      T-score:  -0.4, marked degenerative changes present  Left Femoral Neck            T-score:  -2.3  Forearm (radius 33%)      T-score:  -1.5  The right and left femur is not acceptable for evaluation due to previous arthroplasty.    Lumbar (L1-L4) BMD: 1.147  Previous: 1.345  Left Total Hip BMD: 0.763  Previous: 0.944  Forearm (radius 33%) BMD: 0.755   Previous: NA    Comparison is made to another DXA performed on the same Lunar Prodigy  machine on 02/05/2009. There was a study performed in 2013 but the images are not available.    IMPRESSION  Osteopenia., Degenerative changes of the lumbar spine which may falsely elevate results.    There has been significant decrease in bone density of the lumbar spine. There has been significant decrease in bone density of the hip. The forearm was not included on the previous study so comparison is not possible.    Recommendations include ensuring adequate Calcium and Vitamin D.    The current NOF Guidelines recommend treatment for patients with prior hip or vertebral fracture, T-score -2.5 or below, or 10 year risk of any major osteoporotic fracture >20% or 10 year risk of hip fracture >3%, as calculated using the FRAX calculator (www.shef.ac.uk/FRAX or you can google \"FRAX\").    This patient's risks based on available information, with the use of FRAX, are 13 % for major osteoporotic fracture and 3.6 % for hip fracture.  Based on these guidelines, treatment (in addition to calcium and vitamin D) is recommended for this patient, after ruling out other causes of osteoporosis.  This is meant as an aid to clinical decision making; one must still use clinical " judgement.      Follow up can be considered in 2 years.  ___________________  Donya Oliveira M.D.  Electronically signed

## 2025-07-28 ENCOUNTER — OFFICE VISIT (OUTPATIENT)
Dept: ENDOCRINOLOGY | Facility: CLINIC | Age: 70
End: 2025-07-28
Payer: MEDICARE

## 2025-07-28 VITALS
SYSTOLIC BLOOD PRESSURE: 117 MMHG | HEART RATE: 68 BPM | OXYGEN SATURATION: 95 % | BODY MASS INDEX: 36.44 KG/M2 | DIASTOLIC BLOOD PRESSURE: 80 MMHG | WEIGHT: 219 LBS

## 2025-07-28 DIAGNOSIS — M81.0 AGE-RELATED OSTEOPOROSIS WITHOUT CURRENT PATHOLOGICAL FRACTURE: Primary | ICD-10-CM

## 2025-07-28 DIAGNOSIS — Z78.0 MENOPAUSE: ICD-10-CM

## 2025-07-28 DIAGNOSIS — E66.09 CLASS 1 OBESITY DUE TO EXCESS CALORIES WITH SERIOUS COMORBIDITY AND BODY MASS INDEX (BMI) OF 34.0 TO 34.9 IN ADULT: ICD-10-CM

## 2025-07-28 DIAGNOSIS — M85.80 OSTEOPENIA, UNSPECIFIED LOCATION: ICD-10-CM

## 2025-07-28 DIAGNOSIS — E03.9 ACQUIRED HYPOTHYROIDISM: ICD-10-CM

## 2025-07-28 DIAGNOSIS — E66.811 CLASS 1 OBESITY DUE TO EXCESS CALORIES WITH SERIOUS COMORBIDITY AND BODY MASS INDEX (BMI) OF 34.0 TO 34.9 IN ADULT: ICD-10-CM

## 2025-07-28 DIAGNOSIS — I10 BENIGN ESSENTIAL HYPERTENSION: ICD-10-CM

## 2025-07-28 PROCEDURE — 99214 OFFICE O/P EST MOD 30 MIN: CPT | Performed by: INTERNAL MEDICINE

## 2025-07-28 PROCEDURE — 3074F SYST BP LT 130 MM HG: CPT | Performed by: INTERNAL MEDICINE

## 2025-07-28 PROCEDURE — 3079F DIAST BP 80-89 MM HG: CPT | Performed by: INTERNAL MEDICINE

## 2025-07-28 PROCEDURE — G2211 COMPLEX E/M VISIT ADD ON: HCPCS | Performed by: INTERNAL MEDICINE

## 2025-07-28 RX ORDER — ALENDRONATE SODIUM 70 MG/1
TABLET ORAL
Qty: 12 TABLET | Refills: 4 | Status: SHIPPED | OUTPATIENT
Start: 2025-07-28

## 2025-07-28 RX ORDER — LEVOTHYROXINE SODIUM 112 UG/1
112 TABLET ORAL DAILY
Qty: 90 TABLET | Refills: 4 | Status: SHIPPED | OUTPATIENT
Start: 2025-07-28

## 2025-07-28 NOTE — NURSING NOTE
Mercedes Barber's goals for this visit include:   Chief Complaint   Patient presents with    Endocrine Problem     Osteopenia      Thyroid Disease     She requests these members of her care team be copied on today's visit information: No    PCP: Skyler Álvarez    Referring Provider:  Referred Self, MD  No address on file    /80 (BP Location: Left arm, Patient Position: Sitting, Cuff Size: Adult Large)   Pulse 68   Wt 99.3 kg (219 lb)   LMP 03/11/2010   SpO2 95%   BMI 36.44 kg/m      Do you need any medication refills at today's visit? No

## 2025-07-28 NOTE — LETTER
7/28/2025      Mercedes Barber  [address]  [place] [address]      Dear Colleague,    Thank you for referring your patient, Mercedes Barber, to the [facility]. Please see a copy of my visit note below.        Endocrinology and Diabetes Clinic           Follow up for Graves/postablative hypothyroidism and osteoporosis    Assessment/Plan  Graves disease  70 y old woman with postablative hypothyroidism and osteoporosis    Hypothyroidism  St post I131 treatment in 6/2022 for Graves disease. On levothyroxine 112 mcg, Tsh in normal range. TSH levels had been somewhat variable, most recent 1.80. Clinically hypothyroid with inability to lose weight and fatigue.   - Recheck TSH  - Continue levothyroxine    Osteoporosis  Patient has been on alendronate 70 mg once a week, takes on Sundays - will finish 5 year course July 2026. Has some acid reflux with medication which is well controlled on omeprazole. Most recent DXA consistent with osteopenia (January 2024).   - Continue alendronate   - Recheck DXA after completing 5 year course of alendronate   - Continue omeprazole as needed    Fatigue  Patient has chronic fatigue in the setting of hypothyroidism. Many other etiologies of fatigue have been investigated and treated including low iron, medication side effects, and manifestation of known RICARDA/depression. PCP could consider role of low blood pressure and adjust HTN medications, BP continues to be low-normal at this visit.     Follow-up with Dr. Caraballo, patient prefers [facility]    Rubi Oliveros, MS-4    I, Odalys Caraballo, saw this patient with the student. I discussed the patient with the student, reviewed the chart including notes, imaging and labs. I confirmed key parts of history and exam. I agree with the assessment and plan of care as documented in the student's note.     Key Findings: Hypothyroidism St post iodine ablation on LT4 with lab in normal range, multiple  symptoms including fatigue and inability to loose weight. Opted to continue LT4 at current dose. Osteoporosis- on Alendronate x 4 years, cont until 8/2026 for 5 years of treatment.     The longitudinal plan of hypothyroidism and osteoporosis as documented were addressed during this visit. Due to the added complexity in care, I will continue to support Mercedes in the subsequent management and with ongoing continuity of care.      Odalys Caraballo MD  Endocrinology and Diabetes  Telephone contact:  [facility] [phone]  [facility] [phone]     Interval history  2y follow up    Thyroid  Radioiodine treatment with 16.1 my millicuries iodine on June 30, 2022    Patient has been on levothyroxine 112 mcg daily    Patient continues to be concerned about her weight gain and a weight loss clinic referral was placed on 7/28/25, unable to achieve additional weight loss    Symptoms of hypo- and hyperthyroidism:  Has gained weight; some cold intolerance; has constipation, well controlled with current bowel regimen; hair loss no, change in skin pattern no; diagnosed with anxiety and depression, follows closely with psych for this; fatigue YES; heart racing no; tremors no; menses na    Osteoporosis  - teeth well, some GERD well controlled with prn omeprazole   - weight stable, abdominal dyscomfort no  - since last visit falls no, fractures no,    Most recen DXA scan 1/30/2024 lowest T-score -2.3 at the left fem neck, 3.1% increase at the left fem, 7% at the distal radius with T-score of -2.0 compared to 2022    Osteoporosis medication: Alendronate 70 mg weekly since 9/2021    Calcium supplement: no  Vitamin D: no  Protein intake: good  Physical activity: limited due to dizziness    Current Problem List:   Patient Active Problem List   Diagnosis     Menopausal syndrome (hot flashes)     Family history of breast cancer     Vulvar pain     Renal anomaly     Overactive  bladder     Rectal prolapse     CARDIOVASCULAR SCREENING; LDL GOAL LESS THAN 160     Dysphagia     Confusion     Headache     Left shoulder pain     Anemia     Mild major depression (H)     Esophageal stricture     Vulvar lesion     Lumbago     Pain in thoracic spine     Sciatica     Pain in joint, lower leg     Plantar fascial fibromatosis     Pain in joint involving ankle and foot     Other specified hypothyroidism     GERD (gastroesophageal reflux disease)     Irritable bowel syndrome     Other sleep apnea     Cervical high risk HPV (human papillomavirus) test positive     Restless legs syndrome (RLS)     History of total hip arthroplasty, right     Hip pain     Infection of right prosthetic hip joint     Cellulitis of right hip     Deep venous thrombosis of upper extremity (H)     Peripheral edema     Low iron stores     Mechanical complication of prosthetic hip implant, initial encounter     Status post hip surgery     Chest pain, unspecified     Closed fracture of proximal end of left humerus     Generalized anxiety disorder     History of infection     Infection and inflammatory reaction due to unspecified internal joint prosthesis, initial encounter     Ingrowing nail     Major depressive disorder, recurrent episode, moderate (H)     Mild intermittent asthma     Nausea     Partial epilepsy with intractable epilepsy (H)     Repeated falls     History of revision of total replacement of right hip joint     Seizure disorder (H)     Benign essential hypertension     Sacroiliac joint pain     Closed fracture of left wrist, initial encounter     Lumbar spondylosis     Lumbar facet arthropathy     Tuberous sclerosis (H)     Hyperthyroidism     Graves disease     Arthritis of carpometacarpal (CMC) joint of left thumb     Antinuclear antibody (DOC) titer greater than 1:80     Chronic kidney disease, stage 3a (H)       Past Medical and Past Surgical History:  Past Medical History:   Diagnosis Date     Antinuclear  antibody (DOC) titer greater than 1:80 02/22/2023    1:160     Arthritis     Thumb     Cervical high risk HPV (human papillomavirus) test positive 07/17/2017 07/17/17: NIL pap, + HR HPV (not 16 or 18) result.      Cervical pain 09/15/2016     Chronic infection     MRSA     Constipation 08/27/2012     Depressive disorder      Diarrhea 04/30/2009     Esophageal stricture 02/21/2012     Gastro-oesophageal reflux disease      History of blood transfusion      Hypertension      Hypothyroidism 09/18/2012     IBS (irritable bowel syndrome)      Mild major depression 02/21/2012     Neuropathy of lower extremity      Other sleep apnea      Rectal prolapse      Restless leg syndrome      Seizure disorder (H)     25 years ago     Sleep apnea     CPAP, no longer using CPAP     Temporal sclerosis 08/27/2012     Tuberous sclerosis (H)      Uncomplicated asthma        Past Surgical History:   Procedure Laterality Date     Anterior COLPORRHAPHY, BLADDER/VAGINA      perigee mesh     ARTHROPLASTY HIP Right 7/23/2019    Procedure: Right total hip arthroplasty;  Surgeon: Anant Mejia MD;  Location: [facility] OR     ARTHROPLASTY PATELLO-FEMORAL (KNEE) Bilateral      ARTHROPLASTY REVISION HIP Right 8/7/2019    Procedure: Right hip revision total arthroplasty;  Surgeon: Tom Antonio MD;  Location: [facility] OR     ARTHROPLASTY REVISION HIP Right 8/5/2020    Procedure: Revision Right total hip arthroplasty;  Surgeon: Pan Grey MD;  Location: [facility] OR     CARPAL TUNNEL RELEASE RT/LT       DENTAL SURGERY      implant     DESTRUCTION OF PARAVERTEBRAL FACET LUMBAR / SACRAL SINGLE Left 10/27/2021    Procedure: DESTRUCTION, NERVE, FACET JOINT, LUMBOSACRAL, Left L4-5 and L5-S1 facet joint radiofrequency ablation;  Surgeon: Celena Perez MD;  Location: [facility] OR     DILATION AND CURETTAGE       DRAIN PILONIDAL CYST SIMPL       ENDOSCOPY DRUG INDUCED SLEEP N/A 11/18/2015    Procedure: ENDOSCOPY DRUG INDUCED SLEEP;  Surgeon: Angel  Park Harper MD;  Location: [facility] OR     ESOPHAGOSCOPY, GASTROSCOPY, DUODENOSCOPY (EGD), COMBINED  4/5/2013    Procedure: COMBINED ESOPHAGOSCOPY, GASTROSCOPY, DUODENOSCOPY (EGD), BIOPSY SINGLE OR MULTIPLE;;  Surgeon: Mundo Mehta MD;  Location:  GI     EXCISE LESION TRUNK  8/10/2012    Procedure: EXCISE LESION TRUNK;;  Surgeon: Jerald Diggs MD;  Location: [facility] OR     HYSTERECTOMY       INJECT BLOCK MEDIAL BRANCH CERVICAL/THORACIC/LUMBAR Bilateral 9/20/2021    Procedure: BLOCK, NERVE, FACET JOINT, MEDIAL BRANCH, DIAGNOSTIC - Bilateral L4-5 and L5-S1 Medial branch blocks;  Surgeon: Celena Perez MD;  Location: [facility] OR     INJECT BLOCK MEDIAL BRANCH CERVICAL/THORACIC/LUMBAR Bilateral 9/27/2021    Procedure: BLOCK, NERVE, FACET JOINT, MEDIAL BRANCH, DIAGNOSTIC - Bilateral L4-5 and L5-S1 Medial branch blocks;  Surgeon: Celena Perez MD;  Location: [facility] OR     INJECT SACROILIAC JOINT Bilateral 5/24/2021    Procedure: INJECTION, SACROILIAC JOINT;  Surgeon: Celena Perez MD;  Location: [facility] OR     IRRIGATION AND DEBRIDEMENT HIP, COMBINED Right 8/26/2019    Procedure: 1.  Right hip wound irrigation and debridement with excisional debridement of nonviable skin, subcutaneous tissues, and fascia. 2. Application of incisional wound VAC, 27cm length incision.;  Surgeon: Tyson Prabhakar MD;  Location: [facility] OR     L shoulder fracture       OPEN REDUCTION INTERNAL FIXATION WRIST Left 4/30/2021    Procedure: Open reduction with internal fixation of displaced left intraarticular distal radius fracture;  Surgeon: Luis Manuel Sánchez MD;  Location:  OR     rectal prolapse repair abdominally       RELEASE PLANTAR FASCIA Right 1/20/2015    Procedure: RELEASE PLANTAR FASCIA;  Surgeon: Maicol Liu DPM;  Location:  [facility]     REMOVE MESH VAGINA  8/10/2012    Procedure: REMOVE MESH VAGINA;  Excision of Vaginal Mesh Exposure, Removal of Skin Tag Left Inner Leg;  Surgeon: Jerald Diggs MD;  Location:   OR     REPAIR HAMMER TOE Right 1/20/2015    Procedure: REPAIR HAMMER TOE;  Surgeon: Maicol Liu DPM;  Location: [facility]     TONSILLECTOMY & ADENOIDECTOMY       TUBAL LIGATION         Medications:   Current Outpatient Medications   Medication Sig Dispense Refill     albuterol (PROAIR HFA) 108 (90 Base) MCG/ACT inhaler Inhale 2 puffs into the lungs 4 times daily as needed for shortness of breath or wheezing 18 g 0     alendronate (FOSAMAX) 70 MG tablet TAKE 1 TABLET EVERY 7 DAYS 12 tablet 4     amLODIPine (NORVASC) 5 MG tablet Take 1 tablet (5 mg) by mouth daily. 90 tablet 1     amoxicillin (AMOXIL) 500 MG tablet Take 4 tablets (2000 mg) by mouth 1 hour before dental procedures/cleanings. 4 tablet 4     budesonide-formoterol (SYMBICORT) 160-4.5 MCG/ACT Inhaler Inhale 2 puffs into the lungs 2 times daily       carboxymethylcellulose PF (REFRESH PLUS) 0.5 % ophthalmic solution Place 1 drop into both eyes 2 times daily       cycloSPORINE (RESTASIS) 0.05 % ophthalmic emulsion Place 1 drop into both eyes 2 times daily        Dentifrices (BIOTENE DRY MOUTH DT) Apply 2 sprays to affected area 3 times daily as needed        FLUoxetine (PROZAC) 20 MG capsule        fluticasone-vilanterol (BREO ELLIPTA) 200-25 MCG/INH inhaler Inhale 1 puff into the lungs daily       furosemide (LASIX) 20 MG tablet Take 2 tablets (40 mg) by mouth daily. 90 tablet 1     hydrOXYzine (VISTARIL) 25 MG capsule HYDROXYZINE PAMOATE 25 MG CAPS       hypromellose (ARTIFICIAL TEARS) 0.5 % SOLN ophthalmic solution Place 1 drop into both eyes 2 times daily       ibuprofen (ADVIL/MOTRIN) 600 MG tablet Take 600 mg by mouth every 6 hours as needed for moderate pain       lacosamide (VIMPAT) 200 MG TABS tablet Take 1 tablet (200 mg) by mouth 2 times daily 30 tablet 0     levothyroxine (SYNTHROID/LEVOTHROID) 112 MCG tablet Take 1 tablet (112 mcg) by mouth daily. 90 tablet 4     linaclotide (LINZESS) 290 MCG capsule Take 1 capsule (290 mcg) by mouth every  morning (before breakfast) 10 capsule 0     loratadine (CLARITIN REDITABS) 10 MG ODT Take 1 tablet (10 mg) by mouth daily as needed for allergies       LORazepam (ATIVAN) 0.5 MG tablet nightly as needed for sleep       magnesium oxide (KP MAG-OXIDE MAGNESIUM) 200 MG TABS KP MAG-OXIDE MAGNESIUM 200 MG TABS       methylphenidate (RITALIN) 5 MG tablet Take 2.5-5 mg by mouth as needed.       omeprazole (PRILOSEC) 40 MG DR capsule Take 1 capsule (40 mg) by mouth 2 times daily 180 capsule 3     potassium citrate (UROCIT-K) 10 MEQ (1080 MG) CR tablet TAKE 1 TABLET DAILY 90 tablet 1     pramipexole (MIRAPEX) 1 MG tablet 3 tablets by mouth 3 hours prior to bedtime       traZODone (DESYREL) 150 MG tablet 150 mg At Bedtime        triamcinolone (KENALOG) 0.1 % external cream APPLY TOPICALLY 3 TIMES    DAILY. 30 g 0       Allergies:   Allergies   Allergen Reactions     Blood Transfusion Related (Informational Only) Other (See Comments)     Patient has a history of a clinically significant antibody against RBC antigens.  A delay in compatible RBCs may occur.     Pregabalin      Other reaction(s): Weight gain     Rotigotine      Other reaction(s): Worsening RLS; wt gain     Zolpidem      Other reaction(s): Hx of Parasomnias; should not be on Ambien     Iron      Other reaction(s): Constipation     Budesonide      Edema     Bupropion Rash     Carbamazepine Diarrhea     Clonazepam Other (See Comments)     Hypotension, trouble walking, mind disturbance     Encort [Hydrocortisone Acetate] Swelling     Localized to feet     Encort [Hydrocortisone] Swelling     Erythromycin Diarrhea     Erythromycin Nausea and Vomiting and Diarrhea     Flagyl [Metronidazole] Nausea     Gabapentin Other (See Comments)     Causes over-eating     Lamictal [Lamotrigine] Dizziness     Oxycodone Nausea and Vomiting     Tegretol [Carbamazepine, Eslicarbazepine, And Oxcarbazepine] Nausea and Vomiting       Social History     Tobacco Use     Smoking status:  Never     Smokeless tobacco: Never   Substance Use Topics     Alcohol use: Yes     Comment: 1 glass of wine 2x/yr       Family History   Problem Relation Age of Onset     Eye Disorder Mother      Lipids Mother         has CHF     Osteoporosis Mother      Diabetes Father      Breast Cancer Sister      Depression Sister      Lipids Sister      Thyroid Disease Sister      Hypertension Brother      Alcohol/Drug Brother      Depression Brother      Gastrointestinal Disease Brother         hx of colonic polyps     Lipids Brother      Psychotic Disorder Brother      Alzheimer Disease Paternal Grandmother      Congenital Anomalies Daughter      Neurologic Disorder Daughter      Suicide Son      Thyroid Disease Sister        Physical Examination:  LMP 03/11/2010     Wt Readings from Last 4 Encounters:   06/11/25 99.3 kg (219 lb)   04/30/25 108.4 kg (239 lb)   04/30/25 108.4 kg (239 lb)   02/20/25 94.8 kg (209 lb)   weight today 219 lbs per pt report  GEN: stated age appearing, NAD   HEENT: mild ptosis in bilateral eyes, no exophthalmos noted  PSYCH: Alert and oriented times 3; coherent speech, normal   rate and volume, able to articulate logical thoughts, able   to abstract reason, no tangential thoughts, no hallucinations   or delusions  Her affect is normal and pleasant  RESP: No cough, no audible wheezing, able to talk in full sentences     Labs and Studies:   Lab Results   Component Value Date    TSH 1.80 03/21/2025    TSH 1.72 11/13/2024    TSH 4.91 (H) 08/01/2024    TSH 2.63 01/16/2024    TSH 6.94 (H) 12/05/2023     Lab Results   Component Value Date    SABA 9.5 06/11/2025    SABA 9.5 05/28/2025    SABA 9.5 04/30/2025    SABA 9.2 03/21/2025    ALBUMIN 4.2 04/30/2025    ALT 14 04/30/2025    PHOS 4.1 09/17/2009    AST 18 04/30/2025    BILITOTAL 0.3 04/30/2025    CR 1.07 (H) 06/11/2025    CR 1.14 (H) 05/28/2025    CR 0.90 04/30/2025     06/11/2025    TSH 1.80 03/21/2025    ALKPHOS 74 04/30/2025    HGB 12.9 04/30/2025        Results for orders placed in visit on 11/14/22    XR Femur Right 2 Views    Narrative  EXAM: FEMUR RIGHT TWO VIEWS  DATE/TIME: 11/14/2022 1:50 PM    INDICATION: Right femur pain.  COMPARISON: None.    Impression  IMPRESSION:  1.  No femur fracture.  2.  Right total hip arthroplasty with proximal femur cerclage wire  fixation. Hardware is intact.  3.  Right total knee arthroplasty. The visualized hardware is intact.  4.  No joint malalignment.    ROHINI DE LUNA MD      SYSTEM ID:  [id number]        Results for orders placed in visit on 05/24/22    US Thyroid    Narrative  US THYROID 5/24/2022 1:18 PM    COMPARISON: None    HISTORY: Acquired hypothyroidism    FINDINGS:  Thyroid parenchyma: Homogenous  The right lobe of the thyroid measures: 5.3 x 1.2 x 2 cm  The left lobe of the thyroid measures: 2.9 x 1.3 x 1.1 cm  The thyroid isthmus measures: 0.3 cm    No thyroid nodules identified.    Impression  Impression: Normal sonographic appearance of the thyroid. No thyroid  nodules.    I have personally reviewed the examination and initial interpretation  and I agree with the findings.    BRISEIDA BOB MD      SYSTEM ID:  [id number]  ;    Results for orders placed during the hospital encounter of 05/25/22    NM Thyroid Uptake and Scan    Narrative  EXAM: NM THYROID UPTAKE AND SCAN  LOCATION: [facility]  DATE/TIME: 5/25/2022 11:55 AM    INDICATION: Clinical and/or laboratory evidence of hyperthyroidism.  COMPARISON: Thyroid ultrasound dated 05/24/2022.  TECHNIQUE: 236 uCi I-123, oral ingestion. 24-hour neck uptake and imaging.    FINDINGS: 24-hour uptake: 47.3% (Normal range: 10-30%).    Impression  IMPRESSION:    Diffuse radiotracer uptake throughout the thyroid gland suspicious for Graves' disease without hot/cold nodule.  '    Results for orders placed in visit on 09/01/21    DX Hip/Pelvis/Spine    Narrative  BONE DENSITOMETRY  [facility] [address] [place] [address]  2021    PATIENT: Mercedes Barber  CHART: [id number]  :  1955  AGE:  66 year old  SEX:  female  REFERRING PROVIDER:  Skyler Álvarez MD    PROCEDURE:  Bone density scanning was performed using DXA technology of the lumbar spine and hip.  Scanning was performed on a Lunar Prodigy scanner.  Reporting is completed in the form of a T-score.  The T-score represents the standard deviation from peak bone mass based on a young healthy adult.    REFERENCE T-SCORES:  Normal                -1.0 and greater  Osteopenia         Between -1.0 and -2.5  Osteoporosis     -2.5 and less    RISK FACTORS:  Post-menopausal, Height loss of 2 inches, Fractures of wrist and humerus    CURRENT TREATMENT:  None listed    FINDINGS:  Lumbar Spine (L1-L4)      T-score:  -0.4, marked degenerative changes present  Left Femoral Neck            T-score:  -2.3  Forearm (radius 33%)      T-score:  -1.5  The right and left femur is not acceptable for evaluation due to previous arthroplasty.    Lumbar (L1-L4) BMD: 1.147  Previous: 1.345  Left Total Hip BMD: 0.763  Previous: 0.944  Forearm (radius 33%) BMD: 0.755   Previous: NA    Comparison is made to another DXA performed on the same Lunar Prodigy  machine on 2009. There was a study performed in  but the images are not available.    IMPRESSION  Osteopenia., Degenerative changes of the lumbar spine which may falsely elevate results.    There has been significant decrease in bone density of the lumbar spine. There has been significant decrease in bone density of the hip. The forearm was not included on the previous study so comparison is not possible.    Recommendations include ensuring adequate Calcium and Vitamin D.    The current NOF Guidelines recommend treatment for patients with prior hip or vertebral fracture, T-score -2.5 or below, or 10 year risk of any major osteoporotic fracture >20% or 10 year risk of hip fracture >3%, as calculated using the  "FRAX calculator (www.shef.ac.uk/FRAX or you can google \"FRAX\").    This patient's risks based on available information, with the use of FRAX, are 13 % for major osteoporotic fracture and 3.6 % for hip fracture.  Based on these guidelines, treatment (in addition to calcium and vitamin D) is recommended for this patient, after ruling out other causes of osteoporosis.  This is meant as an aid to clinical decision making; one must still use clinical judgement.      Follow up can be considered in 2 years.  ___________________  Donya Oliveira M.D.  Electronically signed    Again, thank you for allowing me to participate in the care of your patient.        Sincerely,      Odalys Caraballo MD    Electronically signed  "

## 2025-07-28 NOTE — PATIENT INSTRUCTIONS
Continue Levothyroxine and Alendronate    Referral to weight loss surgery    Repeat DXA scan in about 1 year prior to follow up treatment

## 2025-07-29 ENCOUNTER — MYC MEDICAL ADVICE (OUTPATIENT)
Dept: INTERNAL MEDICINE | Facility: CLINIC | Age: 70
End: 2025-07-29
Payer: MEDICARE

## 2025-07-29 ENCOUNTER — PATIENT OUTREACH (OUTPATIENT)
Dept: CARE COORDINATION | Facility: CLINIC | Age: 70
End: 2025-07-29
Payer: MEDICARE

## 2025-07-30 ENCOUNTER — MYC MEDICAL ADVICE (OUTPATIENT)
Dept: INTERNAL MEDICINE | Facility: CLINIC | Age: 70
End: 2025-07-30
Payer: MEDICARE

## 2025-07-31 ENCOUNTER — PATIENT OUTREACH (OUTPATIENT)
Dept: CARE COORDINATION | Facility: CLINIC | Age: 70
End: 2025-07-31
Payer: MEDICARE

## 2025-08-27 ENCOUNTER — MYC MEDICAL ADVICE (OUTPATIENT)
Dept: INTERNAL MEDICINE | Facility: CLINIC | Age: 70
End: 2025-08-27
Payer: MEDICARE

## (undated) DEVICE — SUCTION MANIFOLD NEPTUNE 2 SYS 4 PORT 0702-020-000

## (undated) DEVICE — BLADE SAW SAGITTAL STRK 25X90X1.27MM HD SYS 6 6125-127-090

## (undated) DEVICE — ESU ELEC BLADE 6" COATED E1450-6

## (undated) DEVICE — LINEN ORTHO ACL PACK 5447

## (undated) DEVICE — SU ETHIBOND 5 V-37 4X30" MB66G

## (undated) DEVICE — PREP CHLORAPREP 26ML TINTED ORANGE  260815

## (undated) DEVICE — LINEN HALF SHEET 5512

## (undated) DEVICE — LINEN FULL SHEET 5511

## (undated) DEVICE — DEVICE RETRIEVER HEWSON 71111579

## (undated) DEVICE — SET HANDPIECE INTERPULSE W/COAXIAL FAN SPRAY TIP 0210118000

## (undated) DEVICE — SU VICRYL 0 CT-1 36" J346H

## (undated) DEVICE — SOL NACL 0.9% IRRIG 3000ML BAG 2B7477

## (undated) DEVICE — DRSG STERI STRIP 1/2X4" R1547

## (undated) DEVICE — PACK TOTAL HIP RIDGES LATEX PO15HIFSG

## (undated) DEVICE — LINEN DRAPE 54X72" 5467

## (undated) DEVICE — SOL NACL 0.9% IRRIG 1000ML BOTTLE 2F7124

## (undated) DEVICE — GLOVE PROTEXIS POWDER FREE 8.5 ORTHOPEDIC 2D73ET85

## (undated) DEVICE — DRSG TEGADERM 4X10" 1627

## (undated) DEVICE — CAST PLASTER SPLINT 3X15" EXTRA FAST

## (undated) DEVICE — SU STRATAFIX PDS PLUS 1 CT-1 18" SXPP1A404

## (undated) DEVICE — NDL SPINAL 20GA 3.5" 405182

## (undated) DEVICE — SUCTION IRR SYSTEM W/O TIP INTERPULSE HANDPIECE 0210-100-000

## (undated) DEVICE — DRAPE STERI TOWEL LG 1010

## (undated) DEVICE — GLOVE PROTEXIS POWDER FREE 7.5 ORTHOPEDIC 2D73ET75

## (undated) DEVICE — GLOVE PROTEXIS W/NEU-THERA 7.0  2D73TE70

## (undated) DEVICE — ESU CANNULA RF MONOPLOAR CVD 20GA 10X150MM 0406-630-225

## (undated) DEVICE — PACK HAND SOP32HARMO

## (undated) DEVICE — DRSG TEGADERM 4X4 3/4" 1626W

## (undated) DEVICE — TRAY PAIN INJECTION 97A 640

## (undated) DEVICE — KIT CULTURE TRANSPORT SYS A.C.T. II DUAL ANEROBE R124022

## (undated) DEVICE — PREP CHLORAPREP W/ORANGE TINT 10.5ML 260715

## (undated) DEVICE — GLOVE PROTEXIS BLUE W/NEU-THERA 8.0  2D73EB80

## (undated) DEVICE — STRAP STIRRUP W/SLIP 30187-030

## (undated) DEVICE — GLOVE PROTEXIS POWDER FREE 8.0 ORTHOPEDIC 2D73ET80

## (undated) DEVICE — DRSG WOUND VAC SPONGE MED BLACK M8275052/5

## (undated) DEVICE — GLOVE PROTEXIS BLUE W/NEU-THERA 8.5  2D73EB85

## (undated) DEVICE — SU ETHIBOND 1 CT-1 30" X425H

## (undated) DEVICE — BONE CLEANING TIP INTERPULSE  0210-010-000

## (undated) DEVICE — BLADE SAW SAGITTAL STRK 18X90X1.37MM HD SYS 6 6118-137-090

## (undated) DEVICE — SYR 03ML LL W/O NDL 309657

## (undated) DEVICE — CANNULA MONOPOLAR CVD 100ML 20GA 0406-630-125

## (undated) DEVICE — SOL WATER IRRIG 1000ML BOTTLE 2F7114

## (undated) DEVICE — SU VICRYL+ 1 MO-4 18" DYED VCP702D

## (undated) DEVICE — DRAPE IOBAN INCISE 36X23" 6651EZ

## (undated) DEVICE — ESU PENCIL W/HOLSTER E2350H

## (undated) DEVICE — DRAPE STOCKINETTE IMPERVIOUS 12" 1587

## (undated) DEVICE — SU MONOCRYL 3-0 SH 27" UND Y416H

## (undated) DEVICE — GLOVE ESTEEM POWDER FREE 7.0  2D72PL70

## (undated) DEVICE — SU MONOCRYL 4-0 PS-2 18" UND Y496G

## (undated) DEVICE — DRSG GAUZE 4X4" 8044

## (undated) DEVICE — DRAPE C-ARM MINI 5423

## (undated) DEVICE — PREP CHLORAPREP 26ML TINTED HI-LITE ORANGE 930815

## (undated) DEVICE — DRILL BIT HANDINN 2.0MM FDB 2.0

## (undated) DEVICE — Device

## (undated) DEVICE — SUCTION MANIFOLD DORNOCH ULTRA CART UL-CL500

## (undated) DEVICE — TUBING IRRIG TUR Y TYPE 96" LF 6543-01

## (undated) DEVICE — POSITIONER ABDUCTION PILLOW FOAM MED FP-ABDUCTM

## (undated) DEVICE — SU MONOCRYL 3-0 PS-2 27" Y427H

## (undated) DEVICE — NDL 18GA 1.5" 305196

## (undated) DEVICE — NDL BLUNT 18GA 1" W/O FILTER 305181

## (undated) DEVICE — STRATAFIX 2-0

## (undated) DEVICE — BAG CLEAR TRASH 1.3M 39X33" P4040C

## (undated) DEVICE — GOWN XLG DISP 9545

## (undated) DEVICE — CATH TRAY FOLEY SURESTEP 16FR DRAIN BAG STATOCK A899916

## (undated) DEVICE — DRSG XEROFORM 1X8"

## (undated) DEVICE — LINEN TOWEL PACK X5 5464

## (undated) DEVICE — BNDG ELASTIC 4" DBL LENGTH UNSTERILE 6611-14

## (undated) DEVICE — ADH SKIN CLOSURE PREMIERPRO EXOFIN 1.0ML 3470

## (undated) DEVICE — CAST PADDING 3" UNSTERILE 9043

## (undated) DEVICE — EYE PREP BETADINE 5% SOLUTION 30ML 0065-0411-30

## (undated) DEVICE — NDL SPINAL 22GA 5" QUINCKE 405148

## (undated) DEVICE — SUCTION CANISTER MEDIVAC LINER 3000ML W/LID 65651-530

## (undated) DEVICE — SUCTION TIP YANKAUER W/O VENT K86

## (undated) DEVICE — LINEN MAYO STAND COVER OVERSIZE PACK 5458

## (undated) DEVICE — SU VICRYL 2-0 CT-2 27" UND J269H

## (undated) DEVICE — ESU GROUND PAD ADULT W/CORD E7507

## (undated) DEVICE — ESU PENCIL W/SMOKE EVAC NEPTUNE STRYKER 0703-046-000

## (undated) DEVICE — LINEN TOWEL PACK X10 5473

## (undated) DEVICE — HOOD FLYTE W/PEELAWAY 408-800-100

## (undated) DEVICE — GLOVE PROTEXIS W/NEU-THERA 8.5  2D73TE85

## (undated) DEVICE — LINEN GOWN X4 5410

## (undated) DEVICE — SU VICRYL 2-0 CT-1 36" J345H

## (undated) DEVICE — NDL SPINAL 18GA 3.5" 405184

## (undated) DEVICE — LINEN BACK PACK 5440

## (undated) DEVICE — GLOVE PROTEXIS BLUE W/NEU-THERA 7.5  2D73EB75

## (undated) DEVICE — DRSG GAUZE 4X4" TRAY

## (undated) DEVICE — SLING ARM LG 79-99157

## (undated) DEVICE — SUTURE VICRYL+ 2-0 CT-2 27" UND VCP269H

## (undated) DEVICE — WIPES FOLEY CARE SURESTEP PROVON DFC100

## (undated) DEVICE — BLADE KNIFE SURG 10 371110

## (undated) DEVICE — DRAPE C-ARM W/STRAPS 42X72" 07-CA104

## (undated) DEVICE — GLOVE PROTEXIS POWDER FREE SMT 7.0  2D72PT70X

## (undated) DEVICE — SU VICRYL 4-0 PS-2 18" UND J496H

## (undated) DEVICE — DRSG PRIMAPORE 03 1/8X6" 66000318

## (undated) DEVICE — GLOVE PROTEXIS BLUE W/NEU-THERA 7.0  2D73EB70

## (undated) DEVICE — IMM PILLOW ABDUCT HIP MED M60-025-M

## (undated) DEVICE — SU NDL MAYO 1824-2

## (undated) DEVICE — SYR 10ML LL W/O NDL 302995

## (undated) DEVICE — CATH TRAY FOLEY SURESTEP 16FR WDRAIN BAG STLK LATEX A300316A

## (undated) DEVICE — DRAPE CONVERTORS U-DRAPE 60X72" 8476

## (undated) DEVICE — SYR 50ML LL W/O NDL 309653

## (undated) DEVICE — SU STRATAFIXÂ PDO 2-0 24CMX24CM MH DA SXPD2B408

## (undated) DEVICE — DRSG DRAIN 4X4" 7086

## (undated) DEVICE — SU VICRYL 2-0 CT-1 27" UND J259H

## (undated) DEVICE — SU VICRYL 0 CT-1 27" J340H

## (undated) DEVICE — PACK TOTAL HIP W/POUCH RIVERSIDE LATEX FREE

## (undated) DEVICE — DRILL BIT HANDINN 2.5MM DB2.5

## (undated) DEVICE — DRAIN HEMOVAC RESERVOIR KIT 10FR 1/8" MED 00-2550-002-10

## (undated) DEVICE — DRAPE IOBAN INCISE 23X17" 6650EZ

## (undated) DEVICE — GLOVE PROTEXIS POWDER FREE 7.0 ORTHOPEDIC 2D73ET70

## (undated) DEVICE — CANISTER WOUND VAC W/GEL 500ML M8275063/5

## (undated) DEVICE — NDL SPINAL 25GA 3.5" QUINCKE 405180

## (undated) DEVICE — SPONGE LAP 18X18" X8435

## (undated) DEVICE — DRSG AQUACEL AG 3.5X9.75" HYDROFIBER 412011

## (undated) DEVICE — SU ETHILON 2-0 PS 18" 585H

## (undated) DEVICE — TOURNIQUET SGL BLADDER 18"X4" RED 5921-218-135

## (undated) DEVICE — STRAP KNEE/BODY 31143004

## (undated) DEVICE — BLADE KNIFE SURG 15 371115

## (undated) DEVICE — SU FIBERWIRE 2 38"  AR-7200

## (undated) DEVICE — SU MONOCRYL 3-0 PS-1 27" Y936H

## (undated) DEVICE — CAST BUCKET

## (undated) DEVICE — GOWN IMPERVIOUS SPECIALTY XLG/XLONG 32474

## (undated) DEVICE — SU PDS II 0 CT-1 27" Z340H

## (undated) DEVICE — DRSG ADAPTIC 3X8" 6113

## (undated) DEVICE — CAST PLASTER SPLINT 4X15" EXTRA FAST

## (undated) DEVICE — SU MONOCRYL 0 CT 36" Y358H

## (undated) DEVICE — SYR 30ML LL W/O NDL 302832

## (undated) DEVICE — NDL COUNTER 20CT 31142493

## (undated) RX ORDER — VANCOMYCIN HYDROCHLORIDE 1 G/20ML
INJECTION, POWDER, LYOPHILIZED, FOR SOLUTION INTRAVENOUS
Status: DISPENSED
Start: 2019-08-07

## (undated) RX ORDER — ACETAMINOPHEN 325 MG/1
TABLET ORAL
Status: DISPENSED
Start: 2021-04-30

## (undated) RX ORDER — HYDROMORPHONE HYDROCHLORIDE 1 MG/ML
INJECTION, SOLUTION INTRAMUSCULAR; INTRAVENOUS; SUBCUTANEOUS
Status: DISPENSED
Start: 2021-04-30

## (undated) RX ORDER — HYDROMORPHONE HYDROCHLORIDE 1 MG/ML
INJECTION, SOLUTION INTRAMUSCULAR; INTRAVENOUS; SUBCUTANEOUS
Status: DISPENSED
Start: 2020-08-05

## (undated) RX ORDER — FENTANYL CITRATE 50 UG/ML
INJECTION, SOLUTION INTRAMUSCULAR; INTRAVENOUS
Status: DISPENSED
Start: 2020-08-05

## (undated) RX ORDER — PROPOFOL 10 MG/ML
INJECTION, EMULSION INTRAVENOUS
Status: DISPENSED
Start: 2021-04-30

## (undated) RX ORDER — ACETAMINOPHEN 500 MG
TABLET ORAL
Status: DISPENSED
Start: 2019-07-23

## (undated) RX ORDER — ONDANSETRON 2 MG/ML
INJECTION INTRAMUSCULAR; INTRAVENOUS
Status: DISPENSED
Start: 2021-04-30

## (undated) RX ORDER — DEXAMETHASONE SODIUM PHOSPHATE 4 MG/ML
INJECTION, SOLUTION INTRA-ARTICULAR; INTRALESIONAL; INTRAMUSCULAR; INTRAVENOUS; SOFT TISSUE
Status: DISPENSED
Start: 2021-04-30

## (undated) RX ORDER — HYDROMORPHONE HYDROCHLORIDE 1 MG/ML
INJECTION, SOLUTION INTRAMUSCULAR; INTRAVENOUS; SUBCUTANEOUS
Status: DISPENSED
Start: 2019-07-23

## (undated) RX ORDER — KETAMINE HCL IN 0.9 % NACL 50 MG/5 ML
SYRINGE (ML) INTRAVENOUS
Status: DISPENSED
Start: 2019-07-23

## (undated) RX ORDER — FENTANYL CITRATE 50 UG/ML
INJECTION, SOLUTION INTRAMUSCULAR; INTRAVENOUS
Status: DISPENSED
Start: 2021-04-30

## (undated) RX ORDER — OXYCODONE HYDROCHLORIDE 5 MG/1
TABLET ORAL
Status: DISPENSED
Start: 2021-04-30

## (undated) RX ORDER — ACETAMINOPHEN 325 MG/1
TABLET ORAL
Status: DISPENSED
Start: 2020-08-05

## (undated) RX ORDER — FENTANYL CITRATE 50 UG/ML
INJECTION, SOLUTION INTRAMUSCULAR; INTRAVENOUS
Status: DISPENSED
Start: 2019-07-23

## (undated) RX ORDER — FENTANYL CITRATE 50 UG/ML
INJECTION, SOLUTION INTRAMUSCULAR; INTRAVENOUS
Status: DISPENSED
Start: 2019-08-07

## (undated) RX ORDER — LIDOCAINE HYDROCHLORIDE 10 MG/ML
INJECTION, SOLUTION EPIDURAL; INFILTRATION; INTRACAUDAL; PERINEURAL
Status: DISPENSED
Start: 2021-04-30

## (undated) RX ORDER — CEFAZOLIN SODIUM 1 G/3ML
INJECTION, POWDER, FOR SOLUTION INTRAMUSCULAR; INTRAVENOUS
Status: DISPENSED
Start: 2019-08-07

## (undated) RX ORDER — CEFAZOLIN SODIUM 2 G/100ML
INJECTION, SOLUTION INTRAVENOUS
Status: DISPENSED
Start: 2019-07-23

## (undated) RX ORDER — FENTANYL CITRATE 50 UG/ML
INJECTION, SOLUTION INTRAMUSCULAR; INTRAVENOUS
Status: DISPENSED
Start: 2019-08-26

## (undated) RX ORDER — TRANEXAMIC ACID 650 MG/1
TABLET ORAL
Status: DISPENSED
Start: 2021-04-30

## (undated) RX ORDER — CEFAZOLIN SODIUM 2 G/100ML
INJECTION, SOLUTION INTRAVENOUS
Status: DISPENSED
Start: 2020-08-05

## (undated) RX ORDER — FENTANYL CITRATE 50 UG/ML
INJECTION, SOLUTION INTRAMUSCULAR; INTRAVENOUS
Status: DISPENSED
Start: 2021-10-27

## (undated) RX ORDER — LIDOCAINE HYDROCHLORIDE 10 MG/ML
INJECTION, SOLUTION EPIDURAL; INFILTRATION; INTRACAUDAL; PERINEURAL
Status: DISPENSED
Start: 2019-08-07

## (undated) RX ORDER — LABETALOL HYDROCHLORIDE 5 MG/ML
INJECTION, SOLUTION INTRAVENOUS
Status: DISPENSED
Start: 2021-04-30

## (undated) RX ORDER — CEFAZOLIN SODIUM 2 G/100ML
INJECTION, SOLUTION INTRAVENOUS
Status: DISPENSED
Start: 2019-08-07

## (undated) RX ORDER — IBUPROFEN 600 MG/1
TABLET, FILM COATED ORAL
Status: DISPENSED
Start: 2021-04-30

## (undated) RX ORDER — GLYCOPYRROLATE 0.2 MG/ML
INJECTION INTRAMUSCULAR; INTRAVENOUS
Status: DISPENSED
Start: 2019-08-07

## (undated) RX ORDER — TRANEXAMIC ACID 650 MG/1
TABLET ORAL
Status: DISPENSED
Start: 2020-08-05

## (undated) RX ORDER — PROPOFOL 10 MG/ML
INJECTION, EMULSION INTRAVENOUS
Status: DISPENSED
Start: 2019-08-07

## (undated) RX ORDER — ONDANSETRON 2 MG/ML
INJECTION INTRAMUSCULAR; INTRAVENOUS
Status: DISPENSED
Start: 2019-08-07

## (undated) RX ORDER — HYDROMORPHONE HYDROCHLORIDE 2 MG/1
TABLET ORAL
Status: DISPENSED
Start: 2020-08-05

## (undated) RX ORDER — CEFAZOLIN SODIUM 2 G/100ML
INJECTION, SOLUTION INTRAVENOUS
Status: DISPENSED
Start: 2021-04-30

## (undated) RX ORDER — NEOSTIGMINE METHYLSULFATE 1 MG/ML
VIAL (ML) INJECTION
Status: DISPENSED
Start: 2019-08-07

## (undated) RX ORDER — CELECOXIB 200 MG/1
CAPSULE ORAL
Status: DISPENSED
Start: 2020-08-05

## (undated) RX ORDER — BUPIVACAINE HYDROCHLORIDE 5 MG/ML
INJECTION, SOLUTION EPIDURAL; INTRACAUDAL
Status: DISPENSED
Start: 2019-08-26

## (undated) RX ORDER — GLYCOPYRROLATE 0.2 MG/ML
INJECTION INTRAMUSCULAR; INTRAVENOUS
Status: DISPENSED
Start: 2021-04-30

## (undated) RX ORDER — HYDROMORPHONE HYDROCHLORIDE 1 MG/ML
INJECTION, SOLUTION INTRAMUSCULAR; INTRAVENOUS; SUBCUTANEOUS
Status: DISPENSED
Start: 2019-08-26

## (undated) RX ORDER — DEXAMETHASONE SODIUM PHOSPHATE 4 MG/ML
INJECTION, SOLUTION INTRA-ARTICULAR; INTRALESIONAL; INTRAMUSCULAR; INTRAVENOUS; SOFT TISSUE
Status: DISPENSED
Start: 2019-08-07